# Patient Record
Sex: FEMALE | Race: WHITE | HISPANIC OR LATINO | Employment: UNEMPLOYED | URBAN - METROPOLITAN AREA
[De-identification: names, ages, dates, MRNs, and addresses within clinical notes are randomized per-mention and may not be internally consistent; named-entity substitution may affect disease eponyms.]

---

## 2017-01-01 ENCOUNTER — PATIENT MESSAGE (OUTPATIENT)
Dept: INTERNAL MEDICINE | Facility: CLINIC | Age: 72
End: 2017-01-01

## 2017-01-03 ENCOUNTER — TELEPHONE (OUTPATIENT)
Dept: FAMILY MEDICINE | Facility: CLINIC | Age: 72
End: 2017-01-03

## 2017-01-03 RX ORDER — METOPROLOL SUCCINATE 25 MG/1
25 TABLET, EXTENDED RELEASE ORAL DAILY
Qty: 30 TABLET | Refills: 0 | Status: SHIPPED | OUTPATIENT
Start: 2017-01-03 | End: 2017-01-28 | Stop reason: SDUPTHER

## 2017-01-03 NOTE — TELEPHONE ENCOUNTER
Her last B/P was elevated.  Have her come in for nurse visit for repeat B/P check.  Will send out #30 metoprolol.

## 2017-01-05 ENCOUNTER — TELEPHONE (OUTPATIENT)
Dept: UROLOGY | Facility: CLINIC | Age: 72
End: 2017-01-05

## 2017-01-09 ENCOUNTER — HOSPITAL ENCOUNTER (OUTPATIENT)
Dept: RADIOLOGY | Facility: HOSPITAL | Age: 72
Discharge: HOME | End: 2017-01-09
Attending: UROLOGY
Payer: MEDICARE

## 2017-01-09 DIAGNOSIS — R31.29 HEMATURIA, MICROSCOPIC: ICD-10-CM

## 2017-01-09 PROCEDURE — 76770 US EXAM ABDO BACK WALL COMP: CPT | Mod: TC,PO

## 2017-01-09 PROCEDURE — 76770 US EXAM ABDO BACK WALL COMP: CPT | Mod: 26,,, | Performed by: RADIOLOGY

## 2017-01-11 ENCOUNTER — OFFICE VISIT (OUTPATIENT)
Dept: FAMILY MEDICINE | Facility: CLINIC | Age: 72
End: 2017-01-11
Payer: MEDICARE

## 2017-01-11 ENCOUNTER — HOSPITAL ENCOUNTER (OUTPATIENT)
Dept: RADIOLOGY | Facility: HOSPITAL | Age: 72
Discharge: HOME | End: 2017-01-11
Attending: NURSE PRACTITIONER
Payer: MEDICARE

## 2017-01-11 VITALS
BODY MASS INDEX: 28.68 KG/M2 | TEMPERATURE: 98 F | DIASTOLIC BLOOD PRESSURE: 89 MMHG | HEART RATE: 82 BPM | WEIGHT: 168 LBS | SYSTOLIC BLOOD PRESSURE: 122 MMHG | HEIGHT: 64 IN

## 2017-01-11 DIAGNOSIS — M79.671 RIGHT FOOT PAIN: Primary | ICD-10-CM

## 2017-01-11 DIAGNOSIS — M79.672 LEFT FOOT PAIN: ICD-10-CM

## 2017-01-11 DIAGNOSIS — M79.671 RIGHT FOOT PAIN: ICD-10-CM

## 2017-01-11 PROCEDURE — 73630 X-RAY EXAM OF FOOT: CPT | Mod: 26,RT,, | Performed by: RADIOLOGY

## 2017-01-11 PROCEDURE — 99999 PR PBB SHADOW E&M-EST. PATIENT-LVL IV: CPT | Mod: PBBFAC,,, | Performed by: NURSE PRACTITIONER

## 2017-01-11 PROCEDURE — 99213 OFFICE O/P EST LOW 20 MIN: CPT | Mod: S$PBB,,, | Performed by: NURSE PRACTITIONER

## 2017-01-11 PROCEDURE — 73630 X-RAY EXAM OF FOOT: CPT | Mod: TC,PO,RT

## 2017-01-11 RX ORDER — DICLOFENAC SODIUM 75 MG/1
75 TABLET, DELAYED RELEASE ORAL 2 TIMES DAILY PRN
Qty: 40 TABLET | Refills: 0 | Status: SHIPPED | OUTPATIENT
Start: 2017-01-11 | End: 2017-01-28 | Stop reason: SDUPTHER

## 2017-01-11 NOTE — PROGRESS NOTES
Subjective:       Patient ID: Niurka Pedraza is a 71 y.o. female.    Chief Complaint: right foot pain    HPI Comments: Right ankle and foot swelling for months, has gotten worse over last week, by end of day will be very painful and swollen. No prior injury, has not tried anything at home for the pain.    Vitals:    01/11/17 1052   BP: 122/89   Pulse: 82   Temp: 98.3 °F (36.8 °C)     Review of Systems   Constitutional: Negative for chills and fever.   Respiratory: Negative for shortness of breath.    Cardiovascular: Negative for chest pain and palpitations.   Musculoskeletal: Positive for arthralgias (atrhriyis to hands, knees and hips also bother her in am, difficult to get started first thing in am).        Right ankle and foot pain with swelling, states has not been hot or red   Skin: Negative.  Negative for wound.       Past Medical History   Diagnosis Date    Coccidiomycosis, progressive     Hyperlipidemia     Hypertension     Pulmonary nodules      Objective:      Physical Exam   Constitutional: She is oriented to person, place, and time. She appears well-developed and well-nourished.   HENT:   Head: Normocephalic.   Eyes: Pupils are equal, round, and reactive to light.   Cardiovascular: Normal rate, regular rhythm, normal heart sounds and intact distal pulses.    No murmur heard.  Pulmonary/Chest: Effort normal and breath sounds normal.   Abdominal: Soft. Bowel sounds are normal.   Musculoskeletal:        Right ankle: She exhibits decreased range of motion and swelling. She exhibits no ecchymosis and normal pulse. Tenderness. Medial malleolus tenderness found.   Right medial malleolus tender and swollen, non pitting edema   Neurological: She is alert and oriented to person, place, and time.   Skin: Skin is warm and dry.   Psychiatric: She has a normal mood and affect.   Nursing note and vitals reviewed.      Assessment:       1. Right foot pain        Plan:       Right foot pain  -     Cancel: X-Ray Foot  Complete Left; Future; Expected date: 1/11/17  -     diclofenac (VOLTAREN) 75 MG EC tablet; Take 1 tablet (75 mg total) by mouth 2 (two) times daily as needed.  Dispense: 40 tablet; Refill: 0  -     X-Ray Foot Complete Right; Future; Expected date: 1/11/17    will xray foot today, diclofenac prn BID, compression stockings recommended when will be sitting for prolonged periods of time        Return for pending test results.

## 2017-01-11 NOTE — MR AVS SNAPSHOT
[facility]  [address]  [place] [address]  Phone: [phone]  Fax: [phone]                  Niurka Pedraza   2017 11:00 AM   Office Visit    Description:  Female : 1945   Provider:  LE Peralta   Department:  [facility]           Reason for Visit     right foot pain           Diagnoses this Visit        Comments    Left foot pain    -  Primary            To Do List           Future Appointments        Provider Department Dept Phone    2017 10:30 AM LAB, [place] [facility] [phone]    2017 10:30 AM MATA Dhaliwal MD [facility] [phone]      Goals (5 Years of Data)     None       These Medications        Disp Refills Start End    diclofenac (VOLTAREN) 75 MG EC tablet 40 tablet 0 2017     Take 1 tablet (75 mg total) by mouth 2 (two) times daily as needed. - Oral    Pharmacy: [facility]/pharmacy #[id number] - [place], [place] - [address]  #: [phone]         [facility] On Call     [facility] On [facility] Nurse Care Line -  Assistance  Registered nurses in the [facility] provide clinical advisement, health education, appointment booking, and other advisory services.  Call for this free service at [phone].             Medications           Message regarding Medications     Verify the changes and/or additions to your medication regime listed below are the same as discussed with your clinician today.  If any of these changes or additions are incorrect, please notify your healthcare provider.        START taking these NEW medications        Refills    diclofenac (VOLTAREN) 75 MG EC tablet 0    Sig: Take 1 tablet (75 mg total) by mouth 2 (two) times daily as needed.    Class: Normal    Route: Oral           Verify that the below list of medications is an accurate representation of the medications you are currently taking.  If none reported, the list may be blank. If  "incorrect, please contact your healthcare provider. Carry this list with you in case of emergency.           Current Medications     atorvastatin (LIPITOR) 20 MG tablet Take 1 tablet (20 mg total) by mouth once daily.    calcium-vitamin D3 (CALCIUM 500 + D) 500 mg(1,250mg) -200 unit per tablet Take 1 tablet by mouth 2 (two) times daily with meals.    levothyroxine (SYNTHROID) 50 MCG tablet Take 1 tablet (50 mcg total) by mouth once daily.    metoprolol succinate (TOPROL-XL) 25 MG 24 hr tablet Take 1 tablet (25 mg total) by mouth once daily.    multivitamin (ONE DAILY MULTIVITAMIN) per tablet Take 1 tablet by mouth once daily.    diclofenac (VOLTAREN) 75 MG EC tablet Take 1 tablet (75 mg total) by mouth 2 (two) times daily as needed.           Clinical Reference Information           Vital Signs - Last Recorded  Most recent update: 1/11/2017 10:55 AM by Araseli Cruz MA    BP Pulse Temp Ht Wt BMI    122/89 (BP Location: Left arm, Patient Position: Sitting, BP Method: Manual) 82 98.3 °F (36.8 °C) (Oral) 5' 4" (1.626 m) 76.2 kg (167 lb 15.9 oz) 28.84 kg/m2      Blood Pressure          Most Recent Value    BP  122/89      Allergies as of 1/11/2017     No Known Allergies      Immunizations Administered on Date of Encounter - 1/11/2017     None      Orders Placed During Today's Visit     Future Labs/Procedures Expected by Expires    X-Ray Foot Complete Left  1/11/2017 1/12/2018      Instructions    Compression stockings, wear when you will be sitting for prolonged periods of time.       "

## 2017-01-15 ENCOUNTER — PATIENT MESSAGE (OUTPATIENT)
Dept: INTERNAL MEDICINE | Facility: CLINIC | Age: 72
End: 2017-01-15

## 2017-01-16 ENCOUNTER — PROCEDURE VISIT (OUTPATIENT)
Dept: UROLOGY | Facility: CLINIC | Age: 72
End: 2017-01-16
Payer: MEDICARE

## 2017-01-16 VITALS
BODY MASS INDEX: 28.68 KG/M2 | WEIGHT: 168 LBS | HEIGHT: 64 IN | SYSTOLIC BLOOD PRESSURE: 161 MMHG | DIASTOLIC BLOOD PRESSURE: 91 MMHG | HEART RATE: 71 BPM

## 2017-01-16 DIAGNOSIS — R31.29 MICROHEMATURIA: Primary | ICD-10-CM

## 2017-01-16 PROCEDURE — 88112 CYTOPATH CELL ENHANCE TECH: CPT | Performed by: PATHOLOGY

## 2017-01-16 PROCEDURE — 52000 CYSTOURETHROSCOPY: CPT | Mod: PBBFAC,PO | Performed by: UROLOGY

## 2017-01-16 PROCEDURE — 88112 CYTOPATH CELL ENHANCE TECH: CPT | Mod: 26,,, | Performed by: PATHOLOGY

## 2017-01-16 NOTE — PROCEDURES
"Cystoscopy  Date/Time: 1/16/2017 11:51 AM  Performed by: MATA LEO  Authorized by: MATA LEO     Consent Done?:  Yes (Written)  Time out: Immediately prior to procedure a "time out" was called to verify the correct patient, procedure, equipment, support staff and site/side marked as required.    Indications: recurrent UTIs and hematuria    Position:  Other  Anesthesia:  Lidocaine jelly  Patient sedated?: No    Preparation: Patient was prepped and draped in usual sterile fashion      Scope type:  Flexible cystoscope    Patient tolerance:  Patient tolerated the procedure well with no immediate complications      The patient was placed in the frog-leg position.  The genitals were prepped and draped in a sterile fashion.  Viscous lidocaine jelly was intsilled into the urethra.  The uretrhal was dilated with sounds to 22 [place].  Using a flexible scope, a careful cystoscopic exam was then performed.  The entire bladder mucosa was systematically visualized.  There was mild bladder wall trabeculation, otherwise the mucosa appeared normal.  There were no lesions, masses foreign bodies or stones.   Each ureteral orifices were visualized and both had clear efflux of urine.  Barbotage was performed and washings were collected for cytological evaluation.  The cystoscope was then removed and I examined the entire length of the urethra.  The urethra appeared normal.  She tolerated the procedure well.  There were no complications.    Impression:  Normal cystoscopy.      "

## 2017-01-17 ENCOUNTER — LAB VISIT (OUTPATIENT)
Dept: LAB | Facility: HOSPITAL | Age: 72
End: 2017-01-17
Attending: INTERNAL MEDICINE
Payer: MEDICARE

## 2017-01-17 DIAGNOSIS — E03.9 HYPOTHYROIDISM, UNSPECIFIED TYPE: ICD-10-CM

## 2017-01-17 LAB — TSH SERPL DL<=0.005 MIU/L-ACNC: 2.48 UIU/ML

## 2017-01-17 PROCEDURE — 36415 COLL VENOUS BLD VENIPUNCTURE: CPT | Mod: PO

## 2017-01-17 PROCEDURE — 84443 ASSAY THYROID STIM HORMONE: CPT

## 2017-01-20 ENCOUNTER — TELEPHONE (OUTPATIENT)
Dept: UROLOGY | Facility: CLINIC | Age: 72
End: 2017-01-20

## 2017-01-20 RX ORDER — FERROUS SULFATE, DRIED 160(50) MG
1 TABLET, EXTENDED RELEASE ORAL 2 TIMES DAILY WITH MEALS
COMMUNITY
Start: 2017-01-20 | End: 2017-01-26 | Stop reason: SDUPTHER

## 2017-01-20 NOTE — TELEPHONE ENCOUNTER
----- Message from MATA Dhaliwal MD sent at 1/19/2017  4:45 PM CST -----  Call with urine cytology.  Negative.

## 2017-01-20 NOTE — TELEPHONE ENCOUNTER
Spoke to pt and informed her of the test results of the urine cytology. Pt verbalized understanding.

## 2017-01-22 ENCOUNTER — PATIENT MESSAGE (OUTPATIENT)
Dept: INTERNAL MEDICINE | Facility: CLINIC | Age: 72
End: 2017-01-22

## 2017-01-27 RX ORDER — FERROUS SULFATE, DRIED 160(50) MG
1 TABLET, EXTENDED RELEASE ORAL 2 TIMES DAILY WITH MEALS
COMMUNITY
Start: 2017-01-27 | End: 2017-04-18 | Stop reason: SDUPTHER

## 2017-01-28 DIAGNOSIS — M79.671 RIGHT FOOT PAIN: ICD-10-CM

## 2017-01-30 RX ORDER — DICLOFENAC SODIUM 75 MG/1
TABLET, DELAYED RELEASE ORAL
Qty: 40 TABLET | Refills: 0 | Status: SHIPPED | OUTPATIENT
Start: 2017-01-30 | End: 2017-03-21 | Stop reason: SDUPTHER

## 2017-01-30 RX ORDER — METOPROLOL SUCCINATE 25 MG/1
25 TABLET, EXTENDED RELEASE ORAL DAILY
Qty: 30 TABLET | Refills: 0 | OUTPATIENT
Start: 2017-01-30

## 2017-01-31 ENCOUNTER — TELEPHONE (OUTPATIENT)
Dept: FAMILY MEDICINE | Facility: CLINIC | Age: 72
End: 2017-01-31

## 2017-01-31 ENCOUNTER — CLINICAL SUPPORT (OUTPATIENT)
Dept: FAMILY MEDICINE | Facility: CLINIC | Age: 72
End: 2017-01-31
Payer: MEDICARE

## 2017-01-31 VITALS — DIASTOLIC BLOOD PRESSURE: 86 MMHG | SYSTOLIC BLOOD PRESSURE: 134 MMHG

## 2017-01-31 PROCEDURE — 99999 PR PBB SHADOW E&M-EST. PATIENT-LVL II: CPT | Mod: PBBFAC,,,

## 2017-01-31 PROCEDURE — 99212 OFFICE O/P EST SF 10 MIN: CPT | Mod: PBBFAC,PO

## 2017-01-31 RX ORDER — METOPROLOL SUCCINATE 25 MG/1
25 TABLET, EXTENDED RELEASE ORAL DAILY
Qty: 30 TABLET | Refills: 5 | Status: SHIPPED | OUTPATIENT
Start: 2017-01-31 | End: 2017-02-09 | Stop reason: SDUPTHER

## 2017-01-31 NOTE — PROGRESS NOTES
Pt here for nurse visit to check b/p. Allergies and medication reconciliation completed. Pt states correct. States that she has taken her medication this morning. 134/86 left arm sitting.

## 2017-02-09 DIAGNOSIS — E03.9 HYPOTHYROIDISM, UNSPECIFIED TYPE: ICD-10-CM

## 2017-02-10 RX ORDER — LEVOTHYROXINE SODIUM 50 UG/1
50 TABLET ORAL
Qty: 30 TABLET | Refills: 11 | Status: SHIPPED | OUTPATIENT
Start: 2017-02-10 | End: 2017-02-19 | Stop reason: SDUPTHER

## 2017-02-10 RX ORDER — METOPROLOL SUCCINATE 25 MG/1
25 TABLET, EXTENDED RELEASE ORAL DAILY
Qty: 30 TABLET | Refills: 11 | Status: SHIPPED | OUTPATIENT
Start: 2017-02-10 | End: 2017-04-18 | Stop reason: SDUPTHER

## 2017-02-19 DIAGNOSIS — E03.9 HYPOTHYROIDISM, UNSPECIFIED TYPE: ICD-10-CM

## 2017-02-19 RX ORDER — LEVOTHYROXINE SODIUM 50 UG/1
TABLET ORAL
Qty: 30 TABLET | Refills: 9 | Status: SHIPPED | OUTPATIENT
Start: 2017-02-19 | End: 2017-03-20 | Stop reason: SDUPTHER

## 2017-03-15 DIAGNOSIS — M79.671 RIGHT FOOT PAIN: ICD-10-CM

## 2017-03-15 RX ORDER — DICLOFENAC SODIUM 75 MG/1
TABLET, DELAYED RELEASE ORAL
Qty: 40 TABLET | Refills: 0 | Status: CANCELLED | OUTPATIENT
Start: 2017-03-15

## 2017-03-20 ENCOUNTER — TELEPHONE (OUTPATIENT)
Dept: FAMILY MEDICINE | Facility: CLINIC | Age: 72
End: 2017-03-20

## 2017-03-20 DIAGNOSIS — E03.9 HYPOTHYROIDISM, UNSPECIFIED TYPE: ICD-10-CM

## 2017-03-20 DIAGNOSIS — M79.671 RIGHT FOOT PAIN: ICD-10-CM

## 2017-03-23 RX ORDER — LEVOTHYROXINE SODIUM 50 UG/1
TABLET ORAL
Qty: 30 TABLET | Refills: 9 | Status: SHIPPED | OUTPATIENT
Start: 2017-03-23 | End: 2017-04-18 | Stop reason: SDUPTHER

## 2017-03-23 RX ORDER — DICLOFENAC SODIUM 75 MG/1
TABLET, DELAYED RELEASE ORAL
Qty: 40 TABLET | Refills: 0 | Status: SHIPPED | OUTPATIENT
Start: 2017-03-23 | End: 2017-09-01 | Stop reason: SDUPTHER

## 2017-03-31 ENCOUNTER — LAB VISIT (OUTPATIENT)
Dept: LAB | Facility: HOSPITAL | Age: 72
End: 2017-03-31
Attending: UROLOGY
Payer: MEDICARE

## 2017-03-31 ENCOUNTER — TELEPHONE (OUTPATIENT)
Dept: UROLOGY | Facility: CLINIC | Age: 72
End: 2017-03-31

## 2017-03-31 DIAGNOSIS — N39.0 URINARY TRACT INFECTION WITHOUT HEMATURIA, SITE UNSPECIFIED: Primary | ICD-10-CM

## 2017-03-31 DIAGNOSIS — R30.0 DYSURIA: Primary | ICD-10-CM

## 2017-03-31 DIAGNOSIS — N39.0 URINARY TRACT INFECTION WITHOUT HEMATURIA, SITE UNSPECIFIED: ICD-10-CM

## 2017-03-31 LAB
BACTERIA #/AREA URNS HPF: ABNORMAL /HPF
BILIRUB UR QL STRIP: NEGATIVE
CLARITY UR: CLEAR
COLOR UR: ABNORMAL
GLUCOSE UR QL STRIP: ABNORMAL
HGB UR QL STRIP: ABNORMAL
HYALINE CASTS #/AREA URNS LPF: 0 /LPF
KETONES UR QL STRIP: NEGATIVE
LEUKOCYTE ESTERASE UR QL STRIP: NEGATIVE
MICROSCOPIC COMMENT: ABNORMAL
NITRITE UR QL STRIP: POSITIVE
PH UR STRIP: 6 [PH] (ref 5–8)
PROT UR QL STRIP: ABNORMAL
RBC #/AREA URNS HPF: 25 /HPF (ref 0–4)
SP GR UR STRIP: >=1.03 (ref 1–1.03)
SQUAMOUS #/AREA URNS HPF: 5 /HPF
URN SPEC COLLECT METH UR: ABNORMAL
WBC #/AREA URNS HPF: 50 /HPF (ref 0–5)

## 2017-03-31 PROCEDURE — 81000 URINALYSIS NONAUTO W/SCOPE: CPT | Mod: PO

## 2017-03-31 PROCEDURE — 87077 CULTURE AEROBIC IDENTIFY: CPT

## 2017-03-31 PROCEDURE — 87088 URINE BACTERIA CULTURE: CPT

## 2017-03-31 PROCEDURE — 87186 SC STD MICRODIL/AGAR DIL: CPT

## 2017-03-31 PROCEDURE — 87086 URINE CULTURE/COLONY COUNT: CPT

## 2017-03-31 RX ORDER — CIPROFLOXACIN 500 MG/1
500 TABLET ORAL 2 TIMES DAILY
Qty: 10 TABLET | Refills: 0 | Status: SHIPPED | OUTPATIENT
Start: 2017-03-31 | End: 2017-04-05

## 2017-03-31 NOTE — TELEPHONE ENCOUNTER
Patient requested a urinalysis due to painful urination. Please sign orders for u/a and c&s. We will call her later today with results.

## 2017-03-31 NOTE — TELEPHONE ENCOUNTER
Please review urinalysis and advise on the test results. Pt would like to get started on an antibiotic.

## 2017-03-31 NOTE — TELEPHONE ENCOUNTER
Anusha,   Pt stopped in clinic asking for Dr Zelaya to rx her antibiotics for reoccurring UTI. I see she is a patient of Dr Dhaliwal. Dr Zelaya stated she will not rx for this. Can you assist pt with this issue. [phone].

## 2017-04-03 ENCOUNTER — PATIENT MESSAGE (OUTPATIENT)
Dept: INTERNAL MEDICINE | Facility: CLINIC | Age: 72
End: 2017-04-03

## 2017-04-03 ENCOUNTER — TELEPHONE (OUTPATIENT)
Dept: FAMILY MEDICINE | Facility: CLINIC | Age: 72
End: 2017-04-03

## 2017-04-03 DIAGNOSIS — N39.0 URINARY TRACT INFECTION WITHOUT HEMATURIA, SITE UNSPECIFIED: Primary | ICD-10-CM

## 2017-04-03 LAB — BACTERIA UR CULT: NORMAL

## 2017-04-03 NOTE — TELEPHONE ENCOUNTER
Please notify pt. That her culture was sensitive to the antibiotic given, it should have worked.  Please come in and repeat urinalysis.

## 2017-04-03 NOTE — TELEPHONE ENCOUNTER
Preet Zelaya, I would like to know if you have the results of my urine analysis from last friday. I continue in pain. Thanks Niurka

## 2017-04-04 ENCOUNTER — TELEPHONE (OUTPATIENT)
Dept: UROLOGY | Facility: CLINIC | Age: 72
End: 2017-04-04

## 2017-04-04 NOTE — TELEPHONE ENCOUNTER
----- Message from MATA Dhaliwal MD sent at 4/3/2017  5:35 PM CDT -----  Call with culture results.  Positive and sensitive to Cipro

## 2017-04-04 NOTE — TELEPHONE ENCOUNTER
----- Message from Brigette Duckworth sent at 4/3/2017  3:23 PM CDT -----  Contact: self  Patient [phone] is calling concerning her urine results from Friday 03 31 17/please call patient

## 2017-04-09 ENCOUNTER — PATIENT MESSAGE (OUTPATIENT)
Dept: UROLOGY | Facility: CLINIC | Age: 72
End: 2017-04-09

## 2017-04-10 RX ORDER — CEFDINIR 300 MG/1
300 CAPSULE ORAL 2 TIMES DAILY
Qty: 20 CAPSULE | Refills: 0 | Status: SHIPPED | OUTPATIENT
Start: 2017-04-10 | End: 2017-04-20

## 2017-04-10 NOTE — TELEPHONE ENCOUNTER
Persistent frequency, NO fever.  States that Cipro did not help and would like Cefdinir sent to her pharmacy

## 2017-04-10 NOTE — TELEPHONE ENCOUNTER
----- Message from Brigette Del Angel sent at 4/10/2017 10:28 AM CDT -----  Contact: Niurka  Patient is calling to state Rx antibiotic did not work and is asking for Rx that Dr Zelaya prescribed in December as it did work (Rx Cefdinir). Please call [phone]. Thanks!     [id number] [place] - [address]  [address]  [facility] [address]  Phone: [phone] Fax: [phone]

## 2017-04-18 ENCOUNTER — TELEPHONE (OUTPATIENT)
Dept: FAMILY MEDICINE | Facility: CLINIC | Age: 72
End: 2017-04-18

## 2017-04-18 DIAGNOSIS — E03.9 HYPOTHYROIDISM, UNSPECIFIED TYPE: ICD-10-CM

## 2017-04-18 RX ORDER — LEVOTHYROXINE SODIUM 50 UG/1
TABLET ORAL
Qty: 30 TABLET | Refills: 9 | Status: SHIPPED | OUTPATIENT
Start: 2017-04-18 | End: 2017-05-16 | Stop reason: SDUPTHER

## 2017-04-18 RX ORDER — METOPROLOL SUCCINATE 25 MG/1
25 TABLET, EXTENDED RELEASE ORAL DAILY
Qty: 30 TABLET | Refills: 6 | Status: SHIPPED | OUTPATIENT
Start: 2017-04-18 | End: 2018-01-10 | Stop reason: SDUPTHER

## 2017-04-18 RX ORDER — FERROUS SULFATE, DRIED 160(50) MG
1 TABLET, EXTENDED RELEASE ORAL 2 TIMES DAILY WITH MEALS
COMMUNITY
Start: 2017-04-18

## 2017-04-27 ENCOUNTER — OFFICE VISIT (OUTPATIENT)
Dept: UROLOGY | Facility: CLINIC | Age: 72
End: 2017-04-27
Payer: MEDICARE

## 2017-04-27 VITALS
HEART RATE: 81 BPM | SYSTOLIC BLOOD PRESSURE: 125 MMHG | BODY MASS INDEX: 28.65 KG/M2 | WEIGHT: 166.88 LBS | DIASTOLIC BLOOD PRESSURE: 86 MMHG

## 2017-04-27 DIAGNOSIS — R39.15 URINARY URGENCY: ICD-10-CM

## 2017-04-27 DIAGNOSIS — N30.90 CYSTITIS WITHOUT HEMATURIA: ICD-10-CM

## 2017-04-27 DIAGNOSIS — R35.0 INCREASED URINARY FREQUENCY: ICD-10-CM

## 2017-04-27 DIAGNOSIS — R30.0 DYSURIA: Primary | ICD-10-CM

## 2017-04-27 LAB
BILIRUB SERPL-MCNC: ABNORMAL MG/DL
BLOOD URINE, POC: 250
COLOR, POC UA: ABNORMAL
GLUCOSE UR QL STRIP: ABNORMAL
KETONES UR QL STRIP: ABNORMAL
LEUKOCYTE ESTERASE URINE, POC: ABNORMAL
NITRITE, POC UA: ABNORMAL
PH, POC UA: 5
PROTEIN, POC: 100
SPECIFIC GRAVITY, POC UA: 1.02
UROBILINOGEN, POC UA: ABNORMAL

## 2017-04-27 PROCEDURE — 99999 PR PBB SHADOW E&M-EST. PATIENT-LVL III: CPT | Mod: PBBFAC,,, | Performed by: UROLOGY

## 2017-04-27 PROCEDURE — 87186 SC STD MICRODIL/AGAR DIL: CPT

## 2017-04-27 PROCEDURE — 99214 OFFICE O/P EST MOD 30 MIN: CPT | Mod: S$PBB,,, | Performed by: UROLOGY

## 2017-04-27 PROCEDURE — 87086 URINE CULTURE/COLONY COUNT: CPT

## 2017-04-27 PROCEDURE — 87077 CULTURE AEROBIC IDENTIFY: CPT

## 2017-04-27 PROCEDURE — 87088 URINE BACTERIA CULTURE: CPT

## 2017-04-27 PROCEDURE — 99213 OFFICE O/P EST LOW 20 MIN: CPT | Mod: PBBFAC,PO | Performed by: UROLOGY

## 2017-04-27 PROCEDURE — 81002 URINALYSIS NONAUTO W/O SCOPE: CPT | Mod: PBBFAC,PO | Performed by: UROLOGY

## 2017-04-27 RX ORDER — AMOXICILLIN AND CLAVULANATE POTASSIUM 875; 125 MG/1; MG/1
1 TABLET, FILM COATED ORAL 2 TIMES DAILY
Qty: 20 TABLET | Refills: 0 | Status: SHIPPED | OUTPATIENT
Start: 2017-04-27 | End: 2017-05-08

## 2017-04-27 NOTE — PROGRESS NOTES
Subjective:       Patient ID: Niurka Pedraza is a 72 y.o. female.    Chief Complaint: Dysuria    HPI     72 year old with recurrent UTIs.  Urine last month gre E. Coli and she was treated with a course of Cipro based on culture results.  Her symptoms improved but then quickly returned.  She complains of urinary frequency, urgency and dysuria.  She was then seen in urgent care an repeat urine culture grew Enterococcuus.  She was given a course of Bactrim.  Symptoms have persisted.    Urine dipstick shows positive for blood and positive for leukocytes.  Micro exam: 5-10 WBC's per HPF and 10-15 RBC's per HPF.    Review of Systems   Constitutional: Negative for fever.   Genitourinary: Negative for dysuria and hematuria.       Objective:      Physical Exam   Constitutional: She is oriented to person, place, and time. She appears well-developed and well-nourished.   HENT:   Head: Normocephalic.   Eyes: Conjunctivae and EOM are normal.   Neck: Normal range of motion.   Cardiovascular: Normal rate.    Pulmonary/Chest: Effort normal.   Abdominal: Soft. She exhibits no distension and no mass. There is no tenderness.   Genitourinary:   Genitourinary Comments: Bladder non-tender and nondistended  No CVA tenderness   Musculoskeletal: She exhibits no edema.   Neurological: She is alert and oriented to person, place, and time.   Skin: Skin is warm and dry. No rash noted. No erythema.   Psychiatric: She has a normal mood and affect. Her behavior is normal.   Vitals reviewed.      Assessment:       1. Dysuria    2. Cystitis without hematuria    3. Increased urinary frequency    4. Urinary urgency        Plan:       Dysuria  -     POCT URINE DIPSTICK WITHOUT MICROSCOPE    Cystitis without hematuria  -     Urine culture  -     amoxicillin-clavulanate 875-125mg (AUGMENTIN) 875-125 mg per tablet; Take 1 tablet by mouth 2 (two) times daily.  Dispense: 20 tablet; Refill: 0  -     methen-m.blue-s.phos-phsal-hyo 118-10-40.8-36 mg Cap; Take 1  capsule by mouth every 8 (eight) hours as needed.  Dispense: 20 capsule; Refill: 2    Increased urinary frequency    Urinary urgency

## 2017-05-01 ENCOUNTER — TELEPHONE (OUTPATIENT)
Dept: UROLOGY | Facility: CLINIC | Age: 72
End: 2017-05-01

## 2017-05-01 LAB — BACTERIA UR CULT: NORMAL

## 2017-05-01 NOTE — TELEPHONE ENCOUNTER
----- Message from MATA Dhaliwal MD sent at 5/1/2017  4:17 PM CDT -----  Call with urine culture.  Positive and sensitive to Augmentin.

## 2017-05-01 NOTE — TELEPHONE ENCOUNTER
Called to inform patient that Dr. Dhaliwal states her culture results are positive and sensitive to Augmentin. She is to continue Augmentin as prescribed. Patient verbalized understanding.

## 2017-05-08 ENCOUNTER — OFFICE VISIT (OUTPATIENT)
Dept: UROLOGY | Facility: CLINIC | Age: 72
End: 2017-05-08
Payer: MEDICARE

## 2017-05-08 VITALS
SYSTOLIC BLOOD PRESSURE: 145 MMHG | HEART RATE: 74 BPM | BODY MASS INDEX: 29.35 KG/M2 | HEIGHT: 64 IN | DIASTOLIC BLOOD PRESSURE: 83 MMHG | WEIGHT: 171.94 LBS

## 2017-05-08 DIAGNOSIS — N30.00 ACUTE CYSTITIS WITHOUT HEMATURIA: Primary | ICD-10-CM

## 2017-05-08 DIAGNOSIS — N30.90 CYSTITIS WITHOUT HEMATURIA: ICD-10-CM

## 2017-05-08 LAB
BILIRUB SERPL-MCNC: ABNORMAL MG/DL
BLOOD URINE, POC: ABNORMAL
COLOR, POC UA: ABNORMAL
GLUCOSE UR QL STRIP: ABNORMAL
KETONES UR QL STRIP: ABNORMAL
LEUKOCYTE ESTERASE URINE, POC: ABNORMAL
NITRITE, POC UA: ABNORMAL
PH, POC UA: 5
PROTEIN, POC: ABNORMAL
SPECIFIC GRAVITY, POC UA: 1.03
UROBILINOGEN, POC UA: ABNORMAL

## 2017-05-08 PROCEDURE — 99213 OFFICE O/P EST LOW 20 MIN: CPT | Mod: S$PBB,,, | Performed by: UROLOGY

## 2017-05-08 PROCEDURE — 99999 PR PBB SHADOW E&M-EST. PATIENT-LVL III: CPT | Mod: PBBFAC,,, | Performed by: UROLOGY

## 2017-05-08 PROCEDURE — 81002 URINALYSIS NONAUTO W/O SCOPE: CPT | Mod: PBBFAC,PO | Performed by: UROLOGY

## 2017-05-08 PROCEDURE — 99213 OFFICE O/P EST LOW 20 MIN: CPT | Mod: PBBFAC,PO | Performed by: UROLOGY

## 2017-05-08 PROCEDURE — 87086 URINE CULTURE/COLONY COUNT: CPT

## 2017-05-08 NOTE — PROGRESS NOTES
Subjective:       Patient ID: Niurka Pedraza is a 72 y.o. female.    Chief Complaint: Follow-up    HPI     72 year old with recurrent UTIs. Urine last month gre E. Coli and she was treated with a course of Cipro based on culture results. Her symptoms improved but then quickly returned. She complains of urinary frequency, urgency and dysuria. She was then seen in urgent care an repeat urine culture grew Enterococcus. She was given a course of Bactrim. Symptoms have persisted.  Enterococcus resistant to Bactrim and sensitive to Ampicillin.  She now has completed a 10 days course of Augmentin.  She feels much better.  Still has frequency.  Urine dipstick shows negative for nitrites, leukocytes, glucose, protein, positive for 1+ blood.  Micro exam: 10-15 WBC's per HPF and 2-3 RBC's per HPF.    Review of Systems   Constitutional: Negative for fever.   Genitourinary: Negative for dysuria and hematuria.       Objective:      Physical Exam   Constitutional: She is oriented to person, place, and time. She appears well-developed and well-nourished.   Pulmonary/Chest: Effort normal. No respiratory distress.   Neurological: She is alert and oriented to person, place, and time.   Skin: Skin is warm.   Psychiatric: She has a normal mood and affect. Her behavior is normal.   Vitals reviewed.      Assessment:       1. Acute cystitis without hematuria    2. Cystitis without hematuria        Plan:       Acute cystitis without hematuria  -     POCT URINE DIPSTICK WITHOUT MICROSCOPE  -     Urine culture    Cystitis without hematuria      ?persistent infection.  Repeat urine culture.  Will hold additional antibiotics pending culture results.

## 2017-05-10 LAB — BACTERIA UR CULT: NORMAL

## 2017-05-11 ENCOUNTER — TELEPHONE (OUTPATIENT)
Dept: UROLOGY | Facility: CLINIC | Age: 72
End: 2017-05-11

## 2017-05-11 NOTE — TELEPHONE ENCOUNTER
----- Message from MATA Dhaliwal MD sent at 5/11/2017  7:08 AM CDT -----  Call patient with culture results.  Negative

## 2017-05-16 DIAGNOSIS — E03.9 HYPOTHYROIDISM, UNSPECIFIED TYPE: ICD-10-CM

## 2017-05-17 RX ORDER — LEVOTHYROXINE SODIUM 50 UG/1
TABLET ORAL
Qty: 30 TABLET | Refills: 9 | Status: SHIPPED | OUTPATIENT
Start: 2017-05-17 | End: 2018-02-01 | Stop reason: SDUPTHER

## 2017-05-29 ENCOUNTER — PATIENT MESSAGE (OUTPATIENT)
Dept: UROLOGY | Facility: CLINIC | Age: 72
End: 2017-05-29

## 2017-05-29 ENCOUNTER — LAB VISIT (OUTPATIENT)
Dept: LAB | Facility: HOSPITAL | Age: 72
End: 2017-05-29
Attending: UROLOGY
Payer: MEDICARE

## 2017-05-29 ENCOUNTER — TELEPHONE (OUTPATIENT)
Dept: UROLOGY | Facility: CLINIC | Age: 72
End: 2017-05-29

## 2017-05-29 DIAGNOSIS — R30.0 DYSURIA: ICD-10-CM

## 2017-05-29 DIAGNOSIS — R30.0 DYSURIA: Primary | ICD-10-CM

## 2017-05-29 LAB
BACTERIA #/AREA URNS HPF: ABNORMAL /HPF
BILIRUB UR QL STRIP: NEGATIVE
CLARITY UR: ABNORMAL
COLOR UR: YELLOW
GLUCOSE UR QL STRIP: NEGATIVE
HGB UR QL STRIP: ABNORMAL
HYALINE CASTS #/AREA URNS LPF: 0 /LPF
KETONES UR QL STRIP: NEGATIVE
LEUKOCYTE ESTERASE UR QL STRIP: ABNORMAL
MICROSCOPIC COMMENT: ABNORMAL
NITRITE UR QL STRIP: POSITIVE
PH UR STRIP: 6 [PH] (ref 5–8)
PROT UR QL STRIP: ABNORMAL
RBC #/AREA URNS HPF: 50 /HPF (ref 0–4)
SP GR UR STRIP: >=1.03 (ref 1–1.03)
URN SPEC COLLECT METH UR: ABNORMAL
WBC #/AREA URNS HPF: >100 /HPF (ref 0–5)
WBC CLUMPS URNS QL MICRO: ABNORMAL

## 2017-05-29 PROCEDURE — 81000 URINALYSIS NONAUTO W/SCOPE: CPT | Mod: PO

## 2017-05-29 PROCEDURE — 87088 URINE BACTERIA CULTURE: CPT

## 2017-05-29 PROCEDURE — 87086 URINE CULTURE/COLONY COUNT: CPT

## 2017-05-29 PROCEDURE — 87077 CULTURE AEROBIC IDENTIFY: CPT

## 2017-05-29 PROCEDURE — 87186 SC STD MICRODIL/AGAR DIL: CPT

## 2017-05-29 NOTE — TELEPHONE ENCOUNTER
Patient sent a message through the portal today for dysuria and urinary frequency.  She would like to get an order for a urinalysis and culture. She is starting on Uribell today and we will call her with results.

## 2017-05-30 ENCOUNTER — TELEPHONE (OUTPATIENT)
Dept: UROLOGY | Facility: CLINIC | Age: 72
End: 2017-05-30

## 2017-05-30 RX ORDER — NITROFURANTOIN 25; 75 MG/1; MG/1
100 CAPSULE ORAL 2 TIMES DAILY
Qty: 28 CAPSULE | Refills: 0 | Status: SHIPPED | OUTPATIENT
Start: 2017-05-30 | End: 2017-06-13

## 2017-05-30 NOTE — TELEPHONE ENCOUNTER
Spoke to pt and informed her of the urinalysis results. PT verbalized understanding and to go start taking the antibiotic dr boudreaux called in for her.

## 2017-05-30 NOTE — TELEPHONE ENCOUNTER
----- Message from MATA Dhaliwal MD sent at 5/30/2017  7:52 AM CDT -----  Macrobid sent to her pharmacy

## 2017-05-31 LAB — BACTERIA UR CULT: NORMAL

## 2017-06-26 ENCOUNTER — PATIENT MESSAGE (OUTPATIENT)
Dept: UROLOGY | Facility: CLINIC | Age: 72
End: 2017-06-26

## 2017-06-26 DIAGNOSIS — N30.00 ACUTE CYSTITIS WITHOUT HEMATURIA: Primary | ICD-10-CM

## 2017-06-27 ENCOUNTER — LAB VISIT (OUTPATIENT)
Dept: LAB | Facility: HOSPITAL | Age: 72
End: 2017-06-27
Attending: UROLOGY
Payer: MEDICARE

## 2017-06-27 DIAGNOSIS — N30.00 ACUTE CYSTITIS WITHOUT HEMATURIA: ICD-10-CM

## 2017-06-27 LAB
BACTERIA #/AREA URNS HPF: ABNORMAL /HPF
BILIRUB UR QL STRIP: NEGATIVE
CLARITY UR: ABNORMAL
COLOR UR: YELLOW
GLUCOSE UR QL STRIP: NEGATIVE
HGB UR QL STRIP: ABNORMAL
HYALINE CASTS #/AREA URNS LPF: 0 /LPF
KETONES UR QL STRIP: NEGATIVE
LEUKOCYTE ESTERASE UR QL STRIP: ABNORMAL
MICROSCOPIC COMMENT: ABNORMAL
NITRITE UR QL STRIP: NEGATIVE
PH UR STRIP: 7 [PH] (ref 5–8)
PROT UR QL STRIP: ABNORMAL
RBC #/AREA URNS HPF: 15 /HPF (ref 0–4)
SP GR UR STRIP: 1.02 (ref 1–1.03)
SQUAMOUS #/AREA URNS HPF: 2 /HPF
URN SPEC COLLECT METH UR: ABNORMAL
WBC #/AREA URNS HPF: >100 /HPF (ref 0–5)

## 2017-06-27 PROCEDURE — 81000 URINALYSIS NONAUTO W/SCOPE: CPT | Mod: PO

## 2017-06-28 ENCOUNTER — TELEPHONE (OUTPATIENT)
Dept: UROLOGY | Facility: CLINIC | Age: 72
End: 2017-06-28

## 2017-06-28 DIAGNOSIS — N30.00 ACUTE CYSTITIS WITHOUT HEMATURIA: Primary | ICD-10-CM

## 2017-06-28 NOTE — TELEPHONE ENCOUNTER
Patient notified, she reports that she has a very strong sense of urgency and is urinating every hour to hour & a half.  She has dysuria and is up all night.  She would prefer an antibiotic.  I put in Bactrim for you

## 2017-06-28 NOTE — TELEPHONE ENCOUNTER
----- Message from MATA Dhaliwal MD sent at 6/28/2017  3:09 PM CDT -----  Call with UA results.  Positive.   Is she symptomatic?  Should we give her antibiotics now or wait until culture results?

## 2017-06-29 ENCOUNTER — PATIENT MESSAGE (OUTPATIENT)
Dept: UROLOGY | Facility: CLINIC | Age: 72
End: 2017-06-29

## 2017-06-29 RX ORDER — SULFAMETHOXAZOLE AND TRIMETHOPRIM 800; 160 MG/1; MG/1
1 TABLET ORAL 2 TIMES DAILY
Qty: 20 TABLET | Refills: 0 | Status: SHIPPED | OUTPATIENT
Start: 2017-06-29 | End: 2017-07-09

## 2017-06-29 NOTE — TELEPHONE ENCOUNTER
Patient notified that Bactrim was sent to pharmacy,  If she doesn't improve will need to re check urine with a urine culture.

## 2017-07-19 ENCOUNTER — PATIENT MESSAGE (OUTPATIENT)
Dept: UROLOGY | Facility: CLINIC | Age: 72
End: 2017-07-19

## 2017-07-20 ENCOUNTER — OFFICE VISIT (OUTPATIENT)
Dept: UROLOGY | Facility: CLINIC | Age: 72
End: 2017-07-20
Payer: MEDICARE

## 2017-07-20 VITALS
HEIGHT: 64 IN | SYSTOLIC BLOOD PRESSURE: 155 MMHG | DIASTOLIC BLOOD PRESSURE: 93 MMHG | BODY MASS INDEX: 28.6 KG/M2 | HEART RATE: 84 BPM | WEIGHT: 167.56 LBS

## 2017-07-20 DIAGNOSIS — N30.90 CYSTITIS WITHOUT HEMATURIA: ICD-10-CM

## 2017-07-20 DIAGNOSIS — N39.41 URGE INCONTINENCE: ICD-10-CM

## 2017-07-20 DIAGNOSIS — R30.0 DYSURIA: Primary | ICD-10-CM

## 2017-07-20 PROCEDURE — 87186 SC STD MICRODIL/AGAR DIL: CPT

## 2017-07-20 PROCEDURE — 81002 URINALYSIS NONAUTO W/O SCOPE: CPT | Mod: PBBFAC,PO | Performed by: UROLOGY

## 2017-07-20 PROCEDURE — 87086 URINE CULTURE/COLONY COUNT: CPT

## 2017-07-20 PROCEDURE — 1159F MED LIST DOCD IN RCRD: CPT | Mod: ,,, | Performed by: UROLOGY

## 2017-07-20 PROCEDURE — 99999 PR PBB SHADOW E&M-EST. PATIENT-LVL III: CPT | Mod: PBBFAC,,, | Performed by: UROLOGY

## 2017-07-20 PROCEDURE — 87077 CULTURE AEROBIC IDENTIFY: CPT

## 2017-07-20 PROCEDURE — 99214 OFFICE O/P EST MOD 30 MIN: CPT | Mod: S$PBB,,, | Performed by: UROLOGY

## 2017-07-20 PROCEDURE — 1125F AMNT PAIN NOTED PAIN PRSNT: CPT | Mod: ,,, | Performed by: UROLOGY

## 2017-07-20 PROCEDURE — 99213 OFFICE O/P EST LOW 20 MIN: CPT | Mod: PBBFAC,PO | Performed by: UROLOGY

## 2017-07-20 PROCEDURE — 87088 URINE BACTERIA CULTURE: CPT

## 2017-07-20 RX ORDER — CIPROFLOXACIN 500 MG/1
500 TABLET ORAL 2 TIMES DAILY
Qty: 28 TABLET | Refills: 0 | Status: SHIPPED | OUTPATIENT
Start: 2017-07-20 | End: 2017-08-03

## 2017-07-20 RX ORDER — CEPHALEXIN 250 MG/1
250 CAPSULE ORAL DAILY
Qty: 30 CAPSULE | Refills: 11 | Status: SHIPPED | OUTPATIENT
Start: 2017-07-20 | End: 2017-09-15 | Stop reason: SDUPTHER

## 2017-07-20 NOTE — PROGRESS NOTES
Subjective:       Patient ID: Niurka Pedraza is a 72 y.o. female.    Chief Complaint: Dysuria    HPI     72 year old with recurrent UTIs. She has had multiple positive cultures with different organisms including E. Coli and Enterococcus.  She had an infection last month and completed a course of antibiotic.  Her symptoms resolved but then quickly return.   She complains of urinary frequency, urgency and dysuria.  Cystoscopy and renal ultrasound are unremarkable.    Urine dipstick shows positive for 3+blood, positive for nitrates and positive for 2+leukocytes.  Micro exam: tntc WBC's per HPF and 3+ bacteria.    Review of Systems   Constitutional: Negative for fever.   Genitourinary: Negative for dysuria and hematuria.       Objective:      Physical Exam   Constitutional: She is oriented to person, place, and time. She appears well-developed and well-nourished.   HENT:   Head: Normocephalic.   Eyes: Conjunctivae and EOM are normal.   Neck: Normal range of motion.   Cardiovascular: Normal rate.    Pulmonary/Chest: Effort normal.   Abdominal: Soft. She exhibits no distension and no mass. There is no tenderness.   Genitourinary:   Genitourinary Comments: Bladder non-tender and nondistended  No CVA tenderness   Musculoskeletal: She exhibits no edema.   Neurological: She is alert and oriented to person, place, and time.   Skin: Skin is warm and dry. No rash noted. No erythema.   Psychiatric: She has a normal mood and affect. Her behavior is normal.   Vitals reviewed.      Assessment:       1. Dysuria    2. Urge incontinence    3. Cystitis without hematuria        Plan:       Dysuria  -     POCT URINE DIPSTICK WITHOUT MICROSCOPE  -     Urine culture    Urge incontinence    Cystitis without hematuria    Other orders  -     ciprofloxacin HCl (CIPRO) 500 MG tablet; Take 1 tablet (500 mg total) by mouth 2 (two) times daily.  Dispense: 28 tablet; Refill: 0  -     cephALEXin (KEFLEX) 250 MG capsule; Take 1 capsule (250 mg total) by  mouth once daily.  Dispense: 30 capsule; Refill: 11      Begin daily antibiotics.  F/u urine culture.

## 2017-07-23 ENCOUNTER — PATIENT MESSAGE (OUTPATIENT)
Dept: UROLOGY | Facility: CLINIC | Age: 72
End: 2017-07-23

## 2017-07-24 LAB — BACTERIA UR CULT: NORMAL

## 2017-07-25 ENCOUNTER — TELEPHONE (OUTPATIENT)
Dept: UROLOGY | Facility: CLINIC | Age: 72
End: 2017-07-25

## 2017-07-25 RX ORDER — CEPHALEXIN 500 MG/1
500 CAPSULE ORAL EVERY 8 HOURS
Qty: 30 CAPSULE | Refills: 0 | Status: SHIPPED | OUTPATIENT
Start: 2017-07-25 | End: 2017-08-04

## 2017-07-25 NOTE — TELEPHONE ENCOUNTER
----- Message from MATA Dhaliwal MD sent at 7/25/2017 12:39 PM CDT -----  Call with Culture.  Resistant to Cipro.  Keflex sent to pharmacy

## 2017-08-07 ENCOUNTER — HOSPITAL ENCOUNTER (OUTPATIENT)
Dept: RADIOLOGY | Facility: HOSPITAL | Age: 72
Discharge: HOME | End: 2017-08-07
Attending: INTERNAL MEDICINE
Payer: MEDICARE

## 2017-08-07 DIAGNOSIS — Z12.31 OTHER SCREENING MAMMOGRAM: ICD-10-CM

## 2017-08-07 DIAGNOSIS — Z12.31 VISIT FOR SCREENING MAMMOGRAM: ICD-10-CM

## 2017-08-07 PROCEDURE — 77067 SCR MAMMO BI INCL CAD: CPT | Mod: TC

## 2017-08-07 PROCEDURE — 77063 BREAST TOMOSYNTHESIS BI: CPT | Mod: 26,52,, | Performed by: RADIOLOGY

## 2017-08-07 PROCEDURE — 77067 SCR MAMMO BI INCL CAD: CPT | Mod: 26,52,, | Performed by: RADIOLOGY

## 2017-08-31 ENCOUNTER — PATIENT MESSAGE (OUTPATIENT)
Dept: INTERNAL MEDICINE | Facility: CLINIC | Age: 72
End: 2017-08-31

## 2017-09-01 DIAGNOSIS — M79.671 RIGHT FOOT PAIN: ICD-10-CM

## 2017-09-01 RX ORDER — DICLOFENAC SODIUM 75 MG/1
TABLET, DELAYED RELEASE ORAL
Qty: 40 TABLET | Refills: 0 | Status: SHIPPED | OUTPATIENT
Start: 2017-09-01 | End: 2017-11-22 | Stop reason: SDUPTHER

## 2017-09-15 ENCOUNTER — PATIENT MESSAGE (OUTPATIENT)
Dept: UROLOGY | Facility: CLINIC | Age: 72
End: 2017-09-15

## 2017-09-15 DIAGNOSIS — N39.0 RECURRENT UTI: Primary | ICD-10-CM

## 2017-09-17 RX ORDER — CEPHALEXIN 250 MG/1
250 CAPSULE ORAL DAILY
Qty: 30 CAPSULE | Refills: 11 | Status: SHIPPED | OUTPATIENT
Start: 2017-09-17 | End: 2017-09-20 | Stop reason: SDUPTHER

## 2017-09-20 ENCOUNTER — CLINICAL SUPPORT (OUTPATIENT)
Dept: UROLOGY | Facility: CLINIC | Age: 72
End: 2017-09-20
Payer: MEDICARE

## 2017-09-20 DIAGNOSIS — N39.0 RECURRENT UTI: ICD-10-CM

## 2017-09-20 DIAGNOSIS — N39.0 RECURRENT UTI: Primary | ICD-10-CM

## 2017-09-20 RX ORDER — CEPHALEXIN 250 MG/1
250 CAPSULE ORAL 4 TIMES DAILY
Qty: 56 CAPSULE | Refills: 0 | Status: SHIPPED | OUTPATIENT
Start: 2017-09-20 | End: 2017-12-13

## 2017-09-20 NOTE — TELEPHONE ENCOUNTER
Spoke to pt and she was taking a dose of keflex, like she normally does, when she has a uti. She is not going to run out of her daily kelfex because that is what she was using. Pt needs to be caught up for the medication. Please sign order.

## 2017-09-20 NOTE — PROGRESS NOTES
Discussed pt's medication issues with running out of the kefex and how she was taking the medication. PT needed a prescription sent to the pharmacy.

## 2017-09-21 ENCOUNTER — PATIENT MESSAGE (OUTPATIENT)
Dept: UROLOGY | Facility: CLINIC | Age: 72
End: 2017-09-21

## 2017-10-04 ENCOUNTER — LAB VISIT (OUTPATIENT)
Dept: LAB | Facility: HOSPITAL | Age: 72
End: 2017-10-04
Attending: UROLOGY
Payer: MEDICARE

## 2017-10-04 DIAGNOSIS — N30.00 ACUTE CYSTITIS WITHOUT HEMATURIA: ICD-10-CM

## 2017-10-04 DIAGNOSIS — N30.00 ACUTE CYSTITIS WITHOUT HEMATURIA: Primary | ICD-10-CM

## 2017-10-04 PROCEDURE — 87088 URINE BACTERIA CULTURE: CPT

## 2017-10-04 PROCEDURE — 87186 SC STD MICRODIL/AGAR DIL: CPT

## 2017-10-04 PROCEDURE — 87077 CULTURE AEROBIC IDENTIFY: CPT

## 2017-10-04 PROCEDURE — 87086 URINE CULTURE/COLONY COUNT: CPT

## 2017-10-06 LAB — BACTERIA UR CULT: NORMAL

## 2017-10-09 ENCOUNTER — PATIENT MESSAGE (OUTPATIENT)
Dept: UROLOGY | Facility: CLINIC | Age: 72
End: 2017-10-09

## 2017-10-09 ENCOUNTER — TELEPHONE (OUTPATIENT)
Dept: UROLOGY | Facility: CLINIC | Age: 72
End: 2017-10-09

## 2017-10-09 RX ORDER — CIPROFLOXACIN 500 MG/1
500 TABLET ORAL 2 TIMES DAILY
Qty: 14 TABLET | Refills: 0 | Status: SHIPPED | OUTPATIENT
Start: 2017-10-09 | End: 2017-10-16

## 2017-10-09 NOTE — TELEPHONE ENCOUNTER
----- Message from Kami Krishnan sent at 10/9/2017  2:09 PM CDT -----  Contact: self  Needs new prescription for UTI. Please call back at [phone] (home)     [id number] [place], [place] - [address]  [address] [place] [address]  Phone: [phone] Fax: [phone]

## 2017-10-10 NOTE — TELEPHONE ENCOUNTER
----- Message from MATA Dhaliwal MD sent at 10/9/2017  6:01 PM CDT -----  Call with urine culture.  Positive.  Cipro sent to her pharmacy.

## 2017-10-10 NOTE — TELEPHONE ENCOUNTER
Patient notified of positive urine culture and Cipro sent to her pharmacy.  Stop Keflex while taking the Cipro then resume daily Keflex after Cipro completed.

## 2017-10-15 RX ORDER — ATORVASTATIN CALCIUM 20 MG/1
20 TABLET, FILM COATED ORAL DAILY
Qty: 90 TABLET | Refills: 0 | Status: SHIPPED | OUTPATIENT
Start: 2017-10-15 | End: 2018-01-11 | Stop reason: SDUPTHER

## 2017-10-16 ENCOUNTER — PATIENT MESSAGE (OUTPATIENT)
Dept: UROLOGY | Facility: CLINIC | Age: 72
End: 2017-10-16

## 2017-10-16 DIAGNOSIS — R30.0 DYSURIA: Primary | ICD-10-CM

## 2017-10-17 ENCOUNTER — LAB VISIT (OUTPATIENT)
Dept: LAB | Facility: HOSPITAL | Age: 72
End: 2017-10-17
Attending: UROLOGY
Payer: MEDICARE

## 2017-10-17 DIAGNOSIS — R30.0 DYSURIA: ICD-10-CM

## 2017-10-17 LAB
BACTERIA #/AREA URNS HPF: ABNORMAL /HPF
BILIRUB UR QL STRIP: NEGATIVE
CLARITY UR: CLEAR
COLOR UR: YELLOW
GLUCOSE UR QL STRIP: NEGATIVE
HGB UR QL STRIP: ABNORMAL
HYALINE CASTS #/AREA URNS LPF: 0 /LPF
KETONES UR QL STRIP: NEGATIVE
LEUKOCYTE ESTERASE UR QL STRIP: NEGATIVE
MICROSCOPIC COMMENT: ABNORMAL
NITRITE UR QL STRIP: POSITIVE
PH UR STRIP: 6 [PH] (ref 5–8)
PROT UR QL STRIP: ABNORMAL
RBC #/AREA URNS HPF: 5 /HPF (ref 0–4)
SP GR UR STRIP: >=1.03 (ref 1–1.03)
URN SPEC COLLECT METH UR: ABNORMAL
WBC #/AREA URNS HPF: 80 /HPF (ref 0–5)
WBC CLUMPS URNS QL MICRO: ABNORMAL

## 2017-10-17 PROCEDURE — 81000 URINALYSIS NONAUTO W/SCOPE: CPT | Mod: PO

## 2017-10-17 PROCEDURE — 87077 CULTURE AEROBIC IDENTIFY: CPT

## 2017-10-17 PROCEDURE — 87088 URINE BACTERIA CULTURE: CPT

## 2017-10-17 PROCEDURE — 87186 SC STD MICRODIL/AGAR DIL: CPT

## 2017-10-17 PROCEDURE — 87086 URINE CULTURE/COLONY COUNT: CPT

## 2017-10-19 LAB — BACTERIA UR CULT: NORMAL

## 2017-10-20 ENCOUNTER — TELEPHONE (OUTPATIENT)
Dept: UROLOGY | Facility: CLINIC | Age: 72
End: 2017-10-20

## 2017-10-20 RX ORDER — AMOXICILLIN AND CLAVULANATE POTASSIUM 875; 125 MG/1; MG/1
1 TABLET, FILM COATED ORAL 2 TIMES DAILY
Qty: 20 TABLET | Refills: 0 | Status: SHIPPED | OUTPATIENT
Start: 2017-10-20 | End: 2017-10-30

## 2017-10-20 NOTE — TELEPHONE ENCOUNTER
----- Message from MATA Dhaliwal MD sent at 10/20/2017  8:29 AM CDT -----  Call with culture.  Resistant to Cipro.  Augmentin sent to pharmacy

## 2017-10-24 ENCOUNTER — TELEPHONE (OUTPATIENT)
Dept: UROLOGY | Facility: CLINIC | Age: 72
End: 2017-10-24

## 2017-10-24 NOTE — TELEPHONE ENCOUNTER
Patient reports having right foot edema and pain while taking Cipro. She would like for this to be placed in her chart.

## 2017-10-24 NOTE — TELEPHONE ENCOUNTER
----- Message from Gilda Thompson sent at 10/24/2017 12:18 PM CDT -----  Contact: kathrin Murry call   Call back

## 2017-10-30 ENCOUNTER — HOSPITAL ENCOUNTER (OUTPATIENT)
Dept: RADIOLOGY | Facility: HOSPITAL | Age: 72
Discharge: HOME | End: 2017-10-30
Attending: INTERNAL MEDICINE
Payer: MEDICARE

## 2017-10-30 DIAGNOSIS — R05.9 COUGH: ICD-10-CM

## 2017-10-30 PROCEDURE — 71020 XR CHEST PA AND LATERAL: CPT | Mod: TC,PO

## 2017-10-30 PROCEDURE — 71020 XR CHEST PA AND LATERAL: CPT | Mod: 26,,, | Performed by: RADIOLOGY

## 2017-11-09 ENCOUNTER — PATIENT OUTREACH (OUTPATIENT)
Dept: INTERNAL MEDICINE | Facility: CLINIC | Age: 72
End: 2017-11-09

## 2017-11-09 NOTE — PROGRESS NOTES
[facility] is committed to your overall health.  To help you get the most out of each of your visits, we will review your information to make sure you are up to date on all of your recommended tests and/or procedures.       Your PCP  Savana Zelaya MD   found that you may be due for:       Health Maintenance Due   Topic Date Due    TETANUS VACCINE  03/10/1962    DEXA SCAN  03/10/1984    Colonoscopy  03/10/1994    Zoster Vaccine  03/10/2004    Pneumococcal (65+) (1 of 2 - PCV13) 03/10/2009    Influenza Vaccine  08/01/2017             If you have had any of the above done at another facility, please bring the records or information with you so that your record at [facility] will be complete.  If you would like to schedule any of these, please contact me.     If you are currently taking medication, please bring it with you to your appointment for review.     Also, if you have any type of Advanced Directives, please bring them with you to your office visit so we may scan them into your chart.     Thank you for Choosing [facility] for your healthcare needs.      Additional Information  If you have questions, you can email [email or web address] or call [phone]  to talk to our [facility] staff. Remember, [facility] is NOT to be used for urgent needs. For medical emergencies, dial 911.

## 2017-11-22 ENCOUNTER — OFFICE VISIT (OUTPATIENT)
Dept: INTERNAL MEDICINE | Facility: CLINIC | Age: 72
End: 2017-11-22
Attending: INTERNAL MEDICINE
Payer: MEDICARE

## 2017-11-22 ENCOUNTER — HOSPITAL ENCOUNTER (OUTPATIENT)
Dept: RADIOLOGY | Facility: OTHER | Age: 72
Discharge: HOME | End: 2017-11-22
Attending: INTERNAL MEDICINE
Payer: MEDICARE

## 2017-11-22 VITALS
WEIGHT: 169.31 LBS | HEIGHT: 64 IN | HEART RATE: 82 BPM | DIASTOLIC BLOOD PRESSURE: 69 MMHG | BODY MASS INDEX: 28.91 KG/M2 | SYSTOLIC BLOOD PRESSURE: 123 MMHG | OXYGEN SATURATION: 96 %

## 2017-11-22 DIAGNOSIS — E78.5 HYPERLIPIDEMIA, UNSPECIFIED HYPERLIPIDEMIA TYPE: ICD-10-CM

## 2017-11-22 DIAGNOSIS — M76.61 TENDONITIS, ACHILLES, RIGHT: ICD-10-CM

## 2017-11-22 DIAGNOSIS — H91.91 HEARING LOSS OF RIGHT EAR, UNSPECIFIED HEARING LOSS TYPE: ICD-10-CM

## 2017-11-22 DIAGNOSIS — H91.90 HEARING LOSS, UNSPECIFIED HEARING LOSS TYPE, UNSPECIFIED LATERALITY: ICD-10-CM

## 2017-11-22 DIAGNOSIS — K76.0 FATTY LIVER: ICD-10-CM

## 2017-11-22 DIAGNOSIS — R35.0 URINARY FREQUENCY: ICD-10-CM

## 2017-11-22 DIAGNOSIS — M81.0 AGE-RELATED OSTEOPOROSIS WITHOUT CURRENT PATHOLOGICAL FRACTURE: ICD-10-CM

## 2017-11-22 DIAGNOSIS — R91.8 LUNG NODULES: ICD-10-CM

## 2017-11-22 DIAGNOSIS — I10 HYPERTENSION, BENIGN: ICD-10-CM

## 2017-11-22 DIAGNOSIS — Z78.0 POSTMENOPAUSAL: ICD-10-CM

## 2017-11-22 DIAGNOSIS — H61.23 CERUMEN DEBRIS ON TYMPANIC MEMBRANE OF BOTH EARS: ICD-10-CM

## 2017-11-22 DIAGNOSIS — E66.3 OVERWEIGHT (BMI 25.0-29.9): ICD-10-CM

## 2017-11-22 DIAGNOSIS — M79.671 RIGHT FOOT PAIN: ICD-10-CM

## 2017-11-22 DIAGNOSIS — E03.9 HYPOTHYROIDISM, UNSPECIFIED TYPE: ICD-10-CM

## 2017-11-22 DIAGNOSIS — N39.0 RECURRENT UTI: Primary | ICD-10-CM

## 2017-11-22 DIAGNOSIS — Z12.11 COLON CANCER SCREENING: ICD-10-CM

## 2017-11-22 DIAGNOSIS — H93.11 TINNITUS OF RIGHT EAR: ICD-10-CM

## 2017-11-22 LAB
BILIRUB SERPL-MCNC: NORMAL MG/DL
BILIRUB UR QL STRIP: NEGATIVE
BLOOD URINE, POC: NORMAL
CLARITY UR: CLEAR
COLOR UR: YELLOW
COLOR, POC UA: YELLOW
GLUCOSE UR QL STRIP: NEGATIVE
GLUCOSE UR QL STRIP: NORMAL
HGB UR QL STRIP: ABNORMAL
KETONES UR QL STRIP: NEGATIVE
KETONES UR QL STRIP: NORMAL
LEUKOCYTE ESTERASE UR QL STRIP: NEGATIVE
LEUKOCYTE ESTERASE URINE, POC: NORMAL
NITRITE UR QL STRIP: NEGATIVE
NITRITE, POC UA: NORMAL
PH UR STRIP: 7 [PH] (ref 5–8)
PH, POC UA: 7
PROT UR QL STRIP: NEGATIVE
PROTEIN, POC: NORMAL
SP GR UR STRIP: 1.01 (ref 1–1.03)
SPECIFIC GRAVITY, POC UA: 1.01
URN SPEC COLLECT METH UR: ABNORMAL
UROBILINOGEN UR STRIP-ACNC: NEGATIVE EU/DL
UROBILINOGEN, POC UA: NORMAL

## 2017-11-22 PROCEDURE — 81003 URINALYSIS AUTO W/O SCOPE: CPT

## 2017-11-22 PROCEDURE — 99214 OFFICE O/P EST MOD 30 MIN: CPT | Mod: S$PBB,,, | Performed by: INTERNAL MEDICINE

## 2017-11-22 PROCEDURE — 99215 OFFICE O/P EST HI 40 MIN: CPT | Mod: PBBFAC,25 | Performed by: INTERNAL MEDICINE

## 2017-11-22 PROCEDURE — 81001 URINALYSIS AUTO W/SCOPE: CPT | Mod: PBBFAC | Performed by: INTERNAL MEDICINE

## 2017-11-22 PROCEDURE — 77080 DXA BONE DENSITY AXIAL: CPT | Mod: 26,,, | Performed by: RADIOLOGY

## 2017-11-22 PROCEDURE — 99999 PR PBB SHADOW E&M-EST. PATIENT-LVL V: CPT | Mod: PBBFAC,,, | Performed by: INTERNAL MEDICINE

## 2017-11-22 PROCEDURE — 87086 URINE CULTURE/COLONY COUNT: CPT

## 2017-11-22 PROCEDURE — 77080 DXA BONE DENSITY AXIAL: CPT | Mod: TC

## 2017-11-22 RX ORDER — NITROFURANTOIN 25; 75 MG/1; MG/1
100 CAPSULE ORAL 2 TIMES DAILY
Qty: 14 CAPSULE | Refills: 0 | Status: SHIPPED | OUTPATIENT
Start: 2017-11-22 | End: 2017-11-29

## 2017-11-22 RX ORDER — DICLOFENAC SODIUM 75 MG/1
TABLET, DELAYED RELEASE ORAL
Qty: 40 TABLET | Refills: 0 | Status: SHIPPED | OUTPATIENT
Start: 2017-11-22 | End: 2018-02-05 | Stop reason: SDUPTHER

## 2017-11-22 NOTE — PROGRESS NOTES
"Subjective:   Patient ID: Niurka Pedraza is a 72 y.o. female  Chief complaint:   Chief Complaint   Patient presents with    Establish Care       HPI  Pt here to est care.     Hx of chronic UTIs - completed augmentin 10/20 - 10/30  Taking low dose daily keflex to prevent UTIs. Today reports at baseline urinates frequently and has suprapubic pressure and pain. Followed by urology for this. Had US and cystoscopy and wnl.   Is starying hydrdated. Timed urination  Wears pads - and changing pad 4 times per day.   Today reports increased suprapubic pain, urinary leakage, burning with urination c/w prior UTI symptoms in past. No fevers or chills or back pain.   poct today shows + nitrites, leuks and protein    Hx of htn: taking meds regularly - nervous today.   Hx of white coat htn as well  Hx of pulm coccidiomycosis dx 2 years ago while living in [place] - completed treatment but chronic at this time - f/u with pulmonologist in [place] - Dr. Nemesio Cazares - plans to cont f/u with him     Hx of breast cancer - ductal carcinoma in situ - mastectomy right in 2014 - no chemo or xrt - last mmg was 08/2017 and negative    Was given cipro in past - at last use 10/10/2017 developed achilles tendonitis - give Voltaren which has helped reduce sx. Has not attended PT - is requesting referral for PT, ortho, and urology now that moved to [facility] recently    Review of Systems    Objective:  Vitals:    11/22/17 0941 11/22/17 1022   BP: (!) 132/98 123/69   Pulse: 82    SpO2: 96%    Weight: 76.8 kg (169 lb 5 oz)    Height: 5' 4" (1.626 m)      Body mass index is 29.06 kg/m².    Physical Exam   Constitutional: She is oriented to person, place, and time. She appears well-developed and well-nourished. No distress.   HENT:   Head: Normocephalic and atraumatic.   Right Ear: External ear normal.   Left Ear: External ear normal.   Nose: Nose normal.   Mouth/Throat: Oropharynx is clear and moist. No oropharyngeal exudate.   Eyes: Conjunctivae and " EOM are normal.   Neck: Neck supple. No thyromegaly present.   Cardiovascular: Normal rate, normal heart sounds and intact distal pulses.    Pulmonary/Chest: Effort normal and breath sounds normal. Left breast exhibits no inverted nipple, no mass, no nipple discharge, no skin change and no tenderness. There is no breast swelling.       Abdominal: Soft. Bowel sounds are normal.   Mild suprapubic ttp  No flank or cva ttp   Genitourinary: No breast tenderness, discharge or bleeding.   Musculoskeletal: She exhibits tenderness. She exhibits no edema.   + ttp of right achilles tendon  No erythema or edema or inc warmth   Lymphadenopathy:     She has no cervical adenopathy.     She has no axillary adenopathy.        Right: No supraclavicular and no epitrochlear adenopathy present.        Left: No supraclavicular and no epitrochlear adenopathy present.   Neurological: She is alert and oriented to person, place, and time.   Skin: Skin is warm and dry.   Psychiatric: Her behavior is normal. Thought content normal.   Vitals reviewed.    Assessment:  1. Recurrent UTI    2. Postmenopausal    3. Colon cancer screening    4. Urinary frequency    5. Hyperlipidemia, unspecified hyperlipidemia type    6. Hypothyroidism, unspecified type    7. Hypertension, benign    8. Lung nodules    9. Fatty liver    10. Hearing loss of right ear, unspecified hearing loss type dx in 2014 in [place]    11. Hearing loss, unspecified hearing loss type, unspecified laterality    12. Tinnitus of right ear    13. Overweight (BMI 25.0-29.9)    14. Age-related osteoporosis without current pathological fracture    15. Tendonitis, Achilles, right    16. Right foot pain    17. Cerumen debris on tympanic membrane of both ears        Plan:  Niurka was seen today for establish care.    Diagnoses and all orders for this visit:    Recurrent UTI: poct today shows + nitrites, leuks and protein  Give macrobid, f/u urine labs, refer to urology  Inc fluids, wipe front  to back, change pad daily  -     Ambulatory referral to Urology  -     Urinalysis  -     Urine culture  -     POCT urinalysis, dipstick or tablet reag    Urinary frequency: as above  -     Urinalysis  -     Urine culture  -     POCT urinalysis, dipstick or tablet reag    Hyperlipidemia, unspecified hyperlipidemia type: check lab, cont med  -     Comprehensive metabolic panel; Future  -     Lipid panel; Future    Hypothyroidism, unspecified type: cont med, check labs  -     TSH; Future  -     T4, free; Future    Hypertension, benign: at goal upon repeat - cont med  -     CBC auto differential; Future  -     Comprehensive metabolic panel; Future    Lung nodules: followed by pulm    Fatty liver: check labs    Hearing loss of right ear, unspecified hearing loss type dx in 2014 in [place]:     Hearing loss, unspecified hearing loss type, unspecified laterality: pt requesting hearing test for left ear  -     Ambulatory Referral to ENT    Tinnitus of right ear: stable    Overweight (BMI 25.0-29.9):   - cont diet and exercise  - increase intensity and duration of CV exercise to continue weight loss  - goal wt loss one pound per week  - portion control, healthy choices    Age-related osteoporosis without current pathological fracture: pt reports hx of this and declined fosamax in past. rec otc supplement of calcium 1500mg and vit d 800u divided into 2 doses daily along with reg exercise  -     DXA Bone Density Spine And Hip; Future    Tendonitis, Achilles, right  -     Ambulatory Referral to Orthopedics  -     Ambulatory Referral to Physical/Occupational Therapy    Right foot pain  -     diclofenac (VOLTAREN) 75 MG EC tablet; TAKE 1 TABLET (75 MG TOTAL) BY MOUTH 2 (TWO) TIMES DAILY AS NEEDED.  -     Ambulatory Referral to Orthopedics    Cerumen debris on tympanic membrane of both ears: debrox otc x 1 week and then NV for wax remova;  -     Ear wax removal; Future    -     nitrofurantoin, macrocrystal-monohydrate, (MACROBID)  100 MG capsule; Take 1 capsule (100 mg total) by mouth 2 (two) times daily.    Health Maintenance   Topic Date Due    Colonoscopy  03/10/1994    Zoster Vaccine  03/10/2004    Pneumococcal (65+) (1 of 2 - PCV13) 03/10/2009    DEXA SCAN  11/06/2017    Mammogram  08/15/2019    TETANUS VACCINE  08/18/2020    Lipid Panel  12/15/2021    Influenza Vaccine  Addressed     Declines vaccines

## 2017-11-24 ENCOUNTER — TELEPHONE (OUTPATIENT)
Dept: INTERNAL MEDICINE | Facility: CLINIC | Age: 72
End: 2017-11-24

## 2017-11-24 LAB — BACTERIA UR CULT: NORMAL

## 2017-11-24 NOTE — TELEPHONE ENCOUNTER
Attempted to reach patient and schedule Nurse visit for ear wax removal. Patient did not answer. Left detailed voicemail informing patient to call back and schedule Nurse Visit.

## 2017-11-24 NOTE — TELEPHONE ENCOUNTER
----- Message from Jaja Cr sent at 11/24/2017  1:09 PM CST -----  Contact: [employer] message   Appointment Request From: Niurka Pedraza    With Provider: Savana Anderson MD [-Primary Care Physician-]    Would Accept With:Request to schedule a test or procedure    Preferred Date Range: From 11/28/2017 To 11/29/2017    Preferred Times: Wednesday Afternoon    Reason for visit: Request an Appt    Comments:  Hello,  I would like to set up an appointment to come in for the ear wax removal. Afternoons are best.  Thank you.  Niurka

## 2017-11-27 ENCOUNTER — TELEPHONE (OUTPATIENT)
Dept: INTERNAL MEDICINE | Facility: CLINIC | Age: 72
End: 2017-11-27

## 2017-11-27 NOTE — TELEPHONE ENCOUNTER
----- Message from Jeronimo Jovel sent at 11/27/2017  2:50 PM CST -----  Contact: patient  x 1st Request   _ 2nd Request   _ 3rd Request     Who: NEETA PINTO [[id number]]    Why: Pt missed your call is requesting a call back in reference to scheduling NV for ear wax removal.    What Number to Call Back: [phone]    When to Expect a call back: (Before the end of the day)   -- if call after 3:00 call back will be tomorrow.

## 2017-11-27 NOTE — TELEPHONE ENCOUNTER
Called pt and scheduled NV appt for ear wash   Pt verbally agree to appt time and date and has no further questions or concerns

## 2017-11-29 ENCOUNTER — CLINICAL SUPPORT (OUTPATIENT)
Dept: INTERNAL MEDICINE | Facility: CLINIC | Age: 72
End: 2017-11-29
Payer: MEDICARE

## 2017-11-29 NOTE — PROGRESS NOTES
Pt came in today for ear wash. Ear wash was performed on both ears. Upon inspected both ears had stephanie colored sebum blocking both ear canals. Ear wash was performed twice on each ear with successful removal of ear wax. Pt stated ear felt unclogged. Pt given instructions to avoid loud sounds and music as her hearing may be sensitive and to be aware of dizziness that may occur from wash. Pt demonstrated verbal understanding of information and had no further questions or concerns at this time.

## 2017-12-05 ENCOUNTER — HOSPITAL ENCOUNTER (OUTPATIENT)
Dept: RADIOLOGY | Facility: HOSPITAL | Age: 72
Discharge: HOME | End: 2017-12-05
Attending: PHYSICIAN ASSISTANT
Payer: MEDICARE

## 2017-12-05 ENCOUNTER — OFFICE VISIT (OUTPATIENT)
Dept: ORTHOPEDICS | Facility: CLINIC | Age: 72
End: 2017-12-05
Payer: MEDICARE

## 2017-12-05 DIAGNOSIS — M25.571 ACUTE RIGHT ANKLE PAIN: ICD-10-CM

## 2017-12-05 DIAGNOSIS — M25.571 ACUTE RIGHT ANKLE PAIN: Primary | ICD-10-CM

## 2017-12-05 DIAGNOSIS — M76.71 PERONEAL TENDONITIS OF RIGHT LOWER EXTREMITY: ICD-10-CM

## 2017-12-05 PROCEDURE — 99212 OFFICE O/P EST SF 10 MIN: CPT | Mod: PBBFAC,25 | Performed by: PHYSICIAN ASSISTANT

## 2017-12-05 PROCEDURE — 99203 OFFICE O/P NEW LOW 30 MIN: CPT | Mod: S$PBB,,, | Performed by: PHYSICIAN ASSISTANT

## 2017-12-05 PROCEDURE — 73610 X-RAY EXAM OF ANKLE: CPT | Mod: 26,RT,, | Performed by: RADIOLOGY

## 2017-12-05 PROCEDURE — 99999 PR PBB SHADOW E&M-EST. PATIENT-LVL II: CPT | Mod: PBBFAC,,, | Performed by: PHYSICIAN ASSISTANT

## 2017-12-05 PROCEDURE — 73610 X-RAY EXAM OF ANKLE: CPT | Mod: TC,RT

## 2017-12-07 ENCOUNTER — CLINICAL SUPPORT (OUTPATIENT)
Dept: REHABILITATION | Facility: HOSPITAL | Age: 72
End: 2017-12-07
Attending: INTERNAL MEDICINE
Payer: MEDICARE

## 2017-12-07 DIAGNOSIS — R29.898 ANKLE WEAKNESS: ICD-10-CM

## 2017-12-07 DIAGNOSIS — R26.89 BALANCE PROBLEM: ICD-10-CM

## 2017-12-07 DIAGNOSIS — M25.571 ACUTE RIGHT ANKLE PAIN: Primary | ICD-10-CM

## 2017-12-07 PROCEDURE — 97161 PT EVAL LOW COMPLEX 20 MIN: CPT

## 2017-12-07 PROCEDURE — G8979 MOBILITY GOAL STATUS: HCPCS | Mod: CK

## 2017-12-07 PROCEDURE — G8978 MOBILITY CURRENT STATUS: HCPCS | Mod: CL

## 2017-12-07 NOTE — PROGRESS NOTES
Subjective:      Patient ID: Niurka Pedraza is a 72 y.o. female.    Chief Complaint: No chief complaint on file.    HPI    Patient is a 72 year old female who presents to clinic with chief complaint of a-traumatic constant right ankle/ foot pain since 01/2017.  Patient stated that she frequently takes antibiotics for frequent UTI's.  She stated that with taking one of her antibiotics she noticed pain in the tendons in her foot. She stated that the pain was decreased with stopping the medication. Patient reports that she does have some continued pain to the lateral side of her ankle extending into her foot. Pain is increased with increased activity. She has tried rest ice and NSAID without complete relief. She denied numbness or tingling.     Review of Systems   Constitution: Negative for chills and fever.   Cardiovascular: Negative for chest pain.   Respiratory: Negative for cough and shortness of breath.    Skin: Negative for color change, dry skin, itching, nail changes, poor wound healing and rash.   Musculoskeletal:        Right foot/ ankle pain   Neurological: Negative for dizziness.   Psychiatric/Behavioral: Negative for altered mental status. The patient is not nervous/anxious.    All other systems reviewed and are negative.        Objective:      General    Constitutional: She is oriented to person, place, and time. She appears well-developed and well-nourished. No distress.   HENT:   Head: Atraumatic.   Eyes: Conjunctivae are normal.   Cardiovascular: Normal rate.    Pulmonary/Chest: Effort normal.   Neurological: She is alert and oriented to person, place, and time.   Psychiatric: She has a normal mood and affect. Her behavior is normal.         Right Ankle/Foot Exam     Tenderness   The patient is tender to palpation of the peroneals.    Range of Motion   The patient has normal right ankle ROM.    Muscle Strength   The patient has normal right ankle strength.    Other   Sensation: normal        RADS: no  fracture or dislocation       Assessment:       Encounter Diagnoses   Name Primary?    Acute right ankle pain Yes    Peroneal tendonitis of right lower extremity           Plan:       Discussed treatment plan with patient. Including rest ice elevation NSAID CAM boot and PT. Order for PT placed, [facility]. Patient is to make appointment herself, She is to return to clinic as needed or if no improvement.

## 2017-12-07 NOTE — PROGRESS NOTES
[facility]   PATIENT EVALUATION    Name: Niurka Pedraza  Clinic Number: [id number]    Diagnosis:   Encounter Diagnoses   Name Primary?    Acute right ankle pain Yes    Ankle weakness     Balance problem      Physician: Savana Anderson MD  Treatment Orders: PT Eval and Treat    History     Past Medical History:   Diagnosis Date    Breast cancer     mastectomy 2014    Coccidiomycosis, progressive     Hyperlipidemia     Hypertension     Osteopenia     on Dexa 11/2017    Osteoporosis     pt reports hx of this, declined fosamax in past - treated with calcium and vit D    Pulmonary nodules      Current Outpatient Prescriptions   Medication Sig    atorvastatin (LIPITOR) 20 MG tablet Take 1 tablet (20 mg total) by mouth once daily.    calcium-vitamin D3 (CALCIUM 500 + D) 500 mg(1,250mg) -200 unit per tablet Take 1 tablet by mouth 2 (two) times daily with meals.    cephALEXin (KEFLEX) 250 MG capsule Take 1 capsule (250 mg total) by mouth 4 (four) times daily.    diclofenac (VOLTAREN) 75 MG EC tablet TAKE 1 TABLET (75 MG TOTAL) BY MOUTH 2 (TWO) TIMES DAILY AS NEEDED.    levothyroxine (SYNTHROID) 50 MCG tablet TAKE 1 TABLET (50 MCG TOTAL) BY MOUTH ONCE DAILY.    metoprolol succinate (TOPROL-XL) 25 MG 24 hr tablet Take 1 tablet (25 mg total) by mouth once daily.    multivitamin (ONE DAILY MULTIVITAMIN) per tablet Take 1 tablet by mouth once daily.     No current facility-administered medications for this visit.      Review of patient's allergies indicates:   Allergen Reactions    Ciprofloxacin Other (See Comments)     Right foot pain and edema    Amlodipine Edema     BLE    Lisinopril Other (See Comments)     cough       Precautions: standard    Evaluation Date: 12/7/17  Start Time: 1200  Stop Time: 1300  Visit # authorized: 1/1  Authorization period: 12/7/17-12/7/17  Plan of care expiration: 1/21/18    Hx of present illness: Pt was having R foot pain that was diagnosed  as arthritis, but began having sharp, acute pains when she was taking antibiotics about a month ago. The pain decreased once pt was off antibiotics, however, some pain remains.    Subjective     Niurka Pedraza states she is having pain when getting out of bed in the morning and when walking >30 minutes.    Pain:  Location: meidal R foot up medial ankle and mid lower leg  Description: Sharp  Activities Which Increase Pain: Morning  Activities Which Decrease Pain: rest, NSAIDs  Pain Scale: 2/10 now 7/10 at worst 2/10 at best    Physical Therapy Goals: to decrease pain and increase tolerance to weight-bearing activities      Objective     Observation: (standing) Pt ambulated into clinic without AD in sneakers; swelling around posterior tibialis tendon, increased pronation R>L    Palpation: tender to palpation R foot/ankle/lower leg following posterior tibilalis distribution    Gait: L knee genu valgus, L lateral trunk lean, L hip drop throughout gait cycle    SLS R: unable   SLS L: 10 seconds    Ankle Active/Passive ROM: (measured in degrees)    All active and passive ROM WNL with pain into passive eversion, active inversion/plantarflexion   Subtalar inversion 50' B, subtalar eversion 20' B    Strength: (graded 1-5 out of 5)    RLE LLE   Hip flexion:  4+/5 4+/5   Knee extension: 5/5 5/5   Ankle DF: 5/5 5/5   Posterior fibers of Gluteus medius: 5/5 5/5   Ankle IV: 4/5 5/5   Ankle EV: 5/5 5/5   Knee flexion:  5/5 5/5   Ankle PF 4/5 5/5   Hip extension 4/5 4/5       PT reviewed FOTO scores for Niurka Pedraza on 12/7/17  FOTO score: 34    Functional Limitations Reports - G Codes  Category: mobility  Tool: FOTO      Current ():  CL  Goal (): CK      TREATMENT:  Therapeutic exercise: Niurka received therapeutic exercises to develop strength and endurance, flexibility for 5 minutes including: seated inversion eccentrics, towel scrunches, and ankle plantarflexion with theraband    Pt. Education: Instructed pt. regarding:  proper technique with all exercises, body/gait mechanics, diagnosis, prognosis, goals, and POC. Pt demo good understanding of the education provided. Niurka demonstrated good return demonstration of activities. No cultural, environmental, or spiritual barriers identified to treatment or learning.        Assessment   Patient is a 72 y.o. female referred to outpatient physical therapy who presents with a PT diagnosis of R foot/ankle pain and weakness demonstrating joint dysfunction and functional limitation as described below. Level of complexity is low;  based on patient's past medical history including the below co-morbidities and personal factors; functional limitations, and clinical presentation directly impacting his/her plan of care. Pt demonstrates good rehab potential. Pt will benefit from physcial therapy services in order to maximize pain free functional mobility. The following goals were discussed with the patient and patient is in agreement with them as to be addressed in the treatment plan. Pt was given a HEP consisting of seated inversion eccentrics, towel scrunches, and ankle plantarflexion with theraband. Pt verbally understood the instructions as they were given and demonstrated proper form and technique during therapy. Pt was advised to perform these exercises free of pain, and to stop performing them if pain occurs.       History  Co-morbidities and personal factors that may impact the plan of care Examination  Body Structures and Functions, activity limitations and participation restrictions that may impact the plan of care Clinical Presentation   Decision Making/ Complexity Score   Co-morbidities:   none            Personal Factors:   none Body Regions: foot, ankle    Body Systems: musculoskeletal      Activity limitations: walking, standing, weight bearing      Participation Restrictions: walking       stable   low       Medical necessity is demonstrated by the following IMPAIRMENTS/PROBLEM  LIST:   1) Pain limiting function   2) Gait abnormality   3) ankle weakness   4) Lack of HEP    GOALS:   Short Term Goals:  3 weeks  1.Patient will be proficient and compliant with HEP.  2. Decrease pain at worst to no greater than 5/10.  3. Pt will be able to step out of bed without complaints of pain      Long Term Goals: 6 weeks  1. Abolish all R foot/ankle pain.  2. Increase R SLS time >/= 10 seconds in order to decrease fall risk.  3. Increase R ankle inversion and plantarflexion MMT to 5/5 in order to improve functional mobility.  4. Pt will be between 40% and 60% limited in mobility.    Plan     Pt will be treated by physical therapy 1-3 times a week for 6 weeks for Pt education, HEP, therapeutic exercises, neuromuscular re-education, soft tissue and joint mobilizations; and modalities prn to achieve established goals. Niurka may at times be seen by a PTA as part of the Rehab Team.     I certify the need for these services furnished under this plan of treatment and while under my care.  ______________________________ Physician/Referring Practitioner  Date of Signature    No Carter, PT, DPT

## 2017-12-08 NOTE — PLAN OF CARE
[facility]   PATIENT EVALUATION    Name: Niurka Pedraza  Clinic Number: [id number]    Diagnosis:   Encounter Diagnoses   Name Primary?    Acute right ankle pain Yes    Ankle weakness     Balance problem      Physician: Savana Anderson MD  Treatment Orders: PT Eval and Treat    History     Past Medical History:   Diagnosis Date    Breast cancer     mastectomy 2014    Coccidiomycosis, progressive     Hyperlipidemia     Hypertension     Osteopenia     on Dexa 11/2017    Osteoporosis     pt reports hx of this, declined fosamax in past - treated with calcium and vit D    Pulmonary nodules      Current Outpatient Prescriptions   Medication Sig    atorvastatin (LIPITOR) 20 MG tablet Take 1 tablet (20 mg total) by mouth once daily.    calcium-vitamin D3 (CALCIUM 500 + D) 500 mg(1,250mg) -200 unit per tablet Take 1 tablet by mouth 2 (two) times daily with meals.    cephALEXin (KEFLEX) 250 MG capsule Take 1 capsule (250 mg total) by mouth 4 (four) times daily.    diclofenac (VOLTAREN) 75 MG EC tablet TAKE 1 TABLET (75 MG TOTAL) BY MOUTH 2 (TWO) TIMES DAILY AS NEEDED.    levothyroxine (SYNTHROID) 50 MCG tablet TAKE 1 TABLET (50 MCG TOTAL) BY MOUTH ONCE DAILY.    metoprolol succinate (TOPROL-XL) 25 MG 24 hr tablet Take 1 tablet (25 mg total) by mouth once daily.    multivitamin (ONE DAILY MULTIVITAMIN) per tablet Take 1 tablet by mouth once daily.     No current facility-administered medications for this visit.      Review of patient's allergies indicates:   Allergen Reactions    Ciprofloxacin Other (See Comments)     Right foot pain and edema    Amlodipine Edema     BLE    Lisinopril Other (See Comments)     cough       Precautions: standard    Evaluation Date: 12/7/17  Start Time: 1200  Stop Time: 1300  Visit # authorized: 1/1  Authorization period: 12/7/17-12/7/17  Plan of care expiration: 1/21/18    Hx of present illness: Pt was having R foot pain that was diagnosed  as arthritis, but began having sharp, acute pains when she was taking antibiotics about a month ago. The pain decreased once pt was off antibiotics, however, some pain remains.    Subjective     Niurka Pedraza states she is having pain when getting out of bed in the morning and when walking >30 minutes.    Pain:  Location: meidal R foot up medial ankle and mid lower leg  Description: Sharp  Activities Which Increase Pain: Morning  Activities Which Decrease Pain: rest, NSAIDs  Pain Scale: 2/10 now 7/10 at worst 2/10 at best    Physical Therapy Goals: to decrease pain and increase tolerance to weight-bearing activities      Objective     Observation: (standing) Pt ambulated into clinic without AD in sneakers; swelling around posterior tibialis tendon, increased pronation R>L    Palpation: tender to palpation R foot/ankle/lower leg following posterior tibilalis distribution    Gait: L knee genu valgus, L lateral trunk lean, L hip drop throughout gait cycle    SLS R: unable   SLS L: 10 seconds    Ankle Active/Passive ROM: (measured in degrees)    All active and passive ROM WNL with pain into passive eversion, active inversion/plantarflexion   Subtalar inversion 50' B, subtalar eversion 20' B    Strength: (graded 1-5 out of 5)    RLE LLE   Hip flexion:  4+/5 4+/5   Knee extension: 5/5 5/5   Ankle DF: 5/5 5/5   Posterior fibers of Gluteus medius: 5/5 5/5   Ankle IV: 4/5 5/5   Ankle EV: 5/5 5/5   Knee flexion:  5/5 5/5   Ankle PF 4/5 5/5   Hip extension 4/5 4/5       PT reviewed FOTO scores for Niurka Pedraza on 12/7/17  FOTO score: 34    Functional Limitations Reports - G Codes  Category: mobility  Tool: FOTO      Current ():  CL  Goal (): CK      TREATMENT:  Therapeutic exercise: Niurka received therapeutic exercises to develop strength and endurance, flexibility for 5 minutes including: seated inversion eccentrics, towel scrunches, and ankle plantarflexion with theraband    Pt. Education: Instructed pt. regarding:  proper technique with all exercises, body/gait mechanics, diagnosis, prognosis, goals, and POC. Pt demo good understanding of the education provided. Niurka demonstrated good return demonstration of activities. No cultural, environmental, or spiritual barriers identified to treatment or learning.        Assessment   Patient is a 72 y.o. female referred to outpatient physical therapy who presents with a PT diagnosis of R foot/ankle pain and weakness demonstrating joint dysfunction and functional limitation as described below. Level of complexity is low;  based on patient's past medical history including the below co-morbidities and personal factors; functional limitations, and clinical presentation directly impacting his/her plan of care. Pt demonstrates good rehab potential. Pt will benefit from physcial therapy services in order to maximize pain free functional mobility. The following goals were discussed with the patient and patient is in agreement with them as to be addressed in the treatment plan. Pt was given a HEP consisting of seated inversion eccentrics, towel scrunches, and ankle plantarflexion with theraband. Pt verbally understood the instructions as they were given and demonstrated proper form and technique during therapy. Pt was advised to perform these exercises free of pain, and to stop performing them if pain occurs.       History  Co-morbidities and personal factors that may impact the plan of care Examination  Body Structures and Functions, activity limitations and participation restrictions that may impact the plan of care Clinical Presentation   Decision Making/ Complexity Score   Co-morbidities:   none            Personal Factors:   none Body Regions: foot, ankle    Body Systems: musculoskeletal      Activity limitations: walking, standing, weight bearing      Participation Restrictions: walking       stable   low       Medical necessity is demonstrated by the following IMPAIRMENTS/PROBLEM  LIST:   1) Pain limiting function   2) Gait abnormality   3) ankle weakness   4) Lack of HEP    GOALS:   Short Term Goals:  3 weeks  1.Patient will be proficient and compliant with HEP.  2. Decrease pain at worst to no greater than 5/10.  3. Pt will be able to step out of bed without complaints of pain      Long Term Goals: 6 weeks  1. Abolish all R foot/ankle pain.  2. Increase R SLS time >/= 10 seconds in order to decrease fall risk.  3. Increase R ankle inversion and plantarflexion MMT to 5/5 in order to improve functional mobility.  4. Pt will be between 40% and 60% limited in mobility.    Plan     Pt will be treated by physical therapy 1-3 times a week for 6 weeks for Pt education, HEP, therapeutic exercises, neuromuscular re-education, soft tissue and joint mobilizations; and modalities prn to achieve established goals. Niurka may at times be seen by a PTA as part of the Rehab Team.     I certify the need for these services furnished under this plan of treatment and while under my care.  ______________________________ Physician/Referring Practitioner  Date of Signature    No Carter, PT, DPT

## 2017-12-11 DIAGNOSIS — M54.9 CHRONIC BACK PAIN, UNSPECIFIED BACK LOCATION, UNSPECIFIED BACK PAIN LATERALITY: Primary | ICD-10-CM

## 2017-12-11 DIAGNOSIS — G89.29 CHRONIC BACK PAIN, UNSPECIFIED BACK LOCATION, UNSPECIFIED BACK PAIN LATERALITY: Primary | ICD-10-CM

## 2017-12-13 ENCOUNTER — OFFICE VISIT (OUTPATIENT)
Dept: UROLOGY | Facility: CLINIC | Age: 72
End: 2017-12-13
Attending: UROLOGY
Payer: MEDICARE

## 2017-12-13 ENCOUNTER — LAB VISIT (OUTPATIENT)
Dept: LAB | Facility: OTHER | Age: 72
End: 2017-12-13
Attending: UROLOGY
Payer: MEDICARE

## 2017-12-13 VITALS
HEIGHT: 64 IN | DIASTOLIC BLOOD PRESSURE: 83 MMHG | WEIGHT: 169 LBS | BODY MASS INDEX: 28.85 KG/M2 | SYSTOLIC BLOOD PRESSURE: 143 MMHG | HEART RATE: 84 BPM

## 2017-12-13 DIAGNOSIS — R05.9 COUGH: ICD-10-CM

## 2017-12-13 DIAGNOSIS — N39.0 RECURRENT UTI: Primary | ICD-10-CM

## 2017-12-13 DIAGNOSIS — R31.29 MICROHEMATURIA: ICD-10-CM

## 2017-12-13 DIAGNOSIS — R05.9 COUGH: Primary | ICD-10-CM

## 2017-12-13 DIAGNOSIS — R91.1 COIN LESION: ICD-10-CM

## 2017-12-13 DIAGNOSIS — J47.9 BRONCHIECTASIS WITHOUT ACUTE EXACERBATION: ICD-10-CM

## 2017-12-13 LAB
BILIRUB SERPL-MCNC: ABNORMAL MG/DL
BLOOD URINE, POC: 50
BUN SERPL-MCNC: 23 MG/DL
COLOR, POC UA: ABNORMAL
CREAT SERPL-MCNC: 0.9 MG/DL
EST. GFR  (AFRICAN AMERICAN): >60 ML/MIN/1.73 M^2
EST. GFR  (NON AFRICAN AMERICAN): >60 ML/MIN/1.73 M^2
GLUCOSE UR QL STRIP: ABNORMAL
IGA SERPL-MCNC: 115 MG/DL
IGE SERPL-ACNC: 100 IU/ML
IGG SERPL-MCNC: 1111 MG/DL
IGM SERPL-MCNC: 106 MG/DL
KETONES UR QL STRIP: NORMAL
LEUKOCYTE ESTERASE URINE, POC: ABNORMAL
NITRITE, POC UA: ABNORMAL
PH, POC UA: 5
POC RESIDUAL URINE VOLUME: 32 ML (ref 0–100)
PROTEIN, POC: NORMAL
SPECIFIC GRAVITY, POC UA: 1.02
UROBILINOGEN, POC UA: ABNORMAL

## 2017-12-13 PROCEDURE — 36415 COLL VENOUS BLD VENIPUNCTURE: CPT

## 2017-12-13 PROCEDURE — 99214 OFFICE O/P EST MOD 30 MIN: CPT | Mod: 25,S$GLB,, | Performed by: UROLOGY

## 2017-12-13 PROCEDURE — 82565 ASSAY OF CREATININE: CPT

## 2017-12-13 PROCEDURE — 82784 ASSAY IGA/IGD/IGG/IGM EACH: CPT | Mod: 59

## 2017-12-13 PROCEDURE — 51798 US URINE CAPACITY MEASURE: CPT | Mod: S$GLB,,, | Performed by: UROLOGY

## 2017-12-13 PROCEDURE — 81002 URINALYSIS NONAUTO W/O SCOPE: CPT | Mod: S$GLB,,, | Performed by: UROLOGY

## 2017-12-13 PROCEDURE — 81000 URINALYSIS NONAUTO W/SCOPE: CPT

## 2017-12-13 PROCEDURE — 84520 ASSAY OF UREA NITROGEN: CPT

## 2017-12-13 PROCEDURE — 82785 ASSAY OF IGE: CPT

## 2017-12-13 RX ORDER — CEPHALEXIN 250 MG/1
250 CAPSULE ORAL DAILY
COMMUNITY
End: 2018-01-08 | Stop reason: SDUPTHER

## 2017-12-13 RX ORDER — OXYBUTYNIN CHLORIDE 5 MG/1
5 TABLET, EXTENDED RELEASE ORAL DAILY
Qty: 30 TABLET | Refills: 11 | Status: SHIPPED | OUTPATIENT
Start: 2017-12-13 | End: 2018-10-05 | Stop reason: SDUPTHER

## 2017-12-13 NOTE — PROGRESS NOTES
Subjective:       Niurka Pedraza is a 72 y.o. female who is an established patient with [place] urologist, Dr Dhaliwal, though new to me was seen for evaluation of UTI.      She was seen by another urologist for recurrent UTIs/dysuria. Multiple +UCx with different bacteria (E coli, Enterococcus). She has had negative cystoscopy and GONZÁLEZ (1/17). UTIs return soon after treatment. She was started on prophy Keflex in 7/17.     She saw PCP in 11/17 with complaints of UTI. UCx was negative. She now feels a constant pressure in SP area and c/o pain with walking. She also notes pain worse with movement (like hitting a bump when driving). Denies dysuria. Increased frequency to now q1h. Present for 2 months. Denies constipation, occasional diarrhea. Frequent RADHA. Now with UUI. Also with BOBBI - increased coughing with recent lung issue. Nocturia x 4-5. Denies gross hematuria.     She moved here from [place] in 9/17.     PVR (bladder scan) today - 32cc    UCx:  11/17 - neg  10/17 - >100k E coli  10/17- >100k Enterococcus  7/17 - >100k E coli  5/17- >100k E coli  5/17 - neg  4/17- 50-99k Enterococcus  3/17 - >100k E coli  12/16 - neg      The following portions of the patient's history were reviewed and updated as appropriate: allergies, current medications, past family history, past medical history, past social history, past surgical history and problem list.    Review of Systems  Constitutional: no fever or chills  ENT: no nasal congestion or sore throat  Respiratory: no cough or shortness of breath  Cardiovascular: no chest pain or palpitations  Gastrointestinal: no nausea or vomiting, tolerating diet  Genitourinary: as per HPI  Hematologic/Lymphatic: no easy bruising or lymphadenopathy  Musculoskeletal: no arthralgias or myalgias  Skin: no rashes or lesions  Neurological: no seizures or tremors  Behavioral/Psych: no auditory or visual hallucinations        Objective:    Vitals: BP (!) 143/83 (BP Location: Right arm,  "Patient Position: Sitting, BP Method: Large (Automatic))   Pulse 84   Ht 5' 4" (1.626 m)   Wt 76.7 kg (169 lb)   BMI 29.01 kg/m²     Physical Exam   General: well developed, well nourished in no acute distress  Head: normocephalic, atraumatic  Neck: supple, trachea midline, no obvious enlargement of thyroid  HEENT: EOMI, mucus membranes moist, sclera anicteric, no hearing impairment  Lungs: symmetric expansion, non-labored breathing  Cardiovascular: regular rate and rhythm, normal pulses  Abdomen: soft, non tender, non distended, no palpable masses, no hepatosplenomegaly, no hernias, no CVA tenderness  Musculoskeletal: no peripheral edema, normal ROM in bilateral upper and lower extremities  Lymphatics: no cervical or inguinal lymphadenopathy  Skin: no rashes or lesions  Neuro: alert and oriented x 3, no gross deficits  Psych: normal judgment and insight, normal mood/affect and non-anxious  Genitourinary:   patient declined exam      Lab Review   Urine analysis today in clinic shows positive for red blood cells 50    Lab Results   Component Value Date    WBC 8.59 11/22/2017    HGB 12.6 11/22/2017    HCT 39.3 11/22/2017    MCV 87 11/22/2017     11/22/2017     Lab Results   Component Value Date    CREATININE 1.0 11/22/2017    BUN 24 (H) 11/22/2017         Imaging  Images and reports were personally reviewed by me and discussed with patient  GONZÁLEZ reviewed       Assessment/Plan:      1. Recurrent UTI    - Discussed UTI prevention strategies.   - Adequate hydration.   - Double voiding. Consider timed voiding.    - Avoid constipation.   - GONZÁLEZ - negative 1/17   - Cystoscopy - negative in 1/17   - Cranberry/probiotics.   - Estrace cream 2x weekly - will consider in near future   - Call with UTI symptoms so UA/UCx can be sent.      2. Microhematuria    - Discussed etiology and workup of hematuria   - UCx - results above   - Cytology - previously negative   - CT urogram - ordered. GONZÁLEZ was negative from 1/17.   - " Office cystoscopy - negative 1/17   - UA micro       3. Nocturia/frequency/urge incontinence   - Ditropan XL 5mg       Follow up in 6 weeks with PVR

## 2017-12-14 LAB
CAOX CRY UR QL COMP ASSIST: ABNORMAL
MICROSCOPIC COMMENT: ABNORMAL
RBC #/AREA URNS AUTO: 2 /HPF (ref 0–4)
WBC #/AREA URNS AUTO: 10 /HPF (ref 0–5)

## 2017-12-15 ENCOUNTER — CLINICAL SUPPORT (OUTPATIENT)
Dept: REHABILITATION | Facility: HOSPITAL | Age: 72
End: 2017-12-15
Attending: INTERNAL MEDICINE
Payer: MEDICARE

## 2017-12-15 DIAGNOSIS — R29.898 ANKLE WEAKNESS: ICD-10-CM

## 2017-12-15 DIAGNOSIS — M25.571 ACUTE RIGHT ANKLE PAIN: Primary | ICD-10-CM

## 2017-12-15 DIAGNOSIS — R26.89 BALANCE PROBLEM: ICD-10-CM

## 2017-12-15 PROCEDURE — 97110 THERAPEUTIC EXERCISES: CPT

## 2017-12-15 NOTE — PROGRESS NOTES
"                                                  Physical Therapy Daily Note     Date: 12/15/2017  Name: Niurak Pedraza  Clinic Number: [id number]  Diagnosis:   Encounter Diagnoses   Name Primary?    Acute right ankle pain Yes    Ankle weakness     Balance problem      Physician: Savana Anderson MD      Precautions: standard    Evaluation Date: 12/7/17  Start Time: 1400  Stop Time: 1500  Visit # authorized: 2/12  Authorization period: 12/7/17-12/7/17  Plan of care expiration: 1/21/18    Hx of present illness: Pt was having R foot pain that was diagnosed as arthritis, but began having sharp, acute pains when she was taking antibiotics about a month ago. The pain decreased once pt was off antibiotics, however, some pain remains.      Subjective     Pt reports 5-6/10 R ankle pain pre-tx. Although pt requested I ask her doctor for low back pain referral during the initial evaluation, she requests we focus only on the ankle/foot in tx until she improves. Pt reports she was unable to perform Hep secondary to pain so she stopped.    Objective     Patient received individual therapy to increase strength and endurance with activities as follows:     Niurka received therapeutic exercises to develop strength and endurance for 25 minutes including:     seated inversion eccentrics, towel scrunches, and ankle plantarflexion with theraband       12/15/2017   Inversion eccentrics Manual resistance x20   Plantarflexion seated 2x30 GTB   Towel scrunches x2 min   Seated arch lift x2 min   [place]   x3 min (picked up 3 marbled)   Calf raise eccentrics 2x10   Tandem stance  R back 2x30"   SLS 15" R       Niurka received the following manual therapy techniques for 5 minutes.    STM along R posterior tibialis distribution    Patient received ice to R foot/ankle for 10 minutes post-tx.    Written Home Exercises Provided: no new exercises provided this session  Pt demo good understanding of the education provided. Niurka " demonstrated good return demonstration of activities.     Education provided: Pt educated on all new clinic exercises. Reviewed HEP.  Niurka verbalized good understanding of education provided.   No spiritual or educational barriers to learning identified.    Assessment   Pt tolerated all tx well with no increases in symptoms. Added calf raise eccentrics to program to increase plantarflexion strength. Added towel scrunches, marble , and seated arch lift to program to increase foot intrinsic strength. Pt will continue to benefit from skilled PT in order to decrease pain and swelling, improve R foot/ankle strength, and improve functional mobility.     GOALS:   Short Term Goals:  3 weeks  1.Patient will be proficient and compliant with HEP.  2. Decrease pain at worst to no greater than 5/10.  3. Pt will be able to step out of bed without complaints of pain      Long Term Goals: 6 weeks  1. Abolish all R foot/ankle pain.  2. Increase R SLS time >/= 10 seconds in order to decrease fall risk.  3. Increase R ankle inversion and plantarflexion MMT to 5/5 in order to improve functional mobility.  4. Pt will be between 40% and 60% limited in mobility.       Plan   Progress inversion/plantarflexion/foot intrinsic strength as tolerated.      Therapist: No Carter, PT, DPT

## 2017-12-18 ENCOUNTER — HOSPITAL ENCOUNTER (OUTPATIENT)
Dept: RADIOLOGY | Facility: OTHER | Age: 72
Discharge: HOME | End: 2017-12-18
Attending: UROLOGY
Payer: MEDICARE

## 2017-12-18 ENCOUNTER — HOSPITAL ENCOUNTER (OUTPATIENT)
Dept: RADIOLOGY | Facility: OTHER | Age: 72
Discharge: HOME | End: 2017-12-18
Attending: INTERNAL MEDICINE
Payer: MEDICARE

## 2017-12-18 DIAGNOSIS — J47.0 ACUTE BRONCHITIS WITH BRONCHIECTASIS: ICD-10-CM

## 2017-12-18 DIAGNOSIS — R31.29 MICROHEMATURIA: ICD-10-CM

## 2017-12-18 DIAGNOSIS — N39.0 RECURRENT UTI: ICD-10-CM

## 2017-12-18 DIAGNOSIS — R05.9 COUGH: ICD-10-CM

## 2017-12-18 DIAGNOSIS — I10 HYPERTENSION: ICD-10-CM

## 2017-12-18 DIAGNOSIS — R91.1 PULMONARY NODULE: ICD-10-CM

## 2017-12-18 PROCEDURE — 25500020 PHARM REV CODE 255: Performed by: UROLOGY

## 2017-12-18 PROCEDURE — 74178 CT ABD&PLV WO CNTR FLWD CNTR: CPT | Mod: 26,,, | Performed by: RADIOLOGY

## 2017-12-18 PROCEDURE — 74178 CT ABD&PLV WO CNTR FLWD CNTR: CPT | Mod: TC

## 2017-12-18 PROCEDURE — 71250 CT THORAX DX C-: CPT | Mod: TC

## 2017-12-18 PROCEDURE — 71250 CT THORAX DX C-: CPT | Mod: 26,,, | Performed by: RADIOLOGY

## 2017-12-18 RX ADMIN — IOHEXOL 125 ML: 350 INJECTION, SOLUTION INTRAVENOUS at 12:12

## 2017-12-20 ENCOUNTER — TELEPHONE (OUTPATIENT)
Dept: UROLOGY | Facility: CLINIC | Age: 72
End: 2017-12-20

## 2017-12-20 NOTE — TELEPHONE ENCOUNTER
Called pt to discuss CT scan. No answer. VM left.    Recommended for pelvic US, may need gyn f/u.    Follow up with pulm for lung issues

## 2017-12-21 ENCOUNTER — CLINICAL SUPPORT (OUTPATIENT)
Dept: REHABILITATION | Facility: HOSPITAL | Age: 72
End: 2017-12-21
Attending: INTERNAL MEDICINE
Payer: MEDICARE

## 2017-12-21 ENCOUNTER — TELEPHONE (OUTPATIENT)
Dept: UROLOGY | Facility: CLINIC | Age: 72
End: 2017-12-21

## 2017-12-21 DIAGNOSIS — R29.898 ANKLE WEAKNESS: ICD-10-CM

## 2017-12-21 DIAGNOSIS — N94.9 ADNEXAL FULLNESS: Primary | ICD-10-CM

## 2017-12-21 DIAGNOSIS — M25.571 ACUTE RIGHT ANKLE PAIN: Primary | ICD-10-CM

## 2017-12-21 DIAGNOSIS — R26.89 BALANCE PROBLEM: ICD-10-CM

## 2017-12-21 PROCEDURE — 97110 THERAPEUTIC EXERCISES: CPT

## 2017-12-21 PROCEDURE — 97140 MANUAL THERAPY 1/> REGIONS: CPT

## 2017-12-21 NOTE — TELEPHONE ENCOUNTER
Tried to reach pt to discuss CT scan, no answer.     She has known lung issues that were seen on CT - recommend to follow up with pulmonary as they have planned.    CT also showed a possible abnormality around area of ovary. Recommend pelvic ultrasound to better evaluate. If further evaluation is needed, she will either see her established gyn or I will refer her to one.     Pelvic US ordered, please help schedule.

## 2017-12-21 NOTE — PROGRESS NOTES
"                                                  Physical Therapy Daily Note     Date: 12/22/2017  Name: Niurka Pedraza  Clinic Number: [id number]  Diagnosis:   Encounter Diagnoses   Name Primary?    Acute right ankle pain Yes    Ankle weakness     Balance problem      Physician: Savana Anderson MD      Precautions: standard    Evaluation Date: 12/7/17  Start Time: 1400  Stop Time: 1500  Visit # authorized: 3/12  Authorization period: 12/7/17-12/7/17  Plan of care expiration: 1/21/18    Hx of present illness: Pt was having R foot pain that was diagnosed as arthritis, but began having sharp, acute pains when she was taking antibiotics about a month ago. The pain decreased once pt was off antibiotics, however, some pain remains.      Subjective     Pt reports 6/10 R ankle pain pre-tx. Pt reports she was in so much pain last night that she is taking Diclofenac today. Pt reports her family is in town so she went shopping and walked around too much; she thinks this may have caused increased pain. When I was measuring for navicular drop and thinking about recommended inserts to increase arch support, patient reports she has been wearing inserts given to her by her doctor for about a week. She likes so far with no complaints.    Objective     Measurements   Navicular drop R 5cm to 4.3 cm   Navicular drop L 5.3cm to 4.3 cm    Patient received individual therapy to increase strength and endurance with activities as follows:     Niurka received therapeutic exercises to develop strength and endurance for 30 minutes including:     seated inversion eccentrics, towel scrunches, and ankle plantarflexion with theraband       12/22/2017   Inversion eccentrics Manual resistance x20   Plantarflexion seated 2x30 BTB   Towel scrunches x2 min   Seated arch lift x2 min   [place]   x3 min (picked up 3 marbled)   gastroc stretch standing x1 min B   Calf raise eccentrics 2x10   Tandem stance  R back 2x30"   SLS R 2x30"       Niurka " received the following manual therapy techniques for 25 minutes.    STM along R posterior tibialis distribution with trigger point release   Inversion/plantarflexion eccentrics 3x10 manual resistance    Patient received ice to R foot/ankle for 10 minutes post-tx.    Written Home Exercises Provided: no new exercises provided this session  Pt demo good understanding of the education provided. Niurka demonstrated good return demonstration of activities.     Education provided: Pt educated on all new clinic exercises. Reviewed HEP.  Niurka verbalized good understanding of education provided.   No spiritual or educational barriers to learning identified.    Assessment   Pt demos improved foot intrinsic strength with increased number of marbles picked up with toes and improved comprehension of seated arch lifts. Pt has continued swelling and tender points following R posterior tibialis distribution and muscle wasting in posterior tibialis and gastroc muscles on her R side. Pt will continue to benefit from skilled PT in order to decrease pain and swelling, improve R foot/ankle strength, and improve functional mobility.     GOALS:   Short Term Goals:  3 weeks  1.Patient will be proficient and compliant with HEP.  2. Decrease pain at worst to no greater than 5/10.  3. Pt will be able to step out of bed without complaints of pain      Long Term Goals: 6 weeks  1. Abolish all R foot/ankle pain.  2. Increase R SLS time >/= 10 seconds in order to decrease fall risk.  3. Increase R ankle inversion and plantarflexion MMT to 5/5 in order to improve functional mobility.  4. Pt will be between 40% and 60% limited in mobility.       Plan   Progress inversion/plantarflexion/foot intrinsic strength as tolerated. Progress note 1/7/18.      Therapist: No Carter, PT, DPT

## 2017-12-22 ENCOUNTER — CLINICAL SUPPORT (OUTPATIENT)
Dept: AUDIOLOGY | Facility: CLINIC | Age: 72
End: 2017-12-22
Payer: MEDICARE

## 2017-12-22 ENCOUNTER — OFFICE VISIT (OUTPATIENT)
Dept: OTOLARYNGOLOGY | Facility: CLINIC | Age: 72
End: 2017-12-22
Payer: MEDICARE

## 2017-12-22 VITALS
DIASTOLIC BLOOD PRESSURE: 82 MMHG | HEIGHT: 64 IN | SYSTOLIC BLOOD PRESSURE: 132 MMHG | WEIGHT: 172.81 LBS | BODY MASS INDEX: 29.5 KG/M2 | TEMPERATURE: 98 F | HEART RATE: 75 BPM

## 2017-12-22 DIAGNOSIS — H61.23 BILATERAL IMPACTED CERUMEN: Primary | ICD-10-CM

## 2017-12-22 PROCEDURE — 99213 OFFICE O/P EST LOW 20 MIN: CPT | Mod: 25,S$PBB,, | Performed by: NURSE PRACTITIONER

## 2017-12-22 PROCEDURE — 99999 PR PBB SHADOW E&M-EST. PATIENT-LVL III: CPT | Mod: PBBFAC,,, | Performed by: NURSE PRACTITIONER

## 2017-12-22 PROCEDURE — 92557 COMPREHENSIVE HEARING TEST: CPT | Mod: PBBFAC | Performed by: AUDIOLOGIST

## 2017-12-22 PROCEDURE — 69210 REMOVE IMPACTED EAR WAX UNI: CPT | Mod: S$PBB,,, | Performed by: NURSE PRACTITIONER

## 2017-12-22 PROCEDURE — 92567 TYMPANOMETRY: CPT | Mod: PBBFAC | Performed by: AUDIOLOGIST

## 2017-12-22 PROCEDURE — 69210 REMOVE IMPACTED EAR WAX UNI: CPT | Mod: 50,PBBFAC | Performed by: NURSE PRACTITIONER

## 2017-12-22 PROCEDURE — 99213 OFFICE O/P EST LOW 20 MIN: CPT | Mod: PBBFAC | Performed by: NURSE PRACTITIONER

## 2017-12-22 NOTE — PATIENT INSTRUCTIONS
Patient declined MRI to r/o CPA lesion.  Please retrieve previous audiogram and MRI scans.  Audiogram Reviewed:Asymmetrical hearing loss.  Noise and Hearing Protection pamphlet provided.  Hearing conservation strongly recommended.  Trial of amplification Right ear also recommended.  Re-check of hearing in 18-24 months or sooner if subjective change noted.  Schedule HAC.  Encouraged yearly ear cleanings.  F/U with PCP as per schedule.  RTC prn.

## 2017-12-22 NOTE — LETTER
December 22, 2017      Savana Anderson MD  [address]  [place] [address]           [facility]  [address]  [place] [address]  Phone: [phone]  Fax: [phone]          Patient: Niurka Pedraza   MR Number: [id number]   YOB: 1945   Date of Visit: 12/22/2017       Dear Dr. Savana Anderson:    Thank you for referring Niurka Pedraza to me for evaluation. Attached you will find relevant portions of my assessment and plan of care.    If you have questions, please do not hesitate to call me. I look forward to following Niurka Pedraza along with you.    Sincerely,    Yrn Irwin, NP    Enclosure  CC:  No Recipients    If you would like to receive this communication electronically, please contact [email or web address] or [phone] to request more information on [facility] Link access.    For providers and/or their staff who would like to refer a patient to [facility], please contact us through our one-stop-shop provider referral line, [facility], at [phone].    If you feel you have received this communication in error or would no longer like to receive these types of communications, please e-mail [email or web address]

## 2017-12-22 NOTE — PROGRESS NOTES
Subjective:       Patient ID: Niurka Pedraza is a 72 y.o. female.    Chief Complaint: Hearing Loss and Tinnitus    Niurka Pedraza is a 73 y/o Female who presents for a hearing evaluation and ear cleaning. Associated symptoms include AD occasional tinnitus. She had her ears cleaned via irrigation one week ago. She denies otologic surgery, otalgia, otorrhea, dizziness, or vertigo. No family h/o deafness or hearing loss. 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 She reports that she was thoroughly evaluated and had a negative MRI scan. She does not have the previous audiogram or MRI imaging results for today's visit.  She has a h/o HTN, HLD, Fatty liver, and Lung nodules.   Hearing Loss:   Chronicity:  Chronic  Onset:  More than 1 year ago  Progression since onset:  Gradually worsening  Frequency:  Constantly  Pain scale:  0/10  Hearing loss characteristics:  Moderate   Associated symptoms: tinnitus (occasional).  No dizziness, no vertigo, no ear congestion, no ear pain, no fever, no headaches, no buzzing, no rhinorrhea, no aural fullness, no fluctuance, no imbalance, no otalgia, no otorrhea, no facial weakness, no visual disturbances and not masked by noise.  Aggravated by:  Nothing  Treatments tried:  NothingNo stroke, TIA, or systemic emboli, no neurologic disease, no noise exposure, no dizziness, no ear surgery, no environmental allergies, no ear tubes, no ototoxic drugs, no ear infections and no recent URI.       Past Medical History: Patient has a past medical history of Breast cancer; Coccidiomycosis, progressive; Hyperlipidemia; Hypertension; Osteopenia; Osteoporosis; and Pulmonary nodules.    Past Surgical History: Patient has a past surgical history that includes Cholecystectomy and Mastectomy.    Social History: Patient reports that she has quit smoking. Her smoking use included Cigarettes. She smoked 0.50 packs per day. She has never used smokeless tobacco. She reports that she  does not drink alcohol or use drugs.    Family History: family history includes Breast cancer in her mother; Cancer in her brother and mother; Heart disease in her father; Hypertension in her brother, father, and son; No Known Problems in her daughter and sister; Stroke in her father.    Medications:   Current Outpatient Prescriptions   Medication Sig    atorvastatin (LIPITOR) 20 MG tablet Take 1 tablet (20 mg total) by mouth once daily.    calcium-vitamin D3 (CALCIUM 500 + D) 500 mg(1,250mg) -200 unit per tablet Take 1 tablet by mouth 2 (two) times daily with meals.    cephALEXin (KEFLEX) 250 MG capsule Take 250 mg by mouth once daily.    diclofenac (VOLTAREN) 75 MG EC tablet TAKE 1 TABLET (75 MG TOTAL) BY MOUTH 2 (TWO) TIMES DAILY AS NEEDED.    levothyroxine (SYNTHROID) 50 MCG tablet TAKE 1 TABLET (50 MCG TOTAL) BY MOUTH ONCE DAILY.    metoprolol succinate (TOPROL-XL) 25 MG 24 hr tablet Take 1 tablet (25 mg total) by mouth once daily.    multivitamin (ONE DAILY MULTIVITAMIN) per tablet Take 1 tablet by mouth once daily.    oxybutynin (DITROPAN-XL) 5 MG TR24 Take 1 tablet (5 mg total) by mouth once daily.     No current facility-administered medications for this visit.        Allergies: Patient is allergic to ciprofloxacin; amlodipine; and lisinopril.    Review of Systems   Constitutional: Negative for activity change, appetite change, chills, diaphoresis, fatigue, fever and unexpected weight change.   HENT: Positive for hearing loss and tinnitus (occasional). Negative for congestion, dental problem, drooling, ear discharge, ear pain, facial swelling, mouth sores, nosebleeds, postnasal drip, rhinorrhea, sinus pain, sinus pressure, sneezing, sore throat, trouble swallowing and voice change.    Eyes: Negative for pain and visual disturbance.   Respiratory: Negative for cough, chest tightness, shortness of breath, wheezing and stridor.    Cardiovascular: Negative for chest pain.   Musculoskeletal: Negative for  "gait problem and neck pain.   Skin: Negative for color change and rash.   Allergic/Immunologic: Negative for environmental allergies.   Neurological: Negative for dizziness, vertigo, seizures, syncope, facial asymmetry, speech difficulty, weakness, light-headedness, numbness and headaches.   Psychiatric/Behavioral: Negative for agitation and confusion. The patient is not nervous/anxious.        Objective:       /82 (BP Location: Left arm, Patient Position: Sitting, BP Method: Large (Automatic))   Pulse 75   Temp 97.7 °F (36.5 °C) (Tympanic)   Ht 5' 4" (1.626 m)   Wt 78.4 kg (172 lb 13.5 oz)   BMI 29.67 kg/m²     Physical Exam   Constitutional: She is oriented to person, place, and time. She appears well-developed and well-nourished.   HENT:   Head: Normocephalic and atraumatic. Not macrocephalic and not microcephalic. Head is without raccoon's eyes, without Mccurdy's sign, without abrasion, without contusion, without laceration, without right periorbital erythema and without left periorbital erythema. Hair is normal.   Right Ear: Tympanic membrane, external ear and ear canal normal. No lacerations. No drainage, swelling or tenderness. No foreign bodies. No mastoid tenderness. Tympanic membrane is not injected, not scarred, not perforated, not erythematous, not retracted and not bulging. Tympanic membrane mobility is normal. No middle ear effusion. No hemotympanum. Decreased hearing is noted.   Left Ear: Tympanic membrane, external ear and ear canal normal. No lacerations. No drainage, swelling or tenderness. No foreign bodies. No mastoid tenderness. Tympanic membrane is not injected, not scarred, not perforated, not erythematous, not retracted and not bulging. Tympanic membrane mobility is normal.  No middle ear effusion. No hemotympanum. No decreased hearing is noted.   Nose: Nose normal. No mucosal edema, rhinorrhea, nose lacerations, sinus tenderness, nasal deformity, septal deviation or nasal septal " hematoma. No epistaxis.  No foreign bodies. Right sinus exhibits no maxillary sinus tenderness and no frontal sinus tenderness. Left sinus exhibits no maxillary sinus tenderness and no frontal sinus tenderness.   Mouth/Throat: Uvula is midline, oropharynx is clear and moist and mucous membranes are normal. Mucous membranes are not pale, not dry and not cyanotic. She has dentures. No oral lesions. No trismus in the jaw. Abnormal dentition. No dental abscesses, uvula swelling, lacerations or dental caries. No oropharyngeal exudate, posterior oropharyngeal edema, posterior oropharyngeal erythema or tonsillar abscesses.   Procedure Note:    Patient was brought to the minor procedure room and using the operating microscope the right ear canal  was cleaned of ceruminous debris. There was a significant cerumen impaction.  The forceps and suction were both used to perform this. Tympanic membrane intact. Pt tolerated well. There were no complications.    Procedure Note:    Patient was brought to the minor procedure room and using the operating microscope the left ear canal  was cleaned of ceruminous debris. There was a significant cerumen impaction.  The forceps and suction were both used to perform this. Tympanic membrane intact. Pt tolerated well. There were no complications.    No AOM or OE.  No mastoid, tenderness, swelling, or redness.  Facial nerve intact.     Eyes: Conjunctivae, EOM and lids are normal. Pupils are equal, round, and reactive to light.   Neck: Trachea normal and normal range of motion. Neck supple. No spinous process tenderness and no muscular tenderness present. No neck rigidity. No edema, no erythema and normal range of motion present. No thyroid mass and no thyromegaly present.   Pulmonary/Chest: Effort normal.   Abdominal: Soft.   Musculoskeletal: Normal range of motion.   Lymphadenopathy:        Head (right side): No submental, no submandibular, no tonsillar, no preauricular and no posterior  auricular adenopathy present.        Head (left side): No submental, no submandibular, no tonsillar, no preauricular, no posterior auricular and no occipital adenopathy present.     She has no cervical adenopathy.   Neurological: She is alert and oriented to person, place, and time. No cranial nerve deficit or sensory deficit.   Skin: Skin is warm and dry.   Psychiatric: She has a normal mood and affect. Her behavior is normal. Judgment and thought content normal.   Nursing note and vitals reviewed.      As a result of this patients history and examination findings, a comprehensive audiogram was ordered to determine the level of hearing/hearing loss.        Assessment:       1. Bilateral impacted cerumen    2. Asymmetrical hearing loss of right ear        Plan:       Patient declined MRI scan to r/o CPA lesion.  Please retrieve previous audiogram and MRI scans.  Audiogram Reviewed:Asymmetrical hearing loss.  Noise and Hearing Protection pamphlet provided.  Hearing conservation strongly recommended.  Trial of amplification Right ear also recommended.  Re-check of hearing in 18-24 months or sooner if subjective change noted.  Schedule HAC.  Encouraged yearly ear cleanings.  F/U with PCP as per schedule.  RTC prn.

## 2017-12-22 NOTE — PROGRESS NOTES
Ms. Pedraza was seen in the clinic today for a hearing evaluation.  She reported a longstanding history of hearing loss in her right ear.     Audiological testing revealed a moderate to severe sensorineural hearing loss in the right ear and normal hearing sensitivity in the left ear. A speech reception threshold was obtained at 55 dBHL in the right ear and 20 dBHL in the left ear. Speech discrimination was 72% om the right 96% in the left ear.    Tympanometry revealed Type As tympanograms, bilaterally.    Recommendations:  1. Otologic evaluation  2. Annual hearing evaluation  3. Noise protection  4. Hearing aid consultation

## 2017-12-29 ENCOUNTER — CLINICAL SUPPORT (OUTPATIENT)
Dept: REHABILITATION | Facility: HOSPITAL | Age: 72
End: 2017-12-29
Attending: INTERNAL MEDICINE
Payer: MEDICARE

## 2017-12-29 DIAGNOSIS — M25.571 ACUTE RIGHT ANKLE PAIN: Primary | ICD-10-CM

## 2017-12-29 DIAGNOSIS — R26.89 BALANCE PROBLEM: ICD-10-CM

## 2017-12-29 DIAGNOSIS — R29.898 ANKLE WEAKNESS: ICD-10-CM

## 2017-12-29 PROCEDURE — 97035 APP MDLTY 1+ULTRASOUND EA 15: CPT

## 2017-12-29 PROCEDURE — 97110 THERAPEUTIC EXERCISES: CPT

## 2017-12-29 PROCEDURE — 97140 MANUAL THERAPY 1/> REGIONS: CPT

## 2017-12-29 NOTE — PROGRESS NOTES
"                                                  Physical Therapy Daily Note     Date: 12/29/2017  Name: Niurka Pedraza  Clinic Number: [id number]  Diagnosis:   Encounter Diagnoses   Name Primary?    Acute right ankle pain Yes    Ankle weakness     Balance problem      Physician: Savana Anderson MD      Precautions: standard    Evaluation Date: 12/7/17  Start Time: 1400  Stop Time: 1500  Visit # authorized: 4/12  Authorization period: 12/7/17-12/7/17  Plan of care expiration: 1/21/18    Hx of present illness: Pt was having R foot pain that was diagnosed as arthritis, but began having sharp, acute pains when she was taking antibiotics about a month ago. The pain decreased once pt was off antibiotics, however, some pain remains.      Subjective     Pt reports no real change since last tx. Pt reports she took Cipro for chronic infections many times over many years, but is in tx because this time her foot began to hurt really badly that she couldn't even walk. She is happy that she can at least walk now.    Objective     Patient received individual therapy to increase strength and endurance with activities as follows:     Niurka received therapeutic exercises to develop strength and endurance for 34 minutes including:     seated inversion eccentrics, towel scrunches, and ankle plantarflexion with theraband       12/29/2017   Inversion eccentrics Manual resistance 2x15   Plantarflexion seated 2x30 BTB NP   Towel scrunches x2 min   Seated arch lift x2 min   [place]   x1 cup   gastroc stretch standing x1 min B   Calf raise eccentrics 2x10   Tandem stance  R back 2x30" NP   SLS R 2x30"       Niurka received the following manual therapy techniques for 18 minutes.    STM along R posterior tibialis distribution with trigger point release    Patient received ultrasound over R posterior tibialis by medial malleolus after being cleared for contraindications for 8 minutes at 20% duty cycle, 3 MHz, 1.0 [place]dal[place]/cm " squared    Patient declined ice to R foot/ankle post-tx.    Written Home Exercises Provided: no new exercises provided this session  Pt demo good understanding of the education provided. Niurka demonstrated good return demonstration of activities.     Education provided: Pt educated on all new clinic exercises. Reviewed HEP.  Niurka verbalized good understanding of education provided.   No spiritual or educational barriers to learning identified.    Assessment   Pt demos improved comprehension of exercises with decreased need for cueing. Pt tolerated most tx well but had reports of pain during standing calf raise eccentrics. Pt slow to progress toward pain goals due to chronic nature of disorder caused by reaction to antibiotics. Added Ultrasound to program to promote tissue healing. Pt will continue to benefit from skilled PT in order to decrease pain and swelling, improve R foot/ankle strength, and improve functional mobility.     GOALS:   Short Term Goals:  3 weeks  1.Patient will be proficient and compliant with HEP. *GOAL MET 12/29/17  2. Decrease pain at worst to no greater than 5/10.  3. Pt will be able to step out of bed without complaints of pain      Long Term Goals: 6 weeks  1. Abolish all R foot/ankle pain.  2. Increase R SLS time >/= 10 seconds in order to decrease fall risk.  3. Increase R ankle inversion and plantarflexion MMT to 5/5 in order to improve functional mobility.  4. Pt will be between 40% and 60% limited in mobility.       Plan   Progress inversion/plantarflexion/foot intrinsic strength as tolerated. Progress note 1/7/18.      Therapist: No Carter, PT, DPT

## 2018-01-05 ENCOUNTER — CLINICAL SUPPORT (OUTPATIENT)
Dept: REHABILITATION | Facility: HOSPITAL | Age: 73
End: 2018-01-05
Attending: INTERNAL MEDICINE
Payer: MEDICARE

## 2018-01-05 DIAGNOSIS — M25.571 ACUTE RIGHT ANKLE PAIN: Primary | ICD-10-CM

## 2018-01-05 DIAGNOSIS — R26.89 BALANCE PROBLEM: ICD-10-CM

## 2018-01-05 DIAGNOSIS — R29.898 ANKLE WEAKNESS: ICD-10-CM

## 2018-01-05 PROCEDURE — 97110 THERAPEUTIC EXERCISES: CPT

## 2018-01-05 PROCEDURE — 97035 APP MDLTY 1+ULTRASOUND EA 15: CPT

## 2018-01-05 PROCEDURE — 97140 MANUAL THERAPY 1/> REGIONS: CPT

## 2018-01-05 NOTE — PROGRESS NOTES
"                                                  Physical Therapy Daily Note     Date: 01/05/2018  Name: Niurka Pedraza  Clinic Number: [id number]  Diagnosis:   Encounter Diagnoses   Name Primary?    Acute right ankle pain Yes    Ankle weakness     Balance problem      Physician: Savana Anderson MD      Precautions: standard    Evaluation Date: 12/7/17  Start Time: 1405  Stop Time: 1500  Visit # authorized: 4/12  Authorization period: 12/7/17-12/7/17  Plan of care expiration: 1/21/18    Hx of present illness: Pt was having R foot pain that was diagnosed as arthritis, but began having sharp, acute pains when she was taking antibiotics about a month ago. The pain decreased once pt was off antibiotics, however, some pain remains.      Subjective     Pt reports no real change since last tx. She feels frustrated that she is not getting better even though she has rested and not walked a lot recently.    Objective     Patient received individual therapy to increase strength and endurance with activities as follows:     Niurka received therapeutic exercises to develop strength and endurance for 15 minutes including:     seated inversion eccentrics, towel scrunches, and ankle plantarflexion with theraband       01/05/2018   Inversion eccentrics Manual resistance 2x20   Plantarflexion seated 2x30 STB    Towel scrunches x2 min   Seated arch lift x2 min NP   [place]   x3 min unable   gastroc stretch standing x1 min B   Calf raises 2x10   Tandem stance on foam R back 2x30"    SLS R 2x30" NP       Niurka received the following manual therapy techniques for 10 minutes.    STM along R posterior tibialis distribution with trigger point release    Patient received ultrasound over R posterior tibialis by medial malleolus after being cleared for contraindications for 8 minutes at 20% duty cycle, 3 MHz, 1.2 [place]dal[place]/cm squared    Patient declined ice to R foot/ankle post-tx.    Written Home Exercises Provided: no new exercises " provided this session  Pt demo good understanding of the education provided. Niurka demonstrated good return demonstration of activities.     Education provided: Pt educated on all new clinic exercises. Reviewed HEP.  Niurka verbalized good understanding of education provided.   No spiritual or educational barriers to learning identified.    Assessment   Patient tolerated treatment fairly but is slow to progress toward goals and is not demonstrating functional gains in mobility or pain. Pt unable to tolerate calf raise eccentrics this session so tx was modified to include calf raises on BLE. Patient also unable to  >1 marble with toes this session. Added foam to tandem balance to increase balance challenge this session. Pt will continue to benefit from skilled PT in order to decrease pain and swelling, improve R foot/ankle strength, and improve functional mobility.     GOALS:   Short Term Goals:  3 weeks  1.Patient will be proficient and compliant with HEP. *GOAL MET 12/29/17  2. Decrease pain at worst to no greater than 5/10.  3. Pt will be able to step out of bed without complaints of pain      Long Term Goals: 6 weeks  1. Abolish all R foot/ankle pain.  2. Increase R SLS time >/= 10 seconds in order to decrease fall risk.  3. Increase R ankle inversion and plantarflexion MMT to 5/5 in order to improve functional mobility.  4. Pt will be between 40% and 60% limited in mobility.       Plan   Pt will be treated by physical therapy 1-3 times a week for 6 weeks for Pt education, HEP, therapeutic exercises, neuromuscular re-education, soft tissue and joint mobilizations; and modalities prn to achieve established goals. Niurka may at times be seen by a PTA as part of the Rehab Team. Progress inversion/plantarflexion/foot intrinsic strength as tolerated. Progress note 1/7/18.      Therapist: No Carter, PT, DPT

## 2018-01-06 ENCOUNTER — PATIENT MESSAGE (OUTPATIENT)
Dept: UROLOGY | Facility: CLINIC | Age: 73
End: 2018-01-06

## 2018-01-08 ENCOUNTER — TELEPHONE (OUTPATIENT)
Dept: UROLOGY | Facility: HOSPITAL | Age: 73
End: 2018-01-08

## 2018-01-08 RX ORDER — CEPHALEXIN 250 MG/1
250 CAPSULE ORAL DAILY
Qty: 30 CAPSULE | Refills: 5 | Status: SHIPPED | OUTPATIENT
Start: 2018-01-08 | End: 2018-02-05 | Stop reason: SDUPTHER

## 2018-01-08 NOTE — TELEPHONE ENCOUNTER
Pt sent in request to start abx prophylaxis (that was sent in).    I had also sent a message previously that she was recommended to have pelvic US to better look at an area noted on her recent CT scan. I do not see that this has been scheduled yet. Could this get scheduled prior to her f/u to see me? Thanks.

## 2018-01-10 NOTE — TELEPHONE ENCOUNTER
----- Message from Dacia Carr sent at 1/10/2018 10:13 AM CST -----  Contact: Tameka with [facility]  Tameka with [facility] called to check on the status of the refill request they sent regarding metoprolol succinate (TOPROL-XL) 25 MG 24 hr tablet.  Per Tameka, patient is wanting it refilled at [facility] on [facility] in [facility].  Please call Tameka back at [phone] with any questions.  Thank you!

## 2018-01-11 RX ORDER — ATORVASTATIN CALCIUM 20 MG/1
20 TABLET, FILM COATED ORAL DAILY
Qty: 90 TABLET | Refills: 3 | Status: SHIPPED | OUTPATIENT
Start: 2018-01-11 | End: 2018-01-22 | Stop reason: SDUPTHER

## 2018-01-12 ENCOUNTER — CLINICAL SUPPORT (OUTPATIENT)
Dept: REHABILITATION | Facility: HOSPITAL | Age: 73
End: 2018-01-12
Attending: INTERNAL MEDICINE
Payer: MEDICARE

## 2018-01-12 DIAGNOSIS — R26.89 BALANCE PROBLEM: ICD-10-CM

## 2018-01-12 DIAGNOSIS — R29.898 ANKLE WEAKNESS: ICD-10-CM

## 2018-01-12 DIAGNOSIS — M25.571 ACUTE RIGHT ANKLE PAIN: Primary | ICD-10-CM

## 2018-01-12 PROCEDURE — G8978 MOBILITY CURRENT STATUS: HCPCS | Mod: CL

## 2018-01-12 PROCEDURE — G8979 MOBILITY GOAL STATUS: HCPCS | Mod: CK

## 2018-01-12 PROCEDURE — 97035 APP MDLTY 1+ULTRASOUND EA 15: CPT

## 2018-01-12 PROCEDURE — 97110 THERAPEUTIC EXERCISES: CPT

## 2018-01-12 RX ORDER — METOPROLOL SUCCINATE 25 MG/1
25 TABLET, EXTENDED RELEASE ORAL DAILY
Qty: 30 TABLET | Refills: 6 | Status: SHIPPED | OUTPATIENT
Start: 2018-01-12 | End: 2018-07-25 | Stop reason: SDUPTHER

## 2018-01-12 NOTE — PROGRESS NOTES
"                                                  Physical Therapy Daily Note     Date: 2018  Name: Niurka Pedraza  Clinic Number: [id number]  Diagnosis:   No diagnosis found.  Physician: Savana Anderson MD      Precautions: standard    Evaluation Date: 17  Start Time: 1405  Stop Time: 1500  Visit # authorized:   Authorization period: 17-17  Plan of care expiration: 18    Hx of present illness: Pt was having R foot pain that was diagnosed as arthritis, but began having sharp, acute pains when she was taking antibiotics about a month ago. The pain decreased once pt was off antibiotics, however, some pain remains.      Subjective     Pt reports no real change since last tx. She feels frustrated that she is not getting better even though she has rested and not walked a lot recently.    Objective     Patient received individual therapy to increase strength and endurance with activities as follows:     Niurka received therapeutic exercises to develop strength and endurance for 12 minutes includin/12/2018   Inversion eccentrics Manual resistance 2x20   Plantarflexion seated 2x30 STB    Towel scrunches x2 min   Seated arch lift x2 min NP   [place]   x3 min 1/2 cup   gastroc stretch standing x1 min B   Eccentric Calf raises with toe down on LLE 3x10   Tandem stance  R back 2x30"        Niurka received the following manual therapy techniques for 5 minutes.    STM along R posterior tibialis distribution with trigger point release    Patient received ultrasound over R posterior tibialis by medial malleolus after being cleared for contraindications for 8 minutes at 20% duty cycle, 3 MHz, 1.2 [place]dal[place]/cm squared    Patient declined ice to R foot/ankle post-tx.    Written Home Exercises Provided: no new exercises provided this session  Pt demo good understanding of the education provided. Niurka demonstrated good return demonstration of activities.     Education provided: Pt educated " on all new clinic exercises. Reviewed HEP.  Niurka verbalized good understanding of education provided.   No spiritual or educational barriers to learning identified.    Assessment   Patient has been seen in treatment 1x/week for 6 weeks with HEP. Pt has not shown improvements in swelling or pain and has only met 1 STG and 1 LTG. Discussed options with the patient and decided to refer back to her doctor for further treatment options. Treatment included soft tissue massage, ultrasound, strengthening and stretching exercises with focus on eccentric loading for affected tendon. Pt remains between 60% and 80% limited in mobility. Patient failed to respond to these methods and is being discharged from physical therapy at this time.    GOALS:   Short Term Goals:  3 weeks  1.Patient will be proficient and compliant with HEP. *GOAL MET 12/29/17  2. Decrease pain at worst to no greater than 5/10.  3. Pt will be able to step out of bed without complaints of pain      Long Term Goals: 6 weeks  1. Abolish all R foot/ankle pain.  2. Increase R SLS time >/= 10 seconds in order to decrease fall risk.  3. Increase R ankle inversion and plantarflexion MMT to 5/5 in order to improve functional mobility. *GOAL MET 1/12/18  4. Pt will be between 40% and 60% limited in mobility.       Plan   Pt discharged this visit 1/12/18 to return to MD for further treatment options.      Therapist: No Carter, PT, DPT

## 2018-01-22 ENCOUNTER — OFFICE VISIT (OUTPATIENT)
Dept: ORTHOPEDICS | Facility: CLINIC | Age: 73
End: 2018-01-22
Payer: MEDICARE

## 2018-01-22 DIAGNOSIS — M76.71 PERONEAL TENDONITIS OF RIGHT LOWER EXTREMITY: Primary | ICD-10-CM

## 2018-01-22 PROCEDURE — 99212 OFFICE O/P EST SF 10 MIN: CPT | Mod: PBBFAC | Performed by: PHYSICIAN ASSISTANT

## 2018-01-22 PROCEDURE — 99212 OFFICE O/P EST SF 10 MIN: CPT | Mod: S$PBB,,, | Performed by: PHYSICIAN ASSISTANT

## 2018-01-22 PROCEDURE — 99999 PR PBB SHADOW E&M-EST. PATIENT-LVL II: CPT | Mod: PBBFAC,,, | Performed by: PHYSICIAN ASSISTANT

## 2018-01-22 RX ORDER — METHYLPREDNISOLONE 4 MG/1
TABLET ORAL
Qty: 1 TABLET | Refills: 0 | Status: SHIPPED | OUTPATIENT
Start: 2018-01-22 | End: 2018-02-07

## 2018-01-22 RX ORDER — ATORVASTATIN CALCIUM 20 MG/1
20 TABLET, FILM COATED ORAL DAILY
Qty: 90 TABLET | Refills: 3 | Status: SHIPPED | OUTPATIENT
Start: 2018-01-22 | End: 2018-07-16 | Stop reason: SDUPTHER

## 2018-01-22 NOTE — PROGRESS NOTES
Subjective:      Patient ID: Niurka Pedraza is a 72 y.o. female.    Chief Complaint: No chief complaint on file.    HPI    Patient is a 72 year old female who presents to clinic with chief complaint of a-traumatic constant right ankle/ foot pain since 01/2017.  Patient stated that she frequently takes antibiotics for frequent UTI's.  She stated that with taking one of her antibiotics she noticed pain in the tendons in her foot. She stated that the pain was decreased with stopping the medication. Patient reports that she does have some continued pain to the lateral side of her ankle extending into her foot. Pain is increased with increased activity, and going up on toes.  Patient stated the PT increased her pain. She has tried rest ice and NSAID without complete relief. She denied numbness or tingling.     Review of Systems   Constitution: Negative for chills and fever.   Cardiovascular: Negative for chest pain.   Respiratory: Negative for cough and shortness of breath.    Skin: Negative for color change, dry skin, itching, nail changes, poor wound healing and rash.   Musculoskeletal:        Right foot/ ankle pain   Neurological: Negative for dizziness.   Psychiatric/Behavioral: Negative for altered mental status. The patient is not nervous/anxious.    All other systems reviewed and are negative.        Objective:      General    Constitutional: She is oriented to person, place, and time. She appears well-developed and well-nourished. No distress.   HENT:   Head: Atraumatic.   Eyes: Conjunctivae are normal.   Cardiovascular: Normal rate.    Pulmonary/Chest: Effort normal.   Neurological: She is alert and oriented to person, place, and time.   Psychiatric: She has a normal mood and affect. Her behavior is normal.         Right Ankle/Foot Exam     Tenderness   The patient is tender to palpation of the peroneals.    Range of Motion   The patient has normal right ankle ROM.    Muscle Strength   The patient has normal right  ankle strength.    Other   Sensation: normal        RADS: no fracture or dislocation       Assessment:       Encounter Diagnosis   Name Primary?    Peroneal tendonitis of right lower extremity Yes          Plan:       Discussed treatment plan with patient. Including rest ice elevation medrol dose mino, CAM boot. Patient is to make appointment herself, She is to return to clinic as needed or if no improvement consider MRI

## 2018-01-23 ENCOUNTER — TELEPHONE (OUTPATIENT)
Dept: UROLOGY | Facility: CLINIC | Age: 73
End: 2018-01-23

## 2018-01-23 ENCOUNTER — HOSPITAL ENCOUNTER (OUTPATIENT)
Dept: RADIOLOGY | Facility: OTHER | Age: 73
Discharge: HOME | End: 2018-01-23
Attending: UROLOGY
Payer: MEDICARE

## 2018-01-23 DIAGNOSIS — N83.299 COMPLEX OVARIAN CYST: Primary | ICD-10-CM

## 2018-01-23 DIAGNOSIS — N94.9 ADNEXAL FULLNESS: ICD-10-CM

## 2018-01-23 PROCEDURE — 76830 TRANSVAGINAL US NON-OB: CPT | Mod: TC

## 2018-01-23 PROCEDURE — 76856 US EXAM PELVIC COMPLETE: CPT | Mod: 26,,, | Performed by: RADIOLOGY

## 2018-01-23 PROCEDURE — 76830 TRANSVAGINAL US NON-OB: CPT | Mod: 26,,, | Performed by: RADIOLOGY

## 2018-01-23 NOTE — TELEPHONE ENCOUNTER
----- Message from Heather Tracy sent at 1/23/2018  3:11 PM CST -----  Contact: Self  Pt returning call. [phone].

## 2018-01-23 NOTE — TELEPHONE ENCOUNTER
Please inform pt:    Pelvic US showed a complex cyst in the R ovary. This will need to be evaluated by a gynecologist. I placed a referral for a gynecologist if she does not have one already established. She is a Jew patient.

## 2018-01-23 NOTE — TELEPHONE ENCOUNTER
Spoke with patient notified of complex ovarian cyst on ultrasound and scheduled appointment with GYN.

## 2018-02-01 DIAGNOSIS — E03.9 HYPOTHYROIDISM, UNSPECIFIED TYPE: ICD-10-CM

## 2018-02-02 RX ORDER — LEVOTHYROXINE SODIUM 50 UG/1
TABLET ORAL
Qty: 90 TABLET | Refills: 3 | Status: SHIPPED | OUTPATIENT
Start: 2018-02-02 | End: 2019-02-27 | Stop reason: SDUPTHER

## 2018-02-05 DIAGNOSIS — M79.671 RIGHT FOOT PAIN: ICD-10-CM

## 2018-02-05 RX ORDER — DICLOFENAC SODIUM 75 MG/1
TABLET, DELAYED RELEASE ORAL
Qty: 40 TABLET | Refills: 0 | Status: SHIPPED | OUTPATIENT
Start: 2018-02-05 | End: 2018-06-25 | Stop reason: SDUPTHER

## 2018-02-05 RX ORDER — CEPHALEXIN 250 MG/1
250 CAPSULE ORAL DAILY
Qty: 30 CAPSULE | Refills: 5 | Status: SHIPPED | OUTPATIENT
Start: 2018-02-05 | End: 2018-03-12 | Stop reason: SDUPTHER

## 2018-02-07 ENCOUNTER — OFFICE VISIT (OUTPATIENT)
Dept: UROLOGY | Facility: CLINIC | Age: 73
End: 2018-02-07
Attending: UROLOGY
Payer: MEDICARE

## 2018-02-07 VITALS
WEIGHT: 165 LBS | SYSTOLIC BLOOD PRESSURE: 143 MMHG | DIASTOLIC BLOOD PRESSURE: 84 MMHG | BODY MASS INDEX: 28.17 KG/M2 | HEART RATE: 80 BPM | HEIGHT: 64 IN

## 2018-02-07 DIAGNOSIS — R31.29 MICROHEMATURIA: ICD-10-CM

## 2018-02-07 DIAGNOSIS — N32.81 OAB (OVERACTIVE BLADDER): ICD-10-CM

## 2018-02-07 DIAGNOSIS — N39.0 RECURRENT UTI: Primary | ICD-10-CM

## 2018-02-07 LAB
BILIRUB SERPL-MCNC: ABNORMAL MG/DL
BLOOD URINE, POC: ABNORMAL
COLOR, POC UA: ABNORMAL
GLUCOSE UR QL STRIP: NORMAL
KETONES UR QL STRIP: ABNORMAL
LEUKOCYTE ESTERASE URINE, POC: ABNORMAL
NITRITE, POC UA: ABNORMAL
PH, POC UA: 7
POC RESIDUAL URINE VOLUME: 0 ML (ref 0–100)
PROTEIN, POC: ABNORMAL
SPECIFIC GRAVITY, POC UA: 1.01
UROBILINOGEN, POC UA: NORMAL

## 2018-02-07 PROCEDURE — 1159F MED LIST DOCD IN RCRD: CPT | Mod: S$GLB,,, | Performed by: UROLOGY

## 2018-02-07 PROCEDURE — 1126F AMNT PAIN NOTED NONE PRSNT: CPT | Mod: S$GLB,,, | Performed by: UROLOGY

## 2018-02-07 PROCEDURE — 99214 OFFICE O/P EST MOD 30 MIN: CPT | Mod: 25,S$GLB,, | Performed by: UROLOGY

## 2018-02-07 PROCEDURE — 51798 US URINE CAPACITY MEASURE: CPT | Mod: S$GLB,,, | Performed by: UROLOGY

## 2018-02-07 PROCEDURE — 81002 URINALYSIS NONAUTO W/O SCOPE: CPT | Mod: S$GLB,,, | Performed by: UROLOGY

## 2018-02-07 NOTE — PROGRESS NOTES
Subjective:       Niurka Pedraza is a 72 y.o. female who is an established patient with [facility] urologist, Dr Dhaliwal,  was seen for evaluation of UTI.      She was seen by another urologist for recurrent UTIs/dysuria. Multiple +UCx with different bacteria (E coli, Enterococcus). She has had negative cystoscopy and GONZÁLEZ (1/17). UTIs return soon after treatment. She was started on prophy Keflex in 7/17.     She saw PCP in 11/17 with complaints of UTI. UCx was negative. She now feels a constant pressure in SP area and c/o pain with walking. She also notes pain worse with movement (like hitting a bump when driving). Denies dysuria. Increased frequency to now q1h. Present for 2 months. Denies constipation, occasional diarrhea. Frequent RADHA. Now with UUI. Also with BOBBI - increased coughing with recent lung issue. Nocturia x 4-5. Denies gross hematuria.     She moved here from [place] in 9/17. She requested to be started on abx prophy starting 1/18 (Keflex 250mg).     PVR (bladder scan) initial visit - 32cc. Today - 0cc    CT uro - no  abnormalities. Lung nodules - followed by pulmonary. Ovarian cyst - referred to gyn (seeing tomorrow).     She has been doing great with the Ditropan - she is very pleased. She has been also doing abx prophy since 1/18.     UCx:  11/17 - neg  10/17 - >100k E coli  10/17- >100k Enterococcus  7/17 - >100k E coli  5/17- >100k E coli  5/17 - neg  4/17- 50-99k Enterococcus  3/17 - >100k E coli  12/16 - neg      The following portions of the patient's history were reviewed and updated as appropriate: allergies, current medications, past family history, past medical history, past social history, past surgical history and problem list.    Review of Systems  Constitutional: no fever or chills  ENT: no nasal congestion or sore throat  Respiratory: no cough or shortness of breath  Cardiovascular: no chest pain or palpitations  Gastrointestinal: no nausea or vomiting, tolerating  "diet  Genitourinary: as per HPI  Hematologic/Lymphatic: no easy bruising or lymphadenopathy  Musculoskeletal: no arthralgias or myalgias  Skin: no rashes or lesions  Neurological: no seizures or tremors  Behavioral/Psych: no auditory or visual hallucinations        Objective:    Vitals: BP (!) 143/84   Pulse 80   Ht 5' 4" (1.626 m)   Wt 74.8 kg (165 lb)   BMI 28.32 kg/m²     Physical Exam   General: well developed, well nourished in no acute distress  Head: normocephalic, atraumatic  Neck: supple, trachea midline, no obvious enlargement of thyroid  HEENT: EOMI, mucus membranes moist, sclera anicteric, no hearing impairment  Lungs: symmetric expansion, non-labored breathing  Cardiovascular: regular rate and rhythm, normal pulses  Abdomen: soft, non tender, non distended, no palpable masses, no hepatosplenomegaly, no hernias, no CVA tenderness  Musculoskeletal: no peripheral edema, normal ROM in bilateral upper and lower extremities  Lymphatics: no cervical or inguinal lymphadenopathy  Skin: no rashes or lesions  Neuro: alert and oriented x 3, no gross deficits  Psych: normal judgment and insight, normal mood/affect and non-anxious  Genitourinary:   patient declined exam      Lab Review   Urine analysis today in clinic shows - trace LE, + nit    Lab Results   Component Value Date    WBC 8.59 11/22/2017    HGB 12.6 11/22/2017    HCT 39.3 11/22/2017    MCV 87 11/22/2017     11/22/2017     Lab Results   Component Value Date    CREATININE 0.9 12/13/2017    BUN 23 12/13/2017         Imaging  Images and reports were personally reviewed by me and discussed with patient  GONZÁLEZ reviewed       Assessment/Plan:      1. Recurrent UTI    - Discussed UTI prevention strategies.   - Adequate hydration.   - Double voiding. Consider timed voiding.    - Avoid constipation.   - GONZÁLEZ - negative 1/17   - Cystoscopy - negative in 1/17   - Cranberry/probiotics.   - Estrace cream 2x weekly - will consider in near future   - Call with " UTI symptoms so UA/UCx can be sent.    - Abx prophy x 6 mths (started 1/18) - Keflex 250mg     2. Microhematuria    - Discussed etiology and workup of hematuria   - UCx - results above   - Cytology - previously negative   - CT urogram - ordered. GONZÁLEZ was negative from 1/17.   - Office cystoscopy - negative 1/17   - UA clear of RBCs today        3. Nocturia/frequency/urge incontinence   - Ditropan XL 5mg - doing great      Follow up in 6 months with P[facility]

## 2018-02-08 ENCOUNTER — LAB VISIT (OUTPATIENT)
Dept: LAB | Facility: OTHER | Age: 73
End: 2018-02-08
Attending: OBSTETRICS & GYNECOLOGY
Payer: MEDICARE

## 2018-02-08 ENCOUNTER — OFFICE VISIT (OUTPATIENT)
Dept: OBSTETRICS AND GYNECOLOGY | Facility: CLINIC | Age: 73
End: 2018-02-08
Attending: OBSTETRICS & GYNECOLOGY
Payer: MEDICARE

## 2018-02-08 VITALS
WEIGHT: 171.31 LBS | BODY MASS INDEX: 29.24 KG/M2 | SYSTOLIC BLOOD PRESSURE: 122 MMHG | DIASTOLIC BLOOD PRESSURE: 60 MMHG | HEIGHT: 64 IN

## 2018-02-08 DIAGNOSIS — R19.09 OTHER INTRA-ABDOMINAL AND PELVIC SWELLING, MASS AND LUMP: ICD-10-CM

## 2018-02-08 DIAGNOSIS — N83.201 CYST OF RIGHT OVARY: ICD-10-CM

## 2018-02-08 DIAGNOSIS — Z85.3 PERSONAL HISTORY OF BREAST CANCER: ICD-10-CM

## 2018-02-08 DIAGNOSIS — N83.201 CYST OF RIGHT OVARY: Primary | ICD-10-CM

## 2018-02-08 LAB
CANCER AG125 SERPL-ACNC: 41 U/ML
CEA SERPL-MCNC: 4.8 NG/ML

## 2018-02-08 PROCEDURE — 99213 OFFICE O/P EST LOW 20 MIN: CPT | Mod: PBBFAC | Performed by: OBSTETRICS & GYNECOLOGY

## 2018-02-08 PROCEDURE — 99999 PR PBB SHADOW E&M-EST. PATIENT-LVL III: CPT | Mod: PBBFAC,,, | Performed by: OBSTETRICS & GYNECOLOGY

## 2018-02-08 PROCEDURE — 82378 CARCINOEMBRYONIC ANTIGEN: CPT

## 2018-02-08 PROCEDURE — 99203 OFFICE O/P NEW LOW 30 MIN: CPT | Mod: S$PBB,,, | Performed by: OBSTETRICS & GYNECOLOGY

## 2018-02-08 PROCEDURE — 1125F AMNT PAIN NOTED PAIN PRSNT: CPT | Mod: ,,, | Performed by: OBSTETRICS & GYNECOLOGY

## 2018-02-08 PROCEDURE — 36415 COLL VENOUS BLD VENIPUNCTURE: CPT

## 2018-02-08 PROCEDURE — 1159F MED LIST DOCD IN RCRD: CPT | Mod: ,,, | Performed by: OBSTETRICS & GYNECOLOGY

## 2018-02-08 PROCEDURE — 86304 IMMUNOASSAY TUMOR CA 125: CPT

## 2018-02-08 NOTE — LETTER
February 8, 2018      Anaya Oropeza MD  [address]   [address]  [place] [address]           [facility] [address]  [address]  [facility] [address]  Phone: [phone]  Fax: [phone]          Patient: Niurka Pedraza   MR Number: [id number]   YOB: 1945   Date of Visit: 2/8/2018       Dear Dr. Anaya Oropeza:    Thank you for referring Niurka Pedraza to me for evaluation. Attached you will find relevant portions of my assessment and plan of care.    If you have questions, please do not hesitate to call me. I look forward to following Niurka Pedraza along with you.    Sincerely,    Linette Torres MD    Enclosure  CC:  No Recipients    If you would like to receive this communication electronically, please contact [email or web address] or [phone] to request more information on [employer] access.    For providers and/or their staff who would like to refer a patient to [facility], please contact us through our one-stop-shop provider referral line, [facility], at [phone].    If you feel you have received this communication in error or would no longer like to receive these types of communications, please e-mail [email or web address]

## 2018-02-08 NOTE — PROGRESS NOTES
Niurka Pedraza is a 72 y.o. female patient  NEW TO ME  who presents today for evaluation of an incidental finding of a complex ovarian cyst noted on CT and U/S done by her Urologist. SHE denies abdominal pain/bloating or any GI symptoms. Denies weight loss or weight gain.    :  Procedure: Pelvic ultrasound    Technique: Transabdominal and transvaginal ultrasound of the pelvis.    Clinical indication: 72-year-old female with abnormal CT.    Comparison: CT abdomen and pelvis 2017     Findings:The uterus measures 5.5 cm in length by 2.0 x 3.2 cm in transverse dimension.  The endometrium measures 3.5 mm in thickness.         Right ovary measures 5.0 by 3.9 x 3.7 cm with a few cystic follicles. There is a complex anechoic focus with internal echoes measuring 3.8 x 2.4 x 3.6 cm corresponding to abnormality seen on CT and suggestive for an hemorrhagic cyst.    Left ovary not seen.  There is arterial and venous flow identified in the ovaries bilaterally.    No adnexal mass or free fluid in the pelvis.      Electronic notification system activated   Impression      Complex cystic lesion measuring 3.8 cm right ovary corresponds to abnormality seen on CT. While this may represent a hemorrhagic cyst in light of postmenopausal status Clinical correlation and further characterization with MRI and surgical consultation advised.    Left ovary not seen.       Procedure comments: The patient was surveyed from the lung apices through the pelvis without contrast. Next, after the administration of 100 cc Omni 350 IV contrast  axial images through the abdomen and pelvis were obtained per urogram protocol..    Comparison: None.    Findings:    Examination of the vascular and soft tissue structures at the base of the neck is unremarkable.    Examination of the lung fields demonstrates bilateral pulmonary nodules. The largest of these is on the right and measures 1.5 cm in the posterior right lung. The largest of these  on the left is in the left upper lobe and measures 0.7 cm. There are multiple other scattered subcentimeter nodules bilaterally.    The heart is not enlarged. No pericardial effusion is seen. No axillary or mediastinal lymph node enlargement is seen. The esophagus maintains a normal course and caliber. Prior right mastectomy.    The liver is normal in size and attenuation. Focal hyperdensity in segment 7 near the hepatic dome with a focally dilated segment 7 bile duct. Remainder of the liver is without focal abnormality. No liver lesions..  The gallbladder is absent.  No intrahepatic or extrahepatic biliary ductal dilatation is noted.    The stomach, spleen, pancreas, and adrenal glands are unremarkable.    The kidneys are normal in size and location. No renal masses or nephrolithiasis. They concentrate and excrete contrast appropriately.  No hydronephrosis is seen. The urinary bladder is unremarkable. The uterus appears unremarkable. There is a complex right ovarian lesion.    The visualized loops of small and large bowel show no evidence of obstruction or inflammation. Extensive diverticulosis. No evidence of active diverticulitis.      No ascites, free fluid, or intraperitoneal free air is identified. There is no evidence of lymph node enlargement in the abdomen or pelvis.    The aorta is normal in course and caliber without significant atherosclerotic calcifications. The osseus structures demonstrate age-appropriate degenerative change without evidence of acute fracture or osseus destructive process.  The extraperitoneal soft tissues are unremarkable.   Impression         Bilateral pulmonary nodules, largest measuring 1.5 cm at the right lower lobe. Comparison with any prior cross-sectional imaging would be beneficial. Close interval followup, PET CT, and/or biopsy may be considered for further evaluation.    Complex cystic lesion in the right adnexa. Pelvic ultrasound would be beneficial for further  evaluation.    Focal dilated biliary radical in segment 7. This is of uncertain significance. There are no concerning hepatic lesions.       Patient's last menstrual period was 2000.    Past Medical History:   Diagnosis Date    Breast cancer     mastectomy     Coccidiomycosis, progressive     Hyperlipidemia     Hypertension     OAB (overactive bladder)     Osteopenia     on Dexa 2017    Osteoporosis     pt reports hx of this, declined fosamax in past - treated with calcium and vit D    Pulmonary nodules      Past Surgical History:   Procedure Laterality Date    CHOLECYSTECTOMY      MASTECTOMY Right          Social History     Social History    Marital status:      Spouse name: N/A    Number of children: 3    Years of education: N/A     Occupational History    retired from [employer], [employer]      neuro chemistry     Social History Main Topics    Smoking status: Former Smoker     Packs/day: 0.50     Years: 30.00     Types: Cigarettes     Quit date: 2000    Smokeless tobacco: Never Used      Comment: quit in her 50s, after 30 years smoking;    Alcohol use No    Drug use: No    Sexual activity: No     Other Topics Concern    Not on file     Social History Narrative    From [facility]     Moved to [facility] in  for research    Moved to [place] for period of time    Moved back to [place] Summer 2016 and then to [facility] this year 2017     Family History   Problem Relation Age of Onset    Cancer Mother      colon cancer    Breast cancer Mother     Hypertension Father     Heart disease Father     Stroke Father     No Known Problems Sister     Cancer Brother      lung, ++ tobacco    Hypertension Brother     No Known Problems Daughter     Hypertension Son     Colon cancer Neg Hx     Ovarian cancer Neg Hx      OB History      Para Term  AB Living    3 3 3     3    SAB TAB Ectopic Multiple Live Births            3     "            ROS:  GENERAL: Feeling well overall.   SKIN: Denies rash or lesions.   HEAD: Denies head injury or headache.   NODES: Denies enlarged lymph nodes.   CHEST: Denies chest pain or shortness of breath.   CARDIOVASCULAR: Denies palpitations or left sided chest pain.   ABDOMEN: No abdominal pain, nausea, vomiting or rectal bleeding.   URINARY: No dysuria, hematuria, or burning on urination.  REPRODUCTIVE: See HPI.   BREASTS: Denies pain, lumps, or nipple discharge.   HEMATOLOGIC: No easy bruisability or excessive bleeding.   MUSCULOSKELETAL: Denies joint pain or swelling.   NEUROLOGIC: Denies syncope or weakness.   PSYCHIATRIC: Denies depression.    /60   Ht 5' 4" (1.626 m)   Wt 77.7 kg (171 lb 4.8 oz)   LMP 02/08/2000   BMI 29.40 kg/m²     PE:   APPEARANCE: Well nourished, well developed, in no acute distress.  ABDOMEN: Soft. No tenderness or masses. No hernias. No CVA tenderness.  VULVA: No lesions. Normal female genitalia.  URETHRAL MEATUS: Normal size and location, no lesions, no prolapse.  URETHRA: No masses, tenderness, prolapse or scarring.  VAGINA: DRY and POORLY rugated, no discharge, no significant cystocele or rectocele.  CERVIX: No lesions and discharge. STENOTIC  UTERUS: Normal size, regular shape, mobile, non-tender, bladder base nontender.  ADNEXA: No PALPABLE masses, tenderness or CDS nodularity.  ANUS PERINEUM: Normal.    PROCEDURES:    1. Cyst of right ovary  CANCER ANTIGEN 125    CEA   2. Personal history of breast cancer  CANCER ANTIGEN 125    CEA   3. Other intra-abdominal and pelvic swelling, mass and lump   CANCER ANTIGEN 125    AND PLAN:    DISCUSSED EVALUATION, AND WILL CONSULT GYN ONC FOR REVIEW.    Follow-up if symptoms worsen or fail to improve.    "

## 2018-02-14 ENCOUNTER — TELEPHONE (OUTPATIENT)
Dept: OBSTETRICS AND GYNECOLOGY | Facility: CLINIC | Age: 73
End: 2018-02-14

## 2018-02-14 ENCOUNTER — PATIENT MESSAGE (OUTPATIENT)
Dept: OBSTETRICS AND GYNECOLOGY | Facility: CLINIC | Age: 73
End: 2018-02-14

## 2018-02-14 DIAGNOSIS — N83.209 CYST OF OVARY, UNSPECIFIED LATERALITY: Primary | ICD-10-CM

## 2018-02-14 DIAGNOSIS — R97.1 ELEVATED CA-125: ICD-10-CM

## 2018-02-14 NOTE — TELEPHONE ENCOUNTER
Pt returned your call.   ----- Message from Kami Crook sent at 2/14/2018  8:28 AM CST -----  Contact: pt  X_  1st Request  _  2nd Request  _  3rd Request      Who:NEETA PINTO [[id number]]    Why: Patient states she is returning a call     What Number to Call Back: [phone]    When to Expect a call back: (Before the end of the day)   -- if call after 3:00 call back will be tomorrow.

## 2018-02-14 NOTE — PROGRESS NOTES
I have placed a Consult order and also have contacted the patient and notified her that someone from your office will call her to schedule the consult. Thank you.

## 2018-02-20 ENCOUNTER — INITIAL CONSULT (OUTPATIENT)
Dept: GYNECOLOGIC ONCOLOGY | Facility: CLINIC | Age: 73
End: 2018-02-20
Attending: OBSTETRICS & GYNECOLOGY
Payer: MEDICARE

## 2018-02-20 ENCOUNTER — TELEPHONE (OUTPATIENT)
Dept: GYNECOLOGIC ONCOLOGY | Facility: CLINIC | Age: 73
End: 2018-02-20

## 2018-02-20 VITALS
WEIGHT: 170.88 LBS | BODY MASS INDEX: 29.33 KG/M2 | DIASTOLIC BLOOD PRESSURE: 74 MMHG | SYSTOLIC BLOOD PRESSURE: 136 MMHG | HEART RATE: 85 BPM

## 2018-02-20 DIAGNOSIS — R19.00 PELVIC MASS: ICD-10-CM

## 2018-02-20 DIAGNOSIS — R97.1 ELEVATED CA-125: ICD-10-CM

## 2018-02-20 DIAGNOSIS — R19.00 PELVIC MASS: Primary | ICD-10-CM

## 2018-02-20 PROCEDURE — 1126F AMNT PAIN NOTED NONE PRSNT: CPT | Mod: ,,, | Performed by: OBSTETRICS & GYNECOLOGY

## 2018-02-20 PROCEDURE — 99213 OFFICE O/P EST LOW 20 MIN: CPT | Mod: PBBFAC | Performed by: OBSTETRICS & GYNECOLOGY

## 2018-02-20 PROCEDURE — 1159F MED LIST DOCD IN RCRD: CPT | Mod: ,,, | Performed by: OBSTETRICS & GYNECOLOGY

## 2018-02-20 PROCEDURE — 99205 OFFICE O/P NEW HI 60 MIN: CPT | Mod: S$PBB,,, | Performed by: OBSTETRICS & GYNECOLOGY

## 2018-02-20 PROCEDURE — 99999 PR PBB SHADOW E&M-EST. PATIENT-LVL III: CPT | Mod: PBBFAC,,, | Performed by: OBSTETRICS & GYNECOLOGY

## 2018-02-20 NOTE — LETTER
February 25, 2018        Linette Torres MD  [address]  [address]  [place] [address]             [facility]  [address], [address]  [facility] [address]  Phone: [phone]  Fax: [phone]   Patient: Niurka Pedraza   MR Number: [id number]   YOB: 1945   Date of Visit: 2/20/2018       Dear Dr. Torres:    Thank you for referring Niurka Pedraza to me for evaluation. Below are the relevant portions of my assessment and plan of care.            If you have questions, please do not hesitate to call me. I look forward to following Niurka along with you.    Sincerely,      Talita Barrios MD           CC  No Recipients

## 2018-02-20 NOTE — PROGRESS NOTES
Subjective:      Patient ID: Niurka Pedraza is a 72 y.o. female.    Chief Complaint: Advice Only (ref by Dr Torres)      HPI  Patient is a 71yo female who presents today as a referral from Dr. Linette Torres for pelvic mass and elevated . Patient was seen by her urologist for recurrent UTIs and imaging was ordered.     Imaging reviewed:   CT urogram abd/pelvis 17  Impression   Bilateral pulmonary nodules, largest measuring 1.5 cm at the right lower lobe. Comparison with any prior cross-sectional imaging would be beneficial. Close interval followup, PET CT, and/or biopsy may be considered for further evaluation.    Complex cystic lesion in the right adnexa. Pelvic ultrasound would be beneficial for further evaluation.    Focal dilated biliary radical in segment 7. This is of uncertain significance. There are no concerning hepatic lesions.     Pelvic US 18  Impression    Complex cystic lesion measuring 3.8 cm right ovary corresponds to abnormality seen on CT. While this may represent a hemorrhagic cyst in light of postmenopausal status Clinical correlation and further characterization with MRI and surgical consultation advised.    Left ovary not seen.    UT 5.5cm       41 elevated. CEA 4.8.     Personal history of breast cancer , R mastectomy, no adjuvant treatment.    MMG 2017 WNLs.     Prior abdominal surgeries include cholecystectomy.  x 3Family history mother with breast cancer, no ovarian, uterine, or colon cancer.     Review of Systems   Constitutional: Negative for appetite change, chills, fatigue and fever.   HENT: Negative for mouth sores.    Respiratory: Negative for cough and shortness of breath.    Cardiovascular: Negative for leg swelling.   Gastrointestinal: Negative for abdominal pain, blood in stool, constipation and diarrhea.   Endocrine: Negative for cold intolerance.   Genitourinary: Negative for dysuria and vaginal bleeding.   Musculoskeletal: Negative for myalgias.    Skin: Negative for rash.   Allergic/Immunologic: Negative.    Neurological: Negative for weakness and numbness.   Hematological: Negative for adenopathy. Does not bruise/bleed easily.   Psychiatric/Behavioral: Negative for confusion.       Past Medical History:   Diagnosis Date    Breast cancer     mastectomy 2014    Coccidiomycosis, progressive     Elevated CA-125 2/25/2018    Hyperlipidemia     Hypertension     OAB (overactive bladder)     Osteopenia     on Dexa 11/2017    Osteoporosis     pt reports hx of this, declined fosamax in past - treated with calcium and vit D    Pelvic mass 2/25/2018    Pulmonary nodules      Past Surgical History:   Procedure Laterality Date    CHOLECYSTECTOMY      MASTECTOMY Right     2014     Family History   Problem Relation Age of Onset    Cancer Mother      colon cancer    Breast cancer Mother     Hypertension Father     Heart disease Father     Stroke Father     No Known Problems Sister     Cancer Brother      lung, ++ tobacco    Hypertension Brother     No Known Problems Daughter     Hypertension Son     Colon cancer Neg Hx     Ovarian cancer Neg Hx      Social History     Social History    Marital status:      Spouse name: N/A    Number of children: 3    Years of education: N/A     Occupational History    retired from [employer], [employer]      neuro chemistry     Social History Main Topics    Smoking status: Former Smoker     Packs/day: 0.50     Years: 30.00     Types: Cigarettes     Quit date: 2/8/2000    Smokeless tobacco: Never Used      Comment: quit in her 50s, after 30 years smoking;    Alcohol use No    Drug use: No    Sexual activity: No     Other Topics Concern    Not on file     Social History Narrative    From [place]     Moved to [facility] in 1985 for research    Moved to [place] for period of time    Moved back to [place] Summer 2016 and then to [facility] this year Sept 2017     Current Outpatient Prescriptions    Medication Sig    atorvastatin (LIPITOR) 20 MG tablet Take 1 tablet (20 mg total) by mouth once daily.    calcium-vitamin D3 (CALCIUM 500 + D) 500 mg(1,250mg) -200 unit per tablet Take 1 tablet by mouth 2 (two) times daily with meals.    cephALEXin (KEFLEX) 250 MG capsule Take 1 capsule (250 mg total) by mouth once daily.    diclofenac (VOLTAREN) 75 MG EC tablet TAKE 1 TABLET (75 MG TOTAL) BY MOUTH 2 (TWO) TIMES DAILY AS NEEDED.    levothyroxine (SYNTHROID) 50 MCG tablet TAKE 1 TABLET (50 MCG TOTAL) BY MOUTH ONCE DAILY.    metoprolol succinate (TOPROL-XL) 25 MG 24 hr tablet Take 1 tablet (25 mg total) by mouth once daily.    multivitamin (ONE DAILY MULTIVITAMIN) per tablet Take 1 tablet by mouth once daily.    oxybutynin (DITROPAN-XL) 5 MG TR24 Take 1 tablet (5 mg total) by mouth once daily.     No current facility-administered medications for this visit.      Review of patient's allergies indicates:   Allergen Reactions    Ciprofloxacin Other (See Comments)     Right foot pain and edema    Amlodipine Edema     BLE    Lisinopril Other (See Comments)     cough       Objective:   Physical Exam:   Constitutional: She is oriented to person, place, and time. She appears well-developed and well-nourished.    HENT:   Head: Normocephalic and atraumatic.    Eyes: EOM are normal. Pupils are equal, round, and reactive to light.    Neck: Normal range of motion. Neck supple. No thyromegaly present.    Cardiovascular: Normal rate, regular rhythm and intact distal pulses.     Pulmonary/Chest: Effort normal and breath sounds normal. No respiratory distress. She has no wheezes.        Abdominal: Soft. Bowel sounds are normal. She exhibits no distension, no ascites and no mass. There is no tenderness.     Genitourinary: Rectum normal, vagina normal and uterus normal. Pelvic exam was performed with patient supine. There is no lesion on the right labia. There is no lesion on the left labia. Cervix is normal.    Genitourinary Comments: Adnexal mass is not discretely palpable on exam.            Musculoskeletal: Normal range of motion and moves all extremeties.      Lymphadenopathy:     She has no cervical adenopathy.        Right: No inguinal and no supraclavicular adenopathy present.        Left: No inguinal and no supraclavicular adenopathy present.    Neurological: She is alert and oriented to person, place, and time.    Skin: Skin is warm and dry. No rash noted.    Psychiatric: She has a normal mood and affect.       Assessment:     1. Pelvic mass    2. Elevated CA-125        Plan:   No orders of the defined types were placed in this encounter.      I discussed with the patient the differential diagnosis of a pelvic mass in a postmenopausal woman including benign, borderline, and malignant process. Definitive diagnosis can only be made with surgical resection. She is a good candidate for minimally invasive approach. Give the elevated  I have recommended RTLH/BSO/possible staging based on intraoperative assessment. She desires to proceed. The risks, benefits, and indications of the procedure were discussed with the patient and her family members if present.  These included bleeding, transfusion, infection, damage to surrounding tissues (bowel, bladder, ureter), wound separation, conversion to laparotomy, perioperative cardiac events, VTE, pneumonia, and possible death.  She voiced understanding, all questions were answered and consents were signed.  1. Plan for RTLH/BSO/possible staging 3/23/18 [facility]  2. Pre op anesthesia consult

## 2018-02-25 PROBLEM — R19.00 PELVIC MASS: Status: ACTIVE | Noted: 2018-02-25

## 2018-02-25 PROBLEM — R97.1 ELEVATED CA-125: Status: ACTIVE | Noted: 2018-02-25

## 2018-02-25 RX ORDER — SODIUM CHLORIDE 9 MG/ML
INJECTION, SOLUTION INTRAVENOUS CONTINUOUS
Status: CANCELLED | OUTPATIENT
Start: 2018-02-25

## 2018-02-25 RX ORDER — LIDOCAINE HYDROCHLORIDE 10 MG/ML
1 INJECTION, SOLUTION EPIDURAL; INFILTRATION; INTRACAUDAL; PERINEURAL ONCE
Status: CANCELLED | OUTPATIENT
Start: 2018-02-25 | End: 2018-02-25

## 2018-03-12 RX ORDER — CEPHALEXIN 250 MG/1
250 CAPSULE ORAL DAILY
Qty: 30 CAPSULE | Refills: 5 | Status: SHIPPED | OUTPATIENT
Start: 2018-03-12 | End: 2018-07-13 | Stop reason: SDUPTHER

## 2018-03-16 ENCOUNTER — HOSPITAL ENCOUNTER (OUTPATIENT)
Dept: RADIOLOGY | Facility: HOSPITAL | Age: 73
Discharge: HOME | End: 2018-03-16
Attending: INTERNAL MEDICINE
Payer: MEDICARE

## 2018-03-16 ENCOUNTER — TELEPHONE (OUTPATIENT)
Dept: GYNECOLOGIC ONCOLOGY | Facility: CLINIC | Age: 73
End: 2018-03-16

## 2018-03-16 ENCOUNTER — HOSPITAL ENCOUNTER (OUTPATIENT)
Dept: PREADMISSION TESTING | Facility: OTHER | Age: 73
Discharge: HOME | End: 2018-03-16
Attending: OBSTETRICS & GYNECOLOGY
Payer: MEDICARE

## 2018-03-16 ENCOUNTER — ANESTHESIA EVENT (OUTPATIENT)
Dept: SURGERY | Facility: OTHER | Age: 73
End: 2018-03-16
Payer: MEDICARE

## 2018-03-16 VITALS
TEMPERATURE: 98 F | SYSTOLIC BLOOD PRESSURE: 147 MMHG | OXYGEN SATURATION: 97 % | HEART RATE: 77 BPM | HEIGHT: 64 IN | DIASTOLIC BLOOD PRESSURE: 90 MMHG | WEIGHT: 155 LBS | BODY MASS INDEX: 26.46 KG/M2

## 2018-03-16 DIAGNOSIS — Z01.811 ENCOUNTER FOR PREOPERATIVE PULMONARY EXAMINATION: ICD-10-CM

## 2018-03-16 DIAGNOSIS — R19.00 PELVIC MASS: ICD-10-CM

## 2018-03-16 DIAGNOSIS — R97.1 ELEVATED CA-125: ICD-10-CM

## 2018-03-16 DIAGNOSIS — Z01.818 PREOP TESTING: ICD-10-CM

## 2018-03-16 LAB
ABO + RH BLD: NORMAL
ANION GAP SERPL CALC-SCNC: 9 MMOL/L
BLD GP AB SCN CELLS X3 SERPL QL: NORMAL
BUN SERPL-MCNC: 23 MG/DL
CALCIUM SERPL-MCNC: 9.7 MG/DL
CHLORIDE SERPL-SCNC: 106 MMOL/L
CO2 SERPL-SCNC: 24 MMOL/L
CREAT SERPL-MCNC: 0.9 MG/DL
EST. GFR  (AFRICAN AMERICAN): >60 ML/MIN/1.73 M^2
EST. GFR  (NON AFRICAN AMERICAN): >60 ML/MIN/1.73 M^2
GLUCOSE SERPL-MCNC: 94 MG/DL
HCT VFR BLD AUTO: 41.1 %
HGB BLD-MCNC: 13.2 G/DL
POTASSIUM SERPL-SCNC: 4.1 MMOL/L
SODIUM SERPL-SCNC: 139 MMOL/L

## 2018-03-16 PROCEDURE — 71046 X-RAY EXAM CHEST 2 VIEWS: CPT | Mod: TC,FY,PO

## 2018-03-16 PROCEDURE — 36415 COLL VENOUS BLD VENIPUNCTURE: CPT

## 2018-03-16 PROCEDURE — 86901 BLOOD TYPING SEROLOGIC RH(D): CPT

## 2018-03-16 PROCEDURE — 85018 HEMOGLOBIN: CPT

## 2018-03-16 PROCEDURE — 71046 X-RAY EXAM CHEST 2 VIEWS: CPT | Mod: 26,,, | Performed by: RADIOLOGY

## 2018-03-16 PROCEDURE — 93010 ELECTROCARDIOGRAM REPORT: CPT | Mod: ,,, | Performed by: INTERNAL MEDICINE

## 2018-03-16 PROCEDURE — 93005 ELECTROCARDIOGRAM TRACING: CPT

## 2018-03-16 PROCEDURE — 80048 BASIC METABOLIC PNL TOTAL CA: CPT

## 2018-03-16 PROCEDURE — 85014 HEMATOCRIT: CPT

## 2018-03-16 RX ORDER — SODIUM CHLORIDE, SODIUM LACTATE, POTASSIUM CHLORIDE, CALCIUM CHLORIDE 600; 310; 30; 20 MG/100ML; MG/100ML; MG/100ML; MG/100ML
INJECTION, SOLUTION INTRAVENOUS CONTINUOUS
Status: CANCELLED | OUTPATIENT
Start: 2018-03-16

## 2018-03-16 NOTE — TELEPHONE ENCOUNTER
----- Message from Corey Nguyen sent at 3/16/2018  3:06 PM CDT -----  Contact: [facility] (Dr. Cazares)  X_ 1st Request  _ 2nd Request  _ 3rd Request    Who: [facility] (Dr. Cazares)    Why: States they need a statement for the records that patient needs medical clearance fax to [phone]. Would need this information by Monday morning    What Number to Call Back: [phone]    When to Expect a call back: (Before the end of the day)  -- if call after 3:00 call back will be tomorrow.

## 2018-03-16 NOTE — ANESTHESIA PREPROCEDURE EVALUATION
03/16/2018  Niurka Pedraza is a 73 y.o., female.    Anesthesia Evaluation    I have reviewed the Patient Summary Reports.    I have reviewed the Nursing Notes.   I have reviewed the Medications.     Review of Systems  Anesthesia Hx:  No problems with previous Anesthesia  Denies Family Hx of Anesthesia complications.   Denies Personal Hx of Anesthesia complications.   Social:  Former Smoker    Hematology/Oncology:  Hematology Normal   Oncology Normal     EENT/Dental:EENT/Dental Normal   Cardiovascular:   Exercise tolerance: good Hypertension    Pulmonary:   COPD History of pulmonary nodules with chronic cough. Has periods of better and worse throughout the day.unknown cause.Also coccidiiomycosis   Renal/:  Renal/ Normal     Musculoskeletal:  Musculoskeletal Normal    Neurological:  Neurology Normal    Endocrine:   Hypothyroidism    Dermatological:  Skin Normal    Psych:  Psychiatric Normal           Physical Exam  General:  Well nourished    Airway/Jaw/Neck:  Airway Findings: Mouth Opening: Normal Tongue: Normal  General Airway Assessment: Adult  Mallampati: I  TM Distance: Normal, at least 6 cm  Jaw/Neck Findings:  Neck ROM: Normal ROM      Dental:  Dental Findings: Upper Dentures, Lower Dentures             Anesthesia Plan  Type of Anesthesia, risks & benefits discussed:  Anesthesia Type:  general  Patient's Preference:   Intra-op Monitoring Plan: standard ASA monitors  Intra-op Monitoring Plan Comments:   Post Op Pain Control Plan: per primary service following discharge from PACU  Post Op Pain Control Plan Comments:   Induction:   IV  Beta Blocker:         Informed Consent: Patient understands risks and agrees with Anesthesia plan.  Questions answered. Anesthesia consent signed with patient.  ASA Score: 3     Day of Surgery Review of History & Physical:    H&P update referred to the surgeon.      Anesthesia Plan Notes: Will need pulmonology clearance prior to surgery due to chronic lung problems.  3/22/19: pulmonary clearance on chart        Ready For Surgery From Anesthesia Perspective.

## 2018-03-16 NOTE — TELEPHONE ENCOUNTER
Spoke with Delmis with Dr. Cazares office. Confirm fax number to send medical clearance for pt upcoming surgery.

## 2018-03-16 NOTE — DISCHARGE INSTRUCTIONS
PRE-ADMIT TESTING -  [phone]    [address]  [facility]          Your surgery has been scheduled at [facility]. We are pleased to have the opportunity to serve you. For Further Information please call [phone].    On the day of surgery please report to the Information Desk on the 1st floor.    · CONTACT YOUR PHYSICIAN'S OFFICE THE DAY PRIOR TO YOUR SURGERY TO OBTAIN YOUR ARRIVAL TIME.     · The evening before surgery do not eat anything after 9 p.m. ( this includes hard candy, chewing gum and mints).  You may only have GATORADE, POWERADE AND WATER  from 9 p.m. until you leave your home.   DO NOT DRINK ANY LIQUIDS ON THE WAY TO THE HOSPITAL.      SPECIAL MEDICATION INSTRUCTIONS: TAKE medications checked off by the Anesthesiologist on your Medication List.    Angiogram Patients: Take medications as instructed by your physician, including aspirin.     Surgery Patients:    If you take ASPIRIN - Your PHYSICIAN/SURGEON will need to inform you IF/OR when you need to stop taking aspirin prior to your surgery.     Do Not take any medications containing IBUPROFEN.  Do Not Wear any make-up or dark nail polish   (especially eye make-up) to surgery. If you come to surgery with makeup on you will be required to remove the makeup or nail polish.    Do not shave your surgical area at least 5 days prior to your surgery. The surgical prep will be performed at the hospital according to Infection Control regulations.    Leave all valuables at home.   Do Not wear any jewelry or watches, including any metal in body piercings.  Contact Lens must be removed before surgery. Either do not wear the contact lens or bring a case and solution for storage.  Please bring a container for eyeglasses or dentures as required.  Bring any paperwork your physician has provided, such as consent forms,  history and physicals, doctor's orders, etc.   Bring comfortable clothes that are loose fitting to wear upon  discharge. Take into consideration the type of surgery being performed.  Maintain your diet as advised per your physician the day prior to surgery.      Adequate rest the night before surgery is advised.   Park in the Parking lot behind the hospital or in the [facility] across the street from the parking lot. Parking is complimentary.  If you will be discharged the same day as your procedure, please arrange for a responsible adult to drive you home or to accompany you if traveling by taxi.   YOU WILL NOT BE PERMITTED TO DRIVE OR TO LEAVE THE HOSPITAL ALONE AFTER SURGERY.   It is strongly recommended that you arrange for someone to remain with you for the first 24 hrs following your surgery.       Thank you for your cooperation.  The Staff of [facility].        Bathing Instructions                                                                 Please shower the evening before and morning of your procedure with    ANTIBACTERIAL SOAP. ( DIAL, etc )  Concentrate on the surgical area   for at least 3 minutes and rinse completely. Dry off as usual.   Do not use any deodorant, powder, body lotions, perfume, after shave or    cologne.

## 2018-03-22 ENCOUNTER — TELEPHONE (OUTPATIENT)
Dept: GYNECOLOGIC ONCOLOGY | Facility: CLINIC | Age: 73
End: 2018-03-22

## 2018-03-23 ENCOUNTER — ANESTHESIA (OUTPATIENT)
Dept: SURGERY | Facility: OTHER | Age: 73
End: 2018-03-23
Payer: MEDICARE

## 2018-03-23 ENCOUNTER — HOSPITAL ENCOUNTER (OUTPATIENT)
Facility: OTHER | Age: 73
Discharge: HOME | End: 2018-03-24
Attending: OBSTETRICS & GYNECOLOGY | Admitting: OBSTETRICS & GYNECOLOGY
Payer: MEDICARE

## 2018-03-23 ENCOUNTER — SURGERY (OUTPATIENT)
Age: 73
End: 2018-03-23

## 2018-03-23 DIAGNOSIS — Z98.890 S/P ROBOT-ASSISTED SURGICAL PROCEDURE: Primary | ICD-10-CM

## 2018-03-23 DIAGNOSIS — R97.1 ELEVATED CA-125: ICD-10-CM

## 2018-03-23 DIAGNOSIS — R19.00 PELVIC MASS: ICD-10-CM

## 2018-03-23 LAB — POCT GLUCOSE: 114 MG/DL (ref 70–110)

## 2018-03-23 PROCEDURE — 27201423 OPTIME MED/SURG SUP & DEVICES STERILE SUPPLY: Performed by: OBSTETRICS & GYNECOLOGY

## 2018-03-23 PROCEDURE — 88305 TISSUE EXAM BY PATHOLOGIST: CPT | Mod: 26,,, | Performed by: PATHOLOGY

## 2018-03-23 PROCEDURE — 25000003 PHARM REV CODE 250: Performed by: SPECIALIST

## 2018-03-23 PROCEDURE — 37000009 HC ANESTHESIA EA ADD 15 MINS: Performed by: OBSTETRICS & GYNECOLOGY

## 2018-03-23 PROCEDURE — 63600175 PHARM REV CODE 636 W HCPCS: Performed by: NURSE ANESTHETIST, CERTIFIED REGISTERED

## 2018-03-23 PROCEDURE — 88305 TISSUE EXAM BY PATHOLOGIST: CPT | Performed by: PATHOLOGY

## 2018-03-23 PROCEDURE — 38571 LAPAROSCOPY LYMPHADENECTOMY: CPT | Mod: 82,AS,51, | Performed by: NURSE PRACTITIONER

## 2018-03-23 PROCEDURE — 88331 PATH CONSLTJ SURG 1 BLK 1SPC: CPT | Mod: 26,,, | Performed by: PATHOLOGY

## 2018-03-23 PROCEDURE — 25000003 PHARM REV CODE 250: Performed by: STUDENT IN AN ORGANIZED HEALTH CARE EDUCATION/TRAINING PROGRAM

## 2018-03-23 PROCEDURE — 37000008 HC ANESTHESIA 1ST 15 MINUTES: Performed by: OBSTETRICS & GYNECOLOGY

## 2018-03-23 PROCEDURE — 36000712 HC OR TIME LEV V 1ST 15 MIN: Performed by: OBSTETRICS & GYNECOLOGY

## 2018-03-23 PROCEDURE — 36000713 HC OR TIME LEV V EA ADD 15 MIN: Performed by: OBSTETRICS & GYNECOLOGY

## 2018-03-23 PROCEDURE — 88307 TISSUE EXAM BY PATHOLOGIST: CPT | Mod: 26,,, | Performed by: PATHOLOGY

## 2018-03-23 PROCEDURE — 94761 N-INVAS EAR/PLS OXIMETRY MLT: CPT

## 2018-03-23 PROCEDURE — 63600175 PHARM REV CODE 636 W HCPCS: Performed by: OBSTETRICS & GYNECOLOGY

## 2018-03-23 PROCEDURE — 38571 LAPAROSCOPY LYMPHADENECTOMY: CPT | Mod: 51,,, | Performed by: OBSTETRICS & GYNECOLOGY

## 2018-03-23 PROCEDURE — 88309 TISSUE EXAM BY PATHOLOGIST: CPT | Mod: 26,,, | Performed by: PATHOLOGY

## 2018-03-23 PROCEDURE — 58575 LAPS TOT HYST RESJ MAL: CPT | Mod: 82,AS,, | Performed by: NURSE PRACTITIONER

## 2018-03-23 PROCEDURE — 94799 UNLISTED PULMONARY SVC/PX: CPT

## 2018-03-23 PROCEDURE — 71000039 HC RECOVERY, EACH ADD'L HOUR: Performed by: OBSTETRICS & GYNECOLOGY

## 2018-03-23 PROCEDURE — 25000003 PHARM REV CODE 250: Performed by: NURSE ANESTHETIST, CERTIFIED REGISTERED

## 2018-03-23 PROCEDURE — 58575 LAPS TOT HYST RESJ MAL: CPT | Mod: ,,, | Performed by: OBSTETRICS & GYNECOLOGY

## 2018-03-23 PROCEDURE — 71000033 HC RECOVERY, INTIAL HOUR: Performed by: OBSTETRICS & GYNECOLOGY

## 2018-03-23 PROCEDURE — 25000003 PHARM REV CODE 250: Performed by: ANESTHESIOLOGY

## 2018-03-23 RX ORDER — SODIUM CHLORIDE, SODIUM LACTATE, POTASSIUM CHLORIDE, CALCIUM CHLORIDE 600; 310; 30; 20 MG/100ML; MG/100ML; MG/100ML; MG/100ML
INJECTION, SOLUTION INTRAVENOUS CONTINUOUS
Status: DISCONTINUED | OUTPATIENT
Start: 2018-03-23 | End: 2018-03-23

## 2018-03-23 RX ORDER — GLYCOPYRROLATE 0.2 MG/ML
INJECTION INTRAMUSCULAR; INTRAVENOUS
Status: DISCONTINUED | OUTPATIENT
Start: 2018-03-23 | End: 2018-03-23

## 2018-03-23 RX ORDER — ROCURONIUM BROMIDE 10 MG/ML
INJECTION, SOLUTION INTRAVENOUS
Status: DISCONTINUED | OUTPATIENT
Start: 2018-03-23 | End: 2018-03-23

## 2018-03-23 RX ORDER — NEOSTIGMINE METHYLSULFATE 1 MG/ML
INJECTION, SOLUTION INTRAVENOUS
Status: DISCONTINUED | OUTPATIENT
Start: 2018-03-23 | End: 2018-03-23

## 2018-03-23 RX ORDER — OXYCODONE HYDROCHLORIDE 5 MG/1
5 TABLET ORAL
Status: DISCONTINUED | OUTPATIENT
Start: 2018-03-23 | End: 2018-03-23 | Stop reason: HOSPADM

## 2018-03-23 RX ORDER — ACETAMINOPHEN 10 MG/ML
INJECTION, SOLUTION INTRAVENOUS
Status: DISCONTINUED | OUTPATIENT
Start: 2018-03-23 | End: 2018-03-23

## 2018-03-23 RX ORDER — ONDANSETRON 2 MG/ML
INJECTION INTRAMUSCULAR; INTRAVENOUS
Status: DISCONTINUED | OUTPATIENT
Start: 2018-03-23 | End: 2018-03-23

## 2018-03-23 RX ORDER — LIDOCAINE HCL/PF 100 MG/5ML
SYRINGE (ML) INTRAVENOUS
Status: DISCONTINUED | OUTPATIENT
Start: 2018-03-23 | End: 2018-03-23

## 2018-03-23 RX ORDER — SODIUM CHLORIDE 9 MG/ML
INJECTION, SOLUTION INTRAVENOUS CONTINUOUS
Status: DISCONTINUED | OUTPATIENT
Start: 2018-03-23 | End: 2018-03-23

## 2018-03-23 RX ORDER — HYDROCODONE BITARTRATE AND ACETAMINOPHEN 10; 325 MG/1; MG/1
1 TABLET ORAL EVERY 4 HOURS PRN
Status: DISCONTINUED | OUTPATIENT
Start: 2018-03-23 | End: 2018-03-24 | Stop reason: HOSPADM

## 2018-03-23 RX ORDER — LIDOCAINE HYDROCHLORIDE 10 MG/ML
1 INJECTION, SOLUTION EPIDURAL; INFILTRATION; INTRACAUDAL; PERINEURAL ONCE
Status: DISCONTINUED | OUTPATIENT
Start: 2018-03-23 | End: 2018-03-23

## 2018-03-23 RX ORDER — ATORVASTATIN CALCIUM 20 MG/1
20 TABLET, FILM COATED ORAL DAILY
Status: DISCONTINUED | OUTPATIENT
Start: 2018-03-23 | End: 2018-03-24 | Stop reason: HOSPADM

## 2018-03-23 RX ORDER — BISACODYL 10 MG
10 SUPPOSITORY, RECTAL RECTAL DAILY PRN
Status: DISCONTINUED | OUTPATIENT
Start: 2018-03-23 | End: 2018-03-24 | Stop reason: HOSPADM

## 2018-03-23 RX ORDER — DIPHENHYDRAMINE HYDROCHLORIDE 50 MG/ML
25 INJECTION INTRAMUSCULAR; INTRAVENOUS EVERY 4 HOURS PRN
Status: DISCONTINUED | OUTPATIENT
Start: 2018-03-23 | End: 2018-03-24 | Stop reason: HOSPADM

## 2018-03-23 RX ORDER — PROPOFOL 10 MG/ML
VIAL (ML) INTRAVENOUS
Status: DISCONTINUED | OUTPATIENT
Start: 2018-03-23 | End: 2018-03-23

## 2018-03-23 RX ORDER — SODIUM CHLORIDE 0.9 % (FLUSH) 0.9 %
3 SYRINGE (ML) INJECTION
Status: DISCONTINUED | OUTPATIENT
Start: 2018-03-23 | End: 2018-03-24 | Stop reason: HOSPADM

## 2018-03-23 RX ORDER — FENTANYL CITRATE 50 UG/ML
INJECTION, SOLUTION INTRAMUSCULAR; INTRAVENOUS
Status: DISCONTINUED | OUTPATIENT
Start: 2018-03-23 | End: 2018-03-23

## 2018-03-23 RX ORDER — IBUPROFEN 600 MG/1
600 TABLET ORAL EVERY 6 HOURS
Status: DISCONTINUED | OUTPATIENT
Start: 2018-03-23 | End: 2018-03-24 | Stop reason: HOSPADM

## 2018-03-23 RX ORDER — DEXAMETHASONE SODIUM PHOSPHATE 4 MG/ML
INJECTION, SOLUTION INTRA-ARTICULAR; INTRALESIONAL; INTRAMUSCULAR; INTRAVENOUS; SOFT TISSUE
Status: DISCONTINUED | OUTPATIENT
Start: 2018-03-23 | End: 2018-03-23

## 2018-03-23 RX ORDER — ONDANSETRON 8 MG/1
8 TABLET, ORALLY DISINTEGRATING ORAL EVERY 8 HOURS PRN
Status: DISCONTINUED | OUTPATIENT
Start: 2018-03-23 | End: 2018-03-24 | Stop reason: HOSPADM

## 2018-03-23 RX ORDER — HYDROCODONE BITARTRATE AND ACETAMINOPHEN 5; 325 MG/1; MG/1
1 TABLET ORAL EVERY 6 HOURS PRN
Qty: 30 TABLET | Refills: 0 | Status: SHIPPED | OUTPATIENT
Start: 2018-03-23 | End: 2018-03-23

## 2018-03-23 RX ORDER — DIPHENHYDRAMINE HYDROCHLORIDE 50 MG/ML
25 INJECTION INTRAMUSCULAR; INTRAVENOUS EVERY 6 HOURS PRN
Status: DISCONTINUED | OUTPATIENT
Start: 2018-03-23 | End: 2018-03-23 | Stop reason: HOSPADM

## 2018-03-23 RX ORDER — HYDROCODONE BITARTRATE AND ACETAMINOPHEN 5; 325 MG/1; MG/1
1 TABLET ORAL EVERY 4 HOURS PRN
Status: DISCONTINUED | OUTPATIENT
Start: 2018-03-23 | End: 2018-03-24 | Stop reason: HOSPADM

## 2018-03-23 RX ORDER — CEFAZOLIN SODIUM 1 G/3ML
2 INJECTION, POWDER, FOR SOLUTION INTRAMUSCULAR; INTRAVENOUS
Status: COMPLETED | OUTPATIENT
Start: 2018-03-23 | End: 2018-03-23

## 2018-03-23 RX ORDER — AMOXICILLIN 250 MG
1 CAPSULE ORAL 2 TIMES DAILY
Status: DISCONTINUED | OUTPATIENT
Start: 2018-03-23 | End: 2018-03-24 | Stop reason: HOSPADM

## 2018-03-23 RX ORDER — MEPERIDINE HYDROCHLORIDE 50 MG/ML
12.5 INJECTION INTRAMUSCULAR; INTRAVENOUS; SUBCUTANEOUS ONCE AS NEEDED
Status: DISCONTINUED | OUTPATIENT
Start: 2018-03-23 | End: 2018-03-23 | Stop reason: HOSPADM

## 2018-03-23 RX ORDER — LEVOTHYROXINE SODIUM 50 UG/1
50 TABLET ORAL
Status: DISCONTINUED | OUTPATIENT
Start: 2018-03-24 | End: 2018-03-24 | Stop reason: HOSPADM

## 2018-03-23 RX ORDER — LABETALOL HYDROCHLORIDE 5 MG/ML
INJECTION, SOLUTION INTRAVENOUS
Status: DISCONTINUED | OUTPATIENT
Start: 2018-03-23 | End: 2018-03-23

## 2018-03-23 RX ORDER — IBUPROFEN 600 MG/1
600 TABLET ORAL EVERY 6 HOURS PRN
Qty: 30 TABLET | Refills: 1 | Status: SHIPPED | OUTPATIENT
Start: 2018-03-23 | End: 2018-07-17 | Stop reason: ALTCHOICE

## 2018-03-23 RX ORDER — VECURONIUM BROMIDE FOR INJECTION 1 MG/ML
INJECTION, POWDER, LYOPHILIZED, FOR SOLUTION INTRAVENOUS
Status: DISCONTINUED | OUTPATIENT
Start: 2018-03-23 | End: 2018-03-23

## 2018-03-23 RX ORDER — FENTANYL CITRATE 50 UG/ML
25 INJECTION, SOLUTION INTRAMUSCULAR; INTRAVENOUS EVERY 5 MIN PRN
Status: DISCONTINUED | OUTPATIENT
Start: 2018-03-23 | End: 2018-03-23 | Stop reason: HOSPADM

## 2018-03-23 RX ORDER — HYDROMORPHONE HYDROCHLORIDE 2 MG/ML
0.4 INJECTION, SOLUTION INTRAMUSCULAR; INTRAVENOUS; SUBCUTANEOUS EVERY 5 MIN PRN
Status: DISCONTINUED | OUTPATIENT
Start: 2018-03-23 | End: 2018-03-23 | Stop reason: HOSPADM

## 2018-03-23 RX ORDER — HYDROCODONE BITARTRATE AND ACETAMINOPHEN 5; 325 MG/1; MG/1
1 TABLET ORAL EVERY 6 HOURS PRN
Qty: 30 TABLET | Refills: 0 | Status: SHIPPED | OUTPATIENT
Start: 2018-03-23 | End: 2018-08-06

## 2018-03-23 RX ORDER — DEXTROSE MONOHYDRATE, SODIUM CHLORIDE, AND POTASSIUM CHLORIDE 50; 1.49; 4.5 G/1000ML; G/1000ML; G/1000ML
INJECTION, SOLUTION INTRAVENOUS CONTINUOUS
Status: DISCONTINUED | OUTPATIENT
Start: 2018-03-23 | End: 2018-03-24

## 2018-03-23 RX ORDER — MUPIROCIN 20 MG/G
1 OINTMENT TOPICAL 2 TIMES DAILY
Status: DISCONTINUED | OUTPATIENT
Start: 2018-03-23 | End: 2018-03-24 | Stop reason: HOSPADM

## 2018-03-23 RX ORDER — OXYBUTYNIN CHLORIDE 5 MG/1
5 TABLET, EXTENDED RELEASE ORAL DAILY
Status: DISCONTINUED | OUTPATIENT
Start: 2018-03-23 | End: 2018-03-24 | Stop reason: HOSPADM

## 2018-03-23 RX ORDER — ONDANSETRON 2 MG/ML
4 INJECTION INTRAMUSCULAR; INTRAVENOUS DAILY PRN
Status: DISCONTINUED | OUTPATIENT
Start: 2018-03-23 | End: 2018-03-23 | Stop reason: HOSPADM

## 2018-03-23 RX ORDER — DIPHENHYDRAMINE HCL 25 MG
25 CAPSULE ORAL EVERY 4 HOURS PRN
Status: DISCONTINUED | OUTPATIENT
Start: 2018-03-23 | End: 2018-03-24 | Stop reason: HOSPADM

## 2018-03-23 RX ORDER — METOPROLOL SUCCINATE 25 MG/1
25 TABLET, EXTENDED RELEASE ORAL DAILY
Status: DISCONTINUED | OUTPATIENT
Start: 2018-03-23 | End: 2018-03-24 | Stop reason: HOSPADM

## 2018-03-23 RX ADMIN — ONDANSETRON 4 MG: 2 INJECTION INTRAMUSCULAR; INTRAVENOUS at 09:03

## 2018-03-23 RX ADMIN — DEXAMETHASONE SODIUM PHOSPHATE 8 MG: 4 INJECTION, SOLUTION INTRAMUSCULAR; INTRAVENOUS at 07:03

## 2018-03-23 RX ADMIN — IBUPROFEN 600 MG: 600 TABLET, FILM COATED ORAL at 12:03

## 2018-03-23 RX ADMIN — LIDOCAINE HYDROCHLORIDE 75 MG: 20 INJECTION, SOLUTION INTRAVENOUS at 07:03

## 2018-03-23 RX ADMIN — OXYCODONE HYDROCHLORIDE 5 MG: 5 TABLET ORAL at 11:03

## 2018-03-23 RX ADMIN — FENTANYL CITRATE 50 MCG: 50 INJECTION, SOLUTION INTRAMUSCULAR; INTRAVENOUS at 07:03

## 2018-03-23 RX ADMIN — CEFAZOLIN 2 G: 330 INJECTION, POWDER, FOR SOLUTION INTRAMUSCULAR; INTRAVENOUS at 07:03

## 2018-03-23 RX ADMIN — CALCIUM CHLORIDE 500 MG: 100 INJECTION, SOLUTION INTRAVENOUS at 08:03

## 2018-03-23 RX ADMIN — OXYBUTYNIN CHLORIDE 5 MG: 5 TABLET, EXTENDED RELEASE ORAL at 12:03

## 2018-03-23 RX ADMIN — ROCURONIUM BROMIDE 40 MG: 10 INJECTION, SOLUTION INTRAVENOUS at 07:03

## 2018-03-23 RX ADMIN — VECURONIUM BROMIDE FOR INJECTION 2 MG: 1 INJECTION, POWDER, LYOPHILIZED, FOR SOLUTION INTRAVENOUS at 08:03

## 2018-03-23 RX ADMIN — MUPIROCIN 1 G: 20 OINTMENT TOPICAL at 09:03

## 2018-03-23 RX ADMIN — NEOSTIGMINE METHYLSULFATE 4 MG: 1 INJECTION INTRAVENOUS at 10:03

## 2018-03-23 RX ADMIN — LABETALOL HYDROCHLORIDE 5 MG: 5 INJECTION, SOLUTION INTRAVENOUS at 07:03

## 2018-03-23 RX ADMIN — DEXTROSE MONOHYDRATE, SODIUM CHLORIDE, AND POTASSIUM CHLORIDE: 50; 4.5; 1.49 INJECTION, SOLUTION INTRAVENOUS at 12:03

## 2018-03-23 RX ADMIN — VECURONIUM BROMIDE FOR INJECTION 2 MG: 1 INJECTION, POWDER, LYOPHILIZED, FOR SOLUTION INTRAVENOUS at 09:03

## 2018-03-23 RX ADMIN — MUPIROCIN 1 G: 20 OINTMENT TOPICAL at 12:03

## 2018-03-23 RX ADMIN — FENTANYL CITRATE 50 MCG: 50 INJECTION, SOLUTION INTRAMUSCULAR; INTRAVENOUS at 09:03

## 2018-03-23 RX ADMIN — ROCURONIUM BROMIDE 10 MG: 10 INJECTION, SOLUTION INTRAVENOUS at 07:03

## 2018-03-23 RX ADMIN — SODIUM CHLORIDE, SODIUM LACTATE, POTASSIUM CHLORIDE, AND CALCIUM CHLORIDE: 600; 310; 30; 20 INJECTION, SOLUTION INTRAVENOUS at 06:03

## 2018-03-23 RX ADMIN — GLYCOPYRROLATE 0.4 MG: 0.2 INJECTION, SOLUTION INTRAMUSCULAR; INTRAVENOUS at 10:03

## 2018-03-23 RX ADMIN — STANDARDIZED SENNA CONCENTRATE AND DOCUSATE SODIUM 1 TABLET: 8.6; 5 TABLET, FILM COATED ORAL at 12:03

## 2018-03-23 RX ADMIN — GLYCOPYRROLATE 0.2 MG: 0.2 INJECTION, SOLUTION INTRAMUSCULAR; INTRAVENOUS at 08:03

## 2018-03-23 RX ADMIN — FENTANYL CITRATE 50 MCG: 50 INJECTION, SOLUTION INTRAMUSCULAR; INTRAVENOUS at 08:03

## 2018-03-23 RX ADMIN — ONDANSETRON 8 MG: 8 TABLET, ORALLY DISINTEGRATING ORAL at 12:03

## 2018-03-23 RX ADMIN — ACETAMINOPHEN 1000 MG: 10 INJECTION, SOLUTION INTRAVENOUS at 07:03

## 2018-03-23 RX ADMIN — STANDARDIZED SENNA CONCENTRATE AND DOCUSATE SODIUM 1 TABLET: 8.6; 5 TABLET, FILM COATED ORAL at 09:03

## 2018-03-23 RX ADMIN — METOPROLOL SUCCINATE 25 MG: 25 TABLET, EXTENDED RELEASE ORAL at 12:03

## 2018-03-23 RX ADMIN — FENTANYL CITRATE 50 MCG: 50 INJECTION, SOLUTION INTRAMUSCULAR; INTRAVENOUS at 10:03

## 2018-03-23 RX ADMIN — FENTANYL CITRATE 100 MCG: 50 INJECTION, SOLUTION INTRAMUSCULAR; INTRAVENOUS at 07:03

## 2018-03-23 RX ADMIN — ATORVASTATIN CALCIUM 20 MG: 20 TABLET, FILM COATED ORAL at 12:03

## 2018-03-23 RX ADMIN — PROPOFOL 200 MG: 10 INJECTION, EMULSION INTRAVENOUS at 07:03

## 2018-03-23 RX ADMIN — HYDROCODONE BITARTRATE AND ACETAMINOPHEN 1 TABLET: 5; 325 TABLET ORAL at 12:03

## 2018-03-23 RX ADMIN — SODIUM CHLORIDE, SODIUM GLUCONATE, SODIUM ACETATE, POTASSIUM CHLORIDE, MAGNESIUM CHLORIDE, SODIUM PHOSPHATE, DIBASIC, AND POTASSIUM PHOSPHATE: .53; .5; .37; .037; .03; .012; .00082 INJECTION, SOLUTION INTRAVENOUS at 08:03

## 2018-03-23 RX ADMIN — IBUPROFEN 600 MG: 600 TABLET, FILM COATED ORAL at 05:03

## 2018-03-23 NOTE — OPERATIVE NOTE ADDENDUM
Certification of Assistant at Surgery       Surgery Date: 3/23/2018     Participating Surgeons:  Surgeon(s) and Role:     * Talita Barrios MD - Primary       Nanda Barahona NP-C, First Assist    Procedures:  Procedure(s) (LRB):  XI ROBOTIC ASSISTED LAPAROSCOPIC HYSTERECTOMY, MINI LAP (N/A)  XI ROBOT ASSISTED LAPAROSCOPIC SALPINGO-OOPHERECTOMY BILATERAL (Bilateral)  STAGING (N/A)  OMENTECTOMY (N/A)  DISSECTION-LYMPH NODE-BILATERAL (Bilateral)    Assistant Surgeon's Certification of Necessity:  I understand that section 1842 (b) (6) (d) of the Social Security Act generally prohibits Medicare Part B reasonable charge payment for the services of assistants at surgery in teaching hospitals when qualified residents are available to furnish such services. I certify that the services for which payment is claimed were medically necessary, and that no qualified resident was available to perform the services. I further understand that these services are subject to post-payment review by the Medicare carrier.      Nanda Barahona NP    03/23/2018  10:38 AM

## 2018-03-23 NOTE — ANESTHESIA POSTPROCEDURE EVALUATION
"Anesthesia Post Evaluation    Patient: Niurka Pedraza    Procedure(s) Performed: Procedure(s) (LRB):  XI ROBOTIC ASSISTED LAPAROSCOPIC HYSTERECTOMY, MINI LAP (N/A)  XI ROBOT ASSISTED LAPAROSCOPIC SALPINGO-OOPHERECTOMY BILATERAL (Bilateral)  STAGING (N/A)  OMENTECTOMY (N/A)  DISSECTION-LYMPH NODE-BILATERAL (Bilateral)    Final Anesthesia Type: general  Patient location during evaluation: PACU  Patient participation: Yes- Able to Participate  Level of consciousness: awake and alert  Post-procedure vital signs: reviewed and stable  Pain management: adequate  Airway patency: patent  PONV status at discharge: No PONV  Anesthetic complications: no      Cardiovascular status: blood pressure returned to baseline  Respiratory status: unassisted and room air  Hydration status: euvolemic  Follow-up not needed.        Visit Vitals  /73   Pulse 76   Temp 37.1 °C (98.7 °F) (Oral)   Resp 16   Ht 5' 4" (1.626 m)   Wt 70.3 kg (155 lb)   LMP 02/08/2000   SpO2 97%   Breastfeeding? No   BMI 26.61 kg/m²       Pain/Luann Score: Pain Assessment Performed: Yes (3/23/2018 11:41 AM)  Presence of Pain: complains of pain/discomfort (3/23/2018 11:41 AM)  Pain Rating Prior to Med Admin: 5 (3/23/2018 11:16 AM)  Pain Rating Post Med Admin: 0 (sleeping) (3/23/2018 11:41 AM)  Luann Score: 10 (3/23/2018 11:00 AM)      "

## 2018-03-23 NOTE — HOSPITAL COURSE
03/23/2018 - presented as scheduled for RATLH/BSO/PLND/Omental bx for ovarian mass and elevated . Complex mass on inspection, suspicious for malignancy. Frozen specimen sent to pathology and returned as borderline. See op note for details, patient tolerated well.

## 2018-03-23 NOTE — TRANSFER OF CARE
"Anesthesia Transfer of Care Note    Patient: Niurka Pedraza    Procedure(s) Performed: Procedure(s) (LRB):  XI ROBOTIC ASSISTED LAPAROSCOPIC HYSTERECTOMY, MINI LAP (N/A)  XI ROBOT ASSISTED LAPAROSCOPIC SALPINGO-OOPHERECTOMY BILATERAL (Bilateral)  STAGING (N/A)  OMENTECTOMY (N/A)  DISSECTION-LYMPH NODE-BILATERAL (Bilateral)    Patient location: PACU    Anesthesia Type: general    Transport from OR: Transported from OR on 2-3 L/min O2 by NC with adequate spontaneous ventilation. Continuous SpO2 monitoring in transport    Post pain: adequate analgesia    Post assessment: no apparent anesthetic complications    Post vital signs: stable    Level of consciousness: awake and alert    Nausea/Vomiting: no nausea/vomiting    Complications: none    Transfer of care protocol was followed      Last vitals:   Visit Vitals  BP (!) 148/88 (BP Location: Left arm, Patient Position: Lying)   Pulse 81   Temp 36.9 °C (98.4 °F) (Oral)   Resp 16   Ht 5' 4" (1.626 m)   Wt 70.3 kg (155 lb)   LMP 02/08/2000   SpO2 96%   Breastfeeding? No   BMI 26.61 kg/m²     "

## 2018-03-23 NOTE — INTERVAL H&P NOTE
The patient has been examined and the H&P has been reviewed:    I concur with the findings and no changes have occurred since H&P was written.    Anesthesia/Surgery risks, benefits and alternative options discussed and understood by patient/family.    Proceed to OR for Davinci assisted laparoscopic hysterectomy with BSO, possible staging.    Nanda Barahona, NP-C, [facility]  GYN Oncology          Active Hospital Problems    Diagnosis  POA    Pelvic mass [R19.00]  Yes      Resolved Hospital Problems    Diagnosis Date Resolved POA   No resolved problems to display.

## 2018-03-23 NOTE — NURSING
Noted fine crackles to bilateral lung bases upon receiving pt to floor.  Pt did not want to eat or drink at this time, so started IV fluids as ordered.  Pt is now tolerating diet well, is hydrating adequately orally, and urine output is 260 mL since arriving to the floor (adequate urine output, clear light yellow).  Rechecked breath sounds, and noted more pronounced crackles in bilateral lung bases. Shut off IV fluids, paged GYN and spoke to resident re: change in status.  Ok to d/c fluids, provide incentive spirometer and teaching, continue to monitor.    Rechecked breath sounds at 1815.  Fine crackles noted to bases; improvement from 1500.  IS at bedside, pt demonstrated use and is performing hourly x 10 to 2500 mL, tolerating well.    Tolerating diet well, voiding clear yellow urine to herndon catheter to gravity, pain well controlled with PO pain medication.  Bed in low locked position, call light within reach, daughter at bedside, able to make needs known; no needs at this time.

## 2018-03-24 VITALS
OXYGEN SATURATION: 93 % | WEIGHT: 187.38 LBS | HEIGHT: 64 IN | TEMPERATURE: 98 F | DIASTOLIC BLOOD PRESSURE: 77 MMHG | BODY MASS INDEX: 31.99 KG/M2 | HEART RATE: 78 BPM | SYSTOLIC BLOOD PRESSURE: 171 MMHG | RESPIRATION RATE: 18 BRPM

## 2018-03-24 PROBLEM — Z98.890 S/P ROBOT-ASSISTED SURGICAL PROCEDURE: Status: ACTIVE | Noted: 2018-03-24

## 2018-03-24 LAB
ANION GAP SERPL CALC-SCNC: 11 MMOL/L
BASOPHILS # BLD AUTO: 0 K/UL
BASOPHILS NFR BLD: 0 %
BUN SERPL-MCNC: 23 MG/DL
CALCIUM SERPL-MCNC: 9.2 MG/DL
CHLORIDE SERPL-SCNC: 101 MMOL/L
CO2 SERPL-SCNC: 25 MMOL/L
CREAT SERPL-MCNC: 1 MG/DL
DIFFERENTIAL METHOD: ABNORMAL
EOSINOPHIL # BLD AUTO: 0 K/UL
EOSINOPHIL NFR BLD: 0.2 %
ERYTHROCYTE [DISTWIDTH] IN BLOOD BY AUTOMATED COUNT: 14.8 %
EST. GFR  (AFRICAN AMERICAN): >60 ML/MIN/1.73 M^2
EST. GFR  (NON AFRICAN AMERICAN): 56 ML/MIN/1.73 M^2
GLUCOSE SERPL-MCNC: 109 MG/DL
HCT VFR BLD AUTO: 34.7 %
HGB BLD-MCNC: 11.2 G/DL
LYMPHOCYTES # BLD AUTO: 2.3 K/UL
LYMPHOCYTES NFR BLD: 18.5 %
MCH RBC QN AUTO: 29.1 PG
MCHC RBC AUTO-ENTMCNC: 32.3 G/DL
MCV RBC AUTO: 90 FL
MONOCYTES # BLD AUTO: 0.7 K/UL
MONOCYTES NFR BLD: 5.5 %
NEUTROPHILS # BLD AUTO: 9.4 K/UL
NEUTROPHILS NFR BLD: 75.4 %
PLATELET # BLD AUTO: 313 K/UL
PMV BLD AUTO: 9.9 FL
POTASSIUM SERPL-SCNC: 4.2 MMOL/L
RBC # BLD AUTO: 3.85 M/UL
SODIUM SERPL-SCNC: 137 MMOL/L
WBC # BLD AUTO: 12.51 K/UL

## 2018-03-24 PROCEDURE — 85025 COMPLETE CBC W/AUTO DIFF WBC: CPT

## 2018-03-24 PROCEDURE — 36415 COLL VENOUS BLD VENIPUNCTURE: CPT

## 2018-03-24 PROCEDURE — 94761 N-INVAS EAR/PLS OXIMETRY MLT: CPT

## 2018-03-24 PROCEDURE — 25000003 PHARM REV CODE 250: Performed by: STUDENT IN AN ORGANIZED HEALTH CARE EDUCATION/TRAINING PROGRAM

## 2018-03-24 PROCEDURE — 80048 BASIC METABOLIC PNL TOTAL CA: CPT

## 2018-03-24 RX ADMIN — LEVOTHYROXINE SODIUM 50 MCG: 50 TABLET ORAL at 05:03

## 2018-03-24 RX ADMIN — IBUPROFEN 600 MG: 600 TABLET, FILM COATED ORAL at 05:03

## 2018-03-24 RX ADMIN — HYDROCODONE BITARTRATE AND ACETAMINOPHEN 1 TABLET: 10; 325 TABLET ORAL at 08:03

## 2018-03-24 RX ADMIN — STANDARDIZED SENNA CONCENTRATE AND DOCUSATE SODIUM 1 TABLET: 8.6; 5 TABLET, FILM COATED ORAL at 08:03

## 2018-03-24 RX ADMIN — IBUPROFEN 600 MG: 600 TABLET, FILM COATED ORAL at 12:03

## 2018-03-24 RX ADMIN — MUPIROCIN 1 G: 20 OINTMENT TOPICAL at 09:03

## 2018-03-24 RX ADMIN — METOPROLOL SUCCINATE 25 MG: 25 TABLET, EXTENDED RELEASE ORAL at 09:03

## 2018-03-24 RX ADMIN — ATORVASTATIN CALCIUM 20 MG: 20 TABLET, FILM COATED ORAL at 08:03

## 2018-03-24 RX ADMIN — OXYBUTYNIN CHLORIDE 5 MG: 5 TABLET, EXTENDED RELEASE ORAL at 08:03

## 2018-03-24 NOTE — PLAN OF CARE
Problem: Patient Care Overview  Goal: Plan of Care Review  Outcome: Ongoing (interventions implemented as appropriate)  Patient on 1L nc. Sats 97%. IS done. Pt. in no distress, will continue to monitor.

## 2018-03-24 NOTE — HOSPITAL COURSE
3/23/2018 - RATLH/BSO/LND/Omental biopsy performed without complication. EBL 50cc.  03/24/2018 - POD#1, doing well, meeting post-op milestones. Will discharge once voiding s/p herndon removal.

## 2018-03-24 NOTE — NURSING
New orders for discharge placed, pt is agreeable with discharge. PIV removed with catheter tip intact, dressing applied. Discharge teaching done at bedside, medications reviewed and appointments given. Pt and family member at bedside verbalized understanding. RX for Norco was also given to patient to be taken to pharmacy of choice. Lap site care taught. Transportaion for discharge placed. Will d/c home

## 2018-03-24 NOTE — HPI
72 yo with pelvic mass and elevated . PMHx complicated by HTN, HLD, OAB, pulm nodules (?coccidio), breast cancer, hypothyroid

## 2018-03-24 NOTE — DISCHARGE SUMMARY
[facility]  Gynecologic Oncology  Discharge Summary    Patient Name: Niurka Pedraza  MRN: [id number]  Admission Date: 3/23/2018  Hospital Length of Stay: 0 days  Discharge Date and Time:  03/24/2018   Attending Physician: Talita Barrios MD   Discharging Provider: Marily Barbosa MD  Primary Care Provider: Savana Anderson MD    HPI:   72 yo with pelvic mass and elevated . PMHx complicated by HTN, HLD, OAB, pulm nodules (?coccidio), breast cancer, hypothyroid    Hospital Course:  3/23/2018 - RATLH/BSO/LND/Omental biopsy performed without complication. EBL 50cc.  03/24/2018 - POD#1, doing well, meeting post-op milestones. Will discharge once voiding s/p herndon removal.      Procedure(s) (LRB):  XI ROBOTIC ASSISTED LAPAROSCOPIC HYSTERECTOMY, MINI LAP (N/A)  XI ROBOT ASSISTED LAPAROSCOPIC SALPINGO-OOPHERECTOMY BILATERAL (Bilateral)  STAGING (N/A)  OMENTECTOMY (N/A)  DISSECTION-LYMPH NODE-BILATERAL (Bilateral)         Significant Diagnostic Studies:   Lab Results   Component Value Date    WBC 12.51 03/24/2018    HGB 11.2 (L) 03/24/2018    HCT 34.7 (L) 03/24/2018    MCV 90 03/24/2018     03/24/2018       Pending Diagnostic Studies:     None        Final Active Diagnoses:    Diagnosis Date Noted POA    PRINCIPAL PROBLEM:  S/P RATLH/BSO/PLND/Omental Bx (mini lap) [Z98.890] 03/24/2018 Not Applicable    Pelvic mass [R19.00] 02/25/2018 Yes    Hypothyroidism [E03.9] 11/22/2017 Yes    Hyperlipidemia [E78.5] 12/11/2016 Yes      Problems Resolved During this Admission:    Diagnosis Date Noted Date Resolved POA        Does this patient meet criteria for extended DVT prophylaxis? No, because not indicated    Discharged Condition: good    Disposition: Home or Self Care    Follow Up:  Follow-up Information     Talita Barrios MD In 4 weeks.    Specialty:  Gynecologic Oncology  Why:  Post-op Check  Contact information:  [address] [place] [address] [phone]                 Patient  Instructions:     Diet Adult Regular     Activity as tolerated     Other restrictions (specify):   Order Comments: Pelvic rest. Nothing in the vagina x 6 weeks.     Notify your health care provider if you experience any of the following:  temperature >100.4     Notify your health care provider if you experience any of the following:  persistent nausea and vomiting or diarrhea     Notify your health care provider if you experience any of the following:  severe uncontrolled pain     Notify your health care provider if you experience any of the following:  redness, tenderness, or signs of infection (pain, swelling, redness, odor or green/yellow discharge around incision site)     Notify your health care provider if you experience any of the following:  severe persistent headache     Notify your health care provider if you experience any of the following:  persistent dizziness, light-headedness, or visual disturbances     No dressing needed   Order Comments: Please keep incision clean and dry. Wash daily with soap and water. Allow to air dry completely.     Notify your health care provider if you experience any of the following:   Order Comments: Vaginal bleeding, saturating more than 1 pad per hour for 2 hours       Medications:  Reconciled Home Medications:   Current Discharge Medication List      START taking these medications    Details   hydrocodone-acetaminophen 5-325mg (NORCO) 5-325 mg per tablet Take 1 tablet by mouth every 6 (six) hours as needed for Pain.  Qty: 30 tablet, Refills: 0      ibuprofen (ADVIL,MOTRIN) 600 MG tablet Take 1 tablet (600 mg total) by mouth every 6 (six) hours as needed for Pain.  Qty: 30 tablet, Refills: 1         CONTINUE these medications which have NOT CHANGED    Details   atorvastatin (LIPITOR) 20 MG tablet Take 1 tablet (20 mg total) by mouth once daily.  Qty: 90 tablet, Refills: 3      cephALEXin (KEFLEX) 250 MG capsule Take 1 capsule (250 mg total) by mouth once daily.  Qty: 30  capsule, Refills: 5      levothyroxine (SYNTHROID) 50 MCG tablet TAKE 1 TABLET (50 MCG TOTAL) BY MOUTH ONCE DAILY.  Qty: 90 tablet, Refills: 3    Associated Diagnoses: Hypothyroidism, unspecified type      metoprolol succinate (TOPROL-XL) 25 MG 24 hr tablet Take 1 tablet (25 mg total) by mouth once daily.  Qty: 30 tablet, Refills: 6      multivitamin (ONE DAILY MULTIVITAMIN) per tablet Take 1 tablet by mouth once daily.      oxybutynin (DITROPAN-XL) 5 MG TR24 Take 1 tablet (5 mg total) by mouth once daily.  Qty: 30 tablet, Refills: 11      calcium-vitamin D3 (CALCIUM 500 + D) 500 mg(1,250mg) -200 unit per tablet Take 1 tablet by mouth 2 (two) times daily with meals.      diclofenac (VOLTAREN) 75 MG EC tablet TAKE 1 TABLET (75 MG TOTAL) BY MOUTH 2 (TWO) TIMES DAILY AS NEEDED.  Qty: 40 tablet, Refills: 0    Associated Diagnoses: Right foot pain             Marily Barbosa MD  Gynecologic Oncology  [facility]

## 2018-03-24 NOTE — ASSESSMENT & PLAN NOTE
POD#1  - Continue routine post-op advances  - Pain: Continue po pain meds, pain well controlled  - Jorgensen removed this AM, VT to follow  - Encourage ambulation  - Encourage IS  - Post Op H/H 11/34, follow up labs in AM   - DC IVF, tolerating more po  - Regular diet  - Antiemetics prn nausea/vomiting.  - Simethicone prn for gas pain  - DVT ppx: RUDY/SCD

## 2018-03-24 NOTE — PROGRESS NOTES
[facility]  Gynecologic Oncology  Progress Note      Patient Name: Niurka Pedraza  MRN: [id number]  Admission Date: 3/23/2018  Hospital Length of Stay: 0 days  Attending Provider: Talita Barrios MD  Primary Care Provider: Savana Anderson MD  Principal Problem: S/P robot-assisted surgical procedure    Follow-up For: Procedure(s) (LRB):  XI ROBOTIC ASSISTED LAPAROSCOPIC HYSTERECTOMY, MINI LAP (N/A)  XI ROBOT ASSISTED LAPAROSCOPIC SALPINGO-OOPHERECTOMY BILATERAL (Bilateral)  STAGING (N/A)  OMENTECTOMY (N/A)  DISSECTION-LYMPH NODE-BILATERAL (Bilateral)  Post-Operative Day: 1 Day Post-Op  Subjective:      History of Present Illness:  72 yo with pelvic mass and elevated . PMHx complicated by HTN, HLD, OAB, pulm nodules (?coccidio), breast cancer, hypothyroid    Hospital Course:  3/23/2018 - RATLH/BSO/LND/Omental biopsy performed without complication. EBL 50cc.  03/24/2018 - POD#1, doing well, meeting post-op milestones. Will discharge once voiding s/p herndon removal.      Interval History: Patient is doing well this morning. No acute events overnight. Pain is well controlled with po pain meds. Tolerating regular diet without N/V. Herndon just removed this AM, has not yet voided. Ambulating without difficulty. No vaginal bleeding.     Scheduled Meds:   atorvastatin  20 mg Oral Daily    ibuprofen  600 mg Oral Q6H    levothyroxine  50 mcg Oral Before breakfast    metoprolol succinate  25 mg Oral Daily    mupirocin  1 g Nasal BID    oxybutynin  5 mg Oral Daily    senna-docusate 8.6-50 mg  1 tablet Oral BID     Continuous Infusions:   dextrose 5 % and 0.45 % NaCl with KCl 20 mEq Stopped (03/23/18 1508)     PRN Meds:bisacodyl, diphenhydrAMINE, diphenhydrAMINE, hydrocodone-acetaminophen 10-325mg, hydrocodone-acetaminophen 5-325mg, ondansetron, promethazine (PHENERGAN) IVPB, sodium chloride 0.9%    Review of patient's allergies indicates:   Allergen Reactions    Ciprofloxacin Other (See Comments)      Right foot pain and edema, tendonitis    Amlodipine Edema     BLE    Lisinopril Other (See Comments)     cough       Objective:     Vital Signs (Most Recent):  Temp: 98.4 °F (36.9 °C) (03/24/18 0437)  Pulse: 76 (03/24/18 0437)  Resp: 18 (03/23/18 2026)  BP: 125/62 (03/24/18 0437)  SpO2: 95 % (03/24/18 0437) Vital Signs (24h Range):  Temp:  [97.8 °F (36.6 °C)-98.7 °F (37.1 °C)] 98.4 °F (36.9 °C)  Pulse:  [75-89] 76  Resp:  [14-18] 18  SpO2:  [95 %-98 %] 95 %  BP: (114-145)/(57-78) 125/62     Weight: 85 kg (187 lb 6.3 oz)  Body mass index is 32.17 kg/m².    Intake/Output - Last 3 Shifts       03/22 0700 - 03/23 0659 03/23 0700 - 03/24 0659    I.V. (mL/kg)  2060.4 (24.2)    Total Intake(mL/kg)  2060.4 (24.2)    Urine (mL/kg/hr)  2100 (1)    Blood  50 (0)    Total Output   2150    Net   -89.6                   Physical Exam:   Constitutional: She is oriented to person, place, and time. She appears well-developed and well-nourished.    HENT:   Head: Normocephalic and atraumatic.     Neck: Normal range of motion.    Cardiovascular: Normal rate, regular rhythm and normal heart sounds.     Pulmonary/Chest: Effort normal and breath sounds normal.        Abdominal: Soft. Bowel sounds are normal. She exhibits abdominal incision (c/d/i). She exhibits no distension. There is no tenderness. There is no rebound and no guarding.             Musculoskeletal: Normal range of motion and moves all extremeties.       Neurological: She is alert and oriented to person, place, and time.    Skin: Skin is warm and dry.    Psychiatric: She has a normal mood and affect.       Lines/Drains/Airways     Peripheral Intravenous Line                 Peripheral IV - Single Lumen 03/23/18 0633 Left Hand 1 day                Laboratory:  Lab Results   Component Value Date    WBC 12.51 03/24/2018    HGB 11.2 (L) 03/24/2018    HCT 34.7 (L) 03/24/2018    MCV 90 03/24/2018     03/24/2018         Assessment/Plan:     * S/P  RATLH/BSO/PLND/Omental Bx (mini lap)    POD#1  - Continue routine post-op advances  - Pain: Continue po pain meds, pain well controlled  - Herndon removed this AM, VT to follow  - Encourage ambulation  - Encourage IS  - Post Op H/H 11/34, follow up labs in AM   - DC IVF, tolerating more po  - Regular diet  - Antiemetics prn nausea/vomiting.  - Simethicone prn for gas pain  - DVT ppx: RUDY/SCD        Hypothyroidism    - continue home meds        Hyperlipidemia    - continue home meds          VTE Risk Mitigation         Ordered     IP VTE HIGH RISK PATIENT  Once      03/24/18 0631     Place RUDY hose  Until discontinued      03/24/18 0631     Place sequential compression device  Until discontinued      03/24/18 0631          Was herndon catheter removed? Yes    Marily Barbosa MD  Gynecologic Oncology  [facility]

## 2018-03-24 NOTE — SUBJECTIVE & OBJECTIVE
Interval History: Patient is doing well this morning. No acute events overnight. Pain is well controlled with po pain meds. Tolerating regular diet without N/V. Jorgensen just removed this AM, has not yet voided. Ambulating without difficulty. No vaginal bleeding.     Scheduled Meds:   atorvastatin  20 mg Oral Daily    ibuprofen  600 mg Oral Q6H    levothyroxine  50 mcg Oral Before breakfast    metoprolol succinate  25 mg Oral Daily    mupirocin  1 g Nasal BID    oxybutynin  5 mg Oral Daily    senna-docusate 8.6-50 mg  1 tablet Oral BID     Continuous Infusions:   dextrose 5 % and 0.45 % NaCl with KCl 20 mEq Stopped (03/23/18 1508)     PRN Meds:bisacodyl, diphenhydrAMINE, diphenhydrAMINE, hydrocodone-acetaminophen 10-325mg, hydrocodone-acetaminophen 5-325mg, ondansetron, promethazine (PHENERGAN) IVPB, sodium chloride 0.9%    Review of patient's allergies indicates:   Allergen Reactions    Ciprofloxacin Other (See Comments)     Right foot pain and edema, tendonitis    Amlodipine Edema     BLE    Lisinopril Other (See Comments)     cough       Objective:     Vital Signs (Most Recent):  Temp: 98.4 °F (36.9 °C) (03/24/18 0437)  Pulse: 76 (03/24/18 0437)  Resp: 18 (03/23/18 2026)  BP: 125/62 (03/24/18 0437)  SpO2: 95 % (03/24/18 0437) Vital Signs (24h Range):  Temp:  [97.8 °F (36.6 °C)-98.7 °F (37.1 °C)] 98.4 °F (36.9 °C)  Pulse:  [75-89] 76  Resp:  [14-18] 18  SpO2:  [95 %-98 %] 95 %  BP: (114-145)/(57-78) 125/62     Weight: 85 kg (187 lb 6.3 oz)  Body mass index is 32.17 kg/m².    Intake/Output - Last 3 Shifts       03/22 0700 - 03/23 0659 03/23 0700 - 03/24 0659    I.V. (mL/kg)  2060.4 (24.2)    Total Intake(mL/kg)  2060.4 (24.2)    Urine (mL/kg/hr)  2100 (1)    Blood  50 (0)    Total Output   2150    Net   -89.6                   Physical Exam:   Constitutional: She is oriented to person, place, and time. She appears well-developed and well-nourished.    HENT:   Head: Normocephalic and atraumatic.     Neck:  Normal range of motion.    Cardiovascular: Normal rate, regular rhythm and normal heart sounds.     Pulmonary/Chest: Effort normal and breath sounds normal.        Abdominal: Soft. Bowel sounds are normal. She exhibits abdominal incision (c/d/i). She exhibits no distension. There is no tenderness. There is no rebound and no guarding.             Musculoskeletal: Normal range of motion and moves all extremeties.       Neurological: She is alert and oriented to person, place, and time.    Skin: Skin is warm and dry.    Psychiatric: She has a normal mood and affect.       Lines/Drains/Airways     Peripheral Intravenous Line                 Peripheral IV - Single Lumen 03/23/18 0633 Left Hand 1 day                Laboratory:  Lab Results   Component Value Date    WBC 12.51 03/24/2018    HGB 11.2 (L) 03/24/2018    HCT 34.7 (L) 03/24/2018    MCV 90 03/24/2018     03/24/2018

## 2018-03-24 NOTE — PLAN OF CARE
Problem: Patient Care Overview  Goal: Plan of Care Review  Outcome: Ongoing (interventions implemented as appropriate)  Pt remains free from falls. Vitals were stable throughout the night on room air. Positions self independently. No complaints of pain or nausea. Bed in low position and call light within reach. Will continue to monitor.

## 2018-03-24 NOTE — PLAN OF CARE
03/24/18 0848   Discharge Assessment   Assessment Type Discharge Planning Assessment   Prior to hospitilization cognitive status: Unable to Assess

## 2018-03-25 NOTE — OP NOTE
DATE OF PROCEDURE:  3/23/2018     SURGEON:  Talita Barrios M.D.     ASSISTANTS: ОЛЕГ Sims, First Assist-- No qualified resident was available for the procedure.      PREOPERATIVE DIAGNOSIS:   1. Pelvic mass  2. Elevated      POSTOPERATIVE DIAGNOSES:    1. Pelvic mass  2. Elevated      PROCEDURE PERFORMED:  Robotic-assisted total laparoscopic hysterectomy, bilateral salpingo-oophorectomy, bilateral pelvic lymph node dissection, omentectomy.      ANESTHESIA:  General endotracheal anesthesia.     SPECIMENS REMOVED:  1. Uterus and cervix.  2. Bilateral fallopian tubes and ovaries    3. Bilateral pelvic lymph nodes    4. Omentum     ESTIMATED BLOOD LOSS:  50cc     COMPLICATIONS:  None.     FINDINGS: 6 week size uterus, 5cm ROV with papillary excrescences, 3cm LOV with similar findings. No ascites, no adenopathy, no evidence of intraperitoneal disease. 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     PROCEDURE IN DETAIL:  The patient was taken to the Operating Room.  Informed consent had been obtained.  She underwent general endotracheal anesthesia without difficulty, was prepped and draped in the normal sterile fashion in a dorsal lithotomy position.  Timeout was performed.  All parties agreed to the planned procedure.  Perioperative antibiotics were administered.  Jorgensen catheter was placed under sterile conditions.  The VCare uterine manipulator was secured in place in a standard fashion for uterine manipulation.      Gloves were changed and attention was turned to the patient's abdomen.  The umbilicus was inverted. Veress needle was gently inserted.  Intra-abdominal placement was confirmed with low CO2 pressure and water drop test.  Abdomen was insufflated and pneumoperitoneum was obtained.  Skin incision was made superior to the umbilicus.  Robotic trocar was introduced.  Intra-abdominal placement was confirmed.  Additional trocars were placed, 2 robotic trocars to the left of the  camera, 1 robotic trocar to the right of the camera and an additional 8 mm assist port to the right of the camera.  The patient was placed in steep Trendelenburg. Robot was docked and operating surgeon reported to the console.       Bilateral round ligaments were identified.  These were cauterized and transected.  The posterior leaf of the broad ligament was then opened bilaterally facilitating access to the retroperitoneum. The bilateral infundibulopelvic ligaments were then skeletonized with good visualization of the ureter beneath.  This were cauterized and transected.  The anterior leaf of the broad ligament was then opened circumferentially.  Proper plane for the bladder flap was identified and the bladder was gently reflected off of the anterior aspect of the uterus and cervix to the level of the cervicovaginal junction. Bilateral uterine arteries were then skeletonized.  These were cauterized and transected.  The remainder of the uterosacral and cardinal ligaments were then also serially cauterized and transected.  Posterior colpotomy was initiated in the 6 o'clock position and carried around circumferentially.  The uterus, cervix, bilateral fallopian tubes and ovaries were then removed through the vagina.      We then proceeded with pelvic lymphadenectomy. Pararectal and paravesical spaces were opened bilaterally. Fibrofatty tissue was removed within defined boundaries for the pelvic lymphadenectomy including laterally the psoas muscle and the genitofemoral nerve, medially the superior vesicle artery and the ureter, cephalad the midpoint of the bifurcation of the common iliac arteries, caudad deep circumflex iliac vein and the deep boundary bilaterally with the obturator nerve. The lymph nodes were removed through the vagina.      We then closed the vaginal cuff with a V-Loc suture in a running fashion. Adriana was placed in the pelvis. The robotic trocars were then removed under direct visualization and the  robot was undocked. We then extended the midline incision 3cm, omentum was brought through the incision. Omentectomy was performed using the ligasure device to serial cauterize and transect. The fascia was then re approximated with vicryl suture in a running fashion. Skin incisions were then rendered hemostatic.  These were closed with 4-0 Monocryl in a subcuticular fashion and topped with sterile Dermabond.  At this point the procedure was deemed complete. The patient tolerated the procedure well.  Sponge, lap, needle and instrument counts were correct x2 as reported by the circulating nurse.  She was awakened from anesthesia and taken to recovery in stable condition.

## 2018-04-03 ENCOUNTER — OFFICE VISIT (OUTPATIENT)
Dept: GYNECOLOGIC ONCOLOGY | Facility: CLINIC | Age: 73
End: 2018-04-03
Payer: MEDICARE

## 2018-04-03 VITALS
HEART RATE: 82 BPM | WEIGHT: 174.19 LBS | DIASTOLIC BLOOD PRESSURE: 88 MMHG | SYSTOLIC BLOOD PRESSURE: 145 MMHG | HEIGHT: 64 IN | BODY MASS INDEX: 29.74 KG/M2

## 2018-04-03 DIAGNOSIS — C56.1 BORDERLINE SEROUS CYSTADENOMA OF RIGHT OVARY: ICD-10-CM

## 2018-04-03 DIAGNOSIS — Z98.890 S/P ROBOT-ASSISTED SURGICAL PROCEDURE: Primary | ICD-10-CM

## 2018-04-03 PROCEDURE — 99213 OFFICE O/P EST LOW 20 MIN: CPT | Mod: PBBFAC | Performed by: OBSTETRICS & GYNECOLOGY

## 2018-04-03 PROCEDURE — 99999 PR PBB SHADOW E&M-EST. PATIENT-LVL III: CPT | Mod: PBBFAC,,, | Performed by: OBSTETRICS & GYNECOLOGY

## 2018-04-03 PROCEDURE — 99024 POSTOP FOLLOW-UP VISIT: CPT | Mod: POP,,, | Performed by: OBSTETRICS & GYNECOLOGY

## 2018-04-03 RX ORDER — DEXTROMETHORPHAN HYDROBROMIDE, GUAIFENESIN 5; 100 MG/5ML; MG/5ML
600 LIQUID ORAL EVERY 8 HOURS
COMMUNITY
End: 2018-11-05

## 2018-04-03 NOTE — PROGRESS NOTES
Subjective:      Patient ID: Niurka Pedraza is a 73 y.o. female.    Chief Complaint: Pelvic Mass (follow up )      HPI  Presents today for first post op visit. S/p RTLH/BSO/BPLND/OMX 3/23/18. Uncomplicated post op course. 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 She is recovering well from surgery. Up and about, eating, +BM.       History:  Patient is a 71yo female originally referred by Dr. Linette Torres for pelvic mass and elevated . Patient was seen by her urologist for recurrent UTIs and imaging was ordered.      Imaging reviewed:   CT urogram abd/pelvis 17  Impression   Bilateral pulmonary nodules, largest measuring 1.5 cm at the right lower lobe. Comparison with any prior cross-sectional imaging would be beneficial. Close interval followup, PET CT, and/or biopsy may be considered for further evaluation.    Complex cystic lesion in the right adnexa. Pelvic ultrasound would be beneficial for further evaluation.    Focal dilated biliary radical in segment 7. This is of uncertain significance. There are no concerning hepatic lesions.      Pelvic US 18  Impression    Complex cystic lesion measuring 3.8 cm right ovary corresponds to abnormality seen on CT. While this may represent a hemorrhagic cyst in light of postmenopausal status Clinical correlation and further characterization with MRI and surgical consultation advised.    Left ovary not seen.     UT 5.5cm        41 elevated. CEA 4.8.      Personal history of breast cancer 2014, R mastectomy, no adjuvant treatment.    MMG 2017 WNLs.      Prior abdominal surgeries include cholecystectomy.  x 3. Family history mother with breast cancer, no ovarian, uterine, or colon cancer.     Review of Systems   Constitutional: Negative for appetite change, chills, fatigue and fever.   HENT: Negative for mouth sores.    Respiratory: Negative for cough and shortness of breath.    Cardiovascular:  Negative for leg swelling.   Gastrointestinal: Negative for abdominal pain, blood in stool, constipation and diarrhea.   Endocrine: Negative for cold intolerance.   Genitourinary: Negative for dysuria and vaginal bleeding.   Musculoskeletal: Negative for myalgias.   Skin: Negative for rash.   Allergic/Immunologic: Negative.    Neurological: Negative for weakness and numbness.   Hematological: Negative for adenopathy. Does not bruise/bleed easily.   Psychiatric/Behavioral: Negative for confusion.       Objective:   Physical Exam:   Constitutional: She is oriented to person, place, and time. She appears well-developed and well-nourished.    HENT:   Head: Normocephalic and atraumatic.    Eyes: EOM are normal. Pupils are equal, round, and reactive to light.    Neck: Normal range of motion. Neck supple. No thyromegaly present.    Cardiovascular: Normal rate, regular rhythm and intact distal pulses.     Pulmonary/Chest: Effort normal and breath sounds normal. No respiratory distress. She has no wheezes.        Abdominal: Soft. Bowel sounds are normal. She exhibits abdominal incision. She exhibits no distension, no ascites and no mass. There is no tenderness.   Trocar incision sites healing well.              Musculoskeletal: Normal range of motion and moves all extremeties.      Lymphadenopathy:     She has no cervical adenopathy.        Right: No supraclavicular adenopathy present.        Left: No supraclavicular adenopathy present.    Neurological: She is alert and oriented to person, place, and time.    Skin: Skin is warm and dry. No rash noted.    Psychiatric: She has a normal mood and affect.       Assessment:     1. S/P RATLH/BSO/PLND/Omental Bx (mini lap)    2. Borderline serous cystadenoma of right ovary        Plan:   No orders of the defined types were placed in this encounter.    Recovering well from surgery.     We discussed the natural history of serous borderline tumors of the ovary. These tumors do not  represent an invasive malignancy and thus chemotherapy is rarely indicated. Surgical resection is considered standard management. Survival approaches 99% at 5 years and 97% at 10 years for early stage disease. The risk of malignant transformation is rare, approximately 2% with recurrent disease, as the vast majority of tumors if they recur will recur as another borderline tumor. While there is no high quality data on post treatment surveillance we extrapolate from ovarian cancer with clinical exams and  monitoring.       RTC 4 weeks for final post op exam.

## 2018-04-03 NOTE — LETTER
April 3, 2018        Linette Torres MD  [address]  [address]  [facility] [address]             [facility]  [address], [address]  [facility] [id number]  Phone: [phone]  Fax: [phone]   Patient: Niurka Pedraza   MR Number: [id number]   YOB: 1945   Date of Visit: 4/3/2018       Dear Dr. Torres:    Thank you for referring Niurka Pedraza to me for evaluation. Below are the relevant portions of my assessment and plan of care.            If you have questions, please do not hesitate to call me. I look forward to following Niurka along with you.    Sincerely,      Talita Barrios MD           CC  No Recipients

## 2018-05-03 ENCOUNTER — HOSPITAL ENCOUNTER (OUTPATIENT)
Dept: RADIOLOGY | Facility: OTHER | Age: 73
Discharge: HOME | End: 2018-05-03
Attending: PHYSICIAN ASSISTANT
Payer: MEDICARE

## 2018-05-03 ENCOUNTER — OFFICE VISIT (OUTPATIENT)
Dept: ORTHOPEDICS | Facility: CLINIC | Age: 73
End: 2018-05-03
Payer: MEDICARE

## 2018-05-03 DIAGNOSIS — M25.571 ACUTE RIGHT ANKLE PAIN: ICD-10-CM

## 2018-05-03 DIAGNOSIS — M25.571 ACUTE RIGHT ANKLE PAIN: Primary | ICD-10-CM

## 2018-05-03 PROCEDURE — 99213 OFFICE O/P EST LOW 20 MIN: CPT | Mod: PBBFAC,25 | Performed by: PHYSICIAN ASSISTANT

## 2018-05-03 PROCEDURE — 73721 MRI JNT OF LWR EXTRE W/O DYE: CPT | Mod: TC,RT

## 2018-05-03 PROCEDURE — 99212 OFFICE O/P EST SF 10 MIN: CPT | Mod: S$PBB,,, | Performed by: PHYSICIAN ASSISTANT

## 2018-05-03 PROCEDURE — 99999 PR PBB SHADOW E&M-EST. PATIENT-LVL III: CPT | Mod: PBBFAC,,, | Performed by: PHYSICIAN ASSISTANT

## 2018-05-03 PROCEDURE — 73721 MRI JNT OF LWR EXTRE W/O DYE: CPT | Mod: 26,RT,, | Performed by: RADIOLOGY

## 2018-05-04 NOTE — PROGRESS NOTES
Subjective:      Patient ID: Niurka Pedraza is a 73 y.o. female.    Chief Complaint: No chief complaint on file.    HPI    Patient is a 72 year old female who presents to clinic with chief complaint of a-traumatic constant right ankle/ foot pain.  She was previously seen for a pain to the lateral ankle which was treated with boot and medrol does mino and resolved for the most part. She is now having pain to the medial side. Pain is increased with increased activity, and going up on toes. She has tried rest ice and NSAID and boot without complete relief. She denied numbness or tingling.     Review of Systems   Constitution: Negative for chills and fever.   Cardiovascular: Negative for chest pain.   Respiratory: Negative for cough and shortness of breath.    Skin: Negative for color change, dry skin, itching, nail changes, poor wound healing and rash.   Musculoskeletal:        Right foot/ ankle pain   Neurological: Negative for dizziness.   Psychiatric/Behavioral: Negative for altered mental status. The patient is not nervous/anxious.    All other systems reviewed and are negative.        Objective:      General    Constitutional: She is oriented to person, place, and time. She appears well-developed and well-nourished. No distress.   HENT:   Head: Atraumatic.   Eyes: Conjunctivae are normal.   Cardiovascular: Normal rate.    Pulmonary/Chest: Effort normal.   Neurological: She is alert and oriented to person, place, and time.   Psychiatric: She has a normal mood and affect. Her behavior is normal.         Right Ankle/Foot Exam     Tenderness   The patient is tender to palpation of the posterior tibial tendon.    Range of Motion   The patient has normal right ankle ROM.    Muscle Strength   The patient has normal right ankle strength.    Other   Sensation: normal        RADS: no fracture or dislocation       Assessment:       Encounter Diagnosis   Name Primary?    Acute right ankle pain Yes          Plan:        Discussed treatment plan with patient. Including rest ice elevation NASID, CAM boot orthotic insert. Order for MRI placed, due to pain in ankle 5 months with out complete relief with conservative measures. Patient will call for results.

## 2018-05-07 ENCOUNTER — TELEPHONE (OUTPATIENT)
Dept: GYNECOLOGIC ONCOLOGY | Facility: CLINIC | Age: 73
End: 2018-05-07

## 2018-05-07 ENCOUNTER — LAB VISIT (OUTPATIENT)
Dept: LAB | Facility: OTHER | Age: 73
End: 2018-05-07
Payer: MEDICARE

## 2018-05-07 ENCOUNTER — OFFICE VISIT (OUTPATIENT)
Dept: GYNECOLOGIC ONCOLOGY | Facility: CLINIC | Age: 73
End: 2018-05-07
Payer: MEDICARE

## 2018-05-07 VITALS
HEART RATE: 87 BPM | BODY MASS INDEX: 29.5 KG/M2 | SYSTOLIC BLOOD PRESSURE: 135 MMHG | DIASTOLIC BLOOD PRESSURE: 76 MMHG | HEIGHT: 64 IN | WEIGHT: 172.81 LBS

## 2018-05-07 DIAGNOSIS — R97.1 ELEVATED CANCER ANTIGEN 125 (CA-125): ICD-10-CM

## 2018-05-07 DIAGNOSIS — Z98.890 S/P ROBOT-ASSISTED SURGICAL PROCEDURE: ICD-10-CM

## 2018-05-07 DIAGNOSIS — C56.1 BORDERLINE SEROUS CYSTADENOMA OF RIGHT OVARY: Primary | ICD-10-CM

## 2018-05-07 DIAGNOSIS — R19.00 PELVIC MASS: ICD-10-CM

## 2018-05-07 DIAGNOSIS — R97.1 ELEVATED CA-125: ICD-10-CM

## 2018-05-07 DIAGNOSIS — R19.00 PELVIC MASS: Primary | ICD-10-CM

## 2018-05-07 PROCEDURE — 86304 IMMUNOASSAY TUMOR CA 125: CPT

## 2018-05-07 PROCEDURE — 36415 COLL VENOUS BLD VENIPUNCTURE: CPT

## 2018-05-07 PROCEDURE — 99213 OFFICE O/P EST LOW 20 MIN: CPT | Mod: PBBFAC | Performed by: OBSTETRICS & GYNECOLOGY

## 2018-05-07 PROCEDURE — 99999 PR PBB SHADOW E&M-EST. PATIENT-LVL III: CPT | Mod: PBBFAC,,, | Performed by: OBSTETRICS & GYNECOLOGY

## 2018-05-07 PROCEDURE — 99024 POSTOP FOLLOW-UP VISIT: CPT | Mod: POP,,, | Performed by: OBSTETRICS & GYNECOLOGY

## 2018-05-07 NOTE — PROGRESS NOTES
Subjective:      Patient ID: Niurka Pedraza is a 73 y.o. female.    Chief Complaint: Post-op Evaluation      HPI  Presents today for second post op visit. S/p RTLH/BSO/BPLND/OMX 3/23/18. Uncomplicated post op course. 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 She continues to recover well from surgery with no complaints.      History:  Patient is a 73yo female originally referred by Dr. Linette Torres for pelvic mass and elevated . Patient was seen by her urologist for recurrent UTIs and imaging was ordered.      Imaging reviewed:   CT urogram abd/pelvis 17  Impression   Bilateral pulmonary nodules, largest measuring 1.5 cm at the right lower lobe. Comparison with any prior cross-sectional imaging would be beneficial. Close interval followup, PET CT, and/or biopsy may be considered for further evaluation.    Complex cystic lesion in the right adnexa. Pelvic ultrasound would be beneficial for further evaluation.    Focal dilated biliary radical in segment 7. This is of uncertain significance. There are no concerning hepatic lesions.      Pelvic US 18  Impression    Complex cystic lesion measuring 3.8 cm right ovary corresponds to abnormality seen on CT. While this may represent a hemorrhagic cyst in light of postmenopausal status Clinical correlation and further characterization with MRI and surgical consultation advised.    Left ovary not seen.     UT 5.5cm        41 elevated. CEA 4.8.      Personal history of breast cancer , R mastectomy, no adjuvant treatment.    MMG 2017 WNLs.      Prior abdominal surgeries include cholecystectomy.  x 3. Family history mother with breast cancer, no ovarian, uterine, or colon cancer.     Review of Systems   Constitutional: Negative for appetite change, chills, fatigue and fever.   HENT: Negative for mouth sores.    Respiratory: Negative for cough and shortness of breath.    Cardiovascular: Negative  for leg swelling.   Gastrointestinal: Negative for abdominal pain, blood in stool, constipation and diarrhea.   Endocrine: Negative for cold intolerance.   Genitourinary: Negative for dysuria and vaginal bleeding.   Musculoskeletal: Negative for myalgias.   Skin: Negative for rash.   Allergic/Immunologic: Negative.    Neurological: Negative for weakness and numbness.   Hematological: Negative for adenopathy. Does not bruise/bleed easily.   Psychiatric/Behavioral: Negative for confusion.       Objective:   Physical Exam:   Constitutional: She is oriented to person, place, and time. She appears well-developed and well-nourished. No distress.    HENT:   Head: Normocephalic.     Neck: Normal range of motion.    Cardiovascular: Normal rate.  Exam reveals no cyanosis and no edema.     Pulmonary/Chest: Effort normal. No respiratory distress. She has no wheezes.        Abdominal: Soft. She exhibits abdominal incision. She exhibits no distension, no ascites and no mass. There is no tenderness.   Trocar incision sites healing well.      Genitourinary: Vagina normal. Pelvic exam was performed with patient supine. There is no rash, tenderness or lesion on the right labia. There is no rash, tenderness or lesion on the left labia. Uterus is absent. No bleeding in the vagina. No vaginal discharge found. Vaginal cuff normal.Cervix exhibits absence.           Musculoskeletal: Normal range of motion and moves all extremeties. She exhibits no edema.      Lymphadenopathy:        Right: No supraclavicular adenopathy present.        Left: No supraclavicular adenopathy present.    Neurological: She is alert and oriented to person, place, and time.    Skin: Skin is warm and dry. No rash noted. No cyanosis. No pallor.    Psychiatric: She has a normal mood and affect.       Assessment:     1. Borderline serous cystadenoma of right ovary    2. S/P RATLH/BSO/PLND/Omental Bx (mini lap)    3. Elevated CA-125        Plan:     Orders Placed This  Encounter   Procedures         Recovering well from surgery.     We discussed the natural history of serous borderline tumors of the ovary. These tumors do not represent an invasive malignancy and thus chemotherapy is rarely indicated. Surgical resection is considered standard management. Survival approaches 99% at 5 years and 97% at 10 years for early stage disease. The risk of malignant transformation is rare, approximately 2% with recurrent disease, as the vast majority of tumors if they recur will recur as another borderline tumor. While there is no high quality data on post treatment surveillance we extrapolate from ovarian cancer with clinical exams and  monitoring.        today.    RTC in 3 months for surveillance with .

## 2018-05-08 LAB — CANCER AG125 SERPL-ACNC: 15 U/ML

## 2018-05-22 ENCOUNTER — HOSPITAL ENCOUNTER (OUTPATIENT)
Dept: RADIOLOGY | Facility: OTHER | Age: 73
Discharge: HOME | End: 2018-05-22
Attending: INTERNAL MEDICINE
Payer: MEDICARE

## 2018-05-22 DIAGNOSIS — Z01.811 ENCOUNTER FOR PRE-OPERATIVE RESPIRATORY CLEARANCE: ICD-10-CM

## 2018-05-22 PROCEDURE — 71046 X-RAY EXAM CHEST 2 VIEWS: CPT | Mod: 26,,, | Performed by: RADIOLOGY

## 2018-05-22 PROCEDURE — 71046 X-RAY EXAM CHEST 2 VIEWS: CPT | Mod: TC,FY

## 2018-06-25 DIAGNOSIS — M79.671 RIGHT FOOT PAIN: ICD-10-CM

## 2018-06-25 RX ORDER — DICLOFENAC SODIUM 75 MG/1
TABLET, DELAYED RELEASE ORAL
Qty: 40 TABLET | Refills: 0 | Status: SHIPPED | OUTPATIENT
Start: 2018-06-25 | End: 2018-07-17 | Stop reason: SDUPTHER

## 2018-07-11 ENCOUNTER — CLINICAL SUPPORT (OUTPATIENT)
Dept: AUDIOLOGY | Facility: CLINIC | Age: 73
End: 2018-07-11

## 2018-07-11 PROCEDURE — 99499 UNLISTED E&M SERVICE: CPT | Mod: S$GLB,,, | Performed by: AUDIOLOGIST

## 2018-07-11 NOTE — PROGRESS NOTES
Mrs. Pedraza was seen for a hearing aid consultation. Recommended a hearing aid for her right ear. Discussed pros and cons of various styles and technology levels. Patient was most interested in [employer] technology with an occluded dome. Patient acknowledged understanding of the 30 day trial period, $250 restocking fee from the time of order, and the fact that we do not file insurance on behalf of the patient. Patient was advised to call or email with any questions. She set up a second HAC appointment for October, after her trip to [facility], to pick out technology level, color and decide on certain features (rechargeable, iPhone compatible, etc.).

## 2018-07-13 RX ORDER — CEPHALEXIN 250 MG/1
250 CAPSULE ORAL DAILY
Qty: 30 CAPSULE | Refills: 5 | Status: SHIPPED | OUTPATIENT
Start: 2018-07-13 | End: 2018-08-08

## 2018-07-16 ENCOUNTER — PATIENT MESSAGE (OUTPATIENT)
Dept: UROLOGY | Facility: CLINIC | Age: 73
End: 2018-07-16

## 2018-07-16 DIAGNOSIS — R30.0 DYSURIA: Primary | ICD-10-CM

## 2018-07-16 RX ORDER — ATORVASTATIN CALCIUM 20 MG/1
20 TABLET, FILM COATED ORAL DAILY
Qty: 90 TABLET | Refills: 3 | Status: SHIPPED | OUTPATIENT
Start: 2018-07-16 | End: 2019-06-12 | Stop reason: SDUPTHER

## 2018-07-17 ENCOUNTER — LAB VISIT (OUTPATIENT)
Dept: LAB | Facility: OTHER | Age: 73
End: 2018-07-17
Attending: UROLOGY
Payer: MEDICARE

## 2018-07-17 DIAGNOSIS — M79.671 RIGHT FOOT PAIN: ICD-10-CM

## 2018-07-17 DIAGNOSIS — R30.0 DYSURIA: ICD-10-CM

## 2018-07-17 LAB
BACTERIA #/AREA URNS HPF: ABNORMAL /HPF
BILIRUB UR QL STRIP: NEGATIVE
CLARITY UR: ABNORMAL
COLOR UR: YELLOW
GLUCOSE UR QL STRIP: NEGATIVE
HGB UR QL STRIP: ABNORMAL
HYALINE CASTS #/AREA URNS LPF: 0 /LPF
KETONES UR QL STRIP: NEGATIVE
LEUKOCYTE ESTERASE UR QL STRIP: ABNORMAL
MICROSCOPIC COMMENT: ABNORMAL
NITRITE UR QL STRIP: POSITIVE
PH UR STRIP: 6 [PH] (ref 5–8)
PROT UR QL STRIP: ABNORMAL
RBC #/AREA URNS HPF: 2 /HPF (ref 0–4)
SP GR UR STRIP: 1.02 (ref 1–1.03)
URN SPEC COLLECT METH UR: ABNORMAL
UROBILINOGEN UR STRIP-ACNC: 1 EU/DL
WBC #/AREA URNS HPF: 25 /HPF (ref 0–5)

## 2018-07-17 PROCEDURE — 87186 SC STD MICRODIL/AGAR DIL: CPT

## 2018-07-17 PROCEDURE — 87077 CULTURE AEROBIC IDENTIFY: CPT

## 2018-07-17 PROCEDURE — 87086 URINE CULTURE/COLONY COUNT: CPT

## 2018-07-17 PROCEDURE — 81000 URINALYSIS NONAUTO W/SCOPE: CPT

## 2018-07-17 PROCEDURE — 87088 URINE BACTERIA CULTURE: CPT

## 2018-07-17 RX ORDER — DICLOFENAC SODIUM 75 MG/1
TABLET, DELAYED RELEASE ORAL
Qty: 40 TABLET | Refills: 0 | Status: SHIPPED | OUTPATIENT
Start: 2018-07-17 | End: 2018-08-10 | Stop reason: SDUPTHER

## 2018-07-19 ENCOUNTER — TELEPHONE (OUTPATIENT)
Dept: UROLOGY | Facility: CLINIC | Age: 73
End: 2018-07-19

## 2018-07-19 ENCOUNTER — OFFICE VISIT (OUTPATIENT)
Dept: ORTHOPEDICS | Facility: CLINIC | Age: 73
End: 2018-07-19
Payer: MEDICARE

## 2018-07-19 DIAGNOSIS — M25.571 ACUTE RIGHT ANKLE PAIN: Primary | ICD-10-CM

## 2018-07-19 DIAGNOSIS — M66.871 TIBIALIS POSTERIOR TENDON TEAR, NONTRAUMATIC, RIGHT: ICD-10-CM

## 2018-07-19 LAB — BACTERIA UR CULT: NORMAL

## 2018-07-19 PROCEDURE — 99213 OFFICE O/P EST LOW 20 MIN: CPT | Mod: S$PBB,,, | Performed by: PHYSICIAN ASSISTANT

## 2018-07-19 PROCEDURE — 99212 OFFICE O/P EST SF 10 MIN: CPT | Mod: PBBFAC | Performed by: PHYSICIAN ASSISTANT

## 2018-07-19 PROCEDURE — 99999 PR PBB SHADOW E&M-EST. PATIENT-LVL II: CPT | Mod: PBBFAC,,, | Performed by: PHYSICIAN ASSISTANT

## 2018-07-19 RX ORDER — SULFAMETHOXAZOLE AND TRIMETHOPRIM 800; 160 MG/1; MG/1
1 TABLET ORAL 2 TIMES DAILY
Qty: 14 TABLET | Refills: 0 | Status: SHIPPED | OUTPATIENT
Start: 2018-07-19 | End: 2018-07-26

## 2018-07-19 NOTE — TELEPHONE ENCOUNTER
Pt came into office this afternoon. She wanted to know if her urine was sent off for culture as well due to her seeing the other results and not a urine culture. Pt was informed that a urine culture was done as well. Pt is requesting results because she stated that she has been in pain and want to know if abx are needed. Please advise.

## 2018-07-19 NOTE — TELEPHONE ENCOUNTER
The culture is not finalized yet. I will sent empiric abx to pharmacy, may need to adjust when culture finalizes.

## 2018-07-20 ENCOUNTER — TELEPHONE (OUTPATIENT)
Dept: UROLOGY | Facility: HOSPITAL | Age: 73
End: 2018-07-20

## 2018-07-20 ENCOUNTER — TELEPHONE (OUTPATIENT)
Dept: UROLOGY | Facility: CLINIC | Age: 73
End: 2018-07-20

## 2018-07-20 NOTE — PROGRESS NOTES
Subjective:      Patient ID: Niurka Pedraza is a 73 y.o. female.    Chief Complaint: No chief complaint on file.    HPI    Patient is a 72 year old female who presents to clinic with chief complaint of a-traumatic constant right medial ankle pain x 7 months.  Pain is increased with increased activity,She has tried rest ice and NSAID and boot without complete relief as well as PT.   MRI on 05/03/2018: Severe tendinosis at the level of the posterior tibial tendon with interstitial split tear. She denied numbness or tingling.     Review of Systems   Constitution: Negative for chills and fever.   Cardiovascular: Negative for chest pain.   Respiratory: Negative for cough and shortness of breath.    Skin: Negative for color change, dry skin, itching, nail changes, poor wound healing and rash.   Musculoskeletal:        Right foot/ ankle pain   Neurological: Negative for dizziness.   Psychiatric/Behavioral: Negative for altered mental status. The patient is not nervous/anxious.    All other systems reviewed and are negative.        Objective:      General    Constitutional: She is oriented to person, place, and time. She appears well-developed and well-nourished. No distress.   HENT:   Head: Atraumatic.   Eyes: Conjunctivae are normal.   Cardiovascular: Normal rate.    Pulmonary/Chest: Effort normal.   Neurological: She is alert and oriented to person, place, and time.   Psychiatric: She has a normal mood and affect. Her behavior is normal.         Right Ankle/Foot Exam     Tenderness   The patient is tender to palpation of the posterior tibial tendon.    Range of Motion   The patient has normal right ankle ROM.    Muscle Strength   The patient has normal right ankle strength.    Other   Sensation: normal          Assessment:       Encounter Diagnoses   Name Primary?    Acute right ankle pain Yes    Tibialis posterior tendon tear, nontraumatic, right           Plan:       Discussed treatment plan with patient. Discussed  treatment options at this point. Patient has failed conservative treatment. Discussed surgical consultation. Patient stated that she is unsure about surgery but it hurts her enough to do so. She has a trip in September and will follow up in October to discuss treatmetn with .

## 2018-07-20 NOTE — TELEPHONE ENCOUNTER
UCx results returned this AM. Bactrim that was sent in last evening is appropriate for this infection.

## 2018-07-25 ENCOUNTER — TELEPHONE (OUTPATIENT)
Dept: INTERNAL MEDICINE | Facility: CLINIC | Age: 73
End: 2018-07-25

## 2018-07-25 ENCOUNTER — PATIENT MESSAGE (OUTPATIENT)
Dept: INTERNAL MEDICINE | Facility: CLINIC | Age: 73
End: 2018-07-25

## 2018-07-25 DIAGNOSIS — I10 HYPERTENSION GOAL BP (BLOOD PRESSURE) < 140/90: Primary | ICD-10-CM

## 2018-07-25 RX ORDER — METOPROLOL SUCCINATE 25 MG/1
25 TABLET, EXTENDED RELEASE ORAL DAILY
Qty: 90 TABLET | Refills: 3 | Status: SHIPPED | OUTPATIENT
Start: 2018-07-25 | End: 2018-11-27

## 2018-07-25 NOTE — TELEPHONE ENCOUNTER
----- Message from Brigette Del Angel sent at 7/25/2018 11:11 AM CDT -----  Contact: Tameka  Type:  Pharmacy Calling to Clarify an RX    Name of Caller:  Tameka  Pharmacy Name:    [facility]/pharmacy #[id number] - [place] - [address]  [address]  [facility] [address]  Phone: [phone] Fax: [phone]  Prescription Name:  metoprolol succinate (TOPROL-XL) 25 MG 24 hr tablet 30    What do they need to clarify?:  Sent refill request  Best Call Back Number:  [phone]  Additional Information:  na

## 2018-07-30 ENCOUNTER — TELEPHONE (OUTPATIENT)
Dept: RESEARCH | Facility: HOSPITAL | Age: 73
End: 2018-07-30

## 2018-08-03 ENCOUNTER — LAB VISIT (OUTPATIENT)
Dept: LAB | Facility: OTHER | Age: 73
End: 2018-08-03
Payer: MEDICARE

## 2018-08-03 ENCOUNTER — TELEPHONE (OUTPATIENT)
Dept: GYNECOLOGIC ONCOLOGY | Facility: CLINIC | Age: 73
End: 2018-08-03

## 2018-08-03 DIAGNOSIS — C56.1 BORDERLINE SEROUS CYSTADENOMA OF RIGHT OVARY: ICD-10-CM

## 2018-08-03 LAB — CANCER AG125 SERPL-ACNC: 10 U/ML

## 2018-08-03 PROCEDURE — 36415 COLL VENOUS BLD VENIPUNCTURE: CPT

## 2018-08-03 PROCEDURE — 86304 IMMUNOASSAY TUMOR CA 125: CPT

## 2018-08-05 ENCOUNTER — PATIENT MESSAGE (OUTPATIENT)
Dept: UROLOGY | Facility: CLINIC | Age: 73
End: 2018-08-05

## 2018-08-05 DIAGNOSIS — N39.0 RECURRENT UTI: Primary | ICD-10-CM

## 2018-08-06 ENCOUNTER — OFFICE VISIT (OUTPATIENT)
Dept: GYNECOLOGIC ONCOLOGY | Facility: CLINIC | Age: 73
End: 2018-08-06
Payer: MEDICARE

## 2018-08-06 ENCOUNTER — LAB VISIT (OUTPATIENT)
Dept: LAB | Facility: OTHER | Age: 73
End: 2018-08-06
Attending: UROLOGY
Payer: MEDICARE

## 2018-08-06 ENCOUNTER — TELEPHONE (OUTPATIENT)
Dept: UROLOGY | Facility: CLINIC | Age: 73
End: 2018-08-06

## 2018-08-06 VITALS
BODY MASS INDEX: 29.52 KG/M2 | SYSTOLIC BLOOD PRESSURE: 153 MMHG | HEART RATE: 77 BPM | DIASTOLIC BLOOD PRESSURE: 71 MMHG | WEIGHT: 172 LBS

## 2018-08-06 DIAGNOSIS — N39.0 RECURRENT UTI: ICD-10-CM

## 2018-08-06 DIAGNOSIS — Z12.31 SCREENING MAMMOGRAM, ENCOUNTER FOR: ICD-10-CM

## 2018-08-06 DIAGNOSIS — C56.9: Primary | ICD-10-CM

## 2018-08-06 LAB
BACTERIA #/AREA URNS HPF: ABNORMAL /HPF
BILIRUB UR QL STRIP: NEGATIVE
CLARITY UR: ABNORMAL
COLOR UR: YELLOW
GLUCOSE UR QL STRIP: NEGATIVE
HGB UR QL STRIP: ABNORMAL
KETONES UR QL STRIP: NEGATIVE
LEUKOCYTE ESTERASE UR QL STRIP: ABNORMAL
MICROSCOPIC COMMENT: ABNORMAL
NITRITE UR QL STRIP: POSITIVE
PH UR STRIP: 6 [PH] (ref 5–8)
PROT UR QL STRIP: ABNORMAL
RBC #/AREA URNS HPF: 7 /HPF (ref 0–4)
SP GR UR STRIP: 1.01 (ref 1–1.03)
SQUAMOUS #/AREA URNS HPF: 5 /HPF
URN SPEC COLLECT METH UR: ABNORMAL
UROBILINOGEN UR STRIP-ACNC: NEGATIVE EU/DL
WBC #/AREA URNS HPF: 33 /HPF (ref 0–5)
WBC CLUMPS URNS QL MICRO: ABNORMAL

## 2018-08-06 PROCEDURE — 99999 PR PBB SHADOW E&M-EST. PATIENT-LVL III: CPT | Mod: PBBFAC,,, | Performed by: OBSTETRICS & GYNECOLOGY

## 2018-08-06 PROCEDURE — 99214 OFFICE O/P EST MOD 30 MIN: CPT | Mod: S$PBB,,, | Performed by: OBSTETRICS & GYNECOLOGY

## 2018-08-06 PROCEDURE — 87086 URINE CULTURE/COLONY COUNT: CPT

## 2018-08-06 PROCEDURE — 81000 URINALYSIS NONAUTO W/SCOPE: CPT

## 2018-08-06 PROCEDURE — 99213 OFFICE O/P EST LOW 20 MIN: CPT | Mod: PBBFAC | Performed by: OBSTETRICS & GYNECOLOGY

## 2018-08-06 RX ORDER — SULFAMETHOXAZOLE AND TRIMETHOPRIM 800; 160 MG/1; MG/1
1 TABLET ORAL 2 TIMES DAILY
Qty: 14 TABLET | Refills: 0 | Status: SHIPPED | OUTPATIENT
Start: 2018-08-06 | End: 2018-08-08

## 2018-08-06 NOTE — TELEPHONE ENCOUNTER
UA concerning for UTI. Will start empiric abx. May need to adjust when UCx returns in 2 days.     Bactrim sent to pharmacy.

## 2018-08-06 NOTE — PROGRESS NOTES
Subjective:      Patient ID: Niurka Pedraza is a 73 y.o. female.    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     41>15>10.    History:  Patient is a 73yo female originally referred by Dr. Linette Torres for pelvic mass and elevated . Patient was seen by her urologist for recurrent UTIs and imaging was ordered.      Imaging reviewed:   CT urogram abd/pelvis 17  Impression   Bilateral pulmonary nodules, largest measuring 1.5 cm at the right lower lobe. Comparison with any prior cross-sectional imaging would be beneficial. Close interval followup, PET CT, and/or biopsy may be considered for further evaluation.    Complex cystic lesion in the right adnexa. Pelvic ultrasound would be beneficial for further evaluation.    Focal dilated biliary radical in segment 7. This is of uncertain significance. There are no concerning hepatic lesions.      Pelvic US 18  Impression    Complex cystic lesion measuring 3.8 cm right ovary corresponds to abnormality seen on CT. While this may represent a hemorrhagic cyst in light of postmenopausal status Clinical correlation and further characterization with MRI and surgical consultation advised.    Left ovary not seen.     UT 5.5cm        41 elevated. CEA 4.8.      Personal history of breast cancer , R mastectomy, no adjuvant treatment.    MMG 2017 WNLs.      Prior abdominal surgeries include cholecystectomy.  x 3. Family history mother with breast cancer, no ovarian, uterine, or colon cancer.     S/p RTLH/BSO/BPLND/OMX 3/23/18. Uncomplicated post op course. 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    Review of Systems   Constitutional: Negative for appetite change, chills, fatigue and fever.   HENT: Negative for mouth  sores.    Respiratory: Negative for cough and shortness of breath.    Cardiovascular: Negative for leg swelling.   Gastrointestinal: Negative for abdominal pain, blood in stool, constipation, diarrhea, nausea and vomiting.   Endocrine: Negative for cold intolerance.   Genitourinary: Positive for frequency (has f/u with urology). Negative for dysuria, pelvic pain and vaginal bleeding.   Musculoskeletal: Negative for back pain and myalgias.   Skin: Negative for rash.   Allergic/Immunologic: Negative.    Neurological: Negative for weakness and numbness.   Hematological: Negative for adenopathy. Does not bruise/bleed easily.   Psychiatric/Behavioral: Negative for confusion. The patient is not nervous/anxious.        Objective:   Physical Exam:   Constitutional: She is oriented to person, place, and time. She appears well-developed and well-nourished. No distress.    HENT:   Head: Normocephalic.    Eyes: No scleral icterus.    Neck: Normal range of motion.    Cardiovascular: Exam reveals no cyanosis and no edema.     Pulmonary/Chest: Effort normal. No respiratory distress. Right breast exhibits absence. Left breast exhibits no inverted nipple, no mass, no nipple discharge, no skin change and no bleeding.        Abdominal: Soft. She exhibits no distension, no ascites and no mass. There is no tenderness.     Genitourinary: Vagina normal. Pelvic exam was performed with patient supine. There is no rash, tenderness or lesion on the right labia. There is no rash, tenderness or lesion on the left labia. Uterus is absent. There is an absent adnexa. Right adnexum displays no mass, no tenderness and no fullness. Left adnexum displays no mass, no tenderness and no fullness. No bleeding in the vagina. No vaginal discharge found. Vaginal cuff normal.Cervix exhibits absence.           Musculoskeletal: Normal range of motion and moves all extremeties. She exhibits no edema.      Lymphadenopathy:        Right: No supraclavicular  adenopathy present.        Left: No supraclavicular adenopathy present.    Neurological: She is alert and oriented to person, place, and time.    Skin: Skin is warm and dry. No rash noted. No cyanosis. No pallor.    Psychiatric: She has a normal mood and affect.       Assessment:     1. Papillary serous cystadenoma, borderline malignancy, unspecified laterality    2. Screening mammogram, encounter for        Plan:     Orders Placed This Encounter   Procedures    Mammo Digital Screening Left with [employer]_CAD       JONATHAN on today's exam.  Due for MMG  RTC in 3 months for surveillance with .    I spent approximately 25 minutes reviewing the available records and evaluating the patient, out of which over 50% of the time was spent face to face with the patient in counseling and coordinating this patient's care.

## 2018-08-07 ENCOUNTER — TELEPHONE (OUTPATIENT)
Dept: RESEARCH | Facility: HOSPITAL | Age: 73
End: 2018-08-07

## 2018-08-07 ENCOUNTER — TELEPHONE (OUTPATIENT)
Dept: UROLOGY | Facility: CLINIC | Age: 73
End: 2018-08-07

## 2018-08-07 LAB
BACTERIA UR CULT: NORMAL
BACTERIA UR CULT: NORMAL

## 2018-08-07 NOTE — TELEPHONE ENCOUNTER
Patient states that she has taken bactrim before and it has never helped- She doesn't no t want to continue that same abt- She states that she has an apt with you tmrw and will discuss her concerns with you then. I advised patient that I would pass along these concerns via message to you as well.

## 2018-08-08 ENCOUNTER — OFFICE VISIT (OUTPATIENT)
Dept: UROLOGY | Facility: CLINIC | Age: 73
End: 2018-08-08
Attending: UROLOGY
Payer: MEDICARE

## 2018-08-08 ENCOUNTER — HOSPITAL ENCOUNTER (OUTPATIENT)
Dept: RADIOLOGY | Facility: OTHER | Age: 73
Discharge: HOME | End: 2018-08-08
Attending: OBSTETRICS & GYNECOLOGY
Payer: MEDICARE

## 2018-08-08 VITALS
DIASTOLIC BLOOD PRESSURE: 85 MMHG | WEIGHT: 172 LBS | HEIGHT: 64 IN | SYSTOLIC BLOOD PRESSURE: 140 MMHG | BODY MASS INDEX: 29.37 KG/M2 | HEART RATE: 87 BPM

## 2018-08-08 DIAGNOSIS — N32.81 OAB (OVERACTIVE BLADDER): ICD-10-CM

## 2018-08-08 DIAGNOSIS — R31.29 MICROHEMATURIA: ICD-10-CM

## 2018-08-08 DIAGNOSIS — Z12.31 SCREENING MAMMOGRAM, ENCOUNTER FOR: ICD-10-CM

## 2018-08-08 DIAGNOSIS — N39.0 RECURRENT UTI: Primary | ICD-10-CM

## 2018-08-08 PROCEDURE — 99214 OFFICE O/P EST MOD 30 MIN: CPT | Mod: S$GLB,,, | Performed by: UROLOGY

## 2018-08-08 PROCEDURE — 77063 BREAST TOMOSYNTHESIS BI: CPT | Mod: 26,,, | Performed by: INTERNAL MEDICINE

## 2018-08-08 PROCEDURE — 77067 SCR MAMMO BI INCL CAD: CPT | Mod: 26,,, | Performed by: INTERNAL MEDICINE

## 2018-08-08 PROCEDURE — 77063 BREAST TOMOSYNTHESIS BI: CPT | Mod: TC

## 2018-08-08 RX ORDER — CEPHALEXIN 250 MG/1
250 CAPSULE ORAL DAILY
Qty: 30 CAPSULE | Refills: 11
Start: 2018-08-08 | End: 2018-08-13 | Stop reason: SDUPTHER

## 2018-08-08 NOTE — PROGRESS NOTES
Subjective:       Niurka Pedraza is a 73 y.o. female who is an established patient with [facility] urologist, Dr Dhaliwal,  was seen for evaluation of UTI.      She was seen by another urologist for recurrent UTIs/dysuria. Multiple +UCx with different bacteria (E coli, Enterococcus). She has had negative cystoscopy and GONZÁLEZ (1/17). UTIs return soon after treatment. She was started on prophy Keflex in 7/17.     She saw PCP in 11/17 with complaints of UTI. UCx was negative. She now feels a constant pressure in SP area and c/o pain with walking. She also notes pain worse with movement (like hitting a bump when driving). Denies dysuria. Increased frequency to now q1h. Present for 2 months. Denies constipation, occasional diarrhea. Frequent RADHA. Now with UUI. Also with BOBBI - increased coughing with recent lung issue. Nocturia x 4-5. Denies gross hematuria.     She moved here from [place] in 9/17. She requested to be started on abx prophy starting 1/18 (Keflex 250mg).     PVR (bladder scan) initial visit - 32cc. Today - 0cc    CT uro - no  abnormalities. Lung nodules - followed by pulmonary. Ovarian cyst - referred to gyn (now s/p DARCI-BSO for ovarian cancer).     She has been doing great with the Ditropan - she is very pleased. She has been also doing abx prophy since 1/18.    She was concerned re: UTI earlier this week. UA and UCx done - UCx with contaminant. She did not take Bactrim, started to feel better. Now taking probiotics that is helping.      UCx:  8/18 - contaminant  7/18 - 50-99k Enterobacter (treated with Bactrim)  11/17 - neg  10/17 - >100k E coli  10/17- >100k Enterococcus  7/17 - >100k E coli  5/17- >100k E coli  5/17 - neg  4/17- 50-99k Enterococcus  3/17 - >100k E coli  12/16 - neg    PVR (bladder scan) today - 33cc      The following portions of the patient's history were reviewed and updated as appropriate: allergies, current medications, past family history, past medical history, past social  "history, past surgical history and problem list.    Review of Systems  Constitutional: no fever or chills  ENT: no nasal congestion or sore throat  Respiratory: no cough or shortness of breath  Cardiovascular: no chest pain or palpitations  Gastrointestinal: no nausea or vomiting, tolerating diet  Genitourinary: as per HPI  Hematologic/Lymphatic: no easy bruising or lymphadenopathy  Musculoskeletal: no arthralgias or myalgias  Skin: no rashes or lesions  Neurological: no seizures or tremors  Behavioral/Psych: no auditory or visual hallucinations        Objective:    Vitals: BP (!) 140/85 (BP Location: Right arm, Patient Position: Sitting, BP Method: Large (Automatic))   Pulse 87   Ht 5' 4" (1.626 m)   Wt 78 kg (172 lb)   LMP 02/08/2000   BMI 29.52 kg/m²     Physical Exam   General: well developed, well nourished in no acute distress  Head: normocephalic, atraumatic  Neck: supple, trachea midline, no obvious enlargement of thyroid  HEENT: EOMI, mucus membranes moist, sclera anicteric, no hearing impairment  Lungs: symmetric expansion, non-labored breathing  Cardiovascular: regular rate and rhythm, normal pulses  Abdomen: soft, non tender, non distended, no palpable masses, no hepatosplenomegaly, no hernias, no CVA tenderness  Musculoskeletal: no peripheral edema, normal ROM in bilateral upper and lower extremities  Lymphatics: no cervical or inguinal lymphadenopathy  Skin: no rashes or lesions  Neuro: alert and oriented x 3, no gross deficits  Psych: normal judgment and insight, normal mood/affect and non-anxious  Genitourinary:   patient declined exam      Lab Review   Urine analysis today in clinic shows - +LE, 5-10 RBCs    Lab Results   Component Value Date    WBC 12.51 03/24/2018    HGB 11.2 (L) 03/24/2018    HCT 34.7 (L) 03/24/2018    MCV 90 03/24/2018     03/24/2018     Lab Results   Component Value Date    CREATININE 1.0 03/24/2018    BUN 23 03/24/2018         Imaging  Images and reports were " personally reviewed by me and discussed with patient  GONZÁLEZ reviewed       Assessment/Plan:      1. Recurrent UTI    - Discussed UTI prevention strategies.   - Adequate hydration.   - Double voiding. Consider timed voiding.    - Avoid constipation.   - GONZÁLEZ - negative 1/17   - Cystoscopy - negative in 1/17   - Cranberry/probiotics.   - Estrace cream 2x weekly - will consider in near future   - Call with UTI symptoms so UA/UCx can be sent.    - Abx prophy (started 1/18) - Keflex 250mg. Still taking.      2. Microhematuria    - Discussed etiology and workup of hematuria   - UCx - results above   - Cytology - previously negative   - CT urogram - ordered. GONZÁLEZ was negative from 1/17.   - Office cystoscopy - negative 1/17        3. Nocturia/frequency/urge incontinence   - Ditropan XL 5mg - doing great      Follow up in 6 months with [facility]

## 2018-08-10 DIAGNOSIS — M79.671 RIGHT FOOT PAIN: ICD-10-CM

## 2018-08-10 RX ORDER — DICLOFENAC SODIUM 75 MG/1
TABLET, DELAYED RELEASE ORAL
Qty: 40 TABLET | Refills: 0 | Status: SHIPPED | OUTPATIENT
Start: 2018-08-10 | End: 2021-05-21

## 2018-08-13 RX ORDER — CEPHALEXIN 250 MG/1
250 CAPSULE ORAL DAILY
Qty: 30 CAPSULE | Refills: 11 | Status: SHIPPED | OUTPATIENT
Start: 2018-08-13 | End: 2018-11-21

## 2018-10-05 RX ORDER — OXYBUTYNIN CHLORIDE 5 MG/1
5 TABLET, EXTENDED RELEASE ORAL DAILY
Qty: 30 TABLET | Refills: 11 | Status: SHIPPED | OUTPATIENT
Start: 2018-10-05 | End: 2019-04-01 | Stop reason: SDUPTHER

## 2018-10-18 ENCOUNTER — TELEPHONE (OUTPATIENT)
Dept: AUDIOLOGY | Facility: CLINIC | Age: 73
End: 2018-10-18

## 2018-10-19 ENCOUNTER — CLINICAL SUPPORT (OUTPATIENT)
Dept: AUDIOLOGY | Facility: CLINIC | Age: 73
End: 2018-10-19

## 2018-10-19 PROCEDURE — 99499 UNLISTED E&M SERVICE: CPT | Mod: S$GLB,,, | Performed by: AUDIOLOGIST

## 2018-10-25 ENCOUNTER — CLINICAL SUPPORT (OUTPATIENT)
Dept: AUDIOLOGY | Facility: CLINIC | Age: 73
End: 2018-10-25

## 2018-10-25 NOTE — PROGRESS NOTES
Mrs. Pedraza was seen on today's date for a hearing aid delivery. She was fitted with a right Phonak Audeo B50 Rechargeable FERNANDEZ hearing aid. Feedback was not an issue at today's appointment. Insertion/removal of hearing aids in to ears and , turning on/off hearing aid and cleaning (changing wax traps, changing domes, cleaning microphones), and water precautions were reviewed at today's appointment. Mrs. Pedraza elected not to add a VC or programs at today's appointment. She was set to 70% of her target settings. She was counseled on acclimatization. She will return for a f/u appointment on 11/08/2018. She was advised to call/email with any questions/issues prior to her f/u appointment.    Hearing Aid Information:    Right ear  : Phonak   Model:  Audeo B50 R  Type:  FERNANDEZ  Color: Sand Beige   Battery: Rechargeable  Tube/ length & power: 1xS  Dome size & style: Medium Occluded  Serial number: [id number]   Warranty expiration: 1/16/22   L and D expiration:  1/16/22

## 2018-11-02 ENCOUNTER — TELEPHONE (OUTPATIENT)
Dept: GYNECOLOGIC ONCOLOGY | Facility: CLINIC | Age: 73
End: 2018-11-02

## 2018-11-05 ENCOUNTER — OFFICE VISIT (OUTPATIENT)
Dept: GYNECOLOGIC ONCOLOGY | Facility: CLINIC | Age: 73
End: 2018-11-05
Payer: MEDICARE

## 2018-11-05 ENCOUNTER — LAB VISIT (OUTPATIENT)
Dept: LAB | Facility: OTHER | Age: 73
End: 2018-11-05
Payer: MEDICARE

## 2018-11-05 VITALS
HEART RATE: 75 BPM | HEIGHT: 64 IN | BODY MASS INDEX: 30.98 KG/M2 | WEIGHT: 181.44 LBS | DIASTOLIC BLOOD PRESSURE: 84 MMHG | SYSTOLIC BLOOD PRESSURE: 175 MMHG

## 2018-11-05 DIAGNOSIS — C56.9: Primary | ICD-10-CM

## 2018-11-05 DIAGNOSIS — C56.1 BORDERLINE SEROUS CYSTADENOMA OF RIGHT OVARY: ICD-10-CM

## 2018-11-05 LAB — CANCER AG125 SERPL-ACNC: 12 U/ML

## 2018-11-05 PROCEDURE — 86304 IMMUNOASSAY TUMOR CA 125: CPT

## 2018-11-05 PROCEDURE — 99213 OFFICE O/P EST LOW 20 MIN: CPT | Mod: PBBFAC | Performed by: OBSTETRICS & GYNECOLOGY

## 2018-11-05 PROCEDURE — 99999 PR PBB SHADOW E&M-EST. PATIENT-LVL III: CPT | Mod: PBBFAC,,, | Performed by: OBSTETRICS & GYNECOLOGY

## 2018-11-05 PROCEDURE — 36415 COLL VENOUS BLD VENIPUNCTURE: CPT

## 2018-11-05 PROCEDURE — 99213 OFFICE O/P EST LOW 20 MIN: CPT | Mod: S$PBB,,, | Performed by: OBSTETRICS & GYNECOLOGY

## 2018-11-05 RX ORDER — METOPROLOL SUCCINATE 25 MG/1
25 TABLET, EXTENDED RELEASE ORAL
COMMUNITY
Start: 2016-11-25 | End: 2018-11-05 | Stop reason: SDUPTHER

## 2018-11-05 RX ORDER — ATORVASTATIN CALCIUM 20 MG/1
20 TABLET, FILM COATED ORAL
COMMUNITY
Start: 2016-12-29 | End: 2018-11-05 | Stop reason: SDUPTHER

## 2018-11-05 RX ORDER — MULTIVITAMIN
1 TABLET ORAL
COMMUNITY
End: 2018-11-05 | Stop reason: SDUPTHER

## 2018-11-05 RX ORDER — FERROUS SULFATE, DRIED 160(50) MG
1 TABLET, EXTENDED RELEASE ORAL
COMMUNITY
Start: 2017-04-18 | End: 2018-11-05 | Stop reason: SDUPTHER

## 2018-11-05 NOTE — PROGRESS NOTES
Subjective:      Patient ID: Niurka Pedraza is a 73 y.o. female.    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 Reports continued overactive bladder symptoms but has urology follow up scheduled. No other GYN complaints.     41>15>10>pending today    History:  Patient is a 71yo female originally referred by Dr. Linette Torres for pelvic mass and elevated . Patient was seen by her urologist for recurrent UTIs and imaging was ordered.      Imaging reviewed:   CT urogram abd/pelvis 17  Impression   Bilateral pulmonary nodules, largest measuring 1.5 cm at the right lower lobe. Comparison with any prior cross-sectional imaging would be beneficial. Close interval followup, PET CT, and/or biopsy may be considered for further evaluation.    Complex cystic lesion in the right adnexa. Pelvic ultrasound would be beneficial for further evaluation.    Focal dilated biliary radical in segment 7. This is of uncertain significance. There are no concerning hepatic lesions.      Pelvic US 18  Impression    Complex cystic lesion measuring 3.8 cm right ovary corresponds to abnormality seen on CT. While this may represent a hemorrhagic cyst in light of postmenopausal status Clinical correlation and further characterization with MRI and surgical consultation advised.    Left ovary not seen.     UT 5.5cm        41 elevated. CEA 4.8.      Personal history of breast cancer , R mastectomy, no adjuvant treatment.    MMG 2017 WNLs.      Prior abdominal surgeries include cholecystectomy.  x 3. Family history mother with breast cancer, no ovarian, uterine, or colon cancer.     S/p RTLH/BSO/BPLND/OMX 3/23/18. Uncomplicated post op course. 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    Review of Systems   Constitutional: Negative for appetite change, chills,  fatigue and fever.   HENT: Negative for mouth sores.    Respiratory: Negative for cough and shortness of breath.    Cardiovascular: Negative for leg swelling.   Gastrointestinal: Negative for abdominal pain, blood in stool, constipation, diarrhea, nausea and vomiting.   Endocrine: Negative for cold intolerance.   Genitourinary: Positive for frequency (has f/u with urology). Negative for dysuria, pelvic pain and vaginal bleeding.   Musculoskeletal: Negative for back pain and myalgias.   Skin: Negative for rash.   Allergic/Immunologic: Negative.    Neurological: Negative for weakness and numbness.   Hematological: Negative for adenopathy. Does not bruise/bleed easily.   Psychiatric/Behavioral: Negative for confusion. The patient is not nervous/anxious.        Objective:   Physical Exam:   Constitutional: She is oriented to person, place, and time. She appears well-developed and well-nourished. No distress.    HENT:   Head: Normocephalic.    Eyes: No scleral icterus.    Neck: Normal range of motion.    Cardiovascular: Exam reveals no cyanosis and no edema.     Pulmonary/Chest: Effort normal. No respiratory distress. Right breast exhibits absence. Left breast exhibits no inverted nipple, no mass, no nipple discharge, no skin change and no bleeding.        Abdominal: Soft. She exhibits no distension, no ascites and no mass. There is no tenderness.     Genitourinary: Vagina normal. Pelvic exam was performed with patient supine. There is no rash, tenderness or lesion on the right labia. There is no rash, tenderness or lesion on the left labia. Uterus is absent. There is an absent adnexa. Right adnexum displays no mass, no tenderness and no fullness. Left adnexum displays no mass, no tenderness and no fullness. No bleeding in the vagina. No vaginal discharge found. Vaginal cuff normal.Cervix exhibits absence.           Musculoskeletal: Normal range of motion and moves all extremeties. She exhibits no edema.       Lymphadenopathy:        Right: No supraclavicular adenopathy present.        Left: No supraclavicular adenopathy present.    Neurological: She is alert and oriented to person, place, and time.    Skin: Skin is warm and dry. No rash noted. No cyanosis. No pallor.    Psychiatric: She has a normal mood and affect.       Assessment:     1. Papillary serous cystadenoma, borderline malignancy, unspecified laterality        Plan:     JONATHAN on today's exam.  F/u  today.  RTC in 3 months for surveillance with .

## 2018-11-08 ENCOUNTER — CLINICAL SUPPORT (OUTPATIENT)
Dept: AUDIOLOGY | Facility: CLINIC | Age: 73
End: 2018-11-08

## 2018-11-08 PROCEDURE — 99499 UNLISTED E&M SERVICE: CPT | Mod: S$GLB,,, | Performed by: AUDIOLOGIST

## 2018-11-08 NOTE — PROGRESS NOTES
Ms. Pedraza was seen for a HAFU. We increased the gain to 90% of target settings. The patient was satisfied with the volume and sound quality of the hearing aid at today's appointment. We reviewed cleaning (brushing) and discussed changing wax filters. The patient expressed that she was comfortable with cleaning. She will return in one month. She was encouraged to call if she needs to come in sooner.

## 2018-11-12 ENCOUNTER — OFFICE VISIT (OUTPATIENT)
Dept: ORTHOPEDICS | Facility: CLINIC | Age: 73
End: 2018-11-12
Payer: MEDICARE

## 2018-11-12 VITALS
DIASTOLIC BLOOD PRESSURE: 84 MMHG | WEIGHT: 179.56 LBS | HEIGHT: 64 IN | SYSTOLIC BLOOD PRESSURE: 137 MMHG | BODY MASS INDEX: 30.66 KG/M2 | HEART RATE: 80 BPM

## 2018-11-12 DIAGNOSIS — M66.871 TIBIALIS POSTERIOR TENDON TEAR, NONTRAUMATIC, RIGHT: Primary | ICD-10-CM

## 2018-11-12 PROCEDURE — 99214 OFFICE O/P EST MOD 30 MIN: CPT | Mod: S$PBB,,, | Performed by: ORTHOPAEDIC SURGERY

## 2018-11-12 PROCEDURE — 99999 PR PBB SHADOW E&M-EST. PATIENT-LVL III: CPT | Mod: PBBFAC,,, | Performed by: ORTHOPAEDIC SURGERY

## 2018-11-12 PROCEDURE — 99213 OFFICE O/P EST LOW 20 MIN: CPT | Mod: PBBFAC | Performed by: ORTHOPAEDIC SURGERY

## 2018-11-12 RX ORDER — DICLOFENAC SODIUM 10 MG/G
GEL TOPICAL DAILY
Status: DISCONTINUED | OUTPATIENT
Start: 2018-11-12 | End: 2019-08-09 | Stop reason: SDUPTHER

## 2018-11-12 NOTE — PROGRESS NOTES
DATE: 11/12/2018  PATIENT: Niurka Pedraza    CHIEF COMPLAINT: R ankle pain    HPI:  73F presents for evaluation of R ankle pain.  Located medial ankle.  >1 year duration.  Denies injury.  Relates pain to taking long course of cipro for recurrent UTIs.  Described as acute.  5/10 severity.  Decreasing in severity since onset.  Worsened by walking.  Improved by rest, elevate.  No history of similar symptoms.  Denies associated paresthesias.  Prior treatment has included boot immobilization, PT.    PAST MEDICAL/SURGICAL HISTORY:  Past Medical History:   Diagnosis Date    Arthritis     Borderline serous cystadenoma of right ovary 4/3/2018    Breast cancer     mastectomy 2014    Coccidiomycosis, progressive     Elevated CA-125 2/25/2018    Hyperlipidemia     Hypertension     OAB (overactive bladder)     Osteopenia     on Dexa 11/2017    Osteoporosis     pt reports hx of this, declined fosamax in past - treated with calcium and vit D    Pelvic mass 2/25/2018    Pulmonary nodules     Thyroid disease      Past Surgical History:   Procedure Laterality Date    CHOLECYSTECTOMY      DISSECTION-LYMPH NODE-BILATERAL Bilateral 3/23/2018    [facility]    MASTECTOMY Right     2014    OMENTECTOMY N/A 3/23/2018    [facility]    STAGING N/A 3/23/2018    [facility]    XI ROBOT ASSISTED LAPAROSCOPIC SALPINGO-OOPHERECTOMY BILATERAL Bilateral 3/23/2018    [facility]    XI ROBOTIC ASSISTED LAPAROSCOPIC HYSTERECTOMY, MINI LAP N/A 3/23/2018    [facility]       Family History:   Family History   Problem Relation Age of Onset    Cancer Mother         colon cancer    Breast cancer Mother     Hypertension Father     Heart disease Father     Stroke Father     No Known Problems Sister     Cancer Brother         lung, ++ tobacco    Hypertension Brother     No  Known Problems Daughter     Hypertension Son     Colon cancer Neg Hx     Ovarian cancer Neg Hx        Social History:   Social History     Socioeconomic History    Marital status:      Spouse name: Not on file    Number of children: 3    Years of education: Not on file    Highest education level: Not on file   Social Needs    Financial resource strain: Not on file    Food insecurity - worry: Not on file    Food insecurity - inability: Not on file    Transportation needs - medical: Not on file    Transportation needs - non-medical: Not on file   Occupational History    Occupation: retired from [employer], [employer]     Comment: neuro chemistry   Tobacco Use    Smoking status: Former Smoker     Packs/day: 0.50     Years: 30.00     Pack years: 15.00     Types: Cigarettes     Last attempt to quit: 2000     Years since quittin.7    Smokeless tobacco: Never Used    Tobacco comment: quit in her 50s, after 30 years smoking;   Substance and Sexual Activity    Alcohol use: No    Drug use: No    Sexual activity: No   Other Topics Concern    Not on file   Social History Narrative    From [place]     Moved to [facility] in  for research    Moved to [place] for period of time    Moved back to [place] Summer 2016 and then to [facility] this year 2017       Current Medications:   Current Outpatient Medications:     atorvastatin (LIPITOR) 20 MG tablet, Take 1 tablet (20 mg total) by mouth once daily., Disp: 90 tablet, Rfl: 3    calcium-vitamin D3 (CALCIUM 500 + D) 500 mg(1,250mg) -200 unit per tablet, Take 1 tablet by mouth 2 (two) times daily with meals., Disp: , Rfl:     cephALEXin (KEFLEX) 250 MG capsule, Take 1 capsule (250 mg total) by mouth once daily., Disp: 30 capsule, Rfl: 11    diclofenac (VOLTAREN) 75 MG EC tablet, TAKE 1 TABLET (75 MG TOTAL) BY MOUTH 2 (TWO) TIMES DAILY AS NEEDED., Disp: 40 tablet, Rfl: 0    levothyroxine (SYNTHROID) 50 MCG tablet, TAKE 1 TABLET (50 MCG  "TOTAL) BY MOUTH ONCE DAILY., Disp: 90 tablet, Rfl: 3    metoprolol succinate (TOPROL-XL) 25 MG 24 hr tablet, Take 1 tablet (25 mg total) by mouth once daily., Disp: 90 tablet, Rfl: 3    multivitamin (ONE DAILY MULTIVITAMIN) per tablet, Take 1 tablet by mouth once daily., Disp: , Rfl:     oxybutynin (DITROPAN-XL) 5 MG TR24, Take 1 tablet (5 mg total) by mouth once daily., Disp: 30 tablet, Rfl: 11    Current Facility-Administered Medications:     diclofenac sodium 1 % gel, , Topical (Top), Daily, Navid Weller MD    Allergies:   Review of patient's allergies indicates:   Allergen Reactions    Ciprofloxacin Other (See Comments)     Right foot pain and edema, tendonitis    Amlodipine Edema and Swelling     BLE  BLE    Lisinopril Other (See Comments)     cough       REVIEW OF SYSTEMS:  Constitutional: negative for fevers  Musculoskeletal: negative for paresthesias    PHYSICAL EXAMINATION:    /84   Pulse 80   Ht 5' 4" (1.626 m)   Wt 81.4 kg (179 lb 9 oz)   LMP 02/08/2000   BMI 30.82 kg/m²     Vitals:  Vital signs stable.  General: No acute distress.  Cardio: Regular rate.  Chest: No increased work of breathing.     MSK:  RLE:   Skin intact  Mild flatfoot deformity  Correctable hindfoot valgus  No ecchymoses  Mild swelling posterior to medial malleolus  TTP posterior to medial malleolus  No pain with ankle/subtalar ROM  No pain with resisted inversion/plantarflexion  Unable to perform one leg heel rise  SILT T/SP/SP/[place]/Sa  Motor intact T/SP/DP  Brisk cap refill  Warm well perfused extremities  PT palpable    IMAGING:     XR 12/2017 R ankle negative for arthritis, flatfoot deformity.    MRI R ankle showing severe tendinosis and tenosynovitis PTT with interstitial tearing.    ASSESSMENT/PLAN:    Niurka was seen today for right ankle pain.    Diagnoses and all orders for this visit:    Tibialis posterior tendon tear, nontraumatic, right  -     diclofenac sodium 1 % gel        73F with PTT rupture.  " Discussed treatment options with patient including immobilization, activity and shoe modifications, PT, surgery.  Patient has improved since onset with immobilization.  She is not ready for surgery at this time.  She would like to try custom vs OTC orthotics.  Rx for voltaren gel and custom orthotics given.  Spenco total support max OTC orthotics recommended if unable to get custom orthotics.  RTC 8 weeks for reassessment.        I have personally taken the history and examined this patient and agree with the residents note as stated above.

## 2018-11-12 NOTE — LETTER
November 12, 2018      Idalmis Hendricks PA-C  [address]  [facility] [address]           [facility]  [address], [address]  [facility] [address]  Phone: [phone]          Patient: Niurka Pedraza   MR Number: [id number]   YOB: 1945   Date of Visit: 11/12/2018       Dear Idalmis Hendricks:    Thank you for referring Niurka Pedraza to me for evaluation. Attached you will find relevant portions of my assessment and plan of care.    If you have questions, please do not hesitate to call me. I look forward to following Niurka Pedraza along with you.    Sincerely,    Navid Weller MD    Enclosure  CC:  No Recipients    If you would like to receive this communication electronically, please contact [email or web address] or [phone] to request more information on [employer] [facility] access.    For providers and/or their staff who would like to refer a patient to [facility], please contact us through our one-stop-shop provider referral line, [facility] , at [phone].    If you feel you have received this communication in error or would no longer like to receive these types of communications, please e-mail [email or web address]

## 2018-11-19 ENCOUNTER — PATIENT OUTREACH (OUTPATIENT)
Dept: ADMINISTRATIVE | Facility: HOSPITAL | Age: 73
End: 2018-11-19

## 2018-11-19 NOTE — PROGRESS NOTES
[facility] is committed to your overall health.  To help you get the most out of each of your visits, we will review your information to make sure you are up to date on all of your recommended tests and/or procedures.       Your PCP  Savana Anderson MD   found that you may be due for:       Health Maintenance Due   Topic Date Due    Colonoscopy  03/10/1994    Pneumococcal (65+) (1 of 2 - PCV13) 03/10/2009    Influenza Vaccine  08/01/2018             If you have had any of the above done at another facility, please bring the records or information with you so that your record at [facility] will be complete.  If you would like to schedule any of these, please contact me.     If you are currently taking medication, please bring it with you to your appointment for review.     Also, if you have any type of Advanced Directives, please bring them with you to your office visit so we may scan them into your chart.       Thank you for Choosing [facility] for your healthcare needs.        Additional Information  If you have questions, you can email [email or web address] or call [phone]  to talk to our [facility] staff. Remember, [facility] is NOT to be used for urgent needs. For medical emergencies, dial 911.

## 2018-11-21 ENCOUNTER — OFFICE VISIT (OUTPATIENT)
Dept: UROLOGY | Facility: CLINIC | Age: 73
End: 2018-11-21
Attending: UROLOGY
Payer: MEDICARE

## 2018-11-21 VITALS
HEART RATE: 94 BPM | DIASTOLIC BLOOD PRESSURE: 77 MMHG | BODY MASS INDEX: 30.56 KG/M2 | SYSTOLIC BLOOD PRESSURE: 141 MMHG | WEIGHT: 179 LBS | HEIGHT: 64 IN

## 2018-11-21 DIAGNOSIS — R31.29 MICROHEMATURIA: ICD-10-CM

## 2018-11-21 DIAGNOSIS — N39.0 RECURRENT UTI: Primary | ICD-10-CM

## 2018-11-21 DIAGNOSIS — N32.81 OAB (OVERACTIVE BLADDER): ICD-10-CM

## 2018-11-21 LAB
BILIRUB SERPL-MCNC: ABNORMAL MG/DL
BLOOD URINE, POC: ABNORMAL
COLOR, POC UA: YELLOW
GLUCOSE UR QL STRIP: ABNORMAL
KETONES UR QL STRIP: ABNORMAL
LEUKOCYTE ESTERASE URINE, POC: ABNORMAL
NITRITE, POC UA: ABNORMAL
PH, POC UA: 5
POC RESIDUAL URINE VOLUME: 50 ML (ref 0–100)
PROTEIN, POC: ABNORMAL
SPECIFIC GRAVITY, POC UA: 1.01
UROBILINOGEN, POC UA: ABNORMAL

## 2018-11-21 PROCEDURE — 87077 CULTURE AEROBIC IDENTIFY: CPT

## 2018-11-21 PROCEDURE — 87086 URINE CULTURE/COLONY COUNT: CPT

## 2018-11-21 PROCEDURE — 81002 URINALYSIS NONAUTO W/O SCOPE: CPT | Mod: S$GLB,,, | Performed by: UROLOGY

## 2018-11-21 PROCEDURE — 87088 URINE BACTERIA CULTURE: CPT

## 2018-11-21 PROCEDURE — 51798 US URINE CAPACITY MEASURE: CPT | Mod: S$GLB,,, | Performed by: UROLOGY

## 2018-11-21 PROCEDURE — 87186 SC STD MICRODIL/AGAR DIL: CPT

## 2018-11-21 PROCEDURE — 99214 OFFICE O/P EST MOD 30 MIN: CPT | Mod: 25,S$GLB,, | Performed by: UROLOGY

## 2018-11-21 NOTE — PROGRESS NOTES
Subjective:       Niurka Pedraza is a 73 y.o. female who is an established patient with [place] urologist, Dr Dhaliwal,  was seen for evaluation of UTI.      She was seen by another urologist for recurrent UTIs/dysuria. Multiple +UCx with different bacteria (E coli, Enterococcus). She has had negative cystoscopy and GONZÁLEZ (1/17). UTIs return soon after treatment. She was started on prophy Keflex in 7/17.     She saw PCP in 11/17 with complaints of UTI. UCx was negative. She now feels a constant pressure in SP area and c/o pain with walking. She also notes pain worse with movement (like hitting a bump when driving). Denies dysuria. Increased frequency to now q1h. Present for 2 months. Denies constipation, occasional diarrhea. Frequent RADHA. Now with UUI. Also with BOBBI - increased coughing with recent lung issue. Nocturia x 4-5. Denies gross hematuria.     She moved here from [place] in 9/17. She requested to be started on abx prophy starting 1/18 (Keflex 250mg).     PVR (bladder scan) initial visit - 32cc, 0cc.    CT uro - no  abnormalities. Lung nodules - followed by pulmonary. Ovarian cyst - referred to gyn (now s/p DARCI-BSO for ovarian cancer).     She has been doing great with the Ditropan - she is very pleased. Was on abx prophy (Keflex) 1/18 x 9 months. Taking probiotics that is helping.     [place] - took Bactrim. Now off prophylactic abx - more frequency, urgency, nocturia, SP pressure. No significant dysuria.      UCx:  8/18 - contaminant  7/18 - 50-99k Enterobacter (treated with Bactrim)  11/17 - neg  10/17 - >100k E coli  10/17- >100k Enterococcus  7/17 - >100k E coli  5/17- >100k E coli  5/17 - neg  4/17- 50-99k Enterococcus  3/17 - >100k E coli  12/16 - neg    PVR (bladder scan) last visit - 33cc. Today - 50cc      The following portions of the patient's history were reviewed and updated as appropriate: allergies, current medications, past family history, past  "medical history, past social history, past surgical history and problem list.    Review of Systems  Constitutional: no fever or chills  ENT: no nasal congestion or sore throat  Respiratory: no cough or shortness of breath  Cardiovascular: no chest pain or palpitations  Gastrointestinal: no nausea or vomiting, tolerating diet  Genitourinary: as per HPI  Hematologic/Lymphatic: no easy bruising or lymphadenopathy  Musculoskeletal: no arthralgias or myalgias  Skin: no rashes or lesions  Neurological: no seizures or tremors  Behavioral/Psych: no auditory or visual hallucinations        Objective:    Vitals: BP (!) 141/77 (BP Location: Right arm, Patient Position: Sitting, BP Method: Medium (Automatic))   Pulse 94   Ht 5' 4" (1.626 m)   Wt 81.2 kg (179 lb)   LMP 02/08/2000   BMI 30.73 kg/m²     Physical Exam   General: well developed, well nourished in no acute distress  Head: normocephalic, atraumatic  Neck: supple, trachea midline, no obvious enlargement of thyroid  HEENT: EOMI, mucus membranes moist, sclera anicteric, no hearing impairment  Lungs: symmetric expansion, non-labored breathing  Cardiovascular: regular rate and rhythm, normal pulses  Abdomen: soft, non tender, non distended, no palpable masses, no hepatosplenomegaly, no hernias, no CVA tenderness  Musculoskeletal: no peripheral edema, normal ROM in bilateral upper and lower extremities  Lymphatics: no cervical or inguinal lymphadenopathy  Skin: no rashes or lesions  Neuro: alert and oriented x 3, no gross deficits  Psych: normal judgment and insight, normal mood/affect and non-anxious  Genitourinary:   patient declined exam      Lab Review   Urine analysis today in clinic shows - +LE, +nit, 5-10 RBCs    Lab Results   Component Value Date    WBC 12.51 03/24/2018    HGB 11.2 (L) 03/24/2018    HCT 34.7 (L) 03/24/2018    MCV 90 03/24/2018     03/24/2018     Lab Results   Component Value Date    CREATININE 1.0 03/24/2018    BUN 23 03/24/2018 "         Imaging  Images and reports were personally reviewed by me and discussed with patient  GONZÁLEZ reviewed       Assessment/Plan:      1. Recurrent UTI    - Discussed UTI prevention strategies.   - Adequate hydration.   - Double voiding. Consider timed voiding.    - Avoid constipation.   - GONZÁLEZ - negative 1/17   - Cystoscopy - negative in 1/17   - Cranberry/probiotics.   - Estrace cream 2x weekly - will consider in near future   - Call with UTI symptoms so UA/UCx can be sent.    - Abx prophy (started 1/18) - Keflex 250mg took for 9 months, stopped 9/18   - UCx today    - Consider restart prophy     2. Microhematuria    - Discussed etiology and workup of hematuria   - UCx - results above   - Cytology - previously negative   - CT urogram - ordered. GONZÁLEZ was negative from 1/17.   - Office cystoscopy - negative 1/17        3. Nocturia/frequency/urge incontinence   - Ditropan XL 5mg - was doing great   - May need increase, will eval after UTI treated      Follow up in 3 months

## 2018-11-23 ENCOUNTER — PATIENT MESSAGE (OUTPATIENT)
Dept: ORTHOPEDICS | Facility: CLINIC | Age: 73
End: 2018-11-23

## 2018-11-23 DIAGNOSIS — M76.61 TENDONITIS, ACHILLES, RIGHT: Primary | ICD-10-CM

## 2018-11-23 RX ORDER — DICLOFENAC SODIUM 10 MG/G
2 GEL TOPICAL 4 TIMES DAILY
Qty: 1 TUBE | Refills: 1 | Status: SHIPPED | OUTPATIENT
Start: 2018-11-23 | End: 2019-05-15 | Stop reason: SDUPTHER

## 2018-11-23 NOTE — PROGRESS NOTES
Voltaren gel ordered as inpatient inadvertently by Dr. Weller. He is out of the office today so I reentered as pharmacy prescription.

## 2018-11-24 LAB — BACTERIA UR CULT: NORMAL

## 2018-11-26 ENCOUNTER — TELEPHONE (OUTPATIENT)
Dept: UROLOGY | Facility: HOSPITAL | Age: 73
End: 2018-11-26

## 2018-11-26 RX ORDER — CEPHALEXIN 500 MG/1
500 CAPSULE ORAL EVERY 8 HOURS
Qty: 30 CAPSULE | Refills: 0 | Status: SHIPPED | OUTPATIENT
Start: 2018-11-26 | End: 2018-12-06

## 2018-11-26 NOTE — TELEPHONE ENCOUNTER
Please inform patient of urinary tract infection on recent urine culture. Appropriate antibiotics (Keflex) were sent in to the pharmacy.

## 2018-11-27 ENCOUNTER — HOSPITAL ENCOUNTER (OUTPATIENT)
Dept: RADIOLOGY | Facility: OTHER | Age: 73
Discharge: HOME | End: 2018-11-27
Attending: INTERNAL MEDICINE
Payer: MEDICARE

## 2018-11-27 ENCOUNTER — OFFICE VISIT (OUTPATIENT)
Dept: INTERNAL MEDICINE | Facility: CLINIC | Age: 73
End: 2018-11-27
Attending: INTERNAL MEDICINE
Payer: MEDICARE

## 2018-11-27 VITALS
WEIGHT: 178.56 LBS | BODY MASS INDEX: 30.48 KG/M2 | OXYGEN SATURATION: 97 % | HEIGHT: 64 IN | HEART RATE: 77 BPM | SYSTOLIC BLOOD PRESSURE: 137 MMHG | DIASTOLIC BLOOD PRESSURE: 72 MMHG

## 2018-11-27 DIAGNOSIS — E78.2 MIXED HYPERLIPIDEMIA: ICD-10-CM

## 2018-11-27 DIAGNOSIS — M25.50 ARTHRALGIA, UNSPECIFIED JOINT: ICD-10-CM

## 2018-11-27 DIAGNOSIS — Z23 NEED FOR PNEUMOCOCCAL VACCINATION: ICD-10-CM

## 2018-11-27 DIAGNOSIS — B38.1: ICD-10-CM

## 2018-11-27 DIAGNOSIS — C56.1 BORDERLINE SEROUS CYSTADENOMA OF RIGHT OVARY: ICD-10-CM

## 2018-11-27 DIAGNOSIS — E03.9 HYPOTHYROIDISM, UNSPECIFIED TYPE: Primary | ICD-10-CM

## 2018-11-27 DIAGNOSIS — M85.80 OSTEOPENIA, UNSPECIFIED LOCATION: ICD-10-CM

## 2018-11-27 DIAGNOSIS — Z12.12 SCREENING FOR COLORECTAL CANCER: ICD-10-CM

## 2018-11-27 DIAGNOSIS — M76.61 TENDONITIS, ACHILLES, RIGHT: ICD-10-CM

## 2018-11-27 DIAGNOSIS — Z12.11 SCREENING FOR COLORECTAL CANCER: ICD-10-CM

## 2018-11-27 DIAGNOSIS — Z98.890 S/P ROBOT-ASSISTED SURGICAL PROCEDURE: ICD-10-CM

## 2018-11-27 DIAGNOSIS — I10 HYPERTENSION, BENIGN: ICD-10-CM

## 2018-11-27 PROBLEM — Z12.31 SCREENING MAMMOGRAM, ENCOUNTER FOR: Status: RESOLVED | Noted: 2018-08-06 | Resolved: 2018-11-27

## 2018-11-27 PROBLEM — M81.0 AGE-RELATED OSTEOPOROSIS WITHOUT CURRENT PATHOLOGICAL FRACTURE: Status: RESOLVED | Noted: 2017-11-22 | Resolved: 2018-11-27

## 2018-11-27 PROCEDURE — 73130 X-RAY EXAM OF HAND: CPT | Mod: 26,50,, | Performed by: RADIOLOGY

## 2018-11-27 PROCEDURE — 99999 PR PBB SHADOW E&M-EST. PATIENT-LVL V: CPT | Mod: PBBFAC,,, | Performed by: INTERNAL MEDICINE

## 2018-11-27 PROCEDURE — 99215 OFFICE O/P EST HI 40 MIN: CPT | Mod: PBBFAC,25 | Performed by: INTERNAL MEDICINE

## 2018-11-27 PROCEDURE — 99214 OFFICE O/P EST MOD 30 MIN: CPT | Mod: S$PBB,,, | Performed by: INTERNAL MEDICINE

## 2018-11-27 PROCEDURE — 73130 X-RAY EXAM OF HAND: CPT | Mod: 50,TC,FY

## 2018-11-27 RX ORDER — METOPROLOL SUCCINATE 50 MG/1
50 TABLET, EXTENDED RELEASE ORAL DAILY
Qty: 90 TABLET | Refills: 3 | Status: SHIPPED | OUTPATIENT
Start: 2018-11-27 | End: 2019-05-23 | Stop reason: SDUPTHER

## 2018-11-27 NOTE — PROGRESS NOTES
Subjective:   Patient ID: Niurka Pedraza is a 73 y.o. female  Chief complaint:   Chief Complaint   Patient presents with    Annual Exam       HPI  Pt here for annual exam     HTN: taking toprol xl 25 - not cheking at home due to fluctuations and cuasing more anxiety - reviewed bp in epic and most above goal     Hypothyroidism: taking levothyroxine in am and aniyah well.     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 Nemesio Cazares - Has appt with pulm scheduled     Hx of breast cancer - ductal carcinoma in situ - mastectomy right in 2014 - no chemo or xrt - last mmg was 08/2018 and negative and breast exam utd 8/2018 on chart review     Today reports diffuse joint pain today   Reports base of right hand, bilateral hips and lower back pain   No red or swollent joints   No family autoimmune joint disease - no lupus or RA or ank spond    No:   Hair loss  Dry eyes or mouth  Photosensitivity  Raynaud's  Oral or nasal ulcers  Rashes  Pleurisy or pericarditis.  Seizures, psychosis, or stroke.  Venous or arterial clots.  Pregnancy hx (if applicable): miscarriage      She is requesting referral to rheumatologist for opinion     Reports had pneumonia vaccine but unsure which one  "- will let me know  Review of Systems    Objective:  Vitals:    11/27/18 1320   BP: 137/72   Pulse: 77   SpO2: 97%   Weight: 81 kg (178 lb 9.2 oz)   Height: 5' 4" (1.626 m)     Body mass index is 30.65 kg/m².    Physical Exam   Constitutional: She is oriented to person, place, and time. She appears well-developed and well-nourished.   HENT:   Head: Normocephalic and atraumatic.   Right Ear: External ear normal.   Left Ear: External ear normal.   Nose: Nose normal.   Mouth/Throat: Oropharynx is clear and moist. No oropharyngeal exudate.   No carotid bruits   Eyes: Conjunctivae and EOM are normal.   Neck: Neck supple. No thyromegaly present.   Cardiovascular: Normal rate, regular rhythm and intact distal pulses.   Pulmonary/Chest: Effort normal and breath sounds normal. She has no wheezes. She has no rales.   Abdominal: Soft. Bowel sounds are normal.   Musculoskeletal: She exhibits no edema or tenderness.   Lymphadenopathy:     She has no cervical adenopathy.   Neurological: She is alert and oriented to person, place, and time.   Skin: Skin is warm and dry.   Psychiatric: Her behavior is normal. Thought content normal.   Vitals reviewed.      Assessment:  1. Hypothyroidism, unspecified type    2. Screening for colorectal cancer    3. Need for pneumococcal vaccination    4. Osteopenia, unspecified location    5. Mixed hyperlipidemia    6. Hypertension, benign    7. Arthralgia, unspecified joint    8. Coccidioidomycosis, chronic pulmonary    9. Borderline serous cystadenoma of right ovary    10. S/P RATLH/BSO/PLND/Omental Bx (mini lap)    11. Tendonitis, Achilles, right        Plan:  Niurka was seen today for annual exam.    Diagnoses and all orders for this visit:    Hypothyroidism, unspecified type  -     TSH; Future  -     CBC auto differential; Future  Controlled, cont med      Screening for colorectal cancer  -     Fecal Immunochemical Test (iFOBT); Future    Need for pneumococcal vaccination    Osteopenia, " unspecified location  -     Vitamin D; Future  rec otc supplement of calcium 1200mg and vit d 800u divided into 2 doses daily along with reg exercise    Mixed hyperlipidemia  -     TSH; Future  -     Lipid panel; Future  -     CBC auto differential; Future  -     Comprehensive metabolic panel; Future  Controlled - cont statin     Hypertension, benign  Uncontrolled  inc bb to 50 as above goal   rtc in 2 weeks for nv for bp check     Arthralgia, unspecified joint  -     Ambulatory Referral to Rheumatology  -     Ambulatory Referral to Orthopedics  -     Sedimentation rate; Future  -     C-reactive protein; Future  -      X-Ray Hand 2 View Bilat; Future  Suspect 2/2 to OA - pt requesting referral to rheum  Will refer to hand specialist     Coccidioidomycosis, chronic pulmonary  Stable, keep appt with pulm     Borderline serous cystadenoma of right ovary  Followed by gyn onc     S/P RATLH/BSO/PLND/Omental Bx (mini lap)  As above     Tendonitis, Achilles, right  Stable - followed by ortho     Other orders  -     metoprolol succinate (TOPROL-XL) 50 MG 24 hr tablet; Take 1 tablet (50 mg total) by mouth once daily.    Declined cscope - was counseled to pursue cscope with mother's hx of possible colon cancer - pt declined and will let me know if reconsiders   Agrees to fitkit     Health Maintenance   Topic Date Due    Colonoscopy  03/10/1994    Pneumococcal (65+) (1 of 2 - PCV13) 03/10/2009    Influenza Vaccine  01/24/2020 (Originally 8/1/2018)    Mammogram  08/08/2020    TETANUS VACCINE  08/18/2020    DEXA SCAN  11/22/2020    Lipid Panel  11/22/2022    Zoster Vaccine  Addressed

## 2018-11-30 DIAGNOSIS — M79.641 RIGHT HAND PAIN: Primary | ICD-10-CM

## 2018-12-03 ENCOUNTER — OFFICE VISIT (OUTPATIENT)
Dept: ORTHOPEDICS | Facility: CLINIC | Age: 73
End: 2018-12-03
Attending: INTERNAL MEDICINE
Payer: MEDICARE

## 2018-12-03 VITALS
WEIGHT: 178.56 LBS | HEART RATE: 74 BPM | SYSTOLIC BLOOD PRESSURE: 129 MMHG | HEIGHT: 64 IN | DIASTOLIC BLOOD PRESSURE: 85 MMHG | BODY MASS INDEX: 30.48 KG/M2

## 2018-12-03 DIAGNOSIS — M18.11 ARTHRITIS OF CARPOMETACARPAL (CMC) JOINT OF RIGHT THUMB: Primary | ICD-10-CM

## 2018-12-03 PROCEDURE — 99213 OFFICE O/P EST LOW 20 MIN: CPT | Mod: PBBFAC | Performed by: ORTHOPAEDIC SURGERY

## 2018-12-03 PROCEDURE — 99999 PR PBB SHADOW E&M-EST. PATIENT-LVL III: CPT | Mod: PBBFAC,,, | Performed by: ORTHOPAEDIC SURGERY

## 2018-12-03 PROCEDURE — 99203 OFFICE O/P NEW LOW 30 MIN: CPT | Mod: S$PBB,GC,, | Performed by: ORTHOPAEDIC SURGERY

## 2018-12-03 NOTE — PROGRESS NOTES
[facility]  History & Physical  Orthopedics    SUBJECTIVE:      Chief Complaint: R thumb pain    Referring Provider: Savana Anderson MD       History of Present Illness:  Patient is a 73 y.o. right hand dominant female who presents today with complaints of pain in the base of her right thumb that has been consistent for the past year. She says that the pain varies from day to day but can get up to a 7/10. She uses diclofenac cream which helps somewhat. She tries not to take many NSAIDs orally due to liver issues. She denies radiation of pain or numbness.      The patient is a/an retired.    Onset of symptoms/DOI was 1 year ago.    Symptoms are aggravated by activity and during the day.    Symptoms are alleviated by rest and medication.    Symptoms consist of pain.    The patient rates their pain as a 7/10.    Attempted treatment(s) and/or interventions include rest and anti-inflammatory medications.     The patient denies any fevers, chills, N/V, D/C and presents for evaluation.       Past Medical History:   Diagnosis Date    Arthritis     Borderline serous cystadenoma of right ovary 4/3/2018    Breast cancer     mastectomy 2014    Coccidiomycosis, progressive     Elevated CA-125 2/25/2018    Hyperlipidemia     Hypertension     OAB (overactive bladder)     Osteopenia     on Dexa 11/2017    Osteoporosis     pt reports hx of this, declined fosamax in past - treated with calcium and vit D    Pelvic mass 2/25/2018    Pulmonary nodules     Thyroid disease      Past Surgical History:   Procedure Laterality Date    CHOLECYSTECTOMY      DISSECTION-LYMPH NODE-BILATERAL Bilateral 3/23/2018    [facility] OR    MASTECTOMY Right     2014    OMENTECTOMY N/A 3/23/2018    [facility] OR    STAGING N/A 3/23/2018    [facility]    XI ROBOT ASSISTED LAPAROSCOPIC SALPINGO-OOPHERECTOMY BILATERAL  Bilateral 3/23/2018    Performed by [facility] at [facility] [place] OR    XI ROBOTIC ASSISTED LAPAROSCOPIC HYSTERECTOMY, MINI LAP N/A 3/23/2018    Performed by Talita Barrios [facility] at [facility] [place] OR     Review of patient's allergies indicates:   Allergen Reactions    Ciprofloxacin Other (See Comments)     Right foot pain and edema, tendonitis    Amlodipine Edema and Swelling     BLE  BLE    Lisinopril Other (See Comments)     cough     Social History     Social History Narrative    From [facility]     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    Moved to [place] for period of time    Moved back to [place] Summer 2016 and then to [place] this year Sept 2017     Family History   Problem Relation Age of Onset    Cancer Mother         colon cancer    Breast cancer Mother     Hypertension Father     Heart disease Father     Stroke Father     No Known Problems Sister     Cancer Brother         lung, ++ tobacco    Hypertension Brother     No Known Problems Daughter     Hypertension Son     Colon cancer Neg Hx     Ovarian cancer Neg Hx          Current Outpatient Medications:     atorvastatin (LIPITOR) 20 MG tablet, Take 1 tablet (20 mg total) by mouth once daily., Disp: 90 tablet, Rfl: 3    calcium-vitamin D3 (CALCIUM 500 + D) 500 mg(1,250mg) -200 unit per tablet, Take 1 tablet by mouth 2 (two) times daily with meals., Disp: , Rfl:     cephALEXin (KEFLEX) 500 MG capsule, Take 1 capsule (500 mg total) by mouth every 8 (eight) hours. for 10 days, Disp: 30 capsule, Rfl: 0    diclofenac (VOLTAREN) 75 MG EC tablet, TAKE 1 TABLET (75 MG TOTAL) BY MOUTH 2 (TWO) TIMES DAILY AS NEEDED., Disp: 40 tablet, Rfl: 0    diclofenac sodium (VOLTAREN) 1 % Gel, Apply 2 g topically 4 (four) times daily., Disp: 1 Tube, Rfl: 1    levothyroxine (SYNTHROID) 50 MCG tablet, TAKE 1 TABLET (50 MCG TOTAL) BY MOUTH ONCE DAILY., Disp: 90 tablet, Rfl: 3    metoprolol succinate (TOPROL-XL) 50 MG 24 hr tablet, Take 1 tablet (50 mg total) by mouth once  daily., Disp: 90 tablet, Rfl: 3    multivitamin (ONE DAILY MULTIVITAMIN) per tablet, Take 1 tablet by mouth once daily., Disp: , Rfl:     oxybutynin (DITROPAN-XL) 5 MG TR24, Take 1 tablet (5 mg total) by mouth once daily., Disp: 30 tablet, Rfl: 11    Current Facility-Administered Medications:     diclofenac sodium 1 % gel, , Topical (Top), Daily, Navid Weller MD      Review of Systems:  Constitutional: no fever or chills  Eyes: no visual changes  ENT: no nasal congestion or sore throat  Respiratory: no cough or shortness of breath  Cardiovascular: no chest pain  Gastrointestinal: no nausea or vomiting, tolerating diet  Musculoskeletal: arthralgias and pain    OBJECTIVE:      Vital Signs (Most Recent):  There were no vitals filed for this visit.  There is no height or weight on file to calculate BMI.      Physical Exam:  Constitutional: The patient appears well-developed and well-nourished. No distress.   Head: Normocephalic and atraumatic.   Nose: Nose normal.   Eyes: Conjunctivae and EOM are normal.   Neck: No tracheal deviation present.   Cardiovascular: Normal rate and intact distal pulses.    Pulmonary/Chest: Effort normal. No respiratory distress.   Abdominal: There is no guarding.   Neurological: The patient is alert.   Psychiatric: The patient has a normal mood and affect.     Right Hand/Wrist Examination:    Observation/Inspection:  Swelling  none    Deformity  none  Discoloration  none     Scars   none    Atrophy  none    HAND/WRIST EXAMINATION:  Finkelstein's Test   Neg  Snuff box tenderness   Neg  Troy's Test    Neg  Hook of Hamate Tenderness  Neg  CMC grind    Pos  Circumduction test   Neg    Neurovascular Exam:  Digits WWP, brisk CR < 3s throughout  NVI motor/LTS to M/R/U nerves, radial pulse 2+  Tinel's Test - Carpal Tunnel  Neg  Tinel's Test - Cubital Tunnel  Neg  Phalen's Test    Neg  Median Nerve Compression Test Neg    ROM hand/wrist/elbow full, painless      Diagnostic  Results:     Xray - R thumb: DJD of CMC joint. No fractures or dislocations.   EMG - none    ASSESSMENT/PLAN:      73 y.o. yo female with R thumb CMC arthritis  1) continue Diclofenac cream for pain  2) removable thumb spica splint given today, to be worse PRN  3) option for injection discussed with patient, she says she will call clinic if she decides to get an injection  4) RTC PRN        Eladio Ervin M.D.

## 2018-12-03 NOTE — LETTER
December 3, 2018      Savana Anderson MD  [address]  [facility] [address]           [facility]  [address], [address]  [facility] [address]  Phone: [phone]          Patient: Niurka Pedraza   MR Number: [id number]   YOB: 1945   Date of Visit: 12/3/2018       Dear Dr. Savana Anderson:    Thank you for referring Niurka Pedraza to me for evaluation. Attached you will find relevant portions of my assessment and plan of care.    If you have questions, please do not hesitate to call me. I look forward to following Niurka Pedraza along with you.    Sincerely,    Eladio Ervin MD    Enclosure  CC:  No Recipients    If you would like to receive this communication electronically, please contact [email or web address] or [phone] to request more information on [employer] access.    For providers and/or their staff who would like to refer a patient to [facility], please contact us through our one-stop-shop provider referral line, [facility], at [phone].    If you feel you have received this communication in error or would no longer like to receive these types of communications, please e-mail [email or web address]

## 2018-12-11 ENCOUNTER — TELEPHONE (OUTPATIENT)
Dept: INTERNAL MEDICINE | Facility: CLINIC | Age: 73
End: 2018-12-11

## 2018-12-11 ENCOUNTER — CLINICAL SUPPORT (OUTPATIENT)
Dept: INTERNAL MEDICINE | Facility: CLINIC | Age: 73
End: 2018-12-11
Payer: MEDICARE

## 2018-12-11 ENCOUNTER — LAB VISIT (OUTPATIENT)
Dept: LAB | Facility: OTHER | Age: 73
End: 2018-12-11
Attending: INTERNAL MEDICINE
Payer: MEDICARE

## 2018-12-11 VITALS — SYSTOLIC BLOOD PRESSURE: 150 MMHG | OXYGEN SATURATION: 95 % | HEART RATE: 68 BPM | DIASTOLIC BLOOD PRESSURE: 87 MMHG

## 2018-12-11 DIAGNOSIS — E03.9 HYPOTHYROIDISM, UNSPECIFIED TYPE: ICD-10-CM

## 2018-12-11 DIAGNOSIS — E78.2 MIXED HYPERLIPIDEMIA: ICD-10-CM

## 2018-12-11 DIAGNOSIS — M85.80 OSTEOPENIA, UNSPECIFIED LOCATION: ICD-10-CM

## 2018-12-11 DIAGNOSIS — M25.50 ARTHRALGIA, UNSPECIFIED JOINT: ICD-10-CM

## 2018-12-11 LAB
25(OH)D3+25(OH)D2 SERPL-MCNC: 37 NG/ML
ALBUMIN SERPL BCP-MCNC: 3.9 G/DL
ALP SERPL-CCNC: 65 U/L
ALT SERPL W/O P-5'-P-CCNC: 55 U/L
ANION GAP SERPL CALC-SCNC: 7 MMOL/L
AST SERPL-CCNC: 37 U/L
BASOPHILS # BLD AUTO: 0.02 K/UL
BASOPHILS NFR BLD: 0.3 %
BILIRUB SERPL-MCNC: 0.8 MG/DL
BUN SERPL-MCNC: 21 MG/DL
CALCIUM SERPL-MCNC: 8.8 MG/DL
CHLORIDE SERPL-SCNC: 107 MMOL/L
CHOLEST SERPL-MCNC: 127 MG/DL
CHOLEST/HDLC SERPL: 3.2 {RATIO}
CO2 SERPL-SCNC: 26 MMOL/L
CREAT SERPL-MCNC: 0.9 MG/DL
CRP SERPL-MCNC: 3.3 MG/L
DIFFERENTIAL METHOD: ABNORMAL
EOSINOPHIL # BLD AUTO: 0.1 K/UL
EOSINOPHIL NFR BLD: 0.8 %
ERYTHROCYTE [DISTWIDTH] IN BLOOD BY AUTOMATED COUNT: 15.7 %
ERYTHROCYTE [SEDIMENTATION RATE] IN BLOOD: 25 MM/HR
EST. GFR  (AFRICAN AMERICAN): >60 ML/MIN/1.73 M^2
EST. GFR  (NON AFRICAN AMERICAN): >60 ML/MIN/1.73 M^2
GLUCOSE SERPL-MCNC: 102 MG/DL
HCT VFR BLD AUTO: 39.4 %
HDLC SERPL-MCNC: 40 MG/DL
HDLC SERPL: 31.5 %
HGB BLD-MCNC: 12.4 G/DL
LDLC SERPL CALC-MCNC: 50.2 MG/DL
LYMPHOCYTES # BLD AUTO: 1.6 K/UL
LYMPHOCYTES NFR BLD: 20.2 %
MCH RBC QN AUTO: 27.4 PG
MCHC RBC AUTO-ENTMCNC: 31.5 G/DL
MCV RBC AUTO: 87 FL
MONOCYTES # BLD AUTO: 0.3 K/UL
MONOCYTES NFR BLD: 4 %
NEUTROPHILS # BLD AUTO: 6 K/UL
NEUTROPHILS NFR BLD: 74.4 %
NONHDLC SERPL-MCNC: 87 MG/DL
PLATELET # BLD AUTO: 290 K/UL
PMV BLD AUTO: 9.9 FL
POTASSIUM SERPL-SCNC: 4 MMOL/L
PROT SERPL-MCNC: 7.2 G/DL
RBC # BLD AUTO: 4.53 M/UL
SODIUM SERPL-SCNC: 140 MMOL/L
TRIGL SERPL-MCNC: 184 MG/DL
TSH SERPL DL<=0.005 MIU/L-ACNC: 2.8 UIU/ML
WBC # BLD AUTO: 7.99 K/UL

## 2018-12-11 PROCEDURE — 99212 OFFICE O/P EST SF 10 MIN: CPT | Mod: PBBFAC

## 2018-12-11 PROCEDURE — 99999 PR PBB SHADOW E&M-EST. PATIENT-LVL II: CPT | Mod: PBBFAC,,,

## 2018-12-11 PROCEDURE — 85025 COMPLETE CBC W/AUTO DIFF WBC: CPT

## 2018-12-11 PROCEDURE — 86140 C-REACTIVE PROTEIN: CPT

## 2018-12-11 PROCEDURE — 80061 LIPID PANEL: CPT

## 2018-12-11 PROCEDURE — 80053 COMPREHEN METABOLIC PANEL: CPT

## 2018-12-11 PROCEDURE — 82306 VITAMIN D 25 HYDROXY: CPT

## 2018-12-11 PROCEDURE — 85651 RBC SED RATE NONAUTOMATED: CPT

## 2018-12-11 PROCEDURE — 36415 COLL VENOUS BLD VENIPUNCTURE: CPT

## 2018-12-11 PROCEDURE — 84443 ASSAY THYROID STIM HORMONE: CPT

## 2018-12-11 RX ORDER — IRBESARTAN 75 MG/1
75 TABLET ORAL NIGHTLY
Qty: 30 TABLET | Refills: 1 | Status: SHIPPED | OUTPATIENT
Start: 2018-12-11 | End: 2018-12-28 | Stop reason: SDUPTHER

## 2018-12-11 NOTE — PROGRESS NOTES
Niurka Pedraza 73 y.o. female is here today for Blood Pressure check.   History of HTN yes.    Review of patient's allergies indicates:   Allergen Reactions    Ciprofloxacin Other (See Comments)     Right foot pain and edema, tendonitis    Amlodipine Edema and Swelling     BLE  BLE    Lisinopril Other (See Comments)     cough     Creatinine   Date Value Ref Range Status   03/24/2018 1.0 0.5 - 1.4 mg/dL Final     Sodium   Date Value Ref Range Status   03/24/2018 137 136 - 145 mmol/L Final     Potassium   Date Value Ref Range Status   03/24/2018 4.2 3.5 - 5.1 mmol/L Final   ]  Patient verifies taking blood pressure medications on a regular basis at the same time of the day.     Current Outpatient Medications:     atorvastatin (LIPITOR) 20 MG tablet, Take 1 tablet (20 mg total) by mouth once daily., Disp: 90 tablet, Rfl: 3    calcium-vitamin D3 (CALCIUM 500 + D) 500 mg(1,250mg) -200 unit per tablet, Take 1 tablet by mouth 2 (two) times daily with meals., Disp: , Rfl:     diclofenac (VOLTAREN) 75 MG EC tablet, TAKE 1 TABLET (75 MG TOTAL) BY MOUTH 2 (TWO) TIMES DAILY AS NEEDED., Disp: 40 tablet, Rfl: 0    diclofenac sodium (VOLTAREN) 1 % Gel, Apply 2 g topically 4 (four) times daily., Disp: 1 Tube, Rfl: 1    levothyroxine (SYNTHROID) 50 MCG tablet, TAKE 1 TABLET (50 MCG TOTAL) BY MOUTH ONCE DAILY., Disp: 90 tablet, Rfl: 3    metoprolol succinate (TOPROL-XL) 50 MG 24 hr tablet, Take 1 tablet (50 mg total) by mouth once daily., Disp: 90 tablet, Rfl: 3    multivitamin (ONE DAILY MULTIVITAMIN) per tablet, Take 1 tablet by mouth once daily., Disp: , Rfl:     oxybutynin (DITROPAN-XL) 5 MG TR24, Take 1 tablet (5 mg total) by mouth once daily., Disp: 30 tablet, Rfl: 11    Current Facility-Administered Medications:     diclofenac sodium 1 % gel, , Topical (Top), Daily, Navid Weller MD  Does patient have record of home blood pressure readings no. Readings have been averaging unknown.   Last dose of blood pressure  medication was taken at 0800.  Patient is asymptomatic.       BP: (!) 150/87 , Pulse: 68 .    Blood pressure reading after 15 minutes was 157/90, Pulse 69.  Dr. Anderson notified.

## 2018-12-11 NOTE — TELEPHONE ENCOUNTER
Spoke w/ pt and informed her for PCP rec and further updates to plan   Pt verbally understand and agree to  new RX and set up BP check appt in 2 weeks as rec by PCP   Pt has no further questions or concerns

## 2018-12-11 NOTE — TELEPHONE ENCOUNTER
bp is above goal today at [place]    - rec cont metoprolol - rec hold off on inc this due to HR in 60s  - rec start irbesartan 75mg daily and arrange nurse visit in 2 weeks for bp check   - please call pt with recs and do not close encounter until pt is reached - thanks!

## 2018-12-13 ENCOUNTER — TELEPHONE (OUTPATIENT)
Dept: INTERNAL MEDICINE | Facility: CLINIC | Age: 73
End: 2018-12-13

## 2018-12-13 DIAGNOSIS — R74.01 TRANSAMINITIS: Primary | ICD-10-CM

## 2018-12-13 NOTE — TELEPHONE ENCOUNTER
Message sent to pt via my chart with lab results and updates to plan.     Please arrange hep panel and lft's in 1-2 weeks   rec avoid all etoh 2-3 days prior to labs and avoid tylenol use as well

## 2018-12-17 ENCOUNTER — LAB VISIT (OUTPATIENT)
Dept: LAB | Facility: HOSPITAL | Age: 73
End: 2018-12-17
Attending: INTERNAL MEDICINE
Payer: MEDICARE

## 2018-12-17 DIAGNOSIS — Z12.12 SCREENING FOR COLORECTAL CANCER: ICD-10-CM

## 2018-12-17 DIAGNOSIS — Z12.11 SCREENING FOR COLORECTAL CANCER: ICD-10-CM

## 2018-12-17 LAB — HEMOCCULT STL QL IA: NEGATIVE

## 2018-12-17 PROCEDURE — 82274 ASSAY TEST FOR BLOOD FECAL: CPT

## 2018-12-18 ENCOUNTER — PATIENT MESSAGE (OUTPATIENT)
Dept: INTERNAL MEDICINE | Facility: CLINIC | Age: 73
End: 2018-12-18

## 2018-12-28 ENCOUNTER — CLINICAL SUPPORT (OUTPATIENT)
Dept: INTERNAL MEDICINE | Facility: CLINIC | Age: 73
End: 2018-12-28
Payer: MEDICARE

## 2018-12-28 ENCOUNTER — TELEPHONE (OUTPATIENT)
Dept: INTERNAL MEDICINE | Facility: CLINIC | Age: 73
End: 2018-12-28

## 2018-12-28 VITALS — HEART RATE: 85 BPM | SYSTOLIC BLOOD PRESSURE: 116 MMHG | DIASTOLIC BLOOD PRESSURE: 82 MMHG | OXYGEN SATURATION: 95 %

## 2018-12-28 PROCEDURE — 99212 OFFICE O/P EST SF 10 MIN: CPT | Mod: PBBFAC

## 2018-12-28 PROCEDURE — 99999 PR PBB SHADOW E&M-EST. PATIENT-LVL II: CPT | Mod: PBBFAC,,,

## 2018-12-28 RX ORDER — IRBESARTAN 75 MG/1
75 TABLET ORAL NIGHTLY
Qty: 90 TABLET | Refills: 1 | Status: SHIPPED | OUTPATIENT
Start: 2018-12-28 | End: 2019-01-09 | Stop reason: SDUPTHER

## 2018-12-28 NOTE — PROGRESS NOTES
Niurka Pedraza 73 y.o. female is here today for Blood Pressure check.   History of HTN yes.    Review of patient's allergies indicates:   Allergen Reactions    Ciprofloxacin Other (See Comments)     Right foot pain and edema, tendonitis    Amlodipine Edema and Swelling     BLE  BLE    Lisinopril Other (See Comments)     cough     Creatinine   Date Value Ref Range Status   12/11/2018 0.9 0.5 - 1.4 mg/dL Final     Sodium   Date Value Ref Range Status   12/11/2018 140 136 - 145 mmol/L Final     Potassium   Date Value Ref Range Status   12/11/2018 4.0 3.5 - 5.1 mmol/L Final   ]  Patient verifies taking blood pressure medications on a regular basis at the same time of the day.     Current Outpatient Medications:     irbesartan (AVAPRO) 75 MG tablet, Take 1 tablet (75 mg total) by mouth every evening., Disp: 30 tablet, Rfl: 1    metoprolol succinate (TOPROL-XL) 50 MG 24 hr tablet, Take 1 tablet (50 mg total) by mouth once daily., Disp: 90 tablet, Rfl: 3    atorvastatin (LIPITOR) 20 MG tablet, Take 1 tablet (20 mg total) by mouth once daily., Disp: 90 tablet, Rfl: 3    calcium-vitamin D3 (CALCIUM 500 + D) 500 mg(1,250mg) -200 unit per tablet, Take 1 tablet by mouth 2 (two) times daily with meals., Disp: , Rfl:     diclofenac (VOLTAREN) 75 MG EC tablet, TAKE 1 TABLET (75 MG TOTAL) BY MOUTH 2 (TWO) TIMES DAILY AS NEEDED., Disp: 40 tablet, Rfl: 0    diclofenac sodium (VOLTAREN) 1 % Gel, Apply 2 g topically 4 (four) times daily., Disp: 1 Tube, Rfl: 1    levothyroxine (SYNTHROID) 50 MCG tablet, TAKE 1 TABLET (50 MCG TOTAL) BY MOUTH ONCE DAILY., Disp: 90 tablet, Rfl: 3    multivitamin (ONE DAILY MULTIVITAMIN) per tablet, Take 1 tablet by mouth once daily., Disp: , Rfl:     oxybutynin (DITROPAN-XL) 5 MG TR24, Take 1 tablet (5 mg total) by mouth once daily., Disp: 30 tablet, Rfl: 11    Current Facility-Administered Medications:     diclofenac sodium 1 % gel, , Topical (Top), Daily, Navid Weller MD  Does patient  have record of home blood pressure readings no.     Last dose of blood pressure medication was taken at 9:00 am.  Patient is asymptomatic.   Denies headaches, chest pains, SOB, blurred vision, dizziness, numbness/tingling to extremities.    BP: 116/82 , Pulse: 85 .    Dr. Anderson notified.

## 2019-01-03 ENCOUNTER — LAB VISIT (OUTPATIENT)
Dept: LAB | Facility: OTHER | Age: 74
End: 2019-01-03
Attending: INTERNAL MEDICINE
Payer: MEDICARE

## 2019-01-03 DIAGNOSIS — R74.01 TRANSAMINITIS: ICD-10-CM

## 2019-01-03 LAB
ALBUMIN SERPL BCP-MCNC: 3.7 G/DL
ALP SERPL-CCNC: 73 U/L
ALT SERPL W/O P-5'-P-CCNC: 51 U/L
AST SERPL-CCNC: 31 U/L
BILIRUB DIRECT SERPL-MCNC: 0.3 MG/DL
BILIRUB SERPL-MCNC: 0.9 MG/DL
PROT SERPL-MCNC: 7.3 G/DL

## 2019-01-03 PROCEDURE — 36415 COLL VENOUS BLD VENIPUNCTURE: CPT

## 2019-01-03 PROCEDURE — 80076 HEPATIC FUNCTION PANEL: CPT

## 2019-01-03 PROCEDURE — 80074 ACUTE HEPATITIS PANEL: CPT

## 2019-01-04 LAB
HAV IGM SERPL QL IA: NEGATIVE
HBV CORE IGM SERPL QL IA: NEGATIVE
HBV SURFACE AG SERPL QL IA: NEGATIVE
HCV AB SERPL QL IA: NEGATIVE

## 2019-01-08 ENCOUNTER — PATIENT MESSAGE (OUTPATIENT)
Dept: UROLOGY | Facility: CLINIC | Age: 74
End: 2019-01-08

## 2019-01-08 DIAGNOSIS — R39.89 SUSPECTED UTI: Primary | ICD-10-CM

## 2019-01-09 ENCOUNTER — LAB VISIT (OUTPATIENT)
Dept: LAB | Facility: OTHER | Age: 74
End: 2019-01-09
Payer: MEDICARE

## 2019-01-09 DIAGNOSIS — R39.89 SUSPECTED UTI: ICD-10-CM

## 2019-01-09 LAB
BACTERIA #/AREA URNS HPF: ABNORMAL /HPF
BILIRUB UR QL STRIP: NEGATIVE
CLARITY UR: ABNORMAL
COLOR UR: YELLOW
GLUCOSE UR QL STRIP: NEGATIVE
HGB UR QL STRIP: ABNORMAL
HYALINE CASTS #/AREA URNS LPF: 0 /LPF
KETONES UR QL STRIP: NEGATIVE
LEUKOCYTE ESTERASE UR QL STRIP: ABNORMAL
MICROSCOPIC COMMENT: ABNORMAL
NITRITE UR QL STRIP: POSITIVE
PH UR STRIP: 7 [PH] (ref 5–8)
PROT UR QL STRIP: ABNORMAL
RBC #/AREA URNS HPF: 11 /HPF (ref 0–4)
SP GR UR STRIP: 1.02 (ref 1–1.03)
URN SPEC COLLECT METH UR: ABNORMAL
UROBILINOGEN UR STRIP-ACNC: NEGATIVE EU/DL
WBC #/AREA URNS HPF: 50 /HPF (ref 0–5)

## 2019-01-09 PROCEDURE — 87186 SC STD MICRODIL/AGAR DIL: CPT

## 2019-01-09 PROCEDURE — 87088 URINE BACTERIA CULTURE: CPT

## 2019-01-09 PROCEDURE — 81000 URINALYSIS NONAUTO W/SCOPE: CPT

## 2019-01-09 PROCEDURE — 87077 CULTURE AEROBIC IDENTIFY: CPT

## 2019-01-09 PROCEDURE — 87086 URINE CULTURE/COLONY COUNT: CPT

## 2019-01-09 RX ORDER — IRBESARTAN 75 MG/1
75 TABLET ORAL NIGHTLY
Qty: 90 TABLET | Refills: 3 | Status: SHIPPED | OUTPATIENT
Start: 2019-01-09 | End: 2019-03-29 | Stop reason: SDUPTHER

## 2019-01-12 LAB — BACTERIA UR CULT: NORMAL

## 2019-01-14 ENCOUNTER — TELEPHONE (OUTPATIENT)
Dept: UROLOGY | Facility: CLINIC | Age: 74
End: 2019-01-14

## 2019-01-14 ENCOUNTER — PATIENT MESSAGE (OUTPATIENT)
Dept: UROLOGY | Facility: CLINIC | Age: 74
End: 2019-01-14

## 2019-01-14 RX ORDER — CEPHALEXIN 500 MG/1
500 CAPSULE ORAL EVERY 8 HOURS
Qty: 30 CAPSULE | Refills: 0 | Status: SHIPPED | OUTPATIENT
Start: 2019-01-14 | End: 2019-01-24

## 2019-01-29 ENCOUNTER — OFFICE VISIT (OUTPATIENT)
Dept: RHEUMATOLOGY | Facility: CLINIC | Age: 74
End: 2019-01-29
Payer: MEDICARE

## 2019-01-29 VITALS
HEIGHT: 64 IN | SYSTOLIC BLOOD PRESSURE: 123 MMHG | BODY MASS INDEX: 31.27 KG/M2 | WEIGHT: 183.19 LBS | DIASTOLIC BLOOD PRESSURE: 77 MMHG | HEART RATE: 75 BPM

## 2019-01-29 DIAGNOSIS — M54.50 CHRONIC MIDLINE LOW BACK PAIN WITHOUT SCIATICA: ICD-10-CM

## 2019-01-29 DIAGNOSIS — M18.11 ARTHRITIS OF CARPOMETACARPAL (CMC) JOINT OF RIGHT THUMB: ICD-10-CM

## 2019-01-29 DIAGNOSIS — G89.29 CHRONIC MIDLINE LOW BACK PAIN WITHOUT SCIATICA: ICD-10-CM

## 2019-01-29 DIAGNOSIS — M76.61 TENDONITIS, ACHILLES, RIGHT: Primary | ICD-10-CM

## 2019-01-29 PROCEDURE — 99204 PR OFFICE/OUTPT VISIT, NEW, LEVL IV, 45-59 MIN: ICD-10-PCS | Mod: S$PBB,,, | Performed by: INTERNAL MEDICINE

## 2019-01-29 PROCEDURE — 99214 OFFICE O/P EST MOD 30 MIN: CPT | Mod: PBBFAC | Performed by: INTERNAL MEDICINE

## 2019-01-29 PROCEDURE — 99999 PR PBB SHADOW E&M-EST. PATIENT-LVL IV: ICD-10-PCS | Mod: PBBFAC,,, | Performed by: INTERNAL MEDICINE

## 2019-01-29 PROCEDURE — 99999 PR PBB SHADOW E&M-EST. PATIENT-LVL IV: CPT | Mod: PBBFAC,,, | Performed by: INTERNAL MEDICINE

## 2019-01-29 PROCEDURE — 99204 OFFICE O/P NEW MOD 45 MIN: CPT | Mod: S$PBB,,, | Performed by: INTERNAL MEDICINE

## 2019-01-29 NOTE — PROGRESS NOTES
Chief Complaint   Patient presents with    Disease Management     thumb pain,ankle pain and back pain       Patient was referred by     History of presenting illness    73 year old white female comes in with joint pains for a long time    Right thumb has been hurting  She has a splint now and that helps  She has hand xrays with b/l CMC OA     Low back hurts in the mornings  Walking first few steps is hard  Bending and mopping and sweeping is hard  Not very stable    Right foot/ankle had a tendinitis  This was due to a quinolone  MRI right ankle had   *Severe tendinosis at the level of the posterior tibial tendon with interstitial split tear  *Normal tibiotalar and subtalar joints.Normal talar dome articular cartilage,  *Intact Achilles' tendon and plantar fascia.  * Normal medial and lateral ligaments.  *Intact  peroneus brevis.    Topical voltaren gel helps    Past history : serous cystadenoma right ovary,breast cancer,HTN,HLD,osteopenia  Thyroid disease,pulmonary nodules    Family history : none    Social history : former smoker quit 2000      Review of Systems   Constitutional: Negative for activity change, appetite change, chills, diaphoresis, fatigue, fever and unexpected weight change.   HENT: Negative for congestion, dental problem, drooling, ear discharge, ear pain, facial swelling, hearing loss, mouth sores, nosebleeds, postnasal drip, rhinorrhea, sinus pressure, sinus pain, sneezing, sore throat, tinnitus, trouble swallowing and voice change.    Eyes: Negative for photophobia, pain, discharge, redness, itching and visual disturbance.   Respiratory: Negative for apnea, cough, choking, chest tightness, shortness of breath, wheezing and stridor.    Cardiovascular: Negative for chest pain, palpitations and leg swelling.   Gastrointestinal: Negative for abdominal distention, abdominal pain, anal bleeding, blood in stool, constipation, diarrhea, nausea, rectal pain and vomiting.   Endocrine: Negative  for cold intolerance, heat intolerance, polydipsia, polyphagia and polyuria.   Genitourinary: Negative for decreased urine volume, difficulty urinating, dysuria, enuresis, flank pain, frequency, genital sores, hematuria and urgency.   Musculoskeletal: Positive for arthralgias. Negative for back pain, gait problem, joint swelling, myalgias, neck pain and neck stiffness.   Skin: Negative for color change, pallor, rash and wound.   Allergic/Immunologic: Negative for environmental allergies, food allergies and immunocompromised state.   Neurological: Negative for dizziness, tremors, seizures, syncope, facial asymmetry, speech difficulty, weakness, light-headedness, numbness and headaches.   Hematological: Negative for adenopathy. Does not bruise/bleed easily.   Psychiatric/Behavioral: Negative for agitation, behavioral problems, confusion, decreased concentration, dysphoric mood, hallucinations, self-injury, sleep disturbance and suicidal ideas. The patient is not nervous/anxious and is not hyperactive.      Physical Exam     FOLEY-28 tender joint count: 0  FOLEY-28 swollen joint count: 0    Right CMC tenderness    Leg length discrepancy    Physical Exam   Constitutional: She is oriented to person, place, and time and well-developed, well-nourished, and in no distress. No distress.   HENT:   Head: Normocephalic.   Mouth/Throat: Oropharynx is clear and moist.   Eyes: Conjunctivae are normal. Pupils are equal, round, and reactive to light. Right eye exhibits no discharge. Left eye exhibits no discharge. No scleral icterus.   Neck: Normal range of motion. No thyromegaly present.   Cardiovascular: Normal rate, regular rhythm, normal heart sounds and intact distal pulses.    Pulmonary/Chest: Effort normal and breath sounds normal. No stridor.   Abdominal: Soft. Bowel sounds are normal.   Lymphadenopathy:     She has no cervical adenopathy.   Neurological: She is alert and oriented to person, place, and time.   Skin: Skin is  warm. No rash noted. She is not diaphoretic.     Psychiatric: Affect and judgment normal.   Musculoskeletal: Normal range of motion.         Assessment     73 year old white female with  Serous cystadenoma right ovary,breast cancer,HTN,HLD,osteopenia  Thyroid disease,pulmonary nodules    She comes in with  Right base of the thumb pain  Low back pain  Right ankle pain    1. Tendonitis, Achilles, right    2. Arthritis of carpometacarpal (CMC) joint of right thumb    3. Chronic midline low back pain without sciatica        She has Right CMC OA on the recent xray    Right ankle tendinitis and rupture/tear in the peroneal tendon due to use of quinolones    New problem     Plan    Right CMC steroid injection offered  Voltaren gel helps  Surgery offered  Splinting  OT    Right ankle surgery offered,she refused  She does good orthotics and follows with podiatry    Low back/hip xray and PT   Assessment of leg length discrepancy      Niurka was seen today for disease management.    Diagnoses and all orders for this visit:    Tendonitis, Achilles, right    Arthritis of carpometacarpal (CMC) joint of right thumb  -     Ambulatory Referral to Physical/Occupational Therapy    Chronic midline low back pain without sciatica  -     X-Ray Lumbar Spine AP And Lateral; Future  -     X-Ray Hips Bilateral 2 View Inc AP Pelvis; Future  -     Ambulatory Referral to Physical/Occupational Therapy

## 2019-01-29 NOTE — LETTER
January 29, 2019      Savana Anderson MD  [address]  [facility] [address]           [facility]  [address]  [place] [address]  Phone: [phone]  Fax: [phone]          Patient: Niurka Pedraza   MR Number: [id number]   YOB: 1945   Date of Visit: 1/29/2019       Dear Dr. Savana Anderson:    Thank you for referring Niurka Pedraza to me for evaluation. Attached you will find relevant portions of my assessment and plan of care.    If you have questions, please do not hesitate to call me. I look forward to following Niurka Pedraza along with you.    Sincerely,    Mathieu Matthews MD    Enclosure  CC:  No Recipients    If you would like to receive this communication electronically, please contact [email or web address] or [phone] to request more information on [facility] access.    For providers and/or their staff who would like to refer a patient to [facility], please contact us through our one-stop-shop provider referral line, [facility], at [phone].    If you feel you have received this communication in error or would no longer like to receive these types of communications, please e-mail [email or web address]

## 2019-01-30 ENCOUNTER — HOSPITAL ENCOUNTER (OUTPATIENT)
Dept: RADIOLOGY | Facility: OTHER | Age: 74
Discharge: HOME | End: 2019-01-30
Attending: INTERNAL MEDICINE
Payer: MEDICARE

## 2019-01-30 DIAGNOSIS — M54.50 CHRONIC MIDLINE LOW BACK PAIN WITHOUT SCIATICA: ICD-10-CM

## 2019-01-30 DIAGNOSIS — G89.29 CHRONIC MIDLINE LOW BACK PAIN WITHOUT SCIATICA: ICD-10-CM

## 2019-01-30 PROCEDURE — 72100 XR LUMBAR SPINE AP AND LATERAL: ICD-10-PCS | Mod: 26,,, | Performed by: RADIOLOGY

## 2019-01-30 PROCEDURE — 73521 X-RAY EXAM HIPS BI 2 VIEWS: CPT | Mod: 26,,, | Performed by: RADIOLOGY

## 2019-01-30 PROCEDURE — 72100 X-RAY EXAM L-S SPINE 2/3 VWS: CPT | Mod: TC,FY

## 2019-01-30 PROCEDURE — 72100 X-RAY EXAM L-S SPINE 2/3 VWS: CPT | Mod: 26,,, | Performed by: RADIOLOGY

## 2019-01-30 PROCEDURE — 73521 X-RAY EXAM HIPS BI 2 VIEWS: CPT | Mod: TC,FY

## 2019-01-30 PROCEDURE — 73521 XR HIPS BILATERAL 2 VIEW INCL AP PELVIS: ICD-10-PCS | Mod: 26,,, | Performed by: RADIOLOGY

## 2019-02-04 ENCOUNTER — TELEPHONE (OUTPATIENT)
Dept: GYNECOLOGIC ONCOLOGY | Facility: CLINIC | Age: 74
End: 2019-02-04

## 2019-02-05 ENCOUNTER — OFFICE VISIT (OUTPATIENT)
Dept: GYNECOLOGIC ONCOLOGY | Facility: CLINIC | Age: 74
End: 2019-02-05
Payer: MEDICARE

## 2019-02-05 ENCOUNTER — LAB VISIT (OUTPATIENT)
Dept: LAB | Facility: OTHER | Age: 74
End: 2019-02-05
Payer: MEDICARE

## 2019-02-05 VITALS
BODY MASS INDEX: 29.92 KG/M2 | WEIGHT: 175.25 LBS | HEIGHT: 64 IN | HEART RATE: 73 BPM | DIASTOLIC BLOOD PRESSURE: 62 MMHG | SYSTOLIC BLOOD PRESSURE: 123 MMHG

## 2019-02-05 DIAGNOSIS — C56.1 BORDERLINE SEROUS CYSTADENOMA OF RIGHT OVARY: ICD-10-CM

## 2019-02-05 DIAGNOSIS — C56.1 BORDERLINE SEROUS CYSTADENOMA OF RIGHT OVARY: Primary | ICD-10-CM

## 2019-02-05 LAB — CANCER AG125 SERPL-ACNC: 10 U/ML

## 2019-02-05 PROCEDURE — 86304 IMMUNOASSAY TUMOR CA 125: CPT

## 2019-02-05 PROCEDURE — 36415 COLL VENOUS BLD VENIPUNCTURE: CPT

## 2019-02-05 PROCEDURE — 99213 OFFICE O/P EST LOW 20 MIN: CPT | Mod: PBBFAC | Performed by: OBSTETRICS & GYNECOLOGY

## 2019-02-05 PROCEDURE — 99214 OFFICE O/P EST MOD 30 MIN: CPT | Mod: S$PBB,,, | Performed by: OBSTETRICS & GYNECOLOGY

## 2019-02-05 PROCEDURE — 99214 PR OFFICE/OUTPT VISIT, EST, LEVL IV, 30-39 MIN: ICD-10-PCS | Mod: S$PBB,,, | Performed by: OBSTETRICS & GYNECOLOGY

## 2019-02-05 PROCEDURE — 99999 PR PBB SHADOW E&M-EST. PATIENT-LVL III: ICD-10-PCS | Mod: PBBFAC,,, | Performed by: OBSTETRICS & GYNECOLOGY

## 2019-02-05 PROCEDURE — 99999 PR PBB SHADOW E&M-EST. PATIENT-LVL III: CPT | Mod: PBBFAC,,, | Performed by: OBSTETRICS & GYNECOLOGY

## 2019-02-05 NOTE — PROGRESS NOTES
Subjective:      Patient ID: Niurka Pedraza is a 73 y.o. female.    Chief Complaint: No chief complaint on file.      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 Without complaints or concerns. Specifically denies N/V, pain, swelling, bleeding, unexpected weight change, SOB, problems with bowel or bladder function.   Disease free interval 1 year.       41>15>10>12>pending today     History:  Patient is a 71yo female originally referred by Dr. Linette Torres for pelvic mass and elevated . Patient was seen by her urologist for recurrent UTIs and imaging was ordered.      Imaging reviewed:   CT urogram abd/pelvis 17  Impression   Bilateral pulmonary nodules, largest measuring 1.5 cm at the right lower lobe. Comparison with any prior cross-sectional imaging would be beneficial. Close interval followup, PET CT, and/or biopsy may be considered for further evaluation.    Complex cystic lesion in the right adnexa. Pelvic ultrasound would be beneficial for further evaluation.    Focal dilated biliary radical in segment 7. This is of uncertain significance. There are no concerning hepatic lesions.      Pelvic US 18  Impression    Complex cystic lesion measuring 3.8 cm right ovary corresponds to abnormality seen on CT. While this may represent a hemorrhagic cyst in light of postmenopausal status Clinical correlation and further characterization with MRI and surgical consultation advised.    Left ovary not seen.     UT 5.5cm        41 elevated. CEA 4.8.      Personal history of breast cancer 2014, R mastectomy, no adjuvant treatment.    MMG 2017 WNLs.      Prior abdominal surgeries include cholecystectomy.  x 3. Family history mother with breast cancer, no ovarian, uterine, or colon cancer.      S/p RTLH/BSO/BPLND/OMX 3/23/18. Uncomplicated post op course. Final pathology reviewed showing papillary seromucinous borderline tumor of  bilateral ovaries.    Review of Systems   Constitutional: Negative for appetite change, chills, fatigue and fever.   HENT: Negative for mouth sores.    Respiratory: Negative for cough and shortness of breath.    Cardiovascular: Negative for leg swelling.   Gastrointestinal: Negative for abdominal pain, blood in stool, constipation and diarrhea.   Endocrine: Negative for cold intolerance.   Genitourinary: Negative for dysuria and vaginal bleeding.   Musculoskeletal: Negative for myalgias.   Skin: Negative for rash.   Allergic/Immunologic: Negative.    Neurological: Negative for weakness and numbness.   Hematological: Negative for adenopathy. Does not bruise/bleed easily.   Psychiatric/Behavioral: Negative for confusion.       Objective:   Physical Exam:   Constitutional: She is oriented to person, place, and time. She appears well-developed and well-nourished.    HENT:   Head: Normocephalic and atraumatic.    Eyes: EOM are normal. Pupils are equal, round, and reactive to light.    Neck: Normal range of motion. Neck supple. No thyromegaly present.    Cardiovascular: Normal rate, regular rhythm and intact distal pulses.     Pulmonary/Chest: Effort normal and breath sounds normal. No respiratory distress. She has no wheezes.        Abdominal: Soft. Bowel sounds are normal. She exhibits no distension, no ascites and no mass. There is no tenderness.     Genitourinary: Rectum normal and vagina normal. Pelvic exam was performed with patient supine. There is no lesion on the right labia. There is no lesion on the left labia. Uterus is absent. There is an absent adnexa. Right adnexum displays no mass. Left adnexum displays no mass. Vaginal cuff normal.Cervix exhibits absence.           Musculoskeletal: Normal range of motion and moves all extremeties.      Lymphadenopathy:     She has no cervical adenopathy.        Right: No inguinal and no supraclavicular adenopathy present.        Left: No inguinal and no supraclavicular  adenopathy present.    Neurological: She is alert and oriented to person, place, and time.    Skin: Skin is warm and dry. No rash noted.    Psychiatric: She has a normal mood and affect.       Assessment:     1. Borderline serous cystadenoma of right ovary        Plan:   No orders of the defined types were placed in this encounter.         No evidence of disease on today's exam  Feels well, no new symptoms  Disease free interval 1 year  Tumor markers  pending.     Recommend continued surveillance. RTC 3 months with  or sooner if needed.

## 2019-02-24 ENCOUNTER — PATIENT MESSAGE (OUTPATIENT)
Dept: UROLOGY | Facility: CLINIC | Age: 74
End: 2019-02-24

## 2019-02-25 ENCOUNTER — LAB VISIT (OUTPATIENT)
Dept: LAB | Facility: OTHER | Age: 74
End: 2019-02-25
Attending: UROLOGY
Payer: MEDICARE

## 2019-02-25 DIAGNOSIS — N39.0 URINARY TRACT INFECTION WITHOUT HEMATURIA, SITE UNSPECIFIED: ICD-10-CM

## 2019-02-25 DIAGNOSIS — N39.0 URINARY TRACT INFECTION WITHOUT HEMATURIA, SITE UNSPECIFIED: Primary | ICD-10-CM

## 2019-02-25 PROCEDURE — 87086 URINE CULTURE/COLONY COUNT: CPT

## 2019-02-25 PROCEDURE — 87088 URINE BACTERIA CULTURE: CPT

## 2019-02-25 PROCEDURE — 87186 SC STD MICRODIL/AGAR DIL: CPT

## 2019-02-25 PROCEDURE — 87077 CULTURE AEROBIC IDENTIFY: CPT

## 2019-02-27 ENCOUNTER — TELEPHONE (OUTPATIENT)
Dept: UROLOGY | Facility: CLINIC | Age: 74
End: 2019-02-27

## 2019-02-27 DIAGNOSIS — E03.9 HYPOTHYROIDISM, UNSPECIFIED TYPE: ICD-10-CM

## 2019-02-27 LAB — BACTERIA UR CULT: NORMAL

## 2019-02-27 RX ORDER — LEVOTHYROXINE SODIUM 50 UG/1
50 TABLET ORAL
Qty: 90 TABLET | Refills: 3 | Status: SHIPPED | OUTPATIENT
Start: 2019-02-27 | End: 2020-04-02 | Stop reason: SDUPTHER

## 2019-02-27 RX ORDER — AMOXICILLIN AND CLAVULANATE POTASSIUM 875; 125 MG/1; MG/1
1 TABLET, FILM COATED ORAL 2 TIMES DAILY
Qty: 20 TABLET | Refills: 0 | Status: SHIPPED | OUTPATIENT
Start: 2019-02-27 | End: 2019-03-09

## 2019-02-27 NOTE — TELEPHONE ENCOUNTER
Please inform patient of urinary tract infection on recent urine culture. Appropriate antibiotics (Augmentin) were sent in to the pharmacy.

## 2019-03-11 ENCOUNTER — TELEPHONE (OUTPATIENT)
Dept: GYNECOLOGIC ONCOLOGY | Facility: CLINIC | Age: 74
End: 2019-03-11

## 2019-03-27 ENCOUNTER — OFFICE VISIT (OUTPATIENT)
Dept: UROLOGY | Facility: CLINIC | Age: 74
End: 2019-03-27
Attending: UROLOGY
Payer: MEDICARE

## 2019-03-27 VITALS
WEIGHT: 175 LBS | SYSTOLIC BLOOD PRESSURE: 154 MMHG | DIASTOLIC BLOOD PRESSURE: 85 MMHG | HEIGHT: 64 IN | HEART RATE: 73 BPM | BODY MASS INDEX: 29.88 KG/M2

## 2019-03-27 DIAGNOSIS — R31.29 MICROHEMATURIA: ICD-10-CM

## 2019-03-27 DIAGNOSIS — N32.81 OAB (OVERACTIVE BLADDER): ICD-10-CM

## 2019-03-27 DIAGNOSIS — R39.89 SUSPECTED UTI: ICD-10-CM

## 2019-03-27 DIAGNOSIS — N39.0 RECURRENT UTI: Primary | ICD-10-CM

## 2019-03-27 PROCEDURE — 99214 PR OFFICE/OUTPT VISIT, EST, LEVL IV, 30-39 MIN: ICD-10-PCS | Mod: S$GLB,,, | Performed by: UROLOGY

## 2019-03-27 PROCEDURE — 87088 URINE BACTERIA CULTURE: CPT

## 2019-03-27 PROCEDURE — 87086 URINE CULTURE/COLONY COUNT: CPT

## 2019-03-27 PROCEDURE — 99214 OFFICE O/P EST MOD 30 MIN: CPT | Mod: S$GLB,,, | Performed by: UROLOGY

## 2019-03-27 PROCEDURE — 87186 SC STD MICRODIL/AGAR DIL: CPT

## 2019-03-27 PROCEDURE — 87077 CULTURE AEROBIC IDENTIFY: CPT

## 2019-03-27 RX ORDER — NITROFURANTOIN 25; 75 MG/1; MG/1
100 CAPSULE ORAL 2 TIMES DAILY
Qty: 28 CAPSULE | Refills: 0 | Status: SHIPPED | OUTPATIENT
Start: 2019-03-27 | End: 2019-04-10

## 2019-03-27 NOTE — PROGRESS NOTES
Subjective:       Niurka Pedraza is a 74 y.o. female who is an established patient with [place] urologist, Dr Dhaliwal,  was seen for evaluation of UTI.      She was seen by another urologist for recurrent UTIs/dysuria. Multiple +UCx with different bacteria (E coli, Enterococcus). She has had negative cystoscopy and GONZÁLEZ (1/17). UTIs return soon after treatment. She was started on prophy Keflex in 7/17.     She saw PCP in 11/17 with complaints of UTI. UCx was negative. She now feels a constant pressure in SP area and c/o pain with walking. She also notes pain worse with movement (like hitting a bump when driving). Denies dysuria. Increased frequency to now q1h. Present for 2 months. Denies constipation, occasional diarrhea. Frequent RADHA. Now with UUI. Also with BOBBI - increased coughing with recent lung issue. Nocturia x 4-5. Denies gross hematuria.     She moved here from [place] in 9/17. She requested to be started on abx prophy starting 1/18 (Keflex 250mg).     PVR (bladder scan) initial visit - 32cc, 0cc.    CT uro - no  abnormalities. Lung nodules - followed by pulmonary. Ovarian cyst - referred to gyn (now s/p DARCI-BSO for ovarian cancer).     She has been doing great with the Ditropan - she is very pleased. Was on abx prophy (Keflex) 1/18 x 9 months. Taking probiotics that is helping.     She [place] ([place] - took Bactrim. Now off prophylactic abx - more frequency, urgency, nocturia, SP pressure. No significant dysuria.     UTIs have become more frequent. Symptoms return after only 5 days of abx. She is traveling to [facility] next week. +UTI symptoms.      UCx:  2/19 - >100k E coli  1/19 - >100k E coli  11/18 - >100k E coli  8/18 - contaminant  7/18 - 50-99k Enterobacter (treated with Bactrim)  11/17 - neg  10/17 - >100k E coli  10/17- >100k Enterococcus  7/17 - >100k E coli  5/17- >100k E coli  5/17 - neg  4/17- 50-99k Enterococcus  3/17 - >100k E coli  12/16 - neg    PVR (bladder  "scan) last visit - 33cc, 50cc      The following portions of the patient's history were reviewed and updated as appropriate: allergies, current medications, past family history, past medical history, past social history, past surgical history and problem list.     Review of Systems  Constitutional: no fever or chills  ENT: no nasal congestion or sore throat  Respiratory: no cough or shortness of breath  Cardiovascular: no chest pain or palpitations  Gastrointestinal: no nausea or vomiting, tolerating diet  Genitourinary: as per HPI  Hematologic/Lymphatic: no easy bruising or lymphadenopathy  Musculoskeletal: no arthralgias or myalgias  Skin: no rashes or lesions  Neurological: no seizures or tremors  Behavioral/Psych: no auditory or visual hallucinations        Objective:    Vitals: BP (!) 154/85 (BP Location: Left arm, Patient Position: Sitting, BP Method: Large (Automatic))   Pulse 73   Ht 5' 4" (1.626 m)   Wt 79.4 kg (175 lb)   LMP 02/08/2000   BMI 30.04 kg/m²     Physical Exam   General: well developed, well nourished in no acute distress  Head: normocephalic, atraumatic  Neck: supple, trachea midline, no obvious enlargement of thyroid  HEENT: EOMI, mucus membranes moist, sclera anicteric, no hearing impairment  Lungs: symmetric expansion, non-labored breathing  Cardiovascular: regular rate and rhythm, normal pulses  Abdomen: soft, non tender, non distended, no palpable masses, no hepatosplenomegaly, no hernias, no CVA tenderness  Musculoskeletal: no peripheral edema, normal ROM in bilateral upper and lower extremities  Lymphatics: no cervical or inguinal lymphadenopathy  Skin: no rashes or lesions  Neuro: alert and oriented x 3, no gross deficits  Psych: normal judgment and insight, normal mood/affect and non-anxious  Genitourinary:   patient declined exam      Lab Review   Urine analysis today in clinic shows - +LE, +nit, 250 RBCs    Lab Results   Component Value Date    WBC 7.99 12/11/2018    HGB 12.4 " 12/11/2018    HCT 39.4 12/11/2018    MCV 87 12/11/2018     12/11/2018     Lab Results   Component Value Date    CREATININE 0.9 12/11/2018    BUN 21 12/11/2018       Imaging  Images and reports were personally reviewed by me and discussed with patient  GNOZÁLEZ reviewed       Assessment/Plan:      1. Recurrent UTI    - Discussed UTI prevention strategies.   - Adequate hydration.   - Double voiding. Consider timed voiding.    - Avoid constipation.   - GONZÁLEZ - negative 1/17   - Cystoscopy - negative in 1/17 (by another urologist)   - Cranberry/probiotics.   - Estrace cream 2x weekly - will consider in near future (now with ovarian ca)   - Call with UTI symptoms so UA/UCx can be sent.    - Abx prophy (started 1/18) - Keflex 250mg took for 9 months, stopped 9/18     - Multiple UTIs since stopping prophy   - Consider restart prophy   - Repeat workup - GONZÁLEZ and cysto   - UCx today     2. Microhematuria    - Discussed etiology and workup of hematuria   - UCx - results above   - Cytology - previously negative   - CT urogram - no  abnormalities. GONZÁLEZ was negative from 1/17.   - Office cystoscopy - negative 1/17        3. Nocturia/frequency/urge incontinence   - Ditropan XL 5mg - was doing great   - May need increase, will eval after UTI treated      Follow up in 3-4 weeks for cystoscopy

## 2019-03-29 ENCOUNTER — TELEPHONE (OUTPATIENT)
Dept: UROLOGY | Facility: HOSPITAL | Age: 74
End: 2019-03-29

## 2019-03-29 DIAGNOSIS — I10 HYPERTENSION, BENIGN: Primary | ICD-10-CM

## 2019-03-29 LAB — BACTERIA UR CULT: NORMAL

## 2019-03-29 RX ORDER — IRBESARTAN 75 MG/1
75 TABLET ORAL NIGHTLY
Qty: 90 TABLET | Refills: 3 | Status: SHIPPED | OUTPATIENT
Start: 2019-03-29 | End: 2020-02-24 | Stop reason: SDUPTHER

## 2019-03-29 NOTE — TELEPHONE ENCOUNTER
Please inform pt that recent urine culture showed infection. Continue taking abx given at recent visit until all pills are completed. Thanks.

## 2019-04-01 RX ORDER — OXYBUTYNIN CHLORIDE 5 MG/1
5 TABLET, EXTENDED RELEASE ORAL DAILY
Qty: 30 TABLET | Refills: 11 | Status: SHIPPED | OUTPATIENT
Start: 2019-04-01 | End: 2019-09-04

## 2019-04-22 ENCOUNTER — HOSPITAL ENCOUNTER (OUTPATIENT)
Dept: RADIOLOGY | Facility: OTHER | Age: 74
Discharge: HOME | End: 2019-04-22
Attending: UROLOGY
Payer: MEDICARE

## 2019-04-22 DIAGNOSIS — N39.0 RECURRENT UTI: ICD-10-CM

## 2019-04-22 PROCEDURE — 76770 US EXAM ABDO BACK WALL COMP: CPT | Mod: TC

## 2019-04-22 PROCEDURE — 76770 US EXAM ABDO BACK WALL COMP: CPT | Mod: 26,,, | Performed by: RADIOLOGY

## 2019-04-22 PROCEDURE — 76770 US RETROPERITONEAL COMPLETE: ICD-10-PCS | Mod: 26,,, | Performed by: RADIOLOGY

## 2019-05-01 ENCOUNTER — PROCEDURE VISIT (OUTPATIENT)
Dept: UROLOGY | Facility: CLINIC | Age: 74
End: 2019-05-01
Attending: UROLOGY
Payer: MEDICARE

## 2019-05-01 VITALS
HEART RATE: 64 BPM | BODY MASS INDEX: 29.89 KG/M2 | DIASTOLIC BLOOD PRESSURE: 72 MMHG | SYSTOLIC BLOOD PRESSURE: 132 MMHG | HEIGHT: 64 IN | WEIGHT: 175.06 LBS

## 2019-05-01 DIAGNOSIS — N39.0 RECURRENT UTI: ICD-10-CM

## 2019-05-01 LAB
BILIRUB SERPL-MCNC: ABNORMAL MG/DL
BLOOD URINE, POC: ABNORMAL
COLOR, POC UA: ABNORMAL
GLUCOSE UR QL STRIP: NORMAL
KETONES UR QL STRIP: ABNORMAL
LEUKOCYTE ESTERASE URINE, POC: ABNORMAL
NITRITE, POC UA: ABNORMAL
PH, POC UA: 5
PROTEIN, POC: ABNORMAL
SPECIFIC GRAVITY, POC UA: 1.02
UROBILINOGEN, POC UA: NORMAL

## 2019-05-01 PROCEDURE — 81002 POCT URINE DIPSTICK WITHOUT MICROSCOPE: ICD-10-PCS | Mod: S$GLB,,, | Performed by: UROLOGY

## 2019-05-01 PROCEDURE — 81002 URINALYSIS NONAUTO W/O SCOPE: CPT | Mod: S$GLB,,, | Performed by: UROLOGY

## 2019-05-01 PROCEDURE — 87086 URINE CULTURE/COLONY COUNT: CPT

## 2019-05-01 PROCEDURE — 87077 CULTURE AEROBIC IDENTIFY: CPT

## 2019-05-01 PROCEDURE — 87186 SC STD MICRODIL/AGAR DIL: CPT

## 2019-05-01 PROCEDURE — 87088 URINE BACTERIA CULTURE: CPT

## 2019-05-04 ENCOUNTER — PATIENT MESSAGE (OUTPATIENT)
Dept: UROLOGY | Facility: CLINIC | Age: 74
End: 2019-05-04

## 2019-05-04 LAB — BACTERIA UR CULT: NORMAL

## 2019-05-06 ENCOUNTER — TELEPHONE (OUTPATIENT)
Dept: GYNECOLOGIC ONCOLOGY | Facility: CLINIC | Age: 74
End: 2019-05-06

## 2019-05-06 DIAGNOSIS — N39.0 RECURRENT UTI: Primary | ICD-10-CM

## 2019-05-06 RX ORDER — SULFAMETHOXAZOLE AND TRIMETHOPRIM 400; 80 MG/1; MG/1
1 TABLET ORAL 2 TIMES DAILY
Qty: 14 TABLET | Refills: 0 | Status: SHIPPED | OUTPATIENT
Start: 2019-05-06 | End: 2019-05-13

## 2019-05-07 ENCOUNTER — OFFICE VISIT (OUTPATIENT)
Dept: GYNECOLOGIC ONCOLOGY | Facility: CLINIC | Age: 74
End: 2019-05-07
Payer: MEDICARE

## 2019-05-07 ENCOUNTER — LAB VISIT (OUTPATIENT)
Dept: LAB | Facility: OTHER | Age: 74
End: 2019-05-07
Payer: MEDICARE

## 2019-05-07 VITALS
BODY MASS INDEX: 30.48 KG/M2 | SYSTOLIC BLOOD PRESSURE: 130 MMHG | HEART RATE: 72 BPM | DIASTOLIC BLOOD PRESSURE: 70 MMHG | HEIGHT: 64 IN | WEIGHT: 178.56 LBS

## 2019-05-07 DIAGNOSIS — C56.1 BORDERLINE SEROUS CYSTADENOMA OF RIGHT OVARY: ICD-10-CM

## 2019-05-07 DIAGNOSIS — C56.9 SEROUS CYSTADENOMA OF OVARY WITH BORDERLINE MALIGNANT FEATURES: Primary | ICD-10-CM

## 2019-05-07 LAB — CANCER AG125 SERPL-ACNC: 11 U/ML (ref 0–30)

## 2019-05-07 PROCEDURE — 99999 PR PBB SHADOW E&M-EST. PATIENT-LVL III: ICD-10-PCS | Mod: PBBFAC,,, | Performed by: OBSTETRICS & GYNECOLOGY

## 2019-05-07 PROCEDURE — 99999 PR PBB SHADOW E&M-EST. PATIENT-LVL III: CPT | Mod: PBBFAC,,, | Performed by: OBSTETRICS & GYNECOLOGY

## 2019-05-07 PROCEDURE — 86304 IMMUNOASSAY TUMOR CA 125: CPT

## 2019-05-07 PROCEDURE — 36415 COLL VENOUS BLD VENIPUNCTURE: CPT

## 2019-05-07 PROCEDURE — 99214 PR OFFICE/OUTPT VISIT, EST, LEVL IV, 30-39 MIN: ICD-10-PCS | Mod: S$PBB,,, | Performed by: OBSTETRICS & GYNECOLOGY

## 2019-05-07 PROCEDURE — 99214 OFFICE O/P EST MOD 30 MIN: CPT | Mod: S$PBB,,, | Performed by: OBSTETRICS & GYNECOLOGY

## 2019-05-07 PROCEDURE — 99213 OFFICE O/P EST LOW 20 MIN: CPT | Mod: PBBFAC | Performed by: OBSTETRICS & GYNECOLOGY

## 2019-05-07 NOTE — PROGRESS NOTES
Subjective:      Patient ID: Niurka Pedraza is a 74 y.o. female.    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 Without complaints or concerns. Specifically denies N/V, pain, swelling, bleeding, unexpected weight change, SOB, problems with bowel or bladder function.   Disease free interval 1.25 year.       41>15>10>12>10>pending today     History:  Patient is a 71yo female originally referred by Dr. Linette Torres for pelvic mass and elevated . Patient was seen by her urologist for recurrent UTIs and imaging was ordered.      Imaging reviewed:   CT urogram abd/pelvis 17  Impression   Bilateral pulmonary nodules, largest measuring 1.5 cm at the right lower lobe. Comparison with any prior cross-sectional imaging would be beneficial. Close interval followup, PET CT, and/or biopsy may be considered for further evaluation.    Complex cystic lesion in the right adnexa. Pelvic ultrasound would be beneficial for further evaluation.    Focal dilated biliary radical in segment 7. This is of uncertain significance. There are no concerning hepatic lesions.      Pelvic US 18  Impression    Complex cystic lesion measuring 3.8 cm right ovary corresponds to abnormality seen on CT. While this may represent a hemorrhagic cyst in light of postmenopausal status Clinical correlation and further characterization with MRI and surgical consultation advised.    Left ovary not seen.     UT 5.5cm        41 elevated. CEA 4.8.      Personal history of breast cancer , R mastectomy, no adjuvant treatment.    MMG 2017 WNLs.      Prior abdominal surgeries include cholecystectomy.  x 3. Family history mother with breast cancer, no ovarian, uterine, or colon cancer.      S/p RTLH/BSO/BPLND/OMX 3/23/18. Uncomplicated post op course. Final pathology reviewed showing papillary  seromucinous borderline tumor of bilateral ovaries.    Review of Systems   Constitutional: Negative for appetite change, chills, fatigue and fever.   HENT: Negative for mouth sores.    Respiratory: Negative for cough and shortness of breath.    Cardiovascular: Negative for leg swelling.   Gastrointestinal: Negative for abdominal pain, blood in stool, constipation and diarrhea.   Endocrine: Negative for cold intolerance.   Genitourinary: Negative for dysuria and vaginal bleeding.   Musculoskeletal: Negative for myalgias.   Skin: Negative for rash.   Allergic/Immunologic: Negative.    Neurological: Negative for weakness and numbness.   Hematological: Negative for adenopathy. Does not bruise/bleed easily.   Psychiatric/Behavioral: Negative for confusion.       Objective:   Physical Exam:   Constitutional: She is oriented to person, place, and time. She appears well-developed and well-nourished.    HENT:   Head: Normocephalic and atraumatic.    Eyes: Pupils are equal, round, and reactive to light. EOM are normal.    Neck: Normal range of motion. Neck supple. No thyromegaly present.    Cardiovascular: Normal rate, regular rhythm and intact distal pulses.     Pulmonary/Chest: Effort normal and breath sounds normal. No respiratory distress. She has no wheezes.        Abdominal: Soft. Bowel sounds are normal. She exhibits no distension, no ascites and no mass. There is no tenderness.     Genitourinary: Rectum normal and vagina normal. Pelvic exam was performed with patient supine. There is no lesion on the right labia. There is no lesion on the left labia. Uterus is absent. There is an absent adnexa. Right adnexum displays no mass. Left adnexum displays no mass. Vaginal cuff normal.Cervix exhibits absence.           Musculoskeletal: Normal range of motion and moves all extremeties.      Lymphadenopathy:     She has no cervical adenopathy.        Right: No inguinal and no supraclavicular adenopathy present.        Left: No  inguinal and no supraclavicular adenopathy present.    Neurological: She is alert and oriented to person, place, and time.    Skin: Skin is warm and dry. No rash noted.    Psychiatric: She has a normal mood and affect.       Assessment:     1. Serous cystadenoma of ovary with borderline malignant features    2. Borderline serous cystadenoma of right ovary        Plan:     Orders Placed This Encounter   Procedures         No evidence of disease on today's exam  Feels well, no new symptoms  Disease free interval 1.25 year  Tumor markers  pending.      Recommend continued surveillance. RTC 3 months with  or sooner if needed.

## 2019-05-14 ENCOUNTER — PATIENT MESSAGE (OUTPATIENT)
Dept: UROLOGY | Facility: CLINIC | Age: 74
End: 2019-05-14

## 2019-05-15 ENCOUNTER — OFFICE VISIT (OUTPATIENT)
Dept: UROLOGY | Facility: CLINIC | Age: 74
End: 2019-05-15
Attending: UROLOGY
Payer: MEDICARE

## 2019-05-15 ENCOUNTER — PROCEDURE VISIT (OUTPATIENT)
Dept: UROLOGY | Facility: CLINIC | Age: 74
End: 2019-05-15
Attending: UROLOGY
Payer: MEDICARE

## 2019-05-15 VITALS
WEIGHT: 178 LBS | BODY MASS INDEX: 30.39 KG/M2 | HEART RATE: 101 BPM | HEIGHT: 64 IN | DIASTOLIC BLOOD PRESSURE: 74 MMHG | SYSTOLIC BLOOD PRESSURE: 132 MMHG

## 2019-05-15 DIAGNOSIS — N39.0 RECURRENT UTI: Primary | ICD-10-CM

## 2019-05-15 DIAGNOSIS — R31.29 MICROHEMATURIA: ICD-10-CM

## 2019-05-15 DIAGNOSIS — N39.0 URINARY TRACT INFECTION WITHOUT HEMATURIA, SITE UNSPECIFIED: Primary | ICD-10-CM

## 2019-05-15 DIAGNOSIS — N32.81 OAB (OVERACTIVE BLADDER): ICD-10-CM

## 2019-05-15 LAB
BILIRUB SERPL-MCNC: ABNORMAL MG/DL
BLOOD URINE, POC: 250
COLOR, POC UA: ABNORMAL
GLUCOSE UR QL STRIP: NORMAL
KETONES UR QL STRIP: ABNORMAL
LEUKOCYTE ESTERASE URINE, POC: ABNORMAL
NITRITE, POC UA: ABNORMAL
PH, POC UA: 5
PROTEIN, POC: ABNORMAL
SPECIFIC GRAVITY, POC UA: 1.02
UROBILINOGEN, POC UA: NORMAL

## 2019-05-15 PROCEDURE — 99214 OFFICE O/P EST MOD 30 MIN: CPT | Mod: 25,S$GLB,, | Performed by: UROLOGY

## 2019-05-15 PROCEDURE — 87088 URINE BACTERIA CULTURE: CPT

## 2019-05-15 PROCEDURE — 81002 POCT URINE DIPSTICK WITHOUT MICROSCOPE: ICD-10-PCS | Mod: S$GLB,,, | Performed by: UROLOGY

## 2019-05-15 PROCEDURE — 87077 CULTURE AEROBIC IDENTIFY: CPT

## 2019-05-15 PROCEDURE — 99214 PR OFFICE/OUTPT VISIT, EST, LEVL IV, 30-39 MIN: ICD-10-PCS | Mod: 25,S$GLB,, | Performed by: UROLOGY

## 2019-05-15 PROCEDURE — 87186 SC STD MICRODIL/AGAR DIL: CPT

## 2019-05-15 PROCEDURE — 87086 URINE CULTURE/COLONY COUNT: CPT

## 2019-05-15 PROCEDURE — 81002 URINALYSIS NONAUTO W/O SCOPE: CPT | Mod: S$GLB,,, | Performed by: UROLOGY

## 2019-05-15 RX ORDER — LIDOCAINE HYDROCHLORIDE 20 MG/ML
JELLY TOPICAL ONCE
Status: CANCELLED | OUTPATIENT
Start: 2019-05-15

## 2019-05-15 RX ORDER — AMOXICILLIN AND CLAVULANATE POTASSIUM 875; 125 MG/1; MG/1
1 TABLET, FILM COATED ORAL 2 TIMES DAILY
Qty: 20 TABLET | Refills: 0 | Status: SHIPPED | OUTPATIENT
Start: 2019-05-15 | End: 2019-05-25

## 2019-05-15 RX ORDER — ESTRADIOL 0.1 MG/G
1 CREAM VAGINAL
Qty: 42.5 G | Refills: 11 | Status: SHIPPED | OUTPATIENT
Start: 2019-05-16 | End: 2021-05-21

## 2019-05-15 RX ORDER — CEPHALEXIN 500 MG/1
500 CAPSULE ORAL EVERY 6 HOURS
Status: CANCELLED | OUTPATIENT
Start: 2019-05-15

## 2019-05-15 NOTE — PROGRESS NOTES
Subjective:       Niurka Pedraza is a 74 y.o. female who is an established patient with [place] urologist, Dr Dhaliwal,  was seen for evaluation of UTI.      She was seen by another urologist for recurrent UTIs/dysuria. Multiple +UCx with different bacteria (E coli, Enterococcus). She has had negative cystoscopy and GONZÁLEZ (1/17). UTIs return soon after treatment. She was started on prophy Keflex in 7/17.     She saw PCP in 11/17 with complaints of UTI. UCx was negative. She now feels a constant pressure in SP area and c/o pain with walking. She also notes pain worse with movement (like hitting a bump when driving). Denies dysuria. Increased frequency to now q1h. Present for 2 months. Denies constipation, occasional diarrhea. Frequent RADHA. Now with UUI. Also with BOBBI - increased coughing with recent lung issue. Nocturia x 4-5. Denies gross hematuria.     She moved here from [place] in 9/17. She requested to be started on abx prophy starting 1/18 (Keflex 250mg).     PVR (bladder scan) initial visit - 32cc, 0cc.    CT uro - no  abnormalities. Lung nodules - followed by pulmonary. Ovarian cyst - referred to gyn (now s/p DARCI-BSO for ovarian cancer).     She has been doing great with the Ditropan - she is very pleased. Was on abx prophy (Keflex) 1/18 x 9 months. Taking probiotics that is helping.     [place] - took Bactrim. Now off prophylactic abx - more frequency, urgency, nocturia, SP pressure. No significant dysuria.     UTIs have become more frequent. Symptoms returning quickly.    Planned for cysto 2 weeks ago and today. +UTI symptoms. Treated with Bactrim last visit but no improvement.    GONZÁLEZ 4/19 - wnl, PVR 31cc     UCx:  5/19 - >100k E coli  3/19 - >100k E coli  2/19 - >100k E coli  1/19 - >100k E coli  11/18 - >100k E coli  8/18 - contaminant  7/18 - 50-99k Enterobacter (treated with Bactrim)  11/17 - neg  10/17 - >100k E coli  10/17- >100k Enterococcus  7/17 - >100k E  coli  5/17- >100k E coli  5/17 - neg  4/17- 50-99k Enterococcus  3/17 - >100k E coli  12/16 - neg    PVR (bladder scan) last visit - 33cc, 50cc      The following portions of the patient's history were reviewed and updated as appropriate: allergies, current medications, past family history, past medical history, past social history, past surgical history and problem list.     Review of Systems  Constitutional: no fever or chills  ENT: no nasal congestion or sore throat  Respiratory: no cough or shortness of breath  Cardiovascular: no chest pain or palpitations  Gastrointestinal: no nausea or vomiting, tolerating diet  Genitourinary: as per HPI  Hematologic/Lymphatic: no easy bruising or lymphadenopathy  Musculoskeletal: no arthralgias or myalgias  Skin: no rashes or lesions  Neurological: no seizures or tremors  Behavioral/Psych: no auditory or visual hallucinations        Objective:    Vitals: LMP 02/08/2000     Physical Exam   General: well developed, well nourished in no acute distress  Head: normocephalic, atraumatic  Neck: supple, trachea midline, no obvious enlargement of thyroid  HEENT: EOMI, mucus membranes moist, sclera anicteric, no hearing impairment  Lungs: symmetric expansion, non-labored breathing  Cardiovascular: regular rate and rhythm, normal pulses  Abdomen: soft, non tender, non distended, no palpable masses, no hepatosplenomegaly, no hernias, no CVA tenderness  Musculoskeletal: no peripheral edema, normal ROM in bilateral upper and lower extremities  Lymphatics: no cervical or inguinal lymphadenopathy  Skin: no rashes or lesions  Neuro: alert and oriented x 3, no gross deficits  Psych: normal judgment and insight, normal mood/affect and non-anxious  Genitourinary:   patient declined exam      Lab Review   Urine analysis today in clinic shows - +LE, +nit, 250 RBCs    Lab Results   Component Value Date    WBC 7.99 12/11/2018    HGB 12.4 12/11/2018    HCT 39.4 12/11/2018    MCV 87 12/11/2018    PLT  290 12/11/2018     Lab Results   Component Value Date    CREATININE 0.9 12/11/2018    BUN 21 12/11/2018       Imaging  Images and reports were personally reviewed by me and discussed with patient  GONZÁLEZ reviewed       Assessment/Plan:      1. Recurrent UTI    - Discussed UTI prevention strategies.   - Adequate hydration.   - Double voiding. Consider timed voiding.    - Avoid constipation.   - GONZÁLEZ - negative 1/17   - Cystoscopy - negative in 1/17 (by another urologist)   - Cranberry/probiotics.   - Estrace cream 2x weekly - will consider in near future (now with ovarian ca)   - Call with UTI symptoms so UA/UCx can be sent.    - Abx prophy (started 1/18) - Keflex 250mg took for 9 months, stopped 9/18     - Multiple UTIs since stopping prophy   - Consider restart prophy   - Repeat workup - GONZÁLEZ and cysto   - GONZÁLEZ - wnl   - Cysto rescheduled x 2   - UCx today. Augmentin empiric.   - Start Estrace (approved by gyn onc). Instructed on use.      2. Microhematuria    - Discussed etiology and workup of hematuria   - UCx - results above   - Cytology - previously negative   - CT urogram - no  abnormalities. GONZÁLEZ was negative from 1/17.   - Office cystoscopy - negative 1/17        3. Nocturia/frequency/urge incontinence   - Ditropan XL 5mg - was doing great   - May need increase, will eval after UTI treated      Follow up in 1-2 weeks for cystoscopy

## 2019-05-17 ENCOUNTER — PATIENT MESSAGE (OUTPATIENT)
Dept: RHEUMATOLOGY | Facility: CLINIC | Age: 74
End: 2019-05-17

## 2019-05-17 LAB — BACTERIA UR CULT: NORMAL

## 2019-05-17 NOTE — PROCEDURES
"Niurka Pedraza is a 74 y.o. female patient.    Pulse: 101 (05/15/19 1119)  BP: 132/74 (05/15/19 1119)  Weight: 80.7 kg (178 lb) (05/15/19 1119)  Height: 5' 4" (162.6 cm) (05/15/19 1119)       Procedures    Anaya Oropeza  5/17/2019      Procedure cancelled  " unknown

## 2019-05-20 ENCOUNTER — TELEPHONE (OUTPATIENT)
Dept: UROLOGY | Facility: CLINIC | Age: 74
End: 2019-05-20

## 2019-05-20 ENCOUNTER — TELEPHONE (OUTPATIENT)
Dept: UROLOGY | Facility: HOSPITAL | Age: 74
End: 2019-05-20

## 2019-05-23 RX ORDER — METOPROLOL SUCCINATE 50 MG/1
50 TABLET, EXTENDED RELEASE ORAL DAILY
Qty: 90 TABLET | Refills: 3 | Status: SHIPPED | OUTPATIENT
Start: 2019-05-23 | End: 2019-08-16 | Stop reason: SDUPTHER

## 2019-05-29 ENCOUNTER — PROCEDURE VISIT (OUTPATIENT)
Dept: UROLOGY | Facility: CLINIC | Age: 74
End: 2019-05-29
Attending: UROLOGY
Payer: MEDICARE

## 2019-05-29 VITALS
DIASTOLIC BLOOD PRESSURE: 79 MMHG | HEART RATE: 71 BPM | BODY MASS INDEX: 30.55 KG/M2 | WEIGHT: 178 LBS | SYSTOLIC BLOOD PRESSURE: 145 MMHG

## 2019-05-29 DIAGNOSIS — N39.0 RECURRENT UTI: Primary | ICD-10-CM

## 2019-05-29 PROCEDURE — 52000 CYSTOSCOPY: ICD-10-PCS | Mod: S$GLB,,, | Performed by: UROLOGY

## 2019-05-29 PROCEDURE — 87086 URINE CULTURE/COLONY COUNT: CPT

## 2019-05-29 PROCEDURE — 52000 CYSTOURETHROSCOPY: CPT | Mod: S$GLB,,, | Performed by: UROLOGY

## 2019-05-29 RX ORDER — LIDOCAINE HYDROCHLORIDE 20 MG/ML
JELLY TOPICAL
Status: COMPLETED | OUTPATIENT
Start: 2019-05-29 | End: 2019-05-29

## 2019-05-29 RX ORDER — CEPHALEXIN 500 MG/1
500 CAPSULE ORAL
Status: COMPLETED | OUTPATIENT
Start: 2019-05-29 | End: 2019-05-29

## 2019-05-29 RX ADMIN — LIDOCAINE HYDROCHLORIDE 5 ML: 20 JELLY TOPICAL at 03:05

## 2019-05-29 RX ADMIN — CEPHALEXIN 500 MG: 500 CAPSULE ORAL at 03:05

## 2019-05-29 NOTE — PROCEDURES
"Cystoscopy  Date/Time: 5/29/2019 3:38 PM  Performed by: Anaya Oropeza MD  Authorized by: Anaya Oropeza MD     Consent Done?:  Yes (Written)  Time out: Immediately prior to procedure a "time out" was called to verify the correct patient, procedure, equipment, support staff and site/side marked as required.    Indications: recurrent UTIs    Position:  Dorsal lithotomy  Anesthesia:  Lidocaine jelly  Patient sedated?: No    Preparation: Patient was prepped and draped in usual sterile fashion      Scope type:  Flexible cystoscope  Stent inserted: No    Stent removed: No    External exam normal: Yes    Digital exam performed: No    Urethra normal: Yes  Bladder neck normal: Bladder neck normal   Bladder normal: Yes (Heavy sediment making visualization poor. Squamous metaplasia noted. No bladder tumors/stones/etc.)      Patient tolerance:  Patient tolerated the procedure well with no immediate complications     Cystoscopy with heavy sediment in bladder  Bladder washing removed for UCx      "

## 2019-05-31 LAB — BACTERIA UR CULT: NO GROWTH

## 2019-06-10 ENCOUNTER — PATIENT MESSAGE (OUTPATIENT)
Dept: UROLOGY | Facility: CLINIC | Age: 74
End: 2019-06-10

## 2019-06-10 DIAGNOSIS — N39.0 RECURRENT UTI: Primary | ICD-10-CM

## 2019-06-10 NOTE — TELEPHONE ENCOUNTER
UA and UCx ordered.     She will likely need to do suppressive abx after treatment dose abx as her UTIs are happening too frequently.

## 2019-06-11 ENCOUNTER — LAB VISIT (OUTPATIENT)
Dept: LAB | Facility: OTHER | Age: 74
End: 2019-06-11
Attending: UROLOGY
Payer: MEDICARE

## 2019-06-11 DIAGNOSIS — N39.0 RECURRENT UTI: ICD-10-CM

## 2019-06-11 LAB
BACTERIA #/AREA URNS HPF: ABNORMAL /HPF
BILIRUB UR QL STRIP: NEGATIVE
CLARITY UR: ABNORMAL
COLOR UR: YELLOW
GLUCOSE UR QL STRIP: NEGATIVE
HGB UR QL STRIP: ABNORMAL
HYALINE CASTS #/AREA URNS LPF: 0 /LPF
KETONES UR QL STRIP: NEGATIVE
LEUKOCYTE ESTERASE UR QL STRIP: ABNORMAL
MICROSCOPIC COMMENT: ABNORMAL
NITRITE UR QL STRIP: POSITIVE
PH UR STRIP: 6 [PH] (ref 5–8)
PROT UR QL STRIP: ABNORMAL
RBC #/AREA URNS HPF: 100 /HPF (ref 0–4)
SP GR UR STRIP: >=1.03 (ref 1–1.03)
SQUAMOUS #/AREA URNS HPF: 8 /HPF
URN SPEC COLLECT METH UR: ABNORMAL
UROBILINOGEN UR STRIP-ACNC: 1 EU/DL
WBC #/AREA URNS HPF: 100 /HPF (ref 0–5)
WBC CLUMPS URNS QL MICRO: ABNORMAL

## 2019-06-11 PROCEDURE — 87186 SC STD MICRODIL/AGAR DIL: CPT

## 2019-06-11 PROCEDURE — 81000 URINALYSIS NONAUTO W/SCOPE: CPT

## 2019-06-11 PROCEDURE — 87086 URINE CULTURE/COLONY COUNT: CPT

## 2019-06-11 PROCEDURE — 87088 URINE BACTERIA CULTURE: CPT

## 2019-06-11 PROCEDURE — 87077 CULTURE AEROBIC IDENTIFY: CPT

## 2019-06-12 RX ORDER — ATORVASTATIN CALCIUM 20 MG/1
20 TABLET, FILM COATED ORAL DAILY
Qty: 90 TABLET | Refills: 3 | Status: SHIPPED | OUTPATIENT
Start: 2019-06-12 | End: 2020-08-23 | Stop reason: SDUPTHER

## 2019-06-13 ENCOUNTER — TELEPHONE (OUTPATIENT)
Dept: UROLOGY | Facility: CLINIC | Age: 74
End: 2019-06-13

## 2019-06-13 ENCOUNTER — PATIENT MESSAGE (OUTPATIENT)
Dept: UROLOGY | Facility: CLINIC | Age: 74
End: 2019-06-13

## 2019-06-13 NOTE — TELEPHONE ENCOUNTER
Spoke to pt she was checking on results of urine culture. I advised her culture is still pending.-mamta

## 2019-06-13 NOTE — TELEPHONE ENCOUNTER
----- Message from George Solorzano MA sent at 6/13/2019 12:42 PM CDT -----  Contact: Pt  Pt would like to be called back regarding her Uti.        Pt can be reached at [phone].      Thanks

## 2019-06-14 ENCOUNTER — TELEPHONE (OUTPATIENT)
Dept: UROLOGY | Facility: HOSPITAL | Age: 74
End: 2019-06-14

## 2019-06-14 ENCOUNTER — TELEPHONE (OUTPATIENT)
Dept: UROLOGY | Facility: CLINIC | Age: 74
End: 2019-06-14

## 2019-06-14 LAB — BACTERIA UR CULT: NORMAL

## 2019-06-14 RX ORDER — NITROFURANTOIN MACROCRYSTALS 50 MG/1
50 CAPSULE ORAL NIGHTLY
Qty: 30 CAPSULE | Refills: 11 | Status: SHIPPED | OUTPATIENT
Start: 2019-06-14 | End: 2019-07-14

## 2019-06-14 RX ORDER — NITROFURANTOIN 25; 75 MG/1; MG/1
100 CAPSULE ORAL 2 TIMES DAILY
Qty: 20 CAPSULE | Refills: 0 | Status: SHIPPED | OUTPATIENT
Start: 2019-06-14 | End: 2019-06-24

## 2019-06-14 NOTE — TELEPHONE ENCOUNTER
Spoke to pt advised do to positive urine culture macrobid 100mg was called into pharmacy pt should take bid until finished an that start macrobid 50mg after. Pt states she fully understand.mamta

## 2019-06-14 NOTE — TELEPHONE ENCOUNTER
Please inform patient of urinary tract infection on recent urine culture. Appropriate antibiotics (Macrobid) were sent in to the pharmacy.    Macrobid 100mg PO BID sent to pharmacy. After this completed, take Macrobid 50mg QHS for prophylaxis.

## 2019-07-05 ENCOUNTER — OFFICE VISIT (OUTPATIENT)
Dept: INTERNAL MEDICINE | Facility: CLINIC | Age: 74
End: 2019-07-05
Attending: INTERNAL MEDICINE
Payer: MEDICARE

## 2019-07-05 ENCOUNTER — HOSPITAL ENCOUNTER (OUTPATIENT)
Dept: RADIOLOGY | Facility: OTHER | Age: 74
Discharge: HOME | End: 2019-07-05
Attending: INTERNAL MEDICINE
Payer: MEDICARE

## 2019-07-05 ENCOUNTER — TELEPHONE (OUTPATIENT)
Dept: INTERNAL MEDICINE | Facility: CLINIC | Age: 74
End: 2019-07-05

## 2019-07-05 VITALS
BODY MASS INDEX: 30.71 KG/M2 | HEIGHT: 64 IN | OXYGEN SATURATION: 96 % | HEART RATE: 74 BPM | DIASTOLIC BLOOD PRESSURE: 70 MMHG | SYSTOLIC BLOOD PRESSURE: 126 MMHG | WEIGHT: 179.88 LBS

## 2019-07-05 DIAGNOSIS — M25.572 CHRONIC PAIN OF LEFT ANKLE: Primary | ICD-10-CM

## 2019-07-05 DIAGNOSIS — M25.572 LEFT ANKLE PAIN, UNSPECIFIED CHRONICITY: ICD-10-CM

## 2019-07-05 DIAGNOSIS — G89.29 CHRONIC PAIN OF LEFT ANKLE: Primary | ICD-10-CM

## 2019-07-05 DIAGNOSIS — M25.572 LEFT ANKLE PAIN, UNSPECIFIED CHRONICITY: Primary | ICD-10-CM

## 2019-07-05 PROCEDURE — 73610 X-RAY EXAM OF ANKLE: CPT | Mod: TC,FY,LT

## 2019-07-05 PROCEDURE — 99214 OFFICE O/P EST MOD 30 MIN: CPT | Mod: PBBFAC,25 | Performed by: INTERNAL MEDICINE

## 2019-07-05 PROCEDURE — 99999 PR PBB SHADOW E&M-EST. PATIENT-LVL IV: ICD-10-PCS | Mod: PBBFAC,,, | Performed by: INTERNAL MEDICINE

## 2019-07-05 PROCEDURE — 99214 OFFICE O/P EST MOD 30 MIN: CPT | Mod: S$PBB,,, | Performed by: INTERNAL MEDICINE

## 2019-07-05 PROCEDURE — 73610 XR ANKLE COMPLETE 3 VIEW LEFT: ICD-10-PCS | Mod: 26,LT,, | Performed by: INTERNAL MEDICINE

## 2019-07-05 PROCEDURE — 99999 PR PBB SHADOW E&M-EST. PATIENT-LVL IV: CPT | Mod: PBBFAC,,, | Performed by: INTERNAL MEDICINE

## 2019-07-05 PROCEDURE — 73610 X-RAY EXAM OF ANKLE: CPT | Mod: 26,LT,, | Performed by: INTERNAL MEDICINE

## 2019-07-05 PROCEDURE — 99214 PR OFFICE/OUTPT VISIT, EST, LEVL IV, 30-39 MIN: ICD-10-PCS | Mod: S$PBB,,, | Performed by: INTERNAL MEDICINE

## 2019-07-05 RX ORDER — DICLOFENAC SODIUM 10 MG/G
2 GEL TOPICAL EVERY 8 HOURS PRN
Qty: 100 G | Refills: 1 | Status: SHIPPED | OUTPATIENT
Start: 2019-07-05 | End: 2019-11-19 | Stop reason: SDUPTHER

## 2019-07-05 RX ORDER — LANOLIN ALCOHOL/MO/W.PET/CERES
400 CREAM (GRAM) TOPICAL DAILY
Refills: 0 | COMMUNITY
Start: 2019-07-05 | End: 2019-09-04

## 2019-07-05 NOTE — TELEPHONE ENCOUNTER
Ms Pedraza this is Dr. Anderson's recommendations:  Xray neg for fracture   Message sent to pt via my chart with results and updates to plan.   Please schedule MRI left ankle. I need a day and time to schedule MRI

## 2019-07-05 NOTE — TELEPHONE ENCOUNTER
Xray neg for fracture   Message sent to pt via my chart with results and updates to plan.   Please schedule MRI left ankle

## 2019-07-05 NOTE — PROGRESS NOTES
"Subjective:   Patient ID: Niurka Pedraza is a 74 y.o. female  Chief complaint:   Chief Complaint   Patient presents with    Foot Pain    Foot Swelling       HPI    Pt here for UC appt for left foot pain x 4-6 weeks    Seen previously by ortho for right ankle pain - seen by surgeon and rec surgery - she opted to postpone for now and using arch supports and diclofenac cream   Symptoms had improved but then started with left achy foot pain along medial portion of ankle   - nonradiating  - worse with walking   - improves with diclofenac topical   Is swelling at medial portion of ankle  No redness or inc warmth  Has been using ace bandage and ice which are helpful   No trauma or inciting event  Sx not subsided or improved overall    Review of Systems    Objective:  Vitals:    07/05/19 1210   BP: 126/70   Pulse: 74   SpO2: 96%   Weight: 81.6 kg (179 lb 14.3 oz)   Height: 5' 4" (1.626 m)     Body mass index is 30.88 kg/m².    Physical Exam   Constitutional: She is oriented to person, place, and time. She appears well-developed and well-nourished. No distress.   HENT:   Head: Normocephalic and atraumatic.   Eyes: Conjunctivae and EOM are normal.   Neck: Normal range of motion. Neck supple.   Cardiovascular: Intact distal pulses.   Pulmonary/Chest: Effort normal.   Abdominal: Normal appearance.   Musculoskeletal: She exhibits edema and tenderness.   + edema at left medial malleolus   +Tenderness posterior and inferior to left medial malleolus   No inc redness or warmth  Able to bear weight   Neurological: She is alert and oriented to person, place, and time. She has normal strength. Gait normal.   Skin: Skin is warm, dry and intact. Capillary refill takes less than 2 seconds. She is not diaphoretic. No cyanosis or erythema. Nails show no clubbing.   Psychiatric: She has a normal mood and affect. Her speech is normal and behavior is normal. Cognition and memory are normal.   Vitals reviewed.    Assessment:  1. Left ankle " pain, unspecified chronicity      Plan:  Niurka was seen today for foot pain and foot swelling.    Diagnoses and all orders for this visit:    Left ankle pain, unspecified chronicity  -     X-Ray Ankle Complete 3 View Left; Future  -     Ambulatory Referral to Orthopedics  RICE therapy  Check xray today to eval for fx - sx not improved > 4 weeks   Will get MRI if neg for fx  Will f/u with ortho      Other orders  -     diclofenac sodium (VOLTAREN) 1 % Gel; Apply 2 g topically every 8 (eight) hours as needed.  -     magnesium oxide (MAG-OX) 400 mg (241.3 mg magnesium) tablet; Take 1 tablet (400 mg total) by mouth once daily.    Health Maintenance   Topic Date Due    Pneumococcal Vaccine (65+ Low/Medium Risk) (1 of 2 - PCV13) 03/10/2009    Influenza Vaccine  01/24/2020 (Originally 8/1/2019)    Fecal Occult Blood Test (FOBT)/[employer]  12/17/2019    TETANUS VACCINE  08/18/2020    DEXA SCAN  11/22/2020    Lipid Panel  12/11/2023

## 2019-07-10 ENCOUNTER — PATIENT MESSAGE (OUTPATIENT)
Dept: INTERNAL MEDICINE | Facility: CLINIC | Age: 74
End: 2019-07-10

## 2019-07-16 ENCOUNTER — HOSPITAL ENCOUNTER (OUTPATIENT)
Dept: RADIOLOGY | Facility: OTHER | Age: 74
Discharge: HOME | End: 2019-07-16
Attending: INTERNAL MEDICINE
Payer: MEDICARE

## 2019-07-16 DIAGNOSIS — M25.572 CHRONIC PAIN OF LEFT ANKLE: ICD-10-CM

## 2019-07-16 DIAGNOSIS — G89.29 CHRONIC PAIN OF LEFT ANKLE: ICD-10-CM

## 2019-07-16 PROCEDURE — 73721 MRI JNT OF LWR EXTRE W/O DYE: CPT | Mod: 26,LT,, | Performed by: RADIOLOGY

## 2019-07-16 PROCEDURE — 73721 MRI ANKLE WITHOUT CONTRAST LEFT: ICD-10-PCS | Mod: 26,LT,, | Performed by: RADIOLOGY

## 2019-07-16 PROCEDURE — 73721 MRI JNT OF LWR EXTRE W/O DYE: CPT | Mod: TC,LT

## 2019-08-06 ENCOUNTER — TELEPHONE (OUTPATIENT)
Dept: ADMINISTRATIVE | Facility: OTHER | Age: 74
End: 2019-08-06

## 2019-08-09 ENCOUNTER — OFFICE VISIT (OUTPATIENT)
Dept: ORTHOPEDICS | Facility: CLINIC | Age: 74
End: 2019-08-09
Payer: MEDICARE

## 2019-08-09 VITALS — DIASTOLIC BLOOD PRESSURE: 81 MMHG | HEART RATE: 68 BPM | SYSTOLIC BLOOD PRESSURE: 168 MMHG

## 2019-08-09 DIAGNOSIS — M66.872 NONTRAUMATIC RUPTURE OF LEFT POSTERIOR TIBIAL TENDON: Primary | ICD-10-CM

## 2019-08-09 PROCEDURE — 99999 PR PBB SHADOW E&M-EST. PATIENT-LVL II: ICD-10-PCS | Mod: PBBFAC,,, | Performed by: ORTHOPAEDIC SURGERY

## 2019-08-09 PROCEDURE — 99213 PR OFFICE/OUTPT VISIT, EST, LEVL III, 20-29 MIN: ICD-10-PCS | Mod: S$PBB,,, | Performed by: ORTHOPAEDIC SURGERY

## 2019-08-09 PROCEDURE — 99212 OFFICE O/P EST SF 10 MIN: CPT | Mod: PBBFAC | Performed by: ORTHOPAEDIC SURGERY

## 2019-08-09 PROCEDURE — 99999 PR PBB SHADOW E&M-EST. PATIENT-LVL II: CPT | Mod: PBBFAC,,, | Performed by: ORTHOPAEDIC SURGERY

## 2019-08-09 PROCEDURE — 99213 OFFICE O/P EST LOW 20 MIN: CPT | Mod: S$PBB,,, | Performed by: ORTHOPAEDIC SURGERY

## 2019-08-09 RX ORDER — METHYLPREDNISOLONE 4 MG/1
TABLET ORAL
Qty: 1 PACKAGE | Refills: 0 | Status: SHIPPED | OUTPATIENT
Start: 2019-08-09 | End: 2019-08-13 | Stop reason: ALTCHOICE

## 2019-08-09 NOTE — LETTER
August 9, 2019      Savana Anderson MD  [address]  [place] [address]           [facility]  [address]  [place] [address]  Phone: [phone]          Patient: Niurka Pedraza   MR Number: [id number]   YOB: 1945   Date of Visit: 8/9/2019       Dear Dr. Savana Anderson:    Thank you for referring Niurka Pedraza to me for evaluation. Attached you will find relevant portions of my assessment and plan of care.    If you have questions, please do not hesitate to call me. I look forward to following Niurka Pedraza along with you.    Sincerely,    Navid Weller MD    Enclosure  CC:  No Recipients    If you would like to receive this communication electronically, please contact [email or web address] or [phone] to request more information on [employer] access.    For providers and/or their staff who would like to refer a patient to [facility], please contact us through our one-stop-shop provider referral line, [facility], at [phone].    If you feel you have received this communication in error or would no longer like to receive these types of communications, please e-mail [email or web address]

## 2019-08-09 NOTE — PROGRESS NOTES
Niurka Pedraza  Returns today for a new problem.  This is a 74-year-old female who I saw last November for right posterior tibial tendon dysfunction which has been improved and stabilized with conservative care including orthotics.  She comes in today because over the last 2 months she has developed severe pain and swelling on the medial aspect of her left foot unrelated to any trauma.  Her primary care doctor ordered x-ray and MRI and she is referred for further evaluation today. She has had to limit her activity due to the pain and swelling. She has not initiated any treatment on her own.    Examination:  She walks in with an antalgic gait. On standing inspection she has mild pes planus bilaterally. She is unable to do a single limb heel rise on the left side. There is noticeable swelling medially about the ankle and hindfoot.  On sitting exam she is tender along the course of the posterior tibial tendon with associated swelling.  She has pain on resistance to inversion of her foot. She has painless motion of her ankle subtalar joint. She has good distal pulses.    Imaging:  I reviewed a nonweightbearing x-ray of her left ankle as well as the MRI.  She has significant tendinosis of the posterior tibial tendon. There also associated changes of deltoid ligament and more than likely the spring ligament.    Impression:  Nontraumatic rupture left posterior tibial tendon    Recommendation:  I would like to immobilize her in a fracture boot for the next few weeks.  I would also like her to limit her activities as much as possible. I prescribed a Medrol Dosepak and recommended periodic icing along with elevation.    Follow-up in 4 weeks.  If her symptoms improve we may consider physical therapy and weaning out of the boot.  I suspect, though, she may require surgical intervention for this problem.

## 2019-08-12 ENCOUNTER — TELEPHONE (OUTPATIENT)
Dept: ADMINISTRATIVE | Facility: OTHER | Age: 74
End: 2019-08-12

## 2019-08-13 ENCOUNTER — OFFICE VISIT (OUTPATIENT)
Dept: GYNECOLOGIC ONCOLOGY | Facility: CLINIC | Age: 74
End: 2019-08-13
Payer: MEDICARE

## 2019-08-13 ENCOUNTER — HOSPITAL ENCOUNTER (OUTPATIENT)
Dept: RADIOLOGY | Facility: OTHER | Age: 74
Discharge: HOME | End: 2019-08-13
Attending: OBSTETRICS & GYNECOLOGY
Payer: MEDICARE

## 2019-08-13 VITALS
BODY MASS INDEX: 30.11 KG/M2 | WEIGHT: 176.38 LBS | DIASTOLIC BLOOD PRESSURE: 67 MMHG | SYSTOLIC BLOOD PRESSURE: 132 MMHG | HEIGHT: 64 IN | HEART RATE: 71 BPM

## 2019-08-13 DIAGNOSIS — C56.1 BORDERLINE SEROUS CYSTADENOMA OF RIGHT OVARY: ICD-10-CM

## 2019-08-13 DIAGNOSIS — Z12.31 SCREENING MAMMOGRAM, ENCOUNTER FOR: Primary | ICD-10-CM

## 2019-08-13 DIAGNOSIS — Z12.31 SCREENING MAMMOGRAM, ENCOUNTER FOR: ICD-10-CM

## 2019-08-13 PROCEDURE — 99214 OFFICE O/P EST MOD 30 MIN: CPT | Mod: S$PBB,,, | Performed by: OBSTETRICS & GYNECOLOGY

## 2019-08-13 PROCEDURE — 77067 SCR MAMMO BI INCL CAD: CPT | Mod: 26,,, | Performed by: RADIOLOGY

## 2019-08-13 PROCEDURE — 77067 SCR MAMMO BI INCL CAD: CPT | Mod: TC

## 2019-08-13 PROCEDURE — 77063 MAMMO DIGITAL SCREENING LEFT WITH TOMOSYNTHESIS_CAD: ICD-10-PCS | Mod: 26,,, | Performed by: RADIOLOGY

## 2019-08-13 PROCEDURE — 99213 OFFICE O/P EST LOW 20 MIN: CPT | Mod: PBBFAC | Performed by: OBSTETRICS & GYNECOLOGY

## 2019-08-13 PROCEDURE — 99999 PR PBB SHADOW E&M-EST. PATIENT-LVL III: CPT | Mod: PBBFAC,,, | Performed by: OBSTETRICS & GYNECOLOGY

## 2019-08-13 PROCEDURE — 77067 MAMMO DIGITAL SCREENING LEFT WITH TOMOSYNTHESIS_CAD: ICD-10-PCS | Mod: 26,,, | Performed by: RADIOLOGY

## 2019-08-13 PROCEDURE — 99214 PR OFFICE/OUTPT VISIT, EST, LEVL IV, 30-39 MIN: ICD-10-PCS | Mod: S$PBB,,, | Performed by: OBSTETRICS & GYNECOLOGY

## 2019-08-13 PROCEDURE — 99999 PR PBB SHADOW E&M-EST. PATIENT-LVL III: ICD-10-PCS | Mod: PBBFAC,,, | Performed by: OBSTETRICS & GYNECOLOGY

## 2019-08-13 PROCEDURE — 77063 BREAST TOMOSYNTHESIS BI: CPT | Mod: 26,,, | Performed by: RADIOLOGY

## 2019-08-16 DIAGNOSIS — I10 HYPERTENSION, BENIGN: Primary | ICD-10-CM

## 2019-08-16 RX ORDER — METOPROLOL SUCCINATE 50 MG/1
50 TABLET, EXTENDED RELEASE ORAL DAILY
Qty: 90 TABLET | Refills: 3 | Status: SHIPPED | OUTPATIENT
Start: 2019-08-16 | End: 2020-03-04 | Stop reason: SDUPTHER

## 2019-08-18 ENCOUNTER — PATIENT MESSAGE (OUTPATIENT)
Dept: UROLOGY | Facility: CLINIC | Age: 74
End: 2019-08-18

## 2019-08-18 DIAGNOSIS — N39.0 RECURRENT UTI: Primary | ICD-10-CM

## 2019-08-18 NOTE — PROGRESS NOTES
Subjective:      Patient ID: Niurka Pedraza is a 74 y.o. female.    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 Without complaints or concerns. Specifically denies N/V, pain, swelling, bleeding, unexpected weight change, SOB, problems with bowel or bladder function.   Disease free interval 1.5 year.       41>15>10>12>10>11>10 normal.      History:  Patient is a 73yo female originally referred by Dr. Linette Torres for pelvic mass and elevated . Patient was seen by her urologist for recurrent UTIs and imaging was ordered.      Imaging reviewed:   CT urogram abd/pelvis 17  Impression   Bilateral pulmonary nodules, largest measuring 1.5 cm at the right lower lobe. Comparison with any prior cross-sectional imaging would be beneficial. Close interval followup, PET CT, and/or biopsy may be considered for further evaluation.    Complex cystic lesion in the right adnexa. Pelvic ultrasound would be beneficial for further evaluation.    Focal dilated biliary radical in segment 7. This is of uncertain significance. There are no concerning hepatic lesions.      Pelvic US 18  Impression    Complex cystic lesion measuring 3.8 cm right ovary corresponds to abnormality seen on CT. While this may represent a hemorrhagic cyst in light of postmenopausal status Clinical correlation and further characterization with MRI and surgical consultation advised.    Left ovary not seen.     UT 5.5cm        41 elevated. CEA 4.8.      Personal history of breast cancer , R mastectomy, no adjuvant treatment.    MMG 2017 WNLs.      Prior abdominal surgeries include cholecystectomy.  x 3. Family history mother with breast cancer, no ovarian, uterine, or colon cancer.      S/p RTLH/BSO/BPLND/OMX 3/23/18. Uncomplicated post op course. 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    Review of Systems   Constitutional: Negative for appetite change, chills, fatigue and fever.   HENT: Negative for mouth sores.    Respiratory: Negative for cough and shortness of breath.    Cardiovascular: Negative for leg swelling.   Gastrointestinal: Negative for abdominal pain, blood in stool, constipation and diarrhea.   Endocrine: Negative for cold intolerance.   Genitourinary: Negative for dysuria and vaginal bleeding.   Musculoskeletal: Negative for myalgias.   Skin: Negative for rash.   Allergic/Immunologic: Negative.    Neurological: Negative for weakness and numbness.   Hematological: Negative for adenopathy. Does not bruise/bleed easily.   Psychiatric/Behavioral: Negative for confusion.       Objective:   Physical Exam:   Constitutional: She is oriented to person, place, and time. She appears well-developed and well-nourished.    HENT:   Head: Normocephalic and atraumatic.    Eyes: Pupils are equal, round, and reactive to light. EOM are normal.    Neck: Normal range of motion. Neck supple. No thyromegaly present.    Cardiovascular: Normal rate, regular rhythm and intact distal pulses.     Pulmonary/Chest: Effort normal and breath sounds normal. No respiratory distress. She has no wheezes.        Abdominal: Soft. Bowel sounds are normal. She exhibits no distension, no ascites and no mass. There is no tenderness.     Genitourinary: Rectum normal and vagina normal. Pelvic exam was performed with patient supine. There is no lesion on the right labia. There is no lesion on the left labia. Uterus is absent. There is an absent adnexa. Right adnexum displays no mass. Left adnexum displays no mass. Vaginal cuff normal.Cervix exhibits absence.           Musculoskeletal: Normal range of motion and moves all extremeties.      Lymphadenopathy:     She has no cervical adenopathy.        Right: No inguinal and no supraclavicular adenopathy present.         Left: No inguinal and no supraclavicular adenopathy present.    Neurological: She is alert and oriented to person, place, and time.    Skin: Skin is warm and dry. No rash noted.    Psychiatric: She has a normal mood and affect.       Assessment:     1. Screening mammogram, encounter for    2. Borderline serous cystadenoma of right ovary        Plan:   No orders of the defined types were placed in this encounter.    No evidence of disease on today's exam  Feels well, no new symptoms  Disease free interval 1.5 years  Tumor markers  normal       Recommend continued surveillance. RTC 3 months with  or sooner if needed.

## 2019-08-19 ENCOUNTER — LAB VISIT (OUTPATIENT)
Dept: LAB | Facility: OTHER | Age: 74
End: 2019-08-19
Attending: UROLOGY
Payer: MEDICARE

## 2019-08-19 DIAGNOSIS — N39.0 RECURRENT UTI: ICD-10-CM

## 2019-08-19 LAB
BACTERIA #/AREA URNS HPF: ABNORMAL /HPF
BILIRUB UR QL STRIP: NEGATIVE
CLARITY UR: ABNORMAL
COLOR UR: YELLOW
GLUCOSE UR QL STRIP: NEGATIVE
HGB UR QL STRIP: ABNORMAL
KETONES UR QL STRIP: NEGATIVE
LEUKOCYTE ESTERASE UR QL STRIP: ABNORMAL
MICROSCOPIC COMMENT: ABNORMAL
NITRITE UR QL STRIP: POSITIVE
PH UR STRIP: 6 [PH] (ref 5–8)
PROT UR QL STRIP: NEGATIVE
RBC #/AREA URNS HPF: 6 /HPF (ref 0–4)
SP GR UR STRIP: 1.02 (ref 1–1.03)
URN SPEC COLLECT METH UR: ABNORMAL
UROBILINOGEN UR STRIP-ACNC: NEGATIVE EU/DL
WBC #/AREA URNS HPF: >100 /HPF (ref 0–5)
WBC CLUMPS URNS QL MICRO: ABNORMAL

## 2019-08-19 PROCEDURE — 87088 URINE BACTERIA CULTURE: CPT

## 2019-08-19 PROCEDURE — 87077 CULTURE AEROBIC IDENTIFY: CPT

## 2019-08-19 PROCEDURE — 81000 URINALYSIS NONAUTO W/SCOPE: CPT

## 2019-08-19 PROCEDURE — 87086 URINE CULTURE/COLONY COUNT: CPT

## 2019-08-19 PROCEDURE — 87186 SC STD MICRODIL/AGAR DIL: CPT

## 2019-08-21 LAB — BACTERIA UR CULT: ABNORMAL

## 2019-08-22 ENCOUNTER — TELEPHONE (OUTPATIENT)
Dept: UROLOGY | Facility: CLINIC | Age: 74
End: 2019-08-22

## 2019-08-22 RX ORDER — SULFAMETHOXAZOLE AND TRIMETHOPRIM 400; 80 MG/1; MG/1
1 TABLET ORAL 2 TIMES DAILY
Qty: 14 TABLET | Refills: 0 | Status: SHIPPED | OUTPATIENT
Start: 2019-08-22 | End: 2019-08-29

## 2019-09-03 ENCOUNTER — PATIENT OUTREACH (OUTPATIENT)
Dept: ADMINISTRATIVE | Facility: OTHER | Age: 74
End: 2019-09-03

## 2019-09-04 ENCOUNTER — OFFICE VISIT (OUTPATIENT)
Dept: UROLOGY | Facility: CLINIC | Age: 74
End: 2019-09-04
Attending: UROLOGY
Payer: MEDICARE

## 2019-09-04 VITALS
DIASTOLIC BLOOD PRESSURE: 83 MMHG | HEART RATE: 71 BPM | HEIGHT: 64 IN | SYSTOLIC BLOOD PRESSURE: 138 MMHG | WEIGHT: 176.38 LBS | BODY MASS INDEX: 30.11 KG/M2

## 2019-09-04 DIAGNOSIS — R30.0 DYSURIA: ICD-10-CM

## 2019-09-04 DIAGNOSIS — R39.89 SUSPECTED UTI: ICD-10-CM

## 2019-09-04 DIAGNOSIS — R31.29 MICROHEMATURIA: ICD-10-CM

## 2019-09-04 DIAGNOSIS — N32.81 OAB (OVERACTIVE BLADDER): ICD-10-CM

## 2019-09-04 DIAGNOSIS — N39.0 RECURRENT UTI: Primary | ICD-10-CM

## 2019-09-04 LAB
BILIRUB SERPL-MCNC: ABNORMAL MG/DL
BLOOD URINE, POC: ABNORMAL
COLOR, POC UA: YELLOW
GLUCOSE UR QL STRIP: ABNORMAL
KETONES UR QL STRIP: ABNORMAL
LEUKOCYTE ESTERASE URINE, POC: ABNORMAL
NITRITE, POC UA: ABNORMAL
PH, POC UA: 5
POC RESIDUAL URINE VOLUME: 0 ML (ref 0–100)
PROTEIN, POC: ABNORMAL
SPECIFIC GRAVITY, POC UA: 1.02
UROBILINOGEN, POC UA: ABNORMAL

## 2019-09-04 PROCEDURE — 81002 URINALYSIS NONAUTO W/O SCOPE: CPT | Mod: S$GLB,,, | Performed by: UROLOGY

## 2019-09-04 PROCEDURE — 51798 POCT BLADDER SCAN: ICD-10-PCS | Mod: S$GLB,,, | Performed by: UROLOGY

## 2019-09-04 PROCEDURE — 99214 OFFICE O/P EST MOD 30 MIN: CPT | Mod: 25,S$GLB,, | Performed by: UROLOGY

## 2019-09-04 PROCEDURE — 87086 URINE CULTURE/COLONY COUNT: CPT

## 2019-09-04 PROCEDURE — 87077 CULTURE AEROBIC IDENTIFY: CPT

## 2019-09-04 PROCEDURE — 87186 SC STD MICRODIL/AGAR DIL: CPT

## 2019-09-04 PROCEDURE — 51798 US URINE CAPACITY MEASURE: CPT | Mod: S$GLB,,, | Performed by: UROLOGY

## 2019-09-04 PROCEDURE — 87088 URINE BACTERIA CULTURE: CPT

## 2019-09-04 PROCEDURE — 81002 POCT URINE DIPSTICK WITHOUT MICROSCOPE: ICD-10-PCS | Mod: S$GLB,,, | Performed by: UROLOGY

## 2019-09-04 PROCEDURE — 99214 PR OFFICE/OUTPT VISIT, EST, LEVL IV, 30-39 MIN: ICD-10-PCS | Mod: 25,S$GLB,, | Performed by: UROLOGY

## 2019-09-04 RX ORDER — NITROFURANTOIN MACROCRYSTALS 50 MG/1
CAPSULE ORAL
COMMUNITY
End: 2019-09-26 | Stop reason: SDUPTHER

## 2019-09-04 RX ORDER — OXYBUTYNIN CHLORIDE 10 MG/1
10 TABLET, EXTENDED RELEASE ORAL DAILY
Qty: 30 TABLET | Refills: 11 | Status: SHIPPED | OUTPATIENT
Start: 2019-09-04 | End: 2020-04-15 | Stop reason: SDUPTHER

## 2019-09-04 NOTE — PROGRESS NOTES
Subjective:       Niurka Pedraza is a 74 y.o. female who is an established patient with [place] urologist, Dr Dhaliwal,  was seen for evaluation of UTI.      She was seen by another urologist for recurrent UTIs/dysuria. Multiple +UCx with different bacteria (E coli, Enterococcus). She has had negative cystoscopy and GONZÁLEZ (1/17). UTIs return soon after treatment. She was started on prophy Keflex in 7/17.     She saw PCP in 11/17 with complaints of UTI. UCx was negative. She now feels a constant pressure in SP area and c/o pain with walking. She also notes pain worse with movement (like hitting a bump when driving). Denies dysuria. Increased frequency to now q1h. Present for 2 months. Denies constipation, occasional diarrhea. Frequent RADHA. Now with UUI. Also with BOBBI - increased coughing with recent lung issue. Nocturia x 4-5. Denies gross hematuria.     She moved here from [place] in 9/17. She requested to be started on abx prophy starting 1/18 (Keflex 250mg).     PVR (bladder scan) initial visit - 32cc, 0cc.    CT uro - no  abnormalities. Lung nodules - followed by pulmonary. Ovarian cyst - referred to gyn (now s/p DARCI-BSO for ovarian cancer).     She has been doing great with the Ditropan - she is very pleased. Was on abx prophy (Keflex) 1/18 x 9 months. Taking probiotics that is helping.     [place] - took Bactrim. Now off prophylactic abx - more frequency, urgency, nocturia, SP pressure. No significant dysuria.     UTIs have become more frequent. Symptoms returning quickly. Prophylactic abx were restarted (Macrobid 50mg).     She recently finished course of abx but symptoms are now returning just 5 days later. Dysuria improved but pressure and frequency worsened.     Cysto 5/19 - normal, heavy sediment in bladder    GONZÁLEZ 4/19 - wnl, PVR 31cc     UCx:  8/19 - >100k Klebsiella  6/19 - >100k E coli  5/19 - neg  5/19 - >100k E coli  3/19 - >100k E coli  2/19 - >100k E  "coli  1/19 - >100k E coli  11/18 - >100k E coli  8/18 - contaminant  7/18 - 50-99k Enterobacter (treated with Bactrim)  11/17 - neg  10/17 - >100k E coli  10/17- >100k Enterococcus  7/17 - >100k E coli  5/17- >100k E coli  5/17 - neg  4/17- 50-99k Enterococcus  3/17 - >100k E coli  12/16 - neg    PVR (bladder scan) last visit - 33cc, 50cc. Today - 0cc      The following portions of the patient's history were reviewed and updated as appropriate: allergies, current medications, past family history, past medical history, past social history, past surgical history and problem list.     Review of Systems  Constitutional: no fever or chills  ENT: no nasal congestion or sore throat  Respiratory: no cough or shortness of breath  Cardiovascular: no chest pain or palpitations  Gastrointestinal: no nausea or vomiting, tolerating diet  Genitourinary: as per HPI  Hematologic/Lymphatic: no easy bruising or lymphadenopathy  Musculoskeletal: no arthralgias or myalgias  Skin: no rashes or lesions  Neurological: no seizures or tremors  Behavioral/Psych: no auditory or visual hallucinations        Objective:    Vitals: /83 (BP Location: Left arm, Patient Position: Sitting, BP Method: Large (Automatic))   Pulse 71   Ht 5' 4" (1.626 m)   Wt 80 kg (176 lb 5.9 oz)   LMP 02/08/2000   BMI 30.27 kg/m²     Physical Exam   General: well developed, well nourished in no acute distress  Head: normocephalic, atraumatic  Neck: supple, trachea midline, no obvious enlargement of thyroid  HEENT: EOMI, mucus membranes moist, sclera anicteric, no hearing impairment  Lungs: symmetric expansion, non-labored breathing  Cardiovascular: regular rate and rhythm, normal pulses  Abdomen: soft, non tender, non distended, no palpable masses, no hepatosplenomegaly, no hernias, no CVA tenderness  Musculoskeletal: no peripheral edema, normal ROM in bilateral upper and lower extremities  Lymphatics: no cervical or inguinal lymphadenopathy  Skin: no rashes " or lesions  Neuro: alert and oriented x 3, no gross deficits  Psych: normal judgment and insight, normal mood/affect and non-anxious  Genitourinary:   patient declined exam      Lab Review   Urine analysis today in clinic shows - +LE, +nit, 5-10 RBCs    Lab Results   Component Value Date    WBC 7.99 12/11/2018    HGB 12.4 12/11/2018    HCT 39.4 12/11/2018    MCV 87 12/11/2018     12/11/2018     Lab Results   Component Value Date    CREATININE 0.9 12/11/2018    BUN 21 12/11/2018       Imaging  Images and reports were personally reviewed by me and discussed with patient  GONZÁLEZ reviewed       Assessment/Plan:      1. Recurrent UTI    - Discussed UTI prevention strategies.   - Adequate hydration.   - Double voiding. Consider timed voiding.    - Avoid constipation.   - GONZÁLEZ - negative 1/17   - Cystoscopy - negative in 1/17 (by another urologist)   - Cranberry/probiotics.   - Estrace cream 2x weekly - will consider in near future (now with ovarian ca)   - Call with UTI symptoms so UA/UCx can be sent.    - Abx prophy (started 1/18) - Keflex 250mg took for 9 months, stopped 9/18     - Multiple UTIs since stopping prophy   - Consider restart prophy   - Repeat workup - GONZÁLEZ and cysto   - GONZÁLEZ - wnl   - Cysto 5/19 - wnl   - Continue Estrace (approved by gyn onc). Instructed on use.    - Ellura trial, D-mannose supplement     2. Microhematuria    - Discussed etiology and workup of hematuria   - UCx - results above   - Cytology - previously negative   - CT urogram - no  abnormalities. GONZÁLEZ was negative from 1/17.   - Office cystoscopy - negative 1/17        3. Nocturia/frequency/urge incontinence   - Ditropan XL 5mg - was doing great   - Will increase to 10mg      Follow up in 3 months

## 2019-09-06 ENCOUNTER — OFFICE VISIT (OUTPATIENT)
Dept: ORTHOPEDICS | Facility: CLINIC | Age: 74
End: 2019-09-06
Payer: MEDICARE

## 2019-09-06 DIAGNOSIS — M66.872 NONTRAUMATIC RUPTURE OF LEFT POSTERIOR TIBIAL TENDON: Primary | ICD-10-CM

## 2019-09-06 PROCEDURE — 99212 OFFICE O/P EST SF 10 MIN: CPT | Mod: S$PBB,,, | Performed by: ORTHOPAEDIC SURGERY

## 2019-09-06 PROCEDURE — 99211 OFF/OP EST MAY X REQ PHY/QHP: CPT | Mod: PBBFAC | Performed by: ORTHOPAEDIC SURGERY

## 2019-09-06 PROCEDURE — 99999 PR PBB SHADOW E&M-EST. PATIENT-LVL I: CPT | Mod: PBBFAC,,, | Performed by: ORTHOPAEDIC SURGERY

## 2019-09-06 PROCEDURE — 99999 PR PBB SHADOW E&M-EST. PATIENT-LVL I: ICD-10-PCS | Mod: PBBFAC,,, | Performed by: ORTHOPAEDIC SURGERY

## 2019-09-06 PROCEDURE — 99212 PR OFFICE/OUTPT VISIT, EST, LEVL II, 10-19 MIN: ICD-10-PCS | Mod: S$PBB,,, | Performed by: ORTHOPAEDIC SURGERY

## 2019-09-06 NOTE — PROGRESS NOTES
Niurka Pedraza  Returns today for follow-up of her left posterior tibial tendon dysfunction.  This is a 74-year-old female who has had a previous history of right posterior tibial tendon dysfunction which improved with non operative measures.  She developed left-sided symptoms about 3 months ago and an MRI revealed significant tendinosis of the left posterior tibial tendon. She is trying to avoid any surgery. I had her immobilized in a fracture boot for the last few weeks and she reports some improvement.    Examination:  She still has significant swelling along the course of the left posterior tibial tendon with mild tenderness.  She does have weakness as well.    Impression:  Left posterior tibial tendon dysfunction    Recommendation:  She would like to give this further time since her other foot took a while to improve.  She declines any physical therapy.  She will continue to wear her orthotics.    Follow-up in 6 weeks

## 2019-09-07 LAB — BACTERIA UR CULT: ABNORMAL

## 2019-09-09 ENCOUNTER — TELEPHONE (OUTPATIENT)
Dept: UROLOGY | Facility: CLINIC | Age: 74
End: 2019-09-09

## 2019-09-09 RX ORDER — SULFAMETHOXAZOLE AND TRIMETHOPRIM 400; 80 MG/1; MG/1
1 TABLET ORAL 2 TIMES DAILY
Qty: 28 TABLET | Refills: 0 | Status: SHIPPED | OUTPATIENT
Start: 2019-09-09 | End: 2019-09-23

## 2019-09-09 NOTE — TELEPHONE ENCOUNTER
Spoke to pt advised do to positive urine culture bactrim was sent to the pharmacy. Pt states she fully understands.-mamta

## 2019-09-25 ENCOUNTER — PATIENT MESSAGE (OUTPATIENT)
Dept: UROLOGY | Facility: CLINIC | Age: 74
End: 2019-09-25

## 2019-09-26 RX ORDER — NITROFURANTOIN MACROCRYSTALS 50 MG/1
50 CAPSULE ORAL NIGHTLY
Qty: 90 CAPSULE | Refills: 3 | Status: SHIPPED | OUTPATIENT
Start: 2019-09-26 | End: 2019-12-30 | Stop reason: SDUPTHER

## 2019-10-16 ENCOUNTER — PATIENT OUTREACH (OUTPATIENT)
Dept: ADMINISTRATIVE | Facility: OTHER | Age: 74
End: 2019-10-16

## 2019-10-18 ENCOUNTER — OFFICE VISIT (OUTPATIENT)
Dept: ORTHOPEDICS | Facility: CLINIC | Age: 74
End: 2019-10-18
Payer: MEDICARE

## 2019-10-18 DIAGNOSIS — M66.872 NONTRAUMATIC RUPTURE OF LEFT POSTERIOR TIBIAL TENDON: Primary | ICD-10-CM

## 2019-10-18 PROCEDURE — 99999 PR PBB SHADOW E&M-EST. PATIENT-LVL I: ICD-10-PCS | Mod: PBBFAC,,, | Performed by: ORTHOPAEDIC SURGERY

## 2019-10-18 PROCEDURE — 99999 PR PBB SHADOW E&M-EST. PATIENT-LVL I: CPT | Mod: PBBFAC,,, | Performed by: ORTHOPAEDIC SURGERY

## 2019-10-18 PROCEDURE — 99213 PR OFFICE/OUTPT VISIT, EST, LEVL III, 20-29 MIN: ICD-10-PCS | Mod: S$PBB,,, | Performed by: ORTHOPAEDIC SURGERY

## 2019-10-18 PROCEDURE — 99213 OFFICE O/P EST LOW 20 MIN: CPT | Mod: S$PBB,,, | Performed by: ORTHOPAEDIC SURGERY

## 2019-10-18 PROCEDURE — 99211 OFF/OP EST MAY X REQ PHY/QHP: CPT | Mod: PBBFAC | Performed by: ORTHOPAEDIC SURGERY

## 2019-10-21 NOTE — PROGRESS NOTES
Niurka Pedraza    Returns today for follow-up of her left posterior tibial tendon dysfunction.  This is a 74-year-old female who has a functional rupture of her right posterior tibial tendon which she has been managing conservatively.  She had a similar problem on her right side in the past which ultimately stabilized to the point where she could live with it.   She is trying to avoid surgery.  She has used boot immobilization periodically in the past.  She is wearing shoes with orthotics.  She states since her last visit there has been no significant change for better or worse.  She would still like to continue non operative treatment.      Examination:  Her swelling appears to be decreased from last visit.  She still has mild tenderness along the course of the posterior tibial tendon as well as continued weakness.  Her hindfoot joints remain supple.    Impression:  Left posterior tibial tendon dysfunction      Recommendation:  She will continue with conservative measures including orthotics and boot immobilization  as needed for flareups.    She will follow up as needed and call if she has any severe changes.

## 2019-11-18 ENCOUNTER — TELEPHONE (OUTPATIENT)
Dept: GYNECOLOGIC ONCOLOGY | Facility: CLINIC | Age: 74
End: 2019-11-18

## 2019-11-19 ENCOUNTER — OFFICE VISIT (OUTPATIENT)
Dept: GYNECOLOGIC ONCOLOGY | Facility: CLINIC | Age: 74
End: 2019-11-19
Payer: MEDICARE

## 2019-11-19 ENCOUNTER — LAB VISIT (OUTPATIENT)
Dept: LAB | Facility: OTHER | Age: 74
End: 2019-11-19
Attending: OBSTETRICS & GYNECOLOGY
Payer: MEDICARE

## 2019-11-19 VITALS
DIASTOLIC BLOOD PRESSURE: 78 MMHG | WEIGHT: 177.69 LBS | HEART RATE: 80 BPM | HEIGHT: 64 IN | BODY MASS INDEX: 30.34 KG/M2 | SYSTOLIC BLOOD PRESSURE: 127 MMHG

## 2019-11-19 DIAGNOSIS — C56.9 SEROUS CYSTADENOMA OF OVARY WITH BORDERLINE MALIGNANT FEATURES: ICD-10-CM

## 2019-11-19 DIAGNOSIS — C56.1 BORDERLINE SEROUS CYSTADENOMA OF RIGHT OVARY: Primary | ICD-10-CM

## 2019-11-19 LAB — CANCER AG125 SERPL-ACNC: 11 U/ML (ref 0–30)

## 2019-11-19 PROCEDURE — 1159F PR MEDICATION LIST DOCUMENTED IN MEDICAL RECORD: ICD-10-PCS | Mod: ,,, | Performed by: OBSTETRICS & GYNECOLOGY

## 2019-11-19 PROCEDURE — 99214 OFFICE O/P EST MOD 30 MIN: CPT | Mod: S$PBB,,, | Performed by: OBSTETRICS & GYNECOLOGY

## 2019-11-19 PROCEDURE — 1159F MED LIST DOCD IN RCRD: CPT | Mod: ,,, | Performed by: OBSTETRICS & GYNECOLOGY

## 2019-11-19 PROCEDURE — 99212 OFFICE O/P EST SF 10 MIN: CPT | Mod: PBBFAC | Performed by: OBSTETRICS & GYNECOLOGY

## 2019-11-19 PROCEDURE — 99999 PR PBB SHADOW E&M-EST. PATIENT-LVL II: ICD-10-PCS | Mod: PBBFAC,,, | Performed by: OBSTETRICS & GYNECOLOGY

## 2019-11-19 PROCEDURE — 99999 PR PBB SHADOW E&M-EST. PATIENT-LVL II: CPT | Mod: PBBFAC,,, | Performed by: OBSTETRICS & GYNECOLOGY

## 2019-11-19 PROCEDURE — 86304 IMMUNOASSAY TUMOR CA 125: CPT

## 2019-11-19 PROCEDURE — 36415 COLL VENOUS BLD VENIPUNCTURE: CPT

## 2019-11-19 PROCEDURE — 99214 PR OFFICE/OUTPT VISIT, EST, LEVL IV, 30-39 MIN: ICD-10-PCS | Mod: S$PBB,,, | Performed by: OBSTETRICS & GYNECOLOGY

## 2019-11-19 PROCEDURE — 1126F PR PAIN SEVERITY QUANTIFIED, NO PAIN PRESENT: ICD-10-PCS | Mod: ,,, | Performed by: OBSTETRICS & GYNECOLOGY

## 2019-11-19 PROCEDURE — 1126F AMNT PAIN NOTED NONE PRSNT: CPT | Mod: ,,, | Performed by: OBSTETRICS & GYNECOLOGY

## 2019-11-19 RX ORDER — DICLOFENAC SODIUM 10 MG/G
2 GEL TOPICAL EVERY 8 HOURS PRN
Qty: 100 G | Refills: 1 | Status: SHIPPED | OUTPATIENT
Start: 2019-11-19 | End: 2020-02-14 | Stop reason: SDUPTHER

## 2019-11-19 NOTE — PROGRESS NOTES
Subjective:      Patient ID: Niurka Pedraza is a 74 y.o. female.    Chief Complaint: No chief complaint on file.      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 Without complaints or concerns. Specifically denies N/V, pain, swelling, bleeding, unexpected weight change, SOB, problems with bowel function.   Disease free interval 1.75 year.       41>15>10>12>10>11>10> pending today.      History:  Patient is a 73yo female originally referred by Dr. Linette Torres for pelvic mass and elevated . Patient was seen by her urologist for recurrent UTIs and imaging was ordered.      Imaging reviewed:   CT urogram abd/pelvis 17  Impression   Bilateral pulmonary nodules, largest measuring 1.5 cm at the right lower lobe. Comparison with any prior cross-sectional imaging would be beneficial. Close interval followup, PET CT, and/or biopsy may be considered for further evaluation.    Complex cystic lesion in the right adnexa. Pelvic ultrasound would be beneficial for further evaluation.    Focal dilated biliary radical in segment 7. This is of uncertain significance. There are no concerning hepatic lesions.      Pelvic US 18  Impression    Complex cystic lesion measuring 3.8 cm right ovary corresponds to abnormality seen on CT. While this may represent a hemorrhagic cyst in light of postmenopausal status Clinical correlation and further characterization with MRI and surgical consultation advised.    Left ovary not seen.     UT 5.5cm        41 elevated. CEA 4.8.      Personal history of breast cancer , R mastectomy, no adjuvant treatment.    MMG 2017 WNLs.      Prior abdominal surgeries include cholecystectomy.  x 3. Family history mother with breast cancer, no ovarian, uterine, or colon cancer.      S/p RTLH/BSO/BPLND/OMX 3/23/18. Uncomplicated post op course. Final pathology reviewed showing papillary seromucinous borderline tumor of  bilateral ovaries.    Review of Systems   Constitutional: Negative for appetite change, chills, fatigue and fever.   HENT: Negative for mouth sores.    Respiratory: Negative for cough and shortness of breath.    Cardiovascular: Negative for leg swelling.   Gastrointestinal: Negative for abdominal pain, blood in stool, constipation and diarrhea.   Endocrine: Negative for cold intolerance.   Genitourinary: Negative for dysuria and vaginal bleeding.   Musculoskeletal: Negative for myalgias.   Skin: Negative for rash.   Allergic/Immunologic: Negative.    Neurological: Negative for weakness and numbness.   Hematological: Negative for adenopathy. Does not bruise/bleed easily.   Psychiatric/Behavioral: Negative for confusion.       Objective:   Physical Exam:   Constitutional: She is oriented to person, place, and time. She appears well-developed and well-nourished.    HENT:   Head: Normocephalic and atraumatic.    Eyes: Pupils are equal, round, and reactive to light. EOM are normal.    Neck: Normal range of motion. Neck supple. No thyromegaly present.    Cardiovascular: Normal rate, regular rhythm and intact distal pulses.     Pulmonary/Chest: Effort normal and breath sounds normal. No respiratory distress. She has no wheezes.        Abdominal: Soft. Bowel sounds are normal. She exhibits no distension, no ascites and no mass. There is no tenderness.     Genitourinary: Rectum normal and vagina normal. Pelvic exam was performed with patient supine. There is no lesion on the right labia. There is no lesion on the left labia. Uterus is absent. There is an absent adnexa. Right adnexum displays no mass. Left adnexum displays no mass. Vaginal cuff normal.Cervix exhibits absence.           Musculoskeletal: Normal range of motion and moves all extremeties.      Lymphadenopathy:     She has no cervical adenopathy.        Right: No inguinal and no supraclavicular adenopathy present.        Left: No inguinal and no supraclavicular  adenopathy present.    Neurological: She is alert and oriented to person, place, and time.    Skin: Skin is warm and dry. No rash noted.    Psychiatric: She has a normal mood and affect.       Assessment:     1. Borderline serous cystadenoma of right ovary        Plan:   No orders of the defined types were placed in this encounter.    No evidence of disease on today's exam  Feels well, no new symptoms  Disease free interval 1.75 years  Tumor markers  pending       Recommend continued surveillance pending results. RTC 3 months with  or sooner if needed.

## 2019-12-19 ENCOUNTER — PATIENT OUTREACH (OUTPATIENT)
Dept: ADMINISTRATIVE | Facility: HOSPITAL | Age: 74
End: 2019-12-19

## 2019-12-19 DIAGNOSIS — Z12.12 SCREENING FOR COLORECTAL CANCER: Primary | ICD-10-CM

## 2019-12-19 DIAGNOSIS — Z12.11 SCREENING FOR COLORECTAL CANCER: Primary | ICD-10-CM

## 2019-12-26 ENCOUNTER — LAB VISIT (OUTPATIENT)
Dept: LAB | Facility: HOSPITAL | Age: 74
End: 2019-12-26
Attending: INTERNAL MEDICINE
Payer: MEDICARE

## 2019-12-26 DIAGNOSIS — Z12.11 SCREENING FOR COLORECTAL CANCER: ICD-10-CM

## 2019-12-26 DIAGNOSIS — Z12.12 SCREENING FOR COLORECTAL CANCER: ICD-10-CM

## 2019-12-26 PROCEDURE — 82274 ASSAY TEST FOR BLOOD FECAL: CPT

## 2019-12-30 RX ORDER — NITROFURANTOIN MACROCRYSTALS 50 MG/1
50 CAPSULE ORAL NIGHTLY
Qty: 90 CAPSULE | Refills: 3 | Status: SHIPPED | OUTPATIENT
Start: 2019-12-30 | End: 2020-04-04 | Stop reason: SDUPTHER

## 2019-12-30 NOTE — TELEPHONE ENCOUNTER
A message was left for the patient letting her know her medication is at the pharmacy for her .. AUBRIEN.

## 2019-12-31 LAB — HEMOCCULT STL QL IA: NEGATIVE

## 2020-01-14 ENCOUNTER — PATIENT OUTREACH (OUTPATIENT)
Dept: ADMINISTRATIVE | Facility: OTHER | Age: 75
End: 2020-01-14

## 2020-01-15 ENCOUNTER — OFFICE VISIT (OUTPATIENT)
Dept: UROLOGY | Facility: CLINIC | Age: 75
End: 2020-01-15
Attending: UROLOGY
Payer: MEDICARE

## 2020-01-15 VITALS
DIASTOLIC BLOOD PRESSURE: 73 MMHG | HEIGHT: 64 IN | HEART RATE: 80 BPM | SYSTOLIC BLOOD PRESSURE: 131 MMHG | BODY MASS INDEX: 30.22 KG/M2 | WEIGHT: 177 LBS

## 2020-01-15 DIAGNOSIS — R30.0 DYSURIA: ICD-10-CM

## 2020-01-15 DIAGNOSIS — R31.29 MICROHEMATURIA: ICD-10-CM

## 2020-01-15 DIAGNOSIS — N39.0 RECURRENT UTI: Primary | ICD-10-CM

## 2020-01-15 DIAGNOSIS — N32.81 OAB (OVERACTIVE BLADDER): ICD-10-CM

## 2020-01-15 LAB
BILIRUB SERPL-MCNC: NEGATIVE MG/DL
BLOOD URINE, POC: ABNORMAL
COLOR, POC UA: YELLOW
GLUCOSE UR QL STRIP: NORMAL
KETONES UR QL STRIP: NEGATIVE
LEUKOCYTE ESTERASE URINE, POC: ABNORMAL
NITRITE, POC UA: POSITIVE
PH, POC UA: 5
PROTEIN, POC: ABNORMAL
SPECIFIC GRAVITY, POC UA: 1.02
UROBILINOGEN, POC UA: NORMAL

## 2020-01-15 PROCEDURE — 99214 OFFICE O/P EST MOD 30 MIN: CPT | Mod: 25,S$GLB,, | Performed by: UROLOGY

## 2020-01-15 PROCEDURE — 1159F PR MEDICATION LIST DOCUMENTED IN MEDICAL RECORD: ICD-10-PCS | Mod: S$GLB,,, | Performed by: UROLOGY

## 2020-01-15 PROCEDURE — 81002 URINALYSIS NONAUTO W/O SCOPE: CPT | Mod: S$GLB,,, | Performed by: UROLOGY

## 2020-01-15 PROCEDURE — 1159F MED LIST DOCD IN RCRD: CPT | Mod: S$GLB,,, | Performed by: UROLOGY

## 2020-01-15 PROCEDURE — 1126F PR PAIN SEVERITY QUANTIFIED, NO PAIN PRESENT: ICD-10-PCS | Mod: S$GLB,,, | Performed by: UROLOGY

## 2020-01-15 PROCEDURE — 99214 PR OFFICE/OUTPT VISIT, EST, LEVL IV, 30-39 MIN: ICD-10-PCS | Mod: 25,S$GLB,, | Performed by: UROLOGY

## 2020-01-15 PROCEDURE — 1126F AMNT PAIN NOTED NONE PRSNT: CPT | Mod: S$GLB,,, | Performed by: UROLOGY

## 2020-01-15 PROCEDURE — 81002 POCT URINE DIPSTICK WITHOUT MICROSCOPE: ICD-10-PCS | Mod: S$GLB,,, | Performed by: UROLOGY

## 2020-01-15 NOTE — PROGRESS NOTES
Subjective:       Niurka Pedraza is a 74 y.o. female who is an established patient with [place] urologist, Dr Dhaliwal,  was seen for evaluation of UTI.      She was seen by another urologist for recurrent UTIs/dysuria. Multiple +UCx with different bacteria (E coli, Enterococcus). She has had negative cystoscopy and GONZÁLEZ (1/17). UTIs return soon after treatment. She was started on prophy Keflex in 7/17.     She saw PCP in 11/17 with complaints of UTI. UCx was negative. She now feels a constant pressure in SP area and c/o pain with walking. She also notes pain worse with movement (like hitting a bump when driving). Denies dysuria. Increased frequency to now q1h. Present for 2 months. Denies constipation, occasional diarrhea. Frequent RADHA. Now with UUI. Also with BOBBI - increased coughing with recent lung issue. Nocturia x 4-5. Denies gross hematuria.     She moved here from [place] in 9/17. She requested to be started on abx prophy starting 1/18 (Keflex 250mg).     PVR (bladder scan) initial visit - 32cc, 0cc.    CT uro - no  abnormalities. Lung nodules - followed by pulmonary. Ovarian cyst - referred to gyn (now s/p DARCI-BSO for ovarian cancer).     She has been doing great with the Ditropan - she is very pleased. Was on abx prophy (Keflex) 1/18 x 9 months. Taking probiotics that is helping.     [place] - took Bactrim. Now off prophylactic abx - more frequency, urgency, nocturia, SP pressure. No significant dysuria.     UTIs have become more frequent. Symptoms returning quickly. Prophylactic abx were restarted (Macrobid 50mg).     9/4/2019  She recently finished course of abx but symptoms are now returning just 5 days later. Dysuria improved but pressure and frequency worsened.     1/15/2020  Increase Ditropan to 10mg. Taking Macrobid QHS, started in 9/19. No symptoms today.     Cysto 5/19 - normal, heavy sediment in bladder    GONZÁLEZ 4/19 - wnl, PVR 31cc     UCx:  9/19 - >100k  "Klebsiella  8/19 - >100k Klebsiella  6/19 - >100k E coli  5/19 - neg  5/19 - >100k E coli  3/19 - >100k E coli  2/19 - >100k E coli  1/19 - >100k E coli  11/18 - >100k E coli  8/18 - contaminant  7/18 - 50-99k Enterobacter (treated with Bactrim)  11/17 - neg  10/17 - >100k E coli  10/17- >100k Enterococcus  7/17 - >100k E coli  5/17- >100k E coli  5/17 - neg  4/17- 50-99k Enterococcus  3/17 - >100k E coli  12/16 - neg    PVR (bladder scan) last visit - 33cc, 50cc, 0cc      The following portions of the patient's history were reviewed and updated as appropriate: allergies, current medications, past family history, past medical history, past social history, past surgical history and problem list.     Review of Systems  Constitutional: no fever or chills  ENT: no nasal congestion or sore throat  Respiratory: no cough or shortness of breath  Cardiovascular: no chest pain or palpitations  Gastrointestinal: no nausea or vomiting, tolerating diet  Genitourinary: as per HPI  Hematologic/Lymphatic: no easy bruising or lymphadenopathy  Musculoskeletal: no arthralgias or myalgias  Skin: no rashes or lesions  Neurological: no seizures or tremors  Behavioral/Psych: no auditory or visual hallucinations        Objective:    Vitals: /73 (BP Location: Left arm, Patient Position: Sitting, BP Method: Medium (Automatic))   Pulse 80   Ht 5' 4" (1.626 m)   Wt 80.3 kg (177 lb)   LMP 02/08/2000   BMI 30.38 kg/m²     Physical Exam   General: well developed, well nourished in no acute distress  Head: normocephalic, atraumatic  Neck: supple, trachea midline, no obvious enlargement of thyroid  HEENT: EOMI, mucus membranes moist, sclera anicteric, no hearing impairment  Lungs: symmetric expansion, non-labored breathing  Cardiovascular: regular rate and rhythm, normal pulses  Abdomen: soft, non tender, non distended, no palpable masses, no hepatosplenomegaly, no hernias, no CVA tenderness  Musculoskeletal: no peripheral edema, normal " ROM in bilateral upper and lower extremities  Lymphatics: no cervical or inguinal lymphadenopathy  Skin: no rashes or lesions  Neuro: alert and oriented x 3, no gross deficits  Psych: normal judgment and insight, normal mood/affect and non-anxious  Genitourinary:   patient declined exam      Lab Review   Urine analysis today in clinic shows - +LE, +nit, 5-10 RBCs (asymptomatic)    Lab Results   Component Value Date    WBC 7.99 12/11/2018    HGB 12.4 12/11/2018    HCT 39.4 12/11/2018    MCV 87 12/11/2018     12/11/2018     Lab Results   Component Value Date    CREATININE 0.9 12/11/2018    BUN 21 12/11/2018       Imaging  Images and reports were personally reviewed by me and discussed with patient  GONZÁLEZ reviewed       Assessment/Plan:      1. Recurrent UTI    - Discussed UTI prevention strategies.   - Adequate hydration.   - Double voiding. Consider timed voiding.    - Avoid constipation.   - GONZÁLEZ - negative 1/17   - Cystoscopy - negative in 1/17 (by another urologist)   - Cranberry/probiotics.   - Estrace cream 2x weekly - will consider in near future (now with ovarian ca)   - Call with UTI symptoms so UA/UCx can be sent.    - Abx prophy (started 1/18) - Keflex 250mg took for 9 months, stopped 9/18     - Multiple UTIs since stopping prophy   - Restarted prophy - Macrobid QHS, started 9/19 x will continue for 6 months   - Repeat workup - GONZÁLEZ and cysto   - GONZÁLEZ 4/19 - wnl   - Cysto 5/19 - wnl   - Continue Estrace (approved by gyn onc). Instructed on use.    - Ellura trial, D-mannose supplement     2. Microhematuria    - Discussed etiology and workup of hematuria   - UCx - results above   - Cytology - previously negative   - CT urogram - no  abnormalities. GONZÁLEZ was negative from 1/17.   - Office cystoscopy - negative 1/17        3. Nocturia/frequency/urge incontinence   - Ditropan XL 10mg     Plan to stop prophy abx in 3/20  Monistat OTC for yeast PRN      Follow up in 3 months

## 2020-02-14 RX ORDER — DICLOFENAC SODIUM 10 MG/G
2 GEL TOPICAL EVERY 8 HOURS PRN
Qty: 100 G | Refills: 1 | Status: SHIPPED | OUTPATIENT
Start: 2020-02-14 | End: 2021-05-21

## 2020-02-17 ENCOUNTER — TELEPHONE (OUTPATIENT)
Dept: GYNECOLOGIC ONCOLOGY | Facility: CLINIC | Age: 75
End: 2020-02-17

## 2020-02-24 DIAGNOSIS — I10 HYPERTENSION, BENIGN: ICD-10-CM

## 2020-02-24 RX ORDER — IRBESARTAN 75 MG/1
75 TABLET ORAL NIGHTLY
Qty: 90 TABLET | Refills: 0 | Status: SHIPPED | OUTPATIENT
Start: 2020-02-24 | End: 2020-05-18

## 2020-03-03 ENCOUNTER — TELEPHONE (OUTPATIENT)
Dept: GYNECOLOGIC ONCOLOGY | Facility: CLINIC | Age: 75
End: 2020-03-03

## 2020-03-03 NOTE — TELEPHONE ENCOUNTER
Spoke with our patient about her schedule appointment in gyn oncology she agreed she voiced understanding of the date, time and location.  MA/FABIAN /Preceptor Ramiro CONDE

## 2020-03-04 ENCOUNTER — OFFICE VISIT (OUTPATIENT)
Dept: GYNECOLOGIC ONCOLOGY | Facility: CLINIC | Age: 75
End: 2020-03-04
Payer: MEDICARE

## 2020-03-04 ENCOUNTER — LAB VISIT (OUTPATIENT)
Dept: LAB | Facility: OTHER | Age: 75
End: 2020-03-04
Attending: OBSTETRICS & GYNECOLOGY
Payer: MEDICARE

## 2020-03-04 VITALS
HEART RATE: 76 BPM | HEIGHT: 64 IN | SYSTOLIC BLOOD PRESSURE: 122 MMHG | WEIGHT: 178.13 LBS | BODY MASS INDEX: 30.41 KG/M2 | DIASTOLIC BLOOD PRESSURE: 73 MMHG

## 2020-03-04 DIAGNOSIS — I10 HYPERTENSION, BENIGN: ICD-10-CM

## 2020-03-04 DIAGNOSIS — C56.9 SEROUS CYSTADENOMA OF OVARY WITH BORDERLINE MALIGNANT FEATURES: ICD-10-CM

## 2020-03-04 DIAGNOSIS — C56.1 BORDERLINE SEROUS CYSTADENOMA OF RIGHT OVARY: Primary | ICD-10-CM

## 2020-03-04 LAB — CANCER AG125 SERPL-ACNC: 11 U/ML (ref 0–30)

## 2020-03-04 PROCEDURE — 99213 OFFICE O/P EST LOW 20 MIN: CPT | Mod: PBBFAC | Performed by: OBSTETRICS & GYNECOLOGY

## 2020-03-04 PROCEDURE — 99214 OFFICE O/P EST MOD 30 MIN: CPT | Mod: S$PBB,,, | Performed by: OBSTETRICS & GYNECOLOGY

## 2020-03-04 PROCEDURE — 99999 PR PBB SHADOW E&M-EST. PATIENT-LVL III: ICD-10-PCS | Mod: PBBFAC,,, | Performed by: OBSTETRICS & GYNECOLOGY

## 2020-03-04 PROCEDURE — 36415 COLL VENOUS BLD VENIPUNCTURE: CPT

## 2020-03-04 PROCEDURE — 99999 PR PBB SHADOW E&M-EST. PATIENT-LVL III: CPT | Mod: PBBFAC,,, | Performed by: OBSTETRICS & GYNECOLOGY

## 2020-03-04 PROCEDURE — 99214 PR OFFICE/OUTPT VISIT, EST, LEVL IV, 30-39 MIN: ICD-10-PCS | Mod: S$PBB,,, | Performed by: OBSTETRICS & GYNECOLOGY

## 2020-03-04 PROCEDURE — 86304 IMMUNOASSAY TUMOR CA 125: CPT

## 2020-03-04 RX ORDER — METOPROLOL SUCCINATE 50 MG/1
50 TABLET, EXTENDED RELEASE ORAL DAILY
Qty: 90 TABLET | Refills: 3 | Status: SHIPPED | OUTPATIENT
Start: 2020-03-04 | End: 2020-10-27

## 2020-03-08 NOTE — PROGRESS NOTES
Subjective:      Patient ID: Niurka Pedraza is a 74 y.o. female.    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 Without complaints or concerns. Specifically denies N/V, pain, swelling, bleeding, unexpected weight change, SOB, problems with bowel function.   Disease free interval 2 years       41>15>10>12>10>11>10> 11.      History:  Patient is a 71yo female originally referred by Dr. Linette Torres for pelvic mass and elevated . Patient was seen by her urologist for recurrent UTIs and imaging was ordered.      Imaging reviewed:   CT urogram abd/pelvis 17  Impression   Bilateral pulmonary nodules, largest measuring 1.5 cm at the right lower lobe. Comparison with any prior cross-sectional imaging would be beneficial. Close interval followup, PET CT, and/or biopsy may be considered for further evaluation.    Complex cystic lesion in the right adnexa. Pelvic ultrasound would be beneficial for further evaluation.    Focal dilated biliary radical in segment 7. This is of uncertain significance. There are no concerning hepatic lesions.      Pelvic US 18  Impression    Complex cystic lesion measuring 3.8 cm right ovary corresponds to abnormality seen on CT. While this may represent a hemorrhagic cyst in light of postmenopausal status Clinical correlation and further characterization with MRI and surgical consultation advised.    Left ovary not seen.     UT 5.5cm        41 elevated. CEA 4.8.      Personal history of breast cancer , R mastectomy, no adjuvant treatment.    MMG 2017 WNLs.      Prior abdominal surgeries include cholecystectomy.  x 3. Family history mother with breast cancer, no ovarian, uterine, or colon cancer.      S/p RTLH/BSO/BPLND/OMX 3/23/18. Uncomplicated post op course. Final pathology reviewed showing papillary seromucinous borderline tumor  of bilateral ovaries.    Review of Systems   Constitutional: Negative for appetite change, chills, fatigue and fever.   HENT: Negative for mouth sores.    Respiratory: Negative for cough and shortness of breath.    Cardiovascular: Negative for leg swelling.   Gastrointestinal: Negative for abdominal pain, blood in stool, constipation and diarrhea.   Endocrine: Negative for cold intolerance.   Genitourinary: Negative for dysuria and vaginal bleeding.   Musculoskeletal: Negative for myalgias.   Skin: Negative for rash.   Allergic/Immunologic: Negative.    Neurological: Negative for weakness and numbness.   Hematological: Negative for adenopathy. Does not bruise/bleed easily.   Psychiatric/Behavioral: Negative for confusion.       Objective:   Physical Exam:   Constitutional: She is oriented to person, place, and time. She appears well-developed and well-nourished.    HENT:   Head: Normocephalic and atraumatic.    Eyes: Pupils are equal, round, and reactive to light. EOM are normal.    Neck: Normal range of motion. Neck supple. No thyromegaly present.    Cardiovascular: Normal rate, regular rhythm and intact distal pulses.     Pulmonary/Chest: Effort normal and breath sounds normal. No respiratory distress. She has no wheezes.        Abdominal: Soft. Bowel sounds are normal. She exhibits no distension, no ascites and no mass. There is no tenderness.     Genitourinary: Rectum normal and vagina normal. Pelvic exam was performed with patient supine. There is no lesion on the right labia. There is no lesion on the left labia. Uterus is absent. There is an absent adnexa. Right adnexum displays no mass. Left adnexum displays no mass. Vaginal cuff normal.Cervix exhibits absence.           Musculoskeletal: Normal range of motion and moves all extremeties.      Lymphadenopathy:     She has no cervical adenopathy.        Right: No inguinal and no supraclavicular adenopathy present.        Left: No inguinal and no supraclavicular  adenopathy present.    Neurological: She is alert and oriented to person, place, and time.    Skin: Skin is warm and dry. No rash noted.    Psychiatric: She has a normal mood and affect.       Assessment:     1. Borderline serous cystadenoma of right ovary        Plan:   No orders of the defined types were placed in this encounter.    No evidence of disease on today's exam  Feels well, no new symptoms  Disease free interval 2 years  Tumor markers  normal      Recommend continued surveillance pending results. RTC 6 months with  or sooner if needed.

## 2020-04-02 DIAGNOSIS — E03.9 HYPOTHYROIDISM, UNSPECIFIED TYPE: ICD-10-CM

## 2020-04-02 RX ORDER — LEVOTHYROXINE SODIUM 50 UG/1
50 TABLET ORAL
Qty: 90 TABLET | Refills: 2 | Status: SHIPPED | OUTPATIENT
Start: 2020-04-02 | End: 2020-12-21

## 2020-04-04 RX ORDER — NITROFURANTOIN MACROCRYSTALS 50 MG/1
50 CAPSULE ORAL NIGHTLY
Qty: 90 CAPSULE | Refills: 3 | Status: SHIPPED | OUTPATIENT
Start: 2020-04-04 | End: 2020-07-08 | Stop reason: SDUPTHER

## 2020-04-15 RX ORDER — OXYBUTYNIN CHLORIDE 10 MG/1
10 TABLET, EXTENDED RELEASE ORAL DAILY
Qty: 30 TABLET | Refills: 11 | Status: SHIPPED | OUTPATIENT
Start: 2020-04-15 | End: 2020-04-17 | Stop reason: SDUPTHER

## 2020-04-20 ENCOUNTER — TELEPHONE (OUTPATIENT)
Dept: UROLOGY | Facility: CLINIC | Age: 75
End: 2020-04-20

## 2020-04-20 RX ORDER — OXYBUTYNIN CHLORIDE 10 MG/1
10 TABLET, EXTENDED RELEASE ORAL DAILY
Qty: 30 TABLET | Refills: 11 | Status: SHIPPED | OUTPATIENT
Start: 2020-04-20 | End: 2021-06-14 | Stop reason: SDUPTHER

## 2020-04-23 ENCOUNTER — PATIENT MESSAGE (OUTPATIENT)
Dept: ADMINISTRATIVE | Facility: OTHER | Age: 75
End: 2020-04-23

## 2020-05-10 ENCOUNTER — PATIENT MESSAGE (OUTPATIENT)
Dept: UROLOGY | Facility: CLINIC | Age: 75
End: 2020-05-10

## 2020-05-11 DIAGNOSIS — R30.0 DYSURIA: Primary | ICD-10-CM

## 2020-05-12 ENCOUNTER — LAB VISIT (OUTPATIENT)
Dept: LAB | Facility: OTHER | Age: 75
End: 2020-05-12
Attending: NURSE PRACTITIONER
Payer: MEDICARE

## 2020-05-12 DIAGNOSIS — R30.0 DYSURIA: ICD-10-CM

## 2020-05-12 PROCEDURE — 87088 URINE BACTERIA CULTURE: CPT

## 2020-05-12 PROCEDURE — 87086 URINE CULTURE/COLONY COUNT: CPT

## 2020-05-12 PROCEDURE — 87186 SC STD MICRODIL/AGAR DIL: CPT

## 2020-05-12 PROCEDURE — 87077 CULTURE AEROBIC IDENTIFY: CPT

## 2020-05-13 ENCOUNTER — PATIENT MESSAGE (OUTPATIENT)
Dept: ADMINISTRATIVE | Facility: OTHER | Age: 75
End: 2020-05-13

## 2020-05-14 LAB — BACTERIA UR CULT: ABNORMAL

## 2020-05-15 ENCOUNTER — TELEPHONE (OUTPATIENT)
Dept: UROLOGY | Facility: CLINIC | Age: 75
End: 2020-05-15

## 2020-05-15 DIAGNOSIS — N30.00 ACUTE CYSTITIS WITHOUT HEMATURIA: Primary | ICD-10-CM

## 2020-05-15 RX ORDER — CEPHALEXIN 500 MG/1
500 CAPSULE ORAL EVERY 12 HOURS
Qty: 14 CAPSULE | Refills: 0 | Status: SHIPPED | OUTPATIENT
Start: 2020-05-15 | End: 2020-05-22

## 2020-05-17 DIAGNOSIS — I10 HYPERTENSION, BENIGN: ICD-10-CM

## 2020-05-18 RX ORDER — IRBESARTAN 75 MG/1
TABLET ORAL
Qty: 90 TABLET | Refills: 0 | Status: SHIPPED | OUTPATIENT
Start: 2020-05-18 | End: 2020-06-11

## 2020-05-20 ENCOUNTER — PATIENT OUTREACH (OUTPATIENT)
Dept: OTHER | Facility: OTHER | Age: 75
End: 2020-05-20

## 2020-05-20 NOTE — LETTER
May 22, 2020     Niurka Pedraza  [address]  [facility] [address]       Dear Niurka,    Welcome to [facility]! Our goal is to make care effective, proactive and convenient by using data you send us from home to better treat your chronic conditions.              My name is Edmund Harding, and I am your dedicated Digital Medicine clinician. As an expert in medication management, I will help ensure that the medications you are taking continue to provide the intended benefits and help you reach your goals. You can reach me directly at [phone] or by sending me a message directly through your [facility] account.      I am Maria Dolores Lacy and I will be your health . My job is to help you identify lifestyle changes to improve your disease control. We will talk about nutrition, exercise, and other ways you may be able to adjust your current habits to better your health. Additionally, we will help ensure you are completing the tests and screenings that are necessary to help manage your conditions. You can reach me directly at [phone] or by sending me a message directly through your [facility] account.    Most importantly, YOU are at the center of this team. Together, we will work to improve your overall health and encourage you to meet your goals for a healthier lifestyle.     What we expect from YOU:  · Please take frequent home blood pressure measurements. We ask that you take at least 1 blood pressure reading per week, but more information will better help us get you know you. Be sure you rest for a few minutes before taking the reading in a quiet, comfortable place.     Be available to receive phone calls or [employer] messages, when appropriate, from your care team. Please let us know if there are any specific days or times that work best for us to reach you via phone.     Complete routine tests and screenings. Dont worry, we will help keep you on track!           What you should expect  from your Digital Medicine Care Team:   We will work with you to create a personalized plan of care and provide you with encouragement and education, including regarding lifestyle changes, that could help you manage your disease states.     We will adjust your current medications, if needed, and continue to monitor your long-term progress.     We will provide you and your physician with monthly progress reports after you have been in the program for more than 30 days.     We will send you reminders through [employer] and text messages to help ensure you do not miss any testing deadlines to help manage your disease states.    You will be able to reach us by phone or through your [employer] account by clicking our names under Care Team on the right side of the home screen.    I look forward to working with you to achieve your blood pressure goals!    We look forward to working with you to help manage your health,    Sincerely,    Your Digital Medicine Team    Please visit our websites to learn more:   · Hypertension: [email or web address]      Remember, we are not available for emergencies. If you have an emergency, please contact your doctors office directly or call [facility] on-call ([phone] or [phone]) or [phone].

## 2020-05-22 NOTE — PROGRESS NOTES
Digital Medicine: Health  Introduction    Introduced Niurka Pedraza to Digital Medicine. Discussed health  role and recommended lifestyle modifications.    Enrolled Ms. Pedraza into [facility] Digital Medicine program. Introduced myself as pt's health  for the program. Discussed lifestyle goals. Encouraged limiting sodium intake and discussed foods high in sodium. Encouraged the use of salt free seasonings and gave examples. Encouraged 150 minutes of physical activity weekly (~20-25 minutes daily). Pt says she may go for a walk for 20-25 minutes daily. Gave encouragement and support. Encouraged pt to reach out with questions or concerns.       The history is provided by the patient.     HYPERTENSION  Our goal is to get BP to consistently below 130/80mmHg and make the process convenient so patient can avoid extra trips to the office. Getting your blood pressure below 130/80mmHg (definition of control) will reduce your risk for heart attack, kidney failure, stroke and death (as well as kidney failure, eye disease, & dementia)      Reviewed that the Digital Medicine care team - consisting of a clinician and a health  - will follow the most current evidence-based national guidelines for treating your condition.  The health  will focus on lifestyle modifications and motivation while the clinician will focus on medication therapy.  The care team will review all data on a regular basis and reach out as needed.      Explained that one of the key parts of the program is communication with the care team.  Asked patient to respond to outreach attempts and complete questionnaires.  Stressed importance of medication adherence.    Reviewed non-pharmacologic therapies and impact on BP.      Explained that we expect patient to obtain several blood pressures per week at random times of day.  Instructed patient not to allow anyone else to use phone and monitoring device.  Confirmed appropriate BP monitoring technique.       Patient's BP goal is 130/80.Patient's BP average is 144/93 mmHg, which is above goal, per 2017 ACC/AHA Hypertension Guidelines.          Last 5 Patient Entered Readings                                      Current 30 Day Average: 144/93     Recent Readings 5/22/2020 5/22/2020 5/21/2020 5/21/2020 5/21/2020    SBP (mmHg) 152 157 142 136 148    DBP (mmHg) 94 96 83 91 87    Pulse 74 77 73 72 75            INTERVENTION(S)  recommended diet modifications, recommend physical activity, reviewed monitoring technique, encouragement/support, goal setting and denied questions    PLAN  patient verbalizes understanding, patient amenable to changes and continue monitoring      There are no preventive care reminders to display for this patient.    Reviewed the importance of self-monitoring, medication adherence, and that the health  can be used as a resource for lifestyle modifications to help reduce or maintain a healthy lifestyle.    Sent link to [facility] webpages and my contact information via [employer] for future questions. Follow up scheduled.             Diet Screening   She has the following dietary restrictions: low sodium diet    Intervention(s): low sodium diet education, reducing sodium intake, reducing processed foods and reducing seasonings    Physical Activity Screening       Intervention(s): goal setting and goal tracking       SDOH

## 2020-05-31 PROCEDURE — 99454 REM MNTR PHYSIOL PARAM 16-30: CPT | Mod: PBBFAC | Performed by: INTERNAL MEDICINE

## 2020-06-02 ENCOUNTER — PATIENT OUTREACH (OUTPATIENT)
Dept: OTHER | Facility: OTHER | Age: 75
End: 2020-06-02

## 2020-06-02 DIAGNOSIS — I10 HYPERTENSION, BENIGN: Primary | ICD-10-CM

## 2020-06-11 RX ORDER — IRBESARTAN 150 MG/1
150 TABLET ORAL NIGHTLY
Qty: 90 TABLET | Refills: 1 | Status: SHIPPED | OUTPATIENT
Start: 2020-06-11 | End: 2020-10-27

## 2020-06-11 NOTE — PROGRESS NOTES
Digital Medicine: Clinician Introduction    Niurka Pedraza is a 75 y.o. female who is newly enrolled in the Digital Medicine Clinic.    The following information was reviewed and updated:  Preferred pharmacy   [id number] - [place], [place] - [address]  [address]  [place] [address]  Phone: [phone] Fax: [phone]      Patient prefers a 90 days supply.     Review of patient's allergies indicates:   Allergen Reactions    Ciprofloxacin Other (See Comments)     Right foot pain and edema, tendonitis    Amlodipine Edema and Swelling     BLE  BLE    Lisinopril Other (See Comments)     cough       Patient reports doing well without major concern.  Does not know why her readings having been recently.    Hyper-/hypotensive symptoms: endorses increase in headaches recently    Medication issues/non-adherence: denies    Blood pressure cuff issues: denies    Diet: following low sodium diet    Physical activity: house chores, goes for walks around the block when she can but limited due to tendonitis          The history is provided by the patient. No  was used.     HYPERTENSION  Our goal is to get BP to consistently below 130/80mmHg and make the process convenient so patient can avoid extra trips to the office. Getting your blood pressure below 130/80mmHg (definition of control) will reduce your risk for heart attack, kidney failure, stroke and death (as well as kidney failure, eye disease, & dementia)      Reviewed non-pharmacologic therapies and impact on BP      Explained that we expect patient to obtain several blood pressures per week at random times of day.  Instructed patient not to allow anyone else to use phone and monitoring device.  Confirmed appropriate BP monitoring technique.      Explained to patient that the digital medicine team is not available for emergencies.  Patient will call [facility] on-[facility] ([phone] or [phone]) or [phone] if needed.     Patient's BP goal is 130/80. Patients BP average is 147/92 mmHg, which is above goal, per 2017 ACC/AHA Hypertension Guidelines.    Patient is experiencing symptoms of hypertension.      Medication Change: dose increase    Med Review complete.    Allergies reviewed.      Last 5 Patient Entered Readings                                      Current 30 Day Average: 147/92     Recent Readings 6/11/2020 6/10/2020 6/10/2020 6/9/2020 6/9/2020    SBP (mmHg) 153 135 141 142 147    DBP (mmHg) 90 88 93 93 96    Pulse 77 84 85 83 81            INTERVENTION(S)  reviewed appropriate dose schedule, reviewed monitoring technique, recommended med change, encouragement/support and denied questions    PLAN  patient verbalizes understanding, demonstrates understanding via teach back, patient amenable to changes, additional monitoring needed and continue monitoring    -Blood pressure is Uncontrolled per recent readings  -Endorses increase in headaches recently but unclear if directly related to high readings  -Current regimen is appropriate but inadequate for control.  Plan to titrate irbesartan dose.  -BMP needs updated    -Medication changes as follows: increase irbesartan to 150 mg once daily  -BMP to be drawn tomorrow.  Appt created and order linked          There are no preventive care reminders to display for this patient.    Current Medication Regimen:  Hypertension Medications             irbesartan (AVAPRO) 75 MG tablet TAKE 1 TABLET BY MOUTH EVERY EVENING    metoprolol succinate (TOPROL-XL) 50 MG 24 hr tablet Take 1 tablet (50 mg total) by mouth once daily.            Reviewed the importance of self-monitoring, medication adherence, and that the health  can be used as a resource for lifestyle modifications to help reduce or maintain a healthy lifestyle.    Sent link to [facility] webpages and my contact information via [employer] for future questions. Follow up scheduled.             Sleep Apnea  Screening  Patient not previously diagnosed with URSULA and         Medication Adherence Screening   She did not miss a dose this month.  Patient knows purpose of medications.      Patient identified the following reasons for non-compliance: None    Adherence tools used: Pill box

## 2020-06-12 ENCOUNTER — LAB VISIT (OUTPATIENT)
Dept: LAB | Facility: OTHER | Age: 75
End: 2020-06-12
Attending: OBSTETRICS & GYNECOLOGY
Payer: MEDICARE

## 2020-06-12 DIAGNOSIS — I10 HYPERTENSION, BENIGN: ICD-10-CM

## 2020-06-12 LAB
ANION GAP SERPL CALC-SCNC: 8 MMOL/L (ref 8–16)
BUN SERPL-MCNC: 19 MG/DL (ref 8–23)
CALCIUM SERPL-MCNC: 9.3 MG/DL (ref 8.7–10.5)
CHLORIDE SERPL-SCNC: 107 MMOL/L (ref 95–110)
CO2 SERPL-SCNC: 25 MMOL/L (ref 23–29)
CREAT SERPL-MCNC: 0.9 MG/DL (ref 0.5–1.4)
EST. GFR  (AFRICAN AMERICAN): >60 ML/MIN/1.73 M^2
EST. GFR  (NON AFRICAN AMERICAN): >60 ML/MIN/1.73 M^2
GLUCOSE SERPL-MCNC: 137 MG/DL (ref 70–110)
POTASSIUM SERPL-SCNC: 4.1 MMOL/L (ref 3.5–5.1)
SODIUM SERPL-SCNC: 140 MMOL/L (ref 136–145)

## 2020-06-12 PROCEDURE — 80048 BASIC METABOLIC PNL TOTAL CA: CPT

## 2020-06-12 PROCEDURE — 36415 COLL VENOUS BLD VENIPUNCTURE: CPT

## 2020-06-25 ENCOUNTER — PATIENT OUTREACH (OUTPATIENT)
Dept: OTHER | Facility: OTHER | Age: 75
End: 2020-06-25

## 2020-06-25 NOTE — PROGRESS NOTES
Unable to reach pt for f/u of medication change at last outreach.    BP avg 138/88 mmHg, trending downward nicely    Need to ensure patient feeling better with resolution of symptoms but likely continue current regimen

## 2020-06-29 PROCEDURE — 99454 REM MNTR PHYSIOL PARAM 16-30: CPT | Mod: PBBFAC | Performed by: INTERNAL MEDICINE

## 2020-07-06 ENCOUNTER — PATIENT OUTREACH (OUTPATIENT)
Dept: OTHER | Facility: OTHER | Age: 75
End: 2020-07-06

## 2020-07-06 NOTE — PROGRESS NOTES
Digital Medicine: Clinician Follow-Up    Patient reports doing well without major concern.  She is happy with the improved readings.  No longer having frequent headahces.    Hyper-/hypotensive symptoms: denies    Medication issues/non-adherence: denies    Blood pressure cuff issues: denies    Diet: No changes    Physical activity: No changes          The history is provided by the patient. No  was used.   Follow Up  Follow-up reason(s): reading review      Readings are trending down due to medication adherence.    Patient started new medication.    Is patient tolerating med change?:  Yes      INTERVENTION(S)  reviewed appropriate dose schedule, encouragement/support and denied questions    PLAN  patient verbalizes understanding and continue monitoring    -Blood pressure is Close-to-goal per recent readings, trending down  -Current regimen is appropriate and likely adequate for control.  Tolerating med change.  No further changes    -Continue current medications as prescribed        There are no preventive care reminders to display for this patient.    Last 5 Patient Entered Readings                                      Current 30 Day Average: 128/83     Recent Readings 7/5/2020 7/5/2020 7/5/2020 7/4/2020 7/4/2020    SBP (mmHg) 130 119 134 114 119    DBP (mmHg) 76 80 80 81 85    Pulse 84 95 94 70 71             Hypertension Medications             irbesartan (AVAPRO) 150 MG tablet Take 1 tablet (150 mg total) by mouth every evening.    metoprolol succinate (TOPROL-XL) 50 MG 24 hr tablet Take 1 tablet (50 mg total) by mouth once daily.                 Screenings

## 2020-07-12 ENCOUNTER — PATIENT MESSAGE (OUTPATIENT)
Dept: UROLOGY | Facility: CLINIC | Age: 75
End: 2020-07-12

## 2020-07-12 DIAGNOSIS — N39.0 RECURRENT UTI: Primary | ICD-10-CM

## 2020-07-13 ENCOUNTER — LAB VISIT (OUTPATIENT)
Dept: LAB | Facility: OTHER | Age: 75
End: 2020-07-13
Attending: UROLOGY
Payer: MEDICARE

## 2020-07-13 DIAGNOSIS — N39.0 RECURRENT UTI: ICD-10-CM

## 2020-07-13 PROCEDURE — 87088 URINE BACTERIA CULTURE: CPT

## 2020-07-13 PROCEDURE — 87077 CULTURE AEROBIC IDENTIFY: CPT

## 2020-07-13 PROCEDURE — 87086 URINE CULTURE/COLONY COUNT: CPT

## 2020-07-13 PROCEDURE — 87186 SC STD MICRODIL/AGAR DIL: CPT

## 2020-07-13 NOTE — TELEPHONE ENCOUNTER
Pt. States that she is in constant pain and more when she urinates .. she states it feels like a knife is stabbing her in her vagina .. she would like to bring in a sample to be cultured..  Pt. Takes nitrofurantoin(MACRODANTIN)10mg daily..

## 2020-07-15 ENCOUNTER — TELEPHONE (OUTPATIENT)
Dept: UROLOGY | Facility: CLINIC | Age: 75
End: 2020-07-15

## 2020-07-15 LAB — BACTERIA UR CULT: ABNORMAL

## 2020-07-15 RX ORDER — CEPHALEXIN 500 MG/1
500 CAPSULE ORAL EVERY 8 HOURS
Qty: 30 CAPSULE | Refills: 0 | Status: SHIPPED | OUTPATIENT
Start: 2020-07-15 | End: 2020-07-25

## 2020-07-15 NOTE — TELEPHONE ENCOUNTER
Please inform patient of urinary tract infection on recent urine culture. Appropriate antibiotics (Keflex) were sent in to the pharmacy. Okay to hold daily Macrobid while taking Keflex.

## 2020-07-15 NOTE — TELEPHONE ENCOUNTER
Patient advised that her recent ucx was positive for infection, pt will pick-up keflex from the pharmacy.

## 2020-07-17 DIAGNOSIS — Z71.89 COMPLEX CARE COORDINATION: ICD-10-CM

## 2020-07-24 ENCOUNTER — OFFICE VISIT (OUTPATIENT)
Dept: INTERNAL MEDICINE | Facility: CLINIC | Age: 75
End: 2020-07-24
Attending: INTERNAL MEDICINE
Payer: MEDICARE

## 2020-07-24 VITALS
HEART RATE: 76 BPM | SYSTOLIC BLOOD PRESSURE: 129 MMHG | WEIGHT: 178.56 LBS | HEIGHT: 64 IN | OXYGEN SATURATION: 96 % | BODY MASS INDEX: 30.48 KG/M2 | DIASTOLIC BLOOD PRESSURE: 88 MMHG

## 2020-07-24 DIAGNOSIS — N39.0 RECURRENT UTI: ICD-10-CM

## 2020-07-24 DIAGNOSIS — E03.9 HYPOTHYROIDISM, UNSPECIFIED TYPE: ICD-10-CM

## 2020-07-24 DIAGNOSIS — Z12.31 ENCOUNTER FOR SCREENING MAMMOGRAM FOR MALIGNANT NEOPLASM OF BREAST: ICD-10-CM

## 2020-07-24 DIAGNOSIS — I10 HYPERTENSION, BENIGN: Primary | ICD-10-CM

## 2020-07-24 DIAGNOSIS — R73.01 IFG (IMPAIRED FASTING GLUCOSE): ICD-10-CM

## 2020-07-24 DIAGNOSIS — Z12.39 SCREENING FOR BREAST CANCER: ICD-10-CM

## 2020-07-24 DIAGNOSIS — M79.671 RIGHT FOOT PAIN: ICD-10-CM

## 2020-07-24 DIAGNOSIS — H61.23 BILATERAL IMPACTED CERUMEN: ICD-10-CM

## 2020-07-24 DIAGNOSIS — N95.9 MENOPAUSAL PROBLEM: ICD-10-CM

## 2020-07-24 DIAGNOSIS — E78.5 HYPERLIPIDEMIA, UNSPECIFIED HYPERLIPIDEMIA TYPE: ICD-10-CM

## 2020-07-24 DIAGNOSIS — M85.80 OSTEOPENIA, UNSPECIFIED LOCATION: ICD-10-CM

## 2020-07-24 PROCEDURE — 99999 PR PBB SHADOW E&M-EST. PATIENT-LVL V: CPT | Mod: PBBFAC,,, | Performed by: INTERNAL MEDICINE

## 2020-07-24 PROCEDURE — 99215 OFFICE O/P EST HI 40 MIN: CPT | Mod: PBBFAC | Performed by: INTERNAL MEDICINE

## 2020-07-24 PROCEDURE — 99214 OFFICE O/P EST MOD 30 MIN: CPT | Mod: S$PBB,,, | Performed by: INTERNAL MEDICINE

## 2020-07-24 PROCEDURE — 99214 PR OFFICE/OUTPT VISIT, EST, LEVL IV, 30-39 MIN: ICD-10-PCS | Mod: S$PBB,,, | Performed by: INTERNAL MEDICINE

## 2020-07-24 PROCEDURE — 99999 PR PBB SHADOW E&M-EST. PATIENT-LVL V: ICD-10-PCS | Mod: PBBFAC,,, | Performed by: INTERNAL MEDICINE

## 2020-07-24 NOTE — PATIENT INSTRUCTIONS
Recommended vaccines:     Shingrix (shingles vaccine - 2 injections)  Pneumovax 23 - will give today   Tdap (tetanus - in 2-4 weeks at pharmacy)  Flu vaccine in fall (Late August or early September)

## 2020-07-24 NOTE — PROGRESS NOTES
"Subjective:   Patient ID: Niurka Pedraza is a 75 y.o. female  Chief complaint:   Chief Complaint   Patient presents with    Annual Exam       HPI  Pt here for annual exam      HTN:   - taking toprol xl 50 and irbesartan 150mg  - in dig htn prog - bp normal yesterday and elevated today - attrib in part to anxiety and coming to clinic - agrees to repeat bp once returns home     Hypothyroidism:  - taking levothyroxine 50mg in am and aniyah well.    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 Oropeza   - taking abx ppx      Asymmetrical hearing loss:   - wearing hearing aide for right ear   - seen by ENT 12/22/2017   - pt declined MRI scan for CPA lesion     Tendonitis abd posterior tendon tear:   - attributed to cipro and went to ortho for right ankle pain  - followed by ortho - to rtc prn       Osteopenia:   - taking yue/vit d - dexa 11/2017       Hx of pulm coccidiomycosis dx 3 years ago while living in [place], pulmonary nodules:   - completed treatment but chronic at this time   - f/u with pulmonologist in [place] - Dr. Nemesio Cazares   - Has appt with pulm scheduled   - seen by pulm as above and to repeat ct chest this year     Hx of breast cancer - ductal carcinoma in situ - mastectomy right in 2014   - no chemo or xrt   - last mmg was 8/2019  - due for breast exam      She requested referral to rheumatologist for opinion 1/2019     Reports bilateral foot cramping and pain at toenails of first digits  - no drainage or inc warmth or redness    HM:   shingrix   pvax 23  tdap  Flu vaccine in fall     Review of Systems    Objective:  Vitals:    07/24/20 1059   BP: 129/88   BP Location: Left arm   Patient Position: Sitting   Pulse: 76   SpO2: 96%   Weight: 81 kg (178 lb 9.2 oz)   Height: 5' 4" (1.626 m) "     Body mass index is 30.65 kg/m².    Physical Exam  Vitals signs reviewed.   Constitutional:       Appearance: Normal appearance. She is well-developed.   HENT:      Head: Normocephalic and atraumatic.      Right Ear: Ear canal and external ear normal. There is impacted cerumen.      Left Ear: Ear canal and external ear normal. There is impacted cerumen.      Nose: Nose normal. No congestion.      Mouth/Throat:      Mouth: Mucous membranes are moist.      Pharynx: Oropharynx is clear. No oropharyngeal exudate.   Eyes:      Extraocular Movements: Extraocular movements intact.      Conjunctiva/sclera: Conjunctivae normal.   Neck:      Musculoskeletal: Neck supple.      Thyroid: No thyromegaly.   Cardiovascular:      Rate and Rhythm: Normal rate and regular rhythm.      Pulses: Normal pulses.      Heart sounds: Normal heart sounds.   Pulmonary:      Effort: Pulmonary effort is normal.      Breath sounds: Normal breath sounds.   Chest:      Breasts:         Right: Absent.         Left: Normal. No swelling, bleeding, inverted nipple, mass, nipple discharge, skin change or tenderness.       Abdominal:      General: Bowel sounds are normal.      Palpations: Abdomen is soft.   Musculoskeletal:         General: No swelling or tenderness.   Feet:      Right foot:      Protective Sensation: 4 sites tested. 4 sites sensed.      Skin integrity: Skin integrity normal. No ulcer, blister, skin breakdown or erythema.      Left foot:      Protective Sensation: 4 sites tested. 4 sites sensed.      Skin integrity: Skin integrity normal. No ulcer, blister, skin breakdown or erythema.   Lymphadenopathy:      Cervical: No cervical adenopathy.      Upper Body:      Right upper body: No supraclavicular, axillary or pectoral adenopathy.      Left upper body: No supraclavicular, axillary or pectoral adenopathy.   Skin:     General: Skin is warm and dry.      Capillary Refill: Capillary refill takes less than 2 seconds.   Neurological:       General: No focal deficit present.      Mental Status: She is alert and oriented to person, place, and time. Mental status is at baseline.   Psychiatric:         Behavior: Behavior normal.         Thought Content: Thought content normal.         Assessment:  1. Hypertension, benign    2. Screening for breast cancer    3. Hyperlipidemia, unspecified hyperlipidemia type    4. Recurrent UTI    5. Hypothyroidism, unspecified type    6. Osteopenia, unspecified location    7. Bilateral impacted cerumen    8. IFG (impaired fasting glucose)    9. Menopausal problem    10. Encounter for screening mammogram for malignant neoplasm of breast     11. Right foot pain        Plan:  Niurka was seen today for annual exam.    Diagnoses and all orders for this visit:    Hypertension, benign  -     CBC auto differential; Future    Screening for breast cancer  -     Mammo Digital Screening Left w/ Vitor; Future    Hyperlipidemia, unspecified hyperlipidemia type  -     Comprehensive metabolic panel; Future  -     Lipid Panel; Future    Recurrent UTI    Hypothyroidism, unspecified type  -     TSH; Future    Osteopenia, unspecified location  -     DXA Bone Density Spine And Hip; Future    Bilateral impacted cerumen    IFG (impaired fasting glucose)  -     Hemoglobin A1C; Future    Menopausal problem  -     DXA Bone Density Spine And Hip; Future    Encounter for screening mammogram for malignant neoplasm of breast   -     Mammo Digital Screening Left w/ Vitor; Future    Right foot pain  -     Ambulatory referral/consult to Podiatry; Future    Other orders  -     (In Office Administered) Pneumococcal Polysaccharide Vaccine (23 Valent) (SQ/IM)    cont meds   Check bp at home when returns - cont dig htn prog   Consider switching BB   Keep appt with specialists   Labs and mmg, dexa due     Health Maintenance   Topic Date Due    Pneumococcal Vaccine (65+ Low/Medium Risk) (2 of 2 - PPSV23) 11/02/2018    TETANUS VACCINE  08/18/2020    DEXA SCAN   11/22/2020    Lipid Panel  12/11/2023    Hepatitis C Screening  Completed

## 2020-07-27 ENCOUNTER — PATIENT OUTREACH (OUTPATIENT)
Dept: ADMINISTRATIVE | Facility: OTHER | Age: 75
End: 2020-07-27

## 2020-07-27 NOTE — PROGRESS NOTES
"Digital Medicine: Health  Follow-Up    The history is provided by the patient.                     Topics Covered on Call: physical activity and Diet    Additional Follow-up details: Called pt for follow up. Average /84. Recent readings appear to be trending back down. She denies any changes to her diet or physical activity. Pt denies increased stress.         Diet-no change to diet    No change to diet.  Patient reports eating or drinking the following: Pt tries to follow low sodium diet. She says this is going well for her and she denies questions today. and Pt consumes salads, "meat but not too much", wheat pasta    Additional diet details:    Physical Activity-no change to routine  No change to exercise routine.       Additional physical activity details: Pt reports she has not been walking for physical activity. Although we made a goal for her to increase her step intake throughout her home with house work.       Medication Adherence-Medication adherence was assessed.        PLAN  Continue current diet/physical activity routine:  Provided patient education:    Patient verbalizes understanding. Patient did not express questions or concerns and patient has contact information if needed.    Explained the importance of self-monitoring and medication adherence. Encouraged the patient to communicate with their health  for lifestyle modifications to help improve or maintain a healthy lifestyle.        There are no preventive care reminders to display for this patient.    Last 5 Patient Entered Readings                                      Current 30 Day Average: 128/84     Recent Readings 7/25/2020 7/25/2020 7/24/2020 7/24/2020 7/24/2020    SBP (mmHg) 122 135 153 160 141    DBP (mmHg) 96 88 88 68 89    Pulse 70 72 68 69 75               "

## 2020-07-28 ENCOUNTER — PATIENT MESSAGE (OUTPATIENT)
Dept: UROLOGY | Facility: CLINIC | Age: 75
End: 2020-07-28

## 2020-07-28 ENCOUNTER — OFFICE VISIT (OUTPATIENT)
Dept: PODIATRY | Facility: CLINIC | Age: 75
End: 2020-07-28
Attending: INTERNAL MEDICINE
Payer: MEDICARE

## 2020-07-28 ENCOUNTER — LAB VISIT (OUTPATIENT)
Dept: LAB | Facility: OTHER | Age: 75
End: 2020-07-28
Attending: UROLOGY
Payer: MEDICARE

## 2020-07-28 VITALS
BODY MASS INDEX: 30.39 KG/M2 | SYSTOLIC BLOOD PRESSURE: 128 MMHG | HEART RATE: 74 BPM | WEIGHT: 178 LBS | HEIGHT: 64 IN | DIASTOLIC BLOOD PRESSURE: 77 MMHG

## 2020-07-28 DIAGNOSIS — M79.671 RIGHT FOOT PAIN: ICD-10-CM

## 2020-07-28 DIAGNOSIS — M76.829 PTTD (POSTERIOR TIBIAL TENDON DYSFUNCTION): ICD-10-CM

## 2020-07-28 DIAGNOSIS — N39.0 RECURRENT UTI: ICD-10-CM

## 2020-07-28 DIAGNOSIS — B35.1 ONYCHOMYCOSIS DUE TO DERMATOPHYTE: Primary | ICD-10-CM

## 2020-07-28 DIAGNOSIS — N39.0 RECURRENT UTI: Primary | ICD-10-CM

## 2020-07-28 PROCEDURE — 99214 OFFICE O/P EST MOD 30 MIN: CPT | Mod: PBBFAC,PN | Performed by: PODIATRIST

## 2020-07-28 PROCEDURE — 87077 CULTURE AEROBIC IDENTIFY: CPT

## 2020-07-28 PROCEDURE — 99203 OFFICE O/P NEW LOW 30 MIN: CPT | Mod: S$PBB,,, | Performed by: PODIATRIST

## 2020-07-28 PROCEDURE — 87086 URINE CULTURE/COLONY COUNT: CPT

## 2020-07-28 PROCEDURE — 87088 URINE BACTERIA CULTURE: CPT

## 2020-07-28 PROCEDURE — 99999 PR PBB SHADOW E&M-EST. PATIENT-LVL IV: CPT | Mod: PBBFAC,,, | Performed by: PODIATRIST

## 2020-07-28 PROCEDURE — 99203 PR OFFICE/OUTPT VISIT, NEW, LEVL III, 30-44 MIN: ICD-10-PCS | Mod: S$PBB,,, | Performed by: PODIATRIST

## 2020-07-28 PROCEDURE — 99999 PR PBB SHADOW E&M-EST. PATIENT-LVL IV: ICD-10-PCS | Mod: PBBFAC,,, | Performed by: PODIATRIST

## 2020-07-28 PROCEDURE — 87186 SC STD MICRODIL/AGAR DIL: CPT

## 2020-07-28 RX ORDER — SULFAMETHOXAZOLE AND TRIMETHOPRIM 800; 160 MG/1; MG/1
1 TABLET ORAL 2 TIMES DAILY
Qty: 14 TABLET | Refills: 0 | Status: SHIPPED | OUTPATIENT
Start: 2020-07-28 | End: 2020-07-31

## 2020-07-28 RX ORDER — LIDOCAINE HYDROCHLORIDE 20 MG/ML
JELLY TOPICAL
Qty: 30 ML | Refills: 2 | Status: SHIPPED | OUTPATIENT
Start: 2020-07-28 | End: 2022-08-17

## 2020-07-28 RX ORDER — CICLOPIROX 80 MG/ML
SOLUTION TOPICAL NIGHTLY
Qty: 6.6 ML | Refills: 11 | Status: SHIPPED | OUTPATIENT
Start: 2020-07-28 | End: 2020-09-28 | Stop reason: SDUPTHER

## 2020-07-28 NOTE — PROGRESS NOTES
Chart reviewed.   Immunizations: Triggered Imm Registry     Orders placed: n/a  Upcoming appts to satisfy TRINI topics: n/a

## 2020-07-28 NOTE — LETTER
July 28, 2020      Savana Anderson MD  [address]  [facility] [address]           [place] [facility]iat[facility]  [address] [address]  Phone: [phone]  Fax: [phone]          Patient: Niurka Pedraza   MR Number: [id number]   YOB: 1945   Date of Visit: 7/28/2020       Dear Dr. Savana Anderson:    Thank you for referring Niurka Pedraza to me for evaluation. Attached you will find relevant portions of my assessment and plan of care.    If you have questions, please do not hesitate to call me. I look forward to following Niurka Pedraza along with you.    Sincerely,    Tavon Wiggins, DPMIKEY    Enclosure  CC:  No Recipients    If you would like to receive this communication electronically, please contact [email or web address] or [phone] to request more information on [employer] access.    For providers and/or their staff who would like to refer a patient to [facility], please contact us through our one-stop-shop provider referral line, [facility], at [phone].    If you feel you have received this communication in error or would no longer like to receive these types of communications, please e-mail [email or web address]

## 2020-07-28 NOTE — PROGRESS NOTES
"Subjective:      Patient ID: Niurka Pedraza is a 75 y.o. female.    Chief Complaint: Numbness ( Bilateral Toes) and Nail Problem (Fungus Nail)    Burning tingling both feet, all toe tips gradually progressing to sulcus over past several months, worsening.  aggravated by rest at night.  No previous medical treatment.  OTC pain med not helping. Denies trauam, surgery.    CC2 throbbing pain both big toes/toenails.  Gradual onset, worsening over past several weeks, aggravated by increased weight bearing, shoe gear, pressure.  No previous medical treatment.  OTC pain med not helping. Denies trauma, surgery both hallux nails.    Hx "destroyed tendon right ankle PT tendon - she relates she suspects same left more recently , less severe.      Review of Systems   Skin: Positive for nail changes.   Neurological: Positive for numbness and paresthesias.           Objective:      Physical Exam  Constitutional:       General: She is not in acute distress.     Appearance: She is well-developed. She is not diaphoretic.   Cardiovascular:      Pulses:           Popliteal pulses are 2+ on the right side and 2+ on the left side.        Dorsalis pedis pulses are 2+ on the right side and 2+ on the left side.        Posterior tibial pulses are 2+ on the right side and 2+ on the left side.      Comments: Capillary refill 3 seconds all toes/distal feet, all toes/both feet warm to touch.      Negative lymphadenopathy bilateral popliteal fossa and tarsal tunnel.      Negavie lower extremity edema bilateral.    Musculoskeletal:      Right ankle: She exhibits normal range of motion, no swelling, no ecchymosis, no deformity, no laceration and normal pulse. Achilles tendon normal. Achilles tendon exhibits no pain, no defect and normal Nicholas's test results.      Comments: rcsp   everted bilateral, no single leg toe raise Bilateral, negative lateral malleolar index and positive too many toe sign.  Crepitus absent to attempted reduction.  Both " sides are reducible.   Medial arch collapse all loadng and wb..       Ankle dorsiflexion decreased at <10 degrees bilateral with moderate increase with knee flexion bilateral.    Otherwise, Normal angle, base, station of gait. All ten toes without clubbing, cyanosis, or signs of ischemia.  No pain to palpation bilateral lower extremities.  Range of motion, stability, muscle strength, and muscle tone normal bilateral feet and legs.    Lymphadenopathy:      Lower Body: No right inguinal adenopathy. No left inguinal adenopathy.      Comments: Negative lymphadenopathy bilateral popliteal fossa and tarsal tunnel.    Negative lymphangitic streaking bilateral feet/ankles/legs.   Skin:     General: Skin is warm and dry.      Capillary Refill: Capillary refill takes 2 to 3 seconds.      Coloration: Skin is not pale.      Findings: No abrasion, bruising, burn, ecchymosis, erythema, laceration, lesion or rash.      Nails: There is no clubbing.        Comments:   Skin is normal age and health appropriate color, turgor, texture, and temperature bilateral lower extremities without ulceration, hyperpigmentation, discoloration, masses nodules or cords palpated.  No ecchymosis, erythema, edema, or cardinal signs of infection bilateral lower extremities.    Toenails 1st,bilateral are hypertrophic thickened 2-3 mm, dystrophic, discolored tanish brown with tan, gray crumbly subungual debris.  Tender to distal nail plate pressure, without periungual skin abnormality of each.     Neurological:      Mental Status: She is alert and oriented to person, place, and time.      Sensory: No sensory deficit.      Motor: No tremor, atrophy or abnormal muscle tone.      Gait: Gait normal.      Deep Tendon Reflexes:      Reflex Scores:       Patellar reflexes are 2+ on the right side and 2+ on the left side.       Achilles reflexes are 2+ on the right side and 2+ on the left side.     Comments: Paresthesias, and burning bilateral toes with no  clearly identified trigger or source.     Psychiatric:         Behavior: Behavior is cooperative.               Assessment:       Encounter Diagnoses   Name Primary?    Right foot pain     Onychomycosis due to dermatophyte Yes    PTTD (posterior tibial tendon dysfunction)          Plan:       Niurka was seen today for numbness and nail problem.    Diagnoses and all orders for this visit:    Onychomycosis due to dermatophyte    Right foot pain  -     Ambulatory referral/consult to Podiatry    PTTD (posterior tibial tendon dysfunction)    Other orders  -     lidocaine HCL 2% (XYLOCAINE) 2 % jelly; Apply topically as needed. Apply topically once nightly to affected part of foot/feet.  -     ciclopirox (PENLAC) 8 % Soln; Apply topically nightly.      I counseled the patient on her conditions, their implications and medical management.        Patient will stretch the tendo achilles complex three times daily as demonstrated in the office.  Literature was dispensed illustrating proper stretching technique.    Patient will obtain over the counter arch supports and wear them in shoes whenever possible.  Athletic shoes intended for walking or running are usually best.    Discussed conservative treatment with shoes of adequate dimensions, material, and style to alleviate symptoms and delay or prevent surgical intervention.    Lidocaine gel    penlac    Recommend thorough workup for peripheral neuropathy vs, neurogenic source from back.  Tarsal tunnel not likely due to asymmetric PT symptoms/chronicity and recent same time onset/progression with symmetry of neuro symptoms.        Follow up in 1 month (on 8/28/2020), or if symptoms worsen or fail to improve.

## 2020-07-30 ENCOUNTER — PATIENT MESSAGE (OUTPATIENT)
Dept: INTERNAL MEDICINE | Facility: CLINIC | Age: 75
End: 2020-07-30

## 2020-07-30 LAB
BACTERIA UR CULT: ABNORMAL
BACTERIA UR CULT: ABNORMAL

## 2020-07-31 ENCOUNTER — TELEPHONE (OUTPATIENT)
Dept: UROLOGY | Facility: CLINIC | Age: 75
End: 2020-07-31

## 2020-07-31 ENCOUNTER — TELEPHONE (OUTPATIENT)
Dept: UROLOGY | Facility: HOSPITAL | Age: 75
End: 2020-07-31

## 2020-07-31 RX ORDER — AMOXICILLIN AND CLAVULANATE POTASSIUM 875; 125 MG/1; MG/1
1 TABLET, FILM COATED ORAL 2 TIMES DAILY
Qty: 20 TABLET | Refills: 0 | Status: SHIPPED | OUTPATIENT
Start: 2020-07-31 | End: 2020-08-10

## 2020-07-31 NOTE — TELEPHONE ENCOUNTER
Spoke to pt advised do to positive urine culture bactrim that was prescribed is not the correct antibiotic to be pt should stop an start new rx for augmentin. Once completed the augmentin pt should resume macrobid. Pt states she fully understands. mamta

## 2020-07-31 NOTE — TELEPHONE ENCOUNTER
Please inform patient of urinary tract infection on recent urine culture. Appropriate antibiotics (Augmentin) were sent in to the pharmacy.    Final culture showed a resistance to the Bactrim given previously. Stop this and start Augmentin.     UTI is sensitive to the Macrobid given previously for prophylaxis. When you finish the Augmentin, go ahead and restart the Macrobid. If you have another breakthrough UTI, we can adjust that.

## 2020-08-05 ENCOUNTER — LAB VISIT (OUTPATIENT)
Dept: LAB | Facility: OTHER | Age: 75
End: 2020-08-05
Attending: OBSTETRICS & GYNECOLOGY
Payer: MEDICARE

## 2020-08-05 DIAGNOSIS — C56.9 SEROUS CYSTADENOMA OF OVARY WITH BORDERLINE MALIGNANT FEATURES: ICD-10-CM

## 2020-08-05 LAB — CANCER AG125 SERPL-ACNC: 11 U/ML (ref 0–30)

## 2020-08-05 PROCEDURE — 36415 COLL VENOUS BLD VENIPUNCTURE: CPT

## 2020-08-05 PROCEDURE — 86304 IMMUNOASSAY TUMOR CA 125: CPT

## 2020-08-11 ENCOUNTER — LAB VISIT (OUTPATIENT)
Dept: LAB | Facility: OTHER | Age: 75
End: 2020-08-11
Attending: INTERNAL MEDICINE
Payer: MEDICARE

## 2020-08-11 DIAGNOSIS — E03.9 HYPOTHYROIDISM, UNSPECIFIED TYPE: ICD-10-CM

## 2020-08-11 DIAGNOSIS — I10 HYPERTENSION, BENIGN: ICD-10-CM

## 2020-08-11 DIAGNOSIS — E78.5 HYPERLIPIDEMIA, UNSPECIFIED HYPERLIPIDEMIA TYPE: ICD-10-CM

## 2020-08-11 DIAGNOSIS — R73.01 IFG (IMPAIRED FASTING GLUCOSE): ICD-10-CM

## 2020-08-11 LAB
ALBUMIN SERPL BCP-MCNC: 3.7 G/DL (ref 3.5–5.2)
ALP SERPL-CCNC: 62 U/L (ref 55–135)
ALT SERPL W/O P-5'-P-CCNC: 62 U/L (ref 10–44)
ANION GAP SERPL CALC-SCNC: 10 MMOL/L (ref 8–16)
AST SERPL-CCNC: 35 U/L (ref 10–40)
BASOPHILS # BLD AUTO: 0.05 K/UL (ref 0–0.2)
BASOPHILS NFR BLD: 0.6 % (ref 0–1.9)
BILIRUB SERPL-MCNC: 0.8 MG/DL (ref 0.1–1)
BUN SERPL-MCNC: 24 MG/DL (ref 8–23)
CALCIUM SERPL-MCNC: 8.6 MG/DL (ref 8.7–10.5)
CHLORIDE SERPL-SCNC: 107 MMOL/L (ref 95–110)
CHOLEST SERPL-MCNC: 146 MG/DL (ref 120–199)
CHOLEST/HDLC SERPL: 3.7 {RATIO} (ref 2–5)
CO2 SERPL-SCNC: 24 MMOL/L (ref 23–29)
CREAT SERPL-MCNC: 0.9 MG/DL (ref 0.5–1.4)
DIFFERENTIAL METHOD: ABNORMAL
EOSINOPHIL # BLD AUTO: 0.1 K/UL (ref 0–0.5)
EOSINOPHIL NFR BLD: 0.9 % (ref 0–8)
ERYTHROCYTE [DISTWIDTH] IN BLOOD BY AUTOMATED COUNT: 14.5 % (ref 11.5–14.5)
EST. GFR  (AFRICAN AMERICAN): >60 ML/MIN/1.73 M^2
EST. GFR  (NON AFRICAN AMERICAN): >60 ML/MIN/1.73 M^2
ESTIMATED AVG GLUCOSE: 97 MG/DL (ref 68–131)
GLUCOSE SERPL-MCNC: 112 MG/DL (ref 70–110)
HBA1C MFR BLD HPLC: 5 % (ref 4–5.6)
HCT VFR BLD AUTO: 39.4 % (ref 37–48.5)
HDLC SERPL-MCNC: 40 MG/DL (ref 40–75)
HDLC SERPL: 27.4 % (ref 20–50)
HGB BLD-MCNC: 12.4 G/DL (ref 12–16)
IMM GRANULOCYTES # BLD AUTO: 0.09 K/UL (ref 0–0.04)
IMM GRANULOCYTES NFR BLD AUTO: 1.2 % (ref 0–0.5)
LDLC SERPL CALC-MCNC: 80 MG/DL (ref 63–159)
LYMPHOCYTES # BLD AUTO: 2.2 K/UL (ref 1–4.8)
LYMPHOCYTES NFR BLD: 29 % (ref 18–48)
MCH RBC QN AUTO: 28.1 PG (ref 27–31)
MCHC RBC AUTO-ENTMCNC: 31.5 G/DL (ref 32–36)
MCV RBC AUTO: 89 FL (ref 82–98)
MONOCYTES # BLD AUTO: 0.4 K/UL (ref 0.3–1)
MONOCYTES NFR BLD: 5.3 % (ref 4–15)
NEUTROPHILS # BLD AUTO: 4.9 K/UL (ref 1.8–7.7)
NEUTROPHILS NFR BLD: 63 % (ref 38–73)
NONHDLC SERPL-MCNC: 106 MG/DL
NRBC BLD-RTO: 0 /100 WBC
PLATELET # BLD AUTO: 284 K/UL (ref 150–350)
PMV BLD AUTO: 9.6 FL (ref 9.2–12.9)
POTASSIUM SERPL-SCNC: 4.5 MMOL/L (ref 3.5–5.1)
PROT SERPL-MCNC: 7 G/DL (ref 6–8.4)
RBC # BLD AUTO: 4.42 M/UL (ref 4–5.4)
SODIUM SERPL-SCNC: 141 MMOL/L (ref 136–145)
TRIGL SERPL-MCNC: 130 MG/DL (ref 30–150)
TSH SERPL DL<=0.005 MIU/L-ACNC: 1.9 UIU/ML (ref 0.4–4)
WBC # BLD AUTO: 7.7 K/UL (ref 3.9–12.7)

## 2020-08-11 PROCEDURE — 80061 LIPID PANEL: CPT

## 2020-08-11 PROCEDURE — 80053 COMPREHEN METABOLIC PANEL: CPT

## 2020-08-11 PROCEDURE — 85025 COMPLETE CBC W/AUTO DIFF WBC: CPT

## 2020-08-11 PROCEDURE — 84443 ASSAY THYROID STIM HORMONE: CPT

## 2020-08-11 PROCEDURE — 83036 HEMOGLOBIN GLYCOSYLATED A1C: CPT

## 2020-08-11 PROCEDURE — 36415 COLL VENOUS BLD VENIPUNCTURE: CPT

## 2020-08-18 ENCOUNTER — PATIENT OUTREACH (OUTPATIENT)
Dept: OTHER | Facility: OTHER | Age: 75
End: 2020-08-18

## 2020-08-18 NOTE — PROGRESS NOTES
Digital Medicine: Clinician Follow-Up    Patient reports doing well without concern.  Feeling great.    The history is provided by the patient.   Follow-up reason(s): lab follow up and routine follow up.     Hypertension    Readings are trending up due to unclear reasons.  Patient is not experiencing signs/symptoms of hypertension.          Last 5 Patient Entered Readings                                      Current 30 Day Average: 127/83     Recent Readings 8/18/2020 8/17/2020 8/17/2020 8/16/2020 8/16/2020    SBP (mmHg) 136 131 141 124 139    DBP (mmHg) 83 87 89 81 83    Pulse 93 89 95 66 69             Screenings    ASSESSMENT(S)  Patients BP average is 127/83 mmHg, which is above goal. Patient's BP goal is less than or equal to 130/80 per 2017 ACC/AHA Hypertension Guidelines.       Hypertension Plan  Continue current therapy. Close-to-goal.  Defer changes for now.  Will see if readings start trending down over the next 6 weeks.  If not, will increase irbesartan to 300 mg/day.       Addressed any questions or concerns and patient has my contact information if needed prior to next outreach. Patient verbalizes understanding.            There are no preventive care reminders to display for this patient.      Hypertension Medications             irbesartan (AVAPRO) 150 MG tablet Take 1 tablet (150 mg total) by mouth every evening.    metoprolol succinate (TOPROL-XL) 50 MG 24 hr tablet Take 1 tablet (50 mg total) by mouth once daily.

## 2020-08-21 ENCOUNTER — PATIENT OUTREACH (OUTPATIENT)
Dept: OTHER | Facility: OTHER | Age: 75
End: 2020-08-21

## 2020-08-21 ENCOUNTER — HOSPITAL ENCOUNTER (OUTPATIENT)
Dept: RADIOLOGY | Facility: OTHER | Age: 75
Discharge: HOME | End: 2020-08-21
Attending: INTERNAL MEDICINE
Payer: MEDICARE

## 2020-08-21 DIAGNOSIS — Z12.39 SCREENING FOR BREAST CANCER: ICD-10-CM

## 2020-08-21 DIAGNOSIS — N95.9 MENOPAUSAL PROBLEM: ICD-10-CM

## 2020-08-21 DIAGNOSIS — Z12.31 ENCOUNTER FOR SCREENING MAMMOGRAM FOR MALIGNANT NEOPLASM OF BREAST: ICD-10-CM

## 2020-08-21 DIAGNOSIS — M85.80 OSTEOPENIA, UNSPECIFIED LOCATION: ICD-10-CM

## 2020-08-21 PROCEDURE — 77080 DXA BONE DENSITY AXIAL: CPT | Mod: TC

## 2020-08-21 PROCEDURE — 77063 BREAST TOMOSYNTHESIS BI: CPT | Mod: 26,,, | Performed by: RADIOLOGY

## 2020-08-21 PROCEDURE — 77067 MAMMO DIGITAL SCREENING LEFT WITH TOMOSYNTHESIS_CAD: ICD-10-PCS | Mod: 26,,, | Performed by: RADIOLOGY

## 2020-08-21 PROCEDURE — 77067 SCR MAMMO BI INCL CAD: CPT | Mod: TC

## 2020-08-21 PROCEDURE — 77080 DEXA BONE DENSITY SPINE HIP: ICD-10-PCS | Mod: 26,,, | Performed by: RADIOLOGY

## 2020-08-21 PROCEDURE — 77080 DXA BONE DENSITY AXIAL: CPT | Mod: 26,,, | Performed by: RADIOLOGY

## 2020-08-21 PROCEDURE — 77063 MAMMO DIGITAL SCREENING LEFT WITH TOMOSYNTHESIS_CAD: ICD-10-PCS | Mod: 26,,, | Performed by: RADIOLOGY

## 2020-08-21 PROCEDURE — 77067 SCR MAMMO BI INCL CAD: CPT | Mod: 26,,, | Performed by: RADIOLOGY

## 2020-08-21 NOTE — PROGRESS NOTES
Digital Medicine: Health  Follow-Up    The history is provided by the patient.             Reason for review: Blood pressure at goal      Additional Follow-up details: Called patient due to trending up readings. She charged her BP cuff recently. Patient does not know why readings are trending up. She has not changed her diet or physical activity. Patient was busy and could not talk long.        Diet-no change to diet    No change to diet.        Physical Activity-no change to routine  No change to exercise routine.     Medication Adherence-Medication adherence was assessed.        Substance, Sleep, Stress-No change  stress-  Details:  Intervention(s):    Sleep-  Details:  Intervention(s):    Alcohol -  Details:  Intervention(s):    Tobacco-  Details:  Intervention(s):          Continue current diet/physical activity routine.  Instructed to charge device.       Addressed any questions or concerns and patient has my contact information if needed prior to next outreach. Patient verbalizes understanding.      Explained the importance of self-monitoring and medication adherence. Encouraged the patient to communicate with their health  for lifestyle modifications to help improve or maintain a healthy lifestyle.            There are no preventive care reminders to display for this patient.    Last 5 Patient Entered Readings                                      Current 30 Day Average: 126/83     Recent Readings 8/21/2020 8/21/2020 8/20/2020 8/20/2020 8/20/2020    SBP (mmHg) 133 138 130 135 139    DBP (mmHg) 90 96 92 92 84    Pulse 93 85 90 88 89

## 2020-08-23 NOTE — TELEPHONE ENCOUNTER
No new care gaps identified.  Powered by [employer]. Reference number: [id number]. 8/23/2020 4:39:26 PM CDT

## 2020-08-24 RX ORDER — ATORVASTATIN CALCIUM 20 MG/1
20 TABLET, FILM COATED ORAL DAILY
Qty: 90 TABLET | Refills: 0 | Status: SHIPPED | OUTPATIENT
Start: 2020-08-24 | End: 2020-11-16

## 2020-08-25 NOTE — PROGRESS NOTES
Refill Authorization Note     is requesting a refill authorization.    Brief assessment and rationale for refill: APPROVE: non adherent     Medication-related problems identified: Non-adherence (knowledge deficit) non-intentional    Medication Therapy Plan: CDMR. Non adherent per Epic data; PDC ratio is 56%; Will approve 3 more    Medication reconciliation completed: No                         Comments:   Automatic Epic Protocol Generated Data:    Requested Prescriptions   Signed Prescriptions Disp Refills    atorvastatin (LIPITOR) 20 MG tablet 90 tablet 0     Sig: Take 1 tablet (20 mg total) by mouth once daily.       Cardiovascular:  Antilipid - Statins Passed - 8/24/2020  7:46 AM        Passed - Patient is at least 18 years old        Passed - Office visit in past 12 months or future 90 days.     Recent Outpatient Visits            3 weeks ago Onychomycosis due to dermatophyte    [place] [facility] Tavon Wiggins DPM    1 month ago Hypertension, benign    [facility] [address] Savana Anderson MD    5 months ago Borderline serous cystadenoma of right ovary    [facility] Talita Barrios MD    7 months ago Recurrent UTI    [facility] Anaya Oropeza MD    9 months ago Borderline serous cystadenoma of right ovary    [facility] Talita Barrios MD          Future Appointments              In 2 days Anaya Oropeza MD [facility], [facility]    In 2 weeks [facility], [facility]    In 2 months Savana Anderson MD [facility] [address], [facility]                Passed - Lipid Panel completed in last 360 days     Lab Results   Component Value Date    CHOL 146 08/11/2020    HDL 40 08/11/2020    LDLCALC 80.0 08/11/2020    TRIG 130 08/11/2020             Passed - ALT is 94 or below and within 360 days     ALT   Date Value Ref Range  Status   08/11/2020 62 (H) 10 - 44 U/L Final   01/03/2019 51 (H) 10 - 44 U/L Final   12/11/2018 55 (H) 10 - 44 U/L Final              Passed - AST is 54 or below and within 360 days     AST   Date Value Ref Range Status   08/11/2020 35 10 - 40 U/L Final   01/03/2019 31 10 - 40 U/L Final   12/11/2018 37 10 - 40 U/L Final                    Appointments  past 12m or future 3m with PCP    Date Provider   Last Visit   7/24/2020 Savana Anderson MD   Next Visit   10/27/2020 Savana Anderson MD   ED visits in past 90 days: 0     Note composed:9:46 PM 08/24/2020

## 2020-08-26 ENCOUNTER — OFFICE VISIT (OUTPATIENT)
Dept: UROLOGY | Facility: CLINIC | Age: 75
End: 2020-08-26
Attending: UROLOGY
Payer: MEDICARE

## 2020-08-26 VITALS
BODY MASS INDEX: 30.05 KG/M2 | DIASTOLIC BLOOD PRESSURE: 76 MMHG | HEART RATE: 83 BPM | HEIGHT: 64 IN | WEIGHT: 176 LBS | SYSTOLIC BLOOD PRESSURE: 129 MMHG

## 2020-08-26 DIAGNOSIS — N32.81 OAB (OVERACTIVE BLADDER): ICD-10-CM

## 2020-08-26 DIAGNOSIS — R31.29 MICROHEMATURIA: ICD-10-CM

## 2020-08-26 DIAGNOSIS — N39.0 RECURRENT UTI: Primary | ICD-10-CM

## 2020-08-26 DIAGNOSIS — R30.0 DYSURIA: ICD-10-CM

## 2020-08-26 LAB
BILIRUB SERPL-MCNC: ABNORMAL MG/DL
BLOOD URINE, POC: 250
CLARITY, POC UA: ABNORMAL
COLOR, POC UA: YELLOW
GLUCOSE UR QL STRIP: NORMAL
KETONES UR QL STRIP: ABNORMAL
LEUKOCYTE ESTERASE URINE, POC: ABNORMAL
NITRITE, POC UA: ABNORMAL
PH, POC UA: 5
POC RESIDUAL URINE VOLUME: 21 ML (ref 0–100)
PROTEIN, POC: ABNORMAL
SPECIFIC GRAVITY, POC UA: 1.02
UROBILINOGEN, POC UA: NORMAL

## 2020-08-26 PROCEDURE — 51798 US URINE CAPACITY MEASURE: CPT | Mod: S$GLB,,, | Performed by: UROLOGY

## 2020-08-26 PROCEDURE — 99214 OFFICE O/P EST MOD 30 MIN: CPT | Mod: 25,S$GLB,, | Performed by: UROLOGY

## 2020-08-26 PROCEDURE — 81002 URINALYSIS NONAUTO W/O SCOPE: CPT | Mod: S$GLB,,, | Performed by: UROLOGY

## 2020-08-26 PROCEDURE — 51798 POCT BLADDER SCAN: ICD-10-PCS | Mod: S$GLB,,, | Performed by: UROLOGY

## 2020-08-26 PROCEDURE — 99214 PR OFFICE/OUTPT VISIT, EST, LEVL IV, 30-39 MIN: ICD-10-PCS | Mod: 25,S$GLB,, | Performed by: UROLOGY

## 2020-08-26 PROCEDURE — 81002 POCT URINE DIPSTICK WITHOUT MICROSCOPE: ICD-10-PCS | Mod: S$GLB,,, | Performed by: UROLOGY

## 2020-08-26 RX ORDER — CEPHALEXIN 500 MG/1
500 CAPSULE ORAL EVERY 8 HOURS
Qty: 30 CAPSULE | Refills: 0 | Status: SHIPPED | OUTPATIENT
Start: 2020-08-26 | End: 2020-09-05

## 2020-08-31 PROCEDURE — 99454 REM MNTR PHYSIOL PARAM 16-30: CPT | Mod: PBBFAC | Performed by: INTERNAL MEDICINE

## 2020-09-03 ENCOUNTER — HOSPITAL ENCOUNTER (OUTPATIENT)
Dept: RADIOLOGY | Facility: OTHER | Age: 75
Discharge: HOME | End: 2020-09-03
Attending: UROLOGY
Payer: MEDICARE

## 2020-09-03 DIAGNOSIS — N39.0 RECURRENT UTI: ICD-10-CM

## 2020-09-03 PROCEDURE — 74178 CT UROGRAM ABD PELVIS W WO: ICD-10-PCS | Mod: 26,,, | Performed by: RADIOLOGY

## 2020-09-03 PROCEDURE — 74178 CT ABD&PLV WO CNTR FLWD CNTR: CPT | Mod: 26,,, | Performed by: RADIOLOGY

## 2020-09-03 PROCEDURE — 74178 CT ABD&PLV WO CNTR FLWD CNTR: CPT | Mod: TC

## 2020-09-03 PROCEDURE — 25500020 PHARM REV CODE 255: Performed by: UROLOGY

## 2020-09-03 RX ADMIN — IOHEXOL 125 ML: 350 INJECTION, SOLUTION INTRAVENOUS at 11:09

## 2020-09-08 ENCOUNTER — OFFICE VISIT (OUTPATIENT)
Dept: GYNECOLOGIC ONCOLOGY | Facility: CLINIC | Age: 75
End: 2020-09-08
Payer: MEDICARE

## 2020-09-08 VITALS
SYSTOLIC BLOOD PRESSURE: 141 MMHG | WEIGHT: 180.75 LBS | BODY MASS INDEX: 31.03 KG/M2 | DIASTOLIC BLOOD PRESSURE: 68 MMHG | HEART RATE: 70 BPM

## 2020-09-08 DIAGNOSIS — C56.9 SEROUS CYSTADENOMA OF OVARY WITH BORDERLINE MALIGNANT FEATURES: Primary | ICD-10-CM

## 2020-09-08 PROCEDURE — 99214 OFFICE O/P EST MOD 30 MIN: CPT | Mod: S$PBB,,, | Performed by: OBSTETRICS & GYNECOLOGY

## 2020-09-08 PROCEDURE — 99999 PR PBB SHADOW E&M-EST. PATIENT-LVL III: ICD-10-PCS | Mod: PBBFAC,,, | Performed by: OBSTETRICS & GYNECOLOGY

## 2020-09-08 PROCEDURE — 99213 OFFICE O/P EST LOW 20 MIN: CPT | Mod: PBBFAC | Performed by: OBSTETRICS & GYNECOLOGY

## 2020-09-08 PROCEDURE — 99214 PR OFFICE/OUTPT VISIT, EST, LEVL IV, 30-39 MIN: ICD-10-PCS | Mod: S$PBB,,, | Performed by: OBSTETRICS & GYNECOLOGY

## 2020-09-08 PROCEDURE — 99999 PR PBB SHADOW E&M-EST. PATIENT-LVL III: CPT | Mod: PBBFAC,,, | Performed by: OBSTETRICS & GYNECOLOGY

## 2020-09-08 NOTE — LETTER
September 8, 2020        [facility] Anderson MD  [address] [facility] [address]             [facility] [address]  [address], [address]  [facility] [address]  Phone: [phone]  Fax: [phone]   Patient: Niurka Pedraza   MR Number: [id number]   YOB: 1945   Date of Visit: 9/8/2020     Dear Dr. Anderson,     Please find attached progress note.     Sincerely,      Talita Barrios MD            CC  No Recipients    Enclosure

## 2020-09-08 NOTE — PROGRESS NOTES
Subjective:      Patient ID: Niurka Pedraza is a 75 y.o. female.    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 Without complaints or concerns. Specifically denies N/V, pain, swelling, bleeding, unexpected weight change, SOB, problems with bowel function.   Disease free interval 2.5 years       41>15>10>12>10>11>10> 11>11 normal and stable.      History:  Patient is a 73yo female originally referred by Dr. Linette Torres for pelvic mass and elevated . Patient was seen by her urologist for recurrent UTIs and imaging was ordered.      Imaging reviewed:   CT urogram abd/pelvis 17  Impression   Bilateral pulmonary nodules, largest measuring 1.5 cm at the right lower lobe. Comparison with any prior cross-sectional imaging would be beneficial. Close interval followup, PET CT, and/or biopsy may be considered for further evaluation.    Complex cystic lesion in the right adnexa. Pelvic ultrasound would be beneficial for further evaluation.    Focal dilated biliary radical in segment 7. This is of uncertain significance. There are no concerning hepatic lesions.      Pelvic US 18  Impression    Complex cystic lesion measuring 3.8 cm right ovary corresponds to abnormality seen on CT. While this may represent a hemorrhagic cyst in light of postmenopausal status Clinical correlation and further characterization with MRI and surgical consultation advised.    Left ovary not seen.     UT 5.5cm        41 elevated. CEA 4.8.      Personal history of breast cancer , R mastectomy, no adjuvant treatment.    MMG 2017 WNLs.      Prior abdominal surgeries include cholecystectomy.  x 3. Family history mother with breast cancer, no ovarian, uterine, or colon cancer.      S/p RTLH/BSO/BPLND/OMX 3/23/18. Uncomplicated post op course. Final pathology reviewed showing papillary seromucinous borderline tumor of bilateral  ovaries.       Review of Systems   Constitutional: Negative for appetite change, chills, fatigue and fever.   HENT: Negative for mouth sores.    Respiratory: Negative for cough and shortness of breath.    Cardiovascular: Negative for leg swelling.   Gastrointestinal: Negative for abdominal pain, blood in stool, constipation and diarrhea.   Endocrine: Negative for cold intolerance.   Genitourinary: Negative for dysuria and vaginal bleeding.   Musculoskeletal: Negative for myalgias.   Skin: Negative for rash.   Allergic/Immunologic: Negative.    Neurological: Negative for weakness and numbness.   Hematological: Negative for adenopathy. Does not bruise/bleed easily.   Psychiatric/Behavioral: Negative for confusion.       Objective:   Physical Exam:   Constitutional: She is oriented to person, place, and time. She appears well-developed and well-nourished.    HENT:   Head: Normocephalic and atraumatic.    Eyes: Pupils are equal, round, and reactive to light. EOM are normal.    Neck: Normal range of motion. Neck supple. No thyromegaly present.    Cardiovascular: Normal rate, regular rhythm and intact distal pulses.     Pulmonary/Chest: Effort normal and breath sounds normal. No respiratory distress. She has no wheezes.        Abdominal: Soft. Bowel sounds are normal. She exhibits no distension and no mass. There is no abdominal tenderness.     Genitourinary:    Vagina and rectum normal.      Pelvic exam was performed with patient supine.   There is no lesion on the right labia. There is no lesion on the left labia. Uterus is absent. Right adnexum displays no mass. Left adnexum displays no mass. Vaginal cuff normal.Cervix exhibits absence.           Musculoskeletal: Normal range of motion and moves all extremeties.      Lymphadenopathy:     She has no cervical adenopathy.        Right: No supraclavicular adenopathy present.        Left: No supraclavicular adenopathy present.    Neurological: She is alert and oriented to  person, place, and time.    Skin: Skin is warm and dry. No rash noted.    Psychiatric: She has a normal mood and affect.       Assessment:     1. 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 RTC 6 months with  or sooner if needed.

## 2020-09-17 ENCOUNTER — PATIENT MESSAGE (OUTPATIENT)
Dept: UROLOGY | Facility: CLINIC | Age: 75
End: 2020-09-17

## 2020-09-17 NOTE — H&P
----- Message from Angela García sent at 9/17/2020  8:31 AM CDT -----  Contact: [phone]  Type:  Needs Medical Advice    Who Called: Pt  Reason for Call: pt needs a Dr's Note for work due to being late to work for Dropping off a Urine Sample  Would the patient rather a call back or a response via [facility]?  Call back  Best Call Back Number: [phone]         HPI  Patient is a 71yo female who presents today as a referral from Dr. Linette Torres for pelvic mass and elevated . Patient was seen by her urologist for recurrent UTIs and imaging was ordered.      Imaging reviewed:   CT urogram abd/pelvis 17  Impression   Bilateral pulmonary nodules, largest measuring 1.5 cm at the right lower lobe. Comparison with any prior cross-sectional imaging would be beneficial. Close interval followup, PET CT, and/or biopsy may be considered for further evaluation.    Complex cystic lesion in the right adnexa. Pelvic ultrasound would be beneficial for further evaluation.    Focal dilated biliary radical in segment 7. This is of uncertain significance. There are no concerning hepatic lesions.      Pelvic US 18  Impression    Complex cystic lesion measuring 3.8 cm right ovary corresponds to abnormality seen on CT. While this may represent a hemorrhagic cyst in light of postmenopausal status Clinical correlation and further characterization with MRI and surgical consultation advised.    Left ovary not seen.     UT 5.5cm        41 elevated. CEA 4.8.      Personal history of breast cancer , R mastectomy, no adjuvant treatment.    MMG 2017 WNLs.      Prior abdominal surgeries include cholecystectomy.  x 3Family history mother with breast cancer, no ovarian, uterine, or colon cancer.      Review of Systems   Constitutional: Negative for appetite change, chills, fatigue and fever.   HENT: Negative for mouth sores.    Respiratory: Negative for cough and shortness of breath.    Cardiovascular: Negative for leg swelling.   Gastrointestinal: Negative for abdominal pain, blood in stool, constipation and diarrhea.   Endocrine: Negative for cold intolerance.   Genitourinary: Negative for dysuria and vaginal bleeding.   Musculoskeletal: Negative for myalgias.   Skin: Negative for rash.   Allergic/Immunologic: Negative.    Neurological: Negative for weakness and numbness.    Hematological: Negative for adenopathy. Does not bruise/bleed easily.   Psychiatric/Behavioral: Negative for confusion.              Past Medical History:   Diagnosis Date    Breast cancer       mastectomy 2014    Coccidiomycosis, progressive      Elevated CA-125 2/25/2018    Hyperlipidemia      Hypertension      OAB (overactive bladder)      Osteopenia       on Dexa 11/2017    Osteoporosis       pt reports hx of this, declined fosamax in past - treated with calcium and vit D    Pelvic mass 2/25/2018    Pulmonary nodules              Past Surgical History:   Procedure Laterality Date    CHOLECYSTECTOMY        MASTECTOMY Right       2014             Family History   Problem Relation Age of Onset    Cancer Mother         colon cancer    Breast cancer Mother      Hypertension Father      Heart disease Father      Stroke Father      No Known Problems Sister      Cancer Brother         lung, ++ tobacco    Hypertension Brother      No Known Problems Daughter      Hypertension Son      Colon cancer Neg Hx      Ovarian cancer Neg Hx        Social History   Social History            Social History    Marital status:        Spouse name: N/A    Number of children: 3    Years of education: N/A            Occupational History    retired from [employer], [employer]         neuro chemistry             Social History Main Topics    Smoking status: Former Smoker       Packs/day: 0.50       Years: 30.00       Types: Cigarettes       Quit date: 2/8/2000    Smokeless tobacco: Never Used         Comment: quit in her 50s, after 30 years smoking;    Alcohol use No    Drug use: No    Sexual activity: No           Other Topics Concern    Not on file          Social History Narrative     From [place]      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    calcium-vitamin D3 (CALCIUM 500 + D) 500 mg(1,250mg) -200 unit per tablet Take 1 tablet by mouth 2 (two) times daily with meals.    cephALEXin (KEFLEX) 250 MG capsule Take 1 capsule (250 mg total) by mouth once daily.    diclofenac (VOLTAREN) 75 MG EC tablet TAKE 1 TABLET (75 MG TOTAL) BY MOUTH 2 (TWO) TIMES DAILY AS NEEDED.    levothyroxine (SYNTHROID) 50 MCG tablet TAKE 1 TABLET (50 MCG TOTAL) BY MOUTH ONCE DAILY.    metoprolol succinate (TOPROL-XL) 25 MG 24 hr tablet Take 1 tablet (25 mg total) by mouth once daily.    multivitamin (ONE DAILY MULTIVITAMIN) per tablet Take 1 tablet by mouth once daily.    oxybutynin (DITROPAN-XL) 5 MG TR24 Take 1 tablet (5 mg total) by mouth once daily.      No current facility-administered medications for this visit.             Review of patient's allergies indicates:   Allergen Reactions    Ciprofloxacin Other (See Comments)       Right foot pain and edema    Amlodipine Edema       BLE    Lisinopril Other (See Comments)       cough         Objective:   Physical Exam:   Constitutional: She is oriented to person, place, and time. She appears well-developed and well-nourished.    HENT:   Head: Normocephalic and atraumatic.    Eyes: EOM are normal. Pupils are equal, round, and reactive to light.    Neck: Normal range of motion. Neck supple. No thyromegaly present.    Cardiovascular: Normal rate, regular rhythm and intact distal pulses.     Pulmonary/Chest: Effort normal and breath sounds normal. No respiratory distress. She has no wheezes.      Abdominal: Soft. Bowel sounds are normal. She exhibits no distension, no ascites and no mass. There is no tenderness.     Genitourinary: Rectum normal, vagina normal and uterus normal. Pelvic exam was performed with patient supine. There is no lesion on the right labia. There is no lesion on the left labia.  Cervix is normal.   Genitourinary Comments: Adnexal mass is not discretely palpable on exam.            Musculoskeletal: Normal range of motion and moves all extremeties.      Lymphadenopathy:     She has no cervical adenopathy.        Right: No inguinal and no supraclavicular adenopathy present.        Left: No inguinal and no supraclavicular adenopathy present.    Neurological: She is alert and oriented to person, place, and time.    Skin: Skin is warm and dry. No rash noted.    Psychiatric: She has a normal mood and affect.         Assessment:      1. Pelvic mass    2. Elevated CA-125          Plan:   No orders of the defined types were placed in this encounter.      I discussed with the patient the differential diagnosis of a pelvic mass in a postmenopausal woman including benign, borderline, and malignant process. Definitive diagnosis can only be made with surgical resection. She is a good candidate for minimally invasive approach. Give the elevated  I have recommended RTLH/BSO/possible staging based on intraoperative assessment. She desires to proceed. The risks, benefits, and indications of the procedure were discussed with the patient and her family members if present.  These included bleeding, transfusion, infection, damage to surrounding tissues (bowel, bladder, ureter), wound separation, conversion to laparotomy, perioperative cardiac events, VTE, pneumonia, and possible death.  She voiced understanding, all questions were answered and consents were signed.  1. Plan for RTLH/BSO/possible staging 3/23/18 [facility]  2. Pre op anesthesia consult

## 2020-09-18 ENCOUNTER — PATIENT MESSAGE (OUTPATIENT)
Dept: UROLOGY | Facility: CLINIC | Age: 75
End: 2020-09-18

## 2020-09-18 ENCOUNTER — LAB VISIT (OUTPATIENT)
Dept: LAB | Facility: OTHER | Age: 75
End: 2020-09-18
Attending: UROLOGY
Payer: MEDICARE

## 2020-09-18 DIAGNOSIS — N39.0 URINARY TRACT INFECTION WITHOUT HEMATURIA, SITE UNSPECIFIED: ICD-10-CM

## 2020-09-18 DIAGNOSIS — N39.0 URINARY TRACT INFECTION WITHOUT HEMATURIA, SITE UNSPECIFIED: Primary | ICD-10-CM

## 2020-09-18 PROCEDURE — 87186 SC STD MICRODIL/AGAR DIL: CPT

## 2020-09-18 PROCEDURE — 87088 URINE BACTERIA CULTURE: CPT

## 2020-09-18 PROCEDURE — 87086 URINE CULTURE/COLONY COUNT: CPT

## 2020-09-18 PROCEDURE — 87077 CULTURE AEROBIC IDENTIFY: CPT

## 2020-09-21 ENCOUNTER — TELEPHONE (OUTPATIENT)
Dept: UROLOGY | Facility: CLINIC | Age: 75
End: 2020-09-21

## 2020-09-21 LAB — BACTERIA UR CULT: ABNORMAL

## 2020-09-21 RX ORDER — SULFAMETHOXAZOLE AND TRIMETHOPRIM 800; 160 MG/1; MG/1
1 TABLET ORAL 2 TIMES DAILY
Qty: 14 TABLET | Refills: 0 | Status: SHIPPED | OUTPATIENT
Start: 2020-09-21 | End: 2020-09-28

## 2020-09-21 NOTE — TELEPHONE ENCOUNTER
Patient advised that her recent urine culture was positive for infection and she should  bactrim that was called into her pharmacy. Patient verbalized understanding.

## 2020-09-22 ENCOUNTER — PATIENT OUTREACH (OUTPATIENT)
Dept: ADMINISTRATIVE | Facility: OTHER | Age: 75
End: 2020-09-22

## 2020-09-22 NOTE — PROGRESS NOTES
Health Maintenance Due   Topic Date Due    Shingles Vaccine (1 of 2) 03/10/1994    Influenza Vaccine (1) 08/01/2020    TETANUS VACCINE  08/18/2020     Updates were requested from care everywhere.  Chart was reviewed for overdue Proactive [facility] Encounters ([facility]IN[facility]) topics (CRS, Breast Cancer Screening, Eye exam)  Health Maintenance has been updated.  LINKS immunization registry triggered.  Immunizations were reconciled.

## 2020-09-23 ENCOUNTER — OFFICE VISIT (OUTPATIENT)
Dept: UROLOGY | Facility: CLINIC | Age: 75
End: 2020-09-23
Attending: UROLOGY
Payer: MEDICARE

## 2020-09-23 VITALS
HEIGHT: 64 IN | WEIGHT: 180 LBS | SYSTOLIC BLOOD PRESSURE: 134 MMHG | HEART RATE: 79 BPM | DIASTOLIC BLOOD PRESSURE: 73 MMHG | BODY MASS INDEX: 30.73 KG/M2

## 2020-09-23 DIAGNOSIS — R31.29 MICROHEMATURIA: ICD-10-CM

## 2020-09-23 DIAGNOSIS — N32.81 OAB (OVERACTIVE BLADDER): ICD-10-CM

## 2020-09-23 DIAGNOSIS — N39.0 RECURRENT UTI: Primary | ICD-10-CM

## 2020-09-23 LAB
BILIRUB SERPL-MCNC: ABNORMAL MG/DL
BLOOD URINE, POC: 50
CLARITY, POC UA: ABNORMAL
COLOR, POC UA: YELLOW
GLUCOSE UR QL STRIP: NORMAL
KETONES UR QL STRIP: ABNORMAL
LEUKOCYTE ESTERASE URINE, POC: ABNORMAL
NITRITE, POC UA: ABNORMAL
PH, POC UA: 5
PROTEIN, POC: 30
SPECIFIC GRAVITY, POC UA: 1.02
UROBILINOGEN, POC UA: NORMAL

## 2020-09-23 PROCEDURE — 99214 PR OFFICE/OUTPT VISIT, EST, LEVL IV, 30-39 MIN: ICD-10-PCS | Mod: 25,S$GLB,, | Performed by: UROLOGY

## 2020-09-23 PROCEDURE — 99214 OFFICE O/P EST MOD 30 MIN: CPT | Mod: 25,S$GLB,, | Performed by: UROLOGY

## 2020-09-23 PROCEDURE — 81002 URINALYSIS NONAUTO W/O SCOPE: CPT | Mod: S$GLB,,, | Performed by: UROLOGY

## 2020-09-23 PROCEDURE — 81002 POCT URINE DIPSTICK WITHOUT MICROSCOPE: ICD-10-PCS | Mod: S$GLB,,, | Performed by: UROLOGY

## 2020-09-23 NOTE — PROGRESS NOTES
Subjective:       Niurka Pedraza is a 75 y.o. female who is an established patient with [place] urologist, Dr Dhaliwal,  was seen for evaluation of UTI.      She was seen by another urologist for recurrent UTIs/dysuria. Multiple +UCx with different bacteria (E coli, Enterococcus). She has had negative cystoscopy and GONZÁLEZ (1/17). UTIs return soon after treatment. She was started on prophy Keflex in 7/17.     She saw PCP in 11/17 with complaints of UTI. UCx was negative. She now feels a constant pressure in SP area and c/o pain with walking. She also notes pain worse with movement (like hitting a bump when driving). Denies dysuria. Increased frequency to now q1h. Present for 2 months. Denies constipation, occasional diarrhea. Frequent RADHA. Now with UUI. Also with BOBBI - increased coughing with recent lung issue. Nocturia x 4-5. Denies gross hematuria.     She moved here from [place] in 9/17. She requested to be started on abx prophy starting 1/18 (Keflex 250mg).     PVR (bladder scan) initial visit - 32cc, 0cc.    CT uro - no  abnormalities. Lung nodules - followed by pulmonary. Ovarian cyst - referred to gyn (now s/p DARCI-BSO for ovarian cancer).     She has been doing great with the Ditropan - she is very pleased. Was on abx prophy (Keflex) 1/18 x 9 months. Taking probiotics that is helping.     She [place] U[place] ([place] - took Bactrim. Now off prophylactic abx - more frequency, urgency, nocturia, SP pressure. No significant dysuria.     UTIs have become more frequent. Symptoms returning quickly. Prophylactic abx were restarted (Macrobid 50mg).     9/4/2019  She recently finished course of abx but symptoms are now returning just 5 days later. Dysuria improved but pressure and frequency worsened.     1/15/2020  Increase Ditropan to 10mg. Taking Macrobid QHS, started in 9/19. No symptoms today.     8/26/2020  Several recent breakthrough UTIs. Finished abx 2 weeks ago, symptoms have returned.  "Currently on Macrobid. UTI symptoms - frequency, dysuria.   PVR (bladder scan) today - 21cc    9/23/2020  On day 5 of abx, still with some symptoms but improving. CT done - clear.       Cysto 5/19 - normal, heavy sediment in bladder    GONZÁLEZ 4/19 - wnl, PVR 31cc  CT uro 9/20 - negative, pulm nodules (seeing PCP)     UCx:  9/20 - >100k Proteus  7/20 - >100k E coli, 10-49k Proteus  7/20 - >100k Proteus   5/20 - 10-49k Klebsiella  9/19 - >100k Klebsiella  8/19 - >100k Klebsiella  6/19 - >100k E coli  5/19 - neg  5/19 - >100k E coli  3/19 - >100k E coli  2/19 - >100k E coli  1/19 - >100k E coli  11/18 - >100k E coli  8/18 - contaminant  7/18 - 50-99k Enterobacter (treated with Bactrim)  11/17 - neg  10/17 - >100k E coli  10/17- >100k Enterococcus  7/17 - >100k E coli  5/17- >100k E coli  5/17 - neg  4/17- 50-99k Enterococcus  3/17 - >100k E coli  12/16 - neg    PVR (bladder scan) last visit - 33cc, 50cc, 0cc      The following portions of the patient's history were reviewed and updated as appropriate: allergies, current medications, past family history, past medical history, past social history, past surgical history and problem list.     Review of Systems  Constitutional: no fever or chills  ENT: no nasal congestion or sore throat  Respiratory: no cough or shortness of breath  Cardiovascular: no chest pain or palpitations  Gastrointestinal: no nausea or vomiting, tolerating diet  Genitourinary: as per HPI  Hematologic/Lymphatic: no easy bruising or lymphadenopathy  Musculoskeletal: no arthralgias or myalgias  Skin: no rashes or lesions  Neurological: no seizures or tremors  Behavioral/Psych: no auditory or visual hallucinations        Objective:    Vitals: /73 (BP Location: Right arm, Patient Position: Sitting, BP Method: Large (Automatic))   Pulse 79   Ht 5' 4" (1.626 m)   Wt 81.6 kg (180 lb)   LMP 02/08/2000   BMI 30.90 kg/m²     Physical Exam   General: well developed, well nourished in no acute " distress  Head: normocephalic, atraumatic  Neck: supple, trachea midline, no obvious enlargement of thyroid  HEENT: EOMI, mucus membranes moist, sclera anicteric, no hearing impairment  Lungs: symmetric expansion, non-labored breathing  Cardiovascular: regular rate and rhythm, normal pulses  Abdomen: soft, non tender, non distended, no palpable masses, no hepatosplenomegaly, no hernias, no CVA tenderness  Musculoskeletal: no peripheral edema, normal ROM in bilateral upper and lower extremities  Lymphatics: no cervical or inguinal lymphadenopathy  Skin: no rashes or lesions  Neuro: alert and oriented x 3, no gross deficits  Psych: normal judgment and insight, normal mood/affect and non-anxious  Genitourinary:   patient declined exam      Lab Review   Urine analysis today in clinic shows - ++LE, +nit, 5-10 RBCs (on abx)    Lab Results   Component Value Date    WBC 7.70 08/11/2020    HGB 12.4 08/11/2020    HCT 39.4 08/11/2020    MCV 89 08/11/2020     08/11/2020     Lab Results   Component Value Date    CREATININE 0.9 08/11/2020    BUN 24 (H) 08/11/2020       Imaging  Images and reports were personally reviewed by me and discussed with patient  GONZÁLEZ reviewed       Assessment/Plan:      1. Recurrent UTI    - Discussed UTI prevention strategies.   - Adequate hydration.   - Double voiding. Consider timed voiding.    - Avoid constipation.   - GONZÁLEZ - negative 1/17   - Cystoscopy - negative in 1/17 (by another urologist)   - Cranberry/probiotics.   - Estrace cream 2x weekly - will consider in near future (now with ovarian ca)   - Call with UTI symptoms so UA/UCx can be sent.    - Abx prophy (started 1/18) - Keflex 250mg took for 9 months, stopped 9/18     - Multiple UTIs since stopping prophy, now with UTIs ON prophy   - Restarted prophy - Macrobid QHS, restarted 9/19 - several UTIs sensitive to prophy   - Repeat workup - GONZÁLEZ and cysto   - GONZÁLEZ 4/19 - wnl   - Cysto 5/19 - wnl   - Continue Estrace (approved by gyn onc).  Instructed on use.    - Ellura trial, D-mannose supplement     - CT urogram 9/20 - negative   - Again encouraged Estrace   - Currently on Macrobid QHS. Discussed changing prophy abx - she requests to hold on changing for now.    - Finish current Bactrim (7d course)        2. Microhematuria    - Discussed etiology and workup of hematuria   - UCx - results above   - Cytology - previously negative   - CT urogram 12/17 - no  abnormalities. GONZÁLEZ was negative from 1/17.   - Office cystoscopy - negative 1/17, 5/19        3. Nocturia/frequency/urge incontinence   - Ditropan XL 10mg         Follow up in 4 months

## 2020-09-24 ENCOUNTER — PATIENT MESSAGE (OUTPATIENT)
Dept: PODIATRY | Facility: CLINIC | Age: 75
End: 2020-09-24

## 2020-09-24 RX ORDER — CICLOPIROX 80 MG/ML
SOLUTION TOPICAL NIGHTLY
Qty: 6.6 ML | Refills: 11 | Status: CANCELLED | OUTPATIENT
Start: 2020-09-24

## 2020-09-24 NOTE — TELEPHONE ENCOUNTER
Spoke with patient she state will go to [facility] because she have 11 refill.            I left a message for the patient to return my call about refill on medication.

## 2020-09-25 ENCOUNTER — PATIENT OUTREACH (OUTPATIENT)
Dept: OTHER | Facility: OTHER | Age: 75
End: 2020-09-25

## 2020-09-25 NOTE — PROGRESS NOTES
Digital Medicine: Clinician Follow-Up    Patient reports doing well but was concerned with the readings yesterday.  She reports feeling tired.    The history is provided by the patient.   Follow-up reason(s): routine follow up.     Hypertension    Readings are trending down   Patient is experiencing signs/symptoms of hypotension. Possibly, as above          Last 5 Patient Entered Readings                                      Current 30 Day Average: 126/82     Recent Readings 9/24/2020 9/24/2020 9/22/2020 9/22/2020 9/21/2020    SBP (mmHg) 94 104 128 127 103    DBP (mmHg) 68 76 74 80 67    Pulse 90 92 94 97 78               Screenings    ASSESSMENT(S)  Patients BP average is 126/82 mmHg, which is above goal. Patient's BP goal is less than or equal to 130/80 per 2017 ACC/AHA Hypertension Guidelines.     Hypertension Plan  Additional monitoring needed. She will take a reading this morning.  Continue current therapy. Defer changes for now.       Addressed patient questions and patient has my contact information if needed prior to next outreach. Patient verbalizes understanding.      Explained to the patient that the Digital Medicine team is not available for emergencies. Advised patient call [facility] ([phone] or [phone]) or [phone] if needed.           There are no preventive care reminders to display for this patient.  There are no preventive care reminders to display for this patient.    Hypertension Medications             irbesartan (AVAPRO) 150 MG tablet Take 1 tablet (150 mg total) by mouth every evening.    metoprolol succinate (TOPROL-XL) 50 MG 24 hr tablet Take 1 tablet (50 mg total) by mouth once daily.

## 2020-09-28 RX ORDER — CICLOPIROX 80 MG/ML
SOLUTION TOPICAL NIGHTLY
Qty: 6.6 ML | Refills: 11 | Status: SHIPPED | OUTPATIENT
Start: 2020-09-28 | End: 2021-05-21

## 2020-09-28 NOTE — PROGRESS NOTES
Contacted patient to review the reading she took on Friday.  These readings were controlled.  Denies issues.    She will monitor for hypotensive symptoms going forward and reach out to me if they present.    No changes.

## 2020-10-07 ENCOUNTER — PATIENT OUTREACH (OUTPATIENT)
Dept: OTHER | Facility: OTHER | Age: 75
End: 2020-10-07

## 2020-10-27 ENCOUNTER — LAB VISIT (OUTPATIENT)
Dept: LAB | Facility: OTHER | Age: 75
End: 2020-10-27
Attending: INTERNAL MEDICINE
Payer: MEDICARE

## 2020-10-27 ENCOUNTER — OFFICE VISIT (OUTPATIENT)
Dept: INTERNAL MEDICINE | Facility: CLINIC | Age: 75
End: 2020-10-27
Attending: INTERNAL MEDICINE
Payer: MEDICARE

## 2020-10-27 VITALS
OXYGEN SATURATION: 95 % | WEIGHT: 178.38 LBS | BODY MASS INDEX: 30.45 KG/M2 | DIASTOLIC BLOOD PRESSURE: 90 MMHG | SYSTOLIC BLOOD PRESSURE: 142 MMHG | HEART RATE: 71 BPM | HEIGHT: 64 IN

## 2020-10-27 DIAGNOSIS — K83.8 DILATION OF BILIARY TRACT: Primary | ICD-10-CM

## 2020-10-27 DIAGNOSIS — I10 HYPERTENSION, BENIGN: ICD-10-CM

## 2020-10-27 DIAGNOSIS — K83.8 DILATION OF BILIARY TRACT: ICD-10-CM

## 2020-10-27 DIAGNOSIS — N39.0 RECURRENT UTI: ICD-10-CM

## 2020-10-27 DIAGNOSIS — Z23 IMMUNIZATION DUE: ICD-10-CM

## 2020-10-27 DIAGNOSIS — E03.9 HYPOTHYROIDISM, UNSPECIFIED TYPE: ICD-10-CM

## 2020-10-27 LAB
BASOPHILS # BLD AUTO: 0.04 K/UL (ref 0–0.2)
BASOPHILS NFR BLD: 0.4 % (ref 0–1.9)
DIFFERENTIAL METHOD: ABNORMAL
EOSINOPHIL # BLD AUTO: 0.1 K/UL (ref 0–0.5)
EOSINOPHIL NFR BLD: 0.5 % (ref 0–8)
ERYTHROCYTE [DISTWIDTH] IN BLOOD BY AUTOMATED COUNT: 14.8 % (ref 11.5–14.5)
HCT VFR BLD AUTO: 42.8 % (ref 37–48.5)
HGB BLD-MCNC: 13.8 G/DL (ref 12–16)
IMM GRANULOCYTES # BLD AUTO: 0.06 K/UL (ref 0–0.04)
IMM GRANULOCYTES NFR BLD AUTO: 0.6 % (ref 0–0.5)
LYMPHOCYTES # BLD AUTO: 2.1 K/UL (ref 1–4.8)
LYMPHOCYTES NFR BLD: 21.7 % (ref 18–48)
MCH RBC QN AUTO: 28.8 PG (ref 27–31)
MCHC RBC AUTO-ENTMCNC: 32.2 G/DL (ref 32–36)
MCV RBC AUTO: 89 FL (ref 82–98)
MONOCYTES # BLD AUTO: 0.6 K/UL (ref 0.3–1)
MONOCYTES NFR BLD: 5.7 % (ref 4–15)
NEUTROPHILS # BLD AUTO: 6.9 K/UL (ref 1.8–7.7)
NEUTROPHILS NFR BLD: 71.1 % (ref 38–73)
NRBC BLD-RTO: 0 /100 WBC
PLATELET # BLD AUTO: 394 K/UL (ref 150–350)
PMV BLD AUTO: 9.9 FL (ref 9.2–12.9)
RBC # BLD AUTO: 4.79 M/UL (ref 4–5.4)
WBC # BLD AUTO: 9.67 K/UL (ref 3.9–12.7)

## 2020-10-27 PROCEDURE — 99999 PR PBB SHADOW E&M-EST. PATIENT-LVL IV: CPT | Mod: PBBFAC,,, | Performed by: INTERNAL MEDICINE

## 2020-10-27 PROCEDURE — 99214 PR OFFICE/OUTPT VISIT, EST, LEVL IV, 30-39 MIN: ICD-10-PCS | Mod: S$PBB,,, | Performed by: INTERNAL MEDICINE

## 2020-10-27 PROCEDURE — 99214 OFFICE O/P EST MOD 30 MIN: CPT | Mod: S$PBB,,, | Performed by: INTERNAL MEDICINE

## 2020-10-27 PROCEDURE — 36415 COLL VENOUS BLD VENIPUNCTURE: CPT

## 2020-10-27 PROCEDURE — 85025 COMPLETE CBC W/AUTO DIFF WBC: CPT

## 2020-10-27 PROCEDURE — 99214 OFFICE O/P EST MOD 30 MIN: CPT | Mod: PBBFAC,25 | Performed by: INTERNAL MEDICINE

## 2020-10-27 PROCEDURE — 99999 PR PBB SHADOW E&M-EST. PATIENT-LVL IV: ICD-10-PCS | Mod: PBBFAC,,, | Performed by: INTERNAL MEDICINE

## 2020-10-27 PROCEDURE — 90662 IIV NO PRSV INCREASED AG IM: CPT | Mod: PBBFAC

## 2020-10-27 RX ORDER — IRBESARTAN 300 MG/1
300 TABLET ORAL NIGHTLY
Qty: 90 TABLET | Refills: 3 | Status: SHIPPED | OUTPATIENT
Start: 2020-10-27 | End: 2021-08-15 | Stop reason: SDUPTHER

## 2020-10-27 NOTE — PROGRESS NOTES
"  Patient was given vaccine information sheet for the Flu Vaccine. The area of injection was palpated using the acromion process as a landmark. This area was cleaned with alcohol. Using a 25g 1" safety needle, 0.5mL of the vaccine was placed into the left muscle. The injection site was dressed with a bandage. Patient experienced no complications and was discharged in stable condition. Fluzone High Dose vaccine Lot: [id number] Exp:06/30/21  "

## 2020-10-27 NOTE — PROGRESS NOTES
"Subjective:   Patient ID: Niurka Pedraza is a 75 y.o. female  Chief complaint:   Chief Complaint   Patient presents with    Hypertension     f/u       HPI  Here for 3 month follow up of HTN:    Since LCV with me she has seen by urology - Dr. Oropeza, gyn onc, podiatry     Osteopenia: taking vit d and calc  utd on dexa    HTN:   - taking toprol xl 50 and irbesartan 150mg  - in dig htn prog - bp normal yesterday and elevated today - attrib in part to anxiety and coming to clinic - agrees to repeat bp once returns home  - dig htn prog going well   - aniyah current meds     Review of Systems    Objective:  Vitals:    10/27/20 1211   BP: (!) 142/90   BP Location: Left arm   Patient Position: Sitting   Pulse: 71   SpO2: 95%   Weight: 80.9 kg (178 lb 5.6 oz)   Height: 5' 4" (1.626 m)     Body mass index is 30.61 kg/m².    Physical Exam  Vitals signs reviewed.   Constitutional:       Appearance: Normal appearance. She is well-developed.   HENT:      Head: Normocephalic and atraumatic.      Nose:      Comments: Wearing mask  Eyes:      Conjunctiva/sclera: Conjunctivae normal.   Neck:      Musculoskeletal: Normal range of motion and neck supple.   Cardiovascular:      Rate and Rhythm: Normal rate and regular rhythm.   Pulmonary:      Effort: Pulmonary effort is normal.      Breath sounds: Normal breath sounds.   Abdominal:      General: Bowel sounds are normal.      Palpations: Abdomen is soft.   Skin:     General: Skin is warm and dry.      Nails: There is no clubbing.     Neurological:      Mental Status: She is alert and oriented to person, place, and time.      Gait: Gait normal.   Psychiatric:         Speech: Speech normal.         Behavior: Behavior normal.         Assessment:  1. Dilation of biliary tract    2. Immunization due    3. Hypertension, benign    4. Recurrent UTI    5. Hypothyroidism, unspecified type        Plan:  Niurka was seen today for hypertension.    Diagnoses and all orders for this visit:    Dilation of " biliary tract  -     CBC auto differential; Future  -     Ambulatory referral/consult to Gastroenterology; Future    Immunization due  -     Cancel: Influenza (FLUAD) - Quadrivalent (Adjuvanted) *Preferred* (65+) (PF)    Hypertension, benign    Recurrent UTI    Hypothyroidism, unspecified type    Other orders  -     irbesartan (AVAPRO) 300 MG tablet; Take 1 tablet (300 mg total) by mouth every evening.  -     Influenza - High Dose (65+) (PF) (IM)    keep f/u with specialists    Stop BB and inc arb to 300mg   Consider adding amlodipine next if needed   Cont dig htn prog    rec tdap, shingrix vaccine  pvax 23 if not already completed at pharmacy    Health Maintenance   Topic Date Due    Pneumococcal Vaccine (65+ Low/Medium Risk) (2 of 2 - PPSV23) 11/02/2018    TETANUS VACCINE  08/18/2020    DEXA SCAN  08/21/2023    Lipid Panel  08/11/2025    Hepatitis C Screening  Completed

## 2020-11-01 ENCOUNTER — NURSE TRIAGE (OUTPATIENT)
Dept: ADMINISTRATIVE | Facility: CLINIC | Age: 75
End: 2020-11-01

## 2020-11-01 ENCOUNTER — PATIENT MESSAGE (OUTPATIENT)
Dept: INTERNAL MEDICINE | Facility: CLINIC | Age: 75
End: 2020-11-01

## 2020-11-01 ENCOUNTER — PATIENT MESSAGE (OUTPATIENT)
Dept: PHARMACY | Facility: CLINIC | Age: 75
End: 2020-11-01

## 2020-11-01 NOTE — TELEPHONE ENCOUNTER
"    Reason for Disposition   Systolic BP  >= 160 OR Diastolic >= 100    Additional Information   Negative: Difficult to awaken or acting confused (e.g., disoriented, slurred speech)   Negative: Severe difficulty breathing (e.g., struggling for each breath, speaks in single words)   Negative: [1] Weakness of the face, arm or leg on one side of the body AND [2] new onset   Negative: [1] Numbness (i.e., loss of sensation) of the face, arm or leg on one side of the body AND [2] new onset   Negative: [1] Chest pain lasts > 5 minutes AND [2] history of heart disease  (i.e., heart attack, bypass surgery, angina, angioplasty, CHF)   Negative: [1] Chest pain AND [2] took nitrogylcerin AND [3] pain was not relieved   Negative: Sounds like a life-threatening emergency to the triager   Negative: [1] Systolic BP  >= 160 OR Diastolic >= 100 AND [2] cardiac or neurologic symptoms (e.g., chest pain, difficulty breathing, unsteady gait, blurred vision)   Negative: [1] Systolic BP  >= 200 OR Diastolic >= 120  AND [2] having NO cardiac or neurologic symptoms   Negative: [1] Postpartum < 6 weeks AND [2] BP Systolic BP  >= 140 OR Diastolic >= 90   Negative: [1] Systolic BP  >= 180 OR Diastolic >= 110 AND [2] missed most recent dose of blood pressure medication   Negative: Systolic BP  >= 180 OR Diastolic >= 110   Negative: Ran out of BP medications    Protocols used: [facility]    Pt and her daughter stated her blood pressure is high. Stated BP 30 minutes ago was 186/102. BP during triage is 153/72. Pt answered "No" to triage questions above. Advised to see PCP within 3 days. Instructed to call back if :    * Weakness or numbness of the face, arm or leg on one side of the body occurs    * Difficulty walking, difficulty talking, or severe headache occurs    * Chest pain or difficulty breathing occurs    * Your blood pressure is over 180/110    * You become worse.    Pt and daughter verbalized understanding.  "

## 2020-11-02 ENCOUNTER — PATIENT OUTREACH (OUTPATIENT)
Dept: OTHER | Facility: OTHER | Age: 75
End: 2020-11-02

## 2020-11-02 ENCOUNTER — PATIENT MESSAGE (OUTPATIENT)
Dept: OTHER | Facility: OTHER | Age: 75
End: 2020-11-02

## 2020-11-02 NOTE — PROGRESS NOTES
Digital Medicine: Clinician Follow-Up    Patient reports that Saturday night was the first time she took the 2 tablets of irbesartan.  The next day was experiencing very high readings.  As a result, last night she decided to skip irbesartan completely.    Confirms stopping metoprolol as instructed by PCP.    Took readings this morning but have not transmitted yet - 160/100     The history is provided by the patient.      Review of patient's allergies indicates:   -- Ciprofloxacin -- Other (See Comments)    --  Right foot pain and edema, tendonitis   -- Amlodipine -- Edema and Swelling    --  BLE             BLE   -- Lisinopril -- Other (See Comments)    --  cough  Follow-up reason(s): medication change follow-up.     Hypertension    Readings are trending up Patient is not experiencing signs/symptoms of hypertension.      Patient did make medication change.    Is patient tolerating med change? no            Last 5 Patient Entered Readings                                      Current 30 Day Average: 137/86     Recent Readings 11/1/2020 11/1/2020 11/1/2020 11/1/2020 11/1/2020    SBP (mmHg) 153 186 158 162 156    DBP (mmHg) 72 102 104 102 63    Pulse 85 85 98 94 93                 Depression Screening  Did not address depression screening.    Sleep Apnea Screening    Did not address sleep apnea screening.     Medication Affordability Screening  Did not address medication affordability screening.     Medication Adherence-Medication adherence was asssessed.    Patient identified the following reasons for non-adherence:  Lack of perceived benefits.            ASSESSMENT(S)  Patients BP average is 137/86 mmHg, which is at goal. Patient's BP goal is less than or equal to 130/80.    Hypertension Plan  Hypertension Medication Change. PCP stopped metoprolol 50 mg without any taper so possibly documenting rebound HTN.  Patient incorrectly skipped irbesartan dose last night thinking the high readings were caused by the increased  dose.  Explained that this was unlikely the case and to not skip doses.  We will restart metoprolol 50 mg - 1/2 tablet once daily and continue irbesartan 150 mg daily for 7 days before discontinuing metoprolol and increase irbesartan to 2 tablets daily.  Instructed to charge device.       Addressed patient questions and patient has my contact information if needed prior to next outreach. Patient verbalizes understanding.      Explained to the patient that the Digital Medicine team is not available for emergencies. Advised patient call [facility] ([phone] or [phone]) or 911 if needed.            There are no preventive care reminders to display for this patient.  There are no preventive care reminders to display for this patient.      Hypertension Medications             irbesartan (AVAPRO) 300 MG tablet Take 1 tablet (300 mg total) by mouth every evening.

## 2020-11-10 ENCOUNTER — PATIENT OUTREACH (OUTPATIENT)
Dept: OTHER | Facility: OTHER | Age: 75
End: 2020-11-10

## 2020-11-10 PROBLEM — J84.9 INTERSTITIAL LUNG DISEASE: Status: ACTIVE | Noted: 2020-11-10

## 2020-11-10 PROBLEM — R05.3 CHRONIC COUGH: Status: ACTIVE | Noted: 2020-11-10

## 2020-11-10 NOTE — PROGRESS NOTES
Attempting follow-up since starting the metoprolol taper and going back down to irbesartan 150 mg/day last week.    Readings continue to trend up.    Would stop metoprolol now and increase irbesartan back to 300 mg/day.

## 2020-11-16 RX ORDER — ATORVASTATIN CALCIUM 20 MG/1
TABLET, FILM COATED ORAL
Qty: 90 TABLET | Refills: 3 | Status: SHIPPED | OUTPATIENT
Start: 2020-11-16 | End: 2021-03-30 | Stop reason: SDUPTHER

## 2020-11-16 NOTE — PROGRESS NOTES
Digital Medicine: Clinician Follow-Up    Patient reports doing well.  Confirms increased irbesartan dose back 300 mg/day 2 days ago and stopped the metoprolol at that time.    Notes increasing heart rate.    The history is provided by the patient.      Review of patient's allergies indicates:   -- Ciprofloxacin -- Other (See Comments)    --  Right foot pain and edema, tendonitis   -- Amlodipine -- Edema and Swelling    --  BLE             BLE   -- Lisinopril -- Other (See Comments)    --  cough  Follow-up reason(s): medication change follow-up.     Hypertension    Readings are trending up Patient is not experiencing signs/symptoms of hypertension.      Patient did make medication change.    Is patient tolerating med change? yes            Last 5 Patient Entered Readings                                      Current 30 Day Average: 140/89     Recent Readings 11/14/2020 11/14/2020 11/13/2020 11/13/2020 11/12/2020    SBP (mmHg) 136 136 147 153 142    DBP (mmHg) 84 69 91 93 87    Pulse 106 103 93 95 94                 Depression Screening  Did not address depression screening.    Sleep Apnea Screening    Did not address sleep apnea screening.     Medication Affordability Screening  Did not address medication affordability screening.     Medication Adherence-Medication adherence was assessed.          ASSESSMENT(S)  Patients BP average is 140/89 mmHg, which is above goal. Patient's BP goal is less than or equal to 130/80.     Hypertension Plan  Continue current therapy. Readings likely to start trending down.  Last reading was improved and close to goal.  Discussed heart rates and reasons why the could be elevated, namely stopping metoprolol.  She will reach out to PCP if concerned.       Addressed patient questions and patient has my contact information if needed prior to next outreach. Patient verbalizes understanding.             There are no preventive care reminders to display for this patient.  There are no  preventive care reminders to display for this patient.      Hypertension Medications             irbesartan (AVAPRO) 300 MG tablet Take 1 tablet (300 mg total) by mouth every evening.

## 2020-11-16 NOTE — PROGRESS NOTES
Refill Authorization Note   Niurka Pedraza is requesting a refill authorization.  Brief assessment and rationale for refill: Approve     Medication Therapy Plan: CD    Medication reconciliation completed: No   Comments:   Orders Placed This Encounter    atorvastatin (LIPITOR) 20 MG tablet      Requested Prescriptions   Signed Prescriptions Disp Refills    atorvastatin (LIPITOR) 20 MG tablet 90 tablet 3     Sig: TAKE 1 TABLET BY MOUTH EVERY DAY       Cardiovascular:  Antilipid - Statins Passed - 11/16/2020 12:36 AM        Passed - Patient is at least 18 years old        Passed - Office visit in past 12 months or future 90 days     Recent Outpatient Visits            6 days ago ILD (interstitial lung disease)    [facility] Nemesio Cazares MD    2 weeks ago Dilation of biliary tract    [facility] [address] Savana Anderson MD    1 month ago Recurrent UTI    [facility] [address] Anaya Oropeza MD    2 months ago Serous cystadenoma of ovary with borderline malignant features    [facility] [address] Talita Barrios MD    2 months ago Recurrent UTI    [facility] [address] Anaya Oropeza MD          Future Appointments              In 2 months Anaya Oropeza MD [facility] [address], [facility]    In 4 months LAB, [facility], [facility]    In 4 months [facility] [address], [facility]    In 5 months Savana Anderson MD [facility] [address], [facility]                Passed - ALT is 94 or below and within 360 days     ALT   Date Value Ref Range Status   08/11/2020 62 (H) 10 - 44 U/L Final   01/03/2019 51 (H) 10 - 44 U/L Final   12/11/2018 55 (H) 10 - 44 U/L Final              Passed - AST is 54 or below and within 360 days     AST   Date Value Ref Range Status   08/11/2020 35 10 - 40 U/L Final   01/03/2019 31 10 - 40 U/L  Final   12/11/2018 37 10 - 40 U/L Final              Passed - Total Cholesterol within 360 days     Cholesterol   Date Value Ref Range Status   08/11/2020 146 120 - 199 mg/dL Final     Comment:     The National Cholesterol Education Program (NCEP) has set the  following guidelines (reference ranges) for Cholesterol:  Optimal.....................<200 mg/dL  Borderline High.............200-239 mg/dL  High........................> or = 240 mg/dL     12/11/2018 127 120 - 199 mg/dL Final     Comment:     The National Cholesterol Education Program (NCEP) has set the  following guidelines (reference ranges) for Cholesterol:  Optimal.....................<200 mg/dL  Borderline High.............200-239 mg/dL  High........................> or = 240 mg/dL     11/22/2017 158 120 - 199 mg/dL Final     Comment:     The National Cholesterol Education Program (NCEP) has set the  following guidelines (reference ranges) for Cholesterol:  Optimal.....................<200 mg/dL  Borderline High.............200-239 mg/dL  High........................> or = 240 mg/dL                Passed - LDL within 360 days     LDL Cholesterol   Date Value Ref Range Status   08/11/2020 80.0 63.0 - 159.0 mg/dL Final     Comment:     The National Cholesterol Education Program (NCEP) has set the  following guidelines (reference values) for LDL Cholesterol:  Optimal.......................<130 mg/dL  Borderline High...............130-159 mg/dL  High..........................160-189 mg/dL  Very High.....................>190 mg/dL              Passed - HDL within 360 days     HDL   Date Value Ref Range Status   08/11/2020 40 40 - 75 mg/dL Final     Comment:     The National Cholesterol Education Program (NCEP) has set the  following guidelines (reference values) for HDL Cholesterol:  Low...............<40 mg/dL  Optimal...........>60 mg/dL              Passed - Triglycerides within 360 days     Triglycerides   Date Value Ref Range Status   08/11/2020 130 30 -  150 mg/dL Final     Comment:     The National Cholesterol Education Program (NCEP) has set the  following guidelines (reference values) for triglycerides:  Normal......................<150 mg/dL  Borderline High.............150-199 mg/dL  High........................200-499 mg/dL     12/11/2018 184 (H) 30 - 150 mg/dL Final     Comment:     The National Cholesterol Education Program (NCEP) has set the  following guidelines (reference values) for triglycerides:  Normal......................<150 mg/dL  Borderline High.............150-199 mg/dL  High........................200-499 mg/dL     11/22/2017 174 (H) 30 - 150 mg/dL Final     Comment:     The National Cholesterol Education Program (NCEP) has set the  following guidelines (reference values) for triglycerides:  Normal......................<150 mg/dL  Borderline High.............150-199 mg/dL  High........................200-499 mg/dL                    Appointments  past 12m or future 3m with PCP    Date Provider   Last Visit   10/27/2020 Savana Anderson MD   Next Visit   4/27/2021 Savana Anderson MD   ED visits in past 90 days: 0     Note composed:3:05 PM 11/16/2020

## 2020-11-16 NOTE — TELEPHONE ENCOUNTER
No new care gaps identified.  Powered by [employer]. Reference number: [id number]. 11/16/2020 12:37:05 AM   CST

## 2020-11-19 NOTE — PROGRESS NOTES
Digital Medicine: Health  Follow-Up    The history is provided by the patient.             Reason for review: Blood pressure not at goal        Topics Covered on Call: physical activity and Diet    Additional Follow-up details: Patient has concerned about her pulse rate, encouraged she speak with her PCP.            Diet-no change to diet    No change to diet.  Patient reports eating or drinking the following: Patient reports no changes to her diet. Says she tries to eat very little salt. Mostly her diet consist of fruit, vegetables, and pasta.      Physical Activity-no change to routine  No change to exercise routine.       Additional physical activity details: Walking around her home, doing home activities, and yard work. Patient states she is constantly moving.       Medication Adherence-Medication adherence was assessed.      Substance, Sleep, Stress-Not assessed      Continue current diet/physical activity routine.       Addressed patient questions and patient has my contact information if needed prior to next outreach. Patient verbalizes understanding.      Explained the importance of self-monitoring and medication adherence. Encouraged the patient to communicate with their health  for lifestyle modifications to help improve or maintain a healthy lifestyle.               There are no preventive care reminders to display for this patient.      Last 5 Patient Entered Readings                                      Current 30 Day Average: 140/89     Recent Readings 11/18/2020 11/16/2020 11/16/2020 11/15/2020 11/15/2020    SBP (mmHg) 142 141 147 137 120    DBP (mmHg) 88 88 88 90 81    Pulse 104 100 102 112 108

## 2020-11-22 ENCOUNTER — PATIENT MESSAGE (OUTPATIENT)
Dept: INTERNAL MEDICINE | Facility: CLINIC | Age: 75
End: 2020-11-22

## 2020-11-22 DIAGNOSIS — R00.0 INCREASED HEART RATE: Primary | ICD-10-CM

## 2020-11-30 RX ORDER — METOPROLOL SUCCINATE 50 MG/1
50 TABLET, EXTENDED RELEASE ORAL DAILY
Qty: 90 TABLET | Refills: 3 | Status: SHIPPED | OUTPATIENT
Start: 2020-11-30 | End: 2021-02-27 | Stop reason: SDUPTHER

## 2020-12-01 ENCOUNTER — PATIENT OUTREACH (OUTPATIENT)
Dept: OTHER | Facility: OTHER | Age: 75
End: 2020-12-01

## 2020-12-01 NOTE — PROGRESS NOTES
Digital Medicine: Clinician Follow-Up    Patient reports doing okay but still concerned about readings.  Believes that adjusting the irbesartan is the reason for the high heart rates as well as the blood pressures.    Reports not reading PCP message yet today.    The history is provided by the patient.   Follow-up reason(s): medication change follow-up.     Hypertension    Readings are trending down Patient is not experiencing signs/symptoms of hypertension.      Patient did make medication change.    Is patient tolerating med change? no            Last 5 Patient Entered Readings                                      Current 30 Day Average: 143/92     Recent Readings 11/30/2020 11/30/2020 11/29/2020 11/28/2020 11/28/2020    SBP (mmHg) 160 166 137 125 126    DBP (mmHg) 103 103 85 82 82    Pulse 95 97 108 110 112                 Depression Screening  Did not address depression screening.    Sleep Apnea Screening    Did not address sleep apnea screening.     Medication Affordability Screening  Did not address medication affordability screening.     Medication Adherence-Medication adherence was asssessed.    Patient identified the following reasons for non-adherence:  Side effects from medication.  Lack of perceived benefits.            ASSESSMENT(S)  Patients BP average is 143/92 mmHg, which is above goal. Patient's BP goal is less than or equal to 130/80.     Hypertension Plan  Hypertension Medication Change. Restart metoprolol 50 mg and decrease irbesartan to 150 mg daily as prescribed by Dr. Anderson.  Readings were actually decreasing on irbesartan 300 mg/day until this morning but heart rates were definitely trending up.  Provided patient education. Discussed the likely reason why heart rates are elevated - metoprolol discontinuation - and that by restarting the heart rates will decrease again.       Addressed patient questions and patient has my contact information if needed prior to next outreach. Patient  verbalizes understanding.             There are no preventive care reminders to display for this patient.  There are no preventive care reminders to display for this patient.      Hypertension Medications             irbesartan (AVAPRO) 300 MG tablet Take 1 tablet (300 mg total) by mouth every evening.    metoprolol succinate (TOPROL-XL) 50 MG 24 hr tablet Take 1 tablet (50 mg total) by mouth once daily.

## 2020-12-01 NOTE — TELEPHONE ENCOUNTER
Please contact pt and inform her that I received her message     - I reviewed her blood pressure and heart rate readings from the digital hypertension program  - I recommend resuming the metoprolol and let's continue the 150mg dose of irbesartan for now    - for some patients on high doses of beta blockers like metoprolol, they can develop rebound high blood pressure but I typically do not see this in those taking lower doses like 50mg for example.  However this may explain her readings earlier on when meds were changed    - please continue home blood pressure monitoring - we can make further adjustments if needed based on readings after resuming metoprolol 50mg

## 2020-12-05 ENCOUNTER — PATIENT MESSAGE (OUTPATIENT)
Dept: INTERNAL MEDICINE | Facility: CLINIC | Age: 75
End: 2020-12-05

## 2020-12-15 ENCOUNTER — PATIENT OUTREACH (OUTPATIENT)
Dept: OTHER | Facility: OTHER | Age: 75
End: 2020-12-15

## 2020-12-15 DIAGNOSIS — I10 HYPERTENSION, BENIGN: Primary | ICD-10-CM

## 2020-12-16 ENCOUNTER — OFFICE VISIT (OUTPATIENT)
Dept: CARDIOLOGY | Facility: CLINIC | Age: 75
End: 2020-12-16
Attending: INTERNAL MEDICINE
Payer: MEDICARE

## 2020-12-16 VITALS
HEART RATE: 73 BPM | DIASTOLIC BLOOD PRESSURE: 80 MMHG | BODY MASS INDEX: 30.13 KG/M2 | SYSTOLIC BLOOD PRESSURE: 120 MMHG | WEIGHT: 176.5 LBS | HEIGHT: 64 IN

## 2020-12-16 DIAGNOSIS — R00.0 INCREASED HEART RATE: ICD-10-CM

## 2020-12-16 DIAGNOSIS — E78.5 HYPERLIPIDEMIA, UNSPECIFIED HYPERLIPIDEMIA TYPE: ICD-10-CM

## 2020-12-16 DIAGNOSIS — I10 HYPERTENSION, BENIGN: Primary | ICD-10-CM

## 2020-12-16 DIAGNOSIS — R00.2 PALPITATIONS: ICD-10-CM

## 2020-12-16 PROCEDURE — 99203 PR OFFICE/OUTPT VISIT, NEW, LEVL III, 30-44 MIN: ICD-10-PCS | Mod: S$PBB,,, | Performed by: INTERNAL MEDICINE

## 2020-12-16 PROCEDURE — 93010 ELECTROCARDIOGRAM REPORT: CPT | Mod: ,,, | Performed by: INTERNAL MEDICINE

## 2020-12-16 PROCEDURE — 99213 OFFICE O/P EST LOW 20 MIN: CPT | Mod: PBBFAC | Performed by: INTERNAL MEDICINE

## 2020-12-16 PROCEDURE — 99999 PR PBB SHADOW E&M-EST. PATIENT-LVL III: CPT | Mod: PBBFAC,,, | Performed by: INTERNAL MEDICINE

## 2020-12-16 PROCEDURE — 93005 ELECTROCARDIOGRAM TRACING: CPT

## 2020-12-16 PROCEDURE — 99999 PR PBB SHADOW E&M-EST. PATIENT-LVL III: ICD-10-PCS | Mod: PBBFAC,,, | Performed by: INTERNAL MEDICINE

## 2020-12-16 PROCEDURE — 93010 EKG 12-LEAD: ICD-10-PCS | Mod: ,,, | Performed by: INTERNAL MEDICINE

## 2020-12-16 PROCEDURE — 99203 OFFICE O/P NEW LOW 30 MIN: CPT | Mod: S$PBB,,, | Performed by: INTERNAL MEDICINE

## 2020-12-16 NOTE — LETTER
December 16, 2020      Savana Anderson MD  [address] [facility] [address]           [facility]  [address] [place] [address], [address] [address]  Phone: [phone]  Fax: [phone]          Patient: Niurka Pedraza   MR Number: [id number]   YOB: 1945   Date of Visit: 12/16/2020       Dear Dr. Savana Anderson:    Thank you for referring Niurka Pedraza to me for evaluation. Attached you will find relevant portions of my assessment and plan of care.    If you have questions, please do not hesitate to call me. I look forward to following Niurka Pedraza along with you.    Sincerely,    Yao Hunt MD    Enclosure  CC:  No Recipients    If you would like to receive this communication electronically, please contact [email or web address] or [phone] to request more information on [employer] Link access.    For providers and/or their staff who would like to refer a patient to [facility], please contact us through our one-stop-shop provider referral line, [facility], at [phone].    If you feel you have received this communication in error or would no longer like to receive these types of communications, please e-mail [email or web address]

## 2020-12-16 NOTE — PROGRESS NOTES
Cardiology Consult  12/16/2020  1:41 PM    Attending Cardiologist: Yao Hunt M.D.  Primary Care Provider: Savana Anderson MD  Chief Complaint/Reason For Consultation:  HTN      Problem list  Patient Active Problem List   Diagnosis    Pulmonary nodules    Pulmonary coccidioidomycosis, unspecified    Hyperlipidemia    Recurrent UTI    Skin lesion    Fatigue    Hypertension, benign    Hypothyroidism    Hearing loss    Hearing loss of right ear    Tinnitus of right ear    Overweight (BMI 25.0-29.9)    Tendonitis, Achilles, right    Cerumen debris on tympanic membrane of both ears    Fatty liver    Postmenopausal    Acute right ankle pain    Ankle weakness    Balance problem    Microhematuria    OAB (overactive bladder)    Pelvic mass    Elevated CA-125    S/P RATLH/BSO/PLND/Omental Bx (mini lap)    Borderline serous cystadenoma of right ovary    Arthritis of carpometacarpal (CMC) joint of right thumb    Chronic cough    Interstitial lung disease       CC:  HTN    HPI:  Niurka Pedraza is a 75 y.o.year-old female HTN asked for cardiology referral to reassure that she is on correct meds.  Recently, her BP meds were changed (increase irbesartan to 300mg and stop metoprolol) and her HR registered 110 so she was concern.  She has since resume her previous regimen of irbesartan 150mg QPM and metoprolol 50mg QAM.  No prior cardiac problems.  Had cardiac w/u including stress test about 6 years ago.  No angina, CARDOZO, palpitations or syncope.      Medications  Current Outpatient Medications   Medication Sig Dispense Refill    irbesartan (AVAPRO) 300 MG tablet Take 1 tablet (300 mg total) by mouth every evening. (Patient taking differently: Take 150 mg by mouth every evening. ) 90 tablet 3    metoprolol succinate (TOPROL-XL) 50 MG 24 hr tablet Take 1 tablet (50 mg total) by mouth once daily. 90 tablet 3    atorvastatin (LIPITOR) 20 MG tablet TAKE 1 TABLET BY MOUTH EVERY DAY 90 tablet 3     azelastine (ASTELIN) 137 mcg (0.1 %) nasal spray 2 sprays (274 mcg total) by Nasal route 2 (two) times daily. 30 mL 6    budesonide-formoterol 80-4.5 mcg (SYMBICORT) 80-4.5 mcg/actuation HFAA Inhale 2 puffs into the lungs 2 (two) times daily as needed. Controller 1 Inhaler 6    calcium-vitamin D3 (CALCIUM 500 + D) 500 mg(1,250mg) -200 unit per tablet Take 1 tablet by mouth 2 (two) times daily with meals.      cephALEXin (KEFLEX) 250 MG capsule Take 1 capsule (250 mg total) by mouth every evening. 30 capsule 6    ciclopirox (PENLAC) 8 % Soln Apply topically nightly. 6.6 mL 11    diclofenac (VOLTAREN) 75 MG EC tablet TAKE 1 TABLET (75 MG TOTAL) BY MOUTH 2 (TWO) TIMES DAILY AS NEEDED. 40 tablet 0    diclofenac sodium (VOLTAREN) 1 % Gel Apply 2 g topically every 8 (eight) hours as needed. 100 g 1    estradiol (ESTRACE) 0.01 % (0.1 mg/gram) vaginal cream Place 1 g vaginally twice a week. 42.5 g 11    levothyroxine (SYNTHROID) 50 MCG tablet Take 1 tablet (50 mcg total) by mouth before breakfast. 90 tablet 2    lidocaine HCL 2% (XYLOCAINE) 2 % jelly Apply topically as needed. Apply topically once nightly to affected part of foot/feet. 30 mL 2    multivitamin (ONE DAILY MULTIVITAMIN) per tablet Take 1 tablet by mouth once daily.      oxybutynin (DITROPAN-XL) 10 MG 24 hr tablet Take 1 tablet (10 mg total) by mouth once daily. 30 tablet 11     No current facility-administered medications for this visit.       Prior to Admission medications    Medication Sig Start Date End Date Taking? Authorizing Provider   irbesartan (AVAPRO) 300 MG tablet Take 1 tablet (300 mg total) by mouth every evening.  Patient taking differently: Take 150 mg by mouth every evening.  10/27/20 10/27/21 Yes Savana Anderson MD   metoprolol succinate (TOPROL-XL) 50 MG 24 hr tablet Take 1 tablet (50 mg total) by mouth once daily. 11/30/20 11/30/21 Yes Savana Anderson MD   atorvastatin (LIPITOR) 20 MG tablet TAKE 1 TABLET BY MOUTH  EVERY DAY 11/16/20   Savana Anderson MD   azelastine (ASTELIN) 137 mcg (0.1 %) nasal spray 2 sprays (274 mcg total) by Nasal route 2 (two) times daily. 11/10/20 11/10/21  Nemesio Cazares MD   budesonide-formoterol 80-4.5 mcg (SYMBICORT) 80-4.5 mcg/actuation HFAA Inhale 2 puffs into the lungs 2 (two) times daily as needed. Controller 11/10/20 11/10/21  Nemesio Cazares MD   calcium-vitamin D3 (CALCIUM 500 + D) 500 mg(1,250mg) -200 unit per tablet Take 1 tablet by mouth 2 (two) times daily with meals. 4/18/17   Savana Zelaya MD   cephALEXin (KEFLEX) 250 MG capsule Take 1 capsule (250 mg total) by mouth every evening. 11/10/20   Nemesio Cazares MD   ciclopirox (PENLAC) 8 % Soln Apply topically nightly. 9/28/20   Tavon Wiggins DPM   diclofenac (VOLTAREN) 75 MG EC tablet TAKE 1 TABLET (75 MG TOTAL) BY MOUTH 2 (TWO) TIMES DAILY AS NEEDED. 8/10/18   Savana Anderson MD   diclofenac sodium (VOLTAREN) 1 % Gel Apply 2 g topically every 8 (eight) hours as needed. 2/14/20   Savana Anderson MD   estradiol (ESTRACE) 0.01 % (0.1 mg/gram) vaginal cream Place 1 g vaginally twice a week. 5/16/19 9/23/20  Anaya Oropeza MD   levothyroxine (SYNTHROID) 50 MCG tablet Take 1 tablet (50 mcg total) by mouth before breakfast. 4/2/20   Savana Anderson MD   lidocaine HCL 2% (XYLOCAINE) 2 % jelly Apply topically as needed. Apply topically once nightly to affected part of foot/feet. 7/28/20   Tavon Wiggins DPM   multivitamin (ONE DAILY MULTIVITAMIN) per tablet Take 1 tablet by mouth once daily.    Historical Provider   oxybutynin (DITROPAN-XL) 10 MG 24 hr tablet Take 1 tablet (10 mg total) by mouth once daily. 4/20/20 4/20/21  Ana Rosa Reyna NP   nitrofurantoin (MACRODANTIN) 50 MG capsule Take 1 capsule (50 mg total) by mouth every evening. 7/9/20 12/16/20  Ana Rosa Reyna NP         History  Past Medical History:   Diagnosis Date    Arthritis     Borderline serous cystadenoma of right ovary 4/3/2018     Breast cancer     mastectomy 2014    Coccidiomycosis, progressive     Elevated CA-125 2018    Hyperlipidemia     Hypertension     OAB (overactive bladder)     Osteopenia     on Dexa 2017    Osteoporosis     pt reports hx of this, declined fosamax in past - treated with calcium and vit D    Pelvic mass 2018    Pulmonary nodules     Thyroid disease      Past Surgical History:   Procedure Laterality Date    CHOLECYSTECTOMY      MASTECTOMY Right          Social History     Socioeconomic History    Marital status:      Spouse name: Not on file    Number of children: 3    Years of education: Not on file    Highest education level: Not on file   Occupational History    Occupation: retired from [employer], [employer]     Comment: neuro chemistry   Social Needs    Financial resource strain: Not hard at all    Food insecurity     Worry: Never true     Inability: Never true    Transportation needs     Medical: No     Non-medical: No   Tobacco Use    Smoking status: Former Smoker     Packs/day: 0.50     Years: 30.00     Pack years: 15.00     Types: Cigarettes     Quit date: 2000     Years since quittin.8    Smokeless tobacco: Never Used    Tobacco comment: quit in her 50s, after 30 years smoking;   Substance and Sexual Activity    Alcohol use: No     Frequency: Monthly or less     Drinks per session: 1 or 2     Binge frequency: Never    Drug use: No    Sexual activity: Never   Lifestyle    Physical activity     Days per week: Not on file     Minutes per session: Not on file    Stress: Not on file   Relationships    Social connections     Talks on phone: More than three times a week     Gets together: Twice a week     Attends Muslim service: Never     Active member of club or organization: No     Attends meetings of clubs or organizations: Never     Relationship status:    Other Topics Concern    Not on file   Social History Narrative    From  [place]     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   HENT: Negative for congestion and nosebleeds.    Eyes: Negative for double vision, vision loss in left eye, vision loss in right eye and visual disturbance.   Cardiovascular: Negative for chest pain, claudication, cyanosis, dyspnea on exertion, irregular heartbeat, leg swelling, near-syncope, orthopnea, palpitations, paroxysmal nocturnal dyspnea and syncope.   Respiratory: Negative for cough, hemoptysis, shortness of breath, sleep disturbances due to breathing, snoring, sputum production and wheezing.    Endocrine: Negative for cold intolerance and heat intolerance.   Skin: Negative for nail changes and rash.   Musculoskeletal: Negative for joint pain, muscle cramps, muscle weakness and myalgias.   Gastrointestinal: Negative for change in bowel habit, heartburn, hematemesis, hematochezia, hemorrhoids and melena.   Neurological: Negative for dizziness, focal weakness and headaches.         Physical Exam  Wt Readings from Last 1 Encounters:   11/10/20 81.2 kg (179 lb)     BP Readings from Last 3 Encounters:   11/10/20 134/80   10/27/20 (!) 142/90   09/23/20 134/73     Pulse Readings from Last 1 Encounters:   11/10/20 88     There is no height or weight on file to calculate BMI.    Physical Exam   Constitutional: She is oriented to person, place, and time. She appears well-developed and well-nourished.   HENT:   Head: Atraumatic.   Eyes: EOM are normal. No scleral icterus.   Neck: Neck  supple. Normal carotid pulses, no hepatojugular reflux and no JVD present. Carotid bruit is not present. No edema present.   Cardiovascular: Normal rate, regular rhythm, S1 normal, S2 normal, normal heart sounds and intact distal pulses. PMI is not displaced. Exam reveals no friction rub.   No murmur heard.  Pulses:       Carotid pulses are 2+ on the right side and 2+ on the left side.       Radial pulses are 2+ on the right side and 2+ on the left side.        Dorsalis pedis pulses are 2+ on the right side and 2+ on the left side.        Posterior tibial pulses are 2+ on the right side and 2+ on the left side.   Pulmonary/Chest: Effort normal and breath sounds normal. No stridor. No respiratory distress. She has no wheezes. She has no rales. She exhibits no tenderness.   Abdominal: Soft. Normal aorta and bowel sounds are normal.   Musculoskeletal:         General: No edema.   Neurological: She is alert and oriented to person, place, and time.   Skin: Skin is warm and dry. No cyanosis. Nails show no clubbing.   Psychiatric: She has a normal mood and affect. Her behavior is normal. Thought content normal.   Vitals reviewed.                Assessment:  1.  HTN  -controlled    Plan:  Suggested that she takes irbesartan 150mg QAM and metoprolol 50mg QPM instead.  No further cardiac testing.  Reassured her that her HR of 110 was likely rebound from stopping metoprolol.  Also reassured her that she does not have any cardiac symptoms and her EKG was normal.  She will return if needed.    Follow up:  PRN    Time spent evaluating and treating patient 15 minutes with >50% of this time being face-to-face.     Yao Hunt MD, F.A.C.C, F.S.C.A.I.

## 2020-12-17 DIAGNOSIS — I10 HYPERTENSION, BENIGN: ICD-10-CM

## 2020-12-17 NOTE — TELEPHONE ENCOUNTER
No new care gaps identified.  Powered by [employer]. Reference number: [id number]. 12/17/2020 4:30:06 AM   CST

## 2020-12-18 RX ORDER — IRBESARTAN 150 MG/1
150 TABLET ORAL NIGHTLY
Qty: 90 TABLET | Refills: 1 | OUTPATIENT
Start: 2020-12-18

## 2020-12-18 NOTE — PROGRESS NOTES
Quick DC. Inappropriate Request    Refill Authorization Note   Niurka Pedraza  is requesting a refill authorization.  Brief Assessment and Rationale for Refill:  Quick Discontinue  Medication Therapy Plan:  CDMR; Pt now on irbesartan 300 mg    Medication Reconciliation Completed:  No      Comments:   Pended Medication(s)       Requested Prescriptions     Refused Prescriptions Disp Refills    irbesartan (AVAPRO) 150 MG tablet [Pharmacy Med Name: IRBESARTAN 150 MG TABLET] 90 tablet 1     Sig: TAKE 1 TABLET (150 MG TOTAL) BY MOUTH EVERY EVENING.     Refused By: JAMILAH JIMENEZ     Reason for Refusal: Refill not appropriate        Duplicate Pended Encounter(s)/ Last Prescribed Details:    Authorizing Provider: Savana Anderson MD ASTRID #:  [id number] NPI:  [id number]    Ordering User:  Savana Anderson MD               Pharmacy:  [facility]/pharm[facility] #[id number] - [place] - [address]   ASTRID #:  [id number]   Pharmacy Comments: --          Fill quantity remaining: -- Fill quantity used: -- Next fill due: --       Outpatient Medication Detail     Disp Refills Start End JERILYN   irbesartan (AVAPRO) 300 MG tablet 90 tablet 3 10/27/2020 10/27/2021 --   Sig - Route: Take 1 tablet (300 mg total) by mouth every evening. - Oral   Patient taking differently: Take 150 mg by mouth every evening.         Sent to pharmacy as: irbesartan (AVAPRO) 300 MG tablet   Class: Normal   Notes to Pharmacy: .   Order: [id number]   Date/Time Signed: 10/27/2020 12:56       E-Prescribing Status: Receipt confirmed by pharmacy (10/27/2020 12:56 PM CDT)   Ordering Encounter Report    Associated Reports   View Encounter                Note composed:2:49 PM 12/18/2020

## 2020-12-19 DIAGNOSIS — E03.9 HYPOTHYROIDISM, UNSPECIFIED TYPE: ICD-10-CM

## 2020-12-19 NOTE — TELEPHONE ENCOUNTER
No new care gaps identified.  Powered by [employer]. Reference number: [id number]. 12/19/2020 1:19:56 AM   CST

## 2020-12-21 RX ORDER — LEVOTHYROXINE SODIUM 50 UG/1
50 TABLET ORAL
Qty: 90 TABLET | Refills: 2 | Status: SHIPPED | OUTPATIENT
Start: 2020-12-21 | End: 2021-07-30 | Stop reason: SDUPTHER

## 2020-12-22 NOTE — PROGRESS NOTES
Refill Authorization Note   Niurka Pedraza  is requesting a refill authorization.  Brief Assessment and Rationale for Refill:  Approve     Medication Therapy Plan:  CDMR. approve     Medication Reconciliation Completed: No   Comments:       Requested Prescriptions   Pending Prescriptions Disp Refills    levothyroxine (SYNTHROID) 50 MCG tablet [Pharmacy Med Name: LEVOTHYROXINE 50 MCG TABLET] 90 tablet 2     Sig: TAKE 1 TABLET (50 MCG TOTAL) BY MOUTH BEFORE BREAKFAST.       Endocrinology:  Hypothyroid Agents Failed - 12/19/2020  1:19 AM        Failed - Manual Review: FT4 is not required if last TSH is WNL.        Failed - T4 free within 1080 days     Free T4   Date Value Ref Range Status   11/22/2017 1.16 0.71 - 1.51 ng/dL Final   12/15/2016 0.89 0.71 - 1.51 ng/dL Final              Passed - Patient is at least 18 years old        Passed - Office visit in past 12 months or future 90 days     Recent Outpatient Visits            5 days ago Hypertension, benign    [facility] Yao Hunt MD    1 month ago ILD (interstitial lung disease)    [facility] Nemesio Cazares MD    1 month ago Dilation of biliary tract    [facility] [address] Savana Anderson MD    2 months ago Recurrent UTI    [facility] [address] Anaya Oropeza MD    3 months ago Serous cystadenoma of ovary with borderline malignant features    [facility] [address] Talita Barrios MD          Future Appointments              In 1 month Anaya Oropeza MD [facility] [address], [facility]    In 3 months LAB, [facility], [facility]    In 3 months [facility]    In 4 months Savana Anderson MD [facility] [address], [facility]                Passed - TSH in normal range and within 360 days     TSH   Date Value Ref Range Status   08/11/2020 1.899  0.400 - 4.000 uIU/mL Final   12/11/2018 2.804 0.400 - 4.000 uIU/mL Final   11/22/2017 1.540 0.400 - 4.000 uIU/mL Final                  Appointments  past 12m or future 3m with PCP    Date Provider   Last Visit   10/27/2020 Savana Anderson MD   Next Visit   4/27/2021 Savana Anderson MD   ED visits in past 90 days: 0     Note composed:6:11 PM 12/21/2020

## 2021-02-03 ENCOUNTER — IMMUNIZATION (OUTPATIENT)
Dept: PHARMACY | Facility: CLINIC | Age: 76
End: 2021-02-03
Payer: MEDICARE

## 2021-02-03 DIAGNOSIS — Z23 NEED FOR VACCINATION: Primary | ICD-10-CM

## 2021-02-08 ENCOUNTER — PATIENT OUTREACH (OUTPATIENT)
Dept: ADMINISTRATIVE | Facility: OTHER | Age: 76
End: 2021-02-08

## 2021-02-10 ENCOUNTER — OFFICE VISIT (OUTPATIENT)
Dept: UROLOGY | Facility: CLINIC | Age: 76
End: 2021-02-10
Attending: UROLOGY
Payer: MEDICARE

## 2021-02-10 VITALS
HEART RATE: 73 BPM | HEIGHT: 64 IN | WEIGHT: 176 LBS | BODY MASS INDEX: 30.05 KG/M2 | SYSTOLIC BLOOD PRESSURE: 124 MMHG | DIASTOLIC BLOOD PRESSURE: 62 MMHG

## 2021-02-10 DIAGNOSIS — N39.0 RECURRENT UTI: Primary | ICD-10-CM

## 2021-02-10 DIAGNOSIS — N32.81 OAB (OVERACTIVE BLADDER): ICD-10-CM

## 2021-02-10 DIAGNOSIS — R31.29 MICROHEMATURIA: ICD-10-CM

## 2021-02-10 LAB
BILIRUB SERPL-MCNC: ABNORMAL MG/DL
BLOOD URINE, POC: 50
CLARITY, POC UA: CLEAR
COLOR, POC UA: YELLOW
GLUCOSE UR QL STRIP: NORMAL
KETONES UR QL STRIP: ABNORMAL
LEUKOCYTE ESTERASE URINE, POC: ABNORMAL
NITRITE, POC UA: ABNORMAL
PH, POC UA: 5
PROTEIN, POC: ABNORMAL
SPECIFIC GRAVITY, POC UA: 1.02
UROBILINOGEN, POC UA: NORMAL

## 2021-02-10 PROCEDURE — 81002 URINALYSIS NONAUTO W/O SCOPE: CPT | Mod: S$GLB,,, | Performed by: UROLOGY

## 2021-02-10 PROCEDURE — 87086 URINE CULTURE/COLONY COUNT: CPT

## 2021-02-10 PROCEDURE — 81002 POCT URINE DIPSTICK WITHOUT MICROSCOPE: ICD-10-PCS | Mod: S$GLB,,, | Performed by: UROLOGY

## 2021-02-10 PROCEDURE — 99214 OFFICE O/P EST MOD 30 MIN: CPT | Mod: 25,S$GLB,, | Performed by: UROLOGY

## 2021-02-10 PROCEDURE — 87088 URINE BACTERIA CULTURE: CPT

## 2021-02-10 PROCEDURE — 87186 SC STD MICRODIL/AGAR DIL: CPT

## 2021-02-10 PROCEDURE — 87077 CULTURE AEROBIC IDENTIFY: CPT

## 2021-02-10 PROCEDURE — 99214 PR OFFICE/OUTPT VISIT, EST, LEVL IV, 30-39 MIN: ICD-10-PCS | Mod: 25,S$GLB,, | Performed by: UROLOGY

## 2021-02-13 LAB — BACTERIA UR CULT: ABNORMAL

## 2021-02-15 ENCOUNTER — TELEPHONE (OUTPATIENT)
Dept: UROLOGY | Facility: CLINIC | Age: 76
End: 2021-02-15

## 2021-02-15 RX ORDER — SULFAMETHOXAZOLE AND TRIMETHOPRIM 800; 160 MG/1; MG/1
1 TABLET ORAL 2 TIMES DAILY
Qty: 14 TABLET | Refills: 0 | Status: SHIPPED | OUTPATIENT
Start: 2021-02-15 | End: 2021-02-22

## 2021-02-18 ENCOUNTER — PATIENT MESSAGE (OUTPATIENT)
Dept: PHARMACY | Facility: CLINIC | Age: 76
End: 2021-02-18

## 2021-02-24 ENCOUNTER — PES CALL (OUTPATIENT)
Dept: ADMINISTRATIVE | Facility: CLINIC | Age: 76
End: 2021-02-24

## 2021-02-26 ENCOUNTER — TELEPHONE (OUTPATIENT)
Dept: GASTROENTEROLOGY | Facility: CLINIC | Age: 76
End: 2021-02-26

## 2021-02-26 ENCOUNTER — TELEPHONE (OUTPATIENT)
Dept: GYNECOLOGIC ONCOLOGY | Facility: CLINIC | Age: 76
End: 2021-02-26

## 2021-02-27 DIAGNOSIS — I10 HYPERTENSION, BENIGN: Primary | ICD-10-CM

## 2021-03-01 RX ORDER — METOPROLOL SUCCINATE 50 MG/1
50 TABLET, EXTENDED RELEASE ORAL DAILY
Qty: 90 TABLET | Refills: 2 | Status: SHIPPED | OUTPATIENT
Start: 2021-03-01 | End: 2021-12-10

## 2021-03-03 ENCOUNTER — IMMUNIZATION (OUTPATIENT)
Dept: PHARMACY | Facility: CLINIC | Age: 76
End: 2021-03-03
Payer: MEDICARE

## 2021-03-03 DIAGNOSIS — Z23 NEED FOR VACCINATION: Primary | ICD-10-CM

## 2021-03-04 ENCOUNTER — LAB VISIT (OUTPATIENT)
Dept: LAB | Facility: HOSPITAL | Age: 76
End: 2021-03-04
Attending: INTERNAL MEDICINE
Payer: MEDICARE

## 2021-03-04 ENCOUNTER — OFFICE VISIT (OUTPATIENT)
Dept: GASTROENTEROLOGY | Facility: CLINIC | Age: 76
End: 2021-03-04
Payer: MEDICARE

## 2021-03-04 VITALS
HEART RATE: 81 BPM | SYSTOLIC BLOOD PRESSURE: 138 MMHG | BODY MASS INDEX: 29.74 KG/M2 | DIASTOLIC BLOOD PRESSURE: 84 MMHG | HEIGHT: 64 IN | WEIGHT: 174.19 LBS

## 2021-03-04 DIAGNOSIS — R74.01 ELEVATED ALT MEASUREMENT: ICD-10-CM

## 2021-03-04 DIAGNOSIS — R93.3 ABNORMAL FINDINGS ON DIAGNOSTIC IMAGING OF OTHER PARTS OF DIGESTIVE TRACT: ICD-10-CM

## 2021-03-04 DIAGNOSIS — R17 JAUNDICE: ICD-10-CM

## 2021-03-04 DIAGNOSIS — K83.8 DILATION OF BILIARY TRACT: ICD-10-CM

## 2021-03-04 DIAGNOSIS — R17 ELEVATED BILIRUBIN: Primary | ICD-10-CM

## 2021-03-04 DIAGNOSIS — Z12.11 COLON CANCER SCREENING: Primary | ICD-10-CM

## 2021-03-04 LAB
ALBUMIN SERPL BCP-MCNC: 3.8 G/DL (ref 3.5–5.2)
ALP SERPL-CCNC: 80 U/L (ref 55–135)
ALT SERPL W/O P-5'-P-CCNC: 33 U/L (ref 10–44)
ANION GAP SERPL CALC-SCNC: 10 MMOL/L (ref 8–16)
AST SERPL-CCNC: 25 U/L (ref 10–40)
BILIRUB DIRECT SERPL-MCNC: 0.6 MG/DL (ref 0.1–0.3)
BILIRUB SERPL-MCNC: 1.6 MG/DL (ref 0.1–1)
BUN SERPL-MCNC: 16 MG/DL (ref 8–23)
CALCIUM SERPL-MCNC: 9.4 MG/DL (ref 8.7–10.5)
CHLORIDE SERPL-SCNC: 105 MMOL/L (ref 95–110)
CO2 SERPL-SCNC: 26 MMOL/L (ref 23–29)
CREAT SERPL-MCNC: 0.9 MG/DL (ref 0.5–1.4)
EST. GFR  (AFRICAN AMERICAN): >60 ML/MIN/1.73 M^2
EST. GFR  (NON AFRICAN AMERICAN): >60 ML/MIN/1.73 M^2
GLUCOSE SERPL-MCNC: 94 MG/DL (ref 70–110)
IGA SERPL-MCNC: 149 MG/DL (ref 40–350)
IGA SERPL-MCNC: 149 MG/DL (ref 40–350)
IGG SERPL-MCNC: 1421 MG/DL (ref 650–1600)
IGM SERPL-MCNC: 114 MG/DL (ref 50–300)
POTASSIUM SERPL-SCNC: 4.1 MMOL/L (ref 3.5–5.1)
PROT SERPL-MCNC: 7.9 G/DL (ref 6–8.4)
SODIUM SERPL-SCNC: 141 MMOL/L (ref 136–145)

## 2021-03-04 PROCEDURE — 99999 PR PBB SHADOW E&M-EST. PATIENT-LVL IV: CPT | Mod: PBBFAC,GC,, | Performed by: STUDENT IN AN ORGANIZED HEALTH CARE EDUCATION/TRAINING PROGRAM

## 2021-03-04 PROCEDURE — 86256 FLUORESCENT ANTIBODY TITER: CPT | Performed by: STUDENT IN AN ORGANIZED HEALTH CARE EDUCATION/TRAINING PROGRAM

## 2021-03-04 PROCEDURE — 99214 OFFICE O/P EST MOD 30 MIN: CPT | Mod: PBBFAC | Performed by: STUDENT IN AN ORGANIZED HEALTH CARE EDUCATION/TRAINING PROGRAM

## 2021-03-04 PROCEDURE — 83516 IMMUNOASSAY NONANTIBODY: CPT | Performed by: INTERNAL MEDICINE

## 2021-03-04 PROCEDURE — 82784 ASSAY IGA/IGD/IGG/IGM EACH: CPT | Mod: 59 | Performed by: STUDENT IN AN ORGANIZED HEALTH CARE EDUCATION/TRAINING PROGRAM

## 2021-03-04 PROCEDURE — 86038 ANTINUCLEAR ANTIBODIES: CPT | Performed by: STUDENT IN AN ORGANIZED HEALTH CARE EDUCATION/TRAINING PROGRAM

## 2021-03-04 PROCEDURE — 86235 NUCLEAR ANTIGEN ANTIBODY: CPT | Mod: 59 | Performed by: STUDENT IN AN ORGANIZED HEALTH CARE EDUCATION/TRAINING PROGRAM

## 2021-03-04 PROCEDURE — 86706 HEP B SURFACE ANTIBODY: CPT | Performed by: INTERNAL MEDICINE

## 2021-03-04 PROCEDURE — 99204 PR OFFICE/OUTPT VISIT, NEW, LEVL IV, 45-59 MIN: ICD-10-PCS | Mod: S$PBB,GC,, | Performed by: STUDENT IN AN ORGANIZED HEALTH CARE EDUCATION/TRAINING PROGRAM

## 2021-03-04 PROCEDURE — 86039 ANTINUCLEAR ANTIBODIES (ANA): CPT | Performed by: INTERNAL MEDICINE

## 2021-03-04 PROCEDURE — 80076 HEPATIC FUNCTION PANEL: CPT | Performed by: INTERNAL MEDICINE

## 2021-03-04 PROCEDURE — 80048 BASIC METABOLIC PNL TOTAL CA: CPT | Performed by: INTERNAL MEDICINE

## 2021-03-04 PROCEDURE — 86790 VIRUS ANTIBODY NOS: CPT | Performed by: INTERNAL MEDICINE

## 2021-03-04 PROCEDURE — 99999 PR PBB SHADOW E&M-EST. PATIENT-LVL IV: ICD-10-PCS | Mod: PBBFAC,GC,, | Performed by: STUDENT IN AN ORGANIZED HEALTH CARE EDUCATION/TRAINING PROGRAM

## 2021-03-04 PROCEDURE — 99204 OFFICE O/P NEW MOD 45 MIN: CPT | Mod: S$PBB,GC,, | Performed by: STUDENT IN AN ORGANIZED HEALTH CARE EDUCATION/TRAINING PROGRAM

## 2021-03-04 PROCEDURE — 36415 COLL VENOUS BLD VENIPUNCTURE: CPT | Performed by: INTERNAL MEDICINE

## 2021-03-05 ENCOUNTER — TELEPHONE (OUTPATIENT)
Dept: GASTROENTEROLOGY | Facility: HOSPITAL | Age: 76
End: 2021-03-05

## 2021-03-05 LAB
ANA PATTERN 1: NORMAL
ANA SER QL IF: POSITIVE
ANA TITR SER IF: NORMAL {TITER}
HEPATITIS A ANTIBODY, IGG: POSITIVE

## 2021-03-08 ENCOUNTER — TELEPHONE (OUTPATIENT)
Dept: GASTROENTEROLOGY | Facility: CLINIC | Age: 76
End: 2021-03-08

## 2021-03-08 ENCOUNTER — HOSPITAL ENCOUNTER (OUTPATIENT)
Dept: RADIOLOGY | Facility: HOSPITAL | Age: 76
Discharge: HOME | End: 2021-03-08
Attending: STUDENT IN AN ORGANIZED HEALTH CARE EDUCATION/TRAINING PROGRAM
Payer: MEDICARE

## 2021-03-08 ENCOUNTER — TELEPHONE (OUTPATIENT)
Dept: ENDOSCOPY | Facility: HOSPITAL | Age: 76
End: 2021-03-08

## 2021-03-08 DIAGNOSIS — R93.3 ABNORMAL FINDINGS ON DIAGNOSTIC IMAGING OF OTHER PARTS OF DIGESTIVE TRACT: ICD-10-CM

## 2021-03-08 DIAGNOSIS — K83.8 DILATION OF BILIARY TRACT: ICD-10-CM

## 2021-03-08 DIAGNOSIS — Z12.11 SPECIAL SCREENING FOR MALIGNANT NEOPLASMS, COLON: Primary | ICD-10-CM

## 2021-03-08 DIAGNOSIS — Z01.812 PRE-PROCEDURE LAB EXAM: ICD-10-CM

## 2021-03-08 LAB
HBV SURFACE AB SER QL IA: NEGATIVE
HBV SURFACE AB SERPL IA-ACNC: <3 MIU/ML
SMOOTH MUSCLE AB TITR SER IF: NORMAL {TITER}
TTG IGA SER-ACNC: 6 UNITS

## 2021-03-08 PROCEDURE — 76376 3D RENDER W/INTRP POSTPROCES: CPT | Mod: TC

## 2021-03-08 PROCEDURE — 76376 MRI ABDOMEN WITH AND WO_INC MRCP: ICD-10-PCS | Mod: 26,GC,, | Performed by: RADIOLOGY

## 2021-03-08 PROCEDURE — 25500020 PHARM REV CODE 255: Performed by: STUDENT IN AN ORGANIZED HEALTH CARE EDUCATION/TRAINING PROGRAM

## 2021-03-08 PROCEDURE — 74183 MRI ABDOMEN WITH AND WO_INC MRCP: ICD-10-PCS | Mod: 26,GC,, | Performed by: RADIOLOGY

## 2021-03-08 PROCEDURE — 76376 3D RENDER W/INTRP POSTPROCES: CPT | Mod: 26,GC,, | Performed by: RADIOLOGY

## 2021-03-08 PROCEDURE — A9585 GADOBUTROL INJECTION: HCPCS | Performed by: STUDENT IN AN ORGANIZED HEALTH CARE EDUCATION/TRAINING PROGRAM

## 2021-03-08 PROCEDURE — 74183 MRI ABD W/O CNTR FLWD CNTR: CPT | Mod: 26,GC,, | Performed by: RADIOLOGY

## 2021-03-08 RX ORDER — GADOBUTROL 604.72 MG/ML
10 INJECTION INTRAVENOUS
Status: COMPLETED | OUTPATIENT
Start: 2021-03-08 | End: 2021-03-08

## 2021-03-08 RX ORDER — SODIUM, POTASSIUM,MAG SULFATES 17.5-3.13G
1 SOLUTION, RECONSTITUTED, ORAL ORAL DAILY
Qty: 1 KIT | Refills: 0 | Status: SHIPPED | OUTPATIENT
Start: 2021-03-08 | End: 2021-03-10

## 2021-03-08 RX ADMIN — GADOBUTROL 10 ML: 604.72 INJECTION INTRAVENOUS at 01:03

## 2021-03-09 LAB
ANTI SM ANTIBODY: 0.08 RATIO (ref 0–0.99)
ANTI SM/RNP ANTIBODY: 0.12 RATIO (ref 0–0.99)
ANTI-SM INTERPRETATION: NEGATIVE
ANTI-SM/RNP INTERPRETATION: NEGATIVE
ANTI-SSA ANTIBODY: 0.07 RATIO (ref 0–0.99)
ANTI-SSA INTERPRETATION: NEGATIVE
ANTI-SSB ANTIBODY: 0.07 RATIO (ref 0–0.99)
ANTI-SSB INTERPRETATION: NEGATIVE
DSDNA AB SER-ACNC: NORMAL [IU]/ML

## 2021-03-12 ENCOUNTER — LAB VISIT (OUTPATIENT)
Dept: LAB | Facility: HOSPITAL | Age: 76
End: 2021-03-12
Attending: INTERNAL MEDICINE
Payer: MEDICARE

## 2021-03-12 DIAGNOSIS — R17 ELEVATED BILIRUBIN: ICD-10-CM

## 2021-03-12 PROCEDURE — 36415 COLL VENOUS BLD VENIPUNCTURE: CPT | Performed by: INTERNAL MEDICINE

## 2021-03-14 ENCOUNTER — TELEPHONE (OUTPATIENT)
Dept: GASTROENTEROLOGY | Facility: CLINIC | Age: 76
End: 2021-03-14

## 2021-03-14 DIAGNOSIS — R93.5 ABNORMAL MRI OF THE ABDOMEN: ICD-10-CM

## 2021-03-14 DIAGNOSIS — K83.8 COMMON BILE DUCT DILATION: Primary | ICD-10-CM

## 2021-03-14 DIAGNOSIS — K83.8 COMMON BILE DUCT DILATATION: Primary | ICD-10-CM

## 2021-03-15 ENCOUNTER — TELEPHONE (OUTPATIENT)
Dept: ENDOSCOPY | Facility: HOSPITAL | Age: 76
End: 2021-03-15

## 2021-03-15 ENCOUNTER — TELEPHONE (OUTPATIENT)
Dept: GASTROENTEROLOGY | Facility: CLINIC | Age: 76
End: 2021-03-15

## 2021-03-18 ENCOUNTER — PATIENT MESSAGE (OUTPATIENT)
Dept: GASTROENTEROLOGY | Facility: HOSPITAL | Age: 76
End: 2021-03-18

## 2021-03-18 LAB
MOL DX INTERP BLD/T QL: NORMAL
REF LAB TEST METHOD: NORMAL
TEST PERFORMANCE INFO SPEC: NORMAL
UGT1A1 ADDITIONAL INFORMATION: NORMAL
UGT1A1 FULL GENE SEQUENCE RESULT: NORMAL
UGT1A1 INTERPRETATION: NORMAL
UGT1A1 REVIEWED BY: NORMAL
UGT1A1 TA REPEAT RESULT: NORMAL

## 2021-03-22 ENCOUNTER — TELEPHONE (OUTPATIENT)
Dept: ENDOSCOPY | Facility: HOSPITAL | Age: 76
End: 2021-03-22

## 2021-03-22 ENCOUNTER — TELEPHONE (OUTPATIENT)
Dept: SPORTS MEDICINE | Facility: CLINIC | Age: 76
End: 2021-03-22

## 2021-03-26 DIAGNOSIS — K83.8 DILATED CBD, ACQUIRED: ICD-10-CM

## 2021-03-29 ENCOUNTER — TELEPHONE (OUTPATIENT)
Dept: ENDOSCOPY | Facility: HOSPITAL | Age: 76
End: 2021-03-29

## 2021-03-29 ENCOUNTER — PATIENT MESSAGE (OUTPATIENT)
Dept: ENDOSCOPY | Facility: HOSPITAL | Age: 76
End: 2021-03-29

## 2021-03-29 DIAGNOSIS — K83.8 DILATED CBD, ACQUIRED: ICD-10-CM

## 2021-03-31 ENCOUNTER — EXTERNAL CHRONIC CARE MANAGEMENT (OUTPATIENT)
Dept: PRIMARY CARE CLINIC | Facility: CLINIC | Age: 76
End: 2021-03-31
Payer: MEDICARE

## 2021-03-31 PROCEDURE — 99490 CHRNC CARE MGMT STAFF 1ST 20: CPT | Mod: S$PBB,,, | Performed by: INTERNAL MEDICINE

## 2021-03-31 PROCEDURE — 99490 PR CHRONIC CARE MGMT, 1ST 20 MIN: ICD-10-PCS | Mod: S$PBB,,, | Performed by: INTERNAL MEDICINE

## 2021-03-31 PROCEDURE — 99490 CHRNC CARE MGMT STAFF 1ST 20: CPT | Mod: PBBFAC | Performed by: INTERNAL MEDICINE

## 2021-04-01 RX ORDER — ATORVASTATIN CALCIUM 20 MG/1
20 TABLET, FILM COATED ORAL DAILY
Qty: 90 TABLET | Refills: 1 | Status: SHIPPED | OUTPATIENT
Start: 2021-04-01 | End: 2021-12-27

## 2021-04-05 ENCOUNTER — HOSPITAL ENCOUNTER (OUTPATIENT)
Dept: PREADMISSION TESTING | Facility: OTHER | Age: 76
Discharge: HOME | End: 2021-04-05
Attending: ANESTHESIOLOGY
Payer: MEDICARE

## 2021-04-05 DIAGNOSIS — K83.8 DILATED CBD, ACQUIRED: ICD-10-CM

## 2021-04-05 PROCEDURE — U0003 INFECTIOUS AGENT DETECTION BY NUCLEIC ACID (DNA OR RNA); SEVERE ACUTE RESPIRATORY SYNDROME CORONAVIRUS 2 (SARS-COV-2) (CORONAVIRUS DISEASE [COVID-19]), AMPLIFIED PROBE TECHNIQUE, MAKING USE OF HIGH THROUGHPUT TECHNOLOGIES AS DESCRIBED BY CMS-2020-01-R: HCPCS | Performed by: FAMILY MEDICINE

## 2021-04-05 PROCEDURE — U0005 INFEC AGEN DETEC AMPLI PROBE: HCPCS | Performed by: FAMILY MEDICINE

## 2021-04-06 ENCOUNTER — OFFICE VISIT (OUTPATIENT)
Dept: GYNECOLOGIC ONCOLOGY | Facility: CLINIC | Age: 76
End: 2021-04-06
Payer: MEDICARE

## 2021-04-06 ENCOUNTER — LAB VISIT (OUTPATIENT)
Dept: LAB | Facility: OTHER | Age: 76
End: 2021-04-06
Attending: OBSTETRICS & GYNECOLOGY
Payer: MEDICARE

## 2021-04-06 VITALS
SYSTOLIC BLOOD PRESSURE: 132 MMHG | WEIGHT: 171.5 LBS | DIASTOLIC BLOOD PRESSURE: 71 MMHG | BODY MASS INDEX: 29.44 KG/M2 | HEART RATE: 72 BPM

## 2021-04-06 DIAGNOSIS — C56.9 SEROUS CYSTADENOMA OF OVARY WITH BORDERLINE MALIGNANT FEATURES: ICD-10-CM

## 2021-04-06 DIAGNOSIS — C56.1 BORDERLINE SEROUS CYSTADENOMA OF RIGHT OVARY: Primary | ICD-10-CM

## 2021-04-06 LAB
CANCER AG125 SERPL-ACNC: 10 U/ML (ref 0–30)
SARS-COV-2 RNA RESP QL NAA+PROBE: NOT DETECTED

## 2021-04-06 PROCEDURE — 99999 PR PBB SHADOW E&M-EST. PATIENT-LVL III: ICD-10-PCS | Mod: PBBFAC,,, | Performed by: OBSTETRICS & GYNECOLOGY

## 2021-04-06 PROCEDURE — 99213 OFFICE O/P EST LOW 20 MIN: CPT | Mod: PBBFAC | Performed by: OBSTETRICS & GYNECOLOGY

## 2021-04-06 PROCEDURE — 36415 COLL VENOUS BLD VENIPUNCTURE: CPT | Performed by: OBSTETRICS & GYNECOLOGY

## 2021-04-06 PROCEDURE — 99214 PR OFFICE/OUTPT VISIT, EST, LEVL IV, 30-39 MIN: ICD-10-PCS | Mod: S$PBB,,, | Performed by: OBSTETRICS & GYNECOLOGY

## 2021-04-06 PROCEDURE — 99999 PR PBB SHADOW E&M-EST. PATIENT-LVL III: CPT | Mod: PBBFAC,,, | Performed by: OBSTETRICS & GYNECOLOGY

## 2021-04-06 PROCEDURE — 99214 OFFICE O/P EST MOD 30 MIN: CPT | Mod: S$PBB,,, | Performed by: OBSTETRICS & GYNECOLOGY

## 2021-04-06 PROCEDURE — 86304 IMMUNOASSAY TUMOR CA 125: CPT | Performed by: OBSTETRICS & GYNECOLOGY

## 2021-04-08 ENCOUNTER — HOSPITAL ENCOUNTER (OUTPATIENT)
Facility: HOSPITAL | Age: 76
Discharge: HOME | End: 2021-04-08
Attending: INTERNAL MEDICINE | Admitting: INTERNAL MEDICINE
Payer: MEDICARE

## 2021-04-08 ENCOUNTER — ANESTHESIA EVENT (OUTPATIENT)
Dept: ENDOSCOPY | Facility: HOSPITAL | Age: 76
End: 2021-04-08
Payer: MEDICARE

## 2021-04-08 ENCOUNTER — ANESTHESIA (OUTPATIENT)
Dept: ENDOSCOPY | Facility: HOSPITAL | Age: 76
End: 2021-04-08
Payer: MEDICARE

## 2021-04-08 VITALS
SYSTOLIC BLOOD PRESSURE: 141 MMHG | OXYGEN SATURATION: 94 % | HEART RATE: 69 BPM | DIASTOLIC BLOOD PRESSURE: 76 MMHG | RESPIRATION RATE: 20 BRPM | TEMPERATURE: 98 F

## 2021-04-08 DIAGNOSIS — K83.8 DILATED BILE DUCT: ICD-10-CM

## 2021-04-08 PROCEDURE — D9220A PRA ANESTHESIA: Mod: CRNA,,, | Performed by: REGISTERED NURSE

## 2021-04-08 PROCEDURE — 37000009 HC ANESTHESIA EA ADD 15 MINS: Performed by: INTERNAL MEDICINE

## 2021-04-08 PROCEDURE — 27202125 HC BALLOON, EXTRACTION (ANY): Performed by: INTERNAL MEDICINE

## 2021-04-08 PROCEDURE — 43274 ERCP DUCT STENT PLACEMENT: CPT | Performed by: INTERNAL MEDICINE

## 2021-04-08 PROCEDURE — 43264 ERCP REMOVE DUCT CALCULI: CPT | Mod: 51,,, | Performed by: INTERNAL MEDICINE

## 2021-04-08 PROCEDURE — PATHNN PATH DEFICIENCY: ICD-10-PCS | Mod: ,,, | Performed by: INTERNAL MEDICINE

## 2021-04-08 PROCEDURE — 74328 X-RAY BILE DUCT ENDOSCOPY: CPT | Mod: 26,,, | Performed by: INTERNAL MEDICINE

## 2021-04-08 PROCEDURE — 27201674 HC SPHINCTERTOME: Performed by: INTERNAL MEDICINE

## 2021-04-08 PROCEDURE — D9220A PRA ANESTHESIA: Mod: ANES,,, | Performed by: ANESTHESIOLOGY

## 2021-04-08 PROCEDURE — D9220A PRA ANESTHESIA: ICD-10-PCS | Mod: ANES,,, | Performed by: ANESTHESIOLOGY

## 2021-04-08 PROCEDURE — PATHNN PATH DEFICIENCY: Mod: ,,, | Performed by: INTERNAL MEDICINE

## 2021-04-08 PROCEDURE — 88305 TISSUE EXAM BY PATHOLOGIST: CPT | Mod: 26,,, | Performed by: PATHOLOGY

## 2021-04-08 PROCEDURE — 43264 ERCP REMOVE DUCT CALCULI: CPT | Performed by: INTERNAL MEDICINE

## 2021-04-08 PROCEDURE — 43274 PR ERCP W/STENT PLCMNT BILIARY/PANCREATIC DUCT: ICD-10-PCS | Mod: ,,, | Performed by: INTERNAL MEDICINE

## 2021-04-08 PROCEDURE — 43259 PR ENDOSCOPIC ULTRASOUND EXAM: ICD-10-PCS | Mod: 51,,, | Performed by: INTERNAL MEDICINE

## 2021-04-08 PROCEDURE — 43259 EGD US EXAM DUODENUM/JEJUNUM: CPT | Performed by: INTERNAL MEDICINE

## 2021-04-08 PROCEDURE — 25000003 PHARM REV CODE 250: Performed by: REGISTERED NURSE

## 2021-04-08 PROCEDURE — 74328 X-RAY BILE DUCT ENDOSCOPY: CPT | Performed by: INTERNAL MEDICINE

## 2021-04-08 PROCEDURE — 27201012 HC FORCEPS, HOT/COLD, DISP: Performed by: INTERNAL MEDICINE

## 2021-04-08 PROCEDURE — 43274 ERCP DUCT STENT PLACEMENT: CPT | Mod: ,,, | Performed by: INTERNAL MEDICINE

## 2021-04-08 PROCEDURE — 25000003 PHARM REV CODE 250: Performed by: INTERNAL MEDICINE

## 2021-04-08 PROCEDURE — 74328 PR  X-RAY FOR BILE DUCT ENDOSCOPY: ICD-10-PCS | Mod: 26,,, | Performed by: INTERNAL MEDICINE

## 2021-04-08 PROCEDURE — D9220A PRA ANESTHESIA: ICD-10-PCS | Mod: CRNA,,, | Performed by: REGISTERED NURSE

## 2021-04-08 PROCEDURE — 88305 TISSUE EXAM BY PATHOLOGIST: CPT | Mod: 59 | Performed by: PATHOLOGY

## 2021-04-08 PROCEDURE — C2617 STENT, NON-COR, TEM W/O DEL: HCPCS | Performed by: INTERNAL MEDICINE

## 2021-04-08 PROCEDURE — 43264 PR ERCP,W/REMOVAL STONE,BIL/PANCR DUCTS: ICD-10-PCS | Mod: 51,,, | Performed by: INTERNAL MEDICINE

## 2021-04-08 PROCEDURE — C1769 GUIDE WIRE: HCPCS | Performed by: INTERNAL MEDICINE

## 2021-04-08 PROCEDURE — 43239 EGD BIOPSY SINGLE/MULTIPLE: CPT | Mod: 59,,, | Performed by: INTERNAL MEDICINE

## 2021-04-08 PROCEDURE — 63600175 PHARM REV CODE 636 W HCPCS: Performed by: ANESTHESIOLOGY

## 2021-04-08 PROCEDURE — 43239 PR EGD, FLEX, W/BIOPSY, SGL/MULTI: ICD-10-PCS | Mod: 59,,, | Performed by: INTERNAL MEDICINE

## 2021-04-08 PROCEDURE — 37000008 HC ANESTHESIA 1ST 15 MINUTES: Performed by: INTERNAL MEDICINE

## 2021-04-08 PROCEDURE — 63600175 PHARM REV CODE 636 W HCPCS: Performed by: REGISTERED NURSE

## 2021-04-08 PROCEDURE — 43239 EGD BIOPSY SINGLE/MULTIPLE: CPT | Performed by: INTERNAL MEDICINE

## 2021-04-08 PROCEDURE — 25500020 PHARM REV CODE 255: Performed by: INTERNAL MEDICINE

## 2021-04-08 PROCEDURE — 88305 TISSUE EXAM BY PATHOLOGIST: ICD-10-PCS | Mod: 26,,, | Performed by: PATHOLOGY

## 2021-04-08 PROCEDURE — 43259 EGD US EXAM DUODENUM/JEJUNUM: CPT | Mod: 51,,, | Performed by: INTERNAL MEDICINE

## 2021-04-08 RX ORDER — INDOMETHACIN 50 MG/1
SUPPOSITORY RECTAL
Status: COMPLETED | OUTPATIENT
Start: 2021-04-08 | End: 2021-04-08

## 2021-04-08 RX ORDER — SODIUM CHLORIDE 9 MG/ML
INJECTION, SOLUTION INTRAVENOUS CONTINUOUS
Status: DISCONTINUED | OUTPATIENT
Start: 2021-04-08 | End: 2021-04-08 | Stop reason: HOSPADM

## 2021-04-08 RX ORDER — SODIUM CHLORIDE 0.9 % (FLUSH) 0.9 %
3 SYRINGE (ML) INJECTION
Status: DISCONTINUED | OUTPATIENT
Start: 2021-04-08 | End: 2021-04-08 | Stop reason: HOSPADM

## 2021-04-08 RX ORDER — FENTANYL CITRATE 50 UG/ML
INJECTION, SOLUTION INTRAMUSCULAR; INTRAVENOUS
Status: DISCONTINUED | OUTPATIENT
Start: 2021-04-08 | End: 2021-04-08

## 2021-04-08 RX ORDER — LIDOCAINE HYDROCHLORIDE 20 MG/ML
INJECTION INTRAVENOUS
Status: DISCONTINUED | OUTPATIENT
Start: 2021-04-08 | End: 2021-04-08

## 2021-04-08 RX ORDER — FENTANYL CITRATE 50 UG/ML
25 INJECTION, SOLUTION INTRAMUSCULAR; INTRAVENOUS EVERY 5 MIN PRN
Status: COMPLETED | OUTPATIENT
Start: 2021-04-08 | End: 2021-04-08

## 2021-04-08 RX ORDER — PROPOFOL 10 MG/ML
VIAL (ML) INTRAVENOUS CONTINUOUS PRN
Status: DISCONTINUED | OUTPATIENT
Start: 2021-04-08 | End: 2021-04-08

## 2021-04-08 RX ORDER — PROPOFOL 10 MG/ML
VIAL (ML) INTRAVENOUS
Status: DISCONTINUED | OUTPATIENT
Start: 2021-04-08 | End: 2021-04-08

## 2021-04-08 RX ORDER — SODIUM CHLORIDE 0.9 % (FLUSH) 0.9 %
10 SYRINGE (ML) INJECTION
Status: DISCONTINUED | OUTPATIENT
Start: 2021-04-08 | End: 2021-04-08 | Stop reason: HOSPADM

## 2021-04-08 RX ORDER — DEXMEDETOMIDINE HYDROCHLORIDE 100 UG/ML
INJECTION, SOLUTION INTRAVENOUS
Status: DISCONTINUED | OUTPATIENT
Start: 2021-04-08 | End: 2021-04-08

## 2021-04-08 RX ORDER — SODIUM CHLORIDE 9 MG/ML
INJECTION, SOLUTION INTRAVENOUS CONTINUOUS PRN
Status: DISCONTINUED | OUTPATIENT
Start: 2021-04-08 | End: 2021-04-08

## 2021-04-08 RX ADMIN — FENTANYL CITRATE 25 MCG: 50 INJECTION, SOLUTION INTRAMUSCULAR; INTRAVENOUS at 11:04

## 2021-04-08 RX ADMIN — INDOMETHACIN 100 MG: 50 SUPPOSITORY RECTAL at 11:04

## 2021-04-08 RX ADMIN — GLYCOPYRROLATE 0.1 MG: 0.2 INJECTION, SOLUTION INTRAMUSCULAR; INTRAVITREAL at 11:04

## 2021-04-08 RX ADMIN — FENTANYL CITRATE 25 MCG: 50 INJECTION, SOLUTION INTRAMUSCULAR; INTRAVENOUS at 01:04

## 2021-04-08 RX ADMIN — SODIUM CHLORIDE: 9 INJECTION, SOLUTION INTRAVENOUS at 10:04

## 2021-04-08 RX ADMIN — DEXMEDETOMIDINE HYDROCHLORIDE 8 MCG: 100 INJECTION, SOLUTION, CONCENTRATE INTRAVENOUS at 12:04

## 2021-04-08 RX ADMIN — PROPOFOL 20 MG: 10 INJECTION, EMULSION INTRAVENOUS at 11:04

## 2021-04-08 RX ADMIN — IOHEXOL 10 ML: 300 INJECTION, SOLUTION INTRAVENOUS at 12:04

## 2021-04-08 RX ADMIN — FENTANYL CITRATE 25 MCG: 50 INJECTION, SOLUTION INTRAMUSCULAR; INTRAVENOUS at 12:04

## 2021-04-08 RX ADMIN — PROPOFOL 150 MCG/KG/MIN: 10 INJECTION, EMULSION INTRAVENOUS at 11:04

## 2021-04-08 RX ADMIN — PROPOFOL 30 MG: 10 INJECTION, EMULSION INTRAVENOUS at 11:04

## 2021-04-08 RX ADMIN — LIDOCAINE HYDROCHLORIDE 80 MG: 20 INJECTION, SOLUTION INTRAVENOUS at 11:04

## 2021-04-09 DIAGNOSIS — K83.8 DILATED CBD, ACQUIRED: Primary | ICD-10-CM

## 2021-04-14 ENCOUNTER — PES CALL (OUTPATIENT)
Dept: ADMINISTRATIVE | Facility: CLINIC | Age: 76
End: 2021-04-14

## 2021-04-14 LAB
FINAL PATHOLOGIC DIAGNOSIS: NORMAL
GROSS: NORMAL
Lab: NORMAL

## 2021-04-18 ENCOUNTER — TELEPHONE (OUTPATIENT)
Dept: GASTROENTEROLOGY | Facility: CLINIC | Age: 76
End: 2021-04-18

## 2021-04-18 DIAGNOSIS — R79.89 ELEVATED LFTS: Primary | ICD-10-CM

## 2021-04-19 ENCOUNTER — TELEPHONE (OUTPATIENT)
Dept: GASTROENTEROLOGY | Facility: CLINIC | Age: 76
End: 2021-04-19

## 2021-04-19 ENCOUNTER — TELEPHONE (OUTPATIENT)
Dept: ENDOSCOPY | Facility: HOSPITAL | Age: 76
End: 2021-04-19

## 2021-04-23 ENCOUNTER — TELEPHONE (OUTPATIENT)
Dept: ENDOSCOPY | Facility: HOSPITAL | Age: 76
End: 2021-04-23

## 2021-04-30 ENCOUNTER — EXTERNAL CHRONIC CARE MANAGEMENT (OUTPATIENT)
Dept: PRIMARY CARE CLINIC | Facility: CLINIC | Age: 76
End: 2021-04-30
Payer: MEDICARE

## 2021-04-30 ENCOUNTER — HOSPITAL ENCOUNTER (OUTPATIENT)
Dept: RADIOLOGY | Facility: OTHER | Age: 76
Discharge: HOME | End: 2021-04-30
Attending: INTERNAL MEDICINE
Payer: MEDICARE

## 2021-04-30 DIAGNOSIS — K83.8 DILATED CBD, ACQUIRED: ICD-10-CM

## 2021-04-30 PROCEDURE — 74019 RADEX ABDOMEN 2 VIEWS: CPT | Mod: TC,FY

## 2021-04-30 PROCEDURE — 99490 CHRNC CARE MGMT STAFF 1ST 20: CPT | Mod: S$PBB,,, | Performed by: INTERNAL MEDICINE

## 2021-04-30 PROCEDURE — 99490 PR CHRONIC CARE MGMT, 1ST 20 MIN: ICD-10-PCS | Mod: S$PBB,,, | Performed by: INTERNAL MEDICINE

## 2021-04-30 PROCEDURE — 99490 CHRNC CARE MGMT STAFF 1ST 20: CPT | Mod: PBBFAC | Performed by: INTERNAL MEDICINE

## 2021-04-30 PROCEDURE — 74019 RADEX ABDOMEN 2 VIEWS: CPT | Mod: 26,,, | Performed by: RADIOLOGY

## 2021-04-30 PROCEDURE — 74019 XR ABDOMEN FLAT AND ERECT: ICD-10-PCS | Mod: 26,,, | Performed by: RADIOLOGY

## 2021-05-10 ENCOUNTER — TELEPHONE (OUTPATIENT)
Dept: ENDOSCOPY | Facility: HOSPITAL | Age: 76
End: 2021-05-10

## 2021-05-17 ENCOUNTER — LAB VISIT (OUTPATIENT)
Dept: LAB | Facility: HOSPITAL | Age: 76
End: 2021-05-17
Attending: INTERNAL MEDICINE
Payer: MEDICARE

## 2021-05-17 DIAGNOSIS — R79.89 ELEVATED LFTS: ICD-10-CM

## 2021-05-17 LAB
ALBUMIN SERPL BCP-MCNC: 3.5 G/DL (ref 3.5–5.2)
ALP SERPL-CCNC: 86 U/L (ref 55–135)
ALT SERPL W/O P-5'-P-CCNC: 21 U/L (ref 10–44)
AST SERPL-CCNC: 22 U/L (ref 10–40)
BILIRUB DIRECT SERPL-MCNC: 0.5 MG/DL (ref 0.1–0.3)
BILIRUB SERPL-MCNC: 1.1 MG/DL (ref 0.1–1)
PROT SERPL-MCNC: 7.6 G/DL (ref 6–8.4)

## 2021-05-17 PROCEDURE — 36415 COLL VENOUS BLD VENIPUNCTURE: CPT | Performed by: INTERNAL MEDICINE

## 2021-05-17 PROCEDURE — 80076 HEPATIC FUNCTION PANEL: CPT | Performed by: INTERNAL MEDICINE

## 2021-05-21 ENCOUNTER — OFFICE VISIT (OUTPATIENT)
Dept: INTERNAL MEDICINE | Facility: CLINIC | Age: 76
End: 2021-05-21
Attending: INTERNAL MEDICINE
Payer: MEDICARE

## 2021-05-21 ENCOUNTER — TELEPHONE (OUTPATIENT)
Dept: INTERNAL MEDICINE | Facility: CLINIC | Age: 76
End: 2021-05-21

## 2021-05-21 VITALS
HEIGHT: 64 IN | HEART RATE: 87 BPM | OXYGEN SATURATION: 94 % | WEIGHT: 169.56 LBS | DIASTOLIC BLOOD PRESSURE: 76 MMHG | BODY MASS INDEX: 28.95 KG/M2 | SYSTOLIC BLOOD PRESSURE: 108 MMHG

## 2021-05-21 DIAGNOSIS — H61.20 IMPACTED CERUMEN, UNSPECIFIED LATERALITY: ICD-10-CM

## 2021-05-21 DIAGNOSIS — I10 HYPERTENSION, BENIGN: ICD-10-CM

## 2021-05-21 DIAGNOSIS — C56.1 BORDERLINE SEROUS CYSTADENOMA OF RIGHT OVARY: ICD-10-CM

## 2021-05-21 DIAGNOSIS — H61.23 CERUMEN DEBRIS ON TYMPANIC MEMBRANE OF BOTH EARS: ICD-10-CM

## 2021-05-21 DIAGNOSIS — Z98.890 S/P ROBOT-ASSISTED SURGICAL PROCEDURE: ICD-10-CM

## 2021-05-21 DIAGNOSIS — H91.90 HEARING LOSS, UNSPECIFIED HEARING LOSS TYPE, UNSPECIFIED LATERALITY: Primary | ICD-10-CM

## 2021-05-21 DIAGNOSIS — J84.9 INTERSTITIAL LUNG DISEASE: ICD-10-CM

## 2021-05-21 DIAGNOSIS — E03.9 HYPOTHYROIDISM, UNSPECIFIED TYPE: ICD-10-CM

## 2021-05-21 DIAGNOSIS — N39.0 RECURRENT UTI: ICD-10-CM

## 2021-05-21 DIAGNOSIS — E78.5 HYPERLIPIDEMIA, UNSPECIFIED HYPERLIPIDEMIA TYPE: ICD-10-CM

## 2021-05-21 PROCEDURE — 99999 PR PBB SHADOW E&M-EST. PATIENT-LVL IV: CPT | Mod: PBBFAC,,, | Performed by: INTERNAL MEDICINE

## 2021-05-21 PROCEDURE — 99999 PR PBB SHADOW E&M-EST. PATIENT-LVL IV: ICD-10-PCS | Mod: PBBFAC,,, | Performed by: INTERNAL MEDICINE

## 2021-05-21 PROCEDURE — 99214 OFFICE O/P EST MOD 30 MIN: CPT | Mod: PBBFAC | Performed by: INTERNAL MEDICINE

## 2021-05-21 PROCEDURE — 99214 PR OFFICE/OUTPT VISIT, EST, LEVL IV, 30-39 MIN: ICD-10-PCS | Mod: S$PBB,,, | Performed by: INTERNAL MEDICINE

## 2021-05-21 PROCEDURE — 99214 OFFICE O/P EST MOD 30 MIN: CPT | Mod: S$PBB,,, | Performed by: INTERNAL MEDICINE

## 2021-05-24 DIAGNOSIS — H91.90 HEARING LOSS, UNSPECIFIED HEARING LOSS TYPE, UNSPECIFIED LATERALITY: Primary | ICD-10-CM

## 2021-05-24 DIAGNOSIS — H93.11 TINNITUS OF RIGHT EAR: ICD-10-CM

## 2021-05-27 ENCOUNTER — TELEPHONE (OUTPATIENT)
Dept: INTERNAL MEDICINE | Facility: CLINIC | Age: 76
End: 2021-05-27

## 2021-05-31 ENCOUNTER — EXTERNAL CHRONIC CARE MANAGEMENT (OUTPATIENT)
Dept: PRIMARY CARE CLINIC | Facility: CLINIC | Age: 76
End: 2021-05-31
Payer: MEDICARE

## 2021-05-31 PROCEDURE — 99490 CHRNC CARE MGMT STAFF 1ST 20: CPT | Mod: S$PBB,,, | Performed by: INTERNAL MEDICINE

## 2021-05-31 PROCEDURE — 99490 PR CHRONIC CARE MGMT, 1ST 20 MIN: ICD-10-PCS | Mod: S$PBB,,, | Performed by: INTERNAL MEDICINE

## 2021-05-31 PROCEDURE — 99490 CHRNC CARE MGMT STAFF 1ST 20: CPT | Mod: PBBFAC | Performed by: INTERNAL MEDICINE

## 2021-06-14 RX ORDER — OXYBUTYNIN CHLORIDE 10 MG/1
10 TABLET, EXTENDED RELEASE ORAL DAILY
Qty: 30 TABLET | Refills: 11 | Status: SHIPPED | OUTPATIENT
Start: 2021-06-14 | End: 2021-09-23 | Stop reason: SDUPTHER

## 2021-06-30 ENCOUNTER — EXTERNAL CHRONIC CARE MANAGEMENT (OUTPATIENT)
Dept: PRIMARY CARE CLINIC | Facility: CLINIC | Age: 76
End: 2021-06-30
Payer: MEDICARE

## 2021-06-30 PROCEDURE — 99490 CHRNC CARE MGMT STAFF 1ST 20: CPT | Mod: S$PBB,,, | Performed by: INTERNAL MEDICINE

## 2021-06-30 PROCEDURE — 99490 PR CHRONIC CARE MGMT, 1ST 20 MIN: ICD-10-PCS | Mod: S$PBB,,, | Performed by: INTERNAL MEDICINE

## 2021-06-30 PROCEDURE — 99490 CHRNC CARE MGMT STAFF 1ST 20: CPT | Mod: PBBFAC | Performed by: INTERNAL MEDICINE

## 2021-07-07 ENCOUNTER — TELEPHONE (OUTPATIENT)
Dept: UROLOGY | Facility: CLINIC | Age: 76
End: 2021-07-07

## 2021-07-09 ENCOUNTER — LAB VISIT (OUTPATIENT)
Dept: LAB | Facility: OTHER | Age: 76
End: 2021-07-09
Attending: UROLOGY
Payer: MEDICARE

## 2021-07-09 ENCOUNTER — PATIENT MESSAGE (OUTPATIENT)
Dept: UROLOGY | Facility: CLINIC | Age: 76
End: 2021-07-09

## 2021-07-09 DIAGNOSIS — R35.0 FREQUENCY OF URINATION: Primary | ICD-10-CM

## 2021-07-09 DIAGNOSIS — R35.0 FREQUENCY OF URINATION: ICD-10-CM

## 2021-07-09 DIAGNOSIS — R39.89 SUSPECTED UTI: Primary | ICD-10-CM

## 2021-07-09 PROCEDURE — 87186 SC STD MICRODIL/AGAR DIL: CPT | Performed by: UROLOGY

## 2021-07-09 PROCEDURE — 87086 URINE CULTURE/COLONY COUNT: CPT | Performed by: UROLOGY

## 2021-07-09 PROCEDURE — 87077 CULTURE AEROBIC IDENTIFY: CPT | Performed by: UROLOGY

## 2021-07-09 PROCEDURE — 87088 URINE BACTERIA CULTURE: CPT | Performed by: UROLOGY

## 2021-07-12 ENCOUNTER — TELEPHONE (OUTPATIENT)
Dept: UROLOGY | Facility: CLINIC | Age: 76
End: 2021-07-12

## 2021-07-12 LAB — BACTERIA UR CULT: ABNORMAL

## 2021-07-12 RX ORDER — AMOXICILLIN AND CLAVULANATE POTASSIUM 875; 125 MG/1; MG/1
1 TABLET, FILM COATED ORAL 2 TIMES DAILY
Qty: 14 TABLET | Refills: 0 | Status: SHIPPED | OUTPATIENT
Start: 2021-07-12 | End: 2021-07-19

## 2021-07-20 ENCOUNTER — PATIENT OUTREACH (OUTPATIENT)
Dept: ADMINISTRATIVE | Facility: OTHER | Age: 76
End: 2021-07-20

## 2021-07-21 ENCOUNTER — CLINICAL SUPPORT (OUTPATIENT)
Dept: OTOLARYNGOLOGY | Facility: CLINIC | Age: 76
End: 2021-07-21
Payer: MEDICARE

## 2021-07-21 ENCOUNTER — OFFICE VISIT (OUTPATIENT)
Dept: OTOLARYNGOLOGY | Facility: CLINIC | Age: 76
End: 2021-07-21
Payer: MEDICARE

## 2021-07-21 VITALS
DIASTOLIC BLOOD PRESSURE: 73 MMHG | HEIGHT: 64 IN | WEIGHT: 167.81 LBS | TEMPERATURE: 98 F | SYSTOLIC BLOOD PRESSURE: 119 MMHG | BODY MASS INDEX: 28.65 KG/M2 | HEART RATE: 85 BPM

## 2021-07-21 DIAGNOSIS — H90.3 ASYMMETRICAL SENSORINEURAL HEARING LOSS: ICD-10-CM

## 2021-07-21 DIAGNOSIS — H90.3 ASYMMETRICAL SENSORINEURAL HEARING LOSS: Primary | ICD-10-CM

## 2021-07-21 DIAGNOSIS — H93.11 TINNITUS OF RIGHT EAR: ICD-10-CM

## 2021-07-21 DIAGNOSIS — H93.299 REDUCED SPEECH DISCRIMINATION: ICD-10-CM

## 2021-07-21 DIAGNOSIS — H91.91 PROFOUND HEARING LOSS OF RIGHT EAR: ICD-10-CM

## 2021-07-21 DIAGNOSIS — H61.23 BILATERAL IMPACTED CERUMEN: ICD-10-CM

## 2021-07-21 DIAGNOSIS — H90.A22 SENSORINEURAL HEARING LOSS (SNHL) OF LEFT EAR WITH RESTRICTED HEARING OF RIGHT EAR: ICD-10-CM

## 2021-07-21 DIAGNOSIS — R29.2 ABNORMAL ACOUSTIC REFLEX: ICD-10-CM

## 2021-07-21 PROCEDURE — 92550 PR TYMPANOMETRY AND REFLEX THRESHOLD MEASUREMENTS: ICD-10-PCS | Mod: S$GLB,,, | Performed by: AUDIOLOGIST-HEARING AID FITTER

## 2021-07-21 PROCEDURE — 92557 PR COMPREHENSIVE HEARING TEST: ICD-10-PCS | Mod: S$GLB,,, | Performed by: AUDIOLOGIST-HEARING AID FITTER

## 2021-07-21 PROCEDURE — 92557 COMPREHENSIVE HEARING TEST: CPT | Mod: S$GLB,,, | Performed by: AUDIOLOGIST-HEARING AID FITTER

## 2021-07-21 PROCEDURE — 69210 EAR CERUMEN REMOVAL: ICD-10-PCS | Mod: S$GLB,,, | Performed by: OTOLARYNGOLOGY

## 2021-07-21 PROCEDURE — 99204 PR OFFICE/OUTPT VISIT, NEW, LEVL IV, 45-59 MIN: ICD-10-PCS | Mod: 25,S$GLB,, | Performed by: OTOLARYNGOLOGY

## 2021-07-21 PROCEDURE — 69210 REMOVE IMPACTED EAR WAX UNI: CPT | Mod: S$GLB,,, | Performed by: OTOLARYNGOLOGY

## 2021-07-21 PROCEDURE — 99204 OFFICE O/P NEW MOD 45 MIN: CPT | Mod: 25,S$GLB,, | Performed by: OTOLARYNGOLOGY

## 2021-07-21 PROCEDURE — 92550 TYMPANOMETRY & REFLEX THRESH: CPT | Mod: S$GLB,,, | Performed by: AUDIOLOGIST-HEARING AID FITTER

## 2021-07-22 ENCOUNTER — PES CALL (OUTPATIENT)
Dept: ADMINISTRATIVE | Facility: CLINIC | Age: 76
End: 2021-07-22

## 2021-07-30 DIAGNOSIS — E03.9 HYPOTHYROIDISM, UNSPECIFIED TYPE: ICD-10-CM

## 2021-07-30 RX ORDER — LEVOTHYROXINE SODIUM 50 UG/1
50 TABLET ORAL
Qty: 90 TABLET | Refills: 0 | Status: SHIPPED | OUTPATIENT
Start: 2021-07-30 | End: 2022-02-28 | Stop reason: SDUPTHER

## 2021-07-31 ENCOUNTER — EXTERNAL CHRONIC CARE MANAGEMENT (OUTPATIENT)
Dept: PRIMARY CARE CLINIC | Facility: CLINIC | Age: 76
End: 2021-07-31
Payer: MEDICARE

## 2021-07-31 PROCEDURE — 99490 CHRNC CARE MGMT STAFF 1ST 20: CPT | Mod: S$PBB,,, | Performed by: INTERNAL MEDICINE

## 2021-07-31 PROCEDURE — 99490 PR CHRONIC CARE MGMT, 1ST 20 MIN: ICD-10-PCS | Mod: S$PBB,,, | Performed by: INTERNAL MEDICINE

## 2021-07-31 PROCEDURE — 99490 CHRNC CARE MGMT STAFF 1ST 20: CPT | Mod: PBBFAC | Performed by: INTERNAL MEDICINE

## 2021-08-16 ENCOUNTER — PATIENT OUTREACH (OUTPATIENT)
Dept: ADMINISTRATIVE | Facility: HOSPITAL | Age: 76
End: 2021-08-16

## 2021-08-16 ENCOUNTER — PATIENT MESSAGE (OUTPATIENT)
Dept: INTERNAL MEDICINE | Facility: CLINIC | Age: 76
End: 2021-08-16

## 2021-08-16 DIAGNOSIS — Z12.31 ENCOUNTER FOR SCREENING MAMMOGRAM FOR BREAST CANCER: Primary | ICD-10-CM

## 2021-08-16 RX ORDER — IRBESARTAN 300 MG/1
300 TABLET ORAL NIGHTLY
Qty: 90 TABLET | Refills: 3 | Status: SHIPPED | OUTPATIENT
Start: 2021-08-16 | End: 2022-02-13 | Stop reason: SDUPTHER

## 2021-08-17 ENCOUNTER — LAB VISIT (OUTPATIENT)
Dept: PRIMARY CARE CLINIC | Facility: OTHER | Age: 76
End: 2021-08-17
Attending: INTERNAL MEDICINE
Payer: MEDICARE

## 2021-08-17 DIAGNOSIS — Z20.822 ENCOUNTER FOR LABORATORY TESTING FOR COVID-19 VIRUS: ICD-10-CM

## 2021-08-17 DIAGNOSIS — R05.9 COUGH: ICD-10-CM

## 2021-08-17 PROCEDURE — U0003 INFECTIOUS AGENT DETECTION BY NUCLEIC ACID (DNA OR RNA); SEVERE ACUTE RESPIRATORY SYNDROME CORONAVIRUS 2 (SARS-COV-2) (CORONAVIRUS DISEASE [COVID-19]), AMPLIFIED PROBE TECHNIQUE, MAKING USE OF HIGH THROUGHPUT TECHNOLOGIES AS DESCRIBED BY CMS-2020-01-R: HCPCS | Performed by: INTERNAL MEDICINE

## 2021-08-19 LAB
SARS-COV-2 RNA RESP QL NAA+PROBE: NOT DETECTED
SARS-COV-2- CYCLE NUMBER: -1

## 2021-08-24 ENCOUNTER — OFFICE VISIT (OUTPATIENT)
Dept: INTERNAL MEDICINE | Facility: CLINIC | Age: 76
End: 2021-08-24
Attending: INTERNAL MEDICINE
Payer: MEDICARE

## 2021-08-24 ENCOUNTER — HOSPITAL ENCOUNTER (OUTPATIENT)
Dept: CARDIOLOGY | Facility: OTHER | Age: 76
Discharge: HOME | End: 2021-08-24
Attending: INTERNAL MEDICINE
Payer: MEDICARE

## 2021-08-24 ENCOUNTER — HOSPITAL ENCOUNTER (OUTPATIENT)
Dept: RADIOLOGY | Facility: OTHER | Age: 76
Discharge: HOME | End: 2021-08-24
Attending: INTERNAL MEDICINE
Payer: MEDICARE

## 2021-08-24 VITALS
HEIGHT: 64 IN | DIASTOLIC BLOOD PRESSURE: 78 MMHG | OXYGEN SATURATION: 96 % | WEIGHT: 166 LBS | HEART RATE: 84 BPM | BODY MASS INDEX: 28.34 KG/M2 | SYSTOLIC BLOOD PRESSURE: 125 MMHG

## 2021-08-24 DIAGNOSIS — R06.09 DOE (DYSPNEA ON EXERTION): ICD-10-CM

## 2021-08-24 DIAGNOSIS — J84.9 INTERSTITIAL LUNG DISEASE: ICD-10-CM

## 2021-08-24 DIAGNOSIS — R06.02 SHORTNESS OF BREATH: ICD-10-CM

## 2021-08-24 DIAGNOSIS — I10 HYPERTENSION, BENIGN: ICD-10-CM

## 2021-08-24 DIAGNOSIS — H91.91 HEARING LOSS OF RIGHT EAR, UNSPECIFIED HEARING LOSS TYPE: ICD-10-CM

## 2021-08-24 DIAGNOSIS — E78.5 HYPERLIPIDEMIA, UNSPECIFIED HYPERLIPIDEMIA TYPE: ICD-10-CM

## 2021-08-24 DIAGNOSIS — R06.09 DOE (DYSPNEA ON EXERTION): Primary | ICD-10-CM

## 2021-08-24 DIAGNOSIS — E03.9 HYPOTHYROIDISM, UNSPECIFIED TYPE: ICD-10-CM

## 2021-08-24 PROCEDURE — 93010 EKG 12-LEAD: ICD-10-PCS | Mod: ,,, | Performed by: INTERNAL MEDICINE

## 2021-08-24 PROCEDURE — 99214 PR OFFICE/OUTPT VISIT, EST, LEVL IV, 30-39 MIN: ICD-10-PCS | Mod: S$PBB,,, | Performed by: INTERNAL MEDICINE

## 2021-08-24 PROCEDURE — 99213 OFFICE O/P EST LOW 20 MIN: CPT | Mod: PBBFAC | Performed by: INTERNAL MEDICINE

## 2021-08-24 PROCEDURE — 71046 XR CHEST PA AND LATERAL: ICD-10-PCS | Mod: 26,,, | Performed by: RADIOLOGY

## 2021-08-24 PROCEDURE — 99214 OFFICE O/P EST MOD 30 MIN: CPT | Mod: S$PBB,,, | Performed by: INTERNAL MEDICINE

## 2021-08-24 PROCEDURE — 71046 X-RAY EXAM CHEST 2 VIEWS: CPT | Mod: TC,FY

## 2021-08-24 PROCEDURE — 99999 PR PBB SHADOW E&M-EST. PATIENT-LVL III: CPT | Mod: PBBFAC,,, | Performed by: INTERNAL MEDICINE

## 2021-08-24 PROCEDURE — 93010 ELECTROCARDIOGRAM REPORT: CPT | Mod: ,,, | Performed by: INTERNAL MEDICINE

## 2021-08-24 PROCEDURE — 71046 X-RAY EXAM CHEST 2 VIEWS: CPT | Mod: 26,,, | Performed by: RADIOLOGY

## 2021-08-24 PROCEDURE — 93005 ELECTROCARDIOGRAM TRACING: CPT

## 2021-08-24 PROCEDURE — 99999 PR PBB SHADOW E&M-EST. PATIENT-LVL III: ICD-10-PCS | Mod: PBBFAC,,, | Performed by: INTERNAL MEDICINE

## 2021-08-26 ENCOUNTER — TELEPHONE (OUTPATIENT)
Dept: INTERNAL MEDICINE | Facility: CLINIC | Age: 76
End: 2021-08-26

## 2021-08-26 RX ORDER — AZITHROMYCIN 250 MG/1
TABLET, FILM COATED ORAL
Qty: 6 TABLET | Refills: 0 | Status: SHIPPED | OUTPATIENT
Start: 2021-08-26 | End: 2021-08-31

## 2021-08-27 ENCOUNTER — PES CALL (OUTPATIENT)
Dept: ADMINISTRATIVE | Facility: CLINIC | Age: 76
End: 2021-08-27

## 2021-08-31 ENCOUNTER — EXTERNAL CHRONIC CARE MANAGEMENT (OUTPATIENT)
Dept: PRIMARY CARE CLINIC | Facility: CLINIC | Age: 76
End: 2021-08-31
Payer: MEDICARE

## 2021-08-31 PROCEDURE — 99490 CHRNC CARE MGMT STAFF 1ST 20: CPT | Mod: PBBFAC | Performed by: INTERNAL MEDICINE

## 2021-08-31 PROCEDURE — 99439 CHRNC CARE MGMT STAF EA ADDL: CPT | Mod: PBBFAC | Performed by: INTERNAL MEDICINE

## 2021-08-31 PROCEDURE — 99490 PR CHRONIC CARE MGMT, 1ST 20 MIN: ICD-10-PCS | Mod: S$PBB,,, | Performed by: INTERNAL MEDICINE

## 2021-08-31 PROCEDURE — 99439 PR CHRONIC CARE MGMT, EA ADDTL 20 MIN: ICD-10-PCS | Mod: S$PBB,,, | Performed by: INTERNAL MEDICINE

## 2021-08-31 PROCEDURE — 99490 CHRNC CARE MGMT STAFF 1ST 20: CPT | Mod: S$PBB,,, | Performed by: INTERNAL MEDICINE

## 2021-08-31 PROCEDURE — 99439 CHRNC CARE MGMT STAF EA ADDL: CPT | Mod: S$PBB,,, | Performed by: INTERNAL MEDICINE

## 2021-09-09 ENCOUNTER — PATIENT MESSAGE (OUTPATIENT)
Dept: INTERNAL MEDICINE | Facility: CLINIC | Age: 76
End: 2021-09-09

## 2021-09-11 ENCOUNTER — PATIENT MESSAGE (OUTPATIENT)
Dept: INTERNAL MEDICINE | Facility: CLINIC | Age: 76
End: 2021-09-11

## 2021-09-13 ENCOUNTER — HOSPITAL ENCOUNTER (OUTPATIENT)
Dept: RADIOLOGY | Facility: OTHER | Age: 76
Discharge: HOME | End: 2021-09-13
Attending: INTERNAL MEDICINE
Payer: MEDICARE

## 2021-09-13 DIAGNOSIS — Z12.31 ENCOUNTER FOR SCREENING MAMMOGRAM FOR BREAST CANCER: ICD-10-CM

## 2021-09-13 PROCEDURE — 77067 SCR MAMMO BI INCL CAD: CPT | Mod: TC

## 2021-09-13 PROCEDURE — 77067 MAMMO DIGITAL SCREENING LEFT WITH TOMO: ICD-10-PCS | Mod: 26,,, | Performed by: RADIOLOGY

## 2021-09-13 PROCEDURE — 77067 SCR MAMMO BI INCL CAD: CPT | Mod: 26,,, | Performed by: RADIOLOGY

## 2021-09-13 PROCEDURE — 77063 BREAST TOMOSYNTHESIS BI: CPT | Mod: 26,,, | Performed by: RADIOLOGY

## 2021-09-13 PROCEDURE — 77063 MAMMO DIGITAL SCREENING LEFT WITH TOMO: ICD-10-PCS | Mod: 26,,, | Performed by: RADIOLOGY

## 2021-09-17 ENCOUNTER — OFFICE VISIT (OUTPATIENT)
Dept: INTERNAL MEDICINE | Facility: CLINIC | Age: 76
End: 2021-09-17
Attending: INTERNAL MEDICINE
Payer: MEDICARE

## 2021-09-17 VITALS
DIASTOLIC BLOOD PRESSURE: 80 MMHG | HEIGHT: 64 IN | HEART RATE: 68 BPM | OXYGEN SATURATION: 96 % | WEIGHT: 164.44 LBS | SYSTOLIC BLOOD PRESSURE: 134 MMHG | BODY MASS INDEX: 28.07 KG/M2

## 2021-09-17 DIAGNOSIS — R05.3 CHRONIC COUGH: ICD-10-CM

## 2021-09-17 DIAGNOSIS — J84.9 INTERSTITIAL PULMONARY DISEASE, UNSPECIFIED: ICD-10-CM

## 2021-09-17 DIAGNOSIS — B38.2 PULMONARY COCCIDIOIDOMYCOSIS, UNSPECIFIED: ICD-10-CM

## 2021-09-17 DIAGNOSIS — I10 HYPERTENSION, BENIGN: ICD-10-CM

## 2021-09-17 DIAGNOSIS — J84.9 INTERSTITIAL LUNG DISEASE: Primary | ICD-10-CM

## 2021-09-17 PROCEDURE — 99214 OFFICE O/P EST MOD 30 MIN: CPT | Mod: PBBFAC | Performed by: INTERNAL MEDICINE

## 2021-09-17 PROCEDURE — 99999 PR PBB SHADOW E&M-EST. PATIENT-LVL IV: ICD-10-PCS | Mod: PBBFAC,,, | Performed by: INTERNAL MEDICINE

## 2021-09-17 PROCEDURE — 99214 PR OFFICE/OUTPT VISIT, EST, LEVL IV, 30-39 MIN: ICD-10-PCS | Mod: S$PBB,,, | Performed by: INTERNAL MEDICINE

## 2021-09-17 PROCEDURE — 99999 PR PBB SHADOW E&M-EST. PATIENT-LVL IV: CPT | Mod: PBBFAC,,, | Performed by: INTERNAL MEDICINE

## 2021-09-17 PROCEDURE — 99214 OFFICE O/P EST MOD 30 MIN: CPT | Mod: S$PBB,,, | Performed by: INTERNAL MEDICINE

## 2021-09-17 RX ORDER — ALBUTEROL SULFATE 90 UG/1
1-2 AEROSOL, METERED RESPIRATORY (INHALATION) EVERY 6 HOURS PRN
Qty: 18 G | Refills: 1 | Status: SHIPPED | OUTPATIENT
Start: 2021-09-17 | End: 2023-03-19 | Stop reason: SDUPTHER

## 2021-09-21 ENCOUNTER — TELEPHONE (OUTPATIENT)
Dept: PULMONOLOGY | Facility: CLINIC | Age: 76
End: 2021-09-21

## 2021-09-28 ENCOUNTER — HOSPITAL ENCOUNTER (OUTPATIENT)
Dept: CARDIOLOGY | Facility: OTHER | Age: 76
Discharge: HOME | End: 2021-09-28
Attending: INTERNAL MEDICINE
Payer: MEDICARE

## 2021-09-28 ENCOUNTER — HOSPITAL ENCOUNTER (OUTPATIENT)
Dept: RADIOLOGY | Facility: OTHER | Age: 76
Discharge: HOME | End: 2021-09-28
Attending: INTERNAL MEDICINE
Payer: MEDICARE

## 2021-09-28 VITALS
BODY MASS INDEX: 28 KG/M2 | HEIGHT: 64 IN | SYSTOLIC BLOOD PRESSURE: 118 MMHG | WEIGHT: 164 LBS | HEART RATE: 80 BPM | DIASTOLIC BLOOD PRESSURE: 86 MMHG

## 2021-09-28 DIAGNOSIS — R06.02 SHORTNESS OF BREATH: ICD-10-CM

## 2021-09-28 DIAGNOSIS — J84.9 INTERSTITIAL PULMONARY DISEASE, UNSPECIFIED: ICD-10-CM

## 2021-09-28 DIAGNOSIS — R06.09 DOE (DYSPNEA ON EXERTION): ICD-10-CM

## 2021-09-28 LAB
BSA FOR ECHO PROCEDURE: 1.83 M2
CV ECHO LV RWT: 0.53 CM
CV STRESS BASE HR: 80 BPM
DIASTOLIC BLOOD PRESSURE: 86 MMHG
ECHO LV POSTERIOR WALL: 1 CM (ref 0.6–1.1)
FRACTIONAL SHORTENING: 50 % (ref 28–44)
INTERVENTRICULAR SEPTUM: 0.9 CM (ref 0.6–1.1)
LEFT INTERNAL DIMENSION IN SYSTOLE: 1.9 CM (ref 2.1–4)
LEFT VENTRICLE MASS INDEX: 61 G/M2
LEFT VENTRICULAR INTERNAL DIMENSION IN DIASTOLE: 3.8 CM (ref 3.5–6)
LEFT VENTRICULAR MASS: 109.03 G
OHS CV CPX 85 PERCENT MAX PREDICTED HEART RATE MALE: 118
OHS CV CPX ESTIMATED METS: 5
OHS CV CPX MAX PREDICTED HEART RATE: 138
OHS CV CPX PATIENT IS FEMALE: 1
OHS CV CPX PATIENT IS MALE: 0
OHS CV CPX PEAK DIASTOLIC BLOOD PRESSURE: 71 MMHG
OHS CV CPX PEAK HEAR RATE: 129 BPM
OHS CV CPX PEAK RATE PRESSURE PRODUCT: NORMAL
OHS CV CPX PEAK SYSTOLIC BLOOD PRESSURE: 137 MMHG
OHS CV CPX PERCENT MAX PREDICTED HEART RATE ACHIEVED: 93
OHS CV CPX RATE PRESSURE PRODUCT PRESENTING: 9440
STRESS ECHO POST EXERCISE DUR MIN: 2 MINUTES
STRESS ECHO POST EXERCISE DUR SEC: 0 SECONDS
SYSTOLIC BLOOD PRESSURE: 118 MMHG

## 2021-09-28 PROCEDURE — 71250 CT THORAX DX C-: CPT | Mod: TC

## 2021-09-28 PROCEDURE — 71250 CT THORAX DX C-: CPT | Mod: 26,,, | Performed by: RADIOLOGY

## 2021-09-28 PROCEDURE — 93351 STRESS TTE COMPLETE: CPT | Mod: 26,,, | Performed by: INTERNAL MEDICINE

## 2021-09-28 PROCEDURE — 71250 CT CHEST WITHOUT CONTRAST: ICD-10-PCS | Mod: 26,,, | Performed by: RADIOLOGY

## 2021-09-28 PROCEDURE — 93351 STRESS ECHO (CUPID ONLY): ICD-10-PCS | Mod: 26,,, | Performed by: INTERNAL MEDICINE

## 2021-09-28 PROCEDURE — 93351 STRESS TTE COMPLETE: CPT

## 2021-09-30 ENCOUNTER — EXTERNAL CHRONIC CARE MANAGEMENT (OUTPATIENT)
Dept: PRIMARY CARE CLINIC | Facility: CLINIC | Age: 76
End: 2021-09-30
Payer: MEDICARE

## 2021-09-30 ENCOUNTER — TELEPHONE (OUTPATIENT)
Dept: INTERNAL MEDICINE | Facility: CLINIC | Age: 76
End: 2021-09-30

## 2021-09-30 PROCEDURE — 99490 CHRNC CARE MGMT STAFF 1ST 20: CPT | Mod: PBBFAC | Performed by: INTERNAL MEDICINE

## 2021-09-30 PROCEDURE — 99490 CHRNC CARE MGMT STAFF 1ST 20: CPT | Mod: S$PBB,,, | Performed by: INTERNAL MEDICINE

## 2021-09-30 PROCEDURE — 99490 PR CHRONIC CARE MGMT, 1ST 20 MIN: ICD-10-PCS | Mod: S$PBB,,, | Performed by: INTERNAL MEDICINE

## 2021-10-05 ENCOUNTER — LAB VISIT (OUTPATIENT)
Dept: LAB | Facility: OTHER | Age: 76
End: 2021-10-05
Attending: OBSTETRICS & GYNECOLOGY
Payer: MEDICARE

## 2021-10-05 DIAGNOSIS — C56.9 SEROUS CYSTADENOMA OF OVARY WITH BORDERLINE MALIGNANT FEATURES: ICD-10-CM

## 2021-10-05 LAB — CANCER AG125 SERPL-ACNC: 16 U/ML (ref 0–30)

## 2021-10-05 PROCEDURE — 36415 COLL VENOUS BLD VENIPUNCTURE: CPT | Performed by: OBSTETRICS & GYNECOLOGY

## 2021-10-05 PROCEDURE — 86304 IMMUNOASSAY TUMOR CA 125: CPT | Performed by: OBSTETRICS & GYNECOLOGY

## 2021-10-07 ENCOUNTER — LAB VISIT (OUTPATIENT)
Dept: LAB | Facility: OTHER | Age: 76
End: 2021-10-07
Payer: MEDICARE

## 2021-10-07 DIAGNOSIS — J84.9 ILD (INTERSTITIAL LUNG DISEASE): ICD-10-CM

## 2021-10-07 DIAGNOSIS — R05.3 CHRONIC COUGH: ICD-10-CM

## 2021-10-07 PROCEDURE — 87206 SMEAR FLUORESCENT/ACID STAI: CPT | Performed by: INTERNAL MEDICINE

## 2021-10-07 PROCEDURE — 87116 MYCOBACTERIA CULTURE: CPT | Performed by: INTERNAL MEDICINE

## 2021-10-07 PROCEDURE — 87015 SPECIMEN INFECT AGNT CONCNTJ: CPT | Performed by: INTERNAL MEDICINE

## 2021-10-08 ENCOUNTER — LAB VISIT (OUTPATIENT)
Dept: LAB | Facility: OTHER | Age: 76
End: 2021-10-08
Attending: INTERNAL MEDICINE
Payer: MEDICARE

## 2021-10-08 DIAGNOSIS — J84.9 ILD (INTERSTITIAL LUNG DISEASE): ICD-10-CM

## 2021-10-08 DIAGNOSIS — R05.3 CHRONIC COUGH: ICD-10-CM

## 2021-10-08 PROCEDURE — 87015 SPECIMEN INFECT AGNT CONCNTJ: CPT | Performed by: INTERNAL MEDICINE

## 2021-10-08 PROCEDURE — 87206 SMEAR FLUORESCENT/ACID STAI: CPT | Performed by: INTERNAL MEDICINE

## 2021-10-08 PROCEDURE — 87116 MYCOBACTERIA CULTURE: CPT | Performed by: INTERNAL MEDICINE

## 2021-10-13 ENCOUNTER — OFFICE VISIT (OUTPATIENT)
Dept: GYNECOLOGIC ONCOLOGY | Facility: CLINIC | Age: 76
End: 2021-10-13
Payer: MEDICARE

## 2021-10-13 VITALS
WEIGHT: 163 LBS | HEART RATE: 80 BPM | SYSTOLIC BLOOD PRESSURE: 132 MMHG | DIASTOLIC BLOOD PRESSURE: 64 MMHG | BODY MASS INDEX: 27.98 KG/M2

## 2021-10-13 DIAGNOSIS — C56.1 BORDERLINE SEROUS CYSTADENOMA OF RIGHT OVARY: Primary | ICD-10-CM

## 2021-10-13 PROCEDURE — 99214 PR OFFICE/OUTPT VISIT, EST, LEVL IV, 30-39 MIN: ICD-10-PCS | Mod: S$PBB,,, | Performed by: OBSTETRICS & GYNECOLOGY

## 2021-10-13 PROCEDURE — 99214 OFFICE O/P EST MOD 30 MIN: CPT | Mod: S$PBB,,, | Performed by: OBSTETRICS & GYNECOLOGY

## 2021-10-13 PROCEDURE — 99213 OFFICE O/P EST LOW 20 MIN: CPT | Mod: PBBFAC | Performed by: OBSTETRICS & GYNECOLOGY

## 2021-10-13 PROCEDURE — 99999 PR PBB SHADOW E&M-EST. PATIENT-LVL III: CPT | Mod: PBBFAC,,, | Performed by: OBSTETRICS & GYNECOLOGY

## 2021-10-13 PROCEDURE — 99999 PR PBB SHADOW E&M-EST. PATIENT-LVL III: ICD-10-PCS | Mod: PBBFAC,,, | Performed by: OBSTETRICS & GYNECOLOGY

## 2021-10-15 ENCOUNTER — LAB VISIT (OUTPATIENT)
Dept: LAB | Facility: OTHER | Age: 76
End: 2021-10-15
Attending: INTERNAL MEDICINE
Payer: MEDICARE

## 2021-10-15 DIAGNOSIS — J84.9 ILD (INTERSTITIAL LUNG DISEASE): ICD-10-CM

## 2021-10-15 DIAGNOSIS — J31.0 CHRONIC RHINITIS: ICD-10-CM

## 2021-10-15 DIAGNOSIS — M25.50 ARTHRALGIA, UNSPECIFIED JOINT: ICD-10-CM

## 2021-10-15 LAB
ALBUMIN SERPL BCP-MCNC: 3.5 G/DL (ref 3.5–5.2)
ALP SERPL-CCNC: 75 U/L (ref 55–135)
ALT SERPL W/O P-5'-P-CCNC: 17 U/L (ref 10–44)
ANION GAP SERPL CALC-SCNC: 11 MMOL/L (ref 8–16)
AST SERPL-CCNC: 23 U/L (ref 10–40)
BASOPHILS # BLD AUTO: 0.02 K/UL (ref 0–0.2)
BASOPHILS NFR BLD: 0.2 % (ref 0–1.9)
BILIRUB SERPL-MCNC: 2 MG/DL (ref 0.1–1)
BUN SERPL-MCNC: 16 MG/DL (ref 8–23)
CALCIUM SERPL-MCNC: 9.3 MG/DL (ref 8.7–10.5)
CCP AB SER IA-ACNC: 1.9 U/ML
CHLORIDE SERPL-SCNC: 102 MMOL/L (ref 95–110)
CO2 SERPL-SCNC: 23 MMOL/L (ref 23–29)
CREAT SERPL-MCNC: 0.9 MG/DL (ref 0.5–1.4)
DIFFERENTIAL METHOD: ABNORMAL
EOSINOPHIL # BLD AUTO: 0 K/UL (ref 0–0.5)
EOSINOPHIL NFR BLD: 0.1 % (ref 0–8)
ERYTHROCYTE [DISTWIDTH] IN BLOOD BY AUTOMATED COUNT: 15.5 % (ref 11.5–14.5)
EST. GFR  (AFRICAN AMERICAN): >60 ML/MIN/1.73 M^2
EST. GFR  (NON AFRICAN AMERICAN): >60 ML/MIN/1.73 M^2
GLUCOSE SERPL-MCNC: 124 MG/DL (ref 70–110)
HCT VFR BLD AUTO: 37.8 % (ref 37–48.5)
HGB BLD-MCNC: 12.6 G/DL (ref 12–16)
IGA SERPL-MCNC: 157 MG/DL (ref 40–350)
IGE SERPL-ACNC: 108 IU/ML (ref 0–100)
IGG SERPL-MCNC: 1313 MG/DL (ref 650–1600)
IGM SERPL-MCNC: 112 MG/DL (ref 50–300)
IMM GRANULOCYTES # BLD AUTO: 0.06 K/UL (ref 0–0.04)
IMM GRANULOCYTES NFR BLD AUTO: 0.6 % (ref 0–0.5)
LYMPHOCYTES # BLD AUTO: 1.2 K/UL (ref 1–4.8)
LYMPHOCYTES NFR BLD: 11 % (ref 18–48)
MCH RBC QN AUTO: 29 PG (ref 27–31)
MCHC RBC AUTO-ENTMCNC: 33.3 G/DL (ref 32–36)
MCV RBC AUTO: 87 FL (ref 82–98)
MONOCYTES # BLD AUTO: 0.9 K/UL (ref 0.3–1)
MONOCYTES NFR BLD: 8.3 % (ref 4–15)
NEUTROPHILS # BLD AUTO: 8.5 K/UL (ref 1.8–7.7)
NEUTROPHILS NFR BLD: 79.8 % (ref 38–73)
NRBC BLD-RTO: 0 /100 WBC
PLATELET # BLD AUTO: 433 K/UL (ref 150–450)
PMV BLD AUTO: 8.9 FL (ref 9.2–12.9)
POTASSIUM SERPL-SCNC: 4 MMOL/L (ref 3.5–5.1)
PROT SERPL-MCNC: 7.8 G/DL (ref 6–8.4)
RBC # BLD AUTO: 4.35 M/UL (ref 4–5.4)
RHEUMATOID FACT SERPL-ACNC: 27 IU/ML (ref 0–15)
SODIUM SERPL-SCNC: 136 MMOL/L (ref 136–145)
WBC # BLD AUTO: 10.68 K/UL (ref 3.9–12.7)

## 2021-10-15 PROCEDURE — 85025 COMPLETE CBC W/AUTO DIFF WBC: CPT | Performed by: INTERNAL MEDICINE

## 2021-10-15 PROCEDURE — 82785 ASSAY OF IGE: CPT | Performed by: INTERNAL MEDICINE

## 2021-10-15 PROCEDURE — 36415 COLL VENOUS BLD VENIPUNCTURE: CPT | Performed by: INTERNAL MEDICINE

## 2021-10-15 PROCEDURE — 82784 ASSAY IGA/IGD/IGG/IGM EACH: CPT | Performed by: INTERNAL MEDICINE

## 2021-10-15 PROCEDURE — 86235 NUCLEAR ANTIGEN ANTIBODY: CPT | Mod: 59 | Performed by: INTERNAL MEDICINE

## 2021-10-15 PROCEDURE — 86200 CCP ANTIBODY: CPT | Performed by: INTERNAL MEDICINE

## 2021-10-15 PROCEDURE — 86235 NUCLEAR ANTIGEN ANTIBODY: CPT | Performed by: INTERNAL MEDICINE

## 2021-10-15 PROCEDURE — 86431 RHEUMATOID FACTOR QUANT: CPT | Performed by: INTERNAL MEDICINE

## 2021-10-15 PROCEDURE — 86038 ANTINUCLEAR ANTIBODIES: CPT | Performed by: INTERNAL MEDICINE

## 2021-10-15 PROCEDURE — 80053 COMPREHEN METABOLIC PANEL: CPT | Performed by: INTERNAL MEDICINE

## 2021-10-18 LAB
ANA SER QL IF: NORMAL
ENA JO1 IGG SER-ACNC: <0.2 U
ENA SCL70 AB SER-ACNC: 9 UNITS

## 2021-10-21 PROBLEM — R91.8 GROUND GLASS OPACITY PRESENT ON IMAGING OF LUNG: Status: ACTIVE | Noted: 2021-10-21

## 2021-10-21 LAB
C IMMITIS AB TITR SER CF: NEGATIVE {TITER}
C IMMITIS IGG SER QL: NEGATIVE
C IMMITIS IGM SER QL: NEGATIVE

## 2021-10-31 ENCOUNTER — EXTERNAL CHRONIC CARE MANAGEMENT (OUTPATIENT)
Dept: PRIMARY CARE CLINIC | Facility: CLINIC | Age: 76
End: 2021-10-31
Payer: MEDICARE

## 2021-10-31 PROCEDURE — 99490 CHRNC CARE MGMT STAFF 1ST 20: CPT | Mod: PBBFAC | Performed by: INTERNAL MEDICINE

## 2021-10-31 PROCEDURE — 99490 CHRNC CARE MGMT STAFF 1ST 20: CPT | Mod: S$PBB,,, | Performed by: INTERNAL MEDICINE

## 2021-10-31 PROCEDURE — 99490 PR CHRONIC CARE MGMT, 1ST 20 MIN: ICD-10-PCS | Mod: S$PBB,,, | Performed by: INTERNAL MEDICINE

## 2021-11-09 ENCOUNTER — IMMUNIZATION (OUTPATIENT)
Dept: INTERNAL MEDICINE | Facility: CLINIC | Age: 76
End: 2021-11-09
Payer: MEDICARE

## 2021-11-09 DIAGNOSIS — Z23 NEED FOR VACCINATION: Primary | ICD-10-CM

## 2021-11-09 PROCEDURE — 0064A COVID-19, MRNA, LNP-S, PF, 100 MCG/0.25 ML DOSE VACCINE (MODERNA BOOSTER): CPT | Mod: PBBFAC

## 2021-11-25 LAB
ACID FAST MOD KINY STN SPEC: NORMAL
MYCOBACTERIUM SPEC QL CULT: NORMAL

## 2021-11-26 LAB
ACID FAST MOD KINY STN SPEC: NORMAL
MYCOBACTERIUM SPEC QL CULT: NORMAL

## 2021-11-30 ENCOUNTER — EXTERNAL CHRONIC CARE MANAGEMENT (OUTPATIENT)
Dept: PRIMARY CARE CLINIC | Facility: CLINIC | Age: 76
End: 2021-11-30
Payer: MEDICARE

## 2021-11-30 PROCEDURE — 99490 CHRNC CARE MGMT STAFF 1ST 20: CPT | Mod: PBBFAC | Performed by: INTERNAL MEDICINE

## 2021-11-30 PROCEDURE — 99439 CHRNC CARE MGMT STAF EA ADDL: CPT | Mod: PBBFAC | Performed by: INTERNAL MEDICINE

## 2021-11-30 PROCEDURE — 99490 PR CHRONIC CARE MGMT, 1ST 20 MIN: ICD-10-PCS | Mod: S$PBB,,, | Performed by: INTERNAL MEDICINE

## 2021-11-30 PROCEDURE — 99490 CHRNC CARE MGMT STAFF 1ST 20: CPT | Mod: S$PBB,,, | Performed by: INTERNAL MEDICINE

## 2021-11-30 PROCEDURE — 99439 CHRNC CARE MGMT STAF EA ADDL: CPT | Mod: S$PBB,,, | Performed by: INTERNAL MEDICINE

## 2021-11-30 PROCEDURE — 99439 PR CHRONIC CARE MGMT, EA ADDTL 20 MIN: ICD-10-PCS | Mod: S$PBB,,, | Performed by: INTERNAL MEDICINE

## 2021-12-10 ENCOUNTER — PATIENT MESSAGE (OUTPATIENT)
Dept: INTERNAL MEDICINE | Facility: CLINIC | Age: 76
End: 2021-12-10
Payer: MEDICARE

## 2021-12-10 ENCOUNTER — LAB VISIT (OUTPATIENT)
Dept: LAB | Facility: OTHER | Age: 76
End: 2021-12-10
Attending: INTERNAL MEDICINE
Payer: MEDICARE

## 2021-12-10 ENCOUNTER — OFFICE VISIT (OUTPATIENT)
Dept: INTERNAL MEDICINE | Facility: CLINIC | Age: 76
End: 2021-12-10
Attending: INTERNAL MEDICINE
Payer: MEDICARE

## 2021-12-10 VITALS
WEIGHT: 155.19 LBS | HEART RATE: 76 BPM | HEIGHT: 64 IN | SYSTOLIC BLOOD PRESSURE: 144 MMHG | OXYGEN SATURATION: 95 % | DIASTOLIC BLOOD PRESSURE: 92 MMHG | BODY MASS INDEX: 26.49 KG/M2

## 2021-12-10 DIAGNOSIS — I10 HYPERTENSION, BENIGN: Primary | ICD-10-CM

## 2021-12-10 DIAGNOSIS — R20.2 NUMBNESS AND TINGLING OF FOOT: ICD-10-CM

## 2021-12-10 DIAGNOSIS — J84.10 PULMONARY FIBROSIS: ICD-10-CM

## 2021-12-10 DIAGNOSIS — R20.0 NUMBNESS AND TINGLING OF FOOT: ICD-10-CM

## 2021-12-10 LAB
ERYTHROCYTE [SEDIMENTATION RATE] IN BLOOD: 18 MM/HR (ref 0–20)
VIT B12 SERPL-MCNC: 704 PG/ML (ref 210–950)

## 2021-12-10 PROCEDURE — 84165 PROTEIN E-PHORESIS SERUM: CPT | Performed by: INTERNAL MEDICINE

## 2021-12-10 PROCEDURE — 99999 PR PBB SHADOW E&M-EST. PATIENT-LVL IV: CPT | Mod: PBBFAC,,, | Performed by: INTERNAL MEDICINE

## 2021-12-10 PROCEDURE — 86592 SYPHILIS TEST NON-TREP QUAL: CPT | Performed by: INTERNAL MEDICINE

## 2021-12-10 PROCEDURE — 99999 PR PBB SHADOW E&M-EST. PATIENT-LVL IV: ICD-10-PCS | Mod: PBBFAC,,, | Performed by: INTERNAL MEDICINE

## 2021-12-10 PROCEDURE — 99214 OFFICE O/P EST MOD 30 MIN: CPT | Mod: S$PBB,,, | Performed by: INTERNAL MEDICINE

## 2021-12-10 PROCEDURE — 86334 IMMUNOFIX E-PHORESIS SERUM: CPT | Mod: 26,,, | Performed by: PATHOLOGY

## 2021-12-10 PROCEDURE — 99214 OFFICE O/P EST MOD 30 MIN: CPT | Mod: PBBFAC | Performed by: INTERNAL MEDICINE

## 2021-12-10 PROCEDURE — 84425 ASSAY OF VITAMIN B-1: CPT | Performed by: INTERNAL MEDICINE

## 2021-12-10 PROCEDURE — 85651 RBC SED RATE NONAUTOMATED: CPT | Performed by: INTERNAL MEDICINE

## 2021-12-10 PROCEDURE — 99214 PR OFFICE/OUTPT VISIT, EST, LEVL IV, 30-39 MIN: ICD-10-PCS | Mod: S$PBB,,, | Performed by: INTERNAL MEDICINE

## 2021-12-10 PROCEDURE — 36415 COLL VENOUS BLD VENIPUNCTURE: CPT | Performed by: INTERNAL MEDICINE

## 2021-12-10 PROCEDURE — 87389 HIV-1 AG W/HIV-1&-2 AB AG IA: CPT | Performed by: INTERNAL MEDICINE

## 2021-12-10 PROCEDURE — 86334 PATHOLOGIST INTERPRETATION IFE: ICD-10-PCS | Mod: 26,,, | Performed by: PATHOLOGY

## 2021-12-10 PROCEDURE — 82607 VITAMIN B-12: CPT | Performed by: INTERNAL MEDICINE

## 2021-12-10 PROCEDURE — 86334 IMMUNOFIX E-PHORESIS SERUM: CPT | Performed by: INTERNAL MEDICINE

## 2021-12-10 PROCEDURE — 84165 PROTEIN E-PHORESIS SERUM: CPT | Mod: 26,,, | Performed by: PATHOLOGY

## 2021-12-10 PROCEDURE — 84165 PATHOLOGIST INTERPRETATION SPE: ICD-10-PCS | Mod: 26,,, | Performed by: PATHOLOGY

## 2021-12-10 RX ORDER — CARVEDILOL 6.25 MG/1
6.25 TABLET ORAL 2 TIMES DAILY WITH MEALS
Qty: 60 TABLET | Refills: 2 | Status: SHIPPED | OUTPATIENT
Start: 2021-12-10 | End: 2022-01-21

## 2021-12-13 ENCOUNTER — PATIENT OUTREACH (OUTPATIENT)
Dept: ADMINISTRATIVE | Facility: OTHER | Age: 76
End: 2021-12-13
Payer: MEDICARE

## 2021-12-13 PROBLEM — J84.10 PULMONARY FIBROSIS: Status: ACTIVE | Noted: 2021-12-13

## 2021-12-13 LAB
ALBUMIN SERPL ELPH-MCNC: 3.91 G/DL (ref 3.35–5.55)
ALPHA1 GLOB SERPL ELPH-MCNC: 0.4 G/DL (ref 0.17–0.41)
ALPHA2 GLOB SERPL ELPH-MCNC: 1.01 G/DL (ref 0.43–0.99)
B-GLOBULIN SERPL ELPH-MCNC: 0.69 G/DL (ref 0.5–1.1)
GAMMA GLOB SERPL ELPH-MCNC: 1.2 G/DL (ref 0.67–1.58)
HIV 1+2 AB+HIV1 P24 AG SERPL QL IA: NEGATIVE
INTERPRETATION SERPL IFE-IMP: NORMAL
PROT SERPL-MCNC: 7.2 G/DL (ref 6–8.4)
RPR SER QL: NORMAL

## 2021-12-14 ENCOUNTER — OFFICE VISIT (OUTPATIENT)
Dept: PODIATRY | Facility: CLINIC | Age: 76
End: 2021-12-14
Payer: MEDICARE

## 2021-12-14 VITALS
WEIGHT: 155 LBS | DIASTOLIC BLOOD PRESSURE: 88 MMHG | SYSTOLIC BLOOD PRESSURE: 143 MMHG | HEIGHT: 64 IN | HEART RATE: 87 BPM | BODY MASS INDEX: 26.46 KG/M2

## 2021-12-14 DIAGNOSIS — M79.675 TOE PAIN, BILATERAL: ICD-10-CM

## 2021-12-14 DIAGNOSIS — M79.674 TOE PAIN, BILATERAL: ICD-10-CM

## 2021-12-14 DIAGNOSIS — B35.1 ONYCHOMYCOSIS DUE TO DERMATOPHYTE: Primary | ICD-10-CM

## 2021-12-14 LAB
ARSENIC BLD-MCNC: <1 NG/ML
CADMIUM BLD-MCNC: 0.3 NG/ML
CITY: NORMAL
COUNTY: NORMAL
GUARDIAN FIRST NAME: NORMAL
GUARDIAN LAST NAME: NORMAL
HOME PHONE: NORMAL
LEAD BLD-MCNC: <1 MCG/DL
MERCURY BLD-MCNC: <1 NG/ML
PATHOLOGIST INTERPRETATION IFE: NORMAL
PATHOLOGIST INTERPRETATION SPE: NORMAL
RACE: NORMAL
STATE: NORMAL
STREET ADDRESS: NORMAL
VENOUS/CAPILLARY: NORMAL
VIT B1 BLD-MCNC: 96 UG/L (ref 38–122)
ZIP: NORMAL

## 2021-12-14 PROCEDURE — 99999 PR PBB SHADOW E&M-EST. PATIENT-LVL IV: ICD-10-PCS | Mod: PBBFAC,,, | Performed by: PODIATRIST

## 2021-12-14 PROCEDURE — 99214 OFFICE O/P EST MOD 30 MIN: CPT | Mod: PBBFAC,PN | Performed by: PODIATRIST

## 2021-12-14 PROCEDURE — 99212 PR OFFICE/OUTPT VISIT, EST, LEVL II, 10-19 MIN: ICD-10-PCS | Mod: S$PBB,,, | Performed by: PODIATRIST

## 2021-12-14 PROCEDURE — 99212 OFFICE O/P EST SF 10 MIN: CPT | Mod: S$PBB,,, | Performed by: PODIATRIST

## 2021-12-14 PROCEDURE — 99999 PR PBB SHADOW E&M-EST. PATIENT-LVL IV: CPT | Mod: PBBFAC,,, | Performed by: PODIATRIST

## 2021-12-14 RX ORDER — NINTEDANIB 150 MG/1
CAPSULE ORAL
COMMUNITY
Start: 2021-12-06 | End: 2022-03-11

## 2021-12-27 RX ORDER — ATORVASTATIN CALCIUM 20 MG/1
TABLET, FILM COATED ORAL
Qty: 90 TABLET | Refills: 2 | Status: SHIPPED | OUTPATIENT
Start: 2021-12-27 | End: 2022-10-03

## 2021-12-31 ENCOUNTER — EXTERNAL CHRONIC CARE MANAGEMENT (OUTPATIENT)
Dept: PRIMARY CARE CLINIC | Facility: CLINIC | Age: 76
End: 2021-12-31
Payer: MEDICARE

## 2021-12-31 PROCEDURE — 99490 CHRNC CARE MGMT STAFF 1ST 20: CPT | Mod: S$PBB,,, | Performed by: INTERNAL MEDICINE

## 2021-12-31 PROCEDURE — 99490 CHRNC CARE MGMT STAFF 1ST 20: CPT | Mod: PBBFAC | Performed by: INTERNAL MEDICINE

## 2021-12-31 PROCEDURE — 99490 PR CHRONIC CARE MGMT, 1ST 20 MIN: ICD-10-PCS | Mod: S$PBB,,, | Performed by: INTERNAL MEDICINE

## 2022-01-02 ENCOUNTER — PATIENT MESSAGE (OUTPATIENT)
Dept: INTERNAL MEDICINE | Facility: CLINIC | Age: 77
End: 2022-01-02
Payer: MEDICARE

## 2022-01-12 ENCOUNTER — OFFICE VISIT (OUTPATIENT)
Dept: UROLOGY | Facility: CLINIC | Age: 77
End: 2022-01-12
Attending: UROLOGY
Payer: MEDICARE

## 2022-01-12 VITALS
HEIGHT: 64 IN | WEIGHT: 156.31 LBS | HEART RATE: 69 BPM | SYSTOLIC BLOOD PRESSURE: 139 MMHG | DIASTOLIC BLOOD PRESSURE: 87 MMHG | BODY MASS INDEX: 26.69 KG/M2

## 2022-01-12 DIAGNOSIS — N39.0 RECURRENT UTI: Primary | ICD-10-CM

## 2022-01-12 DIAGNOSIS — R35.1 NOCTURIA: ICD-10-CM

## 2022-01-12 DIAGNOSIS — N32.81 OAB (OVERACTIVE BLADDER): ICD-10-CM

## 2022-01-12 DIAGNOSIS — R31.29 MICROHEMATURIA: ICD-10-CM

## 2022-01-12 PROCEDURE — 99214 PR OFFICE/OUTPT VISIT, EST, LEVL IV, 30-39 MIN: ICD-10-PCS | Mod: S$GLB,,, | Performed by: UROLOGY

## 2022-01-12 PROCEDURE — 99214 OFFICE O/P EST MOD 30 MIN: CPT | Mod: S$GLB,,, | Performed by: UROLOGY

## 2022-01-18 ENCOUNTER — HOSPITAL ENCOUNTER (EMERGENCY)
Facility: HOSPITAL | Age: 77
Discharge: HOME | End: 2022-01-18
Attending: EMERGENCY MEDICINE | Admitting: EMERGENCY MEDICINE
Payer: MEDICARE

## 2022-01-18 VITALS
TEMPERATURE: 98 F | DIASTOLIC BLOOD PRESSURE: 79 MMHG | OXYGEN SATURATION: 94 % | WEIGHT: 164 LBS | RESPIRATION RATE: 16 BRPM | HEIGHT: 66 IN | HEART RATE: 79 BPM | BODY MASS INDEX: 26.36 KG/M2 | SYSTOLIC BLOOD PRESSURE: 165 MMHG

## 2022-01-18 DIAGNOSIS — I16.0 HYPERTENSIVE URGENCY: ICD-10-CM

## 2022-01-18 DIAGNOSIS — R04.0 LEFT-SIDED EPISTAXIS: Primary | ICD-10-CM

## 2022-01-18 LAB
ANION GAP SERPL CALC-SCNC: 9 MMOL/L (ref 8–16)
BASOPHILS # BLD AUTO: 0.03 K/UL (ref 0–0.2)
BASOPHILS NFR BLD: 0.2 % (ref 0–1.9)
BUN SERPL-MCNC: 24 MG/DL (ref 8–23)
CALCIUM SERPL-MCNC: 9.3 MG/DL (ref 8.7–10.5)
CHLORIDE SERPL-SCNC: 105 MMOL/L (ref 95–110)
CO2 SERPL-SCNC: 26 MMOL/L (ref 23–29)
CREAT SERPL-MCNC: 0.8 MG/DL (ref 0.5–1.4)
DIFFERENTIAL METHOD: ABNORMAL
EOSINOPHIL # BLD AUTO: 0.1 K/UL (ref 0–0.5)
EOSINOPHIL NFR BLD: 0.4 % (ref 0–8)
ERYTHROCYTE [DISTWIDTH] IN BLOOD BY AUTOMATED COUNT: 17.4 % (ref 11.5–14.5)
EST. GFR  (AFRICAN AMERICAN): >60 ML/MIN/1.73 M^2
EST. GFR  (NON AFRICAN AMERICAN): >60 ML/MIN/1.73 M^2
GLUCOSE SERPL-MCNC: 106 MG/DL (ref 70–110)
HCT VFR BLD AUTO: 41.5 % (ref 37–48.5)
HGB BLD-MCNC: 13.9 G/DL (ref 12–16)
IMM GRANULOCYTES # BLD AUTO: 0.08 K/UL (ref 0–0.04)
IMM GRANULOCYTES NFR BLD AUTO: 0.6 % (ref 0–0.5)
LYMPHOCYTES # BLD AUTO: 2.3 K/UL (ref 1–4.8)
LYMPHOCYTES NFR BLD: 16.8 % (ref 18–48)
MCH RBC QN AUTO: 30 PG (ref 27–31)
MCHC RBC AUTO-ENTMCNC: 33.5 G/DL (ref 32–36)
MCV RBC AUTO: 90 FL (ref 82–98)
MONOCYTES # BLD AUTO: 0.6 K/UL (ref 0.3–1)
MONOCYTES NFR BLD: 4.5 % (ref 4–15)
NEUTROPHILS # BLD AUTO: 10.7 K/UL (ref 1.8–7.7)
NEUTROPHILS NFR BLD: 77.5 % (ref 38–73)
NRBC BLD-RTO: 0 /100 WBC
PLATELET # BLD AUTO: 409 K/UL (ref 150–450)
PMV BLD AUTO: 8.5 FL (ref 9.2–12.9)
POTASSIUM SERPL-SCNC: 4.1 MMOL/L (ref 3.5–5.1)
RBC # BLD AUTO: 4.63 M/UL (ref 4–5.4)
SODIUM SERPL-SCNC: 140 MMOL/L (ref 136–145)
WBC # BLD AUTO: 13.85 K/UL (ref 3.9–12.7)

## 2022-01-18 PROCEDURE — 25000003 PHARM REV CODE 250: Performed by: EMERGENCY MEDICINE

## 2022-01-18 PROCEDURE — 96374 THER/PROPH/DIAG INJ IV PUSH: CPT

## 2022-01-18 PROCEDURE — 63600175 PHARM REV CODE 636 W HCPCS: Performed by: EMERGENCY MEDICINE

## 2022-01-18 PROCEDURE — 96375 TX/PRO/DX INJ NEW DRUG ADDON: CPT

## 2022-01-18 PROCEDURE — 85025 COMPLETE CBC W/AUTO DIFF WBC: CPT | Performed by: EMERGENCY MEDICINE

## 2022-01-18 PROCEDURE — 80048 BASIC METABOLIC PNL TOTAL CA: CPT | Performed by: EMERGENCY MEDICINE

## 2022-01-18 PROCEDURE — 30901 CONTROL OF NOSEBLEED: CPT | Mod: LT

## 2022-01-18 PROCEDURE — 99284 EMERGENCY DEPT VISIT MOD MDM: CPT | Mod: 25

## 2022-01-18 PROCEDURE — 96376 TX/PRO/DX INJ SAME DRUG ADON: CPT | Mod: 59

## 2022-01-18 RX ORDER — MORPHINE SULFATE 2 MG/ML
2 INJECTION, SOLUTION INTRAMUSCULAR; INTRAVENOUS
Status: COMPLETED | OUTPATIENT
Start: 2022-01-18 | End: 2022-01-18

## 2022-01-18 RX ORDER — CEPHALEXIN 500 MG/1
500 CAPSULE ORAL
Status: COMPLETED | OUTPATIENT
Start: 2022-01-18 | End: 2022-01-18

## 2022-01-18 RX ORDER — ONDANSETRON 2 MG/ML
4 INJECTION INTRAMUSCULAR; INTRAVENOUS
Status: COMPLETED | OUTPATIENT
Start: 2022-01-18 | End: 2022-01-18

## 2022-01-18 RX ORDER — HYDRALAZINE HYDROCHLORIDE 20 MG/ML
10 INJECTION INTRAMUSCULAR; INTRAVENOUS
Status: COMPLETED | OUTPATIENT
Start: 2022-01-18 | End: 2022-01-18

## 2022-01-18 RX ORDER — OXYMETAZOLINE HCL 0.05 %
1 SPRAY, NON-AEROSOL (ML) NASAL 2 TIMES DAILY
Qty: 15 ML | Refills: 0 | Status: SHIPPED | OUTPATIENT
Start: 2022-01-18 | End: 2022-01-21

## 2022-01-18 RX ORDER — CEPHALEXIN 500 MG/1
500 CAPSULE ORAL 4 TIMES DAILY
Qty: 20 CAPSULE | Refills: 0 | Status: SHIPPED | OUTPATIENT
Start: 2022-01-18 | End: 2022-01-23

## 2022-01-18 RX ORDER — LIDOCAINE HYDROCHLORIDE 20 MG/ML
5 SOLUTION OROPHARYNGEAL
Status: COMPLETED | OUTPATIENT
Start: 2022-01-18 | End: 2022-01-18

## 2022-01-18 RX ORDER — ACETAMINOPHEN 500 MG
1000 TABLET ORAL
Status: COMPLETED | OUTPATIENT
Start: 2022-01-18 | End: 2022-01-18

## 2022-01-18 RX ORDER — TRANEXAMIC ACID 100 MG/ML
1000 INJECTION, SOLUTION INTRAVENOUS
Status: DISCONTINUED | OUTPATIENT
Start: 2022-01-18 | End: 2022-01-18 | Stop reason: HOSPADM

## 2022-01-18 RX ORDER — OXYMETAZOLINE HCL 0.05 %
1 SPRAY, NON-AEROSOL (ML) NASAL
Status: COMPLETED | OUTPATIENT
Start: 2022-01-18 | End: 2022-01-18

## 2022-01-18 RX ORDER — LABETALOL HYDROCHLORIDE 5 MG/ML
10 INJECTION, SOLUTION INTRAVENOUS
Status: COMPLETED | OUTPATIENT
Start: 2022-01-18 | End: 2022-01-18

## 2022-01-18 RX ORDER — HYDROMORPHONE HYDROCHLORIDE 1 MG/ML
0.5 INJECTION, SOLUTION INTRAMUSCULAR; INTRAVENOUS; SUBCUTANEOUS
Status: COMPLETED | OUTPATIENT
Start: 2022-01-18 | End: 2022-01-18

## 2022-01-18 RX ADMIN — HYDRALAZINE HYDROCHLORIDE 10 MG: 20 INJECTION, SOLUTION INTRAMUSCULAR; INTRAVENOUS at 09:01

## 2022-01-18 RX ADMIN — MORPHINE SULFATE 2 MG: 2 INJECTION, SOLUTION INTRAMUSCULAR; INTRAVENOUS at 11:01

## 2022-01-18 RX ADMIN — ONDANSETRON 4 MG: 2 INJECTION INTRAMUSCULAR; INTRAVENOUS at 05:01

## 2022-01-18 RX ADMIN — ACETAMINOPHEN 1000 MG: 500 TABLET ORAL at 07:01

## 2022-01-18 RX ADMIN — Medication 1 SPRAY: at 08:01

## 2022-01-18 RX ADMIN — HYDROMORPHONE HYDROCHLORIDE 0.5 MG: 1 INJECTION, SOLUTION INTRAMUSCULAR; INTRAVENOUS; SUBCUTANEOUS at 02:01

## 2022-01-18 RX ADMIN — LIDOCAINE HYDROCHLORIDE 5 ML: 20 SOLUTION ORAL; TOPICAL at 12:01

## 2022-01-18 RX ADMIN — LABETALOL HYDROCHLORIDE 10 MG: 5 INJECTION, SOLUTION INTRAVENOUS at 12:01

## 2022-01-18 RX ADMIN — HYDRALAZINE HYDROCHLORIDE 10 MG: 20 INJECTION, SOLUTION INTRAMUSCULAR; INTRAVENOUS at 01:01

## 2022-01-18 RX ADMIN — ONDANSETRON 4 MG: 2 INJECTION INTRAMUSCULAR; INTRAVENOUS at 02:01

## 2022-01-18 RX ADMIN — CEPHALEXIN 500 MG: 500 CAPSULE ORAL at 07:01

## 2022-01-18 NOTE — ED PROVIDER NOTES
SCRIBE #1 NOTE: I, Kaitlin Hatch, am scribing for, and in the presence of, Lilian Villalta MD.       Source of History:  The patient.    Chief complaint:  Epistaxis (Pt reports nosebleed starting this morning. Denies injury, denies use of blood thinners. No other symptoms at this time. )      HPI:  Niurka Pedraza is a 76 y.o. female presenting with epistaxis that began about 45 minutes ago. The patient denies any trauma that may have caused this bleeding. Currently, she has cotton packed into the left nare, which has slowed the bleeding. She denies any pain associated with the bleed. She notes that she has had episodes of epistaxis in the past. She is not on any blood thinners. She denies any other bleeding. She has a PMHx of HTN. She typically takes her HTN medications in the morning and at night. She has taken her medications this morning. She also has a PMHx of pulmonary fibrosis and notes that she recently had a change in medication.    This is the extent to the patients complaints today here in the emergency department.    ROS: As per HPI and below:  General: No fever.  No chills.  Eyes: No visual changes.  ENT: No sore throat. No ear pain. +Epistaxis.  Head: No headache.    Respiratory: No shortness of breath.  Cardiovascular: No chest pain.  Abdomen: No abdominal pain.  No nausea or vomiting.  Genito-Urinary: No abnormal urination.  Neurologic: No focal weakness.  No numbness.  MSK: no back pain.  Integument: No rashes or lesions.  Hematologic: No easy bruising.  Endocrine: No excessive thirst or urination.    Review of patient's allergies indicates:   Allergen Reactions    Ciprofloxacin Other (See Comments)     Right foot pain and edema, tendonitis    Amlodipine Edema and Swelling     BLE  BLE    Lisinopril Other (See Comments)     cough    Nitrofuran analogues        PMH:  As per HPI and below:  Past Medical History:   Diagnosis Date    Arthritis     Borderline serous cystadenoma of right ovary 4/3/2018  "   Breast cancer     mastectomy 2014    Coccidiomycosis, progressive     Elevated CA-125 2018    Hyperlipidemia     Hypertension     OAB (overactive bladder)     Osteopenia     on Dexa 2017    Osteoporosis     pt reports hx of this, declined fosamax in past - treated with calcium and vit D    Pelvic mass 2018    Pulmonary nodules     Thyroid disease      Past Surgical History:   Procedure Laterality Date    CHOLECYSTECTOMY      ENDOSCOPIC ULTRASOUND OF UPPER GASTROINTESTINAL TRACT N/A 2021    Procedure: ULTRASOUND, UPPER GI TRACT, ENDOSCOPIC;  Surgeon: Aisha Barajas MD;  Location: [facility] ([address] [place] [address]);  Service: Endoscopy;  Laterality: N/A;  UEUS/ERCP evaluation abn MRCP - Dr Angelika Zamudio-19 test 21 at [facility] Pre-admit - pg    ERCP N/A 2021    Procedure: ERCP (ENDOSCOPIC RETROGRADE CHOLANGIOPANCREATOGRAPHY);  Surgeon: Aisha Barajas MD;  Location: [facility] ([address] [place] [address]);  Service: Endoscopy;  Laterality: N/A;  UEUS/ERCP evaluation abn MRCP - Dr Angelika Zamudio-19 test 21 at [facility] Pre-admit - pg    ESOPHAGOGASTRODUODENOSCOPY N/A 2021    Procedure: EGD (ESOPHAGOGASTRODUODENOSCOPY);  Surgeon: Aisha Barajas MD;  Location: [facility] ([address] [place] [address]);  Service: Endoscopy;  Laterality: N/A;  UEUS/ERCP evaluation abn MRCP - Dr Angelika Zamudio-19 test 21 at [facility] Pre-admit - pg    MASTECTOMY Right     2014       Social History     Tobacco Use    Smoking status: Former Smoker     Packs/day: 0.50     Years: 30.00     Pack years: 15.00     Types: Cigarettes     Quit date: 2000     Years since quittin.9    Smokeless tobacco: Never Used    Tobacco comment: quit in her 50s, after 30 years smoking;   Substance Use Topics    Alcohol use: No    Drug use: No       Physical Exam:    BP (!) 165/79   Pulse 79   Temp 97.8 °F (36.6 °C) (Oral)   Resp 16   Ht 5' 6" (1.676 m)   Wt 74.4 kg (164 lb)   LMP 2000   SpO2 (!) 94%   Breastfeeding No   BMI " 26.47 kg/m²   Nursing note and vital signs reviewed.    Appearance: No acute distress. Comfortable-appearing.  Eyes: No conjunctival injection.  Neck: No deformity.   ENT: Oropharynx clear.  No stridor. Cotton packed in the left nare with minimal oozing surrounding.   Chest/ Respiratory: Clear to auscultation bilaterally.  Good air movement.  No wheezes.  No rhonchi. No rales. No accessory muscle use.  Cardiovascular: Regular rate and rhythm.  No murmurs. No gallops. No rubs.  Abdomen: Soft.  Not distended.  Nontender.  No guarding.  No rebound. Non-peritoneal.  Musculoskeletal: Good range of motion all joints.  No deformities.  Neck supple.  No meningismus.  Skin: No rashes seen.  Good turgor.  No abrasions.  No ecchymoses.  Neurologic: Motor intact.  Sensation intact.  Cerebellar intact.  Cranial nerves intact.  Mental Status:  Alert and oriented x 3.  Appropriate, conversant.      I decided to obtain the patient's medical records.  Summary of Medical Records:      Initial Impression  76 y.o. female with epistaxis and recent elevated HTN possibly related to new medication for pulmonary fibrosis. Plan topical vasoconstrictor, pressure and packing if needed.    Differential Dx:  Anterior/posterior epistaxis, sinusitis, hypertensive urgency/emergency, thromocytopenia     MDM:        ED Course as of 02/01/22 1044   Tue Jan 18, 2022   1000 10:00 AM Patient reevaluated, nasal packing is holding. [MG]   1007 Recurrent epistaxis despite intranasal afrin.  [LF]   1329 Patient with recurrent epistaxis despite packing with rhino rocket 5.5, replaced with 7.5 with improvement but still with minimal oozing/occasional clot with spitting.  Patient with persistent hypertension despite multiple doses of IV antihypertensives.  Will transfer to higher level of care for further management of HTN with ENT available for evaluation.   [LF]      ED Course User Index  [LF] Lilian Villalta MD  [MG] Kaitlin Gil  Attestation:   Scribe #1: I performed the above scribed service and the documentation accurately describes the services I performed. I attest to the accuracy of the note.    Physician Attestation for Scribe: I, Lilian Villalta MD, reviewed documentation as scribed in my presence, which is both accurate and complete.    Diagnostic Impression:    1. Left-sided epistaxis    2. Hypertensive urgency         ED Disposition Condition    Discharge Stable          ED Prescriptions     Medication Sig Dispense Start Date End Date Auth. Provider    cephALEXin (KEFLEX) 500 MG capsule () Take 1 capsule (500 mg total) by mouth 4 (four) times daily. for 5 days 20 capsule 2022 Danielle Bartholomew MD    oxymetazoline (AFRIN) 0.05 % nasal spray () 1 spray by Nasal route 2 (two) times daily. AS NEEDED FOR BLEEDING for 3 days 15 mL 2022 Danielle Bartholomew MD        Follow-up Information     Follow up With Specialties Details Why Contact Info    Rudi Carroll MD Otolaryngology In 3 days on Friday for packing removal [address] [facility] [address]  [phone]               Lilian Villalta MD  22 [phone]

## 2022-01-18 NOTE — ED TRIAGE NOTES
Chief Complaint   Patient presents with    Epistaxis     Pt reports nosebleed starting this morning. Denies injury, denies use of blood thinners. No other symptoms at this time.      Pt presents to ED with epistaxis from both nostrils, bilateral bleeding. Pt is currently in a study for [employer] drug study for pulmonary fibrosis. Pt states that her blood pressure has been elevated since starting this medication and has been experiencing nose bleeds more frequently. States mild dizziness, arrives with saturated cotton balls in nostrils and swallowing blood. Denies other injuries and is not on blood thinners.

## 2022-01-18 NOTE — ED NOTES
Rhino rocket inserted by MD, well tolerated, following recurrence of nasal bleed. Mild oozing around rocket. Pt denies dizziness at this time.

## 2022-01-19 NOTE — ED NOTES
Pt transferred by [facility] as transfer from [facility]. Pt has 3rd rhino rocket of day size 7.5 to L nare, no apparent running blood to nare at this time, no visible blood in back of throat. Pt c/o neck pain, not acute. VSS. Daughter at bedside.     Patient identifiers for Niurka Pedraza 76 y.o. female checked and correct.  Chief Complaint   Patient presents with    Epistaxis     Pt reports nosebleed starting this morning. Denies injury, denies use of blood thinners. No other symptoms at this time.      Past Medical History:   Diagnosis Date    Arthritis     Borderline serous cystadenoma of right ovary 4/3/2018    Breast cancer     mastectomy 2014    Coccidiomycosis, progressive     Elevated CA-125 2/25/2018    Hyperlipidemia     Hypertension     OAB (overactive bladder)     Osteopenia     on Dexa 11/2017    Osteoporosis     pt reports hx of this, declined fosamax in past - treated with calcium and vit D    Pelvic mass 2/25/2018    Pulmonary nodules     Thyroid disease      Allergies reported:   Review of patient's allergies indicates:   Allergen Reactions    Ciprofloxacin Other (See Comments)     Right foot pain and edema, tendonitis    Amlodipine Edema and Swelling     BLE  BLE    Lisinopril Other (See Comments)     cough    Nitrofuran analogues          LOC: Patient is awake, alert, and aware of environment with an appropriate affect. Patient is oriented x 4 and speaking appropriately.  APPEARANCE: Patient resting comfortably and in no acute distress. Patient is clean and well groomed, patient's clothing is properly fastened.  HEENT: Rhino rocket to L nare, bleeding controlled at this time  SKIN: The skin is warm and dry. Patient has normal skin turgor and moist mucus membranes.   MUSKULOSKELETAL: Patient is moving all extremities well, no obvious deformities noted. Pulses intact.   RESPIRATORY: Airway is open and patent. Respirations are spontaneous and non-labored with normal effort and  rate.  CARDIAC: Patient has a normal rate and rhythm. No peripheral edema noted.   ABDOMEN: No distention noted. Soft and non-tender upon palpation.  NEUROLOGICAL: pupils 3mm, PERRL. Facial expression is symmetrical. Hand grasps are equal bilaterally. Normal sensation in all extremities when touched with finger.

## 2022-01-19 NOTE — SUBJECTIVE & OBJECTIVE
Medications:  Continuous Infusions:  Scheduled Meds:   tranexamic acid  1,000 mg Intravenous ED 1 Time     PRN Meds:     No current facility-administered medications on file prior to encounter.     Current Outpatient Medications on File Prior to Encounter   Medication Sig    irbesartan (AVAPRO) 300 MG tablet Take 1 tablet (300 mg total) by mouth every evening. (Patient taking differently: Take 300 mg by mouth once daily.)    albuterol (VENTOLIN HFA) 90 mcg/actuation inhaler Inhale 1-2 puffs into the lungs every 6 (six) hours as needed for Wheezing or Shortness of Breath. Rescue    atorvastatin (LIPITOR) 20 MG tablet TAKE 1 TABLET BY MOUTH EVERY DAY    budesonide-formoterol 80-4.5 mcg (SYMBICORT) 80-4.5 mcg/actuation HFAA Inhale 2 puffs into the lungs 2 (two) times daily as needed. Controller    calcium-vitamin D3 (CALCIUM 500 + D) 500 mg(1,250mg) -200 unit per tablet Take 1 tablet by mouth 2 (two) times daily with meals.    carvediloL (COREG) 6.25 MG tablet Take 1 tablet (6.25 mg total) by mouth 2 (two) times daily with meals.    levothyroxine (SYNTHROID) 50 MCG tablet Take 1 tablet (50 mcg total) by mouth before breakfast.    lidocaine HCL 2% (XYLOCAINE) 2 % jelly Apply topically as needed. Apply topically once nightly to affected part of foot/feet.    multivitamin (THERAGRAN) per tablet Take 1 tablet by mouth once daily.    OFEV 150 mg Cap Take by mouth.    oxybutynin (DITROPAN-XL) 10 MG 24 hr tablet Take 1 tablet (10 mg total) by mouth once daily.    predniSONE (DELTASONE) 5 MG tablet Take 0.5 tablets (2.5 mg total) by mouth 3 (three) times daily.       Review of patient's allergies indicates:   Allergen Reactions    Ciprofloxacin Other (See Comments)     Right foot pain and edema, tendonitis    Amlodipine Edema and Swelling     BLE  BLE    Lisinopril Other (See Comments)     cough    Nitrofuran analogues        Past Medical History:   Diagnosis Date    Arthritis     Borderline serous  cystadenoma of right ovary 4/3/2018    Breast cancer     mastectomy 2014    Coccidiomycosis, progressive     Elevated CA-125 2018    Hyperlipidemia     Hypertension     OAB (overactive bladder)     Osteopenia     on Dexa 2017    Osteoporosis     pt reports hx of this, declined fosamax in past - treated with calcium and vit D    Pelvic mass 2018    Pulmonary nodules     Thyroid disease      Past Surgical History:   Procedure Laterality Date    CHOLECYSTECTOMY      ENDOSCOPIC ULTRASOUND OF UPPER GASTROINTESTINAL TRACT N/A 2021    Procedure: ULTRASOUND, UPPER GI TRACT, ENDOSCOPIC;  Surgeon: Aisha Barajas MD;  Location: [facility] ([address] [place] [address]);  Service: Endoscopy;  Laterality: N/A;  UEUS/ERCP evaluation abn MRCP - Dr Angelika Zamudio-19 test 21 at [facility] Pre-admit - pg    ERCP N/A 2021    Procedure: ERCP (ENDOSCOPIC RETROGRADE CHOLANGIOPANCREATOGRAPHY);  Surgeon: Aisha Barajas MD;  [facility] ([facility]  Service: Endoscopy;  Laterality: N/A;  UEUS/ERCP evaluation abn MRCP - Dr Angelika Steinerid-19 test 21 at [facility] Pre-admit - pg    ESOPHAGOGASTRODUODENOSCOPY N/A 2021    Procedure: EGD (ESOPHAGOGASTRODUODENOSCOPY);  Surgeon: Aisha Barajas MD;  [facility]  Service: Endoscopy;  Laterality: N/A;  UEUS/ERCP evaluation abn MRCP - Dr Angelika Steinerid-19 test 21 at [facility] Pre-admit - pg    MASTECTOMY Right          Family History     Problem Relation (Age of Onset)    Breast cancer Mother    Cancer Mother, Brother    Heart disease Father    Hypertension Father, Brother, Son    No Known Problems Sister, Daughter    Stroke Father        Tobacco Use    Smoking status: Former Smoker     Packs/day: 0.50     Years: 30.00     Pack years: 15.00     Types: Cigarettes     Quit date: 2000     Years since quittin.9    Smokeless tobacco: Never Used    Tobacco comment: quit in her 50s, after 30 years smoking;   Substance and  Sexual Activity    Alcohol use: No    Drug use: No    Sexual activity: Never     Review of Systems   Constitutional: Negative for fever.   HENT: Positive for congestion and nosebleeds. Negative for facial swelling, sore throat, tinnitus and trouble swallowing.    Eyes: Negative for discharge.   Respiratory: Negative for apnea, choking and chest tightness.    Cardiovascular: Negative for chest pain.   Gastrointestinal: Negative for abdominal distention.   Endocrine: Negative for cold intolerance and heat intolerance.   Genitourinary: Negative for difficulty urinating.   Musculoskeletal: Negative for arthralgias.   Neurological: Negative for dizziness, facial asymmetry and speech difficulty.   Hematological: Negative for adenopathy. Does not bruise/bleed easily.   Psychiatric/Behavioral: Negative for agitation.     Objective:     Vital Signs (Most Recent):  Temp: 97.8 °F (36.6 °C) (01/18/22 1800)  Pulse: 98 (01/18/22 1800)  Resp: 16 (01/18/22 1800)  BP: 126/75 (01/18/22 1800)  SpO2: 95 % (01/18/22 1800) Vital Signs (24h Range):  Temp:  [97.8 °F (36.6 °C)-97.9 °F (36.6 °C)] 97.8 °F (36.6 °C)  Pulse:  [69-98] 98  Resp:  [16-18] 16  SpO2:  [91 %-97 %] 95 %  BP: (126-202)/() 126/75     Weight: 74.4 kg (164 lb)  Body mass index is 26.47 kg/m².    Date 01/18/22 0700 - 01/19/22 0659   Shift 9872-8545 9606-1065 6795-2962 24 Hour Total   INTAKE   Shift Total(mL/kg)       OUTPUT   Emesis/NG output 120   120   Shift Total(mL/kg) 120(1.6)   120(1.6)   Weight (kg) 74.4 74.4 74.4 74.4       Physical Exam  Vitals and nursing note reviewed.   Constitutional:       General: She is not in acute distress.     Appearance: Normal appearance. She is not ill-appearing or toxic-appearing.   HENT:      Head: Normocephalic and atraumatic.      Right Ear: External ear normal.      Left Ear: External ear normal.      Nose: No congestion.        Mouth/Throat:      Mouth: Mucous membranes are moist.      Pharynx: No oropharyngeal exudate  or posterior oropharyngeal erythema.     Eyes:      Extraocular Movements: Extraocular movements intact.      Pupils: Pupils are equal, round, and reactive to light.   Cardiovascular:      Rate and Rhythm: Normal rate.   Pulmonary:      Effort: Pulmonary effort is normal. No respiratory distress.      Breath sounds: No stridor.   Abdominal:      General: Abdomen is flat.   Musculoskeletal:         General: Normal range of motion.      Cervical back: Normal range of motion. No rigidity.   Skin:     General: Skin is warm.      Capillary Refill: Capillary refill takes less than 2 seconds.      Coloration: Skin is not jaundiced.   Neurological:      General: No focal deficit present.      Mental Status: She is alert and oriented to person, place, and time.         Significant Labs:  BMP:   Recent Labs   Lab 01/18/22  1001         CO2 26   BUN 24*   CREATININE 0.8   CALCIUM 9.3     CBC:   Recent Labs   Lab 01/18/22  1001   WBC 13.85*   RBC 4.63   HGB 13.9   HCT 41.5      MCV 90   MCH 30.0   MCHC 33.5       Significant Diagnostics:  I have reviewed and interpreted all pertinent imaging results/findings within the past 24 hours.

## 2022-01-19 NOTE — CONSULTS
[facility] - Emergency Dept  Otorhinolaryngology-Head & Neck Surgery  Consult Note    Patient Name: Niurka Pedraza  MRN: [id number]  Code Status: Prior  Admission Date: 1/18/2022  Hospital Length of Stay: 0 days  Attending Physician: Danielle Bartholomew MD  Primary Care Provider: Savana Anderson MD    Patient information was obtained from patient and ER records.     Inpatient consult to ENT  Consult performed by: Michael Galindo MD  Consult ordered by: Danielle Bartholomew MD        Subjective:     Chief Complaint/Reason for Admission: epistaxis    History of Present Illness: 76F with PMH of HTN presents as transfer for epistaxis eval. 7.5cm rapid rhino placed at outside hospital with apparent hemostasis. Pt ntoes that bleeding started this AM when her BP was >200 systolic. ED tried 3 separate packs at OSH, 3rd pack placed at 11 this morning. She has had minimal oozing since then. She does report facial pain since pack placed. Not on blood thinners. Never had issues with epistaxis previously.       Medications:  Continuous Infusions:  Scheduled Meds:   tranexamic acid  1,000 mg Intravenous ED 1 Time     PRN Meds:     No current facility-administered medications on file prior to encounter.     Current Outpatient Medications on File Prior to Encounter   Medication Sig    irbesartan (AVAPRO) 300 MG tablet Take 1 tablet (300 mg total) by mouth every evening. (Patient taking differently: Take 300 mg by mouth once daily.)    albuterol (VENTOLIN HFA) 90 mcg/actuation inhaler Inhale 1-2 puffs into the lungs every 6 (six) hours as needed for Wheezing or Shortness of Breath. Rescue    atorvastatin (LIPITOR) 20 MG tablet TAKE 1 TABLET BY MOUTH EVERY DAY    budesonide-formoterol 80-4.5 mcg (SYMBICORT) 80-4.5 mcg/actuation HFAA Inhale 2 puffs into the lungs 2 (two) times daily as needed. Controller    calcium-vitamin D3 (CALCIUM 500 + D) 500 mg(1,250mg) -200 unit per tablet Take 1 tablet by mouth 2 (two) times daily with  meals.    carvediloL (COREG) 6.25 MG tablet Take 1 tablet (6.25 mg total) by mouth 2 (two) times daily with meals.    levothyroxine (SYNTHROID) 50 MCG tablet Take 1 tablet (50 mcg total) by mouth before breakfast.    lidocaine HCL 2% (XYLOCAINE) 2 % jelly Apply topically as needed. Apply topically once nightly to affected part of foot/feet.    multivitamin (THERAGRAN) per tablet Take 1 tablet by mouth once daily.    OFEV 150 mg Cap Take by mouth.    oxybutynin (DITROPAN-XL) 10 MG 24 hr tablet Take 1 tablet (10 mg total) by mouth once daily.    predniSONE (DELTASONE) 5 MG tablet Take 0.5 tablets (2.5 mg total) by mouth 3 (three) times daily.       Review of patient's allergies indicates:   Allergen Reactions    Ciprofloxacin Other (See Comments)     Right foot pain and edema, tendonitis    Amlodipine Edema and Swelling     BLE  BLE    Lisinopril Other (See Comments)     cough    Nitrofuran analogues        Past Medical History:   Diagnosis Date    Arthritis     Borderline serous cystadenoma of right ovary 4/3/2018    Breast cancer     mastectomy 2014    Coccidiomycosis, progressive     Elevated CA-125 2/25/2018    Hyperlipidemia     Hypertension     OAB (overactive bladder)     Osteopenia     on Dexa 11/2017    Osteoporosis     pt reports hx of this, declined fosamax in past - treated with calcium and vit D    Pelvic mass 2/25/2018    Pulmonary nodules     Thyroid disease      Past Surgical History:   Procedure Laterality Date    CHOLECYSTECTOMY      ENDOSCOPIC ULTRASOUND OF UPPER GASTROINTESTINAL TRACT N/A 4/8/2021    Procedure: ULTRASOUND, UPPER GI TRACT, ENDOSCOPIC;  Surgeon: Aisha Barajas MD;  Location: [address];  Service: Endoscopy;  Laterality: N/A;  UEUS/ERCP evaluation abn MRCP - Dr Carney  Covid-19 test 4/5/21 at [facility] Pre-admit - pg    ERCP N/A 4/8/2021    Procedure: ERCP (ENDOSCOPIC RETROGRADE CHOLANGIOPANCREATOGRAPHY);  Surgeon: Aisha Barajas MD;   [facility] ([address] [place] [address]);  Service: Endoscopy;  Laterality: N/A;  UEUS/ERCP evaluation abn MRCP - Dr Carney  Covid-19 test 21 at [facility] Pre-admit - pg    ESOPHAGOGASTRODUODENOSCOPY N/A 2021    Procedure: EGD (ESOPHAGOGASTRODUODENOSCOPY);  Surgeon: Aisha Barajas MD;  Location: [facility] ([address] [place] [address]);  Service: Endoscopy;  Laterality: N/A;  UEUS/ERCP evaluation abn MRCP - Dr Carney  Covid-19 test 21 at [facility] Pre-admit - pg    MASTECTOMY Right          Family History     Problem Relation (Age of Onset)    Breast cancer Mother    Cancer Mother, Brother    Heart disease Father    Hypertension Father, Brother, Son    No Known Problems Sister, Daughter    Stroke Father        Tobacco Use    Smoking status: Former Smoker     Packs/day: 0.50     Years: 30.00     Pack years: 15.00     Types: Cigarettes     Quit date: 2000     Years since quittin.9    Smokeless tobacco: Never Used    Tobacco comment: quit in her 50s, after 30 years smoking;   Substance and Sexual Activity    Alcohol use: No    Drug use: No    Sexual activity: Never     Review of Systems   Constitutional: Negative for fever.   HENT: Positive for congestion and nosebleeds. Negative for facial swelling, sore throat, tinnitus and trouble swallowing.    Eyes: Negative for discharge.   Respiratory: Negative for apnea, choking and chest tightness.    Cardiovascular: Negative for chest pain.   Gastrointestinal: Negative for abdominal distention.   Endocrine: Negative for cold intolerance and heat intolerance.   Genitourinary: Negative for difficulty urinating.   Musculoskeletal: Negative for arthralgias.   Neurological: Negative for dizziness, facial asymmetry and speech difficulty.   Hematological: Negative for adenopathy. Does not bruise/bleed easily.   Psychiatric/Behavioral: Negative for agitation.     Objective:     Vital Signs (Most Recent):  Temp: 97.8 °F (36.6 °C) (22 1800)  Pulse: 98 (22 1800)  Resp:  16 (01/18/22 1800)  BP: 126/75 (01/18/22 1800)  SpO2: 95 % (01/18/22 1800) Vital Signs (24h Range):  Temp:  [97.8 °F (36.6 °C)-97.9 °F (36.6 °C)] 97.8 °F (36.6 °C)  Pulse:  [69-98] 98  Resp:  [16-18] 16  SpO2:  [91 %-97 %] 95 %  BP: (126-202)/() 126/75     Weight: 74.4 kg (164 lb)  Body mass index is 26.47 kg/m².    Date 01/18/22 0700 - 01/19/22 0659   Shift 5404-3055 3098-0720 4876-0786 24 Hour Total   INTAKE   Shift Total(mL/kg)       OUTPUT   Emesis/NG output 120   120   Shift Total(mL/kg) 120(1.6)   120(1.6)   Weight (kg) 74.4 74.4 74.4 74.4       Physical Exam  Vitals and nursing note reviewed.   Constitutional:       General: She is not in acute distress.     Appearance: Normal appearance. She is not ill-appearing or toxic-appearing.   HENT:      Head: Normocephalic and atraumatic.      Right Ear: External ear normal.      Left Ear: External ear normal.      Nose: No congestion.        Mouth/Throat:      Mouth: Mucous membranes are moist.      Pharynx: No oropharyngeal exudate or posterior oropharyngeal erythema.     Eyes:      Extraocular Movements: Extraocular movements intact.      Pupils: Pupils are equal, round, and reactive to light.   Cardiovascular:      Rate and Rhythm: Normal rate.   Pulmonary:      Effort: Pulmonary effort is normal. No respiratory distress.      Breath sounds: No stridor.   Abdominal:      General: Abdomen is flat.   Musculoskeletal:         General: Normal range of motion.      Cervical back: Normal range of motion. No rigidity.   Skin:     General: Skin is warm.      Capillary Refill: Capillary refill takes less than 2 seconds.      Coloration: Skin is not jaundiced.   Neurological:      General: No focal deficit present.      Mental Status: She is alert and oriented to person, place, and time.         Significant Labs:  BMP:   Recent Labs   Lab 01/18/22  1001         CO2 26   BUN 24*   CREATININE 0.8   CALCIUM 9.3     CBC:   Recent Labs   Lab 01/18/22  1001    WBC 13.85*   RBC 4.63   HGB 13.9   HCT 41.5      MCV 90   MCH 30.0   MCHC 33.5       Significant Diagnostics:  I have reviewed and interpreted all pertinent imaging results/findings within the past 24 hours.    Assessment/Plan:     Epistaxis  76F with epistaxis. Hemostatic with rapid rhino in place. Pt and daugher feel comfortable going home with pack in.    -- please start keflex while pack in place  -- nasal saline to bilateral nares while pack in place q4h wa  -- afrin prn bleeding  -- will schedule for pack removal in clinic Friday with Dr Carroll.  -- discussed with staff      VTE Risk Mitigation (From admission, onward)    None          Thank you for your consult. I will sign off. Please contact us if you have any additional questions.    Michael Galindo MD  Otorhinolaryngology-Head & Neck Surgery  [facility] - Emergency Dept

## 2022-01-19 NOTE — DISCHARGE INSTRUCTIONS
You were seen in the emergency department for nose bleed.  Take Keflex as prescribed while the packing is in place.  Apply nasal saline to bilateral nose while packing in place every 4 hours.  Apply Afrin as needed for bleeding.  Follow-up in ENT clinic on Friday with Dr. Carroll for pack removal.  Return the emergency department for worsening bleeding, fever or chills, or any other concerning symptoms.

## 2022-01-19 NOTE — ASSESSMENT & PLAN NOTE
76F with epistaxis. Hemostatic with rapid rhino in place. Pt and daugher feel comfortable going home with pack in.    -- please start keflex while pack in place  -- nasal saline to bilateral nares while pack in place q4h wa  -- afrin prn bleeding  -- will schedule for pack removal in clinic Friday with Dr Carroll.  -- discussed with staff

## 2022-01-19 NOTE — HPI
76F with PMH of HTN presents as transfer for epistaxis eval. 7.5cm rapid rhino placed at outside hospital with apparent hemostasis. Pt ntoes that bleeding started this AM when her BP was >200 systolic. ED tried 3 separate packs at OSH, 3rd pack placed at 11 this morning. She has had minimal oozing since then. She does report facial pain since pack placed. Not on blood thinners. Never had issues with epistaxis previously.

## 2022-01-19 NOTE — ED PROVIDER NOTES
Encounter Date: 1/18/2022       History     Chief Complaint   Patient presents with    Epistaxis     Pt reports nosebleed starting this morning. Denies injury, denies use of blood thinners. No other symptoms at this time.      76F with PMh pulmonary disease presents as transfer from  for ongoing epistaxis.  Patient not on blood thinners, but this morning developed left epistaxis.  Multiple attempts were made for hemostasis, ultimately with 7.5 rhino rocket.  Daughter at bedside states the present rhino rocket has not had any fresh bleeding since 3:00 p.m..  Patient has been transferred for ENT evaluation.  Patient endorses nausea, although she was given antiemetic just prior to arrival.  She endorses generalized headache.  Denies shortness of breath or lightheadedness.         Review of patient's allergies indicates:   Allergen Reactions    Ciprofloxacin Other (See Comments)     Right foot pain and edema, tendonitis    Amlodipine Edema and Swelling     BLE  BLE    Lisinopril Other (See Comments)     cough    Nitrofuran analogues      Past Medical History:   Diagnosis Date    Arthritis     Borderline serous cystadenoma of right ovary 4/3/2018    Breast cancer     mastectomy 2014    Coccidiomycosis, progressive     Elevated CA-125 2/25/2018    Hyperlipidemia     Hypertension     OAB (overactive bladder)     Osteopenia     on Dexa 11/2017    Osteoporosis     pt reports hx of this, declined fosamax in past - treated with calcium and vit D    Pelvic mass 2/25/2018    Pulmonary nodules     Thyroid disease      Past Surgical History:   Procedure Laterality Date    CHOLECYSTECTOMY      ENDOSCOPIC ULTRASOUND OF UPPER GASTROINTESTINAL TRACT N/A 4/8/2021    Procedure: ULTRASOUND, UPPER GI TRACT, ENDOSCOPIC;  Surgeon: Aisha Barajas MD;  Location: [facility] ([address] [place] [address]);  Service: Endoscopy;  Laterality: N/A;  UEUS/ERCP evaluation abn MRCP - Dr Carney  Covid-19 test 4/5/21 at [facility]  Pre-admit - pg    ERCP N/A 2021    Procedure: ERCP (ENDOSCOPIC RETROGRADE CHOLANGIOPANCREATOGRAPHY);  Surgeon: Aisha Barajas MD;  Location: [facility] ([address] [place] [address]);  Service: Endoscopy;  Laterality: N/A;  UEUS/ERCP evaluation abn MRCP - Dr Angelika Steinerid-19 test 21 at [facility] Pre-admit - pg    ESOPHAGOGASTRODUODENOSCOPY N/A 2021    Procedure: EGD (ESOPHAGOGASTRODUODENOSCOPY);  Surgeon: Aisha Barajas MD;  Location: [facility] ([address] [place] [address]);  Service: Endoscopy;  Laterality: N/A;  UEUS/ERCP evaluation abn MRCP Hortencia Steinerid-19 test 21 at [facility] Pre-admit - pg    MASTECTOMY Right     2014     Family History   Problem Relation Age of Onset    Cancer Mother         colon cancer    Breast cancer Mother     Hypertension Father     Heart disease Father     Stroke Father     No Known Problems Sister     Cancer Brother         lung, ++ tobacco    Hypertension Brother     No Known Problems Daughter     Hypertension Son     Colon cancer Neg Hx     Ovarian cancer Neg Hx      Social History     Tobacco Use    Smoking status: Former Smoker     Packs/day: 0.50     Years: 30.00     Pack years: 15.00     Types: Cigarettes     Quit date: 2000     Years since quittin.9    Smokeless tobacco: Never Used    Tobacco comment: quit in her 50s, after 30 years smoking;   Substance Use Topics    Alcohol use: No    Drug use: No     Review of Systems   Constitutional: Negative for chills and fever.   HENT: Positive for nosebleeds. Negative for congestion.    Respiratory: Negative for cough, chest tightness and shortness of breath.    Gastrointestinal: Positive for nausea and vomiting. Negative for abdominal pain.   Skin: Negative for pallor.   Neurological: Positive for headaches. Negative for dizziness, weakness, light-headedness and numbness.       Physical Exam     Initial Vitals [22 0814]   BP Pulse Resp Temp SpO2   (!) 178/87 85 18 97.9 °F (36.6 °C) 95 %      MAP       --          Physical Exam    Nursing note and vitals reviewed.  Constitutional: She appears well-developed and well-nourished. She is not diaphoretic.   Appears uncomfortable   HENT:   Head: Normocephalic and atraumatic.   Rhino rocket with dried blood in left naris, no blood in oropharynx   Eyes: EOM are normal. Pupils are equal, round, and reactive to light.   Neck:   Normal range of motion.  Musculoskeletal:         General: Normal range of motion.      Cervical back: Normal range of motion.     Neurological: She is alert and oriented to person, place, and time. She has normal strength.   Skin: Skin is warm and dry. Capillary refill takes less than 2 seconds. No pallor.   Psychiatric: She has a normal mood and affect. Her behavior is normal. Judgment and thought content normal.         ED Course   Procedures  Labs Reviewed   CBC W/ AUTO DIFFERENTIAL - Abnormal; Notable for the following components:       Result Value    WBC 13.85 (*)     RDW 17.4 (*)     MPV 8.5 (*)     Immature Granulocytes 0.6 (*)     Gran # (ANC) 10.7 (*)     Immature Grans (Abs) 0.08 (*)     Gran % 77.5 (*)     Lymph % 16.8 (*)     All other components within normal limits   BASIC METABOLIC PANEL - Abnormal; Notable for the following components:    BUN 24 (*)     All other components within normal limits          Imaging Results    None          Medications   tranexamic acid injection Soln 1,000 mg (1,000 mg Intravenous Not Given 1/18/22 1100)   oxymetazoline 0.05 % nasal spray 1 spray (1 spray Each Nostril Given 1/18/22 0852)   hydrALAZINE injection 10 mg (10 mg Intravenous Given 1/18/22 0958)   morphine injection 2 mg (2 mg Intravenous Given 1/18/22 1112)   LIDOcaine HCl 2% oral solution 5 mL (5 mLs Oral Given 1/18/22 1217)   labetaloL injection 10 mg (10 mg Intravenous Given 1/18/22 1217)   hydrALAZINE injection 10 mg (10 mg Intravenous Given 1/18/22 1329)   ondansetron injection 4 mg (4 mg Intravenous Given 1/18/22 1436)   HYDROmorphone injection  0.5 mg (0.5 mg Intravenous Given 1/18/22 4455)   ondansetron injection 4 mg (4 mg Intravenous Given 1/18/22 1718)     Medical Decision Making:   History:   Old Medical Records: I decided to obtain old medical records.  Old Records Summarized: records from clinic visits and records from previous admission(s).       <> Summary of Records: Seen at [facility], blood pressure management with IV hydralazine, multiple attempts at hemostasis including 5.5 and then 7.5 rhino rocket.  Initial Assessment:   Patient appears uncomfortable but normal mentation, rhino rocket in left naris with dried blood and no evidence of active epistaxis.  Will consult with ENT, plan antibiotics for nasal packing.  Will order CBC to determine if significant blood loss since this morning, as well as INR.  Differential Diagnosis:   Epistaxis  ED Management:  Patient seen by ENT, they feel patient is stable for discharge with Keflex.  Patient has headache, will give Tylenol.  Repeat CBC, INR, and point of care glucose initially ordered upon patient's arrival but not obtained prior to ENT evaluation.  Pt remains HD stable, stable for d/c, I discussed outpatient follow up and return precautions with pt and answered all questions.                         Clinical Impression:   Final diagnoses:  [R04.0] Left-sided epistaxis (Primary)  [I16.0] Hypertensive urgency          ED Disposition Condition    Discharge Stable              Danielle Bartholomew MD  01/19/22 4015

## 2022-01-20 ENCOUNTER — PATIENT MESSAGE (OUTPATIENT)
Dept: INTERNAL MEDICINE | Facility: CLINIC | Age: 77
End: 2022-01-20
Payer: MEDICARE

## 2022-01-20 DIAGNOSIS — I10 HYPERTENSION, BENIGN: ICD-10-CM

## 2022-01-21 ENCOUNTER — OFFICE VISIT (OUTPATIENT)
Dept: OTOLARYNGOLOGY | Facility: CLINIC | Age: 77
End: 2022-01-21
Payer: MEDICARE

## 2022-01-21 VITALS
SYSTOLIC BLOOD PRESSURE: 124 MMHG | DIASTOLIC BLOOD PRESSURE: 75 MMHG | BODY MASS INDEX: 24.53 KG/M2 | WEIGHT: 152 LBS | HEART RATE: 96 BPM

## 2022-01-21 DIAGNOSIS — R04.0 EPISTAXIS: ICD-10-CM

## 2022-01-21 DIAGNOSIS — J34.2 NASAL SEPTAL DEVIATION: Primary | ICD-10-CM

## 2022-01-21 PROCEDURE — 31238 PR NASAL/SINUS ENDOSCOPY,W/CONTROL NASAL HEM: ICD-10-PCS | Mod: S$PBB,LT,, | Performed by: STUDENT IN AN ORGANIZED HEALTH CARE EDUCATION/TRAINING PROGRAM

## 2022-01-21 PROCEDURE — 99999 PR PBB SHADOW E&M-EST. PATIENT-LVL III: ICD-10-PCS | Mod: PBBFAC,,, | Performed by: STUDENT IN AN ORGANIZED HEALTH CARE EDUCATION/TRAINING PROGRAM

## 2022-01-21 PROCEDURE — 31238 NSL/SINS NDSC SRG NSL HEMRRG: CPT | Mod: PBBFAC | Performed by: STUDENT IN AN ORGANIZED HEALTH CARE EDUCATION/TRAINING PROGRAM

## 2022-01-21 PROCEDURE — 99213 PR OFFICE/OUTPT VISIT, EST, LEVL III, 20-29 MIN: ICD-10-PCS | Mod: 25,S$PBB,, | Performed by: STUDENT IN AN ORGANIZED HEALTH CARE EDUCATION/TRAINING PROGRAM

## 2022-01-21 PROCEDURE — 99213 OFFICE O/P EST LOW 20 MIN: CPT | Mod: 25,S$PBB,, | Performed by: STUDENT IN AN ORGANIZED HEALTH CARE EDUCATION/TRAINING PROGRAM

## 2022-01-21 PROCEDURE — 99213 OFFICE O/P EST LOW 20 MIN: CPT | Mod: PBBFAC,25 | Performed by: STUDENT IN AN ORGANIZED HEALTH CARE EDUCATION/TRAINING PROGRAM

## 2022-01-21 PROCEDURE — 99999 PR PBB SHADOW E&M-EST. PATIENT-LVL III: CPT | Mod: PBBFAC,,, | Performed by: STUDENT IN AN ORGANIZED HEALTH CARE EDUCATION/TRAINING PROGRAM

## 2022-01-21 PROCEDURE — 31238 NSL/SINS NDSC SRG NSL HEMRRG: CPT | Mod: S$PBB,LT,, | Performed by: STUDENT IN AN ORGANIZED HEALTH CARE EDUCATION/TRAINING PROGRAM

## 2022-01-21 RX ORDER — MONTELUKAST SODIUM 10 MG/1
10 TABLET ORAL DAILY
COMMUNITY
Start: 2022-01-17 | End: 2022-04-19

## 2022-01-21 RX ORDER — CARVEDILOL 12.5 MG/1
12.5 TABLET ORAL 2 TIMES DAILY WITH MEALS
Qty: 60 TABLET | Refills: 2 | Status: SHIPPED | OUTPATIENT
Start: 2022-01-21 | End: 2022-01-23

## 2022-01-21 NOTE — PATIENT INSTRUCTIONS
Patient Education       Nosebleeds Discharge Instructions   About this topic   The nose has many small blood vessels. These blood vessels warm and moisten the air you breathe. They lie close to the surface, and that makes it easy for them to get injured. The most common causes of a nosebleed are dryness, colds, and picking your nose. A nosebleed can also happen after an injury. When you have a nosebleed, blood comes out of your nostrils.     What care is needed at home?   · Ask your doctor what you need to do when you go home. Make sure you ask questions if you do not understand what the doctor says. This way you will know what you need to do.  · To keep from getting another nosebleed right away, for the next 24 hours avoid:  ? Heavy lifting  ? Bending over  ? Blowing your nose very hard  ? Picking your nose  · If your nose starts to bleed again, follow these steps:  ? Blow your nose gently. This may increase bleeding for a moment.  ? Sit, leaning forward slightly. Do not lie down or the blood will run down your throat.  ? Pinch the soft area towards the bottom of your nose, just below the bony part.  ? Hold it shut for at least 5 to 15 minutes. Do not check sooner to see if bleeding has stopped.  ? If your nose keeps bleeding, repeat these steps one time, but hold your nose pinched shut for 10 to 30 minutes. If it continues to bleed, go to the ER or call your doctor.  ? You can also use 2 sprays of oxymetolazone or phenylephrine, an over-the-counter nose spray, in the bleeding nostril. Do not do this more than 3 days in a row.  · Place an ice pack or a bag of frozen peas wrapped in a towel over your nose. Never put ice right on the skin. Do not leave the ice on more than 10 to 15 minutes at a time.  What follow-up care is needed?   Your doctor may ask you to make visits to the office to check on your progress. Be sure to keep these visits.  What drugs may be needed?   The doctor may order drugs to:  · Fight an  infection  · Prevent dryness of your nose  Will physical activity be limited?   You may need to limit your activity. Talk to your doctor about the activities you can do.  What can be done to prevent this health problem?   · Use a saline nasal spray or saline nose drops to avoid dry nose.  · Try keeping the air in your house moist. Run a humidifier.  · Open your mouth when you sneeze.  · Breathe in the steam from a basin of hot water or hot bath when you have a cold. This will help loosen any hard mucus inside your nose.  · Do not pick your nose or put anything into your nose.  · Ask your doctor first before taking any drugs.  · Dont smoke or use e-cigarettes.  When do I need to call the doctor?   · Often have nosebleeds  · Nosebleed happens after you start taking a new drug  · Nosebleed happens with bruising all over the body  · Nosebleed does not stop after 15 to 20 minutes  Teach Back: Helping You Understand   The Teach Back Method helps you understand the information we are giving you. After you talk with the staff, tell them in your own words what you learned. This helps to make sure the staff has described each thing clearly. It also helps to explain things that may have been confusing. Before going home, make sure you can do these:  · I can tell you about my condition.  · I can tell you what I will do if my nose starts to bleed again.  · I can tell you what I will do if I have more nosebleeds or the nosebleed does not stop after 15 to 20 minutes.  Where can I learn more?   American Academy of Pediatrics  https://www.healthychildren.org/English/health-issues/injuries-emergencies/Pages/How-to-Stop-a-Nosebleed.aspx   KidsHealth  http://kidshealth.org/teen/safety/first_aid/nosebleeds.html#   NHS Choices  http://www.nhs.uk/conditions/nosebleed/pages/introduction.aspx   Last Reviewed Date   2020-01-15  Consumer Information Use and Disclaimer   This information is not specific medical advice and does not replace  information you receive from your health care provider. This is only a brief summary of general information. It does NOT include all information about conditions, illnesses, injuries, tests, procedures, treatments, therapies, discharge instructions or life-style choices that may apply to you. You must talk with your health care provider for complete information about your health and treatment options. This information should not be used to decide whether or not to accept your health care providers advice, instructions or recommendations. Only your health care provider has the knowledge and training to provide advice that is right for you.  Copyright   Copyright © 2021 [employer] and its affiliates and/or licensors. All rights reserved.

## 2022-01-21 NOTE — TELEPHONE ENCOUNTER
Ok, please thank her for the update!   I sent in the 12.5mg dose of carvedilol to start taking now please let her know  - please arrange NV for BP check in 2 weeks

## 2022-01-21 NOTE — PROGRESS NOTES
Subjective:     Niurka Pedraza is a 76 y.o. female who was referred to me by Dr. Danielle Bartholomew in consultation for nosebleeds.     She has not previously had nasal cauterization.  The bleeding has required packing for control.  The last episode was 3 days ago, and is not currently active.  There is not a prior history of nasal surgery.  There is not a prior history of nasal trauma.  There is not a history of environmental allergies.  She does not currently use a nasal spray.  There is not a family history to suggest a clotting disorder.  She does currently take a blood-thinning agent.    Has left sided nasal pack placed in ED. Had SBP over 200 mmHg in the ED. Bleeding subsided after BP was under control.     Past Medical History  She has a past medical history of Arthritis, Borderline serous cystadenoma of right ovary, Breast cancer, Coccidiomycosis, progressive, Elevated CA-125, Hyperlipidemia, Hypertension, OAB (overactive bladder), Osteopenia, Osteoporosis, Pelvic mass, Pulmonary nodules, and Thyroid disease.    Past Surgical History  She has a past surgical history that includes Cholecystectomy; Mastectomy (Right); Esophagogastroduodenoscopy (N/A, 4/8/2021); Endoscopic ultrasound of upper gastrointestinal tract (N/A, 4/8/2021); and ERCP (N/A, 4/8/2021).    Family History  Her family history includes Breast cancer in her mother; Cancer in her brother and mother; Heart disease in her father; Hypertension in her brother, father, and son; No Known Problems in her daughter and sister; Stroke in her father.    Social History  She reports that she quit smoking about 21 years ago. Her smoking use included cigarettes. She has a 15.00 pack-year smoking history. She has never used smokeless tobacco. She reports that she does not drink alcohol and does not use drugs.    Allergies  She is allergic to ciprofloxacin, amlodipine, lisinopril, and nitrofuran analogues.    Medications  She has a current medication list which  includes the following prescription(s): albuterol, atorvastatin, budesonide-formoterol 80-4.5 mcg, calcium-vitamin d3, carvedilol, cephalexin, irbesartan, levothyroxine, lidocaine hcl 2%, montelukast, multivitamin, ofev, oxybutynin, oxymetazoline, and prednisone.    Review of Systems   HENT: Positive for ear pain, facial swelling, nosebleeds, runny nose and stuffy nose.               Objective:     /75 (BP Location: Left arm, Patient Position: Sitting)   Pulse 96   Wt 68.9 kg (152 lb)   LMP 02/08/2000   BMI 24.53 kg/m²      Constitutional:   Vital signs are normal. She appears well-developed. Normal speech.      Head:  Normocephalic and atraumatic.     Ears:    Right Ear: No drainage or tenderness. No middle ear effusion.   Left Ear: No drainage or tenderness.  No middle ear effusion.     Nose:        See nasal endoscopy for future details    Mouth/Throat  Oropharynx clear and moist without lesions or asymmetry and normal uvula midline. No trismus. No oropharyngeal exudate. Mirror exam not performed due to patient tolerance.  Mirror exam not performed due to patient tolerance.      Neck:  Neck normal without thyromegaly masses, asymmetry, normal tracheal structure, crepitus, and tenderness, thyroid normal and trachea normal.     Pulmonary/Chest:   Effort normal.     Psychiatric:   She has a normal mood and affect. Her speech is normal.     Skin:   No abrasions, lacerations, lesions, or rashes.       Procedure    Nasal Endoscopy:  1/21/2022    The use of diagnostic nasal endoscopy was considered medically necessary for the evaluation and visualization of the nasal anatomy for symptoms suggestive of nasal or sinus origin. Physical examination (including a nasal speculum evaluation) did not provide sufficient clinical information to establish a diagnosis, or symptoms did not improve or worsened following treatment.     The nasal cavity was decongested with topical 1% phenylephrine and anesthetized with 4%  lidocaine.  A rigid 0-degree endoscope was introduced into the nasal cavity.    The patient was seated in the examination chair. After discussion of risks and benefits, a nasal endoscope was inserted into the nose the endoscope was passed along the left nasal floor to the nasopharynx. It was then passed between the middle and superior meatus, nasal turbinates, nasal septum, nasopharynx and sphenoethmoid region. The nasal endoscope was withdrawn and there was no complications. An identical procedure was performed on the right side. I was present for the entire procedure.The patient tolerated the above procedure well. The findings of this procedure can be found in the dictated note from 1/21/2022 visit.      Right sided nasal passage without evidence of bleeding. Once the Rhinorocket was removed from the left a brisk bleed was noted to be eminating from the left anterior septum. Hemostatic vasoconstrictors were used to control the bleeding. And AgNO3 was applied topically. Posisep was placed in the left cavity to assist with healing and hemostasis.     Nasal Endoscopy with Control of Epistaxis    After written consent was obtained the nose was anesthetized with topical pontocaine and phenylephrine pledgets.  silver nitrate was used to cauterize the prominent vascularity on the left anterior nasal septum.  The patient tolerated the procedure well and there were no complications.  Hemostasis was obtained.  Bacitracin ointment was placed after cauterization.    Data Reviewed    Hemoglobin (g/dL)   Date Value   01/18/2022 13.9     Hematocrit (%)   Date Value   01/18/2022 41.5     Platelets (K/uL)   Date Value   01/18/2022 409     No results found for: LABPROT  aPTT (sec)   Date Value   10/25/2021 29.5     INR (no units)   Date Value   10/25/2021 1.0            Assessment:     1. Nasal septal deviation    2. Epistaxis         Plan:     I had a long discussion with the patient and her daughter regarding her condition and  the further workup and management options.      Do not blow nose for 1 week.  Sneeze with mouth open.  Do not take ibuprofen or aspirin for 1 week.  Place saline nasal gel in nostrils at bedtime.  May use saline nose drops during the day to prevent dryness.  If bleeding recurs, use oxymetazoline (Afrin) spray in the nostril and call for follow-up appointment.    Follow up in about 2 weeks (around 2/4/2022).

## 2022-01-23 ENCOUNTER — PATIENT MESSAGE (OUTPATIENT)
Dept: INTERNAL MEDICINE | Facility: CLINIC | Age: 77
End: 2022-01-23
Payer: MEDICARE

## 2022-01-23 DIAGNOSIS — I10 HYPERTENSION, BENIGN: ICD-10-CM

## 2022-01-23 RX ORDER — CARVEDILOL 6.25 MG/1
6.25 TABLET ORAL 2 TIMES DAILY WITH MEALS
Qty: 60 TABLET | Refills: 2 | Status: SHIPPED | OUTPATIENT
Start: 2022-01-23 | End: 2022-06-05

## 2022-01-23 NOTE — TELEPHONE ENCOUNTER
Please thank her for the update!   Will dec coreg back to 6.25 - if has any further bg readings less than 90/60 will decrease this further to 3.25mg twice daily   - rec continue current dose of irbesartan 300mg     - please call pharmacy and discontinue 12.5mg dose Rx

## 2022-01-25 ENCOUNTER — PATIENT MESSAGE (OUTPATIENT)
Dept: NEUROLOGY | Facility: CLINIC | Age: 77
End: 2022-01-25
Payer: MEDICARE

## 2022-01-31 ENCOUNTER — EXTERNAL CHRONIC CARE MANAGEMENT (OUTPATIENT)
Dept: PRIMARY CARE CLINIC | Facility: CLINIC | Age: 77
End: 2022-01-31
Payer: MEDICARE

## 2022-01-31 PROCEDURE — 99490 CHRNC CARE MGMT STAFF 1ST 20: CPT | Mod: S$PBB,,, | Performed by: INTERNAL MEDICINE

## 2022-01-31 PROCEDURE — 99490 PR CHRONIC CARE MGMT, 1ST 20 MIN: ICD-10-PCS | Mod: S$PBB,,, | Performed by: INTERNAL MEDICINE

## 2022-01-31 PROCEDURE — 99490 CHRNC CARE MGMT STAFF 1ST 20: CPT | Mod: PBBFAC | Performed by: INTERNAL MEDICINE

## 2022-02-03 ENCOUNTER — CLINICAL SUPPORT (OUTPATIENT)
Dept: INTERNAL MEDICINE | Facility: CLINIC | Age: 77
End: 2022-02-03
Payer: MEDICARE

## 2022-02-03 VITALS — SYSTOLIC BLOOD PRESSURE: 116 MMHG | OXYGEN SATURATION: 95 % | HEART RATE: 81 BPM | DIASTOLIC BLOOD PRESSURE: 72 MMHG

## 2022-02-03 PROCEDURE — 99999 PR PBB SHADOW E&M-EST. PATIENT-LVL II: CPT | Mod: PBBFAC,,,

## 2022-02-03 PROCEDURE — 99999 PR PBB SHADOW E&M-EST. PATIENT-LVL II: ICD-10-PCS | Mod: PBBFAC,,,

## 2022-02-03 PROCEDURE — 99212 OFFICE O/P EST SF 10 MIN: CPT | Mod: PBBFAC

## 2022-02-03 NOTE — Clinical Note
Does patient have record of home blood pressure readings yes. Readings have been averaging  (statesBP readings are on her connection to [facility] ) Last dose of blood pressure medication was taken at 0900 this morning Patient is asymptomatic.   /80    Pulse: 83  Blood pressure reading after 15 minutes was 116/72, Pulse 81.   notified.

## 2022-02-03 NOTE — PROGRESS NOTES
Niurka Pedraza 76 y.o. female is here today for Blood Pressure check.   History of HTN yes.    Review of patient's allergies indicates:   Allergen Reactions    Ciprofloxacin Other (See Comments)     Right foot pain and edema, tendonitis    Amlodipine Edema and Swelling     BLE  BLE    Lisinopril Other (See Comments)     cough    Nitrofuran analogues      Creatinine   Date Value Ref Range Status   01/18/2022 0.8 0.5 - 1.4 mg/dL Final     Sodium   Date Value Ref Range Status   01/18/2022 140 136 - 145 mmol/L Final     Potassium   Date Value Ref Range Status   01/18/2022 4.1 3.5 - 5.1 mmol/L Final   ]  Patient verifies taking blood pressure medications on a regular basis at the same time of the day.     Current Outpatient Medications:     albuterol (VENTOLIN HFA) 90 mcg/actuation inhaler, Inhale 1-2 puffs into the lungs every 6 (six) hours as needed for Wheezing or Shortness of Breath. Rescue, Disp: 18 g, Rfl: 1    atorvastatin (LIPITOR) 20 MG tablet, TAKE 1 TABLET BY MOUTH EVERY DAY, Disp: 90 tablet, Rfl: 2    budesonide-formoterol 80-4.5 mcg (SYMBICORT) 80-4.5 mcg/actuation HFAA, Inhale 2 puffs into the lungs 2 (two) times daily as needed. Controller, Disp: 1 Inhaler, Rfl: 6    calcium-vitamin D3 (CALCIUM 500 + D) 500 mg(1,250mg) -200 unit per tablet, Take 1 tablet by mouth 2 (two) times daily with meals., Disp: , Rfl:     carvediloL (COREG) 6.25 MG tablet, Take 1 tablet (6.25 mg total) by mouth 2 (two) times daily with meals., Disp: 60 tablet, Rfl: 2    irbesartan (AVAPRO) 300 MG tablet, Take 1 tablet (300 mg total) by mouth every evening. (Patient taking differently: Take 300 mg by mouth once daily.), Disp: 90 tablet, Rfl: 3    levothyroxine (SYNTHROID) 50 MCG tablet, Take 1 tablet (50 mcg total) by mouth before breakfast., Disp: 90 tablet, Rfl: 0    lidocaine HCL 2% (XYLOCAINE) 2 % jelly, Apply topically as needed. Apply topically once nightly to affected part of foot/feet., Disp: 30 mL, Rfl: 2     montelukast (SINGULAIR) 10 mg tablet, Take 10 mg by mouth once daily., Disp: , Rfl:     multivitamin (THERAGRAN) per tablet, Take 1 tablet by mouth once daily., Disp: , Rfl:     OFEV 150 mg Cap, Take by mouth., Disp: , Rfl:     oxybutynin (DITROPAN-XL) 10 MG 24 hr tablet, Take 1 tablet (10 mg total) by mouth once daily., Disp: 30 tablet, Rfl: 11    predniSONE (DELTASONE) 5 MG tablet, Take 0.5 tablets (2.5 mg total) by mouth 3 (three) times daily., Disp: 50 tablet, Rfl: 1     Does patient have record of home blood pressure readings yes. Readings have been averaging  (statesBP readings are on her connection to [facility] )  Last dose of blood pressure medication was taken at 0900 this morning  Patient is asymptomatic.    /80    Pulse: 83   Blood pressure reading after 15 minutes was 116/72, Pulse 81.    notified.

## 2022-02-04 ENCOUNTER — TELEPHONE (OUTPATIENT)
Dept: INTERNAL MEDICINE | Facility: CLINIC | Age: 77
End: 2022-02-04
Payer: MEDICARE

## 2022-02-04 NOTE — TELEPHONE ENCOUNTER
----- Message from Linette Cruz, RN sent at 2/3/2022  1:07 PM CST -----  Does patient have record of home blood pressure readings yes. Readings have been averaging  (statesBP readings are on her connection to [facility] )  Last dose of blood pressure medication was taken at 0900 this morning  Patient is asymptomatic.    /80    Pulse: 83   Blood pressure reading after 15 minutes was 116/72, Pulse 81.    notified.

## 2022-02-07 NOTE — TELEPHONE ENCOUNTER
Left message on voicemail     [facility] Discharge Summary      MEGAN Alonso is a female infant born on 2020 at 11:24 AM. She weighed 3.44 kg and measured 19.685 in length. Her head circumference was 33 cm at birth. Apgars were 8  and 9 . She has been doing well and feeding well. Maternal Data:     Delivery Type: Vaginal, Spontaneous    Delivery Resuscitation: Suctioning-bulb; Tactile Stimulation  Number of Vessels: 3 Vessels   Cord Events: Nuchal Cord With Compressions  Meconium Stained: None    Estimated Gestational Age: Information for the patient's mother:  Peyman Bell [[id number]]   39w3d       Prenatal Labs: Information for the patient's mother:  Peyman Bell [[id number]]     Lab Results   Component Value Date/Time    ABO/Rh(D) O NEGATIVE 2019 05:48 AM    Antibody screen NEG 2019 05:48 AM    Antibody screen, External neg 2018    HBsAg, External Negative 2018    HIV, External Non Reactive 2018    Rubella, External Immune 2018    RPR, External Non-Reactive 2018    Gonorrhea, External negative 2013    Chlamydia, External negative 2013    GrBStrep, External Negative 2019    ABO,Rh O Neg 2018        Nursery Course:    Immunization History   Administered Date(s) Administered    Hep B, Adol/Ped 2019     [place] Hearing Screen  Hearing Screen: Yes  Left Ear: Pass  Right Ear: Pass  Repeat Hearing Screen Needed: No    Discharge Exam:     Pulse 136, temperature 98.1 °F (36.7 °C), resp. rate 48, height 0.5 m, weight 3.215 kg, head circumference 33 cm. General: healthy-appearing, vigorous infant. Strong cry.   Head: sutures lines are open,fontanelles soft, flat and open  Eyes: sclerae white, pupils equal and reactive, red reflex normal bilaterally  Ears: well-positioned, well-formed pinnae  Nose: clear, normal mucosa  Mouth: Normal tongue, palate intact,  Neck: normal structure  Chest: lungs clear to auscultation, unlabored breathing, no clavicular crepitus  Heart: RRR, S1 S2, no murmurs  Abd: Soft, non-tender, no masses, no HSM, nondistended, umbilical stump clean and dry  Pulses: strong equal femoral pulses, brisk capillary refill  Hips: Right hip click, left normal  : Normal genitalia  Extremities: well-perfused, warm and dry  Neuro: easily aroused  Good symmetric tone and strength  Positive root and suck. Symmetric normal reflexes  Skin: warm and pink    Intake and Output:    05/10 0701 - 05/10 1900  In: 25 [P.O.:25]  Out: -   Urine Occurrence(s): 1 Stool Occurrence(s): 1     Labs:    Recent Results (from the past 96 hour(s))   CORD BLOOD EVALUATION    Collection Time: 19 11:24 AM   Result Value Ref Range    ABO/Rh(D) O NEGATIVE     FRANCIA IgG NEG     WEAK D NEG    BILIRUBIN, FRACTIONATED    Collection Time: 05/10/19 12:36 AM   Result Value Ref Range    Bilirubin, total 9.1 (H) <8.0 MG/DL    Bilirubin, direct 0.3 (H) <0.21 MG/DL    Bilirubin, indirect 8.8 (H) 0.0 - 1.1 MG/DL       Feeding method:    Feeding Method Used: Breast feeding      CHD Screen:  Pre Ductal O2 Sat (%): 97   Post Ductal O2 Sat (%): 97     Assessment:     Active Problems:    Normal  (single liveborn) (4229)      Hip click in  ()         Plan:     Continue routine care. Discharge 2019. Will reevaluate right hip at next office follow up. Follow-up:   As scheduled.   Special Instructions:

## 2022-02-08 ENCOUNTER — OFFICE VISIT (OUTPATIENT)
Dept: OTOLARYNGOLOGY | Facility: CLINIC | Age: 77
End: 2022-02-08
Payer: MEDICARE

## 2022-02-08 VITALS
WEIGHT: 152 LBS | HEART RATE: 88 BPM | DIASTOLIC BLOOD PRESSURE: 81 MMHG | SYSTOLIC BLOOD PRESSURE: 131 MMHG | BODY MASS INDEX: 24.53 KG/M2

## 2022-02-08 DIAGNOSIS — J34.2 NASAL SEPTAL DEVIATION: ICD-10-CM

## 2022-02-08 DIAGNOSIS — R04.0 EPISTAXIS: Primary | ICD-10-CM

## 2022-02-08 PROCEDURE — 31231 PR NASAL ENDOSCOPY, DX: ICD-10-PCS | Mod: S$PBB,,, | Performed by: STUDENT IN AN ORGANIZED HEALTH CARE EDUCATION/TRAINING PROGRAM

## 2022-02-08 PROCEDURE — 99213 OFFICE O/P EST LOW 20 MIN: CPT | Mod: 25,S$PBB,, | Performed by: STUDENT IN AN ORGANIZED HEALTH CARE EDUCATION/TRAINING PROGRAM

## 2022-02-08 PROCEDURE — 31231 NASAL ENDOSCOPY DX: CPT | Mod: PBBFAC | Performed by: STUDENT IN AN ORGANIZED HEALTH CARE EDUCATION/TRAINING PROGRAM

## 2022-02-08 PROCEDURE — 99999 PR PBB SHADOW E&M-EST. PATIENT-LVL IV: CPT | Mod: PBBFAC,,, | Performed by: STUDENT IN AN ORGANIZED HEALTH CARE EDUCATION/TRAINING PROGRAM

## 2022-02-08 PROCEDURE — 99213 PR OFFICE/OUTPT VISIT, EST, LEVL III, 20-29 MIN: ICD-10-PCS | Mod: 25,S$PBB,, | Performed by: STUDENT IN AN ORGANIZED HEALTH CARE EDUCATION/TRAINING PROGRAM

## 2022-02-08 PROCEDURE — 99999 PR PBB SHADOW E&M-EST. PATIENT-LVL IV: ICD-10-PCS | Mod: PBBFAC,,, | Performed by: STUDENT IN AN ORGANIZED HEALTH CARE EDUCATION/TRAINING PROGRAM

## 2022-02-08 PROCEDURE — 31231 NASAL ENDOSCOPY DX: CPT | Mod: S$PBB,,, | Performed by: STUDENT IN AN ORGANIZED HEALTH CARE EDUCATION/TRAINING PROGRAM

## 2022-02-08 PROCEDURE — 99214 OFFICE O/P EST MOD 30 MIN: CPT | Mod: PBBFAC | Performed by: STUDENT IN AN ORGANIZED HEALTH CARE EDUCATION/TRAINING PROGRAM

## 2022-02-08 NOTE — PROGRESS NOTES
Subjective:      Niurka is a 76 y.o. female who comes for follow-up of epistaxis.  Her last visit with me was on 1/21/2022.  She has been doing well since our last visit. Has had a couple of spots of blood in her tissue when she blows her nose.     Her current sinus regime consists of: Nasal saline sprays    The patient's medications, allergies, past medical, surgical, social and family histories were reviewed and updated as appropriate.    A detailed review of systems was obtained with pertinent positives as per the above HPI, and otherwise negative.        Objective:     /81 (Patient Position: Sitting)   Pulse 88   Wt 68.9 kg (152 lb)   LMP 02/08/2000   BMI 24.53 kg/m²        Constitutional:   Vital signs are normal. She appears well-developed. Normal speech.      Head:  Normocephalic and atraumatic.     Ears:    Right Ear: No drainage or tenderness. No middle ear effusion.   Left Ear: No drainage or tenderness.  No middle ear effusion.     Nose:  Epistaxis is observed.   See nasal endoscopy for details    Mouth/Throat  Oropharynx clear and moist without lesions or asymmetry and normal uvula midline. No trismus. No oropharyngeal exudate. Mirror exam not performed due to patient tolerance.  Mirror exam not performed due to patient tolerance.      Neck:  Neck normal without thyromegaly masses, asymmetry, normal tracheal structure, crepitus, and tenderness, thyroid normal and trachea normal.     Pulmonary/Chest:   Effort normal.     Psychiatric:   She has a normal mood and affect. Her speech is normal.     Skin:   No abrasions, lacerations, lesions, or rashes.       Procedure    Nasal endoscopy performed.  See procedure note.                    Nasal Endoscopy:  2/8/2022    The use of diagnostic nasal endoscopy was considered medically necessary for the evaluation and visualization of the nasal anatomy for symptoms suggestive of nasal or sinus origin. Physical examination (including a nasal speculum  evaluation) did not provide sufficient clinical information to establish a diagnosis, or symptoms did not improve or worsened following treatment.     The nasal cavity was decongested with topical 1% phenylephrine and anesthetized with 4% lidocaine.  A rigid 0-degree endoscope was introduced into the nasal cavity.    The patient was seated in the examination chair. After discussion of risks and benefits, a nasal endoscope was inserted into the nose the endoscope was passed along the left nasal floor to the nasopharynx. It was then passed between the middle and superior meatus, nasal turbinates, nasal septum, nasopharynx and sphenoethmoid region. The nasal endoscope was withdrawn and there was no complications. An identical procedure was performed on the right side. I was present for the entire procedure.The patient tolerated the above procedure well. The findings of this procedure can be found in the dictated note from 2/8/2022 visit.        Significant improvement since last visit. Some irritation and crusting buildup on the lest nose along the IT. There is some new right sided epistaxis. No nasal polyposis. No masses.    Right septum cauterized with AgNO3. Patient tolerated procedure well.     Data Reviewed    WBC (K/uL)   Date Value   01/18/2022 13.85 (H)     Eosinophil % (%)   Date Value   01/18/2022 0.4     Eos # (K/uL)   Date Value   01/18/2022 0.1     Platelets (K/uL)   Date Value   01/18/2022 409     Glucose (mg/dL)   Date Value   01/18/2022 106     IgE (IU/mL)   Date Value   10/15/2021 108 (H)       No sinus imaging available.      Assessment:     1. Epistaxis    2. Nasal septal deviation         Plan:     - Cont nasal saline  - RTC 1 month to assess nasal cavity.     Rudi Carroll MD

## 2022-02-11 ENCOUNTER — TELEPHONE (OUTPATIENT)
Dept: OTOLARYNGOLOGY | Facility: CLINIC | Age: 77
End: 2022-02-11
Payer: MEDICARE

## 2022-02-13 DIAGNOSIS — I10 HYPERTENSION, BENIGN: Primary | ICD-10-CM

## 2022-02-14 RX ORDER — IRBESARTAN 300 MG/1
300 TABLET ORAL NIGHTLY
Qty: 90 TABLET | Refills: 3 | Status: SHIPPED | OUTPATIENT
Start: 2022-02-14 | End: 2023-05-09 | Stop reason: SDUPTHER

## 2022-02-14 NOTE — TELEPHONE ENCOUNTER
No new care gaps identified.  Powered by [employer]. Reference number: [id number].   2/13/2022 7:12:05 PM CST

## 2022-02-17 ENCOUNTER — OFFICE VISIT (OUTPATIENT)
Dept: OTOLARYNGOLOGY | Facility: CLINIC | Age: 77
End: 2022-02-17
Payer: MEDICARE

## 2022-02-17 ENCOUNTER — PES CALL (OUTPATIENT)
Dept: ADMINISTRATIVE | Facility: CLINIC | Age: 77
End: 2022-02-17
Payer: MEDICARE

## 2022-02-17 DIAGNOSIS — J34.2 NASAL SEPTAL DEVIATION: ICD-10-CM

## 2022-02-17 DIAGNOSIS — R04.0 EPISTAXIS: Primary | ICD-10-CM

## 2022-02-17 DIAGNOSIS — J34.89 OTHER SPECIFIED DISORDERS OF NOSE AND NASAL SINUSES: ICD-10-CM

## 2022-02-17 PROCEDURE — 31237 PR NASAL/SINUS ENDOSCOPY,BX/RMV POLYP/DEBRID: ICD-10-PCS | Mod: 50,S$PBB,, | Performed by: STUDENT IN AN ORGANIZED HEALTH CARE EDUCATION/TRAINING PROGRAM

## 2022-02-17 PROCEDURE — 99999 PR PBB SHADOW E&M-EST. PATIENT-LVL III: CPT | Mod: PBBFAC,,, | Performed by: STUDENT IN AN ORGANIZED HEALTH CARE EDUCATION/TRAINING PROGRAM

## 2022-02-17 PROCEDURE — 99213 OFFICE O/P EST LOW 20 MIN: CPT | Mod: 25,S$PBB,, | Performed by: STUDENT IN AN ORGANIZED HEALTH CARE EDUCATION/TRAINING PROGRAM

## 2022-02-17 PROCEDURE — 99213 PR OFFICE/OUTPT VISIT, EST, LEVL III, 20-29 MIN: ICD-10-PCS | Mod: 25,S$PBB,, | Performed by: STUDENT IN AN ORGANIZED HEALTH CARE EDUCATION/TRAINING PROGRAM

## 2022-02-17 PROCEDURE — 99213 OFFICE O/P EST LOW 20 MIN: CPT | Mod: PBBFAC | Performed by: STUDENT IN AN ORGANIZED HEALTH CARE EDUCATION/TRAINING PROGRAM

## 2022-02-17 PROCEDURE — 31237 NSL/SINS NDSC SURG BX POLYPC: CPT | Mod: PBBFAC,50 | Performed by: STUDENT IN AN ORGANIZED HEALTH CARE EDUCATION/TRAINING PROGRAM

## 2022-02-17 PROCEDURE — 31231 NASAL ENDOSCOPY DX: CPT | Mod: PBBFAC | Performed by: STUDENT IN AN ORGANIZED HEALTH CARE EDUCATION/TRAINING PROGRAM

## 2022-02-17 PROCEDURE — 99999 PR PBB SHADOW E&M-EST. PATIENT-LVL III: ICD-10-PCS | Mod: PBBFAC,,, | Performed by: STUDENT IN AN ORGANIZED HEALTH CARE EDUCATION/TRAINING PROGRAM

## 2022-02-17 PROCEDURE — 31237 NSL/SINS NDSC SURG BX POLYPC: CPT | Mod: 50,S$PBB,, | Performed by: STUDENT IN AN ORGANIZED HEALTH CARE EDUCATION/TRAINING PROGRAM

## 2022-02-17 NOTE — PROGRESS NOTES
Subjective:      Niurka is a 76 y.o. female who comes for follow-up of epistaxis.  Her last visit with me was on 2/8/2022.  She has been doing well since our last visit until 2 days ago when she had left sided epistaxis. Patient reports that her episodes of epistaxis are very well correlated with elevations in her blood pressure.     The nosebleed was able to be controlled at home with manual pressure and topical vasoconstrictive agents.     Her current sinus regime consists of: Nasal saline sprays    The patient's medications, allergies, past medical, surgical, social and family histories were reviewed and updated as appropriate.    A detailed review of systems was obtained with pertinent positives as per the above HPI, and otherwise negative.        Objective:     LMP 02/08/2000                    Constitutional:   Vital signs are normal. She appears well-developed. Normal speech.      Head:  Normocephalic and atraumatic.     Ears:    Right Ear: No drainage or tenderness. No middle ear effusion.   Left Ear: No drainage or tenderness.  No middle ear effusion.     Nose:  Epistaxis is observed.   See nasal endoscopy for details    Mouth/Throat  Oropharynx clear and moist without lesions or asymmetry and normal uvula midline. No trismus. No oropharyngeal exudate. Mirror exam not performed due to patient tolerance.  Mirror exam not performed due to patient tolerance.      Neck:  Neck normal without thyromegaly masses, asymmetry, normal tracheal structure, crepitus, and tenderness, thyroid normal and trachea normal.     Pulmonary/Chest:   Effort normal.     Psychiatric:   She has a normal mood and affect. Her speech is normal.     Skin:   No abrasions, lacerations, lesions, or rashes.       Procedure    Nasal endoscopy performed.  See procedure note.    Nasal Endoscopy:  2/17/2022    The use of diagnostic nasal endoscopy was considered medically necessary for the evaluation and visualization of the nasal anatomy  for symptoms suggestive of nasal or sinus origin. Physical examination (including a nasal speculum evaluation) did not provide sufficient clinical information to establish a diagnosis, or symptoms did not improve or worsened following treatment.     The nasal cavity was decongested with topical 1% phenylephrine and anesthetized with 4% lidocaine.  A rigid 0-degree endoscope was introduced into the nasal cavity.    The patient was seated in the examination chair. After discussion of risks and benefits, a nasal endoscope was inserted into the nose the endoscope was passed along the left nasal floor to the nasopharynx. It was then passed between the middle and superior meatus, nasal turbinates, nasal septum, nasopharynx and sphenoethmoid region. The nasal endoscope was withdrawn and there was no complications. An identical procedure was performed on the right side. I was present for the entire procedure.The patient tolerated the above procedure well. The findings of this procedure can be found in the dictated note from 2/17/2022 visit.      Bilateral nasal crusting removed in clinic. Improvement of her nasal septum. There was a minimal amount of excoriation on the left nasal septum, no definitive area to cauterize. More crusting on the right from her previous cautery. Small amount of exposed cartilage with healing tissue. Again no definitive area to cauterize in clinic today.     Data Reviewed    WBC (K/uL)   Date Value   01/18/2022 13.85 (H)     Eosinophil % (%)   Date Value   01/18/2022 0.4     Eos # (K/uL)   Date Value   01/18/2022 0.1     Platelets (K/uL)   Date Value   01/18/2022 409     Glucose (mg/dL)   Date Value   01/18/2022 106     IgE (IU/mL)   Date Value   10/15/2021 108 (H)       No sinus imaging available.      Assessment:     1. Epistaxis    2. Other specified disorders of nose and nasal sinuses    3. Nasal septal deviation         Plan:     I had a long discussion with the patient and her daughter about  management of her continued epistaxis.     As her acute episodes are well correlated with spikes in her blood pressure I have strongly advised her to be evaluated by her PCP for consideration to change/manage her medication better.     We discussed that she may benefit from bilateral SPA ligation with possible septoplasty for this recurrent problem. I informed the patient that SPA ligation can control about 90% of future bleeding episodes, however I would need to preserve some blood flow to the septum to prevent any loss of her septum.     The risks and benefits of endoscopic surgery were discussed with the patient in detail, which include but are not limited to pain, bleeding, infection, the need for reoperation, injury to orbit or orbital contents, injury to brain or meninges, and unforeseeable complications of surgery including myocardial infarction, stroke or pulmonary embolus.    CT [employer] will be ordered for potential surgical intervention. She will reach out to the clinic after her PCP visit to schedule her surgery.     Place saline nasal gel in nostrils at bedtime.  May use saline nose drops during the day to prevent dryness.  If bleeding recurs, use oxymetazoline (Afrin) spray in the nostril and call for follow-up appointment.    Rudi Carroll MD

## 2022-02-21 ENCOUNTER — PES CALL (OUTPATIENT)
Dept: ADMINISTRATIVE | Facility: CLINIC | Age: 77
End: 2022-02-21
Payer: MEDICARE

## 2022-02-22 ENCOUNTER — HOSPITAL ENCOUNTER (OUTPATIENT)
Dept: RADIOLOGY | Facility: OTHER | Age: 77
Discharge: HOME | End: 2022-02-22
Attending: STUDENT IN AN ORGANIZED HEALTH CARE EDUCATION/TRAINING PROGRAM
Payer: MEDICARE

## 2022-02-22 ENCOUNTER — PES CALL (OUTPATIENT)
Dept: ADMINISTRATIVE | Facility: CLINIC | Age: 77
End: 2022-02-22
Payer: MEDICARE

## 2022-02-22 DIAGNOSIS — J34.89 OTHER SPECIFIED DISORDERS OF NOSE AND NASAL SINUSES: ICD-10-CM

## 2022-02-22 PROCEDURE — 70486 CT MAXILLOFACIAL W/O DYE: CPT | Mod: 26,,, | Performed by: RADIOLOGY

## 2022-02-22 PROCEDURE — 70486 CT MEDTRONIC SINUSES WITHOUT: ICD-10-PCS | Mod: 26,,, | Performed by: RADIOLOGY

## 2022-02-22 PROCEDURE — 70486 CT MAXILLOFACIAL W/O DYE: CPT | Mod: TC

## 2022-02-28 ENCOUNTER — EXTERNAL CHRONIC CARE MANAGEMENT (OUTPATIENT)
Dept: PRIMARY CARE CLINIC | Facility: CLINIC | Age: 77
End: 2022-02-28
Payer: MEDICARE

## 2022-02-28 PROCEDURE — 99490 CHRNC CARE MGMT STAFF 1ST 20: CPT | Mod: S$PBB,,, | Performed by: INTERNAL MEDICINE

## 2022-02-28 PROCEDURE — 99490 CHRNC CARE MGMT STAFF 1ST 20: CPT | Mod: PBBFAC | Performed by: INTERNAL MEDICINE

## 2022-02-28 PROCEDURE — 99490 PR CHRONIC CARE MGMT, 1ST 20 MIN: ICD-10-PCS | Mod: S$PBB,,, | Performed by: INTERNAL MEDICINE

## 2022-03-07 PROBLEM — J84.112 UIP (USUAL INTERSTITIAL PNEUMONITIS): Status: ACTIVE | Noted: 2021-12-13

## 2022-03-11 ENCOUNTER — OFFICE VISIT (OUTPATIENT)
Dept: INTERNAL MEDICINE | Facility: CLINIC | Age: 77
End: 2022-03-11
Attending: INTERNAL MEDICINE
Payer: MEDICARE

## 2022-03-11 ENCOUNTER — OFFICE VISIT (OUTPATIENT)
Dept: OTOLARYNGOLOGY | Facility: CLINIC | Age: 77
End: 2022-03-11
Payer: MEDICARE

## 2022-03-11 VITALS
DIASTOLIC BLOOD PRESSURE: 74 MMHG | HEART RATE: 69 BPM | BODY MASS INDEX: 25.02 KG/M2 | WEIGHT: 155 LBS | SYSTOLIC BLOOD PRESSURE: 122 MMHG

## 2022-03-11 VITALS
OXYGEN SATURATION: 95 % | WEIGHT: 155.44 LBS | BODY MASS INDEX: 24.98 KG/M2 | HEART RATE: 69 BPM | HEIGHT: 66 IN | SYSTOLIC BLOOD PRESSURE: 122 MMHG | DIASTOLIC BLOOD PRESSURE: 74 MMHG

## 2022-03-11 DIAGNOSIS — R04.0 EPISTAXIS: Primary | ICD-10-CM

## 2022-03-11 DIAGNOSIS — J84.9 INTERSTITIAL LUNG DISEASE: ICD-10-CM

## 2022-03-11 DIAGNOSIS — R30.0 DYSURIA: ICD-10-CM

## 2022-03-11 DIAGNOSIS — J34.2 NASAL SEPTAL DEVIATION: ICD-10-CM

## 2022-03-11 DIAGNOSIS — I10 HYPERTENSION, BENIGN: Primary | ICD-10-CM

## 2022-03-11 DIAGNOSIS — J34.3 HYPERTROPHY OF BOTH INFERIOR NASAL TURBINATES: ICD-10-CM

## 2022-03-11 LAB
AMORPH CRY UR QL COMP ASSIST: ABNORMAL
BACTERIA #/AREA URNS AUTO: ABNORMAL /HPF
BILIRUB SERPL-MCNC: NEGATIVE MG/DL
BILIRUB UR QL STRIP: NEGATIVE
BLOOD URINE, POC: NEGATIVE
CLARITY UR REFRACT.AUTO: ABNORMAL
COLOR UR AUTO: YELLOW
COLOR, POC UA: NORMAL
GLUCOSE UR QL STRIP: NEGATIVE
GLUCOSE UR QL STRIP: NORMAL
HGB UR QL STRIP: ABNORMAL
KETONES UR QL STRIP: ABNORMAL
KETONES UR QL STRIP: NEGATIVE
LEUKOCYTE ESTERASE UR QL STRIP: ABNORMAL
LEUKOCYTE ESTERASE URINE, POC: NORMAL
MICROSCOPIC COMMENT: ABNORMAL
NITRITE UR QL STRIP: POSITIVE
NITRITE, POC UA: POSITIVE
PH UR STRIP: 6 [PH] (ref 5–8)
PH, POC UA: 6
PROT UR QL STRIP: NEGATIVE
PROTEIN, POC: NORMAL
RBC #/AREA URNS AUTO: 9 /HPF (ref 0–4)
SP GR UR STRIP: 1.02 (ref 1–1.03)
SPECIFIC GRAVITY, POC UA: 1.02
SQUAMOUS #/AREA URNS AUTO: 7 /HPF
URN SPEC COLLECT METH UR: ABNORMAL
WBC #/AREA URNS AUTO: 69 /HPF (ref 0–5)
YEAST UR QL AUTO: ABNORMAL

## 2022-03-11 PROCEDURE — 99214 OFFICE O/P EST MOD 30 MIN: CPT | Mod: PBBFAC,25,27 | Performed by: INTERNAL MEDICINE

## 2022-03-11 PROCEDURE — 99999 PR PBB SHADOW E&M-EST. PATIENT-LVL III: CPT | Mod: PBBFAC,,, | Performed by: STUDENT IN AN ORGANIZED HEALTH CARE EDUCATION/TRAINING PROGRAM

## 2022-03-11 PROCEDURE — 81001 URINALYSIS AUTO W/SCOPE: CPT | Mod: PBBFAC | Performed by: INTERNAL MEDICINE

## 2022-03-11 PROCEDURE — 99213 PR OFFICE/OUTPT VISIT, EST, LEVL III, 20-29 MIN: ICD-10-PCS | Mod: 25,S$PBB,, | Performed by: STUDENT IN AN ORGANIZED HEALTH CARE EDUCATION/TRAINING PROGRAM

## 2022-03-11 PROCEDURE — 99214 OFFICE O/P EST MOD 30 MIN: CPT | Mod: S$PBB,,, | Performed by: INTERNAL MEDICINE

## 2022-03-11 PROCEDURE — 87088 URINE BACTERIA CULTURE: CPT | Performed by: INTERNAL MEDICINE

## 2022-03-11 PROCEDURE — 31231 PR NASAL ENDOSCOPY, DX: ICD-10-PCS | Mod: S$PBB,,, | Performed by: STUDENT IN AN ORGANIZED HEALTH CARE EDUCATION/TRAINING PROGRAM

## 2022-03-11 PROCEDURE — 99213 OFFICE O/P EST LOW 20 MIN: CPT | Mod: PBBFAC | Performed by: STUDENT IN AN ORGANIZED HEALTH CARE EDUCATION/TRAINING PROGRAM

## 2022-03-11 PROCEDURE — 31231 NASAL ENDOSCOPY DX: CPT | Mod: PBBFAC | Performed by: STUDENT IN AN ORGANIZED HEALTH CARE EDUCATION/TRAINING PROGRAM

## 2022-03-11 PROCEDURE — 99999 PR PBB SHADOW E&M-EST. PATIENT-LVL III: ICD-10-PCS | Mod: PBBFAC,,, | Performed by: STUDENT IN AN ORGANIZED HEALTH CARE EDUCATION/TRAINING PROGRAM

## 2022-03-11 PROCEDURE — 87077 CULTURE AEROBIC IDENTIFY: CPT | Performed by: INTERNAL MEDICINE

## 2022-03-11 PROCEDURE — 87086 URINE CULTURE/COLONY COUNT: CPT | Performed by: INTERNAL MEDICINE

## 2022-03-11 PROCEDURE — 99214 PR OFFICE/OUTPT VISIT, EST, LEVL IV, 30-39 MIN: ICD-10-PCS | Mod: S$PBB,,, | Performed by: INTERNAL MEDICINE

## 2022-03-11 PROCEDURE — 81001 URINALYSIS AUTO W/SCOPE: CPT | Performed by: INTERNAL MEDICINE

## 2022-03-11 PROCEDURE — 99213 OFFICE O/P EST LOW 20 MIN: CPT | Mod: 25,S$PBB,, | Performed by: STUDENT IN AN ORGANIZED HEALTH CARE EDUCATION/TRAINING PROGRAM

## 2022-03-11 PROCEDURE — 31231 NASAL ENDOSCOPY DX: CPT | Mod: S$PBB,,, | Performed by: STUDENT IN AN ORGANIZED HEALTH CARE EDUCATION/TRAINING PROGRAM

## 2022-03-11 PROCEDURE — 99999 PR PBB SHADOW E&M-EST. PATIENT-LVL IV: CPT | Mod: PBBFAC,,, | Performed by: INTERNAL MEDICINE

## 2022-03-11 PROCEDURE — 87186 SC STD MICRODIL/AGAR DIL: CPT | Performed by: INTERNAL MEDICINE

## 2022-03-11 PROCEDURE — 99999 PR PBB SHADOW E&M-EST. PATIENT-LVL IV: ICD-10-PCS | Mod: PBBFAC,,, | Performed by: INTERNAL MEDICINE

## 2022-03-11 RX ORDER — SULFAMETHOXAZOLE AND TRIMETHOPRIM 800; 160 MG/1; MG/1
1 TABLET ORAL 2 TIMES DAILY
Qty: 6 TABLET | Refills: 0 | Status: SHIPPED | OUTPATIENT
Start: 2022-03-11 | End: 2022-03-14

## 2022-03-11 NOTE — PROGRESS NOTES
Subjective:      Niurka is a 77 y.o. female who comes for follow-up of epistaxis.  Her last visit with me was on 2/17/2022.  No further bleeding episodes. Was seen by PCP this am for her BP medication. Had CT sinus performed.     SNOT-22: 11  STOP-BANG: 3    Her current sinus regime consists of: Nasal saline sprays    The patient's medications, allergies, past medical, surgical, social and family histories were reviewed and updated as appropriate.    A detailed review of systems was obtained with pertinent positives as per the above HPI, and otherwise negative.        Objective:     /74 (Patient Position: Sitting)   Pulse 69   Wt 70.3 kg (155 lb)   LMP 02/08/2000   BMI 25.02 kg/m²      Constitutional:   Vital signs are normal. She appears well-developed. Normal speech.      Head:  Normocephalic and atraumatic.     Ears:    Right Ear: No drainage or tenderness. No middle ear effusion.   Left Ear: No drainage or tenderness.  No middle ear effusion.     Nose:  Epistaxis is observed.   See nasal endoscopy for details    Mouth/Throat  Oropharynx clear and moist without lesions or asymmetry and normal uvula midline. No trismus. No oropharyngeal exudate. Mirror exam not performed due to patient tolerance.  Mirror exam not performed due to patient tolerance.      Neck:  Neck normal without thyromegaly masses, asymmetry, normal tracheal structure, crepitus, and tenderness, thyroid normal and trachea normal.     Pulmonary/Chest:   Effort normal.     Psychiatric:   She has a normal mood and affect. Her speech is normal.     Skin:   No abrasions, lacerations, lesions, or rashes.       Procedure    Nasal endoscopy performed.  See procedure note.    Nasal Endoscopy:  3/11/2022    The use of diagnostic nasal endoscopy was considered medically necessary for the evaluation and visualization of the nasal anatomy for symptoms suggestive of nasal or sinus origin. Physical examination (including a nasal speculum  evaluation) did not provide sufficient clinical information to establish a diagnosis, or symptoms did not improve or worsened following treatment.     The nasal cavity was decongested with topical 1% phenylephrine and anesthetized with 4% lidocaine.  A rigid 0-degree endoscope was introduced into the nasal cavity.    The patient was seated in the examination chair. After discussion of risks and benefits, a nasal endoscope was inserted into the nose the endoscope was passed along the left nasal floor to the nasopharynx. It was then passed between the middle and superior meatus, nasal turbinates, nasal septum, nasopharynx and sphenoethmoid region. The nasal endoscope was withdrawn and there was no complications. An identical procedure was performed on the right side. I was present for the entire procedure.The patient tolerated the above procedure well. The findings of this procedure can be found in the dictated note from 3/11/2022 visit.                              Right sided septal deviation. No active bleeding. Previous area of cautery completely healed. No masses or nasal polyps.     Data Reviewed    WBC (K/uL)   Date Value   03/07/2022 11.57     Eosinophil % (%)   Date Value   03/07/2022 0.3     Eos # (K/uL)   Date Value   03/07/2022 0.0     Platelets (K/uL)   Date Value   03/07/2022 465 (H)     Glucose (mg/dL)   Date Value   01/18/2022 106     IgE (IU/mL)   Date Value   10/15/2021 108 (H)       I independently reviewed the images of the CT sinuses dated 2/22/22. Pertinent findings include right sided septal deviation. Some hyperdense material along the right nasal septum (area of previous cautery with AgNO3). No significant sinonasal opacification in any of her sinuses.       Assessment:     1. Epistaxis    2. Nasal septal deviation    3. Hypertrophy of both inferior nasal turbinates         Plan:     Epistaxis without event for the last month.    We discussed that she may benefit from bilateral SPA ligation  with possible septoplasty for this recurrent problem. I informed the patient that SPA ligation can control about 90% of future bleeding episodes, however I would need to preserve some blood flow to the septum to prevent any loss of her septum.     The risks and benefits of endoscopic surgery were discussed with the patient in detail, which include but are not limited to pain, bleeding, infection, the need for reoperation, injury to orbit or orbital contents, injury to brain or meninges, and unforeseeable complications of surgery including myocardial infarction, stroke or pulmonary embolus.    Patient is content to continue to observe this for now, which I think is reasonable given her resolution in her symptoms.     She will reach out to the clinic if she has any more episodes.     Place saline nasal gel in nostrils at bedtime.  May use saline nose drops during the day to prevent dryness.  If bleeding recurs, use oxymetazoline (Afrin) spray in the nostril and call for follow-up appointment.    Rudi Carroll MD

## 2022-03-11 NOTE — PROGRESS NOTES
"Subjective:   Patient ID: Niurka Pedraza is a 77 y.o. female  Chief complaint:   Chief Complaint   Patient presents with    Hypertension     F/u       HPI    Htn: controlled today   Taking Coreg 6.25 and irb 300  - stopped Ofev in Jan 18th and bp improved - did not start coreg 12.5mg dose since bp improved  fter d/c this     Interstitial lung disease:   Followed by pulm   Still on azithro 3 times per week and montelukast daily and prednisone 2.5 mg TID  Referred to rheum for opinion with +RF on labs     Nosebleeds:   Has ENT appt today - last nosebleed was 1 month ago   - nosebleeds started in Feb  - seen by ENT 2/8/2022  - area cauterized in Feb   - has afrin at home for prn use if sx return     emg test rescheduled   Sx stable   Labs unremarkable     Reports sx of uti over past few days  +Dysuria, frequency   No fevers   No flank pain for a few days or new lbp    Review of Systems    Objective:  Vitals:    03/11/22 1020   BP: 122/74   BP Location: Left arm   Patient Position: Sitting   Pulse: 69   SpO2: 95%   Weight: 70.5 kg (155 lb 6.8 oz)   Height: 5' 6" (1.676 m)     Body mass index is 25.09 kg/m².    Physical Exam  Vitals reviewed.   Constitutional:       Appearance: Normal appearance. She is well-developed.   HENT:      Head: Normocephalic and atraumatic.      Nose:      Comments: Wearing mask   Eyes:      Conjunctiva/sclera: Conjunctivae normal.   Cardiovascular:      Rate and Rhythm: Normal rate and regular rhythm.      Pulses: Normal pulses.      Heart sounds: Normal heart sounds.   Pulmonary:      Effort: Pulmonary effort is normal.      Breath sounds: Normal breath sounds.   Abdominal:      General: Bowel sounds are normal.      Palpations: Abdomen is soft.      Tenderness: There is no right CVA tenderness or left CVA tenderness.      Comments: No flank ttp    Musculoskeletal:         General: No swelling or tenderness.      Cervical back: Normal range of motion and neck supple.   Skin:     General: Skin " is warm and dry.      Capillary Refill: Capillary refill takes less than 2 seconds.      Nails: There is no clubbing.   Neurological:      General: No focal deficit present.      Mental Status: She is alert and oriented to person, place, and time.      Gait: Gait normal.   Psychiatric:         Speech: Speech normal.         Behavior: Behavior normal.         Thought Content: Thought content normal.         Assessment:  1. Hypertension, benign    2. Dysuria    3. Interstitial lung disease        Plan:  Niurka was seen today for hypertension.    Diagnoses and all orders for this visit:    Hypertension, benign  Well controlled today   Cont current meds     Dysuria  -     Urine culture; Future  -     POCT urinalysis, dipstick or tablet reag  -     Urinalysis; Future  -     sulfamethoxazole-trimethoprim 800-160mg (BACTRIM DS) 800-160 mg Tab; Take 1 tablet by mouth 2 (two) times daily. for 3 days    Interstitial lung disease  Followed by pulm   To f/u with rheum for opinion     Other orders  -     Urinalysis Microscopic    Check urine studies and will [facility]   poct with trace leuks, + nitrite, trace pro, no blood   - will send in bactrim   - allx/intol to  and macrobid    Health Maintenance   Topic Date Due    DEXA Scan  08/21/2023    Lipid Panel  08/24/2026    TETANUS VACCINE  07/21/2031    Hepatitis C Screening  Completed

## 2022-03-14 ENCOUNTER — TELEPHONE (OUTPATIENT)
Dept: INTERNAL MEDICINE | Facility: CLINIC | Age: 77
End: 2022-03-14
Payer: MEDICARE

## 2022-03-14 ENCOUNTER — PATIENT MESSAGE (OUTPATIENT)
Dept: INTERNAL MEDICINE | Facility: CLINIC | Age: 77
End: 2022-03-14
Payer: MEDICARE

## 2022-03-14 DIAGNOSIS — J84.112 UIP (USUAL INTERSTITIAL PNEUMONITIS): Primary | ICD-10-CM

## 2022-03-14 DIAGNOSIS — R76.8 RHEUMATOID FACTOR POSITIVE: ICD-10-CM

## 2022-03-14 NOTE — TELEPHONE ENCOUNTER
E consults not active currently for ID at this time   Sent message to ID staff to review u cx findings   Pt with dysuria and frequency - no fever or flank pain or new lbp - no vag discharge  ID will review results and coordinate tx and f/u appt   Called and informed pt to expect outreach from ID clinic   ER prompts reviewed at length   All questions were answered and pt verbalized understanding of plan.

## 2022-03-15 ENCOUNTER — E-CONSULT (OUTPATIENT)
Dept: INFECTIOUS DISEASES | Facility: HOSPITAL | Age: 77
End: 2022-03-15
Payer: MEDICARE

## 2022-03-15 ENCOUNTER — PROCEDURE VISIT (OUTPATIENT)
Dept: NEUROLOGY | Facility: CLINIC | Age: 77
End: 2022-03-15
Attending: INTERNAL MEDICINE
Payer: MEDICARE

## 2022-03-15 DIAGNOSIS — A49.9 ESBL (EXTENDED SPECTRUM BETA-LACTAMASE) PRODUCING BACTERIA INFECTION: Primary | ICD-10-CM

## 2022-03-15 DIAGNOSIS — Z16.12 ESBL (EXTENDED SPECTRUM BETA-LACTAMASE) PRODUCING BACTERIA INFECTION: Primary | ICD-10-CM

## 2022-03-15 DIAGNOSIS — R20.0 NUMBNESS AND TINGLING OF FOOT: ICD-10-CM

## 2022-03-15 DIAGNOSIS — R20.2 NUMBNESS AND TINGLING OF FOOT: ICD-10-CM

## 2022-03-15 DIAGNOSIS — Z16.12 ESBL (EXTENDED SPECTRUM BETA-LACTAMASE) PRODUCING BACTERIA INFECTION: ICD-10-CM

## 2022-03-15 DIAGNOSIS — A49.9 ESBL (EXTENDED SPECTRUM BETA-LACTAMASE) PRODUCING BACTERIA INFECTION: ICD-10-CM

## 2022-03-15 PROCEDURE — 95910 NRV CNDJ TEST 7-8 STUDIES: CPT | Mod: PBBFAC | Performed by: PSYCHIATRY & NEUROLOGY

## 2022-03-15 PROCEDURE — 95886 MUSC TEST DONE W/N TEST COMP: CPT | Mod: 26,S$PBB,, | Performed by: PSYCHIATRY & NEUROLOGY

## 2022-03-15 PROCEDURE — 95910 PR NERVE CONDUCTION STUDY; 7-8 STUDIES: ICD-10-PCS | Mod: 26,S$PBB,, | Performed by: PSYCHIATRY & NEUROLOGY

## 2022-03-15 PROCEDURE — 95886 MUSC TEST DONE W/N TEST COMP: CPT | Mod: PBBFAC | Performed by: PSYCHIATRY & NEUROLOGY

## 2022-03-15 PROCEDURE — 95886 PR EMG COMPLETE, W/ NERVE CONDUCTION STUDIES, 5+ MUSCLES: ICD-10-PCS | Mod: 26,S$PBB,, | Performed by: PSYCHIATRY & NEUROLOGY

## 2022-03-15 PROCEDURE — 95910 NRV CNDJ TEST 7-8 STUDIES: CPT | Mod: 26,S$PBB,, | Performed by: PSYCHIATRY & NEUROLOGY

## 2022-03-15 NOTE — PROGRESS NOTES
Infectious Diseases  Response for E-Consult     Patient Name: Niurka Pedraza  MRN: [id number]  Primary Care Provider: Savana Anderson MD   Requesting Provider: Savana Anderson MD      Findings: 77 year old woman with ESBL UTI   Escherichia coli ESBL     CULTURE, URINE     Amikacin <=16 mcg/mL Resistant     Amox/K Clav'ate >16/8 mcg/mL Resistant     Amp/Sulbactam 16/8 mcg/mL Resistant     Ampicillin >16 mcg/mL Resistant     Cefazolin >16 mcg/mL Resistant     Cefepime >16 mcg/mL Resistant     Ceftriaxone >32 mcg/mL Resistant     Ciprofloxacin >2 mcg/mL Resistant     Ertapenem <=0.5 mcg/mL Sensitive     Gentamicin >8 mcg/mL Resistant     Levofloxacin >4 mcg/mL Resistant     Meropenem <=1 mcg/mL Sensitive     Nitrofurantoin >64 mcg/mL Resistant     Piperacillin/Tazo <=16 mcg/mL Resistant     Tobramycin >8 mcg/mL Resistant     Trimeth/Sulfa >2/38 mcg/mL Resistant              Plan - I called the microlab- to add fosfomycin to the drug susceptibility.  Will send one dose of Fosfomycin to her pharmacy    Final plan will depend on this drug susceptibility.  Plan A -if isolate is susceptible to Fosfomycin -then we can give 2 doses of fosfomycin 3 gram every 72 hours.  Plan B -if isolate is resistant to fosfomycin-we can arrange for IV Ertapenem one gram daily for 3 days through [facility] .  Will follow labs in AM     I did not speak to the requesting provider verbally about this.     Total time of Consultation: 10 minute    Percentage of time spent on verbal/written discussion: 50%     Thank you for your consult.     Chandra Mejia MD  Infectious Diseases  [phone].

## 2022-03-15 NOTE — PROCEDURES
Procedures     EMG/NCS  was performed in the lab. Report scanned into chart.          Maria Tuttle MD  Medicine-Neurology, Clinical Neurophysiology

## 2022-03-16 ENCOUNTER — INFUSION (OUTPATIENT)
Dept: INFECTIOUS DISEASES | Facility: HOSPITAL | Age: 77
End: 2022-03-16
Attending: INTERNAL MEDICINE
Payer: MEDICARE

## 2022-03-16 ENCOUNTER — OFFICE VISIT (OUTPATIENT)
Dept: INFECTIOUS DISEASES | Facility: CLINIC | Age: 77
End: 2022-03-16
Payer: MEDICARE

## 2022-03-16 VITALS
WEIGHT: 154.75 LBS | BODY MASS INDEX: 24.87 KG/M2 | HEIGHT: 66 IN | SYSTOLIC BLOOD PRESSURE: 131 MMHG | TEMPERATURE: 98 F | DIASTOLIC BLOOD PRESSURE: 82 MMHG | HEART RATE: 98 BPM

## 2022-03-16 VITALS
WEIGHT: 154.75 LBS | TEMPERATURE: 97 F | BODY MASS INDEX: 24.98 KG/M2 | DIASTOLIC BLOOD PRESSURE: 71 MMHG | HEART RATE: 94 BPM | SYSTOLIC BLOOD PRESSURE: 131 MMHG | RESPIRATION RATE: 18 BRPM

## 2022-03-16 DIAGNOSIS — B38.2 PULMONARY COCCIDIOIDOMYCOSIS, UNSPECIFIED: ICD-10-CM

## 2022-03-16 DIAGNOSIS — N32.81 OAB (OVERACTIVE BLADDER): ICD-10-CM

## 2022-03-16 DIAGNOSIS — Z16.12 URINARY TRACT INFECTION DUE TO EXTENDED-SPECTRUM BETA LACTAMASE (ESBL) PRODUCING ESCHERICHIA COLI: ICD-10-CM

## 2022-03-16 DIAGNOSIS — Z16.12 URINARY TRACT INFECTION DUE TO EXTENDED-SPECTRUM BETA LACTAMASE (ESBL) PRODUCING ESCHERICHIA COLI: Primary | ICD-10-CM

## 2022-03-16 DIAGNOSIS — T38.0X5A IMMUNOSUPPRESSION DUE TO CHRONIC STEROID USE: ICD-10-CM

## 2022-03-16 DIAGNOSIS — Z79.52 IMMUNOSUPPRESSION DUE TO CHRONIC STEROID USE: ICD-10-CM

## 2022-03-16 DIAGNOSIS — J84.112 UIP (USUAL INTERSTITIAL PNEUMONITIS): ICD-10-CM

## 2022-03-16 DIAGNOSIS — N39.0 URINARY TRACT INFECTION DUE TO EXTENDED-SPECTRUM BETA LACTAMASE (ESBL) PRODUCING ESCHERICHIA COLI: ICD-10-CM

## 2022-03-16 DIAGNOSIS — N39.0 RECURRENT UTI: Primary | ICD-10-CM

## 2022-03-16 DIAGNOSIS — J84.9 INTERSTITIAL LUNG DISEASE: ICD-10-CM

## 2022-03-16 DIAGNOSIS — D84.821 IMMUNOSUPPRESSION DUE TO CHRONIC STEROID USE: ICD-10-CM

## 2022-03-16 DIAGNOSIS — N39.0 URINARY TRACT INFECTION DUE TO EXTENDED-SPECTRUM BETA LACTAMASE (ESBL) PRODUCING ESCHERICHIA COLI: Primary | ICD-10-CM

## 2022-03-16 DIAGNOSIS — B96.29 URINARY TRACT INFECTION DUE TO EXTENDED-SPECTRUM BETA LACTAMASE (ESBL) PRODUCING ESCHERICHIA COLI: Primary | ICD-10-CM

## 2022-03-16 DIAGNOSIS — B96.29 URINARY TRACT INFECTION DUE TO EXTENDED-SPECTRUM BETA LACTAMASE (ESBL) PRODUCING ESCHERICHIA COLI: ICD-10-CM

## 2022-03-16 LAB — BACTERIA UR CULT: ABNORMAL

## 2022-03-16 PROCEDURE — 63600175 PHARM REV CODE 636 W HCPCS: Performed by: INTERNAL MEDICINE

## 2022-03-16 PROCEDURE — 99204 PR OFFICE/OUTPT VISIT, NEW, LEVL IV, 45-59 MIN: ICD-10-PCS | Mod: S$PBB,,, | Performed by: INTERNAL MEDICINE

## 2022-03-16 PROCEDURE — 99213 OFFICE O/P EST LOW 20 MIN: CPT | Mod: PBBFAC,25 | Performed by: INTERNAL MEDICINE

## 2022-03-16 PROCEDURE — 96365 THER/PROPH/DIAG IV INF INIT: CPT

## 2022-03-16 PROCEDURE — 25000003 PHARM REV CODE 250: Performed by: INTERNAL MEDICINE

## 2022-03-16 PROCEDURE — 99999 PR PBB SHADOW E&M-EST. PATIENT-LVL III: CPT | Mod: PBBFAC,,, | Performed by: INTERNAL MEDICINE

## 2022-03-16 PROCEDURE — 99999 PR PBB SHADOW E&M-EST. PATIENT-LVL III: ICD-10-PCS | Mod: PBBFAC,,, | Performed by: INTERNAL MEDICINE

## 2022-03-16 PROCEDURE — 99204 OFFICE O/P NEW MOD 45 MIN: CPT | Mod: S$PBB,,, | Performed by: INTERNAL MEDICINE

## 2022-03-16 RX ORDER — SODIUM CHLORIDE 0.9 % (FLUSH) 0.9 %
10 SYRINGE (ML) INJECTION
Status: DISCONTINUED | OUTPATIENT
Start: 2022-03-16 | End: 2022-03-16 | Stop reason: HOSPADM

## 2022-03-16 RX ORDER — SODIUM CHLORIDE 0.9 % (FLUSH) 0.9 %
10 SYRINGE (ML) INJECTION
Status: CANCELLED | OUTPATIENT
Start: 2022-03-16

## 2022-03-16 RX ORDER — HEPARIN 100 UNIT/ML
500 SYRINGE INTRAVENOUS
Status: CANCELLED | OUTPATIENT
Start: 2022-03-16

## 2022-03-16 RX ORDER — HEPARIN 100 UNIT/ML
500 SYRINGE INTRAVENOUS
Status: DISCONTINUED | OUTPATIENT
Start: 2022-03-16 | End: 2022-03-16 | Stop reason: HOSPADM

## 2022-03-16 RX ADMIN — SODIUM CHLORIDE 1 G: 9 INJECTION, SOLUTION INTRAVENOUS at 03:03

## 2022-03-16 RX ADMIN — SODIUM CHLORIDE: 0.9 INJECTION, SOLUTION INTRAVENOUS at 02:03

## 2022-03-16 NOTE — PROGRESS NOTES
Paticelina arrived for Ertapenem 1/3. NS @ 25 ml/hr infusing concurrently with Ertapenem.   Tolerated infusion without difficulty and left unit in NAD. Return appointment provided.

## 2022-03-16 NOTE — PROGRESS NOTES
"Subjective:      Patient ID: Niurka Pedraza is a 77 y.o. female.    Chief Complaint:Urinary Tract Infection      History of Present Illness    Patient referred for recurrent UTIs, now with MDR E.coli UTI.     76 yo female, currently on azithromycin and prednisone for IPF/UIP, presents with MDR E.coli UTI. She has history of OAB, currently being treated by urology and states that she has had recurrent UTIs for many years. This is the longest she has been "clean" - 8 months. 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     She states that this infection is characterized with constant pressure and dysuria, but not fever or persistent pelvic pain. She has frequency but not incomplete voiding.   She has never been on methenamine.      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 She is currently seeing Dr. Cazares in pulmonology for UIP and is currently taking azithromycin three times per week and prednisone 2.5 TID for this. She was on nintedanib but this led to nose bleeding and it was stopped in January.    She denies fever or chills, as well as back pain or abdominal pain. She was prescribed Bactrim with mild improvement. Urine culture grew E.coli, sensitive only to meropenem and ertapenem. An e-consult was sent to Dr. Mejia, Infectious Disease in [place], who recommended testing for fosfomycin sensitivity - this result came back after her dose of ertapenem in our infusion suite today. She was referred by her PCP for IV antibiotic treatment.    She states that she will be moving to [place] in the next few months.    Review of Systems   Constitutional: Positive for malaise/fatigue and weight loss. Negative for chills, decreased appetite, fever, night sweats and weight gain.   HENT: Positive for hearing loss and tinnitus. Negative for congestion, ear pain, hoarse " voice and sore throat.    Eyes: Negative for blurred vision, redness and visual disturbance.   Cardiovascular: Negative for chest pain, leg swelling and palpitations.   Respiratory: Positive for cough and shortness of breath. Negative for hemoptysis, sputum production and wheezing.    Hematologic/Lymphatic: Negative for adenopathy. Does not bruise/bleed easily.   Skin: Negative for dry skin, itching, rash and suspicious lesions.   Musculoskeletal: Positive for back pain. Negative for joint pain, myalgias and neck pain.   Gastrointestinal: Negative for abdominal pain, constipation, diarrhea, heartburn, nausea and vomiting.   Genitourinary: Positive for flank pain and frequency. Negative for dysuria, hematuria, hesitancy and urgency.   Neurological: Positive for numbness. Negative for dizziness, headaches, paresthesias and weakness.   Psychiatric/Behavioral: Positive for memory loss. Negative for depression. The patient does not have insomnia and is not nervous/anxious.      Objective:   Physical Exam  Vitals and nursing note reviewed.   Constitutional:       Appearance: Normal appearance.   HENT:      Head: Normocephalic and atraumatic.   Eyes:      Extraocular Movements: Extraocular movements intact.      Conjunctiva/sclera: Conjunctivae normal.      Pupils: Pupils are equal, round, and reactive to light.   Cardiovascular:      Rate and Rhythm: Normal rate and regular rhythm.   Pulmonary:      Effort: Pulmonary effort is normal.      Breath sounds: Rales (bilateral bases) present.   Abdominal:      General: Abdomen is flat. Bowel sounds are normal. There is no distension.      Tenderness: There is no abdominal tenderness.   Musculoskeletal:      Cervical back: Normal range of motion and neck supple.   Skin:     General: Skin is warm and dry.   Neurological:      General: No focal deficit present.      Mental Status: She is alert.       Assessment:       1. Recurrent UTI    2. Urinary tract infection due to  extended-spectrum beta lactamase (ESBL) producing Escherichia coli    3. OAB (overactive bladder)    4. Pulmonary coccidioidomycosis, unspecified    5. Interstitial lung disease    6. UIP (usual interstitial pneumonitis)    7. Immunosuppression due to chronic steroid use          Plan:       Patient is doing well, however, she is immunosuppressed on steroids. She does not appear to have urinary retention, but does have OAB. After discussion with the patient, she agreed that she would take IV ertapenem daily for the next 3 days. Due to her Medicare status, this was only available in our infusion suite here. I arranged infusion of ertapenem here and then for the next two days. I discussed using methenamine for urinary antisepsis - she agreed to this after treatment for her infection. Her coccidioides is treated and needs no further work up. She will follow up in 2 weeks with me, or sooner if there are symptoms or problems. I spent over 45 minutes on this encounter, including chart review and arranging IV antibiotics to be given in the infusion suite.

## 2022-03-17 ENCOUNTER — INFUSION (OUTPATIENT)
Dept: INFECTIOUS DISEASES | Facility: HOSPITAL | Age: 77
End: 2022-03-17
Attending: INTERNAL MEDICINE
Payer: MEDICARE

## 2022-03-17 VITALS
WEIGHT: 153.69 LBS | BODY MASS INDEX: 24.8 KG/M2 | TEMPERATURE: 97 F | OXYGEN SATURATION: 95 % | RESPIRATION RATE: 16 BRPM | DIASTOLIC BLOOD PRESSURE: 60 MMHG | SYSTOLIC BLOOD PRESSURE: 122 MMHG | HEART RATE: 96 BPM

## 2022-03-17 DIAGNOSIS — Z16.12 URINARY TRACT INFECTION DUE TO EXTENDED-SPECTRUM BETA LACTAMASE (ESBL) PRODUCING ESCHERICHIA COLI: Primary | ICD-10-CM

## 2022-03-17 DIAGNOSIS — B96.29 URINARY TRACT INFECTION DUE TO EXTENDED-SPECTRUM BETA LACTAMASE (ESBL) PRODUCING ESCHERICHIA COLI: Primary | ICD-10-CM

## 2022-03-17 DIAGNOSIS — N39.0 URINARY TRACT INFECTION DUE TO EXTENDED-SPECTRUM BETA LACTAMASE (ESBL) PRODUCING ESCHERICHIA COLI: Primary | ICD-10-CM

## 2022-03-17 PROCEDURE — 25000003 PHARM REV CODE 250: Performed by: INTERNAL MEDICINE

## 2022-03-17 PROCEDURE — 96365 THER/PROPH/DIAG IV INF INIT: CPT

## 2022-03-17 PROCEDURE — 63600175 PHARM REV CODE 636 W HCPCS: Performed by: INTERNAL MEDICINE

## 2022-03-17 RX ORDER — HEPARIN 100 UNIT/ML
500 SYRINGE INTRAVENOUS
Status: DISCONTINUED | OUTPATIENT
Start: 2022-03-17 | End: 2022-03-17 | Stop reason: HOSPADM

## 2022-03-17 RX ORDER — SODIUM CHLORIDE 0.9 % (FLUSH) 0.9 %
10 SYRINGE (ML) INJECTION
Status: CANCELLED | OUTPATIENT
Start: 2022-03-17

## 2022-03-17 RX ORDER — HEPARIN 100 UNIT/ML
500 SYRINGE INTRAVENOUS
Status: CANCELLED | OUTPATIENT
Start: 2022-03-17

## 2022-03-17 RX ORDER — GRANULES FOR ORAL 3 G/1
3 POWDER ORAL ONCE
Qty: 3 G | Refills: 0 | Status: SHIPPED | OUTPATIENT
Start: 2022-03-17 | End: 2022-03-17

## 2022-03-17 RX ORDER — SODIUM CHLORIDE 0.9 % (FLUSH) 0.9 %
10 SYRINGE (ML) INJECTION
Status: DISCONTINUED | OUTPATIENT
Start: 2022-03-17 | End: 2022-03-17 | Stop reason: HOSPADM

## 2022-03-17 RX ADMIN — SODIUM CHLORIDE: 0.9 INJECTION, SOLUTION INTRAVENOUS at 09:03

## 2022-03-17 RX ADMIN — SODIUM CHLORIDE 1 G: 9 INJECTION, SOLUTION INTRAVENOUS at 09:03

## 2022-03-18 ENCOUNTER — INFUSION (OUTPATIENT)
Dept: INFECTIOUS DISEASES | Facility: HOSPITAL | Age: 77
End: 2022-03-18
Attending: INTERNAL MEDICINE
Payer: MEDICARE

## 2022-03-18 VITALS
BODY MASS INDEX: 25 KG/M2 | HEIGHT: 66 IN | RESPIRATION RATE: 18 BRPM | DIASTOLIC BLOOD PRESSURE: 68 MMHG | SYSTOLIC BLOOD PRESSURE: 120 MMHG | TEMPERATURE: 97 F | HEART RATE: 99 BPM | WEIGHT: 155.56 LBS | OXYGEN SATURATION: 97 %

## 2022-03-18 DIAGNOSIS — Z16.12 URINARY TRACT INFECTION DUE TO EXTENDED-SPECTRUM BETA LACTAMASE (ESBL) PRODUCING ESCHERICHIA COLI: Primary | ICD-10-CM

## 2022-03-18 DIAGNOSIS — B96.29 URINARY TRACT INFECTION DUE TO EXTENDED-SPECTRUM BETA LACTAMASE (ESBL) PRODUCING ESCHERICHIA COLI: Primary | ICD-10-CM

## 2022-03-18 DIAGNOSIS — N39.0 URINARY TRACT INFECTION DUE TO EXTENDED-SPECTRUM BETA LACTAMASE (ESBL) PRODUCING ESCHERICHIA COLI: Primary | ICD-10-CM

## 2022-03-18 PROCEDURE — 25000003 PHARM REV CODE 250: Performed by: INTERNAL MEDICINE

## 2022-03-18 PROCEDURE — 96365 THER/PROPH/DIAG IV INF INIT: CPT

## 2022-03-18 RX ORDER — SODIUM CHLORIDE 0.9 % (FLUSH) 0.9 %
10 SYRINGE (ML) INJECTION
Status: CANCELLED | OUTPATIENT
Start: 2022-03-18

## 2022-03-18 RX ORDER — SODIUM CHLORIDE 0.9 % (FLUSH) 0.9 %
10 SYRINGE (ML) INJECTION
Status: DISCONTINUED | OUTPATIENT
Start: 2022-03-18 | End: 2022-03-18 | Stop reason: HOSPADM

## 2022-03-18 RX ORDER — HEPARIN 100 UNIT/ML
500 SYRINGE INTRAVENOUS
Status: CANCELLED | OUTPATIENT
Start: 2022-03-18

## 2022-03-18 RX ORDER — HEPARIN 100 UNIT/ML
500 SYRINGE INTRAVENOUS
Status: DISCONTINUED | OUTPATIENT
Start: 2022-03-18 | End: 2022-03-18 | Stop reason: HOSPADM

## 2022-03-18 RX ADMIN — SODIUM CHLORIDE: 0.9 INJECTION, SOLUTION INTRAVENOUS at 09:03

## 2022-03-18 NOTE — PROGRESS NOTES
Paticelina arrived for Ertapenem 3/3. NS @ 25 ml/hr infusing concurrently with Ertapenem.   Tolerated infusion without difficulty and left unit in NAD. Return appointment provided.

## 2022-03-31 ENCOUNTER — EXTERNAL CHRONIC CARE MANAGEMENT (OUTPATIENT)
Dept: PRIMARY CARE CLINIC | Facility: CLINIC | Age: 77
End: 2022-03-31
Payer: MEDICARE

## 2022-03-31 PROCEDURE — 99490 CHRNC CARE MGMT STAFF 1ST 20: CPT | Mod: PBBFAC | Performed by: INTERNAL MEDICINE

## 2022-03-31 PROCEDURE — 99490 PR CHRONIC CARE MGMT, 1ST 20 MIN: ICD-10-PCS | Mod: S$PBB,,, | Performed by: INTERNAL MEDICINE

## 2022-03-31 PROCEDURE — 99490 CHRNC CARE MGMT STAFF 1ST 20: CPT | Mod: S$PBB,,, | Performed by: INTERNAL MEDICINE

## 2022-04-14 ENCOUNTER — OFFICE VISIT (OUTPATIENT)
Dept: GYNECOLOGIC ONCOLOGY | Facility: CLINIC | Age: 77
End: 2022-04-14
Payer: MEDICARE

## 2022-04-14 ENCOUNTER — LAB VISIT (OUTPATIENT)
Dept: LAB | Facility: OTHER | Age: 77
End: 2022-04-14
Attending: OBSTETRICS & GYNECOLOGY
Payer: MEDICARE

## 2022-04-14 VITALS
SYSTOLIC BLOOD PRESSURE: 132 MMHG | HEART RATE: 75 BPM | BODY MASS INDEX: 25.23 KG/M2 | DIASTOLIC BLOOD PRESSURE: 71 MMHG | WEIGHT: 156.31 LBS

## 2022-04-14 DIAGNOSIS — C56.1 BORDERLINE SEROUS CYSTADENOMA OF RIGHT OVARY: Primary | ICD-10-CM

## 2022-04-14 DIAGNOSIS — C56.1 BORDERLINE SEROUS CYSTADENOMA OF RIGHT OVARY: ICD-10-CM

## 2022-04-14 LAB — CANCER AG125 SERPL-ACNC: 9 U/ML (ref 0–30)

## 2022-04-14 PROCEDURE — 99999 PR PBB SHADOW E&M-EST. PATIENT-LVL II: ICD-10-PCS | Mod: PBBFAC,,, | Performed by: OBSTETRICS & GYNECOLOGY

## 2022-04-14 PROCEDURE — 99212 OFFICE O/P EST SF 10 MIN: CPT | Mod: PBBFAC | Performed by: OBSTETRICS & GYNECOLOGY

## 2022-04-14 PROCEDURE — 99999 PR PBB SHADOW E&M-EST. PATIENT-LVL II: CPT | Mod: PBBFAC,,, | Performed by: OBSTETRICS & GYNECOLOGY

## 2022-04-14 PROCEDURE — 86304 IMMUNOASSAY TUMOR CA 125: CPT | Performed by: OBSTETRICS & GYNECOLOGY

## 2022-04-14 PROCEDURE — 99214 OFFICE O/P EST MOD 30 MIN: CPT | Mod: S$PBB,,, | Performed by: OBSTETRICS & GYNECOLOGY

## 2022-04-14 PROCEDURE — 36415 COLL VENOUS BLD VENIPUNCTURE: CPT | Performed by: OBSTETRICS & GYNECOLOGY

## 2022-04-14 PROCEDURE — 99214 PR OFFICE/OUTPT VISIT, EST, LEVL IV, 30-39 MIN: ICD-10-PCS | Mod: S$PBB,,, | Performed by: OBSTETRICS & GYNECOLOGY

## 2022-04-14 RX ORDER — AZITHROMYCIN 250 MG/1
250 TABLET, FILM COATED ORAL
COMMUNITY
Start: 2022-03-15 | End: 2022-10-24 | Stop reason: SDUPTHER

## 2022-04-14 NOTE — PROGRESS NOTES
Subjective:      Patient ID: Niurka Pedraza is a 77 y.o. female.    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 Without complaints or concerns. Specifically denies N/V, pain, swelling, bleeding, unexpected weight change, SOB, problems with bowel function.   Disease free interval 4 years    pending      History:  Patient is a 71yo female originally referred by Dr. Linette Torres for pelvic mass and elevated . Patient was seen by her urologist for recurrent UTIs and imaging was ordered.      Imaging reviewed:   CT urogram abd/pelvis 17  Impression   Bilateral pulmonary nodules, largest measuring 1.5 cm at the right lower lobe. Comparison with any prior cross-sectional imaging would be beneficial. Close interval followup, PET CT, and/or biopsy may be considered for further evaluation.    Complex cystic lesion in the right adnexa. Pelvic ultrasound would be beneficial for further evaluation.    Focal dilated biliary radical in segment 7. This is of uncertain significance. There are no concerning hepatic lesions.      Pelvic US 18  Impression    Complex cystic lesion measuring 3.8 cm right ovary corresponds to abnormality seen on CT. While this may represent a hemorrhagic cyst in light of postmenopausal status Clinical correlation and further characterization with MRI and surgical consultation advised.    Left ovary not seen.     UT 5.5cm        41 elevated. CEA 4.8.      Personal history of breast cancer , R mastectomy, no adjuvant treatment.    MMG 2017 WNLs.      Prior abdominal surgeries include cholecystectomy.  x 3. Family history mother with breast cancer, no ovarian, uterine, or colon cancer.      S/p RTLH/BSO/BPLND/OMX 3/23/18. Uncomplicated post op course. 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      Review of Systems    Constitutional: Negative for appetite change, chills, fatigue and fever.   HENT: Negative for mouth sores.    Respiratory: Negative for cough and shortness of breath.    Cardiovascular: Negative for leg swelling.   Gastrointestinal: Negative for abdominal pain, blood in stool, constipation and diarrhea.   Endocrine: Negative for cold intolerance.   Genitourinary: Negative for dysuria and vaginal bleeding.   Musculoskeletal: Negative for myalgias.   Skin: Negative for rash.   Allergic/Immunologic: Negative.    Neurological: Negative for weakness and numbness.   Hematological: Negative for adenopathy. Does not bruise/bleed easily.   Psychiatric/Behavioral: Negative for confusion.       Objective:   Physical Exam:   Constitutional: She is oriented to person, place, and time. She appears well-developed and well-nourished.    HENT:   Head: Normocephalic and atraumatic.    Eyes: Pupils are equal, round, and reactive to light. EOM are normal.    Neck: No thyromegaly present.    Cardiovascular: Normal rate, regular rhythm and intact distal pulses.     Pulmonary/Chest: Effort normal and breath sounds normal. No respiratory distress. She has no wheezes.        Abdominal: Soft. Bowel sounds are normal. She exhibits no distension and no mass. There is no abdominal tenderness.     Genitourinary:    Vagina and rectum normal.      Pelvic exam was performed with patient supine.   There is no lesion on the right labia. There is no lesion on the left labia. Right adnexum displays no mass. Left adnexum displays no mass. Vaginal cuff normal.  Cervix is absent.Uterus is absent.           Musculoskeletal: Normal range of motion and moves all extremeties.      Lymphadenopathy:     She has no cervical adenopathy.        Right: No supraclavicular adenopathy present.        Left: No supraclavicular adenopathy present.    Neurological: She is alert and oriented to person, place, and time.    Skin: Skin is warm and dry. No rash noted.     Psychiatric: She has a normal mood and affect.       Assessment:     1. Borderline serous cystadenoma of right ovary        Plan:     Orders Placed This Encounter   Procedures        CEA     No evidence of disease on today's exam  Feels well, no new symptoms  Disease free interval 4 years  Tumor markers  pending        Recommend continued surveillance pending results. RTC 6 months with tumor markers or sooner if needed     I spent approximately 30 minutes reviewing the available records and evaluating the patient, out of which over 50% of the time was spent face to face with the patient in counseling and coordinating this patient's care.

## 2022-04-30 ENCOUNTER — EXTERNAL CHRONIC CARE MANAGEMENT (OUTPATIENT)
Dept: PRIMARY CARE CLINIC | Facility: CLINIC | Age: 77
End: 2022-04-30
Payer: MEDICARE

## 2022-04-30 PROCEDURE — 99490 PR CHRONIC CARE MGMT, 1ST 20 MIN: ICD-10-PCS | Mod: S$PBB,,, | Performed by: INTERNAL MEDICINE

## 2022-04-30 PROCEDURE — 99490 CHRNC CARE MGMT STAFF 1ST 20: CPT | Mod: S$PBB,,, | Performed by: INTERNAL MEDICINE

## 2022-04-30 PROCEDURE — 99490 CHRNC CARE MGMT STAFF 1ST 20: CPT | Mod: PBBFAC | Performed by: INTERNAL MEDICINE

## 2022-05-29 ENCOUNTER — PATIENT MESSAGE (OUTPATIENT)
Dept: UROLOGY | Facility: CLINIC | Age: 77
End: 2022-05-29
Payer: MEDICARE

## 2022-05-30 ENCOUNTER — TELEPHONE (OUTPATIENT)
Dept: UROLOGY | Facility: CLINIC | Age: 77
End: 2022-05-30
Payer: MEDICARE

## 2022-05-30 DIAGNOSIS — R30.0 DYSURIA: Primary | ICD-10-CM

## 2022-05-30 NOTE — TELEPHONE ENCOUNTER
I called to let the pt know she can drop off a urine sample at any [facility] lab but there was no answer so I left a message.

## 2022-05-31 ENCOUNTER — EXTERNAL CHRONIC CARE MANAGEMENT (OUTPATIENT)
Dept: PRIMARY CARE CLINIC | Facility: CLINIC | Age: 77
End: 2022-05-31
Payer: MEDICARE

## 2022-05-31 ENCOUNTER — LAB VISIT (OUTPATIENT)
Dept: LAB | Facility: OTHER | Age: 77
End: 2022-05-31
Attending: UROLOGY
Payer: MEDICARE

## 2022-05-31 DIAGNOSIS — R30.0 DYSURIA: ICD-10-CM

## 2022-05-31 LAB
BACTERIA #/AREA URNS HPF: ABNORMAL /HPF
BILIRUB UR QL STRIP: NEGATIVE
CLARITY UR REFRACT.AUTO: CLEAR
COLOR UR AUTO: YELLOW
GLUCOSE UR QL STRIP: NEGATIVE
HGB UR QL STRIP: NEGATIVE
KETONES UR QL STRIP: NEGATIVE
LEUKOCYTE ESTERASE UR QL STRIP: ABNORMAL
MICROSCOPIC COMMENT: ABNORMAL
NITRITE UR QL STRIP: POSITIVE
PH UR STRIP: 6 [PH] (ref 5–8)
PROT UR QL STRIP: NEGATIVE
SP GR UR STRIP: 1.01 (ref 1–1.03)
SQUAMOUS #/AREA URNS HPF: 12 /HPF
URN SPEC COLLECT METH UR: ABNORMAL
WBC #/AREA URNS HPF: >100 /HPF (ref 0–5)
WBC CLUMPS URNS QL MICRO: ABNORMAL

## 2022-05-31 PROCEDURE — 99490 CHRNC CARE MGMT STAFF 1ST 20: CPT | Mod: S$PBB,,, | Performed by: INTERNAL MEDICINE

## 2022-05-31 PROCEDURE — 99490 PR CHRONIC CARE MGMT, 1ST 20 MIN: ICD-10-PCS | Mod: S$PBB,,, | Performed by: INTERNAL MEDICINE

## 2022-05-31 PROCEDURE — 87186 SC STD MICRODIL/AGAR DIL: CPT | Performed by: UROLOGY

## 2022-05-31 PROCEDURE — 81003 URINALYSIS AUTO W/O SCOPE: CPT | Performed by: UROLOGY

## 2022-05-31 PROCEDURE — 87088 URINE BACTERIA CULTURE: CPT | Performed by: UROLOGY

## 2022-05-31 PROCEDURE — 81000 URINALYSIS NONAUTO W/SCOPE: CPT | Mod: 59 | Performed by: UROLOGY

## 2022-05-31 PROCEDURE — 87077 CULTURE AEROBIC IDENTIFY: CPT | Performed by: UROLOGY

## 2022-05-31 PROCEDURE — 99490 CHRNC CARE MGMT STAFF 1ST 20: CPT | Mod: PBBFAC | Performed by: INTERNAL MEDICINE

## 2022-05-31 PROCEDURE — 87086 URINE CULTURE/COLONY COUNT: CPT | Mod: 59 | Performed by: UROLOGY

## 2022-06-02 ENCOUNTER — NURSE TRIAGE (OUTPATIENT)
Dept: ADMINISTRATIVE | Facility: CLINIC | Age: 77
End: 2022-06-02
Payer: MEDICARE

## 2022-06-02 ENCOUNTER — TELEPHONE (OUTPATIENT)
Dept: UROLOGY | Facility: CLINIC | Age: 77
End: 2022-06-02
Payer: MEDICARE

## 2022-06-02 ENCOUNTER — HOSPITAL ENCOUNTER (EMERGENCY)
Facility: HOSPITAL | Age: 77
Discharge: HOME | End: 2022-06-02
Attending: EMERGENCY MEDICINE
Payer: MEDICARE

## 2022-06-02 VITALS
DIASTOLIC BLOOD PRESSURE: 82 MMHG | BODY MASS INDEX: 27.43 KG/M2 | RESPIRATION RATE: 18 BRPM | WEIGHT: 160.69 LBS | HEIGHT: 64 IN | SYSTOLIC BLOOD PRESSURE: 171 MMHG | HEART RATE: 83 BPM | OXYGEN SATURATION: 95 % | TEMPERATURE: 99 F

## 2022-06-02 DIAGNOSIS — N39.0 UTI DUE TO EXTENDED-SPECTRUM BETA LACTAMASE (ESBL) PRODUCING ESCHERICHIA COLI: Primary | ICD-10-CM

## 2022-06-02 DIAGNOSIS — B96.29 UTI DUE TO EXTENDED-SPECTRUM BETA LACTAMASE (ESBL) PRODUCING ESCHERICHIA COLI: Primary | ICD-10-CM

## 2022-06-02 DIAGNOSIS — Z16.12 UTI DUE TO EXTENDED-SPECTRUM BETA LACTAMASE (ESBL) PRODUCING ESCHERICHIA COLI: Primary | ICD-10-CM

## 2022-06-02 LAB
ANION GAP SERPL CALC-SCNC: 8 MMOL/L (ref 8–16)
BASOPHILS # BLD AUTO: 0.05 K/UL (ref 0–0.2)
BASOPHILS NFR BLD: 0.4 % (ref 0–1.9)
BUN SERPL-MCNC: 15 MG/DL (ref 8–23)
CALCIUM SERPL-MCNC: 9.6 MG/DL (ref 8.7–10.5)
CHLORIDE SERPL-SCNC: 100 MMOL/L (ref 95–110)
CO2 SERPL-SCNC: 27 MMOL/L (ref 23–29)
CREAT SERPL-MCNC: 0.8 MG/DL (ref 0.5–1.4)
DIFFERENTIAL METHOD: ABNORMAL
EOSINOPHIL # BLD AUTO: 0.1 K/UL (ref 0–0.5)
EOSINOPHIL NFR BLD: 0.6 % (ref 0–8)
ERYTHROCYTE [DISTWIDTH] IN BLOOD BY AUTOMATED COUNT: 14.5 % (ref 11.5–14.5)
EST. GFR  (AFRICAN AMERICAN): >60 ML/MIN/1.73 M^2
EST. GFR  (NON AFRICAN AMERICAN): >60 ML/MIN/1.73 M^2
GLUCOSE SERPL-MCNC: 88 MG/DL (ref 70–110)
HCT VFR BLD AUTO: 38.8 % (ref 37–48.5)
HGB BLD-MCNC: 12.5 G/DL (ref 12–16)
IMM GRANULOCYTES # BLD AUTO: 0.18 K/UL (ref 0–0.04)
IMM GRANULOCYTES NFR BLD AUTO: 1.6 % (ref 0–0.5)
LYMPHOCYTES # BLD AUTO: 3.1 K/UL (ref 1–4.8)
LYMPHOCYTES NFR BLD: 27.4 % (ref 18–48)
MCH RBC QN AUTO: 28.3 PG (ref 27–31)
MCHC RBC AUTO-ENTMCNC: 32.2 G/DL (ref 32–36)
MCV RBC AUTO: 88 FL (ref 82–98)
MONOCYTES # BLD AUTO: 0.6 K/UL (ref 0.3–1)
MONOCYTES NFR BLD: 5.6 % (ref 4–15)
NEUTROPHILS # BLD AUTO: 7.3 K/UL (ref 1.8–7.7)
NEUTROPHILS NFR BLD: 64.4 % (ref 38–73)
NRBC BLD-RTO: 0 /100 WBC
PLATELET # BLD AUTO: 457 K/UL (ref 150–450)
PMV BLD AUTO: 8.4 FL (ref 9.2–12.9)
POTASSIUM SERPL-SCNC: 3.9 MMOL/L (ref 3.5–5.1)
RBC # BLD AUTO: 4.41 M/UL (ref 4–5.4)
SODIUM SERPL-SCNC: 135 MMOL/L (ref 136–145)
WBC # BLD AUTO: 11.34 K/UL (ref 3.9–12.7)

## 2022-06-02 PROCEDURE — 99284 EMERGENCY DEPT VISIT MOD MDM: CPT | Mod: ,,, | Performed by: PHYSICIAN ASSISTANT

## 2022-06-02 PROCEDURE — 99283 EMERGENCY DEPT VISIT LOW MDM: CPT

## 2022-06-02 PROCEDURE — 99284 PR EMERGENCY DEPT VISIT,LEVEL IV: ICD-10-PCS | Mod: ,,, | Performed by: PHYSICIAN ASSISTANT

## 2022-06-02 PROCEDURE — 85025 COMPLETE CBC W/AUTO DIFF WBC: CPT | Performed by: PHYSICIAN ASSISTANT

## 2022-06-02 PROCEDURE — 80048 BASIC METABOLIC PNL TOTAL CA: CPT | Performed by: PHYSICIAN ASSISTANT

## 2022-06-02 PROCEDURE — 25000003 PHARM REV CODE 250: Performed by: PHYSICIAN ASSISTANT

## 2022-06-02 RX ORDER — GRANULES FOR ORAL 3 G/1
3 POWDER ORAL
Qty: 6 G | Refills: 0 | Status: SHIPPED | OUTPATIENT
Start: 2022-06-02 | End: 2022-06-04 | Stop reason: SDUPTHER

## 2022-06-02 RX ORDER — GRANULES FOR ORAL 3 G/1
3 POWDER ORAL
Status: COMPLETED | OUTPATIENT
Start: 2022-06-02 | End: 2022-06-02

## 2022-06-02 RX ADMIN — GRANULES FOR ORAL SOLUTION 3 G: 3 POWDER ORAL at 08:06

## 2022-06-02 NOTE — TELEPHONE ENCOUNTER
" I called and informed th ept that per Ms. Reyna, "Please inform patient that her most recent urine culture was positive for a UTI. Unfortunately the only medications that will work are IV antibiotics that we do not administer in clinic. Recommend patient present to ER for treatment with IV antibiotics". The pt verbalized understanding and said she will go to the ER.  "

## 2022-06-02 NOTE — ED PROVIDER NOTES
Encounter Date: 6/2/2022       History     Chief Complaint   Patient presents with    DR. Ocasio     Got blood work done. Positive for e coli. Needs antibiotics     77-year-old female with multiple comorbidities including ILD on chronic prednisone, coccidiomycosis, recurrent UTIs presents for antibiotic treatment for multi drug-resistant UTI.  She reports frequent, recurrent UTIs since she was a child and has been having dysuria and urinary frequency for a few days.  She had a urine culture drawn by her urologist which showed ESBL E coli sensitive only to ertapenem and meropenem.  She denies any other complaints, no hematuria, abdominal pain, nausea/vomiting, fevers/chills or flank pain.  She reports that previously she was able to do an outpatient infusion of ertapenem.        Review of patient's allergies indicates:   Allergen Reactions    Ciprofloxacin Other (See Comments)     Right foot pain and edema, tendonitis    Amlodipine Edema and Swelling     BLE  BLE    Lisinopril Other (See Comments)     cough    Nitrofuran analogues      Past Medical History:   Diagnosis Date    Arthritis     Borderline serous cystadenoma of right ovary 4/3/2018    Breast cancer     mastectomy 2014    Coccidiomycosis, progressive     Elevated CA-125 2/25/2018    Hyperlipidemia     Hypertension     OAB (overactive bladder)     Osteopenia     on Dexa 11/2017    Osteoporosis     pt reports hx of this, declined fosamax in past - treated with calcium and vit D    Pelvic mass 2/25/2018    Pulmonary nodules     Thyroid disease      Past Surgical History:   Procedure Laterality Date    CHOLECYSTECTOMY      ENDOSCOPIC ULTRASOUND OF UPPER GASTROINTESTINAL TRACT N/A 4/8/2021    Procedure: ULTRASOUND, UPPER GI TRACT, ENDOSCOPIC;  Surgeon: Aisha Barajas MD;  Location: [address]);  Service: Endoscopy;  Laterality: N/A;  UEUS/ERCP evaluation abn MRCP - Dr Carney  Covid-19 test 4/5/21 at [facility] Pre-admit - pg     ERCP N/A 2021    Procedure: ERCP (ENDOSCOPIC RETROGRADE CHOLANGIOPANCREATOGRAPHY);  Surgeon: Aisha Barajas MD;  Location: [facility] ([address] [place] [address]);  Service: Endoscopy;  Laterality: N/A;  UEUS/ERCP evaluation abn MRCP - Dr Carney  Covid-19 test 21 at [facility] Pre-admit - pg    ESOPHAGOGASTRODUODENOSCOPY N/A 2021    Procedure: EGD (ESOPHAGOGASTRODUODENOSCOPY);  Surgeon: Aisha Barajas MD;  Location: [facility] ([address] [place] [address]);  Service: Endoscopy;  Laterality: N/A;  UEUS/ERCP evaluation abn MRCP Hortencia Carney  Covid-19 test 21 at [facility] Pre-admit - pg    MASTECTOMY Right          Family History   Problem Relation Age of Onset    Cancer Mother         colon cancer    Breast cancer Mother     Hypertension Father     Heart disease Father     Stroke Father     No Known Problems Sister     Cancer Brother         lung, ++ tobacco    Hypertension Brother     No Known Problems Daughter     Hypertension Son     Colon cancer Neg Hx     Ovarian cancer Neg Hx      Social History     Tobacco Use    Smoking status: Former Smoker     Packs/day: 0.50     Years: 30.00     Pack years: 15.00     Types: Cigarettes     Quit date: 2000     Years since quittin.3    Smokeless tobacco: Never Used    Tobacco comment: quit in her 50s, after 30 years smoking;   Substance Use Topics    Alcohol use: No    Drug use: No     Review of Systems   Constitutional: Negative for chills, fatigue and fever.   HENT: Negative for sore throat.    Respiratory: Negative for shortness of breath.    Cardiovascular: Negative for chest pain.   Gastrointestinal: Negative for abdominal pain, nausea and vomiting.   Endocrine: Negative for polyuria.   Genitourinary: Positive for dysuria and frequency. Negative for hematuria, pelvic pain and urgency.   Musculoskeletal: Negative for back pain.   Skin: Negative for rash.   Allergic/Immunologic: Positive for immunocompromised state. Negative for food allergies.    Neurological: Negative for weakness.   Hematological: Does not bruise/bleed easily.       Physical Exam     Initial Vitals [06/02/22 1741]   BP Pulse Resp Temp SpO2   (!) 171/82 83 18 98.9 °F (37.2 °C) 95 %      MAP       --         Physical Exam    Nursing note and vitals reviewed.  Constitutional: She appears well-developed and well-nourished. She is not diaphoretic. No distress.   HENT:   Head: Normocephalic and atraumatic.   Eyes: EOM are normal. Pupils are equal, round, and reactive to light.   Neck:   Normal range of motion.  Cardiovascular: Normal rate, regular rhythm, normal heart sounds and intact distal pulses. Exam reveals no gallop and no friction rub.    No murmur heard.  Pulmonary/Chest: Breath sounds normal. No respiratory distress. She has no wheezes. She has no rhonchi. She has no rales. She exhibits no tenderness.   Abdominal: Abdomen is soft. Bowel sounds are normal. She exhibits no distension and no mass. There is no abdominal tenderness. There is no rebound and no guarding.   Musculoskeletal:         General: Normal range of motion.      Cervical back: Normal range of motion.     Neurological: She is alert and oriented to person, place, and time.   Skin: Skin is warm and dry.   Psychiatric: She has a normal mood and affect.         ED Course   Procedures  Labs Reviewed   CBC W/ AUTO DIFFERENTIAL - Abnormal; Notable for the following components:       Result Value    Platelets 457 (*)     MPV 8.4 (*)     Immature Granulocytes 1.6 (*)     Immature Grans (Abs) 0.18 (*)     All other components within normal limits   BASIC METABOLIC PANEL - Abnormal; Notable for the following components:    Sodium 135 (*)     All other components within normal limits          Imaging Results    None          Medications   fosfomycin packet 3 g (3 g Oral Given 6/2/22 2048)     Medical Decision Making:   History:   Old Medical Records: I decided to obtain old medical records.  Old Records Summarized: records from  clinic visits.       <> Summary of Records: Patient had urine culture performed 2 days ago which showed ESBL E coli sensitive only to ertapenem and meropenem.  Initial Assessment:   77-year-old female presenting for multi drug-resistant UTI.  She is hypertensive 171/82 with otherwise normal vitals.  Well-appearing.  Differential Diagnosis:   UTI  She has no fever, flank pain, vomiting to suggest pyelonephritis  She is alert and oriented without any weakness, fever, tachycardia or other concerning features of sepsis  Unclear why her UTIs are recurrent, she is followed by urology is for this issue  Clinical Tests:   Lab Tests: Ordered and Reviewed  ED Management:  Will check labs, for dose IV ertapenem and discuss with Hospital Medicine and Infectious Disease.    Labs are reassuring.  I discussed this patient with hospitalist as well as infectious disease fellow and ED .  Apparently, her prior ESBL UTI was actually sensitive to fosfomycin but this was not tested for on her susceptibilities for this culture.  The micro lab was contacted by Dr. Dempsey of Infectious Diseases who will add on susceptibility testing for this.  Given her well appearance and no systemic symptoms, I think that patient to treatment is preferable if possible, patient and family would prefer to be discharged.  Given 1st dose fosfomycin in the emergency department a prescription for 2 additional doses per infectious disease recommendations.  Plan for follow-up in ID clinic.  Patient was given strict ED return precautions and will be contacted by ID clinic if her UTI is resistant to fosfomycin.  Stressed the importance of follow-up, strict ED return precautions given.  Patient voiced understanding and is comfortable with discharge.   Other:   I have discussed this case with another health care provider.       <> Summary of the Discussion: See ED course             ED Course as of 06/03/22 0006   Thu Jun 02, 2022   1796 I discussed this  patient with hospitalist Dr. Hernandez to see if we can help arrange for outpatient treatment.  Will discuss with Infectious Disease. [CC]   1916 WBC: 11.34  No leukocytosis but there is mild thrombocytosis [CC]   1929 I discussed this patient with infectious disease fellow Dr. Dempsey to help set up outpatient antibiotic infusion. [CC]   2003 I again discussed this patient with Infectious Disease Dr. Dempsey who reports that previously the patient's bacteria was sensitive to fosfomycin and that she may be able to be treated with this again. [CC]      ED Course User Index  [CC] Vanessa Silva PA-C             Clinical Impression:   Final diagnoses:  [N39.0, B96.29, Z16.12] UTI due to extended-spectrum beta lactamase (ESBL) producing Escherichia coli (Primary)          ED Disposition Condition    Discharge Stable        ED Prescriptions     Medication Sig Dispense Start Date End Date Auth. Provider    fosfomycin (MONUROL) 3 gram Pack Take 3 g by mouth every 72 hours. for 2 doses 6 g 6/2/2022 6/6/2022 Vanessa Silva PA-C        Follow-up Information     Follow up With Specialties Details Why Contact Info    Ervin Plascencia MD Infectious Diseases Schedule an appointment as soon as possible for a visit in 1 week  [address] [facility] [address]  [phone]      [facility] - Emergency Dept Emergency Medicine Go to  If symptoms worsen [address] [facility] [id number] [phone]           Vanessa Silva PA-C  06/03/22 0006

## 2022-06-02 NOTE — TELEPHONE ENCOUNTER
Reason for Disposition   Caller has already spoken with another triager or PCP AND has further questions AND triager able to answer questions.    Protocols used: NO CONTACT OR DUPLICATE CONTACT CALL-A-ALVIN    John Paul, Niurka's daughter, called with questions regarding the message they received from urology's office today.  Ana Rosa Reyna NP in urology, called to inform the patient that she does have UTI, and the medication / antibiotic needed for this particular infection does require IV administration of antibiotic, and she should go to ED now for this. She wants to know if they can wait until tomorrow.  Strongly encouraged they bring Niurka to the ED now, so essential antibiotic medication can be infused now. John Paul states they will bring her to [facility] ED now.  Message to Ana Rosa Reyna NP, Ervin Plascencia MD, Infectious Disease, and also Dr Anderson, pcp. Please contact caller directly with any additional care advice.

## 2022-06-02 NOTE — TELEPHONE ENCOUNTER
Please inform patient that her most recent urine culture was positive for a UTI. Unfortunately the only medications that will work are IV antibiotics that we do not administer in clinic. Recommend patient present to ER for treatment with IV antibiotics

## 2022-06-03 LAB — BACTERIA UR CULT: NO GROWTH

## 2022-06-03 NOTE — PROGRESS NOTES
Documentation for clarification-There are two urine cultures in reference here- one without UTI  ( order [id number])and one with UTI ( order [id number]). Patient was sent to the ER for the urine culture noted to have a UTI

## 2022-06-03 NOTE — DISCHARGE INSTRUCTIONS
Diagnosis:  Multi drug-resistant UTI    I discussed your case with our infectious disease doctor, you were given 1 dose of fosfomycin in the emergency department.  This medicine is dosed every 3 days.  I am prescribing 2 additional doses of this medication.  If the bacteria is sensitive to this medicine, you would take your next dose Sunday 6/5/2022 and the following dose 6/8/2022.  If the bacteria is resistant to the fosfomycin, you will receive a call from our infectious disease team will help arrange for IV antibiotics.    Please call to schedule follow-up appointment with our infectious disease team.  If you start to have any worsening symptoms, or new symptoms such as fever, flank pain, vomiting, confusion or weakness please return to the emergency department immediately.

## 2022-06-03 NOTE — ED NOTES
Patient identifiers verified and correct for Niurka Pedraza  LOC: The patient is awake, alert and aware of environment with an appropriate affect, the patient is oriented x 3 and speaking appropriately.   APPEARANCE: Patient appears comfortable and in no acute distress, patient is clean and well groomed.  SKIN: The skin is warm and dry, color consistent with ethnicity, patient has normal skin turgor and moist mucus membranes, skin intact, no breakdown or bruising noted.   MUSCULOSKELETAL: Patient moving all extremities spontaneously, no swelling noted.  RESPIRATORY: Airway is open and patent, respirations are spontaneous, patient has a normal effort and rate, no accessory muscle use noted, pt placed on continuous pulse ox with O2 sats noted at 97% on room air.  CARDIAC: Pt placed on cardiac monitor. Patient has a normal rate and regular rhythm, no edema noted, capillary refill < 3 seconds.   GASTRO: Soft and non tender to palpation, no distention noted, normoactive bowel sounds present in all four quadrants. Pt states bowel movements have been regular.  : e. Coli in urine. Burning and frequency  NEURO: Pt opens eyes spontaneously, behavior appropriate to situation, follows commands, facial expression symmetrical, bilateral hand grasp equal and even, purposeful motor response noted, normal sensation in all extremities when touched with a finger.

## 2022-06-04 ENCOUNTER — TELEPHONE (OUTPATIENT)
Dept: INFECTIOUS DISEASES | Facility: HOSPITAL | Age: 77
End: 2022-06-04
Payer: MEDICARE

## 2022-06-04 DIAGNOSIS — I10 HYPERTENSION, BENIGN: ICD-10-CM

## 2022-06-04 RX ORDER — GRANULES FOR ORAL 3 G/1
3 POWDER ORAL
Qty: 6 G | Refills: 0 | Status: SHIPPED | OUTPATIENT
Start: 2022-06-04 | End: 2022-06-08

## 2022-06-04 NOTE — TELEPHONE ENCOUNTER
Care Due:                  Date            Visit Type   Department     Provider  --------------------------------------------------------------------------------                                EP -                              PRIMARY      [facility] INTERNAL  Last Visit: 03-      CARE ([facility])   MEDICINE       Savana Anderson                              EP -                              PRIMARY      [facility] INTERNAL  Next Visit: 06-      CARE ([facility])   MEDICINE       Savana Anderson                                                            Last  Test          Frequency    Reason                     Performed    Due Date  --------------------------------------------------------------------------------    Lipid Panel.  12 months..  atorvastatin.............  08- 08-    TSH.........  12 months..  levothyroxine............  08- 08-    [employer] Embedded Care Gaps. Reference number: [id number]. 6/04/2022   7:52:14 AM CDT

## 2022-06-04 NOTE — TELEPHONE ENCOUNTER
----- Message from Mei Willingham MA sent at 6/3/2022  2:52 PM CDT -----  Contact: [phone]  Pt stated she needs a prescription for fosfomycin (MONUROL) 3 gram Pack send to [facility].   Scheduled pt to see you on 6/7/22 at 10:30.  Thanks  ----- Message -----  From: Rishi Dempsey MD  Sent: 6/3/2022   2:36 PM CDT  To: Mei Willingham MA    Yeah anytime next week in the morning with me is fine  ----- Message -----  From: Mei Willingham MA  Sent: 6/3/2022   2:27 PM CDT  To: Rishi Dempsey MD    Hi, See message below.   Pt is requesting an appointment for next week,   Can you see her?  ----- Message -----  From: Ervin Plascencia MD  Sent: 6/3/2022   2:07 PM CDT  To: Mei Willingham MA    She was treated by Roselyn from the ED. He can see her.   ----- Message -----  From: Mei Willingham MA  Sent: 6/3/2022  11:28 AM CDT  To: Ervin Plascencia MD    When can you see him?  ----- Message -----  From: Thalia Irwin  Sent: 6/3/2022  11:15 AM CDT  To: Thais Jolly    Pt, need follow up UTI appt and there no options in Epic, pls call to schedule

## 2022-06-05 LAB — BACTERIA UR CULT: ABNORMAL

## 2022-06-05 RX ORDER — CARVEDILOL 6.25 MG/1
TABLET ORAL
Qty: 180 TABLET | Refills: 3 | Status: SHIPPED | OUTPATIENT
Start: 2022-06-05 | End: 2023-06-20 | Stop reason: SDUPTHER

## 2022-06-05 NOTE — TELEPHONE ENCOUNTER
Refill Routing Note   Medication(s) are not appropriate for processing by [facility] for the following reason(s):      - Patient has been seen in the ED/Hospital since the last PCP visit    [facility] action(s):  Defer          Medication reconciliation completed: No     Appointments  past 12m or future 3m with PCP    Date Provider   Last Visit   3/11/2022 Savana Anderson MD   Next Visit   6/24/2022 Savana Anderson MD   ED visits in past 90 days: 1        Note composed:11:57 PM 06/04/2022

## 2022-06-06 ENCOUNTER — TELEPHONE (OUTPATIENT)
Dept: UROLOGY | Facility: CLINIC | Age: 77
End: 2022-06-06
Payer: MEDICARE

## 2022-06-07 ENCOUNTER — OFFICE VISIT (OUTPATIENT)
Dept: INFECTIOUS DISEASES | Facility: CLINIC | Age: 77
End: 2022-06-07
Payer: MEDICARE

## 2022-06-07 VITALS
TEMPERATURE: 99 F | HEIGHT: 64 IN | SYSTOLIC BLOOD PRESSURE: 111 MMHG | DIASTOLIC BLOOD PRESSURE: 70 MMHG | WEIGHT: 160.94 LBS | HEART RATE: 78 BPM | BODY MASS INDEX: 27.48 KG/M2

## 2022-06-07 DIAGNOSIS — A49.9 ESBL (EXTENDED SPECTRUM BETA-LACTAMASE) PRODUCING BACTERIA INFECTION: Primary | ICD-10-CM

## 2022-06-07 DIAGNOSIS — Z16.12 ESBL (EXTENDED SPECTRUM BETA-LACTAMASE) PRODUCING BACTERIA INFECTION: Primary | ICD-10-CM

## 2022-06-07 DIAGNOSIS — N39.0 RECURRENT UTI: ICD-10-CM

## 2022-06-07 PROCEDURE — 99213 OFFICE O/P EST LOW 20 MIN: CPT | Mod: S$PBB,GC,, | Performed by: STUDENT IN AN ORGANIZED HEALTH CARE EDUCATION/TRAINING PROGRAM

## 2022-06-07 PROCEDURE — 99999 PR PBB SHADOW E&M-EST. PATIENT-LVL III: ICD-10-PCS | Mod: PBBFAC,GC,, | Performed by: STUDENT IN AN ORGANIZED HEALTH CARE EDUCATION/TRAINING PROGRAM

## 2022-06-07 PROCEDURE — 99213 OFFICE O/P EST LOW 20 MIN: CPT | Mod: PBBFAC | Performed by: STUDENT IN AN ORGANIZED HEALTH CARE EDUCATION/TRAINING PROGRAM

## 2022-06-07 PROCEDURE — 99999 PR PBB SHADOW E&M-EST. PATIENT-LVL III: CPT | Mod: PBBFAC,GC,, | Performed by: STUDENT IN AN ORGANIZED HEALTH CARE EDUCATION/TRAINING PROGRAM

## 2022-06-07 PROCEDURE — 99213 PR OFFICE/OUTPT VISIT, EST, LEVL III, 20-29 MIN: ICD-10-PCS | Mod: S$PBB,GC,, | Performed by: STUDENT IN AN ORGANIZED HEALTH CARE EDUCATION/TRAINING PROGRAM

## 2022-06-07 NOTE — PROGRESS NOTES
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 Coli UTI.    UTI's typically present with suprapubic pain, dysuria, frequency.  Last seen in ID clinic with Dr. Plascencia 3/16/22.  Was sensitive to fosfomycin, but patient had already started, and ultimately completed, a regimen of Ertapenem.  She presented to [facility] ED 6/2/22 after her urologist had collected urine culture due to complaints of dysuria, frequency.  Found to have ESBL E. Coli.  I had been contacted by the ED provider at the time, recommended fosfomycin x3 doses and outpatient ID clinic follow-up today.    Has one more dose of fosfomycin left.  Dysuria resolved, still with some frequency.  Denies fevers, chills, nausea, vomiting, abdominal or flank pain.    Review of Systems   Constitutional: Negative for chills and fever.   Cardiovascular: Negative for chest pain.   Respiratory: Negative for shortness of breath.    Gastrointestinal: Negative for nausea and vomiting.   Genitourinary: Positive for frequency. Negative for dysuria, flank pain and pelvic pain.         Past Medical History:   Diagnosis Date    Arthritis     Borderline serous cystadenoma of right ovary 4/3/2018    Breast cancer     mastectomy 2014    Coccidiomycosis, progressive     Elevated CA-125 2/25/2018    Hyperlipidemia     Hypertension     OAB (overactive bladder)     Osteopenia     on Dexa 11/2017    Osteoporosis     pt reports hx of this, declined fosamax in past - treated with calcium and vit D    Pelvic mass 2/25/2018    Pulmonary nodules     Thyroid disease      Past Surgical History:   Procedure Laterality Date    CHOLECYSTECTOMY      ENDOSCOPIC ULTRASOUND OF  UPPER GASTROINTESTINAL TRACT N/A 2021    Procedure: ULTRASOUND, UPPER GI TRACT, ENDOSCOPIC;  Surgeon: Aisha Barajas MD;  [facility]  Service: Endoscopy;  Laterality: N/A;  UEUS/ERCP evaluation abn MRCP - Dr Carney  Covid-19 test 21 at [facility] Pre-admit - pg    ERCP N/A 2021    Procedure: ERCP (ENDOSCOPIC RETROGRADE CHOLANGIOPANCREATOGRAPHY);  Surgeon: Aisha Barajas MD;  [facility]  Service: Endoscopy;  Laterality: N/A;  UEUS/ERCP evaluation abn MRCP - Dr Carney  Covid-19 test 21 at [facility] Pre-admit - pg    ESOPHAGOGASTRODUODENOSCOPY N/A 2021    Procedure: EGD (ESOPHAGOGASTRODUODENOSCOPY);  Surgeon: Aisha Barajas MD;  [facility] ([address] [place] [address]);  Service: Endoscopy;  Laterality: N/A;  UEUS/ERCP evaluation abn MRCP - Dr Carney  Covid-19 test 21 at [facility] Pre-admit - pg    MASTECTOMY Right     2014     Family History   Problem Relation Age of Onset    Cancer Mother         colon cancer    Breast cancer Mother     Hypertension Father     Heart disease Father     Stroke Father     No Known Problems Sister     Cancer Brother         lung, ++ tobacco    Hypertension Brother     No Known Problems Daughter     Hypertension Son     Colon cancer Neg Hx     Ovarian cancer Neg Hx      Social History     Tobacco Use    Smoking status: Former Smoker     Packs/day: 0.50     Years: 30.00     Pack years: 15.00     Types: Cigarettes     Quit date: 2000     Years since quittin.3    Smokeless tobacco: Never Used    Tobacco comment: quit in her 50s, after 30 years smoking;   Substance Use Topics    Alcohol use: No    Drug use: No       Review of patient's allergies indicates:   Allergen Reactions    Ciprofloxacin Other (See Comments)     Right foot pain and edema, tendonitis    Amlodipine Edema and Swelling     BLE  BLE    Lisinopril Other (See Comments)     cough    Nitrofuran analogues          Objective:   VS  (24h):   Vitals:    06/07/22 0954   BP: 111/70   Pulse: 78   Temp: 99.1 °F (37.3 °C)         Physical Exam  Vitals reviewed.   Constitutional:       General: She is not in acute distress.     Appearance: Normal appearance. She is normal weight. She is not ill-appearing, toxic-appearing or diaphoretic.   HENT:      Head: Normocephalic and atraumatic.      Right Ear: External ear normal.      Left Ear: External ear normal.      Nose: Nose normal.      Mouth/Throat:      Mouth: Mucous membranes are moist.      Pharynx: Oropharynx is clear.   Eyes:      General: No scleral icterus.        Right eye: No discharge.         Left eye: No discharge.      Extraocular Movements: Extraocular movements intact.      Conjunctiva/sclera: Conjunctivae normal.   Cardiovascular:      Rate and Rhythm: Normal rate and regular rhythm.      Heart sounds: Normal heart sounds. No murmur heard.  Pulmonary:      Effort: Pulmonary effort is normal. No respiratory distress.      Breath sounds: Normal breath sounds. No wheezing, rhonchi or rales.   Abdominal:      General: Abdomen is flat. There is no distension.      Palpations: Abdomen is soft.      Tenderness: There is no abdominal tenderness. There is no right CVA tenderness, left CVA tenderness, guarding or rebound.   Musculoskeletal:         General: Normal range of motion.      Cervical back: Normal range of motion.   Skin:     General: Skin is warm and dry.   Neurological:      Mental Status: She is alert and oriented to person, place, and time. Mental status is at baseline.   Psychiatric:         Mood and Affect: Mood normal.         Behavior: Behavior normal.         Thought Content: Thought content normal.         Judgment: Judgment normal.           Labs:      Micro:       Radiology:       Immunization History   Administered Date(s) Administered    COVID-19 MRNA, LN-S PF (MODERNA HALF 0.25 ML DOSE) 11/09/2021    COVID-19, MRNA, LN-S, PF (MODERNA FULL 0.5 ML DOSE) 02/03/2021,  03/03/2021    Influenza - High Dose - PF (65 years and older) 10/27/2020    Pneumococcal Conjugate - 13 Valent 11/02/2017    Pneumococcal Polysaccharide - 23 Valent 07/28/2020    Tdap 07/21/2021         Assessment & Plan:     1. ESBL (extended spectrum beta-lactamase) producing bacteria infection    2. Recurrent UTI     ESBL E. Coli recurrent UTI, treating with fosfomycin q72h x 3 doses.  Improving but still not at baseline.  Advised patient that is symptoms persist/worsen will repeat urine culture after treatment complete.    Rishi Dempsey MD  Infectious Disease Fellow

## 2022-06-14 ENCOUNTER — OFFICE VISIT (OUTPATIENT)
Dept: RHEUMATOLOGY | Facility: CLINIC | Age: 77
End: 2022-06-14
Payer: MEDICARE

## 2022-06-14 VITALS
BODY MASS INDEX: 27.67 KG/M2 | OXYGEN SATURATION: 95 % | HEIGHT: 64 IN | HEART RATE: 82 BPM | WEIGHT: 162.06 LBS | DIASTOLIC BLOOD PRESSURE: 78 MMHG | RESPIRATION RATE: 20 BRPM | SYSTOLIC BLOOD PRESSURE: 126 MMHG

## 2022-06-14 DIAGNOSIS — J84.112 UIP (USUAL INTERSTITIAL PNEUMONITIS): Primary | ICD-10-CM

## 2022-06-14 DIAGNOSIS — R76.8 RHEUMATOID FACTOR POSITIVE: ICD-10-CM

## 2022-06-14 DIAGNOSIS — Z71.89 COUNSELING AND COORDINATION OF CARE: ICD-10-CM

## 2022-06-14 DIAGNOSIS — M15.9 PRIMARY OSTEOARTHRITIS INVOLVING MULTIPLE JOINTS: ICD-10-CM

## 2022-06-14 PROCEDURE — 99204 PR OFFICE/OUTPT VISIT, NEW, LEVL IV, 45-59 MIN: ICD-10-PCS | Mod: S$PBB,,, | Performed by: INTERNAL MEDICINE

## 2022-06-14 PROCEDURE — 99214 OFFICE O/P EST MOD 30 MIN: CPT | Mod: PBBFAC,PN | Performed by: INTERNAL MEDICINE

## 2022-06-14 PROCEDURE — 99204 OFFICE O/P NEW MOD 45 MIN: CPT | Mod: S$PBB,,, | Performed by: INTERNAL MEDICINE

## 2022-06-14 PROCEDURE — 99999 PR PBB SHADOW E&M-EST. PATIENT-LVL IV: ICD-10-PCS | Mod: PBBFAC,,, | Performed by: INTERNAL MEDICINE

## 2022-06-14 PROCEDURE — 99999 PR PBB SHADOW E&M-EST. PATIENT-LVL IV: CPT | Mod: PBBFAC,,, | Performed by: INTERNAL MEDICINE

## 2022-06-14 NOTE — PROGRESS NOTES
RHEUMATOLOGY OUTPATIENT CLINIC NOTE    6/14/2022    Attending Rheumatologist: Don Levy  Primary Care Provider: Savana Anderson MD   Physician Requesting Consultation: Savana Anderson MD  [address] [place] [address]  Chief Complaint/Reason For Consultation:  No chief complaint on file.      Subjective:       MINOO Pedraza is a 77 y.o. White female with medical history noted below who presents for evaluation of ILD.     Patient notes Hx of ILD. Had work up showing +SHAHNAZ and RF, in the past, repeat negative. Patient endorses joint pain in her first CMC bilaterally though R>L. Also reports chronic back pain. Notes pain is typically worse with activity. Reports minimal morning stiffness. No swelling. Uses brace with relief. She endorses cough, SOB, food getting stuck with swallowing, fatigue, heart burn. Notes some unsteadiness due to weakness. Denies rash, Raynaud's, fever, wt loss, blood clots, dry eyes, dry mouth, oral sores.     Review of Systems   Constitutional: Positive for fatigue. Negative for chills, fever and unexpected weight change.   HENT: Negative for mouth sores.    Eyes: Negative for redness and eye dryness.   Respiratory: Positive for cough and shortness of breath.    Cardiovascular: Negative for chest pain.   Gastrointestinal: Negative for abdominal distention, constipation, diarrhea, nausea and vomiting.   Genitourinary: Negative for vaginal dryness.   Musculoskeletal: Positive for arthralgias and back pain. Negative for gait problem, joint swelling, leg pain, myalgias, neck pain, neck stiffness and joint deformity.   Integumentary:  Negative for rash.   Neurological: Negative for weakness, numbness and headaches.   Hematological: Negative for adenopathy. Does not bruise/bleed easily.   Psychiatric/Behavioral: Negative for confusion, decreased concentration and sleep disturbance. The patient is not nervous/anxious.    All other systems reviewed and are  negative.       Chronic comorbid conditions affecting medical decision making today:  Past Medical History:   Diagnosis Date    Arthritis     Borderline serous cystadenoma of right ovary 4/3/2018    Breast cancer     mastectomy 2014    Coccidiomycosis, progressive     Elevated CA-125 2/25/2018    Hyperlipidemia     Hypertension     OAB (overactive bladder)     Osteopenia     on Dexa 11/2017    Osteoporosis     pt reports hx of this, declined fosamax in past - treated with calcium and vit D    Pelvic mass 2/25/2018    Pulmonary nodules     Thyroid disease      Past Surgical History:   Procedure Laterality Date    CHOLECYSTECTOMY      ENDOSCOPIC ULTRASOUND OF UPPER GASTROINTESTINAL TRACT N/A 4/8/2021    Procedure: ULTRASOUND, UPPER GI TRACT, ENDOSCOPIC;  Surgeon: Aisha Barajas MD;  Location: [facility] ([address] [place] [address]);  Service: Endoscopy;  Laterality: N/A;  UEUS/ERCP evaluation abn MRCP - Dr Angelika Zamudio-19 test 4/5/21 at [facility] Pre-admit - pg    ERCP N/A 4/8/2021    Procedure: ERCP (ENDOSCOPIC RETROGRADE CHOLANGIOPANCREATOGRAPHY);  Surgeon: Aisha Barajas MD;  Location: [facility] ([address] [place] [address]);  Service: Endoscopy;  Laterality: N/A;  UEUS/ERCP evaluation abn MRCP Hortencia Carney  Covid-19 test 4/5/21 at [facility] Pre-admit - pg    ESOPHAGOGASTRODUODENOSCOPY N/A 4/8/2021    Procedure: EGD (ESOPHAGOGASTRODUODENOSCOPY);  Surgeon: Aisha Barajas MD;  Location: [facility] ([address] [place] [address]);  Service: Endoscopy;  Laterality: N/A;  UEUS/ERCP evaluation abn MRCP - Dr Carney  Covid-19 test 4/5/21 at [facility] Pre-admit - pg    MASTECTOMY Right     2014     Family History   Problem Relation Age of Onset    Cancer Mother         colon cancer    Breast cancer Mother     Hypertension Father     Heart disease Father     Stroke Father     No Known Problems Sister     Cancer Brother         lung, ++ tobacco    Hypertension Brother     No Known Problems Daughter     Hypertension Son     Colon cancer Neg Hx      Ovarian cancer Neg Hx      Social History     Substance and Sexual Activity   Alcohol Use No     Social History     Tobacco Use   Smoking Status Former Smoker    Packs/day: 0.50    Years: 30.00    Pack years: 15.00    Types: Cigarettes    Quit date: 2000    Years since quittin.3   Smokeless Tobacco Never Used   Tobacco Comment    quit in her 50s, after 30 years smoking;     Social History     Substance and Sexual Activity   Drug Use No       Current Outpatient Medications:     albuterol (VENTOLIN HFA) 90 mcg/actuation inhaler, Inhale 1-2 puffs into the lungs every 6 (six) hours as needed for Wheezing or Shortness of Breath. Rescue, Disp: 18 g, Rfl: 1    atorvastatin (LIPITOR) 20 MG tablet, TAKE 1 TABLET BY MOUTH EVERY DAY, Disp: 90 tablet, Rfl: 2    azithromycin (Z-SEB) 250 MG tablet, Take 250 mg by mouth 3 (three) times a week., Disp: , Rfl:     budesonide-formoterol 80-4.5 mcg (SYMBICORT) 80-4.5 mcg/actuation HFAA, Inhale 2 puffs into the lungs 2 (two) times daily as needed. Controller, Disp: 1 Inhaler, Rfl: 6    calcium-vitamin D3 (CALCIUM 500 + D) 500 mg(1,250mg) -200 unit per tablet, Take 1 tablet by mouth 2 (two) times daily with meals., Disp: , Rfl:     carvediloL (COREG) 6.25 MG tablet, TAKE 1 TABLET BY MOUTH TWICE A DAY WITH FOOD, Disp: 180 tablet, Rfl: 3    irbesartan (AVAPRO) 300 MG tablet, Take 1 tablet (300 mg total) by mouth every evening., Disp: 90 tablet, Rfl: 3    levothyroxine (SYNTHROID) 50 MCG tablet, Take 1 tablet (50 mcg total) by mouth before breakfast., Disp: 90 tablet, Rfl: 1    lidocaine HCL 2% (XYLOCAINE) 2 % jelly, Apply topically as needed. Apply topically once nightly to affected part of foot/feet., Disp: 30 mL, Rfl: 2    montelukast (SINGULAIR) 10 mg tablet, TAKE 1 TABLET BY MOUTH EVERY DAY IN THE EVENING, Disp: 90 tablet, Rfl: 2    multivitamin (THERAGRAN) per tablet, Take 1 tablet by mouth once daily., Disp: , Rfl:     oxybutynin (DITROPAN-XL) 10 MG 24  hr tablet, Take 1 tablet (10 mg total) by mouth once daily., Disp: 30 tablet, Rfl: 11    predniSONE (DELTASONE) 5 MG tablet, Take 0.5 tablets (2.5 mg total) by mouth 3 (three) times daily., Disp: 50 tablet, Rfl: 1     Objective:         Vitals:    06/14/22 1347   BP: 126/78   Pulse: 82   Resp: 20     Physical Exam   Pulmonary/Chest: She has rales.   Musculoskeletal:      Comments: Can make fist, no synovitis  Bilateral 1st CMC TTP  Knee crepitus   Strength 5/5   No tender points        Reviewed old and all outside pertinent medical records available.    All lab results personally reviewed and interpreted by me.  Lab Results   Component Value Date    WBC 11.34 06/02/2022    HGB 12.5 06/02/2022    HCT 38.8 06/02/2022    MCV 88 06/02/2022    MCH 28.3 06/02/2022    MCHC 32.2 06/02/2022    RDW 14.5 06/02/2022     (H) 06/02/2022    MPV 8.4 (L) 06/02/2022       Lab Results   Component Value Date     (L) 06/02/2022    K 3.9 06/02/2022     06/02/2022    CO2 27 06/02/2022    GLU 88 06/02/2022    BUN 15 06/02/2022    CALCIUM 9.6 06/02/2022    PROT 7.8 10/15/2021    ALBUMIN 3.5 10/15/2021    BILITOT 2.0 (H) 10/15/2021    AST 23 10/15/2021    ALKPHOS 75 10/15/2021    ALT 17 10/15/2021       Lab Results   Component Value Date    COLORU Yellow 05/31/2022    APPEARANCEUA Clear 05/31/2022    SPECGRAV 1.015 05/31/2022    PHUR 6.0 05/31/2022    PROTEINUA Negative 05/31/2022    KETONESU Negative 05/31/2022    LEUKOCYTESUR 2+ (A) 05/31/2022    NITRITE Positive (A) 05/31/2022    UROBILINOGEN normal 02/10/2021       Lab Results   Component Value Date    CRP <0.50 03/07/2022       Lab Results   Component Value Date    SEDRATE 18 12/10/2021       Lab Results   Component Value Date    RF <8.6 03/07/2022    SEDRATE 18 12/10/2021       No components found for: 25OHVITDTOT, 36DGZUPF5, 05BATQIB7, METHODNOTE    No results found for: URICACID    No components found for: TSPOTTB        Imaging:  All imaging reviewed and  independently interpreted by me.         ASSESSMENT / PLAN:     Niurka Pedraza is a 77 y.o. White female with:      1. UIP (usual interstitial pneumonitis)  - patient with UIP   - unclear etiology  - prior labs include +SHAHNAZ and RF, repeat negative, her ROS is not consistent with CTD  - discussed association of CTD and ILD  - continue following with Pulm  - work up as below  - reassurance   - Anti-scleroderma antibody; Future  - RNA polymerase III Ab, IgG; Future  - PM-Scl Antibody by Immunodiffusion; Future  - Anti Sm/RNP Antibody; Future  - Th/To Antibody; Future  - MyoMarker Panel 3; Future  - CK; Future  - Aldolase; Future  - C-Reactive Protein; Future  - Sedimentation rate; Future  - Sjogrens syndrome-B extractable nuclear antibody; Future  - Sjogrens syndrome-A extractable nuclear antibody; Future    2. Rheumatoid factor positive  - patient with +RF, then repeat Negative, Negative CCP   - she has UIP otherwise no other clues to point as RA as etiology  - work up as above  - reassurance     3. Primary osteoarthritis involving multiple joints  - patients joint pain 2/2 OA  - discussed diagnosis and management  - NSAIDs/Tylenol PRN   - wt loss  - reassurance and exercise    4. Other specified counseling  - over 10 minutes spent regarding below topics:  - Immunization counseling done.  - Weight loss counseling done.  - Nutrition and exercise counseling.  - Limitation of alcohol consumption.  - Regular exercise:  Aerobic and resistance.  - Medication counseling provided.      Follow up in about 4 weeks (around 7/12/2022).    Method of contact with patient concerns: [facility]    Disclaimer:  This note is prepared using voice recognition software and as such is likely to have errors and has not been proof read. Please contact me for questions.     Time spent: 45 minutes in face to face discussion concerning diagnosis, prognosis, review of lab and test results, benefits of treatment as well as management  of disease, counseling of patient and coordination of care between various health care providers.  Greater than half the time spent was used for coordination of care and counseling of patient.    Don Levy M.D.  Rheumatology Department   [facility]

## 2022-06-15 ENCOUNTER — PATIENT MESSAGE (OUTPATIENT)
Dept: INFECTIOUS DISEASES | Facility: CLINIC | Age: 77
End: 2022-06-15
Payer: MEDICARE

## 2022-06-15 ENCOUNTER — LAB VISIT (OUTPATIENT)
Dept: LAB | Facility: OTHER | Age: 77
End: 2022-06-15
Attending: INTERNAL MEDICINE
Payer: MEDICARE

## 2022-06-15 DIAGNOSIS — J84.112 UIP (USUAL INTERSTITIAL PNEUMONITIS): ICD-10-CM

## 2022-06-15 LAB
CK SERPL-CCNC: 38 U/L (ref 20–180)
CRP SERPL-MCNC: 0.9 MG/L (ref 0–8.2)
ERYTHROCYTE [SEDIMENTATION RATE] IN BLOOD: 10 MM/HR (ref 0–20)

## 2022-06-15 PROCEDURE — 86235 NUCLEAR ANTIGEN ANTIBODY: CPT | Mod: 59 | Performed by: INTERNAL MEDICINE

## 2022-06-15 PROCEDURE — 82085 ASSAY OF ALDOLASE: CPT | Performed by: INTERNAL MEDICINE

## 2022-06-15 PROCEDURE — 83516 IMMUNOASSAY NONANTIBODY: CPT | Performed by: INTERNAL MEDICINE

## 2022-06-15 PROCEDURE — 85651 RBC SED RATE NONAUTOMATED: CPT | Performed by: INTERNAL MEDICINE

## 2022-06-15 PROCEDURE — 82550 ASSAY OF CK (CPK): CPT | Performed by: INTERNAL MEDICINE

## 2022-06-15 PROCEDURE — 86235 NUCLEAR ANTIGEN ANTIBODY: CPT | Performed by: INTERNAL MEDICINE

## 2022-06-15 PROCEDURE — 86140 C-REACTIVE PROTEIN: CPT | Performed by: INTERNAL MEDICINE

## 2022-06-16 DIAGNOSIS — N39.0 RECURRENT UTI: Primary | ICD-10-CM

## 2022-06-16 LAB — ENA SCL70 IGG SER IA-ACNC: <0.2 U

## 2022-06-17 ENCOUNTER — LAB VISIT (OUTPATIENT)
Dept: LAB | Facility: OTHER | Age: 77
End: 2022-06-17
Attending: INTERNAL MEDICINE
Payer: MEDICARE

## 2022-06-17 ENCOUNTER — TELEPHONE (OUTPATIENT)
Dept: INFECTIOUS DISEASES | Facility: CLINIC | Age: 77
End: 2022-06-17
Payer: MEDICARE

## 2022-06-17 DIAGNOSIS — N39.0 RECURRENT UTI: ICD-10-CM

## 2022-06-17 LAB
ALDOLASE SERPL-CCNC: 6.5 U/L (ref 1.2–7.6)
ANTI SM/RNP ANTIBODY: 0.1 RATIO (ref 0–0.99)
ANTI-SM/RNP INTERPRETATION: NEGATIVE
ANTI-SSA ANTIBODY: 0.06 RATIO (ref 0–0.99)
ANTI-SSA INTERPRETATION: NEGATIVE
ANTI-SSB ANTIBODY: 0.06 RATIO (ref 0–0.99)
ANTI-SSB INTERPRETATION: NEGATIVE
BACTERIA #/AREA URNS AUTO: ABNORMAL /HPF
BILIRUB UR QL STRIP: NEGATIVE
CLARITY UR REFRACT.AUTO: ABNORMAL
COLOR UR AUTO: ABNORMAL
GLUCOSE UR QL STRIP: NEGATIVE
HGB UR QL STRIP: NEGATIVE
HYALINE CASTS UR QL AUTO: 0 /LPF
KETONES UR QL STRIP: ABNORMAL
LEUKOCYTE ESTERASE UR QL STRIP: ABNORMAL
MICROSCOPIC COMMENT: ABNORMAL
NITRITE UR QL STRIP: POSITIVE
PH UR STRIP: 5 [PH] (ref 5–8)
PROT UR QL STRIP: ABNORMAL
RBC #/AREA URNS AUTO: >100 /HPF (ref 0–4)
SP GR UR STRIP: 1.02 (ref 1–1.03)
SQUAMOUS #/AREA URNS AUTO: 25 /HPF
URN SPEC COLLECT METH UR: ABNORMAL
WBC #/AREA URNS AUTO: >100 /HPF (ref 0–5)

## 2022-06-17 PROCEDURE — 81001 URINALYSIS AUTO W/SCOPE: CPT | Performed by: STUDENT IN AN ORGANIZED HEALTH CARE EDUCATION/TRAINING PROGRAM

## 2022-06-17 PROCEDURE — 87086 URINE CULTURE/COLONY COUNT: CPT | Performed by: STUDENT IN AN ORGANIZED HEALTH CARE EDUCATION/TRAINING PROGRAM

## 2022-06-17 PROCEDURE — 87077 CULTURE AEROBIC IDENTIFY: CPT | Performed by: STUDENT IN AN ORGANIZED HEALTH CARE EDUCATION/TRAINING PROGRAM

## 2022-06-17 PROCEDURE — 87186 SC STD MICRODIL/AGAR DIL: CPT | Performed by: STUDENT IN AN ORGANIZED HEALTH CARE EDUCATION/TRAINING PROGRAM

## 2022-06-17 PROCEDURE — 87088 URINE BACTERIA CULTURE: CPT | Performed by: STUDENT IN AN ORGANIZED HEALTH CARE EDUCATION/TRAINING PROGRAM

## 2022-06-17 NOTE — TELEPHONE ENCOUNTER
Spoke with patient and schedule her lab appointment for today @ 12:00. Patient understood and confirmed.

## 2022-06-20 ENCOUNTER — INFUSION (OUTPATIENT)
Dept: INFECTIOUS DISEASES | Facility: HOSPITAL | Age: 77
End: 2022-06-20
Attending: INTERNAL MEDICINE
Payer: MEDICARE

## 2022-06-20 ENCOUNTER — PATIENT MESSAGE (OUTPATIENT)
Dept: INFECTIOUS DISEASES | Facility: CLINIC | Age: 77
End: 2022-06-20
Payer: MEDICARE

## 2022-06-20 VITALS
WEIGHT: 160.69 LBS | BODY MASS INDEX: 27.59 KG/M2 | SYSTOLIC BLOOD PRESSURE: 134 MMHG | TEMPERATURE: 97 F | OXYGEN SATURATION: 95 % | RESPIRATION RATE: 16 BRPM | DIASTOLIC BLOOD PRESSURE: 63 MMHG

## 2022-06-20 DIAGNOSIS — Z16.12 URINARY TRACT INFECTION DUE TO EXTENDED-SPECTRUM BETA LACTAMASE (ESBL) PRODUCING ESCHERICHIA COLI: Primary | ICD-10-CM

## 2022-06-20 DIAGNOSIS — N39.0 URINARY TRACT INFECTION DUE TO EXTENDED-SPECTRUM BETA LACTAMASE (ESBL) PRODUCING ESCHERICHIA COLI: Primary | ICD-10-CM

## 2022-06-20 DIAGNOSIS — B96.29 URINARY TRACT INFECTION DUE TO EXTENDED-SPECTRUM BETA LACTAMASE (ESBL) PRODUCING ESCHERICHIA COLI: Primary | ICD-10-CM

## 2022-06-20 LAB — ENA SCL70 AB SER-ACNC: 9 UNITS

## 2022-06-20 PROCEDURE — 25000003 PHARM REV CODE 250: Performed by: INTERNAL MEDICINE

## 2022-06-20 PROCEDURE — 63600175 PHARM REV CODE 636 W HCPCS: Performed by: INTERNAL MEDICINE

## 2022-06-20 PROCEDURE — 96365 THER/PROPH/DIAG IV INF INIT: CPT

## 2022-06-20 RX ORDER — SODIUM CHLORIDE 0.9 % (FLUSH) 0.9 %
10 SYRINGE (ML) INJECTION
Status: CANCELLED | OUTPATIENT
Start: 2022-06-20

## 2022-06-20 RX ORDER — HEPARIN 100 UNIT/ML
500 SYRINGE INTRAVENOUS
Status: CANCELLED | OUTPATIENT
Start: 2022-06-20

## 2022-06-20 RX ORDER — SODIUM CHLORIDE 0.9 % (FLUSH) 0.9 %
10 SYRINGE (ML) INJECTION
Status: DISCONTINUED | OUTPATIENT
Start: 2022-06-20 | End: 2022-06-20 | Stop reason: HOSPADM

## 2022-06-20 RX ADMIN — SODIUM CHLORIDE 1 G: 9 INJECTION, SOLUTION INTRAVENOUS at 03:06

## 2022-06-20 RX ADMIN — SODIUM CHLORIDE: 9 INJECTION, SOLUTION INTRAVENOUS at 03:06

## 2022-06-20 NOTE — PROGRESS NOTES
Patiient arrived for Ertapenem1/5.  cc bag @ 25 ml/hr infusing concurrently with Ertapenem.   Tolerated infusion without difficulty and left unit in NAD. Return appointment provided.

## 2022-06-21 ENCOUNTER — TELEPHONE (OUTPATIENT)
Dept: INFECTIOUS DISEASES | Facility: CLINIC | Age: 77
End: 2022-06-21
Payer: MEDICARE

## 2022-06-21 ENCOUNTER — INFUSION (OUTPATIENT)
Dept: INFECTIOUS DISEASES | Facility: HOSPITAL | Age: 77
End: 2022-06-21
Attending: INTERNAL MEDICINE
Payer: MEDICARE

## 2022-06-21 VITALS
RESPIRATION RATE: 16 BRPM | HEART RATE: 84 BPM | WEIGHT: 162.13 LBS | OXYGEN SATURATION: 93 % | BODY MASS INDEX: 27.83 KG/M2 | TEMPERATURE: 96 F | SYSTOLIC BLOOD PRESSURE: 141 MMHG | DIASTOLIC BLOOD PRESSURE: 68 MMHG

## 2022-06-21 DIAGNOSIS — B96.29 URINARY TRACT INFECTION DUE TO EXTENDED-SPECTRUM BETA LACTAMASE (ESBL) PRODUCING ESCHERICHIA COLI: Primary | ICD-10-CM

## 2022-06-21 DIAGNOSIS — N39.0 URINARY TRACT INFECTION DUE TO EXTENDED-SPECTRUM BETA LACTAMASE (ESBL) PRODUCING ESCHERICHIA COLI: Primary | ICD-10-CM

## 2022-06-21 DIAGNOSIS — Z16.12 URINARY TRACT INFECTION DUE TO EXTENDED-SPECTRUM BETA LACTAMASE (ESBL) PRODUCING ESCHERICHIA COLI: Primary | ICD-10-CM

## 2022-06-21 LAB — BACTERIA UR CULT: ABNORMAL

## 2022-06-21 PROCEDURE — 25000003 PHARM REV CODE 250: Performed by: INTERNAL MEDICINE

## 2022-06-21 PROCEDURE — 96365 THER/PROPH/DIAG IV INF INIT: CPT

## 2022-06-21 PROCEDURE — 63600175 PHARM REV CODE 636 W HCPCS: Performed by: INTERNAL MEDICINE

## 2022-06-21 RX ORDER — SODIUM CHLORIDE 0.9 % (FLUSH) 0.9 %
10 SYRINGE (ML) INJECTION
Status: CANCELLED | OUTPATIENT
Start: 2022-06-21

## 2022-06-21 RX ORDER — HEPARIN 100 UNIT/ML
500 SYRINGE INTRAVENOUS
Status: CANCELLED | OUTPATIENT
Start: 2022-06-21

## 2022-06-21 RX ORDER — HEPARIN 100 UNIT/ML
500 SYRINGE INTRAVENOUS
Status: DISCONTINUED | OUTPATIENT
Start: 2022-06-21 | End: 2022-06-21 | Stop reason: HOSPADM

## 2022-06-21 RX ORDER — SODIUM CHLORIDE 0.9 % (FLUSH) 0.9 %
10 SYRINGE (ML) INJECTION
Status: DISCONTINUED | OUTPATIENT
Start: 2022-06-21 | End: 2022-06-21 | Stop reason: HOSPADM

## 2022-06-21 RX ADMIN — SODIUM CHLORIDE: 9 INJECTION, SOLUTION INTRAVENOUS at 08:06

## 2022-06-21 RX ADMIN — SODIUM CHLORIDE 1 G: 9 INJECTION, SOLUTION INTRAVENOUS at 09:06

## 2022-06-21 NOTE — PROGRESS NOTES
Patiient arrived for Ertapenem2/5.  cc bag @ 25 ml/hr infusing concurrently with Ertapenem.   Tolerated infusion without difficulty and left unit in NAD. Return appointment provided.

## 2022-06-21 NOTE — TELEPHONE ENCOUNTER
Urine culture result came back as ESBL, E. Coli.  Patient having frequency since finishing fosfomycin, now with dysuria.  Discussed treatment plan with patient, will order Ertapenem 1g IV daily for 5 days at [facility] ambulatory infusion suite.

## 2022-06-22 ENCOUNTER — INFUSION (OUTPATIENT)
Dept: INFECTIOUS DISEASES | Facility: HOSPITAL | Age: 77
End: 2022-06-22
Attending: INTERNAL MEDICINE
Payer: MEDICARE

## 2022-06-22 VITALS
OXYGEN SATURATION: 95 % | HEART RATE: 84 BPM | SYSTOLIC BLOOD PRESSURE: 131 MMHG | WEIGHT: 162.25 LBS | BODY MASS INDEX: 27.85 KG/M2 | TEMPERATURE: 97 F | DIASTOLIC BLOOD PRESSURE: 61 MMHG | RESPIRATION RATE: 18 BRPM

## 2022-06-22 DIAGNOSIS — N39.0 URINARY TRACT INFECTION DUE TO EXTENDED-SPECTRUM BETA LACTAMASE (ESBL) PRODUCING ESCHERICHIA COLI: Primary | ICD-10-CM

## 2022-06-22 DIAGNOSIS — Z16.12 URINARY TRACT INFECTION DUE TO EXTENDED-SPECTRUM BETA LACTAMASE (ESBL) PRODUCING ESCHERICHIA COLI: Primary | ICD-10-CM

## 2022-06-22 DIAGNOSIS — B96.29 URINARY TRACT INFECTION DUE TO EXTENDED-SPECTRUM BETA LACTAMASE (ESBL) PRODUCING ESCHERICHIA COLI: Primary | ICD-10-CM

## 2022-06-22 PROCEDURE — 63600175 PHARM REV CODE 636 W HCPCS: Performed by: INTERNAL MEDICINE

## 2022-06-22 PROCEDURE — 96365 THER/PROPH/DIAG IV INF INIT: CPT

## 2022-06-22 PROCEDURE — 25000003 PHARM REV CODE 250: Performed by: INTERNAL MEDICINE

## 2022-06-22 RX ORDER — SODIUM CHLORIDE 0.9 % (FLUSH) 0.9 %
10 SYRINGE (ML) INJECTION
Status: CANCELLED | OUTPATIENT
Start: 2022-06-22

## 2022-06-22 RX ORDER — SODIUM CHLORIDE 0.9 % (FLUSH) 0.9 %
10 SYRINGE (ML) INJECTION
Status: DISCONTINUED | OUTPATIENT
Start: 2022-06-22 | End: 2022-06-22 | Stop reason: HOSPADM

## 2022-06-22 RX ORDER — HEPARIN 100 UNIT/ML
500 SYRINGE INTRAVENOUS
Status: CANCELLED | OUTPATIENT
Start: 2022-06-22

## 2022-06-22 RX ADMIN — SODIUM CHLORIDE: 9 INJECTION, SOLUTION INTRAVENOUS at 09:06

## 2022-06-22 RX ADMIN — SODIUM CHLORIDE 1 G: 9 INJECTION, SOLUTION INTRAVENOUS at 09:06

## 2022-06-22 NOTE — PROGRESS NOTES
Adrian arrived for Ertapenem 3/5.  cc bag @ 25 ml/hr infusing concurrently.   Tolerated infusion without difficulty and left unit in NAD. Return appointment provided.

## 2022-06-23 ENCOUNTER — INFUSION (OUTPATIENT)
Dept: INFECTIOUS DISEASES | Facility: HOSPITAL | Age: 77
End: 2022-06-23
Attending: INTERNAL MEDICINE
Payer: MEDICARE

## 2022-06-23 VITALS
BODY MASS INDEX: 27.89 KG/M2 | SYSTOLIC BLOOD PRESSURE: 121 MMHG | DIASTOLIC BLOOD PRESSURE: 63 MMHG | WEIGHT: 163.38 LBS | TEMPERATURE: 97 F | HEART RATE: 103 BPM | HEIGHT: 64 IN

## 2022-06-23 DIAGNOSIS — N39.0 URINARY TRACT INFECTION DUE TO EXTENDED-SPECTRUM BETA LACTAMASE (ESBL) PRODUCING ESCHERICHIA COLI: Primary | ICD-10-CM

## 2022-06-23 DIAGNOSIS — Z16.12 URINARY TRACT INFECTION DUE TO EXTENDED-SPECTRUM BETA LACTAMASE (ESBL) PRODUCING ESCHERICHIA COLI: Primary | ICD-10-CM

## 2022-06-23 DIAGNOSIS — B96.29 URINARY TRACT INFECTION DUE TO EXTENDED-SPECTRUM BETA LACTAMASE (ESBL) PRODUCING ESCHERICHIA COLI: Primary | ICD-10-CM

## 2022-06-23 LAB — RNAP III AB SER-ACNC: <20 UNITS

## 2022-06-23 PROCEDURE — 25000003 PHARM REV CODE 250: Performed by: INTERNAL MEDICINE

## 2022-06-23 PROCEDURE — 96365 THER/PROPH/DIAG IV INF INIT: CPT

## 2022-06-23 PROCEDURE — 63600175 PHARM REV CODE 636 W HCPCS: Performed by: INTERNAL MEDICINE

## 2022-06-23 RX ORDER — SODIUM CHLORIDE 0.9 % (FLUSH) 0.9 %
10 SYRINGE (ML) INJECTION
Status: CANCELLED | OUTPATIENT
Start: 2022-06-23

## 2022-06-23 RX ORDER — HEPARIN 100 UNIT/ML
500 SYRINGE INTRAVENOUS
Status: DISCONTINUED | OUTPATIENT
Start: 2022-06-23 | End: 2022-06-23 | Stop reason: HOSPADM

## 2022-06-23 RX ORDER — HEPARIN 100 UNIT/ML
500 SYRINGE INTRAVENOUS
Status: CANCELLED | OUTPATIENT
Start: 2022-06-23

## 2022-06-23 RX ORDER — SODIUM CHLORIDE 0.9 % (FLUSH) 0.9 %
10 SYRINGE (ML) INJECTION
Status: DISCONTINUED | OUTPATIENT
Start: 2022-06-23 | End: 2022-06-23 | Stop reason: HOSPADM

## 2022-06-23 RX ADMIN — SODIUM CHLORIDE: 9 INJECTION, SOLUTION INTRAVENOUS at 10:06

## 2022-06-23 RX ADMIN — SODIUM CHLORIDE 1 G: 9 INJECTION, SOLUTION INTRAVENOUS at 10:06

## 2022-06-24 ENCOUNTER — TELEPHONE (OUTPATIENT)
Dept: UROLOGY | Facility: CLINIC | Age: 77
End: 2022-06-24
Payer: MEDICARE

## 2022-06-24 ENCOUNTER — INFUSION (OUTPATIENT)
Dept: INFECTIOUS DISEASES | Facility: HOSPITAL | Age: 77
End: 2022-06-24
Attending: INTERNAL MEDICINE
Payer: MEDICARE

## 2022-06-24 ENCOUNTER — OFFICE VISIT (OUTPATIENT)
Dept: INTERNAL MEDICINE | Facility: CLINIC | Age: 77
End: 2022-06-24
Attending: INTERNAL MEDICINE
Payer: MEDICARE

## 2022-06-24 VITALS
HEART RATE: 84 BPM | OXYGEN SATURATION: 92 % | RESPIRATION RATE: 18 BRPM | BODY MASS INDEX: 27.91 KG/M2 | WEIGHT: 162.56 LBS | DIASTOLIC BLOOD PRESSURE: 57 MMHG | SYSTOLIC BLOOD PRESSURE: 119 MMHG | TEMPERATURE: 97 F

## 2022-06-24 VITALS
BODY MASS INDEX: 27.77 KG/M2 | HEIGHT: 64 IN | WEIGHT: 162.69 LBS | DIASTOLIC BLOOD PRESSURE: 74 MMHG | OXYGEN SATURATION: 95 % | HEART RATE: 82 BPM | SYSTOLIC BLOOD PRESSURE: 115 MMHG

## 2022-06-24 DIAGNOSIS — I10 HYPERTENSION, BENIGN: ICD-10-CM

## 2022-06-24 DIAGNOSIS — B96.29 URINARY TRACT INFECTION DUE TO EXTENDED-SPECTRUM BETA LACTAMASE (ESBL) PRODUCING ESCHERICHIA COLI: Primary | ICD-10-CM

## 2022-06-24 DIAGNOSIS — N39.0 URINARY TRACT INFECTION DUE TO EXTENDED-SPECTRUM BETA LACTAMASE (ESBL) PRODUCING ESCHERICHIA COLI: Primary | ICD-10-CM

## 2022-06-24 DIAGNOSIS — G60.9 IDIOPATHIC NEUROPATHY: ICD-10-CM

## 2022-06-24 DIAGNOSIS — Z16.12 URINARY TRACT INFECTION DUE TO EXTENDED-SPECTRUM BETA LACTAMASE (ESBL) PRODUCING ESCHERICHIA COLI: Primary | ICD-10-CM

## 2022-06-24 DIAGNOSIS — N39.0 RECURRENT UTI: Primary | ICD-10-CM

## 2022-06-24 DIAGNOSIS — J84.9 INTERSTITIAL LUNG DISEASE: ICD-10-CM

## 2022-06-24 DIAGNOSIS — R30.0 DYSURIA: Primary | ICD-10-CM

## 2022-06-24 PROCEDURE — 25000003 PHARM REV CODE 250: Performed by: INTERNAL MEDICINE

## 2022-06-24 PROCEDURE — 63600175 PHARM REV CODE 636 W HCPCS: Performed by: INTERNAL MEDICINE

## 2022-06-24 PROCEDURE — 96365 THER/PROPH/DIAG IV INF INIT: CPT

## 2022-06-24 PROCEDURE — 99999 PR PBB SHADOW E&M-EST. PATIENT-LVL IV: CPT | Mod: PBBFAC,,, | Performed by: INTERNAL MEDICINE

## 2022-06-24 PROCEDURE — 99214 OFFICE O/P EST MOD 30 MIN: CPT | Mod: S$PBB,,, | Performed by: INTERNAL MEDICINE

## 2022-06-24 PROCEDURE — 99214 PR OFFICE/OUTPT VISIT, EST, LEVL IV, 30-39 MIN: ICD-10-PCS | Mod: S$PBB,,, | Performed by: INTERNAL MEDICINE

## 2022-06-24 PROCEDURE — 99999 PR PBB SHADOW E&M-EST. PATIENT-LVL IV: ICD-10-PCS | Mod: PBBFAC,,, | Performed by: INTERNAL MEDICINE

## 2022-06-24 PROCEDURE — 99214 OFFICE O/P EST MOD 30 MIN: CPT | Mod: PBBFAC,25 | Performed by: INTERNAL MEDICINE

## 2022-06-24 RX ORDER — SODIUM CHLORIDE 0.9 % (FLUSH) 0.9 %
10 SYRINGE (ML) INJECTION
Status: CANCELLED | OUTPATIENT
Start: 2022-06-24

## 2022-06-24 RX ORDER — HEPARIN 100 UNIT/ML
500 SYRINGE INTRAVENOUS
Status: CANCELLED | OUTPATIENT
Start: 2022-06-24

## 2022-06-24 RX ADMIN — SODIUM CHLORIDE 1 G: 9 INJECTION, SOLUTION INTRAVENOUS at 08:06

## 2022-06-24 RX ADMIN — SODIUM CHLORIDE: 0.9 INJECTION, SOLUTION INTRAVENOUS at 08:06

## 2022-06-24 NOTE — PROGRESS NOTES
Adrian arrived for Ertapenem 5/5.  cc bag @ 25 ml/hr infusing concurrently.   Tolerated infusion without difficulty and left unit in NAD. Return appointment provided.

## 2022-06-24 NOTE — TELEPHONE ENCOUNTER
LVM WITH DETAILED MESSAGE FOR THE PATIENT TO CALL THE OFFICE FOR AN APPOINTMENT    ESTHER GRANGER    ----- Message from Marilee Jovel MA sent at 6/24/2022  4:43 PM CDT -----    ----- Message -----  From: Anaya Oropeza MD  Sent: 6/24/2022   4:34 PM CDT  To: Sandip Julien Staff    Pt with persistent UTI despite IV abx. Recommend to repeat workup by ID doctor,which would mean doing a cystoscopy. Can you please notify her of this and help schedule? She is a [facility] patient.

## 2022-06-24 NOTE — PROGRESS NOTES
"Subjective:   Patient ID: Niurka Pedraza is a 77 y.o. female  Chief complaint:   Chief Complaint   Patient presents with    Hypertension     F/u       HPI    HTN: controlled today   Taking Coreg 6.25 and irb 300  Previously:   - stopped Ofev in Jan 18th and bp improved - did not start coreg 12.5mg dose since bp improved  fter d/c this     Interstitial lung disease on azithro and pdn:   Followed by pulm   - seen by rheum this month   - on azithro 3 times per week and montelukast daily and prednisone 2.5 mg TID  Referred to rheum for opinion with +RF on labs     Idiopathic neuropathy:   emg test rescheduled for numbness of feet and completed 3/15/2022 - wnl   Sx located in toes B and she reports that sx are progressing to mid foot bilaterally  Labs unremarkable and reviewed for PN work up today    She started to take fish oil and tuna to inc DHA    MDR UTI:   - seen in ER 6/2  - had Ertapenem infusion yesterday  After had inc freq since finishing fosfomycin and dysuria - to complete 5 days IV Ert  - today reports still with mild dysuria   - followed by ID in past   Reports sx of uti over past few days  +Dysuria, frequency   No fevers   No flank pain for a few days or new lbp    Gyn: Melissa  Gyn onc: eDnis  Pulm: Hollie  ENT: [facility]   Urology: Sandip  Pod: Feliciano     Review of Systems      Objective:  Vitals:    06/24/22 1110   BP: 115/74   BP Location: Left arm   Patient Position: Sitting   Pulse: 82   SpO2: 95%   Weight: 73.8 kg (162 lb 11.2 oz)   Height: 5' 4" (1.626 m)     Body mass index is 27.93 kg/m².    Physical Exam  Vitals reviewed.   Constitutional:       Appearance: Normal appearance. She is well-developed.   HENT:      Head: Normocephalic and atraumatic.   Eyes:      Conjunctiva/sclera: Conjunctivae normal.   Cardiovascular:      Rate and Rhythm: Normal rate and regular rhythm.      Pulses: Normal pulses.      Heart sounds: Normal heart sounds.   Pulmonary:      Effort: Pulmonary effort is " normal.      Breath sounds: Normal breath sounds.   Abdominal:      General: Bowel sounds are normal.      Palpations: Abdomen is soft.      Tenderness: There is no right CVA tenderness or left CVA tenderness.      Comments: No flank ttp    Musculoskeletal:         General: No swelling or tenderness.      Cervical back: Normal range of motion and neck supple.   Skin:     General: Skin is warm and dry.      Capillary Refill: Capillary refill takes less than 2 seconds.      Nails: There is no clubbing.   Neurological:      General: No focal deficit present.      Mental Status: She is alert and oriented to person, place, and time.      Gait: Gait normal.   Psychiatric:         Speech: Speech normal.         Behavior: Behavior normal.         Thought Content: Thought content normal.         Assessment:  1. Dysuria    2. Idiopathic neuropathy    3. Hypertension, benign    4. Interstitial lung disease        Plan:  Dysuria  -     Urinalysis, Reflex to Urine Culture Urine, Clean Catch; Future; Expected date: 06/24/2022  Complete tx per ID recs     Idiopathic neuropathy  -     Ambulatory referral/consult to Neurology; Future; Expected date: 07/01/2022    Hypertension, benign  Controlled   Cont meds   - refer to neuro - she would like to see Dr. Michael Garnica - appt to be arranged     Interstitial lung disease  Followed by specialists - cont meds     Cont meds     Health Maintenance   Topic Date Due    DEXA Scan  08/21/2023    Lipid Panel  08/24/2026    TETANUS VACCINE  07/21/2031    Hepatitis C Screening  Completed

## 2022-06-27 ENCOUNTER — TELEPHONE (OUTPATIENT)
Dept: INTERNAL MEDICINE | Facility: CLINIC | Age: 77
End: 2022-06-27
Payer: MEDICARE

## 2022-06-27 DIAGNOSIS — E78.5 HYPERLIPIDEMIA, UNSPECIFIED HYPERLIPIDEMIA TYPE: Primary | ICD-10-CM

## 2022-06-27 DIAGNOSIS — I10 HYPERTENSION, BENIGN: ICD-10-CM

## 2022-06-27 DIAGNOSIS — R73.9 HYPERGLYCEMIA: ICD-10-CM

## 2022-06-27 DIAGNOSIS — R73.01 IFG (IMPAIRED FASTING GLUCOSE): ICD-10-CM

## 2022-06-27 NOTE — TELEPHONE ENCOUNTER
Patients form for [employer] requires lab results from 9/1/21-9/3022. Patients most recent labs were done 8/2021. Pended new orders to complete form.

## 2022-06-28 ENCOUNTER — PATIENT MESSAGE (OUTPATIENT)
Dept: NEUROLOGY | Facility: CLINIC | Age: 77
End: 2022-06-28
Payer: MEDICARE

## 2022-06-28 ENCOUNTER — TELEPHONE (OUTPATIENT)
Dept: NEUROLOGY | Facility: CLINIC | Age: 77
End: 2022-06-28
Payer: MEDICARE

## 2022-06-28 NOTE — TELEPHONE ENCOUNTER
----- Message from Bisi Miller MA sent at 6/27/2022  4:10 PM CDT -----  Regarding: FW: Appt Request  Can you help get her scheduled?  ----- Message -----  From: Yolanda Balbuena  Sent: 6/27/2022  11:44 AM CDT  To: [facility] Staff  Subject: Appt Request                                     Pt called to schedule from referral pt is asking to see .     Requesting call back: [phone]

## 2022-06-29 ENCOUNTER — TELEPHONE (OUTPATIENT)
Dept: UROLOGY | Facility: CLINIC | Age: 77
End: 2022-06-29

## 2022-06-29 ENCOUNTER — OFFICE VISIT (OUTPATIENT)
Dept: UROLOGY | Facility: CLINIC | Age: 77
End: 2022-06-29
Attending: UROLOGY
Payer: MEDICARE

## 2022-06-29 ENCOUNTER — PATIENT MESSAGE (OUTPATIENT)
Dept: INTERNAL MEDICINE | Facility: CLINIC | Age: 77
End: 2022-06-29
Payer: MEDICARE

## 2022-06-29 VITALS
HEIGHT: 64 IN | HEART RATE: 88 BPM | WEIGHT: 162 LBS | DIASTOLIC BLOOD PRESSURE: 57 MMHG | BODY MASS INDEX: 27.66 KG/M2 | SYSTOLIC BLOOD PRESSURE: 106 MMHG

## 2022-06-29 DIAGNOSIS — R31.29 MICROHEMATURIA: ICD-10-CM

## 2022-06-29 DIAGNOSIS — N39.0 RECURRENT UTI: Primary | ICD-10-CM

## 2022-06-29 DIAGNOSIS — N32.81 OAB (OVERACTIVE BLADDER): ICD-10-CM

## 2022-06-29 DIAGNOSIS — R35.1 NOCTURIA: ICD-10-CM

## 2022-06-29 LAB
BILIRUB SERPL-MCNC: NEGATIVE MG/DL
BLOOD URINE, POC: 50
CLARITY, POC UA: ABNORMAL
COLOR, POC UA: YELLOW
GLUCOSE UR QL STRIP: NORMAL
KETONES UR QL STRIP: NEGATIVE
LEUKOCYTE ESTERASE URINE, POC: ABNORMAL
NITRITE, POC UA: NEGATIVE
PH, POC UA: 5
POC RESIDUAL URINE VOLUME: 22 ML (ref 0–100)
PROTEIN, POC: 30
SPECIFIC GRAVITY, POC UA: 1.02
UROBILINOGEN, POC UA: NORMAL

## 2022-06-29 PROCEDURE — 51798 POCT BLADDER SCAN: ICD-10-PCS | Mod: S$GLB,,, | Performed by: UROLOGY

## 2022-06-29 PROCEDURE — 81002 POCT URINE DIPSTICK WITHOUT MICROSCOPE: ICD-10-PCS | Mod: S$GLB,,, | Performed by: UROLOGY

## 2022-06-29 PROCEDURE — 99214 PR OFFICE/OUTPT VISIT, EST, LEVL IV, 30-39 MIN: ICD-10-PCS | Mod: S$GLB,,, | Performed by: UROLOGY

## 2022-06-29 PROCEDURE — 87086 URINE CULTURE/COLONY COUNT: CPT | Performed by: UROLOGY

## 2022-06-29 PROCEDURE — 99214 OFFICE O/P EST MOD 30 MIN: CPT | Mod: S$GLB,,, | Performed by: UROLOGY

## 2022-06-29 PROCEDURE — 81002 URINALYSIS NONAUTO W/O SCOPE: CPT | Mod: S$GLB,,, | Performed by: UROLOGY

## 2022-06-29 PROCEDURE — 51798 US URINE CAPACITY MEASURE: CPT | Mod: S$GLB,,, | Performed by: UROLOGY

## 2022-06-29 RX ORDER — ESTRADIOL 0.1 MG/G
1 CREAM VAGINAL
Qty: 42.5 G | Refills: 11 | Status: SHIPPED | OUTPATIENT
Start: 2022-06-30 | End: 2024-02-06

## 2022-06-29 NOTE — PROGRESS NOTES
Subjective:       Niurka Pedraza is a 77 y.o. female who is an established patient with [facility] urologist, Dr Dhaliwal,  was seen for evaluation of UTI.      She was seen by another urologist for recurrent UTIs/dysuria. Multiple +UCx with different bacteria (E coli, Enterococcus). She has had negative cystoscopy and GONZÁLEZ (1/17). UTIs return soon after treatment. She was started on prophy Keflex in 7/17.     She saw PCP in 11/17 with complaints of UTI. UCx was negative. She now feels a constant pressure in SP area and c/o pain with walking. She also notes pain worse with movement (like hitting a bump when driving). Denies dysuria. Increased frequency to now q1h. Present for 2 months. Denies constipation, occasional diarrhea. Frequent ARDHA. Now with UUI. Also with BOBBI - increased coughing with recent lung issue. Nocturia x 4-5. Denies gross hematuria.     She moved here from [place] in 9/17. She requested to be started on abx prophy starting 1/18 (Keflex 250mg).     PVR (bladder scan) initial visit - 32cc, 0cc.    CT uro - no  abnormalities. Lung nodules - followed by pulmonary. Ovarian cyst - referred to gyn (now s/p DARCI-BSO for ovarian cancer).     She has been doing great with the Ditropan - she is very pleased. Was on abx prophy (Keflex) 1/18 x 9 months. Taking probiotics that is helping.     [place] - took Bactrim. Now off prophylactic abx - more frequency, urgency, nocturia, SP pressure. No significant dysuria.     UTIs have become more frequent. Symptoms returning quickly. Prophylactic abx were restarted (Macrobid 50mg).     9/4/2019  She recently finished course of abx but symptoms are now returning just 5 days later. Dysuria improved but pressure and frequency worsened.     1/15/2020  Increase Ditropan to 10mg. Taking Macrobid QHS, started in 9/19. No symptoms today.     8/26/2020  Several recent breakthrough UTIs. Finished abx 2 weeks ago, symptoms have returned.  Currently on Macrobid. UTI symptoms - frequency, dysuria.   PVR (bladder scan) today - 21cc    9/23/2020  On day 5 of abx, still with some symptoms but improving. CT done - clear.     2/10/2021  Occasional pain and urgency. Macrobid stopped and changed to Keflex. She is requesting UCx though states symptoms are stable.     1/12/2022  Denies current issues with UTI. Nocturia x 3-4. Off prophy abx. Now on new med for pulm fibrosis.     6/29/2022  Now having multiresistant recurrent UTIs requiring IV abx - treated by ID (Dr Rishi Dempsey). Has been treated with IV abx (ertapenem) - 5 days last week. Still with urgency and frequency. Not using Estrace.   PVR (bladder scan) today - 22c      Cysto 5/19 - normal, heavy sediment in bladder    GONZÁLEZ 4/19 - wnl, PVR 31cc  CT uro 9/20 - negative, pulm nodules (seeing PCP)     UCx:  7/21 - >100k Enterococcus  2/21 - 50-99k Proteus  9/20 - >100k Proteus  7/20 - >100k E coli, 10-49k Proteus  7/20 - >100k Proteus   5/20 - 10-49k Klebsiella  9/19 - >100k Klebsiella  8/19 - >100k Klebsiella  6/19 - >100k E coli  5/19 - neg  5/19 - >100k E coli  3/19 - >100k E coli  2/19 - >100k E coli  1/19 - >100k E coli  11/18 - >100k E coli  8/18 - contaminant  7/18 - 50-99k Enterobacter (treated with Bactrim)  11/17 - neg  10/17 - >100k E coli  10/17- >100k Enterococcus  7/17 - >100k E coli  5/17- >100k E coli  5/17 - neg  4/17- 50-99k Enterococcus  3/17 - >100k E coli  12/16 - neg     PVR (bladder scan) last visit - 33cc, 50cc, 0cc      The following portions of the patient's history were reviewed and updated as appropriate: allergies, current medications, past family history, past medical history, past social history, past surgical history and problem list.     Review of Systems  Constitutional: no fever or chills  ENT: no nasal congestion or sore throat  Respiratory: no cough or shortness of breath  Cardiovascular: no chest pain or palpitations  Gastrointestinal: no nausea or vomiting, tolerating  diet  Genitourinary: as per HPI  Hematologic/Lymphatic: no easy bruising or lymphadenopathy  Musculoskeletal: no arthralgias or myalgias  Skin: no rashes or lesions  Neurological: no seizures or tremors  Behavioral/Psych: no auditory or visual hallucinations        Objective:    Vitals: LMP 02/08/2000     Physical Exam   General: well developed, well nourished in no acute distress  Head: normocephalic, atraumatic  Neck: supple, trachea midline, no obvious enlargement of thyroid  HEENT: EOMI, mucus membranes moist, sclera anicteric, no hearing impairment  Lungs: symmetric expansion, non-labored breathing  Skin: no rashes or lesions  Neuro: alert and oriented x 3, no gross deficits  Psych: normal judgment and insight, normal mood/affect and non-anxious  Genitourinary:   patient declined exam      Lab Review   Urine analysis today in clinic shows - ++LE, 30 protein, 50 RBC     Lab Results   Component Value Date    WBC 11.34 06/02/2022    HGB 12.5 06/02/2022    HCT 38.8 06/02/2022    MCV 88 06/02/2022     (H) 06/02/2022     Lab Results   Component Value Date    CREATININE 0.8 06/02/2022    BUN 15 06/02/2022       Imaging  Images and reports were personally reviewed by me and discussed with patient  GONZÁLEZ reviewed       Assessment/Plan:      1. Recurrent UTI    - Discussed UTI prevention strategies.   - Adequate hydration.   - Double voiding. Consider timed voiding.    - Avoid constipation.   - GONZÁLEZ - negative 1/17   - Cystoscopy - negative in 1/17 (by another urologist)   - Cranberry/probiotics.   - Estrace cream 2x weekly - will consider in near future (now with ovarian ca)   - Call with UTI symptoms so UA/UCx can be sent.    - Abx prophy (started 1/18) - Keflex 250mg took for 9 months, stopped 9/18     - Multiple UTIs since stopping prophy, now with UTIs ON prophy   - Repeat workup - GONZÁLEZ and cysto   - GONZÁLEZ 4/19 - wnl   - Cysto 5/19 - wnl   - Continue Estrace (approved by gyn onc). Instructed on use.    - Ellura  trial, D-mannose supplement, probiotics     - CT urogram 9/20 - negative   - Again encouraged Estrace, discussed importance. New rx today.    - Was on Macrobid QHS, then Keflex - now stopped.   - UCx today due to persistent symptoms. If UTI noted, likely need ID assistance in treatment.   - Repeat UTI workup - GONZÁLEZ (unable to get CT uro currently due to contrast shortage) and cystoscopy.        2. Microhematuria    - Discussed etiology and workup of hematuria   - UCx - results above   - Cytology - previously negative   - CT urogram 12/17 - no  abnormalities. GONZÁLEZ was negative from 1/17.   - Office cystoscopy - negative 1/17, 5/19        3. Nocturia/frequency/urge incontinence   - Ditropan XL 10mg - discussed SE. Declines adjustment of medication.   - Reduce PM fluids      Follow up in 2-3 weeks for cysto

## 2022-06-29 NOTE — TELEPHONE ENCOUNTER
Not sure why unless he is unavailable for clinic appointments and working primarily inpatient?    Please let her know that we sent a message to his staff to inquire about an appointment with him

## 2022-06-30 ENCOUNTER — LAB VISIT (OUTPATIENT)
Dept: LAB | Facility: OTHER | Age: 77
End: 2022-06-30
Attending: INTERNAL MEDICINE
Payer: MEDICARE

## 2022-06-30 DIAGNOSIS — R73.9 HYPERGLYCEMIA: ICD-10-CM

## 2022-06-30 DIAGNOSIS — I10 HYPERTENSION, BENIGN: ICD-10-CM

## 2022-06-30 DIAGNOSIS — E78.5 HYPERLIPIDEMIA, UNSPECIFIED HYPERLIPIDEMIA TYPE: ICD-10-CM

## 2022-06-30 DIAGNOSIS — R73.01 IFG (IMPAIRED FASTING GLUCOSE): ICD-10-CM

## 2022-06-30 LAB
ALBUMIN SERPL BCP-MCNC: 3.5 G/DL (ref 3.5–5.2)
ALP SERPL-CCNC: 52 U/L (ref 55–135)
ALT SERPL W/O P-5'-P-CCNC: 22 U/L (ref 10–44)
ANION GAP SERPL CALC-SCNC: 10 MMOL/L (ref 8–16)
ANTI-PM/SCL AB: <20 UNITS
ANTI-SS-A 52 KD AB, IGG: <20 UNITS
AST SERPL-CCNC: 17 U/L (ref 10–40)
BACTERIA UR CULT: NORMAL
BACTERIA UR CULT: NORMAL
BILIRUB SERPL-MCNC: 0.8 MG/DL (ref 0.1–1)
BUN SERPL-MCNC: 19 MG/DL (ref 8–23)
CALCIUM SERPL-MCNC: 8.7 MG/DL (ref 8.7–10.5)
CHLORIDE SERPL-SCNC: 106 MMOL/L (ref 95–110)
CHOLEST SERPL-MCNC: 153 MG/DL (ref 120–199)
CHOLEST/HDLC SERPL: 2.2 {RATIO} (ref 2–5)
CO2 SERPL-SCNC: 24 MMOL/L (ref 23–29)
CREAT SERPL-MCNC: 0.8 MG/DL (ref 0.5–1.4)
EJ AB SER QL: NEGATIVE
ENA JO1 AB SER IA-ACNC: <20 UNITS
ENA SM+RNP AB SER IA-ACNC: <20 UNITS
EST. GFR  (AFRICAN AMERICAN): >60 ML/MIN/1.73 M^2
EST. GFR  (NON AFRICAN AMERICAN): >60 ML/MIN/1.73 M^2
ESTIMATED AVG GLUCOSE: 94 MG/DL (ref 68–131)
FIBRILLARIN (U3 RNP): NEGATIVE
GLUCOSE SERPL-MCNC: 100 MG/DL (ref 70–110)
HBA1C MFR BLD: 4.9 % (ref 4–5.6)
HDLC SERPL-MCNC: 69 MG/DL (ref 40–75)
HDLC SERPL: 45.1 % (ref 20–50)
KU AB SER QL: NEGATIVE
LDLC SERPL CALC-MCNC: 70.6 MG/DL (ref 63–159)
MDA-5 (P140): <20 UNITS
MI2 AB SER QL: NEGATIVE
NONHDLC SERPL-MCNC: 84 MG/DL
NXP-2 (P140): <20 UNITS
OJ AB SER QL: NEGATIVE
PL12 AB SER QL: NEGATIVE
PL7 AB SER QL: NEGATIVE
POTASSIUM SERPL-SCNC: 4 MMOL/L (ref 3.5–5.1)
PROT SERPL-MCNC: 6.8 G/DL (ref 6–8.4)
SODIUM SERPL-SCNC: 140 MMOL/L (ref 136–145)
SRP AB SERPL QL: NEGATIVE
TH/TO: NEGATIVE
TIF1 GAMMA (P155/140): <20 UNITS
TRIGL SERPL-MCNC: 67 MG/DL (ref 30–150)
U2 SNRNP: NEGATIVE

## 2022-06-30 PROCEDURE — 36415 COLL VENOUS BLD VENIPUNCTURE: CPT | Performed by: INTERNAL MEDICINE

## 2022-06-30 PROCEDURE — 80053 COMPREHEN METABOLIC PANEL: CPT | Performed by: INTERNAL MEDICINE

## 2022-06-30 PROCEDURE — 80061 LIPID PANEL: CPT | Performed by: INTERNAL MEDICINE

## 2022-06-30 PROCEDURE — 83036 HEMOGLOBIN GLYCOSYLATED A1C: CPT | Performed by: INTERNAL MEDICINE

## 2022-07-01 ENCOUNTER — TELEPHONE (OUTPATIENT)
Dept: UROLOGY | Facility: CLINIC | Age: 77
End: 2022-07-01
Payer: MEDICARE

## 2022-07-01 ENCOUNTER — HOSPITAL ENCOUNTER (OUTPATIENT)
Dept: RADIOLOGY | Facility: HOSPITAL | Age: 77
Discharge: HOME | End: 2022-07-01
Attending: UROLOGY
Payer: MEDICARE

## 2022-07-01 DIAGNOSIS — N39.0 RECURRENT UTI: ICD-10-CM

## 2022-07-01 PROCEDURE — 76770 US EXAM ABDO BACK WALL COMP: CPT | Mod: 26,,, | Performed by: INTERNAL MEDICINE

## 2022-07-01 PROCEDURE — 76857 US PELVIS LIMITED NON OB: ICD-10-PCS | Mod: 26,,, | Performed by: INTERNAL MEDICINE

## 2022-07-01 PROCEDURE — 76770 US RETROPERITONEAL COMPLETE: ICD-10-PCS | Mod: 26,,, | Performed by: INTERNAL MEDICINE

## 2022-07-01 PROCEDURE — 76770 US EXAM ABDO BACK WALL COMP: CPT | Mod: TC

## 2022-07-01 PROCEDURE — 76857 US EXAM PELVIC LIMITED: CPT | Mod: TC

## 2022-07-01 PROCEDURE — 76857 US EXAM PELVIC LIMITED: CPT | Mod: 26,,, | Performed by: INTERNAL MEDICINE

## 2022-07-01 NOTE — TELEPHONE ENCOUNTER
LVM to have pt return call to discuss this.    ----- Message from Anaya Oropeza MD sent at 7/1/2022  9:55 AM CDT -----  Please inform pt:  No specific bacteria in your recent urine culture. If you have further symptoms, I recommend a catheterized urine collection specimen to be sent for repeat urine culture.

## 2022-07-05 ENCOUNTER — TELEPHONE (OUTPATIENT)
Dept: INFECTIOUS DISEASES | Facility: HOSPITAL | Age: 77
End: 2022-07-05
Payer: MEDICARE

## 2022-07-05 ENCOUNTER — TELEPHONE (OUTPATIENT)
Dept: UROLOGY | Facility: CLINIC | Age: 77
End: 2022-07-05
Payer: MEDICARE

## 2022-07-05 NOTE — TELEPHONE ENCOUNTER
I left a message for the pt to return call to clinic.  ----- Message from Anaya Oropeza MD sent at 7/1/2022  9:55 AM CDT -----  Please inform pt:  No specific bacteria in your recent urine culture. If you have further symptoms, I recommend a catheterized urine collection specimen to be sent for repeat urine culture.

## 2022-07-05 NOTE — TELEPHONE ENCOUNTER
Called patient to discuss US results, Urine culture results, and to discuss next step for follow-up.  No answer, left VM, will try again at a later date.

## 2022-07-06 ENCOUNTER — PROCEDURE VISIT (OUTPATIENT)
Dept: UROLOGY | Facility: CLINIC | Age: 77
End: 2022-07-06
Attending: UROLOGY
Payer: MEDICARE

## 2022-07-06 VITALS — DIASTOLIC BLOOD PRESSURE: 85 MMHG | SYSTOLIC BLOOD PRESSURE: 128 MMHG | HEART RATE: 79 BPM

## 2022-07-06 DIAGNOSIS — N39.0 RECURRENT UTI: ICD-10-CM

## 2022-07-06 LAB
BILIRUB SERPL-MCNC: NEGATIVE MG/DL
BLOOD URINE, POC: NORMAL
CLARITY, POC UA: NORMAL
COLOR, POC UA: YELLOW
GLUCOSE UR QL STRIP: NORMAL
KETONES UR QL STRIP: NEGATIVE
LEUKOCYTE ESTERASE URINE, POC: NORMAL
NITRITE, POC UA: POSITIVE
PH, POC UA: 5
PROTEIN, POC: NORMAL
SPECIFIC GRAVITY, POC UA: 1.03
UROBILINOGEN, POC UA: NEGATIVE

## 2022-07-06 PROCEDURE — 81002 URINALYSIS NONAUTO W/O SCOPE: CPT | Mod: S$GLB,,, | Performed by: UROLOGY

## 2022-07-06 PROCEDURE — 87088 URINE BACTERIA CULTURE: CPT | Performed by: UROLOGY

## 2022-07-06 PROCEDURE — 87086 URINE CULTURE/COLONY COUNT: CPT | Performed by: UROLOGY

## 2022-07-06 PROCEDURE — 88112 PR  CYTOPATH, CELL ENHANCE TECH: ICD-10-PCS | Mod: 26,,, | Performed by: PATHOLOGY

## 2022-07-06 PROCEDURE — 81002 POCT URINE DIPSTICK WITHOUT MICROSCOPE: ICD-10-PCS | Mod: S$GLB,,, | Performed by: UROLOGY

## 2022-07-06 PROCEDURE — 52000 CYSTOSCOPY: ICD-10-PCS | Mod: S$GLB,,, | Performed by: UROLOGY

## 2022-07-06 PROCEDURE — 52000 CYSTOURETHROSCOPY: CPT | Mod: S$GLB,,, | Performed by: UROLOGY

## 2022-07-06 PROCEDURE — 88112 CYTOPATH CELL ENHANCE TECH: CPT | Performed by: PATHOLOGY

## 2022-07-06 PROCEDURE — 87186 SC STD MICRODIL/AGAR DIL: CPT | Performed by: UROLOGY

## 2022-07-06 PROCEDURE — 87077 CULTURE AEROBIC IDENTIFY: CPT | Performed by: UROLOGY

## 2022-07-06 PROCEDURE — 88112 CYTOPATH CELL ENHANCE TECH: CPT | Mod: 26,,, | Performed by: PATHOLOGY

## 2022-07-06 RX ORDER — CEPHALEXIN 500 MG/1
500 CAPSULE ORAL
Status: COMPLETED | OUTPATIENT
Start: 2022-07-06 | End: 2022-07-06

## 2022-07-06 RX ORDER — LIDOCAINE HYDROCHLORIDE 20 MG/ML
JELLY TOPICAL
Status: COMPLETED | OUTPATIENT
Start: 2022-07-06 | End: 2022-07-06

## 2022-07-06 RX ADMIN — LIDOCAINE HYDROCHLORIDE 1 ML: 20 JELLY TOPICAL at 02:07

## 2022-07-06 RX ADMIN — CEPHALEXIN 500 MG: 500 CAPSULE ORAL at 03:07

## 2022-07-06 NOTE — PROCEDURES
Cystoscopy    Date/Time: 7/6/2022 2:00 PM  Performed by: Anaya Oropeza MD  Authorized by: Anaya Oropeza MD     Consent Done?:  Yes (Written)  Timeout: prior to procedure the correct patient, procedure, and site was verified    Prep: patient was prepped and draped in usual sterile fashion    Anesthesia:  Lidocaine jelly  Indications: recurrent UTIs    Position:  Dorsal lithotomy  Anesthesia:  Lidocaine jelly  Patient sedated?: No    Preparation: Patient was prepped and draped in usual sterile fashion    Scope type:  Flexible cystoscope  Stent inserted: No    Stent removed: No    External exam normal: Yes (Tiny urethral caruncle)    Digital exam performed: No    Urethra normal: Yes    Bladder neck normal: Yes    Bladder normal: No (Widespread scaly areas of the bladder L>R. Abnormal yellow scaliness easly detached from bladder wall without bleeding. No tumors identified.  Photos taken.)     patient tolerated the procedure well with no immediate complications  Comments:        14Fr catheter passed prior to cystoscopy for urine collection. PVR >30cc. Urine for culture and cytology.     Cystoscopy with abnormal appearing bladder mucosa. Recommend repeat cysto in 1 month. If continued appearance, recommend cysto/BBx in OR.

## 2022-07-09 LAB — BACTERIA UR CULT: ABNORMAL

## 2022-07-11 ENCOUNTER — TELEPHONE (OUTPATIENT)
Dept: UROLOGY | Facility: CLINIC | Age: 77
End: 2022-07-11
Payer: MEDICARE

## 2022-07-11 LAB
FINAL PATHOLOGIC DIAGNOSIS: ABNORMAL
Lab: ABNORMAL

## 2022-07-11 NOTE — TELEPHONE ENCOUNTER
----- Message from Anaya Oropeza MD sent at 7/11/2022 10:22 AM CDT -----  I have pt scheduled for cysto in about 1 month. I would like to also see her in clinic sooner to discuss her issues. Can you please book her to see me on July 20 at the 10am slot that is currently blocked? It will be for an regular office visit, not cysto.     Thanks.  Dr. Oropeza

## 2022-07-12 ENCOUNTER — PATIENT MESSAGE (OUTPATIENT)
Dept: INFECTIOUS DISEASES | Facility: CLINIC | Age: 77
End: 2022-07-12
Payer: MEDICARE

## 2022-07-13 NOTE — PROGRESS NOTES
Subjective:       Niurka Pedraza is a 77 y.o. female who is an established patient with [facility], Dr Dhaliwal,  was seen for evaluation of UTI.      She was seen by another urologist for recurrent UTIs/dysuria. Multiple +UCx with different bacteria (E coli, Enterococcus). She has had negative cystoscopy and GONZÁLEZ (1/17). UTIs return soon after treatment. She was started on prophy Keflex in 7/17.     She saw PCP in 11/17 with complaints of UTI. UCx was negative. She now feels a constant pressure in SP area and c/o pain with walking. She also notes pain worse with movement (like hitting a bump when driving). Denies dysuria. Increased frequency to now q1h. Present for 2 months. Denies constipation, occasional diarrhea. Frequent RADHA. Now with UUI. Also with BOBBI - increased coughing with recent lung issue. Nocturia x 4-5. Denies gross hematuria.     She moved here from [place] in 9/17. She requested to be started on abx prophy starting 1/18 (Keflex 250mg).     PVR (bladder scan) initial visit - 32cc, 0cc.    CT uro - no  abnormalities. Lung nodules - followed by pulmonary. Ovarian cyst - referred to gyn (now s/p DARCI-BSO for ovarian cancer).     She has been doing great with the Ditropan - she is very pleased. Was on abx prophy (Keflex) 1/18 x 9 months. Taking probiotics that is helping.     [place] - took Bactrim. Now off prophylactic abx - more frequency, urgency, nocturia, SP pressure. No significant dysuria.     UTIs have become more frequent. Symptoms returning quickly. Prophylactic abx were restarted (Macrobid 50mg).     9/4/2019  She recently finished course of abx but symptoms are now returning just 5 days later. Dysuria improved but pressure and frequency worsened.     1/15/2020  Increase Ditropan to 10mg. Taking Macrobid QHS, started in 9/19. No symptoms today.     8/26/2020  Several recent breakthrough UTIs. Finished abx 2 weeks ago, symptoms have returned.  "Currently on Macrobid. UTI symptoms - frequency, dysuria.   PVR (bladder scan) today - 21cc    9/23/2020  On day 5 of abx, still with some symptoms but improving. CT done - clear.     2/10/2021  Occasional pain and urgency. Macrobid stopped and changed to Keflex. She is requesting UCx though states symptoms are stable.     1/12/2022  Denies current issues with UTI. Nocturia x 3-4. Off prophy abx. Now on new med for pulm fibrosis.     6/29/2022  Now having multiresistant recurrent UTIs requiring IV abx - treated by ID (Dr Rishi Dempsey). Has been treated with IV abx (ertapenem) - 5 days last week. Still with urgency and frequency. Not using Estrace.   PVR (bladder scan) today - 22c    7/20/2022  Here to discuss procedure. UTI symptoms present but not as severe - frequency q1h but dysuria not as bad. She is not on IV treatment for UTI currently.     Cytology 7/22 - no malignant cells    Cysto 5/19 - normal, heavy sediment in bladder    Cysto 7/22 - very abnormal appearing bladder mucosa with widespread "scaly" yellow/white areas L>R. Easily detached from bladder wall without bleeding. No tumors.     GONZÁLEZ 4/19 - wnl, PVR 31cc  CT uro 9/20 - negative, pulm nodules (seeing PCP)  GONZÁLEZ 7/22 - no hydro/stones. PVR 51cc     UCx:  7/22 - >100k E coli ESBL (multiresistant)  7/21 - >100k Enterococcus  2/21 - 50-99k Proteus  9/20 - >100k Proteus  7/20 - >100k E coli, 10-49k Proteus  7/20 - >100k Proteus   5/20 - 10-49k Klebsiella  9/19 - >100k Klebsiella  8/19 - >100k Klebsiella  6/19 - >100k E coli  5/19 - neg  5/19 - >100k E coli  3/19 - >100k E coli  2/19 - >100k E coli  1/19 - >100k E coli  11/18 - >100k E coli  8/18 - contaminant  7/18 - 50-99k Enterobacter (treated with Bactrim)  11/17 - neg  10/17 - >100k E coli  10/17- >100k Enterococcus  7/17 - >100k E coli  5/17- >100k E coli  5/17 - neg  4/17- 50-99k Enterococcus  3/17 - >100k E coli  12/16 - neg     PVR (bladder scan) last visit - 33cc, 50cc, 0cc      The following " "portions of the patient's history were reviewed and updated as appropriate: allergies, current medications, past family history, past medical history, past social history, past surgical history and problem list.     Review of Systems  Constitutional: no fever or chills  ENT: no nasal congestion or sore throat  Respiratory: no cough or shortness of breath  Cardiovascular: no chest pain or palpitations  Gastrointestinal: no nausea or vomiting, tolerating diet  Genitourinary: as per HPI  Hematologic/Lymphatic: no easy bruising or lymphadenopathy  Musculoskeletal: no arthralgias or myalgias  Skin: no rashes or lesions  Neurological: no seizures or tremors  Behavioral/Psych: no auditory or visual hallucinations        Objective:    Vitals: /67   Pulse 78   Ht 5' 4" (1.626 m)   Wt 73.5 kg (162 lb)   LMP 02/08/2000   BMI 27.81 kg/m²     Physical Exam   General: well developed, well nourished in no acute distress  Head: normocephalic, atraumatic  Neck: supple, trachea midline, no obvious enlargement of thyroid  HEENT: EOMI, mucus membranes moist, sclera anicteric, no hearing impairment  Lungs: symmetric expansion, non-labored breathing  Skin: no rashes or lesions  Neuro: alert and oriented x 3, no gross deficits  Psych: normal judgment and insight, normal mood/affect and non-anxious  Genitourinary:   patient declined exam      Lab Review   Urine analysis today in clinic shows - ++LE, +nit, 50 RBC     Lab Results   Component Value Date    WBC 11.34 06/02/2022    HGB 12.5 06/02/2022    HCT 38.8 06/02/2022    MCV 88 06/02/2022     (H) 06/02/2022     Lab Results   Component Value Date    CREATININE 0.8 06/30/2022    BUN 19 06/30/2022       Imaging  Images and reports were personally reviewed by me and discussed with patient  GONZÁLEZ reviewed       Assessment/Plan:      1. Recurrent UTI    - Discussed UTI prevention strategies.   - Adequate hydration.   - Double voiding. Consider timed voiding.    - Avoid " constipation.   - GONZÁLEZ - negative 1/17   - Cystoscopy - negative in 1/17 (by another urologist)   - Cranberry/probiotics.   - Estrace cream 2x weekly - will consider in near future (now with ovarian ca)   - Call with UTI symptoms so UA/UCx can be sent.    - Abx prophy (started 1/18) - Keflex 250mg took for 9 months, stopped 9/18     - Multiple UTIs since stopping prophy, now with UTIs ON prophy   - Repeat workup - GONZÁLEZ and cysto   - GONZÁLEZ 4/19 - wnl   - Cysto 5/19 - wnl   - Continue Estrace (approved by gyn onc). Instructed on use.    - Ellura trial, D-mannose supplement, probiotics     - CT urogram 9/20 - negative   - Again encouraged Estrace, discussed importance. New rx today.    - Was on Macrobid QHS, then Keflex - now stopped.   - Recurrent multiresistant UTIs.    - Repeat GONZÁLEZ 7/22 - wnl   - Office cystoscopy with widespread abnormal bladder mucosa. Recommend bladder biopsy/TUR of abnormal areas. OR 7/27/22.   - Will need IV abx prior       2. Microhematuria    - Discussed etiology and workup of hematuria   - UCx - results above   - Cytology - previously negative   - CT urogram 12/17 - no  abnormalities. GONZÁLEZ was negative from 1/17.   - Office cystoscopy - negative 1/17, 5/19        3. Nocturia/frequency/urge incontinence   - Ditropan XL 10mg - discussed SE. Declines adjustment of medication.   - Reduce PM fluids      Follow up in 3 weeks

## 2022-07-13 NOTE — H&P (VIEW-ONLY)
Subjective:       Niurka Pedraza is a 77 y.o. female who is an established patient with [place] urologist, Dr Dhaliwal,  was seen for evaluation of UTI.      She was seen by another urologist for recurrent UTIs/dysuria. Multiple +UCx with different bacteria (E coli, Enterococcus). She has had negative cystoscopy and GONZÁLEZ (1/17). UTIs return soon after treatment. She was started on prophy Keflex in 7/17.     She saw PCP in 11/17 with complaints of UTI. UCx was negative. She now feels a constant pressure in SP area and c/o pain with walking. She also notes pain worse with movement (like hitting a bump when driving). Denies dysuria. Increased frequency to now q1h. Present for 2 months. Denies constipation, occasional diarrhea. Frequent RADHA. Now with UUI. Also with BOBBI - increased coughing with recent lung issue. Nocturia x 4-5. Denies gross hematuria.     She moved here from [place] in 9/17. She requested to be started on abx prophy starting 1/18 (Keflex 250mg).     PVR (bladder scan) initial visit - 32cc, 0cc.    CT uro - no  abnormalities. Lung nodules - followed by pulmonary. Ovarian cyst - referred to gyn (now s/p DARCI-BSO for ovarian cancer).     She has been doing great with the Ditropan - she is very pleased. Was on abx prophy (Keflex) 1/18 x 9 months. Taking probiotics that is helping.     [place] - took Bactrim. Now off prophylactic abx - more frequency, urgency, nocturia, SP pressure. No significant dysuria.     UTIs have become more frequent. Symptoms returning quickly. Prophylactic abx were restarted (Macrobid 50mg).     9/4/2019  She recently finished course of abx but symptoms are now returning just 5 days later. Dysuria improved but pressure and frequency worsened.     1/15/2020  Increase Ditropan to 10mg. Taking Macrobid QHS, started in 9/19. No symptoms today.     8/26/2020  Several recent breakthrough UTIs. Finished abx 2 weeks ago, symptoms have returned.  "Currently on Macrobid. UTI symptoms - frequency, dysuria.   PVR (bladder scan) today - 21cc    9/23/2020  On day 5 of abx, still with some symptoms but improving. CT done - clear.     2/10/2021  Occasional pain and urgency. Macrobid stopped and changed to Keflex. She is requesting UCx though states symptoms are stable.     1/12/2022  Denies current issues with UTI. Nocturia x 3-4. Off prophy abx. Now on new med for pulm fibrosis.     6/29/2022  Now having multiresistant recurrent UTIs requiring IV abx - treated by ID (Dr Rishi Dempsey). Has been treated with IV abx (ertapenem) - 5 days last week. Still with urgency and frequency. Not using Estrace.   PVR (bladder scan) today - 22c    7/20/2022  Here to discuss procedure. UTI symptoms present but not as severe - frequency q1h but dysuria not as bad. She is not on IV treatment for UTI currently.     Cytology 7/22 - no malignant cells    Cysto 5/19 - normal, heavy sediment in bladder    Cysto 7/22 - very abnormal appearing bladder mucosa with widespread "scaly" yellow/white areas L>R. Easily detached from bladder wall without bleeding. No tumors.     GONZÁLEZ 4/19 - wnl, PVR 31cc  CT uro 9/20 - negative, pulm nodules (seeing PCP)  GONZÁLEZ 7/22 - no hydro/stones. PVR 51cc     UCx:  7/22 - >100k E coli ESBL (multiresistant)  7/21 - >100k Enterococcus  2/21 - 50-99k Proteus  9/20 - >100k Proteus  7/20 - >100k E coli, 10-49k Proteus  7/20 - >100k Proteus   5/20 - 10-49k Klebsiella  9/19 - >100k Klebsiella  8/19 - >100k Klebsiella  6/19 - >100k E coli  5/19 - neg  5/19 - >100k E coli  3/19 - >100k E coli  2/19 - >100k E coli  1/19 - >100k E coli  11/18 - >100k E coli  8/18 - contaminant  7/18 - 50-99k Enterobacter (treated with Bactrim)  11/17 - neg  10/17 - >100k E coli  10/17- >100k Enterococcus  7/17 - >100k E coli  5/17- >100k E coli  5/17 - neg  4/17- 50-99k Enterococcus  3/17 - >100k E coli  12/16 - neg     PVR (bladder scan) last visit - 33cc, 50cc, 0cc      The following " "portions of the patient's history were reviewed and updated as appropriate: allergies, current medications, past family history, past medical history, past social history, past surgical history and problem list.     Review of Systems  Constitutional: no fever or chills  ENT: no nasal congestion or sore throat  Respiratory: no cough or shortness of breath  Cardiovascular: no chest pain or palpitations  Gastrointestinal: no nausea or vomiting, tolerating diet  Genitourinary: as per HPI  Hematologic/Lymphatic: no easy bruising or lymphadenopathy  Musculoskeletal: no arthralgias or myalgias  Skin: no rashes or lesions  Neurological: no seizures or tremors  Behavioral/Psych: no auditory or visual hallucinations        Objective:    Vitals: /67   Pulse 78   Ht 5' 4" (1.626 m)   Wt 73.5 kg (162 lb)   LMP 02/08/2000   BMI 27.81 kg/m²     Physical Exam   General: well developed, well nourished in no acute distress  Head: normocephalic, atraumatic  Neck: supple, trachea midline, no obvious enlargement of thyroid  HEENT: EOMI, mucus membranes moist, sclera anicteric, no hearing impairment  Lungs: symmetric expansion, non-labored breathing  Skin: no rashes or lesions  Neuro: alert and oriented x 3, no gross deficits  Psych: normal judgment and insight, normal mood/affect and non-anxious  Genitourinary:   patient declined exam      Lab Review   Urine analysis today in clinic shows - ++LE, +nit, 50 RBC     Lab Results   Component Value Date    WBC 11.34 06/02/2022    HGB 12.5 06/02/2022    HCT 38.8 06/02/2022    MCV 88 06/02/2022     (H) 06/02/2022     Lab Results   Component Value Date    CREATININE 0.8 06/30/2022    BUN 19 06/30/2022       Imaging  Images and reports were personally reviewed by me and discussed with patient  GONZÁLEZ reviewed       Assessment/Plan:      1. Recurrent UTI    - Discussed UTI prevention strategies.   - Adequate hydration.   - Double voiding. Consider timed voiding.    - Avoid " constipation.   - GONZÁLEZ - negative 1/17   - Cystoscopy - negative in 1/17 (by another urologist)   - Cranberry/probiotics.   - Estrace cream 2x weekly - will consider in near future (now with ovarian ca)   - Call with UTI symptoms so UA/UCx can be sent.    - Abx prophy (started 1/18) - Keflex 250mg took for 9 months, stopped 9/18     - Multiple UTIs since stopping prophy, now with UTIs ON prophy   - Repeat workup - GONZÁLEZ and cysto   - GONZÁLEZ 4/19 - wnl   - Cysto 5/19 - wnl   - Continue Estrace (approved by gyn onc). Instructed on use.    - Ellura trial, D-mannose supplement, probiotics     - CT urogram 9/20 - negative   - Again encouraged Estrace, discussed importance. New rx today.    - Was on Macrobid QHS, then Keflex - now stopped.   - Recurrent multiresistant UTIs.    - Repeat GONZÁLEZ 7/22 - wnl   - Office cystoscopy with widespread abnormal bladder mucosa. Recommend bladder biopsy/TUR of abnormal areas. OR 7/27/22.   - Will need IV abx prior       2. Microhematuria    - Discussed etiology and workup of hematuria   - UCx - results above   - Cytology - previously negative   - CT urogram 12/17 - no  abnormalities. GONZÁLEZ was negative from 1/17.   - Office cystoscopy - negative 1/17, 5/19        3. Nocturia/frequency/urge incontinence   - Ditropan XL 10mg - discussed SE. Declines adjustment of medication.   - Reduce PM fluids      Follow up in 3 weeks

## 2022-07-20 ENCOUNTER — OFFICE VISIT (OUTPATIENT)
Dept: UROLOGY | Facility: CLINIC | Age: 77
End: 2022-07-20
Attending: UROLOGY
Payer: MEDICARE

## 2022-07-20 ENCOUNTER — PATIENT MESSAGE (OUTPATIENT)
Dept: INFECTIOUS DISEASES | Facility: CLINIC | Age: 77
End: 2022-07-20
Payer: MEDICARE

## 2022-07-20 ENCOUNTER — THERAPY VISIT (OUTPATIENT)
Dept: INFECTIOUS DISEASES | Facility: HOSPITAL | Age: 77
End: 2022-07-20
Payer: MEDICARE

## 2022-07-20 VITALS
HEART RATE: 78 BPM | HEIGHT: 64 IN | BODY MASS INDEX: 27.66 KG/M2 | DIASTOLIC BLOOD PRESSURE: 67 MMHG | SYSTOLIC BLOOD PRESSURE: 136 MMHG | WEIGHT: 162 LBS

## 2022-07-20 DIAGNOSIS — N32.9 LESION OF BLADDER: Primary | ICD-10-CM

## 2022-07-20 DIAGNOSIS — R31.29 MICROHEMATURIA: ICD-10-CM

## 2022-07-20 DIAGNOSIS — R30.0 DYSURIA: ICD-10-CM

## 2022-07-20 DIAGNOSIS — N39.0 URINARY TRACT INFECTION DUE TO EXTENDED-SPECTRUM BETA LACTAMASE (ESBL) PRODUCING ESCHERICHIA COLI: Primary | ICD-10-CM

## 2022-07-20 DIAGNOSIS — Z16.12 URINARY TRACT INFECTION DUE TO EXTENDED-SPECTRUM BETA LACTAMASE (ESBL) PRODUCING ESCHERICHIA COLI: Primary | ICD-10-CM

## 2022-07-20 DIAGNOSIS — R35.1 NOCTURIA: ICD-10-CM

## 2022-07-20 DIAGNOSIS — N39.0 RECURRENT UTI: ICD-10-CM

## 2022-07-20 DIAGNOSIS — N32.81 OAB (OVERACTIVE BLADDER): ICD-10-CM

## 2022-07-20 DIAGNOSIS — Z01.818 PRE-OP TESTING: ICD-10-CM

## 2022-07-20 DIAGNOSIS — B96.29 URINARY TRACT INFECTION DUE TO EXTENDED-SPECTRUM BETA LACTAMASE (ESBL) PRODUCING ESCHERICHIA COLI: Primary | ICD-10-CM

## 2022-07-20 PROCEDURE — 99214 OFFICE O/P EST MOD 30 MIN: CPT | Mod: S$GLB,,, | Performed by: UROLOGY

## 2022-07-20 PROCEDURE — 99214 PR OFFICE/OUTPT VISIT, EST, LEVL IV, 30-39 MIN: ICD-10-PCS | Mod: S$GLB,,, | Performed by: UROLOGY

## 2022-07-20 NOTE — H&P
Subjective:       Niurka Pedraza is a 77 y.o. female who is an established patient with [place] urologist, Dr Dhaliwal,  was seen for evaluation of UTI.      She was seen by another urologist for recurrent UTIs/dysuria. Multiple +UCx with different bacteria (E coli, Enterococcus). She has had negative cystoscopy and GONZÁLEZ (1/17). UTIs return soon after treatment. She was started on prophy Keflex in 7/17.     She saw PCP in 11/17 with complaints of UTI. UCx was negative. She now feels a constant pressure in SP area and c/o pain with walking. She also notes pain worse with movement (like hitting a bump when driving). Denies dysuria. Increased frequency to now q1h. Present for 2 months. Denies constipation, occasional diarrhea. Frequent RADHA. Now with UUI. Also with BOBBI - increased coughing with recent lung issue. Nocturia x 4-5. Denies gross hematuria.     She moved here from [place] in 9/17. She requested to be started on abx prophy starting 1/18 (Keflex 250mg).     PVR (bladder scan) initial visit - 32cc, 0cc.    CT uro - no  abnormalities. Lung nodules - followed by pulmonary. Ovarian cyst - referred to gyn (now s/p DARCI-BSO for ovarian cancer).     She has been doing great with the Ditropan - she is very pleased. Was on abx prophy (Keflex) 1/18 x 9 months. Taking probiotics that is helping.     [place] - took Bactrim. Now off prophylactic abx - more frequency, urgency, nocturia, SP pressure. No significant dysuria.     UTIs have become more frequent. Symptoms returning quickly. Prophylactic abx were restarted (Macrobid 50mg).     9/4/2019  She recently finished course of abx but symptoms are now returning just 5 days later. Dysuria improved but pressure and frequency worsened.     1/15/2020  Increase Ditropan to 10mg. Taking Macrobid QHS, started in 9/19. No symptoms today.     8/26/2020  Several recent breakthrough UTIs. Finished abx 2 weeks ago, symptoms have returned.  "Currently on Macrobid. UTI symptoms - frequency, dysuria.   PVR (bladder scan) today - 21cc    9/23/2020  On day 5 of abx, still with some symptoms but improving. CT done - clear.     2/10/2021  Occasional pain and urgency. Macrobid stopped and changed to Keflex. She is requesting UCx though states symptoms are stable.     1/12/2022  Denies current issues with UTI. Nocturia x 3-4. Off prophy abx. Now on new med for pulm fibrosis.     6/29/2022  Now having multiresistant recurrent UTIs requiring IV abx - treated by ID (Dr Rishi Dempsey). Has been treated with IV abx (ertapenem) - 5 days last week. Still with urgency and frequency. Not using Estrace.   PVR (bladder scan) today - 22c    7/20/2022  Here to discuss procedure. UTI symptoms present but not as severe - frequency q1h but dysuria not as bad. She is not on IV treatment for UTI currently.       Cysto 5/19 - normal, heavy sediment in bladder    Cysto 7/22 - very abnormal appearing bladder mucosa with widespread "scaly" yellow/white areas L>R. Easily detached from bladder wall without bleeding. No tumors.     GONZÁLEZ 4/19 - wnl, PVR 31cc  CT uro 9/20 - negative, pulm nodules (seeing PCP)  GONZÁLEZ 7/22 - no hydro/stones. PVR 51cc     UCx:  7/22 - >100k E coli ESBL (multiresistant)  7/21 - >100k Enterococcus  2/21 - 50-99k Proteus  9/20 - >100k Proteus  7/20 - >100k E coli, 10-49k Proteus  7/20 - >100k Proteus   5/20 - 10-49k Klebsiella  9/19 - >100k Klebsiella  8/19 - >100k Klebsiella  6/19 - >100k E coli  5/19 - neg  5/19 - >100k E coli  3/19 - >100k E coli  2/19 - >100k E coli  1/19 - >100k E coli  11/18 - >100k E coli  8/18 - contaminant  7/18 - 50-99k Enterobacter (treated with Bactrim)  11/17 - neg  10/17 - >100k E coli  10/17- >100k Enterococcus  7/17 - >100k E coli  5/17- >100k E coli  5/17 - neg  4/17- 50-99k Enterococcus  3/17 - >100k E coli  12/16 - neg     PVR (bladder scan) last visit - 33cc, 50cc, 0cc      The following portions of the patient's history were " "reviewed and updated as appropriate: allergies, current medications, past family history, past medical history, past social history, past surgical history and problem list.     Review of Systems  Constitutional: no fever or chills  ENT: no nasal congestion or sore throat  Respiratory: no cough or shortness of breath  Cardiovascular: no chest pain or palpitations  Gastrointestinal: no nausea or vomiting, tolerating diet  Genitourinary: as per HPI  Hematologic/Lymphatic: no easy bruising or lymphadenopathy  Musculoskeletal: no arthralgias or myalgias  Skin: no rashes or lesions  Neurological: no seizures or tremors  Behavioral/Psych: no auditory or visual hallucinations        Objective:    Vitals: /67   Pulse 78   Ht 5' 4" (1.626 m)   Wt 73.5 kg (162 lb)   LMP 02/08/2000   BMI 27.81 kg/m²     Physical Exam   General: well developed, well nourished in no acute distress  Head: normocephalic, atraumatic  Neck: supple, trachea midline, no obvious enlargement of thyroid  HEENT: EOMI, mucus membranes moist, sclera anicteric, no hearing impairment  Lungs: symmetric expansion, non-labored breathing  Skin: no rashes or lesions  Neuro: alert and oriented x 3, no gross deficits  Psych: normal judgment and insight, normal mood/affect and non-anxious  Genitourinary:   patient declined exam      Lab Review   Urine analysis today in clinic shows - ++LE, +nit, 50 RBC     Lab Results   Component Value Date    WBC 11.34 06/02/2022    HGB 12.5 06/02/2022    HCT 38.8 06/02/2022    MCV 88 06/02/2022     (H) 06/02/2022     Lab Results   Component Value Date    CREATININE 0.8 06/30/2022    BUN 19 06/30/2022       Imaging  Images and reports were personally reviewed by me and discussed with patient  GONZÁLEZ reviewed       Assessment/Plan:      1. Recurrent UTI    - Discussed UTI prevention strategies.   - Adequate hydration.   - Double voiding. Consider timed voiding.    - Avoid constipation.   - GONZÁLEZ - negative 1/17   - " Cystoscopy - negative in 1/17 (by another urologist)   - Cranberry/probiotics.   - Estrace cream 2x weekly - will consider in near future (now with ovarian ca)   - Call with UTI symptoms so UA/UCx can be sent.    - Abx prophy (started 1/18) - Keflex 250mg took for 9 months, stopped 9/18     - Multiple UTIs since stopping prophy, now with UTIs ON prophy   - Repeat workup - GONZÁLEZ and cysto   - GONZÁLEZ 4/19 - wnl   - Cysto 5/19 - wnl   - Continue Estrace (approved by gyn onc). Instructed on use.    - Ellura trial, D-mannose supplement, probiotics     - CT urogram 9/20 - negative   - Again encouraged Estrace, discussed importance. New rx today.    - Was on Macrobid QHS, then Keflex - now stopped.   - Recurrent multiresistant UTIs.    - Repeat GONZÁLEZ 7/22 - wnl   - Office cystoscopy with widespread abnormal bladder mucosa. Recommend bladder biopsy/TUR of abnormal areas. OR 7/27/22.   - Will need IV abx prior       2. Microhematuria    - Discussed etiology and workup of hematuria   - UCx - results above   - Cytology - previously negative   - CT urogram 12/17 - no  abnormalities. GONZÁLEZ was negative from 1/17.   - Office cystoscopy - negative 1/17, 5/19        3. Nocturia/frequency/urge incontinence   - Ditropan XL 10mg - discussed SE. Declines adjustment of medication.   - Reduce PM fluids      Follow up in 3 weeks

## 2022-07-20 NOTE — PROGRESS NOTES
Has cystoscopy and biopsy planned 7/27/22.  Will treat with Ertapenem during periprocedure period.

## 2022-07-20 NOTE — Clinical Note
I have scheduled our mutual patient, Ms Pedraza, for cysto in OR with resection of the abnormal appearing bladder mucosa seen on office cysto. She has not started abx to treat her recent UTI. Could we start that now in preparation for surgery to limit post-op infection issues? Her OR is scheduled for 7/27/22. Thanks.

## 2022-07-20 NOTE — H&P (VIEW-ONLY)
Subjective:       Niurka Pedraza is a 77 y.o. female who is an established patient with [place] urologist, Dr Dhaliwal,  was seen for evaluation of UTI.      She was seen by another urologist for recurrent UTIs/dysuria. Multiple +UCx with different bacteria (E coli, Enterococcus). She has had negative cystoscopy and GONZÁLEZ (1/17). UTIs return soon after treatment. She was started on prophy Keflex in 7/17.     She saw PCP in 11/17 with complaints of UTI. UCx was negative. She now feels a constant pressure in SP area and c/o pain with walking. She also notes pain worse with movement (like hitting a bump when driving). Denies dysuria. Increased frequency to now q1h. Present for 2 months. Denies constipation, occasional diarrhea. Frequent RADHA. Now with UUI. Also with BOBBI - increased coughing with recent lung issue. Nocturia x 4-5. Denies gross hematuria.     She moved here from [place] in 9/17. She requested to be started on abx prophy starting 1/18 (Keflex 250mg).     PVR (bladder scan) initial visit - 32cc, 0cc.    CT uro - no  abnormalities. Lung nodules - followed by pulmonary. Ovarian cyst - referred to gyn (now s/p DARCI-BSO for ovarian cancer).     She has been doing great with the Ditropan - she is very pleased. Was on abx prophy (Keflex) 1/18 x 9 months. Taking probiotics that is helping.     [place] - [place] Bactrim. Now off prophylactic abx - more frequency, urgency, nocturia, SP pressure. No significant dysuria.     UTIs have become more frequent. Symptoms returning quickly. Prophylactic abx were restarted (Macrobid 50mg).     9/4/2019  She recently finished course of abx but symptoms are now returning just 5 days later. Dysuria improved but pressure and frequency worsened.     1/15/2020  Increase Ditropan to 10mg. Taking Macrobid QHS, started in 9/19. No symptoms today.     8/26/2020  Several recent breakthrough UTIs. Finished abx 2 weeks ago, symptoms have returned.  "Currently on Macrobid. UTI symptoms - frequency, dysuria.   PVR (bladder scan) today - 21cc    9/23/2020  On day 5 of abx, still with some symptoms but improving. CT done - clear.     2/10/2021  Occasional pain and urgency. Macrobid stopped and changed to Keflex. She is requesting UCx though states symptoms are stable.     1/12/2022  Denies current issues with UTI. Nocturia x 3-4. Off prophy abx. Now on new med for pulm fibrosis.     6/29/2022  Now having multiresistant recurrent UTIs requiring IV abx - treated by ID (Dr Rishi Dempsey). Has been treated with IV abx (ertapenem) - 5 days last week. Still with urgency and frequency. Not using Estrace.   PVR (bladder scan) today - 22c    7/20/2022  Here to discuss procedure. UTI symptoms present but not as severe - frequency q1h but dysuria not as bad. She is not on IV treatment for UTI currently.       Cysto 5/19 - normal, heavy sediment in bladder    Cysto 7/22 - very abnormal appearing bladder mucosa with widespread "scaly" yellow/white areas L>R. Easily detached from bladder wall without bleeding. No tumors.     GONZÁLEZ 4/19 - wnl, PVR 31cc  CT uro 9/20 - negative, pulm nodules (seeing PCP)  GONZÁLEZ 7/22 - no hydro/stones. PVR 51cc     UCx:  7/22 - >100k E coli ESBL (multiresistant)  7/21 - >100k Enterococcus  2/21 - 50-99k Proteus  9/20 - >100k Proteus  7/20 - >100k E coli, 10-49k Proteus  7/20 - >100k Proteus   5/20 - 10-49k Klebsiella  9/19 - >100k Klebsiella  8/19 - >100k Klebsiella  6/19 - >100k E coli  5/19 - neg  5/19 - >100k E coli  3/19 - >100k E coli  2/19 - >100k E coli  1/19 - >100k E coli  11/18 - >100k E coli  8/18 - contaminant  7/18 - 50-99k Enterobacter (treated with Bactrim)  11/17 - neg  10/17 - >100k E coli  10/17- >100k Enterococcus  7/17 - >100k E coli  5/17- >100k E coli  5/17 - neg  4/17- 50-99k Enterococcus  3/17 - >100k E coli  12/16 - neg     PVR (bladder scan) last visit - 33cc, 50cc, 0cc      The following portions of the patient's history were " "reviewed and updated as appropriate: allergies, current medications, past family history, past medical history, past social history, past surgical history and problem list.     Review of Systems  Constitutional: no fever or chills  ENT: no nasal congestion or sore throat  Respiratory: no cough or shortness of breath  Cardiovascular: no chest pain or palpitations  Gastrointestinal: no nausea or vomiting, tolerating diet  Genitourinary: as per HPI  Hematologic/Lymphatic: no easy bruising or lymphadenopathy  Musculoskeletal: no arthralgias or myalgias  Skin: no rashes or lesions  Neurological: no seizures or tremors  Behavioral/Psych: no auditory or visual hallucinations        Objective:    Vitals: /67   Pulse 78   Ht 5' 4" (1.626 m)   Wt 73.5 kg (162 lb)   LMP 02/08/2000   BMI 27.81 kg/m²     Physical Exam   General: well developed, well nourished in no acute distress  Head: normocephalic, atraumatic  Neck: supple, trachea midline, no obvious enlargement of thyroid  HEENT: EOMI, mucus membranes moist, sclera anicteric, no hearing impairment  Lungs: symmetric expansion, non-labored breathing  Skin: no rashes or lesions  Neuro: alert and oriented x 3, no gross deficits  Psych: normal judgment and insight, normal mood/affect and non-anxious  Genitourinary:   patient declined exam      Lab Review   Urine analysis today in clinic shows - ++LE, +nit, 50 RBC     Lab Results   Component Value Date    WBC 11.34 06/02/2022    HGB 12.5 06/02/2022    HCT 38.8 06/02/2022    MCV 88 06/02/2022     (H) 06/02/2022     Lab Results   Component Value Date    CREATININE 0.8 06/30/2022    BUN 19 06/30/2022       Imaging  Images and reports were personally reviewed by me and discussed with patient  GONZÁLEZ reviewed       Assessment/Plan:      1. Recurrent UTI    - Discussed UTI prevention strategies.   - Adequate hydration.   - Double voiding. Consider timed voiding.    - Avoid constipation.   - GONZÁLEZ - negative 1/17   - " Cystoscopy - negative in 1/17 (by another urologist)   - Cranberry/probiotics.   - Estrace cream 2x weekly - will consider in near future (now with ovarian ca)   - Call with UTI symptoms so UA/UCx can be sent.    - Abx prophy (started 1/18) - Keflex 250mg took for 9 months, stopped 9/18     - Multiple UTIs since stopping prophy, now with UTIs ON prophy   - Repeat workup - GONZÁLEZ and cysto   - GONZÁLEZ 4/19 - wnl   - Cysto 5/19 - wnl   - Continue Estrace (approved by gyn onc). Instructed on use.    - Ellura trial, D-mannose supplement, probiotics     - CT urogram 9/20 - negative   - Again encouraged Estrace, discussed importance. New rx today.    - Was on Macrobid QHS, then Keflex - now stopped.   - Recurrent multiresistant UTIs.    - Repeat GONZÁLEZ 7/22 - wnl   - Office cystoscopy with widespread abnormal bladder mucosa. Recommend bladder biopsy/TUR of abnormal areas. OR 7/27/22.   - Will need IV abx prior       2. Microhematuria    - Discussed etiology and workup of hematuria   - UCx - results above   - Cytology - previously negative   - CT urogram 12/17 - no  abnormalities. GONZÁLEZ was negative from 1/17.   - Office cystoscopy - negative 1/17, 5/19        3. Nocturia/frequency/urge incontinence   - Ditropan XL 10mg - discussed SE. Declines adjustment of medication.   - Reduce PM fluids      Follow up in 3 weeks

## 2022-07-22 ENCOUNTER — TREATMENT PLANNING (OUTPATIENT)
Dept: INFECTIOUS DISEASES | Facility: HOSPITAL | Age: 77
End: 2022-07-22
Payer: MEDICARE

## 2022-07-24 ENCOUNTER — LAB VISIT (OUTPATIENT)
Dept: SURGERY | Facility: CLINIC | Age: 77
End: 2022-07-24
Payer: MEDICARE

## 2022-07-24 DIAGNOSIS — Z01.818 PRE-OP TESTING: ICD-10-CM

## 2022-07-24 LAB — SARS-COV-2 RNA RESP QL NAA+PROBE: NOT DETECTED

## 2022-07-24 PROCEDURE — U0005 INFEC AGEN DETEC AMPLI PROBE: HCPCS | Performed by: NURSE PRACTITIONER

## 2022-07-24 PROCEDURE — U0003 INFECTIOUS AGENT DETECTION BY NUCLEIC ACID (DNA OR RNA); SEVERE ACUTE RESPIRATORY SYNDROME CORONAVIRUS 2 (SARS-COV-2) (CORONAVIRUS DISEASE [COVID-19]), AMPLIFIED PROBE TECHNIQUE, MAKING USE OF HIGH THROUGHPUT TECHNOLOGIES AS DESCRIBED BY CMS-2020-01-R: HCPCS | Performed by: NURSE PRACTITIONER

## 2022-07-25 ENCOUNTER — ANESTHESIA EVENT (OUTPATIENT)
Dept: SURGERY | Facility: OTHER | Age: 77
End: 2022-07-25
Payer: MEDICARE

## 2022-07-25 ENCOUNTER — INFUSION (OUTPATIENT)
Dept: INFECTIOUS DISEASES | Facility: HOSPITAL | Age: 77
End: 2022-07-25
Attending: INTERNAL MEDICINE
Payer: MEDICARE

## 2022-07-25 ENCOUNTER — HOSPITAL ENCOUNTER (OUTPATIENT)
Dept: PREADMISSION TESTING | Facility: OTHER | Age: 77
Discharge: HOME | End: 2022-07-25
Attending: UROLOGY
Payer: MEDICARE

## 2022-07-25 VITALS
BODY MASS INDEX: 28.07 KG/M2 | TEMPERATURE: 98 F | SYSTOLIC BLOOD PRESSURE: 132 MMHG | DIASTOLIC BLOOD PRESSURE: 85 MMHG | WEIGHT: 164.44 LBS | RESPIRATION RATE: 18 BRPM | HEART RATE: 77 BPM | HEIGHT: 64 IN | OXYGEN SATURATION: 95 %

## 2022-07-25 VITALS
DIASTOLIC BLOOD PRESSURE: 71 MMHG | BODY MASS INDEX: 28.31 KG/M2 | TEMPERATURE: 98 F | HEART RATE: 81 BPM | SYSTOLIC BLOOD PRESSURE: 133 MMHG | OXYGEN SATURATION: 94 % | WEIGHT: 164.88 LBS

## 2022-07-25 DIAGNOSIS — B96.29 URINARY TRACT INFECTION DUE TO EXTENDED-SPECTRUM BETA LACTAMASE (ESBL) PRODUCING ESCHERICHIA COLI: Primary | ICD-10-CM

## 2022-07-25 DIAGNOSIS — N39.0 URINARY TRACT INFECTION DUE TO EXTENDED-SPECTRUM BETA LACTAMASE (ESBL) PRODUCING ESCHERICHIA COLI: Primary | ICD-10-CM

## 2022-07-25 DIAGNOSIS — Z16.12 URINARY TRACT INFECTION DUE TO EXTENDED-SPECTRUM BETA LACTAMASE (ESBL) PRODUCING ESCHERICHIA COLI: Primary | ICD-10-CM

## 2022-07-25 PROCEDURE — 25000003 PHARM REV CODE 250: Performed by: STUDENT IN AN ORGANIZED HEALTH CARE EDUCATION/TRAINING PROGRAM

## 2022-07-25 PROCEDURE — 96365 THER/PROPH/DIAG IV INF INIT: CPT

## 2022-07-25 PROCEDURE — 63600175 PHARM REV CODE 636 W HCPCS: Performed by: STUDENT IN AN ORGANIZED HEALTH CARE EDUCATION/TRAINING PROGRAM

## 2022-07-25 RX ORDER — SODIUM CHLORIDE, SODIUM LACTATE, POTASSIUM CHLORIDE, CALCIUM CHLORIDE 600; 310; 30; 20 MG/100ML; MG/100ML; MG/100ML; MG/100ML
INJECTION, SOLUTION INTRAVENOUS CONTINUOUS
Status: CANCELLED | OUTPATIENT
Start: 2022-07-25

## 2022-07-25 RX ORDER — ALBUTEROL SULFATE 2.5 MG/.5ML
2.5 SOLUTION RESPIRATORY (INHALATION)
Status: CANCELLED | OUTPATIENT
Start: 2022-07-25 | End: 2022-07-25

## 2022-07-25 RX ORDER — ACETAMINOPHEN 500 MG
1000 TABLET ORAL
Status: CANCELLED | OUTPATIENT
Start: 2022-07-25 | End: 2022-07-25

## 2022-07-25 RX ORDER — LIDOCAINE HYDROCHLORIDE 10 MG/ML
0.5 INJECTION, SOLUTION EPIDURAL; INFILTRATION; INTRACAUDAL; PERINEURAL ONCE
Status: CANCELLED | OUTPATIENT
Start: 2022-07-25 | End: 2022-07-25

## 2022-07-25 RX ADMIN — SODIUM CHLORIDE 1 G: 9 INJECTION, SOLUTION INTRAVENOUS at 01:07

## 2022-07-25 NOTE — DISCHARGE INSTRUCTIONS
Information to Prepare you for your Surgery    PRE-ADMIT TESTING -  [phone]    [address] [facility]          Your surgery has been scheduled at [facility]. We are pleased to have the opportunity to serve you. For Further Information please call [phone].    On the day of surgery please report to the Information Desk on the 1st floor.    CONTACT YOUR PHYSICIAN'S OFFICE THE DAY PRIOR TO YOUR SURGERY TO OBTAIN YOUR ARRIVAL TIME.     The evening before surgery do not eat anything after 9 p.m. ( this includes hard candy, chewing gum and mints).  You may only have GATORADE, POWERADE AND WATER  from 9 p.m. until you leave your home.   DO NOT DRINK ANY LIQUIDS ON THE WAY TO THE HOSPITAL.      Why does your anesthesiologist allow you to drink Gatorade/Powerade before surgery?  Gatorade/Powerade helps to increase your comfort before surgery and to decrease your nausea after surgery. The carbohydrates in Gatorade/Powerade help reduce your body's stress response to surgery.  If you are a diabetic-drink only water prior to surgery.      Current Visitor policy(12/27/2021) - Patients may have 2 visitors pre and post procedure. Only 2 visitors will be allowed in the Surgical building with the patient.     SPECIAL MEDICATION INSTRUCTIONS: TAKE medications checked off by the Anesthesiologist on your Medication List.    Angiogram Patients: Take medications as instructed by your physician, including aspirin.     Surgery Patients:    If you take ASPIRIN - Your PHYSICIAN/SURGEON will need to inform you IF/OR when you need to stop taking aspirin prior to your surgery.     Do Not take any medications containing IBUPROFEN.    Do Not Wear any make-up (especially eye make-up) to surgery. Please remove any false eyelashes or eyelash extensions. If you arrive the day of surgery with makeup/eyelashes on you will be required to remove prior to surgery. (There is a risk of corneal  abrasions if eye makeup/eyelash extensions are not removed)      Leave all valuables at home.   Do Not wear any jewelry or watches, including any metal in body piercings. Jewelry must be removed prior to coming to the hospital.  There is a possibility that rings that are unable to be removed may be cut off if they are on the surgical extremity.    Please remove all hair extensions, wigs, clips and any other metal accessories/ ornaments from your hair.  These items may pose a flammable/fire risk in Surgery and must be removed.    Do not shave your surgical area at least 5 days prior to your surgery. The surgical prep will be performed at the hospital according to Infection Control regulations.    Contact Lens must be removed before surgery. Either do not wear the contact lens or bring a case and solution for storage.  Please bring a container for eyeglasses or dentures as required.  Bring any paperwork your physician has provided, such as consent forms,  history and physicals, doctor's orders, etc.   Bring comfortable clothes that are loose fitting to wear upon discharge. Take into consideration the type of surgery being performed.  Maintain your diet as advised per your physician the day prior to surgery.      Adequate rest the night before surgery is advised.   Park in the Parking lot behind the hospital or in the [employer] [facility] across the street from the parking lot. Parking is complimentary.  If you will be discharged the same day as your procedure, please arrange for a responsible adult to drive you home or to accompany you if traveling by taxi.   YOU WILL NOT BE PERMITTED TO DRIVE OR TO LEAVE THE HOSPITAL ALONE AFTER SURGERY.   If you are being discharged the same day, it is strongly recommended that you arrange for someone to remain with you for the first 24 hrs following your surgery.    The Surgeon will speak to your family/visitor after your surgery regarding the outcome of your surgery and post op  care.  The Surgeon may speak to you after your surgery, but there is a possibility you may not remember the details.  Please check with your family members regarding the conversation with the Surgeon.    We strongly recommend whoever is bringing you home be present for discharge instructions.  This will ensure a thorough understanding for your post op home care.    ALL CHILDREN MUST ALWAYS BE ACCOMPANIED BY AN ADULT.    Visitors-Refer to current Visitor policy handouts.    Thank you for your cooperation.  The Staff of [facility].            Bathing Instructions with Hibiclens    Shower the evening before and morning of your procedure with Hibiclens:  Wash your face with water and your regular face wash/soap  Apply Hibiclens directly on your skin or on a wet washcloth and wash gently. When showering: Move away from the shower stream when applying Hibiclens to avoid rinsing off too soon.  Rinse thoroughly with warm water  Do not dilute Hibiclens        Dry off as usual, do not use any deodorant, powder, body lotions, perfume, after shave or cologne.

## 2022-07-25 NOTE — PROGRESS NOTES
MD Dempsey stated pt will receive her 3rd dose of Eratepenem on Wednesday during her scheduled procedure, MD Dempsey stated to cancel her appt with infusion on Wednesday, BALJEET Biswas RN Supervisor notified

## 2022-07-25 NOTE — ANESTHESIA PREPROCEDURE EVALUATION
07/25/2022  Niurka Pedraza is a 77 y.o., female.      Pre-op Assessment    I have reviewed the Patient Summary Reports.     I have reviewed the Nursing Notes.    I have reviewed the Medications.   Prednisone    Review of Systems  Anesthesia Hx:  Denies Family Hx of Anesthesia complications.   Denies Personal Hx of Anesthesia complications.   Social:  Former Smoker    Hematology/Oncology:         -- Cancer in past history: Breast right surgery    Cardiovascular:   Hypertension hyperlipidemia    Pulmonary:   Shortness of breath (usual interstitial pneumonitis) Pulmonary coccidioidomycosis, unspecified   Interstitial lung disease  On TID prednisone  NO home O2 required   Hepatic/GI:   GERD Liver Disease,    Musculoskeletal:   Arthritis     Neurological:  Neurology Normal    Endocrine:   Hypothyroidism        Physical Exam  General: Cooperative, Alert and Oriented    Airway:  Mallampati: II   Mouth Opening: Normal  TM Distance: Normal  Tongue: Normal  Neck ROM: Normal ROM    Dental:  Partial Dentures, Dentures, Caps / Implants        Anesthesia Plan  Type of Anesthesia, risks & benefits discussed:    Anesthesia Type: Gen Supraglottic Airway  Intra-op Monitoring Plan: Standard ASA Monitors  Post Op Pain Control Plan: multimodal analgesia  Induction:  IV  Informed Consent: Informed consent signed with the Patient and all parties understand the risks and agree with anesthesia plan.  All questions answered.   ASA Score: 3  Anesthesia Plan Notes: Normal labs in June. Will not repeat  Will need steroid coverage    Ready For Surgery From Anesthesia Perspective.     .       no

## 2022-07-26 ENCOUNTER — INFUSION (OUTPATIENT)
Dept: INFECTIOUS DISEASES | Facility: HOSPITAL | Age: 77
End: 2022-07-26
Attending: INTERNAL MEDICINE
Payer: MEDICARE

## 2022-07-26 ENCOUNTER — TELEPHONE (OUTPATIENT)
Dept: UROLOGY | Facility: CLINIC | Age: 77
End: 2022-07-26
Payer: MEDICARE

## 2022-07-26 VITALS
BODY MASS INDEX: 28.15 KG/M2 | HEART RATE: 70 BPM | WEIGHT: 164 LBS | DIASTOLIC BLOOD PRESSURE: 66 MMHG | TEMPERATURE: 97 F | RESPIRATION RATE: 18 BRPM | SYSTOLIC BLOOD PRESSURE: 134 MMHG | OXYGEN SATURATION: 95 %

## 2022-07-26 DIAGNOSIS — B96.29 URINARY TRACT INFECTION DUE TO EXTENDED-SPECTRUM BETA LACTAMASE (ESBL) PRODUCING ESCHERICHIA COLI: Primary | ICD-10-CM

## 2022-07-26 DIAGNOSIS — Z16.12 URINARY TRACT INFECTION DUE TO EXTENDED-SPECTRUM BETA LACTAMASE (ESBL) PRODUCING ESCHERICHIA COLI: Primary | ICD-10-CM

## 2022-07-26 DIAGNOSIS — N39.0 URINARY TRACT INFECTION DUE TO EXTENDED-SPECTRUM BETA LACTAMASE (ESBL) PRODUCING ESCHERICHIA COLI: Primary | ICD-10-CM

## 2022-07-26 PROCEDURE — 63600175 PHARM REV CODE 636 W HCPCS: Performed by: STUDENT IN AN ORGANIZED HEALTH CARE EDUCATION/TRAINING PROGRAM

## 2022-07-26 PROCEDURE — 96365 THER/PROPH/DIAG IV INF INIT: CPT

## 2022-07-26 PROCEDURE — 25000003 PHARM REV CODE 250: Performed by: STUDENT IN AN ORGANIZED HEALTH CARE EDUCATION/TRAINING PROGRAM

## 2022-07-26 RX ADMIN — SODIUM CHLORIDE 1 G: 9 INJECTION, SOLUTION INTRAVENOUS at 01:07

## 2022-07-27 ENCOUNTER — HOSPITAL ENCOUNTER (OUTPATIENT)
Facility: OTHER | Age: 77
Discharge: HOME | End: 2022-07-27
Attending: UROLOGY | Admitting: UROLOGY
Payer: MEDICARE

## 2022-07-27 ENCOUNTER — ANESTHESIA (OUTPATIENT)
Dept: SURGERY | Facility: OTHER | Age: 77
End: 2022-07-27
Payer: MEDICARE

## 2022-07-27 VITALS
BODY MASS INDEX: 28.07 KG/M2 | SYSTOLIC BLOOD PRESSURE: 161 MMHG | TEMPERATURE: 98 F | DIASTOLIC BLOOD PRESSURE: 75 MMHG | HEIGHT: 64 IN | HEART RATE: 80 BPM | RESPIRATION RATE: 15 BRPM | WEIGHT: 164.44 LBS | OXYGEN SATURATION: 99 %

## 2022-07-27 DIAGNOSIS — N32.9 LESION OF BLADDER: Primary | ICD-10-CM

## 2022-07-27 DIAGNOSIS — N39.0 RECURRENT UTI: ICD-10-CM

## 2022-07-27 PROCEDURE — 71000033 HC RECOVERY, INTIAL HOUR: Performed by: UROLOGY

## 2022-07-27 PROCEDURE — 36000707: Performed by: UROLOGY

## 2022-07-27 PROCEDURE — 63600175 PHARM REV CODE 636 W HCPCS: Performed by: STUDENT IN AN ORGANIZED HEALTH CARE EDUCATION/TRAINING PROGRAM

## 2022-07-27 PROCEDURE — 25000003 PHARM REV CODE 250: Performed by: ANESTHESIOLOGY

## 2022-07-27 PROCEDURE — 71000015 HC POSTOP RECOV 1ST HR: Performed by: UROLOGY

## 2022-07-27 PROCEDURE — 25500020 PHARM REV CODE 255: Performed by: UROLOGY

## 2022-07-27 PROCEDURE — 36000706: Performed by: UROLOGY

## 2022-07-27 PROCEDURE — 25000003 PHARM REV CODE 250: Performed by: STUDENT IN AN ORGANIZED HEALTH CARE EDUCATION/TRAINING PROGRAM

## 2022-07-27 PROCEDURE — 88305 TISSUE EXAM BY PATHOLOGIST: CPT | Mod: 59 | Performed by: PATHOLOGY

## 2022-07-27 PROCEDURE — 71000016 HC POSTOP RECOV ADDL HR: Performed by: UROLOGY

## 2022-07-27 PROCEDURE — 52240 CYSTOSCOPY AND TREATMENT: CPT | Mod: ,,, | Performed by: UROLOGY

## 2022-07-27 PROCEDURE — 94640 AIRWAY INHALATION TREATMENT: CPT

## 2022-07-27 PROCEDURE — 27201423 OPTIME MED/SURG SUP & DEVICES STERILE SUPPLY: Performed by: UROLOGY

## 2022-07-27 PROCEDURE — 37000008 HC ANESTHESIA 1ST 15 MINUTES: Performed by: UROLOGY

## 2022-07-27 PROCEDURE — 88305 TISSUE EXAM BY PATHOLOGIST: ICD-10-PCS | Mod: 26,,, | Performed by: PATHOLOGY

## 2022-07-27 PROCEDURE — C1758 CATHETER, URETERAL: HCPCS | Performed by: UROLOGY

## 2022-07-27 PROCEDURE — 25000242 PHARM REV CODE 250 ALT 637 W/ HCPCS: Performed by: ANESTHESIOLOGY

## 2022-07-27 PROCEDURE — 63600175 PHARM REV CODE 636 W HCPCS: Performed by: ANESTHESIOLOGY

## 2022-07-27 PROCEDURE — 52240 PR CYSTOURETHROSCOPY,FULGUR >5 CM LESN: ICD-10-PCS | Mod: ,,, | Performed by: UROLOGY

## 2022-07-27 PROCEDURE — 88305 TISSUE EXAM BY PATHOLOGIST: CPT | Mod: 26,,, | Performed by: PATHOLOGY

## 2022-07-27 PROCEDURE — 37000009 HC ANESTHESIA EA ADD 15 MINS: Performed by: UROLOGY

## 2022-07-27 RX ORDER — EPHEDRINE SULFATE 50 MG/ML
INJECTION, SOLUTION INTRAVENOUS
Status: DISCONTINUED | OUTPATIENT
Start: 2022-07-27 | End: 2022-07-27

## 2022-07-27 RX ORDER — HYDROMORPHONE HYDROCHLORIDE 2 MG/ML
0.4 INJECTION, SOLUTION INTRAMUSCULAR; INTRAVENOUS; SUBCUTANEOUS EVERY 5 MIN PRN
Status: DISCONTINUED | OUTPATIENT
Start: 2022-07-27 | End: 2022-07-27 | Stop reason: HOSPADM

## 2022-07-27 RX ORDER — DEXAMETHASONE SODIUM PHOSPHATE 4 MG/ML
INJECTION, SOLUTION INTRA-ARTICULAR; INTRALESIONAL; INTRAMUSCULAR; INTRAVENOUS; SOFT TISSUE
Status: DISCONTINUED | OUTPATIENT
Start: 2022-07-27 | End: 2022-07-27

## 2022-07-27 RX ORDER — FENTANYL CITRATE 50 UG/ML
INJECTION, SOLUTION INTRAMUSCULAR; INTRAVENOUS
Status: DISCONTINUED | OUTPATIENT
Start: 2022-07-27 | End: 2022-07-27

## 2022-07-27 RX ORDER — MEPERIDINE HYDROCHLORIDE 25 MG/ML
12.5 INJECTION INTRAMUSCULAR; INTRAVENOUS; SUBCUTANEOUS ONCE AS NEEDED
Status: DISCONTINUED | OUTPATIENT
Start: 2022-07-27 | End: 2022-07-27 | Stop reason: HOSPADM

## 2022-07-27 RX ORDER — OXYCODONE HYDROCHLORIDE 5 MG/1
5 TABLET ORAL
Status: DISCONTINUED | OUTPATIENT
Start: 2022-07-27 | End: 2022-07-27 | Stop reason: HOSPADM

## 2022-07-27 RX ORDER — ALBUTEROL SULFATE 2.5 MG/.5ML
2.5 SOLUTION RESPIRATORY (INHALATION)
Status: COMPLETED | OUTPATIENT
Start: 2022-07-27 | End: 2022-07-27

## 2022-07-27 RX ORDER — PHENAZOPYRIDINE HYDROCHLORIDE 100 MG/1
100 TABLET, FILM COATED ORAL 3 TIMES DAILY PRN
Qty: 30 TABLET | Refills: 0 | Status: SHIPPED | OUTPATIENT
Start: 2022-07-27 | End: 2022-08-06

## 2022-07-27 RX ORDER — PROPOFOL 10 MG/ML
VIAL (ML) INTRAVENOUS
Status: DISCONTINUED | OUTPATIENT
Start: 2022-07-27 | End: 2022-07-27

## 2022-07-27 RX ORDER — LIDOCAINE HYDROCHLORIDE 20 MG/ML
INJECTION INTRAVENOUS
Status: DISCONTINUED | OUTPATIENT
Start: 2022-07-27 | End: 2022-07-27

## 2022-07-27 RX ORDER — SODIUM CHLORIDE, SODIUM LACTATE, POTASSIUM CHLORIDE, CALCIUM CHLORIDE 600; 310; 30; 20 MG/100ML; MG/100ML; MG/100ML; MG/100ML
INJECTION, SOLUTION INTRAVENOUS CONTINUOUS
Status: DISCONTINUED | OUTPATIENT
Start: 2022-07-27 | End: 2022-07-27 | Stop reason: HOSPADM

## 2022-07-27 RX ORDER — PROCHLORPERAZINE EDISYLATE 5 MG/ML
5 INJECTION INTRAMUSCULAR; INTRAVENOUS EVERY 30 MIN PRN
Status: DISCONTINUED | OUTPATIENT
Start: 2022-07-27 | End: 2022-07-27 | Stop reason: HOSPADM

## 2022-07-27 RX ORDER — ONDANSETRON 2 MG/ML
INJECTION INTRAMUSCULAR; INTRAVENOUS
Status: DISCONTINUED | OUTPATIENT
Start: 2022-07-27 | End: 2022-07-27

## 2022-07-27 RX ORDER — SODIUM CHLORIDE 0.9 % (FLUSH) 0.9 %
3 SYRINGE (ML) INJECTION
Status: DISCONTINUED | OUTPATIENT
Start: 2022-07-27 | End: 2022-07-27 | Stop reason: HOSPADM

## 2022-07-27 RX ORDER — CEFAZOLIN SODIUM 1 G/3ML
2 INJECTION, POWDER, FOR SOLUTION INTRAMUSCULAR; INTRAVENOUS
Status: DISCONTINUED | OUTPATIENT
Start: 2022-07-27 | End: 2022-07-27

## 2022-07-27 RX ORDER — ACETAMINOPHEN 500 MG
1000 TABLET ORAL
Status: COMPLETED | OUTPATIENT
Start: 2022-07-27 | End: 2022-07-27

## 2022-07-27 RX ORDER — LIDOCAINE HYDROCHLORIDE 10 MG/ML
0.5 INJECTION, SOLUTION EPIDURAL; INFILTRATION; INTRACAUDAL; PERINEURAL ONCE
Status: DISCONTINUED | OUTPATIENT
Start: 2022-07-27 | End: 2022-07-27 | Stop reason: HOSPADM

## 2022-07-27 RX ORDER — DIPHENHYDRAMINE HYDROCHLORIDE 50 MG/ML
25 INJECTION INTRAMUSCULAR; INTRAVENOUS EVERY 6 HOURS PRN
Status: DISCONTINUED | OUTPATIENT
Start: 2022-07-27 | End: 2022-07-27 | Stop reason: HOSPADM

## 2022-07-27 RX ADMIN — SODIUM CHLORIDE 100 MG: 9 INJECTION, SOLUTION INTRAVENOUS at 08:07

## 2022-07-27 RX ADMIN — PROPOFOL 150 MG: 10 INJECTION, EMULSION INTRAVENOUS at 08:07

## 2022-07-27 RX ADMIN — SODIUM CHLORIDE, SODIUM LACTATE, POTASSIUM CHLORIDE, AND CALCIUM CHLORIDE: .6; .31; .03; .02 INJECTION, SOLUTION INTRAVENOUS at 08:07

## 2022-07-27 RX ADMIN — EPHEDRINE SULFATE 10 MG: 50 INJECTION INTRAVENOUS at 08:07

## 2022-07-27 RX ADMIN — ACETAMINOPHEN 1000 MG: 500 TABLET, FILM COATED ORAL at 06:07

## 2022-07-27 RX ADMIN — DEXAMETHASONE SODIUM PHOSPHATE 8 MG: 4 INJECTION, SOLUTION INTRAMUSCULAR; INTRAVENOUS at 08:07

## 2022-07-27 RX ADMIN — FENTANYL CITRATE 50 MCG: 50 INJECTION, SOLUTION INTRAMUSCULAR; INTRAVENOUS at 09:07

## 2022-07-27 RX ADMIN — EPHEDRINE SULFATE 10 MG: 50 INJECTION INTRAVENOUS at 09:07

## 2022-07-27 RX ADMIN — ONDANSETRON HYDROCHLORIDE 4 MG: 2 INJECTION INTRAMUSCULAR; INTRAVENOUS at 08:07

## 2022-07-27 RX ADMIN — ERTAPENEM 1 G: 1 INJECTION INTRAMUSCULAR; INTRAVENOUS at 08:07

## 2022-07-27 RX ADMIN — OXYCODONE 5 MG: 5 TABLET ORAL at 10:07

## 2022-07-27 RX ADMIN — LIDOCAINE HYDROCHLORIDE 40 MG: 20 INJECTION, SOLUTION INTRAVENOUS at 08:07

## 2022-07-27 RX ADMIN — ALBUTEROL SULFATE 2.5 MG: 2.5 SOLUTION RESPIRATORY (INHALATION) at 07:07

## 2022-07-27 RX ADMIN — FENTANYL CITRATE 50 MCG: 50 INJECTION, SOLUTION INTRAMUSCULAR; INTRAVENOUS at 08:07

## 2022-07-27 NOTE — PLAN OF CARE
Niurka PHAN Keila has met all discharge criteria from Phase II. Vital Signs are stable, ambulating  without difficulty. Discharge instructions given, patient verbalized understanding. Discharged from facility via wheelchair in stable condition.

## 2022-07-27 NOTE — ANESTHESIA PROCEDURE NOTES
Intubation    Date/Time: 7/27/2022 8:40 AM  Performed by: Gerald Doe CRNA  Authorized by: Evangelist Silver MD     Intubation:     Induction:  Intravenous    Intubated:  Postinduction    Mask Ventilation:  N/a    Attempts:  1    Attempted By:  CRNA    Difficult Airway Encountered?: No      Airway Device:  Supraglottic airway/LMA    Airway Device Size:  3.5    Placement Verified By:  Capnometry    Complicating Factors:  None    Findings Post-Intubation:  BS equal bilateral and atraumatic/condition of teeth unchanged

## 2022-07-27 NOTE — ANESTHESIA POSTPROCEDURE EVALUATION
Anesthesia Post Evaluation    Patient: Niurka PHAN Keila    Procedure(s) Performed: Procedure(s) (LRB):  CYSTOSCOPY, WITH BLADDER BIOPSY; retrograde pyelogram, (Bilateral)  TURBT (TRANSURETHRAL RESECTION OF BLADDER TUMOR) (N/A)    Final Anesthesia Type: general      Patient location during evaluation: PACU  Patient participation: Yes- Able to Participate  Level of consciousness: awake and alert  Post-procedure vital signs: reviewed and stable  Pain management: adequate  Airway patency: patent    PONV status at discharge: No PONV  Anesthetic complications: no      Cardiovascular status: blood pressure returned to baseline  Respiratory status: unassisted, spontaneous ventilation and room air  Hydration status: euvolemic  Follow-up not needed.          Vitals Value Taken Time   /75 07/27/22 1030   Temp 36.7 °C (98.1 °F) 07/27/22 1030   Pulse 78 07/27/22 1030   Resp 15 07/27/22 1039   SpO2 97 % 07/27/22 1030         Event Time   Out of Recovery 10:26:00         Pain/Luann Score: Pain Rating Prior to Med Admin: 4 (7/27/2022 10:39 AM)  Luann Score: 10 (7/27/2022 10:30 AM)

## 2022-07-27 NOTE — TRANSFER OF CARE
"Anesthesia Transfer of Care Note    Patient: Niurka PHAN Keila    Procedure(s) Performed: Procedure(s) (LRB):  CYSTOSCOPY, WITH BLADDER BIOPSY; retrograde pyelogram, (Bilateral)  TURBT (TRANSURETHRAL RESECTION OF BLADDER TUMOR)    Patient location: PACU    Anesthesia Type: general    Transport from OR: Transported from OR on 2-3 L/min O2 by NC with adequate spontaneous ventilation    Post pain: adequate analgesia    Post assessment: no apparent anesthetic complications and tolerated procedure well    Post vital signs: stable    Level of consciousness: awake and alert    Nausea/Vomiting: no nausea/vomiting    Complications: none    Transfer of care protocol was followed      Last vitals:   Visit Vitals  BP (!) 172/84 (BP Location: Left arm, Patient Position: Lying)   Pulse 70   Temp 37.2 °C (99 °F) (Oral)   Resp 18   Ht 5' 4" (1.626 m)   Wt 74.6 kg (164 lb 7.4 oz)   LMP 02/08/2000   SpO2 96%   Breastfeeding No   BMI 28.23 kg/m²     "

## 2022-07-27 NOTE — PATIENT INSTRUCTIONS
Post Cystoscopy and Transurethral resection of Bladder Tumor Instructions  Do not strain to have a bowel movement  No strenuous exercise x 7 days  No driving while you are on narcotic pain medications    You can expect:  To see blood in your urine.    Go to the ER if:  Your catheter stops draining  You are having severe abdominal pain  Inability to void if you do not have a catheter    Call the doctor if:  Temperature is greater than 101F  Persistent vomiting and inability to keep food down

## 2022-07-27 NOTE — INTERVAL H&P NOTE
The patient has been examined and the H&P has been reviewed:    I concur with the findings and no changes have occurred since H&P was written. Ertapenem to be given pre-op.     Anesthesia/Surgery risks, benefits and alternative options discussed and understood by patient/family.          There are no hospital problems to display for this patient.

## 2022-07-27 NOTE — DISCHARGE SUMMARY
[facility]  Discharge Note  Short Stay    Procedure(s) (LRB):  CYSTOSCOPY, WITH BLADDER BIOPSY; retrograde pyelogram, (Bilateral)  TURBT (TRANSURETHRAL RESECTION OF BLADDER TUMOR)    OUTCOME: Patient tolerated treatment/procedure well without complication and is now ready for discharge.    DISPOSITION: Home or Self Care    FINAL DIAGNOSIS:  Lesion of bladder    FOLLOWUP: In clinic    DISCHARGE INSTRUCTIONS:    Discharge Procedure Orders   No dressing needed     Notify your health care provider if you experience any of the following:  temperature >100.4     Notify your health care provider if you experience any of the following:  persistent nausea and vomiting or diarrhea     Notify your health care provider if you experience any of the following:  severe uncontrolled pain     Notify your health care provider if you experience any of the following:  difficulty breathing or increased cough     Notify your health care provider if you experience any of the following:  severe persistent headache     Notify your health care provider if you experience any of the following:  worsening rash     Notify your health care provider if you experience any of the following:  persistent dizziness, light-headedness, or visual disturbances     Activity as tolerated        TIME SPENT ON DISCHARGE: 15 minutes

## 2022-07-27 NOTE — OP NOTE
[facility]  Operative Note    Date: 07/27/2022    Pre-Op Diagnosis: Bladder lesions, recurrent UTI    Post-Op Diagnosis: same    Procedure(s) Performed:   1. TURBT of large (>5.0 cm) sized bladder lesions  2. Bilateral retrograde pyelogram  3. Fluoro < 1 hr    Specimen(s):   1) Bladder scrapings  2) Bladder biopsies    Staff Surgeon: Anaya Oropeza MD    Assistant Surgeon: Dennis Peralta MD (TA),  Michael Ibanez MD    Anesthesia: General LMA anesthesia    Indications: Niurka Pedraza is a 77 y.o. female with a bladder lesions in the setting of [place]. She presents today for surgical resection.      Findings:   1. Diffuse large red-brown bladder plaque located along the entire left lateral wall. The underside of the plaque was noted to be white-silver with a non-erythematous bladder base. The edges of the plaque were also non-erythematous and white in appearance.   2. Multiple medium sized plaques noted around the right lateral wall and base of the bladder at the trigone. No involvement of the UO's were seen.   3. Several small areas of white lesions with scales were seen in the dome on the bladder.  4. B/l RPG showed no filling defects, delicate calices, and no evidence of obstruction. Mild narrowing in L distal ureter, contrast drained briskly.       Estimated Blood Loss: min    Drains: none    Procedure in detail:  After risks, benefits, and possible complications were explained, the patient elected to undergo the procedure and informed consent was obtained. All questions were answered in the stephon-operative area. The patient was transferred to the cystoscopy suite and placed in the supine position. SCDs were applied and working. Anesthesia was administered. The patient was placed in the dorsal lithotomy position and prepped and draped in the usual sterile fashion. Time out was performed, and stephon-procedural antibiotics were confirmed.     A resectoscope in a 26 Fr sheath was assembled with the  Ronald visual obturator. This was introduced into the bladder per urethra, which passed easily. The entire urethra was visualized which showed no masses or strictures. The right and left ureteral orifices were identified in the normal anatomic position. Formal cystoscopy was performed which revealed the above findings with diffuse red-brown plaques along the lateral walls of the bladder. There were no trabeculations, bladder stones, or bladder diverticula.    The right ureteral orifice was identified and cannulated with a 5 Fr open-ended ureteral catheter. A retrograde pyelogram was performed which showed the above. This was then repeated on the left side, revealing the above.      The visual obturator was exchanged for the resecting mechanism. The resecting element was then used to scrape away the majority of the plaques noted along the bladder trigone. The large plaque along the left lateral wall was more adherent and only the superficial layer was able to be removed. This was repeated along the right lateral wall and dome as well. The resecting element was then removed and the bladder was drained with all bladder scraping removed and sent to pathology.     The romo was placed on the scope and inserted into the sheath. Cold-cut biopsy forceps were introduced into the scope and used to take several biopsies of the residual lateral wall plaques' outer portion. Specimens were then removed and passed off the field for pathologic analysis. The bladder was drained and the forceps were removed along with the romo and the resecting element was reassembled and used to fulgurate all areas of the bleeding. Adequate hemostasis was achieved and the scope was removed and bladder was again drained. Jorgensen catheter was not deemed necessary.     The patient tolerated the procedure well and was transferred to recovery in stable condition.    Disposition:  The patient will be discharged home from PACU.     Michael Ibanez  MD        Agree with above documentation.    I was present and scrubbed for the entirety of the procedure.    Anaya Oropeza

## 2022-07-28 ENCOUNTER — INFUSION (OUTPATIENT)
Dept: INFECTIOUS DISEASES | Facility: HOSPITAL | Age: 77
End: 2022-07-28
Attending: INTERNAL MEDICINE
Payer: MEDICARE

## 2022-07-28 ENCOUNTER — PATIENT MESSAGE (OUTPATIENT)
Dept: ADMINISTRATIVE | Facility: OTHER | Age: 77
End: 2022-07-28
Payer: MEDICARE

## 2022-07-28 VITALS
OXYGEN SATURATION: 95 % | WEIGHT: 164 LBS | TEMPERATURE: 97 F | RESPIRATION RATE: 16 BRPM | BODY MASS INDEX: 28.15 KG/M2 | DIASTOLIC BLOOD PRESSURE: 60 MMHG | HEART RATE: 82 BPM | SYSTOLIC BLOOD PRESSURE: 131 MMHG

## 2022-07-28 DIAGNOSIS — Z16.12 URINARY TRACT INFECTION DUE TO EXTENDED-SPECTRUM BETA LACTAMASE (ESBL) PRODUCING ESCHERICHIA COLI: Primary | ICD-10-CM

## 2022-07-28 DIAGNOSIS — N39.0 URINARY TRACT INFECTION DUE TO EXTENDED-SPECTRUM BETA LACTAMASE (ESBL) PRODUCING ESCHERICHIA COLI: Primary | ICD-10-CM

## 2022-07-28 DIAGNOSIS — B96.29 URINARY TRACT INFECTION DUE TO EXTENDED-SPECTRUM BETA LACTAMASE (ESBL) PRODUCING ESCHERICHIA COLI: Primary | ICD-10-CM

## 2022-07-28 PROCEDURE — 96365 THER/PROPH/DIAG IV INF INIT: CPT

## 2022-07-28 PROCEDURE — 63600175 PHARM REV CODE 636 W HCPCS: Performed by: STUDENT IN AN ORGANIZED HEALTH CARE EDUCATION/TRAINING PROGRAM

## 2022-07-28 PROCEDURE — 25000003 PHARM REV CODE 250: Performed by: STUDENT IN AN ORGANIZED HEALTH CARE EDUCATION/TRAINING PROGRAM

## 2022-07-28 RX ADMIN — SODIUM CHLORIDE 1 G: 9 INJECTION, SOLUTION INTRAVENOUS at 02:07

## 2022-07-28 NOTE — PROGRESS NOTES
Patiient arrived for Ertapenem 4/5 infusion over 30 mins., Tolerated well, left unit in NAD

## 2022-07-29 ENCOUNTER — INFUSION (OUTPATIENT)
Dept: INFECTIOUS DISEASES | Facility: HOSPITAL | Age: 77
End: 2022-07-29
Attending: INTERNAL MEDICINE
Payer: MEDICARE

## 2022-07-29 VITALS
OXYGEN SATURATION: 94 % | SYSTOLIC BLOOD PRESSURE: 135 MMHG | TEMPERATURE: 97 F | DIASTOLIC BLOOD PRESSURE: 62 MMHG | BODY MASS INDEX: 28.36 KG/M2 | HEART RATE: 70 BPM | RESPIRATION RATE: 18 BRPM | WEIGHT: 165.25 LBS

## 2022-07-29 DIAGNOSIS — N39.0 URINARY TRACT INFECTION DUE TO EXTENDED-SPECTRUM BETA LACTAMASE (ESBL) PRODUCING ESCHERICHIA COLI: Primary | ICD-10-CM

## 2022-07-29 DIAGNOSIS — B96.29 URINARY TRACT INFECTION DUE TO EXTENDED-SPECTRUM BETA LACTAMASE (ESBL) PRODUCING ESCHERICHIA COLI: Primary | ICD-10-CM

## 2022-07-29 DIAGNOSIS — Z16.12 URINARY TRACT INFECTION DUE TO EXTENDED-SPECTRUM BETA LACTAMASE (ESBL) PRODUCING ESCHERICHIA COLI: Primary | ICD-10-CM

## 2022-07-29 PROCEDURE — 96365 THER/PROPH/DIAG IV INF INIT: CPT

## 2022-07-29 PROCEDURE — 63600175 PHARM REV CODE 636 W HCPCS: Performed by: STUDENT IN AN ORGANIZED HEALTH CARE EDUCATION/TRAINING PROGRAM

## 2022-07-29 PROCEDURE — 25000003 PHARM REV CODE 250: Performed by: STUDENT IN AN ORGANIZED HEALTH CARE EDUCATION/TRAINING PROGRAM

## 2022-07-29 RX ADMIN — SODIUM CHLORIDE 1 G: 9 INJECTION, SOLUTION INTRAVENOUS at 01:07

## 2022-07-29 NOTE — PROGRESS NOTES
Patiient arrived for Ertapenem 5/5 infusion over 30 mins., Tolerated well, left unit in NAD

## 2022-08-01 LAB
FINAL PATHOLOGIC DIAGNOSIS: NORMAL
GROSS: NORMAL
Lab: NORMAL

## 2022-08-10 ENCOUNTER — PATIENT MESSAGE (OUTPATIENT)
Dept: ADMINISTRATIVE | Facility: OTHER | Age: 77
End: 2022-08-10
Payer: MEDICARE

## 2022-08-15 ENCOUNTER — OFFICE VISIT (OUTPATIENT)
Dept: RHEUMATOLOGY | Facility: CLINIC | Age: 77
End: 2022-08-15
Payer: MEDICARE

## 2022-08-15 VITALS
DIASTOLIC BLOOD PRESSURE: 80 MMHG | OXYGEN SATURATION: 95 % | BODY MASS INDEX: 28.49 KG/M2 | SYSTOLIC BLOOD PRESSURE: 135 MMHG | HEIGHT: 64 IN | HEART RATE: 71 BPM | WEIGHT: 166.88 LBS

## 2022-08-15 DIAGNOSIS — M15.9 PRIMARY OSTEOARTHRITIS INVOLVING MULTIPLE JOINTS: ICD-10-CM

## 2022-08-15 DIAGNOSIS — J84.112 UIP (USUAL INTERSTITIAL PNEUMONITIS): Primary | ICD-10-CM

## 2022-08-15 DIAGNOSIS — Z71.89 COUNSELING AND COORDINATION OF CARE: ICD-10-CM

## 2022-08-15 PROCEDURE — 99214 OFFICE O/P EST MOD 30 MIN: CPT | Mod: S$PBB,,, | Performed by: INTERNAL MEDICINE

## 2022-08-15 PROCEDURE — 99214 PR OFFICE/OUTPT VISIT, EST, LEVL IV, 30-39 MIN: ICD-10-PCS | Mod: S$PBB,,, | Performed by: INTERNAL MEDICINE

## 2022-08-15 PROCEDURE — 99999 PR PBB SHADOW E&M-EST. PATIENT-LVL III: ICD-10-PCS | Mod: PBBFAC,,, | Performed by: INTERNAL MEDICINE

## 2022-08-15 PROCEDURE — 99999 PR PBB SHADOW E&M-EST. PATIENT-LVL III: CPT | Mod: PBBFAC,,, | Performed by: INTERNAL MEDICINE

## 2022-08-15 PROCEDURE — 99213 OFFICE O/P EST LOW 20 MIN: CPT | Mod: PBBFAC,PN | Performed by: INTERNAL MEDICINE

## 2022-08-15 NOTE — PROGRESS NOTES
RHEUMATOLOGY OUTPATIENT CLINIC NOTE    8/15/2022    Attending Rheumatologist: Don Levy  Primary Care Provider: Savana Anderson MD   Physician Requesting Consultation: No referring provider defined for this encounter.  Chief Complaint/Reason For Consultation:  No chief complaint on file.      Subjective:       HPI  Niurka Pedraza is a 77 y.o. White female with medical history noted below who presents for evaluation of ILD.  Patient notes Hx of ILD. Had work up showing +SHAHNAZ and RF, in the past, repeat negative. Patient endorses joint pain in her first CMC bilaterally though R>L. Also reports chronic back pain. Notes pain is typically worse with activity. Reports minimal morning stiffness. No swelling. Uses brace with relief. She endorses cough, SOB, food getting stuck with swallowing, fatigue, heart burn. Notes some unsteadiness due to weakness. Denies rash, Raynaud's, fever, wt loss, blood clots, dry eyes, dry mouth, oral sores.     Today  Patient here for follow up.   Last visit presented for work up a recent diagnosis of ILD. Had extensive work up, all negative. Recently started on PDN wih little improvement of symptoms. Advised to follow up with Pulmonary.     Review of Systems   Constitutional: Positive for fatigue. Negative for chills, fever and unexpected weight change.   HENT: Negative for mouth sores.    Eyes: Negative for redness and eye dryness.   Respiratory: Positive for cough and shortness of breath.    Cardiovascular: Negative for chest pain.   Gastrointestinal: Negative for abdominal distention, constipation, diarrhea, nausea and vomiting.   Genitourinary: Negative for vaginal dryness.   Musculoskeletal: Positive for arthralgias and back pain. Negative for gait problem, joint swelling, leg pain, myalgias, neck pain, neck stiffness and joint deformity.   Integumentary:  Negative for rash.   Neurological: Negative for weakness, numbness and headaches.   Hematological: Negative for  adenopathy. Does not bruise/bleed easily.   Psychiatric/Behavioral: Negative for confusion, decreased concentration and sleep disturbance. The patient is not nervous/anxious.    All other systems reviewed and are negative.       Chronic comorbid conditions affecting medical decision making today:  Past Medical History:   Diagnosis Date    Arthritis     Borderline serous cystadenoma of right ovary 4/3/2018    Breast cancer     mastectomy 2014    Coccidiomycosis, progressive     Elevated CA-125 2/25/2018    [place] (hard of hearing)     Hyperlipidemia     OAB (overactive bladder)     Osteopenia     on Dexa 11/2017    Osteoporosis     pt reports hx of this, declined fosamax in past - treated with calcium and vit D    Pelvic mass 2/25/2018    Pulmonary fibrosis     Pulmonary nodules     SOB (shortness of breath) on exertion     Thyroid disease     hypo    UTI (urinary tract infection)      Past Surgical History:   Procedure Laterality Date    CHOLECYSTECTOMY      CYSTOSCOPY WITH BIOPSY OF BLADDER Bilateral 7/27/2022    Procedure: CYSTOSCOPY, WITH BLADDER BIOPSY; retrograde pyelogram,;  Surgeon: Anaya Oropeza MD;  Location: [facility];  Service: Urology;  Laterality: Bilateral;    ENDOSCOPIC ULTRASOUND OF UPPER GASTROINTESTINAL TRACT N/A 4/8/2021    Procedure: ULTRASOUND, UPPER GI TRACT, ENDOSCOPIC;  Surgeon: Aisha Barajas MD;  Location: [address]);  Service: Endoscopy;  Laterality: N/A;  UEUS/ERCP evaluation abn MRCP - Dr Angelika Zamudio-19 test 4/5/21 at [facility] Pre-admit - pg    ERCP N/A 4/8/2021    Procedure: ERCP (ENDOSCOPIC RETROGRADE CHOLANGIOPANCREATOGRAPHY);  Surgeon: Aisha Barajas MD;  Location: [address]);  Service: Endoscopy;  Laterality: N/A;  UEUS/ERCP evaluation abn MRCP - Dr Angelika Zamudio-19 test 4/5/21 at [facility] Pre-admit - pg    ESOPHAGOGASTRODUODENOSCOPY N/A 4/8/2021    Procedure: EGD (ESOPHAGOGASTRODUODENOSCOPY);  Surgeon: Aisha Barajas MD;   [facility]  Service: Endoscopy;  Laterality: N/A;  UEUS/ERCP evaluation abn MRCP - Dr Carney  Covid-19 test 21 at [facility] Pre-admit - pg    HYSTERECTOMY      MASTECTOMY Right     2014     Family History   Problem Relation Age of Onset    Cancer Mother         colon cancer    Breast cancer Mother     Hypertension Father     Heart disease Father     Stroke Father     No Known Problems Sister     Cancer Brother         lung, ++ tobacco    Hypertension Brother     No Known Problems Daughter     Hypertension Son     Colon cancer Neg Hx     Ovarian cancer Neg Hx      Social History     Substance and Sexual Activity   Alcohol Use No     Social History     Tobacco Use   Smoking Status Former Smoker    Packs/day: 0.50    Years: 30.00    Pack years: 15.00    Types: Cigarettes    Quit date: 2000    Years since quittin.5   Smokeless Tobacco Never Used   Tobacco Comment    quit in her 50s, after 30 years smoking;     Social History     Substance and Sexual Activity   Drug Use No       Current Outpatient Medications:     albuterol (VENTOLIN HFA) 90 mcg/actuation inhaler, Inhale 1-2 puffs into the lungs every 6 (six) hours as needed for Wheezing or Shortness of Breath. Rescue, Disp: 18 g, Rfl: 1    atorvastatin (LIPITOR) 20 MG tablet, TAKE 1 TABLET BY MOUTH EVERY DAY, Disp: 90 tablet, Rfl: 2    azithromycin (Z-SEB) 250 MG tablet, Take 250 mg by mouth 3 (three) times a week., Disp: , Rfl:     calcium-vitamin D3 (CALCIUM 500 + D) 500 mg(1,250mg) -200 unit per tablet, Take 1 tablet by mouth 2 (two) times daily with meals., Disp: , Rfl:     carvediloL (COREG) 6.25 MG tablet, TAKE 1 TABLET BY MOUTH TWICE A DAY WITH FOOD, Disp: 180 tablet, Rfl: 3    estradioL (ESTRACE) 0.01 % (0.1 mg/gram) vaginal cream, Place 1 g vaginally twice a week., Disp: 42.5 g, Rfl: 11    irbesartan (AVAPRO) 300 MG tablet, Take 1 tablet (300 mg total) by mouth every evening., Disp: 90 tablet, Rfl:  3    levothyroxine (SYNTHROID) 50 MCG tablet, Take 1 tablet (50 mcg total) by mouth before breakfast., Disp: 90 tablet, Rfl: 1    lidocaine HCL 2% (XYLOCAINE) 2 % jelly, Apply topically as needed. Apply topically once nightly to affected part of foot/feet., Disp: 30 mL, Rfl: 2    montelukast (SINGULAIR) 10 mg tablet, TAKE 1 TABLET BY MOUTH EVERY DAY IN THE EVENING, Disp: 90 tablet, Rfl: 2    multivitamin (THERAGRAN) per tablet, Take 1 tablet by mouth once daily., Disp: , Rfl:     oxybutynin (DITROPAN-XL) 10 MG 24 hr tablet, Take 1 tablet (10 mg total) by mouth once daily., Disp: 30 tablet, Rfl: 11    predniSONE (DELTASONE) 5 MG tablet, TAKE 0.5 TABLETS (2.5 MG TOTAL) BY MOUTH 3 (THREE) TIMES DAILY., Disp: 50 tablet, Rfl: 1    budesonide-formoterol 80-4.5 mcg (SYMBICORT) 80-4.5 mcg/actuation HFAA, Inhale 2 puffs into the lungs 2 (two) times daily as needed. Controller (Patient not taking: Reported on 6/24/2022), Disp: 1 Inhaler, Rfl: 6     Objective:         Vitals:    08/15/22 1052   BP: 135/80   Pulse: 71     Physical Exam   Pulmonary/Chest: She has rales.   Musculoskeletal:      Comments: Can make fist, no synovitis  Bilateral 1st CMC TTP  Knee crepitus   Strength 5/5   No tender points        Reviewed old and all outside pertinent medical records available.    All lab results personally reviewed and interpreted by me.  Lab Results   Component Value Date    WBC 11.34 06/02/2022    HGB 12.5 06/02/2022    HCT 38.8 06/02/2022    MCV 88 06/02/2022    MCH 28.3 06/02/2022    MCHC 32.2 06/02/2022    RDW 14.5 06/02/2022     (H) 06/02/2022    MPV 8.4 (L) 06/02/2022       Lab Results   Component Value Date     06/30/2022    K 4.0 06/30/2022     06/30/2022    CO2 24 06/30/2022     06/30/2022    BUN 19 06/30/2022    CALCIUM 8.7 06/30/2022    PROT 6.8 06/30/2022    ALBUMIN 3.5 06/30/2022    BILITOT 0.8 06/30/2022    AST 17 06/30/2022    ALKPHOS 52 (L) 06/30/2022    ALT 22 06/30/2022       Lab  Results   Component Value Date    COLORU Yellow 07/06/2022    APPEARANCEUA Cloudy (A) 06/17/2022    SPECGRAV 1.03 07/06/2022    PHUR 5 07/06/2022    PROTEINUA 1+ (A) 06/17/2022    KETONESU Negative 07/06/2022    LEUKOCYTESUR 3+ (A) 06/17/2022    NITRITE Positive 07/06/2022    UROBILINOGEN Negative 07/06/2022       Lab Results   Component Value Date    CRP 0.9 06/15/2022       Lab Results   Component Value Date    SEDRATE 10 06/15/2022       Lab Results   Component Value Date    RF <8.6 03/07/2022    SEDRATE 10 06/15/2022       No components found for: 25OHVITDTOT, 47BBELWB1, 64OYNZOK4, METHODNOTE    No results found for: URICACID    No components found for: TSPOTTB        Imaging:  All imaging reviewed and independently interpreted by me.         ASSESSMENT / PLAN:     Niurka Pedraza is a 77 y.o. White female with:      1. UIP (usual interstitial pneumonitis)  - patient with UIP   - unclear etiology  - prior labs include +SHAHNAZ and RF, repeat negative, her ROS is not consistent with CTD  - at this point all her repeat ABS were negative and no symptoms of CTD would say this is likely idiopathic at this time  - continue following with Pulm  - reassurance     2. Primary osteoarthritis involving multiple joints  - patients joint pain 2/2 OA  - stable   - NSAIDs/Tylenol PRN   - wt loss  - reassurance and exercise    3. Other specified counseling  - over 10 minutes spent regarding below topics:  - Immunization counseling done.  - Weight loss counseling done.  - Nutrition and exercise counseling.  - Limitation of alcohol consumption.  - Regular exercise:  Aerobic and resistance.  - Medication counseling provided.      Follow up in about 6 months (around 2/15/2023).    Method of contact with patient concerns: [facility]    Disclaimer:  This note is prepared using voice recognition software and as such is likely to have errors and has not been proof read. Please contact me for questions.     Time spent: 30  minutes in face to face discussion concerning diagnosis, prognosis, review of lab and test results, benefits of treatment as well as management of disease, counseling of patient and coordination of care between various health care providers.  Greater than half the time spent was used for coordination of care and counseling of patient.    Don Levy M.D.  Rheumatology Department   [facility]

## 2022-08-17 ENCOUNTER — OFFICE VISIT (OUTPATIENT)
Dept: UROLOGY | Facility: CLINIC | Age: 77
End: 2022-08-17
Attending: UROLOGY
Payer: MEDICARE

## 2022-08-17 VITALS — DIASTOLIC BLOOD PRESSURE: 57 MMHG | SYSTOLIC BLOOD PRESSURE: 120 MMHG | HEART RATE: 70 BPM

## 2022-08-17 DIAGNOSIS — R35.1 NOCTURIA: ICD-10-CM

## 2022-08-17 DIAGNOSIS — N39.0 RECURRENT UTI: ICD-10-CM

## 2022-08-17 DIAGNOSIS — R31.29 MICROHEMATURIA: ICD-10-CM

## 2022-08-17 DIAGNOSIS — N32.81 OAB (OVERACTIVE BLADDER): ICD-10-CM

## 2022-08-17 DIAGNOSIS — R30.0 DYSURIA: ICD-10-CM

## 2022-08-17 DIAGNOSIS — N32.9 LESION OF BLADDER: Primary | ICD-10-CM

## 2022-08-17 LAB
BILIRUB SERPL-MCNC: NEGATIVE MG/DL
BLOOD URINE, POC: ABNORMAL
CLARITY, POC UA: ABNORMAL
COLOR, POC UA: YELLOW
GLUCOSE UR QL STRIP: NORMAL
KETONES UR QL STRIP: NEGATIVE
LEUKOCYTE ESTERASE URINE, POC: ABNORMAL
NITRITE, POC UA: POSITIVE
PH, POC UA: 6
PROTEIN, POC: NEGATIVE
SPECIFIC GRAVITY, POC UA: 1.01
UROBILINOGEN, POC UA: NORMAL

## 2022-08-17 PROCEDURE — 99214 PR OFFICE/OUTPT VISIT, EST, LEVL IV, 30-39 MIN: ICD-10-PCS | Mod: S$GLB,,, | Performed by: UROLOGY

## 2022-08-17 PROCEDURE — 99214 OFFICE O/P EST MOD 30 MIN: CPT | Mod: S$GLB,,, | Performed by: UROLOGY

## 2022-08-17 PROCEDURE — 81002 POCT URINE DIPSTICK WITHOUT MICROSCOPE: ICD-10-PCS | Mod: S$GLB,,, | Performed by: UROLOGY

## 2022-08-17 PROCEDURE — 81002 URINALYSIS NONAUTO W/O SCOPE: CPT | Mod: S$GLB,,, | Performed by: UROLOGY

## 2022-08-17 NOTE — PROGRESS NOTES
Subjective:       Niurka Pedraza is a 77 y.o. female who is an established patient with [place] urologist, Dr Dhaliwal,  was seen for evaluation of UTI.      She was seen by another urologist for recurrent UTIs/dysuria. Multiple +UCx with different bacteria (E coli, Enterococcus). She has had negative cystoscopy and GONZÁLEZ (1/17). UTIs return soon after treatment. She was started on prophy Keflex in 7/17.     She saw PCP in 11/17 with complaints of UTI. UCx was negative. She now feels a constant pressure in SP area and c/o pain with walking. She also notes pain worse with movement (like hitting a bump when driving). Denies dysuria. Increased frequency to now q1h. Present for 2 months. Denies constipation, occasional diarrhea. Frequent RADHA. Now with UUI. Also with BOBBI - increased coughing with recent lung issue. Nocturia x 4-5. Denies gross hematuria.     She moved here from [place] in 9/17. She requested to be started on abx prophy starting 1/18 (Keflex 250mg).     PVR (bladder scan) initial visit - 32cc, 0cc.    CT uro - no  abnormalities. Lung nodules - followed by pulmonary. Ovarian cyst - referred to gyn (now s/p DARCI-BSO for ovarian cancer).     She has been doing great with the Ditropan - she is very pleased. Was on abx prophy (Keflex) 1/18 x 9 months. Taking probiotics that is helping.     [place] - took Bactrim. Now off prophylactic abx - more frequency, urgency, nocturia, SP pressure. No significant dysuria.     UTIs have become more frequent. Symptoms returning quickly. Prophylactic abx were restarted (Macrobid 50mg).     9/4/2019  She recently finished course of abx but symptoms are now returning just 5 days later. Dysuria improved but pressure and frequency worsened.     1/15/2020  Increase Ditropan to 10mg. Taking Macrobid QHS, started in 9/19. No symptoms today.     8/26/2020  Several recent breakthrough UTIs. Finished abx 2 weeks ago, symptoms have returned.  "Currently on Macrobid. UTI symptoms - frequency, dysuria.   PVR (bladder scan) today - 21cc    9/23/2020  On day 5 of abx, still with some symptoms but improving. CT done - clear.     2/10/2021  Occasional pain and urgency. Macrobid stopped and changed to Keflex. She is requesting UCx though states symptoms are stable.     1/12/2022  Denies current issues with UTI. Nocturia x 3-4. Off prophy abx. Now on new med for pulm fibrosis.     6/29/2022  Now having multiresistant recurrent UTIs requiring IV abx - treated by ID (Dr Rishi Dempsey). Has been treated with IV abx (ertapenem) - 5 days last week. Still with urgency and frequency. Not using Estrace.   PVR (bladder scan) today - 22c    7/20/2022  Here to discuss procedure. UTI symptoms present but not as severe - frequency q1h but dysuria not as bad. She is not on IV treatment for UTI currently.     Cytology 7/22 - no malignant cells    Cysto 5/19 - normal, heavy sediment in bladder    Cysto 7/22 - very abnormal appearing bladder mucosa with widespread "scaly" yellow/white areas L>R. Easily detached from bladder wall without bleeding. No tumors.     GONZÁLEZ 4/19 - wnl, PVR 31cc  CT uro 9/20 - negative, pulm nodules (seeing PCP)  GONZÁLEZ 7/22 - no hydro/stones. PVR 51cc    OR 7/27/22 - TUR of large red-brown plaque along entire L lateral wall with white/silver appearance of bladder under plaque. Other plaques noted throoughout bladder, removed. Bladder biopsies - Polypoid keratinizing squamous mucosa with hemorrhagic stroma and reactive epithelial changes. No malignancy. Plaques were keratinaceous debris and blood.     She reports frequency and nocturia q1h. Mild dysuria - improving.      UCx:  7/22 - >100k E coli ESBL (multiresistant)  7/21 - >100k Enterococcus  2/21 - 50-99k Proteus  9/20 - >100k Proteus  7/20 - >100k E coli, 10-49k Proteus  7/20 - >100k Proteus   5/20 - 10-49k Klebsiella  9/19 - >100k Klebsiella  8/19 - >100k Klebsiella  6/19 - >100k E coli  5/19 - neg  5/19 " - >100k E coli  3/19 - >100k E coli  2/19 - >100k E coli  1/19 - >100k E coli  11/18 - >100k E coli  8/18 - contaminant  7/18 - 50-99k Enterobacter (treated with Bactrim)  11/17 - neg  10/17 - >100k E coli  10/17- >100k Enterococcus  7/17 - >100k E coli  5/17- >100k E coli  5/17 - neg  4/17- 50-99k Enterococcus  3/17 - >100k E coli  12/16 - neg     PVR (bladder scan) last visit - 33cc, 50cc, 0cc      The following portions of the patient's history were reviewed and updated as appropriate: allergies, current medications, past family history, past medical history, past social history, past surgical history and problem list.     Review of Systems  Constitutional: no fever or chills  ENT: no nasal congestion or sore throat  Respiratory: no cough or shortness of breath  Cardiovascular: no chest pain or palpitations  Gastrointestinal: no nausea or vomiting, tolerating diet  Genitourinary: as per HPI  Hematologic/Lymphatic: no easy bruising or lymphadenopathy  Musculoskeletal: no arthralgias or myalgias  Skin: no rashes or lesions  Neurological: no seizures or tremors  Behavioral/Psych: no auditory or visual hallucinations        Objective:    Vitals: BP (!) 120/57   Pulse 70   LMP 02/08/2000     Physical Exam   General: well developed, well nourished in no acute distress  Head: normocephalic, atraumatic  Neck: supple, trachea midline, no obvious enlargement of thyroid  HEENT: EOMI, mucus membranes moist, sclera anicteric, no hearing impairment  Lungs: symmetric expansion, non-labored breathing  Skin: no rashes or lesions  Neuro: alert and oriented x 3, no gross deficits  Psych: normal judgment and insight, normal mood/affect and non-anxious  Genitourinary:   patient declined exam      Lab Review   Urine analysis today in clinic shows - ++LE, +nit, 5-10 RBC     Lab Results   Component Value Date    WBC 11.34 06/02/2022    HGB 12.5 06/02/2022    HCT 38.8 06/02/2022    MCV 88 06/02/2022     (H) 06/02/2022     Lab  Results   Component Value Date    CREATININE 0.8 06/30/2022    BUN 19 06/30/2022       Imaging  Images and reports were personally reviewed by me and discussed with patient  GONZÁLEZ reviewed       Assessment/Plan:      1. Recurrent UTI    - Discussed UTI prevention strategies.   - Adequate hydration.   - Double voiding. Consider timed voiding.    - Avoid constipation.   - GONZÁLEZ - negative 1/17   - Cystoscopy - negative in 1/17 (by another urologist)   - Cranberry/probiotics.   - Estrace cream 2x weekly - will consider in near future (now with ovarian ca)   - Call with UTI symptoms so UA/UCx can be sent.    - Abx prophy (started 1/18) - Keflex 250mg took for 9 months, stopped 9/18     - Multiple UTIs since stopping prophy, now with UTIs ON prophy   - Repeat workup - GONZÁLEZ and cysto   - GONZÁLEZ 4/19 - wnl   - Cysto 5/19 - wnl   - Continue Estrace (approved by gyn onc). Instructed on use.    - Ellura trial, D-mannose supplement, probiotics     - CT urogram 9/20 - negative   - Again encouraged Estrace, discussed importance. New rx today.    - Was on Macrobid QHS, then Keflex - now stopped.   - Recurrent multiresistant UTIs.    - Repeat GONZÁLEZ 7/22 - wnl   - Office cystoscopy with widespread abnormal bladder mucosa. S/p bladder biopsy/TUR of abnormal areas. OR 7/27/22.   - No malignancy   - Suspect related to ulcerative cystitis     - Repeat cystoscopy in 5-6 months       2. Microhematuria    - Discussed etiology and workup of hematuria   - UCx - results above   - Cytology - previously negative   - CT urogram 12/17 - no  abnormalities. GONZÁLEZ was negative from 1/17.   - Office cystoscopy - negative 1/17, 5/19, 7/22 with abnormalities per above        3. Nocturia/frequency/urge incontinence   - Ditropan XL 10mg - discussed SE. Declines adjustment of medication.   - Reduce PM fluids      Follow up in 5-6 months with cysto

## 2022-08-27 NOTE — ADDENDUM NOTE
Addended by: KIMBERLY KHAN on: 8/27/2022 04:28 PM     Modules accepted: Orders, Level of Service

## 2022-09-02 NOTE — PROGRESS NOTES
I have reviewed the notes, assessments, and/or procedures performed by Dr. Dempsey, I concur with her/his documentation of Niurka Pedraza.

## 2022-09-20 ENCOUNTER — PATIENT MESSAGE (OUTPATIENT)
Dept: INTERNAL MEDICINE | Facility: CLINIC | Age: 77
End: 2022-09-20
Payer: MEDICARE

## 2022-09-20 DIAGNOSIS — Z12.31 BREAST CANCER SCREENING BY MAMMOGRAM: Primary | ICD-10-CM

## 2022-09-21 NOTE — PROGRESS NOTES
Patient required treatment for ESBL UTI, treatment plan already made as a separate encounter.  This is an erroneous/duplicate encounter.

## 2022-10-05 ENCOUNTER — PATIENT MESSAGE (OUTPATIENT)
Dept: UROLOGY | Facility: CLINIC | Age: 77
End: 2022-10-05
Payer: MEDICARE

## 2022-10-06 ENCOUNTER — LAB VISIT (OUTPATIENT)
Dept: LAB | Facility: OTHER | Age: 77
End: 2022-10-06
Attending: UROLOGY
Payer: MEDICARE

## 2022-10-06 ENCOUNTER — TELEPHONE (OUTPATIENT)
Dept: UROLOGY | Facility: CLINIC | Age: 77
End: 2022-10-06
Payer: MEDICARE

## 2022-10-06 DIAGNOSIS — N39.0 RECURRENT UTI: Primary | ICD-10-CM

## 2022-10-06 DIAGNOSIS — N39.0 RECURRENT UTI: ICD-10-CM

## 2022-10-06 PROCEDURE — 87086 URINE CULTURE/COLONY COUNT: CPT | Performed by: UROLOGY

## 2022-10-06 PROCEDURE — 87186 SC STD MICRODIL/AGAR DIL: CPT | Performed by: UROLOGY

## 2022-10-06 PROCEDURE — 87088 URINE BACTERIA CULTURE: CPT | Performed by: UROLOGY

## 2022-10-06 PROCEDURE — 87077 CULTURE AEROBIC IDENTIFY: CPT | Performed by: UROLOGY

## 2022-10-06 NOTE — TELEPHONE ENCOUNTER
I lvm for the pt to let her know Dr. Sage ordered urine testing and she can go to any [facility] lab and drop off her sample.

## 2022-10-06 NOTE — TELEPHONE ENCOUNTER
----- Message from Marilee Jovel MA sent at 10/6/2022 10:27 AM CDT -----  The pt believes she has a UTI. Can you please place orders for urine testing?

## 2022-10-10 ENCOUNTER — TELEPHONE (OUTPATIENT)
Dept: UROLOGY | Facility: CLINIC | Age: 77
End: 2022-10-10
Payer: MEDICARE

## 2022-10-10 ENCOUNTER — PATIENT MESSAGE (OUTPATIENT)
Dept: INFECTIOUS DISEASES | Facility: CLINIC | Age: 77
End: 2022-10-10
Payer: MEDICARE

## 2022-10-10 ENCOUNTER — PATIENT MESSAGE (OUTPATIENT)
Dept: UROLOGY | Facility: CLINIC | Age: 77
End: 2022-10-10
Payer: MEDICARE

## 2022-10-10 DIAGNOSIS — N39.0 RECURRENT UTI: Primary | ICD-10-CM

## 2022-10-10 LAB — BACTERIA UR CULT: ABNORMAL

## 2022-10-10 NOTE — TELEPHONE ENCOUNTER
LM for pt re:infectious disease referral        ----- Message from Abbey Hoffman sent at 10/10/2022 12:56 PM CDT -----  Contact: pt/[phone]  Type:  Patient Call    Who Called:pt  Would the patient rather a call back or a response via [facility]? Call   Best Call Back Number:[phone]  Additional Information: pt  is calling  regarding  her  urine  results  she  is advising if  a  rep  can call  her.

## 2022-10-10 NOTE — TELEPHONE ENCOUNTER
"I lvm for pt to call clinic back and go over her urine culture results. Per Dr. Alejandro, "Patient with multidrug resistant UTI, no oral antibiotics available. Needs to be seen by infectious disease for IV antibiotic treatment. If symptoms unbearable ( fevers, chills, chest pain, shortness of breath, flank pain, unable to void), report to the ED "  "

## 2022-10-11 ENCOUNTER — INFUSION (OUTPATIENT)
Dept: INFECTIOUS DISEASES | Facility: HOSPITAL | Age: 77
End: 2022-10-11
Attending: INTERNAL MEDICINE
Payer: MEDICARE

## 2022-10-11 ENCOUNTER — OFFICE VISIT (OUTPATIENT)
Dept: INFECTIOUS DISEASES | Facility: CLINIC | Age: 77
End: 2022-10-11
Payer: MEDICARE

## 2022-10-11 VITALS
HEART RATE: 85 BPM | SYSTOLIC BLOOD PRESSURE: 114 MMHG | TEMPERATURE: 99 F | HEIGHT: 64 IN | BODY MASS INDEX: 29.02 KG/M2 | DIASTOLIC BLOOD PRESSURE: 69 MMHG | WEIGHT: 170 LBS

## 2022-10-11 VITALS
SYSTOLIC BLOOD PRESSURE: 113 MMHG | WEIGHT: 168.75 LBS | HEART RATE: 76 BPM | DIASTOLIC BLOOD PRESSURE: 58 MMHG | RESPIRATION RATE: 20 BRPM | HEIGHT: 64 IN | OXYGEN SATURATION: 94 % | TEMPERATURE: 97 F | BODY MASS INDEX: 28.81 KG/M2

## 2022-10-11 DIAGNOSIS — N39.0 URINARY TRACT INFECTION DUE TO EXTENDED-SPECTRUM BETA LACTAMASE (ESBL) PRODUCING ESCHERICHIA COLI: Primary | ICD-10-CM

## 2022-10-11 DIAGNOSIS — Z16.12 URINARY TRACT INFECTION DUE TO EXTENDED-SPECTRUM BETA LACTAMASE (ESBL) PRODUCING ESCHERICHIA COLI: Primary | ICD-10-CM

## 2022-10-11 DIAGNOSIS — N39.0 RECURRENT UTI: Primary | ICD-10-CM

## 2022-10-11 DIAGNOSIS — B96.29 URINARY TRACT INFECTION DUE TO EXTENDED-SPECTRUM BETA LACTAMASE (ESBL) PRODUCING ESCHERICHIA COLI: Primary | ICD-10-CM

## 2022-10-11 PROCEDURE — 99213 OFFICE O/P EST LOW 20 MIN: CPT | Mod: S$PBB,GC,, | Performed by: STUDENT IN AN ORGANIZED HEALTH CARE EDUCATION/TRAINING PROGRAM

## 2022-10-11 PROCEDURE — 63600175 PHARM REV CODE 636 W HCPCS: Performed by: STUDENT IN AN ORGANIZED HEALTH CARE EDUCATION/TRAINING PROGRAM

## 2022-10-11 PROCEDURE — 99213 PR OFFICE/OUTPT VISIT, EST, LEVL III, 20-29 MIN: ICD-10-PCS | Mod: S$PBB,GC,, | Performed by: STUDENT IN AN ORGANIZED HEALTH CARE EDUCATION/TRAINING PROGRAM

## 2022-10-11 PROCEDURE — 96365 THER/PROPH/DIAG IV INF INIT: CPT

## 2022-10-11 PROCEDURE — 25000003 PHARM REV CODE 250: Performed by: STUDENT IN AN ORGANIZED HEALTH CARE EDUCATION/TRAINING PROGRAM

## 2022-10-11 PROCEDURE — 99213 OFFICE O/P EST LOW 20 MIN: CPT | Mod: PBBFAC,25 | Performed by: STUDENT IN AN ORGANIZED HEALTH CARE EDUCATION/TRAINING PROGRAM

## 2022-10-11 PROCEDURE — 99999 PR PBB SHADOW E&M-EST. PATIENT-LVL III: ICD-10-PCS | Mod: PBBFAC,GC,, | Performed by: STUDENT IN AN ORGANIZED HEALTH CARE EDUCATION/TRAINING PROGRAM

## 2022-10-11 PROCEDURE — 99999 PR PBB SHADOW E&M-EST. PATIENT-LVL III: CPT | Mod: PBBFAC,GC,, | Performed by: STUDENT IN AN ORGANIZED HEALTH CARE EDUCATION/TRAINING PROGRAM

## 2022-10-11 RX ADMIN — SODIUM CHLORIDE 1 G: 9 INJECTION, SOLUTION INTRAVENOUS at 01:10

## 2022-10-11 NOTE — PROGRESS NOTES
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 Coli UTI.     UTI's typically present with suprapubic pain, dysuria, frequency.  UA from 10/6/22 collected after development of recurrent symptoms and shows ESBL E. Coli.  Has been susceptible to fosfomycin in the past, though the patient believes prior fosfomycin regimens have given less relief than Ertapenem infusions.    Takes d mannose, vitamin c, estradiol.    Review of Systems   Constitutional: Negative for chills and fever.   Cardiovascular:  Negative for chest pain.   Respiratory:  Negative for shortness of breath.    Gastrointestinal:  Negative for nausea and vomiting.   Genitourinary:  Positive for dysuria and frequency. Negative for flank pain and pelvic pain.       Past Medical History:   Diagnosis Date    Arthritis     Borderline serous cystadenoma of right ovary 4/3/2018    Breast cancer     mastectomy 2014    Coccidiomycosis, progressive     Elevated CA-125 2/25/2018    [place] (hard of hearing)     Hyperlipidemia     OAB (overactive bladder)     Osteopenia     on Dexa 11/2017    Osteoporosis     pt reports hx of this, declined fosamax in past - treated with calcium and vit D    Pelvic mass 2/25/2018    Pulmonary fibrosis     Pulmonary nodules     SOB (shortness of breath) on exertion     Thyroid disease     hypo    UTI (urinary tract infection)      Past Surgical History:   Procedure Laterality Date    CHOLECYSTECTOMY      CYSTOSCOPY WITH BIOPSY OF BLADDER Bilateral 7/27/2022    Procedure: CYSTOSCOPY, WITH BLADDER BIOPSY; retrograde pyelogram,;  Surgeon: Anaya Oropeza MD;  Location: [facility] OR;  Service: Urology;  Laterality: Bilateral;     ENDOSCOPIC ULTRASOUND OF UPPER GASTROINTESTINAL TRACT N/A 2021    Procedure: ULTRASOUND, UPPER GI TRACT, ENDOSCOPIC;  Surgeon: Aisha Barajas MD;  Location: [facility] ([address] [place] [address]);  Service: Endoscopy;  Laterality: N/A;  UEUS/ERCP evaluation abn MRCP - Dr Carney  Covid-19 test 21 at [facility] Pre-admit - pg    ERCP N/A 2021    Procedure: ERCP (ENDOSCOPIC RETROGRADE CHOLANGIOPANCREATOGRAPHY);  Surgeon: Aisha Barajas MD;  Location: [facility] ([address] [place] [address]);  Service: Endoscopy;  Laterality: N/A;  UEUS/ERCP evaluation abn MRCP - Dr Carney  Covid-19 test 21 at [facility] Pre-admit - pg    ESOPHAGOGASTRODUODENOSCOPY N/A 2021    Procedure: EGD (ESOPHAGOGASTRODUODENOSCOPY);  Surgeon: Aisha Barajas MD;  Location: [facility] ([address] [place] [address]);  Service: Endoscopy;  Laterality: N/A;  UEUS/ERCP evaluation abn MRCP - Dr Carney  Covid-19 test 21 at [facility] Pre-admit - pg    HYSTERECTOMY      MASTECTOMY Right     2014     Family History   Problem Relation Age of Onset    Cancer Mother         colon cancer    Breast cancer Mother     Hypertension Father     Heart disease Father     Stroke Father     No Known Problems Sister     Cancer Brother         lung, ++ tobacco    Hypertension Brother     No Known Problems Daughter     Hypertension Son     Colon cancer Neg Hx     Ovarian cancer Neg Hx      Social History     Tobacco Use    Smoking status: Former     Packs/day: 0.50     Years: 30.00     Pack years: 15.00     Types: Cigarettes     Quit date: 2000     Years since quittin.7    Smokeless tobacco: Never    Tobacco comments:     quit in her 50s, after 30 years smoking;   Substance Use Topics    Alcohol use: No    Drug use: No       Review of patient's allergies indicates:   Allergen Reactions    Ciprofloxacin Other (See Comments)     Right foot pain and edema, tendonitis    Amlodipine Edema and Swelling     BLE  BLE    Lisinopril Other (See Comments)     cough    Nitrofuran analogues           Objective:   VS (24h):   Vitals:    10/11/22 1252   BP: 114/69   Pulse: 85   Temp: 98.9 °F (37.2 °C)         Physical Exam  Vitals reviewed.   Constitutional:       General: She is not in acute distress.     Appearance: Normal appearance. She is normal weight. She is not ill-appearing, toxic-appearing or diaphoretic.   HENT:      Head: Normocephalic and atraumatic.      Right Ear: External ear normal.      Left Ear: External ear normal.      Nose: Nose normal.      Mouth/Throat:      Mouth: Mucous membranes are moist.      Pharynx: Oropharynx is clear.   Eyes:      General: No scleral icterus.        Right eye: No discharge.         Left eye: No discharge.      Extraocular Movements: Extraocular movements intact.      Conjunctiva/sclera: Conjunctivae normal.   Cardiovascular:      Rate and Rhythm: Normal rate and regular rhythm.      Heart sounds: Normal heart sounds. No murmur heard.  Pulmonary:      Effort: Pulmonary effort is normal. No respiratory distress.      Breath sounds: Normal breath sounds. No wheezing, rhonchi or rales.   Abdominal:      General: Abdomen is flat. There is no distension.      Palpations: Abdomen is soft.      Tenderness: There is no abdominal tenderness. There is no right CVA tenderness, left CVA tenderness, guarding or rebound.   Musculoskeletal:         General: Normal range of motion.      Cervical back: Normal range of motion.   Skin:     General: Skin is warm and dry.   Neurological:      Mental Status: She is alert and oriented to person, place, and time. Mental status is at baseline.   Psychiatric:         Mood and Affect: Mood normal.         Behavior: Behavior normal.         Thought Content: Thought content normal.         Judgment: Judgment normal.         Labs:  Reviewed    Micro:   Reviewed    Radiology:   Reviewed    Immunization History   Administered Date(s) Administered    COVID-19 MRNA, LN-S PF (MODERNA HALF 0.25 ML DOSE) 11/09/2021    COVID-19, MRNA, LN-S, PF  (MODERNA FULL 0.5 ML DOSE) 02/03/2021, 03/03/2021    Influenza - High Dose - PF (65 years and older) 10/27/2020    Pneumococcal Conjugate - 13 Valent 11/02/2017    Pneumococcal Polysaccharide - 23 Valent 07/28/2020    Tdap 07/21/2021         Assessment & Plan:     1. Recurrent UTI  - fosfomycin (MONUROL) 3 gram Pack; Take 3 g by mouth once. for 1 dose  Dispense: 3 g; Refill: 0     Recurrent ESBL E. Coli UTI, presents with recurrence today.  -4 days of Ertapenem 1g IV; Ambulatory infusion center not open on Saturdays  -1 dose of fosfomycin afterward to complete treatment    Follow up in 4 months      Rishi Dempsey MD  Infectious Disease Fellow

## 2022-10-11 NOTE — PROGRESS NOTES
Patiient arrived for Ertapenem 1/4 infusion over 30 mins., Tolerated well, left unit in NAD

## 2022-10-12 ENCOUNTER — INFUSION (OUTPATIENT)
Dept: INFECTIOUS DISEASES | Facility: HOSPITAL | Age: 77
End: 2022-10-12
Attending: INTERNAL MEDICINE
Payer: MEDICARE

## 2022-10-12 VITALS
TEMPERATURE: 97 F | SYSTOLIC BLOOD PRESSURE: 128 MMHG | RESPIRATION RATE: 18 BRPM | OXYGEN SATURATION: 94 % | DIASTOLIC BLOOD PRESSURE: 62 MMHG | BODY MASS INDEX: 29.2 KG/M2 | WEIGHT: 170.06 LBS | HEART RATE: 75 BPM

## 2022-10-12 DIAGNOSIS — B96.29 URINARY TRACT INFECTION DUE TO EXTENDED-SPECTRUM BETA LACTAMASE (ESBL) PRODUCING ESCHERICHIA COLI: Primary | ICD-10-CM

## 2022-10-12 DIAGNOSIS — N39.0 URINARY TRACT INFECTION DUE TO EXTENDED-SPECTRUM BETA LACTAMASE (ESBL) PRODUCING ESCHERICHIA COLI: Primary | ICD-10-CM

## 2022-10-12 DIAGNOSIS — Z16.12 URINARY TRACT INFECTION DUE TO EXTENDED-SPECTRUM BETA LACTAMASE (ESBL) PRODUCING ESCHERICHIA COLI: Primary | ICD-10-CM

## 2022-10-12 PROCEDURE — 63600175 PHARM REV CODE 636 W HCPCS: Performed by: STUDENT IN AN ORGANIZED HEALTH CARE EDUCATION/TRAINING PROGRAM

## 2022-10-12 PROCEDURE — 96365 THER/PROPH/DIAG IV INF INIT: CPT

## 2022-10-12 PROCEDURE — 25000003 PHARM REV CODE 250: Performed by: STUDENT IN AN ORGANIZED HEALTH CARE EDUCATION/TRAINING PROGRAM

## 2022-10-12 RX ADMIN — SODIUM CHLORIDE 1 G: 9 INJECTION, SOLUTION INTRAVENOUS at 01:10

## 2022-10-12 NOTE — PROGRESS NOTES
Patiient arrived for Ertapenem 2/4 infusion over 30 mins., Tolerated well, left unit in NAD

## 2022-10-13 ENCOUNTER — INFUSION (OUTPATIENT)
Dept: INFECTIOUS DISEASES | Facility: HOSPITAL | Age: 77
End: 2022-10-13
Attending: INTERNAL MEDICINE
Payer: MEDICARE

## 2022-10-13 VITALS
HEART RATE: 79 BPM | SYSTOLIC BLOOD PRESSURE: 129 MMHG | WEIGHT: 170.5 LBS | HEIGHT: 64 IN | TEMPERATURE: 97 F | BODY MASS INDEX: 29.11 KG/M2 | DIASTOLIC BLOOD PRESSURE: 65 MMHG | OXYGEN SATURATION: 94 % | RESPIRATION RATE: 20 BRPM

## 2022-10-13 DIAGNOSIS — Z16.12 URINARY TRACT INFECTION DUE TO EXTENDED-SPECTRUM BETA LACTAMASE (ESBL) PRODUCING ESCHERICHIA COLI: Primary | ICD-10-CM

## 2022-10-13 DIAGNOSIS — B96.29 URINARY TRACT INFECTION DUE TO EXTENDED-SPECTRUM BETA LACTAMASE (ESBL) PRODUCING ESCHERICHIA COLI: Primary | ICD-10-CM

## 2022-10-13 DIAGNOSIS — N39.0 URINARY TRACT INFECTION DUE TO EXTENDED-SPECTRUM BETA LACTAMASE (ESBL) PRODUCING ESCHERICHIA COLI: Primary | ICD-10-CM

## 2022-10-13 PROCEDURE — 63600175 PHARM REV CODE 636 W HCPCS: Performed by: STUDENT IN AN ORGANIZED HEALTH CARE EDUCATION/TRAINING PROGRAM

## 2022-10-13 PROCEDURE — 25000003 PHARM REV CODE 250: Performed by: STUDENT IN AN ORGANIZED HEALTH CARE EDUCATION/TRAINING PROGRAM

## 2022-10-13 PROCEDURE — 96365 THER/PROPH/DIAG IV INF INIT: CPT

## 2022-10-13 RX ORDER — GRANULES FOR ORAL 3 G/1
3 POWDER ORAL ONCE
Qty: 3 G | Refills: 0 | Status: SHIPPED | OUTPATIENT
Start: 2022-10-13 | End: 2022-10-13

## 2022-10-13 RX ADMIN — SODIUM CHLORIDE 1 G: 9 INJECTION, SOLUTION INTRAVENOUS at 02:10

## 2022-10-13 NOTE — PROGRESS NOTES
Patiient arrived for Ertapenem 3/4 infusion over 30 mins., Tolerated well, left unit in NAD

## 2022-10-14 ENCOUNTER — INFUSION (OUTPATIENT)
Dept: INFECTIOUS DISEASES | Facility: HOSPITAL | Age: 77
End: 2022-10-14
Attending: INTERNAL MEDICINE
Payer: MEDICARE

## 2022-10-14 VITALS
SYSTOLIC BLOOD PRESSURE: 131 MMHG | RESPIRATION RATE: 18 BRPM | BODY MASS INDEX: 28.87 KG/M2 | WEIGHT: 168.19 LBS | TEMPERATURE: 98 F | DIASTOLIC BLOOD PRESSURE: 65 MMHG | OXYGEN SATURATION: 94 % | HEART RATE: 98 BPM

## 2022-10-14 DIAGNOSIS — N39.0 URINARY TRACT INFECTION DUE TO EXTENDED-SPECTRUM BETA LACTAMASE (ESBL) PRODUCING ESCHERICHIA COLI: Primary | ICD-10-CM

## 2022-10-14 DIAGNOSIS — Z16.12 URINARY TRACT INFECTION DUE TO EXTENDED-SPECTRUM BETA LACTAMASE (ESBL) PRODUCING ESCHERICHIA COLI: Primary | ICD-10-CM

## 2022-10-14 DIAGNOSIS — B96.29 URINARY TRACT INFECTION DUE TO EXTENDED-SPECTRUM BETA LACTAMASE (ESBL) PRODUCING ESCHERICHIA COLI: Primary | ICD-10-CM

## 2022-10-14 PROCEDURE — 63600175 PHARM REV CODE 636 W HCPCS: Performed by: STUDENT IN AN ORGANIZED HEALTH CARE EDUCATION/TRAINING PROGRAM

## 2022-10-14 PROCEDURE — 25000003 PHARM REV CODE 250: Performed by: STUDENT IN AN ORGANIZED HEALTH CARE EDUCATION/TRAINING PROGRAM

## 2022-10-14 PROCEDURE — 96365 THER/PROPH/DIAG IV INF INIT: CPT

## 2022-10-14 RX ADMIN — SODIUM CHLORIDE 1 G: 9 INJECTION, SOLUTION INTRAVENOUS at 01:10

## 2022-10-14 NOTE — PROGRESS NOTES
Patiient arrived for Ertapenem 4/4 infusion over 30 mins., Tolerated well, left unit in NAD

## 2022-10-19 ENCOUNTER — PATIENT MESSAGE (OUTPATIENT)
Dept: INFECTIOUS DISEASES | Facility: CLINIC | Age: 77
End: 2022-10-19
Payer: MEDICARE

## 2022-10-19 ENCOUNTER — OFFICE VISIT (OUTPATIENT)
Dept: INTERNAL MEDICINE | Facility: CLINIC | Age: 77
End: 2022-10-19
Attending: INTERNAL MEDICINE
Payer: MEDICARE

## 2022-10-19 ENCOUNTER — LAB VISIT (OUTPATIENT)
Dept: LAB | Facility: OTHER | Age: 77
End: 2022-10-19
Attending: OBSTETRICS & GYNECOLOGY
Payer: MEDICARE

## 2022-10-19 ENCOUNTER — OFFICE VISIT (OUTPATIENT)
Dept: GYNECOLOGIC ONCOLOGY | Facility: CLINIC | Age: 77
End: 2022-10-19
Payer: MEDICARE

## 2022-10-19 ENCOUNTER — TELEPHONE (OUTPATIENT)
Dept: OTOLARYNGOLOGY | Facility: CLINIC | Age: 77
End: 2022-10-19
Payer: MEDICARE

## 2022-10-19 VITALS
OXYGEN SATURATION: 96 % | BODY MASS INDEX: 28.79 KG/M2 | WEIGHT: 168.63 LBS | DIASTOLIC BLOOD PRESSURE: 78 MMHG | HEIGHT: 64 IN | HEART RATE: 64 BPM | SYSTOLIC BLOOD PRESSURE: 130 MMHG

## 2022-10-19 VITALS
HEART RATE: 78 BPM | BODY MASS INDEX: 29.2 KG/M2 | WEIGHT: 170.13 LBS | SYSTOLIC BLOOD PRESSURE: 123 MMHG | DIASTOLIC BLOOD PRESSURE: 72 MMHG

## 2022-10-19 DIAGNOSIS — I10 ESSENTIAL HYPERTENSION: ICD-10-CM

## 2022-10-19 DIAGNOSIS — C56.1 BORDERLINE SEROUS CYSTADENOMA OF RIGHT OVARY: Primary | ICD-10-CM

## 2022-10-19 DIAGNOSIS — N95.9 MENOPAUSAL PROBLEM: ICD-10-CM

## 2022-10-19 DIAGNOSIS — C56.1 BORDERLINE SEROUS CYSTADENOMA OF RIGHT OVARY: ICD-10-CM

## 2022-10-19 DIAGNOSIS — E03.9 HYPOTHYROIDISM, UNSPECIFIED TYPE: ICD-10-CM

## 2022-10-19 DIAGNOSIS — M85.80 OSTEOPENIA, UNSPECIFIED LOCATION: ICD-10-CM

## 2022-10-19 DIAGNOSIS — N39.0 RECURRENT UTI: ICD-10-CM

## 2022-10-19 DIAGNOSIS — G60.9 IDIOPATHIC NEUROPATHY: ICD-10-CM

## 2022-10-19 DIAGNOSIS — J84.9 INTERSTITIAL LUNG DISEASE: ICD-10-CM

## 2022-10-19 DIAGNOSIS — R30.0 DYSURIA: Primary | ICD-10-CM

## 2022-10-19 DIAGNOSIS — H91.93 BILATERAL HEARING LOSS, UNSPECIFIED HEARING LOSS TYPE: ICD-10-CM

## 2022-10-19 LAB
CANCER AG125 SERPL-ACNC: 10 U/ML (ref 0–30)
CEA SERPL-MCNC: 7.3 NG/ML (ref 0–5)

## 2022-10-19 PROCEDURE — 99999 PR PBB SHADOW E&M-EST. PATIENT-LVL III: CPT | Mod: PBBFAC,,, | Performed by: OBSTETRICS & GYNECOLOGY

## 2022-10-19 PROCEDURE — 99214 PR OFFICE/OUTPT VISIT, EST, LEVL IV, 30-39 MIN: ICD-10-PCS | Mod: S$PBB,,, | Performed by: INTERNAL MEDICINE

## 2022-10-19 PROCEDURE — 99999 PR PBB SHADOW E&M-EST. PATIENT-LVL V: CPT | Mod: PBBFAC,,, | Performed by: INTERNAL MEDICINE

## 2022-10-19 PROCEDURE — 82378 CARCINOEMBRYONIC ANTIGEN: CPT | Performed by: OBSTETRICS & GYNECOLOGY

## 2022-10-19 PROCEDURE — 99214 OFFICE O/P EST MOD 30 MIN: CPT | Mod: S$PBB,,, | Performed by: OBSTETRICS & GYNECOLOGY

## 2022-10-19 PROCEDURE — 99213 OFFICE O/P EST LOW 20 MIN: CPT | Mod: 25,27,PBBFAC | Performed by: OBSTETRICS & GYNECOLOGY

## 2022-10-19 PROCEDURE — 36415 COLL VENOUS BLD VENIPUNCTURE: CPT | Performed by: OBSTETRICS & GYNECOLOGY

## 2022-10-19 PROCEDURE — 86304 IMMUNOASSAY TUMOR CA 125: CPT | Performed by: OBSTETRICS & GYNECOLOGY

## 2022-10-19 PROCEDURE — 99214 PR OFFICE/OUTPT VISIT, EST, LEVL IV, 30-39 MIN: ICD-10-PCS | Mod: S$PBB,,, | Performed by: OBSTETRICS & GYNECOLOGY

## 2022-10-19 PROCEDURE — 99999 PR PBB SHADOW E&M-EST. PATIENT-LVL V: ICD-10-PCS | Mod: PBBFAC,,, | Performed by: INTERNAL MEDICINE

## 2022-10-19 PROCEDURE — 99215 OFFICE O/P EST HI 40 MIN: CPT | Mod: PBBFAC,27 | Performed by: INTERNAL MEDICINE

## 2022-10-19 PROCEDURE — 99214 OFFICE O/P EST MOD 30 MIN: CPT | Mod: S$PBB,,, | Performed by: INTERNAL MEDICINE

## 2022-10-19 PROCEDURE — 99999 PR PBB SHADOW E&M-EST. PATIENT-LVL III: ICD-10-PCS | Mod: PBBFAC,,, | Performed by: OBSTETRICS & GYNECOLOGY

## 2022-10-19 PROCEDURE — 87086 URINE CULTURE/COLONY COUNT: CPT | Performed by: INTERNAL MEDICINE

## 2022-10-19 RX ORDER — GRANULES FOR ORAL 3 G/1
POWDER ORAL
COMMUNITY
Start: 2022-10-14 | End: 2023-01-18

## 2022-10-19 NOTE — TELEPHONE ENCOUNTER
LM of appointment on 12/19 with Becky العراقي and Tayo, letter has been mailed with date and times

## 2022-10-19 NOTE — PROGRESS NOTES
"Subjective:   Patient ID: Niurka Pedraza is a 77 y.o. female  Chief complaint:   Chief Complaint   Patient presents with    Hypertension     F/u    Dysuria         HPIHere for 4 month follow up:     Sister from [place] visiting in November for first time!!!  - 80 years old     Being treated for ESBL UTI by ID and completed infusion 10/14   - seen by ID 10/11/2022 - given fosfomycin and ertapenem  - seen by urology 8/17/2022 - to have repeat cystoscopy 8/2022  - seen by Rheum 8/15/2022 - Dr. Levy - to f/u in 6 months    Per ID note 10/11/2022:  "UTI's typically present with suprapubic pain, dysuria, frequency.  UA from 10/6/22 collected after development of recurrent symptoms and shows ESBL E. Coli.  Has been susceptible to fosfomycin in the past, though the patient believes prior fosfomycin regimens have given less relief than Ertapenem infusions."  - completed IV infusion on 10/14 and had one dose of fosfomycin after this   - sx of dysuria and frequency improved but still not gone to date     - had poct urine today in clinic + lueks, trace pro, no blood   - reviewed how to complete clean catch urine specimen and she did perform this correctly today for this specimen     HTN: controlled today   Taking Coreg 6.25 and irb 300  Previously:   - stopped Ofev in Jan 18th and bp improved - did not start coreg 12.5mg dose since bp improved  fter d/c this     Interstitial lung disease on azithro and pdn:   Followed by pulm   - seen by rheum   - on azithro 3 times per week and montelukast daily and prednisone 2.5 mg TID  - Referred to rheum for opinion with +RF on labs     Idiopathic neuropathy:   Has appt with neuro in November  Previously:   emg test for numbness of feet and completed 3/15/2022 - wnl   Sx located in toes B and she reports that sx are progressing to mid foot bilaterally  Labs unremarkable and reviewed for PN work up today    She started to take fish oil and tuna to inc DHA    MDR UTI:   - seen in ER 6/2  - " "had Ertapenem infusion yesterday  After had inc freq since finishing fosfomycin and dysuria - to complete 5 days IV Ert  - today reports still with mild dysuria   - followed by ID in past   Reports sx of uti over past few days  +Dysuria, frequency   No fevers   No flank pain for a few days or new lbp    HM:  Completed all vaccines per her including flu, shingrix first, new covid booster and pneumonia vaccine - reports flu vaccine was given with recent covid booster on 9/30/2022 - HM to be updated     Osteopenia:  Dexa 8/2020 - due for repeat    Hypothyroidism:   Blaire levothyroxine   Tsh at goal    Gyn: Melissa Kline onc: Denis  Pulm: Hollie  ENT: [facility] nosebleeds   Urology: Sandip  Pod: Feliciano     Review of Systems    Objective:  Vitals:    10/19/22 1137   BP: 130/78   BP Location: Left arm   Patient Position: Sitting   Pulse: 64   SpO2: 96%   Weight: 76.5 kg (168 lb 10.4 oz)   Height: 5' 4" (1.626 m)       Body mass index is 28.95 kg/m².    Physical Exam  Vitals reviewed.   Constitutional:       Appearance: Normal appearance. She is well-developed.   HENT:      Head: Normocephalic and atraumatic.   Eyes:      Extraocular Movements: Extraocular movements intact.      Conjunctiva/sclera: Conjunctivae normal.   Neck:      Thyroid: No thyromegaly.   Cardiovascular:      Rate and Rhythm: Normal rate and regular rhythm.      Pulses: Normal pulses.      Heart sounds: Normal heart sounds.   Pulmonary:      Effort: Pulmonary effort is normal.      Breath sounds: Normal breath sounds.   Abdominal:      General: Bowel sounds are normal.      Palpations: Abdomen is soft.      Tenderness: There is no right CVA tenderness or left CVA tenderness.   Musculoskeletal:         General: No swelling or tenderness.      Cervical back: Neck supple.   Lymphadenopathy:      Cervical: No cervical adenopathy.   Skin:     General: Skin is warm and dry.      Capillary Refill: Capillary refill takes less than 2 seconds.   Neurological:      " General: No focal deficit present.      Mental Status: She is alert and oriented to person, place, and time. Mental status is at baseline.   Psychiatric:         Behavior: Behavior normal.         Thought Content: Thought content normal.       Assessment:  1. Dysuria    2. Menopausal problem    3. Bilateral hearing loss, unspecified hearing loss type    4. Hypothyroidism, unspecified type    5. Recurrent UTI    6. Essential hypertension    7. Interstitial lung disease    8. Idiopathic neuropathy    9. Osteopenia, unspecified location          Plan:  Dysuria  -     Urine culture; Future; Expected date: 10/19/2022  Completed IV tx - repeat culture   ADDENDUM:  She agrees to f/u with ID   - reviewed chart and specialist notified and reviewed cx data     Menopausal problem  -     DXA Bone Density Spine And Hip; Future; Expected date: 10/19/2022    Bilateral hearing loss, unspecified hearing loss type  -     Ambulatory referral/consult to ENT; Future; Expected date: 10/26/2022  -     Ambulatory referral/consult to Audiology; Future; Expected date: 10/26/2022    Hypothyroidism, unspecified type  -     TSH; Future; Expected date: 10/19/2022  Stable   Cont med and check tsh     Recurrent UTI    Essential hypertension  Stable   Cont meds     Interstitial lung disease  Stable   F/u with pulm     Idiopathic neuropathy  Stable   F/u with enuro     Osteopenia, unspecified location  Recommend over the counter supplement of calcium 1200mg divided into 2 doses daily. Recommend 800u of vitamin D3 daily along with regular exercise.  Schedule dexa       Health Maintenance   Topic Date Due    DEXA Scan  08/21/2023    Lipid Panel  06/30/2027    TETANUS VACCINE  07/21/2031    Hepatitis C Screening  Completed

## 2022-10-20 ENCOUNTER — TELEPHONE (OUTPATIENT)
Dept: GYNECOLOGIC ONCOLOGY | Facility: CLINIC | Age: 77
End: 2022-10-20
Payer: MEDICARE

## 2022-10-20 LAB
BACTERIA UR CULT: NORMAL
BACTERIA UR CULT: NORMAL

## 2022-10-20 NOTE — PROGRESS NOTES
Subjective:      Patient ID: Niurka Pedraza is a 77 y.o. female.    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 Without complaints or concerns. Specifically denies N/V, pain, swelling, bleeding, unexpected weight change, SOB, problems with bowel function.   Disease free interval 4.5 years      CEA 7.3 (elevated)  ___________________________   History:  Patient is a 73yo female originally referred by Dr. Linette Torres for pelvic mass and elevated . Patient was seen by her urologist for recurrent UTIs and imaging was ordered.      Imaging reviewed:   CT urogram abd/pelvis 17  Impression   Bilateral pulmonary nodules, largest measuring 1.5 cm at the right lower lobe. Comparison with any prior cross-sectional imaging would be beneficial. Close interval followup, PET CT, and/or biopsy may be considered for further evaluation.    Complex cystic lesion in the right adnexa. Pelvic ultrasound would be beneficial for further evaluation.    Focal dilated biliary radical in segment 7. This is of uncertain significance. There are no concerning hepatic lesions.      Pelvic US 18  Impression    Complex cystic lesion measuring 3.8 cm right ovary corresponds to abnormality seen on CT. While this may represent a hemorrhagic cyst in light of postmenopausal status Clinical correlation and further characterization with MRI and surgical consultation advised.    Left ovary not seen.     UT 5.5cm        41 elevated. CEA 4.8.      Personal history of breast cancer , R mastectomy, no adjuvant treatment.    MMG 2017 WNLs.      Prior abdominal surgeries include cholecystectomy.  x 3. Family history mother with breast cancer, no ovarian, uterine, or colon cancer.      S/p RTLH/BSO/BPLND/OMX 3/23/18. Uncomplicated post op course. Final pathology reviewed showing papillary seromucinous borderline  tumor of bilateral ovaries.       Review of Systems   Constitutional:  Negative for appetite change, chills, fatigue and fever.   HENT:  Negative for mouth sores.    Respiratory:  Negative for cough and shortness of breath.    Cardiovascular:  Negative for leg swelling.   Gastrointestinal:  Negative for abdominal pain, blood in stool, constipation and diarrhea.   Endocrine: Negative for cold intolerance.   Genitourinary:  Negative for dysuria and vaginal bleeding.   Musculoskeletal:  Negative for myalgias.   Skin:  Negative for rash.   Allergic/Immunologic: Negative.    Neurological:  Negative for weakness and numbness.   Hematological:  Negative for adenopathy. Does not bruise/bleed easily.   Psychiatric/Behavioral:  Negative for confusion.      Objective:   Physical Exam:   Constitutional: She is oriented to person, place, and time. She appears well-developed and well-nourished.    HENT:   Head: Normocephalic and atraumatic.    Eyes: Pupils are equal, round, and reactive to light. EOM are normal.    Neck: No thyromegaly present.    Cardiovascular:  Normal rate, regular rhythm and intact distal pulses.             Pulmonary/Chest: Effort normal and breath sounds normal. No respiratory distress. She has no wheezes.        Abdominal: Soft. Bowel sounds are normal. She exhibits no distension and no mass. There is no abdominal tenderness.     Genitourinary:    Vagina and rectum normal.      Pelvic exam was performed with patient supine.   There is no lesion on the right labia. There is no lesion on the left labia. Right adnexum displays no mass. Left adnexum displays no mass. Vaginal cuff normal.  Cervix is absent.Uterus is absent.           Musculoskeletal: Normal range of motion and moves all extremeties.      Lymphadenopathy:     She has no cervical adenopathy. No inguinal adenopathy noted on the right or left side.        Right: No supraclavicular adenopathy present.        Left: No supraclavicular adenopathy  present.    Neurological: She is alert and oriented to person, place, and time.    Skin: Skin is warm and dry. No rash noted.    Psychiatric: She has a normal mood and affect.     Assessment:     1. Borderline serous cystadenoma of right ovary        Plan:     Orders Placed This Encounter   Procedures        CEA     No evidence of disease on today's exam  Feels well, no new symptoms  Disease free interval 4.5 years  Tumor markers  normal and stable. CEA slightly elevated 7.3         Will repeat CEA in 4 weeks for follow up. Currently being treated for recurrent UTIs.   Otherwse RTC 6 months with tumor markers or sooner if needed     I spent approximately 30 minutes reviewing the available records and evaluating the patient, out of which over 50% of the time was spent face to face with the patient in counseling and coordinating this patient's care.

## 2022-10-20 NOTE — TELEPHONE ENCOUNTER
Called patient to review slightly elevated CEA. Currently being treated for recurrent UTIs. Will plan to repeat in 1 month. Inquire about last colonoscopy.     No answer, left voicemail.

## 2022-10-24 ENCOUNTER — TELEPHONE (OUTPATIENT)
Dept: GYNECOLOGIC ONCOLOGY | Facility: CLINIC | Age: 77
End: 2022-10-24
Payer: MEDICARE

## 2022-10-24 ENCOUNTER — PATIENT MESSAGE (OUTPATIENT)
Dept: GYNECOLOGIC ONCOLOGY | Facility: CLINIC | Age: 77
End: 2022-10-24
Payer: MEDICARE

## 2022-10-24 PROBLEM — G60.9 IDIOPATHIC NEUROPATHY: Status: ACTIVE | Noted: 2022-10-24

## 2022-10-26 DIAGNOSIS — R97.0 ELEVATED CEA: Primary | ICD-10-CM

## 2022-11-09 ENCOUNTER — PATIENT MESSAGE (OUTPATIENT)
Dept: NEUROLOGY | Facility: CLINIC | Age: 77
End: 2022-11-09
Payer: MEDICARE

## 2022-11-10 ENCOUNTER — TELEPHONE (OUTPATIENT)
Dept: NEUROLOGY | Facility: CLINIC | Age: 77
End: 2022-11-10
Payer: MEDICARE

## 2022-11-16 ENCOUNTER — HOSPITAL ENCOUNTER (OUTPATIENT)
Dept: RADIOLOGY | Facility: OTHER | Age: 77
Discharge: HOME | End: 2022-11-16
Attending: INTERNAL MEDICINE
Payer: MEDICARE

## 2022-11-16 DIAGNOSIS — Z12.31 BREAST CANCER SCREENING BY MAMMOGRAM: ICD-10-CM

## 2022-11-16 PROCEDURE — 77063 BREAST TOMOSYNTHESIS BI: CPT | Mod: 26,,, | Performed by: RADIOLOGY

## 2022-11-16 PROCEDURE — 77063 BREAST TOMOSYNTHESIS BI: CPT | Mod: TC

## 2022-11-16 PROCEDURE — 77067 SCR MAMMO BI INCL CAD: CPT | Mod: 26,,, | Performed by: RADIOLOGY

## 2022-11-16 PROCEDURE — 77067 MAMMO DIGITAL SCREENING LEFT WITH TOMO: ICD-10-PCS | Mod: 26,,, | Performed by: RADIOLOGY

## 2022-11-16 PROCEDURE — 77063 MAMMO DIGITAL SCREENING LEFT WITH TOMO: ICD-10-PCS | Mod: 26,,, | Performed by: RADIOLOGY

## 2022-11-17 ENCOUNTER — LAB VISIT (OUTPATIENT)
Dept: LAB | Facility: OTHER | Age: 77
End: 2022-11-17
Attending: OBSTETRICS & GYNECOLOGY
Payer: MEDICARE

## 2022-11-17 ENCOUNTER — TELEPHONE (OUTPATIENT)
Dept: NEUROLOGY | Facility: CLINIC | Age: 77
End: 2022-11-17
Payer: MEDICARE

## 2022-11-17 DIAGNOSIS — C56.1 BORDERLINE SEROUS CYSTADENOMA OF RIGHT OVARY: ICD-10-CM

## 2022-11-17 LAB — CEA SERPL-MCNC: 7.7 NG/ML (ref 0–5)

## 2022-11-17 PROCEDURE — 82378 CARCINOEMBRYONIC ANTIGEN: CPT | Performed by: OBSTETRICS & GYNECOLOGY

## 2022-11-17 PROCEDURE — 36415 COLL VENOUS BLD VENIPUNCTURE: CPT | Performed by: OBSTETRICS & GYNECOLOGY

## 2022-11-17 NOTE — TELEPHONE ENCOUNTER
----- Message from Luz Marina Aguilar sent at 11/17/2022 10:16 AM CST -----  Contact: pt  Pt requesting a callback to get her appt rescheduled and wants to know why she cant see Dr caldwell           Confirmed contact below:  Contact Name:Niurka Pedraza  Phone Number: [phone]

## 2022-11-18 ENCOUNTER — TELEPHONE (OUTPATIENT)
Dept: NEUROLOGY | Facility: CLINIC | Age: 77
End: 2022-11-18
Payer: MEDICARE

## 2022-11-18 NOTE — TELEPHONE ENCOUNTER
----- Message from Ly Rueda MA sent at 11/18/2022  4:39 PM CST -----  Regarding: FW: Appointment  Contact: [phone] or [phone]  This is the pt I wanted tot alk to you about yesterday. Schedule with Nahun (I think we took her off of his schedule based on her diagnosis).  If not, where should we recommend her to?   ----- Message -----  From: Hollie De La Cruz  Sent: 11/18/2022   2:39 PM CST  To: [facility] Ochoa Staff  Subject: Appointment                                      Pt is wanting to be seen by Dr. Garnica. She is est with No Harmon but states she no longer wants her to care for her. I sent a message to No Harmon detailing the situation. Please call to discuss further @ [phone] or [phone]

## 2022-11-19 NOTE — TELEPHONE ENCOUNTER
----- Message from Hollie De La Cruz sent at 11/18/2022  2:39 PM CST -----  Regarding: Switch Providers  Contact: [phone] or [phone]  Pt is wanting to switch her care over to Dr. Garnica. Please call to discuss further @ [phone] or [phone]

## 2022-11-21 ENCOUNTER — TELEPHONE (OUTPATIENT)
Dept: GYNECOLOGIC ONCOLOGY | Facility: CLINIC | Age: 77
End: 2022-11-21
Payer: MEDICARE

## 2022-11-21 DIAGNOSIS — C56.1 BORDERLINE SEROUS CYSTADENOMA OF RIGHT OVARY: Primary | ICD-10-CM

## 2022-11-21 DIAGNOSIS — R97.0 ELEVATED CEA: ICD-10-CM

## 2022-11-21 PROBLEM — R09.02 HYPOXEMIA: Status: ACTIVE | Noted: 2022-11-21

## 2022-11-21 NOTE — TELEPHONE ENCOUNTER
Spoke with patient. Reviewed CEA 4.8>7.3>7.7. History seromucinous borderline tumor of the ovary. Will obtain CT to evaluate for recurrent disease. Also discussed colonoscopy.     Message routed to staff to assist with scheduling. She voiced understanding.

## 2022-11-22 ENCOUNTER — TELEPHONE (OUTPATIENT)
Dept: NEUROLOGY | Facility: CLINIC | Age: 77
End: 2022-11-22
Payer: MEDICARE

## 2022-11-22 NOTE — TELEPHONE ENCOUNTER
Tried to contact the patient to schedule an appointment with a general Neurologist. Unable to leave a message on voicemail.

## 2022-11-22 NOTE — TELEPHONE ENCOUNTER
----- Message from Anaya Gracia MA sent at 11/22/2022  3:11 PM CST -----  Hi,    Patient has recently cancelled her appointment with No and has called to switch her care over to Dr. Garnica.

## 2022-11-28 ENCOUNTER — TELEPHONE (OUTPATIENT)
Dept: GYNECOLOGIC ONCOLOGY | Facility: CLINIC | Age: 77
End: 2022-11-28
Payer: MEDICARE

## 2022-11-28 ENCOUNTER — HOSPITAL ENCOUNTER (OUTPATIENT)
Dept: RADIOLOGY | Facility: OTHER | Age: 77
Discharge: HOME | End: 2022-11-28
Attending: OBSTETRICS & GYNECOLOGY
Payer: MEDICARE

## 2022-11-28 DIAGNOSIS — Z98.890 S/P ROBOT-ASSISTED SURGICAL PROCEDURE: Primary | ICD-10-CM

## 2022-11-28 DIAGNOSIS — R97.0 ELEVATED CEA: ICD-10-CM

## 2022-11-28 DIAGNOSIS — C56.1 BORDERLINE SEROUS CYSTADENOMA OF RIGHT OVARY: ICD-10-CM

## 2022-11-28 PROCEDURE — 74177 CT ABD & PELVIS W/CONTRAST: CPT | Mod: 26,,, | Performed by: RADIOLOGY

## 2022-11-28 PROCEDURE — 74177 CT CHEST ABDOMEN PELVIS WITH CONTRAST (XPD): ICD-10-PCS | Mod: 26,,, | Performed by: RADIOLOGY

## 2022-11-28 PROCEDURE — 71260 CT THORAX DX C+: CPT | Mod: 26,,, | Performed by: RADIOLOGY

## 2022-11-28 PROCEDURE — 71260 CT THORAX DX C+: CPT | Mod: TC

## 2022-11-28 PROCEDURE — A9698 NON-RAD CONTRAST MATERIALNOC: HCPCS | Performed by: OBSTETRICS & GYNECOLOGY

## 2022-11-28 PROCEDURE — 74177 CT ABD & PELVIS W/CONTRAST: CPT | Mod: TC

## 2022-11-28 PROCEDURE — 25500020 PHARM REV CODE 255: Performed by: OBSTETRICS & GYNECOLOGY

## 2022-11-28 PROCEDURE — 71260 CT CHEST ABDOMEN PELVIS WITH CONTRAST (XPD): ICD-10-PCS | Mod: 26,,, | Performed by: RADIOLOGY

## 2022-11-28 RX ADMIN — BARIUM SULFATE 900 ML: 20 SUSPENSION ORAL at 11:11

## 2022-11-28 RX ADMIN — IOHEXOL 75 ML: 350 INJECTION, SOLUTION INTRAVENOUS at 12:11

## 2022-11-28 NOTE — TELEPHONE ENCOUNTER
Called patient to review CT which shows no obvious evidence of recurrent borderline tumor. Mild thickening of the sigmoid colon and will plan for colonoscopy.     No answer. Left voicemail.

## 2022-11-29 ENCOUNTER — TELEPHONE (OUTPATIENT)
Dept: ENDOSCOPY | Facility: HOSPITAL | Age: 77
End: 2022-11-29
Payer: MEDICARE

## 2022-11-29 ENCOUNTER — PATIENT MESSAGE (OUTPATIENT)
Dept: ENDOSCOPY | Facility: HOSPITAL | Age: 77
End: 2022-11-29
Payer: MEDICARE

## 2022-11-29 VITALS — HEIGHT: 64 IN | WEIGHT: 170 LBS | BODY MASS INDEX: 29.02 KG/M2

## 2022-11-29 DIAGNOSIS — Z12.11 SPECIAL SCREENING FOR MALIGNANT NEOPLASMS, COLON: Primary | ICD-10-CM

## 2022-11-29 RX ORDER — POLYETHYLENE GLYCOL 3350, SODIUM SULFATE ANHYDROUS, SODIUM BICARBONATE, SODIUM CHLORIDE, POTASSIUM CHLORIDE 236; 22.74; 6.74; 5.86; 2.97 G/4L; G/4L; G/4L; G/4L; G/4L
4 POWDER, FOR SOLUTION ORAL ONCE
Qty: 4000 ML | Refills: 0 | Status: SHIPPED | OUTPATIENT
Start: 2022-11-29 | End: 2022-11-29

## 2022-12-09 ENCOUNTER — ANESTHESIA EVENT (OUTPATIENT)
Dept: ENDOSCOPY | Facility: HOSPITAL | Age: 77
End: 2022-12-09
Payer: MEDICARE

## 2022-12-09 ENCOUNTER — HOSPITAL ENCOUNTER (OUTPATIENT)
Facility: HOSPITAL | Age: 77
Discharge: HOME | End: 2022-12-09
Attending: STUDENT IN AN ORGANIZED HEALTH CARE EDUCATION/TRAINING PROGRAM | Admitting: STUDENT IN AN ORGANIZED HEALTH CARE EDUCATION/TRAINING PROGRAM
Payer: MEDICARE

## 2022-12-09 ENCOUNTER — ANESTHESIA (OUTPATIENT)
Dept: ENDOSCOPY | Facility: HOSPITAL | Age: 77
End: 2022-12-09
Payer: MEDICARE

## 2022-12-09 VITALS
BODY MASS INDEX: 27.31 KG/M2 | OXYGEN SATURATION: 96 % | HEART RATE: 73 BPM | RESPIRATION RATE: 15 BRPM | TEMPERATURE: 98 F | HEIGHT: 64 IN | DIASTOLIC BLOOD PRESSURE: 60 MMHG | SYSTOLIC BLOOD PRESSURE: 119 MMHG | WEIGHT: 160 LBS

## 2022-12-09 DIAGNOSIS — Z12.11 ENCOUNTER FOR SCREENING COLONOSCOPY: ICD-10-CM

## 2022-12-09 DIAGNOSIS — Z12.11 SCREENING FOR MALIGNANT NEOPLASM OF COLON: Primary | ICD-10-CM

## 2022-12-09 PROCEDURE — 45385 PR COLONOSCOPY,REMV LESN,SNARE: ICD-10-PCS | Mod: PT,,, | Performed by: STUDENT IN AN ORGANIZED HEALTH CARE EDUCATION/TRAINING PROGRAM

## 2022-12-09 PROCEDURE — 45385 COLONOSCOPY W/LESION REMOVAL: CPT | Mod: PT | Performed by: STUDENT IN AN ORGANIZED HEALTH CARE EDUCATION/TRAINING PROGRAM

## 2022-12-09 PROCEDURE — 45385 COLONOSCOPY W/LESION REMOVAL: CPT | Mod: PT,,, | Performed by: STUDENT IN AN ORGANIZED HEALTH CARE EDUCATION/TRAINING PROGRAM

## 2022-12-09 PROCEDURE — E9220 PRA ENDO ANESTHESIA: ICD-10-PCS | Mod: PT,,, | Performed by: NURSE ANESTHETIST, CERTIFIED REGISTERED

## 2022-12-09 PROCEDURE — 88305 TISSUE EXAM BY PATHOLOGIST: ICD-10-PCS | Mod: 26,,, | Performed by: PATHOLOGY

## 2022-12-09 PROCEDURE — 63600175 PHARM REV CODE 636 W HCPCS: Performed by: NURSE ANESTHETIST, CERTIFIED REGISTERED

## 2022-12-09 PROCEDURE — E9220 PRA ENDO ANESTHESIA: HCPCS | Mod: PT,,, | Performed by: NURSE ANESTHETIST, CERTIFIED REGISTERED

## 2022-12-09 PROCEDURE — 88305 TISSUE EXAM BY PATHOLOGIST: CPT | Performed by: PATHOLOGY

## 2022-12-09 PROCEDURE — 37000008 HC ANESTHESIA 1ST 15 MINUTES: Performed by: STUDENT IN AN ORGANIZED HEALTH CARE EDUCATION/TRAINING PROGRAM

## 2022-12-09 PROCEDURE — 88305 TISSUE EXAM BY PATHOLOGIST: CPT | Mod: 26,,, | Performed by: PATHOLOGY

## 2022-12-09 PROCEDURE — 37000009 HC ANESTHESIA EA ADD 15 MINS: Performed by: STUDENT IN AN ORGANIZED HEALTH CARE EDUCATION/TRAINING PROGRAM

## 2022-12-09 PROCEDURE — 25000003 PHARM REV CODE 250: Performed by: NURSE ANESTHETIST, CERTIFIED REGISTERED

## 2022-12-09 PROCEDURE — 27201089 HC SNARE, DISP (ANY): Performed by: STUDENT IN AN ORGANIZED HEALTH CARE EDUCATION/TRAINING PROGRAM

## 2022-12-09 RX ORDER — LIDOCAINE HYDROCHLORIDE 20 MG/ML
INJECTION, SOLUTION EPIDURAL; INFILTRATION; INTRACAUDAL; PERINEURAL
Status: DISCONTINUED | OUTPATIENT
Start: 2022-12-09 | End: 2022-12-09

## 2022-12-09 RX ORDER — PROPOFOL 10 MG/ML
VIAL (ML) INTRAVENOUS CONTINUOUS PRN
Status: DISCONTINUED | OUTPATIENT
Start: 2022-12-09 | End: 2022-12-09

## 2022-12-09 RX ORDER — PROPOFOL 10 MG/ML
VIAL (ML) INTRAVENOUS
Status: DISCONTINUED | OUTPATIENT
Start: 2022-12-09 | End: 2022-12-09

## 2022-12-09 RX ORDER — SODIUM CHLORIDE 9 MG/ML
INJECTION, SOLUTION INTRAVENOUS CONTINUOUS
Status: DISCONTINUED | OUTPATIENT
Start: 2022-12-09 | End: 2022-12-09 | Stop reason: HOSPADM

## 2022-12-09 RX ADMIN — SODIUM CHLORIDE: 9 INJECTION, SOLUTION INTRAVENOUS at 01:12

## 2022-12-09 RX ADMIN — PROPOFOL 100 MG: 10 INJECTION, EMULSION INTRAVENOUS at 01:12

## 2022-12-09 RX ADMIN — Medication 150 MCG/KG/MIN: at 01:12

## 2022-12-09 RX ADMIN — LIDOCAINE HYDROCHLORIDE 50 MG: 20 INJECTION, SOLUTION EPIDURAL; INFILTRATION; INTRACAUDAL; PERINEURAL at 01:12

## 2022-12-09 NOTE — PROVATION PATIENT INSTRUCTIONS
Discharge Summary/Instructions after an Endoscopic Procedure  Patient Name: Niurka Pedraza  Patient MRN: [id number]  Patient YOB: 1945  Friday, December 9, 2022  Enrique Dominguez MD  Dear patient,  As a result of recent federal legislation (The Federal Cures Act), you may   receive lab or pathology results from your procedure in your [facility]   account before your physician is able to contact you. Your physician or   their representative will relay the results to you with their   recommendations at their soonest availability.  Thank you,  RESTRICTIONS:  During your procedure today, you received medications for sedation.  These   medications may affect your judgment, balance and coordination.  Therefore,   for 24 hours, you have the following restrictions:   - DO NOT drive a car, operate machinery, make legal/financial decisions,   sign important papers or drink alcohol.    ACTIVITY:  Today: no heavy lifting, straining or running due to procedural   sedation/anesthesia.  The following day: return to full activity including work.  DIET:  Eat and drink normally unless instructed otherwise.     TREATMENT FOR COMMON SIDE EFFECTS:  - Mild abdominal pain, nausea, belching, bloating or excessive gas:  rest,   eat lightly and use a heating pad.  - Sore Throat: treat with throat lozenges and/or gargle with warm salt   water.  - Because air was used during the procedure, expelling large amounts of air   from your rectum or belching is normal.  - If a bowel prep was taken, you may not have a bowel movement for 1-3 days.    This is normal.  SYMPTOMS TO WATCH FOR AND REPORT TO YOUR PHYSICIAN:  1. Abdominal pain or bloating, other than gas cramps.  2. Chest pain.  3. Back pain.  4. Signs of infection such as: chills or fever occurring within 24 hours   after the procedure.  5. Rectal bleeding, which would show as bright red, maroon, or black stools.   (A tablespoon of blood from the rectum is not serious, especially  if   hemorrhoids are present.)  6. Vomiting.  7. Weakness or dizziness.  GO DIRECTLY TO THE NEAREST EMERGENCY ROOM IF YOU HAVE ANY OF THE FOLLOWING:      Difficulty breathing              Chills and/or fever over 101 F   Persistent vomiting and/or vomiting blood   Severe abdominal pain   Severe chest pain   Black, tarry stools   Bleeding- more than one tablespoon   Any other symptom or condition that you feel may need urgent attention  Your doctor recommends these additional instructions:  If any biopsies were taken, your doctors clinic will contact you in 1 to 2   weeks with any results.  - Discharge patient to home.   - Resume previous diet.   - Continue present medications.   - Await pathology results.   - Repeat colonoscopy in 3 years for surveillance based on pathology results.     - Return to referring physician as previously scheduled.  For questions, problems or results please call your physician - Enrique Dominguez MD at Work:  [phone].  [facility] EMERGENCY ROOM PHONE NUMBER: [phone]  IF A COMPLICATION OR EMERGENCY SITUATION ARISES AND YOU ARE UNABLE TO REACH   YOUR PHYSICIAN - GO DIRECTLY TO THE EMERGENCY ROOM.  MD Enrique Nails MD  12/9/2022 1:40:41 PM  This report has been verified and signed electronically.  Dear patient,  As a result of recent federal legislation (The Federal Cures Act), you may   receive lab or pathology results from your procedure in your [facility]   account before your physician is able to contact you. Your physician or   their representative will relay the results to you with their   recommendations at their soonest availability.  Thank you,  PROVATION

## 2022-12-09 NOTE — H&P
[facility]  Colon and Rectal Surgery    [address]  [place], [place]  Tel: [phone]  Fax: [phone]  [email or web address]   MD Francois Fong MD Brian Kann, MD W. Forrest Johnston, MD Matthew Giglia, MD Jennifer Paruch, MD William Kethman, MD Danielle Kay, MD     Patient name: Niurka Pedraza   YOB: 1945   MRN: [id number]  Date of procedure: 12/09/2022    Procedure: Colonoscopy  Indications: Screening for colon cancer - CEA elevated, ? Lesion in ascending colon on CT CAP, possible family history of CRC - her mother had an obstruction before passing, personal and family history of breast cancer, underwent RTLH/BSO/BPLND/OMX in 3/23/18 with Dr. Barrios, has never had a colonoscopy    The patient was informed of the availability of a certified  without charge. A certified  was not necessary for this visit.    Sedation plan: MAC  ASA: ASA 2 - Patient with mild systemic disease with no functional limitations    Review of Systems  See above    Past Medical History:   Diagnosis Date    Arthritis     Borderline serous cystadenoma of right ovary 4/3/2018    Breast cancer     mastectomy 2014    Coccidiomycosis, progressive     Elevated CA-125 2/25/2018    [place] (hard of hearing)     Hyperlipidemia     OAB (overactive bladder)     Osteopenia     on Dexa 11/2017    Osteoporosis     pt reports hx of this, declined fosamax in past - treated with calcium and vit D    Pelvic mass 2/25/2018    Pulmonary fibrosis     Pulmonary nodules     SOB (shortness of breath) on exertion     Thyroid disease     hypo    UTI (urinary tract infection)      Past Surgical History:   Procedure Laterality Date    CHOLECYSTECTOMY      CYSTOSCOPY WITH BIOPSY OF BLADDER Bilateral 7/27/2022    Procedure: CYSTOSCOPY, WITH BLADDER BIOPSY; retrograde pyelogram,;  Surgeon: Anaya Oropeza MD;  Location: [facility];  Service: Urology;  Laterality:  Bilateral;    ENDOSCOPIC ULTRASOUND OF UPPER GASTROINTESTINAL TRACT N/A 2021    Procedure: ULTRASOUND, UPPER GI TRACT, ENDOSCOPIC;  Surgeon: Aisha Barajas MD;  [facility]  Service: Endoscopy;  Laterality: N/A;  UEUS/ERCP evaluation abn MRCP - Dr Carney  Covid-19 test 21 at [facility] Pre-admit - pg    ERCP N/A 2021    Procedure: ERCP (ENDOSCOPIC RETROGRADE CHOLANGIOPANCREATOGRAPHY);  Surgeon: Aisha Barajas MD;  [facility]  Service: Endoscopy;  Laterality: N/A;  UEUS/ERCP evaluation abn MRCP - Dr Carney  Covid-19 test 21 at [facility] Pre-admit - pg    ESOPHAGOGASTRODUODENOSCOPY N/A 2021    Procedure: EGD (ESOPHAGOGASTRODUODENOSCOPY);  Surgeon: Aisha Barajas MD;  Location: [facility] ([address] [place] [address]);  Service: Endoscopy;  Laterality: N/A;  UEUS/ERCP evaluation abn MRCP - Dr Carney  Covid-19 test 21 at [facility] Pre-admit - pg    HYSTERECTOMY      MASTECTOMY Right     2014     Family History   Problem Relation Age of Onset    Cancer Mother         colon cancer    Breast cancer Mother     Hypertension Father     Heart disease Father     Stroke Father     No Known Problems Sister     Cancer Brother         lung, ++ tobacco    Hypertension Brother     No Known Problems Daughter     Hypertension Son     Colon cancer Neg Hx     Ovarian cancer Neg Hx      Social History     Tobacco Use    Smoking status: Former     Packs/day: 0.50     Years: 30.00     Pack years: 15.00     Types: Cigarettes     Quit date: 2000     Years since quittin.8    Smokeless tobacco: Never    Tobacco comments:     quit in her 50s, after 30 years smoking;   Substance Use Topics    Alcohol use: No    Drug use: No     Review of patient's allergies indicates:   Allergen Reactions    Ciprofloxacin Other (See Comments)     Right foot pain and edema, tendonitis    Amlodipine Edema and Swelling     BLE  BLE    Lisinopril Other (See Comments)     cough    Nitrofuran analogues         Prior to Admission medications    Medication Sig Start Date End Date Taking? Authorizing Provider   atorvastatin (LIPITOR) 20 MG tablet TAKE 1 TABLET BY MOUTH EVERY DAY 10/3/22  Yes Savana Anderson MD   calcium-vitamin D3 (CALCIUM 500 + D) 500 mg(1,250mg) -200 unit per tablet Take 1 tablet by mouth 2 (two) times daily with meals. 4/18/17  Yes Savana Zelaya MD   carvediloL (COREG) 6.25 MG tablet TAKE 1 TABLET BY MOUTH TWICE A DAY WITH FOOD 6/5/22  Yes Savana Anderson MD   irbesartan (AVAPRO) 300 MG tablet Take 1 tablet (300 mg total) by mouth every evening. 2/14/22 2/14/23 Yes Savana Anderson MD   levothyroxine (SYNTHROID) 50 MCG tablet TAKE 1 TABLET BY MOUTH BEFORE BREAKFAST. 8/28/22  Yes Savana Anderson MD   montelukast (SINGULAIR) 10 mg tablet TAKE 1 TABLET BY MOUTH EVERY DAY IN THE EVENING 4/19/22  Yes Nemesio Cazares MD   multivitamin (THERAGRAN) per tablet Take 1 tablet by mouth once daily.   Yes Historical Provider   oxybutynin (DITROPAN-XL) 10 MG 24 hr tablet Take 1 tablet (10 mg total) by mouth once daily. 12/22/21 12/22/22 Yes Anaya Oropeza MD   predniSONE (DELTASONE) 1 MG tablet Take 2 tablets (2 mg total) by mouth once daily. 11/21/22  Yes Nemesio Cazares MD   albuterol (VENTOLIN HFA) 90 mcg/actuation inhaler Inhale 1-2 puffs into the lungs every 6 (six) hours as needed for Wheezing or Shortness of Breath. Rescue 9/17/21   Savana Anderson MD   azithromycin (Z-SEB) 250 MG tablet Take 1 tablet (250 mg total) by mouth 3 (three) times a week. 10/24/22   Blaze Hager, TRINO   budesonide-formoterol 80-4.5 mcg (SYMBICORT) 80-4.5 mcg/actuation HFAA Inhale 2 puffs into the lungs 2 (two) times daily as needed. Controller  Patient not taking: Reported on 6/24/2022 11/10/20 4/14/22  Nemesio Cazares MD   estradioL (ESTRACE) 0.01 % (0.1 mg/gram) vaginal cream Place 1 g vaginally twice a week. 6/30/22 6/30/23  Anaya Oropeza MD   fosfomycin (MONUROL) 3 gram Pack  "SMARTSIG:3 Gram(s) By Mouth Once 10/14/22   Historical Provider       Physical Examination  BP (!) 169/90 (BP Location: Left arm, Patient Position: Lying)   Pulse 88   Temp 98.1 °F (36.7 °C) (Temporal)   Resp 16   Ht 5' 4" (1.626 m)   Wt 72.6 kg (160 lb)   LMP 02/08/2000   SpO2 95%   Breastfeeding No   BMI 27.46 kg/m²      Constitutional: well developed, no cough, no dyspnea, alert, and no acute distress    Head: Normocephalic, no lesions, without obvious abnormality  Eye: Normal external eye, conjunctiva, and lids, PERRL  Cardiovascular: regular rate and regular rhythm  Respiratory: normal air entry  Gastrointestinal: soft, non-tender, without masses or organomegaly  Neurologic: alert, oriented, normal speech, no focal findings or movement disorder noted  Psychiatric: appropriate, normal mood    Plan of Care    It was a pleasure meeting Ms. Pedraza today - we will plan to perform a colonoscopy with monitored anesthesia care. The details of the procedure, the possible need for biopsy or polypectomy and the potential risks including bleeding, perforation, missed polyps were discussed in detail and they consented to undergo the procedure.      Enrique Dominguez MD - Staff Surgeon  Department of Colon & Rectal Surgery  [facility]    "

## 2022-12-09 NOTE — TRANSFER OF CARE
"Anesthesia Transfer of Care Note    Patient: Niurka Pedraza    Procedure(s) Performed: Procedure(s) (LRB):  COLONOSCOPY (N/A)    Patient location: PACU    Anesthesia Type: general    Transport from OR: Transported from OR on room air with adequate spontaneous ventilation    Post pain: adequate analgesia    Post assessment: no apparent anesthetic complications    Post vital signs: stable    Level of consciousness: awake    Nausea/Vomiting: no nausea/vomiting    Complications: none    Transfer of care protocol was followed      Last vitals:   Visit Vitals  BP (!) 169/90 (BP Location: Left arm, Patient Position: Lying)   Pulse 88   Temp 36.7 °C (98.1 °F) (Temporal)   Resp 16   Ht 5' 4" (1.626 m)   Wt 72.6 kg (160 lb)   LMP 02/08/2000   SpO2 95%   Breastfeeding No   BMI 27.46 kg/m²     "

## 2022-12-09 NOTE — ANESTHESIA POSTPROCEDURE EVALUATION
Anesthesia Post Evaluation    Patient: Niurka Pedraza    Procedure(s) Performed: Procedure(s) (LRB):  COLONOSCOPY (N/A)    Final Anesthesia Type: general      Patient location during evaluation: GI PACU  Patient participation: Yes- Able to Participate  Level of consciousness: awake and alert and oriented  Post-procedure vital signs: reviewed and stable  Pain management: adequate  Airway patency: patent    PONV status at discharge: No PONV  Anesthetic complications: no      Cardiovascular status: hemodynamically stable  Respiratory status: unassisted, spontaneous ventilation and room air  Hydration status: euvolemic  Follow-up not needed.          Vitals Value Taken Time   /60 12/09/22 1415   Temp 98 12/09/22 1415   Pulse 73 12/09/22 1415   Resp 15 12/09/22 1415   SpO2 96 % 12/09/22 1415         Event Time   Out of Recovery 14:23:37         Pain/Luann Score: Luann Score: 8 (12/9/2022  1:45 PM)

## 2022-12-09 NOTE — ANESTHESIA PREPROCEDURE EVALUATION
12/09/2022  Niurka Pedraza is a 77 y.o., female.      Patient Active Problem List   Diagnosis    Pulmonary nodules    Pulmonary coccidioidomycosis, unspecified    Hyperlipidemia    Recurrent UTI    Skin lesion    Fatigue    Essential hypertension    Hypothyroidism    Hearing loss    Hearing loss of right ear    Tinnitus of right ear    Overweight (BMI 25.0-29.9)    Tendonitis, Achilles, right    Cerumen debris on tympanic membrane of both ears    Fatty liver    Postmenopausal    Acute right ankle pain    Ankle weakness    Balance problem    Microhematuria    OAB (overactive bladder)    Pelvic mass    Elevated CA-125    S/P RATLH/BSO/PLND/Omental Bx (mini lap)    Borderline serous cystadenoma of right ovary    Arthritis of carpometacarpal (CMC) joint of right thumb    Chronic cough    Interstitial lung disease    Dilated bile duct    Ground glass opacity present on imaging of lung    UIP (usual interstitial pneumonitis)    Epistaxis    Urinary tract infection due to extended-spectrum beta lactamase (ESBL) producing Escherichia coli    Immunosuppression due to chronic steroid use    Lesion of bladder    Idiopathic neuropathy    Osteopenia    Hypoxemia     Past Surgical History:   Procedure Laterality Date    CHOLECYSTECTOMY      CYSTOSCOPY WITH BIOPSY OF BLADDER Bilateral 7/27/2022    Procedure: CYSTOSCOPY, WITH BLADDER BIOPSY; retrograde pyelogram,;  Surgeon: Anaya Oropeza MD;  [facility];  Service: Urology;  Laterality: Bilateral;    ENDOSCOPIC ULTRASOUND OF UPPER GASTROINTESTINAL TRACT N/A 4/8/2021    Procedure: ULTRASOUND, UPPER GI TRACT, ENDOSCOPIC;  Surgeon: Aisha Barajas MD;  Location: [facility] ([address] [place] [address]);  Service: Endoscopy;  Laterality: N/A;  UEUS/ERCP evaluation abn MRCP - Dr Carney  Covid-19 test 4/5/21 at [facility] Pre-admit - pg    ERCP  N/A 4/8/2021    Procedure: ERCP (ENDOSCOPIC RETROGRADE CHOLANGIOPANCREATOGRAPHY);  Surgeon: Aisha Barajas MD;  Location: [facility] ([address] [place] [address]);  Service: Endoscopy;  Laterality: N/A;  UEUS/ERCP evaluation abn MRCP - Dr Carney  Covid-19 test 4/5/21 at [facility] Pre-admit - pg    ESOPHAGOGASTRODUODENOSCOPY N/A 4/8/2021    Procedure: EGD (ESOPHAGOGASTRODUODENOSCOPY);  Surgeon: Aisha Barajas MD;  [facility]  Service: Endoscopy;  Laterality: N/A;  UEUS/ERCP evaluation abn MRCP - Dr Carney  Covid-19 test 4/5/21 at [facility] Pre-admit - pg    HYSTERECTOMY      MASTECTOMY Right     2014     Past Medical History:   Diagnosis Date    Arthritis     Borderline serous cystadenoma of right ovary 4/3/2018    Breast cancer     mastectomy 2014    Coccidiomycosis, progressive     Elevated CA-125 2/25/2018    [place] (hard of hearing)     Hyperlipidemia     OAB (overactive bladder)     Osteopenia     on Dexa 11/2017    Osteoporosis     pt reports hx of this, declined fosamax in past - treated with calcium and vit D    Pelvic mass 2/25/2018    Pulmonary fibrosis     Pulmonary nodules     SOB (shortness of breath) on exertion     Thyroid disease     hypo    UTI (urinary tract infection)          Pre-op Assessment    I have reviewed the Patient Summary Reports.       I have reviewed the Medications.     Review of Systems      Physical Exam  General: Well nourished, Alert and Oriented    Airway:  Mallampati: II / I  Mouth Opening: Normal  TM Distance: Normal  Tongue: Normal  Neck ROM: Normal ROM    Dental:  Intact        Anesthesia Plan  Type of Anesthesia, risks & benefits discussed:    Anesthesia Type: Gen Natural Airway  Intra-op Monitoring Plan: Standard ASA Monitors  Induction:  IV  Airway Plan: , Post-Induction  Informed Consent: Informed consent signed with the Patient and all parties understand the risks and agree with anesthesia plan.  All questions answered.   ASA Score: 3  Day of  Surgery Review of History & Physical: I have interviewed and examined the patient. I have reviewed the patient's H&P dated: There are no significant changes.     Ready For Surgery From Anesthesia Perspective.     .

## 2022-12-12 ENCOUNTER — PATIENT MESSAGE (OUTPATIENT)
Dept: NEUROLOGY | Facility: CLINIC | Age: 77
End: 2022-12-12
Payer: MEDICARE

## 2022-12-12 NOTE — TELEPHONE ENCOUNTER
SAMMYM and sent portal message offering pt an appt in Neuro the same day pt goes to ENT at [facility].

## 2022-12-13 NOTE — PROGRESS NOTES
Neurology Clinic Note      Date: 12/19/22  Patient Name: Niurka Pedraza   MRN: [id number]   PCP: Savana Anderson  Referring Provider: Savana Anderson MD    Assessment and Plan:   Niurka Pedraza is a 77 y.o. female presenting for evaluation of a progressive length-dependent neuropathy.  Her nerve conduction study in March 2022 showed no evidence of large fiber involvement although her exam now is notable for severe vibratory loss in the lower extremities.  Labs ordered for neuropathy workup.  Discussed that she may need a repeat nerve conduction study at some point but this can wait pending her workup.    She also appears to have restless leg syndrome.  Started on gabapentin 300 mg nightly.      Problem List Items Addressed This Visit          Neuro    Neuropathy - Primary    Relevant Medications    gabapentin (NEURONTIN) 300 MG capsule    Other Relevant Orders    SHAHNAZ    Hepatitis C antibody    Protein electrophoresis, serum    Protein electrophoresis, timed urine    Vitamin B1    Vitamin B12 Deficiency Panel    Sedimentation rate    Sjogrens syndrome-A extractable nuclear antibody    RPR    Copper, serum    FOLATE    VITAMIN B6    RLS (restless legs syndrome)    Relevant Medications    gabapentin (NEURONTIN) 300 MG capsule    Other Relevant Orders    Ferritin     Other Visit Diagnoses       Idiopathic progressive neuropathy        Relevant Orders    Vitamin B12 Deficiency Panel    FOLATE    VITAMIN B6           Subjective:          HPI: Ms. Niurka Pedraza is a 77 y.o. female with a history of breast cancer s/p mastectomy, ILD, HLD, HTN, serous cystadenoma of the right ovary, presenting for evaluation of neuropathy.    Her symptoms began around 3 years ago in the lower extremities as tingling and numbness initially involving the toes. Symptoms have been slowly progressive and now both her feet are equally involved up to the ankles.  They occur at night but also She denies any pain or weakness.  No low back pain  or radiating pain.  No bowel/bladder incontinence.  Over the last 6-7 months her fingertips have started to get involved.  She denies any neck pain.  Over the last year, she has been feeling unsteady on her feet but denies any falls.  She does not feel she requires any assistive devices as yet.    Sleep is normal.  She does have a history of breast cancer and underwent mastectomy.  She has never been on any chemotherapeutic drugs.  No history of exposure to heavy metals.    Of note, she also reports cramping in her calves bilaterally for many years.  This is separate from the numbness and occurs only at night.  Relieved with movement.    She underwent a nerve conduction study in March 2022 which showed no evidence of large fiber involvement.    Labs were reviewed.  Vitamin B12 was normal in December 2021.  ESR was normal in June 2022.  No history of diabetes.      PAST MEDICAL HISTORY:  Past Medical History:   Diagnosis Date    Arthritis     Borderline serous cystadenoma of right ovary 4/3/2018    Breast cancer     mastectomy 2014    Coccidiomycosis, progressive     Elevated CA-125 2/25/2018    [place] (hard of hearing)     Hyperlipidemia     OAB (overactive bladder)     Osteopenia     on Dexa 11/2017    Osteoporosis     pt reports hx of this, declined fosamax in past - treated with calcium and vit D    Pelvic mass 2/25/2018    Pulmonary fibrosis     Pulmonary nodules     SOB (shortness of breath) on exertion     Thyroid disease     hypo    UTI (urinary tract infection)        PAST SURGICAL HISTORY:  Past Surgical History:   Procedure Laterality Date    CHOLECYSTECTOMY      COLONOSCOPY N/A 12/9/2022    Procedure: COLONOSCOPY;  Surgeon: Enrique Dominguez MD;  Location: [address]);  Service: Endoscopy;  Laterality: N/A;  inst via portal  pt requests after 12/9/22-MS  11/30-pt notified of earlier arrival time-[facility]    CYSTOSCOPY WITH BIOPSY OF BLADDER Bilateral 7/27/2022    Procedure:  CYSTOSCOPY, WITH BLADDER BIOPSY; retrograde pyelogram,;  Surgeon: Anaya Oropeza MD;  Location: [facility];  Service: Urology;  Laterality: Bilateral;    ENDOSCOPIC ULTRASOUND OF UPPER GASTROINTESTINAL TRACT N/A 4/8/2021    Procedure: ULTRASOUND, UPPER GI TRACT, ENDOSCOPIC;  Surgeon: Aisha Barajas MD;  Location: [facility] (2ND FLR);  Service: Endoscopy;  Laterality: N/A;  UEUS/ERCP evaluation abn MRCP - Dr Angelika Steinerid-19 test 4/5/21 at [facility] Pre-admit - pg    ERCP N/A 4/8/2021    Procedure: ERCP (ENDOSCOPIC RETROGRADE CHOLANGIOPANCREATOGRAPHY);  Surgeon: Aisha Barajas MD;  Location: [facility] (2ND FLR);  Service: Endoscopy;  Laterality: N/A;  UEUS/ERCP evaluation abn MRCFLEX Zamudio-19 test 4/5/21 at [facility] Pre-admit - pg    ESOPHAGOGASTRODUODENOSCOPY N/A 4/8/2021    Procedure: EGD (ESOPHAGOGASTRODUODENOSCOPY);  Surgeon: Aisha Barajas MD;  Location: [facility] (2ND FLR);  Service: Endoscopy;  Laterality: N/A;  UEUS/ERCP evaluation abn MRCP - Dr Angelika Steinerid-19 test 4/5/21 at [facility] Pre-admit - pg    HYSTERECTOMY      MASTECTOMY Right     2014       CURRENT MEDS:  Current Outpatient Medications   Medication Sig Dispense Refill    albuterol (VENTOLIN HFA) 90 mcg/actuation inhaler Inhale 1-2 puffs into the lungs every 6 (six) hours as needed for Wheezing or Shortness of Breath. Rescue 18 g 1    atorvastatin (LIPITOR) 20 MG tablet TAKE 1 TABLET BY MOUTH EVERY DAY 90 tablet 2    azithromycin (Z-SEB) 250 MG tablet Take 1 tablet (250 mg total) by mouth 3 (three) times a week. 12 tablet 11    budesonide-formoterol 80-4.5 mcg (SYMBICORT) 80-4.5 mcg/actuation HFAA Inhale 2 puffs into the lungs 2 (two) times daily as needed. Controller (Patient not taking: Reported on 6/24/2022) 1 Inhaler 6    calcium-vitamin D3 (CALCIUM 500 + D) 500 mg(1,250mg) -200 unit per tablet Take 1 tablet by mouth 2 (two) times daily with meals.      carvediloL (COREG) 6.25 MG tablet TAKE 1 TABLET BY  MOUTH TWICE A DAY WITH FOOD 180 tablet 3    estradioL (ESTRACE) 0.01 % (0.1 mg/gram) vaginal cream Place 1 g vaginally twice a week. 42.5 g 11    fosfomycin (MONUROL) 3 gram Pack SMARTSIG:3 Gram(s) By Mouth Once      gabapentin (NEURONTIN) 300 MG capsule Take 1 capsule (300 mg total) by mouth every evening. 30 capsule 2    irbesartan (AVAPRO) 300 MG tablet Take 1 tablet (300 mg total) by mouth every evening. 90 tablet 3    levothyroxine (SYNTHROID) 50 MCG tablet TAKE 1 TABLET BY MOUTH BEFORE BREAKFAST. 90 tablet 1    montelukast (SINGULAIR) 10 mg tablet TAKE 1 TABLET BY MOUTH EVERY DAY IN THE EVENING 90 tablet 2    multivitamin (THERAGRAN) per tablet Take 1 tablet by mouth once daily.      oxybutynin (DITROPAN-XL) 10 MG 24 hr tablet Take 1 tablet (10 mg total) by mouth once daily. 30 tablet 11    predniSONE (DELTASONE) 1 MG tablet Take 2 tablets (2 mg total) by mouth once daily. 180 tablet 3     No current facility-administered medications for this visit.       ALLERGIES:  Review of patient's allergies indicates:   Allergen Reactions    Ciprofloxacin Other (See Comments)     Right foot pain and edema, tendonitis    Amlodipine Edema and Swelling     BLE  BLE    Lisinopril Other (See Comments)     cough    Nitrofuran analogues        FAMILY HISTORY:  Family History   Problem Relation Age of Onset    Cancer Mother         colon cancer    Breast cancer Mother     Hypertension Father     Heart disease Father     Stroke Father     No Known Problems Sister     Cancer Brother         lung, ++ tobacco    Hypertension Brother     No Known Problems Daughter     Hypertension Son     Colon cancer Neg Hx     Ovarian cancer Neg Hx        SOCIAL HISTORY:  Social History     Tobacco Use    Smoking status: Former     Packs/day: 0.50     Years: 30.00     Pack years: 15.00     Types: Cigarettes     Quit date: 2000     Years since quittin.8    Smokeless tobacco: Never    Tobacco comments:     quit in  "her 50s, after 30 years smoking;   Substance Use Topics    Alcohol use: No    Drug use: No       Review of Systems:  12 system review of systems is negative except for the symptoms mentioned in HPI.      Objective:     Vitals:    22 1329   BP: (!) 113/56   Pulse: 72   Resp: 19   Weight: 77.5 kg (170 lb 13.7 oz)   Height: 5' 4" (1.626 m)     General: NAD, well nourished   Eyes: no tearing, discharge, no erythema   Neck: Supple, full range of motion  Cardiovascular: Warm and well perfused  Lungs: Normal work of breathing  Skin: No rash, lesions, or breakdown on exposed skin  Psychiatry: Mood and affect are appropriate       NEUROLOGICAL EXAMINATION:     MENTAL STATUS   Oriented to person.   Oriented to place.   Oriented to time.   Follows 2 step commands.   Speech: speech is normal   Level of consciousness: alert    CRANIAL NERVES     CN II   Visual fields full to confrontation.     CN III, IV, VI   Extraocular motions are normal.   Ophthalmoparesis: none    CN V   Facial sensation intact.     CN VII   Facial expression full, symmetric.     CN VIII   CN VIII normal.     CN XI   CN XI normal.     CN XII   CN XII normal.     MOTOR EXAM   Overall muscle tone: normal  Right arm pronator drift: absent  Left arm pronator drift: absent    Strength   Right deltoid: 5/5  Left deltoid: 5/5  Right biceps: 5/5  Left biceps: 5/5  Right triceps: 5/5  Left triceps: 5/5  Right wrist flexion: 5/5  Left wrist flexion: 5/5  Right wrist extension: 5/5  Left wrist extension: 5/5  Right interossei: 5/5  Left interossei: 5/5  Right iliopsoas: 5/5  Left iliopsoas: 5/5  Right quadriceps: 5/5  Left quadriceps: 5/5  Right hamstrin/5  Left hamstrin/5  Right glutei: 5/5  Left glutei: 5/5  Right anterior tibial: 5/5  Left anterior tibial: 5/5  Right posterior tibial: 5/5  Left posterior tibial: 5/5  Right peroneal: 5/5  Left peroneal: 5/5  Right gastroc: 5/5  Left gastroc: 5/5       5/5 in all muscle groups of upper and lower " extremities.     REFLEXES     Reflexes   Right brachioradialis: 2+  Left brachioradialis: 2+  Right biceps: 2+  Left biceps: 2+  Right triceps: 2+  Left triceps: 2+  Right patellar: 2+  Left patellar: 2+  Right achilles: 1+  Left achilles: 1+  Right plantar: normal  Left plantar: normal  Right Ram: absent  Left Ram: absent  Right ankle clonus: absent  Left ankle clonus: absent    SENSORY EXAM   Light touch normal.   Right leg vibration: decreased from ankle  Left leg vibration: decreased from ankle  Proprioception normal.   Pinprick normal.   Romberg: negative    GAIT AND COORDINATION      Coordination   Finger to nose coordination: normal  Heel to shin coordination: normal    Tremor   Resting tremor: absent  Intention tremor: absent       Mild ataxic gait.             Umang Canales MD  Department of Neurology  [facility]

## 2022-12-19 ENCOUNTER — CLINICAL SUPPORT (OUTPATIENT)
Dept: OTOLARYNGOLOGY | Facility: CLINIC | Age: 77
End: 2022-12-19
Attending: INTERNAL MEDICINE
Payer: MEDICARE

## 2022-12-19 ENCOUNTER — OFFICE VISIT (OUTPATIENT)
Dept: NEUROLOGY | Facility: CLINIC | Age: 77
End: 2022-12-19
Payer: MEDICARE

## 2022-12-19 ENCOUNTER — LAB VISIT (OUTPATIENT)
Dept: LAB | Facility: OTHER | Age: 77
End: 2022-12-19
Attending: STUDENT IN AN ORGANIZED HEALTH CARE EDUCATION/TRAINING PROGRAM
Payer: MEDICARE

## 2022-12-19 ENCOUNTER — OFFICE VISIT (OUTPATIENT)
Dept: OTOLARYNGOLOGY | Facility: CLINIC | Age: 77
End: 2022-12-19
Payer: MEDICARE

## 2022-12-19 VITALS
HEIGHT: 64 IN | SYSTOLIC BLOOD PRESSURE: 120 MMHG | HEART RATE: 100 BPM | BODY MASS INDEX: 29.71 KG/M2 | DIASTOLIC BLOOD PRESSURE: 77 MMHG | WEIGHT: 174 LBS | TEMPERATURE: 98 F

## 2022-12-19 VITALS
BODY MASS INDEX: 29.17 KG/M2 | RESPIRATION RATE: 19 BRPM | DIASTOLIC BLOOD PRESSURE: 56 MMHG | SYSTOLIC BLOOD PRESSURE: 113 MMHG | HEART RATE: 72 BPM | WEIGHT: 170.88 LBS | HEIGHT: 64 IN

## 2022-12-19 DIAGNOSIS — H90.3 ASYMMETRICAL SENSORINEURAL HEARING LOSS: ICD-10-CM

## 2022-12-19 DIAGNOSIS — E61.1 IRON DEFICIENCY: ICD-10-CM

## 2022-12-19 DIAGNOSIS — H93.299 REDUCED SPEECH DISCRIMINATION: ICD-10-CM

## 2022-12-19 DIAGNOSIS — H91.91 PROFOUND HEARING LOSS OF RIGHT EAR: ICD-10-CM

## 2022-12-19 DIAGNOSIS — G25.81 RLS (RESTLESS LEGS SYNDROME): ICD-10-CM

## 2022-12-19 DIAGNOSIS — H90.3 ASYMMETRICAL SENSORINEURAL HEARING LOSS: Primary | ICD-10-CM

## 2022-12-19 DIAGNOSIS — G62.9 NEUROPATHY: ICD-10-CM

## 2022-12-19 DIAGNOSIS — H91.93 BILATERAL HEARING LOSS, UNSPECIFIED HEARING LOSS TYPE: ICD-10-CM

## 2022-12-19 DIAGNOSIS — G60.3 IDIOPATHIC PROGRESSIVE NEUROPATHY: ICD-10-CM

## 2022-12-19 DIAGNOSIS — G60.9 HEREDITARY AND IDIOPATHIC NEUROPATHY, UNSPECIFIED: ICD-10-CM

## 2022-12-19 DIAGNOSIS — R29.2 ABNORMAL ACOUSTIC REFLEX: ICD-10-CM

## 2022-12-19 DIAGNOSIS — G62.9 NEUROPATHY: Primary | ICD-10-CM

## 2022-12-19 DIAGNOSIS — H61.23 BILATERAL IMPACTED CERUMEN: ICD-10-CM

## 2022-12-19 LAB
ERYTHROCYTE [SEDIMENTATION RATE] IN BLOOD: 13 MM/HR (ref 0–20)
FERRITIN SERPL-MCNC: 211 NG/ML (ref 20–300)
FOLATE SERPL-MCNC: 14.1 NG/ML (ref 4–24)
HCV AB SERPL QL IA: NORMAL
RPR SER QL: NORMAL

## 2022-12-19 PROCEDURE — 99999 PR PBB SHADOW E&M-EST. PATIENT-LVL III: ICD-10-PCS | Mod: PBBFAC,,, | Performed by: STUDENT IN AN ORGANIZED HEALTH CARE EDUCATION/TRAINING PROGRAM

## 2022-12-19 PROCEDURE — 86038 ANTINUCLEAR ANTIBODIES: CPT | Performed by: STUDENT IN AN ORGANIZED HEALTH CARE EDUCATION/TRAINING PROGRAM

## 2022-12-19 PROCEDURE — 84425 ASSAY OF VITAMIN B-1: CPT | Performed by: STUDENT IN AN ORGANIZED HEALTH CARE EDUCATION/TRAINING PROGRAM

## 2022-12-19 PROCEDURE — 36415 COLL VENOUS BLD VENIPUNCTURE: CPT | Performed by: STUDENT IN AN ORGANIZED HEALTH CARE EDUCATION/TRAINING PROGRAM

## 2022-12-19 PROCEDURE — 82525 ASSAY OF COPPER: CPT | Performed by: STUDENT IN AN ORGANIZED HEALTH CARE EDUCATION/TRAINING PROGRAM

## 2022-12-19 PROCEDURE — 92550 TYMPANOMETRY & REFLEX THRESH: CPT | Mod: S$GLB,,, | Performed by: AUDIOLOGIST-HEARING AID FITTER

## 2022-12-19 PROCEDURE — 99204 OFFICE O/P NEW MOD 45 MIN: CPT | Mod: S$PBB,,, | Performed by: STUDENT IN AN ORGANIZED HEALTH CARE EDUCATION/TRAINING PROGRAM

## 2022-12-19 PROCEDURE — 86803 HEPATITIS C AB TEST: CPT | Performed by: STUDENT IN AN ORGANIZED HEALTH CARE EDUCATION/TRAINING PROGRAM

## 2022-12-19 PROCEDURE — 84207 ASSAY OF VITAMIN B-6: CPT | Performed by: STUDENT IN AN ORGANIZED HEALTH CARE EDUCATION/TRAINING PROGRAM

## 2022-12-19 PROCEDURE — 99213 OFFICE O/P EST LOW 20 MIN: CPT | Mod: PBBFAC | Performed by: STUDENT IN AN ORGANIZED HEALTH CARE EDUCATION/TRAINING PROGRAM

## 2022-12-19 PROCEDURE — 99999 PR PBB SHADOW E&M-EST. PATIENT-LVL III: CPT | Mod: PBBFAC,,, | Performed by: STUDENT IN AN ORGANIZED HEALTH CARE EDUCATION/TRAINING PROGRAM

## 2022-12-19 PROCEDURE — 92557 COMPREHENSIVE HEARING TEST: CPT | Mod: S$GLB,,, | Performed by: AUDIOLOGIST-HEARING AID FITTER

## 2022-12-19 PROCEDURE — 82746 ASSAY OF FOLIC ACID SERUM: CPT | Performed by: STUDENT IN AN ORGANIZED HEALTH CARE EDUCATION/TRAINING PROGRAM

## 2022-12-19 PROCEDURE — 69210 REMOVE IMPACTED EAR WAX UNI: CPT | Mod: S$GLB,,, | Performed by: OTOLARYNGOLOGY

## 2022-12-19 PROCEDURE — 99214 OFFICE O/P EST MOD 30 MIN: CPT | Mod: 25,S$GLB,, | Performed by: OTOLARYNGOLOGY

## 2022-12-19 PROCEDURE — 99214 PR OFFICE/OUTPT VISIT, EST, LEVL IV, 30-39 MIN: ICD-10-PCS | Mod: 25,S$GLB,, | Performed by: OTOLARYNGOLOGY

## 2022-12-19 PROCEDURE — 86235 NUCLEAR ANTIGEN ANTIBODY: CPT | Performed by: STUDENT IN AN ORGANIZED HEALTH CARE EDUCATION/TRAINING PROGRAM

## 2022-12-19 PROCEDURE — 82728 ASSAY OF FERRITIN: CPT | Performed by: STUDENT IN AN ORGANIZED HEALTH CARE EDUCATION/TRAINING PROGRAM

## 2022-12-19 PROCEDURE — 92557 PR COMPREHENSIVE HEARING TEST: ICD-10-PCS | Mod: S$GLB,,, | Performed by: AUDIOLOGIST-HEARING AID FITTER

## 2022-12-19 PROCEDURE — 84165 PROTEIN E-PHORESIS SERUM: CPT | Mod: 26,,, | Performed by: PATHOLOGY

## 2022-12-19 PROCEDURE — 85651 RBC SED RATE NONAUTOMATED: CPT | Performed by: STUDENT IN AN ORGANIZED HEALTH CARE EDUCATION/TRAINING PROGRAM

## 2022-12-19 PROCEDURE — 99204 PR OFFICE/OUTPT VISIT, NEW, LEVL IV, 45-59 MIN: ICD-10-PCS | Mod: S$PBB,,, | Performed by: STUDENT IN AN ORGANIZED HEALTH CARE EDUCATION/TRAINING PROGRAM

## 2022-12-19 PROCEDURE — 84165 PATHOLOGIST INTERPRETATION SPE: ICD-10-PCS | Mod: 26,,, | Performed by: PATHOLOGY

## 2022-12-19 PROCEDURE — 84165 PROTEIN E-PHORESIS SERUM: CPT | Performed by: STUDENT IN AN ORGANIZED HEALTH CARE EDUCATION/TRAINING PROGRAM

## 2022-12-19 PROCEDURE — 86592 SYPHILIS TEST NON-TREP QUAL: CPT | Performed by: STUDENT IN AN ORGANIZED HEALTH CARE EDUCATION/TRAINING PROGRAM

## 2022-12-19 PROCEDURE — 92550 PR TYMPANOMETRY AND REFLEX THRESHOLD MEASUREMENTS: ICD-10-PCS | Mod: S$GLB,,, | Performed by: AUDIOLOGIST-HEARING AID FITTER

## 2022-12-19 PROCEDURE — 69210 EAR CERUMEN REMOVAL: ICD-10-PCS | Mod: S$GLB,,, | Performed by: OTOLARYNGOLOGY

## 2022-12-19 RX ORDER — GABAPENTIN 300 MG/1
300 CAPSULE ORAL NIGHTLY
Qty: 30 CAPSULE | Refills: 2 | Status: SHIPPED | OUTPATIENT
Start: 2022-12-19 | End: 2023-03-17

## 2022-12-19 NOTE — Clinical Note
Your patient, Niurka Pedraza, was recently seen for an audiogram.  My assessment and recommendations are enclosed.  If you should have any questions or concerns, please contact me at [phone].   Sincerely, Mauri George, CCC-A Audiologist [facility]

## 2022-12-20 LAB
ALBUMIN SERPL ELPH-MCNC: 3.91 G/DL (ref 3.35–5.55)
ALPHA1 GLOB SERPL ELPH-MCNC: 0.29 G/DL (ref 0.17–0.41)
ALPHA2 GLOB SERPL ELPH-MCNC: 0.74 G/DL (ref 0.43–0.99)
ANA SER QL IF: NORMAL
B-GLOBULIN SERPL ELPH-MCNC: 0.7 G/DL (ref 0.5–1.1)
FINAL PATHOLOGIC DIAGNOSIS: NORMAL
GAMMA GLOB SERPL ELPH-MCNC: 1.07 G/DL (ref 0.67–1.58)
GROSS: NORMAL
Lab: NORMAL
PATHOLOGIST INTERPRETATION SPE: NORMAL
PROT SERPL-MCNC: 6.7 G/DL (ref 6–8.4)

## 2022-12-20 NOTE — PROGRESS NOTES
Mauri George, CCC-A  Audiologist - [facility] [address] [place] [address]  [email or web address]  [phone]    Patient: Niurka Pedraza   MRN: [id number]  [address]   Home Phone [phone]   Work Phone Not on file.   Mobile [phone]   : 1945  CARDOZO: 2022      AUDIOLOGICAL EVALUATION      RECOMMENDATIONS:   It is recommended that Niurka Pedraza:  Follow up medically with Dr. العراقي to assess her asymmetrical hearing loss.  Consider a BiCROS hearing aid system to improve speech understanding.  Continue to receive audiological monitoring annually.  Use precaution and/or hearing protection in noisy environments.    If you should have any questions or concerns regarding the above information, please do not hesitate to contact me at [phone].      _______________________________  Mauri George, WILMAN-A  Audiologist

## 2022-12-21 LAB
ANTI-SSA ANTIBODY: 0.07 RATIO (ref 0–0.99)
ANTI-SSA INTERPRETATION: NEGATIVE
VIT B12 SERPL-MCNC: 436 NG/L (ref 180–914)

## 2022-12-22 LAB — COPPER SERPL-MCNC: 1143 UG/L (ref 810–1990)

## 2022-12-23 LAB
PYRIDOXAL SERPL-MCNC: 9 UG/L (ref 5–50)
VIT B1 BLD-MCNC: 92 UG/L (ref 38–122)

## 2022-12-27 ENCOUNTER — PATIENT MESSAGE (OUTPATIENT)
Dept: INTERNAL MEDICINE | Facility: CLINIC | Age: 77
End: 2022-12-27
Payer: MEDICARE

## 2023-01-18 ENCOUNTER — PROCEDURE VISIT (OUTPATIENT)
Dept: UROLOGY | Facility: CLINIC | Age: 78
End: 2023-01-18
Attending: UROLOGY
Payer: MEDICARE

## 2023-01-18 VITALS — DIASTOLIC BLOOD PRESSURE: 59 MMHG | HEART RATE: 86 BPM | SYSTOLIC BLOOD PRESSURE: 111 MMHG

## 2023-01-18 DIAGNOSIS — N39.0 RECURRENT UTI: Primary | ICD-10-CM

## 2023-01-18 DIAGNOSIS — N32.9 LESION OF BLADDER: ICD-10-CM

## 2023-01-18 PROCEDURE — 52000 CYSTOURETHROSCOPY: CPT | Mod: S$GLB,,, | Performed by: UROLOGY

## 2023-01-18 PROCEDURE — 52000 CYSTOSCOPY: ICD-10-PCS | Mod: S$GLB,,, | Performed by: UROLOGY

## 2023-01-18 RX ORDER — CEPHALEXIN 500 MG/1
500 CAPSULE ORAL
Status: COMPLETED | OUTPATIENT
Start: 2023-01-18 | End: 2023-01-18

## 2023-01-18 RX ORDER — OXYBUTYNIN CHLORIDE 15 MG/1
15 TABLET, EXTENDED RELEASE ORAL DAILY
Qty: 30 TABLET | Refills: 11 | Status: SHIPPED | OUTPATIENT
Start: 2023-01-18 | End: 2023-09-06 | Stop reason: SDUPTHER

## 2023-01-18 RX ORDER — LIDOCAINE HYDROCHLORIDE 20 MG/ML
JELLY TOPICAL
Status: COMPLETED | OUTPATIENT
Start: 2023-01-18 | End: 2023-01-18

## 2023-01-18 RX ADMIN — CEPHALEXIN 500 MG: 500 CAPSULE ORAL at 12:01

## 2023-01-18 RX ADMIN — LIDOCAINE HYDROCHLORIDE 1 ML: 20 JELLY TOPICAL at 12:01

## 2023-01-18 NOTE — PROCEDURES
"Cystoscopy    Date/Time: 1/18/2023 11:20 AM  Performed by: Anaya Oropeza MD  Authorized by: Anaya Oropeza MD     Consent Done?:  Yes (Written)  Timeout: prior to procedure the correct patient, procedure, and site was verified    Prep: patient was prepped and draped in usual sterile fashion    Anesthesia:  Lidocaine jelly  Indications: recurrent UTIs    Position:  Dorsal lithotomy  Anesthesia:  Lidocaine jelly  Patient sedated?: No    Preparation: Patient was prepped and draped in usual sterile fashion    Scope type:  Flexible cystoscope  Stent inserted: No    Stent removed: No    External exam normal: Yes    Digital exam performed: No    Urethra normal: Yes    Bladder neck normal: Yes    Bladder normal: No (Similar appearing yellow "plaques" on the bladder wall, easily scraped free of bladder wall with minimal manipulation of scope. Non-bleeding. Less area than previous but still large area in L lateral, small in R lateral.)     patient tolerated the procedure well with no immediate complications  Comments:        Multi-resistant UTI in in 10/2022.   Cysto with similar appearance of "plaques" on bladder wall. Will monitor for now. If UTIs become more of an issue, will remove again in OR.  Still with frequency on Ditropan 10mg. Will increase to 15mg. Consider change in med.     "

## 2023-01-19 ENCOUNTER — HOSPITAL ENCOUNTER (OUTPATIENT)
Dept: RADIOLOGY | Facility: OTHER | Age: 78
Discharge: HOME | End: 2023-01-19
Attending: INTERNAL MEDICINE
Payer: MEDICARE

## 2023-01-19 DIAGNOSIS — N95.9 MENOPAUSAL PROBLEM: ICD-10-CM

## 2023-01-19 PROCEDURE — 77080 DXA BONE DENSITY AXIAL: CPT | Mod: 26,,, | Performed by: RADIOLOGY

## 2023-01-19 PROCEDURE — 77080 DXA BONE DENSITY AXIAL: CPT | Mod: TC

## 2023-01-19 PROCEDURE — 77080 DEXA BONE DENSITY SPINE HIP: ICD-10-PCS | Mod: 26,,, | Performed by: RADIOLOGY

## 2023-02-21 ENCOUNTER — TELEPHONE (OUTPATIENT)
Dept: INTERNAL MEDICINE | Facility: CLINIC | Age: 78
End: 2023-02-21
Payer: MEDICARE

## 2023-03-10 ENCOUNTER — TELEPHONE (OUTPATIENT)
Dept: NEUROLOGY | Facility: CLINIC | Age: 78
End: 2023-03-10
Payer: MEDICARE

## 2023-03-17 ENCOUNTER — LAB VISIT (OUTPATIENT)
Dept: LAB | Facility: OTHER | Age: 78
End: 2023-03-17
Attending: OBSTETRICS & GYNECOLOGY
Payer: MEDICARE

## 2023-03-17 ENCOUNTER — OFFICE VISIT (OUTPATIENT)
Dept: INTERNAL MEDICINE | Facility: CLINIC | Age: 78
End: 2023-03-17
Attending: INTERNAL MEDICINE
Payer: MEDICARE

## 2023-03-17 VITALS
WEIGHT: 175.5 LBS | HEIGHT: 64 IN | HEART RATE: 70 BPM | SYSTOLIC BLOOD PRESSURE: 126 MMHG | OXYGEN SATURATION: 95 % | BODY MASS INDEX: 29.96 KG/M2 | DIASTOLIC BLOOD PRESSURE: 77 MMHG

## 2023-03-17 DIAGNOSIS — E03.9 HYPOTHYROIDISM, UNSPECIFIED TYPE: ICD-10-CM

## 2023-03-17 DIAGNOSIS — J84.9 INTERSTITIAL LUNG DISEASE: ICD-10-CM

## 2023-03-17 DIAGNOSIS — C56.1 BORDERLINE SEROUS CYSTADENOMA OF RIGHT OVARY: ICD-10-CM

## 2023-03-17 DIAGNOSIS — J84.10 PULMONARY FIBROSIS: ICD-10-CM

## 2023-03-17 DIAGNOSIS — Z79.52 IMMUNOSUPPRESSION DUE TO CHRONIC STEROID USE: ICD-10-CM

## 2023-03-17 DIAGNOSIS — N39.0 RECURRENT UTI: ICD-10-CM

## 2023-03-17 DIAGNOSIS — E78.5 HYPERLIPIDEMIA, UNSPECIFIED HYPERLIPIDEMIA TYPE: ICD-10-CM

## 2023-03-17 DIAGNOSIS — I10 ESSENTIAL HYPERTENSION: Primary | ICD-10-CM

## 2023-03-17 DIAGNOSIS — D84.821 IMMUNOSUPPRESSION DUE TO CHRONIC STEROID USE: ICD-10-CM

## 2023-03-17 DIAGNOSIS — T38.0X5A IMMUNOSUPPRESSION DUE TO CHRONIC STEROID USE: ICD-10-CM

## 2023-03-17 LAB
CANCER AG125 SERPL-ACNC: 10 U/ML (ref 0–30)
CEA SERPL-MCNC: 5.6 NG/ML (ref 0–5)

## 2023-03-17 PROCEDURE — 99999 PR PBB SHADOW E&M-EST. PATIENT-LVL IV: CPT | Mod: PBBFAC,,, | Performed by: INTERNAL MEDICINE

## 2023-03-17 PROCEDURE — 99214 PR OFFICE/OUTPT VISIT, EST, LEVL IV, 30-39 MIN: ICD-10-PCS | Mod: S$PBB,,, | Performed by: INTERNAL MEDICINE

## 2023-03-17 PROCEDURE — 99214 OFFICE O/P EST MOD 30 MIN: CPT | Mod: PBBFAC | Performed by: INTERNAL MEDICINE

## 2023-03-17 PROCEDURE — 99999 PR PBB SHADOW E&M-EST. PATIENT-LVL IV: ICD-10-PCS | Mod: PBBFAC,,, | Performed by: INTERNAL MEDICINE

## 2023-03-17 PROCEDURE — 99214 OFFICE O/P EST MOD 30 MIN: CPT | Mod: S$PBB,,, | Performed by: INTERNAL MEDICINE

## 2023-03-17 PROCEDURE — 36415 COLL VENOUS BLD VENIPUNCTURE: CPT | Performed by: OBSTETRICS & GYNECOLOGY

## 2023-03-17 PROCEDURE — 82378 CARCINOEMBRYONIC ANTIGEN: CPT | Performed by: OBSTETRICS & GYNECOLOGY

## 2023-03-17 PROCEDURE — 86304 IMMUNOASSAY TUMOR CA 125: CPT | Performed by: OBSTETRICS & GYNECOLOGY

## 2023-03-17 NOTE — PROGRESS NOTES
Subjective:   Patient ID: Niurka Pedraza is a 78 y.o. female  Chief complaint:   No chief complaint on file.        HPI  Here for 4 month follow up:     - she is requesting to have cea level repeated - has f/u with specialist scheduled in a few weeks     Had ct scan and cscope - tub adenoma - to repeat in 3 years     Sister from [place] visited in November for first time - great visit and stayed for 1 month  - 80 years old     Patient was seen by Urology January 18, 2023 for cystoscopy due to recurrent urinary tract infections - cysto showed similar appearance of plaques or bladder wall and plan is to monitor for now and consider removal in OR if urinary tract infections progress  - ditropan increased to 15 mg    Hearing loss:    Followed up with ear nose and throat December 19, 2022 and plan is to recheck hearing in 1 year    Neuropathy:  Seen by Neurology December 19, 2022  - gabapentin - never took after read about pot side effects   Previously:   Idiopathic neuropathy:   Has appt with neuro in November  Previously:   emg test for numbness of feet and completed 3/15/2022 - wnl   Sx located in toes B and she reports that sx are progressing to mid foot bilaterally  Labs unremarkable and reviewed for PN work up today    She started to take fish oil and tuna to inc DHA    Restless leg syndrome:  Started on gabapentin but had side effects  - tried pickle juice - 2 tbs and sx of cramping resolved and stopped     Pulmonary fibrosis:  Seen by pulmonology November 21, 2022  Rheumatological eval has been negative for autoimmune condition  - at that appointment they discussed other medication options and patient deferred for now - plans to follow-up in 3 months  - of note oxygen saturation during ambulation decreased the patient did not qualify for oxygen at that time  - was on prednisone, Singulair, and azithromycin - however she stopped all meds now 6 months ago   Previously:   Interstitial lung disease on azithro and  "pdn:   Followed by pulm   - seen by rheum   - on azithro 3 times per week and montelukast daily and prednisone 2.5 mg TID  - Referred to rheum for opinion with +RF on labs     HTN:   controlled today - home readings stable   Taking Coreg 6.25 and irb 300  Previously:   - stopped Ofev in Jan 18th and bp improved - did not start coreg 12.5mg dose since bp improved  fter d/c this   - edema with amlodipine   - cough from lisinopril     Osteopenia:  Dexa 1/2023     Hypothyroidism:   Blaire levothyroxine   Tsh at goal    HM:  2nd shingrix     Gyn: Melissa  Gyn onc: Denis  Pulm: Hollie  ENT: Carroll nosebleeds   Urology: Sandip  Pod: Feliciano     Review of Systems    Objective:  Vitals:    03/17/23 1118 03/17/23 1159   BP: (!) 140/88 126/77   BP Location: Left arm    Patient Position: Sitting    Pulse: 70    SpO2: 95%    Weight: 79.6 kg (175 lb 7.8 oz)    Height: 5' 4" (1.626 m)        Body mass index is 30.12 kg/m².    Physical Exam  Vitals reviewed.   Constitutional:       Appearance: Normal appearance. She is well-developed.   HENT:      Head: Normocephalic and atraumatic.   Eyes:      Extraocular Movements: Extraocular movements intact.      Conjunctiva/sclera: Conjunctivae normal.   Neck:      Thyroid: No thyromegaly.   Cardiovascular:      Rate and Rhythm: Normal rate and regular rhythm.      Pulses: Normal pulses.      Heart sounds: Normal heart sounds.   Pulmonary:      Effort: Pulmonary effort is normal.      Breath sounds: Normal breath sounds.   Abdominal:      General: Bowel sounds are normal.      Palpations: Abdomen is soft.      Tenderness: There is no right CVA tenderness or left CVA tenderness.   Musculoskeletal:         General: No swelling or tenderness.      Cervical back: Neck supple.   Lymphadenopathy:      Cervical: No cervical adenopathy.   Skin:     General: Skin is warm and dry.      Capillary Refill: Capillary refill takes less than 2 seconds.   Neurological:      General: No focal deficit present.      " Mental Status: She is alert and oriented to person, place, and time. Mental status is at baseline.   Psychiatric:         Behavior: Behavior normal.         Thought Content: Thought content normal.       Assessment:  1. Essential hypertension    2. Borderline serous cystadenoma of right ovary    3. Immunosuppression due to chronic steroid use    4. Pulmonary fibrosis    5. Recurrent UTI    6. Interstitial lung disease    7. Hyperlipidemia, unspecified hyperlipidemia type    8. Hypothyroidism, unspecified type          Plan:  Diagnoses and all orders for this visit:    Essential hypertension  Stable cont med   Low salt diet     Borderline serous cystadenoma of right ovary  S/p surgical tx and followed by specialist     Immunosuppression due to chronic steroid use  Stable   Cont med per specialists     Pulmonary fibrosis  Stable   Cont f/u with specialists     Recurrent UTI  Stable   Followed by id and urology   S/p cystoscopy     Interstitial lung disease  Stable   Followed by pulm   Cont meds     Hyperlipidemia, unspecified hyperlipidemia type  Stable   Cont med     Hypothyroidism, unspecified type  Stable   Cont med     Health Maintenance   Topic Date Due    DEXA Scan  01/19/2026    Lipid Panel  06/30/2027    TETANUS VACCINE  07/21/2031    Hepatitis C Screening  Completed

## 2023-03-20 ENCOUNTER — TELEPHONE (OUTPATIENT)
Dept: GYNECOLOGIC ONCOLOGY | Facility: CLINIC | Age: 78
End: 2023-03-20
Payer: MEDICARE

## 2023-03-20 NOTE — TELEPHONE ENCOUNTER
Called patient to review lab results.   CEA has trended down 7.7 to 5.6. Imaging and colonoscopy have been negative. She is scheduled for a follow up visit. Will recommend continued follow up. No additional interventions for the CEA at this time.      No answer. Left voicemail.

## 2023-03-21 ENCOUNTER — OFFICE VISIT (OUTPATIENT)
Dept: NEUROLOGY | Facility: CLINIC | Age: 78
End: 2023-03-21
Payer: MEDICARE

## 2023-03-21 VITALS
DIASTOLIC BLOOD PRESSURE: 56 MMHG | HEART RATE: 85 BPM | SYSTOLIC BLOOD PRESSURE: 114 MMHG | BODY MASS INDEX: 29.71 KG/M2 | HEIGHT: 64 IN | WEIGHT: 174 LBS

## 2023-03-21 DIAGNOSIS — G62.9 NEUROPATHY: Primary | ICD-10-CM

## 2023-03-21 DIAGNOSIS — G25.81 RLS (RESTLESS LEGS SYNDROME): ICD-10-CM

## 2023-03-21 PROCEDURE — 99213 OFFICE O/P EST LOW 20 MIN: CPT | Mod: PBBFAC | Performed by: STUDENT IN AN ORGANIZED HEALTH CARE EDUCATION/TRAINING PROGRAM

## 2023-03-21 PROCEDURE — 99212 PR OFFICE/OUTPT VISIT, EST, LEVL II, 10-19 MIN: ICD-10-PCS | Mod: S$PBB,,, | Performed by: STUDENT IN AN ORGANIZED HEALTH CARE EDUCATION/TRAINING PROGRAM

## 2023-03-21 PROCEDURE — 99999 PR PBB SHADOW E&M-EST. PATIENT-LVL III: ICD-10-PCS | Mod: PBBFAC,,, | Performed by: STUDENT IN AN ORGANIZED HEALTH CARE EDUCATION/TRAINING PROGRAM

## 2023-03-21 PROCEDURE — 99212 OFFICE O/P EST SF 10 MIN: CPT | Mod: S$PBB,,, | Performed by: STUDENT IN AN ORGANIZED HEALTH CARE EDUCATION/TRAINING PROGRAM

## 2023-03-21 PROCEDURE — 99999 PR PBB SHADOW E&M-EST. PATIENT-LVL III: CPT | Mod: PBBFAC,,, | Performed by: STUDENT IN AN ORGANIZED HEALTH CARE EDUCATION/TRAINING PROGRAM

## 2023-03-21 NOTE — PROGRESS NOTES
Neurology Clinic Note      Date: 3/21/23  Patient Name: Niurka Pedraza   MRN: [id number]   PCP: Savana Anderson  Referring Provider: No ref. provider found    Assessment and Plan:   Niurka Pedraza is a 78 y.o. female presenting for evaluation of a progressive length-dependent neuropathy, consistent with small fibre neuropathy. Labs work has been normal.  Likely idiopathic.  I offered the option of a skin biopsy but also informed her that it is not guaranteed that it would provide a diagnosis or change her management.  She opted to defer any further testing for the moment.  No neuropathic agents indicated as she prefers to use electrolyte supplements.    RTC as needed.  She was also provided a disability sticker.        Problem List Items Addressed This Visit          Neuro    Neuropathy - Primary    RLS (restless legs syndrome)         Subjective:       Interval History (3/21/2023):  Patient presents today for follow-up.  She was last seen in December 2023.    She is doing well today.  No progression of her neuropathy.  Continues to deny any pain or weakness or falls.  Did not try the gabapentin as she was concerned about side effects.  She has been using electrolyte supplements (OTC) which has helped.    Her lab work has been normal with no apparent cause found for neuropathy.    HPI (12/19/2023):     Ms. Niurka Pedraza is a 78 y.o. female pwith a history of breast cancer s/p mastectomy, ILD, HLD, HTN, serous cystadenoma of the right ovary, presenting for evaluation of neuropathy.     Her symptoms began around 3 years ago in the lower extremities as tingling and numbness initially involving the toes. Symptoms have been slowly progressive and now both her feet are equally involved up to the ankles.  They occur at night but also She denies any pain or weakness.  No low back pain or radiating pain.  No bowel/bladder incontinence.  Over the last 6-7 months her fingertips have started to get involved.  She denies any neck  pain.  Over the last year, she has been feeling unsteady on her feet but denies any falls.  She does not feel she requires any assistive devices as yet.     Sleep is normal.  She does have a history of breast cancer and underwent mastectomy.  She has never been on any chemotherapeutic drugs.  No history of exposure to heavy metals.     Of note, she also reports cramping in her calves bilaterally for many years.  This is separate from the numbness and occurs only at night.  Relieved with movement.     She underwent a nerve conduction study in March 2022 which showed no evidence of large fiber involvement.     Labs were reviewed.  Vitamin B12 was normal in December 2021.  ESR was normal in June 2022.  No history of diabetes.    PAST MEDICAL HISTORY:  Past Medical History:   Diagnosis Date    Arthritis     Borderline serous cystadenoma of right ovary 4/3/2018    Breast cancer     mastectomy 2014    Coccidiomycosis, progressive     Elevated CA-125 2/25/2018    [place] (hard of hearing)     Hyperlipidemia     OAB (overactive bladder)     Osteopenia     on Dexa 11/2017    Osteoporosis     pt reports hx of this, declined fosamax in past - treated with calcium and vit D    Pelvic mass 2/25/2018    Pulmonary fibrosis     Pulmonary nodules     SOB (shortness of breath) on exertion     Thyroid disease     hypo    UTI (urinary tract infection)        PAST SURGICAL HISTORY:  Past Surgical History:   Procedure Laterality Date    CHOLECYSTECTOMY      COLONOSCOPY N/A 12/9/2022    Procedure: COLONOSCOPY;  Surgeon: Enrique Dominguez MD;  Location: [facility] ([address] [place] [address]);  Service: Endoscopy;  Laterality: N/A;  inst via portal  pt requests after 12/9/22-MS  11/30-pt notified of earlier arrival time-KPvt    CYSTOSCOPY WITH BIOPSY OF BLADDER Bilateral 7/27/2022    Procedure: CYSTOSCOPY, WITH BLADDER BIOPSY; retrograde pyelogram,;  Surgeon: Anaya Oropeza MD;  Location: [facility];  Service: Urology;  Laterality: Bilateral;    ENDOSCOPIC  ULTRASOUND OF UPPER GASTROINTESTINAL TRACT N/A 4/8/2021    Procedure: ULTRASOUND, UPPER GI TRACT, ENDOSCOPIC;  Surgeon: Aisha Barajas MD;  Location: [facility] (2ND FLR);  Service: Endoscopy;  Laterality: N/A;  UEUS/ERCP evaluation abn MRCP - Dr Carney  Covid-19 test 4/5/21 at [facility] Pre-admit - pg    ERCP N/A 4/8/2021    Procedure: ERCP (ENDOSCOPIC RETROGRADE CHOLANGIOPANCREATOGRAPHY);  Surgeon: Aisha Barajas MD;  Location: [facility] ENDO (2ND FLR);  Service: Endoscopy;  Laterality: N/A;  UEUS/ERCP evaluation abn MRCP - Dr Angelika Steinerid-19 test 4/5/21 at [facility] Pre-admit - pg    ESOPHAGOGASTRODUODENOSCOPY N/A 4/8/2021    Procedure: EGD (ESOPHAGOGASTRODUODENOSCOPY);  Surgeon: Aisha Barajas MD;  Location: [facility] (2ND FLR);  Service: Endoscopy;  Laterality: N/A;  UEUS/ERCP evaluation abn MRCP - Dr Carney  Covid-19 test 4/5/21 at [facility] Pre-admit - pg    HYSTERECTOMY      MASTECTOMY Right     2014       CURRENT MEDS:  Current Outpatient Medications   Medication Sig Dispense Refill    albuterol (VENTOLIN HFA) 90 mcg/actuation inhaler Inhale 1-2 puffs into the lungs every 6 (six) hours as needed for Wheezing or Shortness of Breath. Rescue 18 g 11    atorvastatin (LIPITOR) 20 MG tablet TAKE 1 TABLET BY MOUTH EVERY DAY 90 tablet 2    azithromycin (Z-SEB) 250 MG tablet Take 1 tablet (250 mg total) by mouth 3 (three) times a week. (Patient not taking: Reported on 3/17/2023) 12 tablet 11    budesonide-formoterol 80-4.5 mcg (SYMBICORT) 80-4.5 mcg/actuation HFAA Inhale 2 puffs into the lungs 2 (two) times daily as needed. Controller (Patient not taking: Reported on 6/24/2022) 1 Inhaler 6    calcium-vitamin D3 (CALCIUM 500 + D) 500 mg(1,250mg) -200 unit per tablet Take 1 tablet by mouth 2 (two) times daily with meals.      carvediloL (COREG) 6.25 MG tablet TAKE 1 TABLET BY MOUTH TWICE A DAY WITH FOOD 180 tablet 3    estradioL (ESTRACE) 0.01 % (0.1 mg/gram) vaginal cream Place 1 g vaginally twice a week.  42.5 g 11    irbesartan (AVAPRO) 300 MG tablet Take 1 tablet (300 mg total) by mouth every evening. 90 tablet 3    levothyroxine (SYNTHROID) 50 MCG tablet TAKE 1 TABLET BY MOUTH BEFORE BREAKFAST. 90 tablet 1    montelukast (SINGULAIR) 10 mg tablet TAKE 1 TABLET BY MOUTH EVERY DAY IN THE EVENING (Patient not taking: Reported on 3/17/2023) 90 tablet 2    multivitamin (THERAGRAN) per tablet Take 1 tablet by mouth once daily.      oxybutynin (DITROPAN XL) 15 MG TR24 Take 1 tablet (15 mg total) by mouth once daily. 30 tablet 11    predniSONE (DELTASONE) 1 MG tablet Take 2 tablets (2 mg total) by mouth once daily. 180 tablet 3     No current facility-administered medications for this visit.       ALLERGIES:  Review of patient's allergies indicates:   Allergen Reactions    Ciprofloxacin Other (See Comments)     Right foot pain and edema, tendonitis    Amlodipine Edema and Swelling     BLE  BLE    Lisinopril Other (See Comments)     cough    Nitrofuran analogues        FAMILY HISTORY:  Family History   Problem Relation Age of Onset    Cancer Mother         colon cancer    Breast cancer Mother     Hypertension Father     Heart disease Father     Stroke Father     No Known Problems Sister     Cancer Brother         lung, ++ tobacco    Hypertension Brother     No Known Problems Daughter     Hypertension Son     Colon cancer Neg Hx     Ovarian cancer Neg Hx        SOCIAL HISTORY:  Social History     Tobacco Use    Smoking status: Former     Packs/day: 0.50     Years: 30.00     Pack years: 15.00     Types: Cigarettes     Quit date: 2000     Years since quittin.1    Smokeless tobacco: Never    Tobacco comments:     quit in her 50s, after 30 years smoking;   Substance Use Topics    Alcohol use: No    Drug use: No       Review of Systems:  12 system review of systems is negative except for the symptoms mentioned in HPI.      Objective:   There were no vitals filed for this visit.  General: NAD, well nourished   Eyes: no  tearing, discharge, no erythema   Neck: Supple, full range of motion  Cardiovascular: Warm and well perfuse  Lungs: Normal work of breathing  Skin: No rash, lesions, or breakdown on exposed skin  Psychiatry: Mood and affect are appropriate       MENTAL STATUS   Oriented to person.   Oriented to place.   Oriented to time.   Follows 2 step commands.   Speech: speech is normal   Level of consciousness: alert     CRANIAL NERVES      CN II   Visual fields full to confrontation.      CN III, IV, VI   Extraocular motions are normal.   Ophthalmoparesis: none     CN V   Facial sensation intact.      CN VII   Facial expression full, symmetric.      CN VIII   CN VIII normal.      CN XI   CN XI normal.      CN XII   CN XII normal.      MOTOR EXAM   Overall muscle tone: normal  Right arm pronator drift: absent  Left arm pronator drift: absent     Strength   Right deltoid: 5/5  Left deltoid: 5/5  Right biceps: 5/5  Left biceps: 5/5  Right triceps: 5/5  Left triceps: 5/5  Right wrist flexion: 5/5  Left wrist flexion: 5/5  Right wrist extension: 5/5  Left wrist extension: 5/5  Right interossei: 5/5  Left interossei: 5/5  Right iliopsoas: 5/5  Left iliopsoas: 5/5  Right quadriceps: 5/5  Left quadriceps: 5/5  Right hamstrin/5  Left hamstrin/5  Right glutei: 5/5  Left glutei: 5/5  Right anterior tibial: 5/5  Left anterior tibial: 5/5  Right posterior tibial: 5/5  Left posterior tibial: 5/5  Right peroneal: 5/5  Left peroneal: 5/5  Right gastroc: 5/5  Left gastroc: 5/5       5/5 in all muscle groups of upper and lower extremities.      REFLEXES      Reflexes   Right brachioradialis: 2+  Left brachioradialis: 2+  Right biceps: 2+  Left biceps: 2+  Right triceps: 2+  Left triceps: 2+  Right patellar: 2+  Left patellar: 2+  Right achilles: 1+  Left achilles: 1+  Right plantar: normal  Left plantar: normal  Right Ram: absent  Left Ram: absent  Right ankle clonus: absent  Left ankle clonus: absent     SENSORY EXAM   Light  touch normal.   Right leg vibration: decreased from ankle  Left leg vibration: decreased from ankle  Proprioception normal.   Pinprick normal.   Romberg: negative     GAIT AND COORDINATION      Coordination   Finger to nose coordination: normal  Heel to shin coordination: normal     Tremor   Resting tremor: absent  Intention tremor: absent       Mild ataxic gait.           Umang Canales MD  Department of Neurology  [facility]

## 2023-03-22 NOTE — PROGRESS NOTES
Subjective:       Patient ID: Niurka Pedraza is a 78 y.o. female.    Chief Complaint: Follow-up (Check/clean ears and update audiogram.)    Last seen as a new patient 07/21/2021 with history as follows:     She is a new patient for me here today to have her ears and hearing checked.  Reports gradual onset of right-sided tinnitus beginning about 5 years ago.  No antecedent acoustical trauma or head trauma.  No associated fullness, hearing fluctuation, spinning.  Evaluated in [place] and found right-sided hearing loss.  States had workup with imaging studies including MRI and all negative.  Tried right hearing aid in the past but did not like it.  Then last year lost hearing completely on the right.  Again no other otologic or neurologic symptoms or antecedent trigger.  Her concern now is her left ear function.  States can hear AS, but wonders if some decline in hearing AS.  No prior otologic history otherwise.  No acoustical trauma.  No family history of otologic disease.  No nasal or throat or other otolaryngologic complaints.  Medical history includes hypertension, pulmonary coccidioidomycosis, ILD, hypothyroidism on replacement.    Interval history:      At her last visit bilateral cerumen impactions were cleared and audiogram was subsequently performed.  This demonstrated profound non serviceable hearing loss right significantly worse right than prior audiogram done 12/22/2017, as well as left ear hearing ranging from borderline normal to mild sensorineural hearing loss.  Amplification options were reviewed and hearing aid consultation with audiology which she declined.  Also discussed re-evaluation with MRI given the change and lack of prior reports which she also declined.  Hearing protection was reviewed and follow-up one year and sooner if change or problems.  Referred here today by PCP to follow-up on all of this.  Not aware of any change in hearing versus possibly some decline AS.  No acoustical trauma.  No  otalgia, otorrhea, fluctuations, fullness, spinning.  No other otologic or otolaryngologic complaints.  Saw Dr. Carroll in early 2022 for epistaxis with no further bleeding since then.        Review of Systems     Constitutional: Negative for fever.      HENT: Positive for hearing loss and ringing in the ears.  Negative for ear discharge, ear pain, sore throat, stuffy nose, trouble swallowing and voice change.      Respiratory:  Negative for shortness of breath.      Cardiovascular:  Negative for chest pain.     Gastrointestinal:  Negative for abdominal pain.     Musc: Positive for aching joints.     Neurological: Negative for dizziness.      Hematologic: Negative for swollen glands.              Objective:        Vitals:    12/19/22 1511   BP: 120/77   Pulse: 100   Temp: 97.9 °F (36.6 °C)     Body mass index is 29.87 kg/m².  Physical Exam  Constitutional:       General: She is not in acute distress.     Appearance: She is well-developed.   HENT:      Head: Normocephalic and atraumatic.      Right Ear: Tympanic membrane and external ear normal. There is impacted cerumen.      Left Ear: Tympanic membrane and external ear normal. There is impacted cerumen.      Nose: No nasal deformity, mucosal edema or rhinorrhea.      Mouth/Throat:      Mouth: Mucous membranes are moist.      Pharynx: No pharyngeal swelling, oropharyngeal exudate or posterior oropharyngeal erythema.   Neck:      Trachea: Phonation normal.   Pulmonary:      Effort: Pulmonary effort is normal. No respiratory distress.   Lymphadenopathy:      Cervical: No cervical adenopathy.   Skin:     General: Skin is warm and dry.   Neurological:      Mental Status: She is alert and oriented to person, place, and time.   Psychiatric:         Speech: Speech normal.         Behavior: Behavior normal.       Tests / Results:                          Assessment:       1. Bilateral hearing loss, unspecified hearing loss type    2. Asymmetrical sensorineural hearing loss     3. Profound hearing loss of right ear    4. Bilateral impacted cerumen          Plan:       Ears cleaned as per procedure note.    See procedure note.      Above audiogram was subsequently performed and discussed/reviewed and compared to prior.  This demonstrates stable thresholds AS with continued excellent speech discrimination AS and continued profound non serviceable hearing loss right.  Declines further evaluation and amplification trial.  Understands/reviewed hearing protection.  Follow-up one year for cleaning and audiogram unless symptoms or change or problems prior.

## 2023-03-22 NOTE — PROCEDURES
Ear Cerumen Removal    Date/Time: 12/19/2022 3:00 PM  Performed by: Charlotte العراقي MD  Authorized by: Charlotte العراقي MD     Consent Done?:  Yes (Verbal)  Location details:  Both ears  Cerumen  Removal Results:  Cerumen completely removed  Patient tolerance:  Patient tolerated the procedure well with no immediate complications     The ear canals are obstructed by adherent cerumen, right greater than left, cleared with a combination of micro instrumentation and tolerated well and otherwise canals and TMs within normal limits AU.  Audiogram subsequently performed.

## 2023-04-19 ENCOUNTER — HOSPITAL ENCOUNTER (OUTPATIENT)
Dept: RADIOLOGY | Facility: OTHER | Age: 78
Discharge: HOME | End: 2023-04-19
Attending: INTERNAL MEDICINE
Payer: MEDICARE

## 2023-04-19 DIAGNOSIS — R93.89 ABNORMAL CT OF THE CHEST: ICD-10-CM

## 2023-04-19 PROCEDURE — 71046 X-RAY EXAM CHEST 2 VIEWS: CPT | Mod: TC,FY

## 2023-04-19 PROCEDURE — 71046 X-RAY EXAM CHEST 2 VIEWS: CPT | Mod: 26,,, | Performed by: RADIOLOGY

## 2023-04-19 PROCEDURE — 71046 XR CHEST PA AND LATERAL: ICD-10-PCS | Mod: 26,,, | Performed by: RADIOLOGY

## 2023-04-23 DIAGNOSIS — E03.9 HYPOTHYROIDISM, UNSPECIFIED TYPE: ICD-10-CM

## 2023-04-23 NOTE — TELEPHONE ENCOUNTER
Care Due:                  Date            Visit Type   Department     Provider  --------------------------------------------------------------------------------                                EP -                              PRIMARY      [facility] INTERNAL  Last Visit: 03-      CARE ([facility])   MEDICINE       Savana Anderson                              EP -                              PRIMARY      [facility] INTERNAL  Next Visit: 07-      CARE ([facility])   MEDICINE       Savana Anderson                                                            Last  Test          Frequency    Reason                     Performed    Due Date  --------------------------------------------------------------------------------    Lipid Panel.  12 months..  atorvastatin.............  06- 06-    [employer] Embedded Care Gaps. Reference number: [id number]. 4/23/2023   8:54:54 AM CDT

## 2023-04-24 RX ORDER — LEVOTHYROXINE SODIUM 50 UG/1
50 TABLET ORAL
Qty: 90 TABLET | Refills: 1 | Status: SHIPPED | OUTPATIENT
Start: 2023-04-24 | End: 2023-12-04 | Stop reason: SDUPTHER

## 2023-04-24 NOTE — TELEPHONE ENCOUNTER
Provider Staff:  Action required for this patient     Please see care gap opportunities below in Care Due Message.    Thanks!  [facility]     Appointments      Date Provider   Last Visit   3/17/2023 Savana Anderson MD   Next Visit   7/18/2023 Savana Anderson MD     Refill Decision Note   Niurka Pedraza  is requesting a refill authorization.  Brief Assessment and Rationale for Refill:  Approve     Medication Therapy Plan:         Comments:     Note composed:10:41 AM 04/24/2023             Appointments     Last Visit   3/17/2023 Savana Anderson MD   Next Visit   7/18/2023 Savana Anderson MD

## 2023-04-26 PROBLEM — R91.1 LUNG NODULE: Status: ACTIVE | Noted: 2023-04-26

## 2023-05-03 ENCOUNTER — LAB VISIT (OUTPATIENT)
Dept: LAB | Facility: OTHER | Age: 78
End: 2023-05-03
Attending: OBSTETRICS & GYNECOLOGY
Payer: MEDICARE

## 2023-05-03 ENCOUNTER — OFFICE VISIT (OUTPATIENT)
Dept: GYNECOLOGIC ONCOLOGY | Facility: CLINIC | Age: 78
End: 2023-05-03
Payer: MEDICARE

## 2023-05-03 VITALS
WEIGHT: 169 LBS | BODY MASS INDEX: 28.85 KG/M2 | HEART RATE: 83 BPM | HEIGHT: 64 IN | DIASTOLIC BLOOD PRESSURE: 66 MMHG | SYSTOLIC BLOOD PRESSURE: 116 MMHG

## 2023-05-03 DIAGNOSIS — R97.0 ELEVATED CEA: ICD-10-CM

## 2023-05-03 DIAGNOSIS — C56.1 BORDERLINE SEROUS CYSTADENOMA OF RIGHT OVARY: ICD-10-CM

## 2023-05-03 DIAGNOSIS — C56.1 BORDERLINE SEROUS CYSTADENOMA OF RIGHT OVARY: Primary | ICD-10-CM

## 2023-05-03 LAB
CANCER AG125 SERPL-ACNC: 21 U/ML (ref 0–30)
CEA SERPL-MCNC: 6.3 NG/ML (ref 0–5)

## 2023-05-03 PROCEDURE — 99999 PR PBB SHADOW E&M-EST. PATIENT-LVL III: CPT | Mod: PBBFAC,,, | Performed by: OBSTETRICS & GYNECOLOGY

## 2023-05-03 PROCEDURE — 86304 IMMUNOASSAY TUMOR CA 125: CPT | Performed by: OBSTETRICS & GYNECOLOGY

## 2023-05-03 PROCEDURE — 99214 PR OFFICE/OUTPT VISIT, EST, LEVL IV, 30-39 MIN: ICD-10-PCS | Mod: S$PBB,,, | Performed by: OBSTETRICS & GYNECOLOGY

## 2023-05-03 PROCEDURE — 99213 OFFICE O/P EST LOW 20 MIN: CPT | Mod: PBBFAC | Performed by: OBSTETRICS & GYNECOLOGY

## 2023-05-03 PROCEDURE — 99999 PR PBB SHADOW E&M-EST. PATIENT-LVL III: ICD-10-PCS | Mod: PBBFAC,,, | Performed by: OBSTETRICS & GYNECOLOGY

## 2023-05-03 PROCEDURE — 99214 OFFICE O/P EST MOD 30 MIN: CPT | Mod: S$PBB,,, | Performed by: OBSTETRICS & GYNECOLOGY

## 2023-05-03 PROCEDURE — 36415 COLL VENOUS BLD VENIPUNCTURE: CPT | Performed by: OBSTETRICS & GYNECOLOGY

## 2023-05-03 PROCEDURE — 82378 CARCINOEMBRYONIC ANTIGEN: CPT | Performed by: OBSTETRICS & GYNECOLOGY

## 2023-05-03 NOTE — PROGRESS NOTES
Subjective:       Niurka Pedraza is a 75 y.o. female who is an established patient with [place] urologist, Dr Dhaliwal,  was seen for evaluation of UTI.      She was seen by another urologist for recurrent UTIs/dysuria. Multiple +UCx with different bacteria (E coli, Enterococcus). She has had negative cystoscopy and GONZÁLEZ (1/17). UTIs return soon after treatment. She was started on prophy Keflex in 7/17.     She saw PCP in 11/17 with complaints of UTI. UCx was negative. She now feels a constant pressure in SP area and c/o pain with walking. She also notes pain worse with movement (like hitting a bump when driving). Denies dysuria. Increased frequency to now q1h. Present for 2 months. Denies constipation, occasional diarrhea. Frequent RADHA. Now with UUI. Also with BOBBI - increased coughing with recent lung issue. Nocturia x 4-5. Denies gross hematuria.     She moved here from [place] in 9/17. She requested to be started on abx prophy starting 1/18 (Keflex 250mg).     PVR (bladder scan) initial visit - 32cc, 0cc.    CT uro - no  abnormalities. Lung nodules - followed by pulmonary. Ovarian cyst - referred to gyn (now s/p DARCI-BSO for ovarian cancer).     She has been doing great with the Ditropan - she is very pleased. Was on abx prophy (Keflex) 1/18 x 9 months. Taking probiotics that is helping.     She [place] U[place] - took Bactrim. Now off prophylactic abx - more frequency, urgency, nocturia, SP pressure. No significant dysuria.     UTIs have become more frequent. Symptoms returning quickly. Prophylactic abx were restarted (Macrobid 50mg).     9/4/2019  She recently finished course of abx but symptoms are now returning just 5 days later. Dysuria improved but pressure and frequency worsened.     1/15/2020  Increase Ditropan to 10mg. Taking Macrobid QHS, started in 9/19. No symptoms today.     8/26/2020  Several recent breakthrough UTIs. Finished abx 2 weeks ago, symptoms have returned.  "Currently on Macrobid. UTI symptoms - frequency, dysuria.   PVR (bladder scan) today - 21cc    Cysto 5/19 - normal, heavy sediment in bladder    GONZÁLEZ 4/19 - wnl, PVR 31cc     UCx:  7/20 - >100k E coli, 10-49k Proteus  7/20 - >100k Proteus   5/20 - 10-49k Klebsiella  9/19 - >100k Klebsiella  8/19 - >100k Klebsiella  6/19 - >100k E coli  5/19 - neg  5/19 - >100k E coli  3/19 - >100k E coli  2/19 - >100k E coli  1/19 - >100k E coli  11/18 - >100k E coli  8/18 - contaminant  7/18 - 50-99k Enterobacter (treated with Bactrim)  11/17 - neg  10/17 - >100k E coli  10/17- >100k Enterococcus  7/17 - >100k E coli  5/17- >100k E coli  5/17 - neg  4/17- 50-99k Enterococcus  3/17 - >100k E coli  12/16 - neg    PVR (bladder scan) last visit - 33cc, 50cc, 0cc      The following portions of the patient's history were reviewed and updated as appropriate: allergies, current medications, past family history, past medical history, past social history, past surgical history and problem list.     Review of Systems  Constitutional: no fever or chills  ENT: no nasal congestion or sore throat  Respiratory: no cough or shortness of breath  Cardiovascular: no chest pain or palpitations  Gastrointestinal: no nausea or vomiting, tolerating diet  Genitourinary: as per HPI  Hematologic/Lymphatic: no easy bruising or lymphadenopathy  Musculoskeletal: no arthralgias or myalgias  Skin: no rashes or lesions  Neurological: no seizures or tremors  Behavioral/Psych: no auditory or visual hallucinations        Objective:    Vitals: /76 (BP Location: Right arm, Patient Position: Sitting, BP Method: Medium (Automatic))   Pulse 83   Ht 5' 4" (1.626 m)   Wt 79.8 kg (176 lb)   LMP 02/08/2000   BMI 30.21 kg/m²     Physical Exam   General: well developed, well nourished in no acute distress  Head: normocephalic, atraumatic  Neck: supple, trachea midline, no obvious enlargement of thyroid  HEENT: EOMI, mucus membranes moist, sclera anicteric, no hearing " impairment  Lungs: symmetric expansion, non-labored breathing  Cardiovascular: regular rate and rhythm, normal pulses  Abdomen: soft, non tender, non distended, no palpable masses, no hepatosplenomegaly, no hernias, no CVA tenderness  Musculoskeletal: no peripheral edema, normal ROM in bilateral upper and lower extremities  Lymphatics: no cervical or inguinal lymphadenopathy  Skin: no rashes or lesions  Neuro: alert and oriented x 3, no gross deficits  Psych: normal judgment and insight, normal mood/affect and non-anxious  Genitourinary:   patient declined exam      Lab Review   Urine analysis today in clinic shows - +LE, +nit, 5-10 RBCs (+symptomatic)    Lab Results   Component Value Date    WBC 7.70 08/11/2020    HGB 12.4 08/11/2020    HCT 39.4 08/11/2020    MCV 89 08/11/2020     08/11/2020     Lab Results   Component Value Date    CREATININE 0.9 08/11/2020    BUN 24 (H) 08/11/2020       Imaging  Images and reports were personally reviewed by me and discussed with patient  GONZÁLEZ reviewed       Assessment/Plan:      1. Recurrent UTI    - Discussed UTI prevention strategies.   - Adequate hydration.   - Double voiding. Consider timed voiding.    - Avoid constipation.   - GONZÁLEZ - negative 1/17   - Cystoscopy - negative in 1/17 (by another urologist)   - Cranberry/probiotics.   - Estrace cream 2x weekly - will consider in near future (now with ovarian ca)   - Call with UTI symptoms so UA/UCx can be sent.    - Abx prophy (started 1/18) - Keflex 250mg took for 9 months, stopped 9/18     - Multiple UTIs since stopping prophy, now with UTIs ON prophy   - Restarted prophy - Macrobid QHS, restarted 9/19 - several UTIs sensitive to prophy   - Repeat workup - GONZÁLEZ and cysto   - GONZÁLEZ 4/19 - wnl   - Cysto 5/19 - wnl   - Continue Estrace (approved by gyn onc). Instructed on use.    - Ellura trial, D-mannose supplement     - CT urogram now   - Change ppx based on this UCx. Empiric Keflex sent (treatment dose)   - Again encouraged  Estrace     2. Microhematuria    - Discussed etiology and workup of hematuria   - UCx - results above   - Cytology - previously negative   - CT urogram 12/17 - no  abnormalities. GONZÁLEZ was negative from 1/17.   - Office cystoscopy - negative 1/17, 5/19        3. Nocturia/frequency/urge incontinence   - Ditropan XL 10mg     Plan to stop prophy abx in 3/20  Monistat OTC for yeast PRN      Follow up in 3-4 weeks     No

## 2023-05-03 NOTE — PROGRESS NOTES
Subjective     Patient ID: Niurka Pedraza is a 78 y.o. female.    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 Without complaints or concerns. Specifically denies N/V, pain, swelling, bleeding, unexpected weight change, SOB, problems with bowel function.   Disease free interval 5 years   -10   CEA 5.6, previously 7.3 (elevated)> 7.7> 5.3> 6.3    Work up for elevated CEA  CT 2022 no obvious evidence of disease  Colonoscopy 2022 negative.   ___________________________   History:  Patient is a 73yo female originally referred by Dr. Linette Torres for pelvic mass and elevated . Patient was seen by her urologist for recurrent UTIs and imaging was ordered.      Imaging reviewed:   CT urogram abd/pelvis 17  Impression   Bilateral pulmonary nodules, largest measuring 1.5 cm at the right lower lobe. Comparison with any prior cross-sectional imaging would be beneficial. Close interval followup, PET CT, and/or biopsy may be considered for further evaluation.    Complex cystic lesion in the right adnexa. Pelvic ultrasound would be beneficial for further evaluation.    Focal dilated biliary radical in segment 7. This is of uncertain significance. There are no concerning hepatic lesions.     Pelvic US 18  Impression    Complex cystic lesion measuring 3.8 cm right ovary corresponds to abnormality seen on CT. While this may represent a hemorrhagic cyst in light of postmenopausal status Clinical correlation and further characterization with MRI and surgical consultation advised.    Left ovary not seen.     UT 5.5cm       41 elevated. CEA 4.8.      Personal history of breast cancer , R mastectomy, no adjuvant treatment.    MMG 2017 WNLs.      Prior abdominal surgeries include cholecystectomy.  x 3. Family history mother with breast cancer, no ovarian, uterine, or colon cancer.       S/p RTLH/BSO/BPLND/OMX 3/23/18. Uncomplicated post op course. 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           Review of Systems   Constitutional:  Negative for chills and fever.   HENT:  Negative for mouth sores.    Respiratory:  Negative for chest tightness and shortness of breath.    Cardiovascular:  Negative for chest pain.   Gastrointestinal:  Negative for abdominal distention, nausea and vomiting.   Genitourinary:  Negative for dysuria, hematuria, pelvic pain, vaginal bleeding and vaginal discharge.   Neurological:  Negative for syncope and weakness.        Objective     Physical Exam  Vitals reviewed. Exam conducted with a chaperone present.   Constitutional:       Appearance: Normal appearance.   HENT:      Head: Normocephalic and atraumatic.   Eyes:      Extraocular Movements: Extraocular movements intact.      Conjunctiva/sclera: Conjunctivae normal.      Pupils: Pupils are equal, round, and reactive to light.   Pulmonary:      Effort: Pulmonary effort is normal. No respiratory distress.   Abdominal:      General: There is no distension.      Palpations: Abdomen is soft.      Tenderness: There is no abdominal tenderness.   Genitourinary:     Vagina: Normal.      Uterus: Absent.       Adnexa:         Right: No mass.          Left: No mass.     Musculoskeletal:         General: Normal range of motion.   Lymphadenopathy:      Lower Body: No right inguinal adenopathy. No left inguinal adenopathy.   Skin:     General: Skin is warm and dry.   Neurological:      General: No focal deficit present.      Mental Status: She is alert and oriented to person, place, and time. Mental status is at baseline.   Psychiatric:         Mood and Affect: Mood normal.         Behavior: Behavior normal.         Thought Content: Thought content normal.         Judgment: Judgment normal.        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 CEA slightly elevated 6.3, work up unremarkable. Will reach out GI to discuss further work up.          Otherwise RTC 6 months with tumor markers or sooner if needed     I spent approximately 30 minutes reviewing the available records and evaluating the patient, out of which over 50% of the time was spent face to face with the patient in counseling and coordinating this patient's care.

## 2023-05-08 ENCOUNTER — PES CALL (OUTPATIENT)
Dept: ADMINISTRATIVE | Facility: CLINIC | Age: 78
End: 2023-05-08
Payer: MEDICARE

## 2023-05-08 ENCOUNTER — TELEPHONE (OUTPATIENT)
Dept: GASTROENTEROLOGY | Facility: CLINIC | Age: 78
End: 2023-05-08
Payer: MEDICARE

## 2023-05-09 ENCOUNTER — TELEPHONE (OUTPATIENT)
Dept: GYNECOLOGIC ONCOLOGY | Facility: CLINIC | Age: 78
End: 2023-05-09
Payer: MEDICARE

## 2023-05-09 ENCOUNTER — TELEPHONE (OUTPATIENT)
Dept: ENDOSCOPY | Facility: HOSPITAL | Age: 78
End: 2023-05-09
Payer: MEDICARE

## 2023-05-09 DIAGNOSIS — D27.9: Primary | ICD-10-CM

## 2023-05-09 DIAGNOSIS — I10 HYPERTENSION, BENIGN: ICD-10-CM

## 2023-05-09 RX ORDER — IRBESARTAN 300 MG/1
300 TABLET ORAL NIGHTLY
Qty: 90 TABLET | Refills: 3 | Status: SHIPPED | OUTPATIENT
Start: 2023-05-09 | End: 2023-11-27 | Stop reason: SDUPTHER

## 2023-05-09 NOTE — TELEPHONE ENCOUNTER
----- Message from Patti Singh sent at 5/9/2023  3:48 PM CDT -----  Name of Who is Calling: NEETA PINTO [[id number]]            What is the request in detail: Patient is requesting call back              Can the clinic reply by [facility]: no              [facility]: [phone]

## 2023-05-09 NOTE — TELEPHONE ENCOUNTER
Refill Routing Note   Medication(s) are not appropriate for processing by [facility] for the following reason(s):      No active prescription written by PCP    [facility] action(s):  Defer None identified          Appointments  past 12m or future 3m with PCP    Date Provider   Last Visit   3/17/2023 Savana Anderson MD   Next Visit   5/17/2023 Savana Anderson MD   ED visits in past 90 days: 0        Note composed:5:22 PM 05/09/2023

## 2023-05-09 NOTE — TELEPHONE ENCOUNTER
"Procedure: EGD     Diagnosis:   She has  history of seromucinous borderline tumor of the ovary. We generally follow  and CEA tumor markers. Her CEA recently became slightly elevated. CT imaging negative and colonoscopy negative.     Talita Barrios       Procedure Timin-4 weeks     *If within 4 weeks selected, please laura as high priority*     *If greater than 12 weeks, please select "5-12 weeks" and delay sending until 2 months prior to requested date*     Provider: Myself     Location: [address]     Additional Scheduling Information: no     Prep Specifications:Standard prep     Have you attached a patient to this message: yes   "

## 2023-05-09 NOTE — TELEPHONE ENCOUNTER
No care due was identified.  [facility] Embedded Care Due Messages. Reference number: [id number].   5/09/2023 9:50:58 AM CDT

## 2023-05-11 ENCOUNTER — PES CALL (OUTPATIENT)
Dept: ADMINISTRATIVE | Facility: CLINIC | Age: 78
End: 2023-05-11
Payer: MEDICARE

## 2023-05-15 ENCOUNTER — HOSPITAL ENCOUNTER (OUTPATIENT)
Dept: RADIOLOGY | Facility: OTHER | Age: 78
Discharge: HOME | End: 2023-05-15
Attending: INTERNAL MEDICINE
Payer: MEDICARE

## 2023-05-15 DIAGNOSIS — R05.3 CHRONIC COUGH: ICD-10-CM

## 2023-05-15 PROCEDURE — 71250 CT THORAX DX C-: CPT | Mod: TC

## 2023-05-15 PROCEDURE — 71250 CT CHEST WITHOUT CONTRAST: ICD-10-PCS | Mod: 26,,, | Performed by: RADIOLOGY

## 2023-05-15 PROCEDURE — 71250 CT THORAX DX C-: CPT | Mod: 26,,, | Performed by: RADIOLOGY

## 2023-05-16 RX ORDER — FLUTICASONE FUROATE AND VILANTEROL TRIFENATATE 100; 25 UG/1; UG/1
1 POWDER RESPIRATORY (INHALATION) DAILY
Qty: 1 EACH | Refills: 11 | Status: SHIPPED | OUTPATIENT
Start: 2023-05-16 | End: 2023-05-17 | Stop reason: SDUPTHER

## 2023-05-16 RX ORDER — AZITHROMYCIN 250 MG/1
250 TABLET, FILM COATED ORAL DAILY
Qty: 30 TABLET | Refills: 11 | Status: ON HOLD | OUTPATIENT
Start: 2023-05-16 | End: 2023-05-26 | Stop reason: HOSPADM

## 2023-05-17 ENCOUNTER — OFFICE VISIT (OUTPATIENT)
Dept: INTERNAL MEDICINE | Facility: CLINIC | Age: 78
End: 2023-05-17
Attending: INTERNAL MEDICINE
Payer: MEDICARE

## 2023-05-17 DIAGNOSIS — R05.9 COUGH, UNSPECIFIED TYPE: Primary | ICD-10-CM

## 2023-05-17 DIAGNOSIS — J84.9 INTERSTITIAL LUNG DISEASE: ICD-10-CM

## 2023-05-17 DIAGNOSIS — I10 ESSENTIAL HYPERTENSION: ICD-10-CM

## 2023-05-17 DIAGNOSIS — N39.0 RECURRENT UTI: ICD-10-CM

## 2023-05-17 DIAGNOSIS — E03.9 HYPOTHYROIDISM, UNSPECIFIED TYPE: ICD-10-CM

## 2023-05-17 DIAGNOSIS — R73.9 HYPERGLYCEMIA: ICD-10-CM

## 2023-05-17 DIAGNOSIS — E78.5 HYPERLIPIDEMIA, UNSPECIFIED HYPERLIPIDEMIA TYPE: ICD-10-CM

## 2023-05-17 DIAGNOSIS — G62.9 PERIPHERAL POLYNEUROPATHY: ICD-10-CM

## 2023-05-17 DIAGNOSIS — G62.9 NEUROPATHY: ICD-10-CM

## 2023-05-17 DIAGNOSIS — R30.0 DYSURIA: ICD-10-CM

## 2023-05-17 PROCEDURE — 99999 PR PBB SHADOW E&M-EST. PATIENT-LVL IV: ICD-10-PCS | Mod: PBBFAC,,, | Performed by: INTERNAL MEDICINE

## 2023-05-17 PROCEDURE — 99999 PR PBB SHADOW E&M-EST. PATIENT-LVL IV: CPT | Mod: PBBFAC,,, | Performed by: INTERNAL MEDICINE

## 2023-05-17 PROCEDURE — 99215 OFFICE O/P EST HI 40 MIN: CPT | Mod: S$PBB,,, | Performed by: INTERNAL MEDICINE

## 2023-05-17 PROCEDURE — 99214 OFFICE O/P EST MOD 30 MIN: CPT | Mod: PBBFAC | Performed by: INTERNAL MEDICINE

## 2023-05-17 PROCEDURE — 99215 PR OFFICE/OUTPT VISIT, EST, LEVL V, 40-54 MIN: ICD-10-PCS | Mod: S$PBB,,, | Performed by: INTERNAL MEDICINE

## 2023-05-17 RX ORDER — PANTOPRAZOLE SODIUM 20 MG/1
20 TABLET, DELAYED RELEASE ORAL DAILY
Qty: 30 TABLET | Refills: 2 | Status: SHIPPED | OUTPATIENT
Start: 2023-05-17 | End: 2023-10-25 | Stop reason: SDUPTHER

## 2023-05-17 RX ORDER — GABAPENTIN 100 MG/1
100 CAPSULE ORAL NIGHTLY
Qty: 30 CAPSULE | Refills: 3 | Status: SHIPPED | OUTPATIENT
Start: 2023-05-17 | End: 2024-02-06

## 2023-05-17 RX ORDER — FLUTICASONE FUROATE AND VILANTEROL 100; 25 UG/1; UG/1
1 POWDER RESPIRATORY (INHALATION) DAILY
Qty: 60 EACH | Refills: 4 | Status: SHIPPED | OUTPATIENT
Start: 2023-05-17 | End: 2023-05-19 | Stop reason: SDUPTHER

## 2023-05-17 NOTE — PROGRESS NOTES
Subjective:   Patient ID: Niurka Pedraza is a 78 y.o. female  Chief complaint:   Chief Complaint   Patient presents with    Dysuria         HPI  Here for 2 month follow up:     Since lcv seen by gyn/onc 5/3    Seen by pulm 4/23 - recently tx with augmentin and pdn at that appt for worsening cough   Had ct chest and stable per pulm note when compared to 2021 study   Rec to take prilosec otc and if sx do not improve then restart azithro daily   - today is starting azithro daily   - has not tried ppi yet and will send in protonix for 4 weeks - no burning or gi sx currently  Previously:   Pulmonary fibrosis:  Seen by pulmonology November 21, 2022  Rheumatological eval has been negative for autoimmune condition  - at that appointment they discussed other medication options and patient deferred for now - plans to follow-up in 3 months  - of note oxygen saturation during ambulation decreased the patient did not qualify for oxygen at that time  - was on prednisone, Singulair, and azithromycin - however she stopped all meds now 6 months ago   Previously:   Interstitial lung disease on azithro and pdn:   Followed by pulm   - seen by rheum   - on azithro 3 times per week and montelukast daily and prednisone 2.5 mg TID  - Referred to rheum for opinion with +RF on labs     To have EGD 5/19 due to CEA level     Neuropathy:  Seen by neuro for neuropathy 3/21/2023 - to return as needed   Previously:   Seen by Neurology December 19, 2022  - gabapentin - never took after read about pot side effects   Previously:   Idiopathic neuropathy:   Has appt with neuro in November  Previously:   emg test for numbness of feet and completed 3/15/2022 - wnl   Sx located in toes B and she reports that sx are progressing to mid foot bilaterally  Labs unremarkable and reviewed for PN work up today    She started to take fish oil and tuna to inc DHA    Tub adenoma:   Had ct scan and cscope 12/2022 - to repeat in 3 years     MDR recurrent UTI:   Took  azos yesterday due to dysuria symptoms   Previously:   Patient was seen by Urology January 18, 2023 for cystoscopy due to recurrent urinary tract infections - cysto showed similar appearance of plaques or bladder wall and plan is to monitor for now and consider removal in OR if urinary tract infections progress  - ditropan increased to 15 mg    HTN:   controlled today - home readings stable   Taking Coreg 6.25 and irb 300  Previously:   - stopped Ofev in Jan 18th and bp improved - did not start coreg 12.5mg dose since bp improved  fter d/c this   - edema with amlodipine   - cough from lisinopril     ##### not addressed today ####    Hearing loss:    Followed up with ear nose and throat December 19, 2022 and plan is to recheck hearing in 1 year    Restless leg syndrome:  Started on gabapentin but did not start as concerned about pot side effects  - tried pickle juice - 2 tbs and sx of cramping resolved and stopped     Osteopenia:  Dexa 1/2023     Hypothyroidism:   Blaire levothyroxine   Tsh at goal    ########    HM:  Utd on covid booster per immunization record    Gyn: Melissa  Gyn onc: Denis  Pulm: Hollie  ENT: Carroll nosebleeds   Urology: Sandip  Pod: Feliciano     Review of Systems    Objective:  Vitals:    05/17/23 0935   BP: (P) 110/62   BP Location: (P) Left arm   Patient Position: (P) Sitting   Pulse: (P) 88   SpO2: (P) 95%   Weight: (P) 76 kg (167 lb 8.8 oz)       Body mass index is 28.76 kg/m² (pended).    Physical Exam  Vitals reviewed.   Constitutional:       Appearance: Normal appearance. She is well-developed.   HENT:      Head: Normocephalic and atraumatic.      Right Ear: Tympanic membrane, ear canal and external ear normal.      Left Ear: Tympanic membrane, ear canal and external ear normal.      Nose: Nose normal. No congestion.      Mouth/Throat:      Mouth: Mucous membranes are moist.      Pharynx: Oropharynx is clear. No oropharyngeal exudate.   Eyes:      Extraocular Movements: Extraocular movements  intact.      Conjunctiva/sclera: Conjunctivae normal.   Neck:      Thyroid: No thyromegaly.   Cardiovascular:      Rate and Rhythm: Normal rate and regular rhythm.      Pulses: Normal pulses.      Heart sounds: Normal heart sounds.   Pulmonary:      Effort: Pulmonary effort is normal.      Breath sounds: Normal breath sounds. No wheezing or rhonchi.   Abdominal:      General: Bowel sounds are normal.      Palpations: Abdomen is soft.      Tenderness: There is no right CVA tenderness or left CVA tenderness.   Musculoskeletal:         General: No swelling or tenderness.      Cervical back: Neck supple.   Lymphadenopathy:      Cervical: No cervical adenopathy.   Skin:     General: Skin is warm and dry.      Capillary Refill: Capillary refill takes less than 2 seconds.   Neurological:      General: No focal deficit present.      Mental Status: She is alert and oriented to person, place, and time. Mental status is at baseline.   Psychiatric:         Behavior: Behavior normal.         Thought Content: Thought content normal.       Assessment:  1. Cough, unspecified type    2. Hyperglycemia    3. Hypothyroidism, unspecified type    4. Hyperlipidemia, unspecified hyperlipidemia type    5. Dysuria    6. Peripheral polyneuropathy    7. Interstitial lung disease    8. Neuropathy    9. Recurrent UTI    10. Essential hypertension          Plan:  Niurka was seen today for dysuria.    Diagnoses and all orders for this visit:    Cough, unspecified type  -     pantoprazole (PROTONIX) 20 MG tablet; Take 1 tablet (20 mg total) by mouth once daily.  Start ppi   F/u with pulm as planned   Cont med regimen     Hyperglycemia  -     Basic Metabolic Panel; Future  -     Hemoglobin A1C; Future    Hypothyroidism, unspecified type  -     TSH; Future  Stable   Cont med   Update lab     Hyperlipidemia, unspecified hyperlipidemia type  -     Lipid Panel; Future  Stable cont statin     Dysuria  -  Urine culture; Future  Check ucx clean catch - had  azos so will forgo ua     Peripheral polyneuropathy  -     gabapentin (NEURONTIN) 100 MG capsule; Take 1 capsule (100 mg total) by mouth every evening.  Today: discussed tiral of gpn 100 vs lyria vs elavil for pn symptoms - never started gpn 300mg in past due to c/f pot se - can start lowest dose first to see if tolerates    Interstitial lung disease  As above   F/u with pulm     Neuropathy    Recurrent UTI  As above     Essential hypertension  Stable   Cont meds     40 min spent in care of patient including chart review, history, orders, physical, orders and coordination of care    Health Maintenance   Topic Date Due    DEXA Scan  01/19/2026    Lipid Panel  06/30/2027    TETANUS VACCINE  07/21/2031    Hepatitis C Screening  Completed

## 2023-05-18 ENCOUNTER — TELEPHONE (OUTPATIENT)
Dept: INTERNAL MEDICINE | Facility: CLINIC | Age: 78
End: 2023-05-18
Payer: MEDICARE

## 2023-05-18 ENCOUNTER — TELEPHONE (OUTPATIENT)
Dept: ENDOSCOPY | Facility: HOSPITAL | Age: 78
End: 2023-05-18
Payer: MEDICARE

## 2023-05-18 DIAGNOSIS — R30.0 DYSURIA: Primary | ICD-10-CM

## 2023-05-18 NOTE — TELEPHONE ENCOUNTER
----- Message from Krupa Jovel sent at 5/18/2023  4:11 AM CDT -----  Regarding: Lab Client Services  Contact: [phone]  Good Morning,     My name is Krupa Jovel I work in the Lab Client Services. We had a problem with some lab work on this patient. If someone from your office could call us at [phone] or [phone]. [phone] that would be great. Anyone in my department can help.      Thank you

## 2023-05-18 NOTE — TELEPHONE ENCOUNTER
Thank you for update! Can you please contact pt to inquire if she can submit another sample as the prior sample was leaking/broken

## 2023-05-18 NOTE — TELEPHONE ENCOUNTER
----- Message from Sheryl JENSEN Route sent at 5/18/2023  8:31 AM CDT -----  Regarding: Reschedule  Contact: pt. [phone]  Pt has an appt with provider on 5/19 she needs to reschedule for sometime next month. Pt states she is not feeling well Patient Requesting Call Back @ pt. [phone]

## 2023-05-18 NOTE — TELEPHONE ENCOUNTER
Urine culture was suppose to be processed from specimen collected yesterday in clinic - do not see it in epic as processing - please call and ensure that it is being processed    - as for message below that is not reji of my orders so they should contact the ordering provider about the coccidio - maybe her pulmonologist

## 2023-05-19 ENCOUNTER — LAB VISIT (OUTPATIENT)
Dept: LAB | Facility: OTHER | Age: 78
End: 2023-05-19
Attending: INTERNAL MEDICINE
Payer: MEDICARE

## 2023-05-19 DIAGNOSIS — R30.0 DYSURIA: ICD-10-CM

## 2023-05-19 PROCEDURE — 87086 URINE CULTURE/COLONY COUNT: CPT | Performed by: INTERNAL MEDICINE

## 2023-05-19 RX ORDER — FLUTICASONE FUROATE AND VILANTEROL 100; 25 UG/1; UG/1
1 POWDER RESPIRATORY (INHALATION) DAILY
Qty: 60 EACH | Refills: 4 | Status: SHIPPED | OUTPATIENT
Start: 2023-05-19

## 2023-05-19 NOTE — TELEPHONE ENCOUNTER
----- Message from Hasmukh Irwin sent at 5/18/2023 11:44 PM CDT -----  Regarding: Client Services  Contact: [phone]  Good Morning,     My name is Hasmukh Irwin, I work in the Lab Client Services. We had a problem with some lab work on this patient. If someone from your office could call us at [phone] or [phone] that would be great. Anyone in my department can help.      Thank you,

## 2023-05-20 LAB
BACTERIA UR CULT: NORMAL
BACTERIA UR CULT: NORMAL

## 2023-05-23 ENCOUNTER — HOSPITAL ENCOUNTER (INPATIENT)
Facility: OTHER | Age: 78
LOS: 2 days | Discharge: HOME | DRG: 690 | End: 2023-05-26
Attending: EMERGENCY MEDICINE | Admitting: EMERGENCY MEDICINE
Payer: MEDICARE

## 2023-05-23 ENCOUNTER — TELEPHONE (OUTPATIENT)
Dept: INTERNAL MEDICINE | Facility: CLINIC | Age: 78
End: 2023-05-23
Payer: MEDICARE

## 2023-05-23 DIAGNOSIS — R06.02 SHORTNESS OF BREATH: ICD-10-CM

## 2023-05-23 DIAGNOSIS — N30.01 ACUTE CYSTITIS WITH HEMATURIA: Primary | ICD-10-CM

## 2023-05-23 DIAGNOSIS — D64.9 SYMPTOMATIC ANEMIA: ICD-10-CM

## 2023-05-23 DIAGNOSIS — R06.09 DOE (DYSPNEA ON EXERTION): ICD-10-CM

## 2023-05-23 DIAGNOSIS — D64.9 ANEMIA REQUIRING TRANSFUSIONS: ICD-10-CM

## 2023-05-23 LAB
ABO + RH BLD: NORMAL
ALBUMIN SERPL BCP-MCNC: 2.8 G/DL (ref 3.5–5.2)
ALP SERPL-CCNC: 139 U/L (ref 55–135)
ALT SERPL W/O P-5'-P-CCNC: 26 U/L (ref 10–44)
ANION GAP SERPL CALC-SCNC: 11 MMOL/L (ref 8–16)
AST SERPL-CCNC: 26 U/L (ref 10–40)
BACTERIA #/AREA URNS HPF: ABNORMAL /HPF
BASOPHILS # BLD AUTO: 0.01 K/UL (ref 0–0.2)
BASOPHILS NFR BLD: 0.2 % (ref 0–1.9)
BILIRUB SERPL-MCNC: 1.4 MG/DL (ref 0.1–1)
BILIRUB UR QL STRIP: NEGATIVE
BLD GP AB SCN CELLS X3 SERPL QL: NORMAL
BNP SERPL-MCNC: 98 PG/ML (ref 0–99)
BUN SERPL-MCNC: 12 MG/DL (ref 8–23)
CALCIUM SERPL-MCNC: 9 MG/DL (ref 8.7–10.5)
CHLORIDE SERPL-SCNC: 101 MMOL/L (ref 95–110)
CLARITY UR: ABNORMAL
CO2 SERPL-SCNC: 23 MMOL/L (ref 23–29)
COLOR UR: ABNORMAL
CREAT SERPL-MCNC: 0.9 MG/DL (ref 0.5–1.4)
DIFFERENTIAL METHOD: ABNORMAL
EOSINOPHIL # BLD AUTO: 0 K/UL (ref 0–0.5)
EOSINOPHIL NFR BLD: 0.2 % (ref 0–8)
ERYTHROCYTE [DISTWIDTH] IN BLOOD BY AUTOMATED COUNT: 17.9 % (ref 11.5–14.5)
EST. GFR  (NO RACE VARIABLE): >60 ML/MIN/1.73 M^2
GLUCOSE SERPL-MCNC: 129 MG/DL (ref 70–110)
GLUCOSE UR QL STRIP: NEGATIVE
HCT VFR BLD AUTO: 22.9 % (ref 37–48.5)
HGB BLD-MCNC: 7.3 G/DL (ref 12–16)
HGB UR QL STRIP: NEGATIVE
HYALINE CASTS #/AREA URNS LPF: 0 /LPF
IMM GRANULOCYTES # BLD AUTO: 0.27 K/UL (ref 0–0.04)
IMM GRANULOCYTES NFR BLD AUTO: 4.8 % (ref 0–0.5)
KETONES UR QL STRIP: NEGATIVE
LEUKOCYTE ESTERASE UR QL STRIP: ABNORMAL
LYMPHOCYTES # BLD AUTO: 1.2 K/UL (ref 1–4.8)
LYMPHOCYTES NFR BLD: 21.6 % (ref 18–48)
MCH RBC QN AUTO: 28.9 PG (ref 27–31)
MCHC RBC AUTO-ENTMCNC: 31.9 G/DL (ref 32–36)
MCV RBC AUTO: 91 FL (ref 82–98)
MICROSCOPIC COMMENT: ABNORMAL
MONOCYTES # BLD AUTO: 0.3 K/UL (ref 0.3–1)
MONOCYTES NFR BLD: 4.6 % (ref 4–15)
NEUTROPHILS # BLD AUTO: 3.8 K/UL (ref 1.8–7.7)
NEUTROPHILS NFR BLD: 68.6 % (ref 38–73)
NITRITE UR QL STRIP: POSITIVE
NRBC BLD-RTO: 0 /100 WBC
PH UR STRIP: 6 [PH] (ref 5–8)
PLATELET # BLD AUTO: 350 K/UL (ref 150–450)
PMV BLD AUTO: 8.8 FL (ref 9.2–12.9)
POTASSIUM SERPL-SCNC: 3.8 MMOL/L (ref 3.5–5.1)
PROT SERPL-MCNC: 7.3 G/DL (ref 6–8.4)
PROT UR QL STRIP: ABNORMAL
RBC # BLD AUTO: 2.53 M/UL (ref 4–5.4)
RBC #/AREA URNS HPF: 8 /HPF (ref 0–4)
SODIUM SERPL-SCNC: 135 MMOL/L (ref 136–145)
SP GR UR STRIP: 1.01 (ref 1–1.03)
SPECIMEN OUTDATE: NORMAL
SQUAMOUS #/AREA URNS HPF: 20 /HPF
TROPONIN I SERPL DL<=0.01 NG/ML-MCNC: 0.01 NG/ML (ref 0–0.03)
URN SPEC COLLECT METH UR: ABNORMAL
UROBILINOGEN UR STRIP-ACNC: NEGATIVE EU/DL
WBC # BLD AUTO: 5.6 K/UL (ref 3.9–12.7)
WBC #/AREA URNS HPF: >100 /HPF (ref 0–5)
WBC CLUMPS URNS QL MICRO: ABNORMAL
YEAST URNS QL MICRO: ABNORMAL

## 2023-05-23 PROCEDURE — 93010 ELECTROCARDIOGRAM REPORT: CPT | Mod: ,,, | Performed by: INTERNAL MEDICINE

## 2023-05-23 PROCEDURE — 87086 URINE CULTURE/COLONY COUNT: CPT | Performed by: NURSE PRACTITIONER

## 2023-05-23 PROCEDURE — 63600175 PHARM REV CODE 636 W HCPCS: Performed by: EMERGENCY MEDICINE

## 2023-05-23 PROCEDURE — 82728 ASSAY OF FERRITIN: CPT | Performed by: PHYSICIAN ASSISTANT

## 2023-05-23 PROCEDURE — G0378 HOSPITAL OBSERVATION PER HR: HCPCS

## 2023-05-23 PROCEDURE — 84466 ASSAY OF TRANSFERRIN: CPT | Performed by: PHYSICIAN ASSISTANT

## 2023-05-23 PROCEDURE — 25000003 PHARM REV CODE 250: Performed by: EMERGENCY MEDICINE

## 2023-05-23 PROCEDURE — 80053 COMPREHEN METABOLIC PANEL: CPT | Performed by: NURSE PRACTITIONER

## 2023-05-23 PROCEDURE — 83880 ASSAY OF NATRIURETIC PEPTIDE: CPT | Performed by: NURSE PRACTITIONER

## 2023-05-23 PROCEDURE — 85025 COMPLETE CBC W/AUTO DIFF WBC: CPT | Performed by: NURSE PRACTITIONER

## 2023-05-23 PROCEDURE — 94640 AIRWAY INHALATION TREATMENT: CPT

## 2023-05-23 PROCEDURE — 84484 ASSAY OF TROPONIN QUANT: CPT | Performed by: PHYSICIAN ASSISTANT

## 2023-05-23 PROCEDURE — 93005 ELECTROCARDIOGRAM TRACING: CPT

## 2023-05-23 PROCEDURE — 87088 URINE BACTERIA CULTURE: CPT | Performed by: NURSE PRACTITIONER

## 2023-05-23 PROCEDURE — 86900 BLOOD TYPING SEROLOGIC ABO: CPT | Performed by: PHYSICIAN ASSISTANT

## 2023-05-23 PROCEDURE — 86920 COMPATIBILITY TEST SPIN: CPT | Performed by: INTERNAL MEDICINE

## 2023-05-23 PROCEDURE — 25000242 PHARM REV CODE 250 ALT 637 W/ HCPCS: Performed by: PHYSICIAN ASSISTANT

## 2023-05-23 PROCEDURE — 87077 CULTURE AEROBIC IDENTIFY: CPT | Performed by: NURSE PRACTITIONER

## 2023-05-23 PROCEDURE — 81000 URINALYSIS NONAUTO W/SCOPE: CPT | Performed by: NURSE PRACTITIONER

## 2023-05-23 PROCEDURE — 36415 COLL VENOUS BLD VENIPUNCTURE: CPT | Performed by: PHYSICIAN ASSISTANT

## 2023-05-23 PROCEDURE — 93010 EKG 12-LEAD: ICD-10-PCS | Mod: ,,, | Performed by: INTERNAL MEDICINE

## 2023-05-23 PROCEDURE — 63600175 PHARM REV CODE 636 W HCPCS: Performed by: PHYSICIAN ASSISTANT

## 2023-05-23 PROCEDURE — 86920 COMPATIBILITY TEST SPIN: CPT | Performed by: PHYSICIAN ASSISTANT

## 2023-05-23 PROCEDURE — 87186 SC STD MICRODIL/AGAR DIL: CPT | Performed by: NURSE PRACTITIONER

## 2023-05-23 RX ORDER — METHYLPREDNISOLONE SOD SUCC 125 MG
125 VIAL (EA) INJECTION
Status: COMPLETED | OUTPATIENT
Start: 2023-05-23 | End: 2023-05-23

## 2023-05-23 RX ORDER — PANTOPRAZOLE SODIUM 40 MG/1
40 TABLET, DELAYED RELEASE ORAL DAILY
Status: DISCONTINUED | OUTPATIENT
Start: 2023-05-24 | End: 2023-05-26 | Stop reason: HOSPADM

## 2023-05-23 RX ORDER — FERROUS SULFATE, DRIED 160(50) MG
1 TABLET, EXTENDED RELEASE ORAL 2 TIMES DAILY WITH MEALS
Status: DISCONTINUED | OUTPATIENT
Start: 2023-05-24 | End: 2023-05-26 | Stop reason: HOSPADM

## 2023-05-23 RX ORDER — HYDROCODONE BITARTRATE AND ACETAMINOPHEN 5; 325 MG/1; MG/1
1 TABLET ORAL EVERY 8 HOURS PRN
Status: DISCONTINUED | OUTPATIENT
Start: 2023-05-24 | End: 2023-05-24

## 2023-05-23 RX ORDER — CARVEDILOL 6.25 MG/1
6.25 TABLET ORAL 2 TIMES DAILY WITH MEALS
Status: DISCONTINUED | OUTPATIENT
Start: 2023-05-24 | End: 2023-05-26 | Stop reason: HOSPADM

## 2023-05-23 RX ORDER — IPRATROPIUM BROMIDE AND ALBUTEROL SULFATE 2.5; .5 MG/3ML; MG/3ML
3 SOLUTION RESPIRATORY (INHALATION)
Status: COMPLETED | OUTPATIENT
Start: 2023-05-23 | End: 2023-05-23

## 2023-05-23 RX ORDER — AZELASTINE 1 MG/ML
1 SPRAY, METERED NASAL 2 TIMES DAILY
Status: DISCONTINUED | OUTPATIENT
Start: 2023-05-24 | End: 2023-05-26 | Stop reason: HOSPADM

## 2023-05-23 RX ORDER — OXYBUTYNIN CHLORIDE 5 MG/1
15 TABLET, EXTENDED RELEASE ORAL DAILY
Status: DISCONTINUED | OUTPATIENT
Start: 2023-05-24 | End: 2023-05-26 | Stop reason: HOSPADM

## 2023-05-23 RX ORDER — ACETAMINOPHEN 325 MG/1
650 TABLET ORAL EVERY 6 HOURS PRN
Status: DISCONTINUED | OUTPATIENT
Start: 2023-05-24 | End: 2023-05-24

## 2023-05-23 RX ORDER — PREDNISONE 1 MG/1
2 TABLET ORAL DAILY
Status: DISCONTINUED | OUTPATIENT
Start: 2023-05-24 | End: 2023-05-26 | Stop reason: HOSPADM

## 2023-05-23 RX ORDER — HYDROCODONE BITARTRATE AND ACETAMINOPHEN 500; 5 MG/1; MG/1
TABLET ORAL
Status: DISCONTINUED | OUTPATIENT
Start: 2023-05-23 | End: 2023-05-26 | Stop reason: HOSPADM

## 2023-05-23 RX ORDER — KETOROLAC TROMETHAMINE 30 MG/ML
10 INJECTION, SOLUTION INTRAMUSCULAR; INTRAVENOUS EVERY 8 HOURS PRN
Status: DISCONTINUED | OUTPATIENT
Start: 2023-05-24 | End: 2023-05-24

## 2023-05-23 RX ORDER — MONTELUKAST SODIUM 10 MG/1
10 TABLET ORAL NIGHTLY
Status: DISCONTINUED | OUTPATIENT
Start: 2023-05-24 | End: 2023-05-26 | Stop reason: HOSPADM

## 2023-05-23 RX ORDER — ATORVASTATIN CALCIUM 20 MG/1
20 TABLET, FILM COATED ORAL DAILY
Status: DISCONTINUED | OUTPATIENT
Start: 2023-05-24 | End: 2023-05-26 | Stop reason: HOSPADM

## 2023-05-23 RX ORDER — DIPHENHYDRAMINE HYDROCHLORIDE 50 MG/ML
25 INJECTION INTRAMUSCULAR; INTRAVENOUS NIGHTLY PRN
Status: DISCONTINUED | OUTPATIENT
Start: 2023-05-24 | End: 2023-05-26 | Stop reason: HOSPADM

## 2023-05-23 RX ORDER — FLUTICASONE FUROATE AND VILANTEROL 100; 25 UG/1; UG/1
1 POWDER RESPIRATORY (INHALATION) DAILY
Status: DISCONTINUED | OUTPATIENT
Start: 2023-05-24 | End: 2023-05-26 | Stop reason: HOSPADM

## 2023-05-23 RX ORDER — ONDANSETRON 2 MG/ML
4 INJECTION INTRAMUSCULAR; INTRAVENOUS EVERY 8 HOURS PRN
Status: DISCONTINUED | OUTPATIENT
Start: 2023-05-24 | End: 2023-05-26 | Stop reason: HOSPADM

## 2023-05-23 RX ORDER — GABAPENTIN 100 MG/1
100 CAPSULE ORAL NIGHTLY
Status: DISCONTINUED | OUTPATIENT
Start: 2023-05-24 | End: 2023-05-26 | Stop reason: HOSPADM

## 2023-05-23 RX ORDER — IPRATROPIUM BROMIDE AND ALBUTEROL SULFATE 2.5; .5 MG/3ML; MG/3ML
3 SOLUTION RESPIRATORY (INHALATION) EVERY 4 HOURS PRN
Status: DISCONTINUED | OUTPATIENT
Start: 2023-05-24 | End: 2023-05-26 | Stop reason: HOSPADM

## 2023-05-23 RX ORDER — LEVOTHYROXINE SODIUM 50 UG/1
50 TABLET ORAL
Status: DISCONTINUED | OUTPATIENT
Start: 2023-05-24 | End: 2023-05-26 | Stop reason: HOSPADM

## 2023-05-23 RX ADMIN — METHYLPREDNISOLONE SODIUM SUCCINATE 125 MG: 125 INJECTION, POWDER, FOR SOLUTION INTRAMUSCULAR; INTRAVENOUS at 10:05

## 2023-05-23 RX ADMIN — IPRATROPIUM BROMIDE AND ALBUTEROL SULFATE 3 ML: .5; 3 SOLUTION RESPIRATORY (INHALATION) at 10:05

## 2023-05-23 RX ADMIN — ERTAPENEM 1 G: 1 INJECTION INTRAMUSCULAR; INTRAVENOUS at 10:05

## 2023-05-23 NOTE — TELEPHONE ENCOUNTER
Spoke to MsKuldip Keila and informed her per Dr. Anderson that Hi, Your urine culture grew contaminants. There was no one specific predominant bacteria present. How are your symptoms? Please let me know if you are still symptomatic. We may need to arrange a urology appointment for further evaluation or for an in and out catheter specimen if symptoms persist. Please let me know    Patient states that she is still having a lot of pain.  Patient states that her urination is Painful and frequent.

## 2023-05-23 NOTE — TELEPHONE ENCOUNTER
----- Message from Sheldon Vieira sent at 5/23/2023  3:47 PM CDT -----  Regarding: Niurka  Type: Patient Callback     Who called: Niurka     What is the request in detail: Pt stated she needs the doctor to call her back. Pt stated she is feeling worse than before. Please contact the patient as soon as possible     Can the clinic reply by [facility]? No     Would the patient rather a call back or a response via [facility]? Callback     Best call back number: [phone]      Additional Information:

## 2023-05-23 NOTE — Clinical Note
Diagnosis: Acute cystitis with hematuria [932077]   Future Attending Provider: KELLY LAYNE [[id number]]   Is the patient being sent to ED Observation?: Yes   Admitting Provider:: KELLY LAYNE [[id number]]

## 2023-05-23 NOTE — ED TRIAGE NOTES
Chronic SOB with CARDOZO for years due to pulmonary fibrosis. States worse over past 3-4 days. Chronic cough, no fever. States she has never been placed on home O2. Reports that she has to rest after walking short distances. No CP associated with SOB. Also reports chronic, recurrent UTIs. States symptoms have been worse over past 2 weeks. No fever reported.

## 2023-05-23 NOTE — TELEPHONE ENCOUNTER
----- Message from Pooja Cantor sent at 5/23/2023  1:02 PM CDT -----  Regarding: callback  Name of caller: kathrin       What is the requesting detail: pt is requesting a call bck regarding her urine test. Please advise.       Can the clinic reply by [facility]:       What number to call back:[phone]

## 2023-05-23 NOTE — TELEPHONE ENCOUNTER
"Called pt back   Reports dysuria and frequency worse - more pain with urination   Discussed ucx and need to repeat - may need in and out cath   Has hx of mdr utis     Has ILD, chronic cough now with acute sob worse over past 3-4 days   - reports has 1mg pdn take 2 twice daily at baseline - now with hill with ambulating in home about 15 feet  - started azithro yesterday daily   - cough is "terrible"   - no chest pain   - was given augmentin and medrol dose pack at end of April   Home pulse ox 94% on her read - baseline in chart is 95%-97% on RA  New hill concerning - discussed pot causes including infection, hf, pe   Discussed safest next step would be to go to an er near her - her daughter can bring her today and will go to either main campus or [facility] for further assessment      "

## 2023-05-23 NOTE — TELEPHONE ENCOUNTER
Spoke with pt, he symptoms worse than before. Has other problems in addition to this one. Requesting Dr. Anderson call her. Scheduled her to see urology on Friday.

## 2023-05-23 NOTE — TELEPHONE ENCOUNTER
----- Message from Savana Anderson MD sent at 5/23/2023 11:02 AM CDT -----  Please call pt to inform her of my recommendations under results comments

## 2023-05-23 NOTE — FIRST PROVIDER EVALUATION
Emergency Department TeleTriage Encounter Note      CHIEF COMPLAINT    Chief Complaint   Patient presents with    Shortness of Breath     SOB onset 3-4 days ago. Reports having a respiratory infection a month ago and has gotten better. Hx of pulmonary fibrosis. 93% RA. Accessory muscle use noted. Pt also reports urinary frequency and dysuria. Denies body aches/chills.        VITAL SIGNS   Initial Vitals [05/23/23 1813]   BP Pulse Resp Temp SpO2   (!) 156/73 99 (!) 22 98.3 °F (36.8 °C) 95 %      MAP       --            ALLERGIES    Review of patient's allergies indicates:   Allergen Reactions    Ciprofloxacin Other (See Comments)     Right foot pain and edema, tendonitis    Amlodipine Edema and Swelling     BLE  BLE    Lisinopril Other (See Comments)     cough    Nitrofuran analogues        PROVIDER TRIAGE NOTE  TeleTriage Note: Niurka Pedraza, a nontoxic/well appearing, 78 y.o. female, presented to the ED with c/o SOB and feels unstable on her feet. Diagnosed with bronchitis a month ago. Hx of pulmonary fibrosis. Concerned that there is mold in the house. Also having UTI symptoms.     All ED beds are full at present; patient notified of this status.  Patient seen and medically screened by Nurse Practitioner via teletriage. Orders initiated at triage to expedite care.  Patient is stable to return to the waiting room and will be placed in an ED bed when available.  Care will be transferred to an alternate provider when patient has been placed in an Exam Room from the [facility] for physical exam, additional orders, and disposition.  6:42 PM Daniella Alston DNP, FNP-C        ORDERS  Labs Reviewed   CBC W/ AUTO DIFFERENTIAL   COMPREHENSIVE METABOLIC PANEL   B-TYPE NATRIURETIC PEPTIDE   URINALYSIS, REFLEX TO URINE CULTURE       ED Orders (720h ago, onward)      Start Ordered     Status Ordering Provider    05/23/23 1846 05/23/23 1845  Urinalysis, Reflex to Urine Culture Urine, Clean Catch  STAT         Ordered DANIELLA ALSTON  MIKEY.    05/23/23 1845 05/23/23 1844  CBC Auto Differential  STAT         Ordered RAMY, KESHIA SCHULTZ    05/23/23 1845 05/23/23 1844  Comprehensive Metabolic Panel  STAT         Ordered RAMY, KESHIA JENSENKuldip    05/23/23 1845 05/23/23 1844  Pulse Oximetry Continuous  Continuous         Ordered RAMY, KESHIA JENSENKuldip    05/23/23 1845 05/23/23 1844  Cardiac Monitoring - Adult  Continuous         Ordered RAMY, KESHIA JENSENKuldip    05/23/23 1845 05/23/23 1844  EKG 12-lead  Once         Ordered RAMY, KESHIA JENSENKuldip    05/23/23 1845 05/23/23 1844  X-Ray Chest 1 View  1 time imaging         Ordered RAMY, KESHIA JENSENKuldip    05/23/23 1845 05/23/23 1844  Brain Natriuretic Peptide  STAT         Ordered RAMY, KESHIA JENSENKuldip    05/23/23 1845 05/23/23 1844  Oxygen Continuous  Continuous         Ordered RAMY, KESHIA JENSENKuldip              Virtual Visit Note: The provider triage portion of this emergency department evaluation and documentation was performed via [employer], a HIPAA-compliant telemedicine application, in concert with a tele-presenter in the room. A face to face patient evaluation with one of my colleagues will occur once the patient is placed in an emergency department room.      DISCLAIMER: This note was prepared with [employer] voice recognition transcription software. Garbled syntax, mangled pronouns, and other bizarre constructions may be attributed to that software system.

## 2023-05-24 PROBLEM — R29.818 SUSPECTED SLEEP APNEA: Status: ACTIVE | Noted: 2023-05-24

## 2023-05-24 PROBLEM — N30.01 ACUTE CYSTITIS WITH HEMATURIA: Status: ACTIVE | Noted: 2023-05-24

## 2023-05-24 PROBLEM — R06.02 SHORTNESS OF BREATH: Status: ACTIVE | Noted: 2023-05-24

## 2023-05-24 PROBLEM — D64.9 ANEMIA REQUIRING TRANSFUSIONS: Status: ACTIVE | Noted: 2023-05-24

## 2023-05-24 LAB
ALBUMIN SERPL BCP-MCNC: 2.7 G/DL (ref 3.5–5.2)
ALP SERPL-CCNC: 142 U/L (ref 55–135)
ALT SERPL W/O P-5'-P-CCNC: 24 U/L (ref 10–44)
ANION GAP SERPL CALC-SCNC: 11 MMOL/L (ref 8–16)
ANISOCYTOSIS BLD QL SMEAR: SLIGHT
ANISOCYTOSIS BLD QL SMEAR: SLIGHT
AST SERPL-CCNC: 26 U/L (ref 10–40)
BASOPHILS # BLD AUTO: 0.01 K/UL (ref 0–0.2)
BASOPHILS NFR BLD: 0 % (ref 0–1.9)
BASOPHILS NFR BLD: 0.2 % (ref 0–1.9)
BILIRUB SERPL-MCNC: 1.3 MG/DL (ref 0.1–1)
BLD PROD TYP BPU: NORMAL
BLOOD UNIT EXPIRATION DATE: NORMAL
BLOOD UNIT TYPE CODE: 6200
BLOOD UNIT TYPE: NORMAL
BUN SERPL-MCNC: 11 MG/DL (ref 8–23)
CALCIUM SERPL-MCNC: 8.9 MG/DL (ref 8.7–10.5)
CHLORIDE SERPL-SCNC: 103 MMOL/L (ref 95–110)
CO2 SERPL-SCNC: 22 MMOL/L (ref 23–29)
CODING SYSTEM: NORMAL
CREAT SERPL-MCNC: 0.8 MG/DL (ref 0.5–1.4)
CROSSMATCH INTERPRETATION: NORMAL
DIFFERENTIAL METHOD: ABNORMAL
DIFFERENTIAL METHOD: ABNORMAL
DISPENSE STATUS: NORMAL
EOSINOPHIL # BLD AUTO: 0 K/UL (ref 0–0.5)
EOSINOPHIL NFR BLD: 0 % (ref 0–8)
EOSINOPHIL NFR BLD: 0 % (ref 0–8)
ERYTHROCYTE [DISTWIDTH] IN BLOOD BY AUTOMATED COUNT: 17 % (ref 11.5–14.5)
ERYTHROCYTE [DISTWIDTH] IN BLOOD BY AUTOMATED COUNT: 17.4 % (ref 11.5–14.5)
EST. GFR  (NO RACE VARIABLE): >60 ML/MIN/1.73 M^2
FERRITIN SERPL-MCNC: 749 NG/ML (ref 20–300)
GLUCOSE SERPL-MCNC: 176 MG/DL (ref 70–110)
HCT VFR BLD AUTO: 24.3 % (ref 37–48.5)
HCT VFR BLD AUTO: 25.2 % (ref 37–48.5)
HGB BLD-MCNC: 7.9 G/DL (ref 12–16)
HGB BLD-MCNC: 8.2 G/DL (ref 12–16)
HYPOCHROMIA BLD QL SMEAR: ABNORMAL
IMM GRANULOCYTES # BLD AUTO: 0.22 K/UL (ref 0–0.04)
IMM GRANULOCYTES # BLD AUTO: ABNORMAL K/UL (ref 0–0.04)
IMM GRANULOCYTES NFR BLD AUTO: 4.6 % (ref 0–0.5)
IMM GRANULOCYTES NFR BLD AUTO: ABNORMAL % (ref 0–0.5)
IRON SERPL-MCNC: 35 UG/DL (ref 30–160)
LYMPHOCYTES # BLD AUTO: 1.2 K/UL (ref 1–4.8)
LYMPHOCYTES NFR BLD: 24.2 % (ref 18–48)
LYMPHOCYTES NFR BLD: 29 % (ref 18–48)
MCH RBC QN AUTO: 28.8 PG (ref 27–31)
MCH RBC QN AUTO: 28.9 PG (ref 27–31)
MCHC RBC AUTO-ENTMCNC: 32.5 G/DL (ref 32–36)
MCHC RBC AUTO-ENTMCNC: 32.5 G/DL (ref 32–36)
MCV RBC AUTO: 89 FL (ref 82–98)
MCV RBC AUTO: 89 FL (ref 82–98)
MONOCYTES # BLD AUTO: 0.1 K/UL (ref 0.3–1)
MONOCYTES NFR BLD: 2 % (ref 4–15)
MONOCYTES NFR BLD: 2.3 % (ref 4–15)
MYELOCYTES NFR BLD MANUAL: 2 %
NEUTROPHILS # BLD AUTO: 3.3 K/UL (ref 1.8–7.7)
NEUTROPHILS NFR BLD: 66 % (ref 38–73)
NEUTROPHILS NFR BLD: 68.7 % (ref 38–73)
NEUTS BAND NFR BLD MANUAL: 1 %
NRBC BLD-RTO: 1 /100 WBC
NRBC BLD-RTO: 1 /100 WBC
NUM UNITS TRANS PACKED RBC: NORMAL
OVALOCYTES BLD QL SMEAR: ABNORMAL
PLATELET # BLD AUTO: 306 K/UL (ref 150–450)
PLATELET # BLD AUTO: 333 K/UL (ref 150–450)
PLATELET BLD QL SMEAR: ABNORMAL
PLATELET BLD QL SMEAR: ABNORMAL
PMV BLD AUTO: 9.1 FL (ref 9.2–12.9)
PMV BLD AUTO: 9.2 FL (ref 9.2–12.9)
POIKILOCYTOSIS BLD QL SMEAR: SLIGHT
POTASSIUM SERPL-SCNC: 4.2 MMOL/L (ref 3.5–5.1)
PROT SERPL-MCNC: 7.1 G/DL (ref 6–8.4)
RBC # BLD AUTO: 2.74 M/UL (ref 4–5.4)
RBC # BLD AUTO: 2.84 M/UL (ref 4–5.4)
SATURATED IRON: 15 % (ref 20–50)
SODIUM SERPL-SCNC: 136 MMOL/L (ref 136–145)
TOTAL IRON BINDING CAPACITY: 226 UG/DL (ref 250–450)
TRANSFERRIN SERPL-MCNC: 153 MG/DL (ref 200–375)
WBC # BLD AUTO: 3.28 K/UL (ref 3.9–12.7)
WBC # BLD AUTO: 4.79 K/UL (ref 3.9–12.7)

## 2023-05-24 PROCEDURE — 94640 AIRWAY INHALATION TREATMENT: CPT

## 2023-05-24 PROCEDURE — 99223 PR INITIAL HOSPITAL CARE,LEVL III: ICD-10-PCS | Mod: ,,,

## 2023-05-24 PROCEDURE — 25000003 PHARM REV CODE 250: Performed by: NURSE PRACTITIONER

## 2023-05-24 PROCEDURE — 85027 COMPLETE CBC AUTOMATED: CPT | Performed by: NURSE PRACTITIONER

## 2023-05-24 PROCEDURE — 63600175 PHARM REV CODE 636 W HCPCS: Performed by: NURSE PRACTITIONER

## 2023-05-24 PROCEDURE — 99223 1ST HOSP IP/OBS HIGH 75: CPT | Mod: ,,, | Performed by: INTERNAL MEDICINE

## 2023-05-24 PROCEDURE — 36415 COLL VENOUS BLD VENIPUNCTURE: CPT

## 2023-05-24 PROCEDURE — 94761 N-INVAS EAR/PLS OXIMETRY MLT: CPT

## 2023-05-24 PROCEDURE — 99223 PR INITIAL HOSPITAL CARE,LEVL III: ICD-10-PCS | Mod: ,,, | Performed by: INTERNAL MEDICINE

## 2023-05-24 PROCEDURE — 99285 EMERGENCY DEPT VISIT HI MDM: CPT | Mod: 25

## 2023-05-24 PROCEDURE — 36430 TRANSFUSION BLD/BLD COMPNT: CPT

## 2023-05-24 PROCEDURE — 25000003 PHARM REV CODE 250

## 2023-05-24 PROCEDURE — 25000003 PHARM REV CODE 250: Performed by: INTERNAL MEDICINE

## 2023-05-24 PROCEDURE — 96375 TX/PRO/DX INJ NEW DRUG ADDON: CPT

## 2023-05-24 PROCEDURE — 99900035 HC TECH TIME PER 15 MIN (STAT)

## 2023-05-24 PROCEDURE — 85025 COMPLETE CBC W/AUTO DIFF WBC: CPT

## 2023-05-24 PROCEDURE — 25000242 PHARM REV CODE 250 ALT 637 W/ HCPCS: Performed by: NURSE PRACTITIONER

## 2023-05-24 PROCEDURE — 99223 1ST HOSP IP/OBS HIGH 75: CPT | Mod: ,,,

## 2023-05-24 PROCEDURE — 96365 THER/PROPH/DIAG IV INF INIT: CPT

## 2023-05-24 PROCEDURE — 85007 BL SMEAR W/DIFF WBC COUNT: CPT | Performed by: NURSE PRACTITIONER

## 2023-05-24 PROCEDURE — P9016 RBC LEUKOCYTES REDUCED: HCPCS | Performed by: PHYSICIAN ASSISTANT

## 2023-05-24 PROCEDURE — 27000221 HC OXYGEN, UP TO 24 HOURS

## 2023-05-24 PROCEDURE — 80053 COMPREHEN METABOLIC PANEL: CPT | Performed by: NURSE PRACTITIONER

## 2023-05-24 PROCEDURE — 11000001 HC ACUTE MED/SURG PRIVATE ROOM

## 2023-05-24 RX ORDER — AMOXICILLIN 250 MG
1 CAPSULE ORAL 2 TIMES DAILY
Status: DISCONTINUED | OUTPATIENT
Start: 2023-05-24 | End: 2023-05-26 | Stop reason: HOSPADM

## 2023-05-24 RX ORDER — TALC
6 POWDER (GRAM) TOPICAL NIGHTLY PRN
Status: DISCONTINUED | OUTPATIENT
Start: 2023-05-24 | End: 2023-05-26 | Stop reason: HOSPADM

## 2023-05-24 RX ORDER — IRBESARTAN 150 MG/1
300 TABLET ORAL NIGHTLY
Status: DISCONTINUED | OUTPATIENT
Start: 2023-05-24 | End: 2023-05-26 | Stop reason: HOSPADM

## 2023-05-24 RX ORDER — SIMETHICONE 80 MG
1 TABLET,CHEWABLE ORAL 4 TIMES DAILY PRN
Status: DISCONTINUED | OUTPATIENT
Start: 2023-05-24 | End: 2023-05-26 | Stop reason: HOSPADM

## 2023-05-24 RX ORDER — SODIUM CHLORIDE 9 MG/ML
INJECTION, SOLUTION INTRAVENOUS
Status: DISCONTINUED | OUTPATIENT
Start: 2023-05-24 | End: 2023-05-26 | Stop reason: HOSPADM

## 2023-05-24 RX ORDER — TRAMADOL HYDROCHLORIDE 50 MG/1
50 TABLET ORAL EVERY 6 HOURS PRN
Status: DISCONTINUED | OUTPATIENT
Start: 2023-05-24 | End: 2023-05-26 | Stop reason: HOSPADM

## 2023-05-24 RX ORDER — SODIUM CHLORIDE 0.9 % (FLUSH) 0.9 %
10 SYRINGE (ML) INJECTION EVERY 12 HOURS PRN
Status: DISCONTINUED | OUTPATIENT
Start: 2023-05-24 | End: 2023-05-26 | Stop reason: HOSPADM

## 2023-05-24 RX ORDER — ACETAMINOPHEN 500 MG
1000 TABLET ORAL EVERY 8 HOURS PRN
Status: DISCONTINUED | OUTPATIENT
Start: 2023-05-24 | End: 2023-05-26 | Stop reason: HOSPADM

## 2023-05-24 RX ORDER — BISACODYL 10 MG
10 SUPPOSITORY, RECTAL RECTAL DAILY PRN
Status: DISCONTINUED | OUTPATIENT
Start: 2023-05-24 | End: 2023-05-26 | Stop reason: HOSPADM

## 2023-05-24 RX ORDER — MAG HYDROX/ALUMINUM HYD/SIMETH 200-200-20
30 SUSPENSION, ORAL (FINAL DOSE FORM) ORAL 4 TIMES DAILY PRN
Status: DISCONTINUED | OUTPATIENT
Start: 2023-05-24 | End: 2023-05-26 | Stop reason: HOSPADM

## 2023-05-24 RX ORDER — SODIUM CHLORIDE 0.9 % (FLUSH) 0.9 %
10 SYRINGE (ML) INJECTION
Status: DISCONTINUED | OUTPATIENT
Start: 2023-05-24 | End: 2023-05-26 | Stop reason: HOSPADM

## 2023-05-24 RX ADMIN — Medication 1 TABLET: at 04:05

## 2023-05-24 RX ADMIN — Medication 1 TABLET: at 07:05

## 2023-05-24 RX ADMIN — AZELASTINE HYDROCHLORIDE 137 MCG: 137 SPRAY, METERED NASAL at 10:05

## 2023-05-24 RX ADMIN — PREDNISONE 2 MG: 1 TABLET ORAL at 09:05

## 2023-05-24 RX ADMIN — ATORVASTATIN CALCIUM 20 MG: 20 TABLET, FILM COATED ORAL at 09:05

## 2023-05-24 RX ADMIN — GABAPENTIN 100 MG: 100 CAPSULE ORAL at 01:05

## 2023-05-24 RX ADMIN — MONTELUKAST 10 MG: 10 TABLET, FILM COATED ORAL at 01:05

## 2023-05-24 RX ADMIN — FLUTICASONE FUROATE AND VILANTEROL TRIFENATATE 1 PUFF: 100; 25 POWDER RESPIRATORY (INHALATION) at 08:05

## 2023-05-24 RX ADMIN — DOCUSATE SODIUM AND SENNOSIDES 1 TABLET: 8.6; 5 TABLET, FILM COATED ORAL at 08:05

## 2023-05-24 RX ADMIN — PANTOPRAZOLE SODIUM 40 MG: 40 TABLET, DELAYED RELEASE ORAL at 09:05

## 2023-05-24 RX ADMIN — OXYBUTYNIN CHLORIDE 15 MG: 5 TABLET, EXTENDED RELEASE ORAL at 09:05

## 2023-05-24 RX ADMIN — MONTELUKAST 10 MG: 10 TABLET, FILM COATED ORAL at 08:05

## 2023-05-24 RX ADMIN — IRBESARTAN 300 MG: 150 TABLET, FILM COATED ORAL at 01:05

## 2023-05-24 RX ADMIN — SODIUM CHLORIDE 20 ML/HR: 9 INJECTION, SOLUTION INTRAVENOUS at 11:05

## 2023-05-24 RX ADMIN — CARVEDILOL 6.25 MG: 6.25 TABLET, FILM COATED ORAL at 07:05

## 2023-05-24 RX ADMIN — LEVOTHYROXINE SODIUM 50 MCG: 25 TABLET ORAL at 06:05

## 2023-05-24 RX ADMIN — IRBESARTAN 300 MG: 150 TABLET, FILM COATED ORAL at 10:05

## 2023-05-24 RX ADMIN — AZELASTINE HYDROCHLORIDE 137 MCG: 137 SPRAY, METERED NASAL at 09:05

## 2023-05-24 RX ADMIN — CARVEDILOL 6.25 MG: 6.25 TABLET, FILM COATED ORAL at 04:05

## 2023-05-24 RX ADMIN — GABAPENTIN 100 MG: 100 CAPSULE ORAL at 08:05

## 2023-05-24 RX ADMIN — ERTAPENEM 1 G: 1 INJECTION INTRAMUSCULAR; INTRAVENOUS at 11:05

## 2023-05-24 NOTE — NURSING
Patient arrived to floor via wheelchair with transport.  Patient is a,a,ox4, VSS, and has no complaints at this time.  Patient's skin is clean, dry, and intact.  Will continue to monitor.

## 2023-05-24 NOTE — PROGRESS NOTES
ED Observation Unit  Progress Note      HPI   78-year-old female with a history of pulmonary fibrosis, recurrent UTI, hyperlipidemia presents ER for evaluation of multiple complaints.  Patient reports worsening shortness of breath on exertion over the last 2-3 days.  Daughter reports that patient has been taking multiple pauses upon speaking due to the shortness of breath.  Patient does have a history of pulmonary fibrosis and does have baseline shortness of breath and chronic productive cough.  She denies any associated chest pain or palpitations.  No stent placement.  Does not use any blood thinners.  No recorded fevers at home.  Patient does not use supplemental O2 at home.  No history of PE or DVT.  No leg swelling.  Patient had recent URI but the end of April and was placed on antibiotics and steroids at the time.     Patient also reports dysuria, urgency and frequency upon urination.  She does have history of E coli ESBL and has had multiple infusions in the past.  Has been followed by ID.  Denies any abdominal pain.  No vomiting or diarrhea otherwise.      ED Course:  CBC with H&H 7.3/22.9.  Patient was 3 points higher on labs from 1 month ago.  Unclear etiology for anemia.  Iron studies in process.  Rectal exam guaiac negative and abdomen soft and nontender.  Patient consented and plan to transfuse 1 unit PRBCs.  Shortness of breath likely due to symptomatic anemia.  Patient reports relief with oxygen.  She was ambulated and 95% on room air with ambulation.  Placed on oxygen for comfort.  Patient also given Solu-Medrol and DuoNebs given history of pulmonary fibrosis.  CXR with known underlying pulmonary nodules and bronchiectasis.  No focal consolidation, pneumothorax, or pleural effusion. CMP with mild hyponatremia, mildly elevated T bili and mildly elevated alkaline phosphatase.  Troponin and BNP WNL.  UA consistent with UTI.  Patient with known resistant infections.  Historically has been given ertapenem  with success. In the past ID has arranged outpatient ertapenem infusion. Hopefully can aid patient with this.     I reviewed the ED Provider Note dated 05/23/2023 prior to my evaluation of this patient.  I reviewed all labs and imaging performed in the Main ED, prior to patient being placed in Observation. Patient was placed in the ED Observation Unit for symptomatic anemia and acute cystitis.    Interval History   - afebrile without leukocytosis on repeat CBC  - H&H with only mild improvement to 7.9/24.3 from 7.3/22.9 after 1 unit PRBC  - mild hypoxia at 90% on RA, placed on 2L NC overnight and remains on NC. Ambulated and patient desat to 89% with associated tachycardia.   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    Breast cancer     mastectomy 2014    Coccidiomycosis, progressive     Elevated CA-125 2/25/2018    Hyperlipidemia     OAB (overactive bladder)     Osteopenia     on Dexa 11/2017    Osteoporosis     pt reports hx of this, declined fosamax in past - treated with calcium and vit D    Pelvic mass 2/25/2018    Pulmonary fibrosis     Pulmonary nodules     SOB (shortness of breath) on exertion     Thyroid disease     hypo    UTI (urinary tract infection)       Past Surgical History:   Procedure Laterality Date    CHOLECYSTECTOMY      COLONOSCOPY N/A 12/9/2022    Procedure: COLONOSCOPY;  Surgeon: Enrique Dominguez MD;  Location: [address]);  Service: Endoscopy;  Laterality: N/A;  inst via portal  pt requests after 12/9/22-MS  11/30-pt notified of earlier arrival time-KPvt    CYSTOSCOPY WITH BIOPSY OF BLADDER Bilateral 7/27/2022    Procedure: CYSTOSCOPY, WITH BLADDER BIOPSY; retrograde pyelogram,;  Surgeon: Anaya Oropeza MD;  Location: [facility] OR;   Service: Urology;  Laterality: Bilateral;    ENDOSCOPIC ULTRASOUND OF UPPER GASTROINTESTINAL TRACT N/A 2021    Procedure: ULTRASOUND, UPPER GI TRACT, ENDOSCOPIC;  Surgeon: Aisha Barajas MD;  Location: [address]);  Service: Endoscopy;  Laterality: N/A;  UEUS/ERCP evaluation abn MRCP Hortencia Carney  Covid-19 test 21 at [facility] Pre-admit - pg    ERCP N/A 2021    Procedure: ERCP (ENDOSCOPIC RETROGRADE CHOLANGIOPANCREATOGRAPHY);  Surgeon: Aisha Barajas MD;  Location: [facility] ([address] [place] [address]);  Service: Endoscopy;  Laterality: N/A;  UEUS/ERCP evaluation abn MRCP - Dr Carney  Covid-19 test 21 at [facility] Pre-admit - pg    ESOPHAGOGASTRODUODENOSCOPY N/A 2021    Procedure: EGD (ESOPHAGOGASTRODUODENOSCOPY);  Surgeon: Aisha Barajas MD;  Location: [facility] ([address] [place] [address]);  Service: Endoscopy;  Laterality: N/A;  UEUS/ERCP evaluation abn MRCP Hortencia Carney  Covid-19 test 21 at [facility] Pre-admit - pg    HYSTERECTOMY      MASTECTOMY Right     2014        Family Hx   Family History   Problem Relation Age of Onset    Cancer Mother         colon cancer    Breast cancer Mother     Hypertension Father     Heart disease Father     Stroke Father     No Known Problems Sister     Cancer Brother         lung, ++ tobacco    Hypertension Brother     No Known Problems Daughter     Hypertension Son     Colon cancer Neg Hx     Ovarian cancer Neg Hx         Social Hx   Social History     Socioeconomic History    Marital status:     Number of children: 3   Occupational History    Occupation: retired from [employer], [employer]     Comment: neuro chemistry   Tobacco Use    Smoking status: Former     Packs/day: 0.50     Years: 30.00     Pack years: 15.00     Types: Cigarettes     Quit date: 2000     Years since quittin.3    Smokeless tobacco: Never    Tobacco comments:     quit in her 50s, after 30 years smoking;   Substance and Sexual Activity    Alcohol use: No    Drug use: No     "Sexual activity: Not Currently   Social History Narrative    From [place]     Moved to [facility] in 1985 for research    Moved to [place] for period of time    Moved back to [place] Summer 2016 and then to [facility] this year Sept 2017        Vital Signs   Vitals:    05/24/23 1508 05/24/23 1608 05/24/23 1628 05/24/23 1639   BP:  120/61 138/72    BP Location:       Patient Position:       Pulse: 85 82 82 85   Resp: 18 14 14    Temp:  98.1 °F (36.7 °C) 98 °F (36.7 °C)    TempSrc:       SpO2: 97% 96% 95%    Weight:  74.9 kg (165 lb 2 oz)     Height:  5' 4" (1.626 m)          Review of Systems  Review of Systems   Constitutional:  Negative for chills and fever.   HENT:  Negative for congestion, nosebleeds and sore throat.    Eyes:  Negative for blurred vision, double vision and photophobia.   Respiratory:  Positive for shortness of breath. Negative for cough.    Cardiovascular:  Negative for chest pain, claudication and leg swelling.   Gastrointestinal:  Negative for diarrhea, nausea and vomiting.   Genitourinary:  Positive for frequency. Negative for dysuria and urgency.   Musculoskeletal:  Negative for back pain and neck pain.   Skin:  Negative for itching and rash.   Neurological:  Negative for dizziness, weakness and headaches.     Brief Physical Exam/Reassessment   Physical Exam  Constitutional:       General: She is not in acute distress.     Appearance: Normal appearance. She is not toxic-appearing.   HENT:      Head: Normocephalic and atraumatic.      Nose: Nose normal.      Mouth/Throat:      Mouth: Mucous membranes are moist.   Cardiovascular:      Rate and Rhythm: Normal rate and regular rhythm.   Pulmonary:      Effort: Pulmonary effort is normal. No respiratory distress.   Abdominal:      General: Abdomen is flat. There is no distension.   Musculoskeletal:         General: Normal range of motion.      Cervical back: Normal range of motion.   Skin:     General: Skin is warm and dry.   Neurological:      General: " No focal deficit present.      Mental Status: She is alert and oriented to person, place, and time. Mental status is at baseline.   Psychiatric:         Mood and Affect: Mood normal.         Behavior: Behavior normal.       Labs/Imaging   Labs Reviewed   CBC W/ AUTO DIFFERENTIAL - Abnormal; Notable for the following components:       Result Value    RBC 2.53 (*)     Hemoglobin 7.3 (*)     Hematocrit 22.9 (*)     MCHC 31.9 (*)     RDW 17.9 (*)     MPV 8.8 (*)     Immature Granulocytes 4.8 (*)     Immature Grans (Abs) 0.27 (*)     All other components within normal limits   COMPREHENSIVE METABOLIC PANEL - Abnormal; Notable for the following components:    Sodium 135 (*)     Glucose 129 (*)     Albumin 2.8 (*)     Total Bilirubin 1.4 (*)     Alkaline Phosphatase 139 (*)     All other components within normal limits   URINALYSIS, REFLEX TO URINE CULTURE - Abnormal; Notable for the following components:    Color, UA Orange (*)     Appearance, UA Hazy (*)     Protein, UA 1+ (*)     Nitrite, UA Positive (*)     Leukocytes, UA 3+ (*)     All other components within normal limits    Narrative:     Specimen Source->Urine   URINALYSIS MICROSCOPIC - Abnormal; Notable for the following components:    RBC, UA 8 (*)     WBC, UA >100 (*)     WBC Clumps, UA Occasional (*)     Bacteria Few (*)     Yeast, UA Rare (*)     All other components within normal limits    Narrative:     Specimen Source->Urine   IRON AND TIBC - Abnormal; Notable for the following components:    Transferrin 153 (*)     TIBC 226 (*)     Saturated Iron 15 (*)     All other components within normal limits   FERRITIN - Abnormal; Notable for the following components:    Ferritin 749 (*)     All other components within normal limits   CBC W/ AUTO DIFFERENTIAL - Abnormal; Notable for the following components:    WBC 3.28 (*)     RBC 2.74 (*)     Hemoglobin 7.9 (*)     Hematocrit 24.3 (*)     RDW 17.0 (*)     nRBC 1 (*)     Mono % 2.0 (*)     All other components  within normal limits   COMPREHENSIVE METABOLIC PANEL - Abnormal; Notable for the following components:    CO2 22 (*)     Glucose 176 (*)     Albumin 2.7 (*)     Total Bilirubin 1.3 (*)     Alkaline Phosphatase 142 (*)     All other components within normal limits   CULTURE, URINE   B-TYPE NATRIURETIC PEPTIDE   TROPONIN I   IRON AND TIBC   FERRITIN   TYPE & SCREEN   PREPARE RBC SOFT   PREPARE RBC SOFT      Imaging Results              X-Ray Chest 1 View (Final result)  Result time 05/23/23 19:47:18      Final result by Shea Peralta MD (05/23/23 19:47:18)                   Impression:      No new superimposed focal consolidation.      Electronically signed by: Shea Peralta  Date:    05/23/2023  Time:    19:47               Narrative:    EXAMINATION:  CHEST ONE VIEW    CLINICAL HISTORY:  shortness of breath;    TECHNIQUE:  One view of the chest.    COMPARISON:  10/25/2021 and CT chest 05/15/2023    FINDINGS:  The cardiac silhouette is within normal limits.  Vascular calcification is seen at the aortic knob.  There is chronic interstitial prominence, which is unchanged.  There are known underlying pulmonary nodules and bronchiectasis.  There is no focal consolidation, pneumothorax, or pleural effusion.  The bones are diffusely osteopenic.                                       I reviewed all labs, imaging, EKGs.     Plan    Acute cystitis with hematuria   - IV ertapenam q24h  - consult ID for outpatient infusions upon d/c    2. Shortness of breath  3. Hypoxia  4. Symptomatic anemia  - no history of GI bleed, iron studies pending, unclear source of anemia  - p.r.n oxygen and breathing treatments  - consider 6 minute walk test for home O2  - upgrade to hospital medicine for further observation      I have discussed this case with Dr. Glimore who has agreed to accept this patient to hospital medicine under his service.

## 2023-05-24 NOTE — ED PROVIDER NOTES
Encounter Date: 5/23/2023       History     Chief Complaint   Patient presents with    Shortness of Breath     SOB onset 3-4 days ago. Reports having a respiratory infection a month ago and has gotten better. Hx of pulmonary fibrosis. 93% RA. Accessory muscle use noted. Pt also reports urinary frequency and dysuria. Denies body aches/chills.      78-year-old female with a history of pulmonary fibrosis, recurrent UTI, hyperlipidemia presents ER for evaluation of multiple complaints.  Patient reports worsening shortness of breath on exertion over the last 2-3 days.  Daughter reports that patient has been taking multiple pauses upon speaking due to the shortness of breath.  Patient does have a history of pulmonary fibrosis and does have baseline shortness of breath and chronic productive cough.  She denies any associated chest pain or palpitations.  No stent placement.  Does not use any blood thinners.  No recorded fevers at home.  Patient does not use supplemental O2 at home.  No history of PE or DVT.  No leg swelling.  Patient had recent URI but the end of April and was placed on antibiotics and steroids at the time.    Patient also reports dysuria, urgency and frequency upon urination.  She does have history of E coli ESBL and has had multiple infusions in the past.  Has been followed by ID.  Denies any abdominal pain.  No vomiting or diarrhea otherwise.      Review of patient's allergies indicates:   Allergen Reactions    Ciprofloxacin Other (See Comments)     Right foot pain and edema, tendonitis    Amlodipine Edema and Swelling     BLE  BLE    Lisinopril Other (See Comments)     cough    Nitrofuran analogues      Past Medical History:   Diagnosis Date    Arthritis     Borderline serous cystadenoma of right ovary 4/3/2018    Breast cancer     mastectomy 2014    Coccidiomycosis, progressive     Elevated CA-125 2/25/2018    Hyperlipidemia     OAB (overactive bladder)     Osteopenia     on Dexa 11/2017     Osteoporosis     pt reports hx of this, declined fosamax in past - treated with calcium and vit D    Pelvic mass 2/25/2018    Pulmonary fibrosis     Pulmonary nodules     SOB (shortness of breath) on exertion     Thyroid disease     hypo    UTI (urinary tract infection)      Past Surgical History:   Procedure Laterality Date    CHOLECYSTECTOMY      COLONOSCOPY N/A 12/9/2022    Procedure: COLONOSCOPY;  Surgeon: Enrique Dominguez MD;  Location: [facility] (4TH FLR);  Service: Endoscopy;  Laterality: N/A;  inst via portal  pt requests after 12/9/22-MS  11/30-pt notified of earlier arrival time-KPvt    CYSTOSCOPY WITH BIOPSY OF BLADDER Bilateral 7/27/2022    Procedure: CYSTOSCOPY, WITH BLADDER BIOPSY; retrograde pyelogram,;  Surgeon: Anaya Oropeza MD;  Location: [facility];  Service: Urology;  Laterality: Bilateral;    ENDOSCOPIC ULTRASOUND OF UPPER GASTROINTESTINAL TRACT N/A 4/8/2021    Procedure: ULTRASOUND, UPPER GI TRACT, ENDOSCOPIC;  Surgeon: Aisha Barajas MD;  Location: [facility] (2ND FLR);  Service: Endoscopy;  Laterality: N/A;  UEUS/ERCP evaluation abn MRCP - Dr Angelika Zamudio-19 test 4/5/21 at [facility] Pre-admit - pg    ERCP N/A 4/8/2021    Procedure: ERCP (ENDOSCOPIC RETROGRADE CHOLANGIOPANCREATOGRAPHY);  Surgeon: Aisha Barajas MD;  Location: [facility] (2ND FLR);  Service: Endoscopy;  Laterality: N/A;  UEUS/ERCP evaluation abn MRCP - Dr Angelika Steinerid-19 test 4/5/21 at [facility] Pre-admit - pg    ESOPHAGOGASTRODUODENOSCOPY N/A 4/8/2021    Procedure: EGD (ESOPHAGOGASTRODUODENOSCOPY);  Surgeon: Aisha Barajas MD;  Location: [facility] (2ND FLR);  Service: Endoscopy;  Laterality: N/A;  UEUS/ERCP evaluation abn MRCP Hortencia Steinerid-19 test 4/5/21 at [facility] Pre-admit - pg    HYSTERECTOMY      MASTECTOMY Right     2014     Family History   Problem Relation Age of Onset    Cancer Mother         colon cancer    Breast cancer Mother     Hypertension Father     Heart disease Father     Stroke  Father     No Known Problems Sister     Cancer Brother         lung, ++ tobacco    Hypertension Brother     No Known Problems Daughter     Hypertension Son     Colon cancer Neg Hx     Ovarian cancer Neg Hx      Social History     Tobacco Use    Smoking status: Former     Packs/day: 0.50     Years: 30.00     Pack years: 15.00     Types: Cigarettes     Quit date: 2000     Years since quittin.3    Smokeless tobacco: Never    Tobacco comments:     quit in her 50s, after 30 years smoking;   Substance Use Topics    Alcohol use: No    Drug use: No     Review of Systems   Constitutional:  Positive for fatigue. Negative for chills and fever.   HENT:  Negative for congestion.    Eyes:  Negative for visual disturbance.   Respiratory:  Positive for cough and shortness of breath.    Cardiovascular:  Negative for chest pain.   Gastrointestinal:  Negative for abdominal pain, blood in stool, nausea and vomiting.   Genitourinary:  Positive for dysuria, frequency and urgency.   Musculoskeletal:  Negative for myalgias.   Skin:  Negative for rash.   Allergic/Immunologic: Negative for immunocompromised state.   Neurological:  Negative for syncope, weakness and numbness.   Hematological:  Does not bruise/bleed easily.   Psychiatric/Behavioral:  Negative for confusion.      Physical Exam     Initial Vitals [23 1813]   BP Pulse Resp Temp SpO2   (!) 156/73 99 (!) 22 98.3 °F (36.8 °C) 95 %      MAP       --         Physical Exam    Vitals reviewed.  Constitutional: She appears well-developed and well-nourished. She is not diaphoretic. No distress.   HENT:   Head: Normocephalic and atraumatic.   Eyes: Conjunctivae and EOM are normal.   Neck: Neck supple.   Cardiovascular:  Normal rate, regular rhythm, normal heart sounds and intact distal pulses.           Pulmonary/Chest: No respiratory distress. She has decreased breath sounds (slight).   Abdominal: She exhibits no distension. There is no abdominal tenderness. There is no  rebound and no guarding.   Genitourinary: Rectum:      Guaiac result negative (brown stool).   Guaiac negative stool (brown stool).   Musculoskeletal:         General: Normal range of motion.      Cervical back: Neck supple.     Neurological: She is alert and oriented to person, place, and time.   Skin: Skin is warm.       ED Course   Procedures  Labs Reviewed   CBC W/ AUTO DIFFERENTIAL - Abnormal; Notable for the following components:       Result Value    RBC 2.53 (*)     Hemoglobin 7.3 (*)     Hematocrit 22.9 (*)     MCHC 31.9 (*)     RDW 17.9 (*)     MPV 8.8 (*)     Immature Granulocytes 4.8 (*)     Immature Grans (Abs) 0.27 (*)     All other components within normal limits   COMPREHENSIVE METABOLIC PANEL - Abnormal; Notable for the following components:    Sodium 135 (*)     Glucose 129 (*)     Albumin 2.8 (*)     Total Bilirubin 1.4 (*)     Alkaline Phosphatase 139 (*)     All other components within normal limits   URINALYSIS, REFLEX TO URINE CULTURE - Abnormal; Notable for the following components:    Color, UA Orange (*)     Appearance, UA Hazy (*)     Protein, UA 1+ (*)     Nitrite, UA Positive (*)     Leukocytes, UA 3+ (*)     All other components within normal limits    Narrative:     Specimen Source->Urine   URINALYSIS MICROSCOPIC - Abnormal; Notable for the following components:    RBC, UA 8 (*)     WBC, UA >100 (*)     WBC Clumps, UA Occasional (*)     Bacteria Few (*)     Yeast, UA Rare (*)     All other components within normal limits    Narrative:     Specimen Source->Urine   CULTURE, URINE   B-TYPE NATRIURETIC PEPTIDE   TROPONIN I   IRON AND TIBC   FERRITIN   TYPE & SCREEN   PREPARE RBC SOFT   PREPARE RBC SOFT          Imaging Results              X-Ray Chest 1 View (Final result)  Result time 05/23/23 19:47:18      Final result by Shea Peralta MD (05/23/23 19:47:18)                   Impression:      No new superimposed focal consolidation.      Electronically signed by: Shea  Fabian  Date:    05/23/2023  Time:    19:47               Narrative:    EXAMINATION:  CHEST ONE VIEW    CLINICAL HISTORY:  shortness of breath;    TECHNIQUE:  One view of the chest.    COMPARISON:  10/25/2021 and CT chest 05/15/2023    FINDINGS:  The cardiac silhouette is within normal limits.  Vascular calcification is seen at the aortic knob.  There is chronic interstitial prominence, which is unchanged.  There are known underlying pulmonary nodules and bronchiectasis.  There is no focal consolidation, pneumothorax, or pleural effusion.  The bones are diffusely osteopenic.                                       Medications   0.9%  NaCl infusion (for blood administration) (has no administration in time range)   ertapenem (INVANZ) 1 g in sodium chloride 0.9 % 100 mL IVPB (MB+) (0 g Intravenous Stopped 5/23/23 2309)   albuterol-ipratropium 2.5 mg-0.5 mg/3 mL nebulizer solution 3 mL (3 mLs Nebulization Given 5/23/23 2237)   methylPREDNISolone sodium succinate injection 125 mg (125 mg Intravenous Given 5/23/23 2242)     Medical Decision Making:   Differential Diagnosis:   Pneumonia, ACS, URI, CHF, electrolyte derangement     APC / Resident Notes:   Patient seen in the ER promptly upon arrival.  She is afebrile, no acute distress.  She is able to speak in complete full sentences without difficulty.  O2 saturation above 98% on room air.  Lung sounds are slightly diminished.  Abdomen is soft, nondistended nontender.  No CVA tenderness on exam.  No peripheral edema noted on exam.      ED Course as of 05/23/23 2325   Tue May 23, 2023   2153 On ambulation patient's O2 saturation dropped to 95% on room air. [AJ]   2154 Lab work shows normal white count of 5.6.  Hemoglobin is low 7.3, this is lower from patient's baseline, 1 month ago was 10 [AJ]   2154 Chemistries reveal normal liver and kidney functions.  BNP and troponin unremarkable [AJ]   2154 Urinalysis concerning for UTI with positive nitrite, greater than 100 WBC and  few bacteria.  Patient is symptomatic with dysuria and urgency frequency [AJ]   2154 Patient was given IV ertapenem in the ED she is had multiple urine cultures positive for E coli ESBL [AJ]   2300 Patient received additional DuoNeb and Solu-Medrol given history of pulmonary fibrosis. [AJ]   2300 Blood transfusion consent obtained.  Patient was ordered 1 unit of blood given the symptomatic anemia [AJ]   2301 Discussed case with ED or your provider Warren JAMESON for observation of patient.  Patient and daughter were informed of the decision for admission and observation, acknowledges and agrees to the treatment plan.    Plan:  Repeat hemoglobin after transfusion if stable may be discharge.  Outpatient follow-up with Hematology.  Set up patient for ertapenem IV infusions.  History of resistant UTI in the past. ID consult as needed    The care of this patient was overseen by attending physician who agrees with treatment, plan, and disposition.  Patient has remained stable during her stay in the ED and stable for transfer to the floor at this time.    Disclaimer: This note has been generated using voice-recognition software. There may be typographical errors that have been missed during proof-reading.     [AJ]      ED Course User Index  [AJ] Jackleyn Cowan PA-C                 Clinical Impression:   Final diagnoses:  [R06.02] Shortness of breath  [N30.01] Acute cystitis with hematuria (Primary)  [D64.9] Symptomatic anemia  [D64.9] Anemia requiring transfusions        ED Disposition Condition    Observation Stable                Jackelyn Cowan PA-C  05/23/23 2316       Jackelyn Cowan PA-C  05/23/23 2325       Jackelyn Cowan PA-C  05/24/23 0005

## 2023-05-24 NOTE — H&P
[facility] ED Observation Unit  History and Physical      I assumed care of this patient from the Emergency Department at onset of observation time, 11:05 PM on 05/23/2023.       History of Present Illness:  78-year-old female with a history of pulmonary fibrosis, recurrent UTI, hyperlipidemia presents ER for evaluation of multiple complaints.  Patient reports worsening shortness of breath on exertion over the last 2-3 days.  Daughter reports that patient has been taking multiple pauses upon speaking due to the shortness of breath.  Patient does have a history of pulmonary fibrosis and does have baseline shortness of breath and chronic productive cough.  She denies any associated chest pain or palpitations.  No stent placement.  Does not use any blood thinners.  No recorded fevers at home.  Patient does not use supplemental O2 at home.  No history of PE or DVT.  No leg swelling.  Patient had recent URI but the end of April and was placed on antibiotics and steroids at the time.     Patient also reports dysuria, urgency and frequency upon urination.  She does have history of E coli ESBL and has had multiple infusions in the past.  Has been followed by ID.  Denies any abdominal pain.  No vomiting or diarrhea otherwise.     ED Course:  CBC with H&H 7.3/22.9.  Patient was 3 points higher on labs from 1 month ago.  Unclear etiology for anemia.  Iron studies in process.  Rectal exam guaiac negative and abdomen soft and nontender.  Patient consented and plan to transfuse 1 unit PRBCs.  Shortness of breath likely due to symptomatic anemia.  Patient reports relief with oxygen.  She was ambulated and 95% on room air with ambulation.  Placed on oxygen for comfort.  Patient also given Solu-Medrol and DuoNebs given history of pulmonary fibrosis.  CXR with known underlying pulmonary nodules and bronchiectasis.  No focal consolidation, pneumothorax, or pleural effusion. CMP with mild hyponatremia, mildly elevated T bili and  mildly elevated alkaline phosphatase.  Troponin and BNP WNL.  UA consistent with UTI.  Patient with known resistant infections.  Historically has been given ertapenem with success. In the past ID has arranged outpatient ertapenem infusion. Hopefully can aid patient with this.    I reviewed the ED Provider Note dated 05/23/2023 prior to my evaluation of this patient.  I reviewed all labs and imaging performed in the Main ED, prior to patient being placed in Observation. Patient was placed in the ED Observation Unit for symptomatic anemia and acute cystitis.    PMHx   Past Medical History:   Diagnosis Date    Arthritis     Borderline serous cystadenoma of right ovary 4/3/2018    Breast cancer     mastectomy 2014    Coccidiomycosis, progressive     Elevated CA-125 2/25/2018    Hyperlipidemia     OAB (overactive bladder)     Osteopenia     on Dexa 11/2017    Osteoporosis     pt reports hx of this, declined fosamax in past - treated with calcium and vit D    Pelvic mass 2/25/2018    Pulmonary fibrosis     Pulmonary nodules     SOB (shortness of breath) on exertion     Thyroid disease     hypo    UTI (urinary tract infection)       Past Surgical History:   Procedure Laterality Date    CHOLECYSTECTOMY      COLONOSCOPY N/A 12/9/2022    Procedure: COLONOSCOPY;  Surgeon: Enrique Dominguez MD;  Location: [facility] (4TH FLR);  Service: Endoscopy;  Laterality: N/A;  inst via portal  pt requests after 12/9/22-MS  11/30-pt notified of earlier arrival time-KPvt    CYSTOSCOPY WITH BIOPSY OF BLADDER Bilateral 7/27/2022    Procedure: CYSTOSCOPY, WITH BLADDER BIOPSY; retrograde pyelogram,;  Surgeon: Anaya Oropeza MD;  Location: [facility] OR;  Service: Urology;  Laterality: Bilateral;    ENDOSCOPIC ULTRASOUND OF UPPER GASTROINTESTINAL TRACT N/A 4/8/2021    Procedure: ULTRASOUND, UPPER GI TRACT, ENDOSCOPIC;  Surgeon: Aisha Barajas MD;  Location: [facility] (2ND FLR);  Service: Endoscopy;  Laterality: N/A;  UEUS/ERCP evaluation  abn MRCP Hortencia Carney  Covid-19 test 21 at [facility] [place], operated by [facility] Pre-admit - pg    ERCP N/A 2021    Procedure: ERCP (ENDOSCOPIC RETROGRADE CHOLANGIOPANCREATOGRAPHY);  Surgeon: Aisha Barajas MD;  Location: [facility] ([address] [place] [address]);  Service: Endoscopy;  Laterality: N/A;  UEUS/ERCP evaluation abn MRCP - Dr Carney  Covid-19 test 21 at [facility], [place], operated by [facility] Pre-admit - pg    ESOPHAGOGASTRODUODENOSCOPY N/A 2021    Procedure: EGD (ESOPHAGOGASTRODUODENOSCOPY);  Surgeon: Aisha Barajas MD;  Location: [facility] ([address] [place] [address]);  Service: Endoscopy;  Laterality: N/A;  UEUS/ERCP evaluation abn MRCP Hortencia Carney  Covid-19 test 21 at [facility], [place], operated by [facility] Pre-admit - pg    HYSTERECTOMY      MASTECTOMY Right     2014        Family Hx   Family History   Problem Relation Age of Onset    Cancer Mother         colon cancer    Breast cancer Mother     Hypertension Father     Heart disease Father     Stroke Father     No Known Problems Sister     Cancer Brother         lung, ++ tobacco    Hypertension Brother     No Known Problems Daughter     Hypertension Son     Colon cancer Neg Hx     Ovarian cancer Neg Hx         Social Hx   Social History     Socioeconomic History    Marital status:     Number of children: 3   Occupational History    Occupation: retired from [employer], [employer] [place]     Comment: neuro chemistry   Tobacco Use    Smoking status: Former     Packs/day: 0.50     Years: 30.00     Pack years: 15.00     Types: Cigarettes     Quit date: 2000     Years since quittin.3    Smokeless tobacco: Never    Tobacco comments:     quit in her 50s, after 30 years smoking;   Substance and Sexual Activity    Alcohol use: No    Drug use: No    Sexual activity: Not Currently   Social History Narrative    From [place]     Moved to [facility] in  for research    Moved to [place] for period of time    Moved back to [place] Summer 2016 and then to [facility] this year 2017        Vital Signs   Vitals:    23 1813 23 2237   BP:  "(!) 156/73    Pulse: 99 91   Resp: (!) 22 20   Temp: 98.3 °F (36.8 °C)    TempSrc: Oral    SpO2: 95% 98%   Weight: 74.3 kg (163 lb 12.8 oz)    Height: 5' 4" (1.626 m)        Review of Systems  Review of Systems   Constitutional:  Negative for chills, fever and malaise/fatigue.   Respiratory:  Positive for shortness of breath. Negative for cough.         +CARDOZO   Cardiovascular:  Negative for chest pain and palpitations.   Gastrointestinal:  Positive for abdominal pain. Negative for nausea and vomiting.   Genitourinary:  Positive for dysuria, frequency and urgency. Negative for flank pain and hematuria.   Musculoskeletal:  Negative for back pain and myalgias.   Skin:  Negative for rash.   Neurological:  Negative for dizziness and headaches.   Psychiatric/Behavioral:  Negative for depression and suicidal ideas.      Brief Physical Exam/Reassessment   Physical Exam    Constitutional: She appears well-developed and well-nourished.  Non-toxic appearance. No distress.   Frail appearing   HENT:   Head: Normocephalic and atraumatic.   Eyes: Pupils are equal, round, and reactive to light.   Neck:   Normal range of motion.  Cardiovascular:  Normal rate, regular rhythm and normal heart sounds.           Pulmonary/Chest: Breath sounds normal. No respiratory distress. She has no wheezes. She has no rhonchi. She has no rales. She exhibits no tenderness.   Dyspneic with ambulation   Abdominal: Abdomen is soft. She exhibits no distension. There is no abdominal tenderness.   Musculoskeletal:         General: Normal range of motion.      Cervical back: Normal range of motion.     Neurological: She is alert and oriented to person, place, and time. No sensory deficit. GCS score is 15. GCS eye subscore is 4. GCS verbal subscore is 5. GCS motor subscore is 6.   Skin: Skin is warm and dry. Capillary refill takes less than 2 seconds. No rash noted.   Psychiatric: She has a normal mood and affect. Thought content normal.       Labs Reviewed "   CBC W/ AUTO DIFFERENTIAL - Abnormal; Notable for the following components:       Result Value    RBC 2.53 (*)     Hemoglobin 7.3 (*)     Hematocrit 22.9 (*)     MCHC 31.9 (*)     RDW 17.9 (*)     MPV 8.8 (*)     Immature Granulocytes 4.8 (*)     Immature Grans (Abs) 0.27 (*)     All other components within normal limits   COMPREHENSIVE METABOLIC PANEL - Abnormal; Notable for the following components:    Sodium 135 (*)     Glucose 129 (*)     Albumin 2.8 (*)     Total Bilirubin 1.4 (*)     Alkaline Phosphatase 139 (*)     All other components within normal limits   URINALYSIS, REFLEX TO URINE CULTURE - Abnormal; Notable for the following components:    Color, UA Orange (*)     Appearance, UA Hazy (*)     Protein, UA 1+ (*)     Nitrite, UA Positive (*)     Leukocytes, UA 3+ (*)     All other components within normal limits    Narrative:     Specimen Source->Urine   URINALYSIS MICROSCOPIC - Abnormal; Notable for the following components:    RBC, UA 8 (*)     WBC, UA >100 (*)     WBC Clumps, UA Occasional (*)     Bacteria Few (*)     Yeast, UA Rare (*)     All other components within normal limits    Narrative:     Specimen Source->Urine   CULTURE, URINE   B-TYPE NATRIURETIC PEPTIDE   TROPONIN I   IRON AND TIBC   FERRITIN   TYPE & SCREEN   PREPARE RBC SOFT   PREPARE RBC SOFT     X-Ray Chest 1 View   Final Result      No new superimposed focal consolidation.         Electronically signed by: Shea Peralta   Date:    05/23/2023   Time:    19:47            Medications:   Scheduled Meds:   [START ON 5/24/2023] atorvastatin  20 mg Oral Daily    [START ON 5/24/2023] azelastine  1 spray Nasal BID    [START ON 5/24/2023] calcium-vitamin D3  1 tablet Oral BID WM    [START ON 5/24/2023] carvediloL  6.25 mg Oral BID WM    [START ON 5/24/2023] ertapenem (INVANZ) IVPB  1 g Intravenous Q24H    [START ON 5/24/2023] fluticasone furoate-vilanteroL  1 puff Inhalation Daily    gabapentin  100 mg Oral QHS    [START ON 5/24/2023]  levothyroxine  50 mcg Oral Before breakfast    montelukast  10 mg Oral QHS    NON FORMULARY MEDICATION 300 mg  300 mg Oral QHS    [START ON 5/24/2023] oxybutynin  15 mg Oral Daily    [START ON 5/24/2023] pantoprazole  20 mg Oral Daily    [START ON 5/24/2023] predniSONE  2 mg Oral Daily     Continuous Infusions:  PRN Meds:.sodium chloride, [START ON 5/24/2023] albuterol-ipratropium      Assessment/Plan:    1. Acute cystitis with hematuria    2. Shortness of breath    3. Symptomatic anemia    4. Anemia requiring transfusions      Acute cystitis with hematuria  -patient with history of resistant UTIs  -ertapenem Q 24  -discuss with ID as they arranged outpatient antibiotic infusions in the past    2. Shortness of breath  -likely secondary to the anemia  -oxygen p.r.n. for comfort  -breathing treatments p.r.n.   -not hypoxic  -no desaturation with ambulation    3. Symptomatic anemia  -no history of GIB  -iron studies pending    Case was discussed with the ED provider, Jackelyn Cowan

## 2023-05-24 NOTE — CARE UPDATE
05/24/23 0825   Patient Assessment/Suction   Level of Consciousness (AVPU) alert   Respiratory Effort Normal;Unlabored   All Lung Fields Breath Sounds clear;diminished   PRE-TX-O2   Device (Oxygen Therapy) nasal cannula   Flow (L/min) 2   SpO2 96 %   Pulse 84   Resp 15   Inhaler   $ Inhaler Charges MDI (Metered Dose Inahler) Treatment;Mouth rinsed post treatment   Daily Review of Necessity (Inhaler) completed   Respiratory Treatment Status (Inhaler) given   Treatment Route (Inhaler) mouthpiece   Patient Position (Inhaler) HOB elevated   Post Treatment Assessment (Inhaler) breath sounds unchanged   Signs of Intolerance (Inhaler) none   Breath Sounds Post-Respiratory Treatment   Throughout All Fields Post-Treatment All Fields   Throughout All Fields Post-Treatment clear;diminished   Post-treatment Heart Rate (beats/min) 85   Post-treatment Resp Rate (breaths/min) 15

## 2023-05-24 NOTE — HPI
Niurka Pedraza is a 78 year old lady with hx of recurrent UTI with MDRO, interstitial lung disease with chronic cough, pulmonary fibrosis, HTN, HLD, hypothyroidism, immunosuppression due to chronic steroid use referred to ED from PCP for a few complaints. 1) worsening dysuria and frequency 2) worsening SOB, with acute CARDOZO over the past 3-4 days with Pox 94% on RA (baseline 95-97% on RA) 3) Worsening cough over the last 1 month that cause her so much fatigue that she cannot partake in activities 4) Worsening neuropathy in bilateral lower extremities, chronic.     Patient reports last UTI was in Oct 2022. Sees outpatient urology and had cystoscopy done. Patient reports urine now is brownish, and she has suprapubic tenderness and dysuria. No fever and chills. Patient reports she started having SOB and worsening cough with brownish sputum since 1 month ago. Previously had sick contacts in family. Patient reports that since getting started on antibiotics from PCP, sputum has improved from brown to yellowish to clear. She is concerned about the new onset CARDOZO and SOB on rest. Patient has on baseline has been on azithromycin 3x/week, montelukast daily and prednisone 2mg daily for pulmonary fibrosis and has started taking azithromycin daily yesterday PTA.     Afebrile, HR 99, RR 22, /73, Pox 95% on RA on arrival. H/H 7.3/22.9, 3 points lower than H/H 10.8/33.6 on 4/19/23. Plt at baseline. No leukocytosis on arrival but neutropenic today 3.28K. BNP and Trop I normal. Cr, BUN 0.9, 12 respectively, normal. . UA nitrites positive, 3+ leukocytes, >100WBC. CXR with no new superimposed focal consolidation. Patient was given IV Ertapenam 1g in ED (5/23/23) for UTI, and IV methylprednisolone 125mg with Duonebs for SOB. Patient also received 1 unit of PRBC but recheck today H/H remains at 7.9/24.3. Patient now requires 2L O2 NC to maintain sats above 95%. Urine cultures are in process. Patient denies any hemetemesis,  bleeding per vagina or rectum or wounds anywhere. Patient reports last colonoscopy was done in Dec 2022 and had some polyps clipped and biopsied which were benign. Has a upcoming scheduled EGD.      Patient is upgraded from EDOU to inpatient status with Hospital Medicine for management of UTI, new onset CARDOZO and resting SOB and evaluation of symptomatic anemia. Consults are placed for GI and pulmonology.

## 2023-05-25 PROBLEM — G57.93 NEUROPATHY OF BOTH FEET: Chronic | Status: ACTIVE | Noted: 2023-05-25

## 2023-05-25 PROBLEM — Z86.19 HISTORY OF ESBL E. COLI INFECTION: Status: ACTIVE | Noted: 2023-05-25

## 2023-05-25 LAB
ANION GAP SERPL CALC-SCNC: 8 MMOL/L (ref 8–16)
ASCENDING AORTA: 2.95 CM
AV INDEX (PROSTH): 0.57
AV MEAN GRADIENT: 7 MMHG
AV PEAK GRADIENT: 12 MMHG
AV VALVE AREA: 1.78 CM2
AV VELOCITY RATIO: 0.52
BLD PROD TYP BPU: NORMAL
BLOOD UNIT EXPIRATION DATE: NORMAL
BLOOD UNIT TYPE CODE: 6200
BLOOD UNIT TYPE: NORMAL
BSA FOR ECHO PROCEDURE: 1.84 M2
BUN SERPL-MCNC: 16 MG/DL (ref 8–23)
CALCIUM SERPL-MCNC: 8.8 MG/DL (ref 8.7–10.5)
CHLORIDE SERPL-SCNC: 106 MMOL/L (ref 95–110)
CO2 SERPL-SCNC: 24 MMOL/L (ref 23–29)
CODING SYSTEM: NORMAL
CREAT SERPL-MCNC: 0.8 MG/DL (ref 0.5–1.4)
CROSSMATCH INTERPRETATION: NORMAL
CV ECHO LV RWT: 0.31 CM
DISPENSE STATUS: NORMAL
DOP CALC AO PEAK VEL: 1.72 M/S
DOP CALC AO VTI: 39.8 CM
DOP CALC LVOT AREA: 3.1 CM2
DOP CALC LVOT DIAMETER: 2 CM
DOP CALC LVOT PEAK VEL: 0.89 M/S
DOP CALC LVOT STROKE VOLUME: 70.96 CM3
DOP CALCLVOT PEAK VEL VTI: 22.6 CM
E WAVE DECELERATION TIME: 255.58 MSEC
E/A RATIO: 1.63
E/E' RATIO: 11 M/S
ECHO LV POSTERIOR WALL: 0.76 CM (ref 0.6–1.1)
EJECTION FRACTION: 60 %
ERYTHROCYTE [DISTWIDTH] IN BLOOD BY AUTOMATED COUNT: 17.4 % (ref 11.5–14.5)
EST. GFR  (NO RACE VARIABLE): >60 ML/MIN/1.73 M^2
FOLATE SERPL-MCNC: 15.3 NG/ML (ref 4–24)
FRACTIONAL SHORTENING: 39 % (ref 28–44)
GLUCOSE SERPL-MCNC: 116 MG/DL (ref 70–110)
HAPTOGLOB SERPL-MCNC: 401 MG/DL (ref 30–250)
HCT VFR BLD AUTO: 24.1 % (ref 37–48.5)
HGB BLD-MCNC: 7.6 G/DL (ref 12–16)
INTERVENTRICULAR SEPTUM: 0.83 CM (ref 0.6–1.1)
IVC DIAMETER: 1.45 CM
IVRT: 110.37 MSEC
LA MAJOR: 4.84 CM
LA MINOR: 5.42 CM
LA WIDTH: 3.6 CM
LDH SERPL L TO P-CCNC: 298 U/L (ref 110–260)
LEFT ATRIUM SIZE: 4.01 CM
LEFT ATRIUM VOLUME INDEX MOD: 25.6 ML/M2
LEFT ATRIUM VOLUME INDEX: 34.9 ML/M2
LEFT ATRIUM VOLUME MOD: 46 CM3
LEFT ATRIUM VOLUME: 62.75 CM3
LEFT INTERNAL DIMENSION IN SYSTOLE: 2.94 CM (ref 2.1–4)
LEFT VENTRICLE DIASTOLIC VOLUME INDEX: 60.54 ML/M2
LEFT VENTRICLE DIASTOLIC VOLUME: 108.97 ML
LEFT VENTRICLE MASS INDEX: 71 G/M2
LEFT VENTRICLE SYSTOLIC VOLUME INDEX: 18.5 ML/M2
LEFT VENTRICLE SYSTOLIC VOLUME: 33.27 ML
LEFT VENTRICULAR INTERNAL DIMENSION IN DIASTOLE: 4.83 CM (ref 3.5–6)
LEFT VENTRICULAR MASS: 127.01 G
LV LATERAL E/E' RATIO: 11 M/S
LV SEPTAL E/E' RATIO: 11 M/S
LVOT MG: 1.91 MMHG
LVOT MV: 0.66 CM/S
MCH RBC QN AUTO: 28.4 PG (ref 27–31)
MCHC RBC AUTO-ENTMCNC: 31.5 G/DL (ref 32–36)
MCV RBC AUTO: 90 FL (ref 82–98)
MV PEAK A VEL: 0.54 M/S
MV PEAK E VEL: 0.88 M/S
NUM UNITS TRANS PACKED RBC: NORMAL
PISA MRMAX VEL: 4.32 M/S
PISA TR MAX VEL: 2.72 M/S
PLATELET # BLD AUTO: 294 K/UL (ref 150–450)
PMV BLD AUTO: 9.3 FL (ref 9.2–12.9)
POTASSIUM SERPL-SCNC: 3.9 MMOL/L (ref 3.5–5.1)
PV MV: 0.66 M/S
PV PEAK VELOCITY: 0.81 CM/S
RA MAJOR: 4.41 CM
RA PRESSURE: 3 MMHG
RA WIDTH: 3.4 CM
RBC # BLD AUTO: 2.68 M/UL (ref 4–5.4)
RETICS/RBC NFR AUTO: 3.2 % (ref 0.5–2.5)
RIGHT VENTRICULAR END-DIASTOLIC DIMENSION: 3.33 CM
SINUS: 2.8 CM
SODIUM SERPL-SCNC: 138 MMOL/L (ref 136–145)
STJ: 2.49 CM
TDI LATERAL: 0.08 M/S
TDI SEPTAL: 0.08 M/S
TDI: 0.08 M/S
TR MAX PG: 30 MMHG
TRICUSPID ANNULAR PLANE SYSTOLIC EXCURSION: 1.4 CM
TV REST PULMONARY ARTERY PRESSURE: 33 MMHG
VIT B12 SERPL-MCNC: 763 PG/ML (ref 210–950)
WBC # BLD AUTO: 5.05 K/UL (ref 3.9–12.7)

## 2023-05-25 PROCEDURE — P9016 RBC LEUKOCYTES REDUCED: HCPCS | Performed by: INTERNAL MEDICINE

## 2023-05-25 PROCEDURE — 82607 VITAMIN B-12: CPT | Performed by: INTERNAL MEDICINE

## 2023-05-25 PROCEDURE — 36415 COLL VENOUS BLD VENIPUNCTURE: CPT | Performed by: INTERNAL MEDICINE

## 2023-05-25 PROCEDURE — 80048 BASIC METABOLIC PNL TOTAL CA: CPT

## 2023-05-25 PROCEDURE — 83615 LACTATE (LD) (LDH) ENZYME: CPT | Performed by: INTERNAL MEDICINE

## 2023-05-25 PROCEDURE — 99223 1ST HOSP IP/OBS HIGH 75: CPT | Mod: ,,, | Performed by: INTERNAL MEDICINE

## 2023-05-25 PROCEDURE — 97530 THERAPEUTIC ACTIVITIES: CPT

## 2023-05-25 PROCEDURE — 85045 AUTOMATED RETICULOCYTE COUNT: CPT | Performed by: INTERNAL MEDICINE

## 2023-05-25 PROCEDURE — 99223 PR INITIAL HOSPITAL CARE,LEVL III: ICD-10-PCS | Mod: ,,, | Performed by: INTERNAL MEDICINE

## 2023-05-25 PROCEDURE — 85027 COMPLETE CBC AUTOMATED: CPT

## 2023-05-25 PROCEDURE — 99223 PR INITIAL HOSPITAL CARE,LEVL III: ICD-10-PCS | Mod: GC,,, | Performed by: INTERNAL MEDICINE

## 2023-05-25 PROCEDURE — 82746 ASSAY OF FOLIC ACID SERUM: CPT | Performed by: INTERNAL MEDICINE

## 2023-05-25 PROCEDURE — 25000003 PHARM REV CODE 250: Performed by: NURSE PRACTITIONER

## 2023-05-25 PROCEDURE — 25000003 PHARM REV CODE 250

## 2023-05-25 PROCEDURE — 11000001 HC ACUTE MED/SURG PRIVATE ROOM

## 2023-05-25 PROCEDURE — 94640 AIRWAY INHALATION TREATMENT: CPT

## 2023-05-25 PROCEDURE — 83010 ASSAY OF HAPTOGLOBIN QUANT: CPT | Performed by: INTERNAL MEDICINE

## 2023-05-25 PROCEDURE — 27000221 HC OXYGEN, UP TO 24 HOURS

## 2023-05-25 PROCEDURE — 63600175 PHARM REV CODE 636 W HCPCS: Performed by: NURSE PRACTITIONER

## 2023-05-25 PROCEDURE — 97161 PT EVAL LOW COMPLEX 20 MIN: CPT

## 2023-05-25 PROCEDURE — 25000003 PHARM REV CODE 250: Performed by: INTERNAL MEDICINE

## 2023-05-25 PROCEDURE — 99900035 HC TECH TIME PER 15 MIN (STAT)

## 2023-05-25 PROCEDURE — 99223 1ST HOSP IP/OBS HIGH 75: CPT | Mod: GC,,, | Performed by: INTERNAL MEDICINE

## 2023-05-25 PROCEDURE — 36430 TRANSFUSION BLD/BLD COMPNT: CPT

## 2023-05-25 PROCEDURE — 36415 COLL VENOUS BLD VENIPUNCTURE: CPT

## 2023-05-25 PROCEDURE — 97165 OT EVAL LOW COMPLEX 30 MIN: CPT

## 2023-05-25 PROCEDURE — 94761 N-INVAS EAR/PLS OXIMETRY MLT: CPT

## 2023-05-25 RX ORDER — HYDROCODONE BITARTRATE AND ACETAMINOPHEN 500; 5 MG/1; MG/1
TABLET ORAL
Status: DISCONTINUED | OUTPATIENT
Start: 2023-05-25 | End: 2023-05-26 | Stop reason: HOSPADM

## 2023-05-25 RX ORDER — ASCORBIC ACID 250 MG
250 TABLET ORAL DAILY
Status: DISCONTINUED | OUTPATIENT
Start: 2023-05-25 | End: 2023-05-26 | Stop reason: HOSPADM

## 2023-05-25 RX ORDER — L. ACIDOPHILUS/L.BULGARICUS 100MM CELL
1 GRANULES IN PACKET (EA) ORAL 2 TIMES DAILY
Status: DISCONTINUED | OUTPATIENT
Start: 2023-05-25 | End: 2023-05-26 | Stop reason: HOSPADM

## 2023-05-25 RX ADMIN — DOCUSATE SODIUM AND SENNOSIDES 1 TABLET: 8.6; 5 TABLET, FILM COATED ORAL at 08:05

## 2023-05-25 RX ADMIN — Medication 1 TABLET: at 06:05

## 2023-05-25 RX ADMIN — Medication 1 TABLET: at 08:05

## 2023-05-25 RX ADMIN — LACTOBACILLUS ACIDOPHILUS / LACTOBACILLUS BULGARICUS 1 EACH: 100 MILLION CFU STRENGTH GRANULES at 08:05

## 2023-05-25 RX ADMIN — IRBESARTAN 300 MG: 150 TABLET, FILM COATED ORAL at 08:05

## 2023-05-25 RX ADMIN — PANTOPRAZOLE SODIUM 40 MG: 40 TABLET, DELAYED RELEASE ORAL at 08:05

## 2023-05-25 RX ADMIN — LEVOTHYROXINE SODIUM 50 MCG: 25 TABLET ORAL at 06:05

## 2023-05-25 RX ADMIN — ERTAPENEM 1 G: 1 INJECTION INTRAMUSCULAR; INTRAVENOUS at 11:05

## 2023-05-25 RX ADMIN — CARVEDILOL 6.25 MG: 6.25 TABLET, FILM COATED ORAL at 08:05

## 2023-05-25 RX ADMIN — AZELASTINE HYDROCHLORIDE 137 MCG: 137 SPRAY, METERED NASAL at 08:05

## 2023-05-25 RX ADMIN — PREDNISONE 2 MG: 1 TABLET ORAL at 08:05

## 2023-05-25 RX ADMIN — FLUTICASONE FUROATE AND VILANTEROL TRIFENATATE 1 PUFF: 100; 25 POWDER RESPIRATORY (INHALATION) at 08:05

## 2023-05-25 RX ADMIN — Medication 250 MG: at 11:05

## 2023-05-25 RX ADMIN — CARVEDILOL 6.25 MG: 6.25 TABLET, FILM COATED ORAL at 06:05

## 2023-05-25 RX ADMIN — MONTELUKAST 10 MG: 10 TABLET, FILM COATED ORAL at 08:05

## 2023-05-25 RX ADMIN — ATORVASTATIN CALCIUM 20 MG: 20 TABLET, FILM COATED ORAL at 08:05

## 2023-05-25 RX ADMIN — GABAPENTIN 100 MG: 100 CAPSULE ORAL at 08:05

## 2023-05-25 RX ADMIN — OXYBUTYNIN CHLORIDE 15 MG: 5 TABLET, EXTENDED RELEASE ORAL at 08:05

## 2023-05-25 NOTE — ASSESSMENT & PLAN NOTE
Hx of ILD, pulmonary fibrosis, on home dose Prednisone 2mg daily, montelukast 10mg daily, not on home O2.  Worsening SOB and CARDOZO over 3-4 days now requiring 2L O2 via NC at rest to maintain 98% in the setting of acute drop in hemoglobin over 1 month. IV methylprednisolone 125mg with Duonebs given in ED.    Plan:  - Appreciate pulmonology input  - Continue PO prednisone 2mg and montelukast 10mg daily  - Continue home inhalers albuterol and fluticasone on discharge, duonebs Q4 PRN  - Continue PO pantoprazole 40mg daily  - O2 to maintain Pox > 96%  - Outpatient repeat PFT  - Will need 6MWT to assess need for home oxygen

## 2023-05-25 NOTE — PROGRESS NOTES
[facility] ([place])  Pulmonology  Progress Note    Patient Name: Niurka Pedraza  MRN: [id number]  Admission Date: 5/23/2023  Hospital Length of Stay: 1 days  Code Status: Full Code  Attending Provider: Salvador Gilmore MD  Primary Care Provider: Savana Anderson MD   Principal Problem: Shortness of breath    Subjective:     Interval History:  There was no acute overnight event. Patient discloses she is feeling in terms of fatigue, exertional SOB and cough.   Patient is afebrile.    Objective:     Vital Signs (Most Recent):  Temp: 98.2 °F (36.8 °C) (05/25/23 0804)  Pulse: 79 (05/25/23 0820)  Resp: 16 (05/25/23 0820)  BP: (!) 128/59 (05/25/23 0804)  SpO2: 97 % (05/25/23 0820) Vital Signs (24h Range):  Temp:  [97.7 °F (36.5 °C)-98.2 °F (36.8 °C)] 98.2 °F (36.8 °C)  Pulse:  [] 79  Resp:  [14-24] 16  SpO2:  [89 %-98 %] 97 %  BP: (120-138)/(59-72) 128/59     Weight: 74.9 kg (165 lb 2 oz)  Body mass index is 28.34 kg/m².      Intake/Output Summary (Last 24 hours) at 5/25/2023 0831  Last data filed at 5/25/2023 0647  Gross per 24 hour   Intake 180 ml   Output 600 ml   Net -420 ml        Physical Exam  Vitals reviewed.    Constitutional:       Appearance: Normal appearance.   HENT:      Head: Normocephalic and atraumatic.      Mouth/Throat:      Mouth: Mucous membranes are moist.   Eyes:      Conjunctiva/sclera: Conjunctivae normal.   Cardiovascular:      Rate and Rhythm: Normal rate and regular rhythm.      Pulses: Normal pulses.      Heart sounds: No murmur heard.  Pulmonary:      Effort: Pulmonary effort is normal. No respiratory distress.      Breath sounds: No wheezing.      Comments: Bilateral inspiratory crackles to the posterior lower lung field  Abdominal:      Palpations: Abdomen is soft.      Tenderness: There is no abdominal tenderness.   Musculoskeletal:      Comments: No edema  Neurological:      General: No focal deficit present.      Mental Status: She is alert.   Psychiatric:         Mood and Affect:  Mood normal.         Behavior: Behavior normal.     Review of Systems    Vents:  Oxygen Concentration (%): 28 (05/25/23 0820)    Lines/Drains/Airways       Peripheral Intravenous Line  Duration                  Peripheral IV - Single Lumen 05/24/23 0107 20 G Posterior;Right Forearm 1 day                    Significant Labs:    CBC/Anemia Profile:  Recent Labs   Lab 05/23/23  2333 05/24/23  0633 05/24/23  1816 05/25/23  0510   WBC  --  3.28* 4.79 5.05   HGB  --  7.9* 8.2* 7.6*   HCT  --  24.3* 25.2* 24.1*   PLT  --  306 333 294   MCV  --  89 89 90   RDW  --  17.0* 17.4* 17.4*   IRON 35  --   --   --    FERRITIN 749*  --   --   --         Chemistries:  Recent Labs   Lab 05/23/23  1901 05/24/23  0633 05/25/23  0510   * 136 138   K 3.8 4.2 3.9    103 106   CO2 23 22* 24   BUN 12 11 16   CREATININE 0.9 0.8 0.8   CALCIUM 9.0 8.9 8.8   ALBUMIN 2.8* 2.7*  --    PROT 7.3 7.1  --    BILITOT 1.4* 1.3*  --    ALKPHOS 139* 142*  --    ALT 26 24  --    AST 26 26  --        All pertinent labs within the past 24 hours have been reviewed.    Significant Imaging:  I have reviewed all pertinent imaging results/findings within the past 24 hours.    Assessment/Plan:     Pulmonary  * Shortness of breath  Worsening SOB likely due to symptomatic acute on chronic anemia (significant drop in hemoglobin) on the background of pulmonary fibrosis, SOB improving status post transfusion      Anemia work up  Agree with GI evaluation  Consider hematology evaluation if GI work up is non contributory   Patient has no recent TTE, check TTE to evaluate the cardiac function, structure and pulmonary pressure  Outpatient repeat PFT  6MWT before discharge to assess the need for home oxygen        Pulmonary fibrosis with bronchiectasis  Patient does not appear to be in acute exacerbation. Patient had a recent exacerbation which is improving status post outpatient course of antibiotics and Medrol Dosepak   Continue home dose of Prednisone 2mg oral  daily  Continue home inhalers  Continue PPI      Renal/  Recurrent UTI  Management as par primary     Other  Suspected sleep apnea  Please refer for sleep study on discharge        Patient was seen with the attending physician.            Barbara Hernandez MD  Pulmonology  [facility] ([place])

## 2023-05-25 NOTE — SUBJECTIVE & OBJECTIVE
Interval History:  There was no acute overnight event. Patient discloses she is feeling in terms of fatigue, exertional SOB and cough.   Patient is afebrile.    Objective:     Vital Signs (Most Recent):  Temp: 98.2 °F (36.8 °C) (05/25/23 0804)  Pulse: 79 (05/25/23 0820)  Resp: 16 (05/25/23 0820)  BP: (!) 128/59 (05/25/23 0804)  SpO2: 97 % (05/25/23 0820) Vital Signs (24h Range):  Temp:  [97.7 °F (36.5 °C)-98.2 °F (36.8 °C)] 98.2 °F (36.8 °C)  Pulse:  [] 79  Resp:  [14-24] 16  SpO2:  [89 %-98 %] 97 %  BP: (120-138)/(59-72) 128/59     Weight: 74.9 kg (165 lb 2 oz)  Body mass index is 28.34 kg/m².      Intake/Output Summary (Last 24 hours) at 5/25/2023 0831  Last data filed at 5/25/2023 0647  Gross per 24 hour   Intake 180 ml   Output 600 ml   Net -420 ml        Physical Exam  Vitals reviewed.    Constitutional:       Appearance: Normal appearance.   HENT:      Head: Normocephalic and atraumatic.      Mouth/Throat:      Mouth: Mucous membranes are moist.   Eyes:      Conjunctiva/sclera: Conjunctivae normal.   Cardiovascular:      Rate and Rhythm: Normal rate and regular rhythm.      Pulses: Normal pulses.      Heart sounds: No murmur heard.  Pulmonary:      Effort: Pulmonary effort is normal. No respiratory distress.      Breath sounds: No wheezing.      Comments: Bilateral inspiratory crackles to the posterior lower lung field  Abdominal:      Palpations: Abdomen is soft.      Tenderness: There is no abdominal tenderness.   Musculoskeletal:      Comments: No edema  Neurological:      General: No focal deficit present.      Mental Status: She is alert.   Psychiatric:         Mood and Affect: Mood normal.         Behavior: Behavior normal.     Review of Systems    Vents:  Oxygen Concentration (%): 28 (05/25/23 0820)    Lines/Drains/Airways       Peripheral Intravenous Line  Duration                  Peripheral IV - Single Lumen 05/24/23 0107 20 G Posterior;Right Forearm 1 day                    Significant  Labs:    CBC/Anemia Profile:  Recent Labs   Lab 05/23/23  2333 05/24/23  0633 05/24/23  1816 05/25/23  0510   WBC  --  3.28* 4.79 5.05   HGB  --  7.9* 8.2* 7.6*   HCT  --  24.3* 25.2* 24.1*   PLT  --  306 333 294   MCV  --  89 89 90   RDW  --  17.0* 17.4* 17.4*   IRON 35  --   --   --    FERRITIN 749*  --   --   --         Chemistries:  Recent Labs   Lab 05/23/23  1901 05/24/23  0633 05/25/23  0510   * 136 138   K 3.8 4.2 3.9    103 106   CO2 23 22* 24   BUN 12 11 16   CREATININE 0.9 0.8 0.8   CALCIUM 9.0 8.9 8.8   ALBUMIN 2.8* 2.7*  --    PROT 7.3 7.1  --    BILITOT 1.4* 1.3*  --    ALKPHOS 139* 142*  --    ALT 26 24  --    AST 26 26  --        All pertinent labs within the past 24 hours have been reviewed.    Significant Imaging:  I have reviewed all pertinent imaging results/findings within the past 24 hours.

## 2023-05-25 NOTE — ASSESSMENT & PLAN NOTE
Worsening SOB, with acute CARDOZO over the past 3-4 days with Pox 94% on RA (baseline 95-97% on RA). Mild bilateral lower extremity swelling noted by daughter. BNP normal. CXR with no new superimposed focal consolidation.     - Echo

## 2023-05-25 NOTE — RESEARCH
Sponsor: Dr. Nitin Swain M.D.    Study Title/IRB Number: A computer vision approach to prevention of injury from falling  IRB #: 2020.256    Principle Investigator: Nitin Swain M.D.    Present for Discussion: Yes/Patient only     Is LAR Consenting for Subject: No    Prior to the Informed Consent (IC) being signed, or any study protocol required data collection, testing, procedure, or intervention being performed, the following was done and/or discussed:  Patient was given a copy of the IC for review   Purpose of the study and qualifications to participate   Study design, follow up schedule, and tests or procedures done at each visit  Confidentiality and HIPAA Authorization for Release of Medical Records for the research trial/ subject's rights/research related injury  Risk, Benefits, Alternative Treatments, Compensation and Costs  Participation in the research trial is voluntary and patient may withdraw at anytime  Contact information for study related questions    Patient verbalizes understanding of the above: Yes  Contact information for CRC and PI given to patient: Yes  Patient able to adequately summarize: the purpose of the study, the risks associated with the study, and all procedures, testing, and follow-ups associated with the study: Yes    Patient signed the informed consent form for the A Computer Vision Approach to Prevent Injury From Falling research study with an IRB approval date of 07/09/2020.  Each page of the consent form was reviewed with patient and all questions answered satisfactorily. Patient signed the consent form and received a copy of same. The original consent was scanned into electronic medical records (EPIC) and filed into the subject's research study chart.    Ms. Pedraza was able to complete the following upon entry of the study:    - Subject was outfitted with white and black checkered gown: No  - Subject understands that they must wear the white and black checkered gown  for the entirety of the 24 hour observation period: No   - Subject understands that visitors and staff will also be recorded while in the view of visual recording equipment: Yes  - Subject was able to read consent and understands that they'll only be visually recorded for 24 hours and not audio recorded: Yes  -Subject states that they understand that this is an investigational study and that the WOW (Workstation on Wheels) doesn't prevent or lower risks of falls or injuries, and that they should always follow their provider's orders and use their call bell to alert hospital staff before ambulating or becoming mobile: Yes    Dr. Nitin Swain M.D. has reviewed the following inclusion/ exclusion criteria and confirms that subject meets all Inclusion and no Exclusion criteria at this time:      Inclusion-   - Subject is currently admitted as an inpatient with acute care needs to [facility] and is at risk for falling.  - Subject is awake, alert, oriented and can speak and understand English without        difficulty.  - Subject can stand and ambulate with or without assistance.  - Subject must be literate.  - Subject must have a BMI that allows for proper fit of white and black      checkered gown.  - Subject must be able to provide informed consent.  - Subject will be admitted for > 24 hours.    Exclusion-  - Subject cannot read.  - Subject is immobile e.g. (paralyzed)  - Subject has BMI that prevents them from properly fitting in white and black checkered      gown.  - Subject has COVID-19 or other airborne infectious diseases.  - Subject is on reverse-isolation for immunosuppressive diseases.  - Subject has altered mental status or diagnosis of dementia.  - Subject is expected to be discharged in < 24 hours.    Pt AAO x 4, lying quietly in bed supine with HOB elevated X 75 degrees. Respirations even and unlabored without distress noted or complaints voiced. Pt is calm with a  pleasant affect. Pt reminded that WOW and video recording is not monitored and provides no emergency benefits or reduction or elimination from the risk of injury from falls; pt voiced understanding. Pt reminded to follow plan of care put forth by provider and to call RN for all assistance to reduce risk of problematic situations and potential injuries; pt voiced understanding. Recording started without incident. Advised pt that their participation is voluntary and if for any reasons they decide to cease the study that they only needed to inform their nurse and study session would be stopped; pt voiced understanding. Call bell within reach. Charge nurse notified of continuous video monitoring and of other study initiatives.

## 2023-05-25 NOTE — HPI
78 year old woman with history of UIP with pulmonary fibrosis, not on home oxygen, hypothyroidism, Coccidiomycosis, hyperlipidemia and breast cancer status post mastectomy that presents to the ED for the evaluation of worsening SOB over the last week.  Patient discloses baseline SOB, but in early appear one of the daughters had COVID infection and after which everyone around was sick with cold including her. Then, she developed worsening SOB (on exertion), productive cough with brown sputum and lethargy. Patient was treated with augmentin and 10 days medrol dosepak with improvement in cough and breathing. CT scan of the chest was done that showed stable chronic interstitial lung disease with fibrosis.  Over the last week, patient started having progressive SOB, fatigue and memory impairment.  Patient also discloses increase urinary frequency, urgency and dysuria.  Patient discloses no chest pain, palpitation, fever or chills.  Daughter discloses that patient snores at night.  Patient does not smoke, no alcohol or illicit drug. Patient used to work as .  Last month, patient visted [place] with her family.  of note, patient had a long history of use of nitrofuratoin before she was diagnosed with ILD in 2020.   For the pulmonary fibrosis, patient follows up with Dr. Nemesio Cazares.    On presentation, patient was afebrile and hemodynamically stable and oxygen sat of 95% on RA on presentation. Labs remarkable for significant anemia (hemoglobin of 7.3; hemoglobin 1 month ago was 10.8) and elevated ALP. UA was positive for infection.  Patient was started on ceftriaxone and received 1 unit of PRBCs. Pulmonary consulted for ILD, pulmonary fibrosis with worsening SOB.      Most recent Pulmonary Investigations:  CT scan of 04/26/2023:  Continued findings of chronic interstitial lung disease including interlobular septal thickening, mosaic attenuation of the lungs, varicoid bronchiectasis, and subpleural  cysts.  multiple scattered ill-defined opacities and pulmonary nodules.  No significant interval change compared to prior.  Dominant pleural based nodule right lower lobe measures 1.5 cm series 4, image 194, stable.  Pleura: No significant pleural effusion    PFT of Oct/01/2021:  FVC 1.85 (77%)  FEV1 1.56 (84%)  FEV1/FVC 84.56 (108%  TLC 3.62 (73%)  DLCO 9.21 (58%)  Mild restriction with moderate decrease in DLCO.    Broncho of 10/25/2021:  Impression:            - Chronic cough                          - The airway examination of the right lung was                          normal.                          - Scant, white, thick secretions were found                          throughout the tracheobronchial tree.                          - Transbronchial lung biopsies were performed.                          - Scant, white, thick secretions were found                          throughout the left tracheobronchial tree.                          - Bronchoalveolar lavage was performed.    Cytology:  Negative for malignant cells, but showed alveolar macrophages and rare giant cells.  Transbronchial Biopsy: Scant fragments of benign bronchial epithelium    TTE of 08/24/2021:  The left ventricle is normal in size with concentric remodeling and normal systolic function.  The stress echo portion of this study is negative for myocardial ischemia.  The patient's exercise capacity was fair.  The ECG portion of this study is negative for myocardial ischemia.

## 2023-05-25 NOTE — SUBJECTIVE & OBJECTIVE
Past Medical History:   Diagnosis Date    Arthritis     Borderline serous cystadenoma of right ovary 4/3/2018    Breast cancer     mastectomy 2014    Coccidiomycosis, progressive     Elevated CA-125 2/25/2018    Hyperlipidemia     OAB (overactive bladder)     Osteopenia     on Dexa 11/2017    Osteoporosis     pt reports hx of this, declined fosamax in past - treated with calcium and vit D    Pelvic mass 2/25/2018    Pulmonary fibrosis     Pulmonary nodules     SOB (shortness of breath) on exertion     Thyroid disease     hypo    UTI (urinary tract infection)        Past Surgical History:   Procedure Laterality Date    CHOLECYSTECTOMY      COLONOSCOPY N/A 12/9/2022    Procedure: COLONOSCOPY;  Surgeon: Enrique Dominguez MD;  Location: [facility] (4TH FLR);  Service: Endoscopy;  Laterality: N/A;  inst via portal  pt requests after 12/9/22-MS  11/30-pt notified of earlier arrival time-KPvt    CYSTOSCOPY WITH BIOPSY OF BLADDER Bilateral 7/27/2022    Procedure: CYSTOSCOPY, WITH BLADDER BIOPSY; retrograde pyelogram,;  Surgeon: Anaya Oropeza MD;  Location: [facility];  Service: Urology;  Laterality: Bilateral;    ENDOSCOPIC ULTRASOUND OF UPPER GASTROINTESTINAL TRACT N/A 4/8/2021    Procedure: ULTRASOUND, UPPER GI TRACT, ENDOSCOPIC;  Surgeon: Aisha Barajas MD;  Location: [facility] (2ND FLR);  Service: Endoscopy;  Laterality: N/A;  UEUS/ERCP evaluation abn MRCP - Dr Angelika Steinerid-19 test 4/5/21 at [facility], [place], operated by [facility] Pre-admit - pg    ERCP N/A 4/8/2021    Procedure: ERCP (ENDOSCOPIC RETROGRADE CHOLANGIOPANCREATOGRAPHY);  Surgeon: Aisha Barajas MD;  Location: [facility] (2ND FLR);  Service: Endoscopy;  Laterality: N/A;  UEUS/ERCP evaluation abn MRCP - Dr Carney  Covid-19 test 4/5/21 at [facility], [place], operated by [facility] Pre-admit - pg    ESOPHAGOGASTRODUODENOSCOPY N/A 4/8/2021    Procedure: EGD (ESOPHAGOGASTRODUODENOSCOPY);  Surgeon: Aisha Barajas MD;  Location: [facility] (2ND FLR);  Service: Endoscopy;  Laterality: N/A;  UEUS/ERCP  evaluation abn MRCP - Dr Carney  Covid-19 test 21 at [facility] Pre-admit - pg    HYSTERECTOMY      MASTECTOMY Right            Review of patient's allergies indicates:   Allergen Reactions    Ciprofloxacin Other (See Comments)     Right foot pain and edema, tendonitis    Amlodipine Edema and Swelling     BLE  BLE    Lisinopril Other (See Comments)     cough    Nitrofuran analogues        Family History       Problem Relation (Age of Onset)    Breast cancer Mother    Cancer Mother, Brother    Heart disease Father    Hypertension Father, Brother, Son    No Known Problems Sister, Daughter    Stroke Father          Tobacco Use    Smoking status: Former     Packs/day: 0.50     Years: 30.00     Pack years: 15.00     Types: Cigarettes     Quit date: 2000     Years since quittin.3    Smokeless tobacco: Never    Tobacco comments:     quit in her 50s, after 30 years smoking;   Substance and Sexual Activity    Alcohol use: No    Drug use: No    Sexual activity: Not Currently         Review of Systems   Constitutional:  Positive for fatigue and unexpected weight change. Negative for activity change, chills and fever.   Respiratory:  Positive for cough and shortness of breath. Negative for apnea and wheezing.    Cardiovascular:  Positive for leg swelling. Negative for chest pain and palpitations.   Gastrointestinal:  Positive for nausea. Negative for abdominal pain and vomiting.   Genitourinary:  Positive for dysuria, frequency and urgency.   Skin:  Negative for color change.   Psychiatric/Behavioral:  Positive for decreased concentration. Negative for agitation and confusion.    All other systems reviewed and are negative.  Objective:     Vital Signs (Most Recent):  Temp: 98 °F (36.7 °C) (23 1628)  Pulse: 85 (23 1800)  Resp: 14 (23 1628)  BP: 138/72 (23 1628)  SpO2: 95 % (23 1628) Vital Signs (24h Range):  Temp:  [97.5 °F (36.4 °C)-98.1 °F (36.7 °C)] 98 °F (36.7 °C)  Pulse:   [] 85  Resp:  [14-20] 14  SpO2:  [89 %-98 %] 95 %  BP: (112-140)/(58-73) 138/72     Weight: 74.9 kg (165 lb 2 oz)  Body mass index is 28.34 kg/m².      Intake/Output Summary (Last 24 hours) at 5/24/2023 1917  Last data filed at 5/24/2023 1713  Gross per 24 hour   Intake 425.42 ml   Output 200 ml   Net 225.42 ml        Physical Exam  Vitals reviewed.   Constitutional:       Appearance: Normal appearance.   HENT:      Head: Normocephalic and atraumatic.      Mouth/Throat:      Mouth: Mucous membranes are moist.   Eyes:      Conjunctiva/sclera: Conjunctivae normal.   Cardiovascular:      Rate and Rhythm: Normal rate and regular rhythm.      Pulses: Normal pulses.      Heart sounds: No murmur heard.  Pulmonary:      Effort: Pulmonary effort is normal. No respiratory distress.      Breath sounds: No wheezing.      Comments: Bilateral inspiratory crackles to the posterior lower lung field  Abdominal:      Palpations: Abdomen is soft.      Tenderness: There is no abdominal tenderness.   Musculoskeletal:      Comments: Trace bilateral lower extremities swelling   Neurological:      General: No focal deficit present.      Mental Status: She is alert.   Psychiatric:         Mood and Affect: Mood normal.         Behavior: Behavior normal.        Vents:       Lines/Drains/Airways       Peripheral Intravenous Line  Duration                  Peripheral IV - Single Lumen 05/24/23 0107 20 G Posterior;Right Forearm <1 day                    Significant Labs:    CBC/Anemia Profile:  Recent Labs   Lab 05/23/23  1901 05/23/23  2333 05/24/23  0633 05/24/23  1816   WBC 5.60  --  3.28* 4.79   HGB 7.3*  --  7.9* 8.2*   HCT 22.9*  --  24.3* 25.2*     --  306 333   MCV 91  --  89 89   RDW 17.9*  --  17.0* 17.4*   IRON  --  35  --   --    FERRITIN  --  749*  --   --         Chemistries:  Recent Labs   Lab 05/23/23  1901 05/24/23  0633   * 136   K 3.8 4.2    103   CO2 23 22*   BUN 12 11   CREATININE 0.9 0.8   CALCIUM  9.0 8.9   ALBUMIN 2.8* 2.7*   PROT 7.3 7.1   BILITOT 1.4* 1.3*   ALKPHOS 139* 142*   ALT 26 24   AST 26 26       All pertinent labs within the past 24 hours have been reviewed.    Significant Imaging:   I have reviewed all pertinent imaging results/findings within the past 24 hours.

## 2023-05-25 NOTE — ASSESSMENT & PLAN NOTE
Worsening SOB likely due to symptomatic acute on chronic anemia (significant drop in hemoglobin) on the background of pulmonary fibrosis     Anemia work up  Agree with GI evaluation  Patient has no recent TTE, check TTE to evaluate the cardiac function, structure and pulmonary pressure  Outpatient repeat PFT  Will need repeat 6MWT before discharge to assess the need for home oxygen

## 2023-05-25 NOTE — CARE UPDATE
05/24/23 2000   Patient Assessment/Suction   Level of Consciousness (AVPU) alert   Respiratory Effort Normal   All Lung Fields Breath Sounds clear;equal bilaterally   Rhythm/Pattern, Respiratory shortness of breath   Skin Integrity   $ Wound Care Tech Time 15 min   Area Observed Behind ear;Cheek;Right   Skin Appearance without discoloration   PRE-TX-O2   Device (Oxygen Therapy) nasal cannula   $ Is the patient on Low Flow Oxygen? Yes   Flow (L/min) 2   Oxygen Concentration (%) 28   SpO2 98 %   Pulse Oximetry Type Intermittent   $ Pulse Oximetry - Multiple Charge Pulse Oximetry - Multiple   Pulse 80   Resp 18   Ready to Wean/Extubation Screen   FIO2<=50 (chart decimal) 0.28

## 2023-05-25 NOTE — PLAN OF CARE
POC reviewed with patient awake and alert. Purposeful rounding completed. VS and assessment per flowsheets. No significant events this shift. Denies pain/discomfort. Received IV antibiotics according to MAR. No injuries, falls, or trauma occurred during shift. Bed low and locked, side rails up x 2, call light within reach. Safety maintained.       Problem: Adult Inpatient Plan of Care  Goal: Plan of Care Review  Outcome: Ongoing, Progressing  Goal: Patient-Specific Goal (Individualized)  Outcome: Ongoing, Progressing  Goal: Absence of Hospital-Acquired Illness or Injury  Outcome: Ongoing, Progressing  Goal: Optimal Comfort and Wellbeing  Outcome: Ongoing, Progressing  Goal: Readiness for Transition of Care  Outcome: Ongoing, Progressing

## 2023-05-25 NOTE — H&P
[facility] Medicine  History & Physical    Patient Name: Niurka Pedraza  MRN: [id number]  Patient Class: IP- Inpatient  Admission Date: 5/23/2023  Attending Physician: Salvador Gilmore MD   Primary Care Provider: Savana Anderson MD    Patient information was obtained from patient, past medical records and ER records.     Subjective:     Principal Problem:Shortness of breath    Chief Complaint:   Chief Complaint   Patient presents with    Shortness of Breath     SOB onset 3-4 days ago. Reports having a respiratory infection a month ago and has gotten better. Hx of pulmonary fibrosis. 93% RA. Accessory muscle use noted. Pt also reports urinary frequency and dysuria. Denies body aches/chills.         HPI: Niurka Pedraza is a 78 year old lady with hx of recurrent UTI with MDRO, interstitial lung disease with chronic cough, pulmonary fibrosis, HTN, HLD, hypothyroidism, immunosuppression due to chronic steroid use referred to ED from PCP for a few complaints. 1) worsening dysuria and frequency 2) worsening SOB, with acute CARDOZO over the past 3-4 days with Pox 94% on RA (baseline 95-97% on RA) 3) Worsening cough over the last 1 month that cause her so much fatigue that she cannot partake in activities 4) Worsening neuropathy in bilateral lower extremities, chronic.     Patient reports last UTI was in Oct 2022. Sees outpatient urology and had cystoscopy done. Patient reports urine now is brownish, and she has suprapubic tenderness and dysuria. No fever and chills. Patient reports she started having SOB and worsening cough with brownish sputum since 1 month ago. Previously had sick contacts in family. Patient reports that since getting started on antibiotics from PCP, sputum has improved from brown to yellowish to clear. She is concerned about the new onset CARDOZO and SOB on rest. Patient has on baseline has been on azithromycin 3x/week, montelukast daily and prednisone 2mg daily for pulmonary fibrosis and  has started taking azithromycin daily yesterday PTA.     Afebrile, HR 99, RR 22, /73, Pox 95% on RA on arrival. H/H 7.3/22.9, 3 points lower than H/H 10.8/33.6 on 4/19/23. Plt at baseline. No leukocytosis on arrival but neutropenic today 3.28K. BNP and Trop I normal. Cr, BUN 0.9, 12 respectively, normal. . UA nitrites positive, 3+ leukocytes, >100WBC. CXR with no new superimposed focal consolidation. Patient was given IV Ertapenam 1g in ED (5/23/23) for UTI, and IV methylprednisolone 125mg with Duonebs for SOB. Patient also received 1 unit of PRBC but recheck today H/H remains at 7.9/24.3. Patient now requires 2L O2 NC to maintain sats above 95%. Urine cultures are in process. Patient denies any hemetemesis, bleeding per vagina or rectum or wounds anywhere. Patient reports last colonoscopy was done in Dec 2022 and had some polyps clipped and biopsied which were benign. Has a upcoming scheduled EGD.      Patient is upgraded from EDOU to inpatient status with Hospital Medicine for management of UTI, new onset CARDOZO and resting SOB and evaluation of symptomatic anemia. Consults are placed for GI and pulmonology.       Past Medical History:   Diagnosis Date    Arthritis     Borderline serous cystadenoma of right ovary 4/3/2018    Breast cancer     mastectomy 2014    Coccidiomycosis, progressive     Elevated CA-125 2/25/2018    Hyperlipidemia     OAB (overactive bladder)     Osteopenia     on Dexa 11/2017    Osteoporosis     pt reports hx of this, declined fosamax in past - treated with calcium and vit D    Pelvic mass 2/25/2018    Pulmonary fibrosis     Pulmonary nodules     SOB (shortness of breath) on exertion     Thyroid disease     hypo    UTI (urinary tract infection)        Past Surgical History:   Procedure Laterality Date    CHOLECYSTECTOMY      COLONOSCOPY N/A 12/9/2022    Procedure: COLONOSCOPY;  Surgeon: Enrique Dominguez MD;  Location: [facility] ([address] [place] [address]);  Service: Endoscopy;   Laterality: N/A;  inst via portal  pt requests after 12/9/22-MS  11/30-pt notified of earlier arrival time-KPvt    CYSTOSCOPY WITH BIOPSY OF BLADDER Bilateral 7/27/2022    Procedure: CYSTOSCOPY, WITH BLADDER BIOPSY; retrograde pyelogram,;  Surgeon: Anaya Oropeza MD;  Location: [facility];  Service: Urology;  Laterality: Bilateral;    ENDOSCOPIC ULTRASOUND OF UPPER GASTROINTESTINAL TRACT N/A 4/8/2021    Procedure: ULTRASOUND, UPPER GI TRACT, ENDOSCOPIC;  Surgeon: Aisha Barajas MD;  Location: [facility] ([address] [place] [address]);  Service: Endoscopy;  Laterality: N/A;  UEUS/ERCP evaluation abn MRCP - Dr Angelika Zamudio-19 test 4/5/21 at [facility] Pre-admit - pg    ERCP N/A 4/8/2021    Procedure: ERCP (ENDOSCOPIC RETROGRADE CHOLANGIOPANCREATOGRAPHY);  Surgeon: Aisha Barajas MD;  [facility]  Service: Endoscopy;  Laterality: N/A;  UEUS/ERCP evaluation abn MRCP - Dr Angelika Steinerid-19 test 4/5/21 at [facility] Pre-admit - pg    ESOPHAGOGASTRODUODENOSCOPY N/A 4/8/2021    Procedure: EGD (ESOPHAGOGASTRODUODENOSCOPY);  Surgeon: Aisha Barajas MD;  [facility]  Service: Endoscopy;  Laterality: N/A;  UEUS/ERCP evaluation abn MRCP - Dr Angelika Steinerid-19 test 4/5/21 at [facility] Pre-admit - pg    HYSTERECTOMY      MASTECTOMY Right     2014       Review of patient's allergies indicates:   Allergen Reactions    Ciprofloxacin Other (See Comments)     Right foot pain and edema, tendonitis    Amlodipine Edema and Swelling     BLE  BLE    Lisinopril Other (See Comments)     cough    Nitrofuran analogues        No current facility-administered medications on file prior to encounter.     Current Outpatient Medications on File Prior to Encounter   Medication Sig    albuterol (VENTOLIN HFA) 90 mcg/actuation inhaler Inhale 1-2 puffs into the lungs every 6 (six) hours as needed for Wheezing or Shortness of Breath. Rescue    atorvastatin (LIPITOR) 20 MG tablet TAKE 1 TABLET BY MOUTH EVERY DAY     azithromycin (Z-SEB) 250 MG tablet Take 1 tablet (250 mg total) by mouth once daily. (Patient not taking: Reported on 5/17/2023)    calcium-vitamin D3 (CALCIUM 500 + D) 500 mg(1,250mg) -200 unit per tablet Take 1 tablet by mouth 2 (two) times daily with meals.    carvediloL (COREG) 6.25 MG tablet TAKE 1 TABLET BY MOUTH TWICE A DAY WITH FOOD    estradioL (ESTRACE) 0.01 % (0.1 mg/gram) vaginal cream Place 1 g vaginally twice a week.    fluticasone furoate-vilanteroL (BREO ELLIPTA) 100-25 mcg/dose diskus inhaler Inhale 1 puff into the lungs once daily. Controller.  PLEASE NOTE IT IS ONCE A DAY!!    fluticasone furoate-vilanteroL (BREO ELLIPTA) 100-25 mcg/dose diskus inhaler Inhale 1 puff into the lungs once daily. Controller    gabapentin (NEURONTIN) 100 MG capsule Take 1 capsule (100 mg total) by mouth every evening.    irbesartan (AVAPRO) 300 MG tablet Take 1 tablet (300 mg total) by mouth every evening.    levothyroxine (SYNTHROID) 50 MCG tablet Take 1 tablet (50 mcg total) by mouth before breakfast.    montelukast (SINGULAIR) 10 mg tablet TAKE 1 TABLET BY MOUTH EVERY DAY IN THE EVENING    multivitamin (THERAGRAN) per tablet Take 1 tablet by mouth once daily.    oxybutynin (DITROPAN XL) 15 MG TR24 Take 1 tablet (15 mg total) by mouth once daily.    pantoprazole (PROTONIX) 20 MG tablet Take 1 tablet (20 mg total) by mouth once daily.    predniSONE (DELTASONE) 1 MG tablet Take 2 tablets (2 mg total) by mouth once daily.    [DISCONTINUED] budesonide-formoterol 80-4.5 mcg (SYMBICORT) 80-4.5 mcg/actuation HFAA Inhale 2 puffs into the lungs 2 (two) times daily as needed. Controller     Family History       Problem Relation (Age of Onset)    Breast cancer Mother    Cancer Mother, Brother    Heart disease Father    Hypertension Father, Brother, Son    No Known Problems Sister, Daughter    Stroke Father          Tobacco Use    Smoking status: Former     Packs/day: 0.50     Years: 30.00     Pack years:  15.00     Types: Cigarettes     Quit date: 2000     Years since quittin.3    Smokeless tobacco: Never    Tobacco comments:     quit in her 50s, after 30 years smoking;   Substance and Sexual Activity    Alcohol use: No    Drug use: No    Sexual activity: Not Currently     Review of Systems   Constitutional:  Positive for activity change. Negative for chills and fever.   HENT:  Negative for congestion and sore throat.    Eyes:  Negative for pain and redness.   Respiratory:  Positive for cough and shortness of breath. Negative for wheezing.    Cardiovascular:  Positive for leg swelling. Negative for chest pain.   Gastrointestinal:  Negative for anal bleeding, blood in stool, diarrhea, nausea and vomiting.   Genitourinary:  Positive for dysuria. Negative for vaginal bleeding.   Skin:  Negative for rash and wound.   Neurological:  Positive for numbness (neuropathy in bilateral feet). Negative for weakness and light-headedness.   Psychiatric/Behavioral:  Negative for agitation and behavioral problems.    Objective:     Vital Signs (Most Recent):  Temp: 98.2 °F (36.8 °C) (23 234)  Pulse: 77 (23)  Resp: 18 (23)  BP: 120/60 (23)  SpO2: 97 % (23) Vital Signs (24h Range):  Temp:  [97.5 °F (36.4 °C)-98.2 °F (36.8 °C)] 98.2 °F (36.8 °C)  Pulse:  [] 77  Resp:  [14-24] 18  SpO2:  [89 %-98 %] 97 %  BP: (112-140)/(58-73) 120/60     Weight: 74.9 kg (165 lb 2 oz)  Body mass index is 28.34 kg/m².     Physical Exam  Vitals and nursing note reviewed.   Constitutional:       General: She is not in acute distress.  HENT:      Head: Normocephalic and atraumatic.      Nose: No congestion or rhinorrhea.   Eyes:      General: No scleral icterus.        Right eye: No discharge.         Left eye: No discharge.   Cardiovascular:      Rate and Rhythm: Regular rhythm.      Pulses: Normal pulses.      Heart sounds: Normal heart sounds.   Pulmonary:      Effort: No respiratory  distress.      Breath sounds: No wheezing.      Comments: On 2L O2, speaking in full sentences  Abdominal:      General: Bowel sounds are normal. There is no distension.      Palpations: Abdomen is soft.      Tenderness: There is no abdominal tenderness.   Musculoskeletal:      Right lower leg: Edema (slight, noted in ankle joint) present.      Left lower leg: Edema (slight, noted in ankle joint) present.   Skin:     General: Skin is warm and dry.   Neurological:      Mental Status: She is alert and oriented to person, place, and time. Mental status is at baseline.   Psychiatric:         Mood and Affect: Mood normal.         Behavior: Behavior normal.              Significant Labs: All pertinent labs within the past 24 hours have been reviewed.  BMP:   Recent Labs   Lab 05/24/23  0633   *      K 4.2      CO2 22*   BUN 11   CREATININE 0.8   CALCIUM 8.9     CBC:   Recent Labs   Lab 05/23/23  1901 05/24/23  0633 05/24/23  1816   WBC 5.60 3.28* 4.79   HGB 7.3* 7.9* 8.2*   HCT 22.9* 24.3* 25.2*    306 333     CMP:   Recent Labs   Lab 05/23/23  1901 05/24/23  0633   * 136   K 3.8 4.2    103   CO2 23 22*   * 176*   BUN 12 11   CREATININE 0.9 0.8   CALCIUM 9.0 8.9   PROT 7.3 7.1   ALBUMIN 2.8* 2.7*   BILITOT 1.4* 1.3*   ALKPHOS 139* 142*   AST 26 26   ALT 26 24   ANIONGAP 11 11     Cardiac Markers:   Recent Labs   Lab 05/23/23 1901   BNP 98     Lactic Acid: No results for input(s): LACTATE in the last 48 hours.  Magnesium: No results for input(s): MG in the last 48 hours.  Troponin:   Recent Labs   Lab 05/23/23 2050   TROPONINI 0.009     Urine Culture: No results for input(s): LABURIN in the last 48 hours.  Urine Studies:   Recent Labs   Lab 05/23/23 1902   COLORU [place]*   APPEARANCEUA Hazy*   PHUR 6.0   SPECGRAV 1.010   PROTEINUA 1+*   GLUCUA Negative   KETONESU Negative   BILIRUBINUA Negative   OCCULTUA Negative   NITRITE Positive*   UROBILINOGEN Negative   LEUKOCYTESUR 3+*    RBCUA 8*   WBCUA >100*   BACTERIA Few*   SQUAMEPITHEL 20   HYALINECASTS 0       Significant Imaging: I have reviewed and interpreted all pertinent imaging results/findings within the past 24 hours.    Assessment/Plan:     * Shortness of breath  Worsening SOB, with acute CARDOZO over the past 3-4 days with Pox 94% on RA (baseline 95-97% on RA). Mild bilateral lower extremity swelling noted by daughter. BNP normal. CXR with no new superimposed focal consolidation.     - Echo    Pulmonary fibrosis  Hx of ILD, pulmonary fibrosis, on home dose Prednisone 2mg daily, montelukast 10mg daily, not on home O2.  Worsening SOB and CARDOZO over 3-4 days now requiring 2L O2 via NC at rest to maintain 98% in the setting of acute drop in hemoglobin over 1 month. IV methylprednisolone 125mg with Duonebs given in ED.    Plan:  - Appreciate pulmonology input  - Continue PO prednisone 2mg and montelukast 10mg daily  - Continue home inhalers albuterol and fluticasone on discharge, duonebs Q4 PRN  - Continue PO pantoprazole 40mg daily  - O2 to maintain Pox > 96%  - Outpatient repeat PFT  - Will need 6MWT to assess need for home oxygen    Acute cystitis with hematuria  Hx of recurrent UTI, MDR, Urine cx from 10/6/22 positive for MDR E.Coli.  Patient reports last UTI was in Oct 2022. Sees outpatient urology and had cystoscopy done. Patient reports urine now is brownish, and she has suprapubic tenderness and dysuria. No fever and chills. Afebrile, no leukocytosis. UA nitrites positive, 3+ leukocytes, >100WBC. Patient was given IV Ertapenam 1g in ED (5/23/23) for UTI.     - Continue IV Ertapenam 1g daily  - Urine culture 5/24/23 in process    Symptomatic anemia  Worsening SOB, with acute CARDOZO over the past 3-4 days with Pox 94% on RA (baseline 95-97% on RA). Denies any hemetemesis, bleeding per vagina or rectum or wounds anywhere. Patient reports last colonoscopy was done in Dec 2022 and had some polyps clipped and biopsied which were benign. Has a  upcoming scheduled EGD. H/H 7.3/22.9, 3 points lower than H/H 10.8/33.6 on 4/19/23. Plt at baseline. 1U PRBC transfused, H/H improved to 8.2/25.2. Patient denies taking any NSAIDs, no heavy alcohol drinking    - GI consult  - Trend H/H, transfuse is hgb < 7  - O2 as tolerated to keep Pox > 96%    Essential hypertension  Hx of HTN, HLD  Chronic. Controlled.     - Resume home dose atorvastatin 20mg daily, carvedilol 6.25mg BID, irbesartan 300mg qhs  - Low sodium diet  - Monitor and adjust as needed    Neuropathy of both feet  Chronic. 2-3 years. Sometimes causes instability in gait.     - Continue home dose gabapentin 100mg qhs  - PT and OT evaluate for safety     Suspected sleep apnea  Noted by Dr. Hernandez, pulmonology    - Sleep study on discharge    Immunosuppression due to chronic steroid use  Leukopenia today, likely hemodilution as noted by Dr. Hernandez  Re-checked, WBC WNL.       Chronic cough  Chronic. Improving as per patient.     Hypothyroidism  Chronic.     - Resume home dose levothyroxine 50mcg daily        VTE Risk Mitigation (From admission, onward)         Ordered     IP VTE HIGH RISK PATIENT  Once         05/24/23 1811     Reason for no Mechanical VTE Prophylaxis  Once        Question:  Reasons:  Answer:  Active Bleeding    05/24/23 1811                Valerie De La Rosa NP  Department of Hospital Medicine  [facility])

## 2023-05-25 NOTE — ASSESSMENT & PLAN NOTE
Pulmonary fibrosis, likely on the background of chronic use of nitrofurantion    Patient does not appear to be in exacerbation  Continue home dose of Prednisone 2mg oral daily  Continue home inhalers  Continue GI prophylaxis   The primary team is concern about her leukopenia today and wondering if steroid should be continue. I looked at the CBC. All the cell lineages were all decrease, I suspect this is hemo-dilution. Please consider repeating the CBC before further transfusion and will continue the prednisone.

## 2023-05-25 NOTE — HPI
77 yo woman with a history of pulmonary fibrosis, recurrent UTIs with ESBL E.coli who presented on 5/24 with SOB, dysuria, and suprapubic pain/tenderness. She was afebrile. She complains of cough and brown sputum, but had O2 sat of 95% on RA. She takes azithromycin 3x/week, montelukast daily and prednisone 2mg daily for pulmonary fibrosis    On arrival she did not have a leukocytosis, but had a new acute anemia - with her HCT at 22.9 on 5/23, dropping from 33.6 on 4/19.    She started azithromycin daily prior to admission. She was given ertapenem for her UTI as well as solumedrol for her pulmonary fibrosis. She is concerned about positive mold testing in her home.

## 2023-05-25 NOTE — ASSESSMENT & PLAN NOTE
Worsening SOB, with acute CARDOZO over the past 3-4 days with Pox 94% on RA (baseline 95-97% on RA). Denies any hemetemesis, bleeding per vagina or rectum or wounds anywhere. Patient reports last colonoscopy was done in Dec 2022 and had some polyps clipped and biopsied which were benign. Has a upcoming scheduled EGD. H/H 7.3/22.9, 3 points lower than H/H 10.8/33.6 on 4/19/23. Plt at baseline. 1U PRBC transfused, H/H improved to 8.2/25.2. Patient denies taking any NSAIDs, no heavy alcohol drinking    - GI consult  - Trend H/H, transfuse is hgb < 7  - O2 as tolerated to keep Pox > 96%

## 2023-05-25 NOTE — CONSULTS
[facility] ([place])  Infectious Disease  Consult Note    Patient Name: Niurka Pedraza  MRN: [id number]  Admission Date: 5/23/2023  Hospital Length of Stay: 1 days  Attending Physician: Salvador Gilmore MD  Primary Care Provider: Savana Anderson MD     Isolation Status: No active isolations    Patient information was obtained from patient, past medical records and ER records.      Inpatient consult to Infectious Diseases  Consult performed by: Ervin Plascencia MD  Consult ordered by: Salvador Gilmore MD        Assessment/Plan:     Renal/  Recurrent UTI  77 yo female with IPF and recurrent ESBL E.coli UTIs. No UTIs since 10/22. Urine is growing GNR. ID and sensitivity pending. Improved on ertapenem.    Recommendation:  Continue ertapenem for 3 days  Follow culture and sensitivities.    ID  History of ESBL E. coli infection  Treat with ertapenem as above - awaiting identification and sensitivities        Thank you for your consult. I will follow-up with patient. Please contact us if you have any additional questions.    Ervin Plascencia MD  Infectious Disease  [facility] ([place])    Subjective:     Principal Problem: Shortness of breath    HPI: 77 yo woman with a history of pulmonary fibrosis, recurrent UTIs with ESBL E.coli who presented on 5/24 with SOB, dysuria, and suprapubic pain/tenderness. She was afebrile. She complains of cough and brown sputum, but had O2 sat of 95% on RA. She takes azithromycin 3x/week, montelukast daily and prednisone 2mg daily for pulmonary fibrosis    On arrival she did not have a leukocytosis, but had a new acute anemia - with her HCT at 22.9 on 5/23, dropping from 33.6 on 4/19.    She started azithromycin daily prior to admission. She was given ertapenem for her UTI as well as solumedrol for her pulmonary fibrosis. She is concerned about positive mold testing in her home.        Past Medical History:   Diagnosis Date    Arthritis     Borderline serous cystadenoma of right  ovary 4/3/2018    Breast cancer     mastectomy 2014    Coccidiomycosis, progressive     Elevated CA-125 2/25/2018    Hyperlipidemia     OAB (overactive bladder)     Osteopenia     on Dexa 11/2017    Osteoporosis     pt reports hx of this, declined fosamax in past - treated with calcium and vit D    Pelvic mass 2/25/2018    Pulmonary fibrosis     Pulmonary nodules     SOB (shortness of breath) on exertion     Thyroid disease     hypo    UTI (urinary tract infection)        Past Surgical History:   Procedure Laterality Date    CHOLECYSTECTOMY      COLONOSCOPY N/A 12/9/2022    Procedure: COLONOSCOPY;  Surgeon: Enrique Dominguez MD;  Location: [facility] (4TH FLR);  Service: Endoscopy;  Laterality: N/A;  inst via portal  pt requests after 12/9/22-MS  11/30-pt notified of earlier arrival time-KPvt    CYSTOSCOPY WITH BIOPSY OF BLADDER Bilateral 7/27/2022    Procedure: CYSTOSCOPY, WITH BLADDER BIOPSY; retrograde pyelogram,;  Surgeon: Anaya Oropeza MD;  Location: [facility];  Service: Urology;  Laterality: Bilateral;    ENDOSCOPIC ULTRASOUND OF UPPER GASTROINTESTINAL TRACT N/A 4/8/2021    Procedure: ULTRASOUND, UPPER GI TRACT, ENDOSCOPIC;  Surgeon: iAsha Barajas MD;  Location: [facility] (2ND FLR);  Service: Endoscopy;  Laterality: N/A;  UEUS/ERCP evaluation abn MRCP - Dr Angelika Zamudio-19 test 4/5/21 at [facility] [place] Pre-admit - pg    ERCP N/A 4/8/2021    Procedure: ERCP (ENDOSCOPIC RETROGRADE CHOLANGIOPANCREATOGRAPHY);  Surgeon: Aisha Barajas MD;  Location: [facility] (2ND FLR);  Service: Endoscopy;  Laterality: N/A;  UEUS/ERCP evaluation abn MRCP Hortencia Zamudio-19 test 4/5/21 at [facility] [place] Pre-admit - pg    ESOPHAGOGASTRODUODENOSCOPY N/A 4/8/2021    Procedure: EGD (ESOPHAGOGASTRODUODENOSCOPY);  Surgeon: Aisha Barajas MD;  Location: [facility] (2ND FLR);  Service: Endoscopy;  Laterality: N/A;  UEUS/ERCP evaluation abn MRCP Hortencia Zamudio-19 test 4/5/21 at [facility] [place] Pre-admit - pg     HYSTERECTOMY      MASTECTOMY Right     2014       Review of patient's allergies indicates:   Allergen Reactions    Ciprofloxacin Other (See Comments)     Right foot pain and edema, tendonitis    Amlodipine Edema and Swelling     BLE  BLE    Lisinopril Other (See Comments)     cough    Nitrofuran analogues        Medications:  Medications Prior to Admission   Medication Sig    albuterol (VENTOLIN HFA) 90 mcg/actuation inhaler Inhale 1-2 puffs into the lungs every 6 (six) hours as needed for Wheezing or Shortness of Breath. Rescue    atorvastatin (LIPITOR) 20 MG tablet TAKE 1 TABLET BY MOUTH EVERY DAY    azithromycin (Z-SEB) 250 MG tablet Take 1 tablet (250 mg total) by mouth once daily. (Patient not taking: Reported on 5/17/2023)    calcium-vitamin D3 (CALCIUM 500 + D) 500 mg(1,250mg) -200 unit per tablet Take 1 tablet by mouth 2 (two) times daily with meals.    carvediloL (COREG) 6.25 MG tablet TAKE 1 TABLET BY MOUTH TWICE A DAY WITH FOOD    estradioL (ESTRACE) 0.01 % (0.1 mg/gram) vaginal cream Place 1 g vaginally twice a week.    fluticasone furoate-vilanteroL (BREO ELLIPTA) 100-25 mcg/dose diskus inhaler Inhale 1 puff into the lungs once daily. Controller.  PLEASE NOTE IT IS ONCE A DAY!!    fluticasone furoate-vilanteroL (BREO ELLIPTA) 100-25 mcg/dose diskus inhaler Inhale 1 puff into the lungs once daily. Controller    gabapentin (NEURONTIN) 100 MG capsule Take 1 capsule (100 mg total) by mouth every evening.    irbesartan (AVAPRO) 300 MG tablet Take 1 tablet (300 mg total) by mouth every evening.    levothyroxine (SYNTHROID) 50 MCG tablet Take 1 tablet (50 mcg total) by mouth before breakfast.    montelukast (SINGULAIR) 10 mg tablet TAKE 1 TABLET BY MOUTH EVERY DAY IN THE EVENING    multivitamin (THERAGRAN) per tablet Take 1 tablet by mouth once daily.    oxybutynin (DITROPAN XL) 15 MG TR24 Take 1 tablet (15 mg total) by mouth once daily.    pantoprazole (PROTONIX) 20 MG tablet Take 1  tablet (20 mg total) by mouth once daily.    predniSONE (DELTASONE) 1 MG tablet Take 2 tablets (2 mg total) by mouth once daily.     Antibiotics (From admission, onward)      Start     Stop Route Frequency Ordered    23 2300  ertapenem (INVANZ) 1 g in sodium chloride 0.9 % 100 mL IVPB (MB+)         -- IV Every 24 hours (non-standard times) 23 1101          Antifungals (From admission, onward)      None          Antivirals (From admission, onward)      None             Immunization History   Administered Date(s) Administered    COVID-19 MRNA, LN-S PF (MODERNA HALF 0.25 ML DOSE) 2021    COVID-19 Vaccine 2022    COVID-19, MRNA, LN-S, PF (MODERNA FULL 0.5 ML DOSE) 2021, 2021    Influenza - High Dose - PF (65 years and older) 10/27/2020, 2022    Pneumococcal Conjugate - 13 Valent 2017    Pneumococcal Conjugate - 20 Valent 2022    Pneumococcal Polysaccharide - 23 Valent 2020    Tdap 2021    Zoster Recombinant 2022       Family History       Problem Relation (Age of Onset)    Breast cancer Mother    Cancer Mother, Brother    Heart disease Father    Hypertension Father, Brother, Son    No Known Problems Sister, Daughter    Stroke Father          Social History     Socioeconomic History    Marital status:     Number of children: 3   Occupational History    Occupation: retired from [employer]     Comment: neuro chemistry   Tobacco Use    Smoking status: Former     Packs/day: 0.50     Years: 30.00     Pack years: 15.00     Types: Cigarettes     Quit date: 2000     Years since quittin.3    Smokeless tobacco: Never    Tobacco comments:     quit in her 50s, after 30 years smoking;   Substance and Sexual Activity    Alcohol use: No    Drug use: No    Sexual activity: Not Currently   Social History Narrative    From [facility]     Moved to [facility] in  for research    Moved to [place] for period of time     Moved back to [place] Summer 2016 and then to [facility] this year Sept 2017     Review of Systems   Respiratory:  Positive for shortness of breath.    Genitourinary:  Positive for dysuria, pelvic pain and urgency. Negative for flank pain.   All other systems reviewed and are negative.  Objective:     Vital Signs (Most Recent):  Temp: 98.1 °F (36.7 °C) (05/25/23 1620)  Pulse: 74 (05/25/23 1620)  Resp: 16 (05/25/23 1620)  BP: (!) 125/58 (05/25/23 1620)  SpO2: 97 % (05/25/23 1620) Vital Signs (24h Range):  Temp:  [97.7 °F (36.5 °C)-98.2 °F (36.8 °C)] 98.1 °F (36.7 °C)  Pulse:  [70-85] 74  Resp:  [14-24] 16  SpO2:  [95 %-98 %] 97 %  BP: (104-138)/(58-72) 125/58     Weight: 74.9 kg (165 lb 2 oz)  Body mass index is 28.34 kg/m².    Estimated Creatinine Clearance: 56.5 mL/min (based on SCr of 0.8 mg/dL).     Physical Exam  Vitals and nursing note reviewed.   Constitutional:       Appearance: Normal appearance.   HENT:      Head: Normocephalic and atraumatic.   Eyes:      Extraocular Movements: Extraocular movements intact.      Pupils: Pupils are equal, round, and reactive to light.   Cardiovascular:      Rate and Rhythm: Normal rate and regular rhythm.   Pulmonary:      Effort: Pulmonary effort is normal.      Breath sounds: Rales present.   Abdominal:      General: Abdomen is flat.      Palpations: Abdomen is soft.   Skin:     General: Skin is warm and dry.   Neurological:      General: No focal deficit present.      Mental Status: She is alert and oriented to person, place, and time.          Significant Labs: Blood Culture: No results for input(s): LABBLOO in the last 4320 hours.  CBC:   Recent Labs   Lab 05/24/23  0633 05/24/23  1816 05/25/23  0510   WBC 3.28* 4.79 5.05   HGB 7.9* 8.2* 7.6*   HCT 24.3* 25.2* 24.1*    333 294     CMP:   Recent Labs   Lab 05/23/23  1901 05/24/23  0633 05/25/23  0510   * 136 138   K 3.8 4.2 3.9    103 106   CO2 23 22* 24   * 176* 116*   BUN 12 11 16   CREATININE  0.9 0.8 0.8   CALCIUM 9.0 8.9 8.8   PROT 7.3 7.1  --    ALBUMIN 2.8* 2.7*  --    BILITOT 1.4* 1.3*  --    ALKPHOS 139* 142*  --    AST 26 26  --    ALT 26 24  --    ANIONGAP 11 11 8     Urine Culture:   Recent Labs   Lab 05/19/23  1238 05/23/23  1902   LABURIN Multiple organisms isolated. None in predominance.  Repeat if  clinically necessary. GRAM NEGATIVE KAIA  >100,000 cfu/ml  Identification and susceptibility pending  *       Significant Imaging: CXR: I have reviewed all pertinent results/findings within the past 24 hours:  The cardiac silhouette is within normal limits.  Vascular calcification is seen at the aortic knob.  There is chronic interstitial prominence, which is unchanged.  There are known underlying pulmonary nodules and bronchiectasis.  There is no focal consolidation, pneumothorax, or pleural effusion.  The bones are diffusely osteopenic.

## 2023-05-25 NOTE — PLAN OF CARE
05/25/23 1705   Discharge Assessment   Assessment Type Discharge Planning Assessment   Confirmed/corrected address, phone number and insurance Yes   Confirmed Demographics Correct on Facesheet   Source of Information family   People in Home alone   Do you expect to return to your current living situation? Yes   Do you have help at home or someone to help you manage your care at home? No   Prior to hospitilization cognitive status: Alert/Oriented   Current cognitive status: Alert/Oriented   Walking or Climbing Stairs stair climbing difficulty, assistance 1 person;stair climbing difficulty, requires equipment   Equipment Currently Used at Home wheelchair   Readmission within 30 days? No   Patient currently being followed by outpatient case management? No   Do you currently have service(s) that help you manage your care at home? No   Do you take prescription medications? Yes   Do you have prescription coverage? Yes   Do you have any problems affording any of your prescribed medications? No   Is the patient taking medications as prescribed? yes   Who is going to help you get home at discharge? Daughter   How do you get to doctors appointments? car, drives self;family or friend will provide   Are you on dialysis? No   Discharge Plan A Home Health   Discharge Plan B Home   DME Needed Upon Discharge  none   Discharge Plan discussed with: Adult children   Transition of Care Barriers None   Financial Resource Strain   How hard is it for you to pay for the very basics like food, housing, medical care, and heating? Not hard   Housing Stability   In the last 12 months, was there a time when you were not able to pay the mortgage or rent on time? N   In the last 12 months, how many places have you lived? 1   In the last 12 months, was there a time when you did not have a steady place to sleep or slept in a shelter (including now)? N   Transportation Needs   In the past 12 months, has lack of transportation kept you from  medical appointments or from getting medications? no   In the past 12 months, has lack of transportation kept you from meetings, work, or from getting things needed for daily living? No   Food Insecurity   Within the past 12 months, you worried that your food would run out before you got the money to buy more. Never true   Within the past 12 months, the food you bought just didn't last and you didn't have money to get more. Never true   Stress   Do you feel stress - tense, restless, nervous, or anxious, or unable to sleep at night because your mind is troubled all the time - these days? Not at all   Social Connections   In a typical week, how many times do you talk on the phone with family, friends, or neighbors? More than 3   How often do you get together with friends or relatives? More than 3   How often do you attend Jehovah's witness or Gnosticism services? Never   Do you belong to any clubs or organizations such as Jehovah's witness groups, unions, fraternal or athletic groups, or school groups? No   How often do you attend meetings of the clubs or organizations you belong to? Never   Are you , , , , never , or living with a partner?    Alcohol Use   Q1: How often do you have a drink containing alcohol? Never   Q2: How many drinks containing alcohol do you have on a typical day when you are drinking? None   Q3: How often do you have six or more drinks on one occasion? Never     Pt's PCP is correct in the chart.  Her daughter will pick her up at discharge.

## 2023-05-25 NOTE — PLAN OF CARE
Problem: Adult Inpatient Plan of Care  Goal: Plan of Care Review  Outcome: Ongoing, Progressing  Goal: Patient-Specific Goal (Individualized)  Outcome: Ongoing, Progressing  Goal: Absence of Hospital-Acquired Illness or Injury  Outcome: Ongoing, Progressing  Goal: Optimal Comfort and Wellbeing  Outcome: Ongoing, Progressing  Goal: Readiness for Transition of Care  Outcome: Ongoing, Progressing     Pt alert and oriented. Afebrile. Ambulatory with standby assist. Oxygen maintained @ 2L nasal cannula. 1u PRBC's transfused. ID consulted. Seen by Pulmonology. IV antibiotics continued. Safety maintained.

## 2023-05-25 NOTE — SUBJECTIVE & OBJECTIVE
Past Medical History:   Diagnosis Date    Arthritis     Borderline serous cystadenoma of right ovary 4/3/2018    Breast cancer     mastectomy 2014    Coccidiomycosis, progressive     Elevated CA-125 2/25/2018    Hyperlipidemia     OAB (overactive bladder)     Osteopenia     on Dexa 11/2017    Osteoporosis     pt reports hx of this, declined fosamax in past - treated with calcium and vit D    Pelvic mass 2/25/2018    Pulmonary fibrosis     Pulmonary nodules     SOB (shortness of breath) on exertion     Thyroid disease     hypo    UTI (urinary tract infection)        Past Surgical History:   Procedure Laterality Date    CHOLECYSTECTOMY      COLONOSCOPY N/A 12/9/2022    Procedure: COLONOSCOPY;  Surgeon: Enrique Dominguez MD;  Location: [facility] (4TH FLR);  Service: Endoscopy;  Laterality: N/A;  inst via portal  pt requests after 12/9/22-MS  11/30-pt notified of earlier arrival time-KPvt    CYSTOSCOPY WITH BIOPSY OF BLADDER Bilateral 7/27/2022    Procedure: CYSTOSCOPY, WITH BLADDER BIOPSY; retrograde pyelogram,;  Surgeon: Anaya Oropeza MD;  Location: [facility];  Service: Urology;  Laterality: Bilateral;    ENDOSCOPIC ULTRASOUND OF UPPER GASTROINTESTINAL TRACT N/A 4/8/2021    Procedure: ULTRASOUND, UPPER GI TRACT, ENDOSCOPIC;  Surgeon: Aisha Barajas MD;  Location: [facility] (2ND FLR);  Service: Endoscopy;  Laterality: N/A;  UEUS/ERCP evaluation abn MRCP - Dr Angelika Steinerid-19 test 4/5/21 at [facility] Pre-admit - pg    ERCP N/A 4/8/2021    Procedure: ERCP (ENDOSCOPIC RETROGRADE CHOLANGIOPANCREATOGRAPHY);  Surgeon: Aisha Barajas MD;  Location: [facility] (2ND FLR);  Service: Endoscopy;  Laterality: N/A;  UEUS/ERCP evaluation abn MRCP - Dr Carney  Covid-19 test 4/5/21 at [facility] Pre-admit - pg    ESOPHAGOGASTRODUODENOSCOPY N/A 4/8/2021    Procedure: EGD (ESOPHAGOGASTRODUODENOSCOPY);  Surgeon: Aisha Barajas MD;  Location: [facility] (2ND FLR);  Service: Endoscopy;  Laterality: N/A;  UEUS/ERCP  evaluation abn MRCP - Dr Carney  Covid-19 test 4/5/21 at [facility] Pre-admit - pg    HYSTERECTOMY      MASTECTOMY Right     2014       Review of patient's allergies indicates:   Allergen Reactions    Ciprofloxacin Other (See Comments)     Right foot pain and edema, tendonitis    Amlodipine Edema and Swelling     BLE  BLE    Lisinopril Other (See Comments)     cough    Nitrofuran analogues        No current facility-administered medications on file prior to encounter.     Current Outpatient Medications on File Prior to Encounter   Medication Sig    albuterol (VENTOLIN HFA) 90 mcg/actuation inhaler Inhale 1-2 puffs into the lungs every 6 (six) hours as needed for Wheezing or Shortness of Breath. Rescue    atorvastatin (LIPITOR) 20 MG tablet TAKE 1 TABLET BY MOUTH EVERY DAY    azithromycin (Z-SEB) 250 MG tablet Take 1 tablet (250 mg total) by mouth once daily. (Patient not taking: Reported on 5/17/2023)    calcium-vitamin D3 (CALCIUM 500 + D) 500 mg(1,250mg) -200 unit per tablet Take 1 tablet by mouth 2 (two) times daily with meals.    carvediloL (COREG) 6.25 MG tablet TAKE 1 TABLET BY MOUTH TWICE A DAY WITH FOOD    estradioL (ESTRACE) 0.01 % (0.1 mg/gram) vaginal cream Place 1 g vaginally twice a week.    fluticasone furoate-vilanteroL (BREO ELLIPTA) 100-25 mcg/dose diskus inhaler Inhale 1 puff into the lungs once daily. Controller.  PLEASE NOTE IT IS ONCE A DAY!!    fluticasone furoate-vilanteroL (BREO ELLIPTA) 100-25 mcg/dose diskus inhaler Inhale 1 puff into the lungs once daily. Controller    gabapentin (NEURONTIN) 100 MG capsule Take 1 capsule (100 mg total) by mouth every evening.    irbesartan (AVAPRO) 300 MG tablet Take 1 tablet (300 mg total) by mouth every evening.    levothyroxine (SYNTHROID) 50 MCG tablet Take 1 tablet (50 mcg total) by mouth before breakfast.    montelukast (SINGULAIR) 10 mg tablet TAKE 1 TABLET BY MOUTH EVERY DAY IN THE EVENING    multivitamin (THERAGRAN) per tablet Take 1 tablet by  mouth once daily.    oxybutynin (DITROPAN XL) 15 MG TR24 Take 1 tablet (15 mg total) by mouth once daily.    pantoprazole (PROTONIX) 20 MG tablet Take 1 tablet (20 mg total) by mouth once daily.    predniSONE (DELTASONE) 1 MG tablet Take 2 tablets (2 mg total) by mouth once daily.    [DISCONTINUED] budesonide-formoterol 80-4.5 mcg (SYMBICORT) 80-4.5 mcg/actuation HFAA Inhale 2 puffs into the lungs 2 (two) times daily as needed. Controller     Family History       Problem Relation (Age of Onset)    Breast cancer Mother    Cancer Mother, Brother    Heart disease Father    Hypertension Father, Brother, Son    No Known Problems Sister, Daughter    Stroke Father          Tobacco Use    Smoking status: Former     Packs/day: 0.50     Years: 30.00     Pack years: 15.00     Types: Cigarettes     Quit date: 2000     Years since quittin.3    Smokeless tobacco: Never    Tobacco comments:     quit in her 50s, after 30 years smoking;   Substance and Sexual Activity    Alcohol use: No    Drug use: No    Sexual activity: Not Currently     Review of Systems   Constitutional:  Positive for activity change. Negative for chills and fever.   HENT:  Negative for congestion and sore throat.    Eyes:  Negative for pain and redness.   Respiratory:  Positive for cough and shortness of breath. Negative for wheezing.    Cardiovascular:  Positive for leg swelling. Negative for chest pain.   Gastrointestinal:  Negative for anal bleeding, blood in stool, diarrhea, nausea and vomiting.   Genitourinary:  Positive for dysuria. Negative for vaginal bleeding.   Skin:  Negative for rash and wound.   Neurological:  Positive for numbness (neuropathy in bilateral feet). Negative for weakness and light-headedness.   Psychiatric/Behavioral:  Negative for agitation and behavioral problems.    Objective:     Vital Signs (Most Recent):  Temp: 98.2 °F (36.8 °C) (23)  Pulse: 77 (23)  Resp: 18 (23)  BP: 120/60 (23  2344)  SpO2: 97 % (05/24/23 2344) Vital Signs (24h Range):  Temp:  [97.5 °F (36.4 °C)-98.2 °F (36.8 °C)] 98.2 °F (36.8 °C)  Pulse:  [] 77  Resp:  [14-24] 18  SpO2:  [89 %-98 %] 97 %  BP: (112-140)/(58-73) 120/60     Weight: 74.9 kg (165 lb 2 oz)  Body mass index is 28.34 kg/m².     Physical Exam  Vitals and nursing note reviewed.   Constitutional:       General: She is not in acute distress.  HENT:      Head: Normocephalic and atraumatic.      Nose: No congestion or rhinorrhea.   Eyes:      General: No scleral icterus.        Right eye: No discharge.         Left eye: No discharge.   Cardiovascular:      Rate and Rhythm: Regular rhythm.      Pulses: Normal pulses.      Heart sounds: Normal heart sounds.   Pulmonary:      Effort: No respiratory distress.      Breath sounds: No wheezing.      Comments: On 2L O2, speaking in full sentences  Abdominal:      General: Bowel sounds are normal. There is no distension.      Palpations: Abdomen is soft.      Tenderness: There is no abdominal tenderness.   Musculoskeletal:      Right lower leg: Edema (slight, noted in ankle joint) present.      Left lower leg: Edema (slight, noted in ankle joint) present.   Skin:     General: Skin is warm and dry.   Neurological:      Mental Status: She is alert and oriented to person, place, and time. Mental status is at baseline.   Psychiatric:         Mood and Affect: Mood normal.         Behavior: Behavior normal.              Significant Labs: All pertinent labs within the past 24 hours have been reviewed.  BMP:   Recent Labs   Lab 05/24/23  0633   *      K 4.2      CO2 22*   BUN 11   CREATININE 0.8   CALCIUM 8.9     CBC:   Recent Labs   Lab 05/23/23  1901 05/24/23  0633 05/24/23  1816   WBC 5.60 3.28* 4.79   HGB 7.3* 7.9* 8.2*   HCT 22.9* 24.3* 25.2*    306 333     CMP:   Recent Labs   Lab 05/23/23  1901 05/24/23  0633   * 136   K 3.8 4.2    103   CO2 23 22*   * 176*   BUN 12 11    CREATININE 0.9 0.8   CALCIUM 9.0 8.9   PROT 7.3 7.1   ALBUMIN 2.8* 2.7*   BILITOT 1.4* 1.3*   ALKPHOS 139* 142*   AST 26 26   ALT 26 24   ANIONGAP 11 11     Cardiac Markers:   Recent Labs   Lab 05/23/23  1901   BNP 98     Lactic Acid: No results for input(s): LACTATE in the last 48 hours.  Magnesium: No results for input(s): MG in the last 48 hours.  Troponin:   Recent Labs   Lab 05/23/23 2050   TROPONINI 0.009     Urine Culture: No results for input(s): LABURIN in the last 48 hours.  Urine Studies:   Recent Labs   Lab 05/23/23 1902   COLORU [place]*   APPEARANCEUA Hazy*   PHUR 6.0   SPECGRAV 1.010   PROTEINUA 1+*   GLUCUA Negative   KETONESU Negative   BILIRUBINUA Negative   OCCULTUA Negative   NITRITE Positive*   UROBILINOGEN Negative   LEUKOCYTESUR 3+*   RBCUA 8*   WBCUA >100*   BACTERIA Few*   SQUAMEPITHEL 20   HYALINECASTS 0       Significant Imaging: I have reviewed and interpreted all pertinent imaging results/findings within the past 24 hours.

## 2023-05-25 NOTE — SUBJECTIVE & OBJECTIVE
Past Medical History:   Diagnosis Date    Arthritis     Borderline serous cystadenoma of right ovary 4/3/2018    Breast cancer     mastectomy 2014    Coccidiomycosis, progressive     Elevated CA-125 2/25/2018    Hyperlipidemia     OAB (overactive bladder)     Osteopenia     on Dexa 11/2017    Osteoporosis     pt reports hx of this, declined fosamax in past - treated with calcium and vit D    Pelvic mass 2/25/2018    Pulmonary fibrosis     Pulmonary nodules     SOB (shortness of breath) on exertion     Thyroid disease     hypo    UTI (urinary tract infection)        Past Surgical History:   Procedure Laterality Date    CHOLECYSTECTOMY      COLONOSCOPY N/A 12/9/2022    Procedure: COLONOSCOPY;  Surgeon: Enrique Dominguez MD;  Location: [facility] (4TH FLR);  Service: Endoscopy;  Laterality: N/A;  inst via portal  pt requests after 12/9/22-MS  11/30-pt notified of earlier arrival time-KPvt    CYSTOSCOPY WITH BIOPSY OF BLADDER Bilateral 7/27/2022    Procedure: CYSTOSCOPY, WITH BLADDER BIOPSY; retrograde pyelogram,;  Surgeon: Anaya Oropeza MD;  Location: [facility];  Service: Urology;  Laterality: Bilateral;    ENDOSCOPIC ULTRASOUND OF UPPER GASTROINTESTINAL TRACT N/A 4/8/2021    Procedure: ULTRASOUND, UPPER GI TRACT, ENDOSCOPIC;  Surgeon: Aisha Barajas MD;  Location: [facility] (2ND FLR);  Service: Endoscopy;  Laterality: N/A;  UEUS/ERCP evaluation abn MRCP - Dr Angelika Steinerid-19 test 4/5/21 at [facility] Pre-admit - pg    ERCP N/A 4/8/2021    Procedure: ERCP (ENDOSCOPIC RETROGRADE CHOLANGIOPANCREATOGRAPHY);  Surgeon: Aisha Barajas MD;  Location: [facility] (2ND FLR);  Service: Endoscopy;  Laterality: N/A;  UEUS/ERCP evaluation abn MRCP - Dr Carney  Covid-19 test 4/5/21 at [facility] Pre-admit - pg    ESOPHAGOGASTRODUODENOSCOPY N/A 4/8/2021    Procedure: EGD (ESOPHAGOGASTRODUODENOSCOPY);  Surgeon: Aisha Barajas MD;  Location: [facility] (2ND FLR);  Service: Endoscopy;  Laterality: N/A;  UEUS/ERCP  evaluation abn MRCP - Dr Carney  Covid-19 test 4/5/21 at [facility] Pre-admit - pg    HYSTERECTOMY      MASTECTOMY Right     2014       Review of patient's allergies indicates:   Allergen Reactions    Ciprofloxacin Other (See Comments)     Right foot pain and edema, tendonitis    Amlodipine Edema and Swelling     BLE  BLE    Lisinopril Other (See Comments)     cough    Nitrofuran analogues        Medications:  Medications Prior to Admission   Medication Sig    albuterol (VENTOLIN HFA) 90 mcg/actuation inhaler Inhale 1-2 puffs into the lungs every 6 (six) hours as needed for Wheezing or Shortness of Breath. Rescue    atorvastatin (LIPITOR) 20 MG tablet TAKE 1 TABLET BY MOUTH EVERY DAY    azithromycin (Z-SEB) 250 MG tablet Take 1 tablet (250 mg total) by mouth once daily. (Patient not taking: Reported on 5/17/2023)    calcium-vitamin D3 (CALCIUM 500 + D) 500 mg(1,250mg) -200 unit per tablet Take 1 tablet by mouth 2 (two) times daily with meals.    carvediloL (COREG) 6.25 MG tablet TAKE 1 TABLET BY MOUTH TWICE A DAY WITH FOOD    estradioL (ESTRACE) 0.01 % (0.1 mg/gram) vaginal cream Place 1 g vaginally twice a week.    fluticasone furoate-vilanteroL (BREO ELLIPTA) 100-25 mcg/dose diskus inhaler Inhale 1 puff into the lungs once daily. Controller.  PLEASE NOTE IT IS ONCE A DAY!!    fluticasone furoate-vilanteroL (BREO ELLIPTA) 100-25 mcg/dose diskus inhaler Inhale 1 puff into the lungs once daily. Controller    gabapentin (NEURONTIN) 100 MG capsule Take 1 capsule (100 mg total) by mouth every evening.    irbesartan (AVAPRO) 300 MG tablet Take 1 tablet (300 mg total) by mouth every evening.    levothyroxine (SYNTHROID) 50 MCG tablet Take 1 tablet (50 mcg total) by mouth before breakfast.    montelukast (SINGULAIR) 10 mg tablet TAKE 1 TABLET BY MOUTH EVERY DAY IN THE EVENING    multivitamin (THERAGRAN) per tablet Take 1 tablet by mouth once daily.    oxybutynin (DITROPAN XL) 15 MG TR24 Take 1 tablet (15 mg total) by mouth  once daily.    pantoprazole (PROTONIX) 20 MG tablet Take 1 tablet (20 mg total) by mouth once daily.    predniSONE (DELTASONE) 1 MG tablet Take 2 tablets (2 mg total) by mouth once daily.     Antibiotics (From admission, onward)      Start     Stop Route Frequency Ordered    23 2300  ertapenem (INVANZ) 1 g in sodium chloride 0.9 % 100 mL IVPB (MB+)         -- IV Every 24 hours (non-standard times) 23 1101          Antifungals (From admission, onward)      None          Antivirals (From admission, onward)      None             Immunization History   Administered Date(s) Administered    COVID-19 MRNA, LN-S PF (MODERNA HALF 0.25 ML DOSE) 2021    COVID-19 Vaccine 2022    COVID-19, MRNA, LN-S, PF (MODERNA FULL 0.5 ML DOSE) 2021, 2021    Influenza - High Dose - PF (65 years and older) 10/27/2020, 2022    Pneumococcal Conjugate - 13 Valent 2017    Pneumococcal Conjugate - 20 Valent 2022    Pneumococcal Polysaccharide - 23 Valent 2020    Tdap 2021    Zoster Recombinant 2022       Family History       Problem Relation (Age of Onset)    Breast cancer Mother    Cancer Mother, Brother    Heart disease Father    Hypertension Father, Brother, Son    No Known Problems Sister, Daughter    Stroke Father          Social History     Socioeconomic History    Marital status:     Number of children: 3   Occupational History    Occupation: retired from hospitals, [employer]     Comment: neuro chemistry   Tobacco Use    Smoking status: Former     Packs/day: 0.50     Years: 30.00     Pack years: 15.00     Types: Cigarettes     Quit date: 2000     Years since quittin.3    Smokeless tobacco: Never    Tobacco comments:     quit in her 50s, after 30 years smoking;   Substance and Sexual Activity    Alcohol use: No    Drug use: No    Sexual activity: Not Currently   Social History Narrative    From [place]     Moved to [place] [facility] [place] in  for research     Moved to [place] for period of time    Moved back to [place] Summer 2016 and then to [facility] this year Sept 2017     Review of Systems   Respiratory:  Positive for shortness of breath.    Genitourinary:  Positive for dysuria, pelvic pain and urgency. Negative for flank pain.   All other systems reviewed and are negative.  Objective:     Vital Signs (Most Recent):  Temp: 98.1 °F (36.7 °C) (05/25/23 1620)  Pulse: 74 (05/25/23 1620)  Resp: 16 (05/25/23 1620)  BP: (!) 125/58 (05/25/23 1620)  SpO2: 97 % (05/25/23 1620) Vital Signs (24h Range):  Temp:  [97.7 °F (36.5 °C)-98.2 °F (36.8 °C)] 98.1 °F (36.7 °C)  Pulse:  [70-85] 74  Resp:  [14-24] 16  SpO2:  [95 %-98 %] 97 %  BP: (104-138)/(58-72) 125/58     Weight: 74.9 kg (165 lb 2 oz)  Body mass index is 28.34 kg/m².    Estimated Creatinine Clearance: 56.5 mL/min (based on SCr of 0.8 mg/dL).     Physical Exam  Vitals and nursing note reviewed.   Constitutional:       Appearance: Normal appearance.   HENT:      Head: Normocephalic and atraumatic.   Eyes:      Extraocular Movements: Extraocular movements intact.      Pupils: Pupils are equal, round, and reactive to light.   Cardiovascular:      Rate and Rhythm: Normal rate and regular rhythm.   Pulmonary:      Effort: Pulmonary effort is normal.      Breath sounds: Rales present.   Abdominal:      General: Abdomen is flat.      Palpations: Abdomen is soft.   Skin:     General: Skin is warm and dry.   Neurological:      General: No focal deficit present.      Mental Status: She is alert and oriented to person, place, and time.          Significant Labs: Blood Culture: No results for input(s): LABBLOO in the last 4320 hours.  CBC:   Recent Labs   Lab 05/24/23  0633 05/24/23  1816 05/25/23  0510   WBC 3.28* 4.79 5.05   HGB 7.9* 8.2* 7.6*   HCT 24.3* 25.2* 24.1*    333 294     CMP:   Recent Labs   Lab 05/23/23  1901 05/24/23  0633 05/25/23  0510   * 136 138   K 3.8 4.2 3.9    103 106   CO2 23 22* 24   *  176* 116*   BUN 12 11 16   CREATININE 0.9 0.8 0.8   CALCIUM 9.0 8.9 8.8   PROT 7.3 7.1  --    ALBUMIN 2.8* 2.7*  --    BILITOT 1.4* 1.3*  --    ALKPHOS 139* 142*  --    AST 26 26  --    ALT 26 24  --    ANIONGAP 11 11 8     Urine Culture:   Recent Labs   Lab 05/19/23  1238 05/23/23  1902   LABURIN Multiple organisms isolated. None in predominance.  Repeat if  clinically necessary. GRAM NEGATIVE KAIA  >100,000 cfu/ml  Identification and susceptibility pending  *       Significant Imaging: CXR: I have reviewed all pertinent results/findings within the past 24 hours:  The cardiac silhouette is within normal limits.  Vascular calcification is seen at the aortic knob.  There is chronic interstitial prominence, which is unchanged.  There are known underlying pulmonary nodules and bronchiectasis.  There is no focal consolidation, pneumothorax, or pleural effusion.  The bones are diffusely osteopenic.

## 2023-05-25 NOTE — ASSESSMENT & PLAN NOTE
Hx of recurrent UTI, MDR, Urine cx from 10/6/22 positive for MDR E.Coli.  Patient reports last UTI was in Oct 2022. Sees outpatient urology and had cystoscopy done. Patient reports urine now is brownish, and she has suprapubic tenderness and dysuria. No fever and chills. Afebrile, no leukocytosis. UA nitrites positive, 3+ leukocytes, >100WBC. Patient was given IV Ertapenam 1g in ED (5/23/23) for UTI.     - Continue IV Ertapenam 1g daily  - Urine culture 5/24/23 in process

## 2023-05-25 NOTE — PT/OT/SLP EVAL
Physical Therapy Evaluation, Treatment, and Discharge Note    Patient Name:  Niurka Pedraza   MRN:  [id number]    Recommendations:     Discharge Recommendations: home health PT, pulmonary rehab (pulm rehab if dx qualifies)  Discharge Equipment Recommendations: none (O2 per MD, has cane and WC for longer community mobility)   Barriers to discharge: None    Assessment:     Niurka Pedraza is a 78 y.o. female admitted with a medical diagnosis of Shortness of breath. She presents with the following impairments/functional limitations: impaired endurance, impaired balance, gait instability, decreased lower extremity function, impaired cardiopulmonary response to activity, impaired sensation .      Patient evaluated and no acute care PT goals identified. Patient has been mobilizing (I) in room doffing O2, instructed in safe mobility with maintaining O2 charla when walking until weaned by MD/RT. Gait and stairs performed with minor SOB and wide step widths possibly d/t chronic neuropathy but no overt LOB or difficulty with mobility.      During this hospitalization, patient does not require further acute PT services.  Please re-consult if situation changes.  Recommendation for d/c to home with HH PT vs pulm rehab (if qualifies with her current dx).    Recent Surgery: * No surgery found *      Plan:     During this hospitalization, patient does not require further acute PT services.  Please re-consult if situation changes.      Subjective     Chief Complaint: Slight SOB with ambulation without NC and concern about SpO2 falling below her usual 95%  Patient/Family Comments/goals: Goal to stay strong (has been mobilizing already in room to prevent debility); Patient agreeable to evaluation.  Pain/Comfort:  Pain Rating 1: 0/10  Pain Rating Post-Intervention 1: 0/10    Patients cultural, spiritual, Protestant conflicts given the current situation: no    Living Environment:  Pt lives alone in a 1st story apartment in 2 story home with a  few steps to enter and handrail(s).   One dtr lives upstairs, another dtr lives next door  Upon discharge, patient will have assistance from her dtrs as needed (visit throughout day).  Prior level of function:  Ambulation: Indep for household mobility, mod(I) with cane for longer distances, WC for extended trips requiring extended standing  ADL's: Mod(I)  Falls: Denies  Equipment used at home: none.  DME owned (not currently used): single point cane and wheelchair.     Objective:     Communicated with JINNY Gonzalez prior to session.  Patient found HOB elevated with peripheral IV, oxygen, telemetry upon PT entry to room.    General Precautions: Standard, respiratory    Orthopedic Precautions:N/A   Braces: N/A  Respiratory Status: Nasal cannula, flow 2 L/min    Patient donned non slip socks, slip on shoes, and gait belt for OOB mobility.    Exams:  Cognition:   Patient is oriented x4.  Pt follows approximately 100% of one and two step commands.    Mood: Pleasant and cooperative.   Safety Awareness: Good  Musculoskeletal:  BMI: 28.34  Posture:  Forward head  LE ROM/Strength:   R ROM: WFL  L ROM: WFL  R Strength:   Knee extension: 5/5  Dorsiflexion: 5/5   L Strength:   Knee extension: 5/5  Dorsiflexion: 5/5   Neuromuscular:  Sensation: H/o diabetic neuropathy  Coordination/Tone/Reflexes: No impairments identified with functional mobility. No formal testing performed.  Balance:   Static sitting: Normal  Static standing: Good  Dynamic standing: Good  Visual-vestibular: No impairments identified with functional mobility. No formal testing performed.  Integument: Visible skin intact  Cardiopulmonary:  Vital signs:   Activity O2 SpO2   Supine 2L 98%   Sitting RA 96%   Ambulation RA 92%, SOB   Ambulation 1L 94%     Edema: None noted    Functional Mobility:  Bed Mobility:     Supine to Sit: modified independence  Transfers:     Sit to Stand:  independence and supervision with no AD  From EOB and toilet  Gait: 2x20 ft with no AD  and SBA with RA and SOB, 1x160 ft with no AD and supervision on 1L NC.   With and without NC, pt with wide step widths with associated increased lateral sway without overt LOB suspect d/t chronic neuropathy.   No overt LOB including during turns and opening doors.   No furniture cruising noted.   Stairs:  Pt ascended/descended 4 stair(s) with No Assistive Device with left handrail with Stand-by Assistance.   Demo's step to gait pattern, pt reports normal gait pattern d/t difficulty with stairs at baseline    AM-PAC 6 CLICK MOBILITY  Total Score:22       Treatment and Education:  TA:  Instructed in safe us eof NC until cleared for RA by MD/RT - provided extension tubing to allow her to mobilize to toilet while keeping NC donned  Instructed in continued OOB mobility as tolerated to prevent hospital acquired debility  Gait Speed: 0.56 m/s  0.4m/s - 0.79m/s - Limited Community Ambulator  Normal gait speed for average healthy adult: 1.2m/s-1.4m/s    AM-PAC 6 CLICK MOBILITY  Total Score:22     Patient left sitting edge of bed with all lines intact, call button in reach, and dtr and MD present.    GOALS:   Multidisciplinary Problems       Physical Therapy Goals       Not on file              Multidisciplinary Problems (Resolved)          Problem: Physical Therapy    Goal Priority Disciplines Outcome Goal Variances Interventions   Physical Therapy Goal   (Resolved)     PT, PT/OT Met                         History:     Past Medical History:   Diagnosis Date    Arthritis     Borderline serous cystadenoma of right ovary 4/3/2018    Breast cancer     mastectomy 2014    Coccidiomycosis, progressive     Elevated CA-125 2/25/2018    Hyperlipidemia     OAB (overactive bladder)     Osteopenia     on Dexa 11/2017    Osteoporosis     pt reports hx of this, declined fosamax in past - treated with calcium and vit D    Pelvic mass 2/25/2018    Pulmonary fibrosis     Pulmonary nodules     SOB (shortness of breath) on exertion     Thyroid  disease     hypo    UTI (urinary tract infection)        Past Surgical History:   Procedure Laterality Date    CHOLECYSTECTOMY      COLONOSCOPY N/A 12/9/2022    Procedure: COLONOSCOPY;  Surgeon: Enrique Dominguez MD;  Location: [facility] (4TH FLR);  Service: Endoscopy;  Laterality: N/A;  inst via portal  pt requests after 12/9/22-MS  11/30-pt notified of earlier arrival time-KPvt    CYSTOSCOPY WITH BIOPSY OF BLADDER Bilateral 7/27/2022    Procedure: CYSTOSCOPY, WITH BLADDER BIOPSY; retrograde pyelogram,;  Surgeon: Anaya Oropeza MD;  Location: [facility];  Service: Urology;  Laterality: Bilateral;    ENDOSCOPIC ULTRASOUND OF UPPER GASTROINTESTINAL TRACT N/A 4/8/2021    Procedure: ULTRASOUND, UPPER GI TRACT, ENDOSCOPIC;  Surgeon: Aisha Barajas MD;  Location: [facility] (2ND FLR);  Service: Endoscopy;  Laterality: N/A;  UEUS/ERCP evaluation abn MRCP - Dr Angelika Zamudio-19 test 4/5/21 at [facility] Pre-admit - pg    ERCP N/A 4/8/2021    Procedure: ERCP (ENDOSCOPIC RETROGRADE CHOLANGIOPANCREATOGRAPHY);  Surgeon: Aisha Barajas MD;  Location: [facility] (2ND FLR);  Service: Endoscopy;  Laterality: N/A;  UEUS/ERCP evaluation abn MRCP - Dr Angelika Steinerid-19 test 4/5/21 at [facility] Pre-admit - pg    ESOPHAGOGASTRODUODENOSCOPY N/A 4/8/2021    Procedure: EGD (ESOPHAGOGASTRODUODENOSCOPY);  Surgeon: Aisha Barajas MD;  Location: [facility] (2ND FLR);  Service: Endoscopy;  Laterality: N/A;  UEUS/ERCP evaluation abn MRCP - Dr Angelika Steinerid-19 test 4/5/21 at [facility] Pre-admit - pg    HYSTERECTOMY      MASTECTOMY Right     2014       Time Tracking:     PT Received On: 05/25/23  PT Start Time: 1039     PT Stop Time: 1056  PT Total Time (min): 17 min     Billable Minutes: Evaluation 9 and Therapeutic Activity 8      05/25/2023

## 2023-05-25 NOTE — CONSULTS
[facility] S[facility] ([place])  Pulmonology  Consult Note    Patient Name: Niurka Pedraza  MRN: [id number]  Admission Date: 5/23/2023  Hospital Length of Stay: 0 days  Code Status: Full Code  Attending Physician: Salvador Gilmore MD  Primary Care Provider: Savana Anderson MD   Principal Problem: Shortness of breath    Inpatient consult to Pulmonology  Consult performed by: Barbara Hernandez MD  Consult ordered by: Valerie De La Rosa NP        Subjective:     HPI:  78 year old woman with history of UIP with pulmonary fibrosis, not on home oxygen, hypothyroidism, Coccidiomycosis, hyperlipidemia and breast cancer status post mastectomy that presents to the ED for the evaluation of worsening SOB over the last week.  Patient discloses baseline SOB, but in early appear one of the daughters had COVID infection and after which everyone around was sick with cold including her. Then, she developed worsening SOB (on exertion), productive cough with brown sputum and lethargy. Patient was treated with augmentin and 10 days medrol dosepak with improvement in cough and breathing. CT scan of the chest was done that showed stable chronic interstitial lung disease with fibrosis.  Over the last week, patient started having progressive SOB, fatigue and memory impairment.  Patient also discloses increase urinary frequency, urgency and dysuria.  Patient discloses no chest pain, palpitation, fever or chills.  Daughter discloses that patient snores at night.  Patient does not smoke, no alcohol or illicit drug. Patient used to work as .  Last month, patient visted [place] with her family.  of note, patient had a long history of use of nitrofuratoin before she was diagnosed with ILD in 2020.   For the pulmonary fibrosis, patient follows up with Dr. Nemesio Cazares.    On presentation, patient was afebrile and hemodynamically stable and oxygen sat of 95% on RA on presentation. Labs remarkable for significant anemia (hemoglobin of 7.3; hemoglobin  1 month ago was 10.8) and elevated ALP. UA was positive for infection.  Patient was started on ceftriaxone and received 1 unit of PRBCs. Pulmonary consulted for ILD, pulmonary fibrosis with worsening SOB.      Most recent Pulmonary Investigations:  CT scan of 04/26/2023:  Continued findings of chronic interstitial lung disease including interlobular septal thickening, mosaic attenuation of the lungs, varicoid bronchiectasis, and subpleural cysts.  multiple scattered ill-defined opacities and pulmonary nodules.  No significant interval change compared to prior.  Dominant pleural based nodule right lower lobe measures 1.5 cm series 4, image 194, stable.  Pleura: No significant pleural effusion    PFT of Oct/01/2021:  FVC 1.85 (77%)  FEV1 1.56 (84%)  FEV1/FVC 84.56 (108%  TLC 3.62 (73%)  DLCO 9.21 (58%)  Mild restriction with moderate decrease in DLCO.    Broncho of 10/25/2021:  Impression:            - Chronic cough                          - The airway examination of the right lung was                          normal.                          - Scant, white, thick secretions were found                          throughout the tracheobronchial tree.                          - Transbronchial lung biopsies were performed.                          - Scant, white, thick secretions were found                          throughout the left tracheobronchial tree.                          - Bronchoalveolar lavage was performed.    Cytology:  Negative for malignant cells, but showed alveolar macrophages and rare giant cells.  Transbronchial Biopsy: Scant fragments of benign bronchial epithelium    TTE of 08/24/2021:  The left ventricle is normal in size with concentric remodeling and normal systolic function.  The stress echo portion of this study is negative for myocardial ischemia.  The patient's exercise capacity was fair.  The ECG portion of this study is negative for myocardial ischemia.      Past Medical History:    Diagnosis Date    Arthritis     Borderline serous cystadenoma of right ovary 4/3/2018    Breast cancer     mastectomy 2014    Coccidiomycosis, progressive     Elevated CA-125 2/25/2018    Hyperlipidemia     OAB (overactive bladder)     Osteopenia     on Dexa 11/2017    Osteoporosis     pt reports hx of this, declined fosamax in past - treated with calcium and vit D    Pelvic mass 2/25/2018    Pulmonary fibrosis     Pulmonary nodules     SOB (shortness of breath) on exertion     Thyroid disease     hypo    UTI (urinary tract infection)        Past Surgical History:   Procedure Laterality Date    CHOLECYSTECTOMY      COLONOSCOPY N/A 12/9/2022    Procedure: COLONOSCOPY;  Surgeon: Enrique Dominguez MD;  Location: [facility] (4TH FLR);  Service: Endoscopy;  Laterality: N/A;  inst via portal  pt requests after 12/9/22-MS  11/30-pt notified of earlier arrival time-KPvt    CYSTOSCOPY WITH BIOPSY OF BLADDER Bilateral 7/27/2022    Procedure: CYSTOSCOPY, WITH BLADDER BIOPSY; retrograde pyelogram,;  Surgeon: Anaya Oropeza MD;  Location: [facility];  Service: Urology;  Laterality: Bilateral;    ENDOSCOPIC ULTRASOUND OF UPPER GASTROINTESTINAL TRACT N/A 4/8/2021    Procedure: ULTRASOUND, UPPER GI TRACT, ENDOSCOPIC;  Surgeon: Aisha Barajas MD;  Location: [facility] (2ND FLR);  Service: Endoscopy;  Laterality: N/A;  UEUS/ERCP evaluation abn MRCFLEX Zamudio-19 test 4/5/21 at [facility] Pre-admit - pg    ERCP N/A 4/8/2021    Procedure: ERCP (ENDOSCOPIC RETROGRADE CHOLANGIOPANCREATOGRAPHY);  Surgeon: Aisha Barajas MD;  Location: [facility] (2ND FLR);  Service: Endoscopy;  Laterality: N/A;  UEUS/ERCP evaluation abn TIN Carney  Covid-19 test 4/5/21 at [facility] Pre-admit - pg    ESOPHAGOGASTRODUODENOSCOPY N/A 4/8/2021    Procedure: EGD (ESOPHAGOGASTRODUODENOSCOPY);  Surgeon: Aisha Barajas MD;  Location: [facility] (2ND FLR);  Service: Endoscopy;  Laterality: N/A;  UEUS/ERCP evaluation abn TIN Burnett Dr  Angelika  Covid-19 test 21 at [facility] Pre-admit - pg    HYSTERECTOMY      MASTECTOMY Right            Review of patient's allergies indicates:   Allergen Reactions    Ciprofloxacin Other (See Comments)     Right foot pain and edema, tendonitis    Amlodipine Edema and Swelling     BLE  BLE    Lisinopril Other (See Comments)     cough    Nitrofuran analogues        Family History       Problem Relation (Age of Onset)    Breast cancer Mother    Cancer Mother, Brother    Heart disease Father    Hypertension Father, Brother, Son    No Known Problems Sister, Daughter    Stroke Father          Tobacco Use    Smoking status: Former     Packs/day: 0.50     Years: 30.00     Pack years: 15.00     Types: Cigarettes     Quit date: 2000     Years since quittin.3    Smokeless tobacco: Never    Tobacco comments:     quit in her 50s, after 30 years smoking;   Substance and Sexual Activity    Alcohol use: No    Drug use: No    Sexual activity: Not Currently         Review of Systems   Constitutional:  Positive for fatigue and unexpected weight change. Negative for activity change, chills and fever.   Respiratory:  Positive for cough and shortness of breath. Negative for apnea and wheezing.    Cardiovascular:  Positive for leg swelling. Negative for chest pain and palpitations.   Gastrointestinal:  Positive for nausea. Negative for abdominal pain and vomiting.   Genitourinary:  Positive for dysuria, frequency and urgency.   Skin:  Negative for color change.   Psychiatric/Behavioral:  Positive for decreased concentration. Negative for agitation and confusion.    All other systems reviewed and are negative.  Objective:     Vital Signs (Most Recent):  Temp: 98 °F (36.7 °C) (23 1628)  Pulse: 85 (23 1800)  Resp: 14 (23 1628)  BP: 138/72 (23 1628)  SpO2: 95 % (23 1628) Vital Signs (24h Range):  Temp:  [97.5 °F (36.4 °C)-98.1 °F (36.7 °C)] 98 °F (36.7 °C)  Pulse:  [] 85  Resp:  [14-20]  14  SpO2:  [89 %-98 %] 95 %  BP: (112-140)/(58-73) 138/72     Weight: 74.9 kg (165 lb 2 oz)  Body mass index is 28.34 kg/m².      Intake/Output Summary (Last 24 hours) at 5/24/2023 1917  Last data filed at 5/24/2023 1713  Gross per 24 hour   Intake 425.42 ml   Output 200 ml   Net 225.42 ml        Physical Exam  Vitals reviewed.   Constitutional:       Appearance: Normal appearance.   HENT:      Head: Normocephalic and atraumatic.      Mouth/Throat:      Mouth: Mucous membranes are moist.   Eyes:      Conjunctiva/sclera: Conjunctivae normal.   Cardiovascular:      Rate and Rhythm: Normal rate and regular rhythm.      Pulses: Normal pulses.      Heart sounds: No murmur heard.  Pulmonary:      Effort: Pulmonary effort is normal. No respiratory distress.      Breath sounds: No wheezing.      Comments: Bilateral inspiratory crackles to the posterior lower lung field  Abdominal:      Palpations: Abdomen is soft.      Tenderness: There is no abdominal tenderness.   Musculoskeletal:      Comments: Trace bilateral lower extremities swelling   Neurological:      General: No focal deficit present.      Mental Status: She is alert.   Psychiatric:         Mood and Affect: Mood normal.         Behavior: Behavior normal.        Vents:       Lines/Drains/Airways       Peripheral Intravenous Line  Duration                  Peripheral IV - Single Lumen 05/24/23 0107 20 G Posterior;Right Forearm <1 day                    Significant Labs:    CBC/Anemia Profile:  Recent Labs   Lab 05/23/23  1901 05/23/23  2333 05/24/23  0633 05/24/23  1816   WBC 5.60  --  3.28* 4.79   HGB 7.3*  --  7.9* 8.2*   HCT 22.9*  --  24.3* 25.2*     --  306 333   MCV 91  --  89 89   RDW 17.9*  --  17.0* 17.4*   IRON  --  35  --   --    FERRITIN  --  749*  --   --         Chemistries:  Recent Labs   Lab 05/23/23  1901 05/24/23  0633   * 136   K 3.8 4.2    103   CO2 23 22*   BUN 12 11   CREATININE 0.9 0.8   CALCIUM 9.0 8.9   ALBUMIN 2.8* 2.7*    PROT 7.3 7.1   BILITOT 1.4* 1.3*   ALKPHOS 139* 142*   ALT 26 24   AST 26 26       All pertinent labs within the past 24 hours have been reviewed.    Significant Imaging:   I have reviewed all pertinent imaging results/findings within the past 24 hours.    Assessment/Plan:     Pulmonary  * Shortness of breath  Worsening SOB likely due to symptomatic acute on chronic anemia (significant drop in hemoglobin) on the background of pulmonary fibrosis     Anemia work up  Agree with GI evaluation  Patient has no recent TTE, check TTE to evaluate the cardiac function, structure and pulmonary pressure  Outpatient repeat PFT  Will need repeat 6MWT before discharge to assess the need for home oxygen      Pulmonary fibrosis with bronchiectasis   Patient does not appear to be in exacerbation  Continue home dose of Prednisone 2mg oral daily  Continue home inhalers  Continue GI prophylaxis   The primary team is concern about her leukopenia today and wondering if steroid should be continue. I looked at the CBC. All the cell lineages were all decrease, I suspect this is hemo-dilution. Please consider repeating the CBC before further transfusion and will continue the prednisone.    Renal/  Recurrent UTI  Management as par primary    Other  Suspected sleep apnea  Please refer for sleep study on discharge        Thank you for the consult. We will continue to follow.     Barbara Hernandez MD  Pulmonology  Scientologist - Med Surg ([place])

## 2023-05-25 NOTE — ASSESSMENT & PLAN NOTE
77 yo female with IPF and recurrent ESBL E.coli UTIs. No UTIs since 10/22. Urine is growing GNR. ID and sensitivity pending. Improved on ertapenem.    Recommendation:  Continue ertapenem for 3 days  Follow culture and sensitivities.

## 2023-05-25 NOTE — ASSESSMENT & PLAN NOTE
Hx of HTN, HLD  Chronic. Controlled.     - Resume home dose atorvastatin 20mg daily, carvedilol 6.25mg BID, irbesartan 300mg qhs  - Low sodium diet  - Monitor and adjust as needed

## 2023-05-25 NOTE — PLAN OF CARE
Problem: Physical Therapy  Goal: Physical Therapy Goal  Outcome: Met     Patient evaluated and no acute care PT goals identified. Patient has been mobilizing (I) in room doffing O2, instructed in safe mobility with maintaining O2 charla when walking until weaned by MD/RT. Gait and stairs performed with minor SOB and wide step widths possibly d/t chronic neuropathy but no overt LOB or difficulty with mobility.     During this hospitalization, patient does not require further acute PT services.  Please re-consult if situation changes.  Recommendation for d/c to home with HH PT vs pulm rehab (if qualifies with her current dx).

## 2023-05-25 NOTE — ASSESSMENT & PLAN NOTE
Chronic. 2-3 years. Sometimes causes instability in gait.     - Continue home dose gabapentin 100mg qhs  - PT and OT evaluate for safety

## 2023-05-26 ENCOUNTER — PATIENT MESSAGE (OUTPATIENT)
Dept: INTERNAL MEDICINE | Facility: CLINIC | Age: 78
End: 2023-05-26
Payer: MEDICARE

## 2023-05-26 VITALS
DIASTOLIC BLOOD PRESSURE: 64 MMHG | WEIGHT: 165.13 LBS | OXYGEN SATURATION: 96 % | TEMPERATURE: 98 F | SYSTOLIC BLOOD PRESSURE: 120 MMHG | BODY MASS INDEX: 28.19 KG/M2 | HEART RATE: 76 BPM | HEIGHT: 64 IN | RESPIRATION RATE: 17 BRPM

## 2023-05-26 LAB
ANION GAP SERPL CALC-SCNC: 11 MMOL/L (ref 8–16)
ANISOCYTOSIS BLD QL SMEAR: SLIGHT
BACTERIA UR CULT: ABNORMAL
BASOPHILS # BLD AUTO: 0.01 K/UL (ref 0–0.2)
BASOPHILS NFR BLD: 0.2 % (ref 0–1.9)
BUN SERPL-MCNC: 21 MG/DL (ref 8–23)
CALCIUM SERPL-MCNC: 8.8 MG/DL (ref 8.7–10.5)
CHLORIDE SERPL-SCNC: 105 MMOL/L (ref 95–110)
CO2 SERPL-SCNC: 24 MMOL/L (ref 23–29)
CREAT SERPL-MCNC: 0.8 MG/DL (ref 0.5–1.4)
DIFFERENTIAL METHOD: ABNORMAL
EOSINOPHIL # BLD AUTO: 0 K/UL (ref 0–0.5)
EOSINOPHIL NFR BLD: 0 % (ref 0–8)
ERYTHROCYTE [DISTWIDTH] IN BLOOD BY AUTOMATED COUNT: 17.2 % (ref 11.5–14.5)
ERYTHROCYTE [DISTWIDTH] IN BLOOD BY AUTOMATED COUNT: 17.3 % (ref 11.5–14.5)
EST. GFR  (NO RACE VARIABLE): >60 ML/MIN/1.73 M^2
GLUCOSE SERPL-MCNC: 105 MG/DL (ref 70–110)
HCT VFR BLD AUTO: 29.6 % (ref 37–48.5)
HCT VFR BLD AUTO: 32.2 % (ref 37–48.5)
HGB BLD-MCNC: 10.3 G/DL (ref 12–16)
HGB BLD-MCNC: 9.3 G/DL (ref 12–16)
IMM GRANULOCYTES # BLD AUTO: 0.27 K/UL (ref 0–0.04)
IMM GRANULOCYTES NFR BLD AUTO: 4.9 % (ref 0–0.5)
LYMPHOCYTES # BLD AUTO: 1.6 K/UL (ref 1–4.8)
LYMPHOCYTES NFR BLD: 29.6 % (ref 18–48)
MCH RBC QN AUTO: 28.3 PG (ref 27–31)
MCH RBC QN AUTO: 28.7 PG (ref 27–31)
MCHC RBC AUTO-ENTMCNC: 31.4 G/DL (ref 32–36)
MCHC RBC AUTO-ENTMCNC: 32 G/DL (ref 32–36)
MCV RBC AUTO: 90 FL (ref 82–98)
MCV RBC AUTO: 90 FL (ref 82–98)
MONOCYTES # BLD AUTO: 0.2 K/UL (ref 0.3–1)
MONOCYTES NFR BLD: 3.5 % (ref 4–15)
NEUTROPHILS # BLD AUTO: 3.4 K/UL (ref 1.8–7.7)
NEUTROPHILS NFR BLD: 61.8 % (ref 38–73)
NRBC BLD-RTO: 1 /100 WBC
PATH REV BLD -IMP: NORMAL
PATH REV BLD -IMP: NORMAL
PLATELET # BLD AUTO: 297 K/UL (ref 150–450)
PLATELET # BLD AUTO: 338 K/UL (ref 150–450)
PLATELET BLD QL SMEAR: ABNORMAL
PMV BLD AUTO: 8.5 FL (ref 9.2–12.9)
PMV BLD AUTO: 8.8 FL (ref 9.2–12.9)
POLYCHROMASIA BLD QL SMEAR: ABNORMAL
POTASSIUM SERPL-SCNC: 3.9 MMOL/L (ref 3.5–5.1)
RBC # BLD AUTO: 3.29 M/UL (ref 4–5.4)
RBC # BLD AUTO: 3.59 M/UL (ref 4–5.4)
SODIUM SERPL-SCNC: 140 MMOL/L (ref 136–145)
WBC # BLD AUTO: 4.18 K/UL (ref 3.9–12.7)
WBC # BLD AUTO: 5.47 K/UL (ref 3.9–12.7)

## 2023-05-26 PROCEDURE — 80048 BASIC METABOLIC PNL TOTAL CA: CPT

## 2023-05-26 PROCEDURE — 25000003 PHARM REV CODE 250

## 2023-05-26 PROCEDURE — 85025 COMPLETE CBC W/AUTO DIFF WBC: CPT | Performed by: INTERNAL MEDICINE

## 2023-05-26 PROCEDURE — 85027 COMPLETE CBC AUTOMATED: CPT

## 2023-05-26 PROCEDURE — 36415 COLL VENOUS BLD VENIPUNCTURE: CPT

## 2023-05-26 PROCEDURE — 63600175 PHARM REV CODE 636 W HCPCS: Performed by: NURSE PRACTITIONER

## 2023-05-26 PROCEDURE — 63600175 PHARM REV CODE 636 W HCPCS: Performed by: INTERNAL MEDICINE

## 2023-05-26 PROCEDURE — 36415 COLL VENOUS BLD VENIPUNCTURE: CPT | Performed by: INTERNAL MEDICINE

## 2023-05-26 PROCEDURE — 25000003 PHARM REV CODE 250: Performed by: INTERNAL MEDICINE

## 2023-05-26 PROCEDURE — 99900035 HC TECH TIME PER 15 MIN (STAT)

## 2023-05-26 PROCEDURE — 85060 PATHOLOGIST REVIEW: ICD-10-PCS | Mod: ,,, | Performed by: PATHOLOGY

## 2023-05-26 PROCEDURE — 27000221 HC OXYGEN, UP TO 24 HOURS

## 2023-05-26 PROCEDURE — 85060 BLOOD SMEAR INTERPRETATION: CPT | Mod: ,,, | Performed by: PATHOLOGY

## 2023-05-26 PROCEDURE — 94640 AIRWAY INHALATION TREATMENT: CPT

## 2023-05-26 PROCEDURE — 25000003 PHARM REV CODE 250: Performed by: NURSE PRACTITIONER

## 2023-05-26 PROCEDURE — 94761 N-INVAS EAR/PLS OXIMETRY MLT: CPT

## 2023-05-26 RX ORDER — ASCORBIC ACID 250 MG
250 TABLET ORAL DAILY
Qty: 30 TABLET | Refills: 2 | Status: SHIPPED | OUTPATIENT
Start: 2023-05-27 | End: 2023-09-08

## 2023-05-26 RX ORDER — L. ACIDOPHILUS/L.BULGARICUS 100MM CELL
1 GRANULES IN PACKET (EA) ORAL 2 TIMES DAILY
Qty: 60 PACKET | Refills: 0 | Status: SHIPPED | OUTPATIENT
Start: 2023-05-26 | End: 2023-06-25

## 2023-05-26 RX ADMIN — LEVOTHYROXINE SODIUM 50 MCG: 25 TABLET ORAL at 05:05

## 2023-05-26 RX ADMIN — FLUTICASONE FUROATE AND VILANTEROL TRIFENATATE 1 PUFF: 100; 25 POWDER RESPIRATORY (INHALATION) at 07:05

## 2023-05-26 RX ADMIN — OXYBUTYNIN CHLORIDE 15 MG: 5 TABLET, EXTENDED RELEASE ORAL at 10:05

## 2023-05-26 RX ADMIN — DOCUSATE SODIUM AND SENNOSIDES 1 TABLET: 8.6; 5 TABLET, FILM COATED ORAL at 10:05

## 2023-05-26 RX ADMIN — Medication 250 MG: at 10:05

## 2023-05-26 RX ADMIN — IRON SUCROSE 200 MG: 20 INJECTION, SOLUTION INTRAVENOUS at 12:05

## 2023-05-26 RX ADMIN — ATORVASTATIN CALCIUM 20 MG: 20 TABLET, FILM COATED ORAL at 10:05

## 2023-05-26 RX ADMIN — Medication 1 TABLET: at 10:05

## 2023-05-26 RX ADMIN — AZELASTINE HYDROCHLORIDE 137 MCG: 137 SPRAY, METERED NASAL at 10:05

## 2023-05-26 RX ADMIN — PANTOPRAZOLE SODIUM 40 MG: 40 TABLET, DELAYED RELEASE ORAL at 10:05

## 2023-05-26 RX ADMIN — CARVEDILOL 6.25 MG: 6.25 TABLET, FILM COATED ORAL at 10:05

## 2023-05-26 RX ADMIN — PREDNISONE 2 MG: 1 TABLET ORAL at 10:05

## 2023-05-26 RX ADMIN — LACTOBACILLUS ACIDOPHILUS / LACTOBACILLUS BULGARICUS 1 EACH: 100 MILLION CFU STRENGTH GRANULES at 10:05

## 2023-05-26 NOTE — PROCEDURES
"Instructions for measuring Oxygen Saturation  to qualify for Home Oxygen:     Please obtain and document (REPLACE # SIGNS AND COPY AND PASTE TEXT INSIDE QUOTATION MARKS) the following for Home Oxygen:     This must be performed and documented within 2 days of discharge.     "Pulse Oximetry:   % SpO2 on room air at rest on                                 If 88% or less, STOP and document.     If 89% or more, measure and  document the following:     "Pulse Oximetry:   SpO2 on room air at rest on                               SpO2 on room air with activity/exercise on                            SpO2 on  oxgyen with activity/excercise on      (NOTE:  FOR OXYGEN WITH ACTIVITY - MEDICARE WANTS TO SEE THAT THE OXYGEN INCREASES ONCE A PATIENT HAS WALKED AND IS BACK ON THE OXYGEN)   Patient SpO2 on oxygen at 2 lpm nasal cannula at rest 98%  Patient SpO2 on room air at rest 95%  Patient SpO2 on room air during exercise decreased to 90%, Hr 98 RR 20  Patient SpO2 During exercise on Oxygen at 2 lpm 94% Hr 95  "

## 2023-05-26 NOTE — PLAN OF CARE
05/26/23 1639   Final Note   Assessment Type Final Discharge Note   Anticipated Discharge Disposition Home-Health   Post-Acute Status   Post-Acute Authorization Home Health   Home Health Status Set-up Complete/Auth obtained   Discharge Delays None known at this time     I certify I provided patient choice and a list to the patient/family of CMS rated Home Health, SNF, IRF, LTACH, intermediate Nursing Homes .  Patient/Family signed Patient's Choice Disclosure Form choosing the following  [facility].    Pt's daughter is picking her up.

## 2023-05-26 NOTE — PLAN OF CARE
Problem: Occupational Therapy  Goal: Occupational Therapy Goal  Description: Evaluation complete and pt not needing further skilled OT services.  Recommend discharge to home with assist of daughter.  OT to sign off.   Outcome: Met

## 2023-05-26 NOTE — PT/OT/SLP EVAL
Occupational Therapy   Evaluation and Discharge    Name: Niurka Pedraza  MRN: [id number]  Admitting Diagnosis: Shortness of breath  Recent Surgery: * No surgery found *      Recommendations:     Discharge Recommendations: pulmonary rehab, home health PT, home health OT  Discharge Equipment Recommendations:   (Wheelchair for community distances, SPC, Home O2)  Barriers to discharge:   (Per MD)    Assessment:     Niurka Pedraza is a 78 y.o. female with a medical diagnosis of Shortness of breath.  She presents at Supervision level with ADL and ADL mobility without AD. Performance deficits affecting function: impaired endurance, impaired balance, impaired functional mobility, gait instability, impaired cardiopulmonary response to activity, impaired sensation.      Rehab Prognosis: Good; patient would benefit from acute skilled OT services to address these deficits and reach maximum level of function.       Plan:   No further skilled OT needs in acute care setting.  Plan of Care Expires: 05/25/23  Plan of Care Reviewed with: patient, daughter    Subjective     Chief Complaint: None  Patient/Family Comments/goals: To return home.     Occupational Profile:  Living Environment: Lives in ground floor apartment of 2 story home; daughter lives in upstairs apartment, tub/shower  Previous level of function: Mod I, gets SOB with increased activity/long distances  Roles and Routines: Mother  Equipment Used at Home: wheelchair  Assistance upon Discharge: Daughters    Pain/Comfort:   None    Patients cultural, spiritual, Temple conflicts given the current situation: no    Objective:     Communicated with: nurse prior to session.  Patient found HOB elevated with peripheral IV, oxygen, telemetry upon OT entry to room.    General Precautions: Standard, respiratory  Orthopedic Precautions: N/A  Braces: N/A  Respiratory Status: Nasal cannula, flow 2 L/min    Occupational Performance:    Bed Mobility:    Supine to sit: Indep  Sit to  supine: Indep    Functional Mobility/Transfers:  Sit to stand: Mod I, use of hands to push up and use of grab bar in bathroom  Transfers: Supervision level for safety  Functional Mobility: No LOB, OT managing IV pole for safety    Activities of Daily Living:  Dressing: Supervision for clothing retrieval  Toileting: Supervision; set up BSC next to bed in case of urgency and not able to wait for nursing to come to room, pt educated on safety      Cognitive/Visual Perceptual:  Intact; no deficits noted    Physical Exam:  UE Function: No deficits bilaterally, WFL for self care tasks  Sitting Balance: Normal/Good  Standing Balance: Good/Good  Sensation: Neuropathy  Skin: Visible skin intact  Edema: None noted    AMPAC 6 Click ADL:  AMPA Total Score:  19    Treatment & Education:  Role of OT, safety, use of call button and BSC    Patient left HOB elevated with all lines intact, call button in reach, nurse notified, and daughter present    GOALS:   Multidisciplinary Problems       Occupational Therapy Goals       Not on file              Multidisciplinary Problems (Resolved)          Problem: Occupational Therapy    Goal Priority Disciplines Outcome Interventions   Occupational Therapy Goal   (Resolved)     OT, PT/OT Met    Description: Evaluation complete and pt not needing further skilled OT services.  Recommend discharge to home with assist of daughter.  OT to sign off.                        History:     Past Medical History:   Diagnosis Date    Arthritis     Borderline serous cystadenoma of right ovary 4/3/2018    Breast cancer     mastectomy 2014    Coccidiomycosis, progressive     Elevated CA-125 2/25/2018    Hyperlipidemia     OAB (overactive bladder)     Osteopenia     on Dexa 11/2017    Osteoporosis     pt reports hx of this, declined fosamax in past - treated with calcium and vit D    Pelvic mass 2/25/2018    Pulmonary fibrosis     Pulmonary nodules     SOB (shortness of breath) on exertion     Thyroid disease      hypo    UTI (urinary tract infection)          Past Surgical History:   Procedure Laterality Date    CHOLECYSTECTOMY      COLONOSCOPY N/A 12/9/2022    Procedure: COLONOSCOPY;  Surgeon: Enrique Dominguez MD;  Location: [facility] (4TH FLR);  Service: Endoscopy;  Laterality: N/A;  inst via portal  pt requests after 12/9/22-MS  11/30-pt notified of earlier arrival time-KPvt    CYSTOSCOPY WITH BIOPSY OF BLADDER Bilateral 7/27/2022    Procedure: CYSTOSCOPY, WITH BLADDER BIOPSY; retrograde pyelogram,;  Surgeon: Anaya Oropeza MD;  Location: [facility];  Service: Urology;  Laterality: Bilateral;    ENDOSCOPIC ULTRASOUND OF UPPER GASTROINTESTINAL TRACT N/A 4/8/2021    Procedure: ULTRASOUND, UPPER GI TRACT, ENDOSCOPIC;  Surgeon: Aisha Barajas MD;  Location: [facility] (2ND FLR);  Service: Endoscopy;  Laterality: N/A;  UEUS/ERCP evaluation abn MRCP - Dr Angelika Zamudio-19 test 4/5/21 at [facility] Pre-admit - pg    ERCP N/A 4/8/2021    Procedure: ERCP (ENDOSCOPIC RETROGRADE CHOLANGIOPANCREATOGRAPHY);  Surgeon: Aisha Barajas MD;  Location: [facility] (2ND FLR);  Service: Endoscopy;  Laterality: N/A;  UEUS/ERCP evaluation abn MRCP - Dr Angelika Steinerid-19 test 4/5/21 at [facility] Pre-admit - pg    ESOPHAGOGASTRODUODENOSCOPY N/A 4/8/2021    Procedure: EGD (ESOPHAGOGASTRODUODENOSCOPY);  Surgeon: Aisha Barajas MD;  Location: [facility] (2ND FLR);  Service: Endoscopy;  Laterality: N/A;  UEUS/ERCP evaluation abn MRCP - Dr Angelika Steinerid-19 test 4/5/21 at [facility] Pre-admit - pg    HYSTERECTOMY      MASTECTOMY Right     2014       Time Tracking:     OT Date of Treatment: 05/25/23  OT Start Time: 1725  OT Stop Time: 1743  OT Total Time (min): 18 min    Billable Minutes:Evaluation 18    5/26/2023

## 2023-05-26 NOTE — PLAN OF CARE
Problem: Adult Inpatient Plan of Care  Goal: Plan of Care Review  5/26/2023 0403 by Zee Gallagher RN  Outcome: Ongoing, Progressing  5/26/2023 0403 by Zee Gallagher RN  Outcome: Ongoing, Progressing     Problem: Infection  Goal: Absence of Infection Signs and Symptoms  Outcome: Ongoing, Progressing     POC reviewed with patient and daughter who was at bedside at beginning of shift.  A/O x4.  Respirations unlabored.  O2 continues at 2L/NC.  Skin w/d.  Continent of b/b.  Up to restroom without difficulty.  ABX Tx in progress with no adverse effects noted.  Tolerates meds whole with water without difficulty.  VSS.  See flowsheet for full assessment.  Able to verbalize wants/needs.  No c/o pain or discomfort at this time.  Fall/safety precautions maintained.

## 2023-05-26 NOTE — NURSING
Pt and daughter given verbal and written discharge instructions including new prescriptions and follow-up appointments. Pt denies current pain or discomfort. No apparent barriers to discharge noted.

## 2023-05-26 NOTE — PLAN OF CARE
[facility] ([place])      HOME HEALTH ORDERS  FACE TO FACE ENCOUNTER    Patient Name: Niurka Pedraza  YOB: 1945    PCP: Savana Anderson MD   [facility]P [facility] [address] [facility] [address]  PCP Phone Number: [phone]  PCP Fax: [phone]    Encounter Date: 5/23/23    Admit to Home Health    Diagnoses:  Active Hospital Problems    Diagnosis  POA    *Shortness of breath [R06.02]  Yes    Neuropathy of both feet [G57.93]  Yes     Chronic    History of ESBL E. coli infection [Z86.19]  Yes    Acute cystitis with hematuria [N30.01]  Yes    Symptomatic anemia [D64.9]  Yes    Suspected sleep apnea [R29.818]  Yes    Immunosuppression due to chronic steroid use [D84.821, T38.0X5A, Z79.52]  Not Applicable    Pulmonary fibrosis [J84.10]  Yes    Interstitial lung disease [J84.9]  Yes     Post infectious from coccidiomycosis        Chronic cough [R05.3]  Yes    Essential hypertension [I10]  Yes    Hypothyroidism [E03.9]  Yes    Hyperlipidemia [E78.5]  Yes    Recurrent UTI [N39.0]  Yes      Resolved Hospital Problems   No resolved problems to display.       Follow Up Appointments:  Future Appointments   Date Time Provider Department Center   6/13/2023 11:30 AM Nemesio Cazares MD [facility] NLPUL [facility]   6/14/2023  3:00 PM Yrn Irwin NP [facility]   6/19/2023  2:20 PM Elida Jackson NP [facility]   8/18/2023 10:30 AM Savana Anderson MD [facility]   11/8/2023 10:30 AM LAB, [facility] LABDRAW [facility]   11/8/2023 11:30 AM Talita Barrios MD [facility] GYN ONC [facility]       Allergies:  Review of patient's allergies indicates:   Allergen Reactions    Ciprofloxacin Other (See Comments)     Right foot pain and edema, tendonitis    Amlodipine Edema and Swelling     BLE  BLE    Lisinopril Other (See Comments)     cough    Nitrofuran analogues        Medications: Review discharge medications with patient and family and provide education.    Current  Facility-Administered Medications   Medication Dose Route Frequency Provider Last Rate Last Admin    0.9%  NaCl infusion (for blood administration)   Intravenous Q24H PRN Jackelyn Cowan PA-C        0.9%  NaCl infusion (for blood administration)   Intravenous Q24H PRN Salvador Gilmore MD        0.9%  NaCl infusion   Intravenous PRN Salvador Gilmore  mL/hr at 05/25/23 1843 Rate Verify at 05/25/23 1843    acetaminophen tablet 1,000 mg  1,000 mg Oral Q8H PRN Valerie De La Rosa NP        albuterol-ipratropium 2.5 mg-0.5 mg/3 mL nebulizer solution 3 mL  3 mL Nebulization Q4H PRN Warren Luo NP        aluminum-magnesium hydroxide-simethicone 200-200-20 mg/5 mL suspension 30 mL  30 mL Oral QID PRN Valerie De La Rosa NP        ascorbic acid (vitamin C) tablet 250 mg  250 mg Oral Daily Salvador Gilmore MD   250 mg at 05/26/23 1000    atorvastatin tablet 20 mg  20 mg Oral Daily Warren Luo NP   20 mg at 05/26/23 1000    azelastine 137 mcg (0.1 %) nasal spray 137 mcg  1 spray Nasal BID Warren Luo NP   137 mcg at 05/26/23 1000    bisacodyL suppository 10 mg  10 mg Rectal Daily PRN Valerie De La Rosa NP        calcium-vitamin D3 500 mg-5 mcg (200 unit) per tablet 1 tablet  1 tablet Oral BID  Warren Luo, NP   1 tablet at 05/26/23 1005    carvediloL tablet 6.25 mg  6.25 mg Oral BID  Warren Luo, NP   6.25 mg at 05/26/23 1005    diphenhydrAMINE injection 25 mg  25 mg Intravenous Nightly PRN Warren Luo NP        fluticasone furoate-vilanteroL 100-25 mcg/dose diskus inhaler 1 puff  1 puff Inhalation Daily Warren Luo NP   1 puff at 05/26/23 0730    gabapentin capsule 100 mg  100 mg Oral QHS Warren Luo, NP   100 mg at 05/25/23 2036    irbesartan tablet 300 mg  300 mg Oral QHS Warren Luo, NP   300 mg at 05/25/23 2035    lactobacillus acidophilus & bulgar 100 million cell packet 1 each  1 packet Oral BID Salvador Gilmore MD   1 each at 05/26/23 1000    levothyroxine tablet 50 mcg  50 mcg Oral Before  breakfast Luannen E. Uphold, NP   50 mcg at 05/26/23 0520    melatonin tablet 6 mg  6 mg Oral Nightly PRN Luannen E. Uphold, NP        montelukast tablet 10 mg  10 mg Oral QHS Cassen E. Uphold, NP   10 mg at 05/25/23 2036    ondansetron injection 4 mg  4 mg Intravenous Q8H PRN Warren E. Uphold, NP        oxybutynin 24 hr tablet 15 mg  15 mg Oral Daily Cassen E. Uphold, NP   15 mg at 05/26/23 1000    pantoprazole EC tablet 40 mg  40 mg Oral Daily Cassen E. Uphold, NP   40 mg at 05/26/23 1000    predniSONE tablet 2 mg  2 mg Oral Daily Cassen E. Uphold, NP   2 mg at 05/26/23 1000    promethazine (PHENERGAN) 12.5 mg in dextrose 5 % (D5W) 50 mL IVPB  12.5 mg Intravenous Q6H PRN Warren E. Uphold, NP        senna-docusate 8.6-50 mg per tablet 1 tablet  1 tablet Oral BID Angelauzhen Rj, NP   1 tablet at 05/26/23 1000    simethicone chewable tablet 80 mg  1 tablet Oral QID PRN Valerie Rj, NP        sodium chloride 0.9% flush 10 mL  10 mL Intravenous PRN Warren E. Uphold, NP        sodium chloride 0.9% flush 10 mL  10 mL Intravenous Q12H PRN Dallinhen Rj, NP        traMADoL tablet 50 mg  50 mg Oral Q6H PRN Valerie Rj, NP         Current Discharge Medication List        CONTINUE these medications which have NOT CHANGED    Details   albuterol (VENTOLIN HFA) 90 mcg/actuation inhaler Inhale 1-2 puffs into the lungs every 6 (six) hours as needed for Wheezing or Shortness of Breath. Rescue  Qty: 18 g, Refills: 11    Associated Diagnoses: ILD (interstitial lung disease)      atorvastatin (LIPITOR) 20 MG tablet TAKE 1 TABLET BY MOUTH EVERY DAY  Qty: 90 tablet, Refills: 2      azithromycin (Z-SEB) 250 MG tablet Take 1 tablet (250 mg total) by mouth once daily.  Qty: 30 tablet, Refills: 11      calcium-vitamin D3 (CALCIUM 500 + D) 500 mg(1,250mg) -200 unit per tablet Take 1 tablet by mouth 2 (two) times daily with meals.      carvediloL (COREG) 6.25 MG tablet TAKE 1 TABLET BY MOUTH TWICE A DAY WITH FOOD  Qty: 180 tablet, Refills: 3     Associated Diagnoses: Hypertension, benign      estradioL (ESTRACE) 0.01 % (0.1 mg/gram) vaginal cream Place 1 g vaginally twice a week.  Qty: 42.5 g, Refills: 11      !! fluticasone furoate-vilanteroL (BREO ELLIPTA) 100-25 mcg/dose diskus inhaler Inhale 1 puff into the lungs once daily. Controller.  PLEASE NOTE IT IS ONCE A DAY!!  Qty: 60 each, Refills: 4      !! fluticasone furoate-vilanteroL (BREO ELLIPTA) 100-25 mcg/dose diskus inhaler Inhale 1 puff into the lungs once daily. Controller  Qty: 1 each, Refills: 11      gabapentin (NEURONTIN) 100 MG capsule Take 1 capsule (100 mg total) by mouth every evening.  Qty: 30 capsule, Refills: 3    Associated Diagnoses: Peripheral polyneuropathy      irbesartan (AVAPRO) 300 MG tablet Take 1 tablet (300 mg total) by mouth every evening.  Qty: 90 tablet, Refills: 3    Comments: .  Associated Diagnoses: Hypertension, benign      levothyroxine (SYNTHROID) 50 MCG tablet Take 1 tablet (50 mcg total) by mouth before breakfast.  Qty: 90 tablet, Refills: 1    Associated Diagnoses: Hypothyroidism, unspecified type      montelukast (SINGULAIR) 10 mg tablet TAKE 1 TABLET BY MOUTH EVERY DAY IN THE EVENING  Qty: 90 tablet, Refills: 2      multivitamin (THERAGRAN) per tablet Take 1 tablet by mouth once daily.      oxybutynin (DITROPAN XL) 15 MG TR24 Take 1 tablet (15 mg total) by mouth once daily.  Qty: 30 tablet, Refills: 11      pantoprazole (PROTONIX) 20 MG tablet Take 1 tablet (20 mg total) by mouth once daily.  Qty: 30 tablet, Refills: 2    Associated Diagnoses: Cough, unspecified type      predniSONE (DELTASONE) 1 MG tablet Take 2 tablets (2 mg total) by mouth once daily.  Qty: 180 tablet, Refills: 3       !! - Potential duplicate medications found. Please discuss with provider.        STOP taking these medications       azelastine (ASTELIN) 137 mcg (0.1 %) nasal spray Comments:   Reason for Stopping:                 I have seen and examined this patient within the last 30 days.  My clinical findings that support the need for the home health skilled services and home bound status are the following:no   Weakness/numbness causing balance and gait disturbance due to Infection and Weakness/Debility making it taxing to leave home.     Diet:   cardiac diet    Labs:  SN to perform labs:  CBC: Weekly; 1 week(s) and Report Lab results to PCP.    Referrals/ Consults  Physical Therapy to evaluate and treat. Evaluate for home safety and equipment needs; Establish/upgrade home exercise program. Perform / instruct on therapeutic exercises, gait training, transfer training, and Range of Motion.  Occupational Therapy to evaluate and treat. Evaluate home environment for safety and equipment needs. Perform/Instruct on transfers, ADL training, ROM, and therapeutic exercises.    Activities:   activity as tolerated    Nursing:   Agency to admit patient within 24 hours of hospital discharge unless specified on physician order or at patient request    SN to complete comprehensive assessment including routine vital signs. Instruct on disease process and s/s of complications to report to MD. Review/verify medication list sent home with the patient at time of discharge  and instruct patient/caregiver as needed. Frequency may be adjusted depending on start of care date.     Skilled nurse to perform up to 3 visits PRN for symptoms related to diagnosis    Notify MD if SBP > 160 or < 90; DBP > 90 or < 50; HR > 120 or < 50; Temp > 101; O2 < 88%    Ok to schedule additional visits based on staff availability and patient request on consecutive days within the home health episode.    When multiple disciplines ordered:    Start of Care occurs on Sunday - Wednesday schedule remaining discipline evaluations as ordered on separate consecutive days following the start of care.    Thursday SOC -schedule subsequent evaluations Friday and Monday the following week.     Friday - Saturday SOC - schedule subsequent discipline  evaluations on consecutive days starting Monday of the following week.    For all post-discharge communication and subsequent orders please contact patient's primary care physician. If unable to reach primary care physician or do not receive response within 30 minutes, please contact [facility] for clinical staff order clarification    Home Health Aide:  Physical Therapy Three times weekly and Occupational Therapy Three times weekly    Wound Care Orders  no    I certify that this patient is confined to her home and needs physical therapy and occupational therapy.

## 2023-05-26 NOTE — PLAN OF CARE
Problem: Adult Inpatient Plan of Care  Goal: Plan of Care Review  Outcome: Met  Goal: Patient-Specific Goal (Individualized)  Outcome: Met  Goal: Absence of Hospital-Acquired Illness or Injury  Outcome: Met  Goal: Optimal Comfort and Wellbeing  Outcome: Met  Goal: Readiness for Transition of Care  Outcome: Met     Problem: Infection  Goal: Absence of Infection Signs and Symptoms  Outcome: Met     Adequate for Discharge

## 2023-05-27 ENCOUNTER — NURSE TRIAGE (OUTPATIENT)
Dept: ADMINISTRATIVE | Facility: CLINIC | Age: 78
End: 2023-05-27
Payer: MEDICARE

## 2023-05-27 ENCOUNTER — HOSPITAL ENCOUNTER (EMERGENCY)
Facility: HOSPITAL | Age: 78
Discharge: HOME | End: 2023-05-27
Attending: STUDENT IN AN ORGANIZED HEALTH CARE EDUCATION/TRAINING PROGRAM
Payer: MEDICARE

## 2023-05-27 VITALS
BODY MASS INDEX: 28.17 KG/M2 | WEIGHT: 165 LBS | HEIGHT: 64 IN | SYSTOLIC BLOOD PRESSURE: 152 MMHG | DIASTOLIC BLOOD PRESSURE: 82 MMHG | OXYGEN SATURATION: 97 % | TEMPERATURE: 98 F | HEART RATE: 76 BPM | RESPIRATION RATE: 18 BRPM

## 2023-05-27 DIAGNOSIS — N39.0 URINARY TRACT INFECTION WITHOUT HEMATURIA, SITE UNSPECIFIED: Primary | ICD-10-CM

## 2023-05-27 LAB
ANION GAP SERPL CALC-SCNC: 11 MMOL/L (ref 8–16)
ANISOCYTOSIS BLD QL SMEAR: SLIGHT
BACTERIA #/AREA URNS AUTO: ABNORMAL /HPF
BASOPHILS # BLD AUTO: 0.03 K/UL (ref 0–0.2)
BASOPHILS NFR BLD: 0.6 % (ref 0–1.9)
BILIRUB UR QL STRIP: NEGATIVE
BUN SERPL-MCNC: 13 MG/DL (ref 8–23)
CALCIUM SERPL-MCNC: 8.9 MG/DL (ref 8.7–10.5)
CHLORIDE SERPL-SCNC: 104 MMOL/L (ref 95–110)
CLARITY UR REFRACT.AUTO: ABNORMAL
CO2 SERPL-SCNC: 22 MMOL/L (ref 23–29)
COLOR UR AUTO: COLORLESS
CREAT SERPL-MCNC: 0.8 MG/DL (ref 0.5–1.4)
DIFFERENTIAL METHOD: ABNORMAL
EOSINOPHIL # BLD AUTO: 0 K/UL (ref 0–0.5)
EOSINOPHIL NFR BLD: 0 % (ref 0–8)
ERYTHROCYTE [DISTWIDTH] IN BLOOD BY AUTOMATED COUNT: 16.8 % (ref 11.5–14.5)
EST. GFR  (NO RACE VARIABLE): >60 ML/MIN/1.73 M^2
GLUCOSE SERPL-MCNC: 106 MG/DL (ref 70–110)
GLUCOSE UR QL STRIP: NEGATIVE
HCT VFR BLD AUTO: 30.8 % (ref 37–48.5)
HGB BLD-MCNC: 9.6 G/DL (ref 12–16)
HGB UR QL STRIP: NEGATIVE
HIV 1+2 AB+HIV1 P24 AG SERPL QL IA: NORMAL
HYPOCHROMIA BLD QL SMEAR: ABNORMAL
IMM GRANULOCYTES # BLD AUTO: 0.23 K/UL (ref 0–0.04)
IMM GRANULOCYTES NFR BLD AUTO: 4.8 % (ref 0–0.5)
KETONES UR QL STRIP: NEGATIVE
LEUKOCYTE ESTERASE UR QL STRIP: ABNORMAL
LYMPHOCYTES # BLD AUTO: 1.4 K/UL (ref 1–4.8)
LYMPHOCYTES NFR BLD: 29.6 % (ref 18–48)
MCH RBC QN AUTO: 27.7 PG (ref 27–31)
MCHC RBC AUTO-ENTMCNC: 31.2 G/DL (ref 32–36)
MCV RBC AUTO: 89 FL (ref 82–98)
MICROSCOPIC COMMENT: ABNORMAL
MONOCYTES # BLD AUTO: 0.1 K/UL (ref 0.3–1)
MONOCYTES NFR BLD: 1.5 % (ref 4–15)
NEUTROPHILS # BLD AUTO: 3 K/UL (ref 1.8–7.7)
NEUTROPHILS NFR BLD: 63.5 % (ref 38–73)
NITRITE UR QL STRIP: NEGATIVE
NRBC BLD-RTO: 0 /100 WBC
OVALOCYTES BLD QL SMEAR: ABNORMAL
PH UR STRIP: 7 [PH] (ref 5–8)
PLATELET # BLD AUTO: 314 K/UL (ref 150–450)
PMV BLD AUTO: 9.1 FL (ref 9.2–12.9)
POIKILOCYTOSIS BLD QL SMEAR: SLIGHT
POLYCHROMASIA BLD QL SMEAR: ABNORMAL
POTASSIUM SERPL-SCNC: 4.1 MMOL/L (ref 3.5–5.1)
PROT UR QL STRIP: NEGATIVE
RBC # BLD AUTO: 3.46 M/UL (ref 4–5.4)
RBC #/AREA URNS AUTO: 36 /HPF (ref 0–4)
SODIUM SERPL-SCNC: 137 MMOL/L (ref 136–145)
SP GR UR STRIP: 1.01 (ref 1–1.03)
SPHEROCYTES BLD QL SMEAR: ABNORMAL
SQUAMOUS #/AREA URNS AUTO: 10 /HPF
URN SPEC COLLECT METH UR: ABNORMAL
WBC # BLD AUTO: 4.77 K/UL (ref 3.9–12.7)
WBC #/AREA URNS AUTO: 27 /HPF (ref 0–5)
YEAST UR QL AUTO: ABNORMAL

## 2023-05-27 PROCEDURE — 96374 THER/PROPH/DIAG INJ IV PUSH: CPT

## 2023-05-27 PROCEDURE — 63600175 PHARM REV CODE 636 W HCPCS: Performed by: PHYSICIAN ASSISTANT

## 2023-05-27 PROCEDURE — 99284 PR EMERGENCY DEPT VISIT,LEVEL IV: ICD-10-PCS | Mod: ,,, | Performed by: STUDENT IN AN ORGANIZED HEALTH CARE EDUCATION/TRAINING PROGRAM

## 2023-05-27 PROCEDURE — 99284 EMERGENCY DEPT VISIT MOD MDM: CPT | Mod: 25

## 2023-05-27 PROCEDURE — 25000003 PHARM REV CODE 250: Performed by: PHYSICIAN ASSISTANT

## 2023-05-27 PROCEDURE — 87106 FUNGI IDENTIFICATION YEAST: CPT | Performed by: PHYSICIAN ASSISTANT

## 2023-05-27 PROCEDURE — 87389 HIV-1 AG W/HIV-1&-2 AB AG IA: CPT | Performed by: PHYSICIAN ASSISTANT

## 2023-05-27 PROCEDURE — 99284 EMERGENCY DEPT VISIT MOD MDM: CPT | Mod: ,,, | Performed by: STUDENT IN AN ORGANIZED HEALTH CARE EDUCATION/TRAINING PROGRAM

## 2023-05-27 PROCEDURE — 87088 URINE BACTERIA CULTURE: CPT | Performed by: PHYSICIAN ASSISTANT

## 2023-05-27 PROCEDURE — 87086 URINE CULTURE/COLONY COUNT: CPT | Performed by: PHYSICIAN ASSISTANT

## 2023-05-27 PROCEDURE — 80048 BASIC METABOLIC PNL TOTAL CA: CPT | Performed by: PHYSICIAN ASSISTANT

## 2023-05-27 PROCEDURE — 81001 URINALYSIS AUTO W/SCOPE: CPT | Performed by: PHYSICIAN ASSISTANT

## 2023-05-27 PROCEDURE — 85025 COMPLETE CBC W/AUTO DIFF WBC: CPT | Performed by: PHYSICIAN ASSISTANT

## 2023-05-27 RX ORDER — SULFAMETHOXAZOLE AND TRIMETHOPRIM 800; 160 MG/1; MG/1
1 TABLET ORAL 2 TIMES DAILY
Qty: 14 TABLET | Refills: 0 | Status: SHIPPED | OUTPATIENT
Start: 2023-05-27 | End: 2023-06-03

## 2023-05-27 RX ADMIN — ERTAPENEM 1 G: 1 INJECTION INTRAMUSCULAR; INTRAVENOUS at 03:05

## 2023-05-27 NOTE — ED PROVIDER NOTES
Encounter Date: 5/27/2023       History     Chief Complaint   Patient presents with    antibiotics     Dc'd last night from [facility], told to come here for daily iv antibiotics for uti     78-year-old female with pmhx pulmonary fibrosis, breast cancer, recurrent urinary tract infections presents to ED with dysuria starting last night. She has associated urgency, frequency, suprapubic pressure. She was discharged from [facility] yesterday. She received 3 doses of IV ertapenem for UTI as recommended by ID. Her symptoms improved while inpatient. She was not discharged home on oral antibiotics. She has a significant history of ESBL UTI. She denies fever, hematuria, flank pain, nausea, vomiting.       Review of patient's allergies indicates:   Allergen Reactions    Ciprofloxacin Other (See Comments)     Right foot pain and edema, tendonitis    Amlodipine Edema and Swelling     BLE  BLE    Lisinopril Other (See Comments)     cough    Nitrofuran analogues      Past Medical History:   Diagnosis Date    Arthritis     Borderline serous cystadenoma of right ovary 4/3/2018    Breast cancer     mastectomy 2014    Coccidiomycosis, progressive     Elevated CA-125 2/25/2018    Hyperlipidemia     OAB (overactive bladder)     Osteopenia     on Dexa 11/2017    Osteoporosis     pt reports hx of this, declined fosamax in past - treated with calcium and vit D    Pelvic mass 2/25/2018    Pulmonary fibrosis     Pulmonary nodules     SOB (shortness of breath) on exertion     Thyroid disease     hypo    UTI (urinary tract infection)      Past Surgical History:   Procedure Laterality Date    CHOLECYSTECTOMY      COLONOSCOPY N/A 12/9/2022    Procedure: COLONOSCOPY;  Surgeon: Enrique Dominguez MD;  Location: [address]);  Service: Endoscopy;  Laterality: N/A;  inst via portal  pt requests after 12/9/22-MS  11/30-pt notified of earlier arrival time-[facility]    CYSTOSCOPY WITH BIOPSY OF BLADDER Bilateral 7/27/2022    Procedure:  CYSTOSCOPY, WITH BLADDER BIOPSY; retrograde pyelogram,;  Surgeon: Anaya Oropeza MD;  Location: [facility];  Service: Urology;  Laterality: Bilateral;    ENDOSCOPIC ULTRASOUND OF UPPER GASTROINTESTINAL TRACT N/A 2021    Procedure: ULTRASOUND, UPPER GI TRACT, ENDOSCOPIC;  Surgeon: Aisha Barajas MD;  [facility]  Service: Endoscopy;  Laterality: N/A;  UEUS/ERCP evaluation abn MRCP - Dr Carney  Covid-19 test 21 at [facility] Pre-admit - pg    ERCP N/A 2021    Procedure: ERCP (ENDOSCOPIC RETROGRADE CHOLANGIOPANCREATOGRAPHY);  Surgeon: Aisha Barajas MD;  Location: [facility] ([address] [place] [address]);  Service: Endoscopy;  Laterality: N/A;  UEUS/ERCP evaluation abn MRCFLEX Steinerid-19 test 21 at [facility] Pre-admit - pg    ESOPHAGOGASTRODUODENOSCOPY N/A 2021    Procedure: EGD (ESOPHAGOGASTRODUODENOSCOPY);  Surgeon: Aisha Barajas MD;  Location: [facility] ([address] [place] [address]);  Service: Endoscopy;  Laterality: N/A;  UEUS/ERCP evaluation abn MRCP Hortencia Carney  Covid-19 test 21 at [facility] Pre-admit - pg    HYSTERECTOMY      MASTECTOMY Right     2014     Family History   Problem Relation Age of Onset    Cancer Mother         colon cancer    Breast cancer Mother     Hypertension Father     Heart disease Father     Stroke Father     No Known Problems Sister     Cancer Brother         lung, ++ tobacco    Hypertension Brother     No Known Problems Daughter     Hypertension Son     Colon cancer Neg Hx     Ovarian cancer Neg Hx      Social History     Tobacco Use    Smoking status: Former     Packs/day: 0.50     Years: 30.00     Pack years: 15.00     Types: Cigarettes     Quit date: 2000     Years since quittin.3    Smokeless tobacco: Never    Tobacco comments:     quit in her 50s, after 30 years smoking;   Substance Use Topics    Alcohol use: No    Drug use: No     Review of Systems   Constitutional:  Negative for chills and fever.   HENT:  Negative for sore throat.     Respiratory:  Negative for shortness of breath.    Cardiovascular:  Negative for chest pain.   Gastrointestinal:  Negative for nausea and vomiting.   Genitourinary:  Positive for dysuria, frequency, pelvic pain and urgency. Negative for flank pain and hematuria.   Musculoskeletal:  Negative for back pain.   Skin:  Negative for rash.   Neurological:  Negative for weakness.   Hematological:  Does not bruise/bleed easily.     Physical Exam     Initial Vitals [05/27/23 1242]   BP Pulse Resp Temp SpO2   135/63 82 18 98.1 °F (36.7 °C) 96 %      MAP       --         Physical Exam    Nursing note and vitals reviewed.  Constitutional: She appears well-developed and well-nourished. She is not diaphoretic. No distress.   HENT:   Head: Normocephalic and atraumatic.   Nose: Nose normal.   Eyes: Conjunctivae and EOM are normal.   Neck: Neck supple.   Cardiovascular:  Normal rate.           Pulmonary/Chest: No respiratory distress.   Abdominal: Abdomen is soft. Bowel sounds are normal. She exhibits no distension. There is abdominal tenderness in the suprapubic area.   No right CVA tenderness.  No left CVA tenderness. There is no guarding.   Musculoskeletal:      Cervical back: Neck supple.     Neurological: She is alert and oriented to person, place, and time. Gait normal.   Skin: No rash noted.   Psychiatric: She has a normal mood and affect. Thought content normal.       ED Course   Procedures  Labs Reviewed   CBC W/ AUTO DIFFERENTIAL - Abnormal; Notable for the following components:       Result Value    RBC 3.46 (*)     Hemoglobin 9.6 (*)     Hematocrit 30.8 (*)     MCHC 31.2 (*)     RDW 16.8 (*)     MPV 9.1 (*)     Immature Granulocytes 4.8 (*)     Immature Grans (Abs) 0.23 (*)     Mono # 0.1 (*)     Mono % 1.5 (*)     All other components within normal limits   BASIC METABOLIC PANEL - Abnormal; Notable for the following components:    CO2 22 (*)     All other components within normal limits   URINALYSIS, REFLEX TO URINE  CULTURE - Abnormal; Notable for the following components:    Color, UA Colorless (*)     Appearance, UA Hazy (*)     Leukocytes, UA 2+ (*)     All other components within normal limits    Narrative:     Specimen Source->Urine   URINALYSIS MICROSCOPIC - Abnormal; Notable for the following components:    RBC, UA 36 (*)     WBC, UA 27 (*)     All other components within normal limits    Narrative:     Specimen Source->Urine   CULTURE, URINE   HIV 1 / 2 ANTIBODY    Narrative:     Release to patient->Immediate          Imaging Results    None          Medications   ertapenem (INVANZ) 1 g in sodium chloride 0.9 % 100 mL IVPB (MB+) (1 g Intravenous New Bag 5/27/23 0215)     Medical Decision Making:   Initial Assessment:   78-year-old female with pmhx pulmonary fibrosis, breast cancer, recurrent urinary tract infections presents to ED with dysuria starting last night. Nontoxic appearing. Hemodynamically stable. Afebrile. I will initiate workup and reassess.   Differential Diagnosis:   UTI, pyelonephritis, urosepsis, nephrolithiasis, IC   Clinical Tests:   Lab Tests: Ordered and Reviewed  ED Management:  - No leukocytosis   - Urinalysis significant for WBCs/RBCs/occasional bacteria though squams present. Given significant hx of UTIs and recent infection, dose of ertapenem based off of ID&S  -ID consulted-- Spoke with Don Saunders MD. Agreeable that ertapenem in ED and dc on oral bactrim for appropriate UTI therapy based on ID&S  - On Reassessment, patient is resting comfortably. Vital signs remain stable during visit. I believe she is stable at this time for discharge.  - Strict ED precautions give to return for new or worsening symptoms   Other:   I discussed test(s) with the performing physician.           ED Course as of 05/28/23 0023   Sat May 27, 2023   1610 Leukocytes, UA(!): 2+ [HM]   1610 WBC, UA(!): 27 [HM]   1610 WBC: 4.77 [HM]      ED Course User Index  [HM] Cindy Almodovar PA-C                 Clinical  Impression:   Final diagnoses:  [N39.0] Urinary tract infection without hematuria, site unspecified (Primary)        ED Disposition Condition    Discharge Stable          ED Prescriptions       Medication Sig Dispense Start Date End Date Auth. Provider    sulfamethoxazole-trimethoprim 800-160mg (BACTRIM DS) 800-160 mg Tab Take 1 tablet by mouth 2 (two) times daily. for 7 days 14 tablet 5/27/2023 6/3/2023 Cindy Almodovar PA-C          Follow-up Information       Follow up With Specialties Details Why Contact Info    Anaya Oropeza MD Urology In 3 days  [address]  [address]  [facility] [address]  [phone]      Savana Anderson MD Internal Medicine In 3 days  [address] [facility] [address]  [phone]      [facility] - Emergency Dept Emergency Medicine  If symptoms worsen [address] [facility] [address] [phone]             Cindy Almodovar PA-C  05/28/23 0023

## 2023-05-27 NOTE — TELEPHONE ENCOUNTER
Patient recent seen in the ER for a UTI. States that she has a history of UTI's that only improve with 5 days of antibiotics. Patient was only given 3 days of antibiotics. States that after returning home symptoms returned and are worse now. Currently c/o dysuria and frequency. Advised per protocol to be seen within the next 24 hours. Patient VU. Plans to go to the nearest ED now for further evaluation. Advised the patient to call back with any further questions or if symptoms worsen.      Reason for Disposition   [1] Taking antibiotic > 72 hours (3 days) for UTI AND [2] painful urination or frequency is SAME (unchanged, not better)    Additional Information   Negative: Shock suspected (e.g., cold/pale/clammy skin, too weak to stand, low BP, rapid pulse)   Negative: Sounds like a life-threatening emergency to the triager   Negative: [1] Unable to urinate (or only a few drops) > 4 hours AND [2] bladder feels very full (e.g., palpable bladder or strong urge to urinate)   Negative: Passing pure blood or large blood clots (i.e., size > a dime)  (Exceptions: Flecks, small strands, or pinkish-red color)   Negative: Patient sounds very sick or weak to the triager   Negative: Fever > 103 F (39.4 C)   Negative: [1] SEVERE pain (e.g., excruciating) AND [2] no improvement 2 hours after pain medications   Negative: [1] Fever > 100.0 F (37.8 C) AND [2] new-onset since starting antibiotics   Negative: [1] Side (flank) or lower back pain AND [2] new-onset since starting antibiotics   Negative: [1] Taking antibiotic > 24 hours for UTI AND [2] flank or lower back pain getting WORSE   Negative: [1] Vomiting 2 or more times AND [2] interferes with taking oral antibiotic    Protocols used: Urinary Tract Infection on Antibiotic Follow-up Call - Female-OSCAR

## 2023-05-27 NOTE — TELEPHONE ENCOUNTER
She Is already established with an infectious disease specialist - she should follow up with her infectious disease and pulmonary specialists to review these results

## 2023-05-27 NOTE — ED TRIAGE NOTES
Pt. Is a 78 yr old female coming in post tx at [facility] for a UTI that normally requires 5 treatments.  She only received three at [facility] and was discharged.  She had frequency burning Qhr.

## 2023-05-27 NOTE — TELEPHONE ENCOUNTER
Duplicate Encounter. No Contact. Post Discharge Day 1 patient was triaged by another OOC Triage RN and advised to Go to ED. Patient states she will be going to ED.        Reason for Disposition   Caller has already spoken with another triager and has no further questions.    Additional Information   Negative: Caller is angry or rude (e.g., hangs up, verbally abusive, yelling)   Negative: Caller hangs up   Negative: Caller has already spoken with the PCP and has no further questions.    Protocols used: No Contact or Duplicate Contact Call-A-

## 2023-05-27 NOTE — DISCHARGE INSTRUCTIONS
- You were diagnosed with acute UTI today   - Ertapenem does given in the emergency department.  Start oral Bactrim tomorrow  - Drink plenty of fluids  - Return to the emergency department if you develop worsening of your symptoms include but not limited to flank pain, worsening of dysuria, hematuria, urinary frequency, fever, abdominal pain, vomiting  - Follow-up with your primary care provider or with Urology in 48-72 hours

## 2023-05-29 ENCOUNTER — OFFICE VISIT (OUTPATIENT)
Dept: UROLOGY | Facility: CLINIC | Age: 78
End: 2023-05-29
Attending: UROLOGY
Payer: MEDICARE

## 2023-05-29 ENCOUNTER — PATIENT MESSAGE (OUTPATIENT)
Dept: INTERNAL MEDICINE | Facility: CLINIC | Age: 78
End: 2023-05-29
Payer: MEDICARE

## 2023-05-29 VITALS
DIASTOLIC BLOOD PRESSURE: 71 MMHG | SYSTOLIC BLOOD PRESSURE: 115 MMHG | BODY MASS INDEX: 27.85 KG/M2 | HEART RATE: 81 BPM | WEIGHT: 163.13 LBS | HEIGHT: 64 IN

## 2023-05-29 DIAGNOSIS — N39.0 URINARY TRACT INFECTION WITHOUT HEMATURIA, SITE UNSPECIFIED: ICD-10-CM

## 2023-05-29 LAB
BACTERIA UR CULT: ABNORMAL
BILIRUB SERPL-MCNC: NORMAL MG/DL
BLOOD URINE, POC: NORMAL
CLARITY, POC UA: CLEAR
COLOR, POC UA: YELLOW
GLUCOSE UR QL STRIP: NORMAL
KETONES UR QL STRIP: NORMAL
LEUKOCYTE ESTERASE URINE, POC: NORMAL
NITRITE, POC UA: NORMAL
PH, POC UA: 5
PROTEIN, POC: NORMAL
SPECIFIC GRAVITY, POC UA: 1.01
UROBILINOGEN, POC UA: NORMAL

## 2023-05-29 PROCEDURE — 51701 INSERT BLADDER CATHETER: CPT | Mod: S$PBB,,,

## 2023-05-29 PROCEDURE — 81002 URINALYSIS NONAUTO W/O SCOPE: CPT | Mod: PBBFAC

## 2023-05-29 PROCEDURE — 99214 OFFICE O/P EST MOD 30 MIN: CPT | Mod: S$PBB,25,,

## 2023-05-29 PROCEDURE — 87088 URINE BACTERIA CULTURE: CPT

## 2023-05-29 PROCEDURE — G0180 MD CERTIFICATION HHA PATIENT: HCPCS | Mod: ,,, | Performed by: INTERNAL MEDICINE

## 2023-05-29 PROCEDURE — 99999 PR PBB SHADOW E&M-EST. PATIENT-LVL IV: CPT | Mod: PBBFAC,,,

## 2023-05-29 PROCEDURE — 51701 PR INSERTION OF NON-INDWELLING BLADDER CATHETERIZATION FOR RESIDUAL UR: ICD-10-PCS | Mod: S$PBB,,,

## 2023-05-29 PROCEDURE — 87086 URINE CULTURE/COLONY COUNT: CPT

## 2023-05-29 PROCEDURE — G0180 PR HOME HEALTH MD CERTIFICATION: ICD-10-PCS | Mod: ,,, | Performed by: INTERNAL MEDICINE

## 2023-05-29 PROCEDURE — 51701 INSERT BLADDER CATHETER: CPT | Mod: PBBFAC

## 2023-05-29 PROCEDURE — 99214 OFFICE O/P EST MOD 30 MIN: CPT | Mod: PBBFAC

## 2023-05-29 PROCEDURE — 99999 PR PBB SHADOW E&M-EST. PATIENT-LVL IV: ICD-10-PCS | Mod: PBBFAC,,,

## 2023-05-29 PROCEDURE — 87106 FUNGI IDENTIFICATION YEAST: CPT

## 2023-05-29 PROCEDURE — 99214 PR OFFICE/OUTPT VISIT, EST, LEVL IV, 30-39 MIN: ICD-10-PCS | Mod: S$PBB,25,,

## 2023-05-29 RX ORDER — FLUCONAZOLE 200 MG/1
200 TABLET ORAL DAILY
Qty: 14 TABLET | Refills: 0 | Status: SHIPPED | OUTPATIENT
Start: 2023-05-29 | End: 2023-06-12

## 2023-05-29 NOTE — PROGRESS NOTES
CHIEF COMPLAINT:  UTI    HISTORY OF PRESENTING ILLINESS:  Niurka Pedraza is a 78 y.o. female established to urology. Presents today c./o urgency, frequency, bladder pressure. Went to ED 5/23/23 for SOB and was found to have UTI, she was tx with IV antibiotics x3 and remained symptomatic. She states that she typically needs 5 doses of IV antibiotics to treat her UTIs. Established to Dr. Oropeza and ID for recurrent multi drug resistant UTIs. Was found to have yeast + urine culture, was rx'd 14 days of diflucan, has not started yet. Pt states the urine culture was not a catheterized sample. She would like to have cath sample collected prior to beginning 14 day course of fluconazole. She is here today with her daughter. PMHx listed below.        REVIEW OF SYSTEMS:  Review of Systems   Constitutional:  Negative for chills and fever.   HENT:  Negative for congestion and sore throat.    Respiratory:  Negative for cough and shortness of breath.    Cardiovascular:  Negative for chest pain and palpitations.   Gastrointestinal:  Positive for abdominal pain (bladder pressure). Negative for constipation.   Genitourinary:  Positive for frequency and urgency. Negative for dysuria, flank pain and hematuria.   Neurological:  Negative for dizziness and headaches.       PATIENT HISTORY:  Past Medical History:   Diagnosis Date    Arthritis     Borderline serous cystadenoma of right ovary 4/3/2018    Breast cancer     mastectomy 2014    Coccidiomycosis, progressive     Elevated CA-125 2/25/2018    Hyperlipidemia     OAB (overactive bladder)     Osteopenia     on Dexa 11/2017    Osteoporosis     pt reports hx of this, declined fosamax in past - treated with calcium and vit D    Pelvic mass 2/25/2018    Pulmonary fibrosis     Pulmonary nodules     SOB (shortness of breath) on exertion     Thyroid disease     hypo    UTI (urinary tract infection)        Past Surgical History:   Procedure Laterality Date    CHOLECYSTECTOMY      COLONOSCOPY  N/A 12/9/2022    Procedure: COLONOSCOPY;  Surgeon: Enrique Dominguez MD;  Location: [facility] (4TH FLR);  Service: Endoscopy;  Laterality: N/A;  inst via portal  pt requests after 12/9/22-MS  11/30-pt notified of earlier arrival time-KPvt    CYSTOSCOPY WITH BIOPSY OF BLADDER Bilateral 7/27/2022    Procedure: CYSTOSCOPY, WITH BLADDER BIOPSY; retrograde pyelogram,;  Surgeon: Anaya Oropeza MD;  Location: [facility];  Service: Urology;  Laterality: Bilateral;    ENDOSCOPIC ULTRASOUND OF UPPER GASTROINTESTINAL TRACT N/A 4/8/2021    Procedure: ULTRASOUND, UPPER GI TRACT, ENDOSCOPIC;  Surgeon: Aisha Barajas MD;  Location: [facility] (2ND FLR);  Service: Endoscopy;  Laterality: N/A;  UEUS/ERCP evaluation abn MRCP - Dr Angelika Steinerid-19 test 4/5/21 at [facility] Pre-admit - pg    ERCP N/A 4/8/2021    Procedure: ERCP (ENDOSCOPIC RETROGRADE CHOLANGIOPANCREATOGRAPHY);  Surgeon: Aisha Barajas MD;  Location: [facility] (2ND FLR);  Service: Endoscopy;  Laterality: N/A;  UEUS/ERCP evaluation abn MRCP - Dr Angelika Steinerid-19 test 4/5/21 at [facility] Pre-admit - pg    ESOPHAGOGASTRODUODENOSCOPY N/A 4/8/2021    Procedure: EGD (ESOPHAGOGASTRODUODENOSCOPY);  Surgeon: Aisha Barajas MD;  Location: [facility] (2ND FLR);  Service: Endoscopy;  Laterality: N/A;  UEUS/ERCP evaluation abn MRCP - Dr Angelika Steinerid-19 test 4/5/21 at [facility] Pre-admit - pg    HYSTERECTOMY      MASTECTOMY Right     2014       Family History   Problem Relation Age of Onset    Cancer Mother         colon cancer    Breast cancer Mother     Hypertension Father     Heart disease Father     Stroke Father     No Known Problems Sister     Cancer Brother         lung, ++ tobacco    Hypertension Brother     No Known Problems Daughter     Hypertension Son     Colon cancer Neg Hx     Ovarian cancer Neg Hx        Social History     Socioeconomic History    Marital status:     Number of children: 3   Occupational History    Occupation: retired from  [employer]     Comment: neuro chemistry   Tobacco Use    Smoking status: Former     Packs/day: 0.50     Years: 30.00     Pack years: 15.00     Types: Cigarettes     Quit date: 2000     Years since quittin.3    Smokeless tobacco: Never    Tobacco comments:     quit in her 50s, after 30 years smoking;   Substance and Sexual Activity    Alcohol use: No    Drug use: No    Sexual activity: Not Currently   Social History Narrative    From [place]     Moved to [facility] in  for research    Moved to [place] for period of time    Moved back to [place] Summer 2016 and then to [facility] this year 2017     Social Determinants of Health     Financial Resource Strain: Low Risk     Difficulty of Paying Living Expenses: Not hard at all   Food Insecurity: No Food Insecurity    Worried About Running Out of Food in the Last Year: Never true    Ran Out of Food in the Last Year: Never true   Transportation Needs: No Transportation Needs    Lack of Transportation (Medical): No    Lack of Transportation (Non-Medical): No   Stress: No Stress Concern Present    Feeling of Stress : Not at all   Social Connections: Socially Isolated    Frequency of Communication with Friends and Family: More than three times a week    Frequency of Social Gatherings with Friends and Family: More than three times a week    Attends Mosque Services: Never    Active Member of Clubs or Organizations: No    Attends Club or Organization Meetings: Never    Marital Status:    Housing Stability: Low Risk     Unable to Pay for Housing in the Last Year: No    Number of Places Lived in the Last Year: 1    Unstable Housing in the Last Year: No       Allergies:  Ciprofloxacin, Amlodipine, Lisinopril, and Nitrofuran analogues    Medications:    Current Outpatient Medications:     albuterol (VENTOLIN HFA) 90 mcg/actuation inhaler, Inhale 1-2 puffs into the lungs every 6 (six) hours as needed for Wheezing or Shortness of Breath. Rescue,  Disp: 18 g, Rfl: 11    ascorbic acid, vitamin C, (VITAMIN C) 250 MG tablet, Take 1 tablet (250 mg total) by mouth once daily., Disp: 30 tablet, Rfl: 2    atorvastatin (LIPITOR) 20 MG tablet, TAKE 1 TABLET BY MOUTH EVERY DAY, Disp: 90 tablet, Rfl: 2    calcium-vitamin D3 (CALCIUM 500 + D) 500 mg(1,250mg) -200 unit per tablet, Take 1 tablet by mouth 2 (two) times daily with meals., Disp: , Rfl:     carvediloL (COREG) 6.25 MG tablet, TAKE 1 TABLET BY MOUTH TWICE A DAY WITH FOOD, Disp: 180 tablet, Rfl: 3    estradioL (ESTRACE) 0.01 % (0.1 mg/gram) vaginal cream, Place 1 g vaginally twice a week., Disp: 42.5 g, Rfl: 11    fluconazole (DIFLUCAN) 200 MG Tab, Take 1 tablet (200 mg total) by mouth once daily. for 14 days, Disp: 14 tablet, Rfl: 0    fluticasone furoate-vilanteroL (BREO ELLIPTA) 100-25 mcg/dose diskus inhaler, Inhale 1 puff into the lungs once daily. Controller. PLEASE NOTE IT IS ONCE A DAY!!, Disp: 60 each, Rfl: 4    fluticasone furoate-vilanteroL (BREO ELLIPTA) 100-25 mcg/dose diskus inhaler, Inhale 1 puff into the lungs once daily. Controller, Disp: 1 each, Rfl: 11    gabapentin (NEURONTIN) 100 MG capsule, Take 1 capsule (100 mg total) by mouth every evening., Disp: 30 capsule, Rfl: 3    irbesartan (AVAPRO) 300 MG tablet, Take 1 tablet (300 mg total) by mouth every evening., Disp: 90 tablet, Rfl: 3    lactobacillus acidophilus & bulgar (LACTINEX) 100 million cell packet, Take 1 packet (1 each total) by mouth 2 (two) times daily., Disp: 60 packet, Rfl: 0    levothyroxine (SYNTHROID) 50 MCG tablet, Take 1 tablet (50 mcg total) by mouth before breakfast., Disp: 90 tablet, Rfl: 1    montelukast (SINGULAIR) 10 mg tablet, TAKE 1 TABLET BY MOUTH EVERY DAY IN THE EVENING, Disp: 90 tablet, Rfl: 2    multivitamin (THERAGRAN) per tablet, Take 1 tablet by mouth once daily., Disp: , Rfl:     oxybutynin (DITROPAN XL) 15 MG TR24, Take 1 tablet (15 mg total) by mouth once daily., Disp: 30 tablet, Rfl: 11    pantoprazole  (PROTONIX) 20 MG tablet, Take 1 tablet (20 mg total) by mouth once daily., Disp: 30 tablet, Rfl: 2    predniSONE (DELTASONE) 1 MG tablet, Take 2 tablets (2 mg total) by mouth once daily., Disp: 180 tablet, Rfl: 3    sulfamethoxazole-trimethoprim 800-160mg (BACTRIM DS) 800-160 mg Tab, Take 1 tablet by mouth 2 (two) times daily. for 7 days, Disp: 14 tablet, Rfl: 0    PHYSICAL EXAMINATION:  Physical Exam  Exam conducted with a chaperone present.   Constitutional:       Appearance: Normal appearance.   HENT:      Head: Normocephalic and atraumatic.      Right Ear: External ear normal.      Left Ear: External ear normal.   Pulmonary:      Effort: Pulmonary effort is normal. No respiratory distress.   Abdominal:      Hernia: There is no hernia in the left inguinal area or right inguinal area.   Genitourinary:     Exam position: Lithotomy position.      Pubic Area: No rash or pubic lice.       Labia:         Right: No rash, tenderness, lesion or injury.         Left: No rash, tenderness or injury.       Urethra: No prolapse, urethral pain, urethral swelling or urethral lesion.      Comments: +vulvovaginal dryness. Consent verbally obtained. Name, , allergies verified. Betadine prep x3 was applied to the urethral meatus. An in and out cath was performed after voiding. The PVR was 80 ml.     Lymphadenopathy:      Lower Body: No right inguinal adenopathy. No left inguinal adenopathy.   Skin:     General: Skin is warm and dry.   Neurological:      General: No focal deficit present.      Mental Status: She is alert and oriented to person, place, and time.   Psychiatric:         Mood and Affect: Mood normal.         Behavior: Behavior normal.         LABS:  Cath PVR 80 ml    Lab Results   Component Value Date    CREATININE 0.8 2023    EGFRNORACEVR >60.0 2023             IMPRESSION:    Encounter Diagnoses   Name Primary?    Urinary tract infection without hematuria, site unspecified          Assessment:       1.  Urinary tract infection without hematuria, site unspecified        Plan:   - cath urine sample sent for culture, will call with results     Follow up PRN with Dr. Oropeza    I spent 30 minutes with the patient of which more than half was spent in direct consultation with the patient in regards to our treatment and plan.  We addressed the office findings and recent labs.   Education and recommendations of today's plan of care including home remedies and needed follow up with PCP.   We discussed the chief complaint; reviewed the LUTS and the possible contributory factors.   Reassurance no infection.  Recommended lifestyle modifications with a proper, healthy diet, good hydration but during the day. Reducing bladder irritants.   Benefits of regular exercise.

## 2023-05-29 NOTE — TELEPHONE ENCOUNTER
Spoke with Luz and informed her I sent incorrect message to her about appt info. Got her two different message threads from today confused. Pt will see urology today for UTI symptoms. Will send staff message to Dr. Dempsey staff to assist with scheduling for mold exposure.

## 2023-05-31 ENCOUNTER — OFFICE VISIT (OUTPATIENT)
Dept: INFECTIOUS DISEASES | Facility: CLINIC | Age: 78
End: 2023-05-31
Payer: MEDICARE

## 2023-05-31 ENCOUNTER — TELEPHONE (OUTPATIENT)
Dept: UROLOGY | Facility: CLINIC | Age: 78
End: 2023-05-31
Payer: MEDICARE

## 2023-05-31 VITALS — WEIGHT: 163.13 LBS | TEMPERATURE: 98 F | BODY MASS INDEX: 27.85 KG/M2 | HEIGHT: 64 IN

## 2023-05-31 DIAGNOSIS — N39.0 RECURRENT UTI: Primary | ICD-10-CM

## 2023-05-31 LAB — BACTERIA UR CULT: ABNORMAL

## 2023-05-31 PROCEDURE — 99999 PR PBB SHADOW E&M-EST. PATIENT-LVL III: ICD-10-PCS | Mod: PBBFAC,GC,, | Performed by: STUDENT IN AN ORGANIZED HEALTH CARE EDUCATION/TRAINING PROGRAM

## 2023-05-31 PROCEDURE — 99214 OFFICE O/P EST MOD 30 MIN: CPT | Mod: S$PBB,GC,, | Performed by: STUDENT IN AN ORGANIZED HEALTH CARE EDUCATION/TRAINING PROGRAM

## 2023-05-31 PROCEDURE — 99999 PR PBB SHADOW E&M-EST. PATIENT-LVL III: CPT | Mod: PBBFAC,GC,, | Performed by: STUDENT IN AN ORGANIZED HEALTH CARE EDUCATION/TRAINING PROGRAM

## 2023-05-31 PROCEDURE — 99214 PR OFFICE/OUTPT VISIT, EST, LEVL IV, 30-39 MIN: ICD-10-PCS | Mod: S$PBB,GC,, | Performed by: STUDENT IN AN ORGANIZED HEALTH CARE EDUCATION/TRAINING PROGRAM

## 2023-05-31 PROCEDURE — 99213 OFFICE O/P EST LOW 20 MIN: CPT | Mod: PBBFAC | Performed by: STUDENT IN AN ORGANIZED HEALTH CARE EDUCATION/TRAINING PROGRAM

## 2023-05-31 NOTE — TELEPHONE ENCOUNTER
Discussed cath urine culture results. Recommended her to begin Diflucan as previously prescribed and to complete course as prescribed.

## 2023-05-31 NOTE — PROGRESS NOTES
INFECTIOUS DISEASE CLINIC  06/05/2023     Subjective:      Chief Complaint: Recurrent UTI     History of Present Illness:     77-year-old female with IPF/UIP on azithromycin and prednisone, hysterectomy/BSO 2018 for borderline ovarian cystadenoma right ovary, OAB,  Recurrent UTI's with prior ESBL presents with dysuria.    Interval history:  Last seen 10/11/22.  Was doing well without any symptoms of UTI until ~5/1/23.  Was admitted to [facility] 5/23/23 for SOB 2/2 pulmonary fibrosis exacerbation, and was treated with 3 days Ertapenem for what appeared to be bacteria UTI 2/2 ESBl E. Coli at the time.  No improvement in symptoms afterward.    Repeat UA's have since grown Candida tropicalis, not ESBL E. Coli from prior.  Seen by urology 5/29/23, Cx repeated growing yeast pending species (suspect will be same).  Has Rx for fluconazole from ED provider which she has yet to start taking.    Current symptoms are dysuria and frequency.  Denies fevers, chills, nausea, vomiting, diarrhea, abdominal/suprapubic pain.     Takes d mannose, vitamin c, estradiol.    Review of Systems   Constitutional: Negative for chills and fever.   Cardiovascular:  Negative for chest pain.   Respiratory:  Negative for shortness of breath.    Gastrointestinal:  Negative for nausea and vomiting.   Genitourinary:  Positive for dysuria and frequency. Negative for flank pain and pelvic pain.       Past Medical History:   Diagnosis Date    Arthritis     Borderline serous cystadenoma of right ovary 4/3/2018    Breast cancer     mastectomy 2014    Coccidiomycosis, progressive     Elevated CA-125 2/25/2018    Hyperlipidemia     OAB (overactive bladder)     Osteopenia     on Dexa 11/2017    Osteoporosis     pt reports hx of this, declined fosamax in past - treated with calcium and vit D    Pelvic mass 2/25/2018    Pulmonary fibrosis     Pulmonary nodules     SOB (shortness of breath) on exertion     Thyroid disease     hypo    UTI (urinary tract  infection)      Past Surgical History:   Procedure Laterality Date    CHOLECYSTECTOMY      COLONOSCOPY N/A 12/9/2022    Procedure: COLONOSCOPY;  Surgeon: Enrique Dominguez MD;  Location: [facility] (4TH FLR);  Service: Endoscopy;  Laterality: N/A;  inst via portal  pt requests after 12/9/22-MS  11/30-pt notified of earlier arrival time-KPvt    CYSTOSCOPY WITH BIOPSY OF BLADDER Bilateral 7/27/2022    Procedure: CYSTOSCOPY, WITH BLADDER BIOPSY; retrograde pyelogram,;  Surgeon: Anaya Oropeza MD;  [facility];  Service: Urology;  Laterality: Bilateral;    ENDOSCOPIC ULTRASOUND OF UPPER GASTROINTESTINAL TRACT N/A 4/8/2021    Procedure: ULTRASOUND, UPPER GI TRACT, ENDOSCOPIC;  Surgeon: Aisha Barajas MD;  Location: [facility] (2ND FLR);  Service: Endoscopy;  Laterality: N/A;  UEUS/ERCP evaluation abn MRCP - Dr Angelika Steinerid-19 test 4/5/21 at [facility] Pre-admit - pg    ERCP N/A 4/8/2021    Procedure: ERCP (ENDOSCOPIC RETROGRADE CHOLANGIOPANCREATOGRAPHY);  Surgeon: Aisha Barajas MD;  Location: [facility] (2ND FLR);  Service: Endoscopy;  Laterality: N/A;  UEUS/ERCP evaluation abn MRCP - Dr Carney  Covid-19 test 4/5/21 at [facility] Pre-admit - pg    ESOPHAGOGASTRODUODENOSCOPY N/A 4/8/2021    Procedure: EGD (ESOPHAGOGASTRODUODENOSCOPY);  Surgeon: Aisha Barajas MD;  Location: [facility] (2ND FLR);  Service: Endoscopy;  Laterality: N/A;  UEUS/ERCP evaluation abn MRCP - Dr Carney  Covid-19 test 4/5/21 at [facility] Pre-admit - pg    ESOPHAGOGASTRODUODENOSCOPY N/A 6/2/2023    Procedure: EGD (ESOPHAGOGASTRODUODENOSCOPY);  Surgeon: Emeka Carney MD;  Location: [facility] (4TH FLR);  Service: Endoscopy;  Laterality: N/A;  She has  history of seromucinous borderline tumor of the ovary. We generally follow  and CEA tumor markers. Her CEA recently became slightly elevated. CT imaging negative and colonoscopy negative.     Talita Barrios    instructions portal-LW  pre    HYSTERECTOMY      MASTECTOMY  Right          Family History   Problem Relation Age of Onset    Cancer Mother         colon cancer    Breast cancer Mother     Hypertension Father     Heart disease Father     Stroke Father     No Known Problems Sister     Cancer Brother         lung, ++ tobacco    Hypertension Brother     No Known Problems Daughter     Hypertension Son     Colon cancer Neg Hx     Ovarian cancer Neg Hx      Social History     Tobacco Use    Smoking status: Former     Packs/day: 0.50     Years: 30.00     Pack years: 15.00     Types: Cigarettes     Quit date: 2000     Years since quittin.3    Smokeless tobacco: Never    Tobacco comments:     quit in her 50s, after 30 years smoking;   Substance Use Topics    Alcohol use: No    Drug use: No       Review of patient's allergies indicates:   Allergen Reactions    Ciprofloxacin Other (See Comments)     Right foot pain and edema, tendonitis    Amlodipine Edema and Swelling     BLE  BLE    Lisinopril Other (See Comments)     cough    Nitrofuran analogues          Objective:   VS (24h):   Vitals:    23 0901   Temp: 97.8 °F (36.6 °C)         Physical Exam  Vitals reviewed.   Constitutional:       General: She is not in acute distress.     Appearance: Normal appearance. She is normal weight. She is not ill-appearing, toxic-appearing or diaphoretic.   HENT:      Head: Normocephalic and atraumatic.      Right Ear: External ear normal.      Left Ear: External ear normal.      Nose: Nose normal.      Mouth/Throat:      Mouth: Mucous membranes are moist.      Pharynx: Oropharynx is clear.   Eyes:      General: No scleral icterus.        Right eye: No discharge.         Left eye: No discharge.      Extraocular Movements: Extraocular movements intact.      Conjunctiva/sclera: Conjunctivae normal.   Cardiovascular:      Rate and Rhythm: Normal rate and regular rhythm.      Heart sounds: Normal heart sounds. No murmur heard.  Pulmonary:      Effort: Pulmonary effort is normal. No  respiratory distress.      Breath sounds: Normal breath sounds. No wheezing, rhonchi or rales.   Abdominal:      General: Abdomen is flat. There is no distension.      Palpations: Abdomen is soft.      Tenderness: There is no abdominal tenderness. There is no right CVA tenderness, left CVA tenderness, guarding or rebound.   Musculoskeletal:         General: Normal range of motion.      Cervical back: Normal range of motion.   Skin:     General: Skin is warm and dry.   Neurological:      Mental Status: She is alert and oriented to person, place, and time. Mental status is at baseline.   Psychiatric:         Mood and Affect: Mood normal.         Behavior: Behavior normal.         Thought Content: Thought content normal.         Judgment: Judgment normal.         Labs:  reviewed    Micro:   reviewed    Radiology:   reviewed    Immunization History   Administered Date(s) Administered    COVID-19 MRNA, LN-S PF (MODERNA HALF 0.25 ML DOSE) 11/09/2021    COVID-19 Vaccine 09/30/2022    COVID-19, MRNA, LN-S, PF (MODERNA FULL 0.5 ML DOSE) 02/03/2021, 03/03/2021    Influenza - High Dose - PF (65 years and older) 10/27/2020, 09/21/2022    Pneumococcal Conjugate - 13 Valent 11/02/2017    Pneumococcal Conjugate - 20 Valent 09/30/2022    Pneumococcal Polysaccharide - 23 Valent 07/28/2020    Tdap 07/21/2021    Zoster Recombinant 09/21/2022         Assessment & Plan:     1. Recurrent UTI   77-year-old female with IPF/UIP on azithromycin and prednisone, hysterectomy/BSO 2018 for borderline ovarian cystadenoma right ovary, OAB,  Recurrent UTI's with prior ESBL presents with dysuriasince 5/1/23.  Initial Cx grew ESBL E. Coli, not improved with Ertapenem x3 days.  Now with candida tropicalis, was prescribed fluconazole by ED.    -discussed with patient that fungal UTI is unusual; and that dysuria and frequency x1 month for UTI is also unusual for UTI.  I agree with trial of fluconazole given this is still possible.  -advised patient to  call back/send message in a few days after taking fluconazole to see if symptoms improve.  If not, possible that symptomatology is due to other etiologies.  Would then discussed with urology for further workup.    Follow up to be determined depending on response to fluconazole.      Rishi Dempsey MD  Infectious Disease Fellow

## 2023-06-01 ENCOUNTER — PATIENT MESSAGE (OUTPATIENT)
Dept: ENDOSCOPY | Facility: HOSPITAL | Age: 78
End: 2023-06-01
Payer: MEDICARE

## 2023-06-02 ENCOUNTER — ANESTHESIA EVENT (OUTPATIENT)
Dept: ENDOSCOPY | Facility: HOSPITAL | Age: 78
End: 2023-06-02
Payer: MEDICARE

## 2023-06-02 ENCOUNTER — ANESTHESIA (OUTPATIENT)
Dept: ENDOSCOPY | Facility: HOSPITAL | Age: 78
End: 2023-06-02
Payer: MEDICARE

## 2023-06-02 ENCOUNTER — TELEPHONE (OUTPATIENT)
Dept: ENDOSCOPY | Facility: HOSPITAL | Age: 78
End: 2023-06-02
Payer: MEDICARE

## 2023-06-02 ENCOUNTER — HOSPITAL ENCOUNTER (OUTPATIENT)
Facility: HOSPITAL | Age: 78
Discharge: HOME | End: 2023-06-02
Attending: INTERNAL MEDICINE | Admitting: INTERNAL MEDICINE
Payer: MEDICARE

## 2023-06-02 VITALS
WEIGHT: 163 LBS | TEMPERATURE: 98 F | SYSTOLIC BLOOD PRESSURE: 132 MMHG | RESPIRATION RATE: 18 BRPM | DIASTOLIC BLOOD PRESSURE: 69 MMHG | BODY MASS INDEX: 27.83 KG/M2 | HEART RATE: 70 BPM | OXYGEN SATURATION: 96 % | HEIGHT: 64 IN

## 2023-06-02 DIAGNOSIS — K21.9 GERD (GASTROESOPHAGEAL REFLUX DISEASE): ICD-10-CM

## 2023-06-02 PROCEDURE — E9220 PRA ENDO ANESTHESIA: HCPCS | Mod: ,,, | Performed by: STUDENT IN AN ORGANIZED HEALTH CARE EDUCATION/TRAINING PROGRAM

## 2023-06-02 PROCEDURE — 37000009 HC ANESTHESIA EA ADD 15 MINS: Performed by: INTERNAL MEDICINE

## 2023-06-02 PROCEDURE — 63600175 PHARM REV CODE 636 W HCPCS: Performed by: STUDENT IN AN ORGANIZED HEALTH CARE EDUCATION/TRAINING PROGRAM

## 2023-06-02 PROCEDURE — 88342 IMHCHEM/IMCYTCHM 1ST ANTB: CPT | Performed by: PATHOLOGY

## 2023-06-02 PROCEDURE — 88305 TISSUE EXAM BY PATHOLOGIST: CPT | Mod: 26,,, | Performed by: PATHOLOGY

## 2023-06-02 PROCEDURE — E9220 PRA ENDO ANESTHESIA: ICD-10-PCS | Mod: ,,, | Performed by: STUDENT IN AN ORGANIZED HEALTH CARE EDUCATION/TRAINING PROGRAM

## 2023-06-02 PROCEDURE — 43239 EGD BIOPSY SINGLE/MULTIPLE: CPT | Mod: ,,, | Performed by: INTERNAL MEDICINE

## 2023-06-02 PROCEDURE — 27201012 HC FORCEPS, HOT/COLD, DISP: Performed by: INTERNAL MEDICINE

## 2023-06-02 PROCEDURE — 88342 CHG IMMUNOCYTOCHEMISTRY: ICD-10-PCS | Mod: 26,,, | Performed by: PATHOLOGY

## 2023-06-02 PROCEDURE — 43239 EGD BIOPSY SINGLE/MULTIPLE: CPT | Performed by: INTERNAL MEDICINE

## 2023-06-02 PROCEDURE — 37000008 HC ANESTHESIA 1ST 15 MINUTES: Performed by: INTERNAL MEDICINE

## 2023-06-02 PROCEDURE — 25000003 PHARM REV CODE 250: Performed by: INTERNAL MEDICINE

## 2023-06-02 PROCEDURE — 88305 TISSUE EXAM BY PATHOLOGIST: ICD-10-PCS | Mod: 26,,, | Performed by: PATHOLOGY

## 2023-06-02 PROCEDURE — 25000003 PHARM REV CODE 250: Performed by: STUDENT IN AN ORGANIZED HEALTH CARE EDUCATION/TRAINING PROGRAM

## 2023-06-02 PROCEDURE — 43239 PR EGD, FLEX, W/BIOPSY, SGL/MULTI: ICD-10-PCS | Mod: ,,, | Performed by: INTERNAL MEDICINE

## 2023-06-02 PROCEDURE — 88305 TISSUE EXAM BY PATHOLOGIST: CPT | Performed by: PATHOLOGY

## 2023-06-02 PROCEDURE — 88342 IMHCHEM/IMCYTCHM 1ST ANTB: CPT | Mod: 26,,, | Performed by: PATHOLOGY

## 2023-06-02 RX ORDER — LIDOCAINE HYDROCHLORIDE 20 MG/ML
INJECTION INTRAVENOUS
Status: DISCONTINUED | OUTPATIENT
Start: 2023-06-02 | End: 2023-06-02

## 2023-06-02 RX ORDER — PROPOFOL 10 MG/ML
VIAL (ML) INTRAVENOUS
Status: DISCONTINUED | OUTPATIENT
Start: 2023-06-02 | End: 2023-06-02

## 2023-06-02 RX ORDER — PROPOFOL 10 MG/ML
VIAL (ML) INTRAVENOUS CONTINUOUS PRN
Status: DISCONTINUED | OUTPATIENT
Start: 2023-06-02 | End: 2023-06-02

## 2023-06-02 RX ORDER — SODIUM CHLORIDE 9 MG/ML
INJECTION, SOLUTION INTRAVENOUS CONTINUOUS
Status: DISCONTINUED | OUTPATIENT
Start: 2023-06-02 | End: 2023-06-02 | Stop reason: HOSPADM

## 2023-06-02 RX ADMIN — SODIUM CHLORIDE: 0.9 INJECTION, SOLUTION INTRAVENOUS at 03:06

## 2023-06-02 RX ADMIN — PROPOFOL 50 MG: 10 INJECTION, EMULSION INTRAVENOUS at 03:06

## 2023-06-02 RX ADMIN — PROPOFOL 125 MCG/KG/MIN: 10 INJECTION, EMULSION INTRAVENOUS at 03:06

## 2023-06-02 RX ADMIN — LIDOCAINE HYDROCHLORIDE 100 MG: 20 INJECTION INTRAVENOUS at 03:06

## 2023-06-02 RX ADMIN — PROPOFOL 10 MG: 10 INJECTION, EMULSION INTRAVENOUS at 03:06

## 2023-06-02 NOTE — TRANSFER OF CARE
"Anesthesia Transfer of Care Note    Patient: Nuirka Pedraza    Procedure(s) Performed: Procedure(s) (LRB):  EGD (ESOPHAGOGASTRODUODENOSCOPY) (N/A)    Patient location: PACU    Anesthesia Type: general    Transport from OR: Transported from OR on room air with adequate spontaneous ventilation    Post pain: adequate analgesia    Post assessment: no apparent anesthetic complications    Post vital signs: stable    Level of consciousness: awake    Nausea/Vomiting: no nausea/vomiting    Complications: none    Transfer of care protocol was followed      Last vitals:   Visit Vitals  BP (!) 92/55 (BP Location: Left arm, Patient Position: Lying)   Pulse 68   Temp 36.5 °C (97.7 °F) (Temporal)   Resp 18   Ht 5' 4" (1.626 m)   Wt 73.9 kg (163 lb)   LMP 02/08/2000   SpO2 96%   Breastfeeding No   BMI 27.98 kg/m²     "

## 2023-06-02 NOTE — ANESTHESIA POSTPROCEDURE EVALUATION
Anesthesia Post Evaluation    Patient: Niurka Pedraza    Procedure(s) Performed: Procedure(s) (LRB):  EGD (ESOPHAGOGASTRODUODENOSCOPY) (N/A)    Final Anesthesia Type: general      Patient location during evaluation: GI PACU  Patient participation: Yes- Able to Participate  Level of consciousness: awake and alert  Post-procedure vital signs: reviewed and stable  Pain management: adequate  Airway patency: patent    PONV status at discharge: No PONV  Anesthetic complications: no      Cardiovascular status: blood pressure returned to baseline  Respiratory status: spontaneous ventilation  Hydration status: euvolemic  Follow-up not needed.          Vitals Value Taken Time   BP 92/55 06/02/23 1556   Temp 36.5 °C (97.7 °F) 06/02/23 1556   Pulse 68 06/02/23 1556   Resp 18 06/02/23 1556   SpO2 96 % 06/02/23 1556         No case tracking events are documented in the log.      Pain/Luann Score: Luann Score: 6 (6/2/2023  3:56 PM)

## 2023-06-02 NOTE — H&P
[facility] 4th [facility]  History & Physical    Subjective:      Chief Complaint/Reason for Admission:     Direct access EGD for iron deficiency anemia and history of GERD    Niurka Pedraza is a 78 y.o. female.    Past Medical History:   Diagnosis Date    Arthritis     Borderline serous cystadenoma of right ovary 4/3/2018    Breast cancer     mastectomy 2014    Coccidiomycosis, progressive     Elevated CA-125 2/25/2018    Hyperlipidemia     OAB (overactive bladder)     Osteopenia     on Dexa 11/2017    Osteoporosis     pt reports hx of this, declined fosamax in past - treated with calcium and vit D    Pelvic mass 2/25/2018    Pulmonary fibrosis     Pulmonary nodules     SOB (shortness of breath) on exertion     Thyroid disease     hypo    UTI (urinary tract infection)      Past Surgical History:   Procedure Laterality Date    CHOLECYSTECTOMY      COLONOSCOPY N/A 12/9/2022    Procedure: COLONOSCOPY;  Surgeon: Enrique Dominguez MD;  Location: [facility] (4TH FLR);  Service: Endoscopy;  Laterality: N/A;  inst via portal  pt requests after 12/9/22-MS  11/30-pt notified of earlier arrival time-KPvt    CYSTOSCOPY WITH BIOPSY OF BLADDER Bilateral 7/27/2022    Procedure: CYSTOSCOPY, WITH BLADDER BIOPSY; retrograde pyelogram,;  Surgeon: Anaya Oropeza MD;  Location: [facility];  Service: Urology;  Laterality: Bilateral;    ENDOSCOPIC ULTRASOUND OF UPPER GASTROINTESTINAL TRACT N/A 4/8/2021    Procedure: ULTRASOUND, UPPER GI TRACT, ENDOSCOPIC;  Surgeon: Aisha Barajas MD;  Location: [facility] (2ND FLR);  Service: Endoscopy;  Laterality: N/A;  UEUS/ERCP evaluation abn MRCP - Dr Angelika Zamudio-19 test 4/5/21 at [facility] Pre-admit - pg    ERCP N/A 4/8/2021    Procedure: ERCP (ENDOSCOPIC RETROGRADE CHOLANGIOPANCREATOGRAPHY);  Surgeon: Aisha Barajas MD;  Location: [facility] (2ND FLR);  Service: Endoscopy;  Laterality: N/A;  UEUS/ERCP evaluation abn MRCP - Dr Angelika Zamudio-19 test 4/5/21 at [facility]  Pre-admit - pg    ESOPHAGOGASTRODUODENOSCOPY N/A 2021    Procedure: EGD (ESOPHAGOGASTRODUODENOSCOPY);  Surgeon: Aisha Barajas MD;  Location: [address];  Service: Endoscopy;  Laterality: N/A;  UEUS/ERCP evaluation abn MRCP - Dr Carney  Covid-19 test 21 at [facility] Pre-admit - pg    HYSTERECTOMY      MASTECTOMY Right     2014     Family History   Problem Relation Age of Onset    Cancer Mother         colon cancer    Breast cancer Mother     Hypertension Father     Heart disease Father     Stroke Father     No Known Problems Sister     Cancer Brother         lung, ++ tobacco    Hypertension Brother     No Known Problems Daughter     Hypertension Son     Colon cancer Neg Hx     Ovarian cancer Neg Hx      Social History     Tobacco Use    Smoking status: Former     Packs/day: 0.50     Years: 30.00     Pack years: 15.00     Types: Cigarettes     Quit date: 2000     Years since quittin.3    Smokeless tobacco: Never    Tobacco comments:     quit in her 50s, after 30 years smoking;   Substance Use Topics    Alcohol use: No    Drug use: No       PTA Medications   Medication Sig    albuterol (VENTOLIN HFA) 90 mcg/actuation inhaler Inhale 1-2 puffs into the lungs every 6 (six) hours as needed for Wheezing or Shortness of Breath. Rescue    ascorbic acid, vitamin C, (VITAMIN C) 250 MG tablet Take 1 tablet (250 mg total) by mouth once daily.    atorvastatin (LIPITOR) 20 MG tablet TAKE 1 TABLET BY MOUTH EVERY DAY    calcium-vitamin D3 (CALCIUM 500 + D) 500 mg(1,250mg) -200 unit per tablet Take 1 tablet by mouth 2 (two) times daily with meals.    carvediloL (COREG) 6.25 MG tablet TAKE 1 TABLET BY MOUTH TWICE A DAY WITH FOOD    estradioL (ESTRACE) 0.01 % (0.1 mg/gram) vaginal cream Place 1 g vaginally twice a week.    fluconazole (DIFLUCAN) 200 MG Tab Take 1 tablet (200 mg total) by mouth once daily. for 14 days    fluticasone furoate-vilanteroL (BREO ELLIPTA) 100-25 mcg/dose diskus inhaler Inhale 1  puff into the lungs once daily. Controller.  PLEASE NOTE IT IS ONCE A DAY!!    fluticasone furoate-vilanteroL (BREO ELLIPTA) 100-25 mcg/dose diskus inhaler Inhale 1 puff into the lungs once daily. Controller    gabapentin (NEURONTIN) 100 MG capsule Take 1 capsule (100 mg total) by mouth every evening.    irbesartan (AVAPRO) 300 MG tablet Take 1 tablet (300 mg total) by mouth every evening.    lactobacillus acidophilus & bulgar (LACTINEX) 100 million cell packet Take 1 packet (1 each total) by mouth 2 (two) times daily.    levothyroxine (SYNTHROID) 50 MCG tablet Take 1 tablet (50 mcg total) by mouth before breakfast.    montelukast (SINGULAIR) 10 mg tablet TAKE 1 TABLET BY MOUTH EVERY DAY IN THE EVENING    multivitamin (THERAGRAN) per tablet Take 1 tablet by mouth once daily.    oxybutynin (DITROPAN XL) 15 MG TR24 Take 1 tablet (15 mg total) by mouth once daily.    pantoprazole (PROTONIX) 20 MG tablet Take 1 tablet (20 mg total) by mouth once daily.    predniSONE (DELTASONE) 1 MG tablet Take 2 tablets (2 mg total) by mouth once daily.    sulfamethoxazole-trimethoprim 800-160mg (BACTRIM DS) 800-160 mg Tab Take 1 tablet by mouth 2 (two) times daily. for 7 days     Review of patient's allergies indicates:   Allergen Reactions    Ciprofloxacin Other (See Comments)     Right foot pain and edema, tendonitis    Amlodipine Edema and Swelling     BLE  BLE    Lisinopril Other (See Comments)     cough    Nitrofuran analogues         Review of Systems   Constitutional:  Negative for fever.   Respiratory:  Negative for shortness of breath.    Cardiovascular:  Negative for chest pain.   Gastrointestinal:  Negative for abdominal pain, nausea and vomiting.     Objective:      Vital Signs (Most Recent)  Temp: 98.2 °F (36.8 °C) (06/02/23 1445)  Pulse: 74 (06/02/23 1445)  Resp: 20 (06/02/23 1445)  BP: 137/70 (06/02/23 1445)  SpO2: 96 % (06/02/23 1445)    Vital Signs Range (Last 24H):  Temp:  [98.2 °F (36.8 °C)]   Pulse:  [74]   Resp:   [20]   BP: (137)/(70)   SpO2:  [96 %]     Physical Exam  Cardiovascular:      Rate and Rhythm: Normal rate.   Pulmonary:      Effort: Pulmonary effort is normal.   Neurological:      Mental Status: She is alert and oriented to person, place, and time.         Assessment:      Direct access EGD for iron deficiency anemia and history of GERD      Plan:    Direct access EGD for iron deficiency anemia and history of GERD

## 2023-06-02 NOTE — PROVATION PATIENT INSTRUCTIONS
Discharge Summary/Instructions after an Endoscopic Procedure  Patient Name: Niurka Pedraza  Patient MRN: [id number]  Patient YOB: 1945  Friday, June 2, 2023  Emeka Carney MD  Dear patient,  As a result of recent federal legislation (The Federal Cures Act), you may   receive lab or pathology results from your procedure in your [facility]   account before your physician is able to contact you. Your physician or   their representative will relay the results to you with their   recommendations at their soonest availability.  Thank you,  RESTRICTIONS:  During your procedure today, you received medications for sedation.  These   medications may affect your judgment, balance and coordination.  Therefore,   for 24 hours, you have the following restrictions:   - DO NOT drive a car, operate machinery, make legal/financial decisions,   sign important papers or drink alcohol.    ACTIVITY:  Today: no heavy lifting, straining or running due to procedural   sedation/anesthesia.  The following day: return to full activity including work.  DIET:  Eat and drink normally unless instructed otherwise.     TREATMENT FOR COMMON SIDE EFFECTS:  - Mild abdominal pain, nausea, belching, bloating or excessive gas:  rest,   eat lightly and use a heating pad.  - Sore Throat: treat with throat lozenges and/or gargle with warm salt   water.  - Because air was used during the procedure, expelling large amounts of air   from your rectum or belching is normal.  - If a bowel prep was taken, you may not have a bowel movement for 1-3 days.    This is normal.  SYMPTOMS TO WATCH FOR AND REPORT TO YOUR PHYSICIAN:  1. Abdominal pain or bloating, other than gas cramps.  2. Chest pain.  3. Back pain.  4. Signs of infection such as: chills or fever occurring within 24 hours   after the procedure.  5. Rectal bleeding, which would show as bright red, maroon, or black stools.   (A tablespoon of blood from the rectum is not serious, especially if    hemorrhoids are present.)  6. Vomiting.  7. Weakness or dizziness.  GO DIRECTLY TO THE NEAREST EMERGENCY ROOM IF YOU HAVE ANY OF THE FOLLOWING:      Difficulty breathing              Chills and/or fever over 101 F   Persistent vomiting and/or vomiting blood   Severe abdominal pain   Severe chest pain   Black, tarry stools   Bleeding- more than one tablespoon   Any other symptom or condition that you feel may need urgent attention  Your doctor recommends these additional instructions:  If any biopsies were taken, your doctors clinic will contact you in 1 to 2   weeks with any results.  - Discharge patient to home.   - Follow an antireflux regimen.   - Await pathology results.   - Telephone endoscopist for pathology results in 3 weeks.   - Return to GI clinic.   - Return to referring physician.   - The findings and recommendations were discussed with the patient.  For questions, problems or results please call your physician - Emeka Carney MD at Work:  [phone].  [facility], EMERGENCY ROOM PHONE NUMBER: [phone]  IF A COMPLICATION OR EMERGENCY SITUATION ARISES AND YOU ARE UNABLE TO REACH   YOUR PHYSICIAN - GO DIRECTLY TO THE EMERGENCY ROOM.  Emeka Carney MD  6/2/2023 3:55:25 PM  This report has been verified and signed electronically.  Dear patient,  As a result of recent federal legislation (The Federal Cures Act), you may   receive lab or pathology results from your procedure in your [facility]   account before your physician is able to contact you. Your physician or   their representative will relay the results to you with their   recommendations at their soonest availability.  Thank you,  PROVATION

## 2023-06-02 NOTE — ANESTHESIA PREPROCEDURE EVALUATION
06/02/2023  Niurka Pedraza is a 78 y.o., female here for EGD. States she has very dry throat/mouth.       Patient Active Problem List   Diagnosis    Pulmonary coccidioidomycosis, unspecified    Hyperlipidemia    Recurrent UTI    Essential hypertension    Hypothyroidism    Hearing loss    Hearing loss of right ear    Tinnitus of right ear    Overweight (BMI 25.0-29.9)    Tendonitis, Achilles, right    Fatty liver    Postmenopausal    Acute right ankle pain    Ankle weakness    Balance problem    Microhematuria    OAB (overactive bladder)    Pelvic mass    Elevated CA-125    S/P RATLH/BSO/PLND/Omental Bx (mini lap)    Borderline serous cystadenoma of right ovary    Arthritis of carpometacarpal (CMC) joint of right thumb    Chronic cough    Interstitial lung disease    Dilated bile duct    Ground glass opacity present on imaging of lung    Pulmonary fibrosis    Epistaxis    Urinary tract infection due to extended-spectrum beta lactamase (ESBL) producing Escherichia coli    Immunosuppression due to chronic steroid use    Lesion of bladder    Neuropathy    Osteopenia    Hypoxemia    RLS (restless legs syndrome)    Lung nodule    Shortness of breath    Acute cystitis with hematuria    Symptomatic anemia    Suspected sleep apnea    Neuropathy of both feet    History of ESBL E. coli infection     Past Surgical History:   Procedure Laterality Date    CHOLECYSTECTOMY      COLONOSCOPY N/A 12/9/2022    Procedure: COLONOSCOPY;  Surgeon: Enrique Dominguez MD;  Location: [facility] ([address] [place] [address]);  Service: Endoscopy;  Laterality: N/A;  inst via portal  pt requests after 12/9/22-MS  11/30-pt notified of earlier arrival time-[facility]ospitals in [place]    CYSTOSCOPY WITH BIOPSY OF BLADDER Bilateral 7/27/2022    Procedure: CYSTOSCOPY, WITH BLADDER BIOPSY; retrograde pyelogram,;  Surgeon: Anaya Oropeza,  MD;  Location: [facility] OR;  Service: Urology;  Laterality: Bilateral;    ENDOSCOPIC ULTRASOUND OF UPPER GASTROINTESTINAL TRACT N/A 4/8/2021    Procedure: ULTRASOUND, UPPER GI TRACT, ENDOSCOPIC;  Surgeon: Aisha Barajas MD;  Location: [address]);  Service: Endoscopy;  Laterality: N/A;  UEUS/ERCP evaluation abn MRCP - Dr Carney  Covid-19 test 4/5/21 at [facility] [place] Pre-admit - pg    ERCP N/A 4/8/2021    Procedure: ERCP (ENDOSCOPIC RETROGRADE CHOLANGIOPANCREATOGRAPHY);  Surgeon: Aisha Barajas MD;  Location: [facility] (2ND FLR);  Service: Endoscopy;  Laterality: N/A;  UEUS/ERCP evaluation abn MRCP - Dr Carney  Covid-19 test 4/5/21 at [facility] [place] Pre-admit - pg    ESOPHAGOGASTRODUODENOSCOPY N/A 4/8/2021    Procedure: EGD (ESOPHAGOGASTRODUODENOSCOPY);  Surgeon: Aisha Barajas MD;  Location: [facility] ([address] [place] [address]);  Service: Endoscopy;  Laterality: N/A;  UEUS/ERCP evaluation abn MRCP - Dr Carney  Covid-19 test 4/5/21 at [facility] [place] Pre-admit - pg    HYSTERECTOMY      MASTECTOMY Right     2014     Past Medical History:   Diagnosis Date    Arthritis     Borderline serous cystadenoma of right ovary 4/3/2018    Breast cancer     mastectomy 2014    Coccidiomycosis, progressive     Elevated CA-125 2/25/2018    Hyperlipidemia     OAB (overactive bladder)     Osteopenia     on Dexa 11/2017    Osteoporosis     pt reports hx of this, declined fosamax in past - treated with calcium and vit D    Pelvic mass 2/25/2018    Pulmonary fibrosis     Pulmonary nodules     SOB (shortness of breath) on exertion     Thyroid disease     hypo    UTI (urinary tract infection)          Pre-op Assessment    I have reviewed the Patient Summary Reports.       I have reviewed the Medications.     Review of Systems  Anesthesia Hx:  No problems with previous Anesthesia    Social:  Non-Smoker    Cardiovascular:   Exercise tolerance: good Hypertension    Pulmonary:   Shortness of breath Does not use oxygen   Endocrine:    Hypothyroidism        Physical Exam  General: Well nourished, Alert and Oriented    Airway:  Mallampati: II / I  Mouth Opening: Normal  TM Distance: Normal  Tongue: Normal  Neck ROM: Normal ROM    Dental:  Intact        Anesthesia Plan  Type of Anesthesia, risks & benefits discussed:    Anesthesia Type: Gen Natural Airway  Intra-op Monitoring Plan: Standard ASA Monitors  Induction:  IV  Airway Plan: , Post-Induction  Informed Consent: Informed consent signed with the Patient and all parties understand the risks and agree with anesthesia plan.  All questions answered.   ASA Score: 3  Day of Surgery Review of History & Physical: I have interviewed and examined the patient. I have reviewed the patient's H&P dated: There are no significant changes.     Ready For Surgery From Anesthesia Perspective.     .

## 2023-06-03 ENCOUNTER — PATIENT MESSAGE (OUTPATIENT)
Dept: INFECTIOUS DISEASES | Facility: CLINIC | Age: 78
End: 2023-06-03
Payer: MEDICARE

## 2023-06-03 ENCOUNTER — PATIENT MESSAGE (OUTPATIENT)
Dept: ENDOSCOPY | Facility: HOSPITAL | Age: 78
End: 2023-06-03
Payer: MEDICARE

## 2023-06-08 ENCOUNTER — PATIENT MESSAGE (OUTPATIENT)
Dept: INTERNAL MEDICINE | Facility: CLINIC | Age: 78
End: 2023-06-08
Payer: MEDICARE

## 2023-06-08 DIAGNOSIS — D64.9 SYMPTOMATIC ANEMIA: Primary | ICD-10-CM

## 2023-06-08 LAB
FINAL PATHOLOGIC DIAGNOSIS: NORMAL
GROSS: NORMAL
Lab: NORMAL

## 2023-06-09 ENCOUNTER — LAB VISIT (OUTPATIENT)
Dept: LAB | Facility: OTHER | Age: 78
End: 2023-06-09
Attending: INTERNAL MEDICINE
Payer: MEDICARE

## 2023-06-09 DIAGNOSIS — D64.9 SYMPTOMATIC ANEMIA: ICD-10-CM

## 2023-06-09 LAB
BASOPHILS # BLD AUTO: 0.01 K/UL (ref 0–0.2)
BASOPHILS NFR BLD: 0.2 % (ref 0–1.9)
DIFFERENTIAL METHOD: ABNORMAL
EOSINOPHIL # BLD AUTO: 0 K/UL (ref 0–0.5)
EOSINOPHIL NFR BLD: 0 % (ref 0–8)
ERYTHROCYTE [DISTWIDTH] IN BLOOD BY AUTOMATED COUNT: 17.3 % (ref 11.5–14.5)
HCT VFR BLD AUTO: 33.3 % (ref 37–48.5)
HGB BLD-MCNC: 10.6 G/DL (ref 12–16)
IMM GRANULOCYTES # BLD AUTO: 0.05 K/UL (ref 0–0.04)
IMM GRANULOCYTES NFR BLD AUTO: 1.2 % (ref 0–0.5)
LYMPHOCYTES # BLD AUTO: 1.6 K/UL (ref 1–4.8)
LYMPHOCYTES NFR BLD: 37.9 % (ref 18–48)
MCH RBC QN AUTO: 29 PG (ref 27–31)
MCHC RBC AUTO-ENTMCNC: 31.8 G/DL (ref 32–36)
MCV RBC AUTO: 91 FL (ref 82–98)
MONOCYTES # BLD AUTO: 0.2 K/UL (ref 0.3–1)
MONOCYTES NFR BLD: 5.1 % (ref 4–15)
NEUTROPHILS # BLD AUTO: 2.3 K/UL (ref 1.8–7.7)
NEUTROPHILS NFR BLD: 55.6 % (ref 38–73)
NRBC BLD-RTO: 0 /100 WBC
PLATELET # BLD AUTO: 208 K/UL (ref 150–450)
PMV BLD AUTO: 9.4 FL (ref 9.2–12.9)
RBC # BLD AUTO: 3.65 M/UL (ref 4–5.4)
WBC # BLD AUTO: 4.14 K/UL (ref 3.9–12.7)

## 2023-06-09 PROCEDURE — 85025 COMPLETE CBC W/AUTO DIFF WBC: CPT | Performed by: INTERNAL MEDICINE

## 2023-06-09 PROCEDURE — 36415 COLL VENOUS BLD VENIPUNCTURE: CPT | Performed by: INTERNAL MEDICINE

## 2023-06-14 ENCOUNTER — OFFICE VISIT (OUTPATIENT)
Dept: INTERNAL MEDICINE | Facility: CLINIC | Age: 78
End: 2023-06-14
Payer: MEDICARE

## 2023-06-14 ENCOUNTER — LAB VISIT (OUTPATIENT)
Dept: LAB | Facility: OTHER | Age: 78
End: 2023-06-14
Attending: INTERNAL MEDICINE
Payer: MEDICARE

## 2023-06-14 VITALS
HEIGHT: 64 IN | BODY MASS INDEX: 27.52 KG/M2 | HEART RATE: 85 BPM | SYSTOLIC BLOOD PRESSURE: 98 MMHG | OXYGEN SATURATION: 95 % | DIASTOLIC BLOOD PRESSURE: 60 MMHG | WEIGHT: 161.19 LBS

## 2023-06-14 DIAGNOSIS — R91.8 GROUND GLASS OPACITY PRESENT ON IMAGING OF LUNG: ICD-10-CM

## 2023-06-14 DIAGNOSIS — B96.29 URINARY TRACT INFECTION DUE TO EXTENDED-SPECTRUM BETA LACTAMASE (ESBL) PRODUCING ESCHERICHIA COLI: ICD-10-CM

## 2023-06-14 DIAGNOSIS — M25.571 ACUTE RIGHT ANKLE PAIN: ICD-10-CM

## 2023-06-14 DIAGNOSIS — E66.3 OVERWEIGHT (BMI 25.0-29.9): ICD-10-CM

## 2023-06-14 DIAGNOSIS — R26.89 BALANCE PROBLEM: ICD-10-CM

## 2023-06-14 DIAGNOSIS — N32.9 LESION OF BLADDER: ICD-10-CM

## 2023-06-14 DIAGNOSIS — G62.9 NEUROPATHY: ICD-10-CM

## 2023-06-14 DIAGNOSIS — M76.61 TENDONITIS, ACHILLES, RIGHT: ICD-10-CM

## 2023-06-14 DIAGNOSIS — Z78.0 POSTMENOPAUSAL: ICD-10-CM

## 2023-06-14 DIAGNOSIS — T38.0X5A IMMUNOSUPPRESSION DUE TO CHRONIC STEROID USE: ICD-10-CM

## 2023-06-14 DIAGNOSIS — M18.11 ARTHRITIS OF CARPOMETACARPAL (CMC) JOINT OF RIGHT THUMB: ICD-10-CM

## 2023-06-14 DIAGNOSIS — R29.898 ANKLE WEAKNESS: ICD-10-CM

## 2023-06-14 DIAGNOSIS — Z98.890 S/P ROBOT-ASSISTED SURGICAL PROCEDURE: ICD-10-CM

## 2023-06-14 DIAGNOSIS — H91.91 HEARING LOSS OF RIGHT EAR, UNSPECIFIED HEARING LOSS TYPE: ICD-10-CM

## 2023-06-14 DIAGNOSIS — N39.0 RECURRENT UTI: ICD-10-CM

## 2023-06-14 DIAGNOSIS — R29.818 SUSPECTED SLEEP APNEA: ICD-10-CM

## 2023-06-14 DIAGNOSIS — Z00.00 ENCOUNTER FOR PREVENTIVE HEALTH EXAMINATION: Primary | ICD-10-CM

## 2023-06-14 DIAGNOSIS — D69.6 THROMBOCYTOPENIA: ICD-10-CM

## 2023-06-14 DIAGNOSIS — R91.1 LUNG NODULE: ICD-10-CM

## 2023-06-14 DIAGNOSIS — N39.0 URINARY TRACT INFECTION DUE TO EXTENDED-SPECTRUM BETA LACTAMASE (ESBL) PRODUCING ESCHERICHIA COLI: ICD-10-CM

## 2023-06-14 DIAGNOSIS — H91.90 HEARING LOSS, UNSPECIFIED HEARING LOSS TYPE, UNSPECIFIED LATERALITY: ICD-10-CM

## 2023-06-14 DIAGNOSIS — H93.11 TINNITUS OF RIGHT EAR: ICD-10-CM

## 2023-06-14 DIAGNOSIS — N32.81 OAB (OVERACTIVE BLADDER): ICD-10-CM

## 2023-06-14 DIAGNOSIS — E03.9 HYPOTHYROIDISM, UNSPECIFIED TYPE: ICD-10-CM

## 2023-06-14 DIAGNOSIS — B38.2 PULMONARY COCCIDIOIDOMYCOSIS, UNSPECIFIED: ICD-10-CM

## 2023-06-14 DIAGNOSIS — I70.0 AORTIC ATHEROSCLEROSIS: ICD-10-CM

## 2023-06-14 DIAGNOSIS — D84.821 IMMUNOSUPPRESSION DUE TO CHRONIC STEROID USE: ICD-10-CM

## 2023-06-14 DIAGNOSIS — R19.00 PELVIC MASS: ICD-10-CM

## 2023-06-14 DIAGNOSIS — R06.02 SHORTNESS OF BREATH: ICD-10-CM

## 2023-06-14 DIAGNOSIS — D64.9 ANEMIA, UNSPECIFIED TYPE: ICD-10-CM

## 2023-06-14 DIAGNOSIS — J84.9 ILD (INTERSTITIAL LUNG DISEASE): ICD-10-CM

## 2023-06-14 DIAGNOSIS — J84.9 INTERSTITIAL LUNG DISEASE: ICD-10-CM

## 2023-06-14 DIAGNOSIS — G57.93 NEUROPATHY OF BOTH FEET: Chronic | ICD-10-CM

## 2023-06-14 DIAGNOSIS — C56.1 BORDERLINE SEROUS CYSTADENOMA OF RIGHT OVARY: ICD-10-CM

## 2023-06-14 DIAGNOSIS — K83.8 DILATED BILE DUCT: ICD-10-CM

## 2023-06-14 DIAGNOSIS — G25.81 RLS (RESTLESS LEGS SYNDROME): ICD-10-CM

## 2023-06-14 DIAGNOSIS — M85.80 OSTEOPENIA, UNSPECIFIED LOCATION: ICD-10-CM

## 2023-06-14 DIAGNOSIS — I10 ESSENTIAL HYPERTENSION: ICD-10-CM

## 2023-06-14 DIAGNOSIS — Z79.52 IMMUNOSUPPRESSION DUE TO CHRONIC STEROID USE: ICD-10-CM

## 2023-06-14 DIAGNOSIS — R04.0 EPISTAXIS: ICD-10-CM

## 2023-06-14 DIAGNOSIS — J84.10 PULMONARY FIBROSIS: ICD-10-CM

## 2023-06-14 DIAGNOSIS — Z16.12 URINARY TRACT INFECTION DUE TO EXTENDED-SPECTRUM BETA LACTAMASE (ESBL) PRODUCING ESCHERICHIA COLI: ICD-10-CM

## 2023-06-14 DIAGNOSIS — R09.02 HYPOXEMIA: ICD-10-CM

## 2023-06-14 DIAGNOSIS — E21.3 HYPERPARATHYROIDISM: ICD-10-CM

## 2023-06-14 DIAGNOSIS — R31.29 MICROHEMATURIA: ICD-10-CM

## 2023-06-14 DIAGNOSIS — N30.01 ACUTE CYSTITIS WITH HEMATURIA: ICD-10-CM

## 2023-06-14 DIAGNOSIS — Z86.19 HISTORY OF ESBL E. COLI INFECTION: ICD-10-CM

## 2023-06-14 DIAGNOSIS — R05.3 CHRONIC COUGH: ICD-10-CM

## 2023-06-14 DIAGNOSIS — M25.50 ARTHRALGIA, UNSPECIFIED JOINT: ICD-10-CM

## 2023-06-14 DIAGNOSIS — E78.5 HYPERLIPIDEMIA, UNSPECIFIED HYPERLIPIDEMIA TYPE: ICD-10-CM

## 2023-06-14 DIAGNOSIS — K76.0 FATTY LIVER: ICD-10-CM

## 2023-06-14 LAB
BASOPHILS # BLD AUTO: 0.01 K/UL (ref 0–0.2)
BASOPHILS NFR BLD: 0.3 % (ref 0–1.9)
CRP SERPL-MCNC: 7.2 MG/L (ref 0–8.2)
CRP SERPL-MCNC: 7.2 MG/L (ref 0–8.2)
DIFFERENTIAL METHOD: ABNORMAL
EOSINOPHIL # BLD AUTO: 0 K/UL (ref 0–0.5)
EOSINOPHIL NFR BLD: 0 % (ref 0–8)
ERYTHROCYTE [DISTWIDTH] IN BLOOD BY AUTOMATED COUNT: 18 % (ref 11.5–14.5)
ERYTHROCYTE [SEDIMENTATION RATE] IN BLOOD: 30 MM/HR (ref 0–20)
HCT VFR BLD AUTO: 31 % (ref 37–48.5)
HGB BLD-MCNC: 10.1 G/DL (ref 12–16)
IMM GRANULOCYTES # BLD AUTO: 0.07 K/UL (ref 0–0.04)
IMM GRANULOCYTES NFR BLD AUTO: 2 % (ref 0–0.5)
LYMPHOCYTES # BLD AUTO: 1.5 K/UL (ref 1–4.8)
LYMPHOCYTES NFR BLD: 42.9 % (ref 18–48)
MCH RBC QN AUTO: 29.8 PG (ref 27–31)
MCHC RBC AUTO-ENTMCNC: 32.6 G/DL (ref 32–36)
MCV RBC AUTO: 91 FL (ref 82–98)
MONOCYTES # BLD AUTO: 0.1 K/UL (ref 0.3–1)
MONOCYTES NFR BLD: 2.8 % (ref 4–15)
NEUTROPHILS # BLD AUTO: 1.8 K/UL (ref 1.8–7.7)
NEUTROPHILS NFR BLD: 52 % (ref 38–73)
NRBC BLD-RTO: 0 /100 WBC
PLATELET # BLD AUTO: 168 K/UL (ref 150–450)
PMV BLD AUTO: 9.3 FL (ref 9.2–12.9)
RBC # BLD AUTO: 3.39 M/UL (ref 4–5.4)
RHEUMATOID FACT SERPL-ACNC: <13 IU/ML (ref 0–15)
WBC # BLD AUTO: 3.54 K/UL (ref 3.9–12.7)

## 2023-06-14 PROCEDURE — G0439 PPPS, SUBSEQ VISIT: HCPCS | Mod: ,,, | Performed by: NURSE PRACTITIONER

## 2023-06-14 PROCEDURE — 99999 PR PBB SHADOW E&M-EST. PATIENT-LVL III: ICD-10-PCS | Mod: PBBFAC,,, | Performed by: NURSE PRACTITIONER

## 2023-06-14 PROCEDURE — 86235 NUCLEAR ANTIGEN ANTIBODY: CPT | Mod: 59 | Performed by: INTERNAL MEDICINE

## 2023-06-14 PROCEDURE — 86038 ANTINUCLEAR ANTIBODIES: CPT | Performed by: INTERNAL MEDICINE

## 2023-06-14 PROCEDURE — 85651 RBC SED RATE NONAUTOMATED: CPT | Performed by: INTERNAL MEDICINE

## 2023-06-14 PROCEDURE — 86431 RHEUMATOID FACTOR QUANT: CPT | Performed by: INTERNAL MEDICINE

## 2023-06-14 PROCEDURE — 86140 C-REACTIVE PROTEIN: CPT | Performed by: INTERNAL MEDICINE

## 2023-06-14 PROCEDURE — 86235 NUCLEAR ANTIGEN ANTIBODY: CPT | Performed by: INTERNAL MEDICINE

## 2023-06-14 PROCEDURE — 36415 COLL VENOUS BLD VENIPUNCTURE: CPT | Performed by: INTERNAL MEDICINE

## 2023-06-14 PROCEDURE — G0439 PR MEDICARE ANNUAL WELLNESS SUBSEQUENT VISIT: ICD-10-PCS | Mod: ,,, | Performed by: NURSE PRACTITIONER

## 2023-06-14 PROCEDURE — 85025 COMPLETE CBC W/AUTO DIFF WBC: CPT | Performed by: INTERNAL MEDICINE

## 2023-06-14 PROCEDURE — 99213 OFFICE O/P EST LOW 20 MIN: CPT | Mod: PBBFAC,PN | Performed by: NURSE PRACTITIONER

## 2023-06-14 PROCEDURE — 99999 PR PBB SHADOW E&M-EST. PATIENT-LVL III: CPT | Mod: PBBFAC,,, | Performed by: NURSE PRACTITIONER

## 2023-06-14 NOTE — PATIENT INSTRUCTIONS
Counseling and Referral of Other Preventative  (Italic type indicates deductible and co-insurance are waived)    Patient Name: Niurka Pedraza  Today's Date: 6/14/2023    Health Maintenance       Date Due Completion Date    Hemoglobin A1c (Prediabetes) 06/30/2023 6/30/2022    COVID-19 Vaccine (6 - Moderna series) 09/16/2023 5/16/2023    Colorectal Cancer Screening 12/09/2025 12/9/2022    DEXA Scan 01/19/2026 1/19/2023    Override on 11/6/2014: Done    Lipid Panel 06/30/2027 6/30/2022    TETANUS VACCINE 07/21/2031 7/21/2021    Override on 8/18/2010: Done        No orders of the defined types were placed in this encounter.    The following information is provided to all patients.  This information is to help you find resources for any of the problems found today that may be affecting your health:                Living healthy guide: www.[facility].[place].gov      Understanding Diabetes: www.diabetes.org      Eating healthy: www.cdc.gov/healthyweight      CDC home safety checklist: www.cdc.gov/steadi/patient.html      Agency on Aging: www.goea.[place].[facility]      Alcoholics anonymous (AA): www.aa.org      Physical Activity: www.yuki.nih.gov/wx6imrx      Tobacco use: www.quitwithusla.org

## 2023-06-14 NOTE — PROGRESS NOTES
Gastroenterology Clinic Consultation Note    Reason for Visit:  The primary encounter diagnosis was Constipation, unspecified constipation type. A diagnosis of Anemia, unspecified type was also pertinent to this visit.    PCP:   Savana Anderson         Initial HPI   This is a 78 y.o. female presenting for consideration of video capsule endoscopy. Presented to the ER and was hospitalized 5/23/23 for anemia, cystitis; requiring 3U pRBC transfusion. Further evaluation for her anemia was conducted with EGD (6/2) without any signs of bleeding. Colonoscopy done in December was negative. Presents to me today to discuss video capsule endoscopy. Daughter was present for visit. Overall feeling well. Denies any blood in stool, melena, hematemesis, nausea, vomiting, diarrhea. Does endorse constipation ([place] #1). Not taking anything to help with her constipation. Denies any notable blood in her stool. Most recent labs done yesterday with stable H/H.       ROS:  Review of Systems   Constitutional:  Negative for chills, diaphoresis, fever, malaise/fatigue and weight loss.   HENT:  Negative for sore throat.    Eyes:  Negative for redness.   Gastrointestinal:  Negative for abdominal pain, blood in stool, constipation, diarrhea, heartburn, melena, nausea and vomiting.   Skin:  Negative for itching and rash.   Neurological:  Negative for dizziness, loss of consciousness and weakness.      Medical History:  has a past medical history of Arthritis, Borderline serous cystadenoma of right ovary (04/03/2018), Breast cancer, Coccidiomycosis, progressive, Elevated CA-125 (02/25/2018), Hyperlipidemia, OAB (overactive bladder), Osteopenia, Osteoporosis, Pelvic mass (02/25/2018), Pulmonary fibrosis, Pulmonary nodules, SOB (shortness of breath) on exertion, Thyroid disease, and UTI (urinary tract infection).    Surgical History:  has a past surgical history that  includes Cholecystectomy; Mastectomy (Right); Esophagogastroduodenoscopy (N/A, 04/08/2021); Endoscopic ultrasound of upper gastrointestinal tract (N/A, 04/08/2021); ERCP (N/A, 04/08/2021); Hysterectomy; Cystoscopy with biopsy of bladder (Bilateral, 07/27/2022); Colonoscopy (N/A, 12/09/2022); and Esophagogastroduodenoscopy (N/A, 06/02/2023).    Family History: family history includes Breast cancer in her mother; Cancer in her brother and mother; Heart disease in her father; Hypertension in her brother, father, and son; No Known Problems in her daughter and sister; Stroke in her father..       Review of patient's allergies indicates:   Allergen Reactions    Ciprofloxacin Other (See Comments)     Right foot pain and edema, tendonitis    Amlodipine Edema and Swelling     BLE  BLE    Lisinopril Other (See Comments)     cough    Nitrofuran analogues        Current Outpatient Medications on File Prior to Visit   Medication Sig Dispense Refill    albuterol (VENTOLIN HFA) 90 mcg/actuation inhaler Inhale 1-2 puffs into the lungs every 6 (six) hours as needed for Wheezing or Shortness of Breath. Rescue 18 g 11    ascorbic acid, vitamin C, (VITAMIN C) 250 MG tablet Take 1 tablet (250 mg total) by mouth once daily. 30 tablet 2    atorvastatin (LIPITOR) 20 MG tablet TAKE 1 TABLET BY MOUTH EVERY DAY 90 tablet 2    calcium-vitamin D3 (CALCIUM 500 + D) 500 mg(1,250mg) -200 unit per tablet Take 1 tablet by mouth 2 (two) times daily with meals.      carvediloL (COREG) 6.25 MG tablet TAKE 1 TABLET BY MOUTH TWICE A DAY WITH FOOD 180 tablet 3    estradioL (ESTRACE) 0.01 % (0.1 mg/gram) vaginal cream Place 1 g vaginally twice a week. 42.5 g 11    fluticasone furoate-vilanteroL (BREO ELLIPTA) 100-25 mcg/dose diskus inhaler Inhale 1 puff into the lungs once daily. Controller.  PLEASE NOTE IT IS ONCE A DAY!! 60 each 4    fluticasone furoate-vilanteroL (BREO ELLIPTA) 100-25 mcg/dose diskus inhaler Inhale 1 puff into the lungs once daily.  "Controller 1 each 11    gabapentin (NEURONTIN) 100 MG capsule Take 1 capsule (100 mg total) by mouth every evening. 30 capsule 3    irbesartan (AVAPRO) 300 MG tablet Take 1 tablet (300 mg total) by mouth every evening. 90 tablet 3    lactobacillus acidophilus & bulgar (LACTINEX) 100 million cell packet Take 1 packet (1 each total) by mouth 2 (two) times daily. 60 packet 0    levothyroxine (SYNTHROID) 50 MCG tablet Take 1 tablet (50 mcg total) by mouth before breakfast. 90 tablet 1    montelukast (SINGULAIR) 10 mg tablet TAKE 1 TABLET BY MOUTH EVERY DAY IN THE EVENING 90 tablet 2    multivitamin (THERAGRAN) per tablet Take 1 tablet by mouth once daily.      oxybutynin (DITROPAN XL) 15 MG TR24 Take 1 tablet (15 mg total) by mouth once daily. 30 tablet 11    pantoprazole (PROTONIX) 20 MG tablet Take 1 tablet (20 mg total) by mouth once daily. 30 tablet 2    predniSONE (DELTASONE) 1 MG tablet Take 2 tablets (2 mg total) by mouth once daily. 180 tablet 3    [DISCONTINUED] budesonide-formoterol 80-4.5 mcg (SYMBICORT) 80-4.5 mcg/actuation HFAA Inhale 2 puffs into the lungs 2 (two) times daily as needed. Controller 1 Inhaler 6     No current facility-administered medications on file prior to visit.         Objective Findings:    Vital Signs:  /85   Pulse 86   Ht 5' 4" (1.626 m)   Wt 72.3 kg (159 lb 6.3 oz)   LMP 02/08/2000   SpO2 96%   BMI 27.36 kg/m²   Body mass index is 27.36 kg/m².    Physical Exam:  Physical Exam  Vitals reviewed.   Constitutional:       General: She is not in acute distress.     Appearance: She is normal weight. She is not ill-appearing.   HENT:      Mouth/Throat:      Mouth: Mucous membranes are moist.      Pharynx: Oropharynx is clear.   Eyes:      General: No scleral icterus.  Abdominal:      General: Bowel sounds are normal. There is no distension.      Palpations: Abdomen is soft. There is no mass.      Tenderness: There is no abdominal tenderness.      Hernia: No hernia is present. "   Skin:     General: Skin is warm and dry.      Capillary Refill: Capillary refill takes less than 2 seconds.      Coloration: Skin is not jaundiced or pale.      Findings: No bruising or erythema.   Neurological:      Mental Status: She is alert and oriented to person, place, and time. Mental status is at baseline.           Labs:  Lab Results   Component Value Date    WBC 3.54 (L) 06/14/2023    HGB 10.1 (L) 06/14/2023    HCT 31.0 (L) 06/14/2023     06/14/2023    CRP 7.2 06/14/2023    CRP 7.2 06/14/2023    CHOL 153 06/30/2022    TRIG 67 06/30/2022    HDL 69 06/30/2022    ALKPHOS 142 (H) 05/24/2023    ALT 24 05/24/2023    AST 26 05/24/2023     05/27/2023    K 4.1 05/27/2023     05/27/2023    CREATININE 0.8 05/27/2023    BUN 13 05/27/2023    CO2 22 (L) 05/27/2023    TSH 1.293 10/19/2022    INR 1.0 10/25/2021    HGBA1C 4.9 06/30/2022       Imaging reviewed: No pertinent imaging reviewed.      Endoscopy reviewed: EGD 6/2/2023  Impression:            - Normal examined duodenum. Biopsied.                          - Normal stomach. Biopsied.                          - Normal esophagus.                          - Z-line regular.   Recommendation:        - Discharge patient to home.                          - Follow an antireflux regimen.                          - Await pathology results.                          - Telephone endoscopist for pathology results in 3                          weeks.                          - Return to GI clinic.                          - Return to referring physician.                          - The findings and recommendations were discussed                          with the patient.   Attending Participation:        I personally performed the entire procedure.   Emeka Carney MD   6/2/2023 3:55:25 PM     Colonoscopy 12/9/2022  Impression:            - The examined portion of the ileum was normal.                          - Two 6 to 10 mm polyps in the ascending colon,                           removed with a cold snare. Resected and retrieved.                          - Two 5 to 7 mm polyps in the transverse colon,                          removed with a cold snare. Resected and retrieved.                          - Diverticulosis in the entire examined colon.                          - The examination was otherwise normal on direct                          and retroflexion views.   Recommendation:        - Discharge patient to home.                          - Resume previous diet.                          - Continue present medications.                          - Await pathology results.                          - Repeat colonoscopy in 3 years for surveillance                          based on pathology results.                          - Return to referring physician as previously                          scheduled.   Attending Participation:        I personally performed the entire procedure.   MD Enrique Nails MD   12/9/2022 1:40:41 PM     Assessment:  1. Constipation, unspecified constipation type    2. Anemia, unspecified type             Plan:  Recommended Miralax and Colace to help with bowel movements. Can consider Linzess if OTC recs fail.  Patient would like to defer further endoscopy at this time. Will reach back out to me if she decides to move forward with procedure. Does have pending appts with hem/onc and would like evaluation with them prior to decision for VCE. Procedure explained in detail. Questions answered. Per patient request, will defer at this time. If patient changes her mind she will let me know and we will arrange.   RTC PRN      Thank you for allowing me to participate in this patient's care.    Sincerely,     Nataly Dove NP  Gastroenterology Department  [facility]

## 2023-06-14 NOTE — PROGRESS NOTES
"  Niurka Pedraza presented for a  Medicare AWV and comprehensive Health Risk Assessment today. The following components were reviewed and updated:    Medical history  Family History  Social history  Allergies and Current Medications  Health Risk Assessment  Health Maintenance  Care Team         ** See Completed Assessments for Annual Wellness Visit within the encounter summary.**         The following assessments were completed:  Living Situation  CAGE  Depression Screening  Timed Get Up and Go  Whisper Test  Cognitive Function Screening  Nutrition Screening  ADL Screening  PAQ Screening          Vitals:    06/14/23 1548   BP: 98/60   BP Location: Left arm   Pulse: 85   SpO2: 95%   Weight: 73.1 kg (161 lb 2.5 oz)   Height: 5' 4" (1.626 m)     Body mass index is 27.66 kg/m².    Physical Exam  Vitals reviewed.   Constitutional:       Appearance: She is well-developed.   HENT:      Head: Normocephalic and atraumatic. Not macrocephalic and not microcephalic. No raccoon eyes, Mccurdy's sign, abrasion, contusion, right periorbital erythema, left periorbital erythema or laceration. Hair is normal.      Right Ear: No decreased hearing noted. No laceration, drainage, swelling or tenderness. No middle ear effusion. No foreign body. No mastoid tenderness. No hemotympanum. Tympanic membrane is not injected, scarred, perforated, erythematous, retracted or bulging. Tympanic membrane has normal mobility.      Left Ear: No decreased hearing noted. No laceration, drainage, swelling or tenderness.  No middle ear effusion. No foreign body. No mastoid tenderness. No hemotympanum. Tympanic membrane is not injected, scarred, perforated, erythematous, retracted or bulging. Tympanic membrane has normal mobility.      Nose: Nose normal. No nasal deformity, laceration or mucosal edema.      Mouth/Throat:      Pharynx: Uvula midline.   Eyes:      General: Lids are normal. No scleral icterus.     Conjunctiva/sclera: Conjunctivae normal.   Neck:    "   Thyroid: No thyroid mass or thyromegaly.      Trachea: Trachea normal.   Cardiovascular:      Rate and Rhythm: Normal rate and regular rhythm.   Pulmonary:      Effort: Pulmonary effort is normal. No respiratory distress.      Breath sounds: Normal breath sounds.   Abdominal:      Palpations: Abdomen is soft.   Musculoskeletal:         General: Normal range of motion.      Cervical back: Neck supple. No edema or erythema. No spinous process tenderness or muscular tenderness. Normal range of motion.   Lymphadenopathy:      Head:      Right side of head: No submental, submandibular, tonsillar, preauricular or posterior auricular adenopathy.      Left side of head: No submental, submandibular, tonsillar, preauricular, posterior auricular or occipital adenopathy.   Skin:     General: Skin is warm and dry.   Neurological:      Mental Status: She is alert and oriented to person, place, and time.      Cranial Nerves: No cranial nerve deficit.      Sensory: No sensory deficit.   Psychiatric:         Behavior: Behavior normal.         Thought Content: Thought content normal.         Judgment: Judgment normal.             Diagnoses and health risks identified today and associated recommendations/orders:    1. Encounter for preventive health examination  Annual Health Risk Assessment (HRA) visit today.  Counseling and referral of health maintenance and preventative health measures performed.  Patient given annual wellness paperwork to take home.  Encouraged to return in 1 year for subsequent HRA visit.     2. Aortic atherosclerosis  Chronic. Stable. Continue current treatment plan as previously prescribed by PCP.    3. Thrombocytopenia  Chronic. Stable. Continue current treatment plan as previously prescribed by PCP.    4. Hyperparathyroidism  Chronic. Stable. Continue current treatment plan as previously prescribed by PCP.    5. Tinnitus of right ear  Chronic. Stable. Continue current treatment plan as previously prescribed  by PCP.    6. Hearing loss of right ear, unspecified hearing loss type  Chronic. Stable. Continue current treatment plan as previously prescribed by PCP.    7. Hearing loss, unspecified hearing loss type, unspecified laterality  Chronic. Stable. Continue current treatment plan as previously prescribed by PCP.    8. Epistaxis  Chronic. Stable. Continue current treatment plan as previously prescribed by PCP.    9. RLS (restless legs syndrome)  Chronic. Stable. Continue current treatment plan as previously prescribed by PCP.    10. Neuropathy of both feet  Chronic. Stable. Continue current treatment plan as previously prescribed by PCP.    11. Neuropathy  Chronic. Stable. Continue current treatment plan as previously prescribed by PCP.    12. Chronic cough  Chronic. Stable. Continue current treatment plan as previously prescribed by PCP.    13. Ground glass opacity present on imaging of lung  Chronic. Stable. Continue current treatment plan as previously prescribed by PCP.    14. Hypoxemia  Chronic. Stable. Continue current treatment plan as previously prescribed by PCP.    15. Interstitial lung disease  Chronic. Stable. Continue current treatment plan as previously prescribed by PCP.    16. Lung nodule  Chronic. Stable. Continue current treatment plan as previously prescribed by PCP.    17. Pulmonary fibrosis  Chronic. Stable. Continue current treatment plan as previously prescribed by PCP.    18. Shortness of breath  Chronic. Stable. Continue current treatment plan as previously prescribed by PCP.    19. Essential hypertension  Chronic. Stable. Controlled. Encouraged to increase exercise as tolerated (moderate-intensity aerobic activity and muscle-strengthening activities) improve diet to heart healthy, low sodium diet.  Continue current treatment plan as previously prescribed by PCP.    20. Hyperlipidemia, unspecified hyperlipidemia type  Chronic. Stable. Continue current treatment plan as previously prescribed by  PCP    21. Acute cystitis with hematuria  Chronic. Stable. Continue current treatment plan as previously prescribed by PCP.    22. Lesion of bladder  Chronic. Stable. Continue current treatment plan as previously prescribed by PCP.    23. Microhematuria  Chronic. Stable. Continue current treatment plan as previously prescribed by PCP.    24. OAB (overactive bladder)  Chronic. Stable. Continue current treatment plan as previously prescribed by PCP.    25. Postmenopausal  Chronic. Stable. Continue current treatment plan as previously prescribed by PCP.    26. Recurrent UTI  Chronic. Stable. Continue current treatment plan as previously prescribed by PCP.    27. Urinary tract infection due to extended-spectrum beta lactamase (ESBL) producing Escherichia coli  Chronic. Stable. Continue current treatment plan as previously prescribed by PCP.    28. History of ESBL E. coli infection  Chronic. Stable. Continue current treatment plan as previously prescribed by PCP.    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 Stable. Continue current treatment plan as previously prescribed by PCP.    30. Immunosuppression due to chronic steroid use  Chronic. Stable. Continue current treatment plan as previously prescribed by PCP.    31. Borderline serous cystadenoma of right ovary  Chronic. Stable. Continue current treatment plan as previously prescribed by PCP.    32. Hypothyroidism, unspecified type  Chronic. Stable. Continue current treatment plan as previously prescribed by PCP.    33. Overweight (BMI 25.0-29.9)  Chronic. Stable. Encouraged to increase exercise as tolerated and improve diet to heart healthy, low sodium diet. Continue current treatment plan as previously prescribed by PCP.    34. Dilated bile duct  Chronic. Stable. Continue current treatment plan as previously prescribed by PCP.    35. Fatty liver  Chronic. Stable. Continue current treatment plan as previously prescribed by PCP.    36. Pelvic  mass  Chronic. Stable. Continue current treatment plan as previously prescribed by PCP.    37. Acute right ankle pain  Chronic. Stable. Continue current treatment plan as previously prescribed by PCP.    38. Ankle weakness  Chronic. Stable. Continue current treatment plan as previously prescribed by PCP.    39. Arthritis of carpometacarpal (CMC) joint of right thumb  Chronic. Stable. Continue current treatment plan as previously prescribed by PCP.    40. Osteopenia, unspecified location  Chronic. Stable. Continue current treatment plan as previously prescribed by PCP.    41. Tendonitis, Achilles, right  Chronic. Stable. Continue current treatment plan as previously prescribed by PCP.    42. S/P RATLH/BSO/PLND/Omental Bx (mini lap)  Chronic. Stable. Continue current treatment plan as previously prescribed by PCP.    43. Balance problem  Chronic. Stable. Continue current treatment plan as previously prescribed by PCP.    44. Suspected sleep apnea  Chronic. Stable. Continue current treatment plan as previously prescribed by PCP.      Provided Niurka with a 5-10 year written screening schedule and personal prevention plan. Recommendations were developed using the USPSTF age appropriate recommendations. Education, counseling, and referrals were provided as needed. After Visit Summary printed and given to patient which includes a list of additional screenings\tests needed.      I offered to discuss end of life issues, including information on how to make advance directives that the patient could use to name someone who would make medical decisions on their behalf if they became too ill to make themselves.    ___Patient declined  _X_Patient is interested, I provided paper work and offered to discuss.    Follow up in about 1 year (around 6/14/2024).    APOLINAR Lamar offered to discuss advanced care planning, including how to pick a person who would make decisions for you if you were unable to make them for yourself,  called a health care power of , and what kind of decisions you might make such as use of life sustaining treatments such as ventilators and tube feeding when faced with a life limiting illness recorded on a living will that they will need to know. (How you want to be cared for as you near the end of your natural life)     X Patient is interested in learning more about how to make advanced directives.  I provided them paperwork and offered to discuss this with them.

## 2023-06-15 ENCOUNTER — OFFICE VISIT (OUTPATIENT)
Dept: GASTROENTEROLOGY | Facility: CLINIC | Age: 78
End: 2023-06-15
Payer: MEDICARE

## 2023-06-15 VITALS
HEART RATE: 86 BPM | WEIGHT: 159.38 LBS | HEIGHT: 64 IN | BODY MASS INDEX: 27.21 KG/M2 | OXYGEN SATURATION: 96 % | SYSTOLIC BLOOD PRESSURE: 118 MMHG | DIASTOLIC BLOOD PRESSURE: 85 MMHG

## 2023-06-15 DIAGNOSIS — D64.9 ANEMIA, UNSPECIFIED TYPE: ICD-10-CM

## 2023-06-15 DIAGNOSIS — K59.00 CONSTIPATION, UNSPECIFIED CONSTIPATION TYPE: Primary | ICD-10-CM

## 2023-06-15 LAB — ANA SER QL IF: NORMAL

## 2023-06-15 PROCEDURE — 99214 PR OFFICE/OUTPT VISIT, EST, LEVL IV, 30-39 MIN: ICD-10-PCS | Mod: S$PBB,,,

## 2023-06-15 PROCEDURE — 99214 OFFICE O/P EST MOD 30 MIN: CPT | Mod: PBBFAC

## 2023-06-15 PROCEDURE — 99214 OFFICE O/P EST MOD 30 MIN: CPT | Mod: S$PBB,,,

## 2023-06-15 PROCEDURE — 99999 PR PBB SHADOW E&M-EST. PATIENT-LVL IV: ICD-10-PCS | Mod: PBBFAC,,,

## 2023-06-15 PROCEDURE — 99999 PR PBB SHADOW E&M-EST. PATIENT-LVL IV: CPT | Mod: PBBFAC,,,

## 2023-06-16 LAB
ANTI SM/RNP ANTIBODY: 0.13 RATIO (ref 0–0.99)
ANTI-SM/RNP INTERPRETATION: NEGATIVE
ENA SCL70 IGG SER IA-ACNC: <0.2 U

## 2023-06-19 ENCOUNTER — EXTERNAL HOME HEALTH (OUTPATIENT)
Dept: HOME HEALTH SERVICES | Facility: HOSPITAL | Age: 78
End: 2023-06-19
Payer: MEDICARE

## 2023-06-19 LAB — ENA SCL70 AB SER-ACNC: 0.6 U/ML

## 2023-06-20 DIAGNOSIS — I10 HYPERTENSION, BENIGN: ICD-10-CM

## 2023-06-20 RX ORDER — CARVEDILOL 6.25 MG/1
6.25 TABLET ORAL 2 TIMES DAILY WITH MEALS
Qty: 180 TABLET | Refills: 0 | Status: SHIPPED | OUTPATIENT
Start: 2023-06-20 | End: 2023-09-21 | Stop reason: SDUPTHER

## 2023-06-20 NOTE — TELEPHONE ENCOUNTER
Refill Decision Note   Niurka Pedraza  is requesting a refill authorization.  Brief Assessment and Rationale for Refill:  Approve     Medication Therapy Plan:  Patient was in the ED for UTI. FOVS    Medication Reconciliation Completed: No   Comments:     No Care Gaps recommended.     Note composed:12:37 PM 06/20/2023

## 2023-06-20 NOTE — TELEPHONE ENCOUNTER
No care due was identified.  [facility] Embedded Care Due Messages. Reference number: [id number].   6/20/2023 10:49:34 AM CDT

## 2023-06-21 ENCOUNTER — DOCUMENT SCAN (OUTPATIENT)
Dept: HOME HEALTH SERVICES | Facility: HOSPITAL | Age: 78
End: 2023-06-21
Payer: MEDICARE

## 2023-06-22 ENCOUNTER — LAB VISIT (OUTPATIENT)
Dept: LAB | Facility: OTHER | Age: 78
End: 2023-06-22
Attending: INTERNAL MEDICINE
Payer: MEDICARE

## 2023-06-22 DIAGNOSIS — E78.5 HYPERLIPIDEMIA, UNSPECIFIED HYPERLIPIDEMIA TYPE: ICD-10-CM

## 2023-06-22 DIAGNOSIS — R73.9 HYPERGLYCEMIA: ICD-10-CM

## 2023-06-22 DIAGNOSIS — E03.9 HYPOTHYROIDISM, UNSPECIFIED TYPE: ICD-10-CM

## 2023-06-22 LAB
ANION GAP SERPL CALC-SCNC: 8 MMOL/L (ref 8–16)
BUN SERPL-MCNC: 19 MG/DL (ref 8–23)
CALCIUM SERPL-MCNC: 8.7 MG/DL (ref 8.7–10.5)
CHLORIDE SERPL-SCNC: 106 MMOL/L (ref 95–110)
CHOLEST SERPL-MCNC: 131 MG/DL (ref 120–199)
CHOLEST/HDLC SERPL: 3 {RATIO} (ref 2–5)
CO2 SERPL-SCNC: 24 MMOL/L (ref 23–29)
CREAT SERPL-MCNC: 0.8 MG/DL (ref 0.5–1.4)
EST. GFR  (NO RACE VARIABLE): >60 ML/MIN/1.73 M^2
ESTIMATED AVG GLUCOSE: 100 MG/DL (ref 68–131)
GLUCOSE SERPL-MCNC: 114 MG/DL (ref 70–110)
HBA1C MFR BLD: 5.1 % (ref 4–5.6)
HDLC SERPL-MCNC: 43 MG/DL (ref 40–75)
HDLC SERPL: 32.8 % (ref 20–50)
LDLC SERPL CALC-MCNC: 64.4 MG/DL (ref 63–159)
NONHDLC SERPL-MCNC: 88 MG/DL
POTASSIUM SERPL-SCNC: 3.9 MMOL/L (ref 3.5–5.1)
SODIUM SERPL-SCNC: 138 MMOL/L (ref 136–145)
TRIGL SERPL-MCNC: 118 MG/DL (ref 30–150)
TSH SERPL DL<=0.005 MIU/L-ACNC: 3.35 UIU/ML (ref 0.4–4)

## 2023-06-22 PROCEDURE — 36415 COLL VENOUS BLD VENIPUNCTURE: CPT | Performed by: INTERNAL MEDICINE

## 2023-06-22 PROCEDURE — 80048 BASIC METABOLIC PNL TOTAL CA: CPT | Performed by: INTERNAL MEDICINE

## 2023-06-22 PROCEDURE — 83036 HEMOGLOBIN GLYCOSYLATED A1C: CPT | Performed by: INTERNAL MEDICINE

## 2023-06-22 PROCEDURE — 80061 LIPID PANEL: CPT | Performed by: INTERNAL MEDICINE

## 2023-06-22 PROCEDURE — 84443 ASSAY THYROID STIM HORMONE: CPT | Performed by: INTERNAL MEDICINE

## 2023-06-26 NOTE — HOSPITAL COURSE
Admitted with symptomatic anemia which was negative for hemolysis, had recent Cscope and was planned to follow up with GI for possible upper endoscopy or capsule study. Had UTI with h/o ESBL and ID was consulted, the patient was treated with ertapenem.

## 2023-06-26 NOTE — DISCHARGE SUMMARY
[facility] Medicine  Discharge Summary      Patient Name: Niurka Pedraza  MRN: [id number]  CARLEEN: [id number]  Patient Class: IP- Inpatient  Admission Date: 5/23/2023  Hospital Length of Stay: 2 days  Discharge Date and Time: 5/26/2023  6:12 PM  Attending Physician: No att. providers found   Discharging Provider: Salvador Gilmore MD  Primary Care Provider: Savana Anderson MD    Primary Care Team: Networked reference to record PCT     HPI:   Niurka Pedraza is a 78 year old lady with hx of recurrent UTI with MDRO, interstitial lung disease with chronic cough, pulmonary fibrosis, HTN, HLD, hypothyroidism, immunosuppression due to chronic steroid use referred to ED from PCP for a few complaints. 1) worsening dysuria and frequency 2) worsening SOB, with acute CARDOZO over the past 3-4 days with Pox 94% on RA (baseline 95-97% on RA) 3) Worsening cough over the last 1 month that cause her so much fatigue that she cannot partake in activities 4) Worsening neuropathy in bilateral lower extremities, chronic.     Patient reports last UTI was in Oct 2022. Sees outpatient urology and had cystoscopy done. Patient reports urine now is brownish, and she has suprapubic tenderness and dysuria. No fever and chills. Patient reports she started having SOB and worsening cough with brownish sputum since 1 month ago. Previously had sick contacts in family. Patient reports that since getting started on antibiotics from PCP, sputum has improved from brown to yellowish to clear. She is concerned about the new onset CARDOZO and SOB on rest. Patient has on baseline has been on azithromycin 3x/week, montelukast daily and prednisone 2mg daily for pulmonary fibrosis and has started taking azithromycin daily yesterday PTA.     Afebrile, HR 99, RR 22, /73, Pox 95% on RA on arrival. H/H 7.3/22.9, 3 points lower than H/H 10.8/33.6 on 4/19/23. Plt at baseline. No leukocytosis on arrival but neutropenic today 3.28K. BNP and Trop I  normal. Cr, BUN 0.9, 12 respectively, normal. . UA nitrites positive, 3+ leukocytes, >100WBC. CXR with no new superimposed focal consolidation. Patient was given IV Ertapenam 1g in ED (5/23/23) for UTI, and IV methylprednisolone 125mg with Duonebs for SOB. Patient also received 1 unit of PRBC but recheck today H/H remains at 7.9/24.3. Patient now requires 2L O2 NC to maintain sats above 95%. Urine cultures are in process. Patient denies any hemetemesis, bleeding per vagina or rectum or wounds anywhere. Patient reports last colonoscopy was done in Dec 2022 and had some polyps clipped and biopsied which were benign. Has a upcoming scheduled EGD.      Patient is upgraded from EDOU to inpatient status with Hospital Medicine for management of UTI, new onset CARDOZO and resting SOB and evaluation of symptomatic anemia. Consults are placed for GI and pulmonology.       * No surgery found *      Hospital Course:   Admitted with symptomatic anemia which was negative for hemolysis, had recent Cscope and was planned to follow up with GI for possible upper endoscopy or capsule study. Had UTI with h/o ESBL and ID was consulted, the patient was treated with ertapenem.       Goals of Care Treatment Preferences:  Code Status: Full Code    Living Will: Yes              Consults:   Consults (From admission, onward)        Status Ordering Provider     Inpatient consult to Infectious Diseases  Once        Provider:  (Not yet assigned)    Completed VERONA BOSWELL     Inpatient consult to Pulmonology  Once        Provider:  Barbara Hernandez MD    Completed TERI PEÑA new Assessment & Plan notes have been filed under this hospital service since the last note was generated.  Service: Hospital Medicine    Final Active Diagnoses:    Diagnosis Date Noted POA    PRINCIPAL PROBLEM:  Shortness of breath [R06.02] 05/24/2023 Yes    Neuropathy of both feet [G57.93] 05/25/2023 Yes     Chronic    History of ESBL E. coli infection  [Z86.19] 05/25/2023 Yes    Acute cystitis with hematuria [N30.01] 05/24/2023 Yes    Symptomatic anemia [D64.9] 05/24/2023 Yes    Suspected sleep apnea [R29.818] 05/24/2023 Yes    Immunosuppression due to chronic steroid use [D84.821, T38.0X5A, Z79.52] 03/16/2022 Not Applicable    Pulmonary fibrosis [J84.10] 12/13/2021 Yes    Interstitial lung disease [J84.9] 11/10/2020 Yes    Chronic cough [R05.3] 11/10/2020 Yes    Essential hypertension [I10] 11/22/2017 Yes    Hypothyroidism [E03.9] 11/22/2017 Yes    Hyperlipidemia [E78.5] 12/11/2016 Yes    Recurrent UTI [N39.0] 12/11/2016 Yes      Problems Resolved During this Admission:       Discharged Condition: good    Disposition: Home or Self Care    Follow Up:    Patient Instructions:      Diet Cardiac     Notify your health care provider if you experience any of the following:  temperature >100.4     Notify your health care provider if you experience any of the following:  difficulty breathing or increased cough     Activity as tolerated       Significant Diagnostic Studies: N/A    Pending Diagnostic Studies:     None         Medications:  Reconciled Home Medications:      Medication List      START taking these medications    ascorbic acid (vitamin C) 250 MG tablet  Commonly known as: VITAMIN C  Take 1 tablet (250 mg total) by mouth once daily.        CONTINUE taking these medications    albuterol 90 mcg/actuation inhaler  Commonly known as: VENTOLIN HFA  Inhale 1-2 puffs into the lungs every 6 (six) hours as needed for Wheezing or Shortness of Breath. Rescue     atorvastatin 20 MG tablet  Commonly known as: LIPITOR  TAKE 1 TABLET BY MOUTH EVERY DAY     calcium-vitamin D3 500 mg-5 mcg (200 unit) per tablet  Commonly known as: CALCIUM 500 + D  Take 1 tablet by mouth 2 (two) times daily with meals.     estradioL 0.01 % (0.1 mg/gram) vaginal cream  Commonly known as: ESTRACE  Place 1 g vaginally twice a week.     fluticasone furoate-vilanteroL 100-25 mcg/dose  diskus inhaler  Commonly known as: BREO ELLIPTA  Inhale 1 puff into the lungs once daily. Controller.  PLEASE NOTE IT IS ONCE A DAY!!     gabapentin 100 MG capsule  Commonly known as: NEURONTIN  Take 1 capsule (100 mg total) by mouth every evening.     irbesartan 300 MG tablet  Commonly known as: AVAPRO  Take 1 tablet (300 mg total) by mouth every evening.     levothyroxine 50 MCG tablet  Commonly known as: SYNTHROID  Take 1 tablet (50 mcg total) by mouth before breakfast.     montelukast 10 mg tablet  Commonly known as: SINGULAIR  TAKE 1 TABLET BY MOUTH EVERY DAY IN THE EVENING     multivitamin per tablet  Commonly known as: THERAGRAN  Take 1 tablet by mouth once daily.     oxybutynin 15 MG Tr24  Commonly known as: DITROPAN XL  Take 1 tablet (15 mg total) by mouth once daily.     pantoprazole 20 MG tablet  Commonly known as: PROTONIX  Take 1 tablet (20 mg total) by mouth once daily.     predniSONE 1 MG tablet  Commonly known as: DELTASONE  Take 2 tablets (2 mg total) by mouth once daily.        STOP taking these medications    azithromycin 250 MG tablet  Commonly known as: Z-SEB        ASK your doctor about these medications    LACTINEX 100 million cell packet  Generic drug: lactobacillus acidophilus & bulgar  Take 1 packet (1 each total) by mouth 2 (two) times daily.  Ask about: Should I take this medication?            Indwelling Lines/Drains at time of discharge:   Lines/Drains/Airways     None                 Time spent on the discharge of patient: 25 minutes         Salvador Gilmore MD  Department of Hospital Medicine  [facility]

## 2023-06-28 LAB — RNAP III AB SER-ACNC: <20 UNITS

## 2023-06-29 ENCOUNTER — DOCUMENT SCAN (OUTPATIENT)
Dept: HOME HEALTH SERVICES | Facility: HOSPITAL | Age: 78
End: 2023-06-29
Payer: MEDICARE

## 2023-07-02 NOTE — PROGRESS NOTES
Niurka - your EGD pathology was benign no evidence of celiac sprue no H pylori    1.  DUODENUM, BIOPSY:   - Duodenal mucosa with no significant histopathologic abnormality   - No evidence of intraepithelial lymphocytosis or villous blunting/atrophy     2.  STOMACH, BIOPSY:   - Gastric mucosa with mild chronic inflammation   - No evidence of Helicobacter-like organisms (a properly controlled H. pylori immunohistochemical stain is negative)   Comment: Interp By John Slater M.D., Signed on 06/08/2023

## 2023-07-06 ENCOUNTER — HOSPITAL ENCOUNTER (EMERGENCY)
Facility: OTHER | Age: 78
Discharge: HOME | End: 2023-07-06
Attending: EMERGENCY MEDICINE
Payer: MEDICARE

## 2023-07-06 VITALS
WEIGHT: 162 LBS | SYSTOLIC BLOOD PRESSURE: 145 MMHG | TEMPERATURE: 99 F | RESPIRATION RATE: 15 BRPM | DIASTOLIC BLOOD PRESSURE: 74 MMHG | OXYGEN SATURATION: 96 % | BODY MASS INDEX: 27.66 KG/M2 | HEIGHT: 64 IN | HEART RATE: 69 BPM

## 2023-07-06 DIAGNOSIS — R06.02 SOB (SHORTNESS OF BREATH): ICD-10-CM

## 2023-07-06 DIAGNOSIS — I10 PRIMARY HYPERTENSION: Primary | ICD-10-CM

## 2023-07-06 DIAGNOSIS — R03.0 ELEVATED BLOOD PRESSURE READING: ICD-10-CM

## 2023-07-06 DIAGNOSIS — N30.00 ACUTE CYSTITIS WITHOUT HEMATURIA: ICD-10-CM

## 2023-07-06 DIAGNOSIS — R42 DIZZINESS: ICD-10-CM

## 2023-07-06 LAB
ABO + RH BLD: NORMAL
ALBUMIN SERPL BCP-MCNC: 3.8 G/DL (ref 3.5–5.2)
ALP SERPL-CCNC: 43 U/L (ref 55–135)
ALT SERPL W/O P-5'-P-CCNC: 11 U/L (ref 10–44)
ANION GAP SERPL CALC-SCNC: 10 MMOL/L (ref 8–16)
ANISOCYTOSIS BLD QL SMEAR: SLIGHT
AST SERPL-CCNC: 16 U/L (ref 10–40)
BACTERIA #/AREA URNS HPF: ABNORMAL /HPF
BACTERIA GENITAL QL WET PREP: ABNORMAL
BASOPHILS # BLD AUTO: 0 K/UL (ref 0–0.2)
BASOPHILS NFR BLD: 0 % (ref 0–1.9)
BILIRUB SERPL-MCNC: 0.9 MG/DL (ref 0.1–1)
BILIRUB UR QL STRIP: NEGATIVE
BLD GP AB SCN CELLS X3 SERPL QL: NORMAL
BUN SERPL-MCNC: 17 MG/DL (ref 8–23)
CALCIUM SERPL-MCNC: 9.5 MG/DL (ref 8.7–10.5)
CHLORIDE SERPL-SCNC: 105 MMOL/L (ref 95–110)
CLARITY UR: ABNORMAL
CLUE CELLS VAG QL WET PREP: ABNORMAL
CO2 SERPL-SCNC: 24 MMOL/L (ref 23–29)
COLOR UR: YELLOW
CREAT SERPL-MCNC: 0.9 MG/DL (ref 0.5–1.4)
DIFFERENTIAL METHOD: ABNORMAL
EOSINOPHIL # BLD AUTO: 0 K/UL (ref 0–0.5)
EOSINOPHIL NFR BLD: 0 % (ref 0–8)
ERYTHROCYTE [DISTWIDTH] IN BLOOD BY AUTOMATED COUNT: 19.5 % (ref 11.5–14.5)
EST. GFR  (NO RACE VARIABLE): >60 ML/MIN/1.73 M^2
FILAMENT FUNGI VAG WET PREP-#/AREA: ABNORMAL
GLUCOSE SERPL-MCNC: 117 MG/DL (ref 70–110)
GLUCOSE UR QL STRIP: NEGATIVE
HCT VFR BLD AUTO: 29.5 % (ref 37–48.5)
HGB BLD-MCNC: 9.4 G/DL (ref 12–16)
HGB UR QL STRIP: NEGATIVE
IMM GRANULOCYTES # BLD AUTO: 0.06 K/UL (ref 0–0.04)
IMM GRANULOCYTES NFR BLD AUTO: 2.2 % (ref 0–0.5)
KETONES UR QL STRIP: NEGATIVE
LEUKOCYTE ESTERASE UR QL STRIP: ABNORMAL
LYMPHOCYTES # BLD AUTO: 1.5 K/UL (ref 1–4.8)
LYMPHOCYTES NFR BLD: 56.4 % (ref 18–48)
MCH RBC QN AUTO: 29.5 PG (ref 27–31)
MCHC RBC AUTO-ENTMCNC: 31.9 G/DL (ref 32–36)
MCV RBC AUTO: 93 FL (ref 82–98)
MICROSCOPIC COMMENT: ABNORMAL
MONOCYTES # BLD AUTO: 0.1 K/UL (ref 0.3–1)
MONOCYTES NFR BLD: 3.7 % (ref 4–15)
NEUTROPHILS # BLD AUTO: 1 K/UL (ref 1.8–7.7)
NEUTROPHILS NFR BLD: 37.7 % (ref 38–73)
NITRITE UR QL STRIP: POSITIVE
NRBC BLD-RTO: 0 /100 WBC
PH UR STRIP: 6 [PH] (ref 5–8)
PLATELET # BLD AUTO: 170 K/UL (ref 150–450)
PLATELET BLD QL SMEAR: ABNORMAL
PMV BLD AUTO: 9.2 FL (ref 9.2–12.9)
POTASSIUM SERPL-SCNC: 4.1 MMOL/L (ref 3.5–5.1)
PROT SERPL-MCNC: 7.3 G/DL (ref 6–8.4)
PROT UR QL STRIP: NEGATIVE
RBC # BLD AUTO: 3.19 M/UL (ref 4–5.4)
SODIUM SERPL-SCNC: 139 MMOL/L (ref 136–145)
SP GR UR STRIP: 1.01 (ref 1–1.03)
SPECIMEN OUTDATE: NORMAL
SPECIMEN SOURCE: ABNORMAL
SQUAMOUS #/AREA URNS HPF: 2 /HPF
T VAGINALIS GENITAL QL WET PREP: ABNORMAL
TROPONIN I SERPL DL<=0.01 NG/ML-MCNC: <0.006 NG/ML (ref 0–0.03)
URN SPEC COLLECT METH UR: ABNORMAL
UROBILINOGEN UR STRIP-ACNC: NEGATIVE EU/DL
WBC # BLD AUTO: 2.73 K/UL (ref 3.9–12.7)
WBC #/AREA URNS HPF: 12 /HPF (ref 0–5)
WBC #/AREA VAG WET PREP: ABNORMAL
YEAST GENITAL QL WET PREP: ABNORMAL

## 2023-07-06 PROCEDURE — 93005 ELECTROCARDIOGRAM TRACING: CPT

## 2023-07-06 PROCEDURE — 80053 COMPREHEN METABOLIC PANEL: CPT

## 2023-07-06 PROCEDURE — 87086 URINE CULTURE/COLONY COUNT: CPT

## 2023-07-06 PROCEDURE — 81000 URINALYSIS NONAUTO W/SCOPE: CPT

## 2023-07-06 PROCEDURE — 25000003 PHARM REV CODE 250

## 2023-07-06 PROCEDURE — 99285 EMERGENCY DEPT VISIT HI MDM: CPT | Mod: 25

## 2023-07-06 PROCEDURE — 85025 COMPLETE CBC W/AUTO DIFF WBC: CPT

## 2023-07-06 PROCEDURE — 96360 HYDRATION IV INFUSION INIT: CPT

## 2023-07-06 PROCEDURE — 93010 ELECTROCARDIOGRAM REPORT: CPT | Mod: ,,, | Performed by: INTERNAL MEDICINE

## 2023-07-06 PROCEDURE — 93010 EKG 12-LEAD: ICD-10-PCS | Mod: ,,, | Performed by: INTERNAL MEDICINE

## 2023-07-06 PROCEDURE — 87088 URINE BACTERIA CULTURE: CPT

## 2023-07-06 PROCEDURE — 87077 CULTURE AEROBIC IDENTIFY: CPT

## 2023-07-06 PROCEDURE — 87210 SMEAR WET MOUNT SALINE/INK: CPT

## 2023-07-06 PROCEDURE — 87186 SC STD MICRODIL/AGAR DIL: CPT

## 2023-07-06 PROCEDURE — 86900 BLOOD TYPING SEROLOGIC ABO: CPT

## 2023-07-06 PROCEDURE — 84484 ASSAY OF TROPONIN QUANT: CPT

## 2023-07-06 RX ORDER — SULFAMETHOXAZOLE AND TRIMETHOPRIM 800; 160 MG/1; MG/1
1 TABLET ORAL 2 TIMES DAILY
Qty: 14 TABLET | Refills: 0 | Status: SHIPPED | OUTPATIENT
Start: 2023-07-06 | End: 2023-07-13

## 2023-07-06 RX ORDER — SODIUM CHLORIDE 9 MG/ML
1000 INJECTION, SOLUTION INTRAVENOUS
Status: COMPLETED | OUTPATIENT
Start: 2023-07-06 | End: 2023-07-06

## 2023-07-06 RX ORDER — SULFAMETHOXAZOLE AND TRIMETHOPRIM 800; 160 MG/1; MG/1
1 TABLET ORAL
Status: COMPLETED | OUTPATIENT
Start: 2023-07-06 | End: 2023-07-06

## 2023-07-06 RX ADMIN — SULFAMETHOXAZOLE AND TRIMETHOPRIM 1 TABLET: 800; 160 TABLET ORAL at 10:07

## 2023-07-06 RX ADMIN — SODIUM CHLORIDE 1000 ML: 9 INJECTION, SOLUTION INTRAVENOUS at 08:07

## 2023-07-07 ENCOUNTER — PATIENT MESSAGE (OUTPATIENT)
Dept: INTERNAL MEDICINE | Facility: CLINIC | Age: 78
End: 2023-07-07
Payer: MEDICARE

## 2023-07-07 DIAGNOSIS — R09.02 HYPOXEMIA: ICD-10-CM

## 2023-07-07 DIAGNOSIS — R06.02 SOB (SHORTNESS OF BREATH): Primary | ICD-10-CM

## 2023-07-07 NOTE — ED TRIAGE NOTES
Pt c/o dizziness today accompanied by high BP readings in the 170s systolic, PMH pulmonary fibrosis, HTN, states has been med compliant with BP meds.

## 2023-07-07 NOTE — DISCHARGE INSTRUCTIONS
Please start Bactrim for UTI.  Still awaiting results of urine culture.  Please follow-up with urology for UTI and repeat testing. Can also retest urine with PCP. Please follow up with PCP for blood pressure medication management. Continue home blood pressure medications as prescribed.  Make sure you stay hydrated and continue good nutrition.  Talk to your doctor about nebulizer.

## 2023-07-07 NOTE — ED PROVIDER NOTES
"Encounter Date: 7/6/2023       History     Chief Complaint   Patient presents with    Hypertension     Report increase in BP even after taking medication, reports dizziness and sob. Denies chest pain, nausea and vomiting at this time.      78-year-old female with history of hypertension, hyperlipidemia, pulmonary fibrosis, frequent UTIs, thyroid disease, and history of shortness a breath with exertion presents to the emergency department for evaluation of elevated blood pressure the last few days.  Patient states that she has been compliant with blood pressure medications.  States that she was talking on the phone today with her family and started to feel off balance.  States that she checked her blood pressure and it was elevated.  Patient states her systolic pressures is usually around "110." She currently reports feeling off balance and short of breath.  States that her shortness of breath is worse than usual.  No relief of symptoms with Breo inhaler or albuterol inhaler, last used prior to arrival.  She has been experiencing a dry cough.  She is currently being evaluated for coccidiomycosis by pulmonology and last had appointment yesterday.  Patient states that more labs are being ordered to evaluate coccidiomycosis and she was given a referral to Hematology. Does report few episodes of non bloody diarrhea a few days ago but notes symptom resolved after taking Pepto-bismol. She is also experiencing dysuria and urinary frequency the past few days. She does have history of frequent UTIs. She denies fever, chills, headaches, vision changes, dizziness, paresthesias, weakness, congestion, rhinorrhea, sore throat, chest pain, palpitations, leg swelling, abdominal pain, nausea, pr vomiting.     The history is provided by the patient (daughter at bedside).   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    Breast cancer     mastectomy 2014    Coccidiomycosis, progressive     Elevated CA-125 02/25/2018    Hyperlipidemia     OAB (overactive bladder)     Osteopenia     on Dexa 11/2017    Osteoporosis     pt reports hx of this, declined fosamax in past - treated with calcium and vit D    Pelvic mass 02/25/2018    Pulmonary fibrosis     Pulmonary nodules     SOB (shortness of breath) on exertion     Thyroid disease     hypo    UTI (urinary tract infection)      Past Surgical History:   Procedure Laterality Date    CHOLECYSTECTOMY      COLONOSCOPY N/A 12/09/2022    Procedure: COLONOSCOPY;  Surgeon: Enrique Dominguez MD;  Location: [facility] (4TH FLR);  Service: Endoscopy;  Laterality: N/A;  inst via portal  pt requests after 12/9/22-MS  11/30-pt notified of earlier arrival time-KPvt    CYSTOSCOPY WITH BIOPSY OF BLADDER Bilateral 07/27/2022    Procedure: CYSTOSCOPY, WITH BLADDER BIOPSY; retrograde pyelogram,;  Surgeon: Anaya Oropeza MD;  Location: [facility];  Service: Urology;  Laterality: Bilateral;    ENDOSCOPIC ULTRASOUND OF UPPER GASTROINTESTINAL TRACT N/A 04/08/2021    Procedure: ULTRASOUND, UPPER GI TRACT, ENDOSCOPIC;  Surgeon: Aisha Barajas MD;  Location: [facility] (2ND FLR);  Service: Endoscopy;  Laterality: N/A;  UEUS/ERCP evaluation abn MRCP - Dr Angelika Shah test 4/5/21 at [facility] Pre-admit - pg    ERCP N/A 04/08/2021    Procedure: ERCP (ENDOSCOPIC RETROGRADE CHOLANGIOPANCREATOGRAPHY);  Surgeon: Aisha Barajas MD;  Location: [facility] (2ND FLR);  Service: Endoscopy;  Laterality: N/A;  UEUS/ERCP evaluation abn MRCP - Dr Angelika Shah test 4/5/21 at [facility] Pre-admit - pg    ESOPHAGOGASTRODUODENOSCOPY N/A 04/08/2021    Procedure: EGD (ESOPHAGOGASTRODUODENOSCOPY);  Surgeon:  Aisha Barajas MD;  Location: [facility] (2ND FLR);  Service: Endoscopy;  Laterality: N/A;  UEUS/ERCP evaluation abn MRCP - Dr Carney  Covid-19 test 21 at [facility] Pre-admit - pg    ESOPHAGOGASTRODUODENOSCOPY N/A 2023    Procedure: EGD (ESOPHAGOGASTRODUODENOSCOPY);  Surgeon: Emeka Carney MD;  Location: [facility] (4TH FLR);  Service: Endoscopy;  Laterality: N/A;  She has  history of seromucinous borderline tumor of the ovary. We generally follow  and CEA tumor markers. Her CEA recently became slightly elevated. CT imaging negative and colonoscopy negative.     Talita Barrios    instructions portal-LW  pre    HYSTERECTOMY      MASTECTOMY Right          Family History   Problem Relation Age of Onset    Cancer Mother         colon cancer    Breast cancer Mother     Hypertension Father     Heart disease Father     Stroke Father     No Known Problems Sister     Cancer Brother         lung, ++ tobacco    Hypertension Brother     No Known Problems Daughter     Hypertension Son     Colon cancer Neg Hx     Ovarian cancer Neg Hx      Social History     Tobacco Use    Smoking status: Former     Packs/day: 0.50     Years: 30.00     Pack years: 15.00     Types: Cigarettes     Start date:      Quit date: 2000     Years since quittin.4    Smokeless tobacco: Never    Tobacco comments:     quit in her 50s, after 30 years smoking;   Substance Use Topics    Alcohol use: No    Drug use: No     Review of Systems   Constitutional:  Negative for chills and fever.   HENT:  Negative for congestion, rhinorrhea and sore throat.    Respiratory:  Positive for cough and shortness of breath.    Cardiovascular:  Negative for chest pain.   Gastrointestinal:  Positive for diarrhea (resolved). Negative for abdominal pain, nausea and vomiting.   Genitourinary:  Positive for dysuria and frequency. Negative for flank pain, hematuria, menstrual problem, urgency, vaginal bleeding and vaginal discharge.    Musculoskeletal:  Positive for gait problem (off balance). Negative for back pain.   Skin:  Negative for rash.   Neurological:  Negative for dizziness and headaches.   Psychiatric/Behavioral:  Negative for confusion.      Physical Exam     Initial Vitals [07/06/23 1923]   BP Pulse Resp Temp SpO2   (!) 182/85 82 18 98.5 °F (36.9 °C) 100 %      MAP       --         Physical Exam    Nursing note and vitals reviewed.  Constitutional: She appears well-developed and well-nourished. No distress.   HENT:   Head: Normocephalic and atraumatic.   Mouth/Throat: Oropharynx is clear and moist.   Eyes: Conjunctivae and EOM are normal.   Neck: Neck supple.   Normal range of motion.  Cardiovascular:  Normal rate, regular rhythm, normal heart sounds and intact distal pulses.           Pulses:       Radial pulses are 2+ on the right side and 2+ on the left side.        Dorsalis pedis pulses are 2+ on the right side and 2+ on the left side.   Pulmonary/Chest: Breath sounds normal. No respiratory distress. She has no wheezes. She has no rhonchi. She has no rales. She exhibits no tenderness.   Abdominal: Abdomen is soft. Bowel sounds are normal. She exhibits no distension and no mass. There is no abdominal tenderness. There is no rebound and no guarding.   Musculoskeletal:         General: No edema. Normal range of motion.      Cervical back: Normal range of motion and neck supple.      Comments: No leg edema.  No calf tenderness to palpation.  No CVA tenderness to palpation.     Neurological: She is alert and oriented to person, place, and time. She has normal strength.   Skin: Skin is warm and dry. No rash noted. No erythema.   Psychiatric: She has a normal mood and affect. Her behavior is normal. Judgment and thought content normal.       ED Course   Procedures  Labs Reviewed   VAGINAL SCREEN - Abnormal; Notable for the following components:       Result Value    Bacteria - Vaginal Screen Rare (*)     All other components within  normal limits    Narrative:     Release to patient->Immediate   CBC W/ AUTO DIFFERENTIAL - Abnormal; Notable for the following components:    WBC 2.73 (*)     RBC 3.19 (*)     Hemoglobin 9.4 (*)     Hematocrit 29.5 (*)     MCHC 31.9 (*)     RDW 19.5 (*)     Immature Granulocytes 2.2 (*)     Gran # (ANC) 1.0 (*)     Immature Grans (Abs) 0.06 (*)     Mono # 0.1 (*)     Gran % 37.7 (*)     Lymph % 56.4 (*)     Mono % 3.7 (*)     All other components within normal limits   COMPREHENSIVE METABOLIC PANEL - Abnormal; Notable for the following components:    Glucose 117 (*)     Alkaline Phosphatase 43 (*)     All other components within normal limits   URINALYSIS, REFLEX TO URINE CULTURE - Abnormal; Notable for the following components:    Appearance, UA Cloudy (*)     Nitrite, UA Positive (*)     Leukocytes, UA Trace (*)     All other components within normal limits    Narrative:     Specimen Source->Urine   URINALYSIS MICROSCOPIC - Abnormal; Notable for the following components:    WBC, UA 12 (*)     Bacteria Moderate (*)     All other components within normal limits    Narrative:     Specimen Source->Urine   CULTURE, URINE   TROPONIN I   TYPE & SCREEN     EKG Readings: (Independently Interpreted)   Initial Reading: No STEMI.   Normal sinus rhythm at a rate of 75.  No ST or T-wave changes.  Normal axis.     Imaging Results              X-Ray Chest AP Portable (Final result)  Result time 07/06/23 21:07:40   Procedure changed from X-Ray Chest PA And Lateral     Final result by Kirsten Snow MD (07/06/23 21:07:40)                   Impression:      No appreciable acute lung parenchymal or pleural abnormality noting diffuse interstitial chronic lung opacities and appreciable nodules are not significantly changed as compared with most recent exam 05/23/2023.      Electronically signed by: Kirsten Snow  Date:    07/06/2023  Time:    21:07               Narrative:    EXAMINATION:  XR CHEST AP PORTABLE    CLINICAL  HISTORY:  sob; Shortness of breath    TECHNIQUE:  Single frontal view of the chest was performed.    COMPARISON:  CT chest 05/15/2023 and 05/23/2023, 04/19/2023, 10/25/2021, 10/24/2021, 10/30/2017 chest x-rays    FINDINGS:  The cardiac silhouette is stable and not significantly enlarged.  There are diffuse reticulonodular bilateral opacities again noted as seen on multiple previous examinations.  There is no acute pleural effusion or pneumothorax.  A relatively dominant nodular focus over the right mid lung field remains stable measuring 12 mm.  Multiple nodular foci are identified on prior CT and described as being stable.    There is no visualized acute osseous abnormality with degenerative changes of the spine and shoulders noted.                                    X-Rays:   Independently Interpreted Readings:   Chest X-Ray: Normal heart size. No new infiltrate, effusion, pneumothorax.  Today's chest x-ray looks similar to previous chest x-ray on 05/23/2023.   Medications   0.9%  NaCl infusion (0 mLs Intravenous Stopped 7/6/23 2209)   sulfamethoxazole-trimethoprim 800-160mg per tablet 1 tablet (1 tablet Oral Given 7/6/23 2205)     Medical Decision Making:   Initial Assessment:   Urgent evaluation of 78-year-old female who presents with elevated blood pressure reading the last few days.  Has been compliant with both of her blood pressure medications.  Also experiencing dry cough for weeks, intermittent shortness of breath for the last few days, dysuria, and urinary frequency.  During my exam, her blood pressure is 167/74.  She is well-appearing and nontoxic.  No respiratory distress.  Speaking in full sentences.  Lungs are clear to auscultation.  Oxygen saturation 96% on room air.  No leg edema or calf tenderness.  Neurovascularly intact.  No focal neurological deficits.  Plan for labs, EKG, and chest x-ray.  Will give IV fluids.  Clinical Tests:   Lab Tests: Ordered and Reviewed  Radiological Study: Ordered and  Reviewed  Medical Tests: Ordered and Reviewed  ED Management:  No acute findings on chest x-ray.  Looks similar to previous chest x-ray on 05/23/2023.  EKG unremarkable.  On review of labs, no leukocytosis.  Hemoglobin 9.4 today.  Was 10.1 three weeks ago.  Patient currently denies shortness of breath, chest pain, palpitations, headaches, dizziness, light-headedness, or vision changes at rest.  Glucose elevated at 117.  Rest of CMP unremarkable.  UA does reveal trace leukocytes with nitrites.  Moderate bacteria with 12 white blood cells and only 2 squamous epithelial cells on high-power field.  On further questioning, patient states that she was treated with IV ertapenem, Bactrim, and fluconazole when she was diagnosed with her last UTI in May.  States that urine cultures in May revealed Candida tropicalis.  Patient states she did complete all antibiotics but was never retested to evaluate if UTI had cleared.  Patient states that her urinary symptoms improved significantly but never completely resolved.  She does not have yeast in her urine today.  Case discussed with supervising physician.  Agrees to start the patient on Bactrim for today's UTI.  Will await urine cultures to result.  Will have her follow-up with Urology and her PCP.  I updated the patient and her daughter on all results and plan of care.  Given that her pharmacy is now closed, will give dose of Bactrim here in the ED. Advised her to  Bactrim from the pharmacy tomorrow. Patient verbalized understanding and agreement with this plan of care. She was given specific return precautions. Advised to follow up with PCP as needed for further management of UTI and hypertension. All questions and concerns addressed. Her blood pressure has down trended to 145/74 without antihypertensives in the ED.  She is stable for discharge.            ED Course as of 07/07/23 0144   Thu Jul 06, 2023   2135 Microscopic Comment: SEE COMMENT [CL]      ED Course User  Index  [CL] Mk Gilmore PA-C                 Clinical Impression:   Final diagnoses:  [R42] Dizziness  [R06.02] SOB (shortness of breath)  [I10] Primary hypertension (Primary)  [R03.0] Elevated blood pressure reading  [N30.00] Acute cystitis without hematuria        ED Disposition Condition    Discharge Stable          ED Prescriptions       Medication Sig Dispense Start Date End Date Auth. Provider    sulfamethoxazole-trimethoprim 800-160mg (BACTRIM DS) 800-160 mg Tab Take 1 tablet by mouth 2 (two) times daily. for 7 days 14 tablet 7/6/2023 7/13/2023 Mk Gilmore PA-C          Follow-up Information       Follow up With Specialties Details Why Contact Info    Savana Anderson MD Internal Medicine  As needed, If symptoms worsen [address]  [facility] [address]  [phone]      [facility] Emergency Dept Emergency Medicine  As needed, If symptoms worsen [address] [facility] [id number] [phone]             Mk Gilmore PA-C  07/07/23 0144       Mk Gilmore PA-C  07/07/23 0145

## 2023-07-08 LAB
ANTI-PM/SCL AB: <20 UNITS
ANTI-SS-A 52 KD AB, IGG: <20 UNITS
EJ AB SER QL: NEGATIVE
ENA JO1 AB SER IA-ACNC: <20 UNITS
ENA SM+RNP AB SER IA-ACNC: <20 UNITS
FIBRILLARIN (U3 RNP): NEGATIVE
KU AB SER QL: NEGATIVE
MDA-5 (P140): <20 UNITS
MI2 AB SER QL: NEGATIVE
NXP-2 (P140): <20 UNITS
OJ AB SER QL: NEGATIVE
PL12 AB SER QL: NEGATIVE
PL7 AB SER QL: NEGATIVE
SRP AB SERPL QL: NEGATIVE
TIF1 GAMMA (P155/140): <20 UNITS
U2 SNRNP: NEGATIVE

## 2023-07-10 ENCOUNTER — TELEPHONE (OUTPATIENT)
Dept: HEMATOLOGY/ONCOLOGY | Facility: CLINIC | Age: 78
End: 2023-07-10
Payer: MEDICARE

## 2023-07-10 DIAGNOSIS — D69.6 THROMBOCYTOPENIA: Primary | ICD-10-CM

## 2023-07-10 RX ORDER — ALBUTEROL SULFATE 0.63 MG/3ML
0.63 SOLUTION RESPIRATORY (INHALATION) EVERY 6 HOURS PRN
Qty: 75 ML | Refills: 0 | Status: SHIPPED | OUTPATIENT
Start: 2023-07-10 | End: 2023-07-11

## 2023-07-10 NOTE — NURSING
Received message from Dr. Cazares regarding patient to be seen by Hematology for anemia. Patient declined first avaialble appt on 7/14 with Dr. Morrison. Patient accepted next available on 7/17 at 1:00 with Dr. Morrison. Date, time, and location confirmed with patient.

## 2023-07-10 NOTE — TELEPHONE ENCOUNTER
No care due was identified.  Health [facility] Embedded Care Due Messages. Reference number: [id number].   7/10/2023 8:03:44 AM CDT

## 2023-07-11 NOTE — TELEPHONE ENCOUNTER
Signed orders for duonebs and neb machine orders from refill encounter from today 7/11/2023    - agree with follow up apt for HTN - please help arrange  - did she reach out to her infectious disease doctor who treats her urinary tract infections?

## 2023-07-12 ENCOUNTER — OFFICE VISIT (OUTPATIENT)
Dept: INFECTIOUS DISEASES | Facility: CLINIC | Age: 78
End: 2023-07-12
Payer: MEDICARE

## 2023-07-12 ENCOUNTER — PATIENT MESSAGE (OUTPATIENT)
Dept: INFECTIOUS DISEASES | Facility: CLINIC | Age: 78
End: 2023-07-12

## 2023-07-12 VITALS
HEART RATE: 78 BPM | DIASTOLIC BLOOD PRESSURE: 74 MMHG | WEIGHT: 162.06 LBS | HEIGHT: 64 IN | SYSTOLIC BLOOD PRESSURE: 117 MMHG | TEMPERATURE: 99 F | BODY MASS INDEX: 27.67 KG/M2

## 2023-07-12 DIAGNOSIS — Z16.12 ESBL (EXTENDED SPECTRUM BETA-LACTAMASE) PRODUCING BACTERIA INFECTION: Primary | ICD-10-CM

## 2023-07-12 DIAGNOSIS — A49.9 ESBL (EXTENDED SPECTRUM BETA-LACTAMASE) PRODUCING BACTERIA INFECTION: Primary | ICD-10-CM

## 2023-07-12 DIAGNOSIS — N39.0 RECURRENT UTI: ICD-10-CM

## 2023-07-12 PROCEDURE — 99213 OFFICE O/P EST LOW 20 MIN: CPT | Mod: PBBFAC | Performed by: INTERNAL MEDICINE

## 2023-07-12 PROCEDURE — 99999 PR PBB SHADOW E&M-EST. PATIENT-LVL III: ICD-10-PCS | Mod: PBBFAC,,, | Performed by: INTERNAL MEDICINE

## 2023-07-12 PROCEDURE — 99214 OFFICE O/P EST MOD 30 MIN: CPT | Mod: S$PBB,,, | Performed by: INTERNAL MEDICINE

## 2023-07-12 PROCEDURE — 99999 PR PBB SHADOW E&M-EST. PATIENT-LVL III: CPT | Mod: PBBFAC,,, | Performed by: INTERNAL MEDICINE

## 2023-07-12 PROCEDURE — 99214 PR OFFICE/OUTPT VISIT, EST, LEVL IV, 30-39 MIN: ICD-10-PCS | Mod: S$PBB,,, | Performed by: INTERNAL MEDICINE

## 2023-07-12 RX ORDER — AMOXICILLIN 500 MG/1
500 CAPSULE ORAL 3 TIMES DAILY
Qty: 15 CAPSULE | Refills: 0 | Status: SHIPPED | OUTPATIENT
Start: 2023-07-12 | End: 2023-07-17

## 2023-07-12 NOTE — PROGRESS NOTES
"INFECTIOUS DISEASE CLINIC  7/12/23     Subjective:      Chief Complaint: + urine culture      History of Present Illness:    Patient Niurka Pedraza is a 78 y.o. female who presents today for + urine culture. Says culture was done in ED, was there for hypertension. Was given bactrim x 5 days. Reports urinary freq, burning has persisted. No improvement of freq on bacrim. Freq x weeks. Says "all my life i've had uti infection". Denies cva tenderness or fever. Feels like she has uti and wants to get treated. Would prefer to come to infusion center rather than get picc and home iv abx. Uses estrogen cream. Used to have issues with constpation, now regular bm      Estab with urology, Dr Oropeza  "Cysto with similar appearance of "plaques" on bladder wall. "      Renal US 6/2022  Right kidney: The right kidney measures 9.9 cm. No cortical thinning. No loss of corticomedullary distinction. Resistive index measures 0.75.  No mass. No renal stone. No hydronephrosis.     Left kidney: The left kidney measures 9.8 cm. No cortical thinning. No loss of corticomedullary distinction. Resistive index measures 0.72.  No mass. No renal stone. No hydronephrosis.     The bladder is partially distended at the time of scanning and has an unremarkable appearance.  On initial imaging, bladder volume measures 83 mL.  Postvoid volume measures 51.4 mL.          17 wbc on UA yesterday, 10 squam  Component Ref Range & Units 1 d ago  (7/11/23) 1 d ago  (7/11/23) 6 d ago  (7/6/23) 1 mo ago  (5/27/23) 1 mo ago  (5/23/23) 1 yr ago  (6/17/22) 1 yr ago  (5/31/22)   Squam Epithel, UA /hpf 10  10  2  10  20  25  12        Urine   0 Result Notes      Component 1 d ago   Urine Culture, Routine      Abnormal   GRAM NEGATIVE KAIA   >100,000 cfu/ml   Identification and susceptibility pending   P      Urine Culture, Routine      Abnormal   ENTEROCOCCUS SPECIES   > 100,000 cfu/ml   Identification and susceptibility pending   P      Resulting Agency [facility]    "           From [place] in [place] 1985    Worked at [employer], retired. Research. neurochemistry     Review of Symptoms:  Constitutional: Denies fevers, chills, or weakness.  ENT: Denies dysphagia, nasal discharge, ear pain or discharge.  Cardiovascular: Denies chest pain, palpitations, orthopnea, or claudication.  Respiratory: Denies shortness of breath, cough, hemoptysis, or wheezing.  GI: Denies nausea/vomitting, hematochezia, melena, abd pain, or changes in appetite.  : Denies dysuria, incontinence, or hematuria.  Musculoskeletal: Denies joint pain or myalgias.  Skin/breast: Denies rashes, lumps, lesions, or discharge.  Neurologic: Denies headache, dizziness, vertigo, or paresthesias.    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    Breast cancer     mastectomy 2014    Coccidiomycosis, progressive     Elevated CA-125 02/25/2018    Hyperlipidemia     OAB (overactive bladder)     Osteopenia     on Dexa 11/2017    Osteoporosis     pt reports hx of this, declined fosamax in past - treated with calcium and vit D    Pelvic mass 02/25/2018    Pulmonary fibrosis     Pulmonary nodules     SOB (shortness of breath) on exertion     Thyroid disease     hypo    UTI (urinary tract infection)        Past Surgical History:   Procedure Laterality Date    CHOLECYSTECTOMY      COLONOSCOPY N/A 12/09/2022    Procedure: COLONOSCOPY;  Surgeon: Enrique Dominguez MD;  Location: [address]);  Service: Endoscopy;  Laterality: N/A;  inst via portal  pt requests after 12/9/22-MS  11/30-pt notified of earlier arrival time-KPvt    CYSTOSCOPY WITH BIOPSY OF BLADDER Bilateral 07/27/2022    Procedure: CYSTOSCOPY, WITH BLADDER BIOPSY; retrograde pyelogram,;  Surgeon: Anaya Oropeza MD;  Location: [facility];  Service: Urology;  Laterality: Bilateral;    ENDOSCOPIC ULTRASOUND OF UPPER GASTROINTESTINAL TRACT N/A 04/08/2021    Procedure: ULTRASOUND, UPPER GI TRACT,  ENDOSCOPIC;  Surgeon: Aisha Barajas MD;  Location: [facility] (2ND FLR);  Service: Endoscopy;  Laterality: N/A;  UEUS/ERCP evaluation abn MRCP - Dr Carney  Covid-19 test 4/5/21 at [facility] Pre-admit - pg    ERCP N/A 04/08/2021    Procedure: ERCP (ENDOSCOPIC RETROGRADE CHOLANGIOPANCREATOGRAPHY);  Surgeon: Aisha Barajas MD;  Location: [facility] (2ND FLR);  Service: Endoscopy;  Laterality: N/A;  UEUS/ERCP evaluation abn MRCP - Dr Carney  Covid-19 test 4/5/21 at [facility] Pre-admit - pg    ESOPHAGOGASTRODUODENOSCOPY N/A 04/08/2021    Procedure: EGD (ESOPHAGOGASTRODUODENOSCOPY);  Surgeon: Aisha Barajas MD;  Location: [facility] (2ND FLR);  Service: Endoscopy;  Laterality: N/A;  UEUS/ERCP evaluation abn MRCP - Dr Carney  Covid-19 test 4/5/21 at [facility] Pre-admit - pg    ESOPHAGOGASTRODUODENOSCOPY N/A 06/02/2023    Procedure: EGD (ESOPHAGOGASTRODUODENOSCOPY);  Surgeon: Emeka Carney MD;  Location: [facility] (4TH FLR);  Service: Endoscopy;  Laterality: N/A;  She has  history of seromucinous borderline tumor of the ovary. We generally follow  and CEA tumor markers. Her CEA recently became slightly elevated. CT imaging negative and colonoscopy negative.     Talita Barrios    instructions portal-[facility]  pre    HYSTERECTOMY      MASTECTOMY Right     2014       Family History   Problem Relation Age of Onset    Cancer Mother         colon cancer    Breast cancer Mother     Hypertension Father     Heart disease Father     Stroke Father     No Known Problems Sister     Cancer Brother         lung, ++ tobacco    Hypertension Brother     No Known Problems Daughter     Hypertension Son     Colon cancer Neg Hx     Ovarian cancer Neg Hx        Social History     Socioeconomic History    Marital status:     Number of children: 3   Occupational History    Occupation: retired from [employer], [employer]     Comment: neuro chemistry   Tobacco Use    Smoking status: Former     Packs/day: 0.50      Years: 30.00     Pack years: 15.00     Types: Cigarettes     Start date:      Quit date: 2000     Years since quittin.4    Smokeless tobacco: Never    Tobacco comments:     quit in her 50s, after 30 years smoking;   Substance and Sexual Activity    Alcohol use: No    Drug use: No    Sexual activity: Not Currently     Partners: Male   Social History Narrative    From [place]     Moved to [facility] in  for research    Moved to [place] for period of time    Moved back to [place] Summer 2016 and then to [facility] this year 2017     Social Determinants of Health     Financial Resource Strain: Low Risk     Difficulty of Paying Living Expenses: Not hard at all   Food Insecurity: No Food Insecurity    Worried About Running Out of Food in the Last Year: Never true    Ran Out of Food in the Last Year: Never true   Transportation Needs: No Transportation Needs    Lack of Transportation (Medical): No    Lack of Transportation (Non-Medical): No   Stress: No Stress Concern Present    Feeling of Stress : Not at all   Social Connections: Socially Isolated    Frequency of Communication with Friends and Family: More than three times a week    Frequency of Social Gatherings with Friends and Family: More than three times a week    Attends [employer] Services: Never    Active Member of Clubs or Organizations: No    Attends Club or Organization Meetings: Never    Marital Status:    Housing Stability: Low Risk     Unable to Pay for Housing in the Last Year: No    Number of Places Lived in the Last Year: 1    Unstable Housing in the Last Year: No       Review of patient's allergies indicates:   Allergen Reactions    Ciprofloxacin Other (See Comments)     Right foot pain and edema, tendonitis    Amlodipine Edema and Swelling     BLE  BLE    Lisinopril Other (See Comments)     cough    Nitrofuran analogues          Objective:   /74 (BP Location: Left arm, Patient Position: Sitting)   Pulse 78   Temp 98.8 °F  "(37.1 °C) (Oral)   Ht 5' 4" (1.626 m)   Wt 73.5 kg (162 lb 0.6 oz)   LMP 02/08/2000   BMI 27.81 kg/m²     General: Afebrile, alert, comfortable, no acute distress.   HEENT:  no scleral icterus.  Pulmonary: Non labored  Extremities: Moves all extremities x 4.   Skin: No jaundice, rashes, or visible lesions.   Neurological:  Alert and oriented x 4.     Labs:    Glucose   Date Value Ref Range Status   07/06/2023 117 (H) 70 - 110 mg/dL Final   06/22/2023 114 (H) 70 - 110 mg/dL Final   05/27/2023 106 70 - 110 mg/dL Final       Calcium   Date Value Ref Range Status   07/06/2023 9.5 8.7 - 10.5 mg/dL Final   06/22/2023 8.7 8.7 - 10.5 mg/dL Final   05/27/2023 8.9 8.7 - 10.5 mg/dL Final       Albumin   Date Value Ref Range Status   07/06/2023 3.8 3.5 - 5.2 g/dL Final   05/24/2023 2.7 (L) 3.5 - 5.2 g/dL Final   05/23/2023 2.8 (L) 3.5 - 5.2 g/dL Final       Total Protein   Date Value Ref Range Status   07/06/2023 7.3 6.0 - 8.4 g/dL Final   05/24/2023 7.1 6.0 - 8.4 g/dL Final   05/23/2023 7.3 6.0 - 8.4 g/dL Final       Sodium   Date Value Ref Range Status   07/06/2023 139 136 - 145 mmol/L Final   06/22/2023 138 136 - 145 mmol/L Final   05/27/2023 137 136 - 145 mmol/L Final       Potassium   Date Value Ref Range Status   07/06/2023 4.1 3.5 - 5.1 mmol/L Final   06/22/2023 3.9 3.5 - 5.1 mmol/L Final   05/27/2023 4.1 3.5 - 5.1 mmol/L Final       CO2   Date Value Ref Range Status   07/06/2023 24 23 - 29 mmol/L Final   06/22/2023 24 23 - 29 mmol/L Final   05/27/2023 22 (L) 23 - 29 mmol/L Final       Chloride   Date Value Ref Range Status   07/06/2023 105 95 - 110 mmol/L Final   06/22/2023 106 95 - 110 mmol/L Final   05/27/2023 104 95 - 110 mmol/L Final       BUN   Date Value Ref Range Status   07/06/2023 17 8 - 23 mg/dL Final   06/22/2023 19 8 - 23 mg/dL Final   05/27/2023 13 8 - 23 mg/dL Final       Creatinine   Date Value Ref Range Status   07/06/2023 0.9 0.5 - 1.4 mg/dL Final   06/22/2023 0.8 0.5 - 1.4 mg/dL Final   05/27/2023 " 0.8 0.5 - 1.4 mg/dL Final       Alkaline Phosphatase   Date Value Ref Range Status   07/06/2023 43 (L) 55 - 135 U/L Final   05/24/2023 142 (H) 55 - 135 U/L Final   05/23/2023 139 (H) 55 - 135 U/L Final       ALT   Date Value Ref Range Status   07/06/2023 11 10 - 44 U/L Final   05/24/2023 24 10 - 44 U/L Final   05/23/2023 26 10 - 44 U/L Final       AST   Date Value Ref Range Status   07/06/2023 16 10 - 40 U/L Final   05/24/2023 26 10 - 40 U/L Final   05/23/2023 26 10 - 40 U/L Final       Total Bilirubin   Date Value Ref Range Status   07/06/2023 0.9 0.1 - 1.0 mg/dL Final     Comment:     For infants and newborns, interpretation of results should be based  on gestational age, weight and in agreement with clinical  observations.    Premature Infant recommended reference ranges:  Up to 24 hours.............<8.0 mg/dL  Up to 48 hours............<12.0 mg/dL  3-5 days..................<15.0 mg/dL  6-29 days.................<15.0 mg/dL     05/24/2023 1.3 (H) 0.1 - 1.0 mg/dL Final     Comment:     For infants and newborns, interpretation of results should be based  on gestational age, weight and in agreement with clinical  observations.    Premature Infant recommended reference ranges:  Up to 24 hours.............<8.0 mg/dL  Up to 48 hours............<12.0 mg/dL  3-5 days..................<15.0 mg/dL  6-29 days.................<15.0 mg/dL     05/23/2023 1.4 (H) 0.1 - 1.0 mg/dL Final     Comment:     For infants and newborns, interpretation of results should be based  on gestational age, weight and in agreement with clinical  observations.    Premature Infant recommended reference ranges:  Up to 24 hours.............<8.0 mg/dL  Up to 48 hours............<12.0 mg/dL  3-5 days..................<15.0 mg/dL  6-29 days.................<15.0 mg/dL         WBC   Date Value Ref Range Status   07/06/2023 2.73 (L) 3.90 - 12.70 K/uL Final   06/14/2023 3.54 (L) 3.90 - 12.70 K/uL Final   06/09/2023 4.14 3.90 - 12.70 K/uL Final        Hemoglobin   Date Value Ref Range Status   07/06/2023 9.4 (L) 12.0 - 16.0 g/dL Final   06/14/2023 10.1 (L) 12.0 - 16.0 g/dL Final   06/09/2023 10.6 (L) 12.0 - 16.0 g/dL Final       Hematocrit   Date Value Ref Range Status   07/06/2023 29.5 (L) 37.0 - 48.5 % Final   06/14/2023 31.0 (L) 37.0 - 48.5 % Final   06/09/2023 33.3 (L) 37.0 - 48.5 % Final       MCV   Date Value Ref Range Status   07/06/2023 93 82 - 98 fL Final   06/14/2023 91 82 - 98 fL Final   06/09/2023 91 82 - 98 fL Final       Platelets   Date Value Ref Range Status   07/06/2023 170 150 - 450 K/uL Final   06/14/2023 168 150 - 450 K/uL Final   06/09/2023 208 150 - 450 K/uL Final       Lab Results   Component Value Date    CHOL 131 06/22/2023    CHOL 153 06/30/2022    CHOL 140 08/24/2021       Lab Results   Component Value Date    HDL 43 06/22/2023    HDL 69 06/30/2022    HDL 36 (L) 08/24/2021       Lab Results   Component Value Date    LDLCALC 64.4 06/22/2023    LDLCALC 70.6 06/30/2022    LDLCALC 71.6 08/24/2021       Lab Results   Component Value Date    TRIG 118 06/22/2023    TRIG 67 06/30/2022    TRIG 162 (H) 08/24/2021       Lab Results   Component Value Date    CHOLHDL 32.8 06/22/2023    CHOLHDL 45.1 06/30/2022    CHOLHDL 25.7 08/24/2021       RPR   Date Value Ref Range Status   12/19/2022 Non-reactive Non-reactive Final   12/10/2021 Non-reactive Non-reactive Final     No results found for: QUANTIFERON    Medications:  Current Outpatient Medications on File Prior to Visit   Medication Sig Dispense Refill    albuterol (VENTOLIN HFA) 90 mcg/actuation inhaler Inhale 1-2 puffs into the lungs every 6 (six) hours as needed for Wheezing or Shortness of Breath. Rescue 18 g 11    albuterol-ipratropium (DUO-NEB) 2.5 mg-0.5 mg/3 mL nebulizer solution Take 3 mLs by nebulization every 6 (six) hours as needed for Wheezing or Shortness of Breath. Rescue 75 mL 11    ascorbic acid, vitamin C, (VITAMIN C) 250 MG tablet Take 1 tablet (250 mg total) by mouth once  daily. 30 tablet 2    atorvastatin (LIPITOR) 20 MG tablet TAKE 1 TABLET BY MOUTH EVERY DAY 90 tablet 2    calcium-vitamin D3 (CALCIUM 500 + D) 500 mg(1,250mg) -200 unit per tablet Take 1 tablet by mouth 2 (two) times daily with meals.      carvediloL (COREG) 6.25 MG tablet Take 1 tablet (6.25 mg total) by mouth 2 (two) times daily with meals. 180 tablet 0    estradioL (ESTRACE) 0.01 % (0.1 mg/gram) vaginal cream Place 1 g vaginally twice a week. 42.5 g 11    fluticasone furoate-vilanteroL (BREO ELLIPTA) 100-25 mcg/dose diskus inhaler Inhale 1 puff into the lungs once daily. Controller.  PLEASE NOTE IT IS ONCE A DAY!! (Patient not taking: Reported on 7/5/2023) 60 each 4    fluticasone furoate-vilanteroL (BREO ELLIPTA) 100-25 mcg/dose diskus inhaler Inhale 1 puff into the lungs once daily. 1 each 11    gabapentin (NEURONTIN) 100 MG capsule Take 1 capsule (100 mg total) by mouth every evening. 30 capsule 3    irbesartan (AVAPRO) 300 MG tablet Take 1 tablet (300 mg total) by mouth every evening. 90 tablet 3    levothyroxine (SYNTHROID) 50 MCG tablet Take 1 tablet (50 mcg total) by mouth before breakfast. 90 tablet 1    montelukast (SINGULAIR) 10 mg tablet TAKE 1 TABLET BY MOUTH EVERY DAY IN THE EVENING 90 tablet 2    multivitamin (THERAGRAN) per tablet Take 1 tablet by mouth once daily.      oxybutynin (DITROPAN XL) 15 MG TR24 Take 1 tablet (15 mg total) by mouth once daily. 30 tablet 11    pantoprazole (PROTONIX) 20 MG tablet Take 1 tablet (20 mg total) by mouth once daily. 30 tablet 2    predniSONE (DELTASONE) 1 MG tablet Take 2 tablets (2 mg total) by mouth once daily. 180 tablet 3    sulfamethoxazole-trimethoprim 800-160mg (BACTRIM DS) 800-160 mg Tab Take 1 tablet by mouth 2 (two) times daily. for 7 days 14 tablet 0    [DISCONTINUED] budesonide-formoterol 80-4.5 mcg (SYMBICORT) 80-4.5 mcg/actuation HFAA Inhale 2 puffs into the lungs 2 (two) times daily as needed. Controller 1 Inhaler 6     No current  facility-administered medications on file prior to visit.       Antibiotics:   Antibiotics (From admission, onward)      None            HIV: No components found for: HIV 1/2 AG/AB  Hepatitis C IgG: No components found for: HEPATITIS C  Syphilis:   RPR   Date Value Ref Range Status   12/19/2022 Non-reactive Non-reactive Final   12/10/2021 Non-reactive Non-reactive Final       Hepatitis A IgG: No components found for: HEPATITIS A IGG  Hepatitis Bc IgG: No components found for: HEPATITIS B CORE IGG  Hepatitis Bs IgG:  Quantiferon: No results found for: QUANTIFERON  VZV IgG: No components found for: VARICELLA IGG    No components found for: SEDIMENTATION RATE  No components found for: C-REACTIVE PROTEIN      Microbiology x 7d:   Microbiology Results (last 7 days)       ** No results found for the last 168 hours. **            Immunization History   Administered Date(s) Administered    COVID-19 MRNA, LN-S PF (MODERNA HALF 0.25 ML DOSE) 11/09/2021    COVID-19 Vaccine 09/30/2022    COVID-19, MRNA, LN-S, PF (MODERNA FULL 0.5 ML DOSE) 02/03/2021, 03/03/2021    Influenza - High Dose - PF (65 years and older) 10/27/2020, 09/21/2022    Pneumococcal Conjugate - 13 Valent 11/02/2017    Pneumococcal Conjugate - 20 Valent 09/30/2022    Pneumococcal Polysaccharide - 23 Valent 07/28/2020    Tdap 07/21/2021    Zoster Recombinant 09/21/2022         Reviewed records today as well as relevant labs, cultures, and imaging    Assessment:       Cystitis   Esbl e.coli    Unclear cause for recurrent uti- no nidus for infection on last renal us or cystoscopy. No oral suppressive option    Plan:     Ertapenem and amoxicillin x 5 days, therapy plan placed    Spoke with micro to add-on fosfomycin susceptibilities    Outpatient Antibiotic Therapy Plan:    Please send referral to [facility]     Infection: cystitis    Discharge Antibiotics:    Intravenous antibiotics:  Ertapenem 1gm iv daily     Therapy Duration:  5 days      I have  sent communication to the referring physician and/or primary care provider.     The total time for evaluation and management services performed on 7/12/23 was greater than 30 minutes.  This includes face to face time and non-face to face time preparing to see the patient (eg, review of tests), obtaining and/or reviewing separately obtained history, documenting clinical information in the electronic or other health record, independently interpreting results, and communicating results to the patient/family/caregiver, or care coordination.             Chandni Allen MD, MPH  Infectious Disease

## 2023-07-13 LAB — BACTERIA UR CULT: ABNORMAL

## 2023-07-14 ENCOUNTER — TELEPHONE (OUTPATIENT)
Dept: HEMATOLOGY/ONCOLOGY | Facility: CLINIC | Age: 78
End: 2023-07-14
Payer: MEDICARE

## 2023-07-14 NOTE — TELEPHONE ENCOUNTER
----- Message from America Dominguez sent at 7/31/2020 10:56 AM CDT -----  Regarding: Patient Returning Call  Contact: Patient  Type:  Patient Returning Call    Who Called: Patient     Who Left Message for Patient:  Alyce Carranza,    Does the patient know what this is regarding?:results    Would the patient rather a call back or a response via [facility]? Call back     Best Call Back Number: [phone]           Never smoker

## 2023-07-14 NOTE — NURSING
Returned patient's call. She is unable to make appt on 7/17 rescheduled on 7/31 at 11:00. Date, time, and location confirmed with patient.

## 2023-07-14 NOTE — TELEPHONE ENCOUNTER
----- Message from Charito Dobbins sent at 7/14/2023 10:14 AM CDT -----  Type: Need Medical Advice   Who Called: Luz daughter of patient   Best callback number:   Additional Information: Daughter of patient called to reschedule appointment on 7/17  Please call to further assist with rescheduling, Thanks

## 2023-07-17 ENCOUNTER — INFUSION (OUTPATIENT)
Dept: INFECTIOUS DISEASES | Facility: HOSPITAL | Age: 78
End: 2023-07-17
Payer: MEDICARE

## 2023-07-17 VITALS
BODY MASS INDEX: 28 KG/M2 | SYSTOLIC BLOOD PRESSURE: 125 MMHG | RESPIRATION RATE: 18 BRPM | TEMPERATURE: 98 F | HEART RATE: 76 BPM | OXYGEN SATURATION: 94 % | WEIGHT: 163.13 LBS | DIASTOLIC BLOOD PRESSURE: 61 MMHG

## 2023-07-17 DIAGNOSIS — B96.29 URINARY TRACT INFECTION DUE TO EXTENDED-SPECTRUM BETA LACTAMASE (ESBL) PRODUCING ESCHERICHIA COLI: Primary | ICD-10-CM

## 2023-07-17 DIAGNOSIS — Z16.12 URINARY TRACT INFECTION DUE TO EXTENDED-SPECTRUM BETA LACTAMASE (ESBL) PRODUCING ESCHERICHIA COLI: Primary | ICD-10-CM

## 2023-07-17 DIAGNOSIS — N39.0 URINARY TRACT INFECTION DUE TO EXTENDED-SPECTRUM BETA LACTAMASE (ESBL) PRODUCING ESCHERICHIA COLI: Primary | ICD-10-CM

## 2023-07-17 PROCEDURE — 25000003 PHARM REV CODE 250: Performed by: INTERNAL MEDICINE

## 2023-07-17 PROCEDURE — 63600175 PHARM REV CODE 636 W HCPCS: Performed by: INTERNAL MEDICINE

## 2023-07-17 PROCEDURE — 96365 THER/PROPH/DIAG IV INF INIT: CPT

## 2023-07-17 RX ADMIN — ERTAPENEM SODIUM 1 G: 1 INJECTION INTRAMUSCULAR; INTRAVENOUS at 03:07

## 2023-07-18 ENCOUNTER — INFUSION (OUTPATIENT)
Dept: INFECTIOUS DISEASES | Facility: HOSPITAL | Age: 78
End: 2023-07-18
Payer: MEDICARE

## 2023-07-18 VITALS
OXYGEN SATURATION: 95 % | HEART RATE: 80 BPM | SYSTOLIC BLOOD PRESSURE: 110 MMHG | HEIGHT: 64 IN | TEMPERATURE: 99 F | RESPIRATION RATE: 16 BRPM | BODY MASS INDEX: 27.43 KG/M2 | WEIGHT: 160.69 LBS | DIASTOLIC BLOOD PRESSURE: 55 MMHG

## 2023-07-18 DIAGNOSIS — Z16.12 URINARY TRACT INFECTION DUE TO EXTENDED-SPECTRUM BETA LACTAMASE (ESBL) PRODUCING ESCHERICHIA COLI: Primary | ICD-10-CM

## 2023-07-18 DIAGNOSIS — B96.29 URINARY TRACT INFECTION DUE TO EXTENDED-SPECTRUM BETA LACTAMASE (ESBL) PRODUCING ESCHERICHIA COLI: Primary | ICD-10-CM

## 2023-07-18 DIAGNOSIS — N39.0 URINARY TRACT INFECTION DUE TO EXTENDED-SPECTRUM BETA LACTAMASE (ESBL) PRODUCING ESCHERICHIA COLI: Primary | ICD-10-CM

## 2023-07-18 PROCEDURE — 96365 THER/PROPH/DIAG IV INF INIT: CPT

## 2023-07-18 PROCEDURE — 63600175 PHARM REV CODE 636 W HCPCS: Performed by: INTERNAL MEDICINE

## 2023-07-18 PROCEDURE — 25000003 PHARM REV CODE 250: Performed by: INTERNAL MEDICINE

## 2023-07-18 RX ADMIN — ERTAPENEM SODIUM 1 G: 1 INJECTION INTRAMUSCULAR; INTRAVENOUS at 03:07

## 2023-07-19 ENCOUNTER — INFUSION (OUTPATIENT)
Dept: INFECTIOUS DISEASES | Facility: HOSPITAL | Age: 78
End: 2023-07-19
Payer: MEDICARE

## 2023-07-19 DIAGNOSIS — B96.29 URINARY TRACT INFECTION DUE TO EXTENDED-SPECTRUM BETA LACTAMASE (ESBL) PRODUCING ESCHERICHIA COLI: Primary | ICD-10-CM

## 2023-07-19 DIAGNOSIS — Z16.12 URINARY TRACT INFECTION DUE TO EXTENDED-SPECTRUM BETA LACTAMASE (ESBL) PRODUCING ESCHERICHIA COLI: Primary | ICD-10-CM

## 2023-07-19 DIAGNOSIS — N39.0 URINARY TRACT INFECTION DUE TO EXTENDED-SPECTRUM BETA LACTAMASE (ESBL) PRODUCING ESCHERICHIA COLI: Primary | ICD-10-CM

## 2023-07-19 PROCEDURE — 63600175 PHARM REV CODE 636 W HCPCS: Performed by: INTERNAL MEDICINE

## 2023-07-19 PROCEDURE — 25000003 PHARM REV CODE 250: Performed by: INTERNAL MEDICINE

## 2023-07-19 PROCEDURE — 96365 THER/PROPH/DIAG IV INF INIT: CPT

## 2023-07-19 RX ADMIN — ERTAPENEM SODIUM 1 G: 1 INJECTION INTRAMUSCULAR; INTRAVENOUS at 02:07

## 2023-07-19 NOTE — PLAN OF CARE
Patient counseled on infection prevention by excellent hand hygiene - verbalized understanding

## 2023-07-20 ENCOUNTER — INFUSION (OUTPATIENT)
Dept: INFECTIOUS DISEASES | Facility: HOSPITAL | Age: 78
End: 2023-07-20
Payer: MEDICARE

## 2023-07-20 VITALS
OXYGEN SATURATION: 96 % | BODY MASS INDEX: 27.59 KG/M2 | HEART RATE: 73 BPM | WEIGHT: 160.69 LBS | SYSTOLIC BLOOD PRESSURE: 111 MMHG | TEMPERATURE: 98 F | DIASTOLIC BLOOD PRESSURE: 59 MMHG

## 2023-07-20 DIAGNOSIS — N39.0 URINARY TRACT INFECTION DUE TO EXTENDED-SPECTRUM BETA LACTAMASE (ESBL) PRODUCING ESCHERICHIA COLI: Primary | ICD-10-CM

## 2023-07-20 DIAGNOSIS — Z16.12 URINARY TRACT INFECTION DUE TO EXTENDED-SPECTRUM BETA LACTAMASE (ESBL) PRODUCING ESCHERICHIA COLI: Primary | ICD-10-CM

## 2023-07-20 DIAGNOSIS — B96.29 URINARY TRACT INFECTION DUE TO EXTENDED-SPECTRUM BETA LACTAMASE (ESBL) PRODUCING ESCHERICHIA COLI: Primary | ICD-10-CM

## 2023-07-20 PROCEDURE — 96365 THER/PROPH/DIAG IV INF INIT: CPT

## 2023-07-20 PROCEDURE — 63600175 PHARM REV CODE 636 W HCPCS: Performed by: INTERNAL MEDICINE

## 2023-07-20 PROCEDURE — 25000003 PHARM REV CODE 250: Performed by: INTERNAL MEDICINE

## 2023-07-20 RX ADMIN — ERTAPENEM SODIUM 1 G: 1 INJECTION INTRAMUSCULAR; INTRAVENOUS at 02:07

## 2023-07-21 ENCOUNTER — INFUSION (OUTPATIENT)
Dept: INFECTIOUS DISEASES | Facility: HOSPITAL | Age: 78
End: 2023-07-21
Attending: INTERNAL MEDICINE
Payer: MEDICARE

## 2023-07-21 VITALS
DIASTOLIC BLOOD PRESSURE: 59 MMHG | SYSTOLIC BLOOD PRESSURE: 113 MMHG | TEMPERATURE: 99 F | HEART RATE: 80 BPM | OXYGEN SATURATION: 96 %

## 2023-07-21 DIAGNOSIS — B96.29 URINARY TRACT INFECTION DUE TO EXTENDED-SPECTRUM BETA LACTAMASE (ESBL) PRODUCING ESCHERICHIA COLI: Primary | ICD-10-CM

## 2023-07-21 DIAGNOSIS — Z16.12 URINARY TRACT INFECTION DUE TO EXTENDED-SPECTRUM BETA LACTAMASE (ESBL) PRODUCING ESCHERICHIA COLI: Primary | ICD-10-CM

## 2023-07-21 DIAGNOSIS — N39.0 URINARY TRACT INFECTION DUE TO EXTENDED-SPECTRUM BETA LACTAMASE (ESBL) PRODUCING ESCHERICHIA COLI: Primary | ICD-10-CM

## 2023-07-21 PROCEDURE — 25000003 PHARM REV CODE 250: Performed by: INTERNAL MEDICINE

## 2023-07-21 PROCEDURE — 63600175 PHARM REV CODE 636 W HCPCS: Performed by: INTERNAL MEDICINE

## 2023-07-21 PROCEDURE — 96365 THER/PROPH/DIAG IV INF INIT: CPT

## 2023-07-21 RX ADMIN — ERTAPENEM SODIUM 1 G: 1 INJECTION INTRAMUSCULAR; INTRAVENOUS at 03:07

## 2023-07-26 ENCOUNTER — TELEPHONE (OUTPATIENT)
Dept: INTERNAL MEDICINE | Facility: CLINIC | Age: 78
End: 2023-07-26
Payer: MEDICARE

## 2023-07-31 ENCOUNTER — OFFICE VISIT (OUTPATIENT)
Dept: HEMATOLOGY/ONCOLOGY | Facility: CLINIC | Age: 78
End: 2023-07-31
Payer: MEDICARE

## 2023-07-31 ENCOUNTER — LAB VISIT (OUTPATIENT)
Dept: LAB | Facility: HOSPITAL | Age: 78
End: 2023-07-31
Attending: STUDENT IN AN ORGANIZED HEALTH CARE EDUCATION/TRAINING PROGRAM
Payer: MEDICARE

## 2023-07-31 VITALS
HEART RATE: 67 BPM | SYSTOLIC BLOOD PRESSURE: 105 MMHG | HEIGHT: 64 IN | DIASTOLIC BLOOD PRESSURE: 63 MMHG | TEMPERATURE: 97 F | WEIGHT: 160.06 LBS | RESPIRATION RATE: 16 BRPM | OXYGEN SATURATION: 96 % | BODY MASS INDEX: 27.33 KG/M2

## 2023-07-31 DIAGNOSIS — R91.8 GROUND GLASS OPACITY PRESENT ON IMAGING OF LUNG: ICD-10-CM

## 2023-07-31 DIAGNOSIS — D64.9 NORMOCYTIC ANEMIA: ICD-10-CM

## 2023-07-31 DIAGNOSIS — D69.6 THROMBOCYTOPENIA: ICD-10-CM

## 2023-07-31 DIAGNOSIS — J84.10 PULMONARY FIBROSIS: ICD-10-CM

## 2023-07-31 DIAGNOSIS — N39.0 URINARY TRACT INFECTION DUE TO EXTENDED-SPECTRUM BETA LACTAMASE (ESBL) PRODUCING ESCHERICHIA COLI: ICD-10-CM

## 2023-07-31 DIAGNOSIS — B96.29 URINARY TRACT INFECTION DUE TO EXTENDED-SPECTRUM BETA LACTAMASE (ESBL) PRODUCING ESCHERICHIA COLI: ICD-10-CM

## 2023-07-31 DIAGNOSIS — Z16.12 URINARY TRACT INFECTION DUE TO EXTENDED-SPECTRUM BETA LACTAMASE (ESBL) PRODUCING ESCHERICHIA COLI: ICD-10-CM

## 2023-07-31 DIAGNOSIS — D69.6 THROMBOCYTOPENIA: Primary | ICD-10-CM

## 2023-07-31 DIAGNOSIS — R91.1 LUNG NODULE: ICD-10-CM

## 2023-07-31 LAB
ALBUMIN SERPL BCP-MCNC: 4.1 G/DL (ref 3.5–5.2)
ALP SERPL-CCNC: 38 U/L (ref 55–135)
ALT SERPL W/O P-5'-P-CCNC: 10 U/L (ref 10–44)
ANION GAP SERPL CALC-SCNC: 11 MMOL/L (ref 8–16)
ANISOCYTOSIS BLD QL SMEAR: SLIGHT
AST SERPL-CCNC: 17 U/L (ref 10–40)
BASOPHILS # BLD AUTO: ABNORMAL K/UL (ref 0–0.2)
BASOPHILS NFR BLD: 0 % (ref 0–1.9)
BILIRUB SERPL-MCNC: 1.8 MG/DL (ref 0.1–1)
BUN SERPL-MCNC: 21 MG/DL (ref 8–23)
CALCIUM SERPL-MCNC: 9.3 MG/DL (ref 8.7–10.5)
CHLORIDE SERPL-SCNC: 106 MMOL/L (ref 95–110)
CO2 SERPL-SCNC: 24 MMOL/L (ref 23–29)
CREAT SERPL-MCNC: 0.9 MG/DL (ref 0.5–1.4)
DIFFERENTIAL METHOD: ABNORMAL
EOSINOPHIL # BLD AUTO: ABNORMAL K/UL (ref 0–0.5)
EOSINOPHIL NFR BLD: 0 % (ref 0–8)
ERYTHROCYTE [DISTWIDTH] IN BLOOD BY AUTOMATED COUNT: 20.5 % (ref 11.5–14.5)
EST. GFR  (NO RACE VARIABLE): >60 ML/MIN/1.73 M^2
FERRITIN SERPL-MCNC: 633 NG/ML (ref 20–300)
GLUCOSE SERPL-MCNC: 112 MG/DL (ref 70–110)
HAPTOGLOB SERPL-MCNC: 161 MG/DL (ref 30–250)
HCT VFR BLD AUTO: 28.7 % (ref 37–48.5)
HGB BLD-MCNC: 9 G/DL (ref 12–16)
IMM GRANULOCYTES # BLD AUTO: ABNORMAL K/UL (ref 0–0.04)
IMM GRANULOCYTES NFR BLD AUTO: ABNORMAL % (ref 0–0.5)
IRON SERPL-MCNC: 100 UG/DL (ref 30–160)
LDH SERPL L TO P-CCNC: 231 U/L (ref 110–260)
LYMPHOCYTES # BLD AUTO: ABNORMAL K/UL (ref 1–4.8)
LYMPHOCYTES NFR BLD: 47 % (ref 18–48)
MCH RBC QN AUTO: 29 PG (ref 27–31)
MCHC RBC AUTO-ENTMCNC: 31.4 G/DL (ref 32–36)
MCV RBC AUTO: 93 FL (ref 82–98)
MONOCYTES # BLD AUTO: ABNORMAL K/UL (ref 0.3–1)
MONOCYTES NFR BLD: 1 % (ref 4–15)
NEUTROPHILS NFR BLD: 52 % (ref 38–73)
NRBC BLD-RTO: 0 /100 WBC
PLATELET # BLD AUTO: 132 K/UL (ref 150–450)
PLATELET BLD QL SMEAR: ABNORMAL
PMV BLD AUTO: 9.5 FL (ref 9.2–12.9)
POTASSIUM SERPL-SCNC: 4 MMOL/L (ref 3.5–5.1)
PROT SERPL-MCNC: 7.3 G/DL (ref 6–8.4)
RBC # BLD AUTO: 3.1 M/UL (ref 4–5.4)
RETICS/RBC NFR AUTO: 6.7 % (ref 0.5–2.5)
SATURATED IRON: 33 % (ref 20–50)
SODIUM SERPL-SCNC: 141 MMOL/L (ref 136–145)
TOTAL IRON BINDING CAPACITY: 302 UG/DL (ref 250–450)
TRANSFERRIN SERPL-MCNC: 204 MG/DL (ref 200–375)
WBC # BLD AUTO: 2.49 K/UL (ref 3.9–12.7)

## 2023-07-31 PROCEDURE — 85045 AUTOMATED RETICULOCYTE COUNT: CPT | Mod: PN | Performed by: STUDENT IN AN ORGANIZED HEALTH CARE EDUCATION/TRAINING PROGRAM

## 2023-07-31 PROCEDURE — 99999 PR PBB SHADOW E&M-EST. PATIENT-LVL V: CPT | Mod: PBBFAC,,, | Performed by: STUDENT IN AN ORGANIZED HEALTH CARE EDUCATION/TRAINING PROGRAM

## 2023-07-31 PROCEDURE — 84630 ASSAY OF ZINC: CPT | Performed by: STUDENT IN AN ORGANIZED HEALTH CARE EDUCATION/TRAINING PROGRAM

## 2023-07-31 PROCEDURE — 86334 IMMUNOFIX E-PHORESIS SERUM: CPT | Mod: 26,,, | Performed by: PATHOLOGY

## 2023-07-31 PROCEDURE — 85027 COMPLETE CBC AUTOMATED: CPT | Mod: PN | Performed by: STUDENT IN AN ORGANIZED HEALTH CARE EDUCATION/TRAINING PROGRAM

## 2023-07-31 PROCEDURE — 86334 PATHOLOGIST INTERPRETATION IFE: ICD-10-PCS | Mod: 26,,, | Performed by: PATHOLOGY

## 2023-07-31 PROCEDURE — 84165 PATHOLOGIST INTERPRETATION SPE: ICD-10-PCS | Mod: 26,,, | Performed by: PATHOLOGY

## 2023-07-31 PROCEDURE — 83010 ASSAY OF HAPTOGLOBIN QUANT: CPT | Performed by: STUDENT IN AN ORGANIZED HEALTH CARE EDUCATION/TRAINING PROGRAM

## 2023-07-31 PROCEDURE — 99204 OFFICE O/P NEW MOD 45 MIN: CPT | Mod: S$PBB,,, | Performed by: STUDENT IN AN ORGANIZED HEALTH CARE EDUCATION/TRAINING PROGRAM

## 2023-07-31 PROCEDURE — 83521 IG LIGHT CHAINS FREE EACH: CPT | Performed by: STUDENT IN AN ORGANIZED HEALTH CARE EDUCATION/TRAINING PROGRAM

## 2023-07-31 PROCEDURE — 84165 PROTEIN E-PHORESIS SERUM: CPT | Mod: 26,,, | Performed by: PATHOLOGY

## 2023-07-31 PROCEDURE — 99204 PR OFFICE/OUTPT VISIT, NEW, LEVL IV, 45-59 MIN: ICD-10-PCS | Mod: S$PBB,,, | Performed by: STUDENT IN AN ORGANIZED HEALTH CARE EDUCATION/TRAINING PROGRAM

## 2023-07-31 PROCEDURE — 84165 PROTEIN E-PHORESIS SERUM: CPT | Performed by: STUDENT IN AN ORGANIZED HEALTH CARE EDUCATION/TRAINING PROGRAM

## 2023-07-31 PROCEDURE — 99215 OFFICE O/P EST HI 40 MIN: CPT | Mod: PBBFAC,PN | Performed by: STUDENT IN AN ORGANIZED HEALTH CARE EDUCATION/TRAINING PROGRAM

## 2023-07-31 PROCEDURE — 80053 COMPREHEN METABOLIC PANEL: CPT | Mod: PN | Performed by: STUDENT IN AN ORGANIZED HEALTH CARE EDUCATION/TRAINING PROGRAM

## 2023-07-31 PROCEDURE — 83540 ASSAY OF IRON: CPT | Performed by: STUDENT IN AN ORGANIZED HEALTH CARE EDUCATION/TRAINING PROGRAM

## 2023-07-31 PROCEDURE — 86334 IMMUNOFIX E-PHORESIS SERUM: CPT | Performed by: STUDENT IN AN ORGANIZED HEALTH CARE EDUCATION/TRAINING PROGRAM

## 2023-07-31 PROCEDURE — 85060 BLOOD SMEAR INTERPRETATION: CPT | Mod: ,,, | Performed by: PATHOLOGY

## 2023-07-31 PROCEDURE — 82525 ASSAY OF COPPER: CPT | Performed by: STUDENT IN AN ORGANIZED HEALTH CARE EDUCATION/TRAINING PROGRAM

## 2023-07-31 PROCEDURE — 83615 LACTATE (LD) (LDH) ENZYME: CPT | Mod: PN | Performed by: STUDENT IN AN ORGANIZED HEALTH CARE EDUCATION/TRAINING PROGRAM

## 2023-07-31 PROCEDURE — 99999 PR PBB SHADOW E&M-EST. PATIENT-LVL V: ICD-10-PCS | Mod: PBBFAC,,, | Performed by: STUDENT IN AN ORGANIZED HEALTH CARE EDUCATION/TRAINING PROGRAM

## 2023-07-31 PROCEDURE — 82728 ASSAY OF FERRITIN: CPT | Performed by: STUDENT IN AN ORGANIZED HEALTH CARE EDUCATION/TRAINING PROGRAM

## 2023-07-31 PROCEDURE — 85060 PATHOLOGIST REVIEW: ICD-10-PCS | Mod: ,,, | Performed by: PATHOLOGY

## 2023-07-31 PROCEDURE — 85007 BL SMEAR W/DIFF WBC COUNT: CPT | Mod: PN | Performed by: STUDENT IN AN ORGANIZED HEALTH CARE EDUCATION/TRAINING PROGRAM

## 2023-07-31 PROCEDURE — 84466 ASSAY OF TRANSFERRIN: CPT | Performed by: STUDENT IN AN ORGANIZED HEALTH CARE EDUCATION/TRAINING PROGRAM

## 2023-07-31 NOTE — PROGRESS NOTES
Subjective:                                                                                    Consult note   Patient ID:    Name: Niurka Pedraza  : 1945  MRN: [id number]      HPI:   Niruka Pedraza is a 78 y.o. female presents for evaluation of Pancytopenia    May-,  was feeling bad, went to ER, diagnosed with pulmonary fibrosis, found to have 2 bad molds in her air conditioning system at home. Following up with pulmonary. Had recurrent UTI all her life, received multiple Abx,  leading to developing resistance, ESBL + in the urine, and was given Ertepenem recently ( -) , off note patient was diagnosed with DCIS  s/p surgery mastectomy, doing well. No chemo or radiation, referred now for pancytopenia, new since at least ~2023 patient denies any weight loss or decrease appetite but has fatigued or generalized weakness     Past Medical History:   Diagnosis Date    Arthritis     Borderline serous cystadenoma of right ovary 2018    Breast cancer     mastectomy     Coccidiomycosis, progressive     Elevated CA-125 2018    Hyperlipidemia     OAB (overactive bladder)     Osteopenia     on Dexa 2017    Osteoporosis     pt reports hx of this, declined fosamax in past - treated with calcium and vit D    Pelvic mass 2018    Pulmonary fibrosis     Pulmonary nodules     SOB (shortness of breath) on exertion     Thyroid disease     hypo    UTI (urinary tract infection)        Past Surgical History:   Procedure Laterality Date    CHOLECYSTECTOMY      COLONOSCOPY N/A 2022    Procedure: COLONOSCOPY;  Surgeon: Enrique Dominguez MD;  Location: [address]);  Service: Endoscopy;  Laterality: N/A;  inst via portal  pt requests after 22-MS  -pt notified of earlier arrival time-KPvt    CYSTOSCOPY WITH BIOPSY OF BLADDER Bilateral 2022    Procedure: CYSTOSCOPY, WITH BLADDER BIOPSY; retrograde pyelogram,;  Surgeon: Anaya Oropeza MD;  Location: [facility];   Service: Urology;  Laterality: Bilateral;    ENDOSCOPIC ULTRASOUND OF UPPER GASTROINTESTINAL TRACT N/A 04/08/2021    Procedure: ULTRASOUND, UPPER GI TRACT, ENDOSCOPIC;  Surgeon: Aisha Barajas MD;  Location: [facility] (2ND FLR);  Service: Endoscopy;  Laterality: N/A;  UEUS/ERCP evaluation abn MRCP - Dr Carney  Covid-19 test 4/5/21 at [facility] Pre-admit - pg    ERCP N/A 04/08/2021    Procedure: ERCP (ENDOSCOPIC RETROGRADE CHOLANGIOPANCREATOGRAPHY);  Surgeon: Aisha Barajas MD;  Location: [facility] (2ND FLR);  Service: Endoscopy;  Laterality: N/A;  UEUS/ERCP evaluation abn MRCP - Dr Carney  Covid-19 test 4/5/21 at [facility] Pre-admit - pg    ESOPHAGOGASTRODUODENOSCOPY N/A 04/08/2021    Procedure: EGD (ESOPHAGOGASTRODUODENOSCOPY);  Surgeon: Aisha Barajas MD;  Location: [facility] (2ND FLR);  Service: Endoscopy;  Laterality: N/A;  UEUS/ERCP evaluation abn MRCP - Dr Carney  Covid-19 test 4/5/21 at [facility] Pre-admit - pg    ESOPHAGOGASTRODUODENOSCOPY N/A 06/02/2023    Procedure: EGD (ESOPHAGOGASTRODUODENOSCOPY);  Surgeon: Emeka Carney MD;  Location: [facility] (4TH FLR);  Service: Endoscopy;  Laterality: N/A;  She has  history of seromucinous borderline tumor of the ovary. We generally follow  and CEA tumor markers. Her CEA recently became slightly elevated. CT imaging negative and colonoscopy negative.     Talita SKuldip Barrios    instructions portal-LW  pre    HYSTERECTOMY      MASTECTOMY Right     2014       Family History   Problem Relation Age of Onset    Cancer Mother         colon cancer    Breast cancer Mother     Hypertension Father     Heart disease Father     Stroke Father     No Known Problems Sister     Cancer Brother         lung, ++ tobacco    Hypertension Brother     No Known Problems Daughter     Hypertension Son     Colon cancer Neg Hx     Ovarian cancer Neg Hx        Social History     Socioeconomic History    Marital status:     Number of children: 3   Occupational History     Occupation: retired from [employer], [employer]     Comment: neuro chemistry   Tobacco Use    Smoking status: Former     Current packs/day: 0.00     Average packs/day: 0.5 packs/day for 30.1 years (15.1 ttl pk-yrs)     Types: Cigarettes     Start date:      Quit date: 2000     Years since quittin.5    Smokeless tobacco: Never    Tobacco comments:     quit in her 50s, after 30 years smoking;   Substance and Sexual Activity    Alcohol use: No    Drug use: No    Sexual activity: Not Currently     Partners: Male   Social History Narrative    From [place]     Moved to [facility] in  for research    Moved to [place] for period of time    Moved back to [place] Summer 2016 and then to [place] this year 2017     Social Determinants of Health     Financial Resource Strain: Low Risk  (2023)    Overall Financial Resource Strain (CARDIA)     Difficulty of Paying Living Expenses: Not hard at all   Food Insecurity: No Food Insecurity (2023)    Hunger Vital Sign     Worried About Running Out of Food in the Last Year: Never true     Ran Out of Food in the Last Year: Never true   Transportation Needs: No Transportation Needs (2023)    PRAPARE - Transportation     Lack of Transportation (Medical): No     Lack of Transportation (Non-Medical): No   Stress: No Stress Concern Present (2023)    [employer] [place] [employer] - Occupational Stress Questionnaire     Feeling of Stress : Not at all   Social Connections: Socially Isolated (2023)    Social Connection and Isolation Panel [NHANES]     Frequency of Communication with Friends and Family: More than three times a week     Frequency of Social Gatherings with Friends and Family: More than three times a week     Attends Buddhism Services: Never     Active Member of Clubs or Organizations: No     Attends Club or Organization Meetings: Never     Marital Status:    Housing Stability: Low Risk  (2023)    Housing  "Stability Vital Sign     Unable to Pay for Housing in the Last Year: No     Number of Places Lived in the Last Year: 1     Unstable Housing in the Last Year: No       Review of patient's allergies indicates:   Allergen Reactions    Ciprofloxacin Other (See Comments)     Right foot pain and edema, tendonitis    Amlodipine Edema and Swelling     BLE  BLE    Lisinopril Other (See Comments)     cough    Nitrofuran analogues        Review of Systems   Constitutional: Positive for malaise/fatigue. Negative for decreased appetite, diaphoresis, fever, night sweats and weight gain.   Cardiovascular:  Negative for chest pain, dyspnea on exertion and leg swelling.   Gastrointestinal:  Negative for melena, nausea and vomiting.   Genitourinary:  Negative for flank pain and non-menstrual bleeding.   Neurological:  Positive for weakness. Negative for dizziness, focal weakness, headaches, light-headedness and seizures.            Objective:     Vitals:    07/31/23 1104   BP: 105/63   BP Location: Left arm   Patient Position: Sitting   BP Method: Medium (Automatic)   Pulse: 67   Resp: 16   Temp: 97.4 °F (36.3 °C)   TempSrc: Temporal   SpO2: 96%   Weight: 72.6 kg (160 lb 0.9 oz)   Height: 5' 4" (1.626 m)        Physical Exam  Physical Exam  Constitutional:       General: She is not in acute distress.     Appearance: Normal appearance. She is normal weight.   HENT:      Head: Normocephalic and atraumatic.   Cardiovascular:      Rate and Rhythm: Normal rate and regular rhythm.      Heart sounds: Normal heart sounds.   Pulmonary:      Effort: Pulmonary effort is normal.      Breath sounds: Normal breath sounds. No wheezing.   Chest:      Chest wall: No tenderness.   Abdominal:      General: Abdomen is flat. Bowel sounds are normal. There is no distension.      Palpations: Abdomen is soft. There is no mass.   Musculoskeletal:      Right lower leg: No edema.   Lymphadenopathy:      Cervical: No cervical adenopathy.   Skin:     Coloration: " Skin is not jaundiced or pale.   Psychiatric:         Mood and Affect: Mood normal.         Behavior: Behavior normal.         Current Outpatient Medications on File Prior to Visit   Medication Sig    albuterol (VENTOLIN HFA) 90 mcg/actuation inhaler Inhale 1-2 puffs into the lungs every 6 (six) hours as needed for Wheezing or Shortness of Breath. Rescue    albuterol-ipratropium (DUO-NEB) 2.5 mg-0.5 mg/3 mL nebulizer solution Take 3 mLs by nebulization every 6 (six) hours as needed for Wheezing or Shortness of Breath. Rescue    ascorbic acid, vitamin C, (VITAMIN C) 250 MG tablet Take 1 tablet (250 mg total) by mouth once daily.    atorvastatin (LIPITOR) 20 MG tablet TAKE 1 TABLET BY MOUTH EVERY DAY    calcium-vitamin D3 (CALCIUM 500 + D) 500 mg(1,250mg) -200 unit per tablet Take 1 tablet by mouth 2 (two) times daily with meals.    carvediloL (COREG) 6.25 MG tablet Take 1 tablet (6.25 mg total) by mouth 2 (two) times daily with meals.    fluticasone furoate-vilanteroL (BREO ELLIPTA) 100-25 mcg/dose diskus inhaler Inhale 1 puff into the lungs once daily. Controller.  PLEASE NOTE IT IS ONCE A DAY!!    gabapentin (NEURONTIN) 100 MG capsule Take 1 capsule (100 mg total) by mouth every evening.    irbesartan (AVAPRO) 300 MG tablet Take 1 tablet (300 mg total) by mouth every evening.    levothyroxine (SYNTHROID) 50 MCG tablet Take 1 tablet (50 mcg total) by mouth before breakfast.    montelukast (SINGULAIR) 10 mg tablet TAKE 1 TABLET BY MOUTH EVERY DAY IN THE EVENING    multivitamin (THERAGRAN) per tablet Take 1 tablet by mouth once daily.    oxybutynin (DITROPAN XL) 15 MG TR24 Take 1 tablet (15 mg total) by mouth once daily.    pantoprazole (PROTONIX) 20 MG tablet Take 1 tablet (20 mg total) by mouth once daily.    predniSONE (DELTASONE) 1 MG tablet Take 2 tablets (2 mg total) by mouth once daily.    estradioL (ESTRACE) 0.01 % (0.1 mg/gram) vaginal cream Place 1 g vaginally twice a week.    [DISCONTINUED]  budesonide-formoterol 80-4.5 mcg (SYMBICORT) 80-4.5 mcg/actuation HFAA Inhale 2 puffs into the lungs 2 (two) times daily as needed. Controller     No current facility-administered medications on file prior to visit.       CBC:  Lab Results   Component Value Date    WBC 2.49 (L) 07/31/2023    HGB 9.0 (L) 07/31/2023    HCT 28.7 (L) 07/31/2023    MCV 93 07/31/2023     (L) 07/31/2023         CMP:  Sodium   Date Value Ref Range Status   07/31/2023 141 136 - 145 mmol/L Final     Potassium   Date Value Ref Range Status   07/31/2023 4.0 3.5 - 5.1 mmol/L Final     Chloride   Date Value Ref Range Status   07/31/2023 106 95 - 110 mmol/L Final     CO2   Date Value Ref Range Status   07/31/2023 24 23 - 29 mmol/L Final     Glucose   Date Value Ref Range Status   07/31/2023 112 (H) 70 - 110 mg/dL Final     BUN   Date Value Ref Range Status   07/31/2023 21 8 - 23 mg/dL Final     Creatinine   Date Value Ref Range Status   07/31/2023 0.9 0.5 - 1.4 mg/dL Final     Calcium   Date Value Ref Range Status   07/31/2023 9.3 8.7 - 10.5 mg/dL Final     Total Protein   Date Value Ref Range Status   07/31/2023 7.3 6.0 - 8.4 g/dL Final     Albumin   Date Value Ref Range Status   07/31/2023 4.1 3.5 - 5.2 g/dL Final     Total Bilirubin   Date Value Ref Range Status   07/31/2023 1.8 (H) 0.1 - 1.0 mg/dL Final     Comment:     For infants and newborns, interpretation of results should be based  on gestational age, weight and in agreement with clinical  observations.    Premature Infant recommended reference ranges:  Up to 24 hours.............<8.0 mg/dL  Up to 48 hours............<12.0 mg/dL  3-5 days..................<15.0 mg/dL  6-29 days.................<15.0 mg/dL       Alkaline Phosphatase   Date Value Ref Range Status   07/31/2023 38 (L) 55 - 135 U/L Final     AST   Date Value Ref Range Status   07/31/2023 17 10 - 40 U/L Final     ALT   Date Value Ref Range Status   07/31/2023 10 10 - 44 U/L Final     Anion Gap   Date Value Ref Range  Status   07/31/2023 11 8 - 16 mmol/L Final     eGFR if    Date Value Ref Range Status   06/30/2022 >60 >60 mL/min/1.73 m^2 Final     eGFR if non    Date Value Ref Range Status   06/30/2022 >60 >60 mL/min/1.73 m^2 Final     Comment:     Calculation used to obtain the estimated glomerular filtration  rate (eGFR) is the CKD-EPI equation.          Lab Results   Component Value Date    IRON 100 07/31/2023    TIBC 302 07/31/2023    FERRITIN 633 (H) 07/31/2023       Lab Results   Component Value Date    [id number] 763 05/25/2023   ,  Lab Results   Component Value Date    FOLATE 15.3 05/25/2023       X-Ray Chest AP Portable  Narrative: EXAMINATION:  XR CHEST AP PORTABLE    CLINICAL HISTORY:  sob; Shortness of breath    TECHNIQUE:  Single frontal view of the chest was performed.    COMPARISON:  CT chest 05/15/2023 and 05/23/2023, 04/19/2023, 10/25/2021, 10/24/2021, 10/30/2017 chest x-rays    FINDINGS:  The cardiac silhouette is stable and not significantly enlarged.  There are diffuse reticulonodular bilateral opacities again noted as seen on multiple previous examinations.  There is no acute pleural effusion or pneumothorax.  A relatively dominant nodular focus over the right mid lung field remains stable measuring 12 mm.  Multiple nodular foci are identified on prior CT and described as being stable.    There is no visualized acute osseous abnormality with degenerative changes of the spine and shoulders noted.  Impression: No appreciable acute lung parenchymal or pleural abnormality noting diffuse interstitial chronic lung opacities and appreciable nodules are not significantly changed as compared with most recent exam 05/23/2023.    Electronically signed by: Kirsten Snow  Date:    07/06/2023  Time:    21:07       All pertinent labs and imaging reviewed.    Assessment:       1. Thrombocytopenia    2. Normocytic anemia    3. Urinary tract infection due to extended-spectrum beta lactamase  (ESBL) producing Escherichia coli    4. Lung nodule    5. Ground glass opacity present on imaging of lung    6. Pulmonary fibrosis      # Pancytopenia concerning but unknown etiology   - discussed with patient in details everything and concerning of possible infection vs malignancy in her bone marrow  - will get blood work in details as below if concerning for possible MM or no abnormal findings might consider BMB, patient agreed and understand m will follow up on results   - no blood transfusion or plts needed     # Pulmonary fibrosis following up with pulmonology   # Recurent UTI: following up with ID, on no medication, recently finished ertrapenem    Plan:     Thrombocytopenia  -     Ambulatory referral/consult to Hematology / Oncology  -     CBC w/ DIFF; Future; Expected date: 07/31/2023  -     CMP; Future; Expected date: 07/31/2023  -     SPEP - Protein electrophoresis, serum; Future; Expected date: 07/31/2023  -     DEISY; Future; Expected date: 07/31/2023  -     Immunoglobulin Free LT Chains Blood; Future; Expected date: 07/31/2023  -     FERRITIN; Future; Expected date: 07/31/2023  -     Iron and TIBC; Future; Expected date: 07/31/2023  -     Zinc; Future; Expected date: 07/31/2023  -     COPPER, SERUM; Future; Expected date: 07/31/2023  -     LDH; Future; Expected date: 07/31/2023  -     HAPTOGLOBIN; Future; Expected date: 07/31/2023  -     Reticulocytes; Future; Expected date: 07/31/2023  -     Pathologist Interpretation Differential; Future; Expected date: 07/31/2023    Normocytic anemia  -     CBC w/ DIFF; Future; Expected date: 07/31/2023  -     CMP; Future; Expected date: 07/31/2023  -     SPEP - Protein electrophoresis, serum; Future; Expected date: 07/31/2023  -     DEISY; Future; Expected date: 07/31/2023  -     Immunoglobulin Free LT Chains Blood; Future; Expected date: 07/31/2023  -     FERRITIN; Future; Expected date: 07/31/2023  -     Iron and TIBC; Future; Expected date: 07/31/2023  -     Zinc;  Future; Expected date: 07/31/2023  -     COPPER, SERUM; Future; Expected date: 07/31/2023  -     LDH; Future; Expected date: 07/31/2023  -     HAPTOGLOBIN; Future; Expected date: 07/31/2023  -     Reticulocytes; Future; Expected date: 07/31/2023  -     Pathologist Interpretation Differential; Future; Expected date: 07/31/2023    Urinary tract infection due to extended-spectrum beta lactamase (ESBL) producing Escherichia coli    Lung nodule    Ground glass opacity present on imaging of lung    Pulmonary fibrosis               Patient queried and all questions were answered.    Recommend Advance Care planning/ directives /living will/patient's wishes  being documented.    Recommend COVID guidelines being followed   Recommend  exercise, nutrition and weight management and health maintenance activities and follow-up with PCPs recommendations       Route Chart for Scheduling    Med Onc Chart Routing      Follow up with physician Other. blood work today (all today)   Follow up with [facility] 3 months.   Infusion scheduling note    Injection scheduling note    Labs    Imaging    Pharmacy appointment    Other referrals                Therapy Plan Information  ertapenem (INVANZ) 1 g in sodium chloride 0.9% 100 mL IVPB  1 g, Intravenous, Every visit      Signed:  Brenda Morrison MD  Hematology and Oncology  [facility]  [facility] [place] [facility]

## 2023-08-01 LAB
ALBUMIN SERPL ELPH-MCNC: 4 G/DL (ref 3.35–5.55)
ALPHA1 GLOB SERPL ELPH-MCNC: 0.32 G/DL (ref 0.17–0.41)
ALPHA2 GLOB SERPL ELPH-MCNC: 0.67 G/DL (ref 0.43–0.99)
B-GLOBULIN SERPL ELPH-MCNC: 0.68 G/DL (ref 0.5–1.1)
GAMMA GLOB SERPL ELPH-MCNC: 1.32 G/DL (ref 0.67–1.58)
INTERPRETATION SERPL IFE-IMP: NORMAL
KAPPA LC SER QL IA: 4.41 MG/DL (ref 0.33–1.94)
KAPPA LC/LAMBDA SER IA: 1.81 (ref 0.26–1.65)
LAMBDA LC SER QL IA: 2.43 MG/DL (ref 0.57–2.63)
PATH REV BLD -IMP: NORMAL
PATH REV BLD -IMP: NORMAL
PATHOLOGIST INTERPRETATION IFE: NORMAL
PATHOLOGIST INTERPRETATION SPE: NORMAL
PROT SERPL-MCNC: 7 G/DL (ref 6–8.4)

## 2023-08-03 LAB — ZINC SERPL-MCNC: 58 UG/DL (ref 60–130)

## 2023-08-04 LAB — COPPER SERPL-MCNC: 1157 UG/L (ref 810–1990)

## 2023-08-13 NOTE — PROGRESS NOTES
97.8 Ms. Pedraza was seen for a hearing aid consultation. Recommended a hearing aid for the right ear. Discussed pros and cons of various styles, technology levels and features. Patient was most interested in rechargeable technolgy. Patient elected to proceed with a right Phonak Audeo B50-R. Patient acknowledged understanding of the 30 day trial period, $250 restocking fee from the time of order, and the fact that we do not file insurance on behalf of the patient. Patient was advised to call or email with any questions. She will return on 10/25/2018 for a HAE.

## 2023-08-14 ENCOUNTER — PATIENT MESSAGE (OUTPATIENT)
Dept: HEMATOLOGY/ONCOLOGY | Facility: CLINIC | Age: 78
End: 2023-08-14
Payer: MEDICARE

## 2023-08-14 DIAGNOSIS — D69.6 THROMBOCYTOPENIA: Primary | ICD-10-CM

## 2023-08-18 ENCOUNTER — OFFICE VISIT (OUTPATIENT)
Dept: INTERNAL MEDICINE | Facility: CLINIC | Age: 78
End: 2023-08-18
Attending: INTERNAL MEDICINE
Payer: MEDICARE

## 2023-08-18 ENCOUNTER — LAB VISIT (OUTPATIENT)
Dept: LAB | Facility: OTHER | Age: 78
End: 2023-08-18
Attending: INTERNAL MEDICINE
Payer: MEDICARE

## 2023-08-18 VITALS
DIASTOLIC BLOOD PRESSURE: 78 MMHG | SYSTOLIC BLOOD PRESSURE: 124 MMHG | BODY MASS INDEX: 27.18 KG/M2 | HEIGHT: 64 IN | WEIGHT: 159.19 LBS | OXYGEN SATURATION: 98 % | HEART RATE: 68 BPM

## 2023-08-18 DIAGNOSIS — D64.9 ANEMIA, UNSPECIFIED TYPE: Primary | ICD-10-CM

## 2023-08-18 DIAGNOSIS — D84.821 IMMUNOSUPPRESSION DUE TO CHRONIC STEROID USE: ICD-10-CM

## 2023-08-18 DIAGNOSIS — D64.9 ANEMIA, UNSPECIFIED TYPE: ICD-10-CM

## 2023-08-18 DIAGNOSIS — J84.9 INTERSTITIAL LUNG DISEASE: ICD-10-CM

## 2023-08-18 DIAGNOSIS — T38.0X5A IMMUNOSUPPRESSION DUE TO CHRONIC STEROID USE: ICD-10-CM

## 2023-08-18 DIAGNOSIS — E60 ZINC DEFICIENCY: ICD-10-CM

## 2023-08-18 DIAGNOSIS — N39.0 RECURRENT UTI: ICD-10-CM

## 2023-08-18 DIAGNOSIS — I10 ESSENTIAL HYPERTENSION: ICD-10-CM

## 2023-08-18 DIAGNOSIS — Z79.52 IMMUNOSUPPRESSION DUE TO CHRONIC STEROID USE: ICD-10-CM

## 2023-08-18 LAB
ANISOCYTOSIS BLD QL SMEAR: SLIGHT
BASOPHILS # BLD AUTO: 0 K/UL (ref 0–0.2)
BASOPHILS NFR BLD: 0 % (ref 0–1.9)
DACRYOCYTES BLD QL SMEAR: ABNORMAL
DIFFERENTIAL METHOD: ABNORMAL
EOSINOPHIL # BLD AUTO: 0 K/UL (ref 0–0.5)
EOSINOPHIL NFR BLD: 0 % (ref 0–8)
ERYTHROCYTE [DISTWIDTH] IN BLOOD BY AUTOMATED COUNT: 20.6 % (ref 11.5–14.5)
HCT VFR BLD AUTO: 28.4 % (ref 37–48.5)
HGB BLD-MCNC: 8.5 G/DL (ref 12–16)
HYPOCHROMIA BLD QL SMEAR: ABNORMAL
IMM GRANULOCYTES # BLD AUTO: 0.06 K/UL (ref 0–0.04)
IMM GRANULOCYTES NFR BLD AUTO: 1.6 % (ref 0–0.5)
LYMPHOCYTES # BLD AUTO: 2 K/UL (ref 1–4.8)
LYMPHOCYTES NFR BLD: 54.1 % (ref 18–48)
MCH RBC QN AUTO: 28.1 PG (ref 27–31)
MCHC RBC AUTO-ENTMCNC: 29.9 G/DL (ref 32–36)
MCV RBC AUTO: 94 FL (ref 82–98)
MONOCYTES # BLD AUTO: 0.1 K/UL (ref 0.3–1)
MONOCYTES NFR BLD: 3 % (ref 4–15)
NEUTROPHILS # BLD AUTO: 1.5 K/UL (ref 1.8–7.7)
NEUTROPHILS NFR BLD: 41.3 % (ref 38–73)
NRBC BLD-RTO: 1 /100 WBC
PLATELET # BLD AUTO: 169 K/UL (ref 150–450)
PLATELET BLD QL SMEAR: ABNORMAL
PMV BLD AUTO: 9.3 FL (ref 9.2–12.9)
POIKILOCYTOSIS BLD QL SMEAR: SLIGHT
POLYCHROMASIA BLD QL SMEAR: ABNORMAL
RBC # BLD AUTO: 3.03 M/UL (ref 4–5.4)
STOMATOCYTES BLD QL SMEAR: PRESENT
WBC # BLD AUTO: 3.64 K/UL (ref 3.9–12.7)

## 2023-08-18 PROCEDURE — 99214 OFFICE O/P EST MOD 30 MIN: CPT | Mod: PBBFAC | Performed by: INTERNAL MEDICINE

## 2023-08-18 PROCEDURE — 99214 OFFICE O/P EST MOD 30 MIN: CPT | Mod: S$PBB,,, | Performed by: INTERNAL MEDICINE

## 2023-08-18 PROCEDURE — 36415 COLL VENOUS BLD VENIPUNCTURE: CPT | Performed by: INTERNAL MEDICINE

## 2023-08-18 PROCEDURE — 99214 PR OFFICE/OUTPT VISIT, EST, LEVL IV, 30-39 MIN: ICD-10-PCS | Mod: S$PBB,,, | Performed by: INTERNAL MEDICINE

## 2023-08-18 PROCEDURE — 99999 PR PBB SHADOW E&M-EST. PATIENT-LVL IV: CPT | Mod: PBBFAC,,, | Performed by: INTERNAL MEDICINE

## 2023-08-18 PROCEDURE — 85025 COMPLETE CBC W/AUTO DIFF WBC: CPT | Performed by: INTERNAL MEDICINE

## 2023-08-18 PROCEDURE — 99999 PR PBB SHADOW E&M-EST. PATIENT-LVL IV: ICD-10-PCS | Mod: PBBFAC,,, | Performed by: INTERNAL MEDICINE

## 2023-08-18 NOTE — PROGRESS NOTES
Subjective:   Patient ID: Niurka Pedraza is a 78 y.o. female  Chief complaint:   Chief Complaint   Patient presents with    Hypertension     F/u         HPI  Here for 3 month follow up of htn:     Anemia:   Seen by h/o 7/31/2023 - to have bone bx on 8/29   Had egd 6/2/2023  Cscope 12/2022  No gross bleeding     Recurrent MDR UTI:  Seen by ID 7/12 and completed IV tx   Followed by urology as well  Previously:   Patient was seen by Urology January 18, 2023 for cystoscopy due to recurrent urinary tract infections - cysto showed similar appearance of plaques or bladder wall and plan is to monitor for now and consider removal in OR if urinary tract infections progress  - ditropan increased to 15 mg    Since lcv seen by gyn/onc 5/3    Takes mvi daily   Zinc mildly low     ILD, immunosuppressed due to chronic pdn use:   1mg pdn currently for pulm fib - 2 tabs every morning  Followed closely by pulm   Previously:   Seen by pulm 4/23 - recently tx with augmentin and pdn at that appt for worsening cough   Had ct chest and stable per pulm note when compared to 2021 study   Rec to take prilosec otc and if sx do not improve then restart azithro daily   - today is starting azithro daily   - has not tried ppi yet and will send in protonix for 4 weeks - no burning or gi sx currently  Previously:   Pulmonary fibrosis:  Seen by pulmonology November 21, 2022  Rheumatological eval has been negative for autoimmune condition  - at that appointment they discussed other medication options and patient deferred for now - plans to follow-up in 3 months  - of note oxygen saturation during ambulation decreased the patient did not qualify for oxygen at that time  - was on prednisone, Singulair, and azithromycin - however she stopped all meds now 6 months ago   Previously:   Interstitial lung disease on azithro and pdn:   Followed by pulm   - seen by rheum   - on azithro 3 times per week and montelukast daily and prednisone 2.5 mg TID  - Referred  "to rheum for opinion with +RF on labs     Neuropathy:  Sx stable today  Seen by neuro for neuropathy 3/21/2023 - to return as needed   Previously:   Seen by Neurology December 19, 2022  - gabapentin - never took after read about pot side effects   Previously:   Idiopathic neuropathy:   Has appt with neuro in November  Previously:   emg test for numbness of feet and completed 3/15/2022 - wnl   Sx located in toes B and she reports that sx are progressing to mid foot bilaterally  Labs unremarkable and reviewed for PN work up today    She started to take fish oil and tuna to inc DHA    HTN:   controlled today - home readings stable   Taking Coreg 6.25 and irb 300  Previously:   - stopped Ofev in Jan 18th and bp improved - did not start coreg 12.5mg dose since bp improved  fter d/c this   - edema with amlodipine   - cough from lisinopril     ##### not addressed today ####    Hearing loss:    Followed up with ear nose and throat December 19, 2022 and plan is to recheck hearing in 1 year    Restless leg syndrome:  Started on gabapentin but did not start as concerned about pot side effects  - tried pickle juice - 2 tbs and sx of cramping resolved and stopped     Osteopenia:  Dexa 1/2023     Hypothyroidism:   Blaire levothyroxine   Tsh at goal    ########    HM:  Flu vaccine    Gyn: Melissa  Gyn onc: Denis  Pulm: Hollie  ENT: Carroll nosebleeds   Urology: Sandip  Pod: Feliciano     Review of Systems    Objective:  Vitals:    08/18/23 1040   BP: 124/78   BP Location: Left arm   Patient Position: Sitting   Pulse: 68   SpO2: 98%   Weight: 72.2 kg (159 lb 2.8 oz)   Height: 5' 4" (1.626 m)       Body mass index is 27.32 kg/m².    Physical Exam  Vitals reviewed.   Constitutional:       Appearance: Normal appearance. She is well-developed.   HENT:      Head: Normocephalic and atraumatic.      Right Ear: Tympanic membrane, ear canal and external ear normal.      Left Ear: Tympanic membrane, ear canal and external ear normal.      Nose: Nose " normal. No congestion.      Mouth/Throat:      Mouth: Mucous membranes are moist.      Pharynx: Oropharynx is clear. No oropharyngeal exudate.   Eyes:      Extraocular Movements: Extraocular movements intact.      Conjunctiva/sclera: Conjunctivae normal.   Neck:      Thyroid: No thyromegaly.   Cardiovascular:      Rate and Rhythm: Normal rate and regular rhythm.      Pulses: Normal pulses.      Heart sounds: Normal heart sounds.   Pulmonary:      Effort: Pulmonary effort is normal.      Breath sounds: Normal breath sounds. No wheezing or rhonchi.   Abdominal:      General: Bowel sounds are normal.      Palpations: Abdomen is soft.      Tenderness: There is no right CVA tenderness or left CVA tenderness.   Musculoskeletal:         General: No swelling or tenderness.      Cervical back: Neck supple.   Lymphadenopathy:      Cervical: No cervical adenopathy.   Skin:     General: Skin is warm and dry.      Capillary Refill: Capillary refill takes less than 2 seconds.   Neurological:      General: No focal deficit present.      Mental Status: She is alert and oriented to person, place, and time. Mental status is at baseline.   Psychiatric:         Behavior: Behavior normal.         Thought Content: Thought content normal.     Assessment:  1. Anemia, unspecified type    2. Recurrent UTI    3. Essential hypertension    4. Immunosuppression due to chronic steroid use    5. Interstitial lung disease    6. Zinc deficiency      Plan:    Niurka was seen today for hypertension.    Diagnoses and all orders for this visit:    Anemia, unspecified type  -     CBC Auto Differential; Future  Bm bx as scheduled   She is requesting cbc today - ordered   Monitor for bleeding   Er and rtc prompts given   F/u with h/o     Recurrent UTI  Asymptomatic today   Cont f/u with specialists     Essential hypertension  Stabl e  Cont med     Immunosuppression due to chronic steroid use  Stable   On pdn - managed by pulm     Interstitial lung  disease  Stable   Cont meds     Zinc deficiency  Zinc suppl at 25mg qod to improve level     Health Maintenance   Topic Date Due    Colorectal Cancer Screening  12/09/2025    DEXA Scan  01/19/2026    Lipid Panel  06/22/2028    TETANUS VACCINE  07/21/2031    Hepatitis C Screening  Completed    Shingles Vaccine  Completed

## 2023-08-30 RX ORDER — ATORVASTATIN CALCIUM 20 MG/1
20 TABLET, FILM COATED ORAL DAILY
Qty: 90 TABLET | Refills: 3 | Status: SHIPPED | OUTPATIENT
Start: 2023-08-30

## 2023-08-30 NOTE — TELEPHONE ENCOUNTER
No care due was identified.  [facility] Embedded Care Due Messages. Reference number: [id number].   8/30/2023 8:34:37 AM CDT

## 2023-08-30 NOTE — PROGRESS NOTES
Subjective:       Niurka Pedraza is a 78 y.o. female who is an established patient with [place] urologist, Dr Dhaliwal,  was seen for evaluation of UTI.      She was seen by another urologist for recurrent UTIs/dysuria. Multiple +UCx with different bacteria (E coli, Enterococcus). She has had negative cystoscopy and GONZÁLEZ (1/17). UTIs return soon after treatment. She was started on prophy Keflex in 7/17.     She saw PCP in 11/17 with complaints of UTI. UCx was negative. She now feels a constant pressure in SP area and c/o pain with walking. She also notes pain worse with movement (like hitting a bump when driving). Denies dysuria. Increased frequency to now q1h. Present for 2 months. Denies constipation, occasional diarrhea. Frequent RADHA. Now with UUI. Also with BOBBI - increased coughing with recent lung issue. Nocturia x 4-5. Denies gross hematuria.     She moved here from [place] in 9/17. She requested to be started on abx prophy starting 1/18 (Keflex 250mg).     PVR (bladder scan) initial visit - 32cc, 0cc.    CT uro - no  abnormalities. Lung nodules - followed by pulmonary. Ovarian cyst - referred to gyn (now s/p DARCI-BSO for ovarian cancer).     She has been doing great with the Ditropan - she is very pleased. Was on abx prophy (Keflex) 1/18 x 9 months. Taking probiotics that is helping.     [place] - took Bactrim. Now off prophylactic abx - more frequency, urgency, nocturia, SP pressure. No significant dysuria.     UTIs have become more frequent. Symptoms returning quickly. Prophylactic abx were restarted (Macrobid 50mg).     9/4/2019  She recently finished course of abx but symptoms are now returning just 5 days later. Dysuria improved but pressure and frequency worsened.     1/15/2020  Increase Ditropan to 10mg. Taking Macrobid QHS, started in 9/19. No symptoms today.     8/26/2020  Several recent breakthrough UTIs. Finished abx 2 weeks ago, symptoms have returned.  "Currently on Macrobid. UTI symptoms - frequency, dysuria.   PVR (bladder scan) today - 21cc    9/23/2020  On day 5 of abx, still with some symptoms but improving. CT done - clear.     2/10/2021  Occasional pain and urgency. Macrobid stopped and changed to Keflex. She is requesting UCx though states symptoms are stable.     1/12/2022  Denies current issues with UTI. Nocturia x 3-4. Off prophy abx. Now on new med for pulm fibrosis.     6/29/2022  Now having multiresistant recurrent UTIs requiring IV abx - treated by ID (Dr Rishi Dempsey). Has been treated with IV abx (ertapenem) - 5 days last week. Still with urgency and frequency. Not using Estrace.   PVR (bladder scan) today - 22c    Cytology 7/22 - no malignant cells    Cysto 5/19 - normal, heavy sediment in bladder    Cysto 7/22 - very abnormal appearing bladder mucosa with widespread "scaly" yellow/white areas L>R. Easily detached from bladder wall without bleeding. No tumors.     GONZÁLEZ 4/19 - wnl, PVR 31cc  CT uro 9/20 - negative, pulm nodules (seeing PCP)  GONZÁLEZ 7/22 - no hydro/stones. PVR 51cc    OR 7/27/22 - TUR of large red-brown plaque along entire L lateral wall with white/silver appearance of bladder under plaque. Other plaques noted throoughout bladder, removed. Bladder biopsies - Polypoid keratinizing squamous mucosa with hemorrhagic stroma and reactive epithelial changes. No malignancy. Plaques were keratinaceous debris and blood.     She reports frequency and nocturia q1h. Mild dysuria - improving.     Cysto 1/23 - Similar appearing yellow "plaques" on the bladder wall, easily scraped free of bladder wall with minimal manipulation of scope. Non-bleeding. Less area than previous but still large area in L lateral, small in R lateral    9/13/2023  Continues to have frequent UTIs. ++urgency. On Ditropan 15mg, not very helpful. Recently diagnosis of myelodysplastic syndrome - about to start treatment. No dysuria currently.      UCx:  7/22, 10/22, 5/23, 7/23, " "7/23 - >100k E coli ESBL (multiresistant)  7/21 - >100k Enterococcus  2/21 - 50-99k Proteus  9/20 - >100k Proteus  7/20 - >100k E coli, 10-49k Proteus  7/20 - >100k Proteus   5/20 - 10-49k Klebsiella  9/19 - >100k Klebsiella  8/19 - >100k Klebsiella  6/19 - >100k E coli  5/19 - neg  5/19 - >100k E coli  3/19 - >100k E coli  2/19 - >100k E coli  1/19 - >100k E coli  11/18 - >100k E coli  8/18 - contaminant  7/18 - 50-99k Enterobacter (treated with Bactrim)  11/17 - neg  10/17 - >100k E coli  10/17- >100k Enterococcus  7/17 - >100k E coli  5/17- >100k E coli  5/17 - neg  4/17- 50-99k Enterococcus  3/17 - >100k E coli  12/16 - neg     PVR (bladder scan) last visit - 33cc, 50cc, 0cc      The following portions of the patient's history were reviewed and updated as appropriate: allergies, current medications, past family history, past medical history, past social history, past surgical history and problem list.     Review of Systems  Constitutional: no fever or chills  ENT: no nasal congestion or sore throat  Respiratory: no cough or shortness of breath  Cardiovascular: no chest pain or palpitations  Gastrointestinal: no nausea or vomiting, tolerating diet  Genitourinary: as per HPI  Hematologic/Lymphatic: no easy bruising or lymphadenopathy  Musculoskeletal: no arthralgias or myalgias  Skin: no rashes or lesions  Neurological: no seizures or tremors  Behavioral/Psych: no auditory or visual hallucinations        Objective:    Vitals: /62 (BP Location: Right arm, Patient Position: Sitting, BP Method: Medium (Automatic))   Pulse 91   Resp 16   Ht 5' 4" (1.626 m)   Wt 72 kg (158 lb 11.7 oz)   LMP 02/08/2000   SpO2 (!) 94%   BMI 27.25 kg/m²     Physical Exam   General: well developed, well nourished in no acute distress  Head: normocephalic, atraumatic  Neck: supple, trachea midline, no obvious enlargement of thyroid  HEENT: EOMI, mucus membranes moist, sclera anicteric, no hearing impairment  Lungs: symmetric " "expansion, non-labored breathing  Skin: no rashes or lesions  Neuro: alert and oriented x 3, no gross deficits  Psych: normal judgment and insight, normal mood/affect and non-anxious  Genitourinary:   patient declined exam      Lab Review   Urine analysis today in clinic shows - +nit    Lab Results   Component Value Date    WBC 2.64 (L) 09/13/2023    HGB 8.6 (L) 09/13/2023    HCT 29.6 (L) 09/13/2023    MCV 94 09/13/2023     (L) 09/13/2023     Lab Results   Component Value Date    CREATININE 1.0 09/13/2023    BUN 17 09/13/2023       Imaging  Images and reports were personally reviewed by me and discussed with patient  GONZÁLEZ, CT reviewed       Assessment/Plan:      1. Recurrent UTI    - Discussed UTI prevention strategies.   - Adequate hydration.   - Double voiding. Consider timed voiding.    - Avoid constipation.   - GONZÁLEZ - negative 1/17   - Cystoscopy - negative in 1/17 (by another urologist)   - Cranberry/probiotics.   - Estrace cream 2x weekly - will consider in near future (now with ovarian ca)   - Call with UTI symptoms so UA/UCx can be sent.    - Abx prophy (started 1/18) - Keflex 250mg took for 9 months, stopped 9/18     - Multiple UTIs since stopping prophy, now with UTIs ON prophy   - Repeat workup - GONZÁLEZ and cysto   - GONZÁLEZ 4/19 - wnl   - Cysto 5/19 - wnl   - Continue Estrace (approved by gyn onc). Instructed on use.    - Ellura trial, D-mannose supplement, probiotics     - CT urogram 9/20 - negative   - Again encouraged Estrace, discussed importance. New rx today.    - Was on Macrobid QHS, then Keflex - now stopped.   - Recurrent multiresistant UTIs.    - Repeat GONZÁLEZ 7/22 - wnl   - Office cystoscopy with widespread abnormal bladder mucosa. S/p bladder biopsy/TUR of abnormal areas. OR 7/27/22.   - No malignancy   - Suspect related to ulcerative cystitis     - Cystoscopy 1/23 - continued "plaques" on bladder wall   - Consider repeat cysto/TUR of abnormal areas. Will watch.        2. Microhematuria    - " Discussed etiology and workup of hematuria   - UCx - results above   - Cytology - previously negative   - CT urogram 12/17 - no  abnormalities. GONZÁLEZ was negative from 1/17.   - Office cystoscopy - negative 1/17, 5/19, 7/22 with abnormalities per above        3. Nocturia/frequency/urge incontinence   - Ditropan XL 15mg - discussed SE.   - Reduce PM fluids   - Urgency worsening. Will add Gemtesa. Call if coverage issues.       Follow up in 6 months

## 2023-08-30 NOTE — TELEPHONE ENCOUNTER
Refill Decision Note      Refill Decision Note   Niurka Pedraza  is requesting a refill authorization.  Brief Assessment and Rationale for Refill:  Approve     Medication Therapy Plan:         Comments:     Note composed:9:35 AM 08/30/2023             Appointments     Last Visit   8/18/2023 Savana Anderson MD   Next Visit   10/19/2023 Savana Anderson MD

## 2023-09-04 PROBLEM — E60 ZINC DEFICIENCY: Status: ACTIVE | Noted: 2023-09-04

## 2023-09-06 RX ORDER — OXYBUTYNIN CHLORIDE 15 MG/1
15 TABLET, EXTENDED RELEASE ORAL DAILY
Qty: 30 TABLET | Refills: 11 | Status: SHIPPED | OUTPATIENT
Start: 2023-09-06 | End: 2024-09-05

## 2023-09-08 ENCOUNTER — TELEPHONE (OUTPATIENT)
Dept: HEMATOLOGY/ONCOLOGY | Facility: CLINIC | Age: 78
End: 2023-09-08
Payer: MEDICARE

## 2023-09-08 NOTE — TELEPHONE ENCOUNTER
Received the following message: Hey so this patient BMBX came back as MDS with EB type 2, has 10-12% blast and she is pancytopenia, patient does not mind going to [facility], can she sees you guys next week?   Called & spoke with pt, scheduled to see Dr. Centeno on Wednesday. Reviewed appt details & confirmed.

## 2023-09-11 DIAGNOSIS — D69.6 THROMBOCYTOPENIA: Primary | ICD-10-CM

## 2023-09-11 DIAGNOSIS — D64.9 NORMOCYTIC ANEMIA: ICD-10-CM

## 2023-09-13 ENCOUNTER — TELEPHONE (OUTPATIENT)
Dept: PHARMACY | Facility: CLINIC | Age: 78
End: 2023-09-13
Payer: MEDICARE

## 2023-09-13 ENCOUNTER — OFFICE VISIT (OUTPATIENT)
Dept: UROLOGY | Facility: CLINIC | Age: 78
End: 2023-09-13
Attending: UROLOGY
Payer: MEDICARE

## 2023-09-13 ENCOUNTER — OFFICE VISIT (OUTPATIENT)
Dept: HEMATOLOGY/ONCOLOGY | Facility: CLINIC | Age: 78
End: 2023-09-13
Payer: MEDICARE

## 2023-09-13 ENCOUNTER — LAB VISIT (OUTPATIENT)
Dept: LAB | Facility: HOSPITAL | Age: 78
End: 2023-09-13
Attending: INTERNAL MEDICINE
Payer: MEDICARE

## 2023-09-13 VITALS
SYSTOLIC BLOOD PRESSURE: 127 MMHG | OXYGEN SATURATION: 98 % | RESPIRATION RATE: 16 BRPM | HEART RATE: 74 BPM | DIASTOLIC BLOOD PRESSURE: 68 MMHG | WEIGHT: 157.44 LBS | TEMPERATURE: 98 F | HEIGHT: 64 IN | BODY MASS INDEX: 26.88 KG/M2

## 2023-09-13 VITALS
BODY MASS INDEX: 27.1 KG/M2 | HEART RATE: 91 BPM | RESPIRATION RATE: 16 BRPM | OXYGEN SATURATION: 94 % | DIASTOLIC BLOOD PRESSURE: 62 MMHG | HEIGHT: 64 IN | WEIGHT: 158.75 LBS | SYSTOLIC BLOOD PRESSURE: 125 MMHG

## 2023-09-13 DIAGNOSIS — N39.0 RECURRENT UTI: Primary | ICD-10-CM

## 2023-09-13 DIAGNOSIS — N32.81 OAB (OVERACTIVE BLADDER): ICD-10-CM

## 2023-09-13 DIAGNOSIS — D46.9 MDS (MYELODYSPLASTIC SYNDROME): ICD-10-CM

## 2023-09-13 DIAGNOSIS — R35.1 NOCTURIA: ICD-10-CM

## 2023-09-13 DIAGNOSIS — R30.0 DYSURIA: ICD-10-CM

## 2023-09-13 DIAGNOSIS — N32.9 LESION OF BLADDER: ICD-10-CM

## 2023-09-13 DIAGNOSIS — D84.822 IMMUNODEFICIENCY DUE TO EXTERNAL CAUSE: ICD-10-CM

## 2023-09-13 DIAGNOSIS — D46.9 MDS (MYELODYSPLASTIC SYNDROME): Primary | ICD-10-CM

## 2023-09-13 PROBLEM — R09.02 HYPOXEMIA: Status: RESOLVED | Noted: 2022-11-21 | Resolved: 2023-09-13

## 2023-09-13 PROBLEM — R06.02 SHORTNESS OF BREATH: Status: RESOLVED | Noted: 2023-05-24 | Resolved: 2023-09-13

## 2023-09-13 LAB
ALBUMIN SERPL BCP-MCNC: 3.9 G/DL (ref 3.5–5.2)
ALP SERPL-CCNC: 45 U/L (ref 55–135)
ALT SERPL W/O P-5'-P-CCNC: 9 U/L (ref 10–44)
ANION GAP SERPL CALC-SCNC: 12 MMOL/L (ref 8–16)
ANISOCYTOSIS BLD QL SMEAR: SLIGHT
AST SERPL-CCNC: 16 U/L (ref 10–40)
BASOPHILS # BLD AUTO: 0 K/UL (ref 0–0.2)
BASOPHILS NFR BLD: 0 % (ref 0–1.9)
BILIRUB SERPL-MCNC: 1.8 MG/DL (ref 0.1–1)
BUN SERPL-MCNC: 17 MG/DL (ref 8–23)
CALCIUM SERPL-MCNC: 9.4 MG/DL (ref 8.7–10.5)
CHLORIDE SERPL-SCNC: 104 MMOL/L (ref 95–110)
CO2 SERPL-SCNC: 24 MMOL/L (ref 23–29)
CREAT SERPL-MCNC: 1 MG/DL (ref 0.5–1.4)
DIFFERENTIAL METHOD: ABNORMAL
EOSINOPHIL # BLD AUTO: 0 K/UL (ref 0–0.5)
EOSINOPHIL NFR BLD: 0 % (ref 0–8)
ERYTHROCYTE [DISTWIDTH] IN BLOOD BY AUTOMATED COUNT: 20.4 % (ref 11.5–14.5)
EST. GFR  (NO RACE VARIABLE): 57.3 ML/MIN/1.73 M^2
GLUCOSE SERPL-MCNC: 109 MG/DL (ref 70–110)
HCT VFR BLD AUTO: 29.6 % (ref 37–48.5)
HGB BLD-MCNC: 8.6 G/DL (ref 12–16)
HYPOCHROMIA BLD QL SMEAR: ABNORMAL
IMM GRANULOCYTES # BLD AUTO: 0.13 K/UL (ref 0–0.04)
IMM GRANULOCYTES NFR BLD AUTO: 4.9 % (ref 0–0.5)
LDH SERPL L TO P-CCNC: 264 U/L (ref 110–260)
LYMPHOCYTES # BLD AUTO: 1.4 K/UL (ref 1–4.8)
LYMPHOCYTES NFR BLD: 53.8 % (ref 18–48)
MAGNESIUM SERPL-MCNC: 2.2 MG/DL (ref 1.6–2.6)
MCH RBC QN AUTO: 27.2 PG (ref 27–31)
MCHC RBC AUTO-ENTMCNC: 29.1 G/DL (ref 32–36)
MCV RBC AUTO: 94 FL (ref 82–98)
MONOCYTES # BLD AUTO: 0.1 K/UL (ref 0.3–1)
MONOCYTES NFR BLD: 2.7 % (ref 4–15)
NEUTROPHILS # BLD AUTO: 1 K/UL (ref 1.8–7.7)
NEUTROPHILS NFR BLD: 38.6 % (ref 38–73)
NRBC BLD-RTO: 1 /100 WBC
PHOSPHATE SERPL-MCNC: 3.8 MG/DL (ref 2.7–4.5)
PLATELET # BLD AUTO: 113 K/UL (ref 150–450)
PLATELET BLD QL SMEAR: ABNORMAL
PMV BLD AUTO: 9.4 FL (ref 9.2–12.9)
POIKILOCYTOSIS BLD QL SMEAR: SLIGHT
POLYCHROMASIA BLD QL SMEAR: ABNORMAL
POTASSIUM SERPL-SCNC: 4.2 MMOL/L (ref 3.5–5.1)
PROT SERPL-MCNC: 7.5 G/DL (ref 6–8.4)
RBC # BLD AUTO: 3.16 M/UL (ref 4–5.4)
SODIUM SERPL-SCNC: 140 MMOL/L (ref 136–145)
STOMATOCYTES BLD QL SMEAR: PRESENT
URATE SERPL-MCNC: 5.5 MG/DL (ref 2.4–5.7)
WBC # BLD AUTO: 2.64 K/UL (ref 3.9–12.7)

## 2023-09-13 PROCEDURE — 84550 ASSAY OF BLOOD/URIC ACID: CPT | Performed by: INTERNAL MEDICINE

## 2023-09-13 PROCEDURE — 99214 OFFICE O/P EST MOD 30 MIN: CPT | Mod: PBBFAC | Performed by: INTERNAL MEDICINE

## 2023-09-13 PROCEDURE — 99999 PR PBB SHADOW E&M-EST. PATIENT-LVL IV: CPT | Mod: PBBFAC,,, | Performed by: INTERNAL MEDICINE

## 2023-09-13 PROCEDURE — 83615 LACTATE (LD) (LDH) ENZYME: CPT | Performed by: INTERNAL MEDICINE

## 2023-09-13 PROCEDURE — 99214 PR OFFICE/OUTPT VISIT, EST, LEVL IV, 30-39 MIN: ICD-10-PCS | Mod: S$GLB,,, | Performed by: UROLOGY

## 2023-09-13 PROCEDURE — 80053 COMPREHEN METABOLIC PANEL: CPT | Performed by: INTERNAL MEDICINE

## 2023-09-13 PROCEDURE — 85025 COMPLETE CBC W/AUTO DIFF WBC: CPT | Performed by: INTERNAL MEDICINE

## 2023-09-13 PROCEDURE — 83735 ASSAY OF MAGNESIUM: CPT | Performed by: INTERNAL MEDICINE

## 2023-09-13 PROCEDURE — 99214 OFFICE O/P EST MOD 30 MIN: CPT | Mod: S$GLB,,, | Performed by: UROLOGY

## 2023-09-13 PROCEDURE — 99215 OFFICE O/P EST HI 40 MIN: CPT | Mod: S$PBB,,, | Performed by: INTERNAL MEDICINE

## 2023-09-13 PROCEDURE — 99215 PR OFFICE/OUTPT VISIT, EST, LEVL V, 40-54 MIN: ICD-10-PCS | Mod: S$PBB,,, | Performed by: INTERNAL MEDICINE

## 2023-09-13 PROCEDURE — 84100 ASSAY OF PHOSPHORUS: CPT | Performed by: INTERNAL MEDICINE

## 2023-09-13 PROCEDURE — 99999 PR PBB SHADOW E&M-EST. PATIENT-LVL IV: ICD-10-PCS | Mod: PBBFAC,,, | Performed by: INTERNAL MEDICINE

## 2023-09-13 RX ORDER — ACYCLOVIR 400 MG/1
400 TABLET ORAL 2 TIMES DAILY
Qty: 60 TABLET | Refills: 11 | Status: SHIPPED | OUTPATIENT
Start: 2023-09-13 | End: 2024-09-12

## 2023-09-13 RX ORDER — VIBEGRON 75 MG/1
75 TABLET, FILM COATED ORAL DAILY
Qty: 30 TABLET | Refills: 11 | Status: SHIPPED | OUTPATIENT
Start: 2023-09-13 | End: 2024-02-06

## 2023-09-13 RX ORDER — POSACONAZOLE 100 MG/1
TABLET, DELAYED RELEASE ORAL
Qty: 30 TABLET | Refills: 2 | Status: SHIPPED | OUTPATIENT
Start: 2023-09-13 | End: 2023-10-28 | Stop reason: SDUPTHER

## 2023-09-13 NOTE — PROGRESS NOTES
HEMATOLOGIC MALIGNANCIES CONSULT NOTE    IDENTIFYING STATEMENT   Niurka Castellon) is a 78 y.o. female with a  of 1945 from [place], [place], referred by Dr. Zandra Shanks for evaluation of myelodysplastic syndrome.     HISTORY OF PRESENT ILLNESS:      Ms. 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     She was seen by Dr. Brenda Morrison in consultation for cytopenias, and she had a bone marrow biopsy at the end of August after initial evaluation did not identify a cause. This was consistent with MDS with excess blasts 2. She was referred here for further evaluation.    She has history of pulmonary coccidioidomycosis as well as ILD. She is seen in a wheelchair at this visit. She is accompanied by her daughter.     Past Medical History:   Diagnosis Date    Arthritis     Borderline serous cystadenoma of right ovary 2018    Breast cancer     mastectomy     Coccidiomycosis, progressive     Elevated CA-125 2018    Hyperlipidemia     OAB (overactive bladder)     Osteopenia     on Dexa 2017    Osteoporosis     pt reports hx of this, declined fosamax in past - treated with calcium and vit D    Pelvic mass 2018    Pulmonary fibrosis     Pulmonary nodules     SOB (shortness of breath) on exertion     Thyroid disease     hypo    UTI (urinary tract infection)        Family History   Problem Relation Age of Onset    Cancer Mother         colon cancer    Breast cancer Mother     Hypertension Father     Heart disease Father     Stroke Father     No Known Problems Sister     Cancer Brother         lung, ++ tobacco    Hypertension Brother     No Known Problems Daughter     Hypertension Son     Colon cancer Neg Hx     Ovarian cancer Neg Hx        Social History     Socioeconomic History    Marital status:     Number of children:  3   Occupational History    Occupation: retired from [employer]     Comment: neuro chemistry   Tobacco Use    Smoking status: Former     Current packs/day: 0.00     Average packs/day: 0.5 packs/day for 30.1 years (15.1 ttl pk-yrs)     Types: Cigarettes     Start date:      Quit date: 2000     Years since quittin.6    Smokeless tobacco: Never    Tobacco comments:     quit in her 50s, after 30 years smoking;   Substance and Sexual Activity    Alcohol use: No    Drug use: No    Sexual activity: Not Currently     Partners: Male   Social History Narrative    From [place]     Moved to [facility] in  for research    Moved to [place] for period of time    Moved back to [place] Summer 2016 and then to [facility] this year 2017     Social Determinants of Health     Financial Resource Strain: Low Risk  (2023)    Overall Financial Resource Strain (CARDIA)     Difficulty of Paying Living Expenses: Not hard at all   Food Insecurity: No Food Insecurity (2023)    Hunger Vital Sign     Worried About Running Out of Food in the Last Year: Never true     Ran Out of Food in the Last Year: Never true   Transportation Needs: No Transportation Needs (2023)    PRAPARE - Transportation     Lack of Transportation (Medical): No     Lack of Transportation (Non-Medical): No   Stress: No Stress Concern Present (2023)    [employer] - Occupational Stress Questionnaire     Feeling of Stress : Not at all   Social Connections: Socially Isolated (2023)    Social Connection and Isolation Panel [NHANES]     Frequency of Communication with Friends and Family: More than three times a week     Frequency of Social Gatherings with Friends and Family: More than three times a week     Attends Hoahaoism Services: Never     Active Member of Clubs or Organizations: No     Attends Club or Organization Meetings: Never     Marital Status:    Housing Stability: Low Risk   (5/25/2023)    Housing Stability Vital Sign     Unable to Pay for Housing in the Last Year: No     Number of Places Lived in the Last Year: 1     Unstable Housing in the Last Year: No         MEDICATIONS:     Current Outpatient Medications on File Prior to Visit   Medication Sig Dispense Refill    albuterol (VENTOLIN HFA) 90 mcg/actuation inhaler Inhale 1-2 puffs into the lungs every 6 (six) hours as needed for Wheezing or Shortness of Breath. Rescue 18 g 11    albuterol-ipratropium (DUO-NEB) 2.5 mg-0.5 mg/3 mL nebulizer solution Take 3 mLs by nebulization every 6 (six) hours as needed for Wheezing or Shortness of Breath. Rescue 75 mL 11    atorvastatin (LIPITOR) 20 MG tablet Take 1 tablet (20 mg total) by mouth once daily. 90 tablet 3    azelastine (ASTELIN) 137 mcg (0.1 %) nasal spray 1 spray (137 mcg total) by Nasal route 2 (two) times daily. 30 mL 6    calcium-vitamin D3 (CALCIUM 500 + D) 500 mg(1,250mg) -200 unit per tablet Take 1 tablet by mouth 2 (two) times daily with meals.      carvediloL (COREG) 6.25 MG tablet Take 1 tablet (6.25 mg total) by mouth 2 (two) times daily with meals. 180 tablet 0    fluticasone furoate-vilanteroL (BREO ELLIPTA) 100-25 mcg/dose diskus inhaler Inhale 1 puff into the lungs once daily. Controller.  PLEASE NOTE IT IS ONCE A DAY!! 60 each 4    irbesartan (AVAPRO) 300 MG tablet Take 1 tablet (300 mg total) by mouth every evening. 90 tablet 3    levothyroxine (SYNTHROID) 50 MCG tablet Take 1 tablet (50 mcg total) by mouth before breakfast. 90 tablet 1    montelukast (SINGULAIR) 10 mg tablet TAKE 1 TABLET BY MOUTH EVERY DAY IN THE EVENING 90 tablet 2    multivitamin (THERAGRAN) per tablet Take 1 tablet by mouth once daily.      oxybutynin (DITROPAN XL) 15 MG TR24 Take 1 tablet (15 mg total) by mouth once daily. 30 tablet 11    predniSONE (DELTASONE) 1 MG tablet Take 2 tablets (2 mg total) by mouth once daily. 180 tablet 3    estradioL (ESTRACE) 0.01 % (0.1 mg/gram) vaginal cream Place  "1 g vaginally twice a week. 42.5 g 11    gabapentin (NEURONTIN) 100 MG capsule Take 1 capsule (100 mg total) by mouth every evening. (Patient not taking: Reported on 8/18/2023) 30 capsule 3    pantoprazole (PROTONIX) 20 MG tablet Take 1 tablet (20 mg total) by mouth once daily. (Patient not taking: Reported on 8/18/2023) 30 tablet 2    [DISCONTINUED] budesonide-formoterol 80-4.5 mcg (SYMBICORT) 80-4.5 mcg/actuation HFAA Inhale 2 puffs into the lungs 2 (two) times daily as needed. Controller 1 Inhaler 6     No current facility-administered medications on file prior to visit.       ALLERGIES:   Review of patient's allergies indicates:   Allergen Reactions    Ciprofloxacin Other (See Comments)     Right foot pain and edema, tendonitis    Amlodipine Edema and Swelling     BLE  BLE    Lisinopril Other (See Comments)     cough    Nitrofuran analogues         ROS:       Review of Systems   Constitutional:  Negative for diaphoresis, fatigue, fever and unexpected weight change.   HENT:   Negative for lump/mass and sore throat.    Eyes:  Negative for icterus.   Respiratory:  Negative for cough and shortness of breath.    Cardiovascular:  Negative for chest pain and palpitations.   Gastrointestinal:  Negative for abdominal distention, constipation, diarrhea, nausea and vomiting.   Genitourinary:  Negative for dysuria and frequency.    Musculoskeletal:  Negative for arthralgias, gait problem and myalgias.   Skin:  Negative for rash.   Neurological:  Negative for dizziness, gait problem and headaches.   Hematological:  Negative for adenopathy. Does not bruise/bleed easily.   Psychiatric/Behavioral:  The patient is not nervous/anxious.        PHYSICAL EXAM:  Vitals:    09/13/23 0957   BP: 127/68   Pulse: 74   Resp: 16   Temp: 97.9 °F (36.6 °C)   TempSrc: Oral   SpO2: 98%   Weight: 71.4 kg (157 lb 6.5 oz)   Height: 5' 4" (1.626 m)   PainSc: 0-No pain       Physical Exam  Constitutional:       General: She is not in acute " distress.     Appearance: She is well-developed.   HENT:      Head: Normocephalic and atraumatic.      Mouth/Throat:      Mouth: No oral lesions.   Eyes:      Conjunctiva/sclera: Conjunctivae normal.   Neck:      Thyroid: No thyromegaly.   Cardiovascular:      Rate and Rhythm: Normal rate and regular rhythm.      Heart sounds: Normal heart sounds. No murmur heard.  Pulmonary:      Breath sounds: Normal breath sounds. No wheezing or rales.   Abdominal:      General: There is no distension.      Palpations: Abdomen is soft. There is no hepatomegaly, splenomegaly or mass.      Tenderness: There is no abdominal tenderness.   Lymphadenopathy:      Cervical: No cervical adenopathy.      Right cervical: No deep cervical adenopathy.     Left cervical: No deep cervical adenopathy.   Skin:     Findings: No rash.   Neurological:      Mental Status: She is alert and oriented to person, place, and time.      Cranial Nerves: No cranial nerve deficit.      Coordination: Coordination normal.      Deep Tendon Reflexes: Reflexes are normal and symmetric.         LAB:   Results for orders placed or performed during the hospital encounter of 08/29/23   CBC auto differential   Result Value Ref Range    WBC 2.68 (L) 3.90 - 12.70 K/uL    RBC 2.86 (L) 4.00 - 5.40 M/uL    Hemoglobin 8.1 (L) 12.0 - 16.0 g/dL    Hematocrit 26.2 (L) 37.0 - 48.5 %    MCV 92 82 - 98 fL    MCH 28.3 27.0 - 31.0 pg    MCHC 30.9 (L) 32.0 - 36.0 g/dL    RDW 20.8 (H) 11.5 - 14.5 %    Platelets 128 (L) 150 - 450 K/uL    MPV 9.4 9.2 - 12.9 fL    Immature Granulocytes CANCELED 0.0 - 0.5 %    Gran # (ANC) 1.3 (L) 1.8 - 7.7 K/uL    Immature Grans (Abs) CANCELED 0.00 - 0.04 K/uL    Lymph # CANCELED 1.0 - 4.8 K/uL    Mono # CANCELED 0.3 - 1.0 K/uL    Eos # CANCELED 0.0 - 0.5 K/uL    Baso # CANCELED 0.00 - 0.20 K/uL    nRBC 1 (A) 0 /100 WBC    Gran % 48.0 38.0 - 73.0 %    Lymph % 46.0 18.0 - 48.0 %    Mono % 4.0 4.0 - 15.0 %    Eosinophil % 0.0 0.0 - 8.0 %    Basophil % 0.0  0.0 - 1.9 %    Other 2 %    Aniso Moderate     Poik Slight     Poly Moderate     Hypo Occasional     Ovalocytes Occasional     Tear Drop Cells Occasional     Large/Giant Platelets Present     Differential Method Manual    Protime-INR (PT)   Result Value Ref Range    PT 14.3 11.8 - 14.7 sec    INR 1.1    APTT   Result Value Ref Range    aPTT 28.0 24.6 - 36.7 sec   Chromosome Analysis, Bone Marrow Right Posterior Iliac Crest   Result Value Ref Range    Chromosome Analysis, Bone Marrow See result image under hyperlink    Myelodysplastic Syndrome (MDS), FISH, Bone Marrow   Result Value Ref Range    MDS FISH  Interpretation See result image under hyperlink      *Note: Due to a large number of results and/or encounters for the requested time period, some results have not been displayed. A complete set of results can be found in Results Review.       PROBLEMS ASSESSED THIS VISIT:    No diagnosis found.    IMPRESSION:    1. Myelodysplastic syndrome with increased blasts-2   A. 8/29/2023: Bone marrow biopsy - 65-75% cellular marrow consistent with myelodysplastic syndrome with excess blasts-2; cytogenetics 46,XX,del(3)(q12)[2]/47,sl,+8[18]; NGS not sent; IPSS-R score of 7 = very high risk    2. Neuropathy  3. INterstitial lung disease/pulmonary fibrosis  4. Hypertension  5. Aortic atherosclerosis  6. Pulmonary coccidioidomycosis  7. Hypothyroidism  8. Hyperparathyroidism    PLAN:       Myelodysplastic syndrome with increased blasts-2  Pancytopenia  Ms. 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 We reviewed that this is an aggressive hematologic malignancy with high probability of evolution to acute myeloid leukemia (AML). According to the ICC pathologic designation, this is referred to as MDS/AML.    NGS testing is not yet available but can be performed. We will see if this can be performed on bone marrow biopsy tissue.    For prognostic assessment, we used IPSS-R, with a score of 7 using laboratory findings at time of bone marrow biospy. This  correlates with very high risk disease. Median survival is estimated at 0.8 years, and median time to evolution in 25% of patients is estimated at 0.7 years.     For therapy, I recommend azacitidine + venetoclax, based on the new classification of this disease as akin to AML. We will plan azacitidine 75 mg/m2 SQ on days 1-5 and venetoclax 100 mg daily (adjusted for concurrent posaconazole use) on days 1-14 of each 28 day cycle. We will plan bone marrow biopsy for disease assessment prior to cycle 2.     Ms. Pedraza is agreeable to this treatment plan. We reviewed that toxicity may include cytopenias, nausea/vomiting, risk of infection.     Advance Care Planning     Date: 09/13/2023  Reviewed goals of care - that treatment is not curative but may prolong life. Treatment may carry high toxicity and monitoring needs and is not guaranteed to be effective.      Follow-up  - labs weekly  - Begin aza/angelo once authorized/obtained  - Bone marrow biopsy prior to cycle 2  - MD follow-up prior to initiation of therapy and prior to cycle 2      Mohit Centeno MD  Hematology and Stem Cell Transplant

## 2023-09-13 NOTE — TELEPHONE ENCOUNTER
Nikhil, this is Jami Berrios, clinical pharmacist with [facility] that is part of your care team.  We have begun working on your prescription that your doctor has sent us. Our next steps include:     Working with your insurance company to obtain approval for your medication  Working with you to ensure your medication is affordable     We will be calling you along the way with updates on your medication but if you have any concerns or receive information that you would like to discuss please reach us at [phone].    Welcome call outcome: Patient/caregiver reached

## 2023-09-15 ENCOUNTER — TELEPHONE (OUTPATIENT)
Dept: UROLOGY | Facility: CLINIC | Age: 78
End: 2023-09-15
Payer: MEDICARE

## 2023-09-16 DIAGNOSIS — R06.02 SOB (SHORTNESS OF BREATH): ICD-10-CM

## 2023-09-16 DIAGNOSIS — R09.02 HYPOXEMIA: ICD-10-CM

## 2023-09-16 DIAGNOSIS — J84.10 PULMONARY FIBROSIS: ICD-10-CM

## 2023-09-16 NOTE — TELEPHONE ENCOUNTER
No care due was identified.  [facility] Embedded Care Due Messages. Reference number: [id number].   9/16/2023 2:38:59 PM CDT

## 2023-09-18 RX ORDER — IPRATROPIUM BROMIDE AND ALBUTEROL SULFATE 2.5; .5 MG/3ML; MG/3ML
3 SOLUTION RESPIRATORY (INHALATION) EVERY 6 HOURS PRN
Qty: 75 ML | Refills: 11 | Status: SHIPPED | OUTPATIENT
Start: 2023-09-18 | End: 2024-09-17

## 2023-09-18 NOTE — TELEPHONE ENCOUNTER
Refill Routing Note   Medication(s) are not appropriate for processing by [facility] for the following reason(s):      New or recently adjusted medication    [facility] action(s):  Defer Care Due:  None identified              Appointments  past 12m or future 3m with PCP    Date Provider   Last Visit   8/18/2023 Savana Anderson MD   Next Visit   10/19/2023 Savana Anderson MD   ED visits in past 90 days: 1        Note composed:9:04 AM 09/18/2023

## 2023-09-19 ENCOUNTER — SPECIALTY PHARMACY (OUTPATIENT)
Dept: PHARMACY | Facility: CLINIC | Age: 78
End: 2023-09-19
Payer: MEDICARE

## 2023-09-19 ENCOUNTER — PATIENT MESSAGE (OUTPATIENT)
Dept: INFECTIOUS DISEASES | Facility: CLINIC | Age: 78
End: 2023-09-19
Payer: MEDICARE

## 2023-09-19 ENCOUNTER — PATIENT MESSAGE (OUTPATIENT)
Dept: INTERNAL MEDICINE | Facility: CLINIC | Age: 78
End: 2023-09-19
Payer: MEDICARE

## 2023-09-19 DIAGNOSIS — R11.0 NAUSEA: ICD-10-CM

## 2023-09-19 DIAGNOSIS — D46.9 MDS (MYELODYSPLASTIC SYNDROME): Primary | ICD-10-CM

## 2023-09-19 DIAGNOSIS — N39.0 RECURRENT UTI: Primary | ICD-10-CM

## 2023-09-19 RX ORDER — ONDANSETRON 4 MG/1
4 TABLET, ORALLY DISINTEGRATING ORAL EVERY 6 HOURS PRN
Qty: 1 TABLET | Refills: 0 | Status: SHIPPED | OUTPATIENT
Start: 2023-09-19 | End: 2023-09-20

## 2023-09-19 NOTE — TELEPHONE ENCOUNTER
Specialty Pharmacy - Initial Clinical Assessment    Specialty Medication Orders Linked to Encounter      Flowsheet Row Most Recent Value   Medication #1 venetoclax (VENCLEXTA) 100 mg Tab (Order#[id number], Rx#[id number])          Patient Diagnosis   D46.9 - MDS (myelodysplastic syndrome)    Subjective    Niurka Pedraza is a 78 y.o. female, who is followed by the specialty pharmacy service for management and education.    Recent Encounters       Date Type Provider Description    09/19/2023 Specialty Pharmacy Jami Berrios, Filipe Initial Clinical Assessment    09/19/2023 Specialty Pharmacy Jami Berrios, PharmD Referral Authorization            Current Outpatient Medications   Medication Sig Note    ascorbic acid/zinc (ZINC WITH VITAMIN C ORAL) Take by mouth.     acyclovir (ZOVIRAX) 400 MG tablet Take 1 tablet (400 mg total) by mouth 2 (two) times daily.     albuterol (VENTOLIN HFA) 90 mcg/actuation inhaler Inhale 1-2 puffs into the lungs every 6 (six) hours as needed for Wheezing or Shortness of Breath. Rescue     albuterol-ipratropium (DUO-NEB) 2.5 mg-0.5 mg/3 mL nebulizer solution Take 3 mLs by nebulization every 6 (six) hours as needed for Wheezing or Shortness of Breath. Rescue     atorvastatin (LIPITOR) 20 MG tablet Take 1 tablet (20 mg total) by mouth once daily.     azelastine (ASTELIN) 137 mcg (0.1 %) nasal spray 1 spray (137 mcg total) by Nasal route 2 (two) times daily.     calcium-vitamin D3 (CALCIUM 500 + D) 500 mg(1,250mg) -200 unit per tablet Take 1 tablet by mouth 2 (two) times daily with meals.     carvediloL (COREG) 6.25 MG tablet Take 1 tablet (6.25 mg total) by mouth 2 (two) times daily with meals.     estradioL (ESTRACE) 0.01 % (0.1 mg/gram) vaginal cream Place 1 g vaginally twice a week.     fluticasone furoate-vilanteroL (BREO ELLIPTA) 100-25 mcg/dose diskus inhaler Inhale 1 puff into the lungs once daily. Controller.  PLEASE NOTE IT IS ONCE A DAY!!     gabapentin (NEURONTIN) 100 MG capsule  Take 1 capsule (100 mg total) by mouth every evening. 9/19/2023: Currently not taking     irbesartan (AVAPRO) 300 MG tablet Take 1 tablet (300 mg total) by mouth every evening.     levothyroxine (SYNTHROID) 50 MCG tablet Take 1 tablet (50 mcg total) by mouth before breakfast.     montelukast (SINGULAIR) 10 mg tablet TAKE 1 TABLET BY MOUTH EVERY DAY IN THE EVENING     multivitamin (THERAGRAN) per tablet Take 1 tablet by mouth once daily.     oxybutynin (DITROPAN XL) 15 MG TR24 Take 1 tablet (15 mg total) by mouth once daily.     pantoprazole (PROTONIX) 20 MG tablet Take 1 tablet (20 mg total) by mouth once daily.     posaconazole (NOXAFIL) 100 mg TbEC tablet Take 3 tablets (300 mg) by mouth twice daily on the first day, and then take 300 mg daily thereafter.     predniSONE (DELTASONE) 1 MG tablet Take 2 tablets (2 mg total) by mouth once daily.     venetoclax (VENCLEXTA) 100 mg Tab Take one tablet (100 mg) by mouth daily on days 1-14 of each 28 day cycle of therapy.     vibegron (GEMTESA) 75 mg Tab Take 75 mg by mouth once daily.    Last reviewed on 9/19/2023  1:20 PM by Jami Coley, PharmD    Review of patient's allergies indicates:   Allergen Reactions    Ciprofloxacin Other (See Comments)     Right foot pain and edema, tendonitis    Amlodipine Edema and Swelling     BLE  BLE    Lisinopril Other (See Comments)     cough    Nitrofuran analogues    Last reviewed on  9/19/2023 1:03 PM by Jami Coley    Drug Interactions    Drug interactions evaluated: yes  Clinically relevant drug interactions identified: yes   Interactions list: Venclexta x posaconazole - increased venclexta concentration     Drug management plan: Venclexta dose reduced appropriately    Provided the patient with educational material regarding drug interactions: yes               Adverse Effects          *All other systems reviewed and are negative       Assessment Questions - Documented Responses      Flowsheet Row Most Recent Value    Assessment    Medication Reconciliation completed for patient Yes   During the past 4 weeks, has patient missed any activities due to condition or medication? No   During the past 4 weeks, did patient have any of the following urgent care visits? None   Goals of Therapy Status Discussed (new start)   Status of the patients ability to self-administer: Is Able   All education points have been covered with patient? Yes, supplemental printed education provided   Welcome packet contents reviewed and discussed with patient? Yes   Assesment completed? Yes   Plan Therapy being initiated   Do you need to open a clinical intervention (i-vent)? Yes   Do you want to schedule first shipment? Yes   Medication #1 Assessment Info    Patient status New medication, New to [facility]   Is this medication appropriate for the patient? Yes   Is this medication effective? Not yet started          Refill Questions - Documented Responses      Flowsheet Row Most Recent Value   Patient Availability and HIPAA Verification    Does patient want to proceed with activity? Yes   HIPAA/medical authority confirmed? Yes   Relationship to patient of person spoken to? Child   Refill Screening Questions    When does the patient need to receive the medication? 09/25/23   Refill Delivery Questions    How will the patient receive the medication? MEDRx   When does the patient need to receive the medication? 09/25/23   Shipping Address Home   Address in [facility] confirmed and updated if neccessary? Yes   Expected Copay ($) 0   Is the patient able to afford the medication copay? Yes   Payment Method zero copay   Days supply of Refill 28   Supplies needed? No supplies needed   Refill activity completed? 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    Tried/failed medications: none    BP Readings from Last 4 Encounters:   09/13/23 125/62   09/13/23 127/68   08/29/23 (!) 108/54   08/22/23 120/70     Ht Readings from Last 4 Encounters:   09/13/23 5' 4" (1.626 m)   09/13/23 5' 4" (1.626 m)   08/29/23 5' 4" (1.626 m)   08/22/23 5' 4" (1.626 m)     Wt Readings from Last 4 Encounters:   09/13/23 72 kg (158 lb 11.7 oz)   09/13/23 71.4 kg (157 lb 6.5 oz)   08/29/23 61.7 kg (136 lb)   08/22/23 71.7 kg (158 lb)     Recent Labs   Lab Result Units 09/13/23  1103 08/29/23  0922 08/18/23  1137 07/31/23  1210 07/06/23 2010 07/06/23 2010 06/22/23  0848   RBC M/uL 3.16 L 2.86 L 3.03 L 3.10 L   < > 3.19 L  --    Hemoglobin g/dL 8.6 L 8.1 L 8.5 L 9.0 L   < > 9.4 L  --    Hematocrit % 29.6 L 26.2 L 28.4 L 28.7 L   < > 29.5 L  --    WBC K/uL 2.64 L 2.68 L 3.64 L 2.49 L   < > 2.73 L  --    Gran # (ANC) K/uL 1.0 L 1.3 L 1.5 L  --    < > 1.0 L  --    Gran % % 38.6 48.0 41.3 52.0   < > 37.7 L  --    Platelets K/uL 113 L 128 L 169 132 L   < > 170  --    Sodium mmol/L 140  --   --  141  --  139 138   Potassium mmol/L 4.2  --   --  4.0  --  4.1 3.9   Chloride mmol/L 104  --   --  106  --  105 106   Glucose mg/dL 109  --   --  112 H  --  117 H 114 H   BUN mg/dL 17  --   --  21  --  17 19   Creatinine mg/dL 1.0  --   --  0.9  --  0.9 0.8   Calcium mg/dL 9.4  --   --  9.3  --  9.5 8.7   Total Protein g/dL 7.5  --   --  7.3  --  7.3  --    Albumin g/dL 3.9  --   --  4.1  --  3.8  --    Total Bilirubin mg/dL 1.8 H  --   --  1.8 H  --  0.9  --    Alkaline Phosphatase U/L 45 L  --   --  38 L  --  43 L  --    AST U/L 16  --   --  17  " --  16  --    ALT U/L 9 L  --   --  10  --  11  --     < > = values in this interval not displayed.     The goals of cancer treatment include:  Achieving remission of cancer, if possible  Reducing tumor size and spread of cancer, if remission is not possible  Minimizing pain and symptoms of the cancer  Preventing infection and other complications of treatment  Promoting adequate nutrition  Encouraging proper hydration  Improving or maintaining quality of life  Maintaining optimal therapy adherence  Minimizing and managing side effects    Goals of Therapy Status: Discussed (new start)    Assessment/Plan  Patient plans to start therapy on 09/25/23      Indication, dosage, appropriateness, effectiveness, safety and convenience of her specialty medication(s) were reviewed today.     Patient Education   Patient received education on the following:   Expectations and possible outcomes of therapy  Proper use, timely administration, and missed dose management  Duration of therapy  Side effects, including prevention, minimization, and management  Contraindications and safety precautions  New or changed medications, including prescribe and over the counter medications and supplements  Reviews recommended vaccinations, as appropriate  Storage, safe handling, and disposal    Tasks added this encounter   3/8/2024 - Clinical Assessment (6 month recurrence)   Tasks due within next 3 months   No tasks due.     Jami Berrios, PharmD  [facility]  [address] [facility] [address]  Phone: [phone]  Fax: [phone]

## 2023-09-19 NOTE — TELEPHONE ENCOUNTER
Venclexta PA approved until 9/18/24    [facility] lashanda acquired and on file for use.

## 2023-09-20 ENCOUNTER — LAB VISIT (OUTPATIENT)
Dept: LAB | Facility: HOSPITAL | Age: 78
End: 2023-09-20
Attending: INTERNAL MEDICINE
Payer: MEDICARE

## 2023-09-20 ENCOUNTER — PATIENT MESSAGE (OUTPATIENT)
Dept: HEMATOLOGY/ONCOLOGY | Facility: CLINIC | Age: 78
End: 2023-09-20

## 2023-09-20 ENCOUNTER — OFFICE VISIT (OUTPATIENT)
Dept: HEMATOLOGY/ONCOLOGY | Facility: CLINIC | Age: 78
End: 2023-09-20
Payer: MEDICARE

## 2023-09-20 DIAGNOSIS — D46.9 MDS (MYELODYSPLASTIC SYNDROME): Primary | ICD-10-CM

## 2023-09-20 DIAGNOSIS — N30.90 CYSTITIS: ICD-10-CM

## 2023-09-20 DIAGNOSIS — D64.9 NORMOCYTIC ANEMIA: ICD-10-CM

## 2023-09-20 DIAGNOSIS — R11.0 NAUSEA: ICD-10-CM

## 2023-09-20 DIAGNOSIS — R30.0 DYSURIA: ICD-10-CM

## 2023-09-20 DIAGNOSIS — D61.818 PANCYTOPENIA: ICD-10-CM

## 2023-09-20 DIAGNOSIS — D69.6 THROMBOCYTOPENIA: ICD-10-CM

## 2023-09-20 PROCEDURE — 88325 CONSLTJ COMPRE RVW REC REPRT: CPT | Mod: ,,, | Performed by: PATHOLOGY

## 2023-09-20 PROCEDURE — 88325 CONSLTJ COMPRE RVW REC REPRT: CPT | Performed by: PATHOLOGY

## 2023-09-20 PROCEDURE — 88342 CHG IMMUNOCYTOCHEMISTRY: ICD-10-PCS | Mod: 26,XU,, | Performed by: PATHOLOGY

## 2023-09-20 PROCEDURE — 88342 IMHCHEM/IMCYTCHM 1ST ANTB: CPT | Mod: 59 | Performed by: PATHOLOGY

## 2023-09-20 PROCEDURE — 88325 PR  COMPREHENSIVE REVIEW OF DATA: ICD-10-PCS | Mod: ,,, | Performed by: PATHOLOGY

## 2023-09-20 PROCEDURE — 88341 PR IHC OR ICC EACH ADD'L SINGLE ANTIBODY  STAINPR: ICD-10-PCS | Mod: 26,XU,, | Performed by: PATHOLOGY

## 2023-09-20 PROCEDURE — 88341 IMHCHEM/IMCYTCHM EA ADD ANTB: CPT | Mod: 26,XU,, | Performed by: PATHOLOGY

## 2023-09-20 PROCEDURE — 88342 IMHCHEM/IMCYTCHM 1ST ANTB: CPT | Mod: 26,XU,, | Performed by: PATHOLOGY

## 2023-09-20 PROCEDURE — 88341 IMHCHEM/IMCYTCHM EA ADD ANTB: CPT | Mod: 59 | Performed by: PATHOLOGY

## 2023-09-20 PROCEDURE — 99215 PR OFFICE/OUTPT VISIT, EST, LEVL V, 40-54 MIN: ICD-10-PCS | Mod: 95,,, | Performed by: INTERNAL MEDICINE

## 2023-09-20 PROCEDURE — 99215 OFFICE O/P EST HI 40 MIN: CPT | Mod: 95,,, | Performed by: INTERNAL MEDICINE

## 2023-09-20 RX ORDER — ONDANSETRON 4 MG/1
8 TABLET, ORALLY DISINTEGRATING ORAL EVERY 6 HOURS PRN
Qty: 30 TABLET | Refills: 1 | Status: SHIPPED | OUTPATIENT
Start: 2023-09-20 | End: 2024-02-06

## 2023-09-20 NOTE — PROGRESS NOTES
HEMATOLOGIC MALIGNANCIES PROGRESS NOTE    IDENTIFYING STATEMENT   Niurka Castellon) is a 78 y.o. female with a  of 1945 from [place], [place] with the diagnosis of MDS.      TELEMEDICINE DOCUMENTATION    The patient location is: [place]  The chief complaint leading to consultation is: MDS    Visit type: audiovisual    Face to Face time with patient: 15 minutes  30 minutes of total time spent on the encounter, which includes face to face time and non-face to face time preparing to see the patient (eg, review of tests), Obtaining and/or reviewing separately obtained history, Documenting clinical information in the electronic or other health record, Independently interpreting results (not separately reported) and communicating results to the patient/family/caregiver, or Care coordination (not separately reported).         Each patient to whom he or she provides medical services by telemedicine is:  (1) informed of the relationship between the physician and patient and the respective role of any other health care provider with respect to management of the patient; and (2) notified that he or she may decline to receive medical services by telemedicine and may withdraw from such care at any time.    Notes:       ONCOLOGY HISTORY:    1. Myelodysplastic syndrome with increased blasts-2              A. 2023: Bone marrow biopsy - 65-75% cellular marrow consistent with myelodysplastic syndrome with excess blasts-2; cytogenetics 46,XX,del(3)(q12)[2]/47,sl,+8[18]; NGS not sent; IPSS-R score of 7 = very high risk     2. Neuropathy  3. INterstitial lung disease/pulmonary fibrosis  4. Hypertension  5. Aortic atherosclerosis  6. Pulmonary coccidioidomycosis  7. Hypothyroidism  8. Hyperparathyroidism    INTERVAL HISTORY:      Ms. Pedraza is seen in this virtual visit in follow-up of MDS/AML and in preparation for initiation of azacitidine-venetoclax therapy. She is reporting dysuria and notes her history of recurrent  ESBL UTIs.     Past Medical History, Past Social History and Past Family History have been reviewed and are unchanged except as noted in the interval history.    MEDICATIONS:     Current Outpatient Medications on File Prior to Visit   Medication Sig Dispense Refill    acyclovir (ZOVIRAX) 400 MG tablet Take 1 tablet (400 mg total) by mouth 2 (two) times daily. 60 tablet 11    albuterol (VENTOLIN HFA) 90 mcg/actuation inhaler Inhale 1-2 puffs into the lungs every 6 (six) hours as needed for Wheezing or Shortness of Breath. Rescue 18 g 11    albuterol-ipratropium (DUO-NEB) 2.5 mg-0.5 mg/3 mL nebulizer solution Take 3 mLs by nebulization every 6 (six) hours as needed for Wheezing or Shortness of Breath. Rescue 75 mL 11    ascorbic acid/zinc (ZINC WITH VITAMIN C ORAL) Take by mouth.      atorvastatin (LIPITOR) 20 MG tablet Take 1 tablet (20 mg total) by mouth once daily. 90 tablet 3    azelastine (ASTELIN) 137 mcg (0.1 %) nasal spray 1 spray (137 mcg total) by Nasal route 2 (two) times daily. 30 mL 6    calcium-vitamin D3 (CALCIUM 500 + D) 500 mg(1,250mg) -200 unit per tablet Take 1 tablet by mouth 2 (two) times daily with meals.      estradioL (ESTRACE) 0.01 % (0.1 mg/gram) vaginal cream Place 1 g vaginally twice a week. 42.5 g 11    fluticasone furoate-vilanteroL (BREO ELLIPTA) 100-25 mcg/dose diskus inhaler Inhale 1 puff into the lungs once daily. Controller.  PLEASE NOTE IT IS ONCE A DAY!! 60 each 4    gabapentin (NEURONTIN) 100 MG capsule Take 1 capsule (100 mg total) by mouth every evening. 30 capsule 3    irbesartan (AVAPRO) 300 MG tablet Take 1 tablet (300 mg total) by mouth every evening. 90 tablet 3    levothyroxine (SYNTHROID) 50 MCG tablet Take 1 tablet (50 mcg total) by mouth before breakfast. 90 tablet 1    montelukast (SINGULAIR) 10 mg tablet TAKE 1 TABLET BY MOUTH EVERY DAY IN THE EVENING 90 tablet 2    multivitamin (THERAGRAN) per tablet Take 1 tablet by mouth once daily.      oxybutynin (DITROPAN XL) 15  MG TR24 Take 1 tablet (15 mg total) by mouth once daily. 30 tablet 11    pantoprazole (PROTONIX) 20 MG tablet Take 1 tablet (20 mg total) by mouth once daily. 30 tablet 2    posaconazole (NOXAFIL) 100 mg TbEC tablet Take 3 tablets (300 mg) by mouth twice daily on the first day, and then take 300 mg daily thereafter. 30 tablet 2    predniSONE (DELTASONE) 1 MG tablet Take 2 tablets (2 mg total) by mouth once daily. 180 tablet 3    venetoclax (VENCLEXTA) 100 mg Tab Take one tablet (100 mg) by mouth daily on days 1-14 of each 28 day cycle of therapy. 14 tablet 0    vibegron (GEMTESA) 75 mg Tab Take 75 mg by mouth once daily. 30 tablet 11    [DISCONTINUED] budesonide-formoterol 80-4.5 mcg (SYMBICORT) 80-4.5 mcg/actuation HFAA Inhale 2 puffs into the lungs 2 (two) times daily as needed. Controller 1 Inhaler 6     No current facility-administered medications on file prior to visit.       ALLERGIES:   Review of patient's allergies indicates:   Allergen Reactions    Ciprofloxacin Other (See Comments)     Right foot pain and edema, tendonitis    Amlodipine Edema and Swelling     BLE  BLE    Lisinopril Other (See Comments)     cough    Nitrofuran analogues         ROS:       Review of Systems   Constitutional:  Negative for diaphoresis, fatigue, fever and unexpected weight change.   HENT:   Negative for lump/mass and sore throat.    Eyes:  Negative for icterus.   Respiratory:  Negative for cough and shortness of breath.    Cardiovascular:  Negative for chest pain and palpitations.   Gastrointestinal:  Negative for abdominal distention, constipation, diarrhea, nausea and vomiting.   Genitourinary:  Positive for dysuria. Negative for frequency.    Musculoskeletal:  Negative for arthralgias, gait problem and myalgias.   Skin:  Negative for rash.   Neurological:  Negative for dizziness, gait problem and headaches.   Hematological:  Negative for adenopathy. Does not bruise/bleed easily.   Psychiatric/Behavioral:  The patient is not  nervous/anxious.        PHYSICAL EXAM:  There were no vitals filed for this visit.    Physical Exam  Head and neck visualized, and no abnormalities seen.     LAB:   Results for orders placed or performed in visit on 09/13/23   CBC Auto Differential   Result Value Ref Range    WBC 2.64 (L) 3.90 - 12.70 K/uL    RBC 3.16 (L) 4.00 - 5.40 M/uL    Hemoglobin 8.6 (L) 12.0 - 16.0 g/dL    Hematocrit 29.6 (L) 37.0 - 48.5 %    MCV 94 82 - 98 fL    MCH 27.2 27.0 - 31.0 pg    MCHC 29.1 (L) 32.0 - 36.0 g/dL    RDW 20.4 (H) 11.5 - 14.5 %    Platelets 113 (L) 150 - 450 K/uL    MPV 9.4 9.2 - 12.9 fL    Immature Granulocytes 4.9 (H) 0.0 - 0.5 %    Gran # (ANC) 1.0 (L) 1.8 - 7.7 K/uL    Immature Grans (Abs) 0.13 (H) 0.00 - 0.04 K/uL    Lymph # 1.4 1.0 - 4.8 K/uL    Mono # 0.1 (L) 0.3 - 1.0 K/uL    Eos # 0.0 0.0 - 0.5 K/uL    Baso # 0.00 0.00 - 0.20 K/uL    nRBC 1 (A) 0 /100 WBC    Gran % 38.6 38.0 - 73.0 %    Lymph % 53.8 (H) 18.0 - 48.0 %    Mono % 2.7 (L) 4.0 - 15.0 %    Eosinophil % 0.0 0.0 - 8.0 %    Basophil % 0.0 0.0 - 1.9 %    Platelet Estimate Decreased (A)     Aniso Slight     Poik Slight     Poly Occasional     Hypo Occasional     Stomatocytes Present     Differential Method Automated    Comprehensive Metabolic Panel   Result Value Ref Range    Sodium 140 136 - 145 mmol/L    Potassium 4.2 3.5 - 5.1 mmol/L    Chloride 104 95 - 110 mmol/L    CO2 24 23 - 29 mmol/L    Glucose 109 70 - 110 mg/dL    BUN 17 8 - 23 mg/dL    Creatinine 1.0 0.5 - 1.4 mg/dL    Calcium 9.4 8.7 - 10.5 mg/dL    Total Protein 7.5 6.0 - 8.4 g/dL    Albumin 3.9 3.5 - 5.2 g/dL    Total Bilirubin 1.8 (H) 0.1 - 1.0 mg/dL    Alkaline Phosphatase 45 (L) 55 - 135 U/L    AST 16 10 - 40 U/L    ALT 9 (L) 10 - 44 U/L    eGFR 57.3 (A) >60 mL/min/1.73 m^2    Anion Gap 12 8 - 16 mmol/L   Magnesium   Result Value Ref Range    Magnesium 2.2 1.6 - 2.6 mg/dL   PHOSPHORUS   Result Value Ref Range    Phosphorus 3.8 2.7 - 4.5 mg/dL   Lactate Dehydrogenase   Result Value Ref  Range     (H) 110 - 260 U/L   Uric Acid   Result Value Ref Range    Uric Acid 5.5 2.4 - 5.7 mg/dL     *Note: Due to a large number of results and/or encounters for the requested time period, some results have not been displayed. A complete set of results can be found in Results Review.       PROBLEMS ASSESSED THIS VISIT:    1. MDS (myelodysplastic syndrome)    2. Dysuria    3. Nausea    4. Cystitis    5. Pancytopenia        PLAN:       Myelodysplastic syndrome  Ms. 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 We reviewed that this is an aggressive hematologic malignancy with high probability of evolution to acute myeloid leukemia (AML). According to the [facility] pathologic designation, this is referred to as MDS/AML.     NGS testing is not yet available but can be performed. We will see if this can be performed on bone marrow biopsy tissue. I will also order NGS on peripheral blood for a upcoming lab draw.      For prognostic assessment, we used IPSS-R, with a score of 7 using laboratory findings at time of bone marrow biospy. This correlates with very high risk disease. Median survival is estimated at 0.8 years, and median time to evolution in 25% of patients is estimated at 0.7 years.      For therapy, I recommend azacitidine + venetoclax, based on the new classification of this disease as akin to AML. We will plan azacitidine 75 mg/m2 SQ on days 1-5 and venetoclax 100 mg daily (adjusted for concurrent posaconazole use) on days 1-14 of each 28 day cycle. We will plan bone marrow biopsy for disease assessment prior to cycle 2.      Ms. Pedraza is agreeable to this treatment plan. We reviewed that toxicity may include cytopenias, nausea/vomiting, risk of infection. she has now received venetoclax, and we will begin therapy on 9/25.     Pancytopenia  Due to MDS/AML. Monitor weekly during therapy and transfuse as clinically indicated.     Acute cystitis  History of recurrent ESBL-producing urinary tract infections. I discussed directly with   Cara on the day of this encounter. She is recommending amikacin daily x 5 days. We will try to get this with her azacitidine treatment.     Follow-up    Route Chart for Scheduling    BMT Chart Routing  Urgent    Follow up with physician . Please schedule bone marrow biopsy during week of 10/16. Follow-up visit with me virtually on 10/23. at 8 AM.   Follow up with [facility]    Provider visit type Malignant hem   Infusion scheduling note    Injection scheduling note    Labs CBC, CMP, magnesium, phosphorus, LDH, uric acid and type and screen   Scheduling:  Preferred lab:  Lab interval: once a week  in [place]   Imaging    Pharmacy appointment    Other referrals                  Treatment Plan Information   OP AZACITIDINE 5-DAY (SUB-Q)   Mohit Centeno MD   Upcoming Treatment Dates - OP AZACITIDINE 5-DAY (SUB-Q)    9/26/2023       Pre-Medications       ondansetron tablet 16 mg       Chemotherapy       azaCITIDine (VIDAZA) chemo injection 135 mg  9/27/2023       Pre-Medications       ondansetron tablet 16 mg       Chemotherapy       azaCITIDine (VIDAZA) chemo injection 135 mg  9/28/2023       Pre-Medications       ondansetron tablet 16 mg       Chemotherapy       azaCITIDine (VIDAZA) chemo injection 135 mg  9/29/2023       Pre-Medications       ondansetron tablet 16 mg       Chemotherapy       azaCITIDine (VIDAZA) chemo injection 135 mg    Therapy Plan Information  Medications  amikacin (AMIKIN) in dextrose 5 % (D5W) 100 mL IVPB  15 mg/kg = 1,075 mg, Intravenous, Once  Flushes  dextrose 5 % (D5W) 250 mL flush bag  Intravenous, Every visit  heparin, porcine (PF) 100 unit/mL injection flush 500 Units  500 Units, Intravenous, Every visit  alteplase injection 2 mg  2 mg, Intra-Catheter, Every visit      Mohit Centeno MD  Hematology and Stem Cell Transplant

## 2023-09-21 ENCOUNTER — PATIENT MESSAGE (OUTPATIENT)
Dept: INFECTIOUS DISEASES | Facility: CLINIC | Age: 78
End: 2023-09-21
Payer: MEDICARE

## 2023-09-21 ENCOUNTER — PATIENT MESSAGE (OUTPATIENT)
Dept: INTERNAL MEDICINE | Facility: CLINIC | Age: 78
End: 2023-09-21
Payer: MEDICARE

## 2023-09-21 DIAGNOSIS — R35.0 FREQUENT URINATION: Primary | ICD-10-CM

## 2023-09-21 DIAGNOSIS — I10 HYPERTENSION, BENIGN: ICD-10-CM

## 2023-09-21 RX ORDER — CARVEDILOL 6.25 MG/1
6.25 TABLET ORAL 2 TIMES DAILY WITH MEALS
Qty: 180 TABLET | Refills: 3 | Status: SHIPPED | OUTPATIENT
Start: 2023-09-21 | End: 2023-12-29

## 2023-09-21 NOTE — TELEPHONE ENCOUNTER
No care due was identified.  [facility] Embedded Care Due Messages. Reference number: [id number].   9/21/2023 10:55:02 AM CDT

## 2023-09-21 NOTE — TELEPHONE ENCOUNTER
Refill Decision Note   Niurka Keila  is requesting a refill authorization.  Brief Assessment and Rationale for Refill:  Approve     Medication Therapy Plan:         Comments:     Note composed:12:46 PM 09/21/2023

## 2023-09-22 ENCOUNTER — PATIENT MESSAGE (OUTPATIENT)
Dept: HEMATOLOGY/ONCOLOGY | Facility: CLINIC | Age: 78
End: 2023-09-22
Payer: MEDICARE

## 2023-09-22 RX ORDER — ONDANSETRON HYDROCHLORIDE 8 MG/1
16 TABLET, FILM COATED ORAL
Status: CANCELLED | OUTPATIENT
Start: 2023-09-28

## 2023-09-22 RX ORDER — ONDANSETRON HYDROCHLORIDE 8 MG/1
16 TABLET, FILM COATED ORAL
Status: CANCELLED | OUTPATIENT
Start: 2023-09-27

## 2023-09-22 RX ORDER — AZACITIDINE 100 MG/1
75 INJECTION, POWDER, LYOPHILIZED, FOR SOLUTION INTRAVENOUS; SUBCUTANEOUS
Status: CANCELLED | OUTPATIENT
Start: 2023-09-29

## 2023-09-22 RX ORDER — AZACITIDINE 100 MG/1
75 INJECTION, POWDER, LYOPHILIZED, FOR SOLUTION INTRAVENOUS; SUBCUTANEOUS
Status: CANCELLED | OUTPATIENT
Start: 2023-09-28

## 2023-09-22 RX ORDER — AZACITIDINE 100 MG/1
75 INJECTION, POWDER, LYOPHILIZED, FOR SOLUTION INTRAVENOUS; SUBCUTANEOUS
Status: CANCELLED | OUTPATIENT
Start: 2023-09-25

## 2023-09-22 RX ORDER — ONDANSETRON HYDROCHLORIDE 8 MG/1
16 TABLET, FILM COATED ORAL
Status: CANCELLED | OUTPATIENT
Start: 2023-09-26

## 2023-09-22 RX ORDER — AZACITIDINE 100 MG/1
75 INJECTION, POWDER, LYOPHILIZED, FOR SOLUTION INTRAVENOUS; SUBCUTANEOUS
Status: CANCELLED | OUTPATIENT
Start: 2023-09-27

## 2023-09-22 RX ORDER — ONDANSETRON HYDROCHLORIDE 8 MG/1
16 TABLET, FILM COATED ORAL
Status: CANCELLED | OUTPATIENT
Start: 2023-09-25

## 2023-09-22 RX ORDER — AZACITIDINE 100 MG/1
75 INJECTION, POWDER, LYOPHILIZED, FOR SOLUTION INTRAVENOUS; SUBCUTANEOUS
Status: CANCELLED | OUTPATIENT
Start: 2023-09-26

## 2023-09-22 RX ORDER — ONDANSETRON HYDROCHLORIDE 8 MG/1
16 TABLET, FILM COATED ORAL
Status: CANCELLED | OUTPATIENT
Start: 2023-09-29

## 2023-09-22 RX ORDER — PHENAZOPYRIDINE HYDROCHLORIDE 100 MG/1
100 TABLET, FILM COATED ORAL 3 TIMES DAILY PRN
Qty: 9 TABLET | Refills: 0 | Status: SHIPPED | OUTPATIENT
Start: 2023-09-22 | End: 2023-09-25

## 2023-09-22 NOTE — TELEPHONE ENCOUNTER
Called and lmovm after no answer  Urine cx ordered by lowell on chart review - dirty specimen with mult squam - to be repeated after she contacted ID

## 2023-09-22 NOTE — TELEPHONE ENCOUNTER
Called and lmovm - will send in pyridium to take - er prompts given   To repeat urine culture as per ID

## 2023-09-25 ENCOUNTER — INFUSION (OUTPATIENT)
Dept: INFUSION THERAPY | Facility: HOSPITAL | Age: 78
End: 2023-09-25
Attending: INTERNAL MEDICINE
Payer: MEDICARE

## 2023-09-25 ENCOUNTER — TELEPHONE (OUTPATIENT)
Dept: HEMATOLOGY/ONCOLOGY | Facility: CLINIC | Age: 78
End: 2023-09-25
Payer: MEDICARE

## 2023-09-25 VITALS
BODY MASS INDEX: 27.36 KG/M2 | RESPIRATION RATE: 16 BRPM | TEMPERATURE: 99 F | WEIGHT: 160.25 LBS | HEIGHT: 64 IN | HEART RATE: 69 BPM | SYSTOLIC BLOOD PRESSURE: 139 MMHG | DIASTOLIC BLOOD PRESSURE: 68 MMHG

## 2023-09-25 DIAGNOSIS — D46.9 MDS (MYELODYSPLASTIC SYNDROME): Primary | ICD-10-CM

## 2023-09-25 PROCEDURE — 63600175 PHARM REV CODE 636 W HCPCS: Mod: PN | Performed by: INTERNAL MEDICINE

## 2023-09-25 PROCEDURE — 25000003 PHARM REV CODE 250: Mod: PN | Performed by: INTERNAL MEDICINE

## 2023-09-25 PROCEDURE — 96401 CHEMO ANTI-NEOPL SQ/IM: CPT | Mod: PN

## 2023-09-25 RX ORDER — AZACITIDINE 100 MG/1
75 INJECTION, POWDER, LYOPHILIZED, FOR SOLUTION INTRAVENOUS; SUBCUTANEOUS
Status: COMPLETED | OUTPATIENT
Start: 2023-09-25 | End: 2023-09-25

## 2023-09-25 RX ORDER — ONDANSETRON 8 MG/1
16 TABLET, ORALLY DISINTEGRATING ORAL ONCE
Status: COMPLETED | OUTPATIENT
Start: 2023-09-25 | End: 2023-09-25

## 2023-09-25 RX ORDER — HEPARIN 100 UNIT/ML
500 SYRINGE INTRAVENOUS
Status: CANCELLED | OUTPATIENT
Start: 2023-09-25

## 2023-09-25 RX ORDER — ONDANSETRON 4 MG/1
16 TABLET, FILM COATED ORAL
Status: DISCONTINUED | OUTPATIENT
Start: 2023-09-25 | End: 2023-09-25

## 2023-09-25 RX ADMIN — ONDANSETRON 16 MG: 8 TABLET, ORALLY DISINTEGRATING ORAL at 01:09

## 2023-09-25 RX ADMIN — AZACITIDINE 135 MG: 100 INJECTION, POWDER, LYOPHILIZED, FOR SOLUTION INTRAVENOUS; SUBCUTANEOUS at 01:09

## 2023-09-25 NOTE — TELEPHONE ENCOUNTER
----- Message from Lashonda Jack sent at 9/22/2023  5:11 PM CDT -----  Contact: Pt  Type:  Patient Returning Call    Who Called:Pt  Who Left Message for Patient:Unknown  Does the patient know what this is regarding?:yes  Would the patient rather a call back or a response via [facility]? call  Best Call Back Number:[phone]    Additional Information: Pt is returning a call from the office. Please call pt back to advise.

## 2023-09-25 NOTE — PLAN OF CARE
Problem: Adult Inpatient Plan of Care  Goal: Plan of Care Review  Outcome: Ongoing, Progressing  Flowsheets (Taken 9/25/2023 1153)  Plan of Care Reviewed With:   patient   daughter  Goal: Optimal Comfort and Wellbeing  Outcome: Ongoing, Progressing  Intervention: Provide Person-Centered Care  Flowsheets (Taken 9/25/2023 1153)  Trust Relationship/Rapport:   care explained   questions answered   questions encouraged     Problem: Fatigue  Goal: Improved Activity Tolerance  Outcome: Ongoing, Progressing  Intervention: Promote Improved Energy  Flowsheets (Taken 9/25/2023 1153)  Fatigue Management:   frequent rest breaks encouraged   paced activity encouraged  Sleep/Rest Enhancement: regular sleep/rest pattern promoted  Activity Management: Patient unable to perform activities    Patient to Infusion for Vidaza, accompanied by her dtr. Treatment plan reviewed with patient. Complains of constipation. Provided with some patient education. VSS. Tolerated infusion. Provided with copy of upcoming appointment schedule. Escorted to the front lobby in no distress for discharge to home.

## 2023-09-25 NOTE — TELEPHONE ENCOUNTER
Reviewed chart. Discussed plan of care with ID and [facility] infusion team regarding administering abx.

## 2023-09-26 ENCOUNTER — INFUSION (OUTPATIENT)
Dept: INFUSION THERAPY | Facility: HOSPITAL | Age: 78
End: 2023-09-26
Attending: INTERNAL MEDICINE
Payer: MEDICARE

## 2023-09-26 VITALS
HEIGHT: 64 IN | BODY MASS INDEX: 27.36 KG/M2 | WEIGHT: 160.25 LBS | RESPIRATION RATE: 17 BRPM | DIASTOLIC BLOOD PRESSURE: 63 MMHG | TEMPERATURE: 99 F | OXYGEN SATURATION: 96 % | HEART RATE: 73 BPM | SYSTOLIC BLOOD PRESSURE: 136 MMHG

## 2023-09-26 DIAGNOSIS — D61.818 PANCYTOPENIA: ICD-10-CM

## 2023-09-26 DIAGNOSIS — D46.9 MDS (MYELODYSPLASTIC SYNDROME): Primary | ICD-10-CM

## 2023-09-26 DIAGNOSIS — Z16.12 URINARY TRACT INFECTION DUE TO EXTENDED-SPECTRUM BETA LACTAMASE (ESBL) PRODUCING ESCHERICHIA COLI: Primary | ICD-10-CM

## 2023-09-26 DIAGNOSIS — N39.0 URINARY TRACT INFECTION DUE TO EXTENDED-SPECTRUM BETA LACTAMASE (ESBL) PRODUCING ESCHERICHIA COLI: Primary | ICD-10-CM

## 2023-09-26 DIAGNOSIS — B96.29 URINARY TRACT INFECTION DUE TO EXTENDED-SPECTRUM BETA LACTAMASE (ESBL) PRODUCING ESCHERICHIA COLI: Primary | ICD-10-CM

## 2023-09-26 DIAGNOSIS — D46.9 MDS (MYELODYSPLASTIC SYNDROME): ICD-10-CM

## 2023-09-26 LAB
COMMENT: NORMAL
FINAL PATHOLOGIC DIAGNOSIS: NORMAL
GROSS: NORMAL
Lab: NORMAL
MICROSCOPIC EXAM: NORMAL

## 2023-09-26 PROCEDURE — 96365 THER/PROPH/DIAG IV INF INIT: CPT | Mod: PN

## 2023-09-26 PROCEDURE — 63600175 PHARM REV CODE 636 W HCPCS: Mod: PN | Performed by: INTERNAL MEDICINE

## 2023-09-26 PROCEDURE — 96401 CHEMO ANTI-NEOPL SQ/IM: CPT | Mod: PN

## 2023-09-26 PROCEDURE — 25000003 PHARM REV CODE 250: Mod: PN | Performed by: INTERNAL MEDICINE

## 2023-09-26 RX ORDER — AZACITIDINE 100 MG/1
75 INJECTION, POWDER, LYOPHILIZED, FOR SOLUTION INTRAVENOUS; SUBCUTANEOUS
Status: COMPLETED | OUTPATIENT
Start: 2023-09-26 | End: 2023-09-26

## 2023-09-26 RX ORDER — HYDROCODONE BITARTRATE AND ACETAMINOPHEN 500; 5 MG/1; MG/1
TABLET ORAL ONCE
Status: CANCELLED | OUTPATIENT
Start: 2023-09-26 | End: 2023-09-26

## 2023-09-26 RX ORDER — ONDANSETRON 4 MG/1
16 TABLET, FILM COATED ORAL
Status: DISCONTINUED | OUTPATIENT
Start: 2023-09-26 | End: 2023-09-26 | Stop reason: SDUPTHER

## 2023-09-26 RX ORDER — ONDANSETRON 8 MG/1
16 TABLET, ORALLY DISINTEGRATING ORAL
Status: COMPLETED | OUTPATIENT
Start: 2023-09-26 | End: 2023-09-26

## 2023-09-26 RX ORDER — HEPARIN 100 UNIT/ML
500 SYRINGE INTRAVENOUS
Status: CANCELLED | OUTPATIENT
Start: 2023-09-26

## 2023-09-26 RX ADMIN — AMIKACIN SULFATE 1100 MG: 250 INJECTION, SOLUTION INTRAMUSCULAR; INTRAVENOUS at 11:09

## 2023-09-26 RX ADMIN — DEXTROSE MONOHYDRATE: 5 INJECTION, SOLUTION INTRAVENOUS at 11:09

## 2023-09-26 RX ADMIN — AZACITIDINE 135 MG: 100 INJECTION, POWDER, LYOPHILIZED, FOR SOLUTION INTRAVENOUS; SUBCUTANEOUS at 01:09

## 2023-09-26 RX ADMIN — ONDANSETRON 16 MG: 8 TABLET, ORALLY DISINTEGRATING ORAL at 01:09

## 2023-09-26 NOTE — PLAN OF CARE
Problem: Adult Inpatient Plan of Care  Goal: Plan of Care Review  Outcome: Ongoing, Progressing  Flowsheets (Taken 9/26/2023 1330)  Plan of Care Reviewed With:   patient   daughter   Pt tolerated amikacin infusion and Vidaza subq well.  No adverse reaction noted.   IV flushed with NS and D/C per protocol.  Patient left clinic in no acute distress.

## 2023-09-26 NOTE — PLAN OF CARE
Problem: Adult Inpatient Plan of Care  Goal: Patient-Specific Goal (Individualized)  Outcome: Ongoing, Progressing  Flowsheets (Taken 9/26/2023 1128)  Anxieties, Fears or Concerns: none  Individualized Care Needs: recliner, warm blanket, daughter chairside     Problem: Fatigue  Goal: Improved Activity Tolerance  Outcome: Ongoing, Progressing  Intervention: Promote Improved Energy  Flowsheets (Taken 9/26/2023 1128)  Fatigue Management:   activity schedule adjusted   frequent rest breaks encouraged  Sleep/Rest Enhancement:   awakenings minimized   regular sleep/rest pattern promoted  Activity Management: Ambulated -L4

## 2023-09-27 ENCOUNTER — INFUSION (OUTPATIENT)
Dept: INFUSION THERAPY | Facility: HOSPITAL | Age: 78
End: 2023-09-27
Attending: INTERNAL MEDICINE
Payer: MEDICARE

## 2023-09-27 VITALS
SYSTOLIC BLOOD PRESSURE: 134 MMHG | HEART RATE: 67 BPM | WEIGHT: 160.25 LBS | HEIGHT: 64 IN | TEMPERATURE: 98 F | DIASTOLIC BLOOD PRESSURE: 77 MMHG | BODY MASS INDEX: 27.36 KG/M2 | RESPIRATION RATE: 18 BRPM

## 2023-09-27 DIAGNOSIS — D46.9 MDS (MYELODYSPLASTIC SYNDROME): Primary | ICD-10-CM

## 2023-09-27 PROCEDURE — 63600175 PHARM REV CODE 636 W HCPCS: Mod: PN | Performed by: INTERNAL MEDICINE

## 2023-09-27 PROCEDURE — 25000003 PHARM REV CODE 250: Mod: PN | Performed by: INTERNAL MEDICINE

## 2023-09-27 PROCEDURE — 96401 CHEMO ANTI-NEOPL SQ/IM: CPT | Mod: PN

## 2023-09-27 RX ORDER — ONDANSETRON 8 MG/1
16 TABLET, ORALLY DISINTEGRATING ORAL ONCE
Status: COMPLETED | OUTPATIENT
Start: 2023-09-27 | End: 2023-09-27

## 2023-09-27 RX ORDER — ONDANSETRON 4 MG/1
16 TABLET, FILM COATED ORAL
Status: DISCONTINUED | OUTPATIENT
Start: 2023-09-27 | End: 2023-09-27

## 2023-09-27 RX ORDER — AZACITIDINE 100 MG/1
75 INJECTION, POWDER, LYOPHILIZED, FOR SOLUTION INTRAVENOUS; SUBCUTANEOUS
Status: COMPLETED | OUTPATIENT
Start: 2023-09-27 | End: 2023-09-27

## 2023-09-27 RX ADMIN — ONDANSETRON 16 MG: 8 TABLET, ORALLY DISINTEGRATING ORAL at 12:09

## 2023-09-27 RX ADMIN — AZACITIDINE 135 MG: 100 INJECTION, POWDER, LYOPHILIZED, FOR SOLUTION INTRAVENOUS; SUBCUTANEOUS at 01:09

## 2023-09-27 NOTE — PLAN OF CARE
Problem: Adult Inpatient Plan of Care  Goal: Plan of Care Review  Outcome: Ongoing, Progressing  Flowsheets (Taken 9/27/2023 1230)  Plan of Care Reviewed With:   patient   daughter  Goal: Patient-Specific Goal (Individualized)  Outcome: Ongoing, Progressing  Flowsheets (Taken 9/27/2023 1230)  Anxieties, Fears or Concerns: none  Individualized Care Needs: recliner, blanket,     Problem: Fatigue  Goal: Improved Activity Tolerance  Outcome: Ongoing, Progressing  Intervention: Promote Improved Energy  Flowsheets (Taken 9/27/2023 1230)  Fatigue Management: frequent rest breaks encouraged  Sleep/Rest Enhancement:   family presence promoted   relaxation techniques promoted   noise level reduced  Activity Management:   Up in chair - L3   Walk with assistive devise and /or staff member - L3     Pt tolerated vidaza inj to abd. NAD noted. Pt d/c home, pt aware of appt tomorrow.

## 2023-09-28 ENCOUNTER — OFFICE VISIT (OUTPATIENT)
Dept: INFECTIOUS DISEASES | Facility: CLINIC | Age: 78
End: 2023-09-28
Payer: MEDICARE

## 2023-09-28 ENCOUNTER — DOCUMENTATION ONLY (OUTPATIENT)
Dept: INFUSION THERAPY | Facility: HOSPITAL | Age: 78
End: 2023-09-28
Payer: MEDICARE

## 2023-09-28 ENCOUNTER — INFUSION (OUTPATIENT)
Dept: INFUSION THERAPY | Facility: HOSPITAL | Age: 78
End: 2023-09-28
Attending: INTERNAL MEDICINE
Payer: MEDICARE

## 2023-09-28 VITALS
HEART RATE: 70 BPM | SYSTOLIC BLOOD PRESSURE: 126 MMHG | DIASTOLIC BLOOD PRESSURE: 71 MMHG | HEIGHT: 64 IN | WEIGHT: 160.25 LBS | BODY MASS INDEX: 27.36 KG/M2 | RESPIRATION RATE: 16 BRPM

## 2023-09-28 DIAGNOSIS — D46.9 MDS (MYELODYSPLASTIC SYNDROME): Primary | ICD-10-CM

## 2023-09-28 DIAGNOSIS — N39.0 RECURRENT UTI: Primary | ICD-10-CM

## 2023-09-28 PROCEDURE — 25000003 PHARM REV CODE 250: Mod: PN | Performed by: INTERNAL MEDICINE

## 2023-09-28 PROCEDURE — 96401 CHEMO ANTI-NEOPL SQ/IM: CPT | Mod: PN

## 2023-09-28 PROCEDURE — 99215 OFFICE O/P EST HI 40 MIN: CPT | Mod: 95,,, | Performed by: INTERNAL MEDICINE

## 2023-09-28 PROCEDURE — 63600175 PHARM REV CODE 636 W HCPCS: Mod: PN | Performed by: INTERNAL MEDICINE

## 2023-09-28 PROCEDURE — 99215 PR OFFICE/OUTPT VISIT, EST, LEVL V, 40-54 MIN: ICD-10-PCS | Mod: 95,,, | Performed by: INTERNAL MEDICINE

## 2023-09-28 RX ORDER — HEPARIN 100 UNIT/ML
500 SYRINGE INTRAVENOUS
Status: CANCELLED | OUTPATIENT
Start: 2023-09-29

## 2023-09-28 RX ORDER — ONDANSETRON 4 MG/1
16 TABLET, FILM COATED ORAL
Status: DISCONTINUED | OUTPATIENT
Start: 2023-09-28 | End: 2023-09-28

## 2023-09-28 RX ORDER — AZACITIDINE 100 MG/1
75 INJECTION, POWDER, LYOPHILIZED, FOR SOLUTION INTRAVENOUS; SUBCUTANEOUS
Status: COMPLETED | OUTPATIENT
Start: 2023-09-28 | End: 2023-09-28

## 2023-09-28 RX ORDER — ONDANSETRON 8 MG/1
16 TABLET, ORALLY DISINTEGRATING ORAL ONCE
Status: COMPLETED | OUTPATIENT
Start: 2023-09-28 | End: 2023-09-28

## 2023-09-28 RX ADMIN — ONDANSETRON 16 MG: 8 TABLET, ORALLY DISINTEGRATING ORAL at 11:09

## 2023-09-28 RX ADMIN — AZACITIDINE 135 MG: 100 INJECTION, POWDER, LYOPHILIZED, FOR SOLUTION INTRAVENOUS; SUBCUTANEOUS at 12:09

## 2023-09-28 NOTE — PROGRESS NOTES
Oncology Nutrition   New Patient Education  Niurka Pedraza   1945    Nutrition Education   This is a 78 y.o.female with a medical diagnosis of MDS.     Met w/ pt at chairside to discuss current nutritional status and nutrition as it relates to cancer and cancer treatment. Pt currently low nutrition risk. Pt daughter, Luz, present at time of new pt education. Pt denies any nutrition concerns. RD discussed role in POC.     Wt Readings from Last 10 Encounters:   09/28/23 72.7 kg (160 lb 4.4 oz)   09/27/23 72.7 kg (160 lb 4.4 oz)   09/26/23 72.7 kg (160 lb 4.4 oz)   09/25/23 72.7 kg (160 lb 4.4 oz)   09/13/23 72 kg (158 lb 11.7 oz)   09/13/23 71.4 kg (157 lb 6.5 oz)   08/29/23 61.7 kg (136 lb)   08/22/23 71.7 kg (158 lb)   08/18/23 72.2 kg (159 lb 2.8 oz)   07/31/23 72.6 kg (160 lb 0.9 oz)      [x] PMHx reviewed  [x] Labs reviewed    Educated on food safety and common nutrition impact symptoms associated with chemotherapy treatment. Reinforced the importance of good hydration. Handouts provided.    Answered all nutrition related questions.     Patient provided with dietitian contact number and advised to call with questions or make future appointment if further intervention is needed. RD to follow throughout tx prn.    Pooja Garner, EMPERATRIZ, LDN  09/28/2023  12:19 PM

## 2023-09-28 NOTE — PROGRESS NOTES
The patient location is: [place]  The chief complaint leading to consultation is: UTI    Visit type: audiovisual    Face to Face time with patient: 20  40 minutes of total time spent on the encounter, which includes face to face time and non-face to face time preparing to see the patient (eg, review of tests), Obtaining and/or reviewing separately obtained history, Documenting clinical information in the electronic or other health record, Independently interpreting results (not separately reported) and communicating results to the patient/family/caregiver, or Care coordination (not separately reported).     Each patient to whom he or she provides medical services by telemedicine is:  (1) informed of the relationship between the physician and patient and the respective role of any other health care provider with respect to management of the patient; and (2) notified that he or she may decline to receive medical services by telemedicine and may withdraw from such care at any time.        Subjective:     Patient ID: Niurka Pedraza is a 78 y.o. female    Chief Complaint: UTI    HPI: 78F with hx of recurrent ESBL E. Coli UTIs and recently diagnosed MDS starting chemo this week who is seen today for follow up UTI treatment. She received a dose of amikacin on 9/26 for UTI treatment. Since receiving antibiotics, she is feeling significantly better. She endorses resolution of dysuria, but still having some frequency. She is otherwise feeling well.       Immunization History   Administered Date(s) Administered    COVID-19 MRNA, LN-S PF (MODERNA HALF 0.25 ML DOSE) 11/09/2021    COVID-19 Vaccine 09/30/2022    COVID-19, MRNA, LN-S, PF (MODERNA FULL 0.5 ML DOSE) 02/03/2021, 03/03/2021    Influenza - High Dose - PF (65 years and older) 10/27/2020, 09/21/2022    Pneumococcal Conjugate - 13 Valent 11/02/2017    Pneumococcal Conjugate - 20 Valent 09/30/2022    Pneumococcal Polysaccharide - 23 Valent 07/28/2020    Tdap 07/21/2021    Zoster  Recombinant 09/21/2022        Review of Systems   All other systems reviewed and are negative.       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    Breast cancer     mastectomy 2014    Coccidiomycosis, progressive     Elevated CA-125 02/25/2018    Hyperlipidemia     OAB (overactive bladder)     Osteopenia     on Dexa 11/2017    Osteoporosis     pt reports hx of this, declined fosamax in past - treated with calcium and vit D    Pelvic mass 02/25/2018    Pulmonary fibrosis     Pulmonary nodules     SOB (shortness of breath) on exertion     Thyroid disease     hypo    Urinary tract infection due to extended-spectrum beta lactamase (ESBL) producing Escherichia coli 3/16/2022    UTI (urinary tract infection)      Past Surgical History:   Procedure Laterality Date    CHOLECYSTECTOMY      COLONOSCOPY N/A 12/09/2022    Procedure: COLONOSCOPY;  Surgeon: Enrique Dominguez MD;  Location: [facility] (4TH FLR);  Service: Endoscopy;  Laterality: N/A;  inst via portal  pt requests after 12/9/22-MS  11/30-pt notified of earlier arrival time-KPvt    CYSTOSCOPY WITH BIOPSY OF BLADDER Bilateral 07/27/2022    Procedure: CYSTOSCOPY, WITH BLADDER BIOPSY; retrograde pyelogram,;  Surgeon: Anaya Oropeza MD;  Location: [facility];  Service: Urology;  Laterality: Bilateral;    ENDOSCOPIC ULTRASOUND OF UPPER GASTROINTESTINAL TRACT N/A 04/08/2021    Procedure: ULTRASOUND, UPPER GI TRACT, ENDOSCOPIC;  Surgeon: Aisha Barajas MD;  Location: [facility] (2ND FLR);  Service: Endoscopy;  Laterality: N/A;  UEUS/ERCP evaluation abn MRCP - Dr Angelika Steven19 test 4/5/21 at [facility] Pre-admit - pg    ERCP N/A 04/08/2021    Procedure: ERCP (ENDOSCOPIC RETROGRADE CHOLANGIOPANCREATOGRAPHY);  Surgeon: Aisha Barajas MD;  Location: [facility] (2ND FLR);  Service: Endoscopy;  Laterality: N/A;  UEUS/ERCP evaluation abn MRCP - Dr Angelika Shah test 4/5/21 at [facility]  Pre-admit - pg    ESOPHAGOGASTRODUODENOSCOPY N/A 2021    Procedure: EGD (ESOPHAGOGASTRODUODENOSCOPY);  Surgeon: Aisha Barajas MD;  Location: [facility] (2ND FLR);  Service: Endoscopy;  Laterality: N/A;  UEUS/ERCP evaluation abn MRCP - Dr Carney  Covid-19 test 21 at [facility] Pre-admit - pg    ESOPHAGOGASTRODUODENOSCOPY N/A 2023    Procedure: EGD (ESOPHAGOGASTRODUODENOSCOPY);  Surgeon: Emeka Carney MD;  Location: [facility] (4TH FLR);  Service: Endoscopy;  Laterality: N/A;  She has  history of seromucinous borderline tumor of the ovary. We generally follow  and CEA tumor markers. Her CEA recently became slightly elevated. CT imaging negative and colonoscopy negative.     Talita Barrios    instructions portal-LW  pre    HYSTERECTOMY      MASTECTOMY Right          Family History   Problem Relation Age of Onset    Cancer Mother         colon cancer    Breast cancer Mother     Hypertension Father     Heart disease Father     Stroke Father     No Known Problems Sister     Cancer Brother         lung, ++ tobacco    Hypertension Brother     No Known Problems Daughter     Hypertension Son     Colon cancer Neg Hx     Ovarian cancer Neg Hx      Social History     Tobacco Use    Smoking status: Former     Current packs/day: 0.00     Average packs/day: 0.5 packs/day for 30.1 years (15.1 ttl pk-yrs)     Types: Cigarettes     Start date:      Quit date: 2000     Years since quittin.6    Smokeless tobacco: Never    Tobacco comments:     quit in her 50s, after 30 years smoking;   Substance Use Topics    Alcohol use: No    Drug use: No       Objective:     Physical Exam  Constitutional:       General: She is not in acute distress.     Appearance: Normal appearance. She is well-developed. She is not ill-appearing or diaphoretic.   HENT:      Head: Normocephalic and atraumatic.      Right Ear: External ear normal.      Left Ear: External ear normal.      Nose: Nose normal.   Eyes:       General: No scleral icterus.        Right eye: No discharge.         Left eye: No discharge.      Extraocular Movements: Extraocular movements intact.      Conjunctiva/sclera: Conjunctivae normal.   Pulmonary:      Effort: Pulmonary effort is normal. No respiratory distress.      Breath sounds: No stridor.   Musculoskeletal:         General: Normal range of motion.   Skin:     Findings: No erythema or rash.   Neurological:      General: No focal deficit present.      Mental Status: She is alert and oriented to person, place, and time. Mental status is at baseline.      Cranial Nerves: No cranial nerve deficit.   Psychiatric:         Mood and Affect: Mood normal.         Behavior: Behavior normal.         Thought Content: Thought content normal.         Judgment: Judgment normal.         Data:    All data, including recent labs, radiology, and pathology, has been independently reviewed.    Labs:    Recent Labs   Lab Result Units 07/31/23  1210 08/18/23  1137 08/29/23  0922 09/13/23  1103 09/26/23  1104   WBC K/uL 2.49*   < > 2.68* 2.64* 1.45*   Hemoglobin g/dL 9.0*   < > 8.1* 8.6* 7.0*   Hematocrit % 28.7*   < > 26.2* 29.6* 24.1*   Sodium mmol/L 141  --   --  140 142   Potassium mmol/L 4.0  --   --  4.2 4.1   Chloride mmol/L 106  --   --  104 107   BUN mg/dL 21  --   --  17 15   Creatinine mg/dL 0.9  --   --  1.0 1.0   AST U/L 17  --   --  16 19   ALT U/L 10  --   --  9* 17   Alkaline Phosphatase U/L 38*  --   --  45* 45*   Total Bilirubin mg/dL 1.8*  --   --  1.8* 1.1*   INR   --   --  1.1  --   --     < > = values in this interval not displayed.        Radiology:    No results found in the last 24 hours.     Assessment:    1. Recurrent UTI  Will give one additional dose of amikacin tomorrow - order sent to [facility] infusion and RN notified  Recommended that patient continue taking D-mannose and vitamin C   Will place standing urine culture orders, patient may submit sample to lab anytime she develops symptoms of UTI              Follow up as needed    The total time for evaluation and management services performed on 9/28/23 was greater than 40 minutes.     Farzana Marroquin DO  Transplant Infectious Disease

## 2023-09-28 NOTE — PLAN OF CARE
Problem: Adult Inpatient Plan of Care  Goal: Plan of Care Review  Outcome: Ongoing, Progressing  Flowsheets (Taken 9/28/2023 1204)  Plan of Care Reviewed With:   patient   daughter  Goal: Optimal Comfort and Wellbeing  Outcome: Ongoing, Progressing  Intervention: Provide Person-Centered Care  Flowsheets (Taken 9/28/2023 1204)  Trust Relationship/Rapport:   questions answered   care explained   questions encouraged  Goal: Patient-Specific Goal (Individualized)  Outcome: Ongoing, Progressing  Flowsheets (Taken 9/28/2023 1204)  Anxieties, Fears or Concerns: None  Individualized Care Needs: Recliner, warm blanket, conversation, dtr at chairside.     Problem: Fatigue  Goal: Improved Activity Tolerance  Outcome: Ongoing, Progressing  Intervention: Promote Improved Energy  Flowsheets (Taken 9/28/2023 1204)  Fatigue Management:   frequent rest breaks encouraged   paced activity encouraged  Sleep/Rest Enhancement: regular sleep/rest pattern promoted  Activity Management: Patient unable to perform activities    Patient to Infusion for Vidaza, accompanied by her dtr. She is using a w/c. Treatment plan reviewed with patient. VSS. Tolerated infusion. Provided with copy of upcoming appointment schedule. Escorted to the front lobby in no distress for discharge to home.

## 2023-09-29 ENCOUNTER — PATIENT MESSAGE (OUTPATIENT)
Dept: INFECTIOUS DISEASES | Facility: CLINIC | Age: 78
End: 2023-09-29
Payer: MEDICARE

## 2023-09-29 ENCOUNTER — INFUSION (OUTPATIENT)
Dept: INFUSION THERAPY | Facility: HOSPITAL | Age: 78
End: 2023-09-29
Attending: INTERNAL MEDICINE
Payer: MEDICARE

## 2023-09-29 VITALS
TEMPERATURE: 98 F | SYSTOLIC BLOOD PRESSURE: 135 MMHG | BODY MASS INDEX: 27.36 KG/M2 | WEIGHT: 160.25 LBS | DIASTOLIC BLOOD PRESSURE: 66 MMHG | RESPIRATION RATE: 20 BRPM | HEART RATE: 72 BPM | HEIGHT: 64 IN

## 2023-09-29 DIAGNOSIS — D46.9 MDS (MYELODYSPLASTIC SYNDROME): Primary | ICD-10-CM

## 2023-09-29 DIAGNOSIS — B96.29 URINARY TRACT INFECTION DUE TO EXTENDED-SPECTRUM BETA LACTAMASE (ESBL) PRODUCING ESCHERICHIA COLI: ICD-10-CM

## 2023-09-29 DIAGNOSIS — Z16.12 URINARY TRACT INFECTION DUE TO EXTENDED-SPECTRUM BETA LACTAMASE (ESBL) PRODUCING ESCHERICHIA COLI: ICD-10-CM

## 2023-09-29 DIAGNOSIS — N39.0 URINARY TRACT INFECTION DUE TO EXTENDED-SPECTRUM BETA LACTAMASE (ESBL) PRODUCING ESCHERICHIA COLI: ICD-10-CM

## 2023-09-29 PROCEDURE — 25000003 PHARM REV CODE 250: Mod: PN | Performed by: INTERNAL MEDICINE

## 2023-09-29 PROCEDURE — 96367 TX/PROPH/DG ADDL SEQ IV INF: CPT | Mod: PN

## 2023-09-29 PROCEDURE — 63600175 PHARM REV CODE 636 W HCPCS: Mod: PN | Performed by: INTERNAL MEDICINE

## 2023-09-29 PROCEDURE — 96401 CHEMO ANTI-NEOPL SQ/IM: CPT | Mod: PN

## 2023-09-29 PROCEDURE — 96365 THER/PROPH/DIAG IV INF INIT: CPT | Mod: PN

## 2023-09-29 RX ORDER — HEPARIN 100 UNIT/ML
500 SYRINGE INTRAVENOUS
Status: CANCELLED | OUTPATIENT
Start: 2023-09-29

## 2023-09-29 RX ORDER — ONDANSETRON 8 MG/1
16 TABLET, ORALLY DISINTEGRATING ORAL ONCE
Status: COMPLETED | OUTPATIENT
Start: 2023-09-29 | End: 2023-09-29

## 2023-09-29 RX ORDER — ONDANSETRON 4 MG/1
16 TABLET, FILM COATED ORAL
Status: DISCONTINUED | OUTPATIENT
Start: 2023-09-29 | End: 2023-09-29 | Stop reason: SDUPTHER

## 2023-09-29 RX ORDER — AZACITIDINE 100 MG/1
75 INJECTION, POWDER, LYOPHILIZED, FOR SOLUTION INTRAVENOUS; SUBCUTANEOUS
Status: COMPLETED | OUTPATIENT
Start: 2023-09-29 | End: 2023-09-29

## 2023-09-29 RX ORDER — HEPARIN 100 UNIT/ML
500 SYRINGE INTRAVENOUS
Status: DISCONTINUED | OUTPATIENT
Start: 2023-09-29 | End: 2023-09-29 | Stop reason: HOSPADM

## 2023-09-29 RX ADMIN — AMIKACIN SULFATE 1100 MG: 250 INJECTION, SOLUTION INTRAMUSCULAR; INTRAVENOUS at 12:09

## 2023-09-29 RX ADMIN — DEXTROSE MONOHYDRATE: 5 INJECTION, SOLUTION INTRAVENOUS at 11:09

## 2023-09-29 RX ADMIN — ONDANSETRON 16 MG: 8 TABLET, ORALLY DISINTEGRATING ORAL at 12:09

## 2023-09-29 RX ADMIN — AZACITIDINE 135 MG: 100 INJECTION, POWDER, LYOPHILIZED, FOR SOLUTION INTRAVENOUS; SUBCUTANEOUS at 01:09

## 2023-09-29 NOTE — PLAN OF CARE
Tolerated vidaza/amikacin well.  No reactions noted.  No questions or concerns at this time.  Left unit via w/c in N

## 2023-10-02 ENCOUNTER — LAB VISIT (OUTPATIENT)
Dept: LAB | Facility: HOSPITAL | Age: 78
End: 2023-10-02
Attending: INTERNAL MEDICINE
Payer: MEDICARE

## 2023-10-02 ENCOUNTER — PATIENT MESSAGE (OUTPATIENT)
Dept: INFECTIOUS DISEASES | Facility: CLINIC | Age: 78
End: 2023-10-02
Payer: MEDICARE

## 2023-10-02 DIAGNOSIS — A49.9 ESBL (EXTENDED SPECTRUM BETA-LACTAMASE) PRODUCING BACTERIA INFECTION: Primary | ICD-10-CM

## 2023-10-02 DIAGNOSIS — N39.0 RECURRENT UTI: ICD-10-CM

## 2023-10-02 DIAGNOSIS — Z16.12 ESBL (EXTENDED SPECTRUM BETA-LACTAMASE) PRODUCING BACTERIA INFECTION: Primary | ICD-10-CM

## 2023-10-02 DIAGNOSIS — D46.9 MDS (MYELODYSPLASTIC SYNDROME): ICD-10-CM

## 2023-10-02 LAB
ABO + RH BLD: NORMAL
ALBUMIN SERPL BCP-MCNC: 3.7 G/DL (ref 3.5–5.2)
ALP SERPL-CCNC: 56 U/L (ref 55–135)
ALT SERPL W/O P-5'-P-CCNC: 17 U/L (ref 10–44)
ANION GAP SERPL CALC-SCNC: 9 MMOL/L (ref 8–16)
ANISOCYTOSIS BLD QL SMEAR: SLIGHT
AST SERPL-CCNC: 19 U/L (ref 10–40)
BACTERIA #/AREA URNS HPF: ABNORMAL /HPF
BASOPHILS # BLD AUTO: ABNORMAL K/UL (ref 0–0.2)
BASOPHILS NFR BLD: 0 % (ref 0–1.9)
BILIRUB SERPL-MCNC: 1.8 MG/DL (ref 0.1–1)
BILIRUB UR QL STRIP: NEGATIVE
BLD GP AB SCN CELLS X3 SERPL QL: NORMAL
BUN SERPL-MCNC: 16 MG/DL (ref 8–23)
CALCIUM SERPL-MCNC: 9.1 MG/DL (ref 8.7–10.5)
CHLORIDE SERPL-SCNC: 104 MMOL/L (ref 95–110)
CLARITY UR: CLEAR
CO2 SERPL-SCNC: 26 MMOL/L (ref 23–29)
COLOR UR: YELLOW
CREAT SERPL-MCNC: 0.9 MG/DL (ref 0.5–1.4)
DACRYOCYTES BLD QL SMEAR: ABNORMAL
DIFFERENTIAL METHOD: ABNORMAL
EOSINOPHIL # BLD AUTO: ABNORMAL K/UL (ref 0–0.5)
EOSINOPHIL NFR BLD: 0 % (ref 0–8)
ERYTHROCYTE [DISTWIDTH] IN BLOOD BY AUTOMATED COUNT: 19.5 % (ref 11.5–14.5)
EST. GFR  (NO RACE VARIABLE): >60 ML/MIN/1.73 M^2
GLUCOSE SERPL-MCNC: 102 MG/DL (ref 70–110)
GLUCOSE UR QL STRIP: NEGATIVE
HCT VFR BLD AUTO: 29.7 % (ref 37–48.5)
HGB BLD-MCNC: 8.8 G/DL (ref 12–16)
HGB UR QL STRIP: NEGATIVE
HYPOCHROMIA BLD QL SMEAR: ABNORMAL
IMM GRANULOCYTES # BLD AUTO: ABNORMAL K/UL (ref 0–0.04)
IMM GRANULOCYTES NFR BLD AUTO: ABNORMAL % (ref 0–0.5)
KETONES UR QL STRIP: NEGATIVE
LDH SERPL L TO P-CCNC: 278 U/L (ref 110–260)
LEUKOCYTE ESTERASE UR QL STRIP: ABNORMAL
LYMPHOCYTES # BLD AUTO: ABNORMAL K/UL (ref 1–4.8)
LYMPHOCYTES NFR BLD: 68 % (ref 18–48)
MAGNESIUM SERPL-MCNC: 2.4 MG/DL (ref 1.6–2.6)
MCH RBC QN AUTO: 27.3 PG (ref 27–31)
MCHC RBC AUTO-ENTMCNC: 29.6 G/DL (ref 32–36)
MCV RBC AUTO: 92 FL (ref 82–98)
MICROSCOPIC COMMENT: ABNORMAL
MONOCYTES # BLD AUTO: ABNORMAL K/UL (ref 0.3–1)
MONOCYTES NFR BLD: 1 % (ref 4–15)
NEUTROPHILS NFR BLD: 30 % (ref 38–73)
NEUTS BAND NFR BLD MANUAL: 1 %
NITRITE UR QL STRIP: POSITIVE
NRBC BLD-RTO: 0 /100 WBC
PH UR STRIP: 6 [PH] (ref 5–8)
PHOSPHATE SERPL-MCNC: 3.3 MG/DL (ref 2.7–4.5)
PLATELET # BLD AUTO: 127 K/UL (ref 150–450)
PLATELET BLD QL SMEAR: ABNORMAL
PMV BLD AUTO: 9.5 FL (ref 9.2–12.9)
POIKILOCYTOSIS BLD QL SMEAR: SLIGHT
POLYCHROMASIA BLD QL SMEAR: ABNORMAL
POTASSIUM SERPL-SCNC: 4.1 MMOL/L (ref 3.5–5.1)
PROT SERPL-MCNC: 7.2 G/DL (ref 6–8.4)
PROT UR QL STRIP: ABNORMAL
RBC # BLD AUTO: 3.22 M/UL (ref 4–5.4)
SODIUM SERPL-SCNC: 139 MMOL/L (ref 136–145)
SP GR UR STRIP: 1.01 (ref 1–1.03)
SPECIMEN OUTDATE: NORMAL
SQUAMOUS #/AREA URNS HPF: 2 /HPF
STOMATOCYTES BLD QL SMEAR: PRESENT
URATE SERPL-MCNC: 5.3 MG/DL (ref 2.4–5.7)
URN SPEC COLLECT METH UR: ABNORMAL
WBC # BLD AUTO: 1.58 K/UL (ref 3.9–12.7)
WBC #/AREA URNS HPF: 6 /HPF (ref 0–5)

## 2023-10-02 PROCEDURE — 81000 URINALYSIS NONAUTO W/SCOPE: CPT | Mod: PN | Performed by: INTERNAL MEDICINE

## 2023-10-02 PROCEDURE — 86850 RBC ANTIBODY SCREEN: CPT | Mod: PN | Performed by: INTERNAL MEDICINE

## 2023-10-02 PROCEDURE — 80053 COMPREHEN METABOLIC PANEL: CPT | Mod: PN | Performed by: INTERNAL MEDICINE

## 2023-10-02 PROCEDURE — 84550 ASSAY OF BLOOD/URIC ACID: CPT | Mod: PN | Performed by: INTERNAL MEDICINE

## 2023-10-02 PROCEDURE — 85027 COMPLETE CBC AUTOMATED: CPT | Mod: PN | Performed by: INTERNAL MEDICINE

## 2023-10-02 PROCEDURE — 83735 ASSAY OF MAGNESIUM: CPT | Mod: PN | Performed by: INTERNAL MEDICINE

## 2023-10-02 PROCEDURE — 86901 BLOOD TYPING SEROLOGIC RH(D): CPT | Performed by: INTERNAL MEDICINE

## 2023-10-02 PROCEDURE — 85007 BL SMEAR W/DIFF WBC COUNT: CPT | Mod: PN | Performed by: INTERNAL MEDICINE

## 2023-10-02 PROCEDURE — 83615 LACTATE (LD) (LDH) ENZYME: CPT | Mod: PN | Performed by: INTERNAL MEDICINE

## 2023-10-02 PROCEDURE — 84100 ASSAY OF PHOSPHORUS: CPT | Mod: PN | Performed by: INTERNAL MEDICINE

## 2023-10-02 RX ORDER — GRANULES FOR ORAL 3 G/1
3 POWDER ORAL ONCE
Qty: 3 G | Refills: 0 | Status: SHIPPED | OUTPATIENT
Start: 2023-10-02 | End: 2023-10-02

## 2023-10-10 ENCOUNTER — LAB VISIT (OUTPATIENT)
Dept: LAB | Facility: HOSPITAL | Age: 78
End: 2023-10-10
Attending: INTERNAL MEDICINE
Payer: MEDICARE

## 2023-10-10 DIAGNOSIS — N39.0 RECURRENT UTI: ICD-10-CM

## 2023-10-10 DIAGNOSIS — D46.9 MDS (MYELODYSPLASTIC SYNDROME): ICD-10-CM

## 2023-10-10 LAB
ABO + RH BLD: NORMAL
ALBUMIN SERPL BCP-MCNC: 3.2 G/DL (ref 3.5–5.2)
ALP SERPL-CCNC: 59 U/L (ref 55–135)
ALT SERPL W/O P-5'-P-CCNC: 11 U/L (ref 10–44)
ANION GAP SERPL CALC-SCNC: 10 MMOL/L (ref 8–16)
ANISOCYTOSIS BLD QL SMEAR: SLIGHT
AST SERPL-CCNC: 15 U/L (ref 10–40)
BACTERIA #/AREA URNS HPF: ABNORMAL /HPF
BASO STIPL BLD QL SMEAR: ABNORMAL
BASOPHILS # BLD AUTO: ABNORMAL K/UL (ref 0–0.2)
BASOPHILS NFR BLD: 0 % (ref 0–1.9)
BILIRUB SERPL-MCNC: 1.8 MG/DL (ref 0.1–1)
BILIRUB UR QL STRIP: NEGATIVE
BLD GP AB SCN CELLS X3 SERPL QL: NORMAL
BUN SERPL-MCNC: 14 MG/DL (ref 8–23)
BURR CELLS BLD QL SMEAR: ABNORMAL
CALCIUM SERPL-MCNC: 8.8 MG/DL (ref 8.7–10.5)
CHLORIDE SERPL-SCNC: 105 MMOL/L (ref 95–110)
CLARITY UR: ABNORMAL
CO2 SERPL-SCNC: 25 MMOL/L (ref 23–29)
COLOR UR: YELLOW
CREAT SERPL-MCNC: 0.9 MG/DL (ref 0.5–1.4)
DACRYOCYTES BLD QL SMEAR: ABNORMAL
DIFFERENTIAL METHOD: ABNORMAL
EOSINOPHIL # BLD AUTO: ABNORMAL K/UL (ref 0–0.5)
EOSINOPHIL NFR BLD: 0 % (ref 0–8)
ERYTHROCYTE [DISTWIDTH] IN BLOOD BY AUTOMATED COUNT: 19.6 % (ref 11.5–14.5)
EST. GFR  (NO RACE VARIABLE): >60 ML/MIN/1.73 M^2
GIANT PLATELETS BLD QL SMEAR: PRESENT
GLUCOSE SERPL-MCNC: 141 MG/DL (ref 70–110)
GLUCOSE UR QL STRIP: NEGATIVE
HCT VFR BLD AUTO: 25.2 % (ref 37–48.5)
HGB BLD-MCNC: 7.3 G/DL (ref 12–16)
HGB UR QL STRIP: NEGATIVE
HYPOCHROMIA BLD QL SMEAR: ABNORMAL
IMM GRANULOCYTES # BLD AUTO: ABNORMAL K/UL (ref 0–0.04)
IMM GRANULOCYTES NFR BLD AUTO: ABNORMAL % (ref 0–0.5)
KETONES UR QL STRIP: NEGATIVE
LDH SERPL L TO P-CCNC: 247 U/L (ref 110–260)
LEUKOCYTE ESTERASE UR QL STRIP: ABNORMAL
LYMPHOCYTES # BLD AUTO: ABNORMAL K/UL (ref 1–4.8)
LYMPHOCYTES NFR BLD: 77 % (ref 18–48)
MAGNESIUM SERPL-MCNC: 2 MG/DL (ref 1.6–2.6)
MCH RBC QN AUTO: 27 PG (ref 27–31)
MCHC RBC AUTO-ENTMCNC: 29 G/DL (ref 32–36)
MCV RBC AUTO: 93 FL (ref 82–98)
MICROSCOPIC COMMENT: ABNORMAL
MONOCYTES # BLD AUTO: ABNORMAL K/UL (ref 0.3–1)
MONOCYTES NFR BLD: 3 % (ref 4–15)
NEUTROPHILS NFR BLD: 18 % (ref 38–73)
NEUTS BAND NFR BLD MANUAL: 2 %
NITRITE UR QL STRIP: POSITIVE
NRBC BLD-RTO: 3 /100 WBC
OVALOCYTES BLD QL SMEAR: ABNORMAL
PH UR STRIP: 6 [PH] (ref 5–8)
PHOSPHATE SERPL-MCNC: 2.7 MG/DL (ref 2.7–4.5)
PLATELET # BLD AUTO: 122 K/UL (ref 150–450)
PLATELET BLD QL SMEAR: ABNORMAL
PMV BLD AUTO: 9.9 FL (ref 9.2–12.9)
POIKILOCYTOSIS BLD QL SMEAR: ABNORMAL
POLYCHROMASIA BLD QL SMEAR: ABNORMAL
POTASSIUM SERPL-SCNC: 3.7 MMOL/L (ref 3.5–5.1)
PROT SERPL-MCNC: 6.8 G/DL (ref 6–8.4)
PROT UR QL STRIP: ABNORMAL
RBC # BLD AUTO: 2.7 M/UL (ref 4–5.4)
SCHISTOCYTES BLD QL SMEAR: PRESENT
SODIUM SERPL-SCNC: 140 MMOL/L (ref 136–145)
SP GR UR STRIP: 1.01 (ref 1–1.03)
SPECIMEN OUTDATE: NORMAL
SQUAMOUS #/AREA URNS HPF: 10 /HPF
URATE SERPL-MCNC: 5 MG/DL (ref 2.4–5.7)
URN SPEC COLLECT METH UR: ABNORMAL
WBC # BLD AUTO: 0.77 K/UL (ref 3.9–12.7)
WBC #/AREA URNS HPF: 1 /HPF (ref 0–5)

## 2023-10-10 PROCEDURE — 85027 COMPLETE CBC AUTOMATED: CPT | Mod: PN | Performed by: INTERNAL MEDICINE

## 2023-10-10 PROCEDURE — 83615 LACTATE (LD) (LDH) ENZYME: CPT | Mod: PN | Performed by: INTERNAL MEDICINE

## 2023-10-10 PROCEDURE — 84550 ASSAY OF BLOOD/URIC ACID: CPT | Mod: PN | Performed by: INTERNAL MEDICINE

## 2023-10-10 PROCEDURE — 80053 COMPREHEN METABOLIC PANEL: CPT | Mod: PN | Performed by: INTERNAL MEDICINE

## 2023-10-10 PROCEDURE — 81000 URINALYSIS NONAUTO W/SCOPE: CPT | Mod: PN | Performed by: INTERNAL MEDICINE

## 2023-10-10 PROCEDURE — 86850 RBC ANTIBODY SCREEN: CPT | Performed by: INTERNAL MEDICINE

## 2023-10-10 PROCEDURE — 84100 ASSAY OF PHOSPHORUS: CPT | Mod: PN | Performed by: INTERNAL MEDICINE

## 2023-10-10 PROCEDURE — 85007 BL SMEAR W/DIFF WBC COUNT: CPT | Mod: PN | Performed by: INTERNAL MEDICINE

## 2023-10-10 PROCEDURE — 86850 RBC ANTIBODY SCREEN: CPT | Mod: PN | Performed by: INTERNAL MEDICINE

## 2023-10-10 PROCEDURE — 83735 ASSAY OF MAGNESIUM: CPT | Mod: PN | Performed by: INTERNAL MEDICINE

## 2023-10-11 ENCOUNTER — PATIENT MESSAGE (OUTPATIENT)
Dept: HEMATOLOGY/ONCOLOGY | Facility: CLINIC | Age: 78
End: 2023-10-11
Payer: MEDICARE

## 2023-10-11 DIAGNOSIS — D46.9 MDS (MYELODYSPLASTIC SYNDROME): ICD-10-CM

## 2023-10-16 ENCOUNTER — PATIENT MESSAGE (OUTPATIENT)
Dept: INFECTIOUS DISEASES | Facility: CLINIC | Age: 78
End: 2023-10-16
Payer: MEDICARE

## 2023-10-16 ENCOUNTER — TELEPHONE (OUTPATIENT)
Dept: INTERNAL MEDICINE | Facility: CLINIC | Age: 78
End: 2023-10-16
Payer: MEDICARE

## 2023-10-16 DIAGNOSIS — N39.0 RECURRENT UTI: Primary | ICD-10-CM

## 2023-10-16 NOTE — TELEPHONE ENCOUNTER
Spoke with [employer] and provided them our correct fax number for them to fax forms to.

## 2023-10-16 NOTE — TELEPHONE ENCOUNTER
----- Message from Pooja Cantor sent at 10/13/2023 10:26 AM CDT -----  Regarding: follow up  Type:  Pharmacy Calling to Clarify an RX    Name of Caller: Francis     Pharmacy Name:  [facility].     Prescription Name: DME supplies. Bag, Left Wrist and Left ankle.     What do they need to clarify?: states they faxed over a request multiple times, but have not heard anything back. Please advise     Best Call Back Number:[phone]     Additional Information:

## 2023-10-17 ENCOUNTER — LAB VISIT (OUTPATIENT)
Dept: LAB | Facility: HOSPITAL | Age: 78
End: 2023-10-17
Attending: INTERNAL MEDICINE
Payer: MEDICARE

## 2023-10-17 DIAGNOSIS — N39.0 RECURRENT UTI: ICD-10-CM

## 2023-10-17 LAB
AMORPH CRY URNS QL MICRO: ABNORMAL
BACTERIA #/AREA URNS HPF: ABNORMAL /HPF
BILIRUB UR QL STRIP: NEGATIVE
CLARITY UR: ABNORMAL
COLOR UR: YELLOW
GLUCOSE UR QL STRIP: NEGATIVE
HGB UR QL STRIP: NEGATIVE
KETONES UR QL STRIP: NEGATIVE
LEUKOCYTE ESTERASE UR QL STRIP: ABNORMAL
MICROSCOPIC COMMENT: ABNORMAL
NITRITE UR QL STRIP: POSITIVE
PH UR STRIP: 6 [PH] (ref 5–8)
PROT UR QL STRIP: NEGATIVE
SP GR UR STRIP: 1.02 (ref 1–1.03)
SQUAMOUS #/AREA URNS HPF: 16 /HPF
URN SPEC COLLECT METH UR: ABNORMAL
WBC #/AREA URNS HPF: 4 /HPF (ref 0–5)

## 2023-10-17 PROCEDURE — 87077 CULTURE AEROBIC IDENTIFY: CPT | Performed by: INTERNAL MEDICINE

## 2023-10-17 PROCEDURE — 81000 URINALYSIS NONAUTO W/SCOPE: CPT | Mod: PN | Performed by: INTERNAL MEDICINE

## 2023-10-17 PROCEDURE — 87088 URINE BACTERIA CULTURE: CPT | Performed by: INTERNAL MEDICINE

## 2023-10-17 PROCEDURE — 87186 SC STD MICRODIL/AGAR DIL: CPT | Performed by: INTERNAL MEDICINE

## 2023-10-17 PROCEDURE — 87086 URINE CULTURE/COLONY COUNT: CPT | Performed by: INTERNAL MEDICINE

## 2023-10-18 ENCOUNTER — APPOINTMENT (OUTPATIENT)
Dept: LAB | Facility: HOSPITAL | Age: 78
End: 2023-10-18
Payer: MEDICARE

## 2023-10-18 ENCOUNTER — PROCEDURE VISIT (OUTPATIENT)
Dept: HEMATOLOGY/ONCOLOGY | Facility: CLINIC | Age: 78
End: 2023-10-18
Payer: MEDICARE

## 2023-10-18 ENCOUNTER — TELEPHONE (OUTPATIENT)
Dept: HEMATOLOGY/ONCOLOGY | Facility: CLINIC | Age: 78
End: 2023-10-18
Payer: MEDICARE

## 2023-10-18 VITALS
HEIGHT: 64 IN | DIASTOLIC BLOOD PRESSURE: 67 MMHG | OXYGEN SATURATION: 97 % | TEMPERATURE: 99 F | SYSTOLIC BLOOD PRESSURE: 144 MMHG | BODY MASS INDEX: 27.51 KG/M2 | HEART RATE: 74 BPM

## 2023-10-18 DIAGNOSIS — D46.9 MDS (MYELODYSPLASTIC SYNDROME): Primary | ICD-10-CM

## 2023-10-18 PROCEDURE — 88305 TISSUE EXAM BY PATHOLOGIST: CPT | Mod: 26,,, | Performed by: PATHOLOGY

## 2023-10-18 PROCEDURE — 88311 DECALCIFY TISSUE: CPT | Performed by: PATHOLOGY

## 2023-10-18 PROCEDURE — 88342 IMHCHEM/IMCYTCHM 1ST ANTB: CPT | Mod: 26,59,, | Performed by: PATHOLOGY

## 2023-10-18 PROCEDURE — 38222 DX BONE MARROW BX & ASPIR: CPT | Mod: S$PBB,LT,, | Performed by: NURSE PRACTITIONER

## 2023-10-18 PROCEDURE — 88313 SPECIAL STAINS GROUP 2: CPT | Mod: 59 | Performed by: PATHOLOGY

## 2023-10-18 PROCEDURE — 38222 DX BONE MARROW BX & ASPIR: CPT | Mod: PBBFAC | Performed by: NURSE PRACTITIONER

## 2023-10-18 PROCEDURE — 38222 BONE MARROW: ICD-10-PCS | Mod: S$PBB,LT,, | Performed by: NURSE PRACTITIONER

## 2023-10-18 PROCEDURE — 88342 IMHCHEM/IMCYTCHM 1ST ANTB: CPT | Mod: 59 | Performed by: PATHOLOGY

## 2023-10-18 PROCEDURE — 85097 PR  BONE MARROW,SMEAR INTERPRETATION: ICD-10-PCS | Mod: ,,, | Performed by: PATHOLOGY

## 2023-10-18 PROCEDURE — 88189 PR  FLOWCYTOMETRY/READ, 16 & > MARKERS: ICD-10-PCS | Mod: ,,, | Performed by: PATHOLOGY

## 2023-10-18 PROCEDURE — 88341 PR IHC OR ICC EACH ADD'L SINGLE ANTIBODY  STAINPR: ICD-10-PCS | Mod: 26,,, | Performed by: PATHOLOGY

## 2023-10-18 PROCEDURE — 88341 IMHCHEM/IMCYTCHM EA ADD ANTB: CPT | Performed by: PATHOLOGY

## 2023-10-18 PROCEDURE — 88311 PR  DECALCIFY TISSUE: ICD-10-PCS | Mod: 26,,, | Performed by: PATHOLOGY

## 2023-10-18 PROCEDURE — 88313 PR  SPECIAL STAINS,GROUP II: ICD-10-PCS | Mod: 26,,, | Performed by: PATHOLOGY

## 2023-10-18 PROCEDURE — 88305 TISSUE EXAM BY PATHOLOGIST: CPT | Performed by: PATHOLOGY

## 2023-10-18 PROCEDURE — 88311 DECALCIFY TISSUE: CPT | Mod: 26,,, | Performed by: PATHOLOGY

## 2023-10-18 PROCEDURE — 88342 CHG IMMUNOCYTOCHEMISTRY: ICD-10-PCS | Mod: 26,59,, | Performed by: PATHOLOGY

## 2023-10-18 PROCEDURE — 88184 FLOWCYTOMETRY/ TC 1 MARKER: CPT | Performed by: PATHOLOGY

## 2023-10-18 PROCEDURE — 99999PBSHW PR PBB SHADOW TECHNICAL ONLY FILED TO HB: ICD-10-PCS | Mod: PBBFAC,,,

## 2023-10-18 PROCEDURE — 85097 BONE MARROW INTERPRETATION: CPT | Mod: ,,, | Performed by: PATHOLOGY

## 2023-10-18 PROCEDURE — 88305 TISSUE EXAM BY PATHOLOGIST: ICD-10-PCS | Mod: 26,,, | Performed by: PATHOLOGY

## 2023-10-18 PROCEDURE — 99999PBSHW PR PBB SHADOW TECHNICAL ONLY FILED TO HB: Mod: PBBFAC,,,

## 2023-10-18 PROCEDURE — 88313 SPECIAL STAINS GROUP 2: CPT | Mod: 26,,, | Performed by: PATHOLOGY

## 2023-10-18 PROCEDURE — 88189 FLOWCYTOMETRY/READ 16 & >: CPT | Mod: ,,, | Performed by: PATHOLOGY

## 2023-10-18 PROCEDURE — 88341 IMHCHEM/IMCYTCHM EA ADD ANTB: CPT | Mod: 26,,, | Performed by: PATHOLOGY

## 2023-10-18 PROCEDURE — 88185 FLOWCYTOMETRY/TC ADD-ON: CPT | Performed by: PATHOLOGY

## 2023-10-18 RX ORDER — LIDOCAINE HYDROCHLORIDE 20 MG/ML
10 INJECTION, SOLUTION EPIDURAL; INFILTRATION; INTRACAUDAL; PERINEURAL ONCE
Status: COMPLETED | OUTPATIENT
Start: 2023-10-18 | End: 2023-10-18

## 2023-10-18 RX ADMIN — LIDOCAINE HYDROCHLORIDE 7 ML: 20 INJECTION, SOLUTION EPIDURAL; INFILTRATION; INTRACAUDAL; PERINEURAL at 12:10

## 2023-10-18 NOTE — PROCEDURES
Patient with MDS presented at BMT clinic for restaging bone marrow biopsy. Tolerated procedure well. Not in any distress.  See  note for full history. Reviewed medications, allergies and labs.   She tolerated procedure well, but requested her future marrows under sedation at [facility].     Steffi Esteves NP  Hematology/Oncology/BMT

## 2023-10-18 NOTE — PROCEDURES
Bone marrow    Date/Time: 10/18/2023 9:30 AM    Performed by: Steffi Esteves NP  Authorized by: Steffi Esteves NP    Aspiration?: Yes   Biopsy?: Yes    PROCEDURE NOTE:  Date of Procedure: 10/18/2023   Bone Marrow Biopsy and Aspiration  Indication: MDS restaging  Consent: Informed consent was obtained from patient.  Timeout: Done and documented.  Position: Prone  Site: Left posterior illiac crest.  Prep: Betadine.  Needle used: 11 gauge Jamshidi needle.  Anesthetic: 2% lidocaine 7 cc.  Biopsy: The biopsy needle was introduced into the marrow cavity and an aspirate was obtained without complications and sent for flow cytometry,MDS FISH, NGS, DNA hold and cytogenetics. Core biopsy obtained without difficulty and sent for routine histologic examination.  Complications: None.  Disposition: Left patient with primary nurse,instructed to lay on back for 20 minutes. Advised not to take shower and keep dressing clean, dry & intact for 24 hours.  Blood loss: Minimal.     Steffi Esteves NP  Hematology/Oncology/BMT

## 2023-10-19 ENCOUNTER — OFFICE VISIT (OUTPATIENT)
Dept: INTERNAL MEDICINE | Facility: CLINIC | Age: 78
End: 2023-10-19
Attending: INTERNAL MEDICINE
Payer: MEDICARE

## 2023-10-19 VITALS
SYSTOLIC BLOOD PRESSURE: 130 MMHG | DIASTOLIC BLOOD PRESSURE: 70 MMHG | WEIGHT: 154.31 LBS | HEART RATE: 60 BPM | BODY MASS INDEX: 26.49 KG/M2 | OXYGEN SATURATION: 98 %

## 2023-10-19 DIAGNOSIS — M79.673 PAIN OF FOOT, UNSPECIFIED LATERALITY: Primary | ICD-10-CM

## 2023-10-19 DIAGNOSIS — I10 ESSENTIAL HYPERTENSION: ICD-10-CM

## 2023-10-19 DIAGNOSIS — N39.0 RECURRENT UTI: ICD-10-CM

## 2023-10-19 DIAGNOSIS — D46.9 MDS (MYELODYSPLASTIC SYNDROME): ICD-10-CM

## 2023-10-19 DIAGNOSIS — E60 ZINC DEFICIENCY: ICD-10-CM

## 2023-10-19 PROCEDURE — 99215 OFFICE O/P EST HI 40 MIN: CPT | Mod: PBBFAC | Performed by: INTERNAL MEDICINE

## 2023-10-19 PROCEDURE — 99215 OFFICE O/P EST HI 40 MIN: CPT | Mod: S$PBB,,, | Performed by: INTERNAL MEDICINE

## 2023-10-19 PROCEDURE — 99999 PR PBB SHADOW E&M-EST. PATIENT-LVL V: CPT | Mod: PBBFAC,,, | Performed by: INTERNAL MEDICINE

## 2023-10-19 PROCEDURE — 99999 PR PBB SHADOW E&M-EST. PATIENT-LVL V: ICD-10-PCS | Mod: PBBFAC,,, | Performed by: INTERNAL MEDICINE

## 2023-10-19 PROCEDURE — 99215 PR OFFICE/OUTPT VISIT, EST, LEVL V, 40-54 MIN: ICD-10-PCS | Mod: S$PBB,,, | Performed by: INTERNAL MEDICINE

## 2023-10-19 NOTE — PROGRESS NOTES
Subjective:   Patient ID: Niurka Pedraza is a 78 y.o. female  Chief complaint:   Chief Complaint   Patient presents with    Anemia         HPI  Here for 3 month follow up of htn:     MDS:  Had 2nd bx yesterday for MDS restaging  - followed by h/o   Anemia:   Seen by h/o 7/31/2023 - to have bone bx on 8/29   Had egd 6/2/2023  Cscope 12/2022  No gross bleeding     Recurrent MDR UTI:  Seen by ID    Followed by urology as well  Previously:   Patient was seen by Urology January 18, 2023 for cystoscopy due to recurrent urinary tract infections - cysto showed similar appearance of plaques or bladder wall and plan is to monitor for now and consider removal in OR if urinary tract infections progress  - ditropan increased to 15 mg    For several onths right foot pain after cheo ran over foot with toy when runnign down [place] - since then pain at medial aspect of heal over past 5 days - no redness or mass or bruising     Zinc def:   Taking zinc suppl     HTN:   controlled today - home readings stable   Taking Coreg 6.25 and irb 300  Previously:   - stopped Ofev in Jan 18th and bp improved - did not start coreg 12.5mg dose since bp improved  fter d/c this   - edema with amlodipine   - cough from lisinopril     #### not addressed today #####    Since lcv seen by gyn/onc 5/3    Takes mvi daily   Zinc mildly low     ILD, immunosuppressed due to chronic pdn use:   1mg pdn currently for pulm fib - 2 tabs every morning  Followed closely by pulm   Previously:   Seen by pulm 4/23 - recently tx with augmentin and pdn at that appt for worsening cough   Had ct chest and stable per pulm note when compared to 2021 study   Rec to take prilosec otc and if sx do not improve then restart azithro daily   - today is starting azithro daily   - has not tried ppi yet and will send in protonix for 4 weeks - no burning or gi sx currently  Previously:   Pulmonary fibrosis:  Seen by pulmonology November 21, 2022  Rheumatological eval has been negative  for autoimmune condition  - at that appointment they discussed other medication options and patient deferred for now - plans to follow-up in 3 months  - of note oxygen saturation during ambulation decreased the patient did not qualify for oxygen at that time  - was on prednisone, Singulair, and azithromycin - however she stopped all meds now 6 months ago   Previously:   Interstitial lung disease on azithro and pdn:   Followed by pulm   - seen by rheum   - on azithro 3 times per week and montelukast daily and prednisone 2.5 mg TID  - Referred to rheum for opinion with +RF on labs     Neuropathy:  Sx stable today  Seen by neuro for neuropathy 3/21/2023 - to return as needed   Previously:   Seen by Neurology December 19, 2022  - gabapentin - never took after read about pot side effects   Previously:   Idiopathic neuropathy:   Has appt with neuro in November  Previously:   emg test for numbness of feet and completed 3/15/2022 - wnl   Sx located in toes B and she reports that sx are progressing to mid foot bilaterally  Labs unremarkable and reviewed for PN work up today    She started to take fish oil and tuna to inc DHA    Hearing loss:    Followed up with ear nose and throat December 19, 2022 and plan is to recheck hearing in 1 year    Restless leg syndrome:  Started on gabapentin but did not start as concerned about pot side effects  - tried pickle juice - 2 tbs and sx of cramping resolved and stopped     Osteopenia:  Dexa 1/2023     Hypothyroidism:   Blaire levothyroxine   Tsh at goal    ########    HM:  Flu vaccine    Gyn: Melissa  Gyn onc: Denis  Pulm: Hollie  ENT: Carroll nosebleeds   Urology: Sandip  Pod: Feliciano     Review of Systems    Objective:  Vitals:    10/19/23 1117   BP: 130/70   BP Location: Left arm   Patient Position: Sitting   Pulse: 60   SpO2: 98%   Weight: 70 kg (154 lb 5.2 oz)       Body mass index is 26.49 kg/m².    Physical Exam  Vitals reviewed.   Constitutional:       Appearance: Normal appearance.  She is well-developed.   HENT:      Head: Normocephalic and atraumatic.   Eyes:      Extraocular Movements: Extraocular movements intact.      Conjunctiva/sclera: Conjunctivae normal.   Neck:      Thyroid: No thyromegaly.   Cardiovascular:      Rate and Rhythm: Normal rate and regular rhythm.      Pulses: Normal pulses.      Heart sounds: Normal heart sounds.   Pulmonary:      Effort: Pulmonary effort is normal.      Breath sounds: Normal breath sounds. No wheezing or rhonchi.   Abdominal:      General: Bowel sounds are normal.      Palpations: Abdomen is soft.      Tenderness: There is no right CVA tenderness or left CVA tenderness.   Musculoskeletal:         General: Tenderness present. No swelling.      Cervical back: Neck supple.      Comments: Ttp of medial aspect of foot   No swelling or inc redness or warmth or skin changes    Lymphadenopathy:      Cervical: No cervical adenopathy.   Skin:     General: Skin is warm and dry.      Capillary Refill: Capillary refill takes less than 2 seconds.   Neurological:      General: No focal deficit present.      Mental Status: She is alert and oriented to person, place, and time. Mental status is at baseline.   Psychiatric:         Behavior: Behavior normal.         Thought Content: Thought content normal.     Assessment:  1. Pain of foot, unspecified laterality    2. Zinc deficiency    3. MDS (myelodysplastic syndrome)    4. Recurrent UTI    5. Essential hypertension      Plan:    Niurka was seen today for anemia.    Diagnoses and all orders for this visit:    Pain of foot, unspecified laterality  -     X-Ray Foot Complete 3 view Right; Future  Rice therapy   Apap prn   Cont supportive shoes     Zinc deficiency  -     ZINC; Future  Cont zinc suppl   Check level     MDS (myelodysplastic syndrome)  F/u with h/o   Labs being monitored closely     Recurrent UTI  Stable today   F/u with specialists     Essential hypertension  Stable   Cont meds today and home monitoring   Low  salt diet     43 min spent in care of patient including chart review, history, orders, physical, orders and coordination of care    Health Maintenance   Topic Date Due    Colorectal Cancer Screening  12/09/2025    DEXA Scan  01/19/2026    Lipid Panel  06/22/2028    TETANUS VACCINE  07/21/2031    Hepatitis C Screening  Completed    Shingles Vaccine  Completed

## 2023-10-20 ENCOUNTER — HOSPITAL ENCOUNTER (OUTPATIENT)
Dept: RADIOLOGY | Facility: HOSPITAL | Age: 78
Discharge: HOME | End: 2023-10-20
Attending: INTERNAL MEDICINE
Payer: MEDICARE

## 2023-10-20 DIAGNOSIS — M79.673 PAIN OF FOOT, UNSPECIFIED LATERALITY: ICD-10-CM

## 2023-10-20 LAB
BACTERIA UR CULT: ABNORMAL
BODY SITE - BONE MARROW: NORMAL
CLINICAL DIAGNOSIS - BONE MARROW: NORMAL
DNA/RNA EXTRACT AND HOLD RESULT: NORMAL
DNA/RNA EXTRACTION: NORMAL
EXHR SPECIMEN TYPE: NORMAL
FLOW CYTOMETRY ANTIBODIES ANALYZED - BONE MARROW: NORMAL
FLOW CYTOMETRY COMMENT - BONE MARROW: NORMAL
FLOW CYTOMETRY INTERPRETATION - BONE MARROW: NORMAL

## 2023-10-20 PROCEDURE — 73630 X-RAY EXAM OF FOOT: CPT | Mod: 26,RT,, | Performed by: RADIOLOGY

## 2023-10-20 PROCEDURE — 73630 X-RAY EXAM OF FOOT: CPT | Mod: TC,FY,PO,RT

## 2023-10-20 PROCEDURE — 73630 XR FOOT COMPLETE 3 VIEW RIGHT: ICD-10-PCS | Mod: 26,RT,, | Performed by: RADIOLOGY

## 2023-10-20 RX ORDER — HEPARIN 100 UNIT/ML
500 SYRINGE INTRAVENOUS
Status: CANCELLED | OUTPATIENT
Start: 2023-10-23

## 2023-10-20 RX ORDER — SODIUM CHLORIDE 0.9 % (FLUSH) 0.9 %
10 SYRINGE (ML) INJECTION
Status: CANCELLED | OUTPATIENT
Start: 2023-10-23

## 2023-10-23 ENCOUNTER — OFFICE VISIT (OUTPATIENT)
Dept: HEMATOLOGY/ONCOLOGY | Facility: CLINIC | Age: 78
End: 2023-10-23
Payer: MEDICARE

## 2023-10-23 ENCOUNTER — INFUSION (OUTPATIENT)
Dept: INFUSION THERAPY | Facility: HOSPITAL | Age: 78
End: 2023-10-23
Attending: INTERNAL MEDICINE
Payer: MEDICARE

## 2023-10-23 ENCOUNTER — PATIENT MESSAGE (OUTPATIENT)
Dept: HEMATOLOGY/ONCOLOGY | Facility: CLINIC | Age: 78
End: 2023-10-23

## 2023-10-23 VITALS
BODY MASS INDEX: 26.46 KG/M2 | TEMPERATURE: 98 F | WEIGHT: 155 LBS | RESPIRATION RATE: 18 BRPM | DIASTOLIC BLOOD PRESSURE: 65 MMHG | SYSTOLIC BLOOD PRESSURE: 111 MMHG | HEIGHT: 64 IN | HEART RATE: 73 BPM

## 2023-10-23 DIAGNOSIS — Z16.12 URINARY TRACT INFECTION DUE TO EXTENDED-SPECTRUM BETA LACTAMASE (ESBL) PRODUCING ESCHERICHIA COLI: ICD-10-CM

## 2023-10-23 DIAGNOSIS — D63.0 ANEMIA IN NEOPLASTIC DISEASE: ICD-10-CM

## 2023-10-23 DIAGNOSIS — D75.839 THROMBOCYTOSIS: ICD-10-CM

## 2023-10-23 DIAGNOSIS — D46.9 MDS (MYELODYSPLASTIC SYNDROME): Primary | ICD-10-CM

## 2023-10-23 DIAGNOSIS — D70.1 LEUKOPENIA DUE TO ANTINEOPLASTIC CHEMOTHERAPY: ICD-10-CM

## 2023-10-23 DIAGNOSIS — N39.0 URINARY TRACT INFECTION DUE TO EXTENDED-SPECTRUM BETA LACTAMASE (ESBL) PRODUCING ESCHERICHIA COLI: ICD-10-CM

## 2023-10-23 DIAGNOSIS — B96.29 URINARY TRACT INFECTION DUE TO EXTENDED-SPECTRUM BETA LACTAMASE (ESBL) PRODUCING ESCHERICHIA COLI: ICD-10-CM

## 2023-10-23 DIAGNOSIS — T45.1X5A LEUKOPENIA DUE TO ANTINEOPLASTIC CHEMOTHERAPY: ICD-10-CM

## 2023-10-23 PROCEDURE — 96401 CHEMO ANTI-NEOPL SQ/IM: CPT | Mod: PN

## 2023-10-23 PROCEDURE — 96365 THER/PROPH/DIAG IV INF INIT: CPT | Mod: PN

## 2023-10-23 PROCEDURE — 25000003 PHARM REV CODE 250: Mod: PN | Performed by: INTERNAL MEDICINE

## 2023-10-23 PROCEDURE — 99215 OFFICE O/P EST HI 40 MIN: CPT | Mod: 95,,, | Performed by: INTERNAL MEDICINE

## 2023-10-23 PROCEDURE — 63600175 PHARM REV CODE 636 W HCPCS: Mod: PN | Performed by: INTERNAL MEDICINE

## 2023-10-23 PROCEDURE — 99215 PR OFFICE/OUTPT VISIT, EST, LEVL V, 40-54 MIN: ICD-10-PCS | Mod: 95,,, | Performed by: INTERNAL MEDICINE

## 2023-10-23 RX ORDER — AZACITIDINE 100 MG/1
75 INJECTION, POWDER, LYOPHILIZED, FOR SOLUTION INTRAVENOUS; SUBCUTANEOUS
Status: CANCELLED | OUTPATIENT
Start: 2023-10-25

## 2023-10-23 RX ORDER — ONDANSETRON 8 MG/1
16 TABLET, ORALLY DISINTEGRATING ORAL
Status: COMPLETED | OUTPATIENT
Start: 2023-10-23 | End: 2023-10-23

## 2023-10-23 RX ORDER — AZACITIDINE 100 MG/1
75 INJECTION, POWDER, LYOPHILIZED, FOR SOLUTION INTRAVENOUS; SUBCUTANEOUS
Status: COMPLETED | OUTPATIENT
Start: 2023-10-23 | End: 2023-10-23

## 2023-10-23 RX ORDER — ONDANSETRON 8 MG/1
16 TABLET, ORALLY DISINTEGRATING ORAL
Status: CANCELLED
Start: 2023-10-25

## 2023-10-23 RX ORDER — AZACITIDINE 100 MG/1
75 INJECTION, POWDER, LYOPHILIZED, FOR SOLUTION INTRAVENOUS; SUBCUTANEOUS
Status: CANCELLED | OUTPATIENT
Start: 2023-10-24

## 2023-10-23 RX ORDER — AZACITIDINE 100 MG/1
75 INJECTION, POWDER, LYOPHILIZED, FOR SOLUTION INTRAVENOUS; SUBCUTANEOUS
Status: CANCELLED | OUTPATIENT
Start: 2023-10-27

## 2023-10-23 RX ORDER — SODIUM CHLORIDE 0.9 % (FLUSH) 0.9 %
10 SYRINGE (ML) INJECTION
Status: DISCONTINUED | OUTPATIENT
Start: 2023-10-23 | End: 2023-10-23 | Stop reason: HOSPADM

## 2023-10-23 RX ORDER — ONDANSETRON 8 MG/1
16 TABLET, ORALLY DISINTEGRATING ORAL
Status: CANCELLED
Start: 2023-10-24

## 2023-10-23 RX ORDER — HEPARIN 100 UNIT/ML
500 SYRINGE INTRAVENOUS
Status: CANCELLED | OUTPATIENT
Start: 2023-10-23

## 2023-10-23 RX ORDER — AZACITIDINE 100 MG/1
75 INJECTION, POWDER, LYOPHILIZED, FOR SOLUTION INTRAVENOUS; SUBCUTANEOUS
Status: CANCELLED | OUTPATIENT
Start: 2023-10-26

## 2023-10-23 RX ORDER — ONDANSETRON 8 MG/1
16 TABLET, ORALLY DISINTEGRATING ORAL
Status: CANCELLED
Start: 2023-10-27

## 2023-10-23 RX ORDER — AZACITIDINE 100 MG/1
75 INJECTION, POWDER, LYOPHILIZED, FOR SOLUTION INTRAVENOUS; SUBCUTANEOUS
Status: CANCELLED | OUTPATIENT
Start: 2023-10-23

## 2023-10-23 RX ORDER — SODIUM CHLORIDE 0.9 % (FLUSH) 0.9 %
10 SYRINGE (ML) INJECTION
Status: CANCELLED | OUTPATIENT
Start: 2023-10-23

## 2023-10-23 RX ORDER — ONDANSETRON 8 MG/1
16 TABLET, ORALLY DISINTEGRATING ORAL
Status: CANCELLED
Start: 2023-10-26

## 2023-10-23 RX ORDER — ONDANSETRON 8 MG/1
16 TABLET, ORALLY DISINTEGRATING ORAL
Status: CANCELLED
Start: 2023-10-23

## 2023-10-23 RX ADMIN — AZACITIDINE 135 MG: 100 INJECTION, POWDER, LYOPHILIZED, FOR SOLUTION INTRAVENOUS; SUBCUTANEOUS at 12:10

## 2023-10-23 RX ADMIN — SODIUM CHLORIDE: 9 INJECTION, SOLUTION INTRAVENOUS at 11:10

## 2023-10-23 RX ADMIN — ONDANSETRON 16 MG: 8 TABLET, ORALLY DISINTEGRATING ORAL at 12:10

## 2023-10-23 RX ADMIN — ERTAPENEM 1 G: 1 INJECTION, POWDER, LYOPHILIZED, FOR SOLUTION INTRAMUSCULAR; INTRAVENOUS at 12:10

## 2023-10-23 NOTE — PROGRESS NOTES
Route Chart for Scheduling    BMT Chart Routing  Urgent    Follow up with physician . 11/17 at 1 PM while on hospital rounds. OK to book during this time.   Follow up with [facility]    Provider visit type Malignant hem   Infusion scheduling note    Injection scheduling note    Labs CBC, CMP, magnesium, phosphorus, LDH, uric acid and type and screen   Scheduling:  Preferred lab:  Lab interval: once a week  on [facility]. Schedule beginning this week.   Imaging    Pharmacy appointment    Other referrals                  Treatment Plan Information   OP AZACITIDINE 5-DAY (SUB-Q)   Mohit Centeno MD   Upcoming Treatment Dates - OP AZACITIDINE 5-DAY (SUB-Q)    10/23/2023       Pre-Medications       ondansetron disintegrating tablet 16 mg       Chemotherapy       azaCITIDine (VIDAZA) chemo injection 135 mg  10/24/2023       Pre-Medications       ondansetron disintegrating tablet 16 mg       Chemotherapy       azaCITIDine (VIDAZA) chemo injection 135 mg  10/25/2023       Pre-Medications       ondansetron disintegrating tablet 16 mg       Chemotherapy       azaCITIDine (VIDAZA) chemo injection 135 mg  10/26/2023       Pre-Medications       ondansetron disintegrating tablet 16 mg       Chemotherapy       azaCITIDine (VIDAZA) chemo injection 135 mg    Therapy Plan Information  Medications  ertapenem (INVANZ) 1 g in sodium chloride 0.9% 100 mL IVPB  1 g, Intravenous, Every visit  Flushes  sodium chloride 0.9% 250 mL flush bag  Intravenous, Every visit  sodium chloride 0.9% flush 10 mL  10 mL, Intravenous, Every visit  heparin, porcine (PF) 100 unit/mL injection flush 500 Units  500 Units, Intravenous, Every visit  alteplase injection 2 mg  2 mg, Intra-Catheter, Every visit

## 2023-10-23 NOTE — PLAN OF CARE
Problem: Adult Inpatient Plan of Care  Goal: Plan of Care Review  Flowsheets (Taken 10/23/2023 1150)  Plan of Care Reviewed With: patient  Goal: Optimal Comfort and Wellbeing  Outcome: Ongoing, Progressing  Goal: Patient-Specific Goal (Individualized)  Flowsheets (Taken 10/23/2023 1150)  Anxieties, Fears or Concerns: UTI  Individualized Care Needs: recliner, personal blanket, headphones     Problem: Fatigue  Goal: Improved Activity Tolerance  Outcome: Ongoing, Progressing  Intervention: Promote Improved Energy  Flowsheets (Taken 10/23/2023 1150)  Fatigue Management: paced activity encouraged  Sleep/Rest Enhancement:   noise level reduced   natural light exposure provided   family presence promoted   relaxation techniques promoted  Activity Management:   Ambulated -L4   Up in chair - L3     Pt tolerated vidaza and ertapenem. VSS, pt voiced no new complaints or concerns at this time. NAD noted. Pt d/c home

## 2023-10-24 ENCOUNTER — DOCUMENTATION ONLY (OUTPATIENT)
Dept: INFUSION THERAPY | Facility: HOSPITAL | Age: 78
End: 2023-10-24

## 2023-10-24 ENCOUNTER — DOCUMENTATION ONLY (OUTPATIENT)
Dept: INFUSION THERAPY | Facility: HOSPITAL | Age: 78
End: 2023-10-24
Payer: MEDICARE

## 2023-10-24 ENCOUNTER — INFUSION (OUTPATIENT)
Dept: INFUSION THERAPY | Facility: HOSPITAL | Age: 78
End: 2023-10-24
Attending: INTERNAL MEDICINE
Payer: MEDICARE

## 2023-10-24 VITALS
HEART RATE: 66 BPM | SYSTOLIC BLOOD PRESSURE: 109 MMHG | BODY MASS INDEX: 26.27 KG/M2 | HEIGHT: 64 IN | RESPIRATION RATE: 18 BRPM | TEMPERATURE: 98 F | DIASTOLIC BLOOD PRESSURE: 72 MMHG | WEIGHT: 153.88 LBS

## 2023-10-24 DIAGNOSIS — D46.9 MDS (MYELODYSPLASTIC SYNDROME): ICD-10-CM

## 2023-10-24 DIAGNOSIS — N39.0 URINARY TRACT INFECTION DUE TO EXTENDED-SPECTRUM BETA LACTAMASE (ESBL) PRODUCING ESCHERICHIA COLI: Primary | ICD-10-CM

## 2023-10-24 DIAGNOSIS — Z16.12 URINARY TRACT INFECTION DUE TO EXTENDED-SPECTRUM BETA LACTAMASE (ESBL) PRODUCING ESCHERICHIA COLI: Primary | ICD-10-CM

## 2023-10-24 DIAGNOSIS — B96.29 URINARY TRACT INFECTION DUE TO EXTENDED-SPECTRUM BETA LACTAMASE (ESBL) PRODUCING ESCHERICHIA COLI: Primary | ICD-10-CM

## 2023-10-24 PROCEDURE — 63600175 PHARM REV CODE 636 W HCPCS: Mod: PN | Performed by: INTERNAL MEDICINE

## 2023-10-24 PROCEDURE — 96365 THER/PROPH/DIAG IV INF INIT: CPT | Mod: PN

## 2023-10-24 PROCEDURE — 25000003 PHARM REV CODE 250: Mod: PN | Performed by: INTERNAL MEDICINE

## 2023-10-24 PROCEDURE — 96402 CHEMO HORMON ANTINEOPL SQ/IM: CPT | Mod: PN

## 2023-10-24 RX ORDER — AZACITIDINE 100 MG/1
75 INJECTION, POWDER, LYOPHILIZED, FOR SOLUTION INTRAVENOUS; SUBCUTANEOUS
Status: COMPLETED | OUTPATIENT
Start: 2023-10-24 | End: 2023-10-24

## 2023-10-24 RX ORDER — HEPARIN 100 UNIT/ML
500 SYRINGE INTRAVENOUS
Status: CANCELLED | OUTPATIENT
Start: 2023-10-24

## 2023-10-24 RX ORDER — HEPARIN 100 UNIT/ML
500 SYRINGE INTRAVENOUS
Status: DISCONTINUED | OUTPATIENT
Start: 2023-10-24 | End: 2023-10-24 | Stop reason: HOSPADM

## 2023-10-24 RX ORDER — SODIUM CHLORIDE 0.9 % (FLUSH) 0.9 %
10 SYRINGE (ML) INJECTION
Status: CANCELLED | OUTPATIENT
Start: 2023-10-24

## 2023-10-24 RX ORDER — ONDANSETRON 8 MG/1
16 TABLET, ORALLY DISINTEGRATING ORAL
Status: COMPLETED | OUTPATIENT
Start: 2023-10-24 | End: 2023-10-24

## 2023-10-24 RX ORDER — SODIUM CHLORIDE 0.9 % (FLUSH) 0.9 %
10 SYRINGE (ML) INJECTION
Status: DISCONTINUED | OUTPATIENT
Start: 2023-10-24 | End: 2023-10-24 | Stop reason: HOSPADM

## 2023-10-24 RX ADMIN — AZACITIDINE 135 MG: 100 INJECTION, POWDER, LYOPHILIZED, FOR SOLUTION INTRAVENOUS; SUBCUTANEOUS at 11:10

## 2023-10-24 RX ADMIN — ERTAPENEM 1 G: 1 INJECTION, POWDER, LYOPHILIZED, FOR SOLUTION INTRAMUSCULAR; INTRAVENOUS at 11:10

## 2023-10-24 RX ADMIN — ONDANSETRON 16 MG: 8 TABLET, ORALLY DISINTEGRATING ORAL at 10:10

## 2023-10-24 RX ADMIN — SODIUM CHLORIDE: 9 INJECTION, SOLUTION INTRAVENOUS at 10:10

## 2023-10-24 NOTE — PROGRESS NOTES
2:06 PM    Oncology   [employer] Education  Niurka Pedraza   1945    Social Service Education   This is a 78 y.o.female with a medical diagnosis of MDS.    Current Support System: The pt reported she has three children who support her and today her daughter, Luz is with her at chairside.    Reviewed role of  during cancer treatment. Educated on role of SW on care team and resources available at the cancer center.   The pt said her hair is starting to fall out, but she does not know if it is the chemo and will wait to see what happens. This LCSW informed the pt of the wig des and she declined at this time, however she did choose some chemo beanies in case she loses her hair.  The pt reported no other practical needs at this time.    Patient provided with social contact number and advised to call with questions or make future appointment if further intervention is needed. SW to follow throughout tx.    Estela Gabriel, ERNESTO  10/24/2023  2:06 PM

## 2023-10-24 NOTE — PLAN OF CARE
Tolerated Vidaza/Invanz well. No reactions noted.  No questions or concerns at this time.  Left unit via w/c in NAD.

## 2023-10-24 NOTE — PROGRESS NOTES
HEMATOLOGIC MALIGNANCIES PROGRESS NOTE    IDENTIFYING STATEMENT   Niurka Castellon) is a 78 y.o. female with a  of 1945 from [place], [place] with the diagnosis of MDS.      TELEMEDICINE DOCUMENTATION    The patient location is: [place]  The chief complaint leading to consultation is: MDS    Visit type: audiovisual    Face to Face time with patient: 15 minutes  30 minutes of total time spent on the encounter, which includes face to face time and non-face to face time preparing to see the patient (eg, review of tests), Obtaining and/or reviewing separately obtained history, Documenting clinical information in the electronic or other health record, Independently interpreting results (not separately reported) and communicating results to the patient/family/caregiver, or Care coordination (not separately reported).         Each patient to whom he or she provides medical services by telemedicine is:  (1) informed of the relationship between the physician and patient and the respective role of any other health care provider with respect to management of the patient; and (2) notified that he or she may decline to receive medical services by telemedicine and may withdraw from such care at any time.    Notes:     ONCOLOGY HISTORY:    1. Myelodysplastic syndrome with increased blasts-2              A. 2023: Bone marrow biopsy - 65-75% cellular marrow consistent with myelodysplastic syndrome with excess blasts-2; cytogenetics 46,XX,del(3)(q12)[2]/47,sl,+8[18]; NGS not sent; IPSS-R score of 7 = very high risk   B. 2023: Cycle 1 azacitidine-venetoclax (5 days aza, 14 days angelo) for MDS/AML   C. 10/18/2023: Bone marrow biopsy - 50-60% cellular marrow with no increased blasts and trilineage hematopoiesis with granulocytic hypoplasia and moderate dysgranulopoiesis, mildly left-shifted marked erythroid hyperplasia with severe dyserythropoiesis, and marked megakaryocytic hyperplasia with predominantly variably  sized including small del3q-like monolobated forms; 3-4+ histiocytic iron stores; focal MF-1; cytogenetics pending; NGS pending     2. Neuropathy  3. Interstitial lung disease/pulmonary fibrosis  4. Hypertension  5. Aortic atherosclerosis  6. Pulmonary coccidioidomycosis  7. Hypothyroidism  8. Hyperparathyroidism    INTERVAL HISTORY:      Ms. Pedraza is seen in this virtual visit in follow-up of MDS/AML to review bone marrow biopsy results and prior to cycle 2 of aza-angelo therapy.  She is feeling okay.     Past Medical History, Past Social History and Past Family History have been reviewed and are unchanged except as noted in the interval history.    MEDICATIONS:     Current Outpatient Medications on File Prior to Visit   Medication Sig Dispense Refill    acyclovir (ZOVIRAX) 400 MG tablet Take 1 tablet (400 mg total) by mouth 2 (two) times daily. 60 tablet 11    albuterol (VENTOLIN HFA) 90 mcg/actuation inhaler Inhale 1-2 puffs into the lungs every 6 (six) hours as needed for Wheezing or Shortness of Breath. Rescue 18 g 11    albuterol-ipratropium (DUO-NEB) 2.5 mg-0.5 mg/3 mL nebulizer solution Take 3 mLs by nebulization every 6 (six) hours as needed for Wheezing or Shortness of Breath. Rescue 75 mL 11    ascorbic acid/zinc (ZINC WITH VITAMIN C ORAL) Take by mouth.      atorvastatin (LIPITOR) 20 MG tablet Take 1 tablet (20 mg total) by mouth once daily. 90 tablet 3    azelastine (ASTELIN) 137 mcg (0.1 %) nasal spray 1 spray (137 mcg total) by Nasal route 2 (two) times daily. 30 mL 6    calcium-vitamin D3 (CALCIUM 500 + D) 500 mg(1,250mg) -200 unit per tablet Take 1 tablet by mouth 2 (two) times daily with meals.      carvediloL (COREG) 6.25 MG tablet Take 1 tablet (6.25 mg total) by mouth 2 (two) times daily with meals. 180 tablet 3    estradioL (ESTRACE) 0.01 % (0.1 mg/gram) vaginal cream Place 1 g vaginally twice a week. 42.5 g 11    fluticasone furoate-vilanteroL (BREO ELLIPTA) 100-25 mcg/dose diskus inhaler Inhale  1 puff into the lungs once daily. Controller.  PLEASE NOTE IT IS ONCE A DAY!! 60 each 4    gabapentin (NEURONTIN) 100 MG capsule Take 1 capsule (100 mg total) by mouth every evening. (Patient not taking: Reported on 10/24/2023) 30 capsule 3    irbesartan (AVAPRO) 300 MG tablet Take 1 tablet (300 mg total) by mouth every evening. 90 tablet 3    levothyroxine (SYNTHROID) 50 MCG tablet Take 1 tablet (50 mcg total) by mouth before breakfast. 90 tablet 1    montelukast (SINGULAIR) 10 mg tablet TAKE 1 TABLET BY MOUTH EVERY DAY IN THE EVENING 90 tablet 2    multivitamin (THERAGRAN) per tablet Take 1 tablet by mouth once daily.      ondansetron (ZOFRAN-ODT) 4 MG TbDL Take 2 tablets (8 mg total) by mouth every 6 (six) hours as needed (nausea). 30 tablet 1    oxybutynin (DITROPAN XL) 15 MG TR24 Take 1 tablet (15 mg total) by mouth once daily. 30 tablet 11    pantoprazole (PROTONIX) 20 MG tablet Take 1 tablet (20 mg total) by mouth once daily. (Patient not taking: Reported on 10/24/2023) 30 tablet 2    posaconazole (NOXAFIL) 100 mg TbEC tablet Take 3 tablets (300 mg) by mouth twice daily on the first day, and then take 300 mg daily thereafter. (Patient not taking: Reported on 10/24/2023) 30 tablet 2    predniSONE (DELTASONE) 1 MG tablet Take 2 tablets (2 mg total) by mouth once daily. 180 tablet 3    venetoclax (VENCLEXTA) 100 mg Tab Take one tablet (100 mg) by mouth daily on days 1-14 of each 28 day cycle of therapy. 14 tablet 0    vibegron (GEMTESA) 75 mg Tab Take 75 mg by mouth once daily. 30 tablet 11    [DISCONTINUED] budesonide-formoterol 80-4.5 mcg (SYMBICORT) 80-4.5 mcg/actuation HFAA Inhale 2 puffs into the lungs 2 (two) times daily as needed. Controller 1 Inhaler 6     Current Facility-Administered Medications on File Prior to Visit   Medication Dose Route Frequency Provider Last Rate Last Admin    [DISCONTINUED] ertapenem (INVANZ) 1 g in sodium chloride 0.9% 50 mL IVPB  1 g Intravenous Daily Cocco, Linda Elida,  DO   Stopped at 10/23/23 1301    [DISCONTINUED] sodium chloride 0.9% 250 mL flush bag   Intravenous PRN Cocco, Linda Elida, DO   Stopped at 10/23/23 1310    [DISCONTINUED] sodium chloride 0.9% flush 10 mL  10 mL Intravenous PRN Cocco, Linda Elida, DO           ALLERGIES:   Review of patient's allergies indicates:   Allergen Reactions    Ciprofloxacin Other (See Comments)     Right foot pain and edema, tendonitis    Amlodipine Edema and Swelling     BLE  BLE    Lisinopril Other (See Comments)     cough    Nitrofuran analogues         ROS:       Review of Systems   Constitutional:  Negative for diaphoresis, fatigue, fever and unexpected weight change.   HENT:   Negative for lump/mass and sore throat.    Eyes:  Negative for icterus.   Respiratory:  Negative for cough and shortness of breath.    Cardiovascular:  Negative for chest pain and palpitations.   Gastrointestinal:  Negative for abdominal distention, constipation, diarrhea, nausea and vomiting.   Genitourinary:  Positive for dysuria. Negative for frequency.    Musculoskeletal:  Negative for arthralgias, gait problem and myalgias.   Skin:  Negative for rash.   Neurological:  Negative for dizziness, gait problem and headaches.   Hematological:  Negative for adenopathy. Does not bruise/bleed easily.   Psychiatric/Behavioral:  The patient is not nervous/anxious.        PHYSICAL EXAM:  There were no vitals filed for this visit.    Physical Exam  Head and neck visualized, and no abnormalities seen.     LAB:   Results for orders placed or performed in visit on 10/18/23   Chromosome Analysis, Bone Marrow Left Posterior Iliac Crest   Result Value Ref Range    Reason for Referral MDS    Leukemia/Lymphoma Screen - Bone Marrow Left Posterior Iliac Crest   Result Value Ref Range    Clinical Diagnosis - Bone Marrow MDS     Body Site - Bone Marrow LPI     Bone Marrow Interpretation       BONE MARROW ASPIRATE ([id number]), LEFT POSTEROSUPERIOR ILIAC CREST, FLOW  CYTOMETRY:        -  No increase in blasts        -  No monoclonal B-cell population         -  Decreased CD4:CD8 ratio (1:1) with no aberrant T-cell antigen expression    COMMENT: Please correlate the immunophenotyping results with morphologic, clinical, cytogenetic, and molecular findings for final diagnosis.      Bone Marrow Antibodies Analyzed       All analyzed: CD2, CD3, CD4, CD5, CD7, CD8, CD10, CD13, CD19, CD20, CD34, , KAPPA, LAMBDA,CD45 and 7AAD.    Bone Marrow Comment       Flow differential:  Lymphocytes 35.7%, Monocytes 1.2%, Granulocytes 34.9%, Blasts 1.1%, Debris/nRBC 1%,  Viability 98.2%.   OncoHeme (NGS) Hematologic Neoplasms, BM Diagnosis or Indication for test: MDS   Result Value Ref Range    NGS Specimen Type Bone Marrow    Myelodysplastic Syndrome (MDS), FISH, Bone Marrow   Result Value Ref Range    MDS FISH Reason for Referral (BM) MDS     MDS FISH Specimen Source Bone Marrow    Heme Disorders DNA/RNA Hold, Bone Marrow   Result Value Ref Range    EXHR Specimen Type Bone Marrow     EXHR Extract and Hold Result see method     EXHR Extraction Performed    Specimen to Pathology, Bone Marrow Aspiration/Biopsy   Result Value Ref Range    Final Pathologic Diagnosis       BONE MARROW ASPIRATE, TOUCH PREP, CLOT, AND DECALCIFIED NEEDLE CORE BIOPSY:   LEFT POSTEROSUPERIOR ILIAC CREST  - PERSISTENT MYELODYSPLASTIC NEOPLASM WITH MULTILINEAGE DYSPLASIA (5th WHO, 2023: MDS-MLD) pending final classification with cytogenetics and NGS results in a separate supplemental  - Hypercellular marrow (50-60% total cellularity) with no increased blasts and trilineage hematopoiesis with granulocytic hypoplasia with moderate dysgranulopoiesis, mildly left-shifted marked erythroid hyperplasia with severe dyserythropoiesis, and   marked megakaryocytic hyperplasia with severe dysmegakaryopoiesis with predominantly variably sized including small del3q-like monolobated forms  - Relative lymphocytosis with solitary minute  well-defined lymphoid aggregate (favor benign/reactive)  - Few ring sideroblasts (4%)  - Adequate to mildly increased stainable histiocytic iron stores (3-4+ out of 6+)  - Focal mildly increased increased reticulin fibrosis (focal MF-1)  - Focally osteopenic bon y trabeculae      Gross       Patient ID/MRN:  [id number]  Pathology label MRN:  [id number]     Received in 2 parts:     Part 1:  Patient ID/MRN:  [id number]  Pathology label MRN:  [id number]    The specimen is received in formalin labeled &quot;left clot&quot;.  The specimen consists of one fragment of hemorrhagic material measuring 2 x 1.5 x 1.5 cm.  The specimen is submitted entirely in cassette [id number]    Part 2:  Patient ID/MRN:  [id number]  Pathology label MRN:  [id number]    The specimen is received in formalin labeled &quot;left core&quot;.  The specimen consists of 2 red-brown cores of bone measuring 1.6 x 0.2 x 0.2 cm and 0.8 x 0.2 x 0.2 cm.  The specimen is placed in decal and submitted entirely in cassette   [id number]      Brenda Plasencia      Microscopic Exam       PERIPHERAL BLOOD (10/18/23, EPIC electronic medical record):  WBC: 1.44 k/uL, RBC: 3.04 m/uL, HGB: 7.6 g/dL, HCT: 27.1%, MCV: 89 fL, MCH: 25 pg, MCHC: 28 g/dL, RDW: 20.8%, Platelets: 1008 k/uL, MPV: 9.1 fL, Neutrophils: 36%, Bands: 2%, Lymphocytes: 54%, Monocytes: 6%, Eosinophils: 0%, Basophils: 2%, nRBC: 1/100 WBC    No peripheral blood smear available for review.    BONE MARROW ADEQUACY:  Aspirate (2): Adequate with few cellular spicules  Touch Prep: Adequate with similar findings to aspirate  Clot: Adequate   Biopsy: Adequate; 50-60% total cellularity, hypercellular for age    BONE MARROW ASPIRATE & TOUCH PREP:  BLASTS: No increased blasts or identifiable Tip rods  GRANULOPOIESIS: Decreased number; too few cells to optimally assess maturation or dysplasia but hypogranulation and hyposegmentation seen (likely >10% of granulocytes)  ERYTHROPOIESIS: Increased number, mildly  left-shifted maturation, marked dysplasia (>50%) with megaloblastoid changes, nuclear blebbing, and irregular nu clear contours and rare binucleation  MEGAKARYOPOIESIS: Present (increased) with predominantly monolobated and rarer hypolobated non-dwarf forms and dwarf forms and rare disjointed nuclear segmentation and large hyperlobulated forms  OTHER CELL LINEAGES: Relative lymphocytosis and no increased plasma cells    BONE MARROW ASPIRATE MANUAL DIFFERENTIAL (cell count = 200):  MYELOID SERIES  1% Myeloblasts, 0.5% Promyelocytes, 4.5% Myelocytes, 3% Metamyelocytes, 1% Band granulocytes,   1% Segmented granulocytes, 0.5% Eosinophils and precursors, --% Basophils and precursors    ERYTHROID SERIES  5% Proerythroblasts, 10% Basophilic erythroblasts, 44% Polychromatophilic erythroblasts,   8% Orthochromatic erythroblasts    OTHER SERIES  19.5% Lymphocytes, --% Hematogones, 1% Plasma cells, 1% Monocytes    M:E Ratio: 1:5.8 = 0.2    Iron Stain: Stainable histiocytic iron stores are adequate (3+ out of 6+). Ring sideroblasts (4%) are identified.     BONE MARROW CLOT & CORE BIOPSY: Tissue sections show  hypercellular marrow with trilineage hematopoiesis and a solitary minute well-defined lymphoid aggregate (favor benign/reactive) in the clot section (1A). Granulocytes are decreased in number with too   few precursor cells to optimally assess maturation. Erythroid precursors are present predominantly as erythroid islands and are increased in number with mildly left-shifted to adequate maturation. Megakaryocytes are markedly increased in number with   anisocytosis, predominantly monolobated and rarer hypolobated non-dwarf and dwarf forms, few large megakaryocytes with hyperlobulated nuclei, and rare bulbous nuclei and multiple small loose and rare small tight clusters. No overt increase in plasma   cellsor non-perivascular plasma cell clusters are appreciated.    Tissue sections show no morphologic evidence of metastatic  carcinoma infiltration, granulomata,or necrosis. Bony trabeculae are focally osteopenic.    Immunohistochemical study with stains for CD34, E-cadherin, and CD61 was pe rformed on block 2A (core biopsy) with appropriate controls for increased sensitivity and cellular localization within the cells of interest.      CD34 highlights non-increased predominantly scattered and few minute clusters of CD34+ blasts and stains vascular endothelium highlighting no significantly increased vascularity or sinusoidal dilatation. E-cadherin highlights no increased early erythroid   precursor islands. CD61 stains markedly increased megakaryocytes with multiple small loose and rare small tight clusters and no significantly increased micromegakaryocytes.  Special stain for iron was performed on blocks 1A (clot) and 2A (core biopsy) with appropriate controls.   Iron stain highlights adequate to mildly increased stainable histiocytic iron stores (3-4+ out of 6+). Reticulin special stain highlights focal mildly increased reticulin fibrosis (focal MF-1).      Comment       Per the 1st International Consensus Classification (ICC) of Myeloid Neoplasms, the equivalent diagnosis would be persistent MDS with low blasts (MDS-LB).     Corresponding flow cytometry ([id number]) detects no clonal B-cells, aberrant T-cell antigen expression (CD4:CD8 of 1:1), or increase in blasts.     Flow differential:  Lymphocytes 35.7%, Monocytes 1.2%, Granulocytes 34.9%, Blasts 1.1%,             Debris/nRBC 1%, Viability 98.2%.    Cytogenetic and any other additional ancillary test results will be reported in a separate supplemental report.      Disclaimer       Unless the case is a 'gross only' or additional testing only, the final diagnosis for each specimen is based on a microscopic examination of appropriate tissue sections.     *Note: Due to a large number of results and/or encounters for the requested time period, some results have not been displayed. A  complete set of results can be found in Results Review.       PROBLEMS ASSESSED THIS VISIT:    1. MDS (myelodysplastic syndrome)    2. Leukopenia due to antineoplastic chemotherapy    3. Anemia in neoplastic disease    4. Thrombocytosis      PLAN:       Myelodysplastic syndrome  Ms. 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 We reviewed that this is an aggressive hematologic malignancy with high probability of evolution to acute myeloid leukemia (AML). According to the [facility] pathologic designation, this is referred to as MDS/AML.     She has completed first cycle of azacitidine-venetoclax. Bone marrow biopsy shows resolution of increased blasts with ongoing morphologic changes consistent with MDS. We reviewed that this represents a favorable response to therapy.      Continue with cycle 2 of aza-angelo with close monitoring.    Plan bone marrow biopsy q6months to monitor response.     Continue infection prophylaxis with acyclovir, posaconazole. Antibiotic agents per ID given multi-drug resistant UTI.     Leukopenia  Anemia  Due to MDS/AML. Monitor weekly during therapy and transfuse as clinically indicated.     Thrombocytosis  Unclear significance. I have discussed with Dr. Dyson on the day of this encounter. She is assessing for possibility of underlying MDS/MPN overlap.     Acute cystitis  History of recurrent ESBL-producing urinary tract infections. Dr. Marroquin is helping to manage this. Very challenging and unclear if this can be resolved.      Follow-up  Prior to next cycle of therapy.     Mohit Centeno MD  Hematology and Stem Cell Transplant

## 2023-10-24 NOTE — PROGRESS NOTES
Oncology Nutrition   Chemotherapy Infusion Visit    Nutrition Follow Up   RD met with pt at chairside during infusion tx. Pt states her appetite has decreased. Pt reports she forces herself to eat despite low appetite. Pt with a 4.3% weight loss in 3 1/2 weeks, moderate. RD discussed adding a high calorie ONS or making a smoothie at home. Pt agreeable. Pt states she does deal with constipation and is managed with medication. Pt denies any other nutrition related side effects at this time.       Wt Readings from Last 10 Encounters:   10/24/23 69.8 kg (153 lb 14.1 oz)   10/24/23 69.9 kg (154 lb)   10/23/23 70.3 kg (154 lb 15.7 oz)   10/19/23 70 kg (154 lb 5.2 oz)   09/29/23 72.7 kg (160 lb 4.4 oz)   09/28/23 72.7 kg (160 lb 4.4 oz)   09/27/23 72.7 kg (160 lb 4.4 oz)   09/26/23 72.7 kg (160 lb 4.4 oz)   09/25/23 72.7 kg (160 lb 4.4 oz)   09/13/23 72 kg (158 lb 11.7 oz)       All other nutrition questions/concerns addressed as appropriate. Will continue to monitor prn throughout treatment.     Pooja Garner, EMPERATRIZ, LDN  10/24/2023  12:06 PM

## 2023-10-25 ENCOUNTER — INFUSION (OUTPATIENT)
Dept: INFUSION THERAPY | Facility: HOSPITAL | Age: 78
End: 2023-10-25
Attending: INTERNAL MEDICINE
Payer: MEDICARE

## 2023-10-25 VITALS
DIASTOLIC BLOOD PRESSURE: 69 MMHG | WEIGHT: 153.88 LBS | HEART RATE: 71 BPM | OXYGEN SATURATION: 96 % | BODY MASS INDEX: 26.27 KG/M2 | SYSTOLIC BLOOD PRESSURE: 121 MMHG | RESPIRATION RATE: 16 BRPM | TEMPERATURE: 98 F | HEIGHT: 64 IN

## 2023-10-25 DIAGNOSIS — N39.0 URINARY TRACT INFECTION DUE TO EXTENDED-SPECTRUM BETA LACTAMASE (ESBL) PRODUCING ESCHERICHIA COLI: ICD-10-CM

## 2023-10-25 DIAGNOSIS — B96.29 URINARY TRACT INFECTION DUE TO EXTENDED-SPECTRUM BETA LACTAMASE (ESBL) PRODUCING ESCHERICHIA COLI: ICD-10-CM

## 2023-10-25 DIAGNOSIS — D46.9 MDS (MYELODYSPLASTIC SYNDROME): Primary | ICD-10-CM

## 2023-10-25 DIAGNOSIS — R05.9 COUGH, UNSPECIFIED TYPE: ICD-10-CM

## 2023-10-25 DIAGNOSIS — Z16.12 URINARY TRACT INFECTION DUE TO EXTENDED-SPECTRUM BETA LACTAMASE (ESBL) PRODUCING ESCHERICHIA COLI: ICD-10-CM

## 2023-10-25 PROCEDURE — 63600175 PHARM REV CODE 636 W HCPCS: Mod: PN | Performed by: INTERNAL MEDICINE

## 2023-10-25 PROCEDURE — 96367 TX/PROPH/DG ADDL SEQ IV INF: CPT | Mod: PN

## 2023-10-25 PROCEDURE — 25000003 PHARM REV CODE 250: Mod: PN | Performed by: INTERNAL MEDICINE

## 2023-10-25 PROCEDURE — A4216 STERILE WATER/SALINE, 10 ML: HCPCS | Mod: PN | Performed by: INTERNAL MEDICINE

## 2023-10-25 PROCEDURE — 96401 CHEMO ANTI-NEOPL SQ/IM: CPT | Mod: PN

## 2023-10-25 RX ORDER — ONDANSETRON 8 MG/1
16 TABLET, ORALLY DISINTEGRATING ORAL
Status: COMPLETED | OUTPATIENT
Start: 2023-10-25 | End: 2023-10-25

## 2023-10-25 RX ORDER — SODIUM CHLORIDE 0.9 % (FLUSH) 0.9 %
10 SYRINGE (ML) INJECTION
Status: DISCONTINUED | OUTPATIENT
Start: 2023-10-25 | End: 2023-10-25 | Stop reason: HOSPADM

## 2023-10-25 RX ORDER — HEPARIN 100 UNIT/ML
500 SYRINGE INTRAVENOUS
Status: CANCELLED | OUTPATIENT
Start: 2023-10-25

## 2023-10-25 RX ORDER — AZACITIDINE 100 MG/1
75 INJECTION, POWDER, LYOPHILIZED, FOR SOLUTION INTRAVENOUS; SUBCUTANEOUS
Status: COMPLETED | OUTPATIENT
Start: 2023-10-25 | End: 2023-10-25

## 2023-10-25 RX ORDER — SODIUM CHLORIDE 0.9 % (FLUSH) 0.9 %
10 SYRINGE (ML) INJECTION
Status: CANCELLED | OUTPATIENT
Start: 2023-10-25

## 2023-10-25 RX ORDER — PANTOPRAZOLE SODIUM 20 MG/1
20 TABLET, DELAYED RELEASE ORAL DAILY
Qty: 90 TABLET | Refills: 1 | Status: SHIPPED | OUTPATIENT
Start: 2023-10-25

## 2023-10-25 RX ADMIN — AZACITIDINE 135 MG: 100 INJECTION, POWDER, LYOPHILIZED, FOR SOLUTION INTRAVENOUS; SUBCUTANEOUS at 01:10

## 2023-10-25 RX ADMIN — SODIUM CHLORIDE: 9 INJECTION, SOLUTION INTRAVENOUS at 12:10

## 2023-10-25 RX ADMIN — ONDANSETRON 16 MG: 8 TABLET, ORALLY DISINTEGRATING ORAL at 12:10

## 2023-10-25 RX ADMIN — ERTAPENEM 1 G: 1 INJECTION, POWDER, LYOPHILIZED, FOR SOLUTION INTRAMUSCULAR; INTRAVENOUS at 12:10

## 2023-10-25 RX ADMIN — Medication 10 ML: at 01:10

## 2023-10-25 NOTE — TELEPHONE ENCOUNTER
No care due was identified.  [facility] Embedded Care Due Messages. Reference number: [id number].   10/25/2023 12:02:25 PM CDT

## 2023-10-25 NOTE — TELEPHONE ENCOUNTER
Refill Routing Note   Medication(s) are not appropriate for processing by [facility] for the following reason(s):      Medication outside of protocol: PRN use for PPI outside of scope    [facility] action(s):  Route Care Due:  None identified   Medication Therapy Plan:         Appointments  past 12m or future 3m with PCP    Date Provider   Last Visit   10/19/2023 Savana Anderson MD   Next Visit   1/18/2024 Savana Anderson MD   ED visits in past 90 days: 0        Note composed:2:53 PM 10/25/2023

## 2023-10-25 NOTE — PLAN OF CARE
Problem: Adult Inpatient Plan of Care  Goal: Plan of Care Review  Outcome: Ongoing, Progressing  Flowsheets (Taken 10/25/2023 1320)  Plan of Care Reviewed With:   patient   daughter  Goal: Optimal Comfort and Wellbeing  Outcome: Ongoing, Progressing  Intervention: Provide Person-Centered Care  Flowsheets (Taken 10/25/2023 1320)  Trust Relationship/Rapport:   care explained   questions answered   choices provided   questions encouraged   emotional support provided   reassurance provided   empathic listening provided   thoughts/feelings acknowledged  Goal: Patient-Specific Goal (Individualized)  Outcome: Ongoing, Progressing  Flowsheets (Taken 10/25/2023 1320)  Anxieties, Fears or Concerns: ongoing uti  Individualized Care Needs: recliner, personal blanket, pillow, headphones with phone, daughter at chairside, snacks, wheelchair, education, conversation, ice to injection sites     Problem: Fatigue  Goal: Improved Activity Tolerance  Outcome: Ongoing, Progressing  Intervention: Promote Improved Energy  Flowsheets (Taken 10/25/2023 1320)  Fatigue Management:   paced activity encouraged   frequent rest breaks encouraged   activity assistance provided   fatigue-related activity identified  Sleep/Rest Enhancement:   therapeutic touch utilized   relaxation techniques promoted   noise level reduced   family presence promoted   consistent schedule promoted   natural light exposure provided   room darkened  Activity Management:   Ambulated to bathroom - L4   Up in chair - L3     Problem: Fall Injury Risk  Goal: Absence of Fall and Fall-Related Injury  Outcome: Ongoing, Progressing  Intervention: Promote Injury-Free Environment  Flowsheets (Taken 10/25/2023 1320)  Safety Promotion/Fall Prevention:   instructed to call staff for mobility   supervised activity   room near unit station   medications reviewed   Fall Risk reviewed with patient/family   high risk medications identified   in recliner, wheels locked   lighting  adjusted   family to remain at bedside   assistive device/personal item within reach

## 2023-10-25 NOTE — PLAN OF CARE
Pt arrived to clinic for C2D3 of Vidaza injection and tolerated well with no changes throughout therapy. Pt also received day 3/5 Ertapenem infusion and tolerated well. Pt aware of Venetoclax dose to take for today and other home meds to prevent infection as well. Pt aware of side effects and f/u appts and discharged to home in NAD in wheelchair with daughter.

## 2023-10-26 ENCOUNTER — TELEPHONE (OUTPATIENT)
Dept: INTERNAL MEDICINE | Facility: CLINIC | Age: 78
End: 2023-10-26
Payer: MEDICARE

## 2023-10-26 ENCOUNTER — INFUSION (OUTPATIENT)
Dept: INFUSION THERAPY | Facility: HOSPITAL | Age: 78
End: 2023-10-26
Attending: INTERNAL MEDICINE
Payer: MEDICARE

## 2023-10-26 VITALS
SYSTOLIC BLOOD PRESSURE: 104 MMHG | WEIGHT: 153.88 LBS | HEIGHT: 64 IN | RESPIRATION RATE: 16 BRPM | TEMPERATURE: 98 F | HEART RATE: 68 BPM | DIASTOLIC BLOOD PRESSURE: 61 MMHG | BODY MASS INDEX: 26.27 KG/M2

## 2023-10-26 DIAGNOSIS — D46.9 MDS (MYELODYSPLASTIC SYNDROME): ICD-10-CM

## 2023-10-26 DIAGNOSIS — B96.29 URINARY TRACT INFECTION DUE TO EXTENDED-SPECTRUM BETA LACTAMASE (ESBL) PRODUCING ESCHERICHIA COLI: Primary | ICD-10-CM

## 2023-10-26 DIAGNOSIS — N39.0 URINARY TRACT INFECTION DUE TO EXTENDED-SPECTRUM BETA LACTAMASE (ESBL) PRODUCING ESCHERICHIA COLI: Primary | ICD-10-CM

## 2023-10-26 DIAGNOSIS — Z16.12 URINARY TRACT INFECTION DUE TO EXTENDED-SPECTRUM BETA LACTAMASE (ESBL) PRODUCING ESCHERICHIA COLI: Primary | ICD-10-CM

## 2023-10-26 LAB
CHROM BANDING METHOD: NORMAL
CHROMOSOME ANALYSIS BM ADDITIONAL INFORMATION: NORMAL
CHROMOSOME ANALYSIS BM RELEASED BY: NORMAL
CHROMOSOME ANALYSIS BM RESULT SUMMARY: NORMAL
CLINICAL CYTOGENETICIST REVIEW: NORMAL
CLINICAL CYTOGENETICIST REVIEW: NORMAL
FMDS SPECIMEN: NORMAL
KARYOTYP MAR: NORMAL
MDS FISH ADDITIONAL INFORMATION: NORMAL
MDS FISH DISCLAIMER: NORMAL
MDS FISH REASON FOR REFERRAL (BM): NORMAL
MDS FISH RELEASED BY: NORMAL
MDS FISH RESULT (BM): NORMAL
MDS FISH RESULT TABLE: NORMAL
MOL DX INTERP BLD/T QL: NORMAL
REASON FOR REFERRAL (NARRATIVE): NORMAL
REF LAB TEST METHOD: NORMAL
REF LAB TEST METHOD: NORMAL
SPECIMEN SOURCE: NORMAL
SPECIMEN SOURCE: NORMAL
SPECIMEN: NORMAL

## 2023-10-26 PROCEDURE — 25000003 PHARM REV CODE 250: Mod: PN | Performed by: INTERNAL MEDICINE

## 2023-10-26 PROCEDURE — 96401 CHEMO ANTI-NEOPL SQ/IM: CPT | Mod: PN

## 2023-10-26 PROCEDURE — 63600175 PHARM REV CODE 636 W HCPCS: Mod: PN | Performed by: INTERNAL MEDICINE

## 2023-10-26 PROCEDURE — 96365 THER/PROPH/DIAG IV INF INIT: CPT | Mod: PN

## 2023-10-26 RX ORDER — AZACITIDINE 100 MG/1
75 INJECTION, POWDER, LYOPHILIZED, FOR SOLUTION INTRAVENOUS; SUBCUTANEOUS
Status: COMPLETED | OUTPATIENT
Start: 2023-10-26 | End: 2023-10-26

## 2023-10-26 RX ORDER — SODIUM CHLORIDE 0.9 % (FLUSH) 0.9 %
10 SYRINGE (ML) INJECTION
Status: CANCELLED | OUTPATIENT
Start: 2023-10-26

## 2023-10-26 RX ORDER — ONDANSETRON 8 MG/1
16 TABLET, ORALLY DISINTEGRATING ORAL
Status: COMPLETED | OUTPATIENT
Start: 2023-10-26 | End: 2023-10-26

## 2023-10-26 RX ORDER — SODIUM CHLORIDE 0.9 % (FLUSH) 0.9 %
10 SYRINGE (ML) INJECTION
Status: DISCONTINUED | OUTPATIENT
Start: 2023-10-26 | End: 2023-10-26 | Stop reason: HOSPADM

## 2023-10-26 RX ORDER — HEPARIN 100 UNIT/ML
500 SYRINGE INTRAVENOUS
Status: CANCELLED | OUTPATIENT
Start: 2023-10-26

## 2023-10-26 RX ADMIN — ONDANSETRON 16 MG: 8 TABLET, ORALLY DISINTEGRATING ORAL at 12:10

## 2023-10-26 RX ADMIN — AZACITIDINE 135 MG: 100 INJECTION, POWDER, LYOPHILIZED, FOR SOLUTION INTRAVENOUS; SUBCUTANEOUS at 12:10

## 2023-10-26 RX ADMIN — ERTAPENEM 1 G: 1 INJECTION, POWDER, LYOPHILIZED, FOR SOLUTION INTRAMUSCULAR; INTRAVENOUS at 12:10

## 2023-10-26 RX ADMIN — SODIUM CHLORIDE: 9 INJECTION, SOLUTION INTRAVENOUS at 11:10

## 2023-10-26 NOTE — PLAN OF CARE
Problem: Adult Inpatient Plan of Care  Goal: Plan of Care Review  Outcome: Ongoing, Progressing  Flowsheets (Taken 10/26/2023 1200)  Plan of Care Reviewed With:   patient   daughter  Goal: Patient-Specific Goal (Individualized)  Outcome: Ongoing, Progressing  Flowsheets (Taken 10/26/2023 1200)  Anxieties, Fears or Concerns: Chronic UTI  Individualized Care Needs: Recliner, blanket, pillow, dtr at chairside, conversation, Ice to injection sites     Problem: Fatigue  Goal: Improved Activity Tolerance  Outcome: Ongoing, Progressing  Intervention: Promote Improved Energy  Flowsheets (Taken 10/26/2023 1200)  Fatigue Management:   frequent rest breaks encouraged   paced activity encouraged  Sleep/Rest Enhancement: regular sleep/rest pattern promoted  Activity Management: Patient unable to perform activities     Problem: Fall Injury Risk  Goal: Absence of Fall and Fall-Related Injury  Outcome: Ongoing, Progressing    Patient to Infusion for Vidaza and Invanz, using a w/c for weakness and  accompanied by her dtr. Treatment plan reviewed with patient. VSS. Tolerated infusion and injections. Provided with copy of upcoming appointment schedule. Escorted to the front lobby in no distress for discharge to home.

## 2023-10-26 NOTE — PROGRESS NOTES
I have reviewed the notes, assessments, and/or procedures performed this visit, and I concur with the documentation.   Posterior Auricular Interpolation Flap Text: A decision was made to reconstruct the defect utilizing an interpolation axial flap and a staged reconstruction.  A telfa template was made of the defect.  This telfa template was then used to outline the posterior auricular interpolation flap.  The donor area for the pedicle flap was then injected with anesthesia.  The flap was excised through the skin and subcutaneous tissue down to the layer of the underlying musculature.  The pedicle flap was carefully excised within this deep plane to maintain its blood supply.  The edges of the donor site were undermined.   The donor site was closed in a primary fashion.  The pedicle was then rotated into position and sutured.  Once the tube was sutured into place, adequate blood supply was confirmed with blanching and refill.  The pedicle was then wrapped with xeroform gauze and dressed appropriately with a telfa and gauze bandage to ensure continued blood supply and protect the attached pedicle.

## 2023-10-27 ENCOUNTER — INFUSION (OUTPATIENT)
Dept: INFUSION THERAPY | Facility: HOSPITAL | Age: 78
End: 2023-10-27
Attending: INTERNAL MEDICINE
Payer: MEDICARE

## 2023-10-27 VITALS
HEIGHT: 64 IN | SYSTOLIC BLOOD PRESSURE: 117 MMHG | TEMPERATURE: 98 F | HEART RATE: 68 BPM | DIASTOLIC BLOOD PRESSURE: 63 MMHG | BODY MASS INDEX: 26.27 KG/M2 | WEIGHT: 153.88 LBS | RESPIRATION RATE: 16 BRPM

## 2023-10-27 DIAGNOSIS — Z16.12 URINARY TRACT INFECTION DUE TO EXTENDED-SPECTRUM BETA LACTAMASE (ESBL) PRODUCING ESCHERICHIA COLI: ICD-10-CM

## 2023-10-27 DIAGNOSIS — N39.0 URINARY TRACT INFECTION DUE TO EXTENDED-SPECTRUM BETA LACTAMASE (ESBL) PRODUCING ESCHERICHIA COLI: ICD-10-CM

## 2023-10-27 DIAGNOSIS — B96.29 URINARY TRACT INFECTION DUE TO EXTENDED-SPECTRUM BETA LACTAMASE (ESBL) PRODUCING ESCHERICHIA COLI: ICD-10-CM

## 2023-10-27 DIAGNOSIS — D46.9 MDS (MYELODYSPLASTIC SYNDROME): Primary | ICD-10-CM

## 2023-10-27 PROCEDURE — 25000003 PHARM REV CODE 250: Mod: PN | Performed by: INTERNAL MEDICINE

## 2023-10-27 PROCEDURE — 96365 THER/PROPH/DIAG IV INF INIT: CPT | Mod: PN

## 2023-10-27 PROCEDURE — 63600175 PHARM REV CODE 636 W HCPCS: Mod: PN | Performed by: INTERNAL MEDICINE

## 2023-10-27 PROCEDURE — 96401 CHEMO ANTI-NEOPL SQ/IM: CPT | Mod: PN

## 2023-10-27 RX ORDER — SODIUM CHLORIDE 0.9 % (FLUSH) 0.9 %
10 SYRINGE (ML) INJECTION
Status: CANCELLED | OUTPATIENT
Start: 2023-10-27

## 2023-10-27 RX ORDER — ONDANSETRON 8 MG/1
16 TABLET, ORALLY DISINTEGRATING ORAL
Status: DISCONTINUED | OUTPATIENT
Start: 2023-10-27 | End: 2023-10-27 | Stop reason: HOSPADM

## 2023-10-27 RX ORDER — AZACITIDINE 100 MG/1
75 INJECTION, POWDER, LYOPHILIZED, FOR SOLUTION INTRAVENOUS; SUBCUTANEOUS
Status: COMPLETED | OUTPATIENT
Start: 2023-10-27 | End: 2023-10-27

## 2023-10-27 RX ORDER — HEPARIN 100 UNIT/ML
500 SYRINGE INTRAVENOUS
Status: CANCELLED | OUTPATIENT
Start: 2023-10-27

## 2023-10-27 RX ADMIN — ERTAPENEM 1 G: 1 INJECTION, POWDER, LYOPHILIZED, FOR SOLUTION INTRAMUSCULAR; INTRAVENOUS at 11:10

## 2023-10-27 RX ADMIN — SODIUM CHLORIDE: 9 INJECTION, SOLUTION INTRAVENOUS at 11:10

## 2023-10-27 RX ADMIN — AZACITIDINE 135 MG: 100 INJECTION, POWDER, LYOPHILIZED, FOR SOLUTION INTRAVENOUS; SUBCUTANEOUS at 12:10

## 2023-10-27 NOTE — PLAN OF CARE
Pt tolerated Invanz and Vidaza well.  No s/s of reaction noted.  Instructed to call MD with any problems.

## 2023-10-28 ENCOUNTER — PATIENT MESSAGE (OUTPATIENT)
Dept: ADMINISTRATIVE | Facility: OTHER | Age: 78
End: 2023-10-28
Payer: MEDICARE

## 2023-10-28 ENCOUNTER — NURSE TRIAGE (OUTPATIENT)
Dept: ADMINISTRATIVE | Facility: CLINIC | Age: 78
End: 2023-10-28
Payer: MEDICARE

## 2023-10-28 DIAGNOSIS — D46.9 MDS (MYELODYSPLASTIC SYNDROME): ICD-10-CM

## 2023-10-28 NOTE — TELEPHONE ENCOUNTER
Reason for Disposition   Patient sounds very sick or weak to the triager    Additional Information   Negative: Sounds like a life-threatening emergency to the triager   Negative: [1] Neutropenia known or suspected (e.g., recent cancer chemotherapy) AND [2] fever > 100.4 F (38.0 C)   Negative: [1] Vomiting AND [2] contains bile (green color)    Protocols used: Cancer - Constipation-A-AH  Daughter called re  pt constipated . no BM since last Chacorta 10/23. on chemo, chemo meds caused constipated. pt in pain. pt taking Dulcolax and sennacot- since yest. taking miralax now. today at 0900 took sennacot. +Nausea. rec ED. caller agrees.

## 2023-10-29 ENCOUNTER — PATIENT MESSAGE (OUTPATIENT)
Dept: INTERNAL MEDICINE | Facility: CLINIC | Age: 78
End: 2023-10-29
Payer: MEDICARE

## 2023-10-29 DIAGNOSIS — K59.00 CONSTIPATION, UNSPECIFIED CONSTIPATION TYPE: Primary | ICD-10-CM

## 2023-10-30 ENCOUNTER — PATIENT MESSAGE (OUTPATIENT)
Dept: HEMATOLOGY/ONCOLOGY | Facility: CLINIC | Age: 78
End: 2023-10-30
Payer: MEDICARE

## 2023-10-30 ENCOUNTER — LAB VISIT (OUTPATIENT)
Dept: LAB | Facility: HOSPITAL | Age: 78
End: 2023-10-30
Attending: INTERNAL MEDICINE
Payer: MEDICARE

## 2023-10-30 DIAGNOSIS — D46.9 MDS (MYELODYSPLASTIC SYNDROME): ICD-10-CM

## 2023-10-30 LAB
ABO + RH BLD: NORMAL
ALBUMIN SERPL BCP-MCNC: 3 G/DL (ref 3.5–5.2)
ALP SERPL-CCNC: 84 U/L (ref 55–135)
ALT SERPL W/O P-5'-P-CCNC: 17 U/L (ref 10–44)
ANION GAP SERPL CALC-SCNC: 10 MMOL/L (ref 8–16)
AST SERPL-CCNC: 21 U/L (ref 10–40)
BASOPHILS # BLD AUTO: 0 K/UL (ref 0–0.2)
BASOPHILS NFR BLD: 0 % (ref 0–1.9)
BILIRUB SERPL-MCNC: 2.2 MG/DL (ref 0.1–1)
BLD GP AB SCN CELLS X3 SERPL QL: NORMAL
BUN SERPL-MCNC: 24 MG/DL (ref 8–23)
CALCIUM SERPL-MCNC: 8.5 MG/DL (ref 8.7–10.5)
CHLORIDE SERPL-SCNC: 98 MMOL/L (ref 95–110)
CO2 SERPL-SCNC: 26 MMOL/L (ref 23–29)
CREAT SERPL-MCNC: 1.2 MG/DL (ref 0.5–1.4)
DIFFERENTIAL METHOD: ABNORMAL
EOSINOPHIL # BLD AUTO: 0 K/UL (ref 0–0.5)
EOSINOPHIL NFR BLD: 0 % (ref 0–8)
ERYTHROCYTE [DISTWIDTH] IN BLOOD BY AUTOMATED COUNT: 21.5 % (ref 11.5–14.5)
EST. GFR  (NO RACE VARIABLE): 46 ML/MIN/1.73 M^2
GLUCOSE SERPL-MCNC: 169 MG/DL (ref 70–110)
HCT VFR BLD AUTO: 28 % (ref 37–48.5)
HGB BLD-MCNC: 8.4 G/DL (ref 12–16)
IMM GRANULOCYTES # BLD AUTO: 0.15 K/UL (ref 0–0.04)
IMM GRANULOCYTES NFR BLD AUTO: 1.6 % (ref 0–0.5)
LDH SERPL L TO P-CCNC: 223 U/L (ref 110–260)
LYMPHOCYTES # BLD AUTO: 0.6 K/UL (ref 1–4.8)
LYMPHOCYTES NFR BLD: 6.3 % (ref 18–48)
MAGNESIUM SERPL-MCNC: 2.5 MG/DL (ref 1.6–2.6)
MCH RBC QN AUTO: 26.9 PG (ref 27–31)
MCHC RBC AUTO-ENTMCNC: 30 G/DL (ref 32–36)
MCV RBC AUTO: 90 FL (ref 82–98)
MONOCYTES # BLD AUTO: 0.5 K/UL (ref 0.3–1)
MONOCYTES NFR BLD: 5.5 % (ref 4–15)
NEUTROPHILS # BLD AUTO: 8.1 K/UL (ref 1.8–7.7)
NEUTROPHILS NFR BLD: 86.6 % (ref 38–73)
NRBC BLD-RTO: 0 /100 WBC
PHOSPHATE SERPL-MCNC: 2.4 MG/DL (ref 2.7–4.5)
PLATELET # BLD AUTO: 338 K/UL (ref 150–450)
PMV BLD AUTO: 9 FL (ref 9.2–12.9)
POTASSIUM SERPL-SCNC: 4.1 MMOL/L (ref 3.5–5.1)
PROT SERPL-MCNC: 6.8 G/DL (ref 6–8.4)
RBC # BLD AUTO: 3.12 M/UL (ref 4–5.4)
SODIUM SERPL-SCNC: 134 MMOL/L (ref 136–145)
SPECIMEN OUTDATE: NORMAL
URATE SERPL-MCNC: 5.6 MG/DL (ref 2.4–5.7)
WBC # BLD AUTO: 9.37 K/UL (ref 3.9–12.7)

## 2023-10-30 PROCEDURE — 86850 RBC ANTIBODY SCREEN: CPT | Performed by: INTERNAL MEDICINE

## 2023-10-30 PROCEDURE — 84100 ASSAY OF PHOSPHORUS: CPT | Mod: PN | Performed by: INTERNAL MEDICINE

## 2023-10-30 PROCEDURE — 86901 BLOOD TYPING SEROLOGIC RH(D): CPT | Mod: PN | Performed by: INTERNAL MEDICINE

## 2023-10-30 PROCEDURE — 80053 COMPREHEN METABOLIC PANEL: CPT | Mod: PN | Performed by: INTERNAL MEDICINE

## 2023-10-30 PROCEDURE — 83735 ASSAY OF MAGNESIUM: CPT | Mod: PN | Performed by: INTERNAL MEDICINE

## 2023-10-30 PROCEDURE — 36415 COLL VENOUS BLD VENIPUNCTURE: CPT | Mod: PN | Performed by: INTERNAL MEDICINE

## 2023-10-30 PROCEDURE — 83615 LACTATE (LD) (LDH) ENZYME: CPT | Mod: PN | Performed by: INTERNAL MEDICINE

## 2023-10-30 PROCEDURE — 85025 COMPLETE CBC W/AUTO DIFF WBC: CPT | Mod: PN | Performed by: INTERNAL MEDICINE

## 2023-10-30 PROCEDURE — 84550 ASSAY OF BLOOD/URIC ACID: CPT | Mod: PN | Performed by: INTERNAL MEDICINE

## 2023-10-30 RX ORDER — POSACONAZOLE 100 MG/1
TABLET, DELAYED RELEASE ORAL
Qty: 30 TABLET | Refills: 2 | Status: SHIPPED | OUTPATIENT
Start: 2023-10-30 | End: 2024-01-02 | Stop reason: SDUPTHER

## 2023-10-30 RX ORDER — BISACODYL 10 MG
10 SUPPOSITORY, RECTAL RECTAL DAILY PRN
Qty: 3 SUPPOSITORY | Refills: 0 | Status: CANCELLED | OUTPATIENT
Start: 2023-10-30

## 2023-10-30 RX ORDER — LACTULOSE 10 G/15ML
20 SOLUTION ORAL 2 TIMES DAILY PRN
Qty: 900 ML | Refills: 6 | Status: SHIPPED | OUTPATIENT
Start: 2023-10-30 | End: 2023-11-29

## 2023-10-30 NOTE — TELEPHONE ENCOUNTER
I sent in lactulose to start   She can also try to administer an over the counter enema called Fleets enema that is available at most pharmacies for sooner results

## 2023-11-06 ENCOUNTER — DOCUMENTATION ONLY (OUTPATIENT)
Dept: HEMATOLOGY/ONCOLOGY | Facility: CLINIC | Age: 78
End: 2023-11-06
Payer: MEDICARE

## 2023-11-06 ENCOUNTER — LAB VISIT (OUTPATIENT)
Dept: LAB | Facility: HOSPITAL | Age: 78
End: 2023-11-06
Attending: INTERNAL MEDICINE
Payer: MEDICARE

## 2023-11-06 DIAGNOSIS — D46.9 MDS (MYELODYSPLASTIC SYNDROME): ICD-10-CM

## 2023-11-06 LAB
ABO + RH BLD: NORMAL
ALBUMIN SERPL BCP-MCNC: 3 G/DL (ref 3.5–5.2)
ALP SERPL-CCNC: 66 U/L (ref 55–135)
ALT SERPL W/O P-5'-P-CCNC: 22 U/L (ref 10–44)
ANION GAP SERPL CALC-SCNC: 9 MMOL/L (ref 8–16)
ANISOCYTOSIS BLD QL SMEAR: SLIGHT
ANNOTATION COMMENT IMP: NORMAL
AST SERPL-CCNC: 13 U/L (ref 10–40)
BASOPHILS # BLD AUTO: ABNORMAL K/UL (ref 0–0.2)
BASOPHILS NFR BLD: 0 % (ref 0–1.9)
BILIRUB SERPL-MCNC: 0.8 MG/DL (ref 0.1–1)
BLD GP AB SCN CELLS X3 SERPL QL: NORMAL
BUN SERPL-MCNC: 10 MG/DL (ref 8–23)
CALCIUM SERPL-MCNC: 8.7 MG/DL (ref 8.7–10.5)
CHLORIDE SERPL-SCNC: 107 MMOL/L (ref 95–110)
CO2 SERPL-SCNC: 26 MMOL/L (ref 23–29)
CREAT SERPL-MCNC: 0.9 MG/DL (ref 0.5–1.4)
DACRYOCYTES BLD QL SMEAR: ABNORMAL
DIFFERENTIAL METHOD: ABNORMAL
EOSINOPHIL # BLD AUTO: ABNORMAL K/UL (ref 0–0.5)
EOSINOPHIL NFR BLD: 0 % (ref 0–8)
ERYTHROCYTE [DISTWIDTH] IN BLOOD BY AUTOMATED COUNT: 21.5 % (ref 11.5–14.5)
EST. GFR  (NO RACE VARIABLE): >60 ML/MIN/1.73 M^2
GIANT PLATELETS BLD QL SMEAR: PRESENT
GLUCOSE SERPL-MCNC: 118 MG/DL (ref 70–110)
HCT VFR BLD AUTO: 27.1 % (ref 37–48.5)
HGB BLD-MCNC: 8 G/DL (ref 12–16)
HYPOCHROMIA BLD QL SMEAR: ABNORMAL
IMM GRANULOCYTES # BLD AUTO: ABNORMAL K/UL (ref 0–0.04)
IMM GRANULOCYTES NFR BLD AUTO: ABNORMAL % (ref 0–0.5)
LDH SERPL L TO P-CCNC: 168 U/L (ref 110–260)
LYMPHOCYTES # BLD AUTO: ABNORMAL K/UL (ref 1–4.8)
LYMPHOCYTES NFR BLD: 30 % (ref 18–48)
MAGNESIUM SERPL-MCNC: 1.8 MG/DL (ref 1.6–2.6)
MCH RBC QN AUTO: 26.6 PG (ref 27–31)
MCHC RBC AUTO-ENTMCNC: 29.5 G/DL (ref 32–36)
MCV RBC AUTO: 90 FL (ref 82–98)
MONOCYTES # BLD AUTO: ABNORMAL K/UL (ref 0.3–1)
MONOCYTES NFR BLD: 4 % (ref 4–15)
NEUTROPHILS NFR BLD: 66 % (ref 38–73)
NGS CLINCIAL TRIALS: NORMAL
NGS INDICATION OF TEST: NORMAL
NGS INTERPRETATION: NORMAL
NGS ONCOHEME PANEL GENE LIST: NORMAL
NGS PATHOGENIC MUTATIONS DETECTED: NORMAL
NGS REVIEWED BY:: NORMAL
NGS VARIANTS OF UNKNOWN SIGNIFICANCE: NORMAL
NGSHM RESULT, BONE MARROW: NORMAL
NRBC BLD-RTO: 0 /100 WBC
OVALOCYTES BLD QL SMEAR: ABNORMAL
PHOSPHATE SERPL-MCNC: 2.7 MG/DL (ref 2.7–4.5)
PLATELET # BLD AUTO: 236 K/UL (ref 150–450)
PLATELET BLD QL SMEAR: ABNORMAL
PMV BLD AUTO: 8.6 FL (ref 9.2–12.9)
POIKILOCYTOSIS BLD QL SMEAR: SLIGHT
POLYCHROMASIA BLD QL SMEAR: ABNORMAL
POTASSIUM SERPL-SCNC: 3.5 MMOL/L (ref 3.5–5.1)
PROT SERPL-MCNC: 6.4 G/DL (ref 6–8.4)
RBC # BLD AUTO: 3.01 M/UL (ref 4–5.4)
REF LAB TEST METHOD: NORMAL
SCHISTOCYTES BLD QL SMEAR: PRESENT
SODIUM SERPL-SCNC: 142 MMOL/L (ref 136–145)
SPECIMEN OUTDATE: NORMAL
SPECIMEN SOURCE: NORMAL
SPHEROCYTES BLD QL SMEAR: ABNORMAL
TEST PERFORMANCE INFO SPEC: NORMAL
URATE SERPL-MCNC: 5 MG/DL (ref 2.4–5.7)
WBC # BLD AUTO: 1.93 K/UL (ref 3.9–12.7)

## 2023-11-06 PROCEDURE — 83735 ASSAY OF MAGNESIUM: CPT | Mod: PN | Performed by: INTERNAL MEDICINE

## 2023-11-06 PROCEDURE — 85007 BL SMEAR W/DIFF WBC COUNT: CPT | Mod: PN | Performed by: INTERNAL MEDICINE

## 2023-11-06 PROCEDURE — 84550 ASSAY OF BLOOD/URIC ACID: CPT | Mod: PN | Performed by: INTERNAL MEDICINE

## 2023-11-06 PROCEDURE — 85027 COMPLETE CBC AUTOMATED: CPT | Mod: PN | Performed by: INTERNAL MEDICINE

## 2023-11-06 PROCEDURE — 86900 BLOOD TYPING SEROLOGIC ABO: CPT | Performed by: INTERNAL MEDICINE

## 2023-11-06 PROCEDURE — 84100 ASSAY OF PHOSPHORUS: CPT | Mod: PN | Performed by: INTERNAL MEDICINE

## 2023-11-06 PROCEDURE — 86900 BLOOD TYPING SEROLOGIC ABO: CPT | Mod: PN | Performed by: INTERNAL MEDICINE

## 2023-11-06 PROCEDURE — 83615 LACTATE (LD) (LDH) ENZYME: CPT | Mod: PN | Performed by: INTERNAL MEDICINE

## 2023-11-06 PROCEDURE — 80053 COMPREHEN METABOLIC PANEL: CPT | Mod: PN | Performed by: INTERNAL MEDICINE

## 2023-11-08 ENCOUNTER — LAB VISIT (OUTPATIENT)
Dept: LAB | Facility: OTHER | Age: 78
End: 2023-11-08
Attending: OBSTETRICS & GYNECOLOGY
Payer: MEDICARE

## 2023-11-08 ENCOUNTER — OFFICE VISIT (OUTPATIENT)
Dept: GYNECOLOGIC ONCOLOGY | Facility: CLINIC | Age: 78
End: 2023-11-08
Payer: MEDICARE

## 2023-11-08 VITALS
HEIGHT: 66 IN | WEIGHT: 153.63 LBS | BODY MASS INDEX: 24.69 KG/M2 | SYSTOLIC BLOOD PRESSURE: 143 MMHG | HEART RATE: 72 BPM | DIASTOLIC BLOOD PRESSURE: 79 MMHG

## 2023-11-08 DIAGNOSIS — C56.1 BORDERLINE SEROUS CYSTADENOMA OF RIGHT OVARY: Primary | ICD-10-CM

## 2023-11-08 DIAGNOSIS — C56.1 BORDERLINE SEROUS CYSTADENOMA OF RIGHT OVARY: ICD-10-CM

## 2023-11-08 DIAGNOSIS — R97.0 ELEVATED CEA: ICD-10-CM

## 2023-11-08 LAB
CANCER AG125 SERPL-ACNC: 21 U/ML (ref 0–30)
CEA SERPL-MCNC: 3.1 NG/ML (ref 0–5)

## 2023-11-08 PROCEDURE — 99214 OFFICE O/P EST MOD 30 MIN: CPT | Mod: PBBFAC | Performed by: OBSTETRICS & GYNECOLOGY

## 2023-11-08 PROCEDURE — 36415 COLL VENOUS BLD VENIPUNCTURE: CPT | Performed by: OBSTETRICS & GYNECOLOGY

## 2023-11-08 PROCEDURE — 99214 PR OFFICE/OUTPT VISIT, EST, LEVL IV, 30-39 MIN: ICD-10-PCS | Mod: S$PBB,,, | Performed by: OBSTETRICS & GYNECOLOGY

## 2023-11-08 PROCEDURE — 99999 PR PBB SHADOW E&M-EST. PATIENT-LVL IV: CPT | Mod: PBBFAC,,, | Performed by: OBSTETRICS & GYNECOLOGY

## 2023-11-08 PROCEDURE — 99999 PR PBB SHADOW E&M-EST. PATIENT-LVL IV: ICD-10-PCS | Mod: PBBFAC,,, | Performed by: OBSTETRICS & GYNECOLOGY

## 2023-11-08 PROCEDURE — 82378 CARCINOEMBRYONIC ANTIGEN: CPT | Performed by: OBSTETRICS & GYNECOLOGY

## 2023-11-08 PROCEDURE — 99214 OFFICE O/P EST MOD 30 MIN: CPT | Mod: S$PBB,,, | Performed by: OBSTETRICS & GYNECOLOGY

## 2023-11-08 PROCEDURE — 86304 IMMUNOASSAY TUMOR CA 125: CPT | Performed by: OBSTETRICS & GYNECOLOGY

## 2023-11-08 NOTE — PROGRESS NOTES
Subjective:      Patient ID: Niurka Pedraza is a 78 y.o. female.    Chief Complaint: No chief complaint on file.      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 Without complaints or concerns. Specifically denies N/V, pain, swelling, bleeding, unexpected weight change, SOB, problems with bowel function.   Disease free interval 5.5 years   -10> 21>21  CEA 5.6, previously 7.3 (elevated)> 7.7> 5.3> 6.3> 3.1 (normalized)     Work up for elevated CEA  CT 11/28/2022 no obvious evidence of disease  Colonoscopy 12/2022 negative.   EGD 6/2023 negative.     New diagnosis of MDS/AML- treating with Dr. Centeno.   CT obtained in ED 10/28/2023 for constipation shows no evidence of recurrence ovarian disease.   ___________________________   History:  Patient is a 73yo female originally referred by Dr. Linette Torres for pelvic mass and elevated . Patient was seen by her urologist for recurrent UTIs and imaging was ordered.      Imaging reviewed:   CT urogram abd/pelvis 12/18/17  Impression   Bilateral pulmonary nodules, largest measuring 1.5 cm at the right lower lobe. Comparison with any prior cross-sectional imaging would be beneficial. Close interval followup, PET CT, and/or biopsy may be considered for further evaluation.     Complex cystic lesion in the right adnexa. Pelvic ultrasound would be beneficial for further evaluation.     Focal dilated biliary radical in segment 7. This is of uncertain significance. There are no concerning hepatic lesions.     Pelvic US 1/23/18  Impression    Complex cystic lesion measuring 3.8 cm right ovary corresponds to abnormality seen on CT. While this may represent a hemorrhagic cyst in light of postmenopausal status Clinical correlation and further characterization with MRI and surgical consultation advised.     Left ovary not seen.     UT 5.5cm       41 elevated. CEA 4.8.      Personal history of breast cancer  2014, R mastectomy, no adjuvant treatment.    MM 2017 WNLs.      Prior abdominal surgeries include cholecystectomy.  x 3. Family history mother with breast cancer, no ovarian, uterine, or colon cancer.      S/p RTLH/BSO/BPLND/OMX 3/23/18. Uncomplicated post op course. 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  Review of Systems   Constitutional:  Negative for appetite change, chills, fatigue and fever.   HENT:  Negative for mouth sores.    Respiratory:  Negative for cough and shortness of breath.    Cardiovascular:  Negative for leg swelling.   Gastrointestinal:  Negative for abdominal pain, blood in stool, constipation and diarrhea.   Endocrine: Negative for cold intolerance.   Genitourinary:  Negative for dysuria and vaginal bleeding.   Musculoskeletal:  Negative for myalgias.   Skin:  Negative for rash.   Allergic/Immunologic: Negative.    Neurological:  Negative for weakness and numbness.   Hematological:  Negative for adenopathy. Does not bruise/bleed easily.   Psychiatric/Behavioral:  Negative for confusion.        Objective:   Physical Exam:   Constitutional: She is oriented to person, place, and time. She appears well-developed and well-nourished.    HENT:   Head: Normocephalic and atraumatic.    Eyes: Pupils are equal, round, and reactive to light. EOM are normal.    Neck: No thyromegaly present.    Cardiovascular:  Normal rate, regular rhythm and intact distal pulses.             Pulmonary/Chest: Effort normal and breath sounds normal. No respiratory distress. She has no wheezes.        Abdominal: Soft. Bowel sounds are normal. She exhibits no distension and no mass. There is no abdominal tenderness.     Genitourinary:    Vagina and rectum normal.      Pelvic exam was performed with patient supine.   There is no lesion on the right labia. There is no lesion on the left labia. Right adnexum displays no mass. Left adnexum displays no mass. Vaginal  cuff normal.  Cervix is absent.Uterus is absent.           Musculoskeletal: Normal range of motion and moves all extremeties.      Lymphadenopathy:     She has no cervical adenopathy. No inguinal adenopathy noted on the right or left side.        Right: No supraclavicular adenopathy present.        Left: No supraclavicular adenopathy present.    Neurological: She is alert and oriented to person, place, and time.    Skin: Skin is warm and dry. No rash noted.    Psychiatric: She has a normal mood and affect.       Assessment:     1. Borderline serous cystadenoma of right ovary        Plan:   No orders of the defined types were placed in this encounter.    No evidence of disease on today's exam  Feels well, no new symptoms  Disease free interval 5.5 years  Tumor markers  normal. CEA normal.      RTC 6 months with tumor markers or sooner if needed     I spent approximately 30 minutes reviewing the available records and evaluating the patient, out of which over 50% of the time was spent face to face with the patient in counseling and coordinating this patient's care.

## 2023-11-10 DIAGNOSIS — D46.9 MDS (MYELODYSPLASTIC SYNDROME): Primary | ICD-10-CM

## 2023-11-11 LAB
COMMENT: NORMAL
FINAL PATHOLOGIC DIAGNOSIS: NORMAL
GROSS: NORMAL
Lab: NORMAL
MICROSCOPIC EXAM: NORMAL
SUPPLEMENTAL DIAGNOSIS: NORMAL

## 2023-11-12 ENCOUNTER — PATIENT MESSAGE (OUTPATIENT)
Dept: INFECTIOUS DISEASES | Facility: CLINIC | Age: 78
End: 2023-11-12
Payer: MEDICARE

## 2023-11-13 ENCOUNTER — LAB VISIT (OUTPATIENT)
Dept: LAB | Facility: HOSPITAL | Age: 78
End: 2023-11-13
Attending: INTERNAL MEDICINE
Payer: MEDICARE

## 2023-11-13 DIAGNOSIS — N39.0 RECURRENT UTI: ICD-10-CM

## 2023-11-13 DIAGNOSIS — D46.9 MDS (MYELODYSPLASTIC SYNDROME): ICD-10-CM

## 2023-11-13 LAB
ABO + RH BLD: NORMAL
ALBUMIN SERPL BCP-MCNC: 2.9 G/DL (ref 3.5–5.2)
ALP SERPL-CCNC: 119 U/L (ref 55–135)
ALT SERPL W/O P-5'-P-CCNC: 21 U/L (ref 10–44)
ANION GAP SERPL CALC-SCNC: 10 MMOL/L (ref 8–16)
ANISOCYTOSIS BLD QL SMEAR: SLIGHT
AST SERPL-CCNC: 15 U/L (ref 10–40)
BACTERIA #/AREA URNS HPF: ABNORMAL /HPF
BASOPHILS # BLD AUTO: 0.01 K/UL (ref 0–0.2)
BASOPHILS NFR BLD: 0.5 % (ref 0–1.9)
BILIRUB SERPL-MCNC: 0.7 MG/DL (ref 0.1–1)
BILIRUB UR QL STRIP: ABNORMAL
BLD GP AB SCN CELLS X3 SERPL QL: NORMAL
BUN SERPL-MCNC: 11 MG/DL (ref 8–23)
CALCIUM SERPL-MCNC: 8.7 MG/DL (ref 8.7–10.5)
CHLORIDE SERPL-SCNC: 105 MMOL/L (ref 95–110)
CLARITY UR: ABNORMAL
CO2 SERPL-SCNC: 27 MMOL/L (ref 23–29)
COLOR UR: YELLOW
CREAT SERPL-MCNC: 0.8 MG/DL (ref 0.5–1.4)
DIFFERENTIAL METHOD: ABNORMAL
EOSINOPHIL # BLD AUTO: 0 K/UL (ref 0–0.5)
EOSINOPHIL NFR BLD: 0 % (ref 0–8)
ERYTHROCYTE [DISTWIDTH] IN BLOOD BY AUTOMATED COUNT: 22.3 % (ref 11.5–14.5)
EST. GFR  (NO RACE VARIABLE): >60 ML/MIN/1.73 M^2
GLUCOSE SERPL-MCNC: 132 MG/DL (ref 70–110)
GLUCOSE UR QL STRIP: NEGATIVE
HCT VFR BLD AUTO: 30 % (ref 37–48.5)
HGB BLD-MCNC: 8.8 G/DL (ref 12–16)
HGB UR QL STRIP: ABNORMAL
HYALINE CASTS #/AREA URNS LPF: 0 /LPF
HYPOCHROMIA BLD QL SMEAR: ABNORMAL
IMM GRANULOCYTES # BLD AUTO: 0 K/UL (ref 0–0.04)
IMM GRANULOCYTES NFR BLD AUTO: 0 % (ref 0–0.5)
KETONES UR QL STRIP: ABNORMAL
LDH SERPL L TO P-CCNC: 254 U/L (ref 110–260)
LEUKOCYTE ESTERASE UR QL STRIP: ABNORMAL
LYMPHOCYTES # BLD AUTO: 0.4 K/UL (ref 1–4.8)
LYMPHOCYTES NFR BLD: 22.4 % (ref 18–48)
MAGNESIUM SERPL-MCNC: 1.9 MG/DL (ref 1.6–2.6)
MCH RBC QN AUTO: 26.2 PG (ref 27–31)
MCHC RBC AUTO-ENTMCNC: 29.3 G/DL (ref 32–36)
MCV RBC AUTO: 89 FL (ref 82–98)
MICROSCOPIC COMMENT: ABNORMAL
MONOCYTES # BLD AUTO: 0.1 K/UL (ref 0.3–1)
MONOCYTES NFR BLD: 4.4 % (ref 4–15)
NEUTROPHILS # BLD AUTO: 1.3 K/UL (ref 1.8–7.7)
NEUTROPHILS NFR BLD: 72.7 % (ref 38–73)
NITRITE UR QL STRIP: POSITIVE
NRBC BLD-RTO: 0 /100 WBC
PH UR STRIP: 6 [PH] (ref 5–8)
PHOSPHATE SERPL-MCNC: 2.7 MG/DL (ref 2.7–4.5)
PLATELET # BLD AUTO: 205 K/UL (ref 150–450)
PLATELET BLD QL SMEAR: ABNORMAL
PMV BLD AUTO: 8.9 FL (ref 9.2–12.9)
POIKILOCYTOSIS BLD QL SMEAR: SLIGHT
POLYCHROMASIA BLD QL SMEAR: ABNORMAL
POTASSIUM SERPL-SCNC: 3.1 MMOL/L (ref 3.5–5.1)
PROT SERPL-MCNC: 6.8 G/DL (ref 6–8.4)
PROT UR QL STRIP: ABNORMAL
RBC # BLD AUTO: 3.36 M/UL (ref 4–5.4)
RBC #/AREA URNS HPF: 2 /HPF (ref 0–4)
SODIUM SERPL-SCNC: 142 MMOL/L (ref 136–145)
SP GR UR STRIP: 1.02 (ref 1–1.03)
SPECIMEN OUTDATE: NORMAL
SQUAMOUS #/AREA URNS HPF: 2 /HPF
TARGETS BLD QL SMEAR: ABNORMAL
URATE SERPL-MCNC: 4.5 MG/DL (ref 2.4–5.7)
URN SPEC COLLECT METH UR: ABNORMAL
WBC # BLD AUTO: 1.83 K/UL (ref 3.9–12.7)
WBC #/AREA URNS HPF: 98 /HPF (ref 0–5)
WBC CLUMPS URNS QL MICRO: ABNORMAL

## 2023-11-13 PROCEDURE — 84100 ASSAY OF PHOSPHORUS: CPT | Mod: PN | Performed by: INTERNAL MEDICINE

## 2023-11-13 PROCEDURE — 83735 ASSAY OF MAGNESIUM: CPT | Mod: PN | Performed by: INTERNAL MEDICINE

## 2023-11-13 PROCEDURE — 87088 URINE BACTERIA CULTURE: CPT | Performed by: INTERNAL MEDICINE

## 2023-11-13 PROCEDURE — 87077 CULTURE AEROBIC IDENTIFY: CPT | Performed by: INTERNAL MEDICINE

## 2023-11-13 PROCEDURE — 86850 RBC ANTIBODY SCREEN: CPT | Mod: PN | Performed by: INTERNAL MEDICINE

## 2023-11-13 PROCEDURE — 84550 ASSAY OF BLOOD/URIC ACID: CPT | Mod: PN | Performed by: INTERNAL MEDICINE

## 2023-11-13 PROCEDURE — 81000 URINALYSIS NONAUTO W/SCOPE: CPT | Mod: PN | Performed by: INTERNAL MEDICINE

## 2023-11-13 PROCEDURE — 87186 SC STD MICRODIL/AGAR DIL: CPT | Mod: 59 | Performed by: INTERNAL MEDICINE

## 2023-11-13 PROCEDURE — 80053 COMPREHEN METABOLIC PANEL: CPT | Mod: PN | Performed by: INTERNAL MEDICINE

## 2023-11-13 PROCEDURE — 83615 LACTATE (LD) (LDH) ENZYME: CPT | Mod: PN | Performed by: INTERNAL MEDICINE

## 2023-11-13 PROCEDURE — 87086 URINE CULTURE/COLONY COUNT: CPT | Performed by: INTERNAL MEDICINE

## 2023-11-13 PROCEDURE — 86850 RBC ANTIBODY SCREEN: CPT | Performed by: INTERNAL MEDICINE

## 2023-11-13 PROCEDURE — 85025 COMPLETE CBC W/AUTO DIFF WBC: CPT | Mod: PN | Performed by: INTERNAL MEDICINE

## 2023-11-15 ENCOUNTER — PATIENT MESSAGE (OUTPATIENT)
Dept: HEMATOLOGY/ONCOLOGY | Facility: CLINIC | Age: 78
End: 2023-11-15
Payer: MEDICARE

## 2023-11-15 ENCOUNTER — PATIENT MESSAGE (OUTPATIENT)
Dept: INFECTIOUS DISEASES | Facility: CLINIC | Age: 78
End: 2023-11-15
Payer: MEDICARE

## 2023-11-16 LAB
BACTERIA UR CULT: ABNORMAL
BACTERIA UR CULT: ABNORMAL

## 2023-11-16 RX ORDER — SODIUM CHLORIDE 0.9 % (FLUSH) 0.9 %
10 SYRINGE (ML) INJECTION
Status: CANCELLED | OUTPATIENT
Start: 2023-11-20

## 2023-11-16 RX ORDER — HEPARIN 100 UNIT/ML
500 SYRINGE INTRAVENOUS
Status: CANCELLED | OUTPATIENT
Start: 2023-11-20

## 2023-11-17 ENCOUNTER — OFFICE VISIT (OUTPATIENT)
Dept: HEMATOLOGY/ONCOLOGY | Facility: CLINIC | Age: 78
End: 2023-11-17
Payer: MEDICARE

## 2023-11-17 ENCOUNTER — PATIENT MESSAGE (OUTPATIENT)
Dept: HEMATOLOGY/ONCOLOGY | Facility: CLINIC | Age: 78
End: 2023-11-17

## 2023-11-17 DIAGNOSIS — D63.0 ANEMIA IN NEOPLASTIC DISEASE: ICD-10-CM

## 2023-11-17 DIAGNOSIS — D70.1 LEUKOPENIA DUE TO ANTINEOPLASTIC CHEMOTHERAPY: ICD-10-CM

## 2023-11-17 DIAGNOSIS — T45.1X5A LEUKOPENIA DUE TO ANTINEOPLASTIC CHEMOTHERAPY: ICD-10-CM

## 2023-11-17 DIAGNOSIS — D46.9 MDS (MYELODYSPLASTIC SYNDROME): Primary | ICD-10-CM

## 2023-11-17 PROCEDURE — 99215 PR OFFICE/OUTPT VISIT, EST, LEVL V, 40-54 MIN: ICD-10-PCS | Mod: 95,,, | Performed by: INTERNAL MEDICINE

## 2023-11-17 PROCEDURE — 99215 OFFICE O/P EST HI 40 MIN: CPT | Mod: 95,,, | Performed by: INTERNAL MEDICINE

## 2023-11-17 NOTE — PROGRESS NOTES
HEMATOLOGIC MALIGNANCIES PROGRESS NOTE    IDENTIFYING STATEMENT   Niurka Castellon) is a 78 y.o. female with a  of 1945 from [place], [place] with the diagnosis of MDS.      TELEMEDICINE DOCUMENTATION    The patient location is: [place]  The chief complaint leading to consultation is: MDS    Visit type: audiovisual    Face to Face time with patient: 9 minutes  30 minutes of total time spent on the encounter, which includes face to face time and non-face to face time preparing to see the patient (eg, review of tests), Obtaining and/or reviewing separately obtained history, Documenting clinical information in the electronic or other health record, Independently interpreting results (not separately reported) and communicating results to the patient/family/caregiver, or Care coordination (not separately reported).         Each patient to whom he or she provides medical services by telemedicine is:  (1) informed of the relationship between the physician and patient and the respective role of any other health care provider with respect to management of the patient; and (2) notified that he or she may decline to receive medical services by telemedicine and may withdraw from such care at any time.    Notes:     ONCOLOGY HISTORY:    1. Myelodysplastic syndrome with increased blasts-2              A. 2023: Bone marrow biopsy - 65-75% cellular marrow consistent with myelodysplastic syndrome with excess blasts-2; cytogenetics 46,XX,del(3)(q12)[2]/47,sl,+8[18]; NGS not sent; IPSS-R score of 7 = very high risk   B. 2023: Cycle 1 azacitidine-venetoclax (5 days aza, 14 days angelo) for MDS/AML   C. 10/18/2023: Bone marrow biopsy - 50-60% cellular marrow with no increased blasts and trilineage hematopoiesis with granulocytic hypoplasia and moderate dysgranulopoiesis, mildly left-shifted marked erythroid hyperplasia with severe dyserythropoiesis, and marked megakaryocytic hyperplasia with predominantly variably  "sized including small del3q-like monolobated forms; 3-4+ histiocytic iron stores; focal MF-1; cytogenetics 46,XX[20]; NGS shows ASXL1 (26%), SRSF2 (33%) and STAG2 (3%).     2. Neuropathy  3. Interstitial lung disease/pulmonary fibrosis  4. Hypertension  5. Aortic atherosclerosis  6. Pulmonary coccidioidomycosis  7. Hypothyroidism  8. Hyperparathyroidism    INTERVAL HISTORY:      Ms. Pedraza is seen in this virtual visit in follow-up of MDS/AML prior to cycle 3 of aza-angelo therapy.  She is feeling "so-so. " Having lots of constipation, taking Senokot and magnesium citrate.     Past Medical History, Past Social History and Past Family History have been reviewed and are unchanged except as noted in the interval history.    MEDICATIONS:     Current Outpatient Medications on File Prior to Visit   Medication Sig Dispense Refill    acyclovir (ZOVIRAX) 400 MG tablet Take 1 tablet (400 mg total) by mouth 2 (two) times daily. 60 tablet 11    albuterol (VENTOLIN HFA) 90 mcg/actuation inhaler Inhale 1-2 puffs into the lungs every 6 (six) hours as needed for Wheezing or Shortness of Breath. Rescue 18 g 11    albuterol-ipratropium (DUO-NEB) 2.5 mg-0.5 mg/3 mL nebulizer solution Take 3 mLs by nebulization every 6 (six) hours as needed for Wheezing or Shortness of Breath. Rescue 75 mL 11    ascorbic acid/zinc (ZINC WITH VITAMIN C ORAL) Take by mouth.      atorvastatin (LIPITOR) 20 MG tablet Take 1 tablet (20 mg total) by mouth once daily. 90 tablet 3    azelastine (ASTELIN) 137 mcg (0.1 %) nasal spray 1 spray (137 mcg total) by Nasal route 2 (two) times daily. 30 mL 6    calcium-vitamin D3 (CALCIUM 500 + D) 500 mg(1,250mg) -200 unit per tablet Take 1 tablet by mouth 2 (two) times daily with meals.      carvediloL (COREG) 6.25 MG tablet Take 1 tablet (6.25 mg total) by mouth 2 (two) times daily with meals. 180 tablet 3    estradioL (ESTRACE) 0.01 % (0.1 mg/gram) vaginal cream Place 1 g vaginally twice a week. 42.5 g 11    fluticasone " furoate-vilanteroL (BREO ELLIPTA) 100-25 mcg/dose diskus inhaler Inhale 1 puff into the lungs once daily. Controller.  PLEASE NOTE IT IS ONCE A DAY!! 60 each 4    gabapentin (NEURONTIN) 100 MG capsule Take 1 capsule (100 mg total) by mouth every evening. 30 capsule 3    irbesartan (AVAPRO) 300 MG tablet Take 1 tablet (300 mg total) by mouth every evening. 90 tablet 3    lactulose (CHRONULAC) 20 gram/30 mL Soln Take 30 mLs (20 g total) by mouth 2 (two) times daily as needed (constipation - do not exceed max dose of 60mL/24 hours). 900 mL 6    levothyroxine (SYNTHROID) 50 MCG tablet Take 1 tablet (50 mcg total) by mouth before breakfast. 90 tablet 1    montelukast (SINGULAIR) 10 mg tablet TAKE 1 TABLET BY MOUTH EVERY DAY IN THE EVENING 90 tablet 2    multivitamin (THERAGRAN) per tablet Take 1 tablet by mouth once daily.      ondansetron (ZOFRAN-ODT) 4 MG TbDL Take 2 tablets (8 mg total) by mouth every 6 (six) hours as needed (nausea). 30 tablet 1    oxybutynin (DITROPAN XL) 15 MG TR24 Take 1 tablet (15 mg total) by mouth once daily. 30 tablet 11    pantoprazole (PROTONIX) 20 MG tablet Take 1 tablet (20 mg total) by mouth once daily. 90 tablet 1    posaconazole (NOXAFIL) 100 mg TbEC tablet Take 3 tablets (300 mg) by mouth twice daily on the first day, and then take 300 mg daily thereafter. 30 tablet 2    predniSONE (DELTASONE) 1 MG tablet Take 2 tablets (2 mg total) by mouth once daily. 180 tablet 3    sodium chloride 3% 3 % nebulizer solution Take 4 mLs by nebulization as needed for Other. 120 mL 3    venetoclax (VENCLEXTA) 100 mg Tab Take one tablet (100 mg) by mouth daily on days 1-14 of each 28 day cycle of therapy.. 28 tablet 0    vibegron (GEMTESA) 75 mg Tab Take 75 mg by mouth once daily. 30 tablet 11    [DISCONTINUED] budesonide-formoterol 80-4.5 mcg (SYMBICORT) 80-4.5 mcg/actuation HFAA Inhale 2 puffs into the lungs 2 (two) times daily as needed. Controller 1 Inhaler 6     No current facility-administered  medications on file prior to visit.       ALLERGIES:   Review of patient's allergies indicates:   Allergen Reactions    Ciprofloxacin Other (See Comments)     Right foot pain and edema, tendonitis    Amlodipine Edema and Swelling     BLE  BLE    Lisinopril Other (See Comments)     cough    Nitrofuran analogues         ROS:       Review of Systems   Constitutional:  Negative for diaphoresis, fatigue, fever and unexpected weight change.   HENT:   Negative for lump/mass and sore throat.    Eyes:  Negative for icterus.   Respiratory:  Negative for cough and shortness of breath.    Cardiovascular:  Negative for chest pain and palpitations.   Gastrointestinal:  Positive for constipation. Negative for abdominal distention, diarrhea, nausea and vomiting.   Genitourinary:  Positive for dysuria. Negative for frequency.    Musculoskeletal:  Negative for arthralgias, gait problem and myalgias.   Skin:  Negative for rash.   Neurological:  Negative for dizziness, gait problem and headaches.   Hematological:  Negative for adenopathy. Does not bruise/bleed easily.   Psychiatric/Behavioral:  The patient is not nervous/anxious.        PHYSICAL EXAM:  There were no vitals filed for this visit.    Physical Exam  Head and neck visualized, and no abnormalities seen.     LAB:   Results for orders placed or performed in visit on 11/13/23   Urine culture    Specimen: Urine   Result Value Ref Range    Urine Culture, Routine ESCHERICHIA COLI ESBL  >100,000 cfu/ml   (A)     Urine Culture, Routine KLEBSIELLA PNEUMONIAE  > 100,000 cfu/ml   (A)        Susceptibility    Klebsiella pneumoniae - CULTURE, URINE     Amp/Sulbactam <=8/4 Sensitive mcg/mL     Amox/K Clav'ate <=8/4 Sensitive mcg/mL     Ceftriaxone <=1 Sensitive mcg/mL     Cefazolin <=2 Sensitive mcg/mL     Ciprofloxacin <=1 Sensitive mcg/mL     Cefepime <=2 Sensitive mcg/mL     Ertapenem <=0.5 Sensitive mcg/mL     Nitrofurantoin <=32 Sensitive mcg/mL     Gentamicin <=4 Sensitive mcg/mL      Levofloxacin <=2 Sensitive mcg/mL     Meropenem <=1 Sensitive mcg/mL     Piperacillin/Tazo <=16 Sensitive mcg/mL     Trimeth/Sulfa <=2/38 Sensitive mcg/mL     Tobramycin <=4 Sensitive mcg/mL    Escherichia coli ESBL - CULTURE, URINE     Amp/Sulbactam <=8/4 Resistant mcg/mL     Amikacin <=16 Sensitive mcg/mL     Ampicillin >16 Resistant mcg/mL     Amox/K Clav'ate <=8/4 Resistant mcg/mL     Ceftriaxone >32 Resistant mcg/mL     Cefazolin >16 Resistant mcg/mL     Ciprofloxacin >2 Resistant mcg/mL     Cefepime 4 Resistant mcg/mL     Ertapenem <=0.5 Sensitive mcg/mL     Nitrofurantoin <=32 Sensitive mcg/mL     Gentamicin >8 Resistant mcg/mL     Levofloxacin >4 Resistant mcg/mL     Meropenem <=1 Sensitive mcg/mL     Piperacillin/Tazo <=16 Resistant mcg/mL     Trimeth/Sulfa <=2/38 Sensitive mcg/mL     Tobramycin 8 Intermediate mcg/mL   Urinalysis, Reflex to Urine Culture Urine, Clean Catch    Specimen: Urine   Result Value Ref Range    Specimen UA Urine, Clean Catch     Color, UA Yellow Yellow, Straw, Kourtney    Appearance, UA Hazy (A) Clear    pH, UA 6.0 5.0 - 8.0    Specific Gravity, UA 1.025 1.005 - 1.030    Protein, UA 3+ (A) Negative    Glucose, UA Negative Negative    Ketones, UA Trace (A) Negative    Bilirubin (UA) 1+ (A) Negative    Occult Blood UA 1+ (A) Negative    Nitrite, UA Positive (A) Negative    Leukocytes, UA 2+ (A) Negative   CBC Auto Differential   Result Value Ref Range    WBC 1.83 (LL) 3.90 - 12.70 K/uL    RBC 3.36 (L) 4.00 - 5.40 M/uL    Hemoglobin 8.8 (L) 12.0 - 16.0 g/dL    Hematocrit 30.0 (L) 37.0 - 48.5 %    MCV 89 82 - 98 fL    MCH 26.2 (L) 27.0 - 31.0 pg    MCHC 29.3 (L) 32.0 - 36.0 g/dL    RDW 22.3 (H) 11.5 - 14.5 %    Platelets 205 150 - 450 K/uL    MPV 8.9 (L) 9.2 - 12.9 fL    Immature Granulocytes 0.0 0.0 - 0.5 %    Gran # (ANC) 1.3 (L) 1.8 - 7.7 K/uL    Immature Grans (Abs) 0.00 0.00 - 0.04 K/uL    Lymph # 0.4 (L) 1.0 - 4.8 K/uL    Mono # 0.1 (L) 0.3 - 1.0 K/uL    Eos # 0.0 0.0 - 0.5 K/uL     Baso # 0.01 0.00 - 0.20 K/uL    nRBC 0 0 /100 WBC    Gran % 72.7 38.0 - 73.0 %    Lymph % 22.4 18.0 - 48.0 %    Mono % 4.4 4.0 - 15.0 %    Eosinophil % 0.0 0.0 - 8.0 %    Basophil % 0.5 0.0 - 1.9 %    Platelet Estimate Appears normal     Aniso Slight     Poik Slight     Poly Occasional     Hypo Occasional     Target Cells Occasional     Differential Method Automated    Comprehensive Metabolic Panel   Result Value Ref Range    Sodium 142 136 - 145 mmol/L    Potassium 3.1 (L) 3.5 - 5.1 mmol/L    Chloride 105 95 - 110 mmol/L    CO2 27 23 - 29 mmol/L    Glucose 132 (H) 70 - 110 mg/dL    BUN 11 8 - 23 mg/dL    Creatinine 0.8 0.5 - 1.4 mg/dL    Calcium 8.7 8.7 - 10.5 mg/dL    Total Protein 6.8 6.0 - 8.4 g/dL    Albumin 2.9 (L) 3.5 - 5.2 g/dL    Total Bilirubin 0.7 0.1 - 1.0 mg/dL    Alkaline Phosphatase 119 55 - 135 U/L    AST 15 10 - 40 U/L    ALT 21 10 - 44 U/L    eGFR >60.0 >60 mL/min/1.73 m^2    Anion Gap 10 8 - 16 mmol/L   Magnesium   Result Value Ref Range    Magnesium 1.9 1.6 - 2.6 mg/dL   PHOSPHORUS   Result Value Ref Range    Phosphorus 2.7 2.7 - 4.5 mg/dL   Uric Acid   Result Value Ref Range    Uric Acid 4.5 2.4 - 5.7 mg/dL   Lactate Dehydrogenase   Result Value Ref Range     110 - 260 U/L   Urinalysis Microscopic   Result Value Ref Range    RBC, UA 2 0 - 4 /hpf    WBC, UA 98 (H) 0 - 5 /hpf    WBC Clumps, UA Moderate (A) None-Rare    Bacteria Many (A) None-Occ /hpf    Squam Epithel, UA 2 /hpf    Hyaline Casts, UA 0 0-1/lpf /lpf    Microscopic Comment SEE COMMENT    Type & Screen   Result Value Ref Range    Group & Rh A POS     Indirect Emiliano GEL NEG     Specimen Outdate 11/16/2023 23:59      *Note: Due to a large number of results and/or encounters for the requested time period, some results have not been displayed. A complete set of results can be found in Results Review.       PROBLEMS ASSESSED THIS VISIT:    1. MDS (myelodysplastic syndrome)    2. Leukopenia due to antineoplastic chemotherapy    3.  Anemia in neoplastic disease        PLAN:       Myelodysplastic syndrome  Ms. Pedrzaa has MDS-IB2. We reviewed that this is an aggressive hematologic malignancy with high probability of evolution to acute myeloid leukemia (AML). According to the [facility] pathologic designation, this is referred to as MDS/AML.     Bone marrow biopsy after 1 cycle of aza-angelo showed resolution of increased blasts with ongoing morphologic changes consistent with MDS. We reviewed that this represents a favorable response to therapy. We will therefore proceed with indefinite aza-angelo therapy.      Administer cycle 3 of aza-angelo with close monitoring.    Plan bone marrow biopsy q6months to monitor response (due 4/18/2024).     Continue infection prophylaxis with acyclovir, posaconazole. Antibiotic agents per ID given multi-drug resistant UTI.     Leukopenia  Anemia  Due to MDS/AML. Monitor weekly during therapy and transfuse as clinically indicated.     Thrombocytosis  Unclear significance. I have discussed with Dr. Dyson on the day of this encounter. She is assessing for possibility of underlying MDS/MPN overlap.     Follow-up  Prior to next cycle of therapy.     Mohit Centeno MD  Hematology and Stem Cell Transplant

## 2023-11-17 NOTE — PROGRESS NOTES
Route Chart for Scheduling    BMT Chart Routing  Urgent    Follow up with physician . 12/13   Follow up with [facility]    Provider visit type Malignant hem   Infusion scheduling note   Please schedule azacitidine on [facility] on 12/18, 12/19, 12/20, 12/21, 12/22   Injection scheduling note    Labs CBC, CMP, magnesium, phosphorus, LDH, uric acid and type and screen   Scheduling:  Preferred lab:  Lab interval: once a week  on [facility]   Imaging    Pharmacy appointment    Other referrals                  Treatment Plan Information   OP AZACITIDINE 5-DAY (SUB-Q)   Mohit Centeno MD   Upcoming Treatment Dates - OP AZACITIDINE 5-DAY (SUB-Q)    11/20/2023       Pre-Medications       ondansetron disintegrating tablet 16 mg       Chemotherapy       azaCITIDine (VIDAZA) chemo injection 135 mg  11/21/2023       Pre-Medications       ondansetron disintegrating tablet 16 mg       Chemotherapy       azaCITIDine (VIDAZA) chemo injection 135 mg  11/22/2023       Pre-Medications       ondansetron disintegrating tablet 16 mg       Chemotherapy       azaCITIDine (VIDAZA) chemo injection 135 mg  11/23/2023       Pre-Medications       ondansetron disintegrating tablet 16 mg       Chemotherapy       azaCITIDine (VIDAZA) chemo injection 135 mg    Therapy Plan Information  INF ERTAPENEM (INVANZ) IV  Medications  ertapenem (INVANZ) 1 g in sodium chloride 0.9% 100 mL IVPB  1 g, Intravenous, Every visit  Flushes  sodium chloride 0.9% 250 mL flush bag  Intravenous, Every visit  sodium chloride 0.9% flush 10 mL  10 mL, Intravenous, Every visit  heparin, porcine (PF) 100 unit/mL injection flush 500 Units  500 Units, Intravenous, Every visit  alteplase injection 2 mg  2 mg, Intra-Catheter, Every visit    INF ERTAPENEM (INVANZ) IV  Medications  ertapenem (INVANZ) 1 g in sodium chloride 0.9% 100 mL IVPB  1 g, Intravenous, Every visit  Flushes  sodium chloride 0.9% 250 mL flush bag  Intravenous, Every visit  sodium chloride 0.9% flush 10 mL  10 mL,  Intravenous, Every visit  heparin, porcine (PF) 100 unit/mL injection flush 500 Units  500 Units, Intravenous, Every visit  alteplase injection 2 mg  2 mg, Intra-Catheter, Every visit

## 2023-11-20 ENCOUNTER — INFUSION (OUTPATIENT)
Dept: INFUSION THERAPY | Facility: HOSPITAL | Age: 78
End: 2023-11-20
Attending: INTERNAL MEDICINE
Payer: MEDICARE

## 2023-11-20 VITALS
RESPIRATION RATE: 16 BRPM | BODY MASS INDEX: 24.66 KG/M2 | HEIGHT: 66 IN | DIASTOLIC BLOOD PRESSURE: 72 MMHG | WEIGHT: 153.44 LBS | HEART RATE: 64 BPM | TEMPERATURE: 98 F | SYSTOLIC BLOOD PRESSURE: 135 MMHG

## 2023-11-20 DIAGNOSIS — Z16.12 URINARY TRACT INFECTION DUE TO EXTENDED-SPECTRUM BETA LACTAMASE (ESBL) PRODUCING ESCHERICHIA COLI: Primary | ICD-10-CM

## 2023-11-20 DIAGNOSIS — B96.29 URINARY TRACT INFECTION DUE TO EXTENDED-SPECTRUM BETA LACTAMASE (ESBL) PRODUCING ESCHERICHIA COLI: Primary | ICD-10-CM

## 2023-11-20 DIAGNOSIS — N39.0 URINARY TRACT INFECTION DUE TO EXTENDED-SPECTRUM BETA LACTAMASE (ESBL) PRODUCING ESCHERICHIA COLI: Primary | ICD-10-CM

## 2023-11-20 DIAGNOSIS — D46.9 MDS (MYELODYSPLASTIC SYNDROME): ICD-10-CM

## 2023-11-20 PROCEDURE — 63600175 PHARM REV CODE 636 W HCPCS: Mod: PN | Performed by: INTERNAL MEDICINE

## 2023-11-20 PROCEDURE — 25000003 PHARM REV CODE 250: Mod: PN | Performed by: INTERNAL MEDICINE

## 2023-11-20 PROCEDURE — 96401 CHEMO ANTI-NEOPL SQ/IM: CPT | Mod: PN

## 2023-11-20 PROCEDURE — 96365 THER/PROPH/DIAG IV INF INIT: CPT | Mod: PN

## 2023-11-20 RX ORDER — ONDANSETRON 8 MG/1
16 TABLET, ORALLY DISINTEGRATING ORAL
Status: CANCELLED
Start: 2023-11-21

## 2023-11-20 RX ORDER — AZACITIDINE 100 MG/1
75 INJECTION, POWDER, LYOPHILIZED, FOR SOLUTION INTRAVENOUS; SUBCUTANEOUS
Status: CANCELLED | OUTPATIENT
Start: 2023-11-20

## 2023-11-20 RX ORDER — HEPARIN 100 UNIT/ML
500 SYRINGE INTRAVENOUS
Status: DISCONTINUED | OUTPATIENT
Start: 2023-11-20 | End: 2023-11-20 | Stop reason: HOSPADM

## 2023-11-20 RX ORDER — ONDANSETRON 8 MG/1
16 TABLET, ORALLY DISINTEGRATING ORAL
Status: CANCELLED
Start: 2023-11-27

## 2023-11-20 RX ORDER — AZACITIDINE 100 MG/1
75 INJECTION, POWDER, LYOPHILIZED, FOR SOLUTION INTRAVENOUS; SUBCUTANEOUS
Status: CANCELLED | OUTPATIENT
Start: 2023-11-22

## 2023-11-20 RX ORDER — ONDANSETRON 8 MG/1
16 TABLET, ORALLY DISINTEGRATING ORAL
Status: CANCELLED
Start: 2023-11-24

## 2023-11-20 RX ORDER — AZACITIDINE 100 MG/1
75 INJECTION, POWDER, LYOPHILIZED, FOR SOLUTION INTRAVENOUS; SUBCUTANEOUS
Status: COMPLETED | OUTPATIENT
Start: 2023-11-20 | End: 2023-11-20

## 2023-11-20 RX ORDER — SODIUM CHLORIDE 0.9 % (FLUSH) 0.9 %
10 SYRINGE (ML) INJECTION
Status: CANCELLED | OUTPATIENT
Start: 2023-11-20

## 2023-11-20 RX ORDER — AZACITIDINE 100 MG/1
75 INJECTION, POWDER, LYOPHILIZED, FOR SOLUTION INTRAVENOUS; SUBCUTANEOUS
Status: CANCELLED | OUTPATIENT
Start: 2023-11-24

## 2023-11-20 RX ORDER — HEPARIN 100 UNIT/ML
500 SYRINGE INTRAVENOUS
Status: CANCELLED | OUTPATIENT
Start: 2023-11-20

## 2023-11-20 RX ORDER — SODIUM CHLORIDE 0.9 % (FLUSH) 0.9 %
10 SYRINGE (ML) INJECTION
Status: DISCONTINUED | OUTPATIENT
Start: 2023-11-20 | End: 2023-11-20 | Stop reason: HOSPADM

## 2023-11-20 RX ORDER — ONDANSETRON 8 MG/1
16 TABLET, ORALLY DISINTEGRATING ORAL
Status: COMPLETED | OUTPATIENT
Start: 2023-11-20 | End: 2023-11-20

## 2023-11-20 RX ORDER — ONDANSETRON 8 MG/1
16 TABLET, ORALLY DISINTEGRATING ORAL
Status: CANCELLED
Start: 2023-11-22

## 2023-11-20 RX ORDER — ONDANSETRON 8 MG/1
16 TABLET, ORALLY DISINTEGRATING ORAL
Status: CANCELLED
Start: 2023-11-20

## 2023-11-20 RX ORDER — AZACITIDINE 100 MG/1
75 INJECTION, POWDER, LYOPHILIZED, FOR SOLUTION INTRAVENOUS; SUBCUTANEOUS
Status: CANCELLED | OUTPATIENT
Start: 2023-11-21

## 2023-11-20 RX ORDER — AZACITIDINE 100 MG/1
75 INJECTION, POWDER, LYOPHILIZED, FOR SOLUTION INTRAVENOUS; SUBCUTANEOUS
Status: CANCELLED | OUTPATIENT
Start: 2023-11-27

## 2023-11-20 RX ADMIN — ONDANSETRON 16 MG: 8 TABLET, ORALLY DISINTEGRATING ORAL at 11:11

## 2023-11-20 RX ADMIN — AZACITIDINE 135 MG: 100 INJECTION, POWDER, LYOPHILIZED, FOR SOLUTION INTRAVENOUS; SUBCUTANEOUS at 12:11

## 2023-11-20 RX ADMIN — ERTAPENEM 1 G: 1 INJECTION, POWDER, LYOPHILIZED, FOR SOLUTION INTRAMUSCULAR; INTRAVENOUS at 11:11

## 2023-11-20 RX ADMIN — SODIUM CHLORIDE: 9 INJECTION, SOLUTION INTRAVENOUS at 11:11

## 2023-11-20 NOTE — PLAN OF CARE
Problem: Adult Inpatient Plan of Care  Goal: Plan of Care Review  Outcome: Ongoing, Progressing  Flowsheets (Taken 11/20/2023 1126)  Plan of Care Reviewed With:   patient   daughter  Goal: Optimal Comfort and Wellbeing  Outcome: Ongoing, Progressing  Goal: Patient-Specific Goal (Individualized)  Outcome: Ongoing, Progressing  Flowsheets (Taken 11/20/2023 1126)  Anxieties, Fears or Concerns: UTI again, discouraging  Individualized Care Needs: Recliner, warm blanket, dtr at chairside, conversation, ice to injection sites     Problem: Fatigue  Goal: Improved Activity Tolerance  Outcome: Ongoing, Progressing  Intervention: Promote Improved Energy  Flowsheets (Taken 11/20/2023 1126)  Fatigue Management:   frequent rest breaks encouraged   paced activity encouraged  Sleep/Rest Enhancement: regular sleep/rest pattern promoted  Activity Management: Patient unable to perform activities    Patient to Infusion for Vidaza and Invanz, using a w/c and accompanied by her dtr. Treatment plan reviewed with patient. VSS. Tolerated infusion and injections. Provided with copy of upcoming appointment schedule. Escorted to the front lobby in no distress for discharge to home.

## 2023-11-21 ENCOUNTER — TELEPHONE (OUTPATIENT)
Dept: INFUSION THERAPY | Facility: HOSPITAL | Age: 78
End: 2023-11-21
Payer: MEDICARE

## 2023-11-21 ENCOUNTER — INFUSION (OUTPATIENT)
Dept: INFUSION THERAPY | Facility: HOSPITAL | Age: 78
End: 2023-11-21
Attending: INTERNAL MEDICINE
Payer: MEDICARE

## 2023-11-21 ENCOUNTER — PATIENT MESSAGE (OUTPATIENT)
Dept: INTERNAL MEDICINE | Facility: CLINIC | Age: 78
End: 2023-11-21
Payer: MEDICARE

## 2023-11-21 VITALS
HEIGHT: 66 IN | HEART RATE: 68 BPM | RESPIRATION RATE: 18 BRPM | BODY MASS INDEX: 24.66 KG/M2 | SYSTOLIC BLOOD PRESSURE: 136 MMHG | WEIGHT: 153.44 LBS | DIASTOLIC BLOOD PRESSURE: 75 MMHG | TEMPERATURE: 98 F

## 2023-11-21 DIAGNOSIS — N39.0 URINARY TRACT INFECTION DUE TO EXTENDED-SPECTRUM BETA LACTAMASE (ESBL) PRODUCING ESCHERICHIA COLI: Primary | ICD-10-CM

## 2023-11-21 DIAGNOSIS — B96.29 URINARY TRACT INFECTION DUE TO EXTENDED-SPECTRUM BETA LACTAMASE (ESBL) PRODUCING ESCHERICHIA COLI: Primary | ICD-10-CM

## 2023-11-21 DIAGNOSIS — D46.9 MDS (MYELODYSPLASTIC SYNDROME): ICD-10-CM

## 2023-11-21 DIAGNOSIS — Z16.12 URINARY TRACT INFECTION DUE TO EXTENDED-SPECTRUM BETA LACTAMASE (ESBL) PRODUCING ESCHERICHIA COLI: Primary | ICD-10-CM

## 2023-11-21 DIAGNOSIS — D50.9 IRON DEFICIENCY ANEMIA, UNSPECIFIED IRON DEFICIENCY ANEMIA TYPE: Primary | ICD-10-CM

## 2023-11-21 PROCEDURE — 63600175 PHARM REV CODE 636 W HCPCS: Mod: PN | Performed by: INTERNAL MEDICINE

## 2023-11-21 PROCEDURE — 96365 THER/PROPH/DIAG IV INF INIT: CPT | Mod: PN

## 2023-11-21 PROCEDURE — 96401 CHEMO ANTI-NEOPL SQ/IM: CPT | Mod: PN

## 2023-11-21 PROCEDURE — 25000003 PHARM REV CODE 250: Mod: PN | Performed by: INTERNAL MEDICINE

## 2023-11-21 RX ORDER — ONDANSETRON 8 MG/1
16 TABLET, ORALLY DISINTEGRATING ORAL
Status: COMPLETED | OUTPATIENT
Start: 2023-11-21 | End: 2023-11-21

## 2023-11-21 RX ORDER — AZACITIDINE 100 MG/1
75 INJECTION, POWDER, LYOPHILIZED, FOR SOLUTION INTRAVENOUS; SUBCUTANEOUS
Status: COMPLETED | OUTPATIENT
Start: 2023-11-21 | End: 2023-11-21

## 2023-11-21 RX ORDER — SODIUM CHLORIDE 0.9 % (FLUSH) 0.9 %
10 SYRINGE (ML) INJECTION
Status: CANCELLED | OUTPATIENT
Start: 2023-11-21

## 2023-11-21 RX ORDER — HEPARIN 100 UNIT/ML
500 SYRINGE INTRAVENOUS
Status: DISCONTINUED | OUTPATIENT
Start: 2023-11-21 | End: 2023-11-21 | Stop reason: HOSPADM

## 2023-11-21 RX ORDER — HEPARIN 100 UNIT/ML
500 SYRINGE INTRAVENOUS
Status: CANCELLED | OUTPATIENT
Start: 2023-11-21

## 2023-11-21 RX ORDER — SODIUM CHLORIDE 0.9 % (FLUSH) 0.9 %
10 SYRINGE (ML) INJECTION
Status: DISCONTINUED | OUTPATIENT
Start: 2023-11-21 | End: 2023-11-21 | Stop reason: HOSPADM

## 2023-11-21 RX ADMIN — AZACITIDINE 135 MG: 100 INJECTION, POWDER, LYOPHILIZED, FOR SOLUTION INTRAVENOUS; SUBCUTANEOUS at 12:11

## 2023-11-21 RX ADMIN — ONDANSETRON 16 MG: 8 TABLET, ORALLY DISINTEGRATING ORAL at 11:11

## 2023-11-21 RX ADMIN — SODIUM CHLORIDE: 9 INJECTION, SOLUTION INTRAVENOUS at 11:11

## 2023-11-21 RX ADMIN — ERTAPENEM 1 G: 1 INJECTION, POWDER, LYOPHILIZED, FOR SOLUTION INTRAMUSCULAR; INTRAVENOUS at 12:11

## 2023-11-21 NOTE — TELEPHONE ENCOUNTER
----- Message from Loida Tompkins sent at 11/20/2023  4:25 PM CST -----  Type: Needs Medical Advice  Who Called:  Luz (daughter)  Symptoms (please be specific):    How long has patient had these symptoms:    Pharmacy name and phone #:    Best Call Back Number:   Additional Information: Luz is requesting a call back to reschedule her Mom appts. for an earlier shifts.

## 2023-11-22 ENCOUNTER — INFUSION (OUTPATIENT)
Dept: INFUSION THERAPY | Facility: HOSPITAL | Age: 78
End: 2023-11-22
Attending: INTERNAL MEDICINE
Payer: MEDICARE

## 2023-11-22 VITALS
OXYGEN SATURATION: 98 % | HEIGHT: 66 IN | DIASTOLIC BLOOD PRESSURE: 75 MMHG | RESPIRATION RATE: 18 BRPM | TEMPERATURE: 98 F | SYSTOLIC BLOOD PRESSURE: 149 MMHG | WEIGHT: 155 LBS | BODY MASS INDEX: 24.91 KG/M2 | HEART RATE: 69 BPM

## 2023-11-22 DIAGNOSIS — B96.29 URINARY TRACT INFECTION DUE TO EXTENDED-SPECTRUM BETA LACTAMASE (ESBL) PRODUCING ESCHERICHIA COLI: Primary | ICD-10-CM

## 2023-11-22 DIAGNOSIS — D46.9 MDS (MYELODYSPLASTIC SYNDROME): ICD-10-CM

## 2023-11-22 DIAGNOSIS — Z16.12 URINARY TRACT INFECTION DUE TO EXTENDED-SPECTRUM BETA LACTAMASE (ESBL) PRODUCING ESCHERICHIA COLI: Primary | ICD-10-CM

## 2023-11-22 DIAGNOSIS — N39.0 URINARY TRACT INFECTION DUE TO EXTENDED-SPECTRUM BETA LACTAMASE (ESBL) PRODUCING ESCHERICHIA COLI: Primary | ICD-10-CM

## 2023-11-22 PROCEDURE — 63600175 PHARM REV CODE 636 W HCPCS: Mod: PN | Performed by: INTERNAL MEDICINE

## 2023-11-22 PROCEDURE — 96401 CHEMO ANTI-NEOPL SQ/IM: CPT | Mod: PN

## 2023-11-22 PROCEDURE — 25000003 PHARM REV CODE 250: Mod: PN | Performed by: INTERNAL MEDICINE

## 2023-11-22 PROCEDURE — 96367 TX/PROPH/DG ADDL SEQ IV INF: CPT | Mod: PN

## 2023-11-22 RX ORDER — SODIUM CHLORIDE 0.9 % (FLUSH) 0.9 %
10 SYRINGE (ML) INJECTION
Status: DISCONTINUED | OUTPATIENT
Start: 2023-11-22 | End: 2023-11-22 | Stop reason: HOSPADM

## 2023-11-22 RX ORDER — HEPARIN 100 UNIT/ML
500 SYRINGE INTRAVENOUS
Status: CANCELLED | OUTPATIENT
Start: 2023-11-22

## 2023-11-22 RX ORDER — SODIUM CHLORIDE 0.9 % (FLUSH) 0.9 %
10 SYRINGE (ML) INJECTION
Status: CANCELLED | OUTPATIENT
Start: 2023-11-22

## 2023-11-22 RX ORDER — AZACITIDINE 100 MG/1
75 INJECTION, POWDER, LYOPHILIZED, FOR SOLUTION INTRAVENOUS; SUBCUTANEOUS
Status: COMPLETED | OUTPATIENT
Start: 2023-11-22 | End: 2023-11-22

## 2023-11-22 RX ORDER — ONDANSETRON 8 MG/1
16 TABLET, ORALLY DISINTEGRATING ORAL
Status: DISCONTINUED | OUTPATIENT
Start: 2023-11-22 | End: 2023-11-22 | Stop reason: HOSPADM

## 2023-11-22 RX ADMIN — SODIUM CHLORIDE: 9 INJECTION, SOLUTION INTRAVENOUS at 12:11

## 2023-11-22 RX ADMIN — AZACITIDINE 135 MG: 100 INJECTION, POWDER, LYOPHILIZED, FOR SOLUTION INTRAVENOUS; SUBCUTANEOUS at 12:11

## 2023-11-22 RX ADMIN — ERTAPENEM 1 G: 1 INJECTION, POWDER, LYOPHILIZED, FOR SOLUTION INTRAMUSCULAR; INTRAVENOUS at 12:11

## 2023-11-22 NOTE — TELEPHONE ENCOUNTER
She would benefit from an appointment to assess her symptoms.   On chart review, her albumin is low - this is a marker for protein in the body. When this is low it can contribute to swelling as there is not enough protein in the body to help keep fluid in the blood vessels and it can accumulate in the soft tissues - charla the ankles when one is standing or sitting for long periods of time.     Increasing lean sources of protein (egg whites, baked chicken or fish, low fat dairy are examples) can improve this.  I recommend that she wear compression stockings during the day to help with this.     Please help arrange an appt with provider to assess patient - ok to add as VV to my schedule for kimberly 11/22 at 3:30p if no in person appts available tomorrow

## 2023-11-22 NOTE — PLAN OF CARE
Problem: Adult Inpatient Plan of Care  Goal: Plan of Care Review  Outcome: Ongoing, Progressing  Flowsheets (Taken 11/22/2023 1315)  Plan of Care Reviewed With:   patient   daughter  Goal: Optimal Comfort and Wellbeing  Outcome: Ongoing, Progressing  Intervention: Provide Person-Centered Care  Flowsheets (Taken 11/22/2023 1315)  Trust Relationship/Rapport:   care explained   questions encouraged   choices provided   reassurance provided   emotional support provided   thoughts/feelings acknowledged   empathic listening provided   questions answered  Goal: Patient-Specific Goal (Individualized)  Outcome: Ongoing, Progressing  Flowsheets (Taken 11/22/2023 1315)  Anxieties, Fears or Concerns: UTI again  Individualized Care Needs: recliner, blanket, daughter at chairside, wheelchair, ice to injection sites, water, dim lights, sleep, education, conversation     Problem: Fatigue  Goal: Improved Activity Tolerance  Outcome: Ongoing, Progressing  Intervention: Promote Improved Energy  Flowsheets (Taken 11/22/2023 1315)  Fatigue Management:   paced activity encouraged   fatigue-related activity identified   frequent rest breaks encouraged   activity assistance provided  Sleep/Rest Enhancement:   therapeutic touch utilized   room darkened   relaxation techniques promoted   natural light exposure provided   awakenings minimized   consistent schedule promoted   noise level reduced   family presence promoted  Activity Management:   Ambulated to bathroom - L4   Up in chair - L3     Problem: Fall Injury Risk  Goal: Absence of Fall and Fall-Related Injury  Outcome: Ongoing, Progressing  Intervention: Promote Injury-Free Environment  Flowsheets (Taken 11/22/2023 1315)  Safety Promotion/Fall Prevention:   Fall Risk reviewed with patient/family   family to remain at bedside   instructed to call staff for mobility   room near unit station   supervised activity   high risk medications identified   in recliner, wheels locked   lighting  adjusted   medications reviewed

## 2023-11-22 NOTE — PLAN OF CARE
Pt arrived to clinic for C3D3 Vidaza injection as well as D3 Ertapenem infusion and tolerated both well with no changes throughout therapy. Pt aware of Venetoclax dose to take for today and other home meds to prevent infection as well. Pt aware of side effects and f/u appts and discharged to home in NAD with daughter.

## 2023-11-24 ENCOUNTER — INFUSION (OUTPATIENT)
Dept: INFUSION THERAPY | Facility: HOSPITAL | Age: 78
End: 2023-11-24
Attending: INTERNAL MEDICINE
Payer: MEDICARE

## 2023-11-24 VITALS
HEIGHT: 66 IN | TEMPERATURE: 98 F | HEART RATE: 73 BPM | DIASTOLIC BLOOD PRESSURE: 84 MMHG | BODY MASS INDEX: 24.91 KG/M2 | RESPIRATION RATE: 18 BRPM | SYSTOLIC BLOOD PRESSURE: 146 MMHG | WEIGHT: 155 LBS

## 2023-11-24 DIAGNOSIS — B96.29 URINARY TRACT INFECTION DUE TO EXTENDED-SPECTRUM BETA LACTAMASE (ESBL) PRODUCING ESCHERICHIA COLI: Primary | ICD-10-CM

## 2023-11-24 DIAGNOSIS — Z16.12 URINARY TRACT INFECTION DUE TO EXTENDED-SPECTRUM BETA LACTAMASE (ESBL) PRODUCING ESCHERICHIA COLI: Primary | ICD-10-CM

## 2023-11-24 DIAGNOSIS — D46.9 MDS (MYELODYSPLASTIC SYNDROME): ICD-10-CM

## 2023-11-24 DIAGNOSIS — N39.0 URINARY TRACT INFECTION DUE TO EXTENDED-SPECTRUM BETA LACTAMASE (ESBL) PRODUCING ESCHERICHIA COLI: Primary | ICD-10-CM

## 2023-11-24 PROCEDURE — 25000003 PHARM REV CODE 250: Mod: PN | Performed by: INTERNAL MEDICINE

## 2023-11-24 PROCEDURE — 63600175 PHARM REV CODE 636 W HCPCS: Mod: PN | Performed by: INTERNAL MEDICINE

## 2023-11-24 PROCEDURE — 96401 CHEMO ANTI-NEOPL SQ/IM: CPT | Mod: PN

## 2023-11-24 PROCEDURE — 96365 THER/PROPH/DIAG IV INF INIT: CPT | Mod: PN

## 2023-11-24 PROCEDURE — A4216 STERILE WATER/SALINE, 10 ML: HCPCS | Mod: PN | Performed by: INTERNAL MEDICINE

## 2023-11-24 RX ORDER — AZACITIDINE 100 MG/1
75 INJECTION, POWDER, LYOPHILIZED, FOR SOLUTION INTRAVENOUS; SUBCUTANEOUS
Status: COMPLETED | OUTPATIENT
Start: 2023-11-24 | End: 2023-11-24

## 2023-11-24 RX ORDER — ONDANSETRON 8 MG/1
16 TABLET, ORALLY DISINTEGRATING ORAL
Status: DISCONTINUED | OUTPATIENT
Start: 2023-11-24 | End: 2023-11-24 | Stop reason: HOSPADM

## 2023-11-24 RX ORDER — HEPARIN 100 UNIT/ML
500 SYRINGE INTRAVENOUS
Status: CANCELLED | OUTPATIENT
Start: 2023-11-24

## 2023-11-24 RX ORDER — SODIUM CHLORIDE 0.9 % (FLUSH) 0.9 %
10 SYRINGE (ML) INJECTION
Status: DISCONTINUED | OUTPATIENT
Start: 2023-11-24 | End: 2023-11-24 | Stop reason: HOSPADM

## 2023-11-24 RX ORDER — SODIUM CHLORIDE 0.9 % (FLUSH) 0.9 %
10 SYRINGE (ML) INJECTION
Status: CANCELLED | OUTPATIENT
Start: 2023-11-24

## 2023-11-24 RX ADMIN — ERTAPENEM 1 G: 1 INJECTION, POWDER, LYOPHILIZED, FOR SOLUTION INTRAMUSCULAR; INTRAVENOUS at 12:11

## 2023-11-24 RX ADMIN — AZACITIDINE 135 MG: 100 INJECTION, POWDER, LYOPHILIZED, FOR SOLUTION INTRAVENOUS; SUBCUTANEOUS at 12:11

## 2023-11-24 RX ADMIN — SODIUM CHLORIDE: 9 INJECTION, SOLUTION INTRAVENOUS at 11:11

## 2023-11-24 RX ADMIN — Medication 10 ML: at 11:11

## 2023-11-24 NOTE — PLAN OF CARE
Problem: Fatigue (Oncology Care)  Goal: Improved Activity Tolerance  Intervention: Promote Improved Energy  Flowsheets (Taken 11/24/2023 1221)  Fatigue Management:   activity schedule adjusted   fatigue-related activity identified   activity assistance provided   frequent rest breaks encouraged   paced activity encouraged  Sleep/Rest Enhancement:   relaxation techniques promoted   regular sleep/rest pattern promoted   natural light exposure provided  Activity Management: Up in stretcher chair - L1     Problem: Adult Inpatient Plan of Care  Goal: Patient-Specific Goal (Individualized)  Outcome: Ongoing, Progressing  Flowsheets (Taken 11/24/2023 1221)  Anxieties, Fears or Concerns: none  Individualized Care Needs: recliner, warm blanket, pillow, dim lights, wheelchair, dim lights, snacks, daughter at chairside      10-Oct-2019 00:18 right normal/left normal

## 2023-11-24 NOTE — PLAN OF CARE
Problem: Adult Inpatient Plan of Care  Goal: Plan of Care Review  11/24/2023 1627 by Jolie Huston, RN  Outcome: Ongoing, Progressing  Flowsheets (Taken 11/24/2023 1255)  Plan of Care Reviewed With:   patient   daughter  11/24/2023 1221 by Jolie Huston, RN  Outcome: Ongoing, Progressing   Pt tolerated her Ertapenem infusion and Vidaza injection well, NAD. No new c/o voiced. Pt given a schedule and reviewed, pt verbalized understanding. Pt wheeled out of the clinic via WC by her daughter.

## 2023-11-27 ENCOUNTER — INFUSION (OUTPATIENT)
Dept: INFUSION THERAPY | Facility: HOSPITAL | Age: 78
End: 2023-11-27
Attending: INTERNAL MEDICINE
Payer: MEDICARE

## 2023-11-27 ENCOUNTER — TELEPHONE (OUTPATIENT)
Dept: HEMATOLOGY/ONCOLOGY | Facility: CLINIC | Age: 78
End: 2023-11-27
Payer: MEDICARE

## 2023-11-27 ENCOUNTER — DOCUMENTATION ONLY (OUTPATIENT)
Dept: INFUSION THERAPY | Facility: HOSPITAL | Age: 78
End: 2023-11-27
Payer: MEDICARE

## 2023-11-27 VITALS
TEMPERATURE: 98 F | HEIGHT: 66 IN | RESPIRATION RATE: 18 BRPM | BODY MASS INDEX: 24.8 KG/M2 | SYSTOLIC BLOOD PRESSURE: 149 MMHG | DIASTOLIC BLOOD PRESSURE: 93 MMHG | WEIGHT: 154.31 LBS | HEART RATE: 70 BPM

## 2023-11-27 DIAGNOSIS — I10 HYPERTENSION, BENIGN: ICD-10-CM

## 2023-11-27 DIAGNOSIS — Z16.12 URINARY TRACT INFECTION DUE TO EXTENDED-SPECTRUM BETA LACTAMASE (ESBL) PRODUCING ESCHERICHIA COLI: Primary | ICD-10-CM

## 2023-11-27 DIAGNOSIS — B96.29 URINARY TRACT INFECTION DUE TO EXTENDED-SPECTRUM BETA LACTAMASE (ESBL) PRODUCING ESCHERICHIA COLI: Primary | ICD-10-CM

## 2023-11-27 DIAGNOSIS — N39.0 URINARY TRACT INFECTION DUE TO EXTENDED-SPECTRUM BETA LACTAMASE (ESBL) PRODUCING ESCHERICHIA COLI: Primary | ICD-10-CM

## 2023-11-27 DIAGNOSIS — D46.9 MDS (MYELODYSPLASTIC SYNDROME): ICD-10-CM

## 2023-11-27 PROCEDURE — 96375 TX/PRO/DX INJ NEW DRUG ADDON: CPT | Mod: PN

## 2023-11-27 PROCEDURE — 63600175 PHARM REV CODE 636 W HCPCS: Mod: PN | Performed by: INTERNAL MEDICINE

## 2023-11-27 PROCEDURE — 96367 TX/PROPH/DG ADDL SEQ IV INF: CPT | Mod: PN

## 2023-11-27 PROCEDURE — 96365 THER/PROPH/DIAG IV INF INIT: CPT | Mod: PN

## 2023-11-27 PROCEDURE — 96402 CHEMO HORMON ANTINEOPL SQ/IM: CPT | Mod: PN

## 2023-11-27 PROCEDURE — 25000003 PHARM REV CODE 250: Mod: PN | Performed by: INTERNAL MEDICINE

## 2023-11-27 RX ORDER — HEPARIN 100 UNIT/ML
500 SYRINGE INTRAVENOUS
Status: CANCELLED | OUTPATIENT
Start: 2023-11-27

## 2023-11-27 RX ORDER — PROCHLORPERAZINE EDISYLATE 5 MG/ML
5 INJECTION INTRAMUSCULAR; INTRAVENOUS
Status: DISCONTINUED | OUTPATIENT
Start: 2023-11-27 | End: 2023-11-27 | Stop reason: HOSPADM

## 2023-11-27 RX ORDER — AZACITIDINE 100 MG/1
75 INJECTION, POWDER, LYOPHILIZED, FOR SOLUTION INTRAVENOUS; SUBCUTANEOUS
Status: COMPLETED | OUTPATIENT
Start: 2023-11-27 | End: 2023-11-27

## 2023-11-27 RX ORDER — SODIUM CHLORIDE 0.9 % (FLUSH) 0.9 %
10 SYRINGE (ML) INJECTION
Status: CANCELLED | OUTPATIENT
Start: 2023-11-27

## 2023-11-27 RX ORDER — IRBESARTAN 300 MG/1
300 TABLET ORAL NIGHTLY
Qty: 90 TABLET | Refills: 0 | Status: SHIPPED | OUTPATIENT
Start: 2023-11-27 | End: 2024-02-20

## 2023-11-27 RX ORDER — SODIUM CHLORIDE 0.9 % (FLUSH) 0.9 %
10 SYRINGE (ML) INJECTION
Status: DISCONTINUED | OUTPATIENT
Start: 2023-11-27 | End: 2023-11-27 | Stop reason: HOSPADM

## 2023-11-27 RX ORDER — ONDANSETRON 8 MG/1
16 TABLET, ORALLY DISINTEGRATING ORAL
Status: COMPLETED | OUTPATIENT
Start: 2023-11-27 | End: 2023-11-27

## 2023-11-27 RX ADMIN — ERTAPENEM 1 G: 1 INJECTION, POWDER, LYOPHILIZED, FOR SOLUTION INTRAMUSCULAR; INTRAVENOUS at 12:11

## 2023-11-27 RX ADMIN — PROCHLORPERAZINE EDISYLATE 5 MG: 5 INJECTION INTRAMUSCULAR; INTRAVENOUS at 01:11

## 2023-11-27 RX ADMIN — AZACITIDINE 135 MG: 100 INJECTION, POWDER, LYOPHILIZED, FOR SOLUTION INTRAVENOUS; SUBCUTANEOUS at 01:11

## 2023-11-27 RX ADMIN — SODIUM CHLORIDE: 9 INJECTION, SOLUTION INTRAVENOUS at 12:11

## 2023-11-27 NOTE — TELEPHONE ENCOUNTER
Refill Routing Note   Medication(s) are not appropriate for processing by [facility] for the following reason(s):      Patient seen in ED/Hospital since LOV with provider  Required vitals abnormal    [facility] action(s):  Defer Care Due:  None identified            Appointments  past 12m or future 3m with PCP    Date Provider   Last Visit   10/19/2023 Savana Anderson MD   Next Visit   1/18/2024 Savana Anderson MD   ED visits in past 90 days: 1        Note composed:5:04 PM 11/27/2023

## 2023-11-27 NOTE — PROGRESS NOTES
"Oncology Nutrition   Chemotherapy Infusion Visit    Nutrition Follow Up   RD met with pt at chairside during infusion tx. Pt states her appetite "comes and goes". Pt denies any nutrition related side effects, difficulty chewing, or any food intolerances. Pt weight has been stable.       Wt Readings from Last 10 Encounters:   11/27/23 70 kg (154 lb 5.2 oz)   11/24/23 70.3 kg (154 lb 15.7 oz)   11/22/23 70.3 kg (154 lb 15.7 oz)   11/21/23 69.6 kg (153 lb 7 oz)   11/20/23 69.6 kg (153 lb 7 oz)   11/08/23 69.7 kg (153 lb 9.6 oz)   10/28/23 70 kg (154 lb 5.2 oz)   10/27/23 69.8 kg (153 lb 14.1 oz)   10/26/23 69.8 kg (153 lb 14.1 oz)   10/25/23 69.8 kg (153 lb 14.1 oz)       All other nutrition questions/concerns addressed as appropriate. Will continue to monitor prn throughout treatment.     Pooja Garner, EMPERATRIZ, LDN  11/27/2023  1:44 PM          "

## 2023-11-27 NOTE — TELEPHONE ENCOUNTER
No care due was identified.  [facility] Embedded Care Due Messages. Reference number: [id number].   11/27/2023 9:12:48 AM CST

## 2023-11-28 NOTE — PLAN OF CARE
Problem: Adult Inpatient Plan of Care  Goal: Plan of Care Review  Outcome: Ongoing, Progressing  Flowsheets (Taken 11/27/2023 1340)  Plan of Care Reviewed With:   patient   daughter  Goal: Patient-Specific Goal (Individualized)  Outcome: Ongoing, Progressing  Flowsheets (Taken 11/27/2023 1340)  Anxieties, Fears or Concerns: none  Individualized Care Needs: recliner/warm blanket/snacks/daughter at side     Problem: Fatigue (Oncology Care)  Goal: Improved Activity Tolerance  Outcome: Ongoing, Progressing  Intervention: Promote Improved Energy  Flowsheets (Taken 11/27/2023 0114)  Fatigue Management:   activity schedule adjusted   activity assistance provided   fatigue-related activity identified   frequent rest breaks encouraged   paced activity encouraged  Sleep/Rest Enhancement:   noise level reduced   reading promoted   regular sleep/rest pattern promoted   family presence promoted   relaxation techniques promoted   room darkened  Activity Management:   Ambulated -L4   Ambulated to bathroom - L4   Ambulated in [place]land - L4   Up in stretcher chair - L1   Walk with assistive devise and /or staff member - L3   Pt tolerated Vidaza/Antibiotics well today. NAD/no concerns voiced. Reviewed follow-up appointments. All questions were answered, daughter pushed patient downstairs in wheelchair for discharge.

## 2023-11-30 ENCOUNTER — PATIENT MESSAGE (OUTPATIENT)
Dept: INTERNAL MEDICINE | Facility: CLINIC | Age: 78
End: 2023-11-30
Payer: MEDICARE

## 2023-11-30 RX ORDER — FERROUS GLUCONATE 324(38)MG
324 TABLET ORAL EVERY OTHER DAY
Qty: 90 TABLET | Refills: 3 | Status: SHIPPED | OUTPATIENT
Start: 2023-11-30

## 2023-11-30 RX ORDER — FERROUS SULFATE 325(65) MG
325 TABLET ORAL
Qty: 30 TABLET | Refills: 2 | Status: CANCELLED | OUTPATIENT
Start: 2023-11-30

## 2023-11-30 NOTE — TELEPHONE ENCOUNTER
Sent in oral iron rx   Unfortunately I am overbooked already tomorrow   Daniella Shetty is my colleague in clinic and excellent - I recommend that she f/u with her if she has availability to assess her symptoms of leg swelling

## 2023-12-04 ENCOUNTER — TELEPHONE (OUTPATIENT)
Dept: HEMATOLOGY/ONCOLOGY | Facility: CLINIC | Age: 78
End: 2023-12-04
Payer: MEDICARE

## 2023-12-04 ENCOUNTER — LAB VISIT (OUTPATIENT)
Dept: LAB | Facility: HOSPITAL | Age: 78
End: 2023-12-04
Attending: INTERNAL MEDICINE
Payer: MEDICARE

## 2023-12-04 DIAGNOSIS — N39.0 RECURRENT UTI: ICD-10-CM

## 2023-12-04 DIAGNOSIS — E03.9 HYPOTHYROIDISM, UNSPECIFIED TYPE: ICD-10-CM

## 2023-12-04 DIAGNOSIS — D46.9 MDS (MYELODYSPLASTIC SYNDROME): ICD-10-CM

## 2023-12-04 LAB
ABO + RH BLD: NORMAL
ALBUMIN SERPL BCP-MCNC: 2.8 G/DL (ref 3.5–5.2)
ALP SERPL-CCNC: 146 U/L (ref 55–135)
ALT SERPL W/O P-5'-P-CCNC: 22 U/L (ref 10–44)
ANION GAP SERPL CALC-SCNC: 9 MMOL/L (ref 8–16)
ANISOCYTOSIS BLD QL SMEAR: SLIGHT
AST SERPL-CCNC: 16 U/L (ref 10–40)
BACTERIA #/AREA URNS HPF: ABNORMAL /HPF
BASOPHILS # BLD AUTO: ABNORMAL K/UL (ref 0–0.2)
BASOPHILS NFR BLD: 0 % (ref 0–1.9)
BILIRUB SERPL-MCNC: 1.5 MG/DL (ref 0.1–1)
BILIRUB UR QL STRIP: NEGATIVE
BLD GP AB SCN CELLS X3 SERPL QL: NORMAL
BUN SERPL-MCNC: 14 MG/DL (ref 8–23)
CALCIUM SERPL-MCNC: 9 MG/DL (ref 8.7–10.5)
CHLORIDE SERPL-SCNC: 103 MMOL/L (ref 95–110)
CLARITY UR: ABNORMAL
CO2 SERPL-SCNC: 27 MMOL/L (ref 23–29)
COLOR UR: YELLOW
CREAT SERPL-MCNC: 0.8 MG/DL (ref 0.5–1.4)
DACRYOCYTES BLD QL SMEAR: ABNORMAL
DIFFERENTIAL METHOD: ABNORMAL
EOSINOPHIL # BLD AUTO: ABNORMAL K/UL (ref 0–0.5)
EOSINOPHIL NFR BLD: 0 % (ref 0–8)
ERYTHROCYTE [DISTWIDTH] IN BLOOD BY AUTOMATED COUNT: 23.6 % (ref 11.5–14.5)
EST. GFR  (NO RACE VARIABLE): >60 ML/MIN/1.73 M^2
GLUCOSE SERPL-MCNC: 138 MG/DL (ref 70–110)
GLUCOSE UR QL STRIP: NEGATIVE
HCT VFR BLD AUTO: 26.2 % (ref 37–48.5)
HGB BLD-MCNC: 8.1 G/DL (ref 12–16)
HGB UR QL STRIP: ABNORMAL
HYALINE CASTS #/AREA URNS LPF: 0 /LPF
HYPOCHROMIA BLD QL SMEAR: ABNORMAL
IMM GRANULOCYTES # BLD AUTO: ABNORMAL K/UL (ref 0–0.04)
IMM GRANULOCYTES NFR BLD AUTO: ABNORMAL % (ref 0–0.5)
KETONES UR QL STRIP: NEGATIVE
LDH SERPL L TO P-CCNC: 164 U/L (ref 110–260)
LEUKOCYTE ESTERASE UR QL STRIP: ABNORMAL
LYMPHOCYTES # BLD AUTO: ABNORMAL K/UL (ref 1–4.8)
LYMPHOCYTES NFR BLD: 57 % (ref 18–48)
MAGNESIUM SERPL-MCNC: 1.9 MG/DL (ref 1.6–2.6)
MCH RBC QN AUTO: 24.2 PG (ref 27–31)
MCHC RBC AUTO-ENTMCNC: 30.9 G/DL (ref 32–36)
MCV RBC AUTO: 78 FL (ref 82–98)
MICROSCOPIC COMMENT: ABNORMAL
MONOCYTES # BLD AUTO: ABNORMAL K/UL (ref 0.3–1)
MONOCYTES NFR BLD: 0 % (ref 4–15)
NEUTROPHILS NFR BLD: 43 % (ref 38–73)
NITRITE UR QL STRIP: POSITIVE
NRBC BLD-RTO: 0 /100 WBC
OVALOCYTES BLD QL SMEAR: ABNORMAL
PH UR STRIP: 7 [PH] (ref 5–8)
PHOSPHATE SERPL-MCNC: 3.2 MG/DL (ref 2.7–4.5)
PLATELET # BLD AUTO: 53 K/UL (ref 150–450)
PLATELET BLD QL SMEAR: ABNORMAL
PMV BLD AUTO: 9.4 FL (ref 9.2–12.9)
POIKILOCYTOSIS BLD QL SMEAR: SLIGHT
POLYCHROMASIA BLD QL SMEAR: ABNORMAL
POTASSIUM SERPL-SCNC: 3.6 MMOL/L (ref 3.5–5.1)
PROT SERPL-MCNC: 6.7 G/DL (ref 6–8.4)
PROT UR QL STRIP: ABNORMAL
RBC # BLD AUTO: 3.35 M/UL (ref 4–5.4)
RBC #/AREA URNS HPF: 0 /HPF (ref 0–4)
SODIUM SERPL-SCNC: 139 MMOL/L (ref 136–145)
SP GR UR STRIP: 1.02 (ref 1–1.03)
SPECIMEN OUTDATE: NORMAL
SQUAMOUS #/AREA URNS HPF: 9 /HPF
TARGETS BLD QL SMEAR: ABNORMAL
URATE SERPL-MCNC: 3.8 MG/DL (ref 2.4–5.7)
URN SPEC COLLECT METH UR: ABNORMAL
WBC # BLD AUTO: 0.84 K/UL (ref 3.9–12.7)
WBC #/AREA URNS HPF: >100 /HPF (ref 0–5)
WBC CLUMPS URNS QL MICRO: ABNORMAL

## 2023-12-04 PROCEDURE — 87086 URINE CULTURE/COLONY COUNT: CPT | Performed by: INTERNAL MEDICINE

## 2023-12-04 PROCEDURE — 81000 URINALYSIS NONAUTO W/SCOPE: CPT | Mod: PN | Performed by: INTERNAL MEDICINE

## 2023-12-04 PROCEDURE — 86850 RBC ANTIBODY SCREEN: CPT | Mod: PN | Performed by: INTERNAL MEDICINE

## 2023-12-04 PROCEDURE — 87186 SC STD MICRODIL/AGAR DIL: CPT | Performed by: INTERNAL MEDICINE

## 2023-12-04 PROCEDURE — 83615 LACTATE (LD) (LDH) ENZYME: CPT | Mod: PN | Performed by: INTERNAL MEDICINE

## 2023-12-04 PROCEDURE — 85027 COMPLETE CBC AUTOMATED: CPT | Mod: PN | Performed by: INTERNAL MEDICINE

## 2023-12-04 PROCEDURE — 84550 ASSAY OF BLOOD/URIC ACID: CPT | Mod: PN | Performed by: INTERNAL MEDICINE

## 2023-12-04 PROCEDURE — 83735 ASSAY OF MAGNESIUM: CPT | Mod: PN | Performed by: INTERNAL MEDICINE

## 2023-12-04 PROCEDURE — 80053 COMPREHEN METABOLIC PANEL: CPT | Mod: PN | Performed by: INTERNAL MEDICINE

## 2023-12-04 PROCEDURE — 84100 ASSAY OF PHOSPHORUS: CPT | Mod: PN | Performed by: INTERNAL MEDICINE

## 2023-12-04 PROCEDURE — 86901 BLOOD TYPING SEROLOGIC RH(D): CPT | Performed by: INTERNAL MEDICINE

## 2023-12-04 PROCEDURE — 87088 URINE BACTERIA CULTURE: CPT | Performed by: INTERNAL MEDICINE

## 2023-12-04 PROCEDURE — 85007 BL SMEAR W/DIFF WBC COUNT: CPT | Mod: PN | Performed by: INTERNAL MEDICINE

## 2023-12-04 PROCEDURE — 87077 CULTURE AEROBIC IDENTIFY: CPT | Performed by: INTERNAL MEDICINE

## 2023-12-04 PROCEDURE — 36415 COLL VENOUS BLD VENIPUNCTURE: CPT | Mod: PN | Performed by: INTERNAL MEDICINE

## 2023-12-04 RX ORDER — LEVOTHYROXINE SODIUM 50 UG/1
50 TABLET ORAL
Qty: 90 TABLET | Refills: 1 | Status: SHIPPED | OUTPATIENT
Start: 2023-12-04

## 2023-12-04 NOTE — TELEPHONE ENCOUNTER
No care due was identified.  [facility] Embedded Care Due Messages. Reference number: [id number].   12/04/2023 10:54:54 AM CST

## 2023-12-05 NOTE — TELEPHONE ENCOUNTER
Refill Decision Note   Niurka Pedraza  is requesting a refill authorization.  Brief Assessment and Rationale for Refill:  Approve     Medication Therapy Plan:  TSH WNL 06/22/2023; ED visit unrelated to requested rx; Rx sent to pharmacy requested via patient portal    Medication Reconciliation Completed: No   Comments:     No Care Gaps recommended.     Note composed:8:46 PM 12/04/2023

## 2023-12-06 LAB — BACTERIA UR CULT: ABNORMAL

## 2023-12-07 ENCOUNTER — TELEPHONE (OUTPATIENT)
Dept: INFECTIOUS DISEASES | Facility: CLINIC | Age: 78
End: 2023-12-07
Payer: MEDICARE

## 2023-12-07 DIAGNOSIS — D46.9 MDS (MYELODYSPLASTIC SYNDROME): ICD-10-CM

## 2023-12-07 RX ORDER — AMOXICILLIN 875 MG/1
875 TABLET, FILM COATED ORAL 2 TIMES DAILY
Qty: 10 TABLET | Refills: 0 | Status: SHIPPED | OUTPATIENT
Start: 2023-12-07 | End: 2023-12-12

## 2023-12-07 NOTE — TELEPHONE ENCOUNTER
----- Message from Linda Marroquin, DO sent at 12/7/2023  3:57 PM CST -----  Can you let pt know I sent a prescription for antibiotics for UTI over to her pharmacy

## 2023-12-11 ENCOUNTER — TELEPHONE (OUTPATIENT)
Dept: HEMATOLOGY/ONCOLOGY | Facility: CLINIC | Age: 78
End: 2023-12-11
Payer: MEDICARE

## 2023-12-11 ENCOUNTER — LAB VISIT (OUTPATIENT)
Dept: LAB | Facility: HOSPITAL | Age: 78
End: 2023-12-11
Attending: INTERNAL MEDICINE
Payer: MEDICARE

## 2023-12-11 DIAGNOSIS — D46.9 MDS (MYELODYSPLASTIC SYNDROME): ICD-10-CM

## 2023-12-11 DIAGNOSIS — N39.0 RECURRENT UTI: ICD-10-CM

## 2023-12-11 LAB
ABO + RH BLD: NORMAL
ALBUMIN SERPL BCP-MCNC: 2.7 G/DL (ref 3.5–5.2)
ALP SERPL-CCNC: 152 U/L (ref 55–135)
ALT SERPL W/O P-5'-P-CCNC: 20 U/L (ref 10–44)
ANION GAP SERPL CALC-SCNC: 9 MMOL/L (ref 8–16)
ANISOCYTOSIS BLD QL SMEAR: SLIGHT
AST SERPL-CCNC: 10 U/L (ref 10–40)
BASO STIPL BLD QL SMEAR: ABNORMAL
BASOPHILS # BLD AUTO: ABNORMAL K/UL (ref 0–0.2)
BASOPHILS NFR BLD: 0 % (ref 0–1.9)
BILIRUB SERPL-MCNC: 1.3 MG/DL (ref 0.1–1)
BILIRUB UR QL STRIP: NEGATIVE
BLD GP AB SCN CELLS X3 SERPL QL: NORMAL
BUN SERPL-MCNC: 11 MG/DL (ref 8–23)
CALCIUM SERPL-MCNC: 8.8 MG/DL (ref 8.7–10.5)
CHLORIDE SERPL-SCNC: 102 MMOL/L (ref 95–110)
CLARITY UR: CLEAR
CO2 SERPL-SCNC: 26 MMOL/L (ref 23–29)
COLOR UR: YELLOW
CREAT SERPL-MCNC: 0.8 MG/DL (ref 0.5–1.4)
DIFFERENTIAL METHOD: ABNORMAL
EOSINOPHIL # BLD AUTO: ABNORMAL K/UL (ref 0–0.5)
EOSINOPHIL NFR BLD: 0 % (ref 0–8)
ERYTHROCYTE [DISTWIDTH] IN BLOOD BY AUTOMATED COUNT: 23.9 % (ref 11.5–14.5)
EST. GFR  (NO RACE VARIABLE): >60 ML/MIN/1.73 M^2
GLUCOSE SERPL-MCNC: 184 MG/DL (ref 70–110)
GLUCOSE UR QL STRIP: NEGATIVE
HCT VFR BLD AUTO: 25.5 % (ref 37–48.5)
HGB BLD-MCNC: 7.6 G/DL (ref 12–16)
HGB UR QL STRIP: ABNORMAL
HYPOCHROMIA BLD QL SMEAR: ABNORMAL
IMM GRANULOCYTES # BLD AUTO: ABNORMAL K/UL (ref 0–0.04)
IMM GRANULOCYTES NFR BLD AUTO: ABNORMAL % (ref 0–0.5)
KETONES UR QL STRIP: NEGATIVE
LDH SERPL L TO P-CCNC: 152 U/L (ref 110–260)
LEUKOCYTE ESTERASE UR QL STRIP: NEGATIVE
LYMPHOCYTES # BLD AUTO: ABNORMAL K/UL (ref 1–4.8)
LYMPHOCYTES NFR BLD: 60 % (ref 18–48)
MAGNESIUM SERPL-MCNC: 1.8 MG/DL (ref 1.6–2.6)
MCH RBC QN AUTO: 23 PG (ref 27–31)
MCHC RBC AUTO-ENTMCNC: 29.8 G/DL (ref 32–36)
MCV RBC AUTO: 77 FL (ref 82–98)
MONOCYTES # BLD AUTO: ABNORMAL K/UL (ref 0.3–1)
MONOCYTES NFR BLD: 6 % (ref 4–15)
NEUTROPHILS NFR BLD: 30 % (ref 38–73)
NEUTS BAND NFR BLD MANUAL: 4 %
NITRITE UR QL STRIP: NEGATIVE
NRBC BLD-RTO: 0 /100 WBC
OVALOCYTES BLD QL SMEAR: ABNORMAL
PH UR STRIP: 6 [PH] (ref 5–8)
PHOSPHATE SERPL-MCNC: 2.6 MG/DL (ref 2.7–4.5)
PLATELET # BLD AUTO: 119 K/UL (ref 150–450)
PLATELET BLD QL SMEAR: ABNORMAL
PMV BLD AUTO: 8.6 FL (ref 9.2–12.9)
POIKILOCYTOSIS BLD QL SMEAR: SLIGHT
POLYCHROMASIA BLD QL SMEAR: ABNORMAL
POTASSIUM SERPL-SCNC: 3.5 MMOL/L (ref 3.5–5.1)
PROT SERPL-MCNC: 6.9 G/DL (ref 6–8.4)
PROT UR QL STRIP: ABNORMAL
RBC # BLD AUTO: 3.3 M/UL (ref 4–5.4)
SODIUM SERPL-SCNC: 137 MMOL/L (ref 136–145)
SP GR UR STRIP: <=1.005 (ref 1–1.03)
SPECIMEN OUTDATE: NORMAL
URATE SERPL-MCNC: 3.8 MG/DL (ref 2.4–5.7)
URN SPEC COLLECT METH UR: ABNORMAL
WBC # BLD AUTO: 0.45 K/UL (ref 3.9–12.7)

## 2023-12-11 PROCEDURE — 83735 ASSAY OF MAGNESIUM: CPT | Mod: PN | Performed by: INTERNAL MEDICINE

## 2023-12-11 PROCEDURE — 83615 LACTATE (LD) (LDH) ENZYME: CPT | Mod: PN | Performed by: INTERNAL MEDICINE

## 2023-12-11 PROCEDURE — 86901 BLOOD TYPING SEROLOGIC RH(D): CPT | Mod: PN | Performed by: INTERNAL MEDICINE

## 2023-12-11 PROCEDURE — 81003 URINALYSIS AUTO W/O SCOPE: CPT | Mod: PN | Performed by: INTERNAL MEDICINE

## 2023-12-11 PROCEDURE — 85007 BL SMEAR W/DIFF WBC COUNT: CPT | Mod: PN | Performed by: INTERNAL MEDICINE

## 2023-12-11 PROCEDURE — 80053 COMPREHEN METABOLIC PANEL: CPT | Mod: PN | Performed by: INTERNAL MEDICINE

## 2023-12-11 PROCEDURE — 85027 COMPLETE CBC AUTOMATED: CPT | Mod: PN | Performed by: INTERNAL MEDICINE

## 2023-12-11 PROCEDURE — 84550 ASSAY OF BLOOD/URIC ACID: CPT | Mod: PN | Performed by: INTERNAL MEDICINE

## 2023-12-11 PROCEDURE — 86850 RBC ANTIBODY SCREEN: CPT | Performed by: INTERNAL MEDICINE

## 2023-12-11 PROCEDURE — 36415 COLL VENOUS BLD VENIPUNCTURE: CPT | Mod: PN | Performed by: INTERNAL MEDICINE

## 2023-12-11 PROCEDURE — 84100 ASSAY OF PHOSPHORUS: CPT | Mod: PN | Performed by: INTERNAL MEDICINE

## 2023-12-14 ENCOUNTER — OFFICE VISIT (OUTPATIENT)
Dept: HEMATOLOGY/ONCOLOGY | Facility: CLINIC | Age: 78
End: 2023-12-14
Payer: MEDICARE

## 2023-12-14 DIAGNOSIS — D46.9 MDS (MYELODYSPLASTIC SYNDROME): Primary | ICD-10-CM

## 2023-12-14 DIAGNOSIS — D61.818 PANCYTOPENIA: ICD-10-CM

## 2023-12-14 PROCEDURE — 99215 PR OFFICE/OUTPT VISIT, EST, LEVL V, 40-54 MIN: ICD-10-PCS | Mod: 95,,, | Performed by: INTERNAL MEDICINE

## 2023-12-14 PROCEDURE — 99215 OFFICE O/P EST HI 40 MIN: CPT | Mod: 95,,, | Performed by: INTERNAL MEDICINE

## 2023-12-14 NOTE — PROGRESS NOTES
HEMATOLOGIC MALIGNANCIES PROGRESS NOTE    IDENTIFYING STATEMENT   Niurka Castellon) is a 78 y.o. female with a  of 1945 from [place], [place] with the diagnosis of MDS.      TELEMEDICINE DOCUMENTATION    The patient location is: [place]  The chief complaint leading to consultation is: MDS    Visit type: audiovisual    Face to Face time with patient: 21 minutes  35 minutes of total time spent on the encounter, which includes face to face time and non-face to face time preparing to see the patient (eg, review of tests), Obtaining and/or reviewing separately obtained history, Documenting clinical information in the electronic or other health record, Independently interpreting results (not separately reported) and communicating results to the patient/family/caregiver, or Care coordination (not separately reported).         Each patient to whom he or she provides medical services by telemedicine is:  (1) informed of the relationship between the physician and patient and the respective role of any other health care provider with respect to management of the patient; and (2) notified that he or she may decline to receive medical services by telemedicine and may withdraw from such care at any time.    Notes:     ONCOLOGY HISTORY:    1. Myelodysplastic syndrome with increased blasts-2              A. 2023: Bone marrow biopsy - 65-75% cellular marrow consistent with myelodysplastic syndrome with excess blasts-2; cytogenetics 46,XX,del(3)(q12)[2]/47,sl,+8[18]; NGS not sent; IPSS-R score of 7 = very high risk   B. 2023: Cycle 1 azacitidine-venetoclax (5 days aza, 14 days angelo) for MDS/AML   C. 10/18/2023: Bone marrow biopsy - 50-60% cellular marrow with no increased blasts and trilineage hematopoiesis with granulocytic hypoplasia and moderate dysgranulopoiesis, mildly left-shifted marked erythroid hyperplasia with severe dyserythropoiesis, and marked megakaryocytic hyperplasia with predominantly variably  sized including small del3q-like monolobated forms; 3-4+ histiocytic iron stores; focal MF-1; cytogenetics 46,XX[20]; NGS shows ASXL1 (26%), SRSF2 (33%) and STAG2 (3%).     2. Neuropathy  3. Interstitial lung disease/pulmonary fibrosis  4. Hypertension  5. Aortic atherosclerosis  6. Pulmonary coccidioidomycosis  7. Hypothyroidism  8. Hyperparathyroidism    INTERVAL HISTORY:      Ms. Pedraza is seen in this virtual visit in follow-up of MDS/AML prior to cycle 4 of aza-angelo therapy.  She is reporting loss of appetite and taste. She is fatigued.     Past Medical History, Past Social History and Past Family History have been reviewed and are unchanged except as noted in the interval history.    MEDICATIONS:     Current Outpatient Medications on File Prior to Visit   Medication Sig Dispense Refill    acyclovir (ZOVIRAX) 400 MG tablet Take 1 tablet (400 mg total) by mouth 2 (two) times daily. 60 tablet 11    albuterol (VENTOLIN HFA) 90 mcg/actuation inhaler Inhale 1-2 puffs into the lungs every 6 (six) hours as needed for Wheezing or Shortness of Breath. Rescue 18 g 11    albuterol-ipratropium (DUO-NEB) 2.5 mg-0.5 mg/3 mL nebulizer solution Take 3 mLs by nebulization every 6 (six) hours as needed for Wheezing or Shortness of Breath. Rescue 75 mL 11    ascorbic acid/zinc (ZINC WITH VITAMIN C ORAL) Take by mouth.      atorvastatin (LIPITOR) 20 MG tablet Take 1 tablet (20 mg total) by mouth once daily. 90 tablet 3    azelastine (ASTELIN) 137 mcg (0.1 %) nasal spray 1 spray (137 mcg total) by Nasal route 2 (two) times daily. 30 mL 6    calcium-vitamin D3 (CALCIUM 500 + D) 500 mg(1,250mg) -200 unit per tablet Take 1 tablet by mouth 2 (two) times daily with meals.      carvediloL (COREG) 6.25 MG tablet Take 1 tablet (6.25 mg total) by mouth 2 (two) times daily with meals. 180 tablet 3    estradioL (ESTRACE) 0.01 % (0.1 mg/gram) vaginal cream Place 1 g vaginally twice a week. 42.5 g 11    ferrous gluconate (FERGON) 324 MG tablet  Take 1 tablet (324 mg total) by mouth every other day. 90 tablet 3    fluticasone furoate-vilanteroL (BREO ELLIPTA) 100-25 mcg/dose diskus inhaler Inhale 1 puff into the lungs once daily. Controller.  PLEASE NOTE IT IS ONCE A DAY!! 60 each 4    gabapentin (NEURONTIN) 100 MG capsule Take 1 capsule (100 mg total) by mouth every evening. 30 capsule 3    irbesartan (AVAPRO) 300 MG tablet Take 1 tablet (300 mg total) by mouth every evening. 90 tablet 0    levothyroxine (SYNTHROID) 50 MCG tablet Take 1 tablet (50 mcg total) by mouth before breakfast. 90 tablet 1    montelukast (SINGULAIR) 10 mg tablet TAKE 1 TABLET BY MOUTH EVERY DAY IN THE EVENING 90 tablet 2    multivitamin (THERAGRAN) per tablet Take 1 tablet by mouth once daily.      ondansetron (ZOFRAN-ODT) 4 MG TbDL Take 2 tablets (8 mg total) by mouth every 6 (six) hours as needed (nausea). 30 tablet 1    oxybutynin (DITROPAN XL) 15 MG TR24 Take 1 tablet (15 mg total) by mouth once daily. 30 tablet 11    pantoprazole (PROTONIX) 20 MG tablet Take 1 tablet (20 mg total) by mouth once daily. 90 tablet 1    posaconazole (NOXAFIL) 100 mg TbEC tablet Take 3 tablets (300 mg) by mouth twice daily on the first day, and then take 300 mg daily thereafter. 30 tablet 2    predniSONE (DELTASONE) 1 MG tablet Take 2 tablets (2 mg total) by mouth once daily. 180 tablet 3    sodium chloride 3% 3 % nebulizer solution Take 4 mLs by nebulization as needed for Other. 120 mL 3    venetoclax (VENCLEXTA) 100 mg Tab Take one tablet (100 mg) by mouth daily on days 1-14 of each 28 day cycle of therapy.. 28 tablet 0    vibegron (GEMTESA) 75 mg Tab Take 75 mg by mouth once daily. 30 tablet 11    [DISCONTINUED] budesonide-formoterol 80-4.5 mcg (SYMBICORT) 80-4.5 mcg/actuation HFAA Inhale 2 puffs into the lungs 2 (two) times daily as needed. Controller 1 Inhaler 6     No current facility-administered medications on file prior to visit.       ALLERGIES:   Review of patient's allergies indicates:    Allergen Reactions    Ciprofloxacin Other (See Comments)     Right foot pain and edema, tendonitis    Amlodipine Edema and Swelling     BLE  BLE    Lisinopril Other (See Comments)     cough    Nitrofuran analogues         ROS:       Review of Systems   Constitutional:  Positive for appetite change. Negative for unexpected weight change.   HENT:   Negative for mouth sores.    Respiratory:  Positive for cough and shortness of breath.    Cardiovascular:  Negative for chest pain.   Gastrointestinal:  Positive for abdominal pain. Negative for diarrhea.   Genitourinary:  Positive for frequency.    Musculoskeletal:  Positive for back pain.   Skin:  Negative for rash.   Neurological:  Negative for headaches.   Hematological:  Negative for adenopathy.   Psychiatric/Behavioral:  The patient is not nervous/anxious.        PHYSICAL EXAM:  There were no vitals filed for this visit.    Physical Exam  Head and neck visualized, and no abnormalities seen.     LAB:   Results for orders placed or performed in visit on 12/11/23   CBC Auto Differential   Result Value Ref Range    WBC 0.45 (LL) 3.90 - 12.70 K/uL    RBC 3.30 (L) 4.00 - 5.40 M/uL    Hemoglobin 7.6 (L) 12.0 - 16.0 g/dL    Hematocrit 25.5 (L) 37.0 - 48.5 %    MCV 77 (L) 82 - 98 fL    MCH 23.0 (L) 27.0 - 31.0 pg    MCHC 29.8 (L) 32.0 - 36.0 g/dL    RDW 23.9 (H) 11.5 - 14.5 %    Platelets 119 (L) 150 - 450 K/uL    MPV 8.6 (L) 9.2 - 12.9 fL    Immature Granulocytes CANCELED 0.0 - 0.5 %    Immature Grans (Abs) CANCELED 0.00 - 0.04 K/uL    Lymph # CANCELED 1.0 - 4.8 K/uL    Mono # CANCELED 0.3 - 1.0 K/uL    Eos # CANCELED 0.0 - 0.5 K/uL    Baso # CANCELED 0.00 - 0.20 K/uL    nRBC 0 0 /100 WBC    Gran % 30.0 (L) 38.0 - 73.0 %    Lymph % 60.0 (H) 18.0 - 48.0 %    Mono % 6.0 4.0 - 15.0 %    Eosinophil % 0.0 0.0 - 8.0 %    Basophil % 0.0 0.0 - 1.9 %    Bands 4.0 %    Platelet Estimate Decreased (A)     Aniso Slight     Poik Slight     Poly Occasional     Hypo Moderate      Ovalocytes Occasional     Basophilic Stippling Occasional     Differential Method Manual    Comprehensive Metabolic Panel   Result Value Ref Range    Sodium 137 136 - 145 mmol/L    Potassium 3.5 3.5 - 5.1 mmol/L    Chloride 102 95 - 110 mmol/L    CO2 26 23 - 29 mmol/L    Glucose 184 (H) 70 - 110 mg/dL    BUN 11 8 - 23 mg/dL    Creatinine 0.8 0.5 - 1.4 mg/dL    Calcium 8.8 8.7 - 10.5 mg/dL    Total Protein 6.9 6.0 - 8.4 g/dL    Albumin 2.7 (L) 3.5 - 5.2 g/dL    Total Bilirubin 1.3 (H) 0.1 - 1.0 mg/dL    Alkaline Phosphatase 152 (H) 55 - 135 U/L    AST 10 10 - 40 U/L    ALT 20 10 - 44 U/L    eGFR >60.0 >60 mL/min/1.73 m^2    Anion Gap 9 8 - 16 mmol/L   Magnesium   Result Value Ref Range    Magnesium 1.8 1.6 - 2.6 mg/dL   PHOSPHORUS   Result Value Ref Range    Phosphorus 2.6 (L) 2.7 - 4.5 mg/dL   Lactate Dehydrogenase   Result Value Ref Range     110 - 260 U/L   Uric Acid   Result Value Ref Range    Uric Acid 3.8 2.4 - 5.7 mg/dL   Urinalysis, Reflex to Urine Culture Urine, Clean Catch    Specimen: Urine   Result Value Ref Range    Specimen UA Urine, Clean Catch     Color, UA Yellow Yellow, Straw, Kourtney    Appearance, UA Clear Clear    pH, UA 6.0 5.0 - 8.0    Specific Gravity, UA <=1.005 (A) 1.005 - 1.030    Protein, UA Trace (A) Negative    Glucose, UA Negative Negative    Ketones, UA Negative Negative    Bilirubin (UA) Negative Negative    Occult Blood UA Trace (A) Negative    Nitrite, UA Negative Negative    Leukocytes, UA Negative Negative   Type & Screen   Result Value Ref Range    Group & Rh A POS     Indirect Emiliano GEL NEG     Specimen Outdate 12/14/2023 23:59      *Note: Due to a large number of results and/or encounters for the requested time period, some results have not been displayed. A complete set of results can be found in Results Review.       PROBLEMS ASSESSED THIS VISIT:    1. MDS (myelodysplastic syndrome)    2. Pancytopenia          PLAN:       Myelodysplastic syndrome  Ms. 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  We reviewed that this is an aggressive hematologic malignancy with high probability of evolution to acute myeloid leukemia (AML). According to the [facility] pathologic designation, this is referred to as MDS/AML.     Bone marrow biopsy after 1 cycle of aza-angelo showed resolution of increased blasts with ongoing morphologic changes consistent with MDS. We reviewed that this represents a favorable response to therapy. We will therefore proceed with indefinite aza-angelo therapy.      She has severe neutropenia, so we will need to repeat CBC before giving cycle 4 of aza-angelo.     Plan bone marrow biopsy q6months to monitor response (due 4/18/2024).     Continue infection prophylaxis with acyclovir, posaconazole. Antibiotic agents per ID given multi-drug resistant UTI.     Pancytopenia  Due to MDS/AML. Monitor weekly during therapy and transfuse as clinically indicated.     Follow-up    Route Chart for Scheduling    BMT Chart Routing      Follow up with physician . 1/12/2024. May offer virtual visit   Follow up with [facility]    Provider visit type Malignant hem   Infusion scheduling note   1/15, 1/16, 1/17, 1/18, 1/19 in [place] for azacitidine   Injection scheduling note    Labs CBC, CMP, magnesium, phosphorus, LDH, uric acid and type and screen   Scheduling:  Preferred lab:  Lab interval: once a week  on [facility]   Imaging    Pharmacy appointment    Other referrals                  Treatment Plan Information   OP AZACITIDINE 5-DAY (SUB-Q)   Mohit Centeno MD   Upcoming Treatment Dates - OP AZACITIDINE 5-DAY (SUB-Q)    12/18/2023       Pre-Medications       dexAMETHasone tablet 8 mg       Chemotherapy       azaCITIDine (VIDAZA) chemo injection 135 mg  12/19/2023       Pre-Medications       dexAMETHasone tablet 8 mg       Chemotherapy       azaCITIDine (VIDAZA) chemo injection 135 mg  12/20/2023       Pre-Medications       dexAMETHasone tablet 8 mg       Chemotherapy       azaCITIDine (VIDAZA) chemo injection 135  mg  12/21/2023       Pre-Medications       dexAMETHasone tablet 8 mg       Chemotherapy       azaCITIDine (VIDAZA) chemo injection 135 mg    Therapy Plan Information  Medications  ertapenem (INVANZ) 1 g in sodium chloride 0.9% 100 mL IVPB  1 g, Intravenous, Every visit  Flushes  sodium chloride 0.9% 250 mL flush bag  Intravenous, Every visit  sodium chloride 0.9% flush 10 mL  10 mL, Intravenous, Every visit  heparin, porcine (PF) 100 unit/mL injection flush 500 Units  500 Units, Intravenous, Every visit  alteplase injection 2 mg  2 mg, Intra-Catheter, Every visit      Mohit Centeno MD  Hematology and Stem Cell Transplant

## 2023-12-18 ENCOUNTER — INFUSION (OUTPATIENT)
Dept: INFUSION THERAPY | Facility: HOSPITAL | Age: 78
End: 2023-12-18
Attending: INTERNAL MEDICINE
Payer: MEDICARE

## 2023-12-18 ENCOUNTER — TELEPHONE (OUTPATIENT)
Dept: HEMATOLOGY/ONCOLOGY | Facility: CLINIC | Age: 78
End: 2023-12-18
Payer: MEDICARE

## 2023-12-18 VITALS
HEIGHT: 66 IN | WEIGHT: 154.31 LBS | HEART RATE: 75 BPM | TEMPERATURE: 97 F | RESPIRATION RATE: 20 BRPM | BODY MASS INDEX: 24.8 KG/M2 | DIASTOLIC BLOOD PRESSURE: 85 MMHG | SYSTOLIC BLOOD PRESSURE: 143 MMHG | OXYGEN SATURATION: 99 %

## 2023-12-18 DIAGNOSIS — D46.9 MDS (MYELODYSPLASTIC SYNDROME): Primary | ICD-10-CM

## 2023-12-18 PROCEDURE — 63600175 PHARM REV CODE 636 W HCPCS: Mod: PN | Performed by: INTERNAL MEDICINE

## 2023-12-18 PROCEDURE — 96401 CHEMO ANTI-NEOPL SQ/IM: CPT | Mod: PN

## 2023-12-18 RX ORDER — ONDANSETRON HYDROCHLORIDE 8 MG/1
8 TABLET, FILM COATED ORAL ONCE AS NEEDED
Status: CANCELLED
Start: 2023-12-19

## 2023-12-18 RX ORDER — DEXAMETHASONE 4 MG/1
8 TABLET ORAL
Status: CANCELLED
Start: 2023-12-22

## 2023-12-18 RX ORDER — ONDANSETRON HYDROCHLORIDE 8 MG/1
8 TABLET, FILM COATED ORAL ONCE AS NEEDED
Status: CANCELLED
Start: 2023-12-22

## 2023-12-18 RX ORDER — DEXAMETHASONE 4 MG/1
8 TABLET ORAL
Status: CANCELLED
Start: 2023-12-19

## 2023-12-18 RX ORDER — ONDANSETRON HYDROCHLORIDE 8 MG/1
8 TABLET, FILM COATED ORAL ONCE AS NEEDED
Status: CANCELLED
Start: 2023-12-21

## 2023-12-18 RX ORDER — ONDANSETRON HYDROCHLORIDE 8 MG/1
8 TABLET, FILM COATED ORAL ONCE AS NEEDED
Status: CANCELLED
Start: 2023-12-20

## 2023-12-18 RX ORDER — AZACITIDINE 100 MG/1
75 INJECTION, POWDER, LYOPHILIZED, FOR SOLUTION INTRAVENOUS; SUBCUTANEOUS
Status: CANCELLED | OUTPATIENT
Start: 2023-12-21

## 2023-12-18 RX ORDER — DEXAMETHASONE 4 MG/1
8 TABLET ORAL
Status: COMPLETED | OUTPATIENT
Start: 2023-12-18 | End: 2023-12-18

## 2023-12-18 RX ORDER — DEXAMETHASONE 4 MG/1
8 TABLET ORAL
Status: CANCELLED
Start: 2023-12-18

## 2023-12-18 RX ORDER — AZACITIDINE 100 MG/1
75 INJECTION, POWDER, LYOPHILIZED, FOR SOLUTION INTRAVENOUS; SUBCUTANEOUS
Status: CANCELLED | OUTPATIENT
Start: 2023-12-22

## 2023-12-18 RX ORDER — AZACITIDINE 100 MG/1
75 INJECTION, POWDER, LYOPHILIZED, FOR SOLUTION INTRAVENOUS; SUBCUTANEOUS
Status: CANCELLED | OUTPATIENT
Start: 2023-12-19

## 2023-12-18 RX ORDER — AZACITIDINE 100 MG/1
75 INJECTION, POWDER, LYOPHILIZED, FOR SOLUTION INTRAVENOUS; SUBCUTANEOUS
Status: COMPLETED | OUTPATIENT
Start: 2023-12-18 | End: 2023-12-18

## 2023-12-18 RX ORDER — ONDANSETRON HYDROCHLORIDE 8 MG/1
8 TABLET, FILM COATED ORAL ONCE AS NEEDED
Status: CANCELLED
Start: 2023-12-18

## 2023-12-18 RX ORDER — AZACITIDINE 100 MG/1
75 INJECTION, POWDER, LYOPHILIZED, FOR SOLUTION INTRAVENOUS; SUBCUTANEOUS
Status: CANCELLED | OUTPATIENT
Start: 2023-12-18

## 2023-12-18 RX ORDER — AZACITIDINE 100 MG/1
75 INJECTION, POWDER, LYOPHILIZED, FOR SOLUTION INTRAVENOUS; SUBCUTANEOUS
Status: CANCELLED | OUTPATIENT
Start: 2023-12-20

## 2023-12-18 RX ORDER — DEXAMETHASONE 4 MG/1
8 TABLET ORAL
Status: CANCELLED
Start: 2023-12-20

## 2023-12-18 RX ORDER — DEXAMETHASONE 4 MG/1
8 TABLET ORAL
Status: CANCELLED
Start: 2023-12-21

## 2023-12-18 RX ADMIN — DEXAMETHASONE 8 MG: 4 TABLET ORAL at 11:12

## 2023-12-18 RX ADMIN — AZACITIDINE 135 MG: 100 INJECTION, POWDER, LYOPHILIZED, FOR SOLUTION INTRAVENOUS; SUBCUTANEOUS at 12:12

## 2023-12-19 ENCOUNTER — INFUSION (OUTPATIENT)
Dept: INFUSION THERAPY | Facility: HOSPITAL | Age: 78
End: 2023-12-19
Attending: INTERNAL MEDICINE
Payer: MEDICARE

## 2023-12-19 VITALS
SYSTOLIC BLOOD PRESSURE: 151 MMHG | BODY MASS INDEX: 24.8 KG/M2 | DIASTOLIC BLOOD PRESSURE: 72 MMHG | RESPIRATION RATE: 18 BRPM | OXYGEN SATURATION: 97 % | WEIGHT: 154.31 LBS | HEART RATE: 73 BPM | HEIGHT: 66 IN | TEMPERATURE: 98 F

## 2023-12-19 DIAGNOSIS — D46.9 MDS (MYELODYSPLASTIC SYNDROME): Primary | ICD-10-CM

## 2023-12-19 PROCEDURE — 96401 CHEMO ANTI-NEOPL SQ/IM: CPT | Mod: PN

## 2023-12-19 PROCEDURE — 63600175 PHARM REV CODE 636 W HCPCS: Mod: PN | Performed by: INTERNAL MEDICINE

## 2023-12-19 PROCEDURE — 25000003 PHARM REV CODE 250: Mod: PN | Performed by: INTERNAL MEDICINE

## 2023-12-19 RX ORDER — DEXAMETHASONE 4 MG/1
8 TABLET ORAL
Status: COMPLETED | OUTPATIENT
Start: 2023-12-19 | End: 2023-12-19

## 2023-12-19 RX ORDER — ONDANSETRON 4 MG/1
8 TABLET, FILM COATED ORAL ONCE AS NEEDED
Status: DISCONTINUED | OUTPATIENT
Start: 2023-12-19 | End: 2023-12-19

## 2023-12-19 RX ORDER — AZACITIDINE 100 MG/1
75 INJECTION, POWDER, LYOPHILIZED, FOR SOLUTION INTRAVENOUS; SUBCUTANEOUS
Status: COMPLETED | OUTPATIENT
Start: 2023-12-19 | End: 2023-12-19

## 2023-12-19 RX ORDER — ONDANSETRON 8 MG/1
8 TABLET, ORALLY DISINTEGRATING ORAL ONCE AS NEEDED
Status: COMPLETED | OUTPATIENT
Start: 2023-12-19 | End: 2023-12-19

## 2023-12-19 RX ADMIN — DEXAMETHASONE 8 MG: 4 TABLET ORAL at 11:12

## 2023-12-19 RX ADMIN — AZACITIDINE 135 MG: 100 INJECTION, POWDER, LYOPHILIZED, FOR SOLUTION INTRAVENOUS; SUBCUTANEOUS at 11:12

## 2023-12-19 RX ADMIN — ONDANSETRON 8 MG: 8 TABLET, ORALLY DISINTEGRATING ORAL at 11:12

## 2023-12-19 NOTE — PLAN OF CARE
Problem: Adult Inpatient Plan of Care  Goal: Patient-Specific Goal (Individualized)  Outcome: Ongoing, Progressing  Flowsheets (Taken 12/19/2023 1056)  Anxieties, Fears or Concerns: nausea from taking steroids  Individualized Care Needs: recliner, warm blanket, daughter chairside     Problem: Fatigue (Oncology Care)  Goal: Improved Activity Tolerance  Outcome: Ongoing, Progressing  Intervention: Promote Improved Energy  Flowsheets (Taken 12/19/2023 1056)  Fatigue Management: frequent rest breaks encouraged  Sleep/Rest Enhancement: regular sleep/rest pattern promoted  Activity Management: Ambulated -L4

## 2023-12-19 NOTE — PLAN OF CARE
Problem: Adult Inpatient Plan of Care  Goal: Plan of Care Review  Outcome: Ongoing, Progressing  Flowsheets (Taken 12/19/2023 1200)  Plan of Care Reviewed With:   patient   daughter   Pt tolerated vidaza sub q well.  No adverse reaction noted.   Patient left clinic in no acute distress via wheelchair with daughter.

## 2023-12-20 ENCOUNTER — INFUSION (OUTPATIENT)
Dept: INFUSION THERAPY | Facility: HOSPITAL | Age: 78
End: 2023-12-20
Attending: INTERNAL MEDICINE
Payer: MEDICARE

## 2023-12-20 VITALS
HEIGHT: 66 IN | SYSTOLIC BLOOD PRESSURE: 149 MMHG | HEART RATE: 72 BPM | RESPIRATION RATE: 18 BRPM | WEIGHT: 142.19 LBS | OXYGEN SATURATION: 99 % | BODY MASS INDEX: 22.85 KG/M2 | TEMPERATURE: 98 F | DIASTOLIC BLOOD PRESSURE: 83 MMHG

## 2023-12-20 DIAGNOSIS — D46.9 MDS (MYELODYSPLASTIC SYNDROME): Primary | ICD-10-CM

## 2023-12-20 PROCEDURE — 63600175 PHARM REV CODE 636 W HCPCS: Mod: PN | Performed by: INTERNAL MEDICINE

## 2023-12-20 PROCEDURE — 25000003 PHARM REV CODE 250: Mod: PN | Performed by: INTERNAL MEDICINE

## 2023-12-20 PROCEDURE — 96401 CHEMO ANTI-NEOPL SQ/IM: CPT | Mod: PN

## 2023-12-20 RX ORDER — DEXAMETHASONE 4 MG/1
8 TABLET ORAL
Status: COMPLETED | OUTPATIENT
Start: 2023-12-20 | End: 2023-12-20

## 2023-12-20 RX ORDER — ONDANSETRON 8 MG/1
8 TABLET, ORALLY DISINTEGRATING ORAL ONCE AS NEEDED
Status: COMPLETED | OUTPATIENT
Start: 2023-12-20 | End: 2023-12-20

## 2023-12-20 RX ORDER — AZACITIDINE 100 MG/1
75 INJECTION, POWDER, LYOPHILIZED, FOR SOLUTION INTRAVENOUS; SUBCUTANEOUS
Status: COMPLETED | OUTPATIENT
Start: 2023-12-20 | End: 2023-12-20

## 2023-12-20 RX ADMIN — ONDANSETRON 8 MG: 8 TABLET, ORALLY DISINTEGRATING ORAL at 10:12

## 2023-12-20 RX ADMIN — DEXAMETHASONE 8 MG: 4 TABLET ORAL at 10:12

## 2023-12-20 RX ADMIN — AZACITIDINE 135 MG: 100 INJECTION, POWDER, LYOPHILIZED, FOR SOLUTION INTRAVENOUS; SUBCUTANEOUS at 11:12

## 2023-12-20 NOTE — PLAN OF CARE
Problem: Adult Inpatient Plan of Care  Goal: Plan of Care Review  Outcome: Ongoing, Progressing  Flowsheets (Taken 12/20/2023 1210)  Plan of Care Reviewed With:   patient   daughter  Goal: Patient-Specific Goal (Individualized)  Outcome: Ongoing, Progressing  Flowsheets (Taken 12/20/2023 1210)  Anxieties, Fears or Concerns: nausea from taking food on empty stomach  Individualized Care Needs: recliner, blanket, daughter at chairside, ice to injection sites, sleep, education, conversation, pillow, wheelchair     Problem: Fatigue (Oncology Care)  Goal: Improved Activity Tolerance  Outcome: Ongoing, Progressing  Intervention: Promote Improved Energy  Flowsheets (Taken 12/20/2023 1210)  Fatigue Management:   paced activity encouraged   frequent rest breaks encouraged   fatigue-related activity identified   activity assistance provided  Sleep/Rest Enhancement:   relaxation techniques promoted   natural light exposure provided   awakenings minimized   consistent schedule promoted   noise level reduced   room darkened   therapeutic touch utilized   family presence promoted  Activity Management:   Ambulated -L4   Up in chair - L3     Problem: Fall Injury Risk  Goal: Absence of Fall and Fall-Related Injury  Outcome: Ongoing, Progressing  Intervention: Promote Injury-Free Environment  Flowsheets (Taken 12/20/2023 1210)  Safety Promotion/Fall Prevention:   instructed to call staff for mobility   supervised activity   room near unit station   high risk medications identified   Fall Risk reviewed with patient/family   in recliner, wheels locked   lighting adjusted   medications reviewed   family to remain at bedside

## 2023-12-20 NOTE — PLAN OF CARE
Pt arrived to clinic for C4D2 Vidaza injection and tolerated well with no changes throughout therapy. Pt aware of Venetoclax dose to take for today and other home meds to prevent infection as well. Pt aware of side effects and f/u appts and discharged to home in NAD with daughter in wheelchair.

## 2023-12-21 ENCOUNTER — INFUSION (OUTPATIENT)
Dept: INFUSION THERAPY | Facility: HOSPITAL | Age: 78
End: 2023-12-21
Attending: INTERNAL MEDICINE
Payer: MEDICARE

## 2023-12-21 VITALS
SYSTOLIC BLOOD PRESSURE: 151 MMHG | DIASTOLIC BLOOD PRESSURE: 71 MMHG | RESPIRATION RATE: 18 BRPM | HEART RATE: 60 BPM | TEMPERATURE: 98 F

## 2023-12-21 DIAGNOSIS — D46.9 MDS (MYELODYSPLASTIC SYNDROME): Primary | ICD-10-CM

## 2023-12-21 PROCEDURE — 63600175 PHARM REV CODE 636 W HCPCS: Mod: PN | Performed by: INTERNAL MEDICINE

## 2023-12-21 PROCEDURE — 25000003 PHARM REV CODE 250: Mod: PN | Performed by: INTERNAL MEDICINE

## 2023-12-21 PROCEDURE — 96401 CHEMO ANTI-NEOPL SQ/IM: CPT | Mod: PN

## 2023-12-21 RX ORDER — AZACITIDINE 100 MG/1
75 INJECTION, POWDER, LYOPHILIZED, FOR SOLUTION INTRAVENOUS; SUBCUTANEOUS
Status: COMPLETED | OUTPATIENT
Start: 2023-12-21 | End: 2023-12-21

## 2023-12-21 RX ORDER — ONDANSETRON 8 MG/1
8 TABLET, ORALLY DISINTEGRATING ORAL ONCE AS NEEDED
Status: COMPLETED | OUTPATIENT
Start: 2023-12-21 | End: 2023-12-21

## 2023-12-21 RX ORDER — DEXAMETHASONE 4 MG/1
8 TABLET ORAL
Status: COMPLETED | OUTPATIENT
Start: 2023-12-21 | End: 2023-12-21

## 2023-12-21 RX ADMIN — DEXAMETHASONE 8 MG: 4 TABLET ORAL at 10:12

## 2023-12-21 RX ADMIN — ONDANSETRON 8 MG: 8 TABLET, ORALLY DISINTEGRATING ORAL at 10:12

## 2023-12-21 RX ADMIN — AZACITIDINE 135 MG: 100 INJECTION, POWDER, LYOPHILIZED, FOR SOLUTION INTRAVENOUS; SUBCUTANEOUS at 11:12

## 2023-12-21 NOTE — PLAN OF CARE
Problem: Adult Inpatient Plan of Care  Goal: Plan of Care Review  Outcome: Ongoing, Progressing  Flowsheets (Taken 12/21/2023 1050)  Plan of Care Reviewed With:   patient   daughter  Goal: Patient-Specific Goal (Individualized)  Outcome: Ongoing, Progressing  Flowsheets (Taken 12/21/2023 1050)  Anxieties, Fears or Concerns: none  Individualized Care Needs: recliner, pillow, personal blanket, daughter chairside     Problem: Fatigue (Oncology Care)  Goal: Improved Activity Tolerance  Outcome: Ongoing, Progressing  Intervention: Promote Improved Energy  Flowsheets (Taken 12/21/2023 1050)  Fatigue Management: paced activity encouraged  Sleep/Rest Enhancement:   family presence promoted   natural light exposure provided   noise level reduced   relaxation techniques promoted  Activity Management:   Walk with assistive devise and /or staff member - L3   Up in chair - L3    Pt tolerated vidaza inj to abd. NAD noted, pt d/c home.

## 2023-12-22 ENCOUNTER — DOCUMENTATION ONLY (OUTPATIENT)
Dept: INFUSION THERAPY | Facility: HOSPITAL | Age: 78
End: 2023-12-22
Payer: MEDICARE

## 2023-12-22 ENCOUNTER — INFUSION (OUTPATIENT)
Dept: INFUSION THERAPY | Facility: HOSPITAL | Age: 78
End: 2023-12-22
Attending: INTERNAL MEDICINE
Payer: MEDICARE

## 2023-12-22 VITALS
DIASTOLIC BLOOD PRESSURE: 80 MMHG | SYSTOLIC BLOOD PRESSURE: 165 MMHG | HEART RATE: 70 BPM | TEMPERATURE: 98 F | BODY MASS INDEX: 22.85 KG/M2 | WEIGHT: 142.19 LBS | RESPIRATION RATE: 18 BRPM | HEIGHT: 66 IN

## 2023-12-22 DIAGNOSIS — D46.9 MDS (MYELODYSPLASTIC SYNDROME): Primary | ICD-10-CM

## 2023-12-22 PROCEDURE — 96401 CHEMO ANTI-NEOPL SQ/IM: CPT | Mod: PN

## 2023-12-22 PROCEDURE — 25000003 PHARM REV CODE 250: Mod: PN | Performed by: INTERNAL MEDICINE

## 2023-12-22 PROCEDURE — 63600175 PHARM REV CODE 636 W HCPCS: Mod: PN | Performed by: INTERNAL MEDICINE

## 2023-12-22 RX ORDER — DEXAMETHASONE 4 MG/1
8 TABLET ORAL
Status: COMPLETED | OUTPATIENT
Start: 2023-12-22 | End: 2023-12-22

## 2023-12-22 RX ORDER — AZACITIDINE 100 MG/1
75 INJECTION, POWDER, LYOPHILIZED, FOR SOLUTION INTRAVENOUS; SUBCUTANEOUS
Status: COMPLETED | OUTPATIENT
Start: 2023-12-22 | End: 2023-12-22

## 2023-12-22 RX ORDER — ONDANSETRON 8 MG/1
8 TABLET, ORALLY DISINTEGRATING ORAL ONCE AS NEEDED
Status: DISCONTINUED | OUTPATIENT
Start: 2023-12-22 | End: 2023-12-22 | Stop reason: HOSPADM

## 2023-12-22 RX ORDER — CLONIDINE HYDROCHLORIDE 0.1 MG/1
0.1 TABLET ORAL
Status: COMPLETED | OUTPATIENT
Start: 2023-12-22 | End: 2023-12-22

## 2023-12-22 RX ORDER — CLONIDINE HYDROCHLORIDE 0.1 MG/1
0.1 TABLET ORAL
Status: CANCELLED
Start: 2023-12-22

## 2023-12-22 RX ADMIN — AZACITIDINE 135 MG: 100 INJECTION, POWDER, LYOPHILIZED, FOR SOLUTION INTRAVENOUS; SUBCUTANEOUS at 11:12

## 2023-12-22 RX ADMIN — CLONIDINE HYDROCHLORIDE 0.1 MG: 0.1 TABLET ORAL at 11:12

## 2023-12-22 RX ADMIN — DEXAMETHASONE 8 MG: 4 TABLET ORAL at 10:12

## 2023-12-22 NOTE — PLAN OF CARE
Problem: Adult Inpatient Plan of Care  Goal: Plan of Care Review  Outcome: Ongoing, Progressing  Flowsheets (Taken 12/22/2023 1024)  Plan of Care Reviewed With:   patient   daughter  Goal: Patient-Specific Goal (Individualized)  Outcome: Ongoing, Progressing  Flowsheets (Taken 12/22/2023 1024)  Anxieties, Fears or Concerns: itchy hands  Individualized Care Needs: reclienr/warm blanket/warm blanket/snacks/daughter at chairside     Problem: Fatigue (Oncology Care)  Goal: Improved Activity Tolerance  Outcome: Ongoing, Progressing  Intervention: Promote Improved Energy  Flowsheets (Taken 12/22/2023 1024)  Fatigue Management:   activity schedule adjusted   activity assistance provided   fatigue-related activity identified   frequent rest breaks encouraged   paced activity encouraged  Sleep/Rest Enhancement:   noise level reduced   reading promoted   regular sleep/rest pattern promoted   relaxation techniques promoted   room darkened   family presence promoted  Activity Management: Walk with assistive devise and /or staff member - L3   Pt tolerated Vidaza well today. BP elevated, MD notified and Clonidine given due to patient complaints of dizziness/not feeling well. Patient stated symptoms resolved after 30 min. Instructed daughter if symptoms return or elevated BP go to ED. Verbalized understanding. Reviewed follow-up appointments. All questions were answered, Patient wheeled downstairs in wheelchair by daughter.

## 2023-12-22 NOTE — PROGRESS NOTES
ONCOLOGY NUTRITION   FOLLOW UP VISIT        Niurka Pedraza is a 78 y.o. female.  DATE: 12/22/2023        Oncology Diagnosis: MDS     REFERRAL FROM:   [] Integrative Oncology   [] Med/Heme Oncology  [] Radiation Oncology  [] Surgical Oncology   [] Infusion Nurse    [x] Routine Nutrition follow up    TREATMENT PLAN:   [] Full treatment plan pending  [x] Chemotherapy  [] Immunotherapy  [] Radiation  [] Concurrent  [] Surgery  [] Treatment complete/post-treatment    ANTHROPOMETRICS:  Wt Readings from Last 10 Encounters:   12/22/23 64.5 kg (142 lb 3.2 oz)   12/20/23 64.5 kg (142 lb 3.2 oz)   12/19/23 70 kg (154 lb 5.2 oz)   12/18/23 70 kg (154 lb 5.2 oz)   11/27/23 70 kg (154 lb 5.2 oz)   11/24/23 70.3 kg (154 lb 15.7 oz)   11/22/23 70.3 kg (154 lb 15.7 oz)   11/21/23 69.6 kg (153 lb 7 oz)   11/20/23 69.6 kg (153 lb 7 oz)   11/08/23 69.7 kg (153 lb 9.6 oz)      Weight Changes: has decreased 12 pounds over last month, severe loss    PHYSICAL EXAM:  Muscle Wasting Observed:  [] No Deficit   [x] Mild Deficit   [] Moderate   [] Severe    INTAKE:  [x] PO Intake [] TF Intake  Current Diet: regular diet  Dietary Patterns:  Eating meals/snacks as tolerated  [] Oral nutritional supplements:     SYMPTOMS/COMPLAINTS:   [] No nutritional concerns at current  [] Diarrhea                    [] Constipation           [x] Nausea                 [x] Vomiting                [] Indigestion                [] Reflux              [x] Poor Appetite            [] Anorexia                 [] Early Satiety         [] Gas                       [] Bloating                     [] Dry Mouth    [] Mucositis                   [] Mouth Sores           [] Poor Dentition      [] Difficulty chewing  [] Difficulty Swallowing   [] Pain with swallowing [] Change in taste      [] Change in smell   [] Pain (general)       [] Fatigue                      [] Sleep issues    [x] Weight loss  [] other, please specify-     Nutrition Re-Assessment Risk: Moderate      [x] Labs reviewed   [x] Meds reviewed    Education Provided:   [] No Education Needed at this time  [] Diarrhea                                              [] Constipation                          [x] Nausea/Vomiting  [] Mucositis                [] Dry Mouth    [] Dealing with changes in Taste/Smell  [x] Dealing with Poor Appetite   [] Soft/moist Diet      [x] Weight Loss/Gain     [] Weight Maintenance                           [] Indigestion/GERD                 [] Gas/Bloating          [] Foods High/ Low in specific nutrients [x] Increasing Calories/Protein   [] Milkshake/Smoothies Recipes   [] Nutrition Supplements                        [] Increasing Fluid Intakes         [] Foods that fight cancer    [] Evidence bases resources                 [] Fermented Foods/Probiotics  [] Mediterranean/Plant Based Diet     [] Other, specify                                   [] Handouts provided      [] Samples provided     RD NOTE:  RD met with pt and pt's daughter, John Paul, at chairside during infusion tx. Pt present for cycle 4, day 5 of Vidaza. Pt with a 12# or 7.7% weight loss in 1 month. Pt states the steroids she is taking is making her nauseous. Pt reports a couple of episodes of emesis. Pt states she has lost some muscle mass. Pt says her appetite is getting better. Pt eating at time of RD visit. Daughter states she and family members have been cooking for pt to help increase calorie intake and to make sure patient eats prior to taking medications.       RD Goals:   [] Weight stable                  [x] Weight gain                      [] Weight Loss                               [] Continue adequate Kcal/protein   [x] Increase Kcal/protein      [] Adjust Tube-feeding Rx   [] Tolerate Tube Feedings             [] Increase tube feedings to goal     [] Tolerate Supplements     [x] Symptom Improvement   [] Understand nutrition Education  [x] Offer supportive visits   [] other, please  specify    RECOMMENDATIONS:  Eat small bland snack before taking medications  Eat 5-6 small high calorie, high protein meals/day as discussed  Increase fluid to 64fl/oz/day    Follow up: PRN throughout tx     Pooja Garner RDN, LDN  12/22/2023  11:10 AM

## 2023-12-26 ENCOUNTER — TELEPHONE (OUTPATIENT)
Dept: HEMATOLOGY/ONCOLOGY | Facility: CLINIC | Age: 78
End: 2023-12-26
Payer: MEDICARE

## 2023-12-26 ENCOUNTER — PATIENT MESSAGE (OUTPATIENT)
Dept: INTERNAL MEDICINE | Facility: CLINIC | Age: 78
End: 2023-12-26
Payer: MEDICARE

## 2023-12-26 ENCOUNTER — LAB VISIT (OUTPATIENT)
Dept: LAB | Facility: HOSPITAL | Age: 78
End: 2023-12-26
Attending: INTERNAL MEDICINE
Payer: MEDICARE

## 2023-12-26 DIAGNOSIS — D46.9 MDS (MYELODYSPLASTIC SYNDROME): ICD-10-CM

## 2023-12-26 DIAGNOSIS — N39.0 RECURRENT UTI: ICD-10-CM

## 2023-12-26 LAB
ABO + RH BLD: NORMAL
ALBUMIN SERPL BCP-MCNC: 3 G/DL (ref 3.5–5.2)
ALP SERPL-CCNC: 104 U/L (ref 55–135)
ALT SERPL W/O P-5'-P-CCNC: 14 U/L (ref 10–44)
ANION GAP SERPL CALC-SCNC: 10 MMOL/L (ref 8–16)
ANISOCYTOSIS BLD QL SMEAR: SLIGHT
AST SERPL-CCNC: 9 U/L (ref 10–40)
BACTERIA #/AREA URNS HPF: ABNORMAL /HPF
BASO STIPL BLD QL SMEAR: ABNORMAL
BASOPHILS # BLD AUTO: ABNORMAL K/UL (ref 0–0.2)
BASOPHILS NFR BLD: 0 % (ref 0–1.9)
BILIRUB SERPL-MCNC: 1.2 MG/DL (ref 0.1–1)
BILIRUB UR QL STRIP: NEGATIVE
BLD GP AB SCN CELLS X3 SERPL QL: NORMAL
BUN SERPL-MCNC: 25 MG/DL (ref 8–23)
CALCIUM SERPL-MCNC: 9 MG/DL (ref 8.7–10.5)
CHLORIDE SERPL-SCNC: 104 MMOL/L (ref 95–110)
CLARITY UR: CLEAR
CO2 SERPL-SCNC: 24 MMOL/L (ref 23–29)
COLOR UR: YELLOW
CREAT SERPL-MCNC: 0.9 MG/DL (ref 0.5–1.4)
DACRYOCYTES BLD QL SMEAR: ABNORMAL
DIFFERENTIAL METHOD: ABNORMAL
EOSINOPHIL # BLD AUTO: ABNORMAL K/UL (ref 0–0.5)
EOSINOPHIL NFR BLD: 0 % (ref 0–8)
ERYTHROCYTE [DISTWIDTH] IN BLOOD BY AUTOMATED COUNT: 23.9 % (ref 11.5–14.5)
EST. GFR  (NO RACE VARIABLE): >60 ML/MIN/1.73 M^2
GLUCOSE SERPL-MCNC: 206 MG/DL (ref 70–110)
GLUCOSE UR QL STRIP: NEGATIVE
HCT VFR BLD AUTO: 30.3 % (ref 37–48.5)
HGB BLD-MCNC: 9.4 G/DL (ref 12–16)
HGB UR QL STRIP: ABNORMAL
HYALINE CASTS #/AREA URNS LPF: 0 /LPF
HYPOCHROMIA BLD QL SMEAR: ABNORMAL
IMM GRANULOCYTES # BLD AUTO: ABNORMAL K/UL (ref 0–0.04)
IMM GRANULOCYTES NFR BLD AUTO: ABNORMAL % (ref 0–0.5)
KETONES UR QL STRIP: ABNORMAL
LDH SERPL L TO P-CCNC: 167 U/L (ref 110–260)
LEUKOCYTE ESTERASE UR QL STRIP: ABNORMAL
LYMPHOCYTES # BLD AUTO: ABNORMAL K/UL (ref 1–4.8)
LYMPHOCYTES NFR BLD: 45 % (ref 18–48)
MAGNESIUM SERPL-MCNC: 1.8 MG/DL (ref 1.6–2.6)
MCH RBC QN AUTO: 22.5 PG (ref 27–31)
MCHC RBC AUTO-ENTMCNC: 31 G/DL (ref 32–36)
MCV RBC AUTO: 73 FL (ref 82–98)
MICROSCOPIC COMMENT: ABNORMAL
MONOCYTES # BLD AUTO: ABNORMAL K/UL (ref 0.3–1)
MONOCYTES NFR BLD: 5 % (ref 4–15)
NEUTROPHILS NFR BLD: 49 % (ref 38–73)
NEUTS BAND NFR BLD MANUAL: 1 %
NITRITE UR QL STRIP: POSITIVE
NRBC BLD-RTO: 0 /100 WBC
OVALOCYTES BLD QL SMEAR: ABNORMAL
PH UR STRIP: 6 [PH] (ref 5–8)
PHOSPHATE SERPL-MCNC: 2.5 MG/DL (ref 2.7–4.5)
PLATELET # BLD AUTO: 47 K/UL (ref 150–450)
PLATELET BLD QL SMEAR: ABNORMAL
PMV BLD AUTO: ABNORMAL FL (ref 9.2–12.9)
POIKILOCYTOSIS BLD QL SMEAR: SLIGHT
POLYCHROMASIA BLD QL SMEAR: ABNORMAL
POTASSIUM SERPL-SCNC: 3.7 MMOL/L (ref 3.5–5.1)
PROT SERPL-MCNC: 6.7 G/DL (ref 6–8.4)
PROT UR QL STRIP: ABNORMAL
RBC # BLD AUTO: 4.18 M/UL (ref 4–5.4)
RBC #/AREA URNS HPF: 0 /HPF (ref 0–4)
SCHISTOCYTES BLD QL SMEAR: PRESENT
SODIUM SERPL-SCNC: 138 MMOL/L (ref 136–145)
SP GR UR STRIP: 1.02 (ref 1–1.03)
SPECIMEN OUTDATE: NORMAL
SQUAMOUS #/AREA URNS HPF: 2 /HPF
URATE SERPL-MCNC: 3.6 MG/DL (ref 2.4–5.7)
URN SPEC COLLECT METH UR: ABNORMAL
WBC # BLD AUTO: 1.2 K/UL (ref 3.9–12.7)
WBC #/AREA URNS HPF: 12 /HPF (ref 0–5)

## 2023-12-26 PROCEDURE — 85007 BL SMEAR W/DIFF WBC COUNT: CPT | Mod: PN | Performed by: INTERNAL MEDICINE

## 2023-12-26 PROCEDURE — 87086 URINE CULTURE/COLONY COUNT: CPT | Performed by: INTERNAL MEDICINE

## 2023-12-26 PROCEDURE — 83615 LACTATE (LD) (LDH) ENZYME: CPT | Mod: PN | Performed by: INTERNAL MEDICINE

## 2023-12-26 PROCEDURE — 80053 COMPREHEN METABOLIC PANEL: CPT | Mod: PN | Performed by: INTERNAL MEDICINE

## 2023-12-26 PROCEDURE — 84100 ASSAY OF PHOSPHORUS: CPT | Mod: PN | Performed by: INTERNAL MEDICINE

## 2023-12-26 PROCEDURE — 81000 URINALYSIS NONAUTO W/SCOPE: CPT | Mod: PN | Performed by: INTERNAL MEDICINE

## 2023-12-26 PROCEDURE — 83735 ASSAY OF MAGNESIUM: CPT | Mod: PN | Performed by: INTERNAL MEDICINE

## 2023-12-26 PROCEDURE — 87088 URINE BACTERIA CULTURE: CPT | Performed by: INTERNAL MEDICINE

## 2023-12-26 PROCEDURE — 36415 COLL VENOUS BLD VENIPUNCTURE: CPT | Mod: PN | Performed by: INTERNAL MEDICINE

## 2023-12-26 PROCEDURE — 86850 RBC ANTIBODY SCREEN: CPT | Mod: PN | Performed by: INTERNAL MEDICINE

## 2023-12-26 PROCEDURE — 84550 ASSAY OF BLOOD/URIC ACID: CPT | Mod: PN | Performed by: INTERNAL MEDICINE

## 2023-12-26 PROCEDURE — 85027 COMPLETE CBC AUTOMATED: CPT | Mod: PN | Performed by: INTERNAL MEDICINE

## 2023-12-26 PROCEDURE — 87077 CULTURE AEROBIC IDENTIFY: CPT | Performed by: INTERNAL MEDICINE

## 2023-12-26 PROCEDURE — 86901 BLOOD TYPING SEROLOGIC RH(D): CPT | Performed by: INTERNAL MEDICINE

## 2023-12-26 PROCEDURE — 87186 SC STD MICRODIL/AGAR DIL: CPT | Performed by: INTERNAL MEDICINE

## 2023-12-27 ENCOUNTER — PATIENT MESSAGE (OUTPATIENT)
Dept: INTERNAL MEDICINE | Facility: CLINIC | Age: 78
End: 2023-12-27

## 2023-12-27 ENCOUNTER — OFFICE VISIT (OUTPATIENT)
Dept: INTERNAL MEDICINE | Facility: CLINIC | Age: 78
End: 2023-12-27
Payer: MEDICARE

## 2023-12-27 VITALS
WEIGHT: 142.88 LBS | SYSTOLIC BLOOD PRESSURE: 124 MMHG | HEART RATE: 68 BPM | BODY MASS INDEX: 22.96 KG/M2 | OXYGEN SATURATION: 99 % | HEIGHT: 66 IN | DIASTOLIC BLOOD PRESSURE: 72 MMHG

## 2023-12-27 DIAGNOSIS — K62.89 RECTAL PAIN: Primary | ICD-10-CM

## 2023-12-27 PROCEDURE — 99999 PR PBB SHADOW E&M-EST. PATIENT-LVL V: CPT | Mod: PBBFAC,,,

## 2023-12-27 PROCEDURE — 99999 PR PBB SHADOW E&M-EST. PATIENT-LVL V: ICD-10-PCS | Mod: PBBFAC,,,

## 2023-12-27 PROCEDURE — 99215 OFFICE O/P EST HI 40 MIN: CPT | Mod: PBBFAC

## 2023-12-27 PROCEDURE — 99213 PR OFFICE/OUTPT VISIT, EST, LEVL III, 20-29 MIN: ICD-10-PCS | Mod: S$PBB,,,

## 2023-12-27 PROCEDURE — 99213 OFFICE O/P EST LOW 20 MIN: CPT | Mod: S$PBB,,,

## 2023-12-27 RX ORDER — LIDOCAINE HCL AND HYDROCORTISONE ACETATE 20; 20 MG/G; MG/G
1 CREAM RECTAL 2 TIMES DAILY
Qty: 1 KIT | Refills: 0 | Status: SHIPPED | OUTPATIENT
Start: 2023-12-27 | End: 2024-02-06

## 2023-12-27 NOTE — PROGRESS NOTES
"Subjective     Patient ID: Niurka Pedraza is a 78 y.o. female.    Chief Complaint: Rectal Problems    Pt seen in clinic today for rectal pain. Daughter is CG and is present for visit. Pt reports episode of constipation, prior to current issue, that took her to the ER. Started one week ago with no control of her gas. Noticed her anus was "open". Additionally having no control of stools. Denies blood in stools, rectal pruritus. Reports having regular BM since using preparation H with partial symptom resolution.      Review of Systems   Constitutional: Negative.    Gastrointestinal:  Positive for change in bowel habit and rectal pain.          Objective     Physical Exam  Vitals reviewed.   Constitutional:       Appearance: She is well-developed.   HENT:      Head: Normocephalic and atraumatic.   Eyes:      Conjunctiva/sclera: Conjunctivae normal.   Cardiovascular:      Rate and Rhythm: Normal rate.   Pulmonary:      Effort: Pulmonary effort is normal. No respiratory distress.   Abdominal:      Comments: No visible hemorrhoids, anal sphincter tone intact.    Skin:     General: Skin is warm and dry.      Findings: No rash.   Neurological:      Mental Status: She is alert and oriented to person, place, and time.      Coordination: Coordination normal.   Psychiatric:         Behavior: Behavior normal.            Assessment and Plan     1. Rectal pain  -     lidocaine/hydrocortisone ac (LIDOCAINE HCL-HYDROCORTISON AC) 2 %-2 % (7 gram) Kit; Place 1 Application rectally 2 (two) times a day.  Dispense: 1 kit; Refill: 0                 No follow-ups on file.    "

## 2023-12-28 LAB — BACTERIA UR CULT: ABNORMAL

## 2023-12-29 ENCOUNTER — PATIENT MESSAGE (OUTPATIENT)
Dept: INTERNAL MEDICINE | Facility: CLINIC | Age: 78
End: 2023-12-29
Payer: MEDICARE

## 2023-12-29 ENCOUNTER — PATIENT MESSAGE (OUTPATIENT)
Dept: HEMATOLOGY/ONCOLOGY | Facility: CLINIC | Age: 78
End: 2023-12-29
Payer: MEDICARE

## 2023-12-29 DIAGNOSIS — I10 HYPERTENSION, UNSPECIFIED TYPE: Primary | ICD-10-CM

## 2023-12-29 RX ORDER — CARVEDILOL 12.5 MG/1
12.5 TABLET ORAL 2 TIMES DAILY WITH MEALS
Qty: 180 TABLET | Refills: 1 | Status: SHIPPED | OUTPATIENT
Start: 2023-12-29 | End: 2024-01-12

## 2023-12-29 RX ORDER — HYDRALAZINE HYDROCHLORIDE 25 MG/1
25 TABLET, FILM COATED ORAL EVERY 8 HOURS PRN
Qty: 30 TABLET | Refills: 3 | Status: SHIPPED | OUTPATIENT
Start: 2023-12-29 | End: 2024-02-06

## 2023-12-29 NOTE — TELEPHONE ENCOUNTER
"Called and informed patient's daughter, Ms. Escobar about new medication regimen per PCP:    - I recommend that we increase the coreg to 12.5mg twice daily to start today - I sent in the new prescription to her pharmacy.   It is ok to take two 6.25mg doses together for total of 12.5mg twice daily if unable to  the new 12.5mg dose today/this weekend.      - I am also sending in a prescription for a medication called hydralazine to take AS NEEDED which is ONLY to be taken when blood pressure is  > 180/100 and IF it is not improving with repeating pressure after 15-20 min of resting     Previously had side effects with those below so will avoid:   - edema with amlodipine   - cough from lisinopril      Please add her to my schedule for next Tuesday, Wed or Thurs at 2:30p in person if that works for her."     Added patient to PCP's schedule, Tuesday, January 2, 2024 at 2:30 PM. Ms. Esocbar verbalized understanding.  "

## 2023-12-29 NOTE — TELEPHONE ENCOUNTER
Reviewed her chart: She is currently taking irbesartan 300mg daily and coreg at 6.25mg twice daily     - I recommend that we increase the coreg to 12.5mg twice daily to start today - I sent in the new prescription to her pharmacy.   It is ok to take two 6.25mg doses together for total of 12.5mg twice daily if unable to  the new 12.5mg dose today/this weekend.     - I am also sending in a prescription for a medication called hydralazine to take AS NEEDED which is ONLY to be taken when blood pressure is  > 180/100 and IF it is not improving with repeating pressure after 15-20 min of resting    Previously had side effects with those below so will avoid:   - edema with amlodipine   - cough from lisinopril      - please add her to my schedule for next Tuesday, Wed or Thurs at 2:30p in person if that works for her   - if this time does not, please add her to my schedule for a VV at 3:30p (Tues, Wed, or Thurs of next week)

## 2024-01-02 ENCOUNTER — TELEPHONE (OUTPATIENT)
Dept: INTERNAL MEDICINE | Facility: CLINIC | Age: 79
End: 2024-01-02

## 2024-01-02 ENCOUNTER — OFFICE VISIT (OUTPATIENT)
Dept: INTERNAL MEDICINE | Facility: CLINIC | Age: 79
End: 2024-01-02
Attending: INTERNAL MEDICINE
Payer: MEDICARE

## 2024-01-02 VITALS
HEIGHT: 66 IN | BODY MASS INDEX: 22.92 KG/M2 | OXYGEN SATURATION: 97 % | HEART RATE: 73 BPM | WEIGHT: 142.63 LBS | SYSTOLIC BLOOD PRESSURE: 129 MMHG | DIASTOLIC BLOOD PRESSURE: 60 MMHG

## 2024-01-02 DIAGNOSIS — T38.0X5A IMMUNOSUPPRESSION DUE TO CHRONIC STEROID USE: ICD-10-CM

## 2024-01-02 DIAGNOSIS — D46.9 MDS (MYELODYSPLASTIC SYNDROME): ICD-10-CM

## 2024-01-02 DIAGNOSIS — D84.821 IMMUNOSUPPRESSION DUE TO CHRONIC STEROID USE: ICD-10-CM

## 2024-01-02 DIAGNOSIS — K64.9 HEMORRHOIDS, UNSPECIFIED HEMORRHOID TYPE: ICD-10-CM

## 2024-01-02 DIAGNOSIS — R30.0 DYSURIA: Primary | ICD-10-CM

## 2024-01-02 DIAGNOSIS — I10 ESSENTIAL HYPERTENSION: ICD-10-CM

## 2024-01-02 DIAGNOSIS — N39.0 RECURRENT UTI: ICD-10-CM

## 2024-01-02 DIAGNOSIS — Z79.52 IMMUNOSUPPRESSION DUE TO CHRONIC STEROID USE: ICD-10-CM

## 2024-01-02 LAB
AMORPH CRY UR QL COMP ASSIST: ABNORMAL
BACTERIA #/AREA URNS AUTO: ABNORMAL /HPF
BILIRUB UR QL STRIP: NEGATIVE
CAOX CRY UR QL COMP ASSIST: ABNORMAL
CLARITY UR REFRACT.AUTO: ABNORMAL
COLOR UR AUTO: YELLOW
GLUCOSE UR QL STRIP: NEGATIVE
HGB UR QL STRIP: ABNORMAL
HYALINE CASTS UR QL AUTO: 5 /LPF
KETONES UR QL STRIP: ABNORMAL
LEUKOCYTE ESTERASE UR QL STRIP: NEGATIVE
MICROSCOPIC COMMENT: ABNORMAL
NITRITE UR QL STRIP: POSITIVE
PH UR STRIP: 6 [PH] (ref 5–8)
PROT UR QL STRIP: ABNORMAL
RBC #/AREA URNS AUTO: 3 /HPF (ref 0–4)
SP GR UR STRIP: 1.02 (ref 1–1.03)
SQUAMOUS #/AREA URNS AUTO: 20 /HPF
URN SPEC COLLECT METH UR: ABNORMAL
WBC #/AREA URNS AUTO: 17 /HPF (ref 0–5)
YEAST UR QL AUTO: ABNORMAL

## 2024-01-02 PROCEDURE — 99999 PR PBB SHADOW E&M-EST. PATIENT-LVL III: CPT | Mod: PBBFAC,,, | Performed by: INTERNAL MEDICINE

## 2024-01-02 PROCEDURE — 87186 SC STD MICRODIL/AGAR DIL: CPT | Performed by: INTERNAL MEDICINE

## 2024-01-02 PROCEDURE — 99214 OFFICE O/P EST MOD 30 MIN: CPT | Mod: S$PBB,,, | Performed by: INTERNAL MEDICINE

## 2024-01-02 PROCEDURE — 87088 URINE BACTERIA CULTURE: CPT | Performed by: INTERNAL MEDICINE

## 2024-01-02 PROCEDURE — 87077 CULTURE AEROBIC IDENTIFY: CPT | Performed by: INTERNAL MEDICINE

## 2024-01-02 PROCEDURE — 99213 OFFICE O/P EST LOW 20 MIN: CPT | Mod: PBBFAC | Performed by: INTERNAL MEDICINE

## 2024-01-02 PROCEDURE — 87086 URINE CULTURE/COLONY COUNT: CPT | Performed by: INTERNAL MEDICINE

## 2024-01-02 PROCEDURE — 81001 URINALYSIS AUTO W/SCOPE: CPT | Performed by: INTERNAL MEDICINE

## 2024-01-02 RX ORDER — LACTULOSE 10 G/15ML
SOLUTION ORAL; RECTAL
COMMUNITY
Start: 2023-10-30 | End: 2024-02-06

## 2024-01-02 RX ORDER — HYDROCORTISONE 25 MG/G
CREAM TOPICAL 2 TIMES DAILY
Qty: 28 G | Refills: 3 | Status: SHIPPED | OUTPATIENT
Start: 2024-01-02 | End: 2024-02-06

## 2024-01-02 NOTE — PROGRESS NOTES
"Subjective:   Patient ID: Niurka Pedraza is a 78 y.o. female  Chief complaint:   Chief Complaint   Patient presents with    Dysuria     Had culture done on 12/26 - has not received results yet    Hypertension     F/u       HPI  Here for ER and HTN follow up   Accompanied by her daughter today     Was in infusion clinic receiving tx and bp was quite elevated - referred to the ER   - bp improved   - Bb inc    No further ankle swelling - albumin improved   No changes in breathing or sob    HTN:   controlled today - home readings stable   Taking Coreg 6.25 and irb 300  Previously:   - stopped Ofev in Jan 18th and bp improved - did not start coreg 12.5mg dose since bp improved  fter d/c this   - edema with amlodipine   - cough from lisinopril      Hx of recurrent uti   Followed by id   Requesting results from recent cx   Today reports inc frequency and dysuria over past week or so  Suprapubic ttp   + constipatoin + flatus    Review of Systems    Objective:  Vitals:    01/02/24 1444   BP: 129/60   BP Location: Left arm   Patient Position: Sitting   BP Method: Large (Automatic)   Pulse: 73   SpO2: 97%   Weight: 64.7 kg (142 lb 10.2 oz)   Height: 5' 6" (1.676 m)     Body mass index is 23.02 kg/m².    Physical Exam  Vitals reviewed.   Constitutional:       Appearance: Normal appearance. She is well-developed.   HENT:      Head: Normocephalic and atraumatic.   Eyes:      Extraocular Movements: Extraocular movements intact.      Conjunctiva/sclera: Conjunctivae normal.   Cardiovascular:      Rate and Rhythm: Normal rate and regular rhythm.      Pulses: Normal pulses.   Pulmonary:      Effort: Pulmonary effort is normal.      Breath sounds: Normal breath sounds.   Abdominal:      General: Bowel sounds are normal. There is no distension.      Palpations: Abdomen is soft. There is no mass.      Tenderness: There is no right CVA tenderness or left CVA tenderness.      Comments: + suprapubic ttp   No flank ttp    Musculoskeletal:    "      General: No swelling or tenderness.      Cervical back: Normal range of motion and neck supple.   Skin:     General: Skin is warm and dry.      Capillary Refill: Capillary refill takes less than 2 seconds.      Nails: There is no clubbing.   Neurological:      General: No focal deficit present.      Mental Status: She is alert and oriented to person, place, and time.      Gait: Gait normal.   Psychiatric:         Speech: Speech normal.         Behavior: Behavior normal.         Thought Content: Thought content normal.         Assessment:  1. Dysuria    2. Hemorrhoids, unspecified hemorrhoid type    3. Essential hypertension    4. Immunosuppression due to chronic steroid use    5. Recurrent UTI        Plan:  1. Dysuria  -     Urine culture; Future; Expected date: 01/02/2024  -     Urinalysis; Future; Expected date: 01/02/2024    2. Hemorrhoids, unspecified hemorrhoid type  -     hydrocortisone 2.5 % cream; Apply topically 2 (two) times daily. for 10 days  Dispense: 28 g; Refill: 3    3. Essential hypertension    4. Immunosuppression due to chronic steroid use    5. Recurrent UTI    Other orders  -     Urinalysis Microscopic    Htn:   Blaire higher dose of coreg   Can inc further to 25 twice daily if needed as HR in 70s   Has not needed hydraalzine but can take 1-2 doses every 8 hours prn for highbp instructions    Repeat urine culture- frequency and burning with urination   Poct urine trace leuks  Nitrite + 30 pro 250 blood   Cloudy  Sent message to [facility] specialists about urine results and sx - iv abx ot be arranged by ID through infusion clinic     Health Maintenance   Topic Date Due    Colorectal Cancer Screening  12/09/2025    DEXA Scan  01/19/2026    Lipid Panel  06/22/2028    TETANUS VACCINE  07/21/2031    Hepatitis C Screening  Completed    Shingles Vaccine  Completed

## 2024-01-03 ENCOUNTER — LAB VISIT (OUTPATIENT)
Dept: LAB | Facility: HOSPITAL | Age: 79
End: 2024-01-03
Attending: INTERNAL MEDICINE
Payer: MEDICARE

## 2024-01-03 DIAGNOSIS — D46.9 MDS (MYELODYSPLASTIC SYNDROME): Primary | ICD-10-CM

## 2024-01-03 DIAGNOSIS — D61.818 PANCYTOPENIA: ICD-10-CM

## 2024-01-03 DIAGNOSIS — N39.0 RECURRENT UTI: ICD-10-CM

## 2024-01-03 DIAGNOSIS — D46.9 MDS (MYELODYSPLASTIC SYNDROME): ICD-10-CM

## 2024-01-03 LAB
ABO + RH BLD: NORMAL
ALBUMIN SERPL BCP-MCNC: 2.6 G/DL (ref 3.5–5.2)
ALP SERPL-CCNC: 137 U/L (ref 55–135)
ALT SERPL W/O P-5'-P-CCNC: 23 U/L (ref 10–44)
AMM URATE CRY URNS QL MICRO: ABNORMAL
AMORPH CRY URNS QL MICRO: ABNORMAL
ANION GAP SERPL CALC-SCNC: 10 MMOL/L (ref 8–16)
ANISOCYTOSIS BLD QL SMEAR: SLIGHT
AST SERPL-CCNC: 16 U/L (ref 10–40)
BACTERIA #/AREA URNS HPF: ABNORMAL /HPF
BASO STIPL BLD QL SMEAR: ABNORMAL
BASOPHILS # BLD AUTO: 0 K/UL (ref 0–0.2)
BASOPHILS NFR BLD: 0 % (ref 0–1.9)
BILIRUB SERPL-MCNC: 0.7 MG/DL (ref 0.1–1)
BILIRUB UR QL STRIP: NEGATIVE
BLD GP AB SCN CELLS X3 SERPL QL: NORMAL
BUN SERPL-MCNC: 16 MG/DL (ref 8–23)
CALCIUM SERPL-MCNC: 9 MG/DL (ref 8.7–10.5)
CHLORIDE SERPL-SCNC: 103 MMOL/L (ref 95–110)
CLARITY UR: ABNORMAL
CO2 SERPL-SCNC: 25 MMOL/L (ref 23–29)
COLOR UR: YELLOW
CREAT SERPL-MCNC: 0.8 MG/DL (ref 0.5–1.4)
DACRYOCYTES BLD QL SMEAR: ABNORMAL
DIFFERENTIAL METHOD BLD: ABNORMAL
EOSINOPHIL # BLD AUTO: 0 K/UL (ref 0–0.5)
EOSINOPHIL NFR BLD: 0 % (ref 0–8)
ERYTHROCYTE [DISTWIDTH] IN BLOOD BY AUTOMATED COUNT: 25.3 % (ref 11.5–14.5)
EST. GFR  (NO RACE VARIABLE): >60 ML/MIN/1.73 M^2
GLUCOSE SERPL-MCNC: 144 MG/DL (ref 70–110)
GLUCOSE UR QL STRIP: NEGATIVE
HCT VFR BLD AUTO: 21.2 % (ref 37–48.5)
HGB BLD-MCNC: 6.5 G/DL (ref 12–16)
HGB UR QL STRIP: ABNORMAL
HYALINE CASTS #/AREA URNS LPF: 0 /LPF
HYPOCHROMIA BLD QL SMEAR: ABNORMAL
IMM GRANULOCYTES # BLD AUTO: 0.02 K/UL (ref 0–0.04)
IMM GRANULOCYTES NFR BLD AUTO: 3.2 % (ref 0–0.5)
KETONES UR QL STRIP: ABNORMAL
LDH SERPL L TO P-CCNC: 212 U/L (ref 110–260)
LEUKOCYTE ESTERASE UR QL STRIP: ABNORMAL
LYMPHOCYTES # BLD AUTO: 0.4 K/UL (ref 1–4.8)
LYMPHOCYTES NFR BLD: 55.6 % (ref 18–48)
MAGNESIUM SERPL-MCNC: 1.8 MG/DL (ref 1.6–2.6)
MCH RBC QN AUTO: 22.1 PG (ref 27–31)
MCHC RBC AUTO-ENTMCNC: 30.7 G/DL (ref 32–36)
MCV RBC AUTO: 72 FL (ref 82–98)
MICROSCOPIC COMMENT: ABNORMAL
MONOCYTES # BLD AUTO: 0 K/UL (ref 0.3–1)
MONOCYTES NFR BLD: 4.8 % (ref 4–15)
NEUTROPHILS # BLD AUTO: 0.2 K/UL (ref 1.8–7.7)
NEUTROPHILS NFR BLD: 36.4 % (ref 38–73)
NITRITE UR QL STRIP: POSITIVE
NRBC BLD-RTO: 0 /100 WBC
OVALOCYTES BLD QL SMEAR: ABNORMAL
PH UR STRIP: 6 [PH] (ref 5–8)
PHOSPHATE SERPL-MCNC: 3 MG/DL (ref 2.7–4.5)
PLATELET # BLD AUTO: 22 K/UL (ref 150–450)
PLATELET BLD QL SMEAR: ABNORMAL
PMV BLD AUTO: ABNORMAL FL (ref 9.2–12.9)
POIKILOCYTOSIS BLD QL SMEAR: SLIGHT
POLYCHROMASIA BLD QL SMEAR: ABNORMAL
POTASSIUM SERPL-SCNC: 3.5 MMOL/L (ref 3.5–5.1)
PROT SERPL-MCNC: 6.4 G/DL (ref 6–8.4)
PROT UR QL STRIP: ABNORMAL
RBC # BLD AUTO: 2.94 M/UL (ref 4–5.4)
RBC #/AREA URNS HPF: 1 /HPF (ref 0–4)
SCHISTOCYTES BLD QL SMEAR: PRESENT
SODIUM SERPL-SCNC: 138 MMOL/L (ref 136–145)
SP GR UR STRIP: 1.02 (ref 1–1.03)
SPECIMEN OUTDATE: NORMAL
SQUAMOUS #/AREA URNS HPF: 20 /HPF
URATE SERPL-MCNC: 2.9 MG/DL (ref 2.4–5.7)
URN SPEC COLLECT METH UR: ABNORMAL
WBC # BLD AUTO: 0.63 K/UL (ref 3.9–12.7)
WBC #/AREA URNS HPF: 12 /HPF (ref 0–5)

## 2024-01-03 PROCEDURE — 83615 LACTATE (LD) (LDH) ENZYME: CPT | Mod: PN | Performed by: INTERNAL MEDICINE

## 2024-01-03 PROCEDURE — 84100 ASSAY OF PHOSPHORUS: CPT | Mod: PN | Performed by: INTERNAL MEDICINE

## 2024-01-03 PROCEDURE — 87086 URINE CULTURE/COLONY COUNT: CPT | Performed by: INTERNAL MEDICINE

## 2024-01-03 PROCEDURE — 87077 CULTURE AEROBIC IDENTIFY: CPT | Performed by: INTERNAL MEDICINE

## 2024-01-03 PROCEDURE — 87088 URINE BACTERIA CULTURE: CPT | Performed by: INTERNAL MEDICINE

## 2024-01-03 PROCEDURE — 36415 COLL VENOUS BLD VENIPUNCTURE: CPT | Mod: PN | Performed by: INTERNAL MEDICINE

## 2024-01-03 PROCEDURE — 87186 SC STD MICRODIL/AGAR DIL: CPT | Performed by: INTERNAL MEDICINE

## 2024-01-03 PROCEDURE — 81000 URINALYSIS NONAUTO W/SCOPE: CPT | Mod: PN | Performed by: INTERNAL MEDICINE

## 2024-01-03 PROCEDURE — 84550 ASSAY OF BLOOD/URIC ACID: CPT | Mod: PN | Performed by: INTERNAL MEDICINE

## 2024-01-03 PROCEDURE — 83735 ASSAY OF MAGNESIUM: CPT | Mod: PN | Performed by: INTERNAL MEDICINE

## 2024-01-03 PROCEDURE — 85025 COMPLETE CBC W/AUTO DIFF WBC: CPT | Mod: PN | Performed by: INTERNAL MEDICINE

## 2024-01-03 PROCEDURE — 86850 RBC ANTIBODY SCREEN: CPT | Performed by: INTERNAL MEDICINE

## 2024-01-03 PROCEDURE — 86901 BLOOD TYPING SEROLOGIC RH(D): CPT | Mod: PN | Performed by: INTERNAL MEDICINE

## 2024-01-03 PROCEDURE — 80053 COMPREHEN METABOLIC PANEL: CPT | Mod: PN | Performed by: INTERNAL MEDICINE

## 2024-01-03 RX ORDER — ACETAMINOPHEN 325 MG/1
650 TABLET ORAL
Status: CANCELLED | OUTPATIENT
Start: 2024-01-03

## 2024-01-03 RX ORDER — SODIUM CHLORIDE 0.9 % (FLUSH) 0.9 %
10 SYRINGE (ML) INJECTION
Status: CANCELLED | OUTPATIENT
Start: 2024-01-16

## 2024-01-03 RX ORDER — HYDROCODONE BITARTRATE AND ACETAMINOPHEN 500; 5 MG/1; MG/1
TABLET ORAL ONCE
Status: CANCELLED | OUTPATIENT
Start: 2024-01-03 | End: 2024-01-03

## 2024-01-03 RX ORDER — HEPARIN 100 UNIT/ML
500 SYRINGE INTRAVENOUS
Status: CANCELLED | OUTPATIENT
Start: 2024-01-16

## 2024-01-03 RX ORDER — DIPHENHYDRAMINE HCL 25 MG
25 CAPSULE ORAL
Status: CANCELLED | OUTPATIENT
Start: 2024-01-03

## 2024-01-04 DIAGNOSIS — B37.31 VAGINAL YEAST INFECTION: Primary | ICD-10-CM

## 2024-01-04 RX ORDER — POSACONAZOLE 100 MG/1
TABLET, DELAYED RELEASE ORAL
Qty: 30 TABLET | Refills: 2 | Status: SHIPPED | OUTPATIENT
Start: 2024-01-04

## 2024-01-05 ENCOUNTER — PATIENT MESSAGE (OUTPATIENT)
Dept: HEMATOLOGY/ONCOLOGY | Facility: CLINIC | Age: 79
End: 2024-01-05
Payer: MEDICARE

## 2024-01-05 LAB
BACTERIA UR CULT: ABNORMAL
BACTERIA UR CULT: ABNORMAL

## 2024-01-08 ENCOUNTER — TELEPHONE (OUTPATIENT)
Dept: HEMATOLOGY/ONCOLOGY | Facility: CLINIC | Age: 79
End: 2024-01-08
Payer: MEDICARE

## 2024-01-08 ENCOUNTER — PATIENT MESSAGE (OUTPATIENT)
Dept: INFECTIOUS DISEASES | Facility: CLINIC | Age: 79
End: 2024-01-08
Payer: MEDICARE

## 2024-01-08 ENCOUNTER — LAB VISIT (OUTPATIENT)
Dept: LAB | Facility: HOSPITAL | Age: 79
End: 2024-01-08
Attending: INTERNAL MEDICINE
Payer: MEDICARE

## 2024-01-08 DIAGNOSIS — D46.9 MDS (MYELODYSPLASTIC SYNDROME): ICD-10-CM

## 2024-01-08 LAB
ABO + RH BLD: NORMAL
ALBUMIN SERPL BCP-MCNC: 2.6 G/DL (ref 3.5–5.2)
ALP SERPL-CCNC: 146 U/L (ref 55–135)
ALT SERPL W/O P-5'-P-CCNC: 27 U/L (ref 10–44)
ANION GAP SERPL CALC-SCNC: 10 MMOL/L (ref 8–16)
ANISOCYTOSIS BLD QL SMEAR: SLIGHT
AST SERPL-CCNC: 14 U/L (ref 10–40)
BASO STIPL BLD QL SMEAR: ABNORMAL
BASOPHILS # BLD AUTO: 0.01 K/UL (ref 0–0.2)
BASOPHILS NFR BLD: 1 % (ref 0–1.9)
BILIRUB SERPL-MCNC: 1 MG/DL (ref 0.1–1)
BLD GP AB SCN CELLS X3 SERPL QL: NORMAL
BUN SERPL-MCNC: 16 MG/DL (ref 8–23)
CALCIUM SERPL-MCNC: 9.2 MG/DL (ref 8.7–10.5)
CHLORIDE SERPL-SCNC: 100 MMOL/L (ref 95–110)
CO2 SERPL-SCNC: 27 MMOL/L (ref 23–29)
CREAT SERPL-MCNC: 0.8 MG/DL (ref 0.5–1.4)
DACRYOCYTES BLD QL SMEAR: ABNORMAL
DIFFERENTIAL METHOD BLD: ABNORMAL
EOSINOPHIL # BLD AUTO: 0 K/UL (ref 0–0.5)
EOSINOPHIL NFR BLD: 0 % (ref 0–8)
ERYTHROCYTE [DISTWIDTH] IN BLOOD BY AUTOMATED COUNT: 24.6 % (ref 11.5–14.5)
EST. GFR  (NO RACE VARIABLE): >60 ML/MIN/1.73 M^2
GLUCOSE SERPL-MCNC: 145 MG/DL (ref 70–110)
HCT VFR BLD AUTO: 24.4 % (ref 37–48.5)
HGB BLD-MCNC: 7.7 G/DL (ref 12–16)
HYPOCHROMIA BLD QL SMEAR: ABNORMAL
IMM GRANULOCYTES # BLD AUTO: 0.01 K/UL (ref 0–0.04)
IMM GRANULOCYTES NFR BLD AUTO: 1 % (ref 0–0.5)
LDH SERPL L TO P-CCNC: 195 U/L (ref 110–260)
LYMPHOCYTES # BLD AUTO: 0.4 K/UL (ref 1–4.8)
LYMPHOCYTES NFR BLD: 41 % (ref 18–48)
MAGNESIUM SERPL-MCNC: 1.6 MG/DL (ref 1.6–2.6)
MCH RBC QN AUTO: 22.9 PG (ref 27–31)
MCHC RBC AUTO-ENTMCNC: 31.6 G/DL (ref 32–36)
MCV RBC AUTO: 73 FL (ref 82–98)
MONOCYTES # BLD AUTO: 0.1 K/UL (ref 0.3–1)
MONOCYTES NFR BLD: 10.5 % (ref 4–15)
NEUTROPHILS # BLD AUTO: 0.5 K/UL (ref 1.8–7.7)
NEUTROPHILS NFR BLD: 46.5 % (ref 38–73)
NRBC BLD-RTO: 0 /100 WBC
OVALOCYTES BLD QL SMEAR: ABNORMAL
PHOSPHATE SERPL-MCNC: 3 MG/DL (ref 2.7–4.5)
PLATELET # BLD AUTO: 35 K/UL (ref 150–450)
PLATELET BLD QL SMEAR: ABNORMAL
PMV BLD AUTO: ABNORMAL FL (ref 9.2–12.9)
POIKILOCYTOSIS BLD QL SMEAR: SLIGHT
POLYCHROMASIA BLD QL SMEAR: ABNORMAL
POTASSIUM SERPL-SCNC: 3.6 MMOL/L (ref 3.5–5.1)
PROT SERPL-MCNC: 6.4 G/DL (ref 6–8.4)
RBC # BLD AUTO: 3.36 M/UL (ref 4–5.4)
SCHISTOCYTES BLD QL SMEAR: PRESENT
SODIUM SERPL-SCNC: 137 MMOL/L (ref 136–145)
SPECIMEN OUTDATE: NORMAL
URATE SERPL-MCNC: 3.5 MG/DL (ref 2.4–5.7)
WBC # BLD AUTO: 1.05 K/UL (ref 3.9–12.7)
WBC TOXIC VACUOLES BLD QL SMEAR: PRESENT

## 2024-01-08 PROCEDURE — 80053 COMPREHEN METABOLIC PANEL: CPT | Mod: PN | Performed by: INTERNAL MEDICINE

## 2024-01-08 PROCEDURE — 83735 ASSAY OF MAGNESIUM: CPT | Mod: PN | Performed by: INTERNAL MEDICINE

## 2024-01-08 PROCEDURE — 86850 RBC ANTIBODY SCREEN: CPT | Mod: PN | Performed by: INTERNAL MEDICINE

## 2024-01-08 PROCEDURE — 83615 LACTATE (LD) (LDH) ENZYME: CPT | Mod: PN | Performed by: INTERNAL MEDICINE

## 2024-01-08 PROCEDURE — 36415 COLL VENOUS BLD VENIPUNCTURE: CPT | Mod: PN | Performed by: INTERNAL MEDICINE

## 2024-01-08 PROCEDURE — 85025 COMPLETE CBC W/AUTO DIFF WBC: CPT | Mod: PN | Performed by: INTERNAL MEDICINE

## 2024-01-08 PROCEDURE — 86850 RBC ANTIBODY SCREEN: CPT | Performed by: INTERNAL MEDICINE

## 2024-01-08 PROCEDURE — 84100 ASSAY OF PHOSPHORUS: CPT | Mod: PN | Performed by: INTERNAL MEDICINE

## 2024-01-08 PROCEDURE — 84550 ASSAY OF BLOOD/URIC ACID: CPT | Mod: PN | Performed by: INTERNAL MEDICINE

## 2024-01-09 ENCOUNTER — INFUSION (OUTPATIENT)
Dept: INFUSION THERAPY | Facility: HOSPITAL | Age: 79
End: 2024-01-09
Payer: MEDICARE

## 2024-01-09 VITALS
TEMPERATURE: 98 F | SYSTOLIC BLOOD PRESSURE: 120 MMHG | HEIGHT: 66 IN | DIASTOLIC BLOOD PRESSURE: 67 MMHG | RESPIRATION RATE: 18 BRPM | WEIGHT: 142.63 LBS | BODY MASS INDEX: 22.92 KG/M2 | HEART RATE: 67 BPM

## 2024-01-09 DIAGNOSIS — B96.29 URINARY TRACT INFECTION DUE TO EXTENDED-SPECTRUM BETA LACTAMASE (ESBL) PRODUCING ESCHERICHIA COLI: Primary | ICD-10-CM

## 2024-01-09 DIAGNOSIS — N39.0 URINARY TRACT INFECTION DUE TO EXTENDED-SPECTRUM BETA LACTAMASE (ESBL) PRODUCING ESCHERICHIA COLI: Primary | ICD-10-CM

## 2024-01-09 DIAGNOSIS — Z16.12 URINARY TRACT INFECTION DUE TO EXTENDED-SPECTRUM BETA LACTAMASE (ESBL) PRODUCING ESCHERICHIA COLI: Primary | ICD-10-CM

## 2024-01-09 PROCEDURE — 63600175 PHARM REV CODE 636 W HCPCS: Mod: PN | Performed by: INTERNAL MEDICINE

## 2024-01-09 PROCEDURE — 25000003 PHARM REV CODE 250: Mod: PN | Performed by: INTERNAL MEDICINE

## 2024-01-09 PROCEDURE — 96365 THER/PROPH/DIAG IV INF INIT: CPT | Mod: PN

## 2024-01-09 RX ORDER — SODIUM CHLORIDE 0.9 % (FLUSH) 0.9 %
10 SYRINGE (ML) INJECTION
Status: DISCONTINUED | OUTPATIENT
Start: 2024-01-09 | End: 2024-01-09 | Stop reason: HOSPADM

## 2024-01-09 RX ORDER — HEPARIN 100 UNIT/ML
500 SYRINGE INTRAVENOUS
Status: DISCONTINUED | OUTPATIENT
Start: 2024-01-09 | End: 2024-01-09 | Stop reason: HOSPADM

## 2024-01-09 RX ORDER — SODIUM CHLORIDE 0.9 % (FLUSH) 0.9 %
10 SYRINGE (ML) INJECTION
Status: CANCELLED | OUTPATIENT
Start: 2024-01-09

## 2024-01-09 RX ORDER — HEPARIN 100 UNIT/ML
500 SYRINGE INTRAVENOUS
Status: CANCELLED | OUTPATIENT
Start: 2024-01-09

## 2024-01-09 RX ADMIN — SODIUM CHLORIDE: 9 INJECTION, SOLUTION INTRAVENOUS at 01:01

## 2024-01-09 RX ADMIN — ERTAPENEM 1 G: 1 INJECTION, POWDER, LYOPHILIZED, FOR SOLUTION INTRAMUSCULAR; INTRAVENOUS at 02:01

## 2024-01-09 NOTE — PLAN OF CARE
Tolerated Invanz well.  No reactions noted.  No questions or concerns at this time.  Ambulated off unit in NAD.

## 2024-01-10 ENCOUNTER — INFUSION (OUTPATIENT)
Dept: INFUSION THERAPY | Facility: HOSPITAL | Age: 79
End: 2024-01-10
Attending: INTERNAL MEDICINE
Payer: MEDICARE

## 2024-01-10 VITALS
HEART RATE: 68 BPM | DIASTOLIC BLOOD PRESSURE: 71 MMHG | BODY MASS INDEX: 23.02 KG/M2 | WEIGHT: 142.63 LBS | RESPIRATION RATE: 18 BRPM | SYSTOLIC BLOOD PRESSURE: 156 MMHG | TEMPERATURE: 98 F

## 2024-01-10 DIAGNOSIS — B96.29 URINARY TRACT INFECTION DUE TO EXTENDED-SPECTRUM BETA LACTAMASE (ESBL) PRODUCING ESCHERICHIA COLI: Primary | ICD-10-CM

## 2024-01-10 DIAGNOSIS — Z16.12 URINARY TRACT INFECTION DUE TO EXTENDED-SPECTRUM BETA LACTAMASE (ESBL) PRODUCING ESCHERICHIA COLI: Primary | ICD-10-CM

## 2024-01-10 DIAGNOSIS — N39.0 URINARY TRACT INFECTION DUE TO EXTENDED-SPECTRUM BETA LACTAMASE (ESBL) PRODUCING ESCHERICHIA COLI: Primary | ICD-10-CM

## 2024-01-10 PROCEDURE — 63600175 PHARM REV CODE 636 W HCPCS: Mod: PN | Performed by: INTERNAL MEDICINE

## 2024-01-10 PROCEDURE — 25000003 PHARM REV CODE 250: Mod: PN | Performed by: INTERNAL MEDICINE

## 2024-01-10 PROCEDURE — 96365 THER/PROPH/DIAG IV INF INIT: CPT | Mod: PN

## 2024-01-10 RX ORDER — SODIUM CHLORIDE 0.9 % (FLUSH) 0.9 %
10 SYRINGE (ML) INJECTION
Status: CANCELLED | OUTPATIENT
Start: 2024-01-10

## 2024-01-10 RX ORDER — HEPARIN 100 UNIT/ML
500 SYRINGE INTRAVENOUS
Status: CANCELLED | OUTPATIENT
Start: 2024-01-10

## 2024-01-10 RX ORDER — SODIUM CHLORIDE 0.9 % (FLUSH) 0.9 %
10 SYRINGE (ML) INJECTION
Status: DISCONTINUED | OUTPATIENT
Start: 2024-01-10 | End: 2024-01-10 | Stop reason: HOSPADM

## 2024-01-10 RX ADMIN — ERTAPENEM 1 G: 1 INJECTION, POWDER, LYOPHILIZED, FOR SOLUTION INTRAMUSCULAR; INTRAVENOUS at 02:01

## 2024-01-10 RX ADMIN — SODIUM CHLORIDE: 9 INJECTION, SOLUTION INTRAVENOUS at 01:01

## 2024-01-11 ENCOUNTER — INFUSION (OUTPATIENT)
Dept: INFUSION THERAPY | Facility: HOSPITAL | Age: 79
End: 2024-01-11
Attending: INTERNAL MEDICINE
Payer: MEDICARE

## 2024-01-11 VITALS
TEMPERATURE: 98 F | BODY MASS INDEX: 22.92 KG/M2 | RESPIRATION RATE: 18 BRPM | HEART RATE: 66 BPM | SYSTOLIC BLOOD PRESSURE: 133 MMHG | WEIGHT: 142.63 LBS | DIASTOLIC BLOOD PRESSURE: 79 MMHG | HEIGHT: 66 IN

## 2024-01-11 DIAGNOSIS — B96.29 URINARY TRACT INFECTION DUE TO EXTENDED-SPECTRUM BETA LACTAMASE (ESBL) PRODUCING ESCHERICHIA COLI: Primary | ICD-10-CM

## 2024-01-11 DIAGNOSIS — N39.0 URINARY TRACT INFECTION DUE TO EXTENDED-SPECTRUM BETA LACTAMASE (ESBL) PRODUCING ESCHERICHIA COLI: Primary | ICD-10-CM

## 2024-01-11 DIAGNOSIS — Z16.12 URINARY TRACT INFECTION DUE TO EXTENDED-SPECTRUM BETA LACTAMASE (ESBL) PRODUCING ESCHERICHIA COLI: Primary | ICD-10-CM

## 2024-01-11 PROCEDURE — 63600175 PHARM REV CODE 636 W HCPCS: Mod: PN | Performed by: INTERNAL MEDICINE

## 2024-01-11 PROCEDURE — 25000003 PHARM REV CODE 250: Mod: PN | Performed by: INTERNAL MEDICINE

## 2024-01-11 PROCEDURE — 96365 THER/PROPH/DIAG IV INF INIT: CPT | Mod: PN

## 2024-01-11 PROCEDURE — A4216 STERILE WATER/SALINE, 10 ML: HCPCS | Mod: PN | Performed by: INTERNAL MEDICINE

## 2024-01-11 RX ORDER — SODIUM CHLORIDE 0.9 % (FLUSH) 0.9 %
10 SYRINGE (ML) INJECTION
Status: DISCONTINUED | OUTPATIENT
Start: 2024-01-11 | End: 2024-01-11 | Stop reason: HOSPADM

## 2024-01-11 RX ORDER — HEPARIN 100 UNIT/ML
500 SYRINGE INTRAVENOUS
Status: CANCELLED | OUTPATIENT
Start: 2024-01-11

## 2024-01-11 RX ORDER — SODIUM CHLORIDE 0.9 % (FLUSH) 0.9 %
10 SYRINGE (ML) INJECTION
Status: CANCELLED | OUTPATIENT
Start: 2024-01-11

## 2024-01-11 RX ADMIN — SODIUM CHLORIDE: 9 INJECTION, SOLUTION INTRAVENOUS at 01:01

## 2024-01-11 RX ADMIN — ERTAPENEM 1 G: 1 INJECTION, POWDER, LYOPHILIZED, FOR SOLUTION INTRAMUSCULAR; INTRAVENOUS at 02:01

## 2024-01-11 RX ADMIN — Medication 10 ML: at 01:01

## 2024-01-11 NOTE — PLAN OF CARE
Problem: Fatigue  Goal: Improved Activity Tolerance  Intervention: Promote Improved Energy  Flowsheets (Taken 1/11/2024 1300)  Fatigue Management:   activity schedule adjusted   fatigue-related activity identified   frequent rest breaks encouraged   paced activity encouraged  Sleep/Rest Enhancement:   natural light exposure provided   relaxation techniques promoted   regular sleep/rest pattern promoted  Activity Management:   Ambulated -L4   Ambulated to bathroom - L4   Ambulated in roland - L4   Up in stretcher chair - L1     Problem: Adult Inpatient Plan of Care  Goal: Patient-Specific Goal (Individualized)  Outcome: Ongoing, Progressing  Flowsheets (Taken 1/11/2024 1300)  Anxieties, Fears or Concerns: none  Individualized Care Needs: recliner, warm blanket, pillows, dim lights     Problem: Adult Inpatient Plan of Care  Goal: Plan of Care Review  Outcome: Ongoing, Progressing  Flowsheets (Taken 1/11/2024 1455)  Plan of Care Reviewed With: patient   Pt tolerated her Invanz infusion well, NAD. No new c/o voiced. Pt given a schedule and reviewed, pt verbalized understanding. Pt ambulated out of the clinic without difficulty using her cane.

## 2024-01-12 ENCOUNTER — INFUSION (OUTPATIENT)
Dept: INFUSION THERAPY | Facility: HOSPITAL | Age: 79
End: 2024-01-12
Attending: INTERNAL MEDICINE
Payer: MEDICARE

## 2024-01-12 ENCOUNTER — TELEPHONE (OUTPATIENT)
Dept: INFUSION THERAPY | Facility: HOSPITAL | Age: 79
End: 2024-01-12
Payer: MEDICARE

## 2024-01-12 ENCOUNTER — OFFICE VISIT (OUTPATIENT)
Dept: HEMATOLOGY/ONCOLOGY | Facility: CLINIC | Age: 79
End: 2024-01-12
Payer: MEDICARE

## 2024-01-12 ENCOUNTER — PATIENT MESSAGE (OUTPATIENT)
Dept: INTERNAL MEDICINE | Facility: CLINIC | Age: 79
End: 2024-01-12
Payer: MEDICARE

## 2024-01-12 ENCOUNTER — PATIENT MESSAGE (OUTPATIENT)
Dept: HEMATOLOGY/ONCOLOGY | Facility: CLINIC | Age: 79
End: 2024-01-12

## 2024-01-12 VITALS
HEART RATE: 70 BPM | SYSTOLIC BLOOD PRESSURE: 139 MMHG | TEMPERATURE: 98 F | DIASTOLIC BLOOD PRESSURE: 80 MMHG | RESPIRATION RATE: 16 BRPM | HEIGHT: 66 IN | BODY MASS INDEX: 22.18 KG/M2 | WEIGHT: 138 LBS

## 2024-01-12 VITALS — SYSTOLIC BLOOD PRESSURE: 176 MMHG | WEIGHT: 138 LBS | DIASTOLIC BLOOD PRESSURE: 92 MMHG | BODY MASS INDEX: 22.27 KG/M2

## 2024-01-12 DIAGNOSIS — E21.3 HYPERPARATHYROIDISM: ICD-10-CM

## 2024-01-12 DIAGNOSIS — N39.0 URINARY TRACT INFECTION DUE TO EXTENDED-SPECTRUM BETA LACTAMASE (ESBL) PRODUCING ESCHERICHIA COLI: Primary | ICD-10-CM

## 2024-01-12 DIAGNOSIS — D46.9 MDS (MYELODYSPLASTIC SYNDROME): Primary | ICD-10-CM

## 2024-01-12 DIAGNOSIS — E44.0 MODERATE PROTEIN-CALORIE MALNUTRITION: ICD-10-CM

## 2024-01-12 DIAGNOSIS — D61.818 PANCYTOPENIA: ICD-10-CM

## 2024-01-12 DIAGNOSIS — I10 HYPERTENSION, UNSPECIFIED TYPE: ICD-10-CM

## 2024-01-12 DIAGNOSIS — B96.29 URINARY TRACT INFECTION DUE TO EXTENDED-SPECTRUM BETA LACTAMASE (ESBL) PRODUCING ESCHERICHIA COLI: Primary | ICD-10-CM

## 2024-01-12 DIAGNOSIS — Z16.12 URINARY TRACT INFECTION DUE TO EXTENDED-SPECTRUM BETA LACTAMASE (ESBL) PRODUCING ESCHERICHIA COLI: Primary | ICD-10-CM

## 2024-01-12 PROCEDURE — 99215 OFFICE O/P EST HI 40 MIN: CPT | Mod: 95,,, | Performed by: INTERNAL MEDICINE

## 2024-01-12 PROCEDURE — 63600175 PHARM REV CODE 636 W HCPCS: Mod: PN | Performed by: INTERNAL MEDICINE

## 2024-01-12 PROCEDURE — 96365 THER/PROPH/DIAG IV INF INIT: CPT | Mod: PN

## 2024-01-12 PROCEDURE — 25000003 PHARM REV CODE 250: Mod: PN | Performed by: INTERNAL MEDICINE

## 2024-01-12 RX ORDER — SODIUM CHLORIDE 0.9 % (FLUSH) 0.9 %
10 SYRINGE (ML) INJECTION
Status: DISCONTINUED | OUTPATIENT
Start: 2024-01-12 | End: 2024-01-12 | Stop reason: HOSPADM

## 2024-01-12 RX ORDER — SODIUM CHLORIDE 0.9 % (FLUSH) 0.9 %
10 SYRINGE (ML) INJECTION
OUTPATIENT
Start: 2024-01-12

## 2024-01-12 RX ORDER — HEPARIN 100 UNIT/ML
500 SYRINGE INTRAVENOUS
Status: DISCONTINUED | OUTPATIENT
Start: 2024-01-12 | End: 2024-01-12 | Stop reason: HOSPADM

## 2024-01-12 RX ORDER — HEPARIN 100 UNIT/ML
500 SYRINGE INTRAVENOUS
OUTPATIENT
Start: 2024-01-12

## 2024-01-12 RX ORDER — CARVEDILOL 25 MG/1
25 TABLET ORAL 2 TIMES DAILY WITH MEALS
Qty: 180 TABLET | Refills: 1 | Status: SHIPPED | OUTPATIENT
Start: 2024-01-12 | End: 2025-01-11

## 2024-01-12 RX ADMIN — SODIUM CHLORIDE: 9 INJECTION, SOLUTION INTRAVENOUS at 01:01

## 2024-01-12 RX ADMIN — ERTAPENEM 1 G: 1 INJECTION, POWDER, LYOPHILIZED, FOR SOLUTION INTRAMUSCULAR; INTRAVENOUS at 01:01

## 2024-01-12 NOTE — TELEPHONE ENCOUNTER
----- Message from William Solorzano sent at 1/12/2024  8:53 AM CST -----  Regarding: Appointment Change  Good Morning!    At the request of Dr Centeno, please cancel infusions next week. Need to reschedule to week of 1/22.    Thanks for your assistance...    -Neto

## 2024-01-12 NOTE — PLAN OF CARE
Problem: Adult Inpatient Plan of Care  Goal: Plan of Care Review  Outcome: Ongoing, Progressing  Flowsheets (Taken 1/12/2024 1319)  Plan of Care Reviewed With: patient  Goal: Patient-Specific Goal (Individualized)  Outcome: Ongoing, Progressing  Flowsheets (Taken 1/12/2024 1319)  Anxieties, Fears or Concerns: none  Individualized Care Needs: recliner, warm blanket, pillow, dim lights, OJ     Problem: Fatigue  Goal: Improved Activity Tolerance  Outcome: Ongoing, Progressing  Intervention: Promote Improved Energy  Flowsheets (Taken 1/12/2024 1319)  Fatigue Management: paced activity encouraged  Sleep/Rest Enhancement:   natural light exposure provided   noise level reduced  Activity Management:   Ambulated -L4   Ambulated to bathroom - L4   Ambulated in roland - L4   Pt tolerated Invanz infusion well.   No adverse reaction noted.  PIV de-accessed per protocol.   Pt left clinic in no acute distress.

## 2024-01-12 NOTE — PROGRESS NOTES
HEMATOLOGIC MALIGNANCIES PROGRESS NOTE    IDENTIFYING STATEMENT   Niurka Castellon) is a 78 y.o. female with a  of 1945 from [place], [place] with the diagnosis of MDS.      TELEMEDICINE DOCUMENTATION    The patient location is: [place]  The chief complaint leading to consultation is: MDS    Visit type: audiovisual    Face to Face time with patient: 16 minutes  40 minutes of total time spent on the encounter, which includes face to face time and non-face to face time preparing to see the patient (eg, review of tests), Obtaining and/or reviewing separately obtained history, Documenting clinical information in the electronic or other health record, Independently interpreting results (not separately reported) and communicating results to the patient/family/caregiver, or Care coordination (not separately reported).         Each patient to whom he or she provides medical services by telemedicine is:  (1) informed of the relationship between the physician and patient and the respective role of any other health care provider with respect to management of the patient; and (2) notified that he or she may decline to receive medical services by telemedicine and may withdraw from such care at any time.    Notes:     ONCOLOGY HISTORY:    1. Myelodysplastic syndrome with increased blasts-2              A. 2023: Bone marrow biopsy - 65-75% cellular marrow consistent with myelodysplastic syndrome with excess blasts-2; cytogenetics 46,XX,del(3)(q12)[2]/47,sl,+8[18]; NGS not sent; IPSS-R score of 7 = very high risk   B. 2023: Cycle 1 azacitidine-venetoclax (5 days aza, 14 days angelo) for MDS/AML   C. 10/18/2023: Bone marrow biopsy - 50-60% cellular marrow with no increased blasts and trilineage hematopoiesis with granulocytic hypoplasia and moderate dysgranulopoiesis, mildly left-shifted marked erythroid hyperplasia with severe dyserythropoiesis, and marked megakaryocytic hyperplasia with predominantly variably  sized including small del3q-like monolobated forms; 3-4+ histiocytic iron stores; focal MF-1; cytogenetics 46,XX[20]; NGS shows ASXL1 (26%), SRSF2 (33%) and STAG2 (3%).     2. Neuropathy  3. Interstitial lung disease/pulmonary fibrosis  4. Hypertension  5. Aortic atherosclerosis  6. Pulmonary coccidioidomycosis  7. Hypothyroidism  8. Hyperparathyroidism    INTERVAL HISTORY:      Ms. Pedraza is seen in this virtual visit in follow-up of MDS/AML prior to cycle 5 of aza-angelo therapy.      Was in ER with high blood pressure and epistaxis. They packed nose and blood went into stomach, causing emesis.     Concerned about high blood pressure. Has not taken meds yet today. Now has hydralazine prn. Poor appetite, having to force herself to eat. Difference in taste. No mouth sores.     Slow and losing balance a lot. Walking with a cane. Brain is foggy sometimes.     Past Medical History, Past Social History and Past Family History have been reviewed and are unchanged except as noted in the interval history.    MEDICATIONS:     Current Outpatient Medications on File Prior to Visit   Medication Sig Dispense Refill    acyclovir (ZOVIRAX) 400 MG tablet Take 1 tablet (400 mg total) by mouth 2 (two) times daily. 60 tablet 11    albuterol (VENTOLIN HFA) 90 mcg/actuation inhaler Inhale 1-2 puffs into the lungs every 6 (six) hours as needed for Wheezing or Shortness of Breath. Rescue 18 g 11    albuterol-ipratropium (DUO-NEB) 2.5 mg-0.5 mg/3 mL nebulizer solution Take 3 mLs by nebulization every 6 (six) hours as needed for Wheezing or Shortness of Breath. Rescue 75 mL 11    ascorbic acid/zinc (ZINC WITH VITAMIN C ORAL) Take by mouth.      atorvastatin (LIPITOR) 20 MG tablet Take 1 tablet (20 mg total) by mouth once daily. 90 tablet 3    azelastine (ASTELIN) 137 mcg (0.1 %) nasal spray 1 spray (137 mcg total) by Nasal route 2 (two) times daily. 30 mL 6    calcium-vitamin D3 (CALCIUM 500 + D) 500 mg(1,250mg) -200 unit per tablet Take 1  tablet by mouth 2 (two) times daily with meals.      carvediloL (COREG) 12.5 MG tablet Take 1 tablet (12.5 mg total) by mouth 2 (two) times daily with meals. 180 tablet 1    estradioL (ESTRACE) 0.01 % (0.1 mg/gram) vaginal cream Place 1 g vaginally twice a week. 42.5 g 11    ferrous gluconate (FERGON) 324 MG tablet Take 1 tablet (324 mg total) by mouth every other day. 90 tablet 3    fluticasone furoate-vilanteroL (BREO ELLIPTA) 100-25 mcg/dose diskus inhaler Inhale 1 puff into the lungs once daily. Controller.  PLEASE NOTE IT IS ONCE A DAY!! 60 each 4    gabapentin (NEURONTIN) 100 MG capsule Take 1 capsule (100 mg total) by mouth every evening. 30 capsule 3    hydrALAZINE (APRESOLINE) 25 MG tablet Take 1 tablet (25 mg total) by mouth every 8 (eight) hours as needed (if high blood pressure > 180/100). 30 tablet 3    hydrocortisone 2.5 % cream Apply topically 2 (two) times daily. for 10 days 28 g 3    irbesartan (AVAPRO) 300 MG tablet Take 1 tablet (300 mg total) by mouth every evening. 90 tablet 0    lactulose (CHRONULAC) 10 gram/15 mL solution Take by mouth.      levothyroxine (SYNTHROID) 50 MCG tablet Take 1 tablet (50 mcg total) by mouth before breakfast. 90 tablet 1    lidocaine/hydrocortisone ac (LIDOCAINE HCL-HYDROCORTISON AC) 2 %-2 % (7 gram) Kit Place 1 Application rectally 2 (two) times a day. 1 kit 0    montelukast (SINGULAIR) 10 mg tablet TAKE 1 TABLET BY MOUTH EVERY DAY IN THE EVENING 90 tablet 2    multivitamin (THERAGRAN) per tablet Take 1 tablet by mouth once daily.      ondansetron (ZOFRAN-ODT) 4 MG TbDL Take 2 tablets (8 mg total) by mouth every 6 (six) hours as needed (nausea). 30 tablet 1    oxybutynin (DITROPAN XL) 15 MG TR24 Take 1 tablet (15 mg total) by mouth once daily. 30 tablet 11    pantoprazole (PROTONIX) 20 MG tablet Take 1 tablet (20 mg total) by mouth once daily. 90 tablet 1    posaconazole (NOXAFIL) 100 mg TbEC tablet Take 3 tablets (300 mg) by mouth twice daily on the first day, and  then take 300 mg daily thereafter. 30 tablet 2    predniSONE (DELTASONE) 1 MG tablet Take 2 tablets (2 mg total) by mouth once daily. 180 tablet 3    sodium chloride 3% 3 % nebulizer solution Take 4 mLs by nebulization as needed for Other. 120 mL 3    venetoclax (VENCLEXTA) 100 mg Tab Take one tablet (100 mg) by mouth daily on days 1-14 of each 28 day cycle of therapy.. 28 tablet 0    vibegron (GEMTESA) 75 mg Tab Take 75 mg by mouth once daily. 30 tablet 11    [DISCONTINUED] budesonide-formoterol 80-4.5 mcg (SYMBICORT) 80-4.5 mcg/actuation HFAA Inhale 2 puffs into the lungs 2 (two) times daily as needed. Controller 1 Inhaler 6     Current Facility-Administered Medications on File Prior to Visit   Medication Dose Route Frequency Provider Last Rate Last Admin    [DISCONTINUED] ertapenem (INVANZ) 1 g in sodium chloride 0.9% 50 mL IVPB  1 g Intravenous Daily Cocco, Linda Elida, DO   Stopped at 01/11/24 1435    [DISCONTINUED] sodium chloride 0.9% 250 mL flush bag   Intravenous PRN Cocco, Linda Elida, DO   Stopped at 01/11/24 1446    [DISCONTINUED] sodium chloride 0.9% flush 10 mL  10 mL Intravenous PRN Cocco, Linda Elida, DO   10 mL at 01/11/24 1337       ALLERGIES:   Review of patient's allergies indicates:   Allergen Reactions    Ciprofloxacin Other (See Comments)     Right foot pain and edema, tendonitis    Amlodipine Edema and Swelling     BLE  BLE    Lisinopril Other (See Comments)     cough    Nitrofuran analogues         ROS:       Review of Systems   Constitutional:  Positive for appetite change. Negative for unexpected weight change.   HENT:   Negative for mouth sores.    Respiratory:  Positive for cough and shortness of breath.    Cardiovascular:  Negative for chest pain.   Gastrointestinal:  Positive for abdominal pain. Negative for diarrhea.   Genitourinary:  Positive for frequency.    Musculoskeletal:  Positive for back pain.   Skin:  Negative for rash.   Neurological:  Negative for headaches.    Hematological:  Negative for adenopathy.   Psychiatric/Behavioral:  The patient is not nervous/anxious.        PHYSICAL EXAM:  There were no vitals filed for this visit.    Physical Exam  Head and neck visualized, and no abnormalities seen.     LAB:   Results for orders placed or performed in visit on 01/08/24   CBC Auto Differential   Result Value Ref Range    WBC 1.05 (LL) 3.90 - 12.70 K/uL    RBC 3.36 (L) 4.00 - 5.40 M/uL    Hemoglobin 7.7 (L) 12.0 - 16.0 g/dL    Hematocrit 24.4 (L) 37.0 - 48.5 %    MCV 73 (L) 82 - 98 fL    MCH 22.9 (L) 27.0 - 31.0 pg    MCHC 31.6 (L) 32.0 - 36.0 g/dL    RDW 24.6 (H) 11.5 - 14.5 %    Platelets 35 (LL) 150 - 450 K/uL    MPV SEE COMMENT 9.2 - 12.9 fL    Immature Granulocytes 1.0 (H) 0.0 - 0.5 %    Gran # (ANC) 0.5 (L) 1.8 - 7.7 K/uL    Immature Grans (Abs) 0.01 0.00 - 0.04 K/uL    Lymph # 0.4 (L) 1.0 - 4.8 K/uL    Mono # 0.1 (L) 0.3 - 1.0 K/uL    Eos # 0.0 0.0 - 0.5 K/uL    Baso # 0.01 0.00 - 0.20 K/uL    nRBC 0 0 /100 WBC    Gran % 46.5 38.0 - 73.0 %    Lymph % 41.0 18.0 - 48.0 %    Mono % 10.5 4.0 - 15.0 %    Eosinophil % 0.0 0.0 - 8.0 %    Basophil % 1.0 0.0 - 1.9 %    Platelet Estimate Decreased (A)     Aniso Slight     Poik Slight     Poly Occasional     Hypo Moderate     Ovalocytes Occasional     Tear Drop Cells Occasional     Schistocytes Present     Basophilic Stippling Occasional     Vacuolated Granulocytes Present     Differential Method Automated    Comprehensive Metabolic Panel   Result Value Ref Range    Sodium 137 136 - 145 mmol/L    Potassium 3.6 3.5 - 5.1 mmol/L    Chloride 100 95 - 110 mmol/L    CO2 27 23 - 29 mmol/L    Glucose 145 (H) 70 - 110 mg/dL    BUN 16 8 - 23 mg/dL    Creatinine 0.8 0.5 - 1.4 mg/dL    Calcium 9.2 8.7 - 10.5 mg/dL    Total Protein 6.4 6.0 - 8.4 g/dL    Albumin 2.6 (L) 3.5 - 5.2 g/dL    Total Bilirubin 1.0 0.1 - 1.0 mg/dL    Alkaline Phosphatase 146 (H) 55 - 135 U/L    AST 14 10 - 40 U/L    ALT 27 10 - 44 U/L    eGFR >60.0 >60 mL/min/1.73  m^2    Anion Gap 10 8 - 16 mmol/L   Magnesium   Result Value Ref Range    Magnesium 1.6 1.6 - 2.6 mg/dL   PHOSPHORUS   Result Value Ref Range    Phosphorus 3.0 2.7 - 4.5 mg/dL   Lactate Dehydrogenase   Result Value Ref Range     110 - 260 U/L   Uric Acid   Result Value Ref Range    Uric Acid 3.5 2.4 - 5.7 mg/dL   Type & Screen   Result Value Ref Range    Group & Rh A POS     Indirect Emiliano GEL NEG     Specimen Outdate 01/11/2024 23:59      *Note: Due to a large number of results and/or encounters for the requested time period, some results have not been displayed. A complete set of results can be found in Results Review.       PROBLEMS ASSESSED THIS VISIT:    No diagnosis found.        PLAN:       Myelodysplastic syndrome  Ms. 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 We reviewed that this is an aggressive hematologic malignancy with high probability of evolution to acute myeloid leukemia (AML). According to the [facility] pathologic designation, this is referred to as MDS/AML.     Bone marrow biopsy after 1 cycle of aza-angelo showed resolution of increased blasts with ongoing morphologic changes consistent with MDS. We reviewed that this represents a favorable response to therapy. We will therefore proceed with indefinite aza-angelo therapy.      She has severe neutropenia and thrombocytopenia, so we will delay cycle 5 by one week and reassess prior to giving this cycle. If she fails to recover adequately, she may need repeat bone marrow biopsy to assess for loss of response.     Plan bone marrow biopsy at least q6months to monitor response (due 4/18/2024) even if therapy is able to be continued.    Continue infection prophylaxis with acyclovir, posaconazole. Antibiotic agents per ID given multi-drug resistant UTI.     Pancytopenia  Due to MDS/AML. Monitor weekly during therapy and transfuse as clinically indicated.     Weight loss  Refer to dietician.    Hypertension  Increase carvedilol to 25 mg PO BID. Continue other antihypertensive  agents.     Hyperparathyroidism  Calcium normal at this time. Follow-up with PCP.     Follow-up  Repeat labs and visit next week to assess readiness for cycle 5    Mohit Centeno MD  Hematology and Stem Cell Transplant

## 2024-01-16 ENCOUNTER — LAB VISIT (OUTPATIENT)
Dept: LAB | Facility: HOSPITAL | Age: 79
End: 2024-01-16
Attending: INTERNAL MEDICINE
Payer: MEDICARE

## 2024-01-16 ENCOUNTER — TELEPHONE (OUTPATIENT)
Dept: HEMATOLOGY/ONCOLOGY | Facility: CLINIC | Age: 79
End: 2024-01-16
Payer: MEDICARE

## 2024-01-16 DIAGNOSIS — D46.9 MDS (MYELODYSPLASTIC SYNDROME): ICD-10-CM

## 2024-01-16 LAB
ABO + RH BLD: NORMAL
ALBUMIN SERPL BCP-MCNC: 2.8 G/DL (ref 3.5–5.2)
ALP SERPL-CCNC: 106 U/L (ref 55–135)
ALT SERPL W/O P-5'-P-CCNC: 14 U/L (ref 10–44)
ANION GAP SERPL CALC-SCNC: 9 MMOL/L (ref 8–16)
ANISOCYTOSIS BLD QL SMEAR: SLIGHT
AST SERPL-CCNC: 13 U/L (ref 10–40)
BASOPHILS # BLD AUTO: ABNORMAL K/UL (ref 0–0.2)
BASOPHILS NFR BLD: 0 % (ref 0–1.9)
BILIRUB SERPL-MCNC: 0.7 MG/DL (ref 0.1–1)
BLD GP AB SCN CELLS X3 SERPL QL: NORMAL
BUN SERPL-MCNC: 23 MG/DL (ref 8–23)
CALCIUM SERPL-MCNC: 8.8 MG/DL (ref 8.7–10.5)
CHLORIDE SERPL-SCNC: 104 MMOL/L (ref 95–110)
CO2 SERPL-SCNC: 26 MMOL/L (ref 23–29)
CREAT SERPL-MCNC: 0.8 MG/DL (ref 0.5–1.4)
DACRYOCYTES BLD QL SMEAR: ABNORMAL
DIFFERENTIAL METHOD BLD: ABNORMAL
EOSINOPHIL # BLD AUTO: ABNORMAL K/UL (ref 0–0.5)
EOSINOPHIL NFR BLD: 0 % (ref 0–8)
ERYTHROCYTE [DISTWIDTH] IN BLOOD BY AUTOMATED COUNT: 25.6 % (ref 11.5–14.5)
EST. GFR  (NO RACE VARIABLE): >60 ML/MIN/1.73 M^2
GLUCOSE SERPL-MCNC: 116 MG/DL (ref 70–110)
HCT VFR BLD AUTO: 24.7 % (ref 37–48.5)
HGB BLD-MCNC: 7.5 G/DL (ref 12–16)
HYPOCHROMIA BLD QL SMEAR: ABNORMAL
IMM GRANULOCYTES # BLD AUTO: ABNORMAL K/UL (ref 0–0.04)
IMM GRANULOCYTES NFR BLD AUTO: ABNORMAL % (ref 0–0.5)
LDH SERPL L TO P-CCNC: 189 U/L (ref 110–260)
LYMPHOCYTES # BLD AUTO: ABNORMAL K/UL (ref 1–4.8)
LYMPHOCYTES NFR BLD: 32 % (ref 18–48)
MAGNESIUM SERPL-MCNC: 2 MG/DL (ref 1.6–2.6)
MCH RBC QN AUTO: 22.8 PG (ref 27–31)
MCHC RBC AUTO-ENTMCNC: 30.4 G/DL (ref 32–36)
MCV RBC AUTO: 75 FL (ref 82–98)
MONOCYTES # BLD AUTO: ABNORMAL K/UL (ref 0.3–1)
MONOCYTES NFR BLD: 10 % (ref 4–15)
MYELOCYTES NFR BLD MANUAL: 1 %
NEUTROPHILS NFR BLD: 54 % (ref 38–73)
NEUTS BAND NFR BLD MANUAL: 3 %
NRBC BLD-RTO: 0 /100 WBC
OVALOCYTES BLD QL SMEAR: ABNORMAL
PHOSPHATE SERPL-MCNC: 2.6 MG/DL (ref 2.7–4.5)
PLATELET # BLD AUTO: 123 K/UL (ref 150–450)
PLATELET BLD QL SMEAR: ABNORMAL
PMV BLD AUTO: 8.5 FL (ref 9.2–12.9)
POIKILOCYTOSIS BLD QL SMEAR: SLIGHT
POLYCHROMASIA BLD QL SMEAR: ABNORMAL
POTASSIUM SERPL-SCNC: 3.8 MMOL/L (ref 3.5–5.1)
PROT SERPL-MCNC: 6.4 G/DL (ref 6–8.4)
RBC # BLD AUTO: 3.29 M/UL (ref 4–5.4)
SCHISTOCYTES BLD QL SMEAR: PRESENT
SODIUM SERPL-SCNC: 139 MMOL/L (ref 136–145)
SPECIMEN OUTDATE: NORMAL
TOXIC GRANULES BLD QL SMEAR: PRESENT
URATE SERPL-MCNC: 4.7 MG/DL (ref 2.4–5.7)
WBC # BLD AUTO: 1.89 K/UL (ref 3.9–12.7)

## 2024-01-16 PROCEDURE — 84100 ASSAY OF PHOSPHORUS: CPT | Mod: PN | Performed by: INTERNAL MEDICINE

## 2024-01-16 PROCEDURE — 85007 BL SMEAR W/DIFF WBC COUNT: CPT | Mod: PN | Performed by: INTERNAL MEDICINE

## 2024-01-16 PROCEDURE — 83735 ASSAY OF MAGNESIUM: CPT | Mod: PN | Performed by: INTERNAL MEDICINE

## 2024-01-16 PROCEDURE — 84550 ASSAY OF BLOOD/URIC ACID: CPT | Mod: PN | Performed by: INTERNAL MEDICINE

## 2024-01-16 PROCEDURE — 83615 LACTATE (LD) (LDH) ENZYME: CPT | Mod: PN | Performed by: INTERNAL MEDICINE

## 2024-01-16 PROCEDURE — 85027 COMPLETE CBC AUTOMATED: CPT | Mod: PN | Performed by: INTERNAL MEDICINE

## 2024-01-16 PROCEDURE — 86850 RBC ANTIBODY SCREEN: CPT | Performed by: INTERNAL MEDICINE

## 2024-01-16 PROCEDURE — 36415 COLL VENOUS BLD VENIPUNCTURE: CPT | Mod: PN | Performed by: INTERNAL MEDICINE

## 2024-01-16 PROCEDURE — 86850 RBC ANTIBODY SCREEN: CPT | Mod: PN | Performed by: INTERNAL MEDICINE

## 2024-01-16 PROCEDURE — 80053 COMPREHEN METABOLIC PANEL: CPT | Mod: PN | Performed by: INTERNAL MEDICINE

## 2024-01-16 NOTE — TELEPHONE ENCOUNTER
Spoke with patient in regards to nutrition referral placed by Dr. Mohit Centeno. Pt approved and confirmed scheduling for a virtual visit with Nataly García RD on Friday 1/19 @ 2PM.       [place], [place] Ext [phone]

## 2024-01-19 ENCOUNTER — OFFICE VISIT (OUTPATIENT)
Dept: HEMATOLOGY/ONCOLOGY | Facility: CLINIC | Age: 79
End: 2024-01-19
Payer: MEDICARE

## 2024-01-19 VITALS — DIASTOLIC BLOOD PRESSURE: 78 MMHG | SYSTOLIC BLOOD PRESSURE: 154 MMHG

## 2024-01-19 DIAGNOSIS — D61.818 PANCYTOPENIA: ICD-10-CM

## 2024-01-19 DIAGNOSIS — D46.9 MDS (MYELODYSPLASTIC SYNDROME): Primary | ICD-10-CM

## 2024-01-19 DIAGNOSIS — I10 ESSENTIAL HYPERTENSION: ICD-10-CM

## 2024-01-19 PROCEDURE — 99215 OFFICE O/P EST HI 40 MIN: CPT | Mod: 95,,, | Performed by: INTERNAL MEDICINE

## 2024-01-19 RX ORDER — DEXAMETHASONE 4 MG/1
8 TABLET ORAL
Status: CANCELLED
Start: 2024-01-24

## 2024-01-19 RX ORDER — AZACITIDINE 100 MG/1
75 INJECTION, POWDER, LYOPHILIZED, FOR SOLUTION INTRAVENOUS; SUBCUTANEOUS
Status: CANCELLED | OUTPATIENT
Start: 2024-01-23

## 2024-01-19 RX ORDER — ONDANSETRON HYDROCHLORIDE 8 MG/1
8 TABLET, FILM COATED ORAL ONCE AS NEEDED
Status: CANCELLED
Start: 2024-01-26

## 2024-01-19 RX ORDER — DEXAMETHASONE 4 MG/1
8 TABLET ORAL
Status: CANCELLED
Start: 2024-01-25

## 2024-01-19 RX ORDER — AZACITIDINE 100 MG/1
75 INJECTION, POWDER, LYOPHILIZED, FOR SOLUTION INTRAVENOUS; SUBCUTANEOUS
Status: CANCELLED | OUTPATIENT
Start: 2024-01-22

## 2024-01-19 RX ORDER — ONDANSETRON HYDROCHLORIDE 8 MG/1
8 TABLET, FILM COATED ORAL ONCE AS NEEDED
Status: CANCELLED
Start: 2024-01-24

## 2024-01-19 RX ORDER — ONDANSETRON HYDROCHLORIDE 8 MG/1
8 TABLET, FILM COATED ORAL ONCE AS NEEDED
Status: CANCELLED
Start: 2024-01-25

## 2024-01-19 RX ORDER — ONDANSETRON HYDROCHLORIDE 8 MG/1
8 TABLET, FILM COATED ORAL ONCE AS NEEDED
Status: CANCELLED
Start: 2024-01-23

## 2024-01-19 RX ORDER — DEXAMETHASONE 4 MG/1
8 TABLET ORAL
Status: CANCELLED
Start: 2024-01-22

## 2024-01-19 RX ORDER — DEXAMETHASONE 4 MG/1
8 TABLET ORAL
Status: CANCELLED
Start: 2024-01-23

## 2024-01-19 RX ORDER — ONDANSETRON HYDROCHLORIDE 8 MG/1
8 TABLET, FILM COATED ORAL ONCE AS NEEDED
Status: CANCELLED
Start: 2024-01-22

## 2024-01-19 RX ORDER — AZACITIDINE 100 MG/1
75 INJECTION, POWDER, LYOPHILIZED, FOR SOLUTION INTRAVENOUS; SUBCUTANEOUS
Status: CANCELLED | OUTPATIENT
Start: 2024-01-24

## 2024-01-19 RX ORDER — DEXAMETHASONE 4 MG/1
8 TABLET ORAL
Status: CANCELLED
Start: 2024-01-26

## 2024-01-19 RX ORDER — AZACITIDINE 100 MG/1
75 INJECTION, POWDER, LYOPHILIZED, FOR SOLUTION INTRAVENOUS; SUBCUTANEOUS
Status: CANCELLED | OUTPATIENT
Start: 2024-01-26

## 2024-01-19 RX ORDER — AZACITIDINE 100 MG/1
75 INJECTION, POWDER, LYOPHILIZED, FOR SOLUTION INTRAVENOUS; SUBCUTANEOUS
Status: CANCELLED | OUTPATIENT
Start: 2024-01-25

## 2024-01-19 NOTE — PROGRESS NOTES
Route Chart for Scheduling    BMT Chart Routing  Urgent    Follow up with physician . 2/16. May offer virtual visit.   Follow up with [facility]    Provider visit type Malignant hem   Infusion scheduling note   2/19, 2/20, 2/21, 2/22, 2/23 in [facility] for azacitidine   Injection scheduling note    Labs CBC, CMP, magnesium, phosphorus, LDH, uric acid and type and screen   Scheduling:  Preferred lab:  Lab interval: once a week  on [facility]    Pharmacy appointment    Other referrals                  Treatment Plan Information   OP AZACITIDINE 5-DAY (SUB-Q)   Mohit Centeno MD   Upcoming Treatment Dates - OP AZACITIDINE 5-DAY (SUB-Q)    1/15/2024       Pre-Medications       dexAMETHasone tablet 8 mg       Chemotherapy       azaCITIDine (VIDAZA) chemo injection 135 mg  1/16/2024       Pre-Medications       dexAMETHasone tablet 8 mg       Chemotherapy       azaCITIDine (VIDAZA) chemo injection 135 mg  1/17/2024       Pre-Medications       dexAMETHasone tablet 8 mg       Chemotherapy       azaCITIDine (VIDAZA) chemo injection 135 mg  1/18/2024       Pre-Medications       dexAMETHasone tablet 8 mg       Chemotherapy       azaCITIDine (VIDAZA) chemo injection 135 mg    Therapy Plan Information  Medications  ertapenem (INVANZ) 1 g in sodium chloride 0.9% 100 mL IVPB  1 g, Intravenous, Every visit  Flushes  sodium chloride 0.9% 250 mL flush bag  Intravenous, Every visit  sodium chloride 0.9% flush 10 mL  10 mL, Intravenous, Every visit  heparin, porcine (PF) 100 unit/mL injection flush 500 Units  500 Units, Intravenous, Every visit  alteplase injection 2 mg  2 mg, Intra-Catheter, Every visit

## 2024-01-19 NOTE — PROGRESS NOTES
HEMATOLOGIC MALIGNANCIES PROGRESS NOTE    IDENTIFYING STATEMENT   Niurka Castellon) is a 78 y.o. female with a  of 1945 from [place], [place] with the diagnosis of MDS.      TELEMEDICINE DOCUMENTATION    The patient location is: [place]  The chief complaint leading to consultation is: MDS    Visit type: audiovisual    Face to Face time with patient: 8 minutes  30 minutes of total time spent on the encounter, which includes face to face time and non-face to face time preparing to see the patient (eg, review of tests), Obtaining and/or reviewing separately obtained history, Documenting clinical information in the electronic or other health record, Independently interpreting results (not separately reported) and communicating results to the patient/family/caregiver, or Care coordination (not separately reported).         Each patient to whom he or she provides medical services by telemedicine is:  (1) informed of the relationship between the physician and patient and the respective role of any other health care provider with respect to management of the patient; and (2) notified that he or she may decline to receive medical services by telemedicine and may withdraw from such care at any time.    Notes:     ONCOLOGY HISTORY:    1. Myelodysplastic syndrome with increased blasts-2              A. 2023: Bone marrow biopsy - 65-75% cellular marrow consistent with myelodysplastic syndrome with excess blasts-2; cytogenetics 46,XX,del(3)(q12)[2]/47,sl,+8[18]; NGS not sent; IPSS-R score of 7 = very high risk   B. 2023: Cycle 1 azacitidine-venetoclax (5 days aza, 14 days angelo) for MDS/AML   C. 10/18/2023: Bone marrow biopsy - 50-60% cellular marrow with no increased blasts and trilineage hematopoiesis with granulocytic hypoplasia and moderate dysgranulopoiesis, mildly left-shifted marked erythroid hyperplasia with severe dyserythropoiesis, and marked megakaryocytic hyperplasia with predominantly variably  sized including small del3q-like monolobated forms; 3-4+ histiocytic iron stores; focal MF-1; cytogenetics 46,XX[20]; NGS shows ASXL1 (26%), SRSF2 (33%) and STAG2 (3%).     2. Neuropathy  3. Interstitial lung disease/pulmonary fibrosis  4. Hypertension  5. Aortic atherosclerosis  6. Pulmonary coccidioidomycosis  7. Hypothyroidism  8. Hyperparathyroidism    INTERVAL HISTORY:      Ms. Pedraza is seen in this virtual visit in follow-up of MDS/AML prior to cycle 5 of aza-angelo therapy.      BP remains high - not as severe after taking medication (see vitals)    Reports still some instability with walking. Walking with a cane.     She is feeling much better than she did last week.     Past Medical History, Past Social History and Past Family History have been reviewed and are unchanged except as noted in the interval history.    MEDICATIONS:     Current Outpatient Medications on File Prior to Visit   Medication Sig Dispense Refill    acyclovir (ZOVIRAX) 400 MG tablet Take 1 tablet (400 mg total) by mouth 2 (two) times daily. 60 tablet 11    albuterol (VENTOLIN HFA) 90 mcg/actuation inhaler Inhale 1-2 puffs into the lungs every 6 (six) hours as needed for Wheezing or Shortness of Breath. Rescue 18 g 11    albuterol-ipratropium (DUO-NEB) 2.5 mg-0.5 mg/3 mL nebulizer solution Take 3 mLs by nebulization every 6 (six) hours as needed for Wheezing or Shortness of Breath. Rescue 75 mL 11    ascorbic acid/zinc (ZINC WITH VITAMIN C ORAL) Take by mouth.      atorvastatin (LIPITOR) 20 MG tablet Take 1 tablet (20 mg total) by mouth once daily. 90 tablet 3    azelastine (ASTELIN) 137 mcg (0.1 %) nasal spray 1 spray (137 mcg total) by Nasal route 2 (two) times daily. 30 mL 6    calcium-vitamin D3 (CALCIUM 500 + D) 500 mg(1,250mg) -200 unit per tablet Take 1 tablet by mouth 2 (two) times daily with meals.      carvediloL (COREG) 25 MG tablet Take 1 tablet (25 mg total) by mouth 2 (two) times daily with meals. 180 tablet 1    estradioL  (ESTRACE) 0.01 % (0.1 mg/gram) vaginal cream Place 1 g vaginally twice a week. 42.5 g 11    ferrous gluconate (FERGON) 324 MG tablet Take 1 tablet (324 mg total) by mouth every other day. 90 tablet 3    fluticasone furoate-vilanteroL (BREO ELLIPTA) 100-25 mcg/dose diskus inhaler Inhale 1 puff into the lungs once daily. Controller.  PLEASE NOTE IT IS ONCE A DAY!! 60 each 4    gabapentin (NEURONTIN) 100 MG capsule Take 1 capsule (100 mg total) by mouth every evening. 30 capsule 3    hydrALAZINE (APRESOLINE) 25 MG tablet Take 1 tablet (25 mg total) by mouth every 8 (eight) hours as needed (if high blood pressure > 180/100). 30 tablet 3    hydrocortisone 2.5 % cream Apply topically 2 (two) times daily. for 10 days 28 g 3    irbesartan (AVAPRO) 300 MG tablet Take 1 tablet (300 mg total) by mouth every evening. 90 tablet 0    lactulose (CHRONULAC) 10 gram/15 mL solution Take by mouth.      levothyroxine (SYNTHROID) 50 MCG tablet Take 1 tablet (50 mcg total) by mouth before breakfast. 90 tablet 1    lidocaine/hydrocortisone ac (LIDOCAINE HCL-HYDROCORTISON AC) 2 %-2 % (7 gram) Kit Place 1 Application rectally 2 (two) times a day. 1 kit 0    montelukast (SINGULAIR) 10 mg tablet TAKE 1 TABLET BY MOUTH EVERY DAY IN THE EVENING 90 tablet 2    multivitamin (THERAGRAN) per tablet Take 1 tablet by mouth once daily.      ondansetron (ZOFRAN-ODT) 4 MG TbDL Take 2 tablets (8 mg total) by mouth every 6 (six) hours as needed (nausea). 30 tablet 1    oxybutynin (DITROPAN XL) 15 MG TR24 Take 1 tablet (15 mg total) by mouth once daily. 30 tablet 11    pantoprazole (PROTONIX) 20 MG tablet Take 1 tablet (20 mg total) by mouth once daily. 90 tablet 1    posaconazole (NOXAFIL) 100 mg TbEC tablet Take 3 tablets (300 mg) by mouth twice daily on the first day, and then take 300 mg daily thereafter. 30 tablet 2    predniSONE (DELTASONE) 1 MG tablet Take 2 tablets (2 mg total) by mouth once daily. 180 tablet 3    sodium chloride 3% 3 % nebulizer  solution Take 4 mLs by nebulization as needed for Other. 120 mL 3    vibegron (GEMTESA) 75 mg Tab Take 75 mg by mouth once daily. 30 tablet 11    [DISCONTINUED] budesonide-formoterol 80-4.5 mcg (SYMBICORT) 80-4.5 mcg/actuation HFAA Inhale 2 puffs into the lungs 2 (two) times daily as needed. Controller 1 Inhaler 6     No current facility-administered medications on file prior to visit.       ALLERGIES:   Review of patient's allergies indicates:   Allergen Reactions    Ciprofloxacin Other (See Comments)     Right foot pain and edema, tendonitis    Amlodipine Edema and Swelling     BLE  BLE    Lisinopril Other (See Comments)     cough    Nitrofuran analogues         ROS:       Review of Systems   Constitutional:  Positive for appetite change. Negative for unexpected weight change.   HENT:   Negative for mouth sores.    Respiratory:  Negative for cough and shortness of breath.    Cardiovascular:  Negative for chest pain.   Gastrointestinal:  Negative for abdominal pain and diarrhea.   Genitourinary:  Positive for frequency.    Musculoskeletal:  Positive for back pain.   Skin:  Negative for rash.   Neurological:  Negative for headaches.   Hematological:  Negative for adenopathy.   Psychiatric/Behavioral:  The patient is not nervous/anxious.        PHYSICAL EXAM:  Vitals:    01/19/24 1102   BP: (!) 154/78       Physical Exam  Head and neck visualized, and no abnormalities seen.     LAB:   Results for orders placed or performed in visit on 01/16/24   CBC Auto Differential   Result Value Ref Range    WBC 1.89 (LL) 3.90 - 12.70 K/uL    RBC 3.29 (L) 4.00 - 5.40 M/uL    Hemoglobin 7.5 (L) 12.0 - 16.0 g/dL    Hematocrit 24.7 (L) 37.0 - 48.5 %    MCV 75 (L) 82 - 98 fL    MCH 22.8 (L) 27.0 - 31.0 pg    MCHC 30.4 (L) 32.0 - 36.0 g/dL    RDW 25.6 (H) 11.5 - 14.5 %    Platelets 123 (L) 150 - 450 K/uL    MPV 8.5 (L) 9.2 - 12.9 fL    Immature Granulocytes CANCELED 0.0 - 0.5 %    Immature Grans (Abs) CANCELED 0.00 - 0.04 K/uL    Lymph  # CANCELED 1.0 - 4.8 K/uL    Mono # CANCELED 0.3 - 1.0 K/uL    Eos # CANCELED 0.0 - 0.5 K/uL    Baso # CANCELED 0.00 - 0.20 K/uL    nRBC 0 0 /100 WBC    Gran % 54.0 38.0 - 73.0 %    Lymph % 32.0 18.0 - 48.0 %    Mono % 10.0 4.0 - 15.0 %    Eosinophil % 0.0 0.0 - 8.0 %    Basophil % 0.0 0.0 - 1.9 %    Bands 3.0 %    Myelocytes 1.0 %    Platelet Estimate Decreased (A)     Aniso Slight     Poik Slight     Poly Occasional     Hypo Occasional     Ovalocytes Occasional     Tear Drop Cells Occasional     Schistocytes Present     Toxic Granulation Present     Differential Method Manual    Comprehensive Metabolic Panel   Result Value Ref Range    Sodium 139 136 - 145 mmol/L    Potassium 3.8 3.5 - 5.1 mmol/L    Chloride 104 95 - 110 mmol/L    CO2 26 23 - 29 mmol/L    Glucose 116 (H) 70 - 110 mg/dL    BUN 23 8 - 23 mg/dL    Creatinine 0.8 0.5 - 1.4 mg/dL    Calcium 8.8 8.7 - 10.5 mg/dL    Total Protein 6.4 6.0 - 8.4 g/dL    Albumin 2.8 (L) 3.5 - 5.2 g/dL    Total Bilirubin 0.7 0.1 - 1.0 mg/dL    Alkaline Phosphatase 106 55 - 135 U/L    AST 13 10 - 40 U/L    ALT 14 10 - 44 U/L    eGFR >60.0 >60 mL/min/1.73 m^2    Anion Gap 9 8 - 16 mmol/L   Magnesium   Result Value Ref Range    Magnesium 2.0 1.6 - 2.6 mg/dL   PHOSPHORUS   Result Value Ref Range    Phosphorus 2.6 (L) 2.7 - 4.5 mg/dL   Lactate Dehydrogenase   Result Value Ref Range     110 - 260 U/L   Uric Acid   Result Value Ref Range    Uric Acid 4.7 2.4 - 5.7 mg/dL   Type & Screen   Result Value Ref Range    Group & Rh A POS     Indirect Emiliano GEL NEG     Specimen Outdate 01/19/2024 23:59      *Note: Due to a large number of results and/or encounters for the requested time period, some results have not been displayed. A complete set of results can be found in Results Review.       PROBLEMS ASSESSED THIS VISIT:    1. MDS (myelodysplastic syndrome)    2. Pancytopenia    3. Essential hypertension      PLAN:       Myelodysplastic syndrome  Ms. 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 We reviewed  that this is an aggressive hematologic malignancy with high probability of evolution to acute myeloid leukemia (AML). According to the [facility] pathologic designation, this is referred to as MDS/AML.     Bone marrow biopsy after 1 cycle of aza-angelo showed resolution of increased blasts with ongoing morphologic changes consistent with MDS. We reviewed that this represents a favorable response to therapy. We will therefore proceed with indefinite aza-angelo therapy.     Cycle 5 was delayed by one week due to severe neutropenia and thrombocytopenia. This has recovered adequately to administer. Continue therapy next week with very close lab monitoring for ongoing toxicity.      Plan bone marrow biopsy at least q6months to monitor response (due 4/18/2024).    Continue infection prophylaxis with acyclovir, posaconazole. Antibiotic agents per ID given multi-drug resistant UTI.     Pancytopenia  Due to MDS/AML. Monitor weekly during therapy and transfuse as clinically indicated.     Weight loss  Referred to dietician.    Hypertension  Continue carvedilol to 25 mg PO BID. Continue other antihypertensive agents. Follow-up with PCP.     Follow-up  In 4 weeks prior to cycle 6    Mohit Centeno MD  Hematology and Stem Cell Transplant

## 2024-01-22 ENCOUNTER — DOCUMENTATION ONLY (OUTPATIENT)
Dept: INFUSION THERAPY | Facility: HOSPITAL | Age: 79
End: 2024-01-22
Payer: MEDICARE

## 2024-01-22 ENCOUNTER — INFUSION (OUTPATIENT)
Dept: INFUSION THERAPY | Facility: HOSPITAL | Age: 79
End: 2024-01-22
Attending: NURSE PRACTITIONER
Payer: MEDICARE

## 2024-01-22 VITALS
BODY MASS INDEX: 22.11 KG/M2 | OXYGEN SATURATION: 98 % | HEART RATE: 63 BPM | TEMPERATURE: 98 F | HEIGHT: 66 IN | RESPIRATION RATE: 16 BRPM | DIASTOLIC BLOOD PRESSURE: 63 MMHG | WEIGHT: 137.56 LBS | SYSTOLIC BLOOD PRESSURE: 140 MMHG

## 2024-01-22 DIAGNOSIS — D46.9 MDS (MYELODYSPLASTIC SYNDROME): Primary | ICD-10-CM

## 2024-01-22 PROCEDURE — 96401 CHEMO ANTI-NEOPL SQ/IM: CPT | Mod: PN

## 2024-01-22 PROCEDURE — 63600175 PHARM REV CODE 636 W HCPCS: Mod: PN | Performed by: INTERNAL MEDICINE

## 2024-01-22 RX ORDER — AZACITIDINE 100 MG/1
75 INJECTION, POWDER, LYOPHILIZED, FOR SOLUTION INTRAVENOUS; SUBCUTANEOUS
Status: COMPLETED | OUTPATIENT
Start: 2024-01-22 | End: 2024-01-22

## 2024-01-22 RX ORDER — ONDANSETRON 8 MG/1
8 TABLET, ORALLY DISINTEGRATING ORAL ONCE AS NEEDED
Status: DISCONTINUED | OUTPATIENT
Start: 2024-01-22 | End: 2024-01-22 | Stop reason: HOSPADM

## 2024-01-22 RX ORDER — DEXAMETHASONE 4 MG/1
8 TABLET ORAL
Status: COMPLETED | OUTPATIENT
Start: 2024-01-22 | End: 2024-01-22

## 2024-01-22 RX ADMIN — DEXAMETHASONE 8 MG: 4 TABLET ORAL at 11:01

## 2024-01-22 RX ADMIN — AZACITIDINE 135 MG: 100 INJECTION, POWDER, LYOPHILIZED, FOR SOLUTION INTRAVENOUS; SUBCUTANEOUS at 11:01

## 2024-01-22 NOTE — PLAN OF CARE
Problem: Adult Inpatient Plan of Care  Goal: Patient-Specific Goal (Individualized)  Outcome: Ongoing, Progressing  Flowsheets (Taken 1/22/2024 1200)  Anxieties, Fears or Concerns: None  Individualized Care Needs: Recliner     Problem: Fatigue  Goal: Improved Activity Tolerance  Intervention: Promote Improved Energy  Flowsheets (Taken 1/22/2024 1200)  Fatigue Management:   activity schedule adjusted   frequent rest breaks encouraged   paced activity encouraged  Sleep/Rest Enhancement:   regular sleep/rest pattern promoted   relaxation techniques promoted  Activity Management:   Ambulated -L4   Ambulated in roland - L4     Problem: Adult Inpatient Plan of Care  Goal: Plan of Care Review  Outcome: Ongoing, Progressing  Flowsheets (Taken 1/22/2024 1200)  Plan of Care Reviewed With: patient  Tolerated treatment with no known distress.  D/C from infusion center via w/c.

## 2024-01-22 NOTE — PROGRESS NOTES
11:19 AM    This LCSW met with this pt and her daughter at chairside to check in and provide support.  The pt said she has been feeling well and has no concerns to share at this time.  The pt said she would like to speak to a dietitian today about her diet. This  informed the pt I would see if one can come see her today at chairside.  The pt thanked me for coming to check on her today.

## 2024-01-23 ENCOUNTER — DOCUMENTATION ONLY (OUTPATIENT)
Dept: INFUSION THERAPY | Facility: HOSPITAL | Age: 79
End: 2024-01-23

## 2024-01-23 ENCOUNTER — INFUSION (OUTPATIENT)
Dept: INFUSION THERAPY | Facility: HOSPITAL | Age: 79
End: 2024-01-23
Attending: NURSE PRACTITIONER
Payer: MEDICARE

## 2024-01-23 VITALS
WEIGHT: 137.56 LBS | HEART RATE: 72 BPM | SYSTOLIC BLOOD PRESSURE: 132 MMHG | RESPIRATION RATE: 18 BRPM | OXYGEN SATURATION: 97 % | TEMPERATURE: 98 F | HEIGHT: 66 IN | BODY MASS INDEX: 22.11 KG/M2 | DIASTOLIC BLOOD PRESSURE: 60 MMHG

## 2024-01-23 DIAGNOSIS — D46.9 MDS (MYELODYSPLASTIC SYNDROME): Primary | ICD-10-CM

## 2024-01-23 PROCEDURE — 63600175 PHARM REV CODE 636 W HCPCS: Mod: PN | Performed by: INTERNAL MEDICINE

## 2024-01-23 PROCEDURE — 25000003 PHARM REV CODE 250: Mod: PN | Performed by: INTERNAL MEDICINE

## 2024-01-23 PROCEDURE — 96401 CHEMO ANTI-NEOPL SQ/IM: CPT | Mod: PN

## 2024-01-23 RX ORDER — ONDANSETRON 8 MG/1
8 TABLET, ORALLY DISINTEGRATING ORAL ONCE AS NEEDED
Status: COMPLETED | OUTPATIENT
Start: 2024-01-23 | End: 2024-01-23

## 2024-01-23 RX ORDER — DEXAMETHASONE 4 MG/1
8 TABLET ORAL
Status: COMPLETED | OUTPATIENT
Start: 2024-01-23 | End: 2024-01-23

## 2024-01-23 RX ORDER — AZACITIDINE 100 MG/1
75 INJECTION, POWDER, LYOPHILIZED, FOR SOLUTION INTRAVENOUS; SUBCUTANEOUS
Status: COMPLETED | OUTPATIENT
Start: 2024-01-23 | End: 2024-01-23

## 2024-01-23 RX ADMIN — AZACITIDINE 135 MG: 100 INJECTION, POWDER, LYOPHILIZED, FOR SOLUTION INTRAVENOUS; SUBCUTANEOUS at 02:01

## 2024-01-23 RX ADMIN — ONDANSETRON 8 MG: 8 TABLET, ORALLY DISINTEGRATING ORAL at 01:01

## 2024-01-23 RX ADMIN — DEXAMETHASONE 8 MG: 4 TABLET ORAL at 01:01

## 2024-01-23 NOTE — PLAN OF CARE
Problem: Adult Inpatient Plan of Care  Goal: Patient-Specific Goal (Individualized)  Outcome: Ongoing, Progressing  Flowsheets (Taken 1/23/2024 1331)  Anxieties, Fears or Concerns: none  Individualized Care Needs: recliner, warm blanket, daughter chairside     Problem: Fatigue  Goal: Improved Activity Tolerance  Outcome: Ongoing, Progressing  Intervention: Promote Improved Energy  Flowsheets (Taken 1/23/2024 1331)  Fatigue Management: frequent rest breaks encouraged  Sleep/Rest Enhancement: regular sleep/rest pattern promoted  Activity Management: Ambulated in room - L4

## 2024-01-23 NOTE — PROGRESS NOTES
Oncology Nutrition   Chemotherapy Infusion Visit  Late entry, RD visit on 01/22/24  Nutrition Follow Up   RD met with pt at chairside during infusion tx. Pt present for cycle 5 of vidaza. Pt with a 17# weight loss in 1 month. Last weight of 154# on 12/18/23. Pt reports that her appetite is not very good. Pt is drinking x1-2 Ensure original daily. RD discussed eating smaller meals throughout the day and incorporating higher calorie foods in those meals. RD provided pt with samples of a high calorie ONS and coupons to encourage drinking a high calorie ONS.   Discussed adding in an appetite stimulant. Pt agreeable. RD reached out to pt's provider, Mohit Centeno MD about adding an appetite stimulant.       Wt Readings from Last 10 Encounters:   01/23/24 62.4 kg (137 lb 9.1 oz)   01/22/24 62.4 kg (137 lb 9.1 oz)   01/12/24 62.6 kg (138 lb 0.1 oz)   01/12/24 62.6 kg (138 lb)   01/11/24 64.7 kg (142 lb 10.2 oz)   01/10/24 64.7 kg (142 lb 10.2 oz)   01/09/24 64.7 kg (142 lb 10.2 oz)   01/02/24 64.7 kg (142 lb 10.2 oz)   12/27/23 64.8 kg (142 lb 13.7 oz)   12/22/23 64.5 kg (142 lb 3.2 oz)       All other nutrition questions/concerns addressed as appropriate. Will continue to monitor prn throughout treatment.     Pooja Garner, EMPERATRIZ, LDN  01/23/2024  3:06 PM

## 2024-01-23 NOTE — PLAN OF CARE
Problem: Adult Inpatient Plan of Care  Goal: Plan of Care Review  Outcome: Ongoing, Progressing  Flowsheets (Taken 1/23/2024 8258)  Plan of Care Reviewed With:   patient   daughter   Pt tolerated vidaza sub q well.  No adverse reaction noted.   Patient left clinic in no acute distress via WC with daughter.

## 2024-01-24 ENCOUNTER — INFUSION (OUTPATIENT)
Dept: INFUSION THERAPY | Facility: HOSPITAL | Age: 79
End: 2024-01-24
Attending: INTERNAL MEDICINE
Payer: MEDICARE

## 2024-01-24 VITALS
HEART RATE: 65 BPM | TEMPERATURE: 98 F | BODY MASS INDEX: 22.11 KG/M2 | SYSTOLIC BLOOD PRESSURE: 118 MMHG | HEIGHT: 66 IN | WEIGHT: 137.56 LBS | DIASTOLIC BLOOD PRESSURE: 66 MMHG | RESPIRATION RATE: 18 BRPM

## 2024-01-24 DIAGNOSIS — D46.9 MDS (MYELODYSPLASTIC SYNDROME): Primary | ICD-10-CM

## 2024-01-24 PROCEDURE — 25000003 PHARM REV CODE 250: Mod: PN | Performed by: INTERNAL MEDICINE

## 2024-01-24 PROCEDURE — 96401 CHEMO ANTI-NEOPL SQ/IM: CPT | Mod: PN

## 2024-01-24 PROCEDURE — 63600175 PHARM REV CODE 636 W HCPCS: Mod: PN | Performed by: INTERNAL MEDICINE

## 2024-01-24 RX ORDER — DEXAMETHASONE 4 MG/1
8 TABLET ORAL
Status: COMPLETED | OUTPATIENT
Start: 2024-01-24 | End: 2024-01-24

## 2024-01-24 RX ORDER — ONDANSETRON 8 MG/1
8 TABLET, ORALLY DISINTEGRATING ORAL ONCE AS NEEDED
Status: COMPLETED | OUTPATIENT
Start: 2024-01-24 | End: 2024-01-24

## 2024-01-24 RX ORDER — AZACITIDINE 100 MG/1
75 INJECTION, POWDER, LYOPHILIZED, FOR SOLUTION INTRAVENOUS; SUBCUTANEOUS
Status: COMPLETED | OUTPATIENT
Start: 2024-01-24 | End: 2024-01-24

## 2024-01-24 RX ADMIN — DEXAMETHASONE 8 MG: 4 TABLET ORAL at 02:01

## 2024-01-24 RX ADMIN — AZACITIDINE 135 MG: 100 INJECTION, POWDER, LYOPHILIZED, FOR SOLUTION INTRAVENOUS; SUBCUTANEOUS at 02:01

## 2024-01-24 RX ADMIN — ONDANSETRON 8 MG: 8 TABLET, ORALLY DISINTEGRATING ORAL at 02:01

## 2024-01-24 NOTE — PLAN OF CARE
Tolerated vidaza well.  No reactions noted.  No questions or concerns at this time.  Left unit via w/c in NAD.  
daily

## 2024-01-25 ENCOUNTER — INFUSION (OUTPATIENT)
Dept: INFUSION THERAPY | Facility: HOSPITAL | Age: 79
End: 2024-01-25
Attending: INTERNAL MEDICINE
Payer: MEDICARE

## 2024-01-25 VITALS
OXYGEN SATURATION: 98 % | DIASTOLIC BLOOD PRESSURE: 75 MMHG | WEIGHT: 141.13 LBS | TEMPERATURE: 98 F | SYSTOLIC BLOOD PRESSURE: 147 MMHG | HEIGHT: 66 IN | HEART RATE: 66 BPM | RESPIRATION RATE: 18 BRPM | BODY MASS INDEX: 22.68 KG/M2

## 2024-01-25 DIAGNOSIS — D46.9 MDS (MYELODYSPLASTIC SYNDROME): Primary | ICD-10-CM

## 2024-01-25 PROCEDURE — 63600175 PHARM REV CODE 636 W HCPCS: Mod: PN | Performed by: INTERNAL MEDICINE

## 2024-01-25 PROCEDURE — 96401 CHEMO ANTI-NEOPL SQ/IM: CPT | Mod: PN

## 2024-01-25 PROCEDURE — 25000003 PHARM REV CODE 250: Mod: PN | Performed by: INTERNAL MEDICINE

## 2024-01-25 RX ORDER — ONDANSETRON 8 MG/1
8 TABLET, ORALLY DISINTEGRATING ORAL ONCE AS NEEDED
Status: COMPLETED | OUTPATIENT
Start: 2024-01-25 | End: 2024-01-25

## 2024-01-25 RX ORDER — DEXAMETHASONE 4 MG/1
8 TABLET ORAL
Status: COMPLETED | OUTPATIENT
Start: 2024-01-25 | End: 2024-01-25

## 2024-01-25 RX ORDER — AZACITIDINE 100 MG/1
75 INJECTION, POWDER, LYOPHILIZED, FOR SOLUTION INTRAVENOUS; SUBCUTANEOUS
Status: COMPLETED | OUTPATIENT
Start: 2024-01-25 | End: 2024-01-25

## 2024-01-25 RX ADMIN — AZACITIDINE 135 MG: 100 INJECTION, POWDER, LYOPHILIZED, FOR SOLUTION INTRAVENOUS; SUBCUTANEOUS at 09:01

## 2024-01-25 RX ADMIN — ONDANSETRON 8 MG: 8 TABLET, ORALLY DISINTEGRATING ORAL at 09:01

## 2024-01-25 RX ADMIN — DEXAMETHASONE 8 MG: 4 TABLET ORAL at 09:01

## 2024-01-25 NOTE — PLAN OF CARE
Pt arrived to clinic for C5D4 Vidaza injections and tolerated well with no changes throughout therapy. Pt aware of Venetoclax dose to take for today and other home meds to prevent infection as well. Pt aware of side effects and f/u appts and discharged to home in NAD in wheelchair with daughter.

## 2024-01-26 ENCOUNTER — INFUSION (OUTPATIENT)
Dept: INFUSION THERAPY | Facility: HOSPITAL | Age: 79
End: 2024-01-26
Attending: NURSE PRACTITIONER
Payer: MEDICARE

## 2024-01-26 VITALS
TEMPERATURE: 98 F | HEART RATE: 67 BPM | SYSTOLIC BLOOD PRESSURE: 124 MMHG | RESPIRATION RATE: 18 BRPM | WEIGHT: 141.13 LBS | DIASTOLIC BLOOD PRESSURE: 73 MMHG | HEIGHT: 66 IN | BODY MASS INDEX: 22.68 KG/M2

## 2024-01-26 DIAGNOSIS — D46.9 MDS (MYELODYSPLASTIC SYNDROME): Primary | ICD-10-CM

## 2024-01-26 PROCEDURE — 63600175 PHARM REV CODE 636 W HCPCS: Mod: PN | Performed by: INTERNAL MEDICINE

## 2024-01-26 PROCEDURE — 96401 CHEMO ANTI-NEOPL SQ/IM: CPT | Mod: PN

## 2024-01-26 PROCEDURE — 25000003 PHARM REV CODE 250: Mod: PN | Performed by: INTERNAL MEDICINE

## 2024-01-26 RX ORDER — AZACITIDINE 100 MG/1
75 INJECTION, POWDER, LYOPHILIZED, FOR SOLUTION INTRAVENOUS; SUBCUTANEOUS
Status: COMPLETED | OUTPATIENT
Start: 2024-01-26 | End: 2024-01-26

## 2024-01-26 RX ORDER — DEXAMETHASONE 4 MG/1
8 TABLET ORAL
Status: COMPLETED | OUTPATIENT
Start: 2024-01-26 | End: 2024-01-26

## 2024-01-26 RX ORDER — ONDANSETRON 8 MG/1
8 TABLET, ORALLY DISINTEGRATING ORAL ONCE AS NEEDED
Status: COMPLETED | OUTPATIENT
Start: 2024-01-26 | End: 2024-01-26

## 2024-01-26 RX ADMIN — AZACITIDINE 135 MG: 100 INJECTION, POWDER, LYOPHILIZED, FOR SOLUTION INTRAVENOUS; SUBCUTANEOUS at 02:01

## 2024-01-26 RX ADMIN — ONDANSETRON 8 MG: 8 TABLET, ORALLY DISINTEGRATING ORAL at 01:01

## 2024-01-26 RX ADMIN — DEXAMETHASONE 8 MG: 4 TABLET ORAL at 01:01

## 2024-01-26 NOTE — PLAN OF CARE
Tolerated vidaza injections well.  No reactions noted.  No questions or concerns at this time.  Left unit via w/c in NAD.

## 2024-01-30 ENCOUNTER — LAB VISIT (OUTPATIENT)
Dept: LAB | Facility: HOSPITAL | Age: 79
End: 2024-01-30
Attending: INTERNAL MEDICINE
Payer: MEDICARE

## 2024-01-30 ENCOUNTER — TELEPHONE (OUTPATIENT)
Dept: HEMATOLOGY/ONCOLOGY | Facility: CLINIC | Age: 79
End: 2024-01-30
Payer: MEDICARE

## 2024-01-30 DIAGNOSIS — D46.9 MDS (MYELODYSPLASTIC SYNDROME): ICD-10-CM

## 2024-01-30 LAB
ABO + RH BLD: NORMAL
ALBUMIN SERPL BCP-MCNC: 2.9 G/DL (ref 3.5–5.2)
ALP SERPL-CCNC: 72 U/L (ref 55–135)
ALT SERPL W/O P-5'-P-CCNC: 20 U/L (ref 10–44)
ANION GAP SERPL CALC-SCNC: 7 MMOL/L (ref 8–16)
ANISOCYTOSIS BLD QL SMEAR: ABNORMAL
AST SERPL-CCNC: 11 U/L (ref 10–40)
BASOPHILS # BLD AUTO: ABNORMAL K/UL (ref 0–0.2)
BASOPHILS NFR BLD: 0 % (ref 0–1.9)
BILIRUB SERPL-MCNC: 1 MG/DL (ref 0.1–1)
BLD GP AB SCN CELLS X3 SERPL QL: NORMAL
BUN SERPL-MCNC: 32 MG/DL (ref 8–23)
CALCIUM SERPL-MCNC: 8.9 MG/DL (ref 8.7–10.5)
CHLORIDE SERPL-SCNC: 107 MMOL/L (ref 95–110)
CO2 SERPL-SCNC: 24 MMOL/L (ref 23–29)
CREAT SERPL-MCNC: 0.9 MG/DL (ref 0.5–1.4)
DIFFERENTIAL METHOD BLD: ABNORMAL
DOHLE BOD BLD QL SMEAR: PRESENT
EOSINOPHIL # BLD AUTO: ABNORMAL K/UL (ref 0–0.5)
EOSINOPHIL NFR BLD: 1 % (ref 0–8)
ERYTHROCYTE [DISTWIDTH] IN BLOOD BY AUTOMATED COUNT: 28.5 % (ref 11.5–14.5)
EST. GFR  (NO RACE VARIABLE): >60 ML/MIN/1.73 M^2
GLUCOSE SERPL-MCNC: 123 MG/DL (ref 70–110)
HCT VFR BLD AUTO: 26.6 % (ref 37–48.5)
HGB BLD-MCNC: 8.1 G/DL (ref 12–16)
HYPOCHROMIA BLD QL SMEAR: ABNORMAL
IMM GRANULOCYTES # BLD AUTO: ABNORMAL K/UL (ref 0–0.04)
IMM GRANULOCYTES NFR BLD AUTO: ABNORMAL % (ref 0–0.5)
LDH SERPL L TO P-CCNC: 210 U/L (ref 110–260)
LYMPHOCYTES # BLD AUTO: ABNORMAL K/UL (ref 1–4.8)
LYMPHOCYTES NFR BLD: 26 % (ref 18–48)
MAGNESIUM SERPL-MCNC: 1.8 MG/DL (ref 1.6–2.6)
MCH RBC QN AUTO: 23.6 PG (ref 27–31)
MCHC RBC AUTO-ENTMCNC: 30.5 G/DL (ref 32–36)
MCV RBC AUTO: 78 FL (ref 82–98)
MONOCYTES # BLD AUTO: ABNORMAL K/UL (ref 0.3–1)
MONOCYTES NFR BLD: 9 % (ref 4–15)
NEUTROPHILS # BLD AUTO: ABNORMAL K/UL (ref 1.8–7.7)
NEUTROPHILS NFR BLD: 64 % (ref 38–73)
NRBC BLD-RTO: 0 /100 WBC
OVALOCYTES BLD QL SMEAR: ABNORMAL
PHOSPHATE SERPL-MCNC: 2.7 MG/DL (ref 2.7–4.5)
PLATELET # BLD AUTO: 103 K/UL (ref 150–450)
PLATELET BLD QL SMEAR: ABNORMAL
PMV BLD AUTO: 8.3 FL (ref 9.2–12.9)
POTASSIUM SERPL-SCNC: 3.7 MMOL/L (ref 3.5–5.1)
PROT SERPL-MCNC: 6 G/DL (ref 6–8.4)
RBC # BLD AUTO: 3.43 M/UL (ref 4–5.4)
SODIUM SERPL-SCNC: 138 MMOL/L (ref 136–145)
SPECIMEN OUTDATE: NORMAL
TOXIC GRANULES BLD QL SMEAR: PRESENT
URATE SERPL-MCNC: 4.7 MG/DL (ref 2.4–5.7)
WBC # BLD AUTO: 1.66 K/UL (ref 3.9–12.7)

## 2024-01-30 PROCEDURE — 86850 RBC ANTIBODY SCREEN: CPT | Performed by: INTERNAL MEDICINE

## 2024-01-30 PROCEDURE — 85027 COMPLETE CBC AUTOMATED: CPT | Performed by: INTERNAL MEDICINE

## 2024-01-30 PROCEDURE — 84550 ASSAY OF BLOOD/URIC ACID: CPT | Performed by: INTERNAL MEDICINE

## 2024-01-30 PROCEDURE — 83735 ASSAY OF MAGNESIUM: CPT | Performed by: INTERNAL MEDICINE

## 2024-01-30 PROCEDURE — 36415 COLL VENOUS BLD VENIPUNCTURE: CPT | Performed by: INTERNAL MEDICINE

## 2024-01-30 PROCEDURE — 83615 LACTATE (LD) (LDH) ENZYME: CPT | Performed by: INTERNAL MEDICINE

## 2024-01-30 PROCEDURE — 85007 BL SMEAR W/DIFF WBC COUNT: CPT | Performed by: INTERNAL MEDICINE

## 2024-01-30 PROCEDURE — 80053 COMPREHEN METABOLIC PANEL: CPT | Performed by: INTERNAL MEDICINE

## 2024-01-30 PROCEDURE — 84100 ASSAY OF PHOSPHORUS: CPT | Performed by: INTERNAL MEDICINE

## 2024-02-05 ENCOUNTER — PATIENT MESSAGE (OUTPATIENT)
Dept: INTERNAL MEDICINE | Facility: CLINIC | Age: 79
End: 2024-02-05
Payer: MEDICARE

## 2024-02-05 ENCOUNTER — PATIENT MESSAGE (OUTPATIENT)
Dept: HEMATOLOGY/ONCOLOGY | Facility: CLINIC | Age: 79
End: 2024-02-05
Payer: MEDICARE

## 2024-02-05 DIAGNOSIS — J40 BRONCHITIS: ICD-10-CM

## 2024-02-05 DIAGNOSIS — D61.818 PANCYTOPENIA: Primary | ICD-10-CM

## 2024-02-05 DIAGNOSIS — D46.9 MDS (MYELODYSPLASTIC SYNDROME): ICD-10-CM

## 2024-02-05 RX ORDER — DIPHENHYDRAMINE HCL 25 MG
25 CAPSULE ORAL
Status: CANCELLED | OUTPATIENT
Start: 2024-02-05

## 2024-02-05 RX ORDER — ACETAMINOPHEN 325 MG/1
650 TABLET ORAL
Status: CANCELLED | OUTPATIENT
Start: 2024-02-05

## 2024-02-05 RX ORDER — HYDROCODONE BITARTRATE AND ACETAMINOPHEN 500; 5 MG/1; MG/1
TABLET ORAL ONCE
Status: CANCELLED | OUTPATIENT
Start: 2024-02-05 | End: 2024-02-05

## 2024-02-05 NOTE — TELEPHONE ENCOUNTER
She has a history of interstitial lung disease and is immunocompromised - she needs an in person appt to be appropriately assessed - please arrange an appt with provider with availability as soon as possible - she lives on [facility] so please also check there too to see if sooner appt or can go to an urgent care near them

## 2024-02-06 ENCOUNTER — INFUSION (OUTPATIENT)
Dept: INFUSION THERAPY | Facility: HOSPITAL | Age: 79
End: 2024-02-06
Payer: MEDICARE

## 2024-02-06 ENCOUNTER — HOSPITAL ENCOUNTER (INPATIENT)
Facility: HOSPITAL | Age: 79
LOS: 4 days | Discharge: HOME HEALTH | DRG: 177 | End: 2024-02-10
Attending: EMERGENCY MEDICINE | Admitting: STUDENT IN AN ORGANIZED HEALTH CARE EDUCATION/TRAINING PROGRAM
Payer: MEDICARE

## 2024-02-06 VITALS
SYSTOLIC BLOOD PRESSURE: 87 MMHG | TEMPERATURE: 99 F | DIASTOLIC BLOOD PRESSURE: 49 MMHG | HEART RATE: 83 BPM | RESPIRATION RATE: 16 BRPM

## 2024-02-06 DIAGNOSIS — D61.818 PANCYTOPENIA: ICD-10-CM

## 2024-02-06 DIAGNOSIS — N39.0 URINARY TRACT INFECTION WITH HEMATURIA, SITE UNSPECIFIED: ICD-10-CM

## 2024-02-06 DIAGNOSIS — D46.9 MDS (MYELODYSPLASTIC SYNDROME): ICD-10-CM

## 2024-02-06 DIAGNOSIS — R63.0 DECREASED APPETITE: ICD-10-CM

## 2024-02-06 DIAGNOSIS — I10 ESSENTIAL HYPERTENSION: Chronic | ICD-10-CM

## 2024-02-06 DIAGNOSIS — D69.6 THROMBOCYTOPENIA: Chronic | ICD-10-CM

## 2024-02-06 DIAGNOSIS — R19.7 DIARRHEA, UNSPECIFIED TYPE: ICD-10-CM

## 2024-02-06 DIAGNOSIS — E03.9 ACQUIRED HYPOTHYROIDISM: ICD-10-CM

## 2024-02-06 DIAGNOSIS — E88.09 EDEMA DUE TO HYPOALBUMINEMIA: ICD-10-CM

## 2024-02-06 DIAGNOSIS — J18.9 PNEUMONIA OF LEFT LOWER LOBE DUE TO INFECTIOUS ORGANISM: Primary | ICD-10-CM

## 2024-02-06 DIAGNOSIS — U07.1 COVID-19: ICD-10-CM

## 2024-02-06 DIAGNOSIS — J15.211 STAPHYLOCOCCUS AUREUS PNEUMONIA: ICD-10-CM

## 2024-02-06 DIAGNOSIS — T38.0X5A IMMUNOSUPPRESSION DUE TO CHRONIC STEROID USE: ICD-10-CM

## 2024-02-06 DIAGNOSIS — D46.9 MDS (MYELODYSPLASTIC SYNDROME): Chronic | ICD-10-CM

## 2024-02-06 DIAGNOSIS — J84.10 PULMONARY FIBROSIS: Chronic | ICD-10-CM

## 2024-02-06 DIAGNOSIS — E78.49 OTHER HYPERLIPIDEMIA: ICD-10-CM

## 2024-02-06 DIAGNOSIS — E83.42 HYPOMAGNESEMIA: ICD-10-CM

## 2024-02-06 DIAGNOSIS — Z79.52 IMMUNOSUPPRESSION DUE TO CHRONIC STEROID USE: ICD-10-CM

## 2024-02-06 DIAGNOSIS — D84.821 IMMUNOSUPPRESSION DUE TO CHRONIC STEROID USE: ICD-10-CM

## 2024-02-06 DIAGNOSIS — J12.82 PNEUMONIA DUE TO COVID-19 VIRUS: ICD-10-CM

## 2024-02-06 DIAGNOSIS — R06.02 SHORTNESS OF BREATH: ICD-10-CM

## 2024-02-06 DIAGNOSIS — U07.1 PNEUMONIA DUE TO COVID-19 VIRUS: ICD-10-CM

## 2024-02-06 DIAGNOSIS — R07.9 CHEST PAIN: ICD-10-CM

## 2024-02-06 DIAGNOSIS — E87.6 HYPOKALEMIA: ICD-10-CM

## 2024-02-06 DIAGNOSIS — N32.81 OAB (OVERACTIVE BLADDER): ICD-10-CM

## 2024-02-06 DIAGNOSIS — D64.9 SYMPTOMATIC ANEMIA: ICD-10-CM

## 2024-02-06 DIAGNOSIS — R31.9 URINARY TRACT INFECTION WITH HEMATURIA, SITE UNSPECIFIED: ICD-10-CM

## 2024-02-06 PROBLEM — J18.1 LOBAR PNEUMONIA: Status: ACTIVE | Noted: 2024-02-06

## 2024-02-06 LAB
ALBUMIN SERPL BCP-MCNC: 2.1 G/DL (ref 3.5–5.2)
ALLENS TEST: NORMAL
ALP SERPL-CCNC: 89 U/L (ref 55–135)
ALT SERPL W/O P-5'-P-CCNC: 11 U/L (ref 10–44)
ANION GAP SERPL CALC-SCNC: 9 MMOL/L (ref 8–16)
ANISOCYTOSIS BLD QL SMEAR: SLIGHT
AST SERPL-CCNC: 10 U/L (ref 10–40)
BACTERIA #/AREA URNS AUTO: ABNORMAL /HPF
BASOPHILS # BLD AUTO: 0 K/UL (ref 0–0.2)
BASOPHILS NFR BLD: 0 % (ref 0–1.9)
BILIRUB SERPL-MCNC: 1.8 MG/DL (ref 0.1–1)
BILIRUB UR QL STRIP: NEGATIVE
BLD PROD TYP BPU: NORMAL
BLOOD UNIT EXPIRATION DATE: NORMAL
BLOOD UNIT TYPE CODE: 6200
BLOOD UNIT TYPE: NORMAL
BUN SERPL-MCNC: 23 MG/DL (ref 8–23)
CALCIUM SERPL-MCNC: 8.7 MG/DL (ref 8.7–10.5)
CHLORIDE SERPL-SCNC: 103 MMOL/L (ref 95–110)
CLARITY UR REFRACT.AUTO: ABNORMAL
CO2 SERPL-SCNC: 23 MMOL/L (ref 23–29)
CODING SYSTEM: NORMAL
COLOR UR AUTO: ABNORMAL
CREAT SERPL-MCNC: 0.7 MG/DL (ref 0.5–1.4)
CROSSMATCH INTERPRETATION: NORMAL
DIFFERENTIAL METHOD BLD: ABNORMAL
DISPENSE STATUS: NORMAL
EOSINOPHIL # BLD AUTO: 0 K/UL (ref 0–0.5)
EOSINOPHIL NFR BLD: 0 % (ref 0–8)
ERYTHROCYTE [DISTWIDTH] IN BLOOD BY AUTOMATED COUNT: 30.1 % (ref 11.5–14.5)
EST. GFR  (NO RACE VARIABLE): >60 ML/MIN/1.73 M^2
GLUCOSE SERPL-MCNC: 123 MG/DL (ref 70–110)
GLUCOSE UR QL STRIP: NEGATIVE
HCT VFR BLD AUTO: 16.3 % (ref 37–48.5)
HCV AB SERPL QL IA: NORMAL
HGB BLD-MCNC: 5.1 G/DL (ref 12–16)
HGB UR QL STRIP: ABNORMAL
HYALINE CASTS UR QL AUTO: 0 /LPF
HYPOCHROMIA BLD QL SMEAR: ABNORMAL
IMM GRANULOCYTES # BLD AUTO: 0 K/UL (ref 0–0.04)
IMM GRANULOCYTES NFR BLD AUTO: 0 % (ref 0–0.5)
INFLUENZA A, MOLECULAR: NOT DETECTED
INFLUENZA B, MOLECULAR: NOT DETECTED
KETONES UR QL STRIP: ABNORMAL
LDH SERPL L TO P-CCNC: 0.77 MMOL/L (ref 0.5–2.2)
LEUKOCYTE ESTERASE UR QL STRIP: NEGATIVE
LYMPHOCYTES # BLD AUTO: 0.2 K/UL (ref 1–4.8)
LYMPHOCYTES NFR BLD: 22.2 % (ref 18–48)
MAGNESIUM SERPL-MCNC: 1.3 MG/DL (ref 1.6–2.6)
MCH RBC QN AUTO: 23.9 PG (ref 27–31)
MCHC RBC AUTO-ENTMCNC: 31.3 G/DL (ref 32–36)
MCV RBC AUTO: 77 FL (ref 82–98)
MICROSCOPIC COMMENT: ABNORMAL
MONOCYTES # BLD AUTO: 0 K/UL (ref 0.3–1)
MONOCYTES NFR BLD: 4 % (ref 4–15)
NEUTROPHILS # BLD AUTO: 0.7 K/UL (ref 1.8–7.7)
NEUTROPHILS NFR BLD: 73.8 % (ref 38–73)
NITRITE UR QL STRIP: POSITIVE
NRBC BLD-RTO: 0 /100 WBC
NUM UNITS TRANS PACKED RBC: NORMAL
OHS QRS DURATION: 86 MS
OHS QTC CALCULATION: 415 MS
OVALOCYTES BLD QL SMEAR: ABNORMAL
PH UR STRIP: 5 [PH] (ref 5–8)
PHOSPHATE SERPL-MCNC: 3 MG/DL (ref 2.7–4.5)
PLATELET # BLD AUTO: 64 K/UL (ref 150–450)
PMV BLD AUTO: ABNORMAL FL (ref 9.2–12.9)
POIKILOCYTOSIS BLD QL SMEAR: SLIGHT
POLYCHROMASIA BLD QL SMEAR: ABNORMAL
POTASSIUM SERPL-SCNC: 3.5 MMOL/L (ref 3.5–5.1)
PROT SERPL-MCNC: 5.3 G/DL (ref 6–8.4)
PROT UR QL STRIP: ABNORMAL
RBC # BLD AUTO: 2.13 M/UL (ref 4–5.4)
RBC #/AREA URNS AUTO: 5 /HPF (ref 0–4)
RSV AG BY MOLECULAR METHOD: NOT DETECTED
SAMPLE: NORMAL
SARS-COV-2 RNA RESP QL NAA+PROBE: DETECTED
SITE: NORMAL
SODIUM SERPL-SCNC: 135 MMOL/L (ref 136–145)
SP GR UR STRIP: 1.02 (ref 1–1.03)
SPHEROCYTES BLD QL SMEAR: ABNORMAL
SQUAMOUS #/AREA URNS AUTO: 15 /HPF
URN SPEC COLLECT METH UR: ABNORMAL
WBC # BLD AUTO: 0.99 K/UL (ref 3.9–12.7)
WBC #/AREA URNS AUTO: 19 /HPF (ref 0–5)

## 2024-02-06 PROCEDURE — 87040 BLOOD CULTURE FOR BACTERIA: CPT | Mod: 59 | Performed by: EMERGENCY MEDICINE

## 2024-02-06 PROCEDURE — 63600175 PHARM REV CODE 636 W HCPCS: Performed by: EMERGENCY MEDICINE

## 2024-02-06 PROCEDURE — 93005 ELECTROCARDIOGRAM TRACING: CPT

## 2024-02-06 PROCEDURE — 93010 ELECTROCARDIOGRAM REPORT: CPT | Mod: ,,, | Performed by: INTERNAL MEDICINE

## 2024-02-06 PROCEDURE — 25000003 PHARM REV CODE 250

## 2024-02-06 PROCEDURE — 96365 THER/PROPH/DIAG IV INF INIT: CPT

## 2024-02-06 PROCEDURE — 87077 CULTURE AEROBIC IDENTIFY: CPT

## 2024-02-06 PROCEDURE — 86803 HEPATITIS C AB TEST: CPT | Performed by: PHYSICIAN ASSISTANT

## 2024-02-06 PROCEDURE — 87186 SC STD MICRODIL/AGAR DIL: CPT

## 2024-02-06 PROCEDURE — 85025 COMPLETE CBC W/AUTO DIFF WBC: CPT | Performed by: EMERGENCY MEDICINE

## 2024-02-06 PROCEDURE — P9040 RBC LEUKOREDUCED IRRADIATED: HCPCS | Performed by: EMERGENCY MEDICINE

## 2024-02-06 PROCEDURE — 87070 CULTURE OTHR SPECIMN AEROBIC: CPT

## 2024-02-06 PROCEDURE — 0241U SARS-COV2 (COVID) WITH FLU/RSV BY PCR: CPT | Performed by: EMERGENCY MEDICINE

## 2024-02-06 PROCEDURE — 99285 EMERGENCY DEPT VISIT HI MDM: CPT | Mod: 25

## 2024-02-06 PROCEDURE — 83605 ASSAY OF LACTIC ACID: CPT

## 2024-02-06 PROCEDURE — 63600175 PHARM REV CODE 636 W HCPCS

## 2024-02-06 PROCEDURE — 87086 URINE CULTURE/COLONY COUNT: CPT | Performed by: EMERGENCY MEDICINE

## 2024-02-06 PROCEDURE — 12000002 HC ACUTE/MED SURGE SEMI-PRIVATE ROOM

## 2024-02-06 PROCEDURE — 86920 COMPATIBILITY TEST SPIN: CPT | Performed by: EMERGENCY MEDICINE

## 2024-02-06 PROCEDURE — 30233N1 TRANSFUSION OF NONAUTOLOGOUS RED BLOOD CELLS INTO PERIPHERAL VEIN, PERCUTANEOUS APPROACH: ICD-10-PCS | Performed by: EMERGENCY MEDICINE

## 2024-02-06 PROCEDURE — 80053 COMPREHEN METABOLIC PANEL: CPT | Performed by: EMERGENCY MEDICINE

## 2024-02-06 PROCEDURE — 36430 TRANSFUSION BLD/BLD COMPNT: CPT

## 2024-02-06 PROCEDURE — 27000207 HC ISOLATION

## 2024-02-06 PROCEDURE — 84100 ASSAY OF PHOSPHORUS: CPT | Performed by: EMERGENCY MEDICINE

## 2024-02-06 PROCEDURE — 87205 SMEAR GRAM STAIN: CPT

## 2024-02-06 PROCEDURE — 25000003 PHARM REV CODE 250: Performed by: EMERGENCY MEDICINE

## 2024-02-06 PROCEDURE — 25000003 PHARM REV CODE 250: Performed by: INTERNAL MEDICINE

## 2024-02-06 PROCEDURE — 99900035 HC TECH TIME PER 15 MIN (STAT)

## 2024-02-06 PROCEDURE — XW033E5 INTRODUCTION OF REMDESIVIR ANTI-INFECTIVE INTO PERIPHERAL VEIN, PERCUTANEOUS APPROACH, NEW TECHNOLOGY GROUP 5: ICD-10-PCS | Performed by: STUDENT IN AN ORGANIZED HEALTH CARE EDUCATION/TRAINING PROGRAM

## 2024-02-06 PROCEDURE — 63600175 PHARM REV CODE 636 W HCPCS: Mod: JZ,TB

## 2024-02-06 PROCEDURE — 83735 ASSAY OF MAGNESIUM: CPT | Performed by: EMERGENCY MEDICINE

## 2024-02-06 PROCEDURE — 81001 URINALYSIS AUTO W/SCOPE: CPT | Performed by: EMERGENCY MEDICINE

## 2024-02-06 PROCEDURE — 96361 HYDRATE IV INFUSION ADD-ON: CPT

## 2024-02-06 RX ORDER — PROCHLORPERAZINE MALEATE 5 MG
10 TABLET ORAL EVERY 6 HOURS PRN
Status: DISCONTINUED | OUTPATIENT
Start: 2024-02-06 | End: 2024-02-10 | Stop reason: HOSPADM

## 2024-02-06 RX ORDER — ATORVASTATIN CALCIUM 20 MG/1
20 TABLET, FILM COATED ORAL DAILY
Status: DISCONTINUED | OUTPATIENT
Start: 2024-02-07 | End: 2024-02-10 | Stop reason: HOSPADM

## 2024-02-06 RX ORDER — PREDNISONE 1 MG/1
2 TABLET ORAL DAILY
Status: DISCONTINUED | OUTPATIENT
Start: 2024-02-07 | End: 2024-02-08

## 2024-02-06 RX ORDER — SODIUM CHLORIDE 0.9 % (FLUSH) 0.9 %
10 SYRINGE (ML) INJECTION EVERY 12 HOURS PRN
Status: DISCONTINUED | OUTPATIENT
Start: 2024-02-06 | End: 2024-02-10 | Stop reason: HOSPADM

## 2024-02-06 RX ORDER — HYDROCODONE BITARTRATE AND ACETAMINOPHEN 500; 5 MG/1; MG/1
TABLET ORAL ONCE
Status: COMPLETED | OUTPATIENT
Start: 2024-02-06 | End: 2024-02-06

## 2024-02-06 RX ORDER — ACETAMINOPHEN 325 MG/1
650 TABLET ORAL EVERY 4 HOURS PRN
Status: DISCONTINUED | OUTPATIENT
Start: 2024-02-06 | End: 2024-02-10 | Stop reason: HOSPADM

## 2024-02-06 RX ORDER — IBUPROFEN 200 MG
24 TABLET ORAL
Status: DISCONTINUED | OUTPATIENT
Start: 2024-02-06 | End: 2024-02-10 | Stop reason: HOSPADM

## 2024-02-06 RX ORDER — GLUCAGON 1 MG
1 KIT INJECTION
Status: DISCONTINUED | OUTPATIENT
Start: 2024-02-06 | End: 2024-02-10 | Stop reason: HOSPADM

## 2024-02-06 RX ORDER — ACYCLOVIR 200 MG/1
400 CAPSULE ORAL 2 TIMES DAILY
Status: DISCONTINUED | OUTPATIENT
Start: 2024-02-06 | End: 2024-02-10 | Stop reason: HOSPADM

## 2024-02-06 RX ORDER — DIPHENHYDRAMINE HCL 25 MG
25 CAPSULE ORAL
Status: DISCONTINUED | OUTPATIENT
Start: 2024-02-06 | End: 2024-02-06 | Stop reason: HOSPADM

## 2024-02-06 RX ORDER — LEVOTHYROXINE SODIUM 50 UG/1
50 TABLET ORAL
Status: DISCONTINUED | OUTPATIENT
Start: 2024-02-07 | End: 2024-02-10 | Stop reason: HOSPADM

## 2024-02-06 RX ORDER — BENZONATATE 100 MG/1
100 CAPSULE ORAL 3 TIMES DAILY PRN
Status: DISCONTINUED | OUTPATIENT
Start: 2024-02-06 | End: 2024-02-10 | Stop reason: HOSPADM

## 2024-02-06 RX ORDER — POLYETHYLENE GLYCOL 3350 17 G/17G
17 POWDER, FOR SOLUTION ORAL DAILY
Status: DISCONTINUED | OUTPATIENT
Start: 2024-02-07 | End: 2024-02-08

## 2024-02-06 RX ORDER — MAGNESIUM SULFATE HEPTAHYDRATE 40 MG/ML
2 INJECTION, SOLUTION INTRAVENOUS
Status: COMPLETED | OUTPATIENT
Start: 2024-02-06 | End: 2024-02-06

## 2024-02-06 RX ORDER — ONDANSETRON 8 MG/1
8 TABLET, ORALLY DISINTEGRATING ORAL EVERY 8 HOURS PRN
Status: DISCONTINUED | OUTPATIENT
Start: 2024-02-06 | End: 2024-02-10 | Stop reason: HOSPADM

## 2024-02-06 RX ORDER — HYDROCODONE BITARTRATE AND ACETAMINOPHEN 500; 5 MG/1; MG/1
TABLET ORAL
Status: DISCONTINUED | OUTPATIENT
Start: 2024-02-06 | End: 2024-02-08

## 2024-02-06 RX ORDER — FERROUS GLUCONATE 324(37.5)
324 TABLET ORAL EVERY OTHER DAY
Status: DISCONTINUED | OUTPATIENT
Start: 2024-02-07 | End: 2024-02-10 | Stop reason: HOSPADM

## 2024-02-06 RX ORDER — NALOXONE HCL 0.4 MG/ML
0.02 VIAL (ML) INJECTION
Status: DISCONTINUED | OUTPATIENT
Start: 2024-02-06 | End: 2024-02-10 | Stop reason: HOSPADM

## 2024-02-06 RX ORDER — ASCORBIC ACID 250 MG
250 TABLET ORAL DAILY
Status: DISCONTINUED | OUTPATIENT
Start: 2024-02-07 | End: 2024-02-10 | Stop reason: HOSPADM

## 2024-02-06 RX ORDER — ACETAMINOPHEN 325 MG/1
650 TABLET ORAL
Status: DISCONTINUED | OUTPATIENT
Start: 2024-02-06 | End: 2024-02-06 | Stop reason: HOSPADM

## 2024-02-06 RX ORDER — POSACONAZOLE 100 MG/1
300 TABLET, DELAYED RELEASE ORAL DAILY
Status: DISCONTINUED | OUTPATIENT
Start: 2024-02-07 | End: 2024-02-10 | Stop reason: HOSPADM

## 2024-02-06 RX ORDER — FERROUS SULFATE, DRIED 160(50) MG
1 TABLET, EXTENDED RELEASE ORAL 2 TIMES DAILY WITH MEALS
Status: DISCONTINUED | OUTPATIENT
Start: 2024-02-06 | End: 2024-02-10 | Stop reason: HOSPADM

## 2024-02-06 RX ORDER — IBUPROFEN 200 MG
16 TABLET ORAL
Status: DISCONTINUED | OUTPATIENT
Start: 2024-02-06 | End: 2024-02-10 | Stop reason: HOSPADM

## 2024-02-06 RX ADMIN — REMDESIVIR 200 MG: 100 INJECTION, POWDER, LYOPHILIZED, FOR SOLUTION INTRAVENOUS at 06:02

## 2024-02-06 RX ADMIN — SODIUM CHLORIDE 1000 ML: 9 INJECTION, SOLUTION INTRAVENOUS at 11:02

## 2024-02-06 RX ADMIN — ACETAMINOPHEN 650 MG: 325 TABLET ORAL at 06:02

## 2024-02-06 RX ADMIN — ERTAPENEM 1 G: 1 INJECTION INTRAMUSCULAR; INTRAVENOUS at 10:02

## 2024-02-06 RX ADMIN — Medication 1 TABLET: at 06:02

## 2024-02-06 RX ADMIN — BENZONATATE 100 MG: 100 CAPSULE ORAL at 06:02

## 2024-02-06 RX ADMIN — SODIUM CHLORIDE: 9 INJECTION, SOLUTION INTRAVENOUS at 09:02

## 2024-02-06 RX ADMIN — PIPERACILLIN SODIUM AND TAZOBACTAM SODIUM 4.5 G: 4; .5 INJECTION, POWDER, FOR SOLUTION INTRAVENOUS at 04:02

## 2024-02-06 RX ADMIN — MAGNESIUM SULFATE HEPTAHYDRATE 2 G: 40 INJECTION, SOLUTION INTRAVENOUS at 01:02

## 2024-02-06 RX ADMIN — ACYCLOVIR 400 MG: 200 CAPSULE ORAL at 10:02

## 2024-02-06 RX ADMIN — VANCOMYCIN HYDROCHLORIDE 1500 MG: 1.5 INJECTION, POWDER, LYOPHILIZED, FOR SOLUTION INTRAVENOUS at 04:02

## 2024-02-06 NOTE — ASSESSMENT & PLAN NOTE
With ILD. Takes prednisone 2 mg daily, montelukast 10 mg daily. Does not require home O2. Was supposed to have PFT outpatient but no results recorded.    - Hold inhalers with active COVID  - Will need 6MWT to assess need for home oxygen  - Will need outpatient PFTs

## 2024-02-06 NOTE — ASSESSMENT & PLAN NOTE
Patient's anemia is currently uncontrolled. Has received 2 units of PRBCs on 2/6/24 . Etiology likely d/t chronic disease due to Malignancy  Current CBC reviewed-   Lab Results   Component Value Date    HGB 5.1 (LL) 02/06/2024    HCT 16.3 (LL) 02/06/2024     Monitor serial CBC and transfuse if patient becomes hemodynamically unstable, symptomatic or H/H drops below 7/21 per BMT.

## 2024-02-06 NOTE — PLAN OF CARE
0945 pt iv access with ns infused, occluded. After many attempt to start iv without success. Pt noted bp decreasing, pt has cough with mask on and staff with masks on. Daughter with pt states cough has been present for 3 days. Dr. Centeno notified of pts decline and unable to maintain iv access. Pt sent to the ED for evaluation. Daughter agreed to send her to the ed. Pt transported by her wheelchair by  to the ED.

## 2024-02-06 NOTE — ASSESSMENT & PLAN NOTE
Patient has history of ESBL producing Ecoli with MDR. Patient states she has pain and burning with urination. UA in ED with evidence of infection. Received Vanc/Zosyn in ED.    - ID consulted for ertapenem/abx recs, appreciate assistance

## 2024-02-06 NOTE — ASSESSMENT & PLAN NOTE
On 2 mg prednisone for ILD. Will continue with same dose with close monitoring for sick day applicability. Patient currently afebrile, hypotensive, without vomiting or diarrhea.

## 2024-02-06 NOTE — ASSESSMENT & PLAN NOTE
Suspect 2/2 malignancy and poor nutrition. Patient with decreased albumin from prior presentation. Patient states pitting edema has worsened in the last week.    - Compression stockings  - RD consulted, appreciate recs

## 2024-02-06 NOTE — PLAN OF CARE
0840 pt here for transfusion of one unit of PRBC. Pts chart reviewed allergies, meds, tx, hx. Family with pt during tx. No distress noted.

## 2024-02-06 NOTE — ASSESSMENT & PLAN NOTE
Patient was found to have thrombocytopenia, the likely etiology is primary from bone marrow suppression, will monitor the platelets Daily. Will transfuse if platelet count is <10k per BMT. Hold DVT prophylaxis if platelets are <50k. The patient's platelet results have been reviewed and are listed below.  Recent Labs   Lab 02/06/24  1146   PLT 64*

## 2024-02-06 NOTE — ASSESSMENT & PLAN NOTE
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 High probability of evolution to AML. Per [facility] pathologic designation, referred to as MDS/AML. She is on aza-angelo indefinitely per chart review.     - Continue ppx with acyclovir, posaconazole  - ID consulted for MDR UTI  - Daily CBC with transfusions as needed  - BMT notified of patient's admission  - Hold venetoclax while inpatient  - PRN antiemetics

## 2024-02-06 NOTE — SUBJECTIVE & OBJECTIVE
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    Breast cancer     mastectomy 2014    Coccidiomycosis, progressive     Elevated CA-125 02/25/2018    Hyperlipidemia     OAB (overactive bladder)     Osteopenia     on Dexa 11/2017    Osteoporosis     pt reports hx of this, declined fosamax in past - treated with calcium and vit D    Pelvic mass 02/25/2018    Pulmonary fibrosis     Pulmonary nodules     SOB (shortness of breath) on exertion     Thyroid disease     hypo    Urinary tract infection due to extended-spectrum beta lactamase (ESBL) producing Escherichia coli 3/16/2022    UTI (urinary tract infection)        Past Surgical History:   Procedure Laterality Date    CHOLECYSTECTOMY      COLONOSCOPY N/A 12/09/2022    Procedure: COLONOSCOPY;  Surgeon: Enrique Dominguez MD;  Location: [facility] (4TH FLR);  Service: Endoscopy;  Laterality: N/A;  inst via portal  pt requests after 12/9/22-MS  11/30-pt notified of earlier arrival time-KPvt    CYSTOSCOPY WITH BIOPSY OF BLADDER Bilateral 07/27/2022    Procedure: CYSTOSCOPY, WITH BLADDER BIOPSY; retrograde pyelogram,;  Surgeon: Anaya Oropeza MD;  Location: [facility];  Service: Urology;  Laterality: Bilateral;    ENDOSCOPIC ULTRASOUND OF UPPER GASTROINTESTINAL TRACT N/A 04/08/2021    Procedure: ULTRASOUND, UPPER GI TRACT, ENDOSCOPIC;  Surgeon: Aisha Barajas MD;  Location: [facility] (2ND FLR);  Service: Endoscopy;  Laterality: N/A;  UEUS/ERCP evaluation abn MRCP - Dr nAgelika Zamudio-19 test 4/5/21 at [facility] Pre-admit - pg    ERCP N/A 04/08/2021    Procedure: ERCP (ENDOSCOPIC RETROGRADE CHOLANGIOPANCREATOGRAPHY);  Surgeon: Aisha Barajas MD;  Location: [facility] (2ND FLR);  Service: Endoscopy;  Laterality: N/A;  UEUS/ERCP evaluation abn MRCP - Dr Angelika Steven19 test 4/5/21 at [facility] Pre-admit - pg    ESOPHAGOGASTRODUODENOSCOPY N/A 04/08/2021    Procedure: EGD (ESOPHAGOGASTRODUODENOSCOPY);   Surgeon: Aisha Barajas MD;  Location: [facility] (2ND FLR);  Service: Endoscopy;  Laterality: N/A;  UEUS/ERCP evaluation abn MRCP - Dr Carney  Covid-19 test 4/5/21 at [facility] Pre-admit - pg    ESOPHAGOGASTRODUODENOSCOPY N/A 06/02/2023    Procedure: EGD (ESOPHAGOGASTRODUODENOSCOPY);  Surgeon: Emeka Carney MD;  Location: [facility] (4TH FLR);  Service: Endoscopy;  Laterality: N/A;  She has  history of seromucinous borderline tumor of the ovary. We generally follow  and CEA tumor markers. Her CEA recently became slightly elevated. CT imaging negative and colonoscopy negative.     Talita SKuldip Barrios    instructions portal-LW  pre    HYSTERECTOMY      MASTECTOMY Right     2014       Review of patient's allergies indicates:   Allergen Reactions    Ciprofloxacin Other (See Comments)     Right foot pain and edema, tendonitis    Amlodipine Edema and Swelling     BLE  BLE    Lisinopril Other (See Comments)     cough    Nitrofuran analogues        Current Facility-Administered Medications on File Prior to Encounter   Medication    [COMPLETED] 0.9%  NaCl infusion (for blood administration)    [DISCONTINUED] acetaminophen tablet 650 mg    [DISCONTINUED] diphenhydrAMINE capsule 25 mg     Current Outpatient Medications on File Prior to Encounter   Medication Sig    acyclovir (ZOVIRAX) 400 MG tablet Take 1 tablet (400 mg total) by mouth 2 (two) times daily.    albuterol (VENTOLIN HFA) 90 mcg/actuation inhaler Inhale 1-2 puffs into the lungs every 6 (six) hours as needed for Wheezing or Shortness of Breath. Rescue    albuterol-ipratropium (DUO-NEB) 2.5 mg-0.5 mg/3 mL nebulizer solution Take 3 mLs by nebulization every 6 (six) hours as needed for Wheezing or Shortness of Breath. Rescue    ascorbic acid/zinc (ZINC WITH VITAMIN C ORAL) Take by mouth.    atorvastatin (LIPITOR) 20 MG tablet Take 1 tablet (20 mg total) by mouth once daily.    azelastine (ASTELIN) 137 mcg (0.1 %) nasal spray 1 spray (137 mcg total) by Nasal  route 2 (two) times daily.    calcium-vitamin D3 (CALCIUM 500 + D) 500 mg(1,250mg) -200 unit per tablet Take 1 tablet by mouth 2 (two) times daily with meals.    carvediloL (COREG) 25 MG tablet Take 1 tablet (25 mg total) by mouth 2 (two) times daily with meals.    estradioL (ESTRACE) 0.01 % (0.1 mg/gram) vaginal cream Place 1 g vaginally twice a week.    ferrous gluconate (FERGON) 324 MG tablet Take 1 tablet (324 mg total) by mouth every other day.    fluticasone furoate-vilanteroL (BREO ELLIPTA) 100-25 mcg/dose diskus inhaler Inhale 1 puff into the lungs once daily. Controller.  PLEASE NOTE IT IS ONCE A DAY!!    gabapentin (NEURONTIN) 100 MG capsule Take 1 capsule (100 mg total) by mouth every evening. (Patient not taking: Reported on 1/25/2024)    hydrALAZINE (APRESOLINE) 25 MG tablet Take 1 tablet (25 mg total) by mouth every 8 (eight) hours as needed (if high blood pressure > 180/100). (Patient not taking: Reported on 1/25/2024)    hydrocortisone 2.5 % cream Apply topically 2 (two) times daily. for 10 days    irbesartan (AVAPRO) 300 MG tablet Take 1 tablet (300 mg total) by mouth every evening.    lactulose (CHRONULAC) 10 gram/15 mL solution Take by mouth.    levothyroxine (SYNTHROID) 50 MCG tablet Take 1 tablet (50 mcg total) by mouth before breakfast.    lidocaine/hydrocortisone ac (LIDOCAINE HCL-HYDROCORTISON AC) 2 %-2 % (7 gram) Kit Place 1 Application rectally 2 (two) times a day.    montelukast (SINGULAIR) 10 mg tablet TAKE 1 TABLET BY MOUTH EVERY DAY IN THE EVENING    multivitamin (THERAGRAN) per tablet Take 1 tablet by mouth once daily.    ondansetron (ZOFRAN-ODT) 4 MG TbDL Take 2 tablets (8 mg total) by mouth every 6 (six) hours as needed (nausea). (Patient not taking: Reported on 1/25/2024)    oxybutynin (DITROPAN XL) 15 MG TR24 Take 1 tablet (15 mg total) by mouth once daily.    pantoprazole (PROTONIX) 20 MG tablet Take 1 tablet (20 mg total) by mouth once daily.    posaconazole (NOXAFIL) 100 mg TbEC  tablet Take 3 tablets (300 mg) by mouth twice daily on the first day, and then take 300 mg daily thereafter.    predniSONE (DELTASONE) 1 MG tablet Take 2 tablets (2 mg total) by mouth once daily.    sodium chloride 3% 3 % nebulizer solution Take 4 mLs by nebulization as needed for Other.    venetoclax (VENCLEXTA) 100 mg Tab Take one tablet (100 mg) by mouth daily on days 1-7 of each 28 day cycle of therapy..    vibegron (GEMTESA) 75 mg Tab Take 75 mg by mouth once daily.    [DISCONTINUED] budesonide-formoterol 80-4.5 mcg (SYMBICORT) 80-4.5 mcg/actuation HFAA Inhale 2 puffs into the lungs 2 (two) times daily as needed. Controller     Family History       Problem Relation (Age of Onset)    Breast cancer Mother    Cancer Mother, Brother    Heart disease Father    Hypertension Father, Brother, Son    No Known Problems Sister, Daughter    Stroke Father          Tobacco Use    Smoking status: Former     Current packs/day: 0.00     Average packs/day: 0.5 packs/day for 30.1 years (15.1 ttl pk-yrs)     Types: Cigarettes     Start date:      Quit date: 2000     Years since quittin.0    Smokeless tobacco: Never    Tobacco comments:     quit in her 50s, after 30 years smoking;   Substance and Sexual Activity    Alcohol use: No    Drug use: No    Sexual activity: Not Currently     Partners: Male     Review of Systems   Constitutional:  Negative for chills and fever.   HENT:  Negative for mouth sores.    Respiratory:  Positive for cough and shortness of breath.    Cardiovascular:  Positive for leg swelling. Negative for chest pain.   Gastrointestinal:  Negative for abdominal pain, constipation and diarrhea.   Genitourinary:  Positive for difficulty urinating and dysuria.   Skin:  Negative for color change and wound.   Neurological:  Positive for weakness. Negative for headaches.   Psychiatric/Behavioral:  Negative for agitation and confusion.      Objective:     Vital Signs (Most Recent):  Temp: 98.3 °F (36.8 °C)  (02/06/24 1629)  Pulse: 79 (02/06/24 1629)  Resp: 20 (02/06/24 1629)  BP: (!) 100/59 (02/06/24 1629)  SpO2: 98 % (02/06/24 1629) Vital Signs (24h Range):  Temp:  [98.1 °F (36.7 °C)-99.3 °F (37.4 °C)] 98.3 °F (36.8 °C)  Pulse:  [79-94] 79  Resp:  [16-20] 20  SpO2:  [95 %-99 %] 98 %  BP: ()/(49-59) 100/59     Weight: 64 kg (141 lb)  Body mass index is 22.76 kg/m².     Physical Exam  Constitutional:       Appearance: Normal appearance.   HENT:      Head: Normocephalic and atraumatic.      Mouth/Throat:      Mouth: Mucous membranes are moist.      Pharynx: Oropharynx is clear.   Eyes:      Extraocular Movements: Extraocular movements intact.      Conjunctiva/sclera: Conjunctivae normal.   Cardiovascular:      Rate and Rhythm: Normal rate and regular rhythm.   Pulmonary:      Effort: Pulmonary effort is normal. No respiratory distress.      Breath sounds: Wheezing present.   Abdominal:      General: Abdomen is flat.      Palpations: Abdomen is soft.   Musculoskeletal:      Right lower leg: Edema present.      Left lower leg: Edema present.   Skin:     General: Skin is warm and dry.   Neurological:      General: No focal deficit present.      Mental Status: She is alert and oriented to person, place, and time. Mental status is at baseline.   Psychiatric:         Mood and Affect: Mood normal.         Behavior: Behavior normal.         Thought Content: Thought content normal.             Significant Labs: All pertinent labs within the past 24 hours have been reviewed.  CBC:   Recent Labs   Lab 02/05/24  1129 02/06/24  1146   WBC 1.06* 0.99*   HGB 6.4* 5.1*   HCT 21.7* 16.3*   PLT 52* 64*     CMP:   Recent Labs   Lab 02/05/24  1129 02/06/24  1146    135*   K 3.7 3.5    103   CO2 24 23   * 123*   BUN 18 23   CREATININE 0.7 0.7   CALCIUM 8.8 8.7   PROT 5.8* 5.3*   ALBUMIN 2.5* 2.1*   BILITOT 0.9 1.8*   ALKPHOS 88 89   AST 9* 10   ALT 11 11   ANIONGAP 7* 9     Urine Studies:   Recent Labs   Lab  02/06/24  1410   COLORU Kourtney   APPEARANCEUA Cloudy*   PHUR 5.0   SPECGRAV 1.020   PROTEINUA 1+*   GLUCUA Negative   KETONESU Trace*   BILIRUBINUA Negative   OCCULTUA 1+*   NITRITE Positive*   LEUKOCYTESUR Negative   RBCUA 5*   WBCUA 19*   BACTERIA Moderate*   SQUAMEPITHEL 15   HYALINECASTS 0       Significant Imaging: I have reviewed all pertinent imaging results/findings within the past 24 hours.

## 2024-02-06 NOTE — ED TRIAGE NOTES
Niurka Pedraza, a 78 y.o. female presents to the ED w/ complaint of abnormal lab.     Pt was in clinic for blood transfusion for MDS. On active chemo, last treatment 1/26/24 and chemo pills at home.     Pt c/o cough and congestion that started about 3 days ago. + for blood in mucus.     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

## 2024-02-06 NOTE — H&P
[facility] - Emergency Dept  Hospital Medicine  History & Physical    Patient Name: Niurka Pedraza  MRN: [id number]  Patient Class: IP- Inpatient  Admission Date: 2/6/2024  Attending Physician: Rishi You, *   Primary Care Provider: Savana Anderson MD    Patient information was obtained from patient, relative(s), past medical records, and ER records.     Subjective:     Principal Problem:Symptomatic anemia    Chief Complaint:   Chief Complaint   Patient presents with    Abnormal Lab     Was in clinic to get blood transfusion, on chemo for MDS, cough, congestion hx pul fibrosis        HPI: Ms. Pedraza is a 78 year old female with PMH of myelodysplastic syndrome with excess blasts-2 on chronic steroids, ILD/pulmonary fibrosis, hypertension, MDRO UTIs, pulmonary coccidioidomycosis and hypothyroidism who presented from her transfusion center. She was scheduled to have a blood transfusion for her MDS. The center was unable to obtain IV access and vitals showed notable hypotension thus she was instructed to present to the ED.     In ED, IV access was obtained. Afebrile with hypotension otherwise hemodynamically stable. Pancytopenic, WBC 0.99. Lactate WNL. CXR with lower lobe consolidation. Received IV fluids and started on IV antibiotics. Found to be COVID-19 positive.     At bedside, patient and daughter present. Patient says she felt productive cough and shortness of breath for 3 days prior. Denies sick contacts. No fevers, chills, night sweats. Also states she has burning and pain with urination which is chronic.     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    Breast cancer     mastectomy 2014    Coccidiomycosis, progressive     Elevated CA-125 02/25/2018    Hyperlipidemia     OAB (overactive bladder)     Osteopenia     on Dexa 11/2017    Osteoporosis     pt reports hx of this, declined fosamax in past - treated with calcium and vit D     Pelvic mass 02/25/2018    Pulmonary fibrosis     Pulmonary nodules     SOB (shortness of breath) on exertion     Thyroid disease     hypo    Urinary tract infection due to extended-spectrum beta lactamase (ESBL) producing Escherichia coli 3/16/2022    UTI (urinary tract infection)        Past Surgical History:   Procedure Laterality Date    CHOLECYSTECTOMY      COLONOSCOPY N/A 12/09/2022    Procedure: COLONOSCOPY;  Surgeon: Enrique Dominguez MD;  Location: [facility] (4TH FLR);  Service: Endoscopy;  Laterality: N/A;  inst via portal  pt requests after 12/9/22-MS  11/30-pt notified of earlier arrival time-KPvt    CYSTOSCOPY WITH BIOPSY OF BLADDER Bilateral 07/27/2022    Procedure: CYSTOSCOPY, WITH BLADDER BIOPSY; retrograde pyelogram,;  Surgeon: Anaya Oropeza MD;  [facility];  Service: Urology;  Laterality: Bilateral;    ENDOSCOPIC ULTRASOUND OF UPPER GASTROINTESTINAL TRACT N/A 04/08/2021    Procedure: ULTRASOUND, UPPER GI TRACT, ENDOSCOPIC;  Surgeon: Aisha Barajas MD;  Location: [facility] (2ND FLR);  Service: Endoscopy;  Laterality: N/A;  UEUS/ERCP evaluation abn MRCP - Dr Angelika Steinerid-19 test 4/5/21 at [facility] Pre-admit - pg    ERCP N/A 04/08/2021    Procedure: ERCP (ENDOSCOPIC RETROGRADE CHOLANGIOPANCREATOGRAPHY);  Surgeon: Aisha Barajas MD;  Location: [facility] (2ND FLR);  Service: Endoscopy;  Laterality: N/A;  UEUS/ERCP evaluation abn MRCP - Dr Angelika Steinerid-19 test 4/5/21 at [facility] Pre-admit - pg    ESOPHAGOGASTRODUODENOSCOPY N/A 04/08/2021    Procedure: EGD (ESOPHAGOGASTRODUODENOSCOPY);  Surgeon: Aisha Barajas MD;  Location: [facility] (2ND FLR);  Service: Endoscopy;  Laterality: N/A;  UEUS/ERCP evaluation abn MRCP - Dr Angelika Steinerid-19 test 4/5/21 at [facility] Pre-admit - pg    ESOPHAGOGASTRODUODENOSCOPY N/A 06/02/2023    Procedure: EGD (ESOPHAGOGASTRODUODENOSCOPY);  Surgeon: Emeka Carney MD;  Location: [facility] ([address] [place] [address]);  Service: Endoscopy;  Laterality: N/A;   She has  history of seromucinous borderline tumor of the ovary. We generally follow  and CEA tumor markers. Her CEA recently became slightly elevated. CT imaging negative and colonoscopy negative.     Talita Mcnairde    instructions portal-LW  pre    HYSTERECTOMY      MASTECTOMY Right     2014       Review of patient's allergies indicates:   Allergen Reactions    Ciprofloxacin Other (See Comments)     Right foot pain and edema, tendonitis    Amlodipine Edema and Swelling     BLE  BLE    Lisinopril Other (See Comments)     cough    Nitrofuran analogues        Current Facility-Administered Medications on File Prior to Encounter   Medication    [COMPLETED] 0.9%  NaCl infusion (for blood administration)    [DISCONTINUED] acetaminophen tablet 650 mg    [DISCONTINUED] diphenhydrAMINE capsule 25 mg     Current Outpatient Medications on File Prior to Encounter   Medication Sig    acyclovir (ZOVIRAX) 400 MG tablet Take 1 tablet (400 mg total) by mouth 2 (two) times daily.    albuterol (VENTOLIN HFA) 90 mcg/actuation inhaler Inhale 1-2 puffs into the lungs every 6 (six) hours as needed for Wheezing or Shortness of Breath. Rescue    albuterol-ipratropium (DUO-NEB) 2.5 mg-0.5 mg/3 mL nebulizer solution Take 3 mLs by nebulization every 6 (six) hours as needed for Wheezing or Shortness of Breath. Rescue    ascorbic acid/zinc (ZINC WITH VITAMIN C ORAL) Take by mouth.    atorvastatin (LIPITOR) 20 MG tablet Take 1 tablet (20 mg total) by mouth once daily.    azelastine (ASTELIN) 137 mcg (0.1 %) nasal spray 1 spray (137 mcg total) by Nasal route 2 (two) times daily.    calcium-vitamin D3 (CALCIUM 500 + D) 500 mg(1,250mg) -200 unit per tablet Take 1 tablet by mouth 2 (two) times daily with meals.    carvediloL (COREG) 25 MG tablet Take 1 tablet (25 mg total) by mouth 2 (two) times daily with meals.    estradioL (ESTRACE) 0.01 % (0.1 mg/gram) vaginal cream Place 1 g vaginally twice a week.    ferrous gluconate (FERGON) 324 MG  tablet Take 1 tablet (324 mg total) by mouth every other day.    fluticasone furoate-vilanteroL (BREO ELLIPTA) 100-25 mcg/dose diskus inhaler Inhale 1 puff into the lungs once daily. Controller.  PLEASE NOTE IT IS ONCE A DAY!!    gabapentin (NEURONTIN) 100 MG capsule Take 1 capsule (100 mg total) by mouth every evening. (Patient not taking: Reported on 1/25/2024)    hydrALAZINE (APRESOLINE) 25 MG tablet Take 1 tablet (25 mg total) by mouth every 8 (eight) hours as needed (if high blood pressure > 180/100). (Patient not taking: Reported on 1/25/2024)    hydrocortisone 2.5 % cream Apply topically 2 (two) times daily. for 10 days    irbesartan (AVAPRO) 300 MG tablet Take 1 tablet (300 mg total) by mouth every evening.    lactulose (CHRONULAC) 10 gram/15 mL solution Take by mouth.    levothyroxine (SYNTHROID) 50 MCG tablet Take 1 tablet (50 mcg total) by mouth before breakfast.    lidocaine/hydrocortisone ac (LIDOCAINE HCL-HYDROCORTISON AC) 2 %-2 % (7 gram) Kit Place 1 Application rectally 2 (two) times a day.    montelukast (SINGULAIR) 10 mg tablet TAKE 1 TABLET BY MOUTH EVERY DAY IN THE EVENING    multivitamin (THERAGRAN) per tablet Take 1 tablet by mouth once daily.    ondansetron (ZOFRAN-ODT) 4 MG TbDL Take 2 tablets (8 mg total) by mouth every 6 (six) hours as needed (nausea). (Patient not taking: Reported on 1/25/2024)    oxybutynin (DITROPAN XL) 15 MG TR24 Take 1 tablet (15 mg total) by mouth once daily.    pantoprazole (PROTONIX) 20 MG tablet Take 1 tablet (20 mg total) by mouth once daily.    posaconazole (NOXAFIL) 100 mg TbEC tablet Take 3 tablets (300 mg) by mouth twice daily on the first day, and then take 300 mg daily thereafter.    predniSONE (DELTASONE) 1 MG tablet Take 2 tablets (2 mg total) by mouth once daily.    sodium chloride 3% 3 % nebulizer solution Take 4 mLs by nebulization as needed for Other.    venetoclax (VENCLEXTA) 100 mg Tab Take one tablet (100 mg) by mouth daily on days 1-7 of each 28  day cycle of therapy..    vibegron (GEMTESA) 75 mg Tab Take 75 mg by mouth once daily.    [DISCONTINUED] budesonide-formoterol 80-4.5 mcg (SYMBICORT) 80-4.5 mcg/actuation HFAA Inhale 2 puffs into the lungs 2 (two) times daily as needed. Controller     Family History       Problem Relation (Age of Onset)    Breast cancer Mother    Cancer Mother, Brother    Heart disease Father    Hypertension Father, Brother, Son    No Known Problems Sister, Daughter    Stroke Father          Tobacco Use    Smoking status: Former     Current packs/day: 0.00     Average packs/day: 0.5 packs/day for 30.1 years (15.1 ttl pk-yrs)     Types: Cigarettes     Start date:      Quit date: 2000     Years since quittin.0    Smokeless tobacco: Never    Tobacco comments:     quit in her 50s, after 30 years smoking;   Substance and Sexual Activity    Alcohol use: No    Drug use: No    Sexual activity: Not Currently     Partners: Male     Review of Systems   Constitutional:  Negative for chills and fever.   HENT:  Negative for mouth sores.    Respiratory:  Positive for cough and shortness of breath.    Cardiovascular:  Positive for leg swelling. Negative for chest pain.   Gastrointestinal:  Negative for abdominal pain, constipation and diarrhea.   Genitourinary:  Positive for difficulty urinating and dysuria.   Skin:  Negative for color change and wound.   Neurological:  Positive for weakness. Negative for headaches.   Psychiatric/Behavioral:  Negative for agitation and confusion.      Objective:     Vital Signs (Most Recent):  Temp: 98.3 °F (36.8 °C) (24 1629)  Pulse: 79 (24 162)  Resp: 20 (24 162)  BP: (!) 100/59 (24 162)  SpO2: 98 % (24 162) Vital Signs (24h Range):  Temp:  [98.1 °F (36.7 °C)-99.3 °F (37.4 °C)] 98.3 °F (36.8 °C)  Pulse:  [79-94] 79  Resp:  [16-20] 20  SpO2:  [95 %-99 %] 98 %  BP: ()/(49-59) 100/59     Weight: 64 kg (141 lb)  Body mass index is 22.76 kg/m².     Physical  Exam  Constitutional:       Appearance: Normal appearance.   HENT:      Head: Normocephalic and atraumatic.      Mouth/Throat:      Mouth: Mucous membranes are moist.      Pharynx: Oropharynx is clear.   Eyes:      Extraocular Movements: Extraocular movements intact.      Conjunctiva/sclera: Conjunctivae normal.   Cardiovascular:      Rate and Rhythm: Normal rate and regular rhythm.   Pulmonary:      Effort: Pulmonary effort is normal. No respiratory distress.      Breath sounds: Wheezing present.   Abdominal:      General: Abdomen is flat.      Palpations: Abdomen is soft.   Musculoskeletal:      Right lower leg: Edema present.      Left lower leg: Edema present.   Skin:     General: Skin is warm and dry.   Neurological:      General: No focal deficit present.      Mental Status: She is alert and oriented to person, place, and time. Mental status is at baseline.   Psychiatric:         Mood and Affect: Mood normal.         Behavior: Behavior normal.         Thought Content: Thought content normal.             Significant Labs: All pertinent labs within the past 24 hours have been reviewed.  CBC:   Recent Labs   Lab 02/05/24  1129 02/06/24  1146   WBC 1.06* 0.99*   HGB 6.4* 5.1*   HCT 21.7* 16.3*   PLT 52* 64*     CMP:   Recent Labs   Lab 02/05/24  1129 02/06/24  1146    135*   K 3.7 3.5    103   CO2 24 23   * 123*   BUN 18 23   CREATININE 0.7 0.7   CALCIUM 8.8 8.7   PROT 5.8* 5.3*   ALBUMIN 2.5* 2.1*   BILITOT 0.9 1.8*   ALKPHOS 88 89   AST 9* 10   ALT 11 11   ANIONGAP 7* 9     Urine Studies:   Recent Labs   Lab 02/06/24  1410   COLORU Kourtney   APPEARANCEUA Cloudy*   PHUR 5.0   SPECGRAV 1.020   PROTEINUA 1+*   GLUCUA Negative   KETONESU Trace*   BILIRUBINUA Negative   OCCULTUA 1+*   NITRITE Positive*   LEUKOCYTESUR Negative   RBCUA 5*   WBCUA 19*   BACTERIA Moderate*   SQUAMEPITHEL 15   HYALINECASTS 0       Significant Imaging: I have reviewed all pertinent imaging results/findings within the past  24 hours.  Assessment/Plan:     * Symptomatic anemia  Patient's anemia is currently uncontrolled. Has received 2 units of PRBCs on 2/6/24 . Etiology likely d/t chronic disease due to Malignancy  Current CBC reviewed-   Lab Results   Component Value Date    HGB 5.1 (LL) 02/06/2024    HCT 16.3 (LL) 02/06/2024     Monitor serial CBC and transfuse if patient becomes hemodynamically unstable, symptomatic or H/H drops below 7/21 per BMT.    Edema due to hypoalbuminemia  Suspect 2/2 malignancy and poor nutrition. Patient with decreased albumin from prior presentation. Patient states pitting edema has worsened in the last week.    - Compression stockings  - RD consulted, appreciate recs      Lobar pneumonia  COVID-19 positive with CXR showing lower lobe consolidation. Patient with productive cough and SOB. Received Vanc Zosyn as well as IV fluids in the ED. C/f superimposed bacterial pneumonia with COVID-19.     - F/u respiratory cultures and blood cultures  - F/u MRSA nares   - ID consulted for ESBL UTI and possible ertapenem, f/u recs    Decreased appetite  Per patient's daughter, patient is eating 20% of her normal.     - Boosts with meals  - RD consulted, appreciate recs      COVID-19  Patient is identified as High risk for severe complications of COVID 19 based on COVID risk score of 4. Does not qualify for dexamethasone.  Initiate standard COVID protocols; COVID-19 testing Collection Date: 12/29/2023 Collection Time:   2:58 AM ,Infection Control notification  and isolation- respiratory, contact and droplet per protocol    Management: Maintain oxygen saturations 92-96% via Nasal Cannula  LPM and monitor with continuous/intermittent pulse oximetry.     Advance Care Planning Current advance care plan has been discussed with patient/family/POA and patient currently wishes Full Code. ACP documents from April 2018.     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 High probability of  evolution to AML. Per [facility] pathologic designation, referred to as MDS/AML. She is on aza-angelo indefinitely per chart review.     - Continue ppx with acyclovir, posaconazole  - ID consulted for MDR UTI  - Daily CBC with transfusions as needed  - BMT notified of patient's admission  - Hold venetoclax while inpatient  - PRN antiemetics        Thrombocytopenia  Patient was found to have thrombocytopenia, the likely etiology is primary from bone marrow suppression, will monitor the platelets Daily. Will transfuse if platelet count is <10k per BMT. Hold DVT prophylaxis if platelets are <50k. The patient's platelet results have been reviewed and are listed below.  Recent Labs   Lab 02/06/24  1146   PLT 64*         Immunosuppression due to chronic steroid use  On 2 mg prednisone for ILD. Will continue with same dose with close monitoring for sick day applicability. Patient currently afebrile, hypotensive, without vomiting or diarrhea.     Urinary tract infection due to extended-spectrum beta lactamase (ESBL) producing Escherichia coli  Patient has history of ESBL producing Ecoli with MDR. Patient states she has pain and burning with urination. UA in ED with evidence of infection. Received Vanc/Zosyn in ED.    - ID consulted for ertapenem/abx recs, appreciate assistance    Pulmonary fibrosis  With ILD. Takes prednisone 2 mg daily, montelukast 10 mg daily. Does not require home O2. Was supposed to have PFT outpatient but no results recorded.    - Hold inhalers with active COVID  - Will need 6MWT to assess need for home oxygen  - Will need outpatient PFTs      OAB (overactive bladder)  In the setting of chronic UTI. Will hold home meds in the acute setting and restart when clinically indicated.      Hypothyroidism  Continue home med      Essential hypertension  Chronic, controlled. Latest blood pressure and vitals reviewed-     Temp:  [98.1 °F (36.7 °C)-99.3 °F (37.4 °C)]   Pulse:  [79-94]   Resp:  [16-20]   BP: ()/(49-59)    SpO2:  [95 %-99 %] .   Home meds for hypertension were reviewed and noted below.   Hypertension Medications               carvediloL (COREG) 25 MG tablet Take 1 tablet (25 mg total) by mouth 2 (two) times daily with meals.    hydrALAZINE (APRESOLINE) 25 MG tablet Take 1 tablet (25 mg total) by mouth every 8 (eight) hours as needed (if high blood pressure > 180/100).    irbesartan (AVAPRO) 300 MG tablet Take 1 tablet (300 mg total) by mouth every evening.            While in the hospital, will manage blood pressure as follows; Adjust home antihypertensive regimen as follows- hold home medications for hypotension    Will utilize p.r.n. blood pressure medication only if patient's blood pressure greater than 180/110 and she develops symptoms such as worsening chest pain or shortness of breath.    Hyperlipidemia  Continue home statin and monitor LFTs        VTE Risk Mitigation (From admission, onward)           Ordered     Place RUDY hose  Until discontinued         02/06/24 8169     Reason for No Pharmacological VTE Prophylaxis  Once        Question:  Reasons:  Answer:  Risk of Bleeding    02/06/24 1522     IP VTE HIGH RISK PATIENT  Once         02/06/24 1522     Place sequential compression device  Until discontinued         02/06/24 1522                         Karla Larson MD  Department of Hospital Medicine  [facility] - Emergency Dept

## 2024-02-06 NOTE — Clinical Note
Diagnosis: Symptomatic anemia [[id number]]   Future Attending Provider: ELEUTERIO SANCHEZ [[id number]]   Reason for IP Medical Treatment  (Clinical interventions that can only be accomplished in the IP setting? ) :: PNA, covid, symptomatic anemia, hypotension   I certify that Inpatient services for greater than or equal to 2 midnights are medically necessary:: Yes   Plans for Post-Acute care--if anticipated (pick the single best option):: A. No post acute care anticipated at this time   Special Needs:: Isolation [20]

## 2024-02-06 NOTE — ED PROVIDER NOTES
Chief Complaint   Abnormal Lab (Was in clinic to get blood transfusion, on chemo for MDS, cough, congestion hx pul fibrosis)      History Of Present Illness   Niurka Pedraza is a 78 y.o. female presenting with hypotension, cough, congestion.  The cough and congestion started 3 days ago.  She has been feeling weak and scheduled for a blood transfusion for her myelodysplastic syndrome.  She went to the infusion center this morning and they were unable to obtain IV access, plus she was found to be hypotensive.  She was sent to the ED for further evaluation.  No fever or chills, although she was told that she is spiking this morning.    Independent Historian: Daughter provided most history    Review of patient's allergies indicates:   Allergen Reactions    Ciprofloxacin Other (See Comments)     Right foot pain and edema, tendonitis    Amlodipine Edema and Swelling     BLE  BLE    Lisinopril Other (See Comments)     cough    Nitrofuran analogues        Current Facility-Administered Medications on File Prior to Encounter   Medication Dose Route Frequency Provider Last Rate Last Admin    [COMPLETED] 0.9%  NaCl infusion (for blood administration)   Intravenous Once Rishi Benjamin MD 25 mL/hr at 02/06/24 0938 New Bag at 02/06/24 0938    acetaminophen tablet 650 mg  650 mg Oral PRN Rishi Benjamin MD        diphenhydrAMINE capsule 25 mg  25 mg Oral PRN Rishi Benjamin MD         Current Outpatient Medications on File Prior to Encounter   Medication Sig Dispense Refill    acyclovir (ZOVIRAX) 400 MG tablet Take 1 tablet (400 mg total) by mouth 2 (two) times daily. 60 tablet 11    albuterol (VENTOLIN HFA) 90 mcg/actuation inhaler Inhale 1-2 puffs into the lungs every 6 (six) hours as needed for Wheezing or Shortness of Breath. Rescue 18 g 11    albuterol-ipratropium (DUO-NEB) 2.5 mg-0.5 mg/3 mL nebulizer solution Take 3 mLs by nebulization every 6 (six) hours as needed for Wheezing or Shortness of Breath.  Rescue 75 mL 11    ascorbic acid/zinc (ZINC WITH VITAMIN C ORAL) Take by mouth.      atorvastatin (LIPITOR) 20 MG tablet Take 1 tablet (20 mg total) by mouth once daily. 90 tablet 3    azelastine (ASTELIN) 137 mcg (0.1 %) nasal spray 1 spray (137 mcg total) by Nasal route 2 (two) times daily. 30 mL 6    calcium-vitamin D3 (CALCIUM 500 + D) 500 mg(1,250mg) -200 unit per tablet Take 1 tablet by mouth 2 (two) times daily with meals.      carvediloL (COREG) 25 MG tablet Take 1 tablet (25 mg total) by mouth 2 (two) times daily with meals. 180 tablet 1    estradioL (ESTRACE) 0.01 % (0.1 mg/gram) vaginal cream Place 1 g vaginally twice a week. 42.5 g 11    ferrous gluconate (FERGON) 324 MG tablet Take 1 tablet (324 mg total) by mouth every other day. 90 tablet 3    fluticasone furoate-vilanteroL (BREO ELLIPTA) 100-25 mcg/dose diskus inhaler Inhale 1 puff into the lungs once daily. Controller.  PLEASE NOTE IT IS ONCE A DAY!! 60 each 4    gabapentin (NEURONTIN) 100 MG capsule Take 1 capsule (100 mg total) by mouth every evening. (Patient not taking: Reported on 1/25/2024) 30 capsule 3    hydrALAZINE (APRESOLINE) 25 MG tablet Take 1 tablet (25 mg total) by mouth every 8 (eight) hours as needed (if high blood pressure > 180/100). (Patient not taking: Reported on 1/25/2024) 30 tablet 3    hydrocortisone 2.5 % cream Apply topically 2 (two) times daily. for 10 days 28 g 3    irbesartan (AVAPRO) 300 MG tablet Take 1 tablet (300 mg total) by mouth every evening. 90 tablet 0    lactulose (CHRONULAC) 10 gram/15 mL solution Take by mouth.      levothyroxine (SYNTHROID) 50 MCG tablet Take 1 tablet (50 mcg total) by mouth before breakfast. 90 tablet 1    lidocaine/hydrocortisone ac (LIDOCAINE HCL-HYDROCORTISON AC) 2 %-2 % (7 gram) Kit Place 1 Application rectally 2 (two) times a day. 1 kit 0    montelukast (SINGULAIR) 10 mg tablet TAKE 1 TABLET BY MOUTH EVERY DAY IN THE EVENING 90 tablet 2    multivitamin (THERAGRAN) per tablet Take 1  tablet by mouth once daily.      ondansetron (ZOFRAN-ODT) 4 MG TbDL Take 2 tablets (8 mg total) by mouth every 6 (six) hours as needed (nausea). (Patient not taking: Reported on 1/25/2024) 30 tablet 1    oxybutynin (DITROPAN XL) 15 MG TR24 Take 1 tablet (15 mg total) by mouth once daily. 30 tablet 11    pantoprazole (PROTONIX) 20 MG tablet Take 1 tablet (20 mg total) by mouth once daily. 90 tablet 1    posaconazole (NOXAFIL) 100 mg TbEC tablet Take 3 tablets (300 mg) by mouth twice daily on the first day, and then take 300 mg daily thereafter. 30 tablet 2    predniSONE (DELTASONE) 1 MG tablet Take 2 tablets (2 mg total) by mouth once daily. 180 tablet 3    sodium chloride 3% 3 % nebulizer solution Take 4 mLs by nebulization as needed for Other. 120 mL 3    venetoclax (VENCLEXTA) 100 mg Tab Take one tablet (100 mg) by mouth daily on days 1-7 of each 28 day cycle of therapy.. 28 tablet 0    vibegron (GEMTESA) 75 mg Tab Take 75 mg by mouth once daily. 30 tablet 11    [DISCONTINUED] budesonide-formoterol 80-4.5 mcg (SYMBICORT) 80-4.5 mcg/actuation HFAA Inhale 2 puffs into the lungs 2 (two) times daily as needed. 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    Breast cancer     mastectomy 2014    Coccidiomycosis, progressive     Elevated CA-125 02/25/2018    Hyperlipidemia     OAB (overactive bladder)     Osteopenia     on Dexa 11/2017    Osteoporosis     pt reports hx of this, declined fosamax in past - treated with calcium and vit D    Pelvic mass 02/25/2018    Pulmonary fibrosis     Pulmonary nodules     SOB (shortness of breath) on exertion     Thyroid disease     hypo    Urinary tract infection due to extended-spectrum beta lactamase (ESBL) producing Escherichia coli 3/16/2022    UTI (urinary tract infection)      Past Surgical History:   Procedure Laterality Date    CHOLECYSTECTOMY       COLONOSCOPY N/A 12/09/2022    Procedure: COLONOSCOPY;  Surgeon: Enrique Dominguez MD;  Location: [facility] (4TH FLR);  Service: Endoscopy;  Laterality: N/A;  inst via portal  pt requests after 12/9/22-MS  11/30-pt notified of earlier arrival time-KPvt    CYSTOSCOPY WITH BIOPSY OF BLADDER Bilateral 07/27/2022    Procedure: CYSTOSCOPY, WITH BLADDER BIOPSY; retrograde pyelogram,;  Surgeon: Anaya Oropeza MD;  Location: [facility];  Service: Urology;  Laterality: Bilateral;    ENDOSCOPIC ULTRASOUND OF UPPER GASTROINTESTINAL TRACT N/A 04/08/2021    Procedure: ULTRASOUND, UPPER GI TRACT, ENDOSCOPIC;  Surgeon: Aisha Barajas MD;  Location: [facility] (2ND FLR);  Service: Endoscopy;  Laterality: N/A;  UEUS/ERCP evaluation abn MRCP - Dr Angelika Steinerid-19 test 4/5/21 at [facility] Pre-admit - pg    ERCP N/A 04/08/2021    Procedure: ERCP (ENDOSCOPIC RETROGRADE CHOLANGIOPANCREATOGRAPHY);  Surgeon: Aisha Barajas MD;  Location: [facility] (2ND FLR);  Service: Endoscopy;  Laterality: N/A;  UEUS/ERCP evaluation abn MRCP - Dr Angelika Steinerid-19 test 4/5/21 at [facility] Pre-admit - pg    ESOPHAGOGASTRODUODENOSCOPY N/A 04/08/2021    Procedure: EGD (ESOPHAGOGASTRODUODENOSCOPY);  Surgeon: Aisha Barajas MD;  Location: [facility] (2ND FLR);  Service: Endoscopy;  Laterality: N/A;  UEUS/ERCP evaluation abn MRCP - Dr Carney  Covid-19 test 4/5/21 at [facility] Pre-admit - pg    ESOPHAGOGASTRODUODENOSCOPY N/A 06/02/2023    Procedure: EGD (ESOPHAGOGASTRODUODENOSCOPY);  Surgeon: Emeka Carney MD;  Location: [facility] ENDO (4TH FLR);  Service: Endoscopy;  Laterality: N/A;  She has  history of seromucinous borderline tumor of the ovary. We generally follow  and CEA tumor markers. Her CEA recently became slightly elevated. CT imaging negative and colonoscopy negative.     Talita SKuldip Barrios    instructions portal-LW  pre    HYSTERECTOMY      MASTECTOMY Right     2014       Social History     Tobacco Use    Smoking status: Former  "    Current packs/day: 0.00     Average packs/day: 0.5 packs/day for 30.1 years (15.1 ttl pk-yrs)     Types: Cigarettes     Start date:      Quit date: 2000     Years since quittin.0    Smokeless tobacco: Never    Tobacco comments:     quit in her 50s, after 30 years smoking;   Substance Use Topics    Alcohol use: No    Drug use: No       Family History   Problem Relation Age of Onset    Cancer Mother         colon cancer    Breast cancer Mother     Hypertension Father     Heart disease Father     Stroke Father     No Known Problems Sister     Cancer Brother         lung, ++ tobacco    Hypertension Brother     No Known Problems Daughter     Hypertension Son     Colon cancer Neg Hx     Ovarian cancer Neg Hx        Physical Exam     Vitals:    24 1031 24 1255 24 1316 24 1411   BP: (!) 105/52 (!) 95/52 (!) 107/53 (!) 98/53   BP Location:    Left arm   Patient Position:    Lying   Pulse: 82 82 84 83   Resp: 20 18 18 18   Temp: 99.3 °F (37.4 °C) 98.8 °F (37.1 °C) 98.1 °F (36.7 °C) 98.2 °F (36.8 °C)   TempSrc: Oral Oral Oral Oral   SpO2: 96% 95% 99% 95%   Weight: 64 kg (141 lb)   64 kg (141 lb)   Height: 5' 6" (1.676 m)   5' 6" (1.676 m)     Appearance: No acute distress.  Skin: No rashes seen.  Good turgor.  No abrasions.  No ecchymoses.  Eyes: No conjunctival injection.  ENT: Oropharynx clear.    Chest:  Coarse breath sounds at left base, otherwise clear.  Cardiovascular: Regular rate and rhythm.  No murmurs. No gallops. No rubs.  Abdomen: Soft.  Not distended.  Nontender.  No guarding.  No rebound.  Musculoskeletal: Good range of motion all joints.  No deformities.  Neck supple.  No meningismus.  Neurologic: Motor intact.  Sensation intact.  Cranial nerves intact.  Mental Status:  Alert and oriented x 3.  Appropriate, conversant.      Initial MDM   Cough, cold, congestion, with increasing temperature (not yet a fever) and hypotension.  Patient is known to be anemic requiring " transfusion.  Will do septic workup and transfuse blood.  Given her comorbidities, I anticipate admission.  Most likely cause is pneumonia given the lung exam.  Will follow sepsis protocol.    External Records Reviewed:  Clinic notes from today include blood pressure of 87/49.    Medications Given     Medications   0.9%  NaCl infusion (for blood administration) (has no administration in time range)   0.9%  NaCl infusion (for blood administration) (has no administration in time range)   magnesium sulfate 2g in water 50mL IVPB (premix) (2 g Intravenous New Bag 2/6/24 1358)   piperacillin-tazobactam (ZOSYN) 4.5 g in dextrose 5 % in water (D5W) 100 mL IVPB (MB+) (has no administration in time range)   vancomycin 1,500 mg in dextrose 5 % (D5W) 250 mL IVPB (Vial-Mate) (has no administration in time range)   sodium chloride 0.9% bolus 1,000 mL 1,000 mL (0 mLs Intravenous Stopped 2/6/24 1318)       Results and Course     Labs Reviewed   CBC W/ AUTO DIFFERENTIAL - Abnormal; Notable for the following components:       Result Value    WBC 0.99 (*)     RBC 2.13 (*)     Hemoglobin 5.1 (*)     Hematocrit 16.3 (*)     MCV 77 (*)     MCH 23.9 (*)     MCHC 31.3 (*)     RDW 30.1 (*)     Platelets 64 (*)     Gran # (ANC) 0.7 (*)     Lymph # 0.2 (*)     Mono # 0.0 (*)     Gran % 73.8 (*)     All other components within normal limits    Narrative:     WBC, HGB, HCT CRITICAL CALLED TO KESHIA GÓMEZ RN by [facility] 02/06/2024                   12:27   COMPREHENSIVE METABOLIC PANEL - Abnormal; Notable for the following components:    Sodium 135 (*)     Glucose 123 (*)     Total Protein 5.3 (*)     Albumin 2.1 (*)     Total Bilirubin 1.8 (*)     All other components within normal limits   URINALYSIS, REFLEX TO URINE CULTURE - Abnormal; Notable for the following components:    Appearance, UA Cloudy (*)     Protein, UA 1+ (*)     Ketones, UA Trace (*)     Occult Blood UA 1+ (*)     Nitrite, UA Positive (*)     All other components within normal  limits    Narrative:     Specimen Source->Urine   MAGNESIUM - Abnormal; Notable for the following components:    Magnesium 1.3 (*)     All other components within normal limits   SARS-COV2 (COVID) WITH FLU/RSV BY PCR - Abnormal; Notable for the following components:    SARS-CoV2 (COVID-19) Qualitative PCR Detected (*)     All other components within normal limits   URINALYSIS MICROSCOPIC - Abnormal; Notable for the following components:    RBC, UA 5 (*)     WBC, UA 19 (*)     Bacteria Moderate (*)     All other components within normal limits    Narrative:     Specimen Source->Urine   CULTURE, BLOOD   CULTURE, BLOOD   CULTURE, URINE   HEPATITIS C ANTIBODY    Narrative:     Release to patient->Immediate   PHOSPHORUS   ISTAT LACTATE   PREPARE RBC SOFT   PREPARE RBC SOFT       Imaging Results              X-Ray Chest AP Portable (Final result)  Result time 02/06/24 13:57:14      Final result by Victorino Basilio MD (02/06/24 13:57:14)                   Impression:      Subtle increased density within the left lung base suggestive of subtle left basilar consolidation/pneumonia.  Left basilar atelectasis would also be a consideration.    Pulmonary vascular congestion.      Electronically signed by: Victorino Basilio MD  Date:    02/06/2024  Time:    13:57               Narrative:    EXAMINATION:  XR CHEST AP PORTABLE    CLINICAL HISTORY:  Sepsis;    TECHNIQUE:  Single frontal view of the chest was performed.    COMPARISON:  10/28/2023.    FINDINGS:  The heart and mediastinal structures are unremarkable.  Atherosclerotic calcification is present within the aortic arch.  There is pulmonary vascular congestion present.  There is mild increased density within the left lung base along the left heart border and subtle left basilar consolidation/pneumonia cannot be excluded.  There is no evidence for pneumothorax or pleural effusions.  Bony structures are grossly intact.                                      ED Course as of 02/06/24  1512   Tue Feb 06, 2024   1201 POC Lactate: 0.77 [DC]   1201 EKG 12-lead  EKG shows normal sinus rhythm and no acute ischemia per my independent interpretation.     [DC]   1312 Magnesium (!): 1.3 [DC]   1312 Hemoglobin(!!): 5.1 [DC]   1312 WBC(!!): 0.99 [DC]   1312 Platelet Count(!): 64 [DC]   1312 Creatinine: 0.7 [DC]   1411 X-Ray Chest AP Portable  LLL PNA per my independent interpretation.     [DC]   1421 Discussed with BMT [DC]      ED Course User Index  [DC] Francois Heaton MD       PSI score calculated at 139, class V, recommends hospitalization    Critical Care   Performed by: Francois Heaton MD   Authorized by: Francois Heaton MD    Total critical care time (exclusive of procedural time) : 80 minutes  Critical care was necessary to treat or prevent imminent or life-threatening deterioration of the following conditions:  PNA, symptomatic anemia, pancytopenia, hypotension, COVID      MDM, Impression and Plan   78 y.o. female with new left lower lobe pneumonia, symptomatic anemia requiring 2 units of transfusion, hypomagnesemia which we replaced with IV magnesium, and COVID-19.  Discussed with BMT, but given positive COVID test, will admit to Hospital Medicine per protocol.  Discussed with hospital medicine for admission.         Final diagnoses:  [R06.02] Shortness of breath  [D64.9] Symptomatic anemia  [E83.42] Hypomagnesemia  [J18.9] Pneumonia of left lower lobe due to infectious organism (Primary)  [U07.1] COVID-19  [D61.818] Pancytopenia        ED Disposition Condition    Admit Stable                  Francois Heaton MD  02/06/24 9064

## 2024-02-06 NOTE — ASSESSMENT & PLAN NOTE
In the setting of chronic UTI. Will hold home meds in the acute setting and restart when clinically indicated.

## 2024-02-06 NOTE — ASSESSMENT & PLAN NOTE
Chronic, controlled. Latest blood pressure and vitals reviewed-     Temp:  [98.1 °F (36.7 °C)-99.3 °F (37.4 °C)]   Pulse:  [79-94]   Resp:  [16-20]   BP: ()/(49-59)   SpO2:  [95 %-99 %] .   Home meds for hypertension were reviewed and noted below.   Hypertension Medications               carvediloL (COREG) 25 MG tablet Take 1 tablet (25 mg total) by mouth 2 (two) times daily with meals.    hydrALAZINE (APRESOLINE) 25 MG tablet Take 1 tablet (25 mg total) by mouth every 8 (eight) hours as needed (if high blood pressure > 180/100).    irbesartan (AVAPRO) 300 MG tablet Take 1 tablet (300 mg total) by mouth every evening.            While in the hospital, will manage blood pressure as follows; Adjust home antihypertensive regimen as follows- hold home medications for hypotension    Will utilize p.r.n. blood pressure medication only if patient's blood pressure greater than 180/110 and she develops symptoms such as worsening chest pain or shortness of breath.

## 2024-02-06 NOTE — ASSESSMENT & PLAN NOTE
Per patient's daughter, patient is eating 20% of her normal.     - Boosts with meals  - RD consulted, appreciate recs

## 2024-02-06 NOTE — ASSESSMENT & PLAN NOTE
COVID-19 positive with CXR showing lower lobe consolidation. Patient with productive cough and SOB. Received Vanc Zosyn as well as IV fluids in the ED. C/f superimposed bacterial pneumonia with COVID-19.     - F/u respiratory cultures and blood cultures  - F/u MRSA nares   - ID consulted for ESBL UTI and possible ertapenem, f/u recs

## 2024-02-06 NOTE — ASSESSMENT & PLAN NOTE
Patient is identified as High risk for severe complications of COVID 19 based on COVID risk score of 4. Does not qualify for dexamethasone.  Initiate standard COVID protocols; COVID-19 testing Collection Date: 12/29/2023 Collection Time:   2:58 AM ,Infection Control notification  and isolation- respiratory, contact and droplet per protocol    Management: Maintain oxygen saturations 92-96% via Nasal Cannula  LPM and monitor with continuous/intermittent pulse oximetry.     Advance Care Planning  Current advance care plan has been discussed with patient/family/POA and patient currently wishes Full Code. ACP documents from April 2018.

## 2024-02-06 NOTE — HPI
Ms. Pedraza is a 78 year old female with PMH of myelodysplastic syndrome with excess blasts-2 on chronic steroids, ILD/pulmonary fibrosis, hypertension, MDRO UTIs, pulmonary coccidioidomycosis and hypothyroidism who presented from her transfusion center. She was scheduled to have a blood transfusion for her MDS. The center was unable to obtain IV access and vitals showed notable hypotension thus she was instructed to present to the ED.     In ED, IV access was obtained. Afebrile with hypotension otherwise hemodynamically stable. Pancytopenic, WBC 0.99. Lactate WNL. CXR with lower lobe consolidation. Received IV fluids and started on IV antibiotics. Found to be COVID-19 positive.     At bedside, patient and daughter present. Patient says she felt productive cough and shortness of breath for 3 days prior. Denies sick contacts. No fevers, chills, night sweats. Also states she has burning and pain with urination which is chronic.

## 2024-02-07 PROBLEM — D46.9 MDS (MYELODYSPLASTIC SYNDROME): Chronic | Status: ACTIVE | Noted: 2023-09-13

## 2024-02-07 PROBLEM — R19.7 DIARRHEA: Status: ACTIVE | Noted: 2024-02-07

## 2024-02-07 PROBLEM — D69.6 THROMBOCYTOPENIA: Chronic | Status: ACTIVE | Noted: 2023-06-14

## 2024-02-07 PROBLEM — E03.9 HYPOTHYROIDISM: Chronic | Status: ACTIVE | Noted: 2017-11-22

## 2024-02-07 PROBLEM — I10 ESSENTIAL HYPERTENSION: Chronic | Status: ACTIVE | Noted: 2017-11-22

## 2024-02-07 PROBLEM — J84.10 PULMONARY FIBROSIS: Chronic | Status: ACTIVE | Noted: 2021-12-13

## 2024-02-07 LAB
ALBUMIN SERPL BCP-MCNC: 1.9 G/DL (ref 3.5–5.2)
ALP SERPL-CCNC: 91 U/L (ref 55–135)
ALT SERPL W/O P-5'-P-CCNC: 11 U/L (ref 10–44)
ANION GAP SERPL CALC-SCNC: 9 MMOL/L (ref 8–16)
AST SERPL-CCNC: 13 U/L (ref 10–40)
BILIRUB SERPL-MCNC: 1.1 MG/DL (ref 0.1–1)
BLD PROD TYP BPU: NORMAL
BLOOD UNIT EXPIRATION DATE: NORMAL
BLOOD UNIT TYPE CODE: 6200
BLOOD UNIT TYPE: NORMAL
BUN SERPL-MCNC: 20 MG/DL (ref 8–23)
CALCIUM SERPL-MCNC: 8.2 MG/DL (ref 8.7–10.5)
CHLORIDE SERPL-SCNC: 107 MMOL/L (ref 95–110)
CO2 SERPL-SCNC: 19 MMOL/L (ref 23–29)
CODING SYSTEM: NORMAL
CREAT SERPL-MCNC: 0.7 MG/DL (ref 0.5–1.4)
CROSSMATCH INTERPRETATION: NORMAL
DISPENSE STATUS: NORMAL
ERYTHROCYTE [DISTWIDTH] IN BLOOD BY AUTOMATED COUNT: 23.5 % (ref 11.5–14.5)
ERYTHROCYTE [DISTWIDTH] IN BLOOD BY AUTOMATED COUNT: 24 % (ref 11.5–14.5)
ERYTHROCYTE [DISTWIDTH] IN BLOOD BY AUTOMATED COUNT: 24.1 % (ref 11.5–14.5)
ERYTHROCYTE [DISTWIDTH] IN BLOOD BY AUTOMATED COUNT: 25.2 % (ref 11.5–14.5)
EST. GFR  (NO RACE VARIABLE): >60 ML/MIN/1.73 M^2
GLUCOSE SERPL-MCNC: 119 MG/DL (ref 70–110)
HCT VFR BLD AUTO: 21.1 % (ref 37–48.5)
HCT VFR BLD AUTO: 24.4 % (ref 37–48.5)
HCT VFR BLD AUTO: 24.7 % (ref 37–48.5)
HCT VFR BLD AUTO: 28.8 % (ref 37–48.5)
HGB BLD-MCNC: 7 G/DL (ref 12–16)
HGB BLD-MCNC: 8 G/DL (ref 12–16)
HGB BLD-MCNC: 8.1 G/DL (ref 12–16)
HGB BLD-MCNC: 9.7 G/DL (ref 12–16)
MAGNESIUM SERPL-MCNC: 1.8 MG/DL (ref 1.6–2.6)
MCH RBC QN AUTO: 25.9 PG (ref 27–31)
MCH RBC QN AUTO: 26 PG (ref 27–31)
MCH RBC QN AUTO: 26 PG (ref 27–31)
MCH RBC QN AUTO: 26.4 PG (ref 27–31)
MCHC RBC AUTO-ENTMCNC: 32.8 G/DL (ref 32–36)
MCHC RBC AUTO-ENTMCNC: 32.8 G/DL (ref 32–36)
MCHC RBC AUTO-ENTMCNC: 33.2 G/DL (ref 32–36)
MCHC RBC AUTO-ENTMCNC: 33.7 G/DL (ref 32–36)
MCV RBC AUTO: 78 FL (ref 82–98)
MCV RBC AUTO: 78 FL (ref 82–98)
MCV RBC AUTO: 79 FL (ref 82–98)
MCV RBC AUTO: 79 FL (ref 82–98)
NUM UNITS TRANS PACKED RBC: NORMAL
PHOSPHATE SERPL-MCNC: 2.5 MG/DL (ref 2.7–4.5)
PLATELET # BLD AUTO: 60 K/UL (ref 150–450)
PLATELET # BLD AUTO: 67 K/UL (ref 150–450)
PLATELET # BLD AUTO: 78 K/UL (ref 150–450)
PLATELET # BLD AUTO: 86 K/UL (ref 150–450)
PMV BLD AUTO: 8.3 FL (ref 9.2–12.9)
PMV BLD AUTO: 8.7 FL (ref 9.2–12.9)
PMV BLD AUTO: 9.1 FL (ref 9.2–12.9)
PMV BLD AUTO: 9.2 FL (ref 9.2–12.9)
POTASSIUM SERPL-SCNC: 3.5 MMOL/L (ref 3.5–5.1)
PROT SERPL-MCNC: 5.1 G/DL (ref 6–8.4)
RBC # BLD AUTO: 2.69 M/UL (ref 4–5.4)
RBC # BLD AUTO: 3.08 M/UL (ref 4–5.4)
RBC # BLD AUTO: 3.13 M/UL (ref 4–5.4)
RBC # BLD AUTO: 3.68 M/UL (ref 4–5.4)
SODIUM SERPL-SCNC: 135 MMOL/L (ref 136–145)
WBC # BLD AUTO: 1.07 K/UL (ref 3.9–12.7)
WBC # BLD AUTO: 1.32 K/UL (ref 3.9–12.7)
WBC # BLD AUTO: 1.33 K/UL (ref 3.9–12.7)
WBC # BLD AUTO: 1.42 K/UL (ref 3.9–12.7)

## 2024-02-07 PROCEDURE — 27000207 HC ISOLATION

## 2024-02-07 PROCEDURE — 85027 COMPLETE CBC AUTOMATED: CPT

## 2024-02-07 PROCEDURE — 25000003 PHARM REV CODE 250

## 2024-02-07 PROCEDURE — 21400001 HC TELEMETRY ROOM

## 2024-02-07 PROCEDURE — 36415 COLL VENOUS BLD VENIPUNCTURE: CPT

## 2024-02-07 PROCEDURE — 80053 COMPREHEN METABOLIC PANEL: CPT

## 2024-02-07 PROCEDURE — 11000001 HC ACUTE MED/SURG PRIVATE ROOM

## 2024-02-07 PROCEDURE — 36430 TRANSFUSION BLD/BLD COMPNT: CPT

## 2024-02-07 PROCEDURE — 85027 COMPLETE CBC AUTOMATED: CPT | Mod: 91

## 2024-02-07 PROCEDURE — 63600175 PHARM REV CODE 636 W HCPCS

## 2024-02-07 PROCEDURE — 25000242 PHARM REV CODE 250 ALT 637 W/ HCPCS

## 2024-02-07 PROCEDURE — P9040 RBC LEUKOREDUCED IRRADIATED: HCPCS

## 2024-02-07 PROCEDURE — 83735 ASSAY OF MAGNESIUM: CPT

## 2024-02-07 PROCEDURE — 86920 COMPATIBILITY TEST SPIN: CPT

## 2024-02-07 PROCEDURE — 87040 BLOOD CULTURE FOR BACTERIA: CPT | Mod: 59

## 2024-02-07 PROCEDURE — 84100 ASSAY OF PHOSPHORUS: CPT

## 2024-02-07 RX ORDER — FLUTICASONE FUROATE AND VILANTEROL 100; 25 UG/1; UG/1
1 POWDER RESPIRATORY (INHALATION) DAILY
Status: DISCONTINUED | OUTPATIENT
Start: 2024-02-07 | End: 2024-02-10 | Stop reason: HOSPADM

## 2024-02-07 RX ORDER — ENOXAPARIN SODIUM 100 MG/ML
40 INJECTION SUBCUTANEOUS EVERY 24 HOURS
Status: DISCONTINUED | OUTPATIENT
Start: 2024-02-07 | End: 2024-02-10 | Stop reason: HOSPADM

## 2024-02-07 RX ORDER — GUAIFENESIN AND DEXTROMETHORPHAN HYDROBROMIDE 10; 100 MG/5ML; MG/5ML
5 SYRUP ORAL EVERY 4 HOURS PRN
Status: DISCONTINUED | OUTPATIENT
Start: 2024-02-07 | End: 2024-02-10 | Stop reason: HOSPADM

## 2024-02-07 RX ORDER — HYDROCODONE BITARTRATE AND ACETAMINOPHEN 500; 5 MG/1; MG/1
TABLET ORAL
Status: DISCONTINUED | OUTPATIENT
Start: 2024-02-07 | End: 2024-02-10 | Stop reason: HOSPADM

## 2024-02-07 RX ORDER — SODIUM CHLORIDE 9 MG/ML
INJECTION, SOLUTION INTRAVENOUS CONTINUOUS
Status: DISCONTINUED | OUTPATIENT
Start: 2024-02-07 | End: 2024-02-08

## 2024-02-07 RX ORDER — PANTOPRAZOLE SODIUM 40 MG/1
40 TABLET, DELAYED RELEASE ORAL DAILY
Status: DISCONTINUED | OUTPATIENT
Start: 2024-02-07 | End: 2024-02-10 | Stop reason: HOSPADM

## 2024-02-07 RX ADMIN — Medication 1 TABLET: at 09:02

## 2024-02-07 RX ADMIN — ATORVASTATIN CALCIUM 20 MG: 20 TABLET, FILM COATED ORAL at 09:02

## 2024-02-07 RX ADMIN — PANTOPRAZOLE SODIUM 40 MG: 40 TABLET, DELAYED RELEASE ORAL at 09:02

## 2024-02-07 RX ADMIN — BENZONATATE 100 MG: 100 CAPSULE ORAL at 03:02

## 2024-02-07 RX ADMIN — Medication 250 MG: at 09:02

## 2024-02-07 RX ADMIN — REMDESIVIR 100 MG: 100 INJECTION, POWDER, LYOPHILIZED, FOR SOLUTION INTRAVENOUS at 09:02

## 2024-02-07 RX ADMIN — LEVOTHYROXINE SODIUM 50 MCG: 50 TABLET ORAL at 06:02

## 2024-02-07 RX ADMIN — Medication 2 TABLET: at 09:02

## 2024-02-07 RX ADMIN — MEROPENEM 1 G: 1 INJECTION INTRAVENOUS at 06:02

## 2024-02-07 RX ADMIN — THERA TABS 1 TABLET: TAB at 09:02

## 2024-02-07 RX ADMIN — ACETAMINOPHEN 650 MG: 325 TABLET ORAL at 04:02

## 2024-02-07 RX ADMIN — FLUTICASONE FUROATE AND VILANTEROL TRIFENATATE 1 PUFF: 100; 25 POWDER RESPIRATORY (INHALATION) at 09:02

## 2024-02-07 RX ADMIN — MEROPENEM 1 G: 1 INJECTION INTRAVENOUS at 10:02

## 2024-02-07 RX ADMIN — Medication 1 TABLET: at 04:02

## 2024-02-07 RX ADMIN — POSACONAZOLE 300 MG: 100 TABLET, DELAYED RELEASE ORAL at 10:02

## 2024-02-07 RX ADMIN — Medication 324 MG: at 10:02

## 2024-02-07 RX ADMIN — ACYCLOVIR 400 MG: 200 CAPSULE ORAL at 09:02

## 2024-02-07 RX ADMIN — ONDANSETRON 8 MG: 8 TABLET, ORALLY DISINTEGRATING ORAL at 03:02

## 2024-02-07 RX ADMIN — PREDNISONE 2 MG: 1 TABLET ORAL at 09:02

## 2024-02-07 RX ADMIN — SODIUM CHLORIDE: 9 INJECTION, SOLUTION INTRAVENOUS at 05:02

## 2024-02-07 RX ADMIN — ENOXAPARIN SODIUM 40 MG: 40 INJECTION SUBCUTANEOUS at 05:02

## 2024-02-07 NOTE — NURSING
Pt family member called in nursing staff d/t patient throwing up. Rocky brought emesis bags to room. This nurse walked into room with PRN nausea medicine and found patient covered head to toe in blankets, with only her face showing. PCT came in to assess vitals and found her temp was 103.1 and O2 91%. Primary team made aware of vitals change. PRN tylenol administered and blankets and socks removed from patient. Awaiting blood culture to be pulled and processed.

## 2024-02-07 NOTE — ASSESSMENT & PLAN NOTE
77 y/o F h/o MDS on aza-angelo (ACV/POS trophy) with recurrent ESBL Ecoli UTIs, ILD/pulmonary fibrosis, pulmonary coccidiomycosis, admitted from infusion for hypotension, found to be neutropenic with possible L lower lobe consolidation, and SARS-CoV-2 positive.     Recommendations   Continue remdesivir

## 2024-02-07 NOTE — PROGRESS NOTES
[facility] Medicine  Progress Note    Patient Name: Niurka Pedraza  MRN: [id number]  Patient Class: IP- Inpatient   Admission Date: 2/6/2024  Length of Stay: 1 days  Attending Physician: Rishi You, *  Primary Care Provider: Savana Anderson MD        Subjective:     Principal Problem:Symptomatic anemia        HPI:  Ms. Pedraza is a 78 year old female with PMH of myelodysplastic syndrome with excess blasts-2 on chronic steroids, ILD/pulmonary fibrosis, hypertension, MDRO UTIs, pulmonary coccidioidomycosis and hypothyroidism who presented from her transfusion center. She was scheduled to have a blood transfusion for her MDS. The center was unable to obtain IV access and vitals showed notable hypotension thus she was instructed to present to the ED.     In ED, IV access was obtained. Afebrile with hypotension otherwise hemodynamically stable. Pancytopenic, WBC 0.99. Lactate WNL. CXR with lower lobe consolidation. Received IV fluids and started on IV antibiotics. Found to be COVID-19 positive.     At bedside, patient and daughter present. Patient says she felt productive cough and shortness of breath for 3 days prior. Denies sick contacts. No fevers, chills, night sweats. Also states she has burning and pain with urination which is chronic.     Overview/Hospital Course:  ID consulted for antibiotic escalation. Developed new diarrhea and new fever. Blood pressure stable and abdomen non-tender. Stool studies ordered and IV fluids started.    Interval History: New fever and diarrhea; stool studies ordered and more IV fluids given. Continuing abx per ID. Additional PRN cough meds ordered. On room air.    Review of Systems   Constitutional:  Positive for diaphoresis and fever. Negative for chills.   HENT:  Negative for mouth sores.    Respiratory:  Positive for cough and shortness of breath.    Cardiovascular:  Positive for leg swelling. Negative for chest pain.   Gastrointestinal:  Positive  for diarrhea, nausea and vomiting. Negative for abdominal pain and constipation.   Genitourinary:  Positive for difficulty urinating and dysuria.   Skin:  Negative for color change and wound.   Neurological:  Positive for weakness. Negative for headaches.   Psychiatric/Behavioral:  Negative for agitation and confusion.      Objective:     Vital Signs (Most Recent):  Temp: 99.7 °F (37.6 °C) (02/07/24 1639)  Pulse: 92 (02/07/24 1720)  Resp: 18 (02/07/24 1122)  BP: (!) 168/99 (02/07/24 1608)  SpO2: (!) 91 % (02/07/24 1608) Vital Signs (24h Range):  Temp:  [97.6 °F (36.4 °C)-130.1 °F (54.5 °C)] 99.7 °F (37.6 °C)  Pulse:  [] 92  Resp:  [12-21] 18  SpO2:  [91 %-98 %] 91 %  BP: ()/(50-99) 168/99     Weight: 64 kg (141 lb)  Body mass index is 22.76 kg/m².    Intake/Output Summary (Last 24 hours) at 2/7/2024 1752  Last data filed at 2/7/2024 0626  Gross per 24 hour   Intake 1948.25 ml   Output --   Net 1948.25 ml         Physical Exam  Constitutional:       General: She is in acute distress.      Appearance: She is ill-appearing.   HENT:      Head: Normocephalic and atraumatic.      Nose: Congestion present.      Mouth/Throat:      Mouth: Mucous membranes are moist.      Pharynx: Oropharynx is clear.   Eyes:      Extraocular Movements: Extraocular movements intact.      Conjunctiva/sclera: Conjunctivae normal.   Cardiovascular:      Rate and Rhythm: Normal rate and regular rhythm.   Pulmonary:      Effort: No respiratory distress.      Breath sounds: Wheezing present.   Abdominal:      Palpations: Abdomen is soft.      Tenderness: There is no abdominal tenderness. There is no guarding or rebound.   Musculoskeletal:      Right lower leg: Edema present.      Left lower leg: Edema present.   Skin:     General: Skin is warm and dry.   Neurological:      General: No focal deficit present.      Mental Status: She is alert and oriented to person, place, and time. Mental status is at baseline.   Psychiatric:         Mood  and Affect: Mood normal.         Behavior: Behavior normal.         Thought Content: Thought content normal.             Significant Labs: All pertinent labs within the past 24 hours have been reviewed.    Significant Imaging: I have reviewed all pertinent imaging results/findings within the past 24 hours.    Assessment/Plan:      * Symptomatic anemia  Patient's anemia is currently uncontrolled. Etiology likely d/t chronic disease due to Malignancy.    Current CBC reviewed-   Lab Results   Component Value Date    HGB 8.1 (L) 02/07/2024    HCT 24.7 (L) 02/07/2024     Monitor serial CBC and transfuse if patient becomes hemodynamically unstable, symptomatic or H/H drops below 7/21 per BMT.    Diarrhea  New diarrhea during admission with poor PO intake. Also with associated nausea. In the setting of new fever.    - Continue abx  - F/u stool studies including Cdiff  - Maintenance IV fluids (normal saline as pt receiving blood transfusions)  - IV antiemetics ordered       Edema due to hypoalbuminemia  Suspect 2/2 malignancy and poor nutrition. Patient with decreased albumin from prior presentation. Patient states pitting edema has worsened in the last week.    - Compression stockings  - RD consulted, appreciate recs      Lobar pneumonia  COVID-19 positive with CXR showing lower lobe consolidation. Patient with productive cough and SOB. Received Vanc Zosyn as well as IV fluids in the ED. C/f superimposed bacterial pneumonia with COVID-19.     - F/u respiratory cultures and blood cultures  - F/u MRSA nares   - ID consulted for ESBL UTI , appreciate recs    Decreased appetite  Per patient's daughter, patient is eating 20% of her normal.     - Boosts with meals  - RD consulted, appreciate recs      COVID-19  Patient is identified as High risk for severe complications of COVID 19 based on COVID risk score of 4. Does not qualify for dexamethasone.  Initiate standard COVID protocols; COVID-19 testing Collection Date: 12/29/2023  Collection Time:   2:58 AM ,Infection Control notification  and isolation- respiratory, contact and droplet per protocol    Management: Maintain oxygen saturations 92-96% via Nasal Cannula  LPM and monitor with continuous/intermittent pulse oximetry.     Advance Care Planning Current advance care plan has been discussed with patient/family/POA and patient currently wishes Full Code. ACP documents from April 2018.     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 High probability of evolution to AML. Per [facility] pathologic designation, referred to as MDS/AML. She is on aza-angelo indefinitely per chart review.     - Continue ppx with acyclovir, posaconazole  - ID consulted for MDR UTI  - Daily CBC with transfusions as needed  - BMT notified of patient's admission  - Hold venetoclax while inpatient  - PRN antiemetics        Thrombocytopenia  Patient was found to have thrombocytopenia, the likely etiology is primary from bone marrow suppression, will monitor the platelets Daily. Will transfuse if platelet count is <10k per BMT. Hold DVT prophylaxis if platelets are <50k. The patient's platelet results have been reviewed and are listed below.  Recent Labs   Lab 02/06/24  1146   PLT 64*         Immunosuppression due to chronic steroid use  On 2 mg prednisone for ILD. Will continue with same dose with close monitoring for sick day applicability. Patient currently afebrile, hypotensive, without vomiting or diarrhea.     Urinary tract infection due to extended-spectrum beta lactamase (ESBL) producing Escherichia coli  Patient has history of ESBL producing Ecoli with MDR. Patient states she has pain and burning with urination. UA in ED with evidence of infection. Received Vanc/Zosyn in ED.    - ID consulted for ertapenem/abx recs, appreciate assistance    Pulmonary fibrosis  With ILD. Takes prednisone 2 mg daily, montelukast 10 mg daily. Does not require home O2. Was supposed to have PFT  outpatient but no results recorded.    - Continue home inhaler  - Hold nebs with active COVID  - Will need 6MWT to assess need for home oxygen  - Will need outpatient PFTs      OAB (overactive bladder)  In the setting of chronic UTI. Will hold home meds in the acute setting and restart when clinically indicated.      Hypothyroidism  Continue home med      Essential hypertension  Chronic, controlled. Latest blood pressure and vitals reviewed-     Temp:  [98.1 °F (36.7 °C)-99.3 °F (37.4 °C)]   Pulse:  [79-94]   Resp:  [16-20]   BP: ()/(49-59)   SpO2:  [95 %-99 %] .   Home meds for hypertension were reviewed and noted below.   Hypertension Medications               carvediloL (COREG) 25 MG tablet Take 1 tablet (25 mg total) by mouth 2 (two) times daily with meals.    hydrALAZINE (APRESOLINE) 25 MG tablet Take 1 tablet (25 mg total) by mouth every 8 (eight) hours as needed (if high blood pressure > 180/100).    irbesartan (AVAPRO) 300 MG tablet Take 1 tablet (300 mg total) by mouth every evening.            While in the hospital, will manage blood pressure as follows; Adjust home antihypertensive regimen as follows- hold home medications for hypotension    Will utilize p.r.n. blood pressure medication only if patient's blood pressure greater than 180/110 and she develops symptoms such as worsening chest pain or shortness of breath.    Hyperlipidemia  Continue home statin and monitor LFTs        VTE Risk Mitigation (From admission, onward)           Ordered     enoxaparin injection 40 mg  Every 24 hours         02/07/24 1055     Place RUDY hose  Until discontinued         02/06/24 1735     Reason for No Pharmacological VTE Prophylaxis  Once        Question:  Reasons:  Answer:  Risk of Bleeding    02/06/24 1522     IP VTE HIGH RISK PATIENT  Once         02/06/24 1522     Place sequential compression device  Until discontinued         02/06/24 1522                    Discharge Planning   JAKE:      Code Status: Full  Code   Is the patient medically ready for discharge?: No    Reason for patient still in hospital (select all that apply): Laboratory test and Treatment           Karla Larson MD  Department of Hospital Medicine   [facility]

## 2024-02-07 NOTE — SUBJECTIVE & OBJECTIVE
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    Breast cancer     mastectomy 2014    Coccidiomycosis, progressive     Elevated CA-125 02/25/2018    Hyperlipidemia     OAB (overactive bladder)     Osteopenia     on Dexa 11/2017    Osteoporosis     pt reports hx of this, declined fosamax in past - treated with calcium and vit D    Pelvic mass 02/25/2018    Pulmonary fibrosis     Pulmonary nodules     SOB (shortness of breath) on exertion     Thyroid disease     hypo    Urinary tract infection due to extended-spectrum beta lactamase (ESBL) producing Escherichia coli 3/16/2022    UTI (urinary tract infection)        Past Surgical History:   Procedure Laterality Date    CHOLECYSTECTOMY      COLONOSCOPY N/A 12/09/2022    Procedure: COLONOSCOPY;  Surgeon: Enrique Dominguez MD;  Location: [facility] (4TH FLR);  Service: Endoscopy;  Laterality: N/A;  inst via portal  pt requests after 12/9/22-MS  11/30-pt notified of earlier arrival time-KPvt    CYSTOSCOPY WITH BIOPSY OF BLADDER Bilateral 07/27/2022    Procedure: CYSTOSCOPY, WITH BLADDER BIOPSY; retrograde pyelogram,;  Surgeon: Anaya Oropeza MD;  Location: [facility];  Service: Urology;  Laterality: Bilateral;    ENDOSCOPIC ULTRASOUND OF UPPER GASTROINTESTINAL TRACT N/A 04/08/2021    Procedure: ULTRASOUND, UPPER GI TRACT, ENDOSCOPIC;  Surgeon: Aisha Barajas MD;  Location: [facility] (2ND FLR);  Service: Endoscopy;  Laterality: N/A;  UEUS/ERCP evaluation abn MRCP - Dr Angelika Zamudio-19 test 4/5/21 at [facility] Pre-admit - pg    ERCP N/A 04/08/2021    Procedure: ERCP (ENDOSCOPIC RETROGRADE CHOLANGIOPANCREATOGRAPHY);  Surgeon: Aisha Barajas MD;  Location: [facility] (2ND FLR);  Service: Endoscopy;  Laterality: N/A;  UEUS/ERCP evaluation abn MRCP - Dr Angelika Steven19 test 4/5/21 at [facility] Pre-admit - pg    ESOPHAGOGASTRODUODENOSCOPY N/A 04/08/2021    Procedure: EGD (ESOPHAGOGASTRODUODENOSCOPY);   Surgeon: Aisha Barajas MD;  Location: [facility] (2ND FLR);  Service: Endoscopy;  Laterality: N/A;  UEUS/ERCP evaluation abn MRCP - Dr Carney  Covid-19 test 4/5/21 at [facility] Pre-admit - pg    ESOPHAGOGASTRODUODENOSCOPY N/A 06/02/2023    Procedure: EGD (ESOPHAGOGASTRODUODENOSCOPY);  Surgeon: Emeka Carney MD;  Location: [facility] (4TH FLR);  Service: Endoscopy;  Laterality: N/A;  She has  history of seromucinous borderline tumor of the ovary. We generally follow  and CEA tumor markers. Her CEA recently became slightly elevated. CT imaging negative and colonoscopy negative.     Talita Barrios    instructions portal-LW  pre    HYSTERECTOMY      MASTECTOMY Right     2014       Review of patient's allergies indicates:   Allergen Reactions    Ciprofloxacin Other (See Comments)     Right foot pain and edema, tendonitis    Amlodipine Edema and Swelling     BLE  BLE    Lisinopril Other (See Comments)     cough    Nitrofuran analogues        Medications:  (Not in a hospital admission)    Antibiotics (From admission, onward)      None          Antifungals (From admission, onward)      Start     Stop Route Frequency Ordered    02/07/24 0900  posaconazole EC tablet 300 mg         -- Oral Daily 02/06/24 1526          Antivirals (From admission, onward)          Stop Route Frequency     remdesivir 100 mg        See Hyperspace for full Linked Orders Report.    02/09/24 0859 IV Daily     acyclovir         -- Oral 2 times daily             Immunization History   Administered Date(s) Administered    COVID-19 MRNA, LN-S PF (MODERNA HALF 0.25 ML DOSE) 11/09/2021    COVID-19 Vaccine 09/30/2022    COVID-19, MRNA, LN-S, PF (MODERNA FULL 0.5 ML DOSE) 02/03/2021, 03/03/2021    Influenza (FLUAD) - Quadrivalent - Adjuvanted - PF *Preferred* (65+) 10/24/2023    Influenza - High Dose - PF (65 years and older) 10/27/2020, 09/21/2022    Pneumococcal Conjugate - 13 Valent 11/02/2017    Pneumococcal Conjugate - 20 Valent  2022    Pneumococcal Polysaccharide - 23 Valent 2020    Tdap 2021    Zoster Recombinant 2022       Family History       Problem Relation (Age of Onset)    Breast cancer Mother    Cancer Mother, Brother    Heart disease Father    Hypertension Father, Brother, Son    No Known Problems Sister, Daughter    Stroke Father          Social History     Socioeconomic History    Marital status:     Number of children: 3   Occupational History    Occupation: retired from [employer], [employer]     Comment: neuro chemistry   Tobacco Use    Smoking status: Former     Current packs/day: 0.00     Average packs/day: 0.5 packs/day for 30.1 years (15.1 ttl pk-yrs)     Types: Cigarettes     Start date:      Quit date: 2000     Years since quittin.0    Smokeless tobacco: Never    Tobacco comments:     quit in her 50s, after 30 years smoking;   Substance and Sexual Activity    Alcohol use: No    Drug use: No    Sexual activity: Not Currently     Partners: Male   Social History Narrative    From [place] [facility] [facility]     Moved to [facility] [place] [facility] [place] [facility] in  for research    Moved to [place] for period of time    Moved back to [place] Summer 2016 and then to [place] [facility] [place] this year 2017     Social Determinants of Health     Financial Resource Strain: Low Risk  (2024)    Overall Financial Resource Strain (CARDIA)     Difficulty of Paying Living Expenses: Not very hard   Food Insecurity: No Food Insecurity (2024)    Hunger Vital Sign     Worried About Running Out of Food in the Last Year: Never true     Ran Out of Food in the Last Year: Never true   Transportation Needs: No Transportation Needs (2024)    PRAPARE - Transportation     Lack of Transportation (Medical): No     Lack of Transportation (Non-Medical): No   Physical Activity: Inactive (2024)    Exercise Vital Sign     Days of Exercise per Week: 0 days     Minutes of Exercise per Session: 0 min   Stress: No Stress Concern Present (2023)     [facility] - Occupational Stress Questionnaire     Feeling of Stress : Only a little   Social Connections: Socially Isolated (1/12/2024)    Social Connection and Isolation Panel [NHANES]     Frequency of Communication with Friends and Family: More than three times a week     Frequency of Social Gatherings with Friends and Family: Three times a week     Attends Tenriism Services: Never     Active Member of Clubs or Organizations: No     Attends Club or Organization Meetings: Never     Marital Status:    Housing Stability: Low Risk  (1/12/2024)    Housing Stability Vital Sign     Unable to Pay for Housing in the Last Year: No     Number of Places Lived in the Last Year: 1     Unstable Housing in the Last Year: No     Review of Systems   Constitutional:  Positive for fatigue. Negative for activity change, chills and fever.   HENT:  Negative for congestion, mouth sores, rhinorrhea, sinus pressure and sore throat.    Respiratory:  Positive for cough. Negative for shortness of breath and wheezing.    Cardiovascular:  Negative for chest pain and leg swelling.   Gastrointestinal:  Negative for abdominal distention, abdominal pain, diarrhea, nausea and vomiting.   Endocrine: Negative for polyuria.   Genitourinary:  Positive for dysuria. Negative for decreased urine volume and flank pain.   Musculoskeletal:  Negative for joint swelling and neck pain.   Skin:  Negative for rash.   Neurological:  Negative for dizziness, weakness and headaches.   Psychiatric/Behavioral:  Negative for confusion.      Objective:     Vital Signs (Most Recent):  Temp: 98.2 °F (36.8 °C) (02/07/24 0728)  Pulse: 85 (02/07/24 0728)  Resp: 16 (02/07/24 0626)  BP: 124/70 (02/07/24 0728)  SpO2: (!) 93 % (02/07/24 0728) Vital Signs (24h Range):  Temp:  [97.6 °F (36.4 °C)-99.3 °F (37.4 °C)] 98.2 °F (36.8 °C)  Pulse:  [74-94] 85  Resp:  [12-21] 16  SpO2:  [93 %-99 %] 93 %  BP: ()/(49-70) 124/70     Weight: 64 kg  (141 lb)  Body mass index is 22.76 kg/m².    Estimated Creatinine Clearance: 61 mL/min (based on SCr of 0.7 mg/dL).     Physical Exam  Constitutional:       Appearance: She is well-developed.   HENT:      Head: Normocephalic and atraumatic.   Eyes:      Pupils: Pupils are equal, round, and reactive to light.   Cardiovascular:      Rate and Rhythm: Normal rate.   Pulmonary:      Effort: Pulmonary effort is normal.      Breath sounds: Normal breath sounds.   Abdominal:      General: Bowel sounds are normal.      Palpations: Abdomen is soft.   Musculoskeletal:         General: No tenderness.      Cervical back: Normal range of motion and neck supple.   Skin:     General: Skin is warm and dry.   Neurological:      Mental Status: She is alert and oriented to person, place, and time.          Significant Labs: CBC:   Recent Labs   Lab 02/05/24  1129 02/06/24  1146 02/07/24  0021   WBC 1.06* 0.99* 1.07*   HGB 6.4* 5.1* 7.0*   HCT 21.7* 16.3* 21.1*   PLT 52* 64* 67*     CMP:   Recent Labs   Lab 02/05/24  1129 02/06/24  1146 02/07/24  0328    135* 135*   K 3.7 3.5 3.5    103 107   CO2 24 23 19*   * 123* 119*   BUN 18 23 20   CREATININE 0.7 0.7 0.7   CALCIUM 8.8 8.7 8.2*   PROT 5.8* 5.3* 5.1*   ALBUMIN 2.5* 2.1* 1.9*   BILITOT 0.9 1.8* 1.1*   ALKPHOS 88 89 91   AST 9* 10 13   ALT 11 11 11   ANIONGAP 7* 9 9       Significant Imaging: I have reviewed all pertinent imaging results/findings within the past 24 hours.

## 2024-02-07 NOTE — ASSESSMENT & PLAN NOTE
With ILD. Takes prednisone 2 mg daily, montelukast 10 mg daily. Does not require home O2. Was supposed to have PFT outpatient but no results recorded.    - Continue home inhaler  - Hold nebs with active COVID  - Will need 6MWT to assess need for home oxygen  - Will need outpatient PFTs

## 2024-02-07 NOTE — ASSESSMENT & PLAN NOTE
Patient's anemia is currently uncontrolled. Etiology likely d/t chronic disease due to Malignancy.    Current CBC reviewed-   Lab Results   Component Value Date    HGB 8.1 (L) 02/07/2024    HCT 24.7 (L) 02/07/2024     Monitor serial CBC and transfuse if patient becomes hemodynamically unstable, symptomatic or H/H drops below 7/21 per BMT.

## 2024-02-07 NOTE — HOSPITAL COURSE
"ID consulted for antibiotic escalation. Developed new diarrhea and new fever. IV fludis started and stool studies ordered but patient without further diarrhea. Blood pressure stable and abdomen non-tender.  Treating COVID PNA with remdesivir and for patient's history of MDR UTI covering with meropenem. Treating symptoms with stress dose steroids. Will restart home prednisone 2 mg after five "sick day rules." Will discharge with [facility] transfusion and home health.  "

## 2024-02-07 NOTE — PHARMACY MED REC
"Admission Medication History     The home medication history was taken by Ochoa Jovel.    You may go to "Admission" then "Reconcile Home Medications" tabs to review and/or act upon these items.     The home medication list has been updated by the Pharmacy department.   Please read ALL comments highlighted in yellow.   Please address this information as you see fit.    Feel free to contact us if you have any questions or require assistance.      The medications listed below were removed from the home medication list. Please reorder if appropriate:  Patient reports no longer taking the following medication(s):  ALBUTEROL HFA 90 MCG INHALER  SYMBICORT 80-4.5 MCG INHALER  ESTRADIOL 0.01 % VAGINAL CREAM  GABAPENTIN 100 MG CAPSULE  HYDRALAZINE 25 MG TABLET  HYDROCORTISONE 2.5 % CREAM  LACTULOSE 10 GM/ 15 ML SOLUTION  LIDOCAINE/HYDROCORTISONE AC 2 % KIT  MONTELUKAST 10 MG TABLET  ONDANSETRON ODT 4 MG TABLET  VIBEGRON 75 MG TABLET      Medications listed below were obtained from: Patient/family and Analytic software- [employer]  Current Outpatient Medications on File Prior to Encounter   Medication Sig    acyclovir (ZOVIRAX) 400 MG tablet   Take 1 tablet (400 mg total) by mouth 2 (two) times daily.    albuterol-ipratropium (DUO-NEB) 2.5 mg-0.5 mg/3 mL nebulizer solution   Take 3 mLs by nebulization every 6 (six) hours as needed for wheezing or shortness of breath. Rescue    ascorbic acid/zinc (ZINC WITH VITAMIN C ORAL)   Take 1 tablet by mouth once daily.    atorvastatin (LIPITOR) 20 MG tablet   Take 1 tablet (20 mg total) by mouth once daily.    calcium-vitamin D3 (CALCIUM 500 + D) 500 mg(1,250mg) -200 unit per tablet   Take 1 tablet by mouth 2 (two) times daily with meals.    carvediloL (COREG) 25 MG tablet   Take 1 tablet (25 mg total) by mouth 2 (two) times daily with meals.    ferrous gluconate (FERGON) 324 MG tablet   Take 1 tablet (324 mg total) by mouth every other day.    fluticasone furoate-vilanteroL (BREO " ELLIPTA) 100-25 mcg/dose diskus inhaler   Inhale 1 puff into the lungs once daily. Controller.      irbesartan (AVAPRO) 300 MG tablet   Take 1 tablet (300 mg total) by mouth every evening.    levothyroxine (SYNTHROID) 50 MCG tablet   Take 1 tablet (50 mcg total) by mouth before breakfast.    multivitamin (THERAGRAN) per tablet   Take 1 tablet by mouth once daily.    oxybutynin (DITROPAN XL) 15 MG TR24   Take 1 tablet (15 mg total) by mouth once daily.    pantoprazole (PROTONIX) 20 MG tablet   Take 1 tablet (20 mg total) by mouth once daily.    predniSONE (DELTASONE) 1 MG tablet   Take 2 tablets (2 mg total) by mouth once daily.    sodium chloride 3% 3 % nebulizer solution   Take 4 mLs by nebulization as needed for other.    venetoclax (VENCLEXTA) 100 mg Tab   Take one tablet (100 mg) by mouth daily on days 1-7 of each 28 day cycle of therapy..    albuterol (VENTOLIN HFA) 90 mcg/actuation inhaler     Inhale 1-2 puffs into the lungs every 6 (six) hours as needed for wheezing or shortness of breath. Rescue   (Patient not taking: Reported on 2/6/2024)      azelastine (ASTELIN) 137 mcg (0.1 %) nasal spray     Instill 1 spray (137 mcg total) by nasal route 2 (two) times daily.   (Patient not taking: Reported on 2/6/2024)    posaconazole (NOXAFIL) 100 mg TbEC tablet     Take 3 tablets (300 mg) by mouth twice daily on the first day, and then take 300 mg daily thereafter. (Patient not taking: Reported on 2/6/2024)           Potential issues to be addressed PRIOR TO DISCHARGE  Please discuss with the patient barriers to adherence with medication treatment plans  Patient requires education regarding drug therapies     Ochoa Jovel  [id number]                  .

## 2024-02-07 NOTE — SUBJECTIVE & OBJECTIVE
Interval History: New fever and diarrhea; stool studies ordered and more IV fluids given. Continuing abx per ID. Additional PRN cough meds ordered. On room air.    Review of Systems   Constitutional:  Positive for diaphoresis and fever. Negative for chills.   HENT:  Negative for mouth sores.    Respiratory:  Positive for cough and shortness of breath.    Cardiovascular:  Positive for leg swelling. Negative for chest pain.   Gastrointestinal:  Positive for diarrhea, nausea and vomiting. Negative for abdominal pain and constipation.   Genitourinary:  Positive for difficulty urinating and dysuria.   Skin:  Negative for color change and wound.   Neurological:  Positive for weakness. Negative for headaches.   Psychiatric/Behavioral:  Negative for agitation and confusion.      Objective:     Vital Signs (Most Recent):  Temp: 99.7 °F (37.6 °C) (02/07/24 1639)  Pulse: 92 (02/07/24 1720)  Resp: 18 (02/07/24 1122)  BP: (!) 168/99 (02/07/24 1608)  SpO2: (!) 91 % (02/07/24 1608) Vital Signs (24h Range):  Temp:  [97.6 °F (36.4 °C)-130.1 °F (54.5 °C)] 99.7 °F (37.6 °C)  Pulse:  [] 92  Resp:  [12-21] 18  SpO2:  [91 %-98 %] 91 %  BP: ()/(50-99) 168/99     Weight: 64 kg (141 lb)  Body mass index is 22.76 kg/m².    Intake/Output Summary (Last 24 hours) at 2/7/2024 1752  Last data filed at 2/7/2024 0626  Gross per 24 hour   Intake 1948.25 ml   Output --   Net 1948.25 ml         Physical Exam  Constitutional:       General: She is in acute distress.      Appearance: She is ill-appearing.   HENT:      Head: Normocephalic and atraumatic.      Nose: Congestion present.      Mouth/Throat:      Mouth: Mucous membranes are moist.      Pharynx: Oropharynx is clear.   Eyes:      Extraocular Movements: Extraocular movements intact.      Conjunctiva/sclera: Conjunctivae normal.   Cardiovascular:      Rate and Rhythm: Normal rate and regular rhythm.   Pulmonary:      Effort: No respiratory distress.      Breath sounds: Wheezing  present.   Abdominal:      Palpations: Abdomen is soft.      Tenderness: There is no abdominal tenderness. There is no guarding or rebound.   Musculoskeletal:      Right lower leg: Edema present.      Left lower leg: Edema present.   Skin:     General: Skin is warm and dry.   Neurological:      General: No focal deficit present.      Mental Status: She is alert and oriented to person, place, and time. Mental status is at baseline.   Psychiatric:         Mood and Affect: Mood normal.         Behavior: Behavior normal.         Thought Content: Thought content normal.             Significant Labs: All pertinent labs within the past 24 hours have been reviewed.    Significant Imaging: I have reviewed all pertinent imaging results/findings within the past 24 hours.

## 2024-02-07 NOTE — ASSESSMENT & PLAN NOTE
COVID-19 positive with CXR showing lower lobe consolidation. Patient with productive cough and SOB. Received Vanc Zosyn as well as IV fluids in the ED. C/f superimposed bacterial pneumonia with COVID-19.     - F/u respiratory cultures and blood cultures  - F/u MRSA nares   - ID consulted for ESBL UTI , appreciate recs

## 2024-02-07 NOTE — CLINICAL REVIEW
IP Sepsis Screen (most recent)       Sepsis Screen (IP) - 02/07/24 1630       Is the patient's history or complaint suggestive of a possible infection? Yes  -CB    Are there at least two of the following signs and symptoms present? Yes  -CB    Sepsis signs/symptoms - Hyper or Hypothermia Hyperthermia >100.4 or Hypothermia < 96.8  -CB    Sepsis signs/symptoms - Tachycardia Tachycardia     >90  -CB    Sepsis signs/symptoms - WBC WBC < 4,000 or WBC > 12,000  -CB    Are any of the following organ dysfunction criteria present and not considered to be due to a chronic condition? Yes  -CB    Organ Dysfunction Criteria Total Bilirubin > 2.0 Platelet count < 100,000  -CB    Organ Dysfunction Criteria - O2 O2 Saturation < 95% on room air  -CB    Initiate Sepsis Protocol No  -CB    Reason sepsis not considered Pt. receiving appropriate management  -CB              User Key  (r) = Recorded By, (t) = Taken By, (c) = Cosigned By      Initials Name    CB Petra Simpson RN

## 2024-02-07 NOTE — HPI
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 Infectious disease consulted for "Ertapenem for symptomatic UTI with MDROs".     Patient is complaining of urinary symptoms. Today she feels better than yesterday. Did have fever of 103 yesterday around 4 pm, afebrile since then. Blood cultures NGTD. Urine culture in process. Currently on meropenem. Also getting remdesevir for COVID.   "

## 2024-02-07 NOTE — NURSING
Report given to Sunny on [facility] taking over when patient transfers to Rm 646. Questions addressed, awaiting telemetry box and for lab to collect blood cultures then will put in for transportation.

## 2024-02-07 NOTE — ASSESSMENT & PLAN NOTE
New diarrhea during admission with poor PO intake. Also with associated nausea. In the setting of new fever.    - Continue abx  - F/u stool studies including Cdiff  - Maintenance IV fluids (normal saline as pt receiving blood transfusions)  - IV antiemetics ordered

## 2024-02-08 PROBLEM — E43 SEVERE MALNUTRITION: Status: ACTIVE | Noted: 2024-02-08

## 2024-02-08 PROBLEM — R63.0 DECREASED APPETITE: Status: RESOLVED | Noted: 2024-02-06 | Resolved: 2024-02-08

## 2024-02-08 PROBLEM — E87.6 HYPOKALEMIA: Status: ACTIVE | Noted: 2024-02-08

## 2024-02-08 PROBLEM — J12.82 PNEUMONIA DUE TO COVID-19 VIRUS: Status: ACTIVE | Noted: 2024-02-06

## 2024-02-08 PROBLEM — U07.1 PNEUMONIA DUE TO COVID-19 VIRUS: Status: ACTIVE | Noted: 2024-02-06

## 2024-02-08 LAB
ALBUMIN SERPL BCP-MCNC: 1.7 G/DL (ref 3.5–5.2)
ALP SERPL-CCNC: 85 U/L (ref 55–135)
ALT SERPL W/O P-5'-P-CCNC: 10 U/L (ref 10–44)
ANION GAP SERPL CALC-SCNC: 10 MMOL/L (ref 8–16)
AST SERPL-CCNC: 10 U/L (ref 10–40)
BILIRUB SERPL-MCNC: 1.3 MG/DL (ref 0.1–1)
BUN SERPL-MCNC: 16 MG/DL (ref 8–23)
CALCIUM SERPL-MCNC: 8 MG/DL (ref 8.7–10.5)
CHLORIDE SERPL-SCNC: 109 MMOL/L (ref 95–110)
CO2 SERPL-SCNC: 20 MMOL/L (ref 23–29)
CREAT SERPL-MCNC: 0.6 MG/DL (ref 0.5–1.4)
ERYTHROCYTE [DISTWIDTH] IN BLOOD BY AUTOMATED COUNT: 24.3 % (ref 11.5–14.5)
ERYTHROCYTE [DISTWIDTH] IN BLOOD BY AUTOMATED COUNT: 24.4 % (ref 11.5–14.5)
EST. GFR  (NO RACE VARIABLE): >60 ML/MIN/1.73 M^2
GLUCOSE SERPL-MCNC: 131 MG/DL (ref 70–110)
HCT VFR BLD AUTO: 26.2 % (ref 37–48.5)
HCT VFR BLD AUTO: 26.3 % (ref 37–48.5)
HGB BLD-MCNC: 8.3 G/DL (ref 12–16)
HGB BLD-MCNC: 8.6 G/DL (ref 12–16)
MAGNESIUM SERPL-MCNC: 1.7 MG/DL (ref 1.6–2.6)
MCH RBC QN AUTO: 25.7 PG (ref 27–31)
MCH RBC QN AUTO: 26.5 PG (ref 27–31)
MCHC RBC AUTO-ENTMCNC: 31.6 G/DL (ref 32–36)
MCHC RBC AUTO-ENTMCNC: 32.8 G/DL (ref 32–36)
MCV RBC AUTO: 81 FL (ref 82–98)
MCV RBC AUTO: 81 FL (ref 82–98)
PHOSPHATE SERPL-MCNC: 1.8 MG/DL (ref 2.7–4.5)
PLATELET # BLD AUTO: 86 K/UL (ref 150–450)
PLATELET # BLD AUTO: 90 K/UL (ref 150–450)
PMV BLD AUTO: 8.5 FL (ref 9.2–12.9)
PMV BLD AUTO: 9.3 FL (ref 9.2–12.9)
POTASSIUM SERPL-SCNC: 2.8 MMOL/L (ref 3.5–5.1)
PROT SERPL-MCNC: 4.8 G/DL (ref 6–8.4)
RBC # BLD AUTO: 3.23 M/UL (ref 4–5.4)
RBC # BLD AUTO: 3.24 M/UL (ref 4–5.4)
SODIUM SERPL-SCNC: 139 MMOL/L (ref 136–145)
WBC # BLD AUTO: 1.07 K/UL (ref 3.9–12.7)
WBC # BLD AUTO: 1.23 K/UL (ref 3.9–12.7)

## 2024-02-08 PROCEDURE — 94761 N-INVAS EAR/PLS OXIMETRY MLT: CPT

## 2024-02-08 PROCEDURE — 63600175 PHARM REV CODE 636 W HCPCS: Mod: JZ,TB

## 2024-02-08 PROCEDURE — 36415 COLL VENOUS BLD VENIPUNCTURE: CPT | Mod: XB

## 2024-02-08 PROCEDURE — 63600175 PHARM REV CODE 636 W HCPCS

## 2024-02-08 PROCEDURE — 94640 AIRWAY INHALATION TREATMENT: CPT

## 2024-02-08 PROCEDURE — 99233 SBSQ HOSP IP/OBS HIGH 50: CPT | Mod: GC,,, | Performed by: INTERNAL MEDICINE

## 2024-02-08 PROCEDURE — 25000003 PHARM REV CODE 250

## 2024-02-08 PROCEDURE — 25000242 PHARM REV CODE 250 ALT 637 W/ HCPCS

## 2024-02-08 PROCEDURE — 80053 COMPREHEN METABOLIC PANEL: CPT

## 2024-02-08 PROCEDURE — 27000207 HC ISOLATION

## 2024-02-08 PROCEDURE — 84100 ASSAY OF PHOSPHORUS: CPT

## 2024-02-08 PROCEDURE — 21400001 HC TELEMETRY ROOM

## 2024-02-08 PROCEDURE — 99233 SBSQ HOSP IP/OBS HIGH 50: CPT | Mod: ,,, | Performed by: INTERNAL MEDICINE

## 2024-02-08 PROCEDURE — 85027 COMPLETE CBC AUTOMATED: CPT | Mod: 91

## 2024-02-08 PROCEDURE — 83735 ASSAY OF MAGNESIUM: CPT

## 2024-02-08 RX ORDER — PREDNISONE 20 MG/1
40 TABLET ORAL DAILY
Status: DISCONTINUED | OUTPATIENT
Start: 2024-02-08 | End: 2024-02-10 | Stop reason: HOSPADM

## 2024-02-08 RX ORDER — IPRATROPIUM BROMIDE AND ALBUTEROL SULFATE 2.5; .5 MG/3ML; MG/3ML
3 SOLUTION RESPIRATORY (INHALATION) EVERY 4 HOURS PRN
Status: DISCONTINUED | OUTPATIENT
Start: 2024-02-08 | End: 2024-02-10 | Stop reason: HOSPADM

## 2024-02-08 RX ORDER — SODIUM CHLORIDE 9 MG/ML
INJECTION, SOLUTION INTRAVENOUS CONTINUOUS
Status: ACTIVE | OUTPATIENT
Start: 2024-02-08 | End: 2024-02-08

## 2024-02-08 RX ADMIN — FLUTICASONE FUROATE AND VILANTEROL TRIFENATATE 1 PUFF: 100; 25 POWDER RESPIRATORY (INHALATION) at 09:02

## 2024-02-08 RX ADMIN — IPRATROPIUM BROMIDE AND ALBUTEROL SULFATE 3 ML: .5; 3 SOLUTION RESPIRATORY (INHALATION) at 08:02

## 2024-02-08 RX ADMIN — POTASSIUM PHOSPHATE, MONOBASIC AND POTASSIUM PHOSPHATE, DIBASIC 30 MMOL: 224; 236 INJECTION, SOLUTION, CONCENTRATE INTRAVENOUS at 10:02

## 2024-02-08 RX ADMIN — Medication 1 TABLET: at 06:02

## 2024-02-08 RX ADMIN — THERA TABS 1 TABLET: TAB at 12:02

## 2024-02-08 RX ADMIN — ACYCLOVIR 400 MG: 200 CAPSULE ORAL at 12:02

## 2024-02-08 RX ADMIN — Medication 1 TABLET: at 12:02

## 2024-02-08 RX ADMIN — ENOXAPARIN SODIUM 40 MG: 40 INJECTION SUBCUTANEOUS at 06:02

## 2024-02-08 RX ADMIN — ACYCLOVIR 400 MG: 200 CAPSULE ORAL at 09:02

## 2024-02-08 RX ADMIN — SODIUM CHLORIDE: 9 INJECTION, SOLUTION INTRAVENOUS at 02:02

## 2024-02-08 RX ADMIN — ONDANSETRON 8 MG: 8 TABLET, ORALLY DISINTEGRATING ORAL at 06:02

## 2024-02-08 RX ADMIN — MEROPENEM 1 G: 1 INJECTION INTRAVENOUS at 02:02

## 2024-02-08 RX ADMIN — Medication 250 MG: at 12:02

## 2024-02-08 RX ADMIN — POTASSIUM BICARBONATE 50 MEQ: 978 TABLET, EFFERVESCENT ORAL at 12:02

## 2024-02-08 RX ADMIN — ATORVASTATIN CALCIUM 20 MG: 20 TABLET, FILM COATED ORAL at 12:02

## 2024-02-08 RX ADMIN — LEVOTHYROXINE SODIUM 50 MCG: 50 TABLET ORAL at 05:02

## 2024-02-08 RX ADMIN — PANTOPRAZOLE SODIUM 40 MG: 40 TABLET, DELAYED RELEASE ORAL at 12:02

## 2024-02-08 RX ADMIN — POSACONAZOLE 300 MG: 100 TABLET, DELAYED RELEASE ORAL at 12:02

## 2024-02-08 RX ADMIN — MEROPENEM 1 G: 1 INJECTION INTRAVENOUS at 11:02

## 2024-02-08 RX ADMIN — BENZONATATE 100 MG: 100 CAPSULE ORAL at 05:02

## 2024-02-08 RX ADMIN — SODIUM CHLORIDE: 9 INJECTION, SOLUTION INTRAVENOUS at 10:02

## 2024-02-08 RX ADMIN — PREDNISONE 40 MG: 20 TABLET ORAL at 06:02

## 2024-02-08 RX ADMIN — REMDESIVIR 100 MG: 100 INJECTION, POWDER, LYOPHILIZED, FOR SOLUTION INTRAVENOUS at 08:02

## 2024-02-08 NOTE — ASSESSMENT & PLAN NOTE
Patient was found to have thrombocytopenia, the likely etiology is primary from bone marrow suppression, will monitor the platelets Daily. Will transfuse if platelet count is <10k per BMT. Hold DVT prophylaxis if platelets are <50k. The patient's platelet results have been reviewed and are listed below.  Recent Labs   Lab 02/08/24  0746   PLT 86*

## 2024-02-08 NOTE — NURSING
Nurses Note -- 4 Eyes      2/7/2024   6:47 PM      Skin assessed during: Admit      [] No Altered Skin Integrity Present    []Prevention Measures Documented      [x] Yes- Altered Skin Integrity Present or Discovered   [] LDA Added if Not in Epic (Describe Wound)   [] New Altered Skin Integrity was Present on Admit and Documented in LDA   [] Wound Image Taken      Bilateral upper extremity bruising.    Wound Care Consulted? No    Attending Nurse:  JINNY Correa    Second RN/Staff Member:   JINNY Evans

## 2024-02-08 NOTE — CONSULTS
"[facility]  Infectious Disease  Consult Note    Patient Name: Niurka Pedraza  MRN: [id number]  Admission Date: 2/6/2024  Hospital Length of Stay: 2 days  Attending Physician: Francois Plasencia MD  Primary Care Provider: Savana Anderson MD     Isolation Status: Airborne and Contact and Droplet, Special Contact    Patient information was obtained from patient and ER records.      Inpatient consult to Infectious Diseases  Consult performed by: Bonny Smith DO  Consult ordered by: Karla Larson MD        Assessment/Plan:     Renal/  UTI (urinary tract infection)  Patient is having urinary symptoms. Urine culture in process. Blood cultures NGTD. On meropenem given history of ESBL E coli UTIs.     Recommendations  Continue meropenem for now  Follow up blood and urine cultures    ID  COVID-19  79 y/o F h/o MDS on aza-angelo (ACV/POS trophy) with recurrent ESBL Ecoli UTIs, ILD/pulmonary fibrosis, pulmonary coccidiomycosis, admitted from infusion for hypotension, found to be neutropenic with possible L lower lobe consolidation, and SARS-CoV-2 positive.     Recommendations   Continue remdesivir        Thank you for your consult. I will follow-up with patient. Please contact us if you have any additional questions.    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 Infectious disease consulted for "Ertapenem for symptomatic UTI with MDROs".     Patient is complaining of urinary symptoms. Today she feels better than yesterday. Did have fever of 103 yesterday around 4 pm, afebrile since then. Blood cultures NGTD. Urine culture in process. Currently on meropenem. Also " getting remdesevir for COVID.     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    Breast cancer     mastectomy 2014    Coccidiomycosis, progressive     Elevated CA-125 02/25/2018    Hyperlipidemia     OAB (overactive bladder)     Osteopenia     on Dexa 11/2017    Osteoporosis     pt reports hx of this, declined fosamax in past - treated with calcium and vit D    Pelvic mass 02/25/2018    Pulmonary fibrosis     Pulmonary nodules     SOB (shortness of breath) on exertion     Thyroid disease     hypo    Urinary tract infection due to extended-spectrum beta lactamase (ESBL) producing Escherichia coli 3/16/2022    UTI (urinary tract infection)        Past Surgical History:   Procedure Laterality Date    CHOLECYSTECTOMY      COLONOSCOPY N/A 12/09/2022    Procedure: COLONOSCOPY;  Surgeon: Enrique Dominguez MD;  Location: [facility] (4TH FLR);  Service: Endoscopy;  Laterality: N/A;  inst via portal  pt requests after 12/9/22-MS  11/30-pt notified of earlier arrival time-KPvt    CYSTOSCOPY WITH BIOPSY OF BLADDER Bilateral 07/27/2022    Procedure: CYSTOSCOPY, WITH BLADDER BIOPSY; retrograde pyelogram,;  Surgeon: Anaya Oropeza MD;  [facility];  Service: Urology;  Laterality: Bilateral;    ENDOSCOPIC ULTRASOUND OF UPPER GASTROINTESTINAL TRACT N/A 04/08/2021    Procedure: ULTRASOUND, UPPER GI TRACT, ENDOSCOPIC;  Surgeon: Aisha Barajas MD;  Location: [facility] (2ND FLR);  Service: Endoscopy;  Laterality: N/A;  UEUS/ERCP evaluation abn MRCP - Dr Angelika Zamudio-19 test 4/5/21 at [facility] Pre-admit - pg    ERCP N/A 04/08/2021    Procedure: ERCP (ENDOSCOPIC RETROGRADE CHOLANGIOPANCREATOGRAPHY);  Surgeon: Aisha Barajas MD;  Location: [facility] (2ND FLR);  Service: Endoscopy;  Laterality: N/A;  UEUS/ERCP evaluation abn MRCP - Dr Angelika Steven19 test 4/5/21 at [facility] Pre-admit - pg    ESOPHAGOGASTRODUODENOSCOPY N/A 04/08/2021    Procedure:  EGD (ESOPHAGOGASTRODUODENOSCOPY);  Surgeon: Aisha Barajas MD;  Location: [facility] (2ND FLR);  Service: Endoscopy;  Laterality: N/A;  UEUS/ERCP evaluation abn MRCP - Dr Carney  Covid-19 test 4/5/21 at [facility] Pre-admit - pg    ESOPHAGOGASTRODUODENOSCOPY N/A 06/02/2023    Procedure: EGD (ESOPHAGOGASTRODUODENOSCOPY);  Surgeon: Emeka Carney MD;  Location: [facility] (4TH FLR);  Service: Endoscopy;  Laterality: N/A;  She has  history of seromucinous borderline tumor of the ovary. We generally follow  and CEA tumor markers. Her CEA recently became slightly elevated. CT imaging negative and colonoscopy negative.     Talita Barrios    instructions portal-LW  pre    HYSTERECTOMY      MASTECTOMY Right     2014       Review of patient's allergies indicates:   Allergen Reactions    Ciprofloxacin Other (See Comments)     Right foot pain and edema, tendonitis    Amlodipine Edema and Swelling     BLE  BLE    Lisinopril Other (See Comments)     cough    Nitrofuran analogues        Medications:  (Not in a hospital admission)    Antibiotics (From admission, onward)      None          Antifungals (From admission, onward)      Start     Stop Route Frequency Ordered    02/07/24 0900  posaconazole EC tablet 300 mg         -- Oral Daily 02/06/24 1526          Antivirals (From admission, onward)          Stop Route Frequency     remdesivir 100 mg        See Hyperspace for full Linked Orders Report.    02/09/24 0859 IV Daily     acyclovir         -- Oral 2 times daily             Immunization History   Administered Date(s) Administered    COVID-19 MRNA, LN-S PF (MODERNA HALF 0.25 ML DOSE) 11/09/2021    COVID-19 Vaccine 09/30/2022    COVID-19, MRNA, LN-S, PF (MODERNA FULL 0.5 ML DOSE) 02/03/2021, 03/03/2021    Influenza (FLUAD) - Quadrivalent - Adjuvanted - PF *Preferred* (65+) 10/24/2023    Influenza - High Dose - PF (65 years and older) 10/27/2020, 09/21/2022    Pneumococcal Conjugate - 13 Valent 11/02/2017     Pneumococcal Conjugate - 20 Valent 2022    Pneumococcal Polysaccharide - 23 Valent 2020    Tdap 2021    Zoster Recombinant 2022       Family History       Problem Relation (Age of Onset)    Breast cancer Mother    Cancer Mother, Brother    Heart disease Father    Hypertension Father, Brother, Son    No Known Problems Sister, Daughter    Stroke Father          Social History     Socioeconomic History    Marital status:     Number of children: 3   Occupational History    Occupation: retired from [employer], [employer]     Comment: neuro chemistry   Tobacco Use    Smoking status: Former     Current packs/day: 0.00     Average packs/day: 0.5 packs/day for 30.1 years (15.1 ttl pk-yrs)     Types: Cigarettes     Start date:      Quit date: 2000     Years since quittin.0    Smokeless tobacco: Never    Tobacco comments:     quit in her 50s, after 30 years smoking;   Substance and Sexual Activity    Alcohol use: No    Drug use: No    Sexual activity: Not Currently     Partners: Male   Social History Narrative    From [place]     Moved to [facility] in  for research    Moved to [place] for period of time    Moved back to [place] Summer 2016 and then to [place] this year 2017     Social Determinants of Health     Financial Resource Strain: Low Risk  (2024)    Overall Financial Resource Strain (CARDIA)     Difficulty of Paying Living Expenses: Not very hard   Food Insecurity: No Food Insecurity (2024)    Hunger Vital Sign     Worried About Running Out of Food in the Last Year: Never true     Ran Out of Food in the Last Year: Never true   Transportation Needs: No Transportation Needs (2024)    PRAPARE - Transportation     Lack of Transportation (Medical): No     Lack of Transportation (Non-Medical): No   Physical Activity: Inactive (2024)    Exercise Vital Sign     Days of Exercise per Week: 0 days     Minutes of Exercise per Session: 0 min   Stress: No  Stress Concern Present (11/17/2023)    [employer] [place] [employer] - Occupational Stress Questionnaire     Feeling of Stress : Only a little   Social Connections: Socially Isolated (1/12/2024)    Social Connection and Isolation Panel [NHANES]     Frequency of Communication with Friends and Family: More than three times a week     Frequency of Social Gatherings with Friends and Family: Three times a week     Attends Quaker Services: Never     Active Member of Clubs or Organizations: No     Attends Club or Organization Meetings: Never     Marital Status:    Housing Stability: Low Risk  (1/12/2024)    Housing Stability Vital Sign     Unable to Pay for Housing in the Last Year: No     Number of Places Lived in the Last Year: 1     Unstable Housing in the Last Year: No     Review of Systems   Constitutional:  Positive for fatigue. Negative for activity change, chills and fever.   HENT:  Negative for congestion, mouth sores, rhinorrhea, sinus pressure and sore throat.    Respiratory:  Positive for cough. Negative for shortness of breath and wheezing.    Cardiovascular:  Negative for chest pain and leg swelling.   Gastrointestinal:  Negative for abdominal distention, abdominal pain, diarrhea, nausea and vomiting.   Endocrine: Negative for polyuria.   Genitourinary:  Positive for dysuria. Negative for decreased urine volume and flank pain.   Musculoskeletal:  Negative for joint swelling and neck pain.   Skin:  Negative for rash.   Neurological:  Negative for dizziness, weakness and headaches.   Psychiatric/Behavioral:  Negative for confusion.      Objective:     Vital Signs (Most Recent):  Temp: 98.2 °F (36.8 °C) (02/07/24 0728)  Pulse: 85 (02/07/24 0728)  Resp: 16 (02/07/24 0626)  BP: 124/70 (02/07/24 0728)  SpO2: (!) 93 % (02/07/24 0728) Vital Signs (24h Range):  Temp:  [97.6 °F (36.4 °C)-99.3 °F (37.4 °C)] 98.2 °F (36.8 °C)  Pulse:  [74-94] 85  Resp:  [12-21] 16  SpO2:  [93 %-99 %] 93 %  BP:  ()/(49-70) 124/70     Weight: 64 kg (141 lb)  Body mass index is 22.76 kg/m².    Estimated Creatinine Clearance: 61 mL/min (based on SCr of 0.7 mg/dL).     Physical Exam  Constitutional:       Appearance: She is well-developed.   HENT:      Head: Normocephalic and atraumatic.   Eyes:      Pupils: Pupils are equal, round, and reactive to light.   Cardiovascular:      Rate and Rhythm: Normal rate.   Pulmonary:      Effort: Pulmonary effort is normal.      Breath sounds: Normal breath sounds.   Abdominal:      General: Bowel sounds are normal.      Palpations: Abdomen is soft.   Musculoskeletal:         General: No tenderness.      Cervical back: Normal range of motion and neck supple.   Skin:     General: Skin is warm and dry.   Neurological:      Mental Status: She is alert and oriented to person, place, and time.          Significant Labs: CBC:   Recent Labs   Lab 02/05/24  1129 02/06/24  1146 02/07/24  0021   WBC 1.06* 0.99* 1.07*   HGB 6.4* 5.1* 7.0*   HCT 21.7* 16.3* 21.1*   PLT 52* 64* 67*     CMP:   Recent Labs   Lab 02/05/24  1129 02/06/24  1146 02/07/24  0328    135* 135*   K 3.7 3.5 3.5    103 107   CO2 24 23 19*   * 123* 119*   BUN 18 23 20   CREATININE 0.7 0.7 0.7   CALCIUM 8.8 8.7 8.2*   PROT 5.8* 5.3* 5.1*   ALBUMIN 2.5* 2.1* 1.9*   BILITOT 0.9 1.8* 1.1*   ALKPHOS 88 89 91   AST 9* 10 13   ALT 11 11 11   ANIONGAP 7* 9 9       Significant Imaging: I have reviewed all pertinent imaging results/findings within the past 24 hours.

## 2024-02-08 NOTE — ASSESSMENT & PLAN NOTE
Chronic, controlled. Latest blood pressure and vitals reviewed-     Temp:  [97.4 °F (36.3 °C)-130.1 °F (54.5 °C)]   Pulse:  []   Resp:  [18-24]   BP: (112-168)/(56-99)   SpO2:  [91 %-97 %] .   Home meds for hypertension were reviewed and noted below.   Hypertension Medications               carvediloL (COREG) 25 MG tablet Take 1 tablet (25 mg total) by mouth 2 (two) times daily with meals.    hydrALAZINE (APRESOLINE) 25 MG tablet Take 1 tablet (25 mg total) by mouth every 8 (eight) hours as needed (if high blood pressure > 180/100).    irbesartan (AVAPRO) 300 MG tablet Take 1 tablet (300 mg total) by mouth every evening.            While in the hospital, will manage blood pressure as follows; Adjust home antihypertensive regimen as follows- hold home medications for hypotension    Will utilize p.r.n. blood pressure medication only if patient's blood pressure greater than 180/110 and she develops symptoms such as worsening chest pain or shortness of breath.

## 2024-02-08 NOTE — PLAN OF CARE
Chart reviewed  Unable to assess on edou today  Pt was drowsy and no family at bedside.  Case mgt to complete assessment tomorrow  Has had home health and home infusions in the past  Dispo- pending    Medical team care plan attached below                        Assessment/Plan:      * Symptomatic anemia  Patient's anemia is currently uncontrolled. Etiology likely d/t chronic disease due to Malignancy.     Current CBC reviewed-         Lab Results   Component Value Date     HGB 8.1 (L) 02/07/2024     HCT 24.7 (L) 02/07/2024      Diarrhea  New diarrhea during admission with poor PO intake. Also with associated nausea. In the setting of new fever.     - Continue abx  - F/u stool studies including Cdiff  - Maintenance IV fluids (normal saline as pt receiving blood transfusions)  - IV antiemetics ordered         Edema due to hypoalbuminemia  Suspect 2/2 malignancy and poor nutrition. Patient with decreased albumin from prior presentation. Patient states pitting edema has worsened in the last week.     - Compression stockings  - RD consulted, appreciate recs        Lobar pneumonia  COVID-19 positive with CXR showing lower lobe consolidation. Patient with productive cough and SOB. Received Vanc Zosyn as well as IV fluids in the ED. C/f superimposed bacterial pneumonia with COVID-19.      - F/u respiratory cultures and blood cultures  - F/u MRSA nares   - ID consulted for ESBL UTI , appreciate recs     Decreased appetite  Per patient's daughter, patient is eating 20% of her normal.      - Boosts with meals  - RD consulted, appreciate recs        COVID-19  Patient is identified as High risk for severe complications of COVID 19 based on COVID risk score of 4. Does not qualify for dexamethasone.  Initiate standard COVID protocols; COVID-19 testing Collection Date: 12/29/2023 Collection Time:   2:58 AM ,Infection Control notification  and isolation- respiratory, contact and droplet per protocol     Management: Maintain oxygen  saturations 92-96% via Nasal Cannula  LPM and monitor with continuous/intermittent pulse oximetry.      Advance Care Planning Current advance care plan has been discussed with patient/family/POA and patient currently wishes Full Code. ACP documents from April 2018.      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 High probability of evolution to AML. Per [facility] pathologic designation, referred to as MDS/AML. She is on aza-angelo indefinitely per chart review.      - Continue ppx with acyclovir, posaconazole  - ID consulted for MDR UTI  - Daily CBC with transfusions as needed  - BMT notified of patient's admission  - Hold venetoclax while inpatient  - PRN antiemetics         Thrombocytopenia  Patient was found to have thrombocytopenia, the likely etiology is primary from bone marrow suppression, will monitor the platelets Daily. Will transfuse if platelet count is <10k per BMT. Hold DVT prophylaxis if platelets are <50k. The patient's platelet results have been reviewed and are listed below.      Recent Labs   Lab 02/06/24  1146   PLT 64*            Immunosuppression due to chronic steroid use  On 2 mg prednisone for ILD. Will continue with same dose with close monitoring for sick day applicability. Patient currently afebrile, hypotensive, without vomiting or diarrhea.      Urinary tract infection due to extended-spectrum beta lactamase (ESBL) producing Escherichia coli  Patient has history of ESBL producing Ecoli with MDR. Patient states she has pain and burning with urination. UA in ED with evidence of infection. Received Vanc/Zosyn in ED.     - ID consulted for ertapenem/abx recs, appreciate assistance     Pulmonary fibrosis  With ILD. Takes prednisone 2 mg daily, montelukast 10 mg daily. Does not require home O2. Was supposed to have PFT outpatient but no results recorded.     - Continue home inhaler  - Hold nebs with active COVID  - Will need 6MWT to assess need for home  oxygen  - Will need outpatient PFTs        OAB (overactive bladder)  In the setting of chronic UTI. Will hold home meds in the acute setting and restart when clinically indicated.        Hypothyroidism  Continue home med        Essential hypertension  Chronic, controlled. Latest blood pressure and vitals reviewed-      Temp:  [98.1 °F (36.7 °C)-99.3 °F (37.4 °C)]   Pulse:  [79-94]   Resp:  [16-20]   BP: ()/(49-59)   SpO2:  [95 %-99 %] .   Hyperlipidemia  Continue home statin and monitor LFTs

## 2024-02-08 NOTE — ASSESSMENT & PLAN NOTE
COVID-19 positive with CXR showing lower lobe consolidation. Patient with productive cough and SOB. Received Vanc Zosyn as well as IV fluids in the ED. C/f superimposed bacterial pneumonia with COVID-19.     - F/u respiratory cultures and blood cultures  - F/u MRSA nares   - ID consulted for ESBL UTI , appreciate recs   Refill requested

## 2024-02-08 NOTE — PLAN OF CARE
[facility] - Med Surg  Initial Discharge Assessment       Primary Care Provider: Savana Anderson MD    Admission Diagnosis: Shortness of breath [R06.02]  Hypomagnesemia [E83.42]  Pancytopenia [D61.818]  Chest pain [R07.9]  Symptomatic anemia [D64.9]  Pneumonia of left lower lobe due to infectious organism [J18.9]  COVID-19 [U07.1]    Admission Date: 2/6/2024  Expected Discharge Date:          Payor: MEDICARE / Plan: MEDICARE PART A & B / Product Type: Government /     Extended Emergency Contact Information  Primary Emergency Contact: John Paul Pedraza   [facility]  Home Phone: [phone]  Mobile Phone: [phone]  Relation: Daughter  Secondary Emergency Contact: Luz Pedraza  Address: [address] [place] [address]  Home Phone: [phone]  Mobile Phone: [phone]  Relation: Daughter              [id number] - [place], [place] - [address]  [address]  [facility] [address]  Phone: [phone] Fax: [phone]    [id number] - [place], [place] - [address]  [address]  [facility] [address]  Phone: [phone] Fax: [phone]      Initial Assessment (most recent)       Adult Discharge Assessment - 02/07/24 1858          Discharge Assessment    Assessment Type Discharge Planning Assessment (P)      Source of Information health record (P)      DME Needed Upon Discharge  oxygen (P)    walk test this admit ?       Physical Activity    On average, how many days per week do you engage in moderate to strenuous exercise (like a brisk walk)? Patient unable to answer (P)         Financial Resource Strain    How hard is it for you to pay for the very basics like food, housing, medical care, and heating? Patient unable to answer (P)         Housing Stability    In the last 12 months, was there a time when you were not able to pay the mortgage or rent on time? Patient unable to answer (P)      In the last 12 months,  was there a time when you did not have a steady place to sleep or slept in a shelter (including now)? Patient unable to answer (P)         Transportation Needs    In the past 12 months, has lack of transportation kept you from medical appointments or from getting medications? Patient unable to answer (P)      In the past 12 months, has lack of transportation kept you from meetings, work, or from getting things needed for daily living? Patient unable to answer (P)         Food Insecurity    Within the past 12 months, you worried that your food would run out before you got the money to buy more. Patient unable to answer (P)      Within the past 12 months, the food you bought just didn't last and you didn't have money to get more. Patient unable to answer (P)         Stress    Do you feel stress - tense, restless, nervous, or anxious, or unable to sleep at night because your mind is troubled all the time - these days? Patient unable to answer (P)         Social Connections    In a typical week, how many times do you talk on the phone with family, friends, or neighbors? Patient unable to answer (P)      How often do you get together with friends or relatives? Patient unable to answer (P)      How often do you attend [employer] or [employer]al services? Patient unable to answer (P)      How often do you attend meetings of the clubs or organizations you belong to? Patient unable to answer (P)      Are you , , , , never , or living with a partner? Patient unable to answer (P)         Alcohol Use    Q1: How often do you have a drink containing alcohol? Patient unable to answer (P)      Q2: How many drinks containing alcohol do you have on a typical day when you are drinking? Patient unable to answer (P)      Q3: How often do you have six or more drinks on one occasion? Patient unable to answer (P)

## 2024-02-08 NOTE — ASSESSMENT & PLAN NOTE
Patient's anemia is currently uncontrolled. Etiology likely d/t chronic disease due to Malignancy.    Current CBC reviewed-   Lab Results   Component Value Date    HGB 8.3 (L) 02/08/2024    HCT 26.3 (L) 02/08/2024     Monitor serial CBC and transfuse if patient becomes hemodynamically unstable, symptomatic or H/H drops below 7/21 per BMT.

## 2024-02-08 NOTE — ASSESSMENT & PLAN NOTE
Patient currently afebrile, hypotensive, without vomiting or diarrhea.     - stress dose steroids given to address covid symptoms

## 2024-02-08 NOTE — SUBJECTIVE & OBJECTIVE
Interval History: NAEON, feeling better but not back to BL     Review of Systems   Constitutional:  Positive for diaphoresis and fever. Negative for chills.   HENT:  Negative for mouth sores.    Respiratory:  Positive for cough and shortness of breath.    Cardiovascular:  Positive for leg swelling. Negative for chest pain.   Gastrointestinal:  Positive for diarrhea, nausea and vomiting. Negative for abdominal pain and constipation.   Genitourinary:  Positive for difficulty urinating and dysuria.   Skin:  Negative for color change and wound.   Neurological:  Positive for weakness. Negative for headaches.   Psychiatric/Behavioral:  Negative for agitation and confusion.      Objective:     Vital Signs (Most Recent):  Temp: 98.7 °F (37.1 °C) (02/08/24 1147)  Pulse: 80 (02/08/24 1527)  Resp: 18 (02/08/24 1147)  BP: (!) 160/91 (02/08/24 1147)  SpO2: 95 % (02/08/24 1147) Vital Signs (24h Range):  Temp:  [97.4 °F (36.3 °C)-130.1 °F (54.5 °C)] 98.7 °F (37.1 °C)  Pulse:  [] 80  Resp:  [18-24] 18  SpO2:  [91 %-97 %] 95 %  BP: (112-168)/(56-99) 160/91     Weight: 64 kg (141 lb)  Body mass index is 22.76 kg/m².  No intake or output data in the 24 hours ending 02/08/24 1551      Physical Exam  Constitutional:       General: She is in acute distress.      Appearance: She is ill-appearing.   HENT:      Head: Normocephalic and atraumatic.      Nose: Congestion present.      Mouth/Throat:      Mouth: Mucous membranes are moist.      Pharynx: Oropharynx is clear.   Eyes:      Extraocular Movements: Extraocular movements intact.      Conjunctiva/sclera: Conjunctivae normal.   Cardiovascular:      Rate and Rhythm: Normal rate and regular rhythm.   Pulmonary:      Effort: No respiratory distress.      Breath sounds: Wheezing present.   Abdominal:      Palpations: Abdomen is soft.      Tenderness: There is no abdominal tenderness. There is no guarding or rebound.   Musculoskeletal:      Right lower leg: Edema present.      Left  lower leg: Edema present.   Skin:     General: Skin is warm and dry.   Neurological:      General: No focal deficit present.      Mental Status: She is alert and oriented to person, place, and time. Mental status is at baseline.   Psychiatric:         Mood and Affect: Mood normal.         Behavior: Behavior normal.         Thought Content: Thought content normal.             Significant Labs: All pertinent labs within the past 24 hours have been reviewed.    Significant Imaging: I have reviewed all pertinent imaging results/findings within the past 24 hours.

## 2024-02-08 NOTE — ASSESSMENT & PLAN NOTE
Patient has history of ESBL producing Ecoli with MDR. Patient states she has pain and burning with urination. UA in ED with evidence of infection. Received Vanc/Zosyn in ED.    - ID consulted for abx recs, appreciate assistance  - meek

## 2024-02-08 NOTE — PLAN OF CARE
Recommendations    1. Continue Regular Diet. Encourage high-calorie, high-protein food sources.   a. Small, frequent meals/snacks.   2. Recommend Boost Plus (or alternative) BID- chocolate or vanilla.   3. Recommend appetite stimulant.   4. Continue daily weights.   5. Continue MVI + calcium-vit D3, B-complex may be beneficial.   6. RD following.    Goals: Meet % EEN/EPN by follow-up date.  Nutrition Goal Status: new  Communication of RD Recs: other (comment) (POC)

## 2024-02-08 NOTE — ASSESSMENT & PLAN NOTE
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 High probability of evolution to AML. Per [facility] pathologic designation, referred to as MDS/AML. She is on aza-angelo indefinitely per chart review.     - Continue ppx with acyclovir, posaconazole  - ID consulted for MDR UTI  - Daily CBC with transfusions as needed  - BMT notified of patient's admission  - Hold venetoclax while inpatient  - PRN antiemetics

## 2024-02-08 NOTE — NURSING
"Nursing attempted to administer IV medications (ABT and antivirals) this shift. Pt states, "this is too much can you come back later". Charge nurse aware and team made aware.  "

## 2024-02-08 NOTE — ASSESSMENT & PLAN NOTE
Patient is having urinary symptoms. Urine culture in process. Blood cultures NGTD. On meropenem given history of ECBL E coli UTIs.     Recommendations  Continue meropenem for reji  Follow up blood and urine cultures

## 2024-02-08 NOTE — ASSESSMENT & PLAN NOTE
Malnutrition Type:  Context: chronic illness  Level: severe    Related to (etiology):   Inadequate energy/protein intake    Signs and Symptoms (as evidenced by):   Weight loss and decreased appetite     Malnutrition Characteristic Summary:  Weight Loss (Malnutrition): other (see comments) (10.7% x 1 month)  Energy Intake (Malnutrition): less than 75% for greater than or equal to 1 month  Subcutaneous Fat (Malnutrition): moderate depletion  Muscle Mass (Malnutrition): severe depletion  Hand  Strength, Left (Malnutrition): Fair  Hand  Strength, Right (Malnutrition): Fair      Interventions/Recommendations (treatment strategy):  1. Continue Regular Diet. Encourage high-calorie, high-protein food sources. a. Small, frequent meals/snacks. 2. Recommend Boost Plus (or alternative) BID- chocolate or vanilla. 3. Recommend appetite stimulant. 4. Continue daily weights. 5. Continue MVI + calcium-vit D3, B-complex may be beneficial. 6. RD following.    Nutrition Diagnosis Status:   New

## 2024-02-08 NOTE — NURSING
Pt had a change in respiratory status and is now wheezing on expiration. Dr. Solange Guan notified via page and instant message. Order for inhalation treatment but medication wasn't listed. Respiratory notified as well. Md instant message to notify medication is needed. Awaiting response.

## 2024-02-08 NOTE — ASSESSMENT & PLAN NOTE
Nutrition consulted. Most recent weight and BMI monitored-     Measurements:  Wt Readings from Last 1 Encounters:   02/07/24 64 kg (141 lb)   Body mass index is 22.76 kg/m².    Patient has been screened and assessed by RD.    Malnutrition Type:  Context: chronic illness  Level: severe    Malnutrition Characteristic Summary:  Weight Loss (Malnutrition): other (see comments) (10.7% x 1 month)  Energy Intake (Malnutrition): less than 75% for greater than or equal to 1 month  Subcutaneous Fat (Malnutrition): moderate depletion  Muscle Mass (Malnutrition): severe depletion  Hand  Strength, Left (Malnutrition): Fair  Hand  Strength, Right (Malnutrition): Fair    Interventions/Recommendations (treatment strategy):  1. Continue Regular Diet. Encourage high-calorie, high-protein food sources. a. Small, frequent meals/snacks. 2. Recommend Boost Plus (or alternative) BID- chocolate or vanilla. 3. Recommend appetite stimulant. 4. Continue daily weights. 5. Continue MVI + calcium-vit D3, B-complex may be beneficial. 6. RD following.

## 2024-02-08 NOTE — ASSESSMENT & PLAN NOTE
Patient has hypokalemia which is Acute and currently uncontrolled. Most recent potassium levels reviewed-   Lab Results   Component Value Date    K 2.8 (L) 02/08/2024   . Will continue potassium replacement per protocol and recheck repeat levels after replacement completed.

## 2024-02-08 NOTE — PROGRESS NOTES
[facility]  Progress Note    Patient Name: Niurka Pedraza  MRN: [id number]  Patient Class: IP- Inpatient   Admission Date: 2/6/2024  Length of Stay: 2 days  Attending Physician: Francois Plasencia MD  Primary Care Provider: Savana Anderson MD        Subjective:     Principal Problem:Symptomatic anemia        HPI:  Ms. Pedraza is a 78 year old female with PMH of myelodysplastic syndrome with excess blasts-2 on chronic steroids, ILD/pulmonary fibrosis, hypertension, MDRO UTIs, pulmonary coccidioidomycosis and hypothyroidism who presented from her transfusion center. She was scheduled to have a blood transfusion for her MDS. The center was unable to obtain IV access and vitals showed notable hypotension thus she was instructed to present to the ED.     In ED, IV access was obtained. Afebrile with hypotension otherwise hemodynamically stable. Pancytopenic, WBC 0.99. Lactate WNL. CXR with lower lobe consolidation. Received IV fluids and started on IV antibiotics. Found to be COVID-19 positive.     At bedside, patient and daughter present. Patient says she felt productive cough and shortness of breath for 3 days prior. Denies sick contacts. No fevers, chills, night sweats. Also states she has burning and pain with urination which is chronic.     Overview/Hospital Course:  ID consulted for antibiotic escalation. Developed new diarrhea and new fever. Stool studies ordered. Blood pressure stable and abdomen non-tender. Stool studies ordered and IV fluids started. Treating COVID PNA with remdesivir and for patient's history of MDR UTI covering with meropenem. Treating symptoms with stress dose steroids. On IVF for diarrhea and hypotension. Will restart home prednisone 2 mg at discharge.     Interval History: NAEON, feeling better but not back to BL     Review of Systems   Constitutional:  Positive for diaphoresis and fever. Negative for chills.   HENT:  Negative for mouth sores.    Respiratory:   Positive for cough and shortness of breath.    Cardiovascular:  Positive for leg swelling. Negative for chest pain.   Gastrointestinal:  Positive for diarrhea, nausea and vomiting. Negative for abdominal pain and constipation.   Genitourinary:  Positive for difficulty urinating and dysuria.   Skin:  Negative for color change and wound.   Neurological:  Positive for weakness. Negative for headaches.   Psychiatric/Behavioral:  Negative for agitation and confusion.      Objective:     Vital Signs (Most Recent):  Temp: 98.7 °F (37.1 °C) (02/08/24 1147)  Pulse: 80 (02/08/24 1527)  Resp: 18 (02/08/24 1147)  BP: (!) 160/91 (02/08/24 1147)  SpO2: 95 % (02/08/24 1147) Vital Signs (24h Range):  Temp:  [97.4 °F (36.3 °C)-130.1 °F (54.5 °C)] 98.7 °F (37.1 °C)  Pulse:  [] 80  Resp:  [18-24] 18  SpO2:  [91 %-97 %] 95 %  BP: (112-168)/(56-99) 160/91     Weight: 64 kg (141 lb)  Body mass index is 22.76 kg/m².  No intake or output data in the 24 hours ending 02/08/24 1551      Physical Exam  Constitutional:       General: She is in acute distress.      Appearance: She is ill-appearing.   HENT:      Head: Normocephalic and atraumatic.      Nose: Congestion present.      Mouth/Throat:      Mouth: Mucous membranes are moist.      Pharynx: Oropharynx is clear.   Eyes:      Extraocular Movements: Extraocular movements intact.      Conjunctiva/sclera: Conjunctivae normal.   Cardiovascular:      Rate and Rhythm: Normal rate and regular rhythm.   Pulmonary:      Effort: No respiratory distress.      Breath sounds: Wheezing present.   Abdominal:      Palpations: Abdomen is soft.      Tenderness: There is no abdominal tenderness. There is no guarding or rebound.   Musculoskeletal:      Right lower leg: Edema present.      Left lower leg: Edema present.   Skin:     General: Skin is warm and dry.   Neurological:      General: No focal deficit present.      Mental Status: She is alert and oriented to person, place, and time. Mental status  is at baseline.   Psychiatric:         Mood and Affect: Mood normal.         Behavior: Behavior normal.         Thought Content: Thought content normal.             Significant Labs: All pertinent labs within the past 24 hours have been reviewed.    Significant Imaging: I have reviewed all pertinent imaging results/findings within the past 24 hours.    Assessment/Plan:      * Symptomatic anemia  Patient's anemia is currently uncontrolled. Etiology likely d/t chronic disease due to Malignancy.    Current CBC reviewed-   Lab Results   Component Value Date    HGB 8.3 (L) 02/08/2024    HCT 26.3 (L) 02/08/2024     Monitor serial CBC and transfuse if patient becomes hemodynamically unstable, symptomatic or H/H drops below 7/21 per BMT.    Severe malnutrition  Nutrition consulted. Most recent weight and BMI monitored-     Measurements:  Wt Readings from Last 1 Encounters:   02/07/24 64 kg (141 lb)   Body mass index is 22.76 kg/m².    Patient has been screened and assessed by RD.    Malnutrition Type:  Context: chronic illness  Level: severe    Malnutrition Characteristic Summary:  Weight Loss (Malnutrition): other (see comments) (10.7% x 1 month)  Energy Intake (Malnutrition): less than 75% for greater than or equal to 1 month  Subcutaneous Fat (Malnutrition): moderate depletion  Muscle Mass (Malnutrition): severe depletion  Hand  Strength, Left (Malnutrition): Fair  Hand  Strength, Right (Malnutrition): Fair    Interventions/Recommendations (treatment strategy):  1. Continue Regular Diet. Encourage high-calorie, high-protein food sources. a. Small, frequent meals/snacks. 2. Recommend Boost Plus (or alternative) BID- chocolate or vanilla. 3. Recommend appetite stimulant. 4. Continue daily weights. 5. Continue MVI + calcium-vit D3, B-complex may be beneficial. 6. RD following.      Hypokalemia  Patient has hypokalemia which is Acute and currently uncontrolled. Most recent potassium levels reviewed-   Lab Results    Component Value Date    K 2.8 (L) 02/08/2024   . Will continue potassium replacement per protocol and recheck repeat levels after replacement completed.     Diarrhea  New diarrhea during admission with poor PO intake. Also with associated nausea. In the setting of new fever.    - Continue abx  - F/u stool studies including Cdiff  - Maintenance IV fluids (normal saline as pt receiving blood transfusions)  - IV antiemetics ordered       Edema due to hypoalbuminemia  Suspect 2/2 malignancy and poor nutrition. Patient with decreased albumin from prior presentation. Patient states pitting edema has worsened in the last week.    - Compression stockings  - RD consulted, appreciate recs      Pneumonia due to COVID-19 virus  COVID-19 positive with CXR showing lower lobe consolidation. Patient with productive cough and SOB. Received Vanc Zosyn as well as IV fluids in the ED. C/f superimposed bacterial pneumonia with COVID-19.     - F/u respiratory cultures and blood cultures  - F/u MRSA nares   - ID consulted for ESBL UTI , appreciate recs    MDS/AML  Biopsy in 2023 consistent with myelodysplastic syndrome with excess blasts-2. High probability of evolution to AML. 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 She is on aza-angelo indefinitely per chart review.     - Continue ppx with acyclovir, posaconazole  - ID consulted for MDR UTI  - Daily CBC with transfusions as needed  - BMT notified of patient's admission  - Hold venetoclax while inpatient  - PRN antiemetics        Thrombocytopenia  Patient was found to have thrombocytopenia, the likely etiology is primary from bone marrow suppression, will monitor the platelets Daily. Will transfuse if platelet count is <10k per BMT. Hold DVT prophylaxis if platelets are <50k. The patient's platelet results have been reviewed and are listed below.  Recent Labs   Lab 02/08/24  0746   PLT 86*           Immunosuppression due to chronic steroid use  Patient currently afebrile,  hypotensive, without vomiting or diarrhea.     - stress dose steroids given to address covid symptoms     UTI (urinary tract infection)  Patient has history of ESBL producing Ecoli with MDR. Patient states she has pain and burning with urination. UA in ED with evidence of infection. Received Vanc/Zosyn in ED.    - ID consulted for abx recs, appreciate assistance  - meropenum    Pulmonary fibrosis  With ILD. Takes prednisone 2 mg daily, montelukast 10 mg daily. Does not require home O2. Was supposed to have PFT outpatient but no results recorded.    - Continue home inhaler  - Hold nebs with active COVID  - Will need 6MWT to assess need for home oxygen  - Will need outpatient PFTs      OAB (overactive bladder)  In the setting of chronic UTI. Will hold home meds in the acute setting and restart when clinically indicated.      Acquired hypothyroidism  Continue home med      Essential hypertension  Chronic, controlled. Latest blood pressure and vitals reviewed-     Temp:  [97.4 °F (36.3 °C)-130.1 °F (54.5 °C)]   Pulse:  []   Resp:  [18-24]   BP: (112-168)/(56-99)   SpO2:  [91 %-97 %] .   Home meds for hypertension were reviewed and noted below.   Hypertension Medications               carvediloL (COREG) 25 MG tablet Take 1 tablet (25 mg total) by mouth 2 (two) times daily with meals.    hydrALAZINE (APRESOLINE) 25 MG tablet Take 1 tablet (25 mg total) by mouth every 8 (eight) hours as needed (if high blood pressure > 180/100).    irbesartan (AVAPRO) 300 MG tablet Take 1 tablet (300 mg total) by mouth every evening.            While in the hospital, will manage blood pressure as follows; Adjust home antihypertensive regimen as follows- hold home medications for hypotension    Will utilize p.r.n. blood pressure medication only if patient's blood pressure greater than 180/110 and she develops symptoms such as worsening chest pain or shortness of breath.    Other hyperlipidemia  Continue home statin and monitor  LFTs        VTE Risk Mitigation (From admission, onward)           Ordered     enoxaparin injection 40 mg  Every 24 hours         02/07/24 1055     Place RUDY hose  Until discontinued         02/06/24 1735     Reason for No Pharmacological VTE Prophylaxis  Once        Question:  Reasons:  Answer:  Risk of Bleeding    02/06/24 1522     IP VTE HIGH RISK PATIENT  Once         02/06/24 1522     Place sequential compression device  Until discontinued         02/06/24 1522                    Discharge Planning   JAKE: 2/10/2024     Code Status: Full Code   Is the patient medically ready for discharge?: No    Reason for patient still in hospital (select all that apply): Treatment                     Manju Maher DO  Department of Hospital Medicine   [facility]

## 2024-02-08 NOTE — CONSULTS
[facility] - Med Surg  Adult Nutrition  Consult Note    SUMMARY     Recommendations    1. Continue Regular Diet. Encourage high-calorie, high-protein food sources.   a. Small, frequent meals/snacks.   2. Recommend Boost Plus (or alternative) BID- chocolate or vanilla.   3. Recommend appetite stimulant.   4. Continue daily weights.   5. Continue MVI + calcium-vit D3, B-complex may be beneficial.   6. RD following.    Goals: Meet % EEN/EPN by follow-up date.  Nutrition Goal Status: new  Communication of RD Recs: other (comment) (POC)    Assessment and Plan    Endocrine  Severe malnutrition  Malnutrition Type:  Context: chronic illness  Level: severe    Related to (etiology):   Inadequate energy/protein intake    Signs and Symptoms (as evidenced by):   Weight loss and decreased appetite     Malnutrition Characteristic Summary:  Weight Loss (Malnutrition): other (see comments) (10.7% x 1 month)  Energy Intake (Malnutrition): less than 75% for greater than or equal to 1 month  Subcutaneous Fat (Malnutrition): moderate depletion  Muscle Mass (Malnutrition): severe depletion  Hand  Strength, Left (Malnutrition): Fair  Hand  Strength, Right (Malnutrition): Fair      Interventions/Recommendations (treatment strategy):  1. Continue Regular Diet. Encourage high-calorie, high-protein food sources. a. Small, frequent meals/snacks. 2. Recommend Boost Plus (or alternative) BID- chocolate or vanilla. 3. Recommend appetite stimulant. 4. Continue daily weights. 5. Continue MVI + calcium-vit D3, B-complex may be beneficial. 6. RD following.    Nutrition Diagnosis Status:   New         Malnutrition Assessment  Malnutrition Context: chronic illness  Malnutrition Level: severe  Skin (Micronutrient): bruised, thinned, turgor reduced  Nails (Micronutrient): none  Hair/Scalp (Micronutrient): dry, dull, other (see comments) (Hair loss r/t chemotherapy)  Eyes (Micronutrient): none  Extraoral (Micronutrient): none  Gums  (Micronutrient): none  Lips/Mucous Membranes (Micronutrient): none  Teeth (Micronutrient): none  Tongue (Micronutrient): none  Neck/Chest (Micronutrient): muscle wasting, subcutaneous fat loss   Micronutrient Evaluation Summary: suspected deficiency  Micronutrient Evaluation Comments: B vitamins (B1, B2, B3, B5, B6, B7, B9, B12) calcium, phosphorus, vitamin C, iron   Weight Loss (Malnutrition): other (see comments) (10.7% x 1 month)  Energy Intake (Malnutrition): less than 75% for greater than or equal to 1 month  Subcutaneous Fat (Malnutrition): moderate depletion  Muscle Mass (Malnutrition): severe depletion  Hand  Strength, Left (Malnutrition): Fair  Hand  Strength, Right (Malnutrition): Fair   Orbital Region (Subcutaneous Fat Loss): moderate depletion  Upper Arm Region (Subcutaneous Fat Loss): moderate depletion  Thoracic and Lumbar Region: moderate depletion   [place] Region (Muscle Loss): mild depletion  Clavicle Bone Region (Muscle Loss): severe depletion  Clavicle and Acromion Bone Region (Muscle Loss): severe depletion  Scapular Bone Region (Muscle Loss): severe depletion  Dorsal Hand (Muscle Loss): moderate depletion  Patellar Region (Muscle Loss): severe depletion  Anterior Thigh Region (Muscle Loss): severe depletion  Posterior Calf Region (Muscle Loss): severe depletion                 Reason for Assessment    Reason For Assessment: consult  Diagnosis: cancer diagnosis/related complications (Symptomatic anemia)  Relevant Medical History: HTN, HLD  Interdisciplinary Rounds: did not attend  General Information Comments: RD consulted for MDS patient with decreased PO intake. Reports weight loss 17lbs x 1 month since beginning treatment. Lack of appetite. No c/o N/V/C/D. RD encouraged ONS patient woudl like to add BID with alternating flavors. Regular diet. NFPE performed 2/8. Please see PES for details.  Nutrition Discharge Planning: Pending Clinical Course    Nutrition Risk Screen    Nutrition Risk  "Screen: no indicators present    Nutrition/Diet History    Patient Reported Diet/Restrictions/Preferences: general  Spiritual, Cultural Beliefs, Christianity Practices, Values that Affect Care: no  Supplemental Drinks or Food Habits: Ensure Original (1-2/ day)  Food Allergies: NKFA  Factors Affecting Nutritional Intake: decreased appetite    Anthropometrics    Temp: 98.7 °F (37.1 °C)  Height Method: Stated  Height: 5' 6" (167.6 cm)  Height (inches): 66 in  Weight Method: Bed Scale  Weight: 64 kg (141 lb)  Weight (lb): 141 lb  Ideal Body Weight (IBW), Female: 130 lb  % Ideal Body Weight, Female (lb): 108.46 %  BMI (Calculated): 22.8       Lab/Procedures/Meds    Pertinent Labs Reviewed: reviewed  Pertinent Labs Comments: H/H: 8.3/26.3, MCH 25.7, MCHC 31.6, K 2.8, CO2 20, Glu 131, Ca 8.0, P 1.8, TP 4.8, TBili 1.3  Pertinent Medications Reviewed: reviewed  Pertinent Medications Comments: levothyroxine, vitamin C, meropenem, MVI, pantoprazole, prednisone, calcium-vitamin D3, enoxaparin, ferrous gluconate, atorvastatin      Estimated/Assessed Needs    Weight Used For Calorie Calculations: 64 kg (141 lb)  Energy Calorie Requirements (kcal): 1792 kcal/d (28 kcal/kg)  Energy Need Method: Kcal/kg  Protein Requirements: 70 g/d (1.1 g/kg)  Weight Used For Protein Calculations: 64 kg (141 lb)        RDA Method (mL): 1792         Nutrition Prescription Ordered    Current Diet Order: Regular    Evaluation of Received Nutrient/Fluid Intake    Comments: LBM: 2/7  % Intake of Estimated Energy Needs: 75 - 100 %  % Meal Intake: 0 - 25 %    Nutrition Risk    Level of Risk/Frequency of Follow-up: moderate (1-2x/ week)       Monitor and Evaluation    Food and Nutrient Intake: energy intake, food and beverage intake  Food and Nutrient Adminstration: diet order  Physical Activity and Function: nutrition-related ADLs and IADLs, factors affecting access to physical activity  Anthropometric Measurements: weight, body mass index, weight " change  Biochemical Data, Medical Tests and Procedures: lipid profile, inflammatory profile, glucose/endocrine profile, gastrointestinal profile, electrolyte and renal panel  Nutrition-Focused Physical Findings: head and eyes, extremities, muscles and bones, skin, overall appearance       Nutrition Follow-Up    RD Follow-up?: Yes    Gaetano Hobbs Registration Eligible, Provisional LDN

## 2024-02-09 PROBLEM — J15.211 STAPHYLOCOCCUS AUREUS PNEUMONIA: Status: ACTIVE | Noted: 2024-02-09

## 2024-02-09 LAB
ALBUMIN SERPL BCP-MCNC: 1.7 G/DL (ref 3.5–5.2)
ALP SERPL-CCNC: 88 U/L (ref 55–135)
ALT SERPL W/O P-5'-P-CCNC: 12 U/L (ref 10–44)
ANION GAP SERPL CALC-SCNC: 10 MMOL/L (ref 8–16)
AST SERPL-CCNC: 12 U/L (ref 10–40)
BACTERIA SPEC AEROBE CULT: ABNORMAL
BACTERIA SPEC AEROBE CULT: ABNORMAL
BILIRUB SERPL-MCNC: 0.5 MG/DL (ref 0.1–1)
BUN SERPL-MCNC: 14 MG/DL (ref 8–23)
CALCIUM SERPL-MCNC: 7.7 MG/DL (ref 8.7–10.5)
CHLORIDE SERPL-SCNC: 109 MMOL/L (ref 95–110)
CO2 SERPL-SCNC: 20 MMOL/L (ref 23–29)
CREAT SERPL-MCNC: 0.6 MG/DL (ref 0.5–1.4)
ERYTHROCYTE [DISTWIDTH] IN BLOOD BY AUTOMATED COUNT: 24.1 % (ref 11.5–14.5)
ERYTHROCYTE [DISTWIDTH] IN BLOOD BY AUTOMATED COUNT: 24.2 % (ref 11.5–14.5)
ERYTHROCYTE [DISTWIDTH] IN BLOOD BY AUTOMATED COUNT: 24.3 % (ref 11.5–14.5)
EST. GFR  (NO RACE VARIABLE): >60 ML/MIN/1.73 M^2
GLUCOSE SERPL-MCNC: 169 MG/DL (ref 70–110)
GRAM STN SPEC: ABNORMAL
HCT VFR BLD AUTO: 25.4 % (ref 37–48.5)
HCT VFR BLD AUTO: 25.8 % (ref 37–48.5)
HCT VFR BLD AUTO: 27.2 % (ref 37–48.5)
HGB BLD-MCNC: 8.3 G/DL (ref 12–16)
HGB BLD-MCNC: 8.5 G/DL (ref 12–16)
HGB BLD-MCNC: 8.9 G/DL (ref 12–16)
MAGNESIUM SERPL-MCNC: 1.7 MG/DL (ref 1.6–2.6)
MCH RBC QN AUTO: 26.3 PG (ref 27–31)
MCH RBC QN AUTO: 26.3 PG (ref 27–31)
MCH RBC QN AUTO: 26.7 PG (ref 27–31)
MCHC RBC AUTO-ENTMCNC: 32.7 G/DL (ref 32–36)
MCHC RBC AUTO-ENTMCNC: 32.7 G/DL (ref 32–36)
MCHC RBC AUTO-ENTMCNC: 32.9 G/DL (ref 32–36)
MCV RBC AUTO: 80 FL (ref 82–98)
MCV RBC AUTO: 81 FL (ref 82–98)
MCV RBC AUTO: 81 FL (ref 82–98)
PHOSPHATE SERPL-MCNC: 2.7 MG/DL (ref 2.7–4.5)
PLATELET # BLD AUTO: 100 K/UL (ref 150–450)
PLATELET # BLD AUTO: 113 K/UL (ref 150–450)
PLATELET # BLD AUTO: 120 K/UL (ref 150–450)
PMV BLD AUTO: 8.6 FL (ref 9.2–12.9)
PMV BLD AUTO: 9.2 FL (ref 9.2–12.9)
PMV BLD AUTO: 9.3 FL (ref 9.2–12.9)
POTASSIUM SERPL-SCNC: 3.5 MMOL/L (ref 3.5–5.1)
PROT SERPL-MCNC: 4.8 G/DL (ref 6–8.4)
RBC # BLD AUTO: 3.16 M/UL (ref 4–5.4)
RBC # BLD AUTO: 3.18 M/UL (ref 4–5.4)
RBC # BLD AUTO: 3.38 M/UL (ref 4–5.4)
SODIUM SERPL-SCNC: 139 MMOL/L (ref 136–145)
WBC # BLD AUTO: 0.95 K/UL (ref 3.9–12.7)
WBC # BLD AUTO: 1.01 K/UL (ref 3.9–12.7)
WBC # BLD AUTO: 1.23 K/UL (ref 3.9–12.7)

## 2024-02-09 PROCEDURE — 36415 COLL VENOUS BLD VENIPUNCTURE: CPT | Mod: XB

## 2024-02-09 PROCEDURE — 84100 ASSAY OF PHOSPHORUS: CPT

## 2024-02-09 PROCEDURE — 25000003 PHARM REV CODE 250

## 2024-02-09 PROCEDURE — 25000242 PHARM REV CODE 250 ALT 637 W/ HCPCS

## 2024-02-09 PROCEDURE — 99233 SBSQ HOSP IP/OBS HIGH 50: CPT | Mod: GC,,, | Performed by: INTERNAL MEDICINE

## 2024-02-09 PROCEDURE — 63600175 PHARM REV CODE 636 W HCPCS: Mod: JZ,TB

## 2024-02-09 PROCEDURE — 36415 COLL VENOUS BLD VENIPUNCTURE: CPT

## 2024-02-09 PROCEDURE — 36410 VNPNXR 3YR/> PHY/QHP DX/THER: CPT

## 2024-02-09 PROCEDURE — 99900035 HC TECH TIME PER 15 MIN (STAT)

## 2024-02-09 PROCEDURE — 76937 US GUIDE VASCULAR ACCESS: CPT

## 2024-02-09 PROCEDURE — 94640 AIRWAY INHALATION TREATMENT: CPT

## 2024-02-09 PROCEDURE — 85027 COMPLETE CBC AUTOMATED: CPT

## 2024-02-09 PROCEDURE — 63600175 PHARM REV CODE 636 W HCPCS

## 2024-02-09 PROCEDURE — 27000207 HC ISOLATION

## 2024-02-09 PROCEDURE — 83735 ASSAY OF MAGNESIUM: CPT

## 2024-02-09 PROCEDURE — 63600175 PHARM REV CODE 636 W HCPCS: Performed by: INTERNAL MEDICINE

## 2024-02-09 PROCEDURE — 94761 N-INVAS EAR/PLS OXIMETRY MLT: CPT

## 2024-02-09 PROCEDURE — 21400001 HC TELEMETRY ROOM

## 2024-02-09 PROCEDURE — 25000003 PHARM REV CODE 250: Performed by: INTERNAL MEDICINE

## 2024-02-09 PROCEDURE — 85027 COMPLETE CBC AUTOMATED: CPT | Mod: 91

## 2024-02-09 PROCEDURE — C1751 CATH, INF, PER/CENT/MIDLINE: HCPCS

## 2024-02-09 PROCEDURE — 80053 COMPREHEN METABOLIC PANEL: CPT

## 2024-02-09 RX ORDER — PREDNISONE 1 MG/1
2 TABLET ORAL DAILY
Qty: 180 TABLET | Refills: 3
Start: 2024-02-09 | End: 2024-02-10

## 2024-02-09 RX ORDER — SODIUM CHLORIDE 0.9 % (FLUSH) 0.9 %
10 SYRINGE (ML) INJECTION
Status: DISCONTINUED | OUTPATIENT
Start: 2024-02-09 | End: 2024-02-10 | Stop reason: HOSPADM

## 2024-02-09 RX ORDER — SODIUM CHLORIDE 0.9 % (FLUSH) 0.9 %
10 SYRINGE (ML) INJECTION EVERY 6 HOURS
Status: DISCONTINUED | OUTPATIENT
Start: 2024-02-10 | End: 2024-02-10 | Stop reason: HOSPADM

## 2024-02-09 RX ORDER — PREDNISONE 20 MG/1
40 TABLET ORAL DAILY
Qty: 6 TABLET | Refills: 0 | Status: SHIPPED | OUTPATIENT
Start: 2024-02-10 | End: 2024-02-10 | Stop reason: HOSPADM

## 2024-02-09 RX ORDER — MIRTAZAPINE 7.5 MG/1
7.5 TABLET, FILM COATED ORAL NIGHTLY
Qty: 30 TABLET | Refills: 1 | Status: SHIPPED | OUTPATIENT
Start: 2024-02-09 | End: 2024-02-15 | Stop reason: SDUPTHER

## 2024-02-09 RX ORDER — GUAIFENESIN AND DEXTROMETHORPHAN HYDROBROMIDE 10; 100 MG/5ML; MG/5ML
5 SYRUP ORAL EVERY 4 HOURS PRN
Refills: 0 | COMMUNITY
Start: 2024-02-09 | End: 2024-02-19

## 2024-02-09 RX ORDER — MIRTAZAPINE 7.5 MG/1
7.5 TABLET, FILM COATED ORAL NIGHTLY
Status: DISCONTINUED | OUTPATIENT
Start: 2024-02-09 | End: 2024-02-10 | Stop reason: HOSPADM

## 2024-02-09 RX ADMIN — MIRTAZAPINE 7.5 MG: 7.5 TABLET, FILM COATED ORAL at 09:02

## 2024-02-09 RX ADMIN — MEROPENEM 1 G: 1 INJECTION INTRAVENOUS at 04:02

## 2024-02-09 RX ADMIN — MEROPENEM 1 G: 1 INJECTION INTRAVENOUS at 09:02

## 2024-02-09 RX ADMIN — REMDESIVIR 100 MG: 100 INJECTION, POWDER, LYOPHILIZED, FOR SOLUTION INTRAVENOUS at 09:02

## 2024-02-09 RX ADMIN — Medication 1 TABLET: at 10:02

## 2024-02-09 RX ADMIN — ATORVASTATIN CALCIUM 20 MG: 20 TABLET, FILM COATED ORAL at 10:02

## 2024-02-09 RX ADMIN — VANCOMYCIN HYDROCHLORIDE 750 MG: 750 INJECTION, POWDER, LYOPHILIZED, FOR SOLUTION INTRAVENOUS at 09:02

## 2024-02-09 RX ADMIN — POSACONAZOLE 300 MG: 100 TABLET, DELAYED RELEASE ORAL at 10:02

## 2024-02-09 RX ADMIN — THERA TABS 1 TABLET: TAB at 10:02

## 2024-02-09 RX ADMIN — ACYCLOVIR 400 MG: 200 CAPSULE ORAL at 09:02

## 2024-02-09 RX ADMIN — LEVOTHYROXINE SODIUM 50 MCG: 50 TABLET ORAL at 05:02

## 2024-02-09 RX ADMIN — Medication 324 MG: at 10:02

## 2024-02-09 RX ADMIN — Medication 250 MG: at 10:02

## 2024-02-09 RX ADMIN — PANTOPRAZOLE SODIUM 40 MG: 40 TABLET, DELAYED RELEASE ORAL at 10:02

## 2024-02-09 RX ADMIN — ACYCLOVIR 400 MG: 200 CAPSULE ORAL at 10:02

## 2024-02-09 RX ADMIN — PREDNISONE 40 MG: 20 TABLET ORAL at 10:02

## 2024-02-09 RX ADMIN — FLUTICASONE FUROATE AND VILANTEROL TRIFENATATE 1 PUFF: 100; 25 POWDER RESPIRATORY (INHALATION) at 10:02

## 2024-02-09 NOTE — SUBJECTIVE & OBJECTIVE
Interval History: Patient with persistent but improved cough. Has been afebrile and patient feeling better. Has not had diarrhea in 2 days. Will order midline consult for home meropenem per ID recs.    Review of Systems   Constitutional:  Positive for diaphoresis and fever. Negative for chills.   HENT:  Negative for mouth sores.    Respiratory:  Positive for cough.    Cardiovascular:  Positive for leg swelling. Negative for chest pain.   Gastrointestinal:  Negative for abdominal pain, constipation, diarrhea, nausea and vomiting.   Genitourinary:  Positive for difficulty urinating and dysuria.   Skin:  Negative for color change and wound.   Neurological:  Positive for weakness. Negative for headaches.   Psychiatric/Behavioral:  Negative for agitation and confusion.      Objective:     Vital Signs (Most Recent):  Temp: 97.7 °F (36.5 °C) (02/09/24 1148)  Pulse: 61 (02/09/24 1157)  Resp: 16 (02/09/24 1148)  BP: 131/83 (02/09/24 1148)  SpO2: 96 % (02/09/24 1148) Vital Signs (24h Range):  Temp:  [97.7 °F (36.5 °C)-99 °F (37.2 °C)] 97.7 °F (36.5 °C)  Pulse:  [61-92] 61  Resp:  [16-18] 16  SpO2:  [93 %-96 %] 96 %  BP: (131-158)/(78-83) 131/83     Weight: 64 kg (141 lb)  Body mass index is 22.76 kg/m².    Intake/Output Summary (Last 24 hours) at 2/9/2024 1508  Last data filed at 2/9/2024 1012  Gross per 24 hour   Intake 240 ml   Output --   Net 240 ml         Physical Exam  Constitutional:       General: She is not in acute distress.     Appearance: Normal appearance. She is normal weight.   HENT:      Head: Normocephalic and atraumatic.      Nose: Congestion present.      Mouth/Throat:      Mouth: Mucous membranes are moist.      Pharynx: Oropharynx is clear.   Eyes:      Extraocular Movements: Extraocular movements intact.      Conjunctiva/sclera: Conjunctivae normal.   Cardiovascular:      Rate and Rhythm: Normal rate and regular rhythm.   Pulmonary:      Effort: No respiratory distress.      Breath sounds: Wheezing  present.   Abdominal:      Palpations: Abdomen is soft.      Tenderness: There is no abdominal tenderness. There is no guarding or rebound.   Musculoskeletal:      Right lower leg: Edema present.      Left lower leg: Edema present.   Skin:     General: Skin is warm and dry.   Neurological:      General: No focal deficit present.      Mental Status: She is alert and oriented to person, place, and time. Mental status is at baseline.   Psychiatric:         Mood and Affect: Mood normal.         Behavior: Behavior normal.         Thought Content: Thought content normal.             Significant Labs: All pertinent labs within the past 24 hours have been reviewed.    Significant Imaging: I have reviewed all pertinent imaging results/findings within the past 24 hours.

## 2024-02-09 NOTE — ASSESSMENT & PLAN NOTE
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 High probability of evolution to AML. 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 She is on aza-angelo indefinitely per chart review.     - Continue ppx with acyclovir, posaconazole  - ID consulted for MDR UTI, on oksana  - Q8H CBC with transfusions as needed  - BMT notified of patient's admission  - Hold venetoclax while inpatient  - PRN antiemetics

## 2024-02-09 NOTE — PLAN OF CARE
Outpatient Antibiotic Therapy Plan:    Please send referral to [facility].    1) Infection: ESBL UTI, MSSA PNA    2) Discharge Antibiotics:    Intravenous antibiotics:  Meropenem 1g IV q 8 hours       3) Therapy Duration:  10 days    Estimated end date of IV antibiotics: 02/16/24    4) Outpatient Weekly Labs:    Order the following labs to be drawn on Mondays:   CBC  CMP       5) Fax Lab Results to Infectious Diseases Provider: Dr. Smith    [facility] Fax Number: [phone]    6) Outpatient Infectious Diseases Follow-up    Follow-up appointment will be arranged by the ID clinic and will be found in the patient's appointments tab.    Prior to discharge, please ensure the patient's follow-up has been scheduled.    If there is still no follow-up scheduled prior to discharge, please send an EPIC message to Kanwal Esposito in Infectious Diseases.

## 2024-02-09 NOTE — PROGRESS NOTES
VANCOMYCIN DOSING BY PHARMACY DISCONTINUATION NOTE    Niurka Pedraza is a 78 y.o. female who had been consulted for vancomycin dosing.    The pharmacy consult for vancomycin dosing has been discontinued.     Vancomycin Dosing by Pharmacy Consult will sign-off. Please reconsult if necessary. Thank you for allowing us to participate in this patient's care.     Francis Hunt, PharmD, BCPS  Ext. [phone]

## 2024-02-09 NOTE — PROGRESS NOTES
[facility] Medicine  Progress Note    Patient Name: Niurka Pedraza  MRN: [id number]  Patient Class: IP- Inpatient   Admission Date: 2/6/2024  Length of Stay: 3 days  Attending Physician: Francois Plasencia MD  Primary Care Provider: Savana Anderson MD        Subjective:     Principal Problem:Symptomatic anemia        HPI:  Ms. Pedraza is a 78 year old female with PMH of myelodysplastic syndrome with excess blasts-2 on chronic steroids, ILD/pulmonary fibrosis, hypertension, MDRO UTIs, pulmonary coccidioidomycosis and hypothyroidism who presented from her transfusion center. She was scheduled to have a blood transfusion for her MDS. The center was unable to obtain IV access and vitals showed notable hypotension thus she was instructed to present to the ED.     In ED, IV access was obtained. Afebrile with hypotension otherwise hemodynamically stable. Pancytopenic, WBC 0.99. Lactate WNL. CXR with lower lobe consolidation. Received IV fluids and started on IV antibiotics. Found to be COVID-19 positive.     At bedside, patient and daughter present. Patient says she felt productive cough and shortness of breath for 3 days prior. Denies sick contacts. No fevers, chills, night sweats. Also states she has burning and pain with urination which is chronic.     Overview/Hospital Course:  ID consulted for antibiotic escalation. Developed new diarrhea and new fever. IV fludis started and stool studies ordered but patient without further diarrhea. Blood pressure stable and abdomen non-tender.  Treating COVID PNA with remdesivir and for patient's history of MDR UTI covering with meropenem. Treating symptoms with stress dose steroids. Will restart home prednisone 2 mg at discharge. Will need 10 day course of meropenem.    Interval History: Patient with persistent but improved cough. Has been afebrile and patient feeling better. Has not had diarrhea in 2 days. Will order midline consult for home meropenem per ID  recs.    Review of Systems   Constitutional:  Positive for diaphoresis and fever. Negative for chills.   HENT:  Negative for mouth sores.    Respiratory:  Positive for cough.    Cardiovascular:  Positive for leg swelling. Negative for chest pain.   Gastrointestinal:  Negative for abdominal pain, constipation, diarrhea, nausea and vomiting.   Genitourinary:  Positive for difficulty urinating and dysuria.   Skin:  Negative for color change and wound.   Neurological:  Positive for weakness. Negative for headaches.   Psychiatric/Behavioral:  Negative for agitation and confusion.      Objective:     Vital Signs (Most Recent):  Temp: 97.7 °F (36.5 °C) (02/09/24 1148)  Pulse: 61 (02/09/24 1157)  Resp: 16 (02/09/24 1148)  BP: 131/83 (02/09/24 1148)  SpO2: 96 % (02/09/24 1148) Vital Signs (24h Range):  Temp:  [97.7 °F (36.5 °C)-99 °F (37.2 °C)] 97.7 °F (36.5 °C)  Pulse:  [61-92] 61  Resp:  [16-18] 16  SpO2:  [93 %-96 %] 96 %  BP: (131-158)/(78-83) 131/83     Weight: 64 kg (141 lb)  Body mass index is 22.76 kg/m².    Intake/Output Summary (Last 24 hours) at 2/9/2024 1508  Last data filed at 2/9/2024 1012  Gross per 24 hour   Intake 240 ml   Output --   Net 240 ml         Physical Exam  Constitutional:       General: She is not in acute distress.     Appearance: Normal appearance. She is normal weight.   HENT:      Head: Normocephalic and atraumatic.      Nose: Congestion present.      Mouth/Throat:      Mouth: Mucous membranes are moist.      Pharynx: Oropharynx is clear.   Eyes:      Extraocular Movements: Extraocular movements intact.      Conjunctiva/sclera: Conjunctivae normal.   Cardiovascular:      Rate and Rhythm: Normal rate and regular rhythm.   Pulmonary:      Effort: No respiratory distress.      Breath sounds: Wheezing present.   Abdominal:      Palpations: Abdomen is soft.      Tenderness: There is no abdominal tenderness. There is no guarding or rebound.   Musculoskeletal:      Right lower leg: Edema present.       Left lower leg: Edema present.   Skin:     General: Skin is warm and dry.   Neurological:      General: No focal deficit present.      Mental Status: She is alert and oriented to person, place, and time. Mental status is at baseline.   Psychiatric:         Mood and Affect: Mood normal.         Behavior: Behavior normal.         Thought Content: Thought content normal.             Significant Labs: All pertinent labs within the past 24 hours have been reviewed.    Significant Imaging: I have reviewed all pertinent imaging results/findings within the past 24 hours.    Assessment/Plan:      * Symptomatic anemia  Patient's anemia is currently uncontrolled. Etiology likely d/t chronic disease due to Malignancy.    Current CBC reviewed-   Lab Results   Component Value Date    HGB 8.3 (L) 02/08/2024    HCT 26.3 (L) 02/08/2024     Monitor serial CBC and transfuse if patient becomes hemodynamically unstable, symptomatic or H/H drops below 7/21 per BMT.    Staphylococcus aureus pneumonia  COVID-19 with superimposed bacterial pneumonia.   Symptomatically improving on BSAbx.   Has not fevered since starting meropenem.  Has been on room air during hospitalization.     - Continuing meropenem per ID  - PRN cough suppresants      Severe malnutrition  Nutrition consulted. Most recent weight and BMI monitored-     Measurements:  Wt Readings from Last 1 Encounters:   02/07/24 64 kg (141 lb)   Body mass index is 22.76 kg/m².    Patient has been screened and assessed by RD.    Malnutrition Type:  Context: chronic illness  Level: severe    Malnutrition Characteristic Summary:  Weight Loss (Malnutrition): other (see comments) (10.7% x 1 month)  Energy Intake (Malnutrition): less than 75% for greater than or equal to 1 month  Subcutaneous Fat (Malnutrition): moderate depletion  Muscle Mass (Malnutrition): severe depletion  Hand  Strength, Left (Malnutrition): Fair  Hand  Strength, Right (Malnutrition):  Fair    Interventions/Recommendations (treatment strategy):  1. Continue Regular Diet. Encourage high-calorie, high-protein food sources. a. Small, frequent meals/snacks. 2. Recommend Boost Plus (or alternative) BID- chocolate or vanilla. 3. Recommend appetite stimulant. 4. Continue daily weights. 5. Continue MVI + calcium-vit D3, B-complex may be beneficial. 6. RD following.    Will add remeron 7.5 mg     Hypokalemia  Patient has hypokalemia which is Acute and currently uncontrolled. Most recent potassium levels reviewed-   Lab Results   Component Value Date    K 3.5 02/09/2024   . Will continue potassium replacement per protocol and recheck repeat levels after replacement completed.     Diarrhea  New diarrhea during admission with poor PO intake. Also with associated nausea. In the setting of new fever on 2/7. Has not fevered or had diarrhea since.    - Continue abx  - F/u stool studies including Cdiff if able to provide sample  - Maintenance IV fluids (normal saline as pt receiving blood transfusions)  - IV antiemetics ordered       Edema due to hypoalbuminemia  Suspect 2/2 malignancy and poor nutrition. Patient with decreased albumin from prior presentation. Patient states pitting edema has worsened in the last week.    - Compression stockings  - RD consulted, appreciate recs      Pneumonia due to COVID-19 virus  COVID-19 positive with CXR showing lower lobe consolidation. Patient with productive cough and SOB. Received Vanc Zosyn as well as IV fluids in the ED. C/f superimposed bacterial pneumonia with COVID-19.     - F/u respiratory cultures and blood cultures  - F/u MRSA nares   - ID consulted for ESBL UTI , appreciate recs    MDS/AML  Biopsy in 2023 consistent with myelodysplastic syndrome with excess blasts-2. High probability of evolution to AML. 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 She is on aza-angelo indefinitely per chart review.     - Continue ppx with acyclovir, posaconazole  - ID  consulted for MDR UTI, on oksana  - Q8H CBC with transfusions as needed  - BMT notified of patient's admission  - Hold venetoclax while inpatient  - PRN antiemetics        Thrombocytopenia  Patient was found to have thrombocytopenia, the likely etiology is primary from bone marrow suppression, will monitor the platelets Daily. Will transfuse if platelet count is <10k per BMT. Hold DVT prophylaxis if platelets are <50k. The patient's platelet results have been reviewed and are listed below.  Recent Labs   Lab 02/08/24  0746   PLT 86*           Immunosuppression due to chronic steroid use  Patient currently afebrile, hypotensive, without vomiting or diarrhea.     - stress dose steroids given to address covid symptoms     UTI (urinary tract infection)  Patient has history of ESBL producing Ecoli with MDR. Patient states she has pain and burning with urination. UA in ED with evidence of infection. Received Vanc/Zosyn in ED.    - ID consulted for abx recs, appreciate assistance  - Meropenum for 10 day course total  - Will discharge to  with meropenem transfusion    Pulmonary fibrosis  With ILD. Takes prednisone 2 mg daily, montelukast 10 mg daily. Does not require home O2. Was supposed to have PFT outpatient but no results recorded.    - Continue home inhaler  - Hold nebs with active COVID  - Will need 6MWT to assess need for home oxygen  - Will need outpatient PFTs      OAB (overactive bladder)  In the setting of chronic UTI. Will hold home meds in the acute setting and restart when clinically indicated.      Acquired hypothyroidism  Continue home med      Essential hypertension  Chronic, controlled. Latest blood pressure and vitals reviewed-     Temp:  [97.4 °F (36.3 °C)-130.1 °F (54.5 °C)]   Pulse:  []   Resp:  [18-24]   BP: (112-168)/(56-99)   SpO2:  [91 %-97 %] .   Home meds for hypertension were reviewed and noted below.   Hypertension Medications               carvediloL (COREG) 25 MG tablet Take 1 tablet  (25 mg total) by mouth 2 (two) times daily with meals.    hydrALAZINE (APRESOLINE) 25 MG tablet Take 1 tablet (25 mg total) by mouth every 8 (eight) hours as needed (if high blood pressure > 180/100).    irbesartan (AVAPRO) 300 MG tablet Take 1 tablet (300 mg total) by mouth every evening.            While in the hospital, will manage blood pressure as follows; Adjust home antihypertensive regimen as follows- hold home medications for hypotension    Will utilize p.r.n. blood pressure medication only if patient's blood pressure greater than 180/110 and she develops symptoms such as worsening chest pain or shortness of breath.    Other hyperlipidemia  Continue home statin, daily CMPs        VTE Risk Mitigation (From admission, onward)           Ordered     enoxaparin injection 40 mg  Every 24 hours         02/07/24 1055     Place RUDY hose  Until discontinued         02/06/24 1735     Reason for No Pharmacological VTE Prophylaxis  Once        Question:  Reasons:  Answer:  Risk of Bleeding    02/06/24 1522     IP VTE HIGH RISK PATIENT  Once         02/06/24 1522     Place sequential compression device  Until discontinued         02/06/24 1522                    Discharge Planning   JAKE: 2/10/2024     Code Status: Full Code   Is the patient medically ready for discharge?: No    Reason for patient still in hospital (select all that apply): Treatment             Karla Larson MD  Department of Hospital Medicine   [facility]

## 2024-02-09 NOTE — ASSESSMENT & PLAN NOTE
Nutrition consulted. Most recent weight and BMI monitored-     Measurements:  Wt Readings from Last 1 Encounters:   02/07/24 64 kg (141 lb)   Body mass index is 22.76 kg/m².    Patient has been screened and assessed by RD.    Malnutrition Type:  Context: chronic illness  Level: severe    Malnutrition Characteristic Summary:  Weight Loss (Malnutrition): other (see comments) (10.7% x 1 month)  Energy Intake (Malnutrition): less than 75% for greater than or equal to 1 month  Subcutaneous Fat (Malnutrition): moderate depletion  Muscle Mass (Malnutrition): severe depletion  Hand  Strength, Left (Malnutrition): Fair  Hand  Strength, Right (Malnutrition): Fair    Interventions/Recommendations (treatment strategy):  1. Continue Regular Diet. Encourage high-calorie, high-protein food sources. a. Small, frequent meals/snacks. 2. Recommend Boost Plus (or alternative) BID- chocolate or vanilla. 3. Recommend appetite stimulant. 4. Continue daily weights. 5. Continue MVI + calcium-vit D3, B-complex may be beneficial. 6. RD following.    Will add remeron 7.5 mg

## 2024-02-09 NOTE — PLAN OF CARE
Patient will discharge to home with [facility] health and [facility] home infusion.    Called and provided [place] with the address patient will discharge to.  [address] [place] [address]    Noam Hillman, RNCM  Case Management  [phone]

## 2024-02-09 NOTE — ASSESSMENT & PLAN NOTE
New diarrhea during admission with poor PO intake. Also with associated nausea. In the setting of new fever on 2/7. Has not fevered or had diarrhea since.    - Continue abx  - F/u stool studies including Cdiff if able to provide sample  - Maintenance IV fluids (normal saline as pt receiving blood transfusions)  - IV antiemetics ordered

## 2024-02-09 NOTE — PLAN OF CARE
[facility]      HOME HEALTH ORDERS  FACE TO FACE ENCOUNTER    Patient Name: Niurka Pedraza  YOB: 1945    PCP: Savana Anderson MD   [facility] [address] 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      Immunosuppression due to chronic steroid use [D84.821, T38.0X5A, Z79.52]  Not Applicable    UTI (urinary tract infection) [N39.0]  Yes    Pulmonary fibrosis [J84.10]  Yes     Chronic    OAB (overactive bladder) [N32.81]  Yes    Essential hypertension [I10]  Yes     Chronic    Acquired hypothyroidism [E03.9]  Yes    Other hyperlipidemia [E78.49]  Yes      Resolved Hospital Problems    Diagnosis Date Resolved POA    Decreased appetite [R63.0] 02/08/2024 Yes       Follow Up Appointments:  Future Appointments   Date Time Provider Department Center   2/12/2024  8:40 AM LAB, HEMONC CANCER BLDG [facility]   2/16/2024  4:40 PM Mohit Centeno MD [facility] HC BMT [facility]   2/19/2024 12:50 PM [facility]   2/19/2024  1:45 PM INJECTION, [facility]   2/20/2024  1:45 PM INJECTION, [facility]   2/21/2024  1:45 PM INJECTION, [facility]   2/22/2024  1:45 PM INJECTION, [facility]   2/23/2024  1:45 PM INJECTION, [facility] INFUSION [facility] CHEMO [facility]   2/26/2024 10:00 AM [facility] LAB BMT [facility] LABBMT [facility]   3/4/2024  9:15 AM  LAB, [facility] OHS DRAW STATION [facility] LAB [facility] at [facility]   4/3/2024 11:20 AM Anaya Oropeza MD [facility] UROLOGY [facility]   5/9/2024 12:00 PM LAB, [facility] LAB [place]   5/9/2024  1:00 PM Talita Barrios MD [facility] GYNONC [facility] at [facility]   6/17/2024 10:30 AM Nemesio Cazares MD [facility] NLPUL [facility]       Allergies:  Review of patient's allergies indicates:   Allergen Reactions    Ciprofloxacin Other (See Comments)     Right foot pain and edema, tendonitis    Amlodipine Edema and Swelling     BLE  BLE    Lisinopril Other (See Comments)     cough    Nitrofuran analogues        Medications: Review discharge medications with patient and family and provide education.    Current Facility-Administered Medications   Medication Dose Route Frequency Provider Last Rate Last Admin    0.9%  NaCl infusion (for blood administration)   Intravenous Q24H PRN Kirby Evans MD        acetaminophen tablet 650 mg  650 mg Oral Q4H PRN Karla Larson MD   650 mg at 02/07/24 1608    acyclovir capsule 400 mg  400 mg Oral BID Karla Larson MD   400 mg at 02/09/24 1000    albuterol-ipratropium 2.5 mg-0.5 mg/3 mL nebulizer solution 3 mL  3 mL Nebulization Q4H PRN Janine Collado MD   3 mL at 02/08/24 0828    ascorbic acid (vitamin C) tablet 250 mg  250 mg Oral Daily Karla Larson MD   250 mg at 02/09/24 1000    atorvastatin tablet 20 mg  20 mg Oral Daily Karla Larson MD   20 mg at 02/09/24 1000    benzonatate capsule 100 mg  100 mg Oral TID PRN Manju Maher DO   100 mg at 02/08/24 0546    calcium-vitamin D3 500 mg-5 mcg (200 unit) per tablet 1 tablet  1 tablet Oral BID  Karla Larson MD   1 tablet at 02/09/24 1000    dextromethorphan-guaiFENesin  mg/5 ml liquid 5 mL  5 mL Oral Q4H PRN Karla Larson MD        enoxaparin injection 40 mg  40 mg Subcutaneous Q24H (prophylaxis, 1700) Karla Larson MD   40 mg at 02/08/24 1829    ferrous gluconate tablet 324 mg  324 mg Oral Every other day Karla Larson MD   324 mg at  02/09/24 1000    fluticasone furoate-vilanteroL 100-25 mcg/dose diskus inhaler 1 puff  1 puff Inhalation Daily Manju Maher DO   1 puff at 02/09/24 1006    glucagon (human recombinant) injection 1 mg  1 mg Intramuscular PRN Karla Larson MD        glucose chewable tablet 16 g  16 g Oral PRN Karla Larson MD        glucose chewable tablet 24 g  24 g Oral PRN Karla Larson MD        levothyroxine tablet 50 mcg  50 mcg Oral Before breakfast Karla Larson MD   50 mcg at 02/09/24 0527    meropenem (MERREM) 1 g in sodium chloride 0.9 % 100 mL IVPB (MB+)  1 g Intravenous Q8H Manju Maher DO   Stopped at 02/09/24 1000    mirtazapine tablet 7.5 mg  7.5 mg Oral QHS Karla Larson MD        multivitamin tablet  1 tablet Oral Daily Karla Larson MD   1 tablet at 02/09/24 1000    naloxone 0.4 mg/mL injection 0.02 mg  0.02 mg Intravenous PRN Karla Larson MD        ondansetron disintegrating tablet 8 mg  8 mg Oral Q8H PRN Karla Larson MD   8 mg at 02/08/24 1832    pantoprazole EC tablet 40 mg  40 mg Oral Daily Manju Maher,    40 mg at 02/09/24 1000    posaconazole EC tablet 300 mg  300 mg Oral Daily Karla Larson MD   300 mg at 02/09/24 1000    predniSONE tablet 40 mg  40 mg Oral Daily Manju Maher, DO   40 mg at 02/09/24 1000    prochlorperazine tablet 10 mg  10 mg Oral Q6H PRN Karla Larson MD        remdesivir 100 mg in sodium chloride 0.9% 250 mL infusion  100 mg Intravenous Q24H Karla Larson MD        sodium chloride 0.9% flush 10 mL  10 mL Intravenous Q12H PRN Karla Larson MD         Current Discharge Medication List        START taking these medications    Details   dextromethorphan-guaiFENesin  mg/5 ml (ROBITUSSIN-DM)  mg/5 mL liquid Take 5 mLs by mouth every 4 (four) hours as needed (cough).  Refills: 0      mirtazapine (REMERON) 7.5 MG Tab Take 1 tablet (7.5 mg total) by mouth every evening.  Qty: 30 tablet, Refills: 1      sodium chloride 0.9 % PgBk 100 mL with  meropenem 1 gram SolR 1 g Inject 1 g into the vein every 8 (eight) hours. (End date 2/16/24) for 7 days           CONTINUE these medications which have CHANGED    Details   !! predniSONE (DELTASONE) 1 MG tablet Take 2 tablets (2 mg total) by mouth once daily. Resume 2 mg daily on February 13th after sick day rules completed  Qty: 180 tablet, Refills: 3      !! predniSONE (DELTASONE) 20 MG tablet Take 2 tablets (40 mg total) by mouth once daily. for 3 days  Qty: 6 tablet, Refills: 0       !! - Potential duplicate medications found. Please discuss with provider.        CONTINUE these medications which have NOT CHANGED    Details   acyclovir (ZOVIRAX) 400 MG tablet Take 1 tablet (400 mg total) by mouth 2 (two) times daily.  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 Rescue  Qty: 75 mL, Refills: 11    Associated Diagnoses: SOB (shortness of breath); Hypoxemia; Pulmonary fibrosis      ascorbic acid/zinc (ZINC WITH VITAMIN C ORAL) Take 1 tablet by mouth Daily.      atorvastatin (LIPITOR) 20 MG tablet Take 1 tablet (20 mg total) by mouth once daily.  Qty: 90 tablet, Refills: 3      calcium-vitamin D3 (CALCIUM 500 + D) 500 mg(1,250mg) -200 unit per tablet Take 1 tablet by mouth 2 (two) times daily with meals.      carvediloL (COREG) 25 MG tablet Take 1 tablet (25 mg total) by mouth 2 (two) times daily with meals.  Qty: 180 tablet, Refills: 1    Comments: .  Associated Diagnoses: Hypertension, unspecified type      ferrous gluconate (FERGON) 324 MG tablet Take 1 tablet (324 mg total) by mouth every other day.  Qty: 90 tablet, Refills: 3    Associated Diagnoses: Iron deficiency anemia, unspecified iron deficiency anemia type      fluticasone furoate-vilanteroL (BREO ELLIPTA) 100-25 mcg/dose diskus inhaler Inhale  1 puff into the lungs once daily. Controller.  PLEASE NOTE IT IS ONCE A DAY!!  Qty: 60 each, Refills: 4      irbesartan (AVAPRO) 300 MG tablet Take 1 tablet (300 mg total) by mouth every evening.  Qty: 90 tablet, Refills: 0    Comments: .  Associated Diagnoses: Hypertension, benign      levothyroxine (SYNTHROID) 50 MCG tablet Take 1 tablet (50 mcg total) by mouth before breakfast.  Qty: 90 tablet, Refills: 1    Associated Diagnoses: Hypothyroidism, unspecified type      multivitamin (THERAGRAN) per tablet Take 1 tablet by mouth once daily.      oxybutynin (DITROPAN XL) 15 MG TR24 Take 1 tablet (15 mg total) by mouth once daily.  Qty: 30 tablet, Refills: 11      pantoprazole (PROTONIX) 20 MG tablet Take 1 tablet (20 mg total) by mouth once daily.  Qty: 90 tablet, Refills: 1    Associated Diagnoses: Cough, unspecified type      sodium chloride 3% 3 % nebulizer solution Take 4 mLs by nebulization as needed for Other.  Qty: 120 mL, Refills: 3      venetoclax (VENCLEXTA) 100 mg Tab Take one tablet (100 mg) by mouth daily on days 1-7 of each 28 day cycle of therapy..  Qty: 28 tablet, Refills: 0    Associated Diagnoses: MDS (myelodysplastic syndrome)      azelastine (ASTELIN) 137 mcg (0.1 %) nasal spray 1 spray (137 mcg total) by Nasal route 2 (two) times daily.  Qty: 30 mL, Refills: 6      posaconazole (NOXAFIL) 100 mg TbEC tablet Take 3 tablets (300 mg) by mouth twice daily on the first day, and then take 300 mg daily thereafter.  Qty: 30 tablet, Refills: 2    Associated Diagnoses: MDS (myelodysplastic syndrome)               I have seen and examined this patient within the last 30 days. My clinical findings that support the need for the home health skilled services and home bound status are the following:no   Weakness/numbness causing balance and gait disturbance due to Weakness/Debility and Malignancy/Cancer making it taxing to leave home.     Diet:   regular diet    Labs:  Report Lab results to PCP.    Referrals/  Consults  Physical Therapy to evaluate and treat. Evaluate for home safety and equipment needs; Establish/upgrade home exercise program. Perform / instruct on therapeutic exercises, gait training, transfer training, and Range of Motion.  Occupational Therapy to evaluate and treat. Evaluate home environment for safety and equipment needs. Perform/Instruct on transfers, ADL training, ROM, and therapeutic exercises.  Aide to provide assistance with personal care, ADLs, and vital signs.    Activities:   activity as tolerated    Nursing:   Agency to admit patient within 24 hours of hospital discharge unless specified on physician order or at patient request    SN to complete comprehensive assessment including routine vital signs. Instruct on disease process and s/s of complications to report to MD. Review/verify medication list sent home with the patient at time of discharge  and instruct patient/caregiver as needed. Frequency may be adjusted depending on start of care date.     Skilled nurse to perform up to 3 visits PRN for symptoms related to diagnosis    Notify MD if SBP > 160 or < 90; DBP > 90 or < 50; HR > 120 or < 50; Temp > 101; O2 < 88%;  Ok to schedule additional visits based on staff availability and patient request on consecutive days within the home health episode.    When multiple disciplines ordered:    Start of Care occurs on Sunday - Wednesday schedule remaining discipline evaluations as ordered on separate consecutive days following the start of care.    Thursday SOC -schedule subsequent evaluations Friday and Monday the following week.     Friday - Saturday SOC - schedule subsequent discipline evaluations on consecutive days starting Monday of the following week.    For all post-discharge communication and subsequent orders please contact patient's primary care physician. If unable to reach primary care physician or do not receive response within 30 minutes, please contact [facility],  Hospital Medicine for clinical staff order clarification    Miscellaneous   Home Infusion Therapy:   SN to perform Infusion Therapy/Central Line Care.  Review Central Line Care & Central Line Flush with patient.    Administer (drug and dose): Meropenem 1g IV q8 hours    Last dose given: 02/09/2024                         Home dose due: 02/10/2024    Estimated end date of IV antibiotics: 02/16/2024    Scrub the Hub: Prior to accessing the line, always perform a 30 second alcohol scrub  Each lumen of the central line is to be flushed at least daily with 10 mL Normal Saline and 3 mL Heparin flush (10 units/mL)  Skilled Nurse (SN) may draw blood from IV access  Blood Draw Procedure:   - Aspirate at least 5 mL of blood   - Discard   - Obtain specimen   - Change injection cap   - Flush with 20 mL Normal Saline followed by a                 3-5 mL Heparin flush (10 units/mL)  Central :   - Sterile dressing changes are done weekly and as needed.   - Use chlor-hexadine scrub to cleanse site, apply Biopatch to insertion site,       apply securement device dressing   - Injection caps are changed weekly and after EVERY lab draw.   - If sterile gauze is under dressing to control oozing,                 dressing change must be performed every 24 hours until gauze is not needed.    Home Health Aide:  Nursing Three times weekly, Physical Therapy Three times weekly, and Occupational Therapy Three times weekly    Wound Care Orders  no    I certify that this patient is confined to her home and needs intermittent skilled nursing care, physical therapy, and occupational therapy.

## 2024-02-09 NOTE — PLAN OF CARE
Infectious Disease Note      Ms. Pedraza is a 78F h/o MDS on aza-angelo (ACV/POS trophy) with recurrent ESBL Ecoli UTIs, ILD/pulmonary fibrosis, pulmonary coccidiomycosis, admitted from infusion for hypotension, found to be neutropenic with possible L lower lobe consolidation, and SARS-CoV-2 positive. Respiratory culture with MSSA. Urine culture unfortunately not sent to micro lab after UA was done.     Recommendations  Will continue to treat for ESBL UTI given her history, as well as MSSA PNA - meropenem x 10 days  Continue remdesivir       Infectious Disease team will sign off. Please call or re-consult with any further questions.       Bonny Smith  Infectious Disease Fellow PGY4

## 2024-02-09 NOTE — ASSESSMENT & PLAN NOTE
Patient has history of ESBL producing Ecoli with MDR. Patient states she has pain and burning with urination. UA in ED with evidence of infection. Received Vanc/Zosyn in ED.    - ID consulted for abx recs, appreciate assistance  - Meropenum for 10 day course total  - Will discharge to  with meropenem transfusion

## 2024-02-09 NOTE — CONSULTS
Pharmacokinetic Initial Assessment: IV Vancomycin    Assessment/Plan:    Initiate intravenous vancomycin 750 mg q12h   Desired empiric serum trough concentration is 15 to 20 mcg/mL  Draw vancomycin trough level 60 min prior to fourth dose on 2/10 at approximately 2000    Pharmacy will continue to follow and monitor vancomycin.      Please contact pharmacy at extension [phone] with any questions regarding this assessment.     Thank you for the consult,   Izabella Noonan     Patient brief summary:  Niurka Pedraza is a 78 y.o. female initiated on antimicrobial therapy with IV Vancomycin for treatment of suspected lower respiratory infection    Drug Allergies:   Review of patient's allergies indicates:   Allergen Reactions    Ciprofloxacin Other (See Comments)     Right foot pain and edema, tendonitis    Amlodipine Edema and Swelling     BLE  BLE    Lisinopril Other (See Comments)     cough    Nitrofuran analogues        Actual Body Weight:   Wt Readings from Last 3 Encounters:   02/07/24 1834 64 kg (141 lb)   02/06/24 1411 64 kg (141 lb)   02/06/24 1031 64 kg (141 lb)   01/26/24 1321 64 kg (141 lb 1.5 oz)   01/25/24 0845 64 kg (141 lb 1.5 oz)     Renal Function:   Estimated Creatinine Clearance: 71.2 mL/min (based on SCr of 0.6 mg/dL).,     Dialysis Method (if applicable):  N/A    CBC (last 72 hours):  Recent Labs   Lab Result Units 02/06/24  1146 02/07/24  0021 02/07/24  0931 02/07/24  1600 02/07/24  2312 02/08/24  0746 02/08/24  1558 02/09/24  0025   WBC K/uL 0.99* 1.07* 1.32* 1.42* 1.33* 1.07* 1.23* 1.01*   Hemoglobin g/dL 5.1* 7.0* 8.1* 9.7* 8.0* 8.3* 8.6* 8.5*   Hematocrit % 16.3* 21.1* 24.7* 28.8* 24.4* 26.3* 26.2* 25.8*   Platelets K/uL 64* 67* 60* 78* 86* 86* 90* 100*   Gran % % 73.8*  --   --   --   --   --   --   --    Lymph % % 22.2  --   --   --   --   --   --   --    Mono % % 4.0  --   --   --   --   --   --   --    Eosinophil % % 0.0  --   --   --   --   --   --   --    Basophil % % 0.0  --   --   --   --   " --   --   --    Differential Method  Automated  --   --   --   --   --   --   --        Metabolic Panel (last 72 hours):  Recent Labs   Lab Result Units 02/06/24  1146 02/06/24  1410 02/07/24  0328 02/08/24  0643 02/09/24  0526   Sodium mmol/L 135*  --  135* 139 139   Potassium mmol/L 3.5  --  3.5 2.8* 3.5   Chloride mmol/L 103  --  107 109 109   CO2 mmol/L 23  --  19* 20* 20*   Glucose mg/dL 123*  --  119* 131* 169*   Glucose, UA   --  Negative  --   --   --    BUN mg/dL 23  --  20 16 14   Creatinine mg/dL 0.7  --  0.7 0.6 0.6   Albumin g/dL 2.1*  --  1.9* 1.7* 1.7*   Total Bilirubin mg/dL 1.8*  --  1.1* 1.3* 0.5   Alkaline Phosphatase U/L 89  --  91 85 88   AST U/L 10  --  13 10 12   ALT U/L 11  --  11 10 12   Magnesium mg/dL 1.3*  --  1.8 1.7 1.7   Phosphorus mg/dL 3.0  --  2.5* 1.8* 2.7       Drug levels (last 3 results):  No results for input(s): "VANCOMYCINRA", "VANCORANDOM", "VANCOMYCINPE", "VANCOPEAK", "VANCOMYCINTR", "VANCOTROUGH" in the last 72 hours.    Microbiologic Results:  Microbiology Results (last 7 days)       Procedure Component Value Units Date/Time    Blood culture [[id number]] Collected: 02/07/24 1611    Order Status: Completed Specimen: Blood Updated: 02/08/24 2012     Blood Culture, Routine No Growth to date      No Growth to date    Blood culture [[id number]] Collected: 02/07/24 1611    Order Status: Completed Specimen: Blood Updated: 02/08/24 2012     Blood Culture, Routine No Growth to date      No Growth to date    Blood culture x two cultures. Draw prior to antibiotics. [[id number]] Collected: 02/06/24 1157    Order Status: Completed Specimen: Blood from Peripheral, Forearm, Right Updated: 02/08/24 1412     Blood Culture, Routine No Growth to date      No Growth to date      No Growth to date    Narrative:      Aerobic and anaerobic    Blood culture x two cultures. Draw prior to antibiotics. [[id number]] Collected: 02/06/24 1146    Order Status: Completed Specimen: Blood from " Peripheral, Hand, Right Updated: 02/08/24 1412     Blood Culture, Routine No Growth to date      No Growth to date      No Growth to date    Narrative:      Aerobic and anaerobic    Culture, Respiratory with Gram Stain [[id number]]  (Abnormal) Collected: 02/06/24 1805    Order Status: Completed Specimen: Sputum, Expectorated Updated: 02/08/24 1123     Respiratory Culture No Pseudomonas isolated.      STAPHYLOCOCCUS AUREUS  Moderate  Susceptibility pending  Normal respiratory bonnie also present       Gram Stain (Respiratory) <10 epithelial cells per low power field.     Gram Stain (Respiratory) Rare WBC's     Gram Stain (Respiratory) Many Gram positive rods     Gram Stain (Respiratory) Few Gram positive cocci     Gram Stain (Respiratory) Rare yeast    Clostridium difficile EIA [[id number]]     Order Status: Canceled Specimen: Stool     Stool culture [[id number]]     Order Status: No result Specimen: Stool     Culture, MRSA [[id number]]     Order Status: No result Specimen: MRSA source from Nares, Right     Urine culture [[id number]] Collected: 02/06/24 1410    Order Status: No result Specimen: Urine Updated: 02/06/24 1432

## 2024-02-09 NOTE — PLAN OF CARE
Problem: Adult Inpatient Plan of Care  Goal: Plan of Care Review  Outcome: Ongoing, Progressing  Goal: Patient-Specific Goal (Individualized)  Outcome: Ongoing, Progressing  Goal: Absence of Hospital-Acquired Illness or Injury  Outcome: Ongoing, Progressing  Goal: Optimal Comfort and Wellbeing  Outcome: Ongoing, Progressing  Goal: Readiness for Transition of Care  Outcome: Ongoing, Progressing     Problem: Skin Injury Risk Increased  Goal: Skin Health and Integrity  Outcome: Ongoing, Progressing     Problem: Infection  Goal: Absence of Infection Signs and Symptoms  Outcome: Ongoing, Progressing     Problem: Fall Injury Risk  Goal: Absence of Fall and Fall-Related Injury  Outcome: Ongoing, Progressing     No bowel movement on this shift. Daughter remains at bedside. Bed in lowest position with sr up x2 and call light in reach.  Plan of care ongoing.

## 2024-02-09 NOTE — ASSESSMENT & PLAN NOTE
COVID-19 with superimposed bacterial pneumonia.   Symptomatically improving on BSAbx.   Has not fevered since starting meropenem.  Has been on room air during hospitalization.     - Continuing meropenem per ID  - PRN cough suppresants

## 2024-02-09 NOTE — ASSESSMENT & PLAN NOTE
Patient has hypokalemia which is Acute and currently uncontrolled. Most recent potassium levels reviewed-   Lab Results   Component Value Date    K 3.5 02/09/2024   . Will continue potassium replacement per protocol and recheck repeat levels after replacement completed.

## 2024-02-09 NOTE — NURSING
MD Marilee from team 1 notified of pt's critical WBC - 1.01 at 0151. I received this critical value from lab.

## 2024-02-10 VITALS
WEIGHT: 141 LBS | HEART RATE: 83 BPM | DIASTOLIC BLOOD PRESSURE: 89 MMHG | RESPIRATION RATE: 16 BRPM | OXYGEN SATURATION: 97 % | BODY MASS INDEX: 22.66 KG/M2 | TEMPERATURE: 99 F | SYSTOLIC BLOOD PRESSURE: 167 MMHG | HEIGHT: 66 IN

## 2024-02-10 LAB
ALBUMIN SERPL BCP-MCNC: 1.8 G/DL (ref 3.5–5.2)
ALP SERPL-CCNC: 86 U/L (ref 55–135)
ALT SERPL W/O P-5'-P-CCNC: 19 U/L (ref 10–44)
ANION GAP SERPL CALC-SCNC: 10 MMOL/L (ref 8–16)
AST SERPL-CCNC: 16 U/L (ref 10–40)
BILIRUB SERPL-MCNC: 0.5 MG/DL (ref 0.1–1)
BUN SERPL-MCNC: 15 MG/DL (ref 8–23)
CALCIUM SERPL-MCNC: 8.3 MG/DL (ref 8.7–10.5)
CHLORIDE SERPL-SCNC: 107 MMOL/L (ref 95–110)
CO2 SERPL-SCNC: 23 MMOL/L (ref 23–29)
CREAT SERPL-MCNC: 0.6 MG/DL (ref 0.5–1.4)
ERYTHROCYTE [DISTWIDTH] IN BLOOD BY AUTOMATED COUNT: 24.1 % (ref 11.5–14.5)
ERYTHROCYTE [DISTWIDTH] IN BLOOD BY AUTOMATED COUNT: 24.2 % (ref 11.5–14.5)
EST. GFR  (NO RACE VARIABLE): >60 ML/MIN/1.73 M^2
GLUCOSE SERPL-MCNC: 146 MG/DL (ref 70–110)
HCT VFR BLD AUTO: 26.9 % (ref 37–48.5)
HCT VFR BLD AUTO: 29.5 % (ref 37–48.5)
HGB BLD-MCNC: 8.7 G/DL (ref 12–16)
HGB BLD-MCNC: 9.1 G/DL (ref 12–16)
MAGNESIUM SERPL-MCNC: 1.7 MG/DL (ref 1.6–2.6)
MCH RBC QN AUTO: 25.6 PG (ref 27–31)
MCH RBC QN AUTO: 26 PG (ref 27–31)
MCHC RBC AUTO-ENTMCNC: 30.8 G/DL (ref 32–36)
MCHC RBC AUTO-ENTMCNC: 32.3 G/DL (ref 32–36)
MCV RBC AUTO: 80 FL (ref 82–98)
MCV RBC AUTO: 83 FL (ref 82–98)
PHOSPHATE SERPL-MCNC: 2.4 MG/DL (ref 2.7–4.5)
PLATELET # BLD AUTO: 105 K/UL (ref 150–450)
PLATELET # BLD AUTO: 128 K/UL (ref 150–450)
PMV BLD AUTO: 8.9 FL (ref 9.2–12.9)
PMV BLD AUTO: 9.3 FL (ref 9.2–12.9)
POTASSIUM SERPL-SCNC: 3.2 MMOL/L (ref 3.5–5.1)
PROT SERPL-MCNC: 5 G/DL (ref 6–8.4)
RBC # BLD AUTO: 3.35 M/UL (ref 4–5.4)
RBC # BLD AUTO: 3.55 M/UL (ref 4–5.4)
SODIUM SERPL-SCNC: 140 MMOL/L (ref 136–145)
WBC # BLD AUTO: 1.39 K/UL (ref 3.9–12.7)
WBC # BLD AUTO: 1.45 K/UL (ref 3.9–12.7)

## 2024-02-10 PROCEDURE — 63600175 PHARM REV CODE 636 W HCPCS

## 2024-02-10 PROCEDURE — 25000003 PHARM REV CODE 250: Performed by: INTERNAL MEDICINE

## 2024-02-10 PROCEDURE — 83735 ASSAY OF MAGNESIUM: CPT

## 2024-02-10 PROCEDURE — 36415 COLL VENOUS BLD VENIPUNCTURE: CPT

## 2024-02-10 PROCEDURE — 94761 N-INVAS EAR/PLS OXIMETRY MLT: CPT

## 2024-02-10 PROCEDURE — 80053 COMPREHEN METABOLIC PANEL: CPT

## 2024-02-10 PROCEDURE — 85027 COMPLETE CBC AUTOMATED: CPT

## 2024-02-10 PROCEDURE — A4216 STERILE WATER/SALINE, 10 ML: HCPCS | Performed by: INTERNAL MEDICINE

## 2024-02-10 PROCEDURE — 36415 COLL VENOUS BLD VENIPUNCTURE: CPT | Mod: XB

## 2024-02-10 PROCEDURE — 99239 HOSP IP/OBS DSCHRG MGMT >30: CPT | Mod: GC,,, | Performed by: INTERNAL MEDICINE

## 2024-02-10 PROCEDURE — 84100 ASSAY OF PHOSPHORUS: CPT

## 2024-02-10 PROCEDURE — 94640 AIRWAY INHALATION TREATMENT: CPT

## 2024-02-10 PROCEDURE — 25000003 PHARM REV CODE 250

## 2024-02-10 RX ORDER — POLYETHYLENE GLYCOL 3350 17 G/17G
17 POWDER, FOR SOLUTION ORAL DAILY
Status: DISCONTINUED | OUTPATIENT
Start: 2024-02-10 | End: 2024-02-10 | Stop reason: HOSPADM

## 2024-02-10 RX ORDER — PREDNISONE 1 MG/1
2 TABLET ORAL DAILY
Qty: 180 TABLET | Refills: 3
Start: 2024-02-10

## 2024-02-10 RX ORDER — POTASSIUM CHLORIDE 1500 MG/1
20 TABLET, EXTENDED RELEASE ORAL DAILY
Qty: 14 TABLET | Refills: 0 | Status: SHIPPED | OUTPATIENT
Start: 2024-02-10 | End: 2024-02-15 | Stop reason: SDUPTHER

## 2024-02-10 RX ADMIN — Medication 250 MG: at 10:02

## 2024-02-10 RX ADMIN — Medication 1 TABLET: at 12:02

## 2024-02-10 RX ADMIN — POSACONAZOLE 300 MG: 100 TABLET, DELAYED RELEASE ORAL at 10:02

## 2024-02-10 RX ADMIN — Medication 1 TABLET: at 10:02

## 2024-02-10 RX ADMIN — GUAIFENESIN AND DEXTROMETHORPHAN 5 ML: 100; 10 SYRUP ORAL at 03:02

## 2024-02-10 RX ADMIN — Medication 10 ML: at 11:02

## 2024-02-10 RX ADMIN — BENZONATATE 100 MG: 100 CAPSULE ORAL at 12:02

## 2024-02-10 RX ADMIN — GUAIFENESIN AND DEXTROMETHORPHAN 5 ML: 100; 10 SYRUP ORAL at 10:02

## 2024-02-10 RX ADMIN — PANTOPRAZOLE SODIUM 40 MG: 40 TABLET, DELAYED RELEASE ORAL at 10:02

## 2024-02-10 RX ADMIN — ACYCLOVIR 400 MG: 200 CAPSULE ORAL at 10:02

## 2024-02-10 RX ADMIN — THERA TABS 1 TABLET: TAB at 10:02

## 2024-02-10 RX ADMIN — POTASSIUM BICARBONATE 40 MEQ: 391 TABLET, EFFERVESCENT ORAL at 10:02

## 2024-02-10 RX ADMIN — ATORVASTATIN CALCIUM 20 MG: 20 TABLET, FILM COATED ORAL at 10:02

## 2024-02-10 RX ADMIN — Medication 10 ML: at 06:02

## 2024-02-10 RX ADMIN — FLUTICASONE FUROATE AND VILANTEROL TRIFENATATE 1 PUFF: 100; 25 POWDER RESPIRATORY (INHALATION) at 08:02

## 2024-02-10 RX ADMIN — MEROPENEM 1 G: 1 INJECTION INTRAVENOUS at 10:02

## 2024-02-10 RX ADMIN — LEVOTHYROXINE SODIUM 50 MCG: 50 TABLET ORAL at 06:02

## 2024-02-10 RX ADMIN — ENOXAPARIN SODIUM 40 MG: 40 INJECTION SUBCUTANEOUS at 12:02

## 2024-02-10 RX ADMIN — Medication 10 ML: at 12:02

## 2024-02-10 RX ADMIN — POLYETHYLENE GLYCOL 3350 17 G: 17 POWDER, FOR SOLUTION ORAL at 10:02

## 2024-02-10 RX ADMIN — PREDNISONE 40 MG: 20 TABLET ORAL at 10:02

## 2024-02-10 RX ADMIN — MEROPENEM 1 G: 1 INJECTION INTRAVENOUS at 12:02

## 2024-02-10 RX ADMIN — BENZONATATE 100 MG: 100 CAPSULE ORAL at 10:02

## 2024-02-10 NOTE — ASSESSMENT & PLAN NOTE
Patient has hypokalemia which is Acute and currently uncontrolled. Most recent potassium levels reviewed-   Lab Results   Component Value Date    K 3.2 (L) 02/10/2024   . Will continue potassium replacement per protocol and recheck repeat levels after replacement completed.

## 2024-02-10 NOTE — PLAN OF CARE
Patient cleared for discharge from case management standpoint.      Gilma Geiger RN  Weekend  - [facility]  Spectralink: [phone]

## 2024-02-10 NOTE — DISCHARGE SUMMARY
"[facility]  Discharge Summary      Patient Name: Niurka Pedraza  MRN: [id number]  CARLEEN: [id number]  Patient Class: IP- Inpatient  Admission Date: 2/6/2024  Hospital Length of Stay: 4 days  Discharge Date and Time:  02/10/2024 9:09 AM  Attending Physician: Francois Plasencia MD   Discharging Provider: Karla Larson MD  Primary Care Provider: Savana Anderson MD  [facility] Medicine Team: [facility] 1 Karla Larson MD  Primary Care Team: [facility] 1    HPI:   Ms. Pedraza is a 78 year old female with PMH of myelodysplastic syndrome with excess blasts-2 on chronic steroids, ILD/pulmonary fibrosis, hypertension, MDRO UTIs, pulmonary coccidioidomycosis and hypothyroidism who presented from her transfusion center. She was scheduled to have a blood transfusion for her MDS. The center was unable to obtain IV access and vitals showed notable hypotension thus she was instructed to present to the ED.     In ED, IV access was obtained. Afebrile with hypotension otherwise hemodynamically stable. Pancytopenic, WBC 0.99. Lactate WNL. CXR with lower lobe consolidation. Received IV fluids and started on IV antibiotics. Found to be COVID-19 positive.     At bedside, patient and daughter present. Patient says she felt productive cough and shortness of breath for 3 days prior. Denies sick contacts. No fevers, chills, night sweats. Also states she has burning and pain with urination which is chronic.     * No surgery found *      Hospital Course:   ID consulted for antibiotic escalation. Developed new diarrhea and new fever. IV fludis started and stool studies ordered but patient without further diarrhea. Blood pressure stable and abdomen non-tender.  Treating COVID PNA with remdesivir and for patient's history of MDR UTI covering with meropenem. Treating symptoms with stress dose steroids. Will restart home prednisone 2 mg after five "sick day rules." Will discharge with midline, [facility] transfusion and " home health.     Goals of Care Treatment Preferences:  Code Status: Full Code    Living Will: Yes          Vital Signs (Most Recent):  Temp: 97.7 °F (36.5 °C) (02/09/24 2343)  Pulse: 78 (02/10/24 0800)  Resp: 18 (02/10/24 0800)  BP: (!) 166/87 (02/10/24 0612)  SpO2: 97 % (02/10/24 0800) Vital Signs (24h Range):  Temp:  [97.3 °F (36.3 °C)-97.7 °F (36.5 °C)] 97.7 °F (36.5 °C)  Pulse:  [61-86] 78  Resp:  [16-20] 18  SpO2:  [96 %-97 %] 97 %  BP: (131-166)/(83-97) 166/87     Weight: 64 kg (141 lb)  Body mass index is 22.76 kg/m².    Intake/Output Summary (Last 24 hours) at 2/10/2024 0901  Last data filed at 2/9/2024 1012  Gross per 24 hour   Intake 240 ml   Output --   Net 240 ml         Physical Exam  Constitutional:       General: She is not in acute distress.     Appearance: Normal appearance. She is normal weight.   HENT:      Head: Normocephalic and atraumatic.      Nose: Congestion present.      Mouth/Throat:      Mouth: Mucous membranes are moist.      Pharynx: Oropharynx is clear.   Eyes:      Extraocular Movements: Extraocular movements intact.      Conjunctiva/sclera: Conjunctivae normal.   Cardiovascular:      Rate and Rhythm: Normal rate and regular rhythm.   Pulmonary:      Effort: No respiratory distress.      Breath sounds: Wheezing present.   Abdominal:      Palpations: Abdomen is soft.      Tenderness: There is no abdominal tenderness. There is no guarding or rebound.   Musculoskeletal:      Right lower leg: Edema present.      Left lower leg: Edema present.   Skin:     General: Skin is warm and dry.   Neurological:      General: No focal deficit present.      Mental Status: She is alert and oriented to person, place, and time. Mental status is at baseline.   Psychiatric:         Mood and Affect: Mood normal.         Behavior: Behavior normal.         Thought Content: Thought content normal.       Consults:   Consults (From admission, onward)          Status Ordering Provider     Inpatient consult to  Midline team  Once        Provider:  (Not yet assigned)    Completed FRANCIA MARTÍNEZ     Inpatient consult to Registered Dietitian/Nutritionist  Once        Provider:  (Not yet assigned)    Completed FRANCIA MARTÍNEZ     Inpatient consult to Infectious Diseases  Once        Provider:  (Not yet assigned)    Completed FRANCIA MARTÍNEZ            Pulmonary  Staphylococcus aureus pneumonia  COVID-19 with superimposed bacterial pneumonia.   Symptomatically improving on BSAbx.   Has not fevered since starting meropenem.  Has been on room air during hospitalization.     - Continuing meropenem per ID  - PRN cough suppresants      Pneumonia due to COVID-19 virus  COVID-19 positive with CXR showing lower lobe consolidation. Patient with productive cough and SOB. Received Vanc Zosyn as well as IV fluids in the ED. C/f superimposed bacterial pneumonia with COVID-19.     - Received remdesivir while inpatient, no indication for dexamethasone  - ID consulted, will be discharged on meropenem for ESBL UTI   - PRN cough medicine for symptom management    Pulmonary fibrosis  With ILD. Takes prednisone 2 mg daily, montelukast 10 mg daily. Does not require home O2. Was supposed to have PFT outpatient but no results recorded.    - Continue home inhaler  - Hold nebs with active COVID  - Will need outpatient PFTs      Cardiac/Vascular  Essential hypertension  Chronic, controlled. Latest blood pressure and vitals reviewed-     Temp:  [97.3 °F (36.3 °C)-97.7 °F (36.5 °C)]   Pulse:  [61-86]   Resp:  [16-20]   BP: (131-166)/(83-97)   SpO2:  [96 %-97 %] .   Home meds for hypertension were reviewed and noted below.   Hypertension Medications               carvediloL (COREG) 25 MG tablet Take 1 tablet (25 mg total) by mouth 2 (two) times daily with meals.    hydrALAZINE (APRESOLINE) 25 MG tablet Take 1 tablet (25 mg total) by mouth every 8 (eight) hours as needed (if high blood pressure > 180/100).    irbesartan (AVAPRO) 300 MG tablet Take 1 tablet  (300 mg total) by mouth every evening.            While in the hospital, will manage blood pressure as follows; Adjust home antihypertensive regimen as follows- hold home medications for hypotension . BP stable during admission, resume at d/c.    Will utilize p.r.n. blood pressure medication only if patient's blood pressure greater than 180/110 and she develops symptoms such as worsening chest pain or shortness of breath.    Other hyperlipidemia  Continue home statin and monitor LFTs      Renal/  Hypokalemia  Patient has hypokalemia which is Acute and currently uncontrolled. Most recent potassium levels reviewed-   Lab Results   Component Value Date    K 3.2 (L) 02/10/2024   . Will continue potassium replacement per protocol and recheck repeat levels after replacement completed.     UTI (urinary tract infection)  Patient has history of ESBL producing Ecoli with MDR. Patient states she has pain and burning with urination. UA in ED with evidence of infection. Received Vanc/Zosyn in ED.    - ID consulted for abx recs, appreciate assistance  - Meropenum for 10 day course total  - Will discharge to  with meropenem transfusion    OAB (overactive bladder)  In the setting of chronic UTI. Resume at discharge      Immunology/Multi System  Immunosuppression due to chronic steroid use      - stress dose steroids given to address covid symptoms for 5 day total course    Oncology  MDS/AML  Biopsy in 2023 consistent with myelodysplastic syndrome with excess blasts-2. High probability of evolution to AML. 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 She is on aza-angelo indefinitely per chart review.     - Continue ppx with acyclovir, posaconazole  - ID consulted for MDR UTI, on oksana and will discharge with IV infusions  - BMT notified of patient's admission  - Hold venetoclax while inpatient  - PRN antiemetics        Endocrine  Severe malnutrition  Nutrition consulted. Most recent weight and BMI monitored-      Measurements:  Wt Readings from Last 1 Encounters:   02/07/24 64 kg (141 lb)   Body mass index is 22.76 kg/m².    Patient has been screened and assessed by RD.    Malnutrition Type:  Context: chronic illness  Level: severe    Malnutrition Characteristic Summary:  Weight Loss (Malnutrition): other (see comments) (10.7% x 1 month)  Energy Intake (Malnutrition): less than 75% for greater than or equal to 1 month  Subcutaneous Fat (Malnutrition): moderate depletion  Muscle Mass (Malnutrition): severe depletion  Hand  Strength, Left (Malnutrition): Fair  Hand  Strength, Right (Malnutrition): Fair    Interventions/Recommendations (treatment strategy):  1. Continue Regular Diet. Encourage high-calorie, high-protein food sources. a. Small, frequent meals/snacks. 2. Recommend Boost Plus (or alternative) BID- chocolate or vanilla. 3. Recommend appetite stimulant. 4. Continue daily weights. 5. Continue MVI + calcium-vit D3, B-complex may be beneficial. 6. RD following.    Will add remeron 7.5 mg     Edema due to hypoalbuminemia  Suspect 2/2 malignancy and poor nutrition. Patient with decreased albumin from prior presentation. Patient states pitting edema has worsened in the last week.    - Compression stockings        Acquired hypothyroidism  Continue home med      GI  Diarrhea  New diarrhea during admission with poor PO intake. Also with associated nausea. In the setting of new fever on 2/7. Has not fevered or had diarrhea since.    - Laxatives held as Cdiff rule out, however patient did not have further diarrhea  - Maintenance IV fluids (normal saline as pt receiving blood transfusions)  - IV antiemetics ordered         Final Active Diagnoses:    Diagnosis Date Noted POA    PRINCIPAL PROBLEM:  Symptomatic anemia [D64.9] 05/24/2023 Yes    Staphylococcus aureus pneumonia [J15.211] 02/09/2024 Yes    Hypokalemia [E87.6] 02/08/2024 No    Severe malnutrition [E43] 02/08/2024 Yes    Diarrhea [R19.7] 02/07/2024 No     Pneumonia due to COVID-19 virus [U07.1, J12.82] 02/06/2024 Yes    Edema due to hypoalbuminemia [E88.09] 02/06/2024 Yes    MDS/AML [D46.9] 09/13/2023 Yes     Chronic    Thrombocytopenia [D69.6] 06/14/2023 Yes     Chronic    Immunosuppression due to chronic steroid use [D84.821, T38.0X5A, Z79.52] 03/16/2022 Not Applicable    UTI (urinary tract infection) [N39.0] 03/16/2022 Yes    Pulmonary fibrosis [J84.10] 12/13/2021 Yes     Chronic    OAB (overactive bladder) [N32.81] 02/07/2018 Yes    Essential hypertension [I10] 11/22/2017 Yes     Chronic    Acquired hypothyroidism [E03.9] 11/22/2017 Yes    Other hyperlipidemia [E78.49] 12/11/2016 Yes      Problems Resolved During this Admission:    Diagnosis Date Noted Date Resolved POA    Decreased appetite [R63.0] 02/06/2024 02/08/2024 Yes       Discharged Condition: stable    Disposition: home with home health      Significant Diagnostic Studies: N/A    Pending Diagnostic Studies:       Procedure Component Value Units Date/Time    CBC Without Differential [[id number]]     Order Status: Sent Lab Status: No result     Specimen: Blood            Medications:  Reconciled Home Medications:      Medication List        START taking these medications      dextromethorphan-guaiFENesin  mg/5 ml  mg/5 mL liquid  Commonly known as: ROBITUSSIN-DM  Take 5 mLs by mouth every 4 (four) hours as needed (cough).     mirtazapine 7.5 MG Tab  Commonly known as: REMERON  Take 1 tablet (7.5 mg total) by mouth every evening.     potassium bicarbonate disintegrating tablet  Commonly known as: K-LYTE  Take 1 tablet (20 mEq total) by mouth once daily.     sodium chloride 0.9 % PgBk 100 mL with meropenem 1 gram SolR 1 g  Inject 1 g into the vein every 8 (eight) hours. (End date 2/16/24) for 7 days            CONTINUE taking these medications      acyclovir 400 MG tablet  Commonly known as: ZOVIRAX  Take 1 tablet (400 mg total) by mouth 2 (two) times daily.     albuterol-ipratropium 2.5 mg-0.5  mg/3 mL nebulizer solution  Commonly known as: DUO-NEB  Take 3 mLs by nebulization every 6 (six) hours as needed for Wheezing or Shortness of Breath. Rescue     atorvastatin 20 MG tablet  Commonly known as: LIPITOR  Take 1 tablet (20 mg total) by mouth once daily.     calcium-vitamin D3 500 mg-5 mcg (200 unit) per tablet  Commonly known as: CALCIUM 500 + D  Take 1 tablet by mouth 2 (two) times daily with meals.     carvediloL 25 MG tablet  Commonly known as: COREG  Take 1 tablet (25 mg total) by mouth 2 (two) times daily with meals.     ferrous gluconate 324 MG tablet  Commonly known as: FERGON  Take 1 tablet (324 mg total) by mouth every other day.     fluticasone furoate-vilanteroL 100-25 mcg/dose diskus inhaler  Commonly known as: BREO ELLIPTA  Inhale 1 puff into the lungs once daily. Controller.  PLEASE NOTE IT IS ONCE A DAY!!     irbesartan 300 MG tablet  Commonly known as: AVAPRO  Take 1 tablet (300 mg total) by mouth every evening.     levothyroxine 50 MCG tablet  Commonly known as: SYNTHROID  Take 1 tablet (50 mcg total) by mouth before breakfast.     multivitamin per tablet  Commonly known as: THERAGRAN  Take 1 tablet by mouth once daily.     oxybutynin 15 MG Tr24  Commonly known as: DITROPAN XL  Take 1 tablet (15 mg total) by mouth once daily.     pantoprazole 20 MG tablet  Commonly known as: PROTONIX  Take 1 tablet (20 mg total) by mouth once daily.     predniSONE 1 MG tablet  Commonly known as: DELTASONE  Take 2 tablets (2 mg total) by mouth once daily.     sodium chloride 3% 3 % nebulizer solution  Take 4 mLs by nebulization as needed for Other.     venetoclax 100 mg Tab  Commonly known as: VENCLEXTA  Take one tablet (100 mg) by mouth daily on days 1-7 of each 28 day cycle of therapy..     ZINC WITH VITAMIN C ORAL  Take 1 tablet by mouth Daily.            ASK your doctor about these medications      azelastine 137 mcg (0.1 %) nasal spray  Commonly known as: ASTELIN  1 spray (137 mcg total) by Nasal  route 2 (two) times daily.     posaconazole 100 mg Tbec tablet  Commonly known as: NOXAFIL  Take 3 tablets (300 mg) by mouth twice daily on the first day, and then take 300 mg daily thereafter.              Indwelling Lines/Drains at time of discharge:   Lines/Drains/Airways       Midline                   Time spent on the discharge of patient: 45 minutes         Karla Larson MD  Department of Hospital Medicine  [facility]

## 2024-02-10 NOTE — ASSESSMENT & PLAN NOTE
Chronic, controlled. Latest blood pressure and vitals reviewed-     Temp:  [97.3 °F (36.3 °C)-97.7 °F (36.5 °C)]   Pulse:  [61-86]   Resp:  [16-20]   BP: (131-166)/(83-97)   SpO2:  [96 %-97 %] .   Home meds for hypertension were reviewed and noted below.   Hypertension Medications               carvediloL (COREG) 25 MG tablet Take 1 tablet (25 mg total) by mouth 2 (two) times daily with meals.    hydrALAZINE (APRESOLINE) 25 MG tablet Take 1 tablet (25 mg total) by mouth every 8 (eight) hours as needed (if high blood pressure > 180/100).    irbesartan (AVAPRO) 300 MG tablet Take 1 tablet (300 mg total) by mouth every evening.            While in the hospital, will manage blood pressure as follows; Adjust home antihypertensive regimen as follows- hold home medications for hypotension . BP stable during admission, resume at d/c.    Will utilize p.r.n. blood pressure medication only if patient's blood pressure greater than 180/110 and she develops symptoms such as worsening chest pain or shortness of breath.

## 2024-02-10 NOTE — ASSESSMENT & PLAN NOTE
New diarrhea during admission with poor PO intake. Also with associated nausea. In the setting of new fever on 2/7. Has not fevered or had diarrhea since.    - Laxatives held as Cdiff rule out, however patient did not have further diarrhea  - Maintenance IV fluids (normal saline as pt receiving blood transfusions)  - IV antiemetics ordered

## 2024-02-10 NOTE — PROGRESS NOTES
[facility]    [facility] educator met with the patient and daughter and discussed the discharge plan for home IVABX. Niurka Pedraza will discharge home with family support.  The patient will infuse her medication via Elastomeric Pump. The patient and daughter were educated on the S.A.S.H procedure and a S.A.S.H mat was provided. The patient education checklist was reviewed and acknowledged by the above person(s) and they are agreeable to discharge with the home infusion plan of care. The IV administration process using aspetic technique was reviewed with successful return demonstration. The patient feels comfortable with doing her home infusions at home. The patient will discharge home with Meropenem 1 gm IV every 8 hours for an estimated end of therapy date of 2/16/24. Dosing schedule times are 0600, 1400 and 2200. An extension was left at the bedside for the PICC line to place to her single lumen midline. [facility] will follow patient for weekly dressing changes and lab draws. Time was allotted for questions. The patient's nurse and case management team were notified that the teaching has been completed.     Medication delivery will be made to the home after discharge.    Patient accepted to care by [facility] and report was called to Pooja   Phone number [phone]    [facility]  Babita Landry RN, Clinical Educator  Cell [phone]  Office [phone]  Fax [phone]

## 2024-02-10 NOTE — CONSULTS
Single lumen 18G, 10CM midline placed in the left cephalic vein. Needle advanced into the vessel under real time ultrasound guidance. Image recorded and saved.    Max dwell date 03/09/24.  Lot number: [id number]

## 2024-02-10 NOTE — PLAN OF CARE
Daughter already picked up medications from pharmacy downstairs. Potassium changed to tablet. Pt's Midline will stay in place due to pt receiving IV antibiotics for the next 7 days. Taught pt how to use incentive spirometer and oscillator (for mucus).  Discharge paperwork explained to patient and daughter at bedside. Currently waiting for wheelchair escort.   Problem: Adult Inpatient Plan of Care  Goal: Plan of Care Review  Outcome: Met  Goal: Patient-Specific Goal (Individualized)  Outcome: Met  Goal: Absence of Hospital-Acquired Illness or Injury  Outcome: Met  Goal: Optimal Comfort and Wellbeing  Outcome: Met  Goal: Readiness for Transition of Care  Outcome: Met     Problem: Skin Injury Risk Increased  Goal: Skin Health and Integrity  Outcome: Met     Problem: Infection  Goal: Absence of Infection Signs and Symptoms  Outcome: Met     Problem: Fall Injury Risk  Goal: Absence of Fall and Fall-Related Injury  Outcome: Met

## 2024-02-10 NOTE — ASSESSMENT & PLAN NOTE
With ILD. Takes prednisone 2 mg daily, montelukast 10 mg daily. Does not require home O2. Was supposed to have PFT outpatient but no results recorded.    - Continue home inhaler  - Hold nebs with active COVID  - Will need outpatient PFTs

## 2024-02-10 NOTE — ASSESSMENT & PLAN NOTE
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 High probability of evolution to AML. 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 She is on aza-angelo indefinitely per chart review.     - Continue ppx with acyclovir, posaconazole  - ID consulted for MDR UTI, on oksana and will discharge with IV infusions  - BMT notified of patient's admission  - Hold venetoclax while inpatient  - PRN antiemetics

## 2024-02-10 NOTE — SUBJECTIVE & OBJECTIVE
Interval History: midline inserted, transfusion and home health confirmed. Will order miralax as patient has not had BM in several days (previously had diarrhea which is why laxatives were held)    Review of Systems   Constitutional:  Negative for chills, diaphoresis and fever.   HENT:  Negative for mouth sores.    Respiratory:  Positive for cough.    Cardiovascular:  Positive for leg swelling. Negative for chest pain.   Gastrointestinal:  Negative for abdominal pain, constipation, diarrhea, nausea and vomiting.   Genitourinary:  Positive for difficulty urinating and dysuria.   Skin:  Negative for color change and wound.   Neurological:  Positive for weakness. Negative for headaches.   Psychiatric/Behavioral:  Negative for agitation and confusion.      Objective:     Vital Signs (Most Recent):  Temp: 97.7 °F (36.5 °C) (02/09/24 2343)  Pulse: 78 (02/10/24 0800)  Resp: 18 (02/10/24 0800)  BP: (!) 166/87 (02/10/24 0612)  SpO2: 97 % (02/10/24 0800) Vital Signs (24h Range):  Temp:  [97.3 °F (36.3 °C)-97.7 °F (36.5 °C)] 97.7 °F (36.5 °C)  Pulse:  [61-86] 78  Resp:  [16-20] 18  SpO2:  [96 %-97 %] 97 %  BP: (131-166)/(83-97) 166/87     Weight: 64 kg (141 lb)  Body mass index is 22.76 kg/m².    Intake/Output Summary (Last 24 hours) at 2/10/2024 0901  Last data filed at 2/9/2024 1012  Gross per 24 hour   Intake 240 ml   Output --   Net 240 ml         Physical Exam  Constitutional:       General: She is not in acute distress.     Appearance: Normal appearance. She is normal weight.   HENT:      Head: Normocephalic and atraumatic.      Nose: Congestion present.      Mouth/Throat:      Mouth: Mucous membranes are moist.      Pharynx: Oropharynx is clear.   Eyes:      Extraocular Movements: Extraocular movements intact.      Conjunctiva/sclera: Conjunctivae normal.   Cardiovascular:      Rate and Rhythm: Normal rate and regular rhythm.   Pulmonary:      Effort: No respiratory distress.      Breath sounds: Wheezing present.    Abdominal:      Palpations: Abdomen is soft.      Tenderness: There is no abdominal tenderness. There is no guarding or rebound.   Musculoskeletal:      Right lower leg: Edema present.      Left lower leg: Edema present.   Skin:     General: Skin is warm and dry.   Neurological:      General: No focal deficit present.      Mental Status: She is alert and oriented to person, place, and time. Mental status is at baseline.   Psychiatric:         Mood and Affect: Mood normal.         Behavior: Behavior normal.         Thought Content: Thought content normal.             Significant Labs: All pertinent labs within the past 24 hours have been reviewed.    Significant Imaging: I have reviewed all pertinent imaging results/findings within the past 24 hours.

## 2024-02-10 NOTE — ASSESSMENT & PLAN NOTE
COVID-19 positive with CXR showing lower lobe consolidation. Patient with productive cough and SOB. Received Vanc Zosyn as well as IV fluids in the ED. C/f superimposed bacterial pneumonia with COVID-19.     - Received remdesivir while inpatient, no indication for dexamethasone  - ID consulted, will be discharged on meropenem for ESBL UTI   - PRN cough medicine for symptom management

## 2024-02-10 NOTE — DISCHARGE INSTRUCTIONS
Over-the-counter medications such as benzonatate an dextromethorphan-guaifenesin may alleviate cough symptoms.   Please call your doctor or return to the ED if you experience worsening fevers, new pain, difficulty breathing, lightheadedness, dizziness, or syncope (fainting).

## 2024-02-10 NOTE — ASSESSMENT & PLAN NOTE
Suspect 2/2 malignancy and poor nutrition. Patient with decreased albumin from prior presentation. Patient states pitting edema has worsened in the last week.    - Compression stockings

## 2024-02-10 NOTE — PLAN OF CARE
CM received call from accepting HH agency, [facility]. Inquired re: discharge status. Attending team confirmed d/c today around noon. Updated Hammad at [facility] [phone] and faxed HH orders to [phone].      Gilma Geiger RN  Weekend  - [facility]  Spectralink: [phone]

## 2024-02-11 ENCOUNTER — PATIENT MESSAGE (OUTPATIENT)
Dept: INTERNAL MEDICINE | Facility: CLINIC | Age: 79
End: 2024-02-11
Payer: MEDICARE

## 2024-02-11 ENCOUNTER — PATIENT MESSAGE (OUTPATIENT)
Dept: HEMATOLOGY/ONCOLOGY | Facility: CLINIC | Age: 79
End: 2024-02-11
Payer: MEDICARE

## 2024-02-11 LAB
BACTERIA BLD CULT: NORMAL
BACTERIA BLD CULT: NORMAL
BACTERIA UR CULT: NO GROWTH

## 2024-02-11 NOTE — PLAN OF CARE
[facility] Surg  Discharge Final Note    Primary Care Provider: Savana Anderson MD    Expected Discharge Date: 2/10/2024    Final Discharge Note (most recent)       Final Note - 02/10/24 1530          Final Note    Assessment Type Final Discharge Note     Anticipated Discharge Disposition Home-Health Care [facility]     What phone number can be called within the next 1-3 days to see how you are doing after discharge? [phone]     Hospital Resources/Appts/Education Provided Provided patient/caregiver with written discharge plan information;Post-Acute resouces added to AVS;Appointments scheduled and added to AVS        Post-Acute Status    Home Health Status Set-up Complete/Auth obtained     Discharge Delays None known at this time                   Important Message from Medicare        Contact Info       [facility]ALTH [facility]   Specialty: Home Health Services, Home Therapy Services, Home Living Aide Services    [address], [address]  [facility] [address]   Phone: [phone]       Next Steps: Follow up          Future Appointments   Date Time Provider Department Center   2/12/2024  8:40 AM LAB, HEMONC CANCER BLDG [facility] HO [facility]   2/16/2024  4:40 PM Mohit Centeno MD [facility] HC BMT [facility]   2/19/2024 12:50 PM [facility] BMT [facility]   2/19/2024  1:45 PM INJECTION, [facility] INFUSION [facility]   2/20/2024  1:45 PM INJECTION, [facility] INF[facility]ION [facility]   2/21/2024  1:45 PM INJECTION, [facility] INF[facility]ION [facility]   2/22/2024  1:45 PM INJECTION, [facility] INFUSION [facility]   2/23/2024  1:45 PM INJECTION, [facility] INF[facility]ION [facility]   2/26/2024 10:00 AM [facility] LAB BMT [facility] LAB[facility]   3/4/2024  9:15 AM LAB, [facility] DRAW STATION OST LAB [facility] at [facility]   4/3/2024 11:20 AM Anaya Oropeza MD [facility] UROLOGY [facility]   5/9/2024 12:00 PM LAB, [facility] LAB [place]   5/9/2024  1:00 PM Talita Barrios MD [facility]  GYNONC [facility] at [facility]   6/17/2024 10:30 AM Nemesio Cazares MD [facility] NLPUL [facility]     Gilma Geiger RN  Weekend  - [facility]ake[facility]  Spectralink: [phone]

## 2024-02-12 LAB
BACTERIA BLD CULT: NORMAL
BACTERIA BLD CULT: NORMAL

## 2024-02-12 NOTE — TELEPHONE ENCOUNTER
Please see if can come this Thursday for appt with me - ok to add for 2:30p if earlier slot not available

## 2024-02-14 ENCOUNTER — LAB VISIT (OUTPATIENT)
Dept: LAB | Facility: HOSPITAL | Age: 79
End: 2024-02-14
Attending: INTERNAL MEDICINE
Payer: MEDICARE

## 2024-02-14 DIAGNOSIS — D46.9 MDS (MYELODYSPLASTIC SYNDROME): ICD-10-CM

## 2024-02-14 LAB
ABO + RH BLD: NORMAL
ALBUMIN SERPL BCP-MCNC: 2.1 G/DL (ref 3.5–5.2)
ALP SERPL-CCNC: 112 U/L (ref 55–135)
ALT SERPL W/O P-5'-P-CCNC: 14 U/L (ref 10–44)
ANION GAP SERPL CALC-SCNC: 6 MMOL/L (ref 8–16)
ANISOCYTOSIS BLD QL SMEAR: SLIGHT
AST SERPL-CCNC: 13 U/L (ref 10–40)
BASOPHILS # BLD AUTO: 0.02 K/UL (ref 0–0.2)
BASOPHILS NFR BLD: 1.1 % (ref 0–1.9)
BILIRUB SERPL-MCNC: 1 MG/DL (ref 0.1–1)
BLD GP AB SCN CELLS X3 SERPL QL: NORMAL
BUN SERPL-MCNC: 20 MG/DL (ref 8–23)
CALCIUM SERPL-MCNC: 8.3 MG/DL (ref 8.7–10.5)
CHLORIDE SERPL-SCNC: 102 MMOL/L (ref 95–110)
CO2 SERPL-SCNC: 28 MMOL/L (ref 23–29)
CREAT SERPL-MCNC: 0.6 MG/DL (ref 0.5–1.4)
DACRYOCYTES BLD QL SMEAR: ABNORMAL
DIFFERENTIAL METHOD BLD: ABNORMAL
EOSINOPHIL # BLD AUTO: 0 K/UL (ref 0–0.5)
EOSINOPHIL NFR BLD: 0 % (ref 0–8)
ERYTHROCYTE [DISTWIDTH] IN BLOOD BY AUTOMATED COUNT: 23.6 % (ref 11.5–14.5)
EST. GFR  (NO RACE VARIABLE): >60 ML/MIN/1.73 M^2
GLUCOSE SERPL-MCNC: 158 MG/DL (ref 70–110)
HCT VFR BLD AUTO: 31.1 % (ref 37–48.5)
HGB BLD-MCNC: 9.6 G/DL (ref 12–16)
IMM GRANULOCYTES # BLD AUTO: 0.04 K/UL (ref 0–0.04)
IMM GRANULOCYTES NFR BLD AUTO: 2.1 % (ref 0–0.5)
LDH SERPL L TO P-CCNC: 305 U/L (ref 110–260)
LYMPHOCYTES # BLD AUTO: 0.6 K/UL (ref 1–4.8)
LYMPHOCYTES NFR BLD: 30.5 % (ref 18–48)
MAGNESIUM SERPL-MCNC: 2 MG/DL (ref 1.6–2.6)
MCH RBC QN AUTO: 25.6 PG (ref 27–31)
MCHC RBC AUTO-ENTMCNC: 30.9 G/DL (ref 32–36)
MCV RBC AUTO: 83 FL (ref 82–98)
MONOCYTES # BLD AUTO: 0.2 K/UL (ref 0.3–1)
MONOCYTES NFR BLD: 8 % (ref 4–15)
NEUTROPHILS # BLD AUTO: 1.1 K/UL (ref 1.8–7.7)
NEUTROPHILS NFR BLD: 58.3 % (ref 38–73)
NRBC BLD-RTO: 0 /100 WBC
OVALOCYTES BLD QL SMEAR: ABNORMAL
PHOSPHATE SERPL-MCNC: 2.4 MG/DL (ref 2.7–4.5)
PLATELET # BLD AUTO: 139 K/UL (ref 150–450)
PMV BLD AUTO: 9.2 FL (ref 9.2–12.9)
POIKILOCYTOSIS BLD QL SMEAR: SLIGHT
POLYCHROMASIA BLD QL SMEAR: ABNORMAL
POTASSIUM SERPL-SCNC: 3.2 MMOL/L (ref 3.5–5.1)
PROT SERPL-MCNC: 5.7 G/DL (ref 6–8.4)
RBC # BLD AUTO: 3.75 M/UL (ref 4–5.4)
SODIUM SERPL-SCNC: 136 MMOL/L (ref 136–145)
SPECIMEN OUTDATE: NORMAL
SPHEROCYTES BLD QL SMEAR: ABNORMAL
URATE SERPL-MCNC: 3.7 MG/DL (ref 2.4–5.7)
WBC # BLD AUTO: 1.87 K/UL (ref 3.9–12.7)

## 2024-02-14 PROCEDURE — 84550 ASSAY OF BLOOD/URIC ACID: CPT | Performed by: INTERNAL MEDICINE

## 2024-02-14 PROCEDURE — 85025 COMPLETE CBC W/AUTO DIFF WBC: CPT | Performed by: INTERNAL MEDICINE

## 2024-02-14 PROCEDURE — 86901 BLOOD TYPING SEROLOGIC RH(D): CPT | Performed by: INTERNAL MEDICINE

## 2024-02-14 PROCEDURE — 80053 COMPREHEN METABOLIC PANEL: CPT | Performed by: INTERNAL MEDICINE

## 2024-02-14 PROCEDURE — 84100 ASSAY OF PHOSPHORUS: CPT | Performed by: INTERNAL MEDICINE

## 2024-02-14 PROCEDURE — 83735 ASSAY OF MAGNESIUM: CPT | Performed by: INTERNAL MEDICINE

## 2024-02-14 PROCEDURE — 83615 LACTATE (LD) (LDH) ENZYME: CPT | Performed by: INTERNAL MEDICINE

## 2024-02-15 ENCOUNTER — OFFICE VISIT (OUTPATIENT)
Dept: INTERNAL MEDICINE | Facility: CLINIC | Age: 79
End: 2024-02-15
Attending: INTERNAL MEDICINE
Payer: MEDICARE

## 2024-02-15 VITALS
BODY MASS INDEX: 23.57 KG/M2 | DIASTOLIC BLOOD PRESSURE: 82 MMHG | OXYGEN SATURATION: 93 % | HEIGHT: 66 IN | SYSTOLIC BLOOD PRESSURE: 162 MMHG | HEART RATE: 69 BPM | WEIGHT: 146.63 LBS

## 2024-02-15 DIAGNOSIS — E83.39 HYPOPHOSPHATEMIA: ICD-10-CM

## 2024-02-15 DIAGNOSIS — N39.0 RECURRENT UTI: ICD-10-CM

## 2024-02-15 DIAGNOSIS — M54.9 CHRONIC BACK PAIN, UNSPECIFIED BACK LOCATION, UNSPECIFIED BACK PAIN LATERALITY: ICD-10-CM

## 2024-02-15 DIAGNOSIS — Z09 HOSPITAL DISCHARGE FOLLOW-UP: Primary | ICD-10-CM

## 2024-02-15 DIAGNOSIS — E87.6 HYPOKALEMIA: ICD-10-CM

## 2024-02-15 DIAGNOSIS — G89.29 CHRONIC BACK PAIN, UNSPECIFIED BACK LOCATION, UNSPECIFIED BACK PAIN LATERALITY: ICD-10-CM

## 2024-02-15 DIAGNOSIS — J84.10 PULMONARY FIBROSIS: Chronic | ICD-10-CM

## 2024-02-15 DIAGNOSIS — Z79.52 IMMUNOSUPPRESSION DUE TO CHRONIC STEROID USE: ICD-10-CM

## 2024-02-15 DIAGNOSIS — J15.211 PNEUMONIA DUE TO METHICILLIN SUSCEPTIBLE STAPHYLOCOCCUS AUREUS (MSSA), UNSPECIFIED LATERALITY, UNSPECIFIED PART OF LUNG: ICD-10-CM

## 2024-02-15 DIAGNOSIS — T38.0X5A IMMUNOSUPPRESSION DUE TO CHRONIC STEROID USE: ICD-10-CM

## 2024-02-15 DIAGNOSIS — D84.821 IMMUNOSUPPRESSION DUE TO CHRONIC STEROID USE: ICD-10-CM

## 2024-02-15 DIAGNOSIS — I70.0 AORTIC ATHEROSCLEROSIS: ICD-10-CM

## 2024-02-15 DIAGNOSIS — D46.9 MDS (MYELODYSPLASTIC SYNDROME): Chronic | ICD-10-CM

## 2024-02-15 PROCEDURE — 99214 OFFICE O/P EST MOD 30 MIN: CPT | Mod: PBBFAC | Performed by: INTERNAL MEDICINE

## 2024-02-15 PROCEDURE — 99999 PR PBB SHADOW E&M-EST. PATIENT-LVL IV: CPT | Mod: PBBFAC,,, | Performed by: INTERNAL MEDICINE

## 2024-02-15 PROCEDURE — 99215 OFFICE O/P EST HI 40 MIN: CPT | Mod: S$PBB,,, | Performed by: INTERNAL MEDICINE

## 2024-02-15 RX ORDER — POTASSIUM CHLORIDE 1500 MG/1
20 TABLET, EXTENDED RELEASE ORAL DAILY
Qty: 90 TABLET | Refills: 3 | Status: ON HOLD | OUTPATIENT
Start: 2024-02-15 | End: 2024-05-03 | Stop reason: HOSPADM

## 2024-02-15 RX ORDER — MELOXICAM 15 MG/1
15 TABLET ORAL DAILY PRN
Qty: 15 TABLET | Refills: 3 | Status: ON HOLD | OUTPATIENT
Start: 2024-02-15 | End: 2024-03-13 | Stop reason: HOSPADM

## 2024-02-15 RX ORDER — METHOCARBAMOL 500 MG/1
500 TABLET, FILM COATED ORAL 3 TIMES DAILY PRN
Qty: 30 TABLET | Refills: 3 | Status: SHIPPED | OUTPATIENT
Start: 2024-02-15 | End: 2024-02-25

## 2024-02-15 RX ORDER — MIRTAZAPINE 7.5 MG/1
7.5 TABLET, FILM COATED ORAL NIGHTLY
Qty: 90 TABLET | Refills: 3 | Status: SHIPPED | OUTPATIENT
Start: 2024-02-15 | End: 2025-02-14

## 2024-02-15 NOTE — PROGRESS NOTES
"Subjective:   Patient ID: Niurka Pedraza is a 78 y.o. female  Chief complaint:   Chief Complaint   Patient presents with    Hospital Follow Up       HPI  Pt here for hospital discharge follow up   Accompanied by daughter today who is primary caregiver      Admitted 2/6  - 2/10   DC to home to complete IV meropenem for recurrent UTI and MSSA pneumonia per ID.  Asymptomatic COVID infection.  On room air with normal oxygen saturation.  Home health infusion set up.     HPI:   Ms. Pedraza is a 78 year old female with PMH of myelodysplastic syndrome with excess blasts-2 on chronic steroids, ILD/pulmonary fibrosis, hypertension, MDRO UTIs, pulmonary coccidioidomycosis and hypothyroidism who presented from her transfusion center. She was scheduled to have a blood transfusion for her MDS. The center was unable to obtain IV access and vitals showed notable hypotension thus she was instructed to present to the ED.   In ED, IV access was obtained. Afebrile with hypotension otherwise hemodynamically stable. Pancytopenic, WBC 0.99. Lactate WNL. CXR with lower lobe consolidation. Received IV fluids and started on IV antibiotics. Found to be COVID-19 positive.   At bedside, patient and daughter present. Patient says she felt productive cough and shortness of breath for 3 days prior. Denies sick contacts. No fevers, chills, night sweats. Also states she has burning and pain with urination which is chronic.    Hospital Course:   ID consulted for antibiotic escalation. Developed new diarrhea and new fever. IV fludis started and stool studies ordered but patient without further diarrhea. Blood pressure stable and abdomen non-tender.  Treating COVID PNA with remdesivir and for patient's history of MDR UTI covering with meropenem. Treating symptoms with stress dose steroids. Will restart home prednisone 2 mg after five "sick day rules." Will discharge with midline, [facility] transfusion and home health.     PNA due to covid:   Cxr with lower " "lobe consolidations   - Received remdesivir while inpatient, no indication for dexamethasone  - stress dose steroids given while admitted for 5 day course - to resume pdn 2mg dose  - ID consulted, discharged on meropenem for ESBL UTI   - PRN cough medicine for symptom management  Today sx improved and tolerating IV abx at home     ESBL -  Ecoli with MDR  - ID consulted for abx recs  - Meropenum for 10 day course total  - discharged to  with meropenem transfusion  Today: receiving home IV abx   Has f/u with urology     ILD:   - Takes prednisone 2 mg daily at baseline and resumed dosing as above, montelukast 10 mg daily  - Does not require home O2  - to f/u with pulm     HTN:  At times bp was getting too low when taking meds without checking bp    Malnutrition: nutrition consulted     MDS/AML  Biopsy in 2023: MDS myelodysplastic syndrome with excess blasts-2 - High prob of evolution to AML - refer to as MDS/AML  - on aza-angelo    - Continue ppx with acyclovir, posaconazole  - ID consulted for MDR UTI, on oksana and will discharge with IV infusions  - BMT notified of patient's admission  - Hold venetoclax while inpatient  - PRN antiemetics     Edema due to hypoalbuminemia  - Suspect 2/2 malignancy and poor nutrition  - Compression stockings     Diarrhea while inpt subsided before Cdiff rule out - asymptomatic since then     Weight loss:   Started on mirtazapine 7.5mg and tolerating    Oral potassium   Robitussin dm     Review of Systems    Objective:  Vitals:    02/15/24 1334   BP: (!) 162/82   BP Location: Right arm   Patient Position: Sitting   Pulse: 69   SpO2: (!) 93%   Weight: 66.5 kg (146 lb 9.7 oz)   Height: 5' 6" (1.676 m)     Body mass index is 23.66 kg/m².    Physical Exam  Vitals reviewed.   Constitutional:       Appearance: Normal appearance. She is well-developed.   HENT:      Head: Normocephalic and atraumatic.   Eyes:      Extraocular Movements: Extraocular movements intact.      Conjunctiva/sclera: " Conjunctivae normal.   Cardiovascular:      Rate and Rhythm: Normal rate and regular rhythm.      Pulses: Normal pulses.      Heart sounds: Normal heart sounds.   Pulmonary:      Effort: Pulmonary effort is normal. No respiratory distress.   Abdominal:      General: Bowel sounds are normal.      Palpations: Abdomen is soft.   Musculoskeletal:         General: No tenderness or signs of injury.      Cervical back: Normal range of motion and neck supple.   Skin:     General: Skin is warm and dry.      Nails: There is no clubbing.   Neurological:      General: No focal deficit present.      Mental Status: She is alert and oriented to person, place, and time.      Gait: Gait normal.   Psychiatric:         Speech: Speech normal.         Behavior: Behavior normal.         Thought Content: Thought content normal.       Assessment:  1. Hospital discharge follow-up    2. Chronic back pain, unspecified back location, unspecified back pain laterality    3. Hypokalemia    4. Hypophosphatemia    5. Recurrent UTI    6. Pneumonia due to methicillin susceptible Staphylococcus aureus (MSSA), unspecified laterality, unspecified part of lung    7. Pulmonary fibrosis    8. MDS/AML    9. Immunosuppression due to chronic steroid use    10. Aortic atherosclerosis        Plan:  1. Hospital discharge follow-up  Cont hh and iv abx course   F/u with specialists - urology, h/o     2. Chronic back pain, unspecified back location, unspecified back pain laterality  -     COMMODE FOR HOME USE  -     meloxicam (MOBIC) 15 MG tablet; Take 1 tablet (15 mg total) by mouth daily as needed for Pain. (Anti-inflammatory medication) take with food  Dispense: 15 tablet; Refill: 3  -     methocarbamoL (ROBAXIN) 500 MG Tab; Take 1 tablet (500 mg total) by mouth 3 (three) times daily as needed (muscle spasm).  Dispense: 30 tablet; Refill: 3  Sleeping at rental and uncomfortable mattress   No red flag sx or spinal ttp     3. Hypokalemia  -     potassium chloride  (K-TAB) 20 mEq; Take 1 tablet (20 mEq total) by mouth once daily.  Dispense: 90 tablet; Refill: 3  -     POTASSIUM; Future; Expected date: 02/22/2024    4. Hypophosphatemia  -     PHOSPHORUS; Future; Expected date: 02/22/2024    5. Recurrent UTI  As per prob #1    6. Pneumonia due to methicillin susceptible Staphylococcus aureus (MSSA), unspecified laterality, unspecified part of lung  As per prob #1  Improved     7. Pulmonary fibrosis  Followed by pulm   Cont mgmt and meds per med card     8. MDS/AML  F/u with h/o as planned     9. Immunosuppression due to chronic steroid use  Cont current reigmen f/u with h/o     10. Aortic atherosclerosis  Stable   Cont statin     Other orders  -     mirtazapine (REMERON) 7.5 MG Tab; Take 1 tablet (7.5 mg total) by mouth every evening.  Dispense: 90 tablet; Refill: 3  Tolerating = cont med     53 min spent in care of patient including chart review, history, orders, physical, orders and coordination of care    Health Maintenance   Topic Date Due    Colorectal Cancer Screening  12/09/2025    DEXA Scan  01/19/2026    Lipid Panel  06/22/2028    TETANUS VACCINE  07/21/2031    Hepatitis C Screening  Completed    Shingles Vaccine  Completed

## 2024-02-16 ENCOUNTER — OFFICE VISIT (OUTPATIENT)
Dept: HEMATOLOGY/ONCOLOGY | Facility: CLINIC | Age: 79
End: 2024-02-16
Payer: MEDICARE

## 2024-02-16 DIAGNOSIS — D46.9 MDS (MYELODYSPLASTIC SYNDROME): Primary | Chronic | ICD-10-CM

## 2024-02-16 DIAGNOSIS — D61.818 PANCYTOPENIA: ICD-10-CM

## 2024-02-16 PROCEDURE — 99215 OFFICE O/P EST HI 40 MIN: CPT | Mod: 95,,, | Performed by: INTERNAL MEDICINE

## 2024-02-19 ENCOUNTER — LAB VISIT (OUTPATIENT)
Dept: LAB | Facility: HOSPITAL | Age: 79
End: 2024-02-19
Attending: INTERNAL MEDICINE
Payer: MEDICARE

## 2024-02-19 DIAGNOSIS — D46.9 MDS (MYELODYSPLASTIC SYNDROME): ICD-10-CM

## 2024-02-19 LAB
ABO + RH BLD: NORMAL
ALBUMIN SERPL BCP-MCNC: 2.7 G/DL (ref 3.5–5.2)
ALP SERPL-CCNC: 139 U/L (ref 55–135)
ALT SERPL W/O P-5'-P-CCNC: 15 U/L (ref 10–44)
ANION GAP SERPL CALC-SCNC: 6 MMOL/L (ref 8–16)
AST SERPL-CCNC: 14 U/L (ref 10–40)
BASOPHILS # BLD AUTO: 0.02 K/UL (ref 0–0.2)
BASOPHILS NFR BLD: 0.6 % (ref 0–1.9)
BILIRUB SERPL-MCNC: 1.2 MG/DL (ref 0.1–1)
BLD GP AB SCN CELLS X3 SERPL QL: NORMAL
BUN SERPL-MCNC: 31 MG/DL (ref 8–23)
CALCIUM SERPL-MCNC: 9.3 MG/DL (ref 8.7–10.5)
CHLORIDE SERPL-SCNC: 100 MMOL/L (ref 95–110)
CO2 SERPL-SCNC: 29 MMOL/L (ref 23–29)
CREAT SERPL-MCNC: 0.7 MG/DL (ref 0.5–1.4)
DIFFERENTIAL METHOD BLD: ABNORMAL
EOSINOPHIL # BLD AUTO: 0 K/UL (ref 0–0.5)
EOSINOPHIL NFR BLD: 0 % (ref 0–8)
ERYTHROCYTE [DISTWIDTH] IN BLOOD BY AUTOMATED COUNT: 22.3 % (ref 11.5–14.5)
EST. GFR  (NO RACE VARIABLE): >60 ML/MIN/1.73 M^2
GLUCOSE SERPL-MCNC: 92 MG/DL (ref 70–110)
HCT VFR BLD AUTO: 31.2 % (ref 37–48.5)
HGB BLD-MCNC: 9.7 G/DL (ref 12–16)
IMM GRANULOCYTES # BLD AUTO: 0.03 K/UL (ref 0–0.04)
IMM GRANULOCYTES NFR BLD AUTO: 0.9 % (ref 0–0.5)
LDH SERPL L TO P-CCNC: 253 U/L (ref 110–260)
LYMPHOCYTES # BLD AUTO: 1 K/UL (ref 1–4.8)
LYMPHOCYTES NFR BLD: 28.5 % (ref 18–48)
MAGNESIUM SERPL-MCNC: 2.4 MG/DL (ref 1.6–2.6)
MCH RBC QN AUTO: 26.1 PG (ref 27–31)
MCHC RBC AUTO-ENTMCNC: 31.1 G/DL (ref 32–36)
MCV RBC AUTO: 84 FL (ref 82–98)
MONOCYTES # BLD AUTO: 0.4 K/UL (ref 0.3–1)
MONOCYTES NFR BLD: 10.7 % (ref 4–15)
NEUTROPHILS # BLD AUTO: 2 K/UL (ref 1.8–7.7)
NEUTROPHILS NFR BLD: 59.3 % (ref 38–73)
NRBC BLD-RTO: 0 /100 WBC
PHOSPHATE SERPL-MCNC: 3.8 MG/DL (ref 2.7–4.5)
PLATELET # BLD AUTO: 154 K/UL (ref 150–450)
PMV BLD AUTO: 9.2 FL (ref 9.2–12.9)
POTASSIUM SERPL-SCNC: 4.4 MMOL/L (ref 3.5–5.1)
PROT SERPL-MCNC: 6.8 G/DL (ref 6–8.4)
RBC # BLD AUTO: 3.71 M/UL (ref 4–5.4)
SODIUM SERPL-SCNC: 135 MMOL/L (ref 136–145)
SPECIMEN OUTDATE: NORMAL
URATE SERPL-MCNC: 4.5 MG/DL (ref 2.4–5.7)
WBC # BLD AUTO: 3.37 K/UL (ref 3.9–12.7)

## 2024-02-19 PROCEDURE — 84100 ASSAY OF PHOSPHORUS: CPT | Performed by: INTERNAL MEDICINE

## 2024-02-19 PROCEDURE — 83615 LACTATE (LD) (LDH) ENZYME: CPT | Performed by: INTERNAL MEDICINE

## 2024-02-19 PROCEDURE — 86850 RBC ANTIBODY SCREEN: CPT | Performed by: INTERNAL MEDICINE

## 2024-02-19 PROCEDURE — 83735 ASSAY OF MAGNESIUM: CPT | Performed by: INTERNAL MEDICINE

## 2024-02-19 PROCEDURE — 80053 COMPREHEN METABOLIC PANEL: CPT | Performed by: INTERNAL MEDICINE

## 2024-02-19 PROCEDURE — 84550 ASSAY OF BLOOD/URIC ACID: CPT | Performed by: INTERNAL MEDICINE

## 2024-02-19 PROCEDURE — 85025 COMPLETE CBC W/AUTO DIFF WBC: CPT | Performed by: INTERNAL MEDICINE

## 2024-02-20 DIAGNOSIS — I10 HYPERTENSION, BENIGN: ICD-10-CM

## 2024-02-20 PROBLEM — D64.9 SYMPTOMATIC ANEMIA: Status: RESOLVED | Noted: 2023-05-24 | Resolved: 2024-02-20

## 2024-02-20 RX ORDER — AZACITIDINE 100 MG/1
75 INJECTION, POWDER, LYOPHILIZED, FOR SOLUTION INTRAVENOUS; SUBCUTANEOUS
Status: CANCELLED | OUTPATIENT
Start: 2024-02-29

## 2024-02-20 RX ORDER — ONDANSETRON HYDROCHLORIDE 8 MG/1
8 TABLET, FILM COATED ORAL ONCE AS NEEDED
Status: CANCELLED
Start: 2024-02-26

## 2024-02-20 RX ORDER — ONDANSETRON HYDROCHLORIDE 8 MG/1
8 TABLET, FILM COATED ORAL ONCE AS NEEDED
Status: CANCELLED
Start: 2024-02-29

## 2024-02-20 RX ORDER — DEXAMETHASONE 4 MG/1
8 TABLET ORAL
Status: CANCELLED
Start: 2024-02-29

## 2024-02-20 RX ORDER — ONDANSETRON HYDROCHLORIDE 8 MG/1
8 TABLET, FILM COATED ORAL ONCE AS NEEDED
Status: CANCELLED
Start: 2024-03-01

## 2024-02-20 RX ORDER — ONDANSETRON HYDROCHLORIDE 8 MG/1
8 TABLET, FILM COATED ORAL ONCE AS NEEDED
Status: CANCELLED
Start: 2024-02-27

## 2024-02-20 RX ORDER — AZACITIDINE 100 MG/1
75 INJECTION, POWDER, LYOPHILIZED, FOR SOLUTION INTRAVENOUS; SUBCUTANEOUS
Status: CANCELLED | OUTPATIENT
Start: 2024-02-28

## 2024-02-20 RX ORDER — DEXAMETHASONE 4 MG/1
8 TABLET ORAL
Status: CANCELLED
Start: 2024-02-26

## 2024-02-20 RX ORDER — DEXAMETHASONE 4 MG/1
8 TABLET ORAL
Status: CANCELLED
Start: 2024-02-28

## 2024-02-20 RX ORDER — AZACITIDINE 100 MG/1
75 INJECTION, POWDER, LYOPHILIZED, FOR SOLUTION INTRAVENOUS; SUBCUTANEOUS
Status: CANCELLED | OUTPATIENT
Start: 2024-02-27

## 2024-02-20 RX ORDER — DEXAMETHASONE 4 MG/1
8 TABLET ORAL
Status: CANCELLED
Start: 2024-03-01

## 2024-02-20 RX ORDER — IRBESARTAN 300 MG/1
300 TABLET ORAL NIGHTLY
Qty: 90 TABLET | Refills: 3 | Status: ON HOLD | OUTPATIENT
Start: 2024-02-20 | End: 2024-03-13 | Stop reason: HOSPADM

## 2024-02-20 RX ORDER — ONDANSETRON HYDROCHLORIDE 8 MG/1
8 TABLET, FILM COATED ORAL ONCE AS NEEDED
Status: CANCELLED
Start: 2024-02-28

## 2024-02-20 RX ORDER — AZACITIDINE 100 MG/1
75 INJECTION, POWDER, LYOPHILIZED, FOR SOLUTION INTRAVENOUS; SUBCUTANEOUS
Status: CANCELLED | OUTPATIENT
Start: 2024-02-26

## 2024-02-20 RX ORDER — AZACITIDINE 100 MG/1
75 INJECTION, POWDER, LYOPHILIZED, FOR SOLUTION INTRAVENOUS; SUBCUTANEOUS
Status: CANCELLED | OUTPATIENT
Start: 2024-03-01

## 2024-02-20 RX ORDER — DEXAMETHASONE 4 MG/1
8 TABLET ORAL
Status: CANCELLED
Start: 2024-02-27

## 2024-02-20 NOTE — TELEPHONE ENCOUNTER
No care due was identified.  [facility] Embedded Care Due Messages. Reference number: [id number].   2/20/2024 11:35:45 AM CST

## 2024-02-20 NOTE — PROGRESS NOTES
HEMATOLOGIC MALIGNANCIES PROGRESS NOTE    IDENTIFYING STATEMENT   Niurka Castellon) is a 78 y.o. female with a  of 1945 from [place], [place] with the diagnosis of MDS.      TELEMEDICINE DOCUMENTATION    The patient location is: [place]  The chief complaint leading to consultation is: MDS    Visit type: audiovisual    Face to Face time with patient: 10 minutes  40 minutes of total time spent on the encounter, which includes face to face time and non-face to face time preparing to see the patient (eg, review of tests), Obtaining and/or reviewing separately obtained history, Documenting clinical information in the electronic or other health record, Independently interpreting results (not separately reported) and communicating results to the patient/family/caregiver, or Care coordination (not separately reported).         Each patient to whom he or she provides medical services by telemedicine is:  (1) informed of the relationship between the physician and patient and the respective role of any other health care provider with respect to management of the patient; and (2) notified that he or she may decline to receive medical services by telemedicine and may withdraw from such care at any time.    Notes:     ONCOLOGY HISTORY:    1. Myelodysplastic syndrome with increased blasts-2              A. 2023: Bone marrow biopsy - 65-75% cellular marrow consistent with myelodysplastic syndrome with excess blasts-2; cytogenetics 46,XX,del(3)(q12)[2]/47,sl,+8[18]; NGS not sent; IPSS-R score of 7 = very high risk   B. 2023: Cycle 1 azacitidine-venetoclax (5 days aza, 14 days angelo) for MDS/AML   C. 10/18/2023: Bone marrow biopsy - 50-60% cellular marrow with no increased blasts and trilineage hematopoiesis with granulocytic hypoplasia and moderate dysgranulopoiesis, mildly left-shifted marked erythroid hyperplasia with severe dyserythropoiesis, and marked megakaryocytic hyperplasia with predominantly variably  sized including small del3q-like monolobated forms; 3-4+ histiocytic iron stores; focal MF-1; cytogenetics 46,XX[20]; NGS shows ASXL1 (26%), SRSF2 (33%) and STAG2 (3%).     2. Neuropathy  3. Interstitial lung disease/pulmonary fibrosis  4. Hypertension  5. Aortic atherosclerosis  6. Pulmonary coccidioidomycosis  7. Hypothyroidism  8. Hyperparathyroidism    INTERVAL HISTORY:      Ms. Pedraza is seen in this virtual visit in follow-up of MDS/AML prior to cycle 6 of aza-angelo therapy.      She acquired COVID infection and is still recovering at this time. She was hospitalized from  - 2/10. Her energy remains diminished. She is with daughter at an [facility].     Past Medical History, Past Social History and Past Family History have been reviewed and are unchanged except as noted in the interval history.    MEDICATIONS:     Current Outpatient Medications on File Prior to Visit   Medication Sig Dispense Refill    acyclovir (ZOVIRAX) 400 MG tablet Take 1 tablet (400 mg total) by mouth 2 (two) times daily. 60 tablet 11    albuterol-ipratropium (DUO-NEB) 2.5 mg-0.5 mg/3 mL nebulizer solution Take 3 mLs by nebulization every 6 (six) hours as needed for Wheezing or Shortness of Breath. Rescue (Patient not taking: Reported on 2/15/2024) 75 mL 11    ascorbic acid/zinc (ZINC WITH VITAMIN C ORAL) Take 1 tablet by mouth Daily.      atorvastatin (LIPITOR) 20 MG tablet Take 1 tablet (20 mg total) by mouth once daily. 90 tablet 3    azelastine (ASTELIN) 137 mcg (0.1 %) nasal spray 1 spray (137 mcg total) by Nasal route 2 (two) times daily. (Patient not taking: Reported on 2024) 30 mL 6    calcium-vitamin D3 (CALCIUM 500 + D) 500 mg(1,250mg) -200 unit per tablet Take 1 tablet by mouth 2 (two) times daily with meals.      carvediloL (COREG) 25 MG tablet Take 1 tablet (25 mg total) by mouth 2 (two) times daily with meals. 180 tablet 1    [] dextromethorphan-guaiFENesin  mg/5 ml (ROBITUSSIN-DM)  mg/5 mL liquid Take 5  mLs by mouth every 4 (four) hours as needed (cough).  0    ferrous gluconate (FERGON) 324 MG tablet Take 1 tablet (324 mg total) by mouth every other day. 90 tablet 3    fluticasone furoate-vilanteroL (BREO ELLIPTA) 100-25 mcg/dose diskus inhaler Inhale 1 puff into the lungs once daily. Controller.  PLEASE NOTE IT IS ONCE A DAY!! 60 each 4    irbesartan (AVAPRO) 300 MG tablet Take 1 tablet (300 mg total) by mouth every evening. (Patient taking differently: Take 300 mg by mouth every evening. PRN) 90 tablet 0    levothyroxine (SYNTHROID) 50 MCG tablet Take 1 tablet (50 mcg total) by mouth before breakfast. 90 tablet 1    meloxicam (MOBIC) 15 MG tablet Take 1 tablet (15 mg total) by mouth daily as needed for Pain. (Anti-inflammatory medication) take with food 15 tablet 3    methocarbamoL (ROBAXIN) 500 MG Tab Take 1 tablet (500 mg total) by mouth 3 (three) times daily as needed (muscle spasm). 30 tablet 3    mirtazapine (REMERON) 7.5 MG Tab Take 1 tablet (7.5 mg total) by mouth every evening. 90 tablet 3    multivitamin (THERAGRAN) per tablet Take 1 tablet by mouth once daily.      oxybutynin (DITROPAN XL) 15 MG TR24 Take 1 tablet (15 mg total) by mouth once daily. 30 tablet 11    pantoprazole (PROTONIX) 20 MG tablet Take 1 tablet (20 mg total) by mouth once daily. 90 tablet 1    posaconazole (NOXAFIL) 100 mg TbEC tablet Take 3 tablets (300 mg) by mouth twice daily on the first day, and then take 300 mg daily thereafter. 30 tablet 2    potassium chloride (K-TAB) 20 mEq Take 1 tablet (20 mEq total) by mouth once daily. 90 tablet 3    predniSONE (DELTASONE) 1 MG tablet Take 2 tablets (2 mg total) by mouth once daily. 180 tablet 3    sodium chloride 3% 3 % nebulizer solution Take 4 mLs by nebulization as needed for Other. 120 mL 3    venetoclax (VENCLEXTA) 100 mg Tab Take one tablet (100 mg) by mouth daily on days 1-7 of each 28 day cycle of therapy.. 28 tablet 0    [DISCONTINUED] budesonide-formoterol 80-4.5 mcg  (SYMBICORT) 80-4.5 mcg/actuation HFAA Inhale 2 puffs into the lungs 2 (two) times daily as needed. Controller 1 Inhaler 6     No current facility-administered medications on file prior to visit.       ALLERGIES:   Review of patient's allergies indicates:   Allergen Reactions    Ciprofloxacin Other (See Comments)     Right foot pain and edema, tendonitis    Amlodipine Edema and Swelling     BLE  BLE    Lisinopril Other (See Comments)     cough    Nitrofuran analogues         ROS:       Review of Systems   Constitutional:  Positive for appetite change. Negative for unexpected weight change.   HENT:   Negative for mouth sores.    Respiratory:  Negative for cough and shortness of breath.    Cardiovascular:  Negative for chest pain.   Gastrointestinal:  Negative for abdominal pain and diarrhea.   Genitourinary:  Positive for frequency.    Musculoskeletal:  Positive for back pain.   Skin:  Negative for rash.   Neurological:  Negative for headaches.   Hematological:  Negative for adenopathy.   Psychiatric/Behavioral:  The patient is not nervous/anxious.        PHYSICAL EXAM:  There were no vitals filed for this visit.      Physical Exam  Head and neck visualized, and no abnormalities seen.     LAB:   Results for orders placed or performed in visit on 02/14/24   CBC Auto Differential   Result Value Ref Range    WBC 1.87 (LL) 3.90 - 12.70 K/uL    RBC 3.75 (L) 4.00 - 5.40 M/uL    Hemoglobin 9.6 (L) 12.0 - 16.0 g/dL    Hematocrit 31.1 (L) 37.0 - 48.5 %    MCV 83 82 - 98 fL    MCH 25.6 (L) 27.0 - 31.0 pg    MCHC 30.9 (L) 32.0 - 36.0 g/dL    RDW 23.6 (H) 11.5 - 14.5 %    Platelets 139 (L) 150 - 450 K/uL    MPV 9.2 9.2 - 12.9 fL    Immature Granulocytes 2.1 (H) 0.0 - 0.5 %    Gran # (ANC) 1.1 (L) 1.8 - 7.7 K/uL    Immature Grans (Abs) 0.04 0.00 - 0.04 K/uL    Lymph # 0.6 (L) 1.0 - 4.8 K/uL    Mono # 0.2 (L) 0.3 - 1.0 K/uL    Eos # 0.0 0.0 - 0.5 K/uL    Baso # 0.02 0.00 - 0.20 K/uL    nRBC 0 0 /100 WBC    Gran % 58.3 38.0 - 73.0 %     Lymph % 30.5 18.0 - 48.0 %    Mono % 8.0 4.0 - 15.0 %    Eosinophil % 0.0 0.0 - 8.0 %    Basophil % 1.1 0.0 - 1.9 %    Aniso Slight     Poik Slight     Poly Occasional     Ovalocytes Occasional     Tear Drop Cells Occasional     Spherocytes Occasional     Differential Method Automated    Comprehensive Metabolic Panel   Result Value Ref Range    Sodium 136 136 - 145 mmol/L    Potassium 3.2 (L) 3.5 - 5.1 mmol/L    Chloride 102 95 - 110 mmol/L    CO2 28 23 - 29 mmol/L    Glucose 158 (H) 70 - 110 mg/dL    BUN 20 8 - 23 mg/dL    Creatinine 0.6 0.5 - 1.4 mg/dL    Calcium 8.3 (L) 8.7 - 10.5 mg/dL    Total Protein 5.7 (L) 6.0 - 8.4 g/dL    Albumin 2.1 (L) 3.5 - 5.2 g/dL    Total Bilirubin 1.0 0.1 - 1.0 mg/dL    Alkaline Phosphatase 112 55 - 135 U/L    AST 13 10 - 40 U/L    ALT 14 10 - 44 U/L    eGFR >60.0 >60 mL/min/1.73 m^2    Anion Gap 6 (L) 8 - 16 mmol/L   Magnesium   Result Value Ref Range    Magnesium 2.0 1.6 - 2.6 mg/dL   PHOSPHORUS   Result Value Ref Range    Phosphorus 2.4 (L) 2.7 - 4.5 mg/dL   Lactate Dehydrogenase   Result Value Ref Range     (H) 110 - 260 U/L   Uric Acid   Result Value Ref Range    Uric Acid 3.7 2.4 - 5.7 mg/dL   Type & Screen   Result Value Ref Range    Group & Rh A POS     Indirect Emiliano NEG     Specimen Outdate 02/17/2024 23:59      *Note: Due to a large number of results and/or encounters for the requested time period, some results have not been displayed. A complete set of results can be found in Results Review.       PROBLEMS ASSESSED THIS VISIT:    No diagnosis found.    PLAN:       Myelodysplastic syndrome  Ms. 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 We reviewed that this is an aggressive hematologic malignancy with high probability of evolution to acute myeloid leukemia (AML). According to the ICC pathologic designation, this is referred to as MDS/AML.     Bone marrow biopsy after 1 cycle of aza-angelo showed resolution of increased blasts with ongoing morphologic changes consistent with MDS. We  reviewed that this represents a favorable response to therapy. We will therefore proceed with indefinite aza-angelo therapy.     Current cycle complicated by COVID infection. She has adequate cytologic recovery to proceed with her next cycle of therapy, but we will delay by an additional week as she has reduced performance status and requests more time for recovery prior to proceeding.     Plan bone marrow biopsy at least q6months to monitor response (due 4/18/2024).    Continue infection prophylaxis with acyclovir, posaconazole. Antibiotic agents per ID given multi-drug resistant UTI.     Pancytopenia  Due to MDS/AML. Monitor weekly during therapy and transfuse as clinically indicated.     Follow-up  Prior to cycle 7    Route Chart for Scheduling    BMT Chart Routing      Follow up with physician . 3/20   Follow up with [facility]    Provider visit type Malignant hem   Infusion scheduling note    Injection scheduling note 3/25, 3/26, 3/27, 3/28, 3/29   Labs CBC, CMP, magnesium, phosphorus, LDH and uric acid   Scheduling:  Preferred lab:  Lab interval: once a week     Imaging    Pharmacy appointment    Other referrals                  Treatment Plan Information   OP AZACITIDINE 5-DAY (SUB-Q)   Mohit Centeno MD   Upcoming Treatment Dates - OP AZACITIDINE 5-DAY (SUB-Q)    2/26/2024       Pre-Medications       dexAMETHasone tablet 8 mg       Chemotherapy       azaCITIDine (VIDAZA) chemo injection 135 mg  2/27/2024       Pre-Medications       dexAMETHasone tablet 8 mg       Chemotherapy       azaCITIDine (VIDAZA) chemo injection 135 mg  2/28/2024       Pre-Medications       dexAMETHasone tablet 8 mg       Chemotherapy       azaCITIDine (VIDAZA) chemo injection 135 mg  2/29/2024       Pre-Medications       dexAMETHasone tablet 8 mg       Chemotherapy       azaCITIDine (VIDAZA) chemo injection 135 mg    Therapy Plan Information  Medications  ertapenem (INVANZ) 1 g in sodium chloride 0.9% 100 mL IVPB  1 g, Intravenous, Every  visit  Flushes  sodium chloride 0.9% 250 mL flush bag  Intravenous, Every visit  sodium chloride 0.9% flush 10 mL  10 mL, Intravenous, Every visit  heparin, porcine (PF) 100 unit/mL injection flush 500 Units  500 Units, Intravenous, Every visit  alteplase injection 2 mg  2 mg, Intra-Catheter, Every visit      Mohit Centeno MD  Hematology and Stem Cell Transplant

## 2024-02-20 NOTE — TELEPHONE ENCOUNTER
Refill Routing Note   Medication(s) are not appropriate for processing by [facility] for the following reason(s):        Required vitals abnormal  Clarification of medication (Rx) details    [facility] action(s):  Defer      Medication Therapy Plan: PT REPORTED TAKING PRN      Appointments  past 12m or future 3m with PCP    Date Provider   Last Visit   2/15/2024 Savana Anderson MD   Next Visit   Visit date not found Savana Anderson MD   ED visits in past 90 days: 1        Note composed:11:50 AM 02/20/2024

## 2024-02-21 ENCOUNTER — LAB VISIT (OUTPATIENT)
Dept: LAB | Facility: HOSPITAL | Age: 79
End: 2024-02-21
Payer: MEDICARE

## 2024-02-21 DIAGNOSIS — J84.10 PULMONARY FIBROSIS: ICD-10-CM

## 2024-02-21 DIAGNOSIS — R05.9 COUGH, UNSPECIFIED TYPE: ICD-10-CM

## 2024-02-21 LAB
BACTERIA SPEC AEROBE CULT: NORMAL
GRAM STN SPEC: NORMAL
GRAM STN SPEC: NORMAL

## 2024-02-21 PROCEDURE — 87205 SMEAR GRAM STAIN: CPT | Performed by: INTERNAL MEDICINE

## 2024-02-21 PROCEDURE — 87070 CULTURE OTHR SPECIMN AEROBIC: CPT | Performed by: INTERNAL MEDICINE

## 2024-02-22 ENCOUNTER — HOSPITAL ENCOUNTER (OUTPATIENT)
Dept: RADIOLOGY | Facility: OTHER | Age: 79
Discharge: HOME | End: 2024-02-22
Attending: INTERNAL MEDICINE
Payer: MEDICARE

## 2024-02-22 DIAGNOSIS — R05.9 COUGH, UNSPECIFIED TYPE: ICD-10-CM

## 2024-02-22 DIAGNOSIS — J84.10 PULMONARY FIBROSIS: ICD-10-CM

## 2024-02-22 PROCEDURE — 71046 X-RAY EXAM CHEST 2 VIEWS: CPT | Mod: TC,FY

## 2024-02-22 PROCEDURE — 71046 X-RAY EXAM CHEST 2 VIEWS: CPT | Mod: 26,,, | Performed by: RADIOLOGY

## 2024-02-26 ENCOUNTER — INFUSION (OUTPATIENT)
Dept: INFUSION THERAPY | Facility: HOSPITAL | Age: 79
End: 2024-02-26
Payer: MEDICARE

## 2024-02-26 ENCOUNTER — OFFICE VISIT (OUTPATIENT)
Dept: INFECTIOUS DISEASES | Facility: CLINIC | Age: 79
End: 2024-02-26
Payer: MEDICARE

## 2024-02-26 ENCOUNTER — HOSPITAL ENCOUNTER (OUTPATIENT)
Dept: RADIOLOGY | Facility: HOSPITAL | Age: 79
Discharge: HOME | End: 2024-02-26
Attending: INTERNAL MEDICINE
Payer: MEDICARE

## 2024-02-26 VITALS — DIASTOLIC BLOOD PRESSURE: 67 MMHG | SYSTOLIC BLOOD PRESSURE: 132 MMHG | HEART RATE: 81 BPM

## 2024-02-26 VITALS — BODY MASS INDEX: 22.11 KG/M2 | WEIGHT: 137 LBS

## 2024-02-26 VITALS
TEMPERATURE: 98 F | HEART RATE: 69 BPM | HEIGHT: 66 IN | BODY MASS INDEX: 22.15 KG/M2 | WEIGHT: 137.81 LBS | SYSTOLIC BLOOD PRESSURE: 155 MMHG | DIASTOLIC BLOOD PRESSURE: 75 MMHG

## 2024-02-26 DIAGNOSIS — M25.551 RIGHT HIP PAIN: ICD-10-CM

## 2024-02-26 DIAGNOSIS — D46.9 MDS (MYELODYSPLASTIC SYNDROME): Primary | ICD-10-CM

## 2024-02-26 DIAGNOSIS — M25.551 RIGHT HIP PAIN: Primary | ICD-10-CM

## 2024-02-26 PROCEDURE — 99999 PR PBB SHADOW E&M-EST. PATIENT-LVL IV: CPT | Mod: PBBFAC,,, | Performed by: INTERNAL MEDICINE

## 2024-02-26 PROCEDURE — 96401 CHEMO ANTI-NEOPL SQ/IM: CPT

## 2024-02-26 PROCEDURE — 25000003 PHARM REV CODE 250: Performed by: INTERNAL MEDICINE

## 2024-02-26 PROCEDURE — 73723 MRI JOINT LWR EXTR W/O&W/DYE: CPT | Mod: TC,RT

## 2024-02-26 PROCEDURE — 99214 OFFICE O/P EST MOD 30 MIN: CPT | Mod: PBBFAC,25 | Performed by: INTERNAL MEDICINE

## 2024-02-26 PROCEDURE — 63600175 PHARM REV CODE 636 W HCPCS: Performed by: INTERNAL MEDICINE

## 2024-02-26 PROCEDURE — A9585 GADOBUTROL INJECTION: HCPCS | Performed by: INTERNAL MEDICINE

## 2024-02-26 PROCEDURE — 99214 OFFICE O/P EST MOD 30 MIN: CPT | Mod: S$PBB,,, | Performed by: INTERNAL MEDICINE

## 2024-02-26 PROCEDURE — 25500020 PHARM REV CODE 255: Performed by: INTERNAL MEDICINE

## 2024-02-26 PROCEDURE — 73723 MRI JOINT LWR EXTR W/O&W/DYE: CPT | Mod: 26,RT,, | Performed by: RADIOLOGY

## 2024-02-26 RX ORDER — AZACITIDINE 100 MG/1
75 INJECTION, POWDER, LYOPHILIZED, FOR SOLUTION INTRAVENOUS; SUBCUTANEOUS
Status: COMPLETED | OUTPATIENT
Start: 2024-02-26 | End: 2024-02-26

## 2024-02-26 RX ORDER — DEXAMETHASONE 4 MG/1
8 TABLET ORAL
Status: COMPLETED | OUTPATIENT
Start: 2024-02-26 | End: 2024-02-26

## 2024-02-26 RX ORDER — ONDANSETRON 4 MG/1
8 TABLET, FILM COATED ORAL ONCE AS NEEDED
Status: COMPLETED | OUTPATIENT
Start: 2024-02-26 | End: 2024-02-26

## 2024-02-26 RX ORDER — GADOBUTROL 604.72 MG/ML
7 INJECTION INTRAVENOUS
Status: COMPLETED | OUTPATIENT
Start: 2024-02-26 | End: 2024-02-26

## 2024-02-26 RX ADMIN — DEXAMETHASONE 8 MG: 4 TABLET ORAL at 04:02

## 2024-02-26 RX ADMIN — AZACITIDINE 135 MG: 100 INJECTION, POWDER, LYOPHILIZED, FOR SOLUTION INTRAVENOUS; SUBCUTANEOUS at 04:02

## 2024-02-26 RX ADMIN — ONDANSETRON HYDROCHLORIDE 8 MG: 4 TABLET, FILM COATED ORAL at 04:02

## 2024-02-26 RX ADMIN — GADOBUTROL 7 ML: 604.72 INJECTION INTRAVENOUS at 09:02

## 2024-02-26 NOTE — PROGRESS NOTES
INFECTIOUS DISEASE CLINIC  2/26/24     Subjective:      Chief Complaint: hospital follow up       History of Present Illness:    Patient Niurka Pedraza is a 78 y.o. female with MDS and recurrent UTI who presents today for hospital follow up.     Admitted 2/6 - 2/10 with cough and sob. Dx with covid and MSSA pneumonia. There was also question about possible uti, but pt denies symptoms and UA not c/w uti (19 wbc, contaminated 15 squam). Was d/c'd on iv meropenem until 2/16 per ID recs.     Picc line removed. Pt reports severe R hip pain and unable to bear weight and is using wheelchair. Said pain started while in the hospital. Denies f/c. Ongoing cough with some phlegm, but improving. Had episode of diarrhea while on abx, now resolved. Denies indwelling hardware. Denies issues with picc line.       R hip pain, difficult to walk    Getting urine infections less frequently than in the past    Lots of coughing. Brown mucous            From [place] in [place]    Worked at hospitals, retired. Research. neurochemistry     Review of Symptoms:  Constitutional: Denies fevers, chills, or weakness.  ENT: Denies dysphagia, nasal discharge, ear pain or discharge.  Cardiovascular: Denies chest pain, palpitations, orthopnea, or claudication.  Respiratory: Denies shortness of breath, cough, hemoptysis, or wheezing.  GI: Denies nausea/vomitting, hematochezia, melena, abd pain, or changes in appetite.  : Denies dysuria, incontinence, or hematuria.  Musculoskeletal: Denies joint pain or myalgias.  Skin/breast: Denies rashes, lumps, lesions, or discharge.  Neurologic: Denies headache, dizziness, vertigo, or paresthesias.    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    Breast cancer     mastectomy 2014    Coccidiomycosis, progressive     Elevated CA-125 02/25/2018    Hyperlipidemia     OAB (overactive bladder)     Osteopenia     on Dexa 11/2017    Osteoporosis     pt  reports hx of this, declined fosamax in past - treated with calcium and vit D    Pelvic mass 02/25/2018    Pulmonary fibrosis     Pulmonary nodules     SOB (shortness of breath) on exertion     Thyroid disease     hypo    Urinary tract infection due to extended-spectrum beta lactamase (ESBL) producing Escherichia coli 3/16/2022    UTI (urinary tract infection)        Past Surgical History:   Procedure Laterality Date    CHOLECYSTECTOMY      COLONOSCOPY N/A 12/09/2022    Procedure: COLONOSCOPY;  Surgeon: Enrique Dominguez MD;  Location: [facility] (4TH FLR);  Service: Endoscopy;  Laterality: N/A;  inst via portal  pt requests after 12/9/22-MS  11/30-pt notified of earlier arrival time-KPvt    CYSTOSCOPY WITH BIOPSY OF BLADDER Bilateral 07/27/2022    Procedure: CYSTOSCOPY, WITH BLADDER BIOPSY; retrograde pyelogram,;  Surgeon: Anaya Oropeza MD;  Location: [facility];  Service: Urology;  Laterality: Bilateral;    ENDOSCOPIC ULTRASOUND OF UPPER GASTROINTESTINAL TRACT N/A 04/08/2021    Procedure: ULTRASOUND, UPPER GI TRACT, ENDOSCOPIC;  Surgeon: Aisha Barajas MD;  Location: [facility] (2ND FLR);  Service: Endoscopy;  Laterality: N/A;  UEUS/ERCP evaluation abn MRCP - Dr Angelika Zamudio-19 test 4/5/21 at [facility], [place], operated by [facility] Pre-admit - pg    ERCP N/A 04/08/2021    Procedure: ERCP (ENDOSCOPIC RETROGRADE CHOLANGIOPANCREATOGRAPHY);  Surgeon: Aisha Baraajs MD;  Location: [facility] (2ND FLR);  Service: Endoscopy;  Laterality: N/A;  UEUS/ERCP evaluation abn MRCP - Dr Angelika Zamudio-19 test 4/5/21 at [facility], [place], operated by [facility] Pre-admit - pg    ESOPHAGOGASTRODUODENOSCOPY N/A 04/08/2021    Procedure: EGD (ESOPHAGOGASTRODUODENOSCOPY);  Surgeon: Aisha Barajas MD;  Location: [facility] (2ND FLR);  Service: Endoscopy;  Laterality: N/A;  UEUS/ERCP evaluation abn MRCP Hortencia Zamudio-19 test 4/5/21 at [facility], [place], operated by [facility] Pre-admit - pg    ESOPHAGOGASTRODUODENOSCOPY N/A 06/02/2023    Procedure: EGD (ESOPHAGOGASTRODUODENOSCOPY);  Surgeon: Emeka MARTINEZ  MD Angelika;  Location: [facility] (4TH FLR);  Service: Endoscopy;  Laterality: N/A;  She has  history of seromucinous borderline tumor of the ovary. We generally follow  and CEA tumor markers. Her CEA recently became slightly elevated. CT imaging negative and colonoscopy negative.     Talita Barrios    instructions portal-LW  pre    HYSTERECTOMY      MASTECTOMY Right            Family History   Problem Relation Age of Onset    Cancer Mother         colon cancer    Breast cancer Mother     Hypertension Father     Heart disease Father     Stroke Father     No Known Problems Sister     Cancer Brother         lung, ++ tobacco    Hypertension Brother     No Known Problems Daughter     Hypertension Son     Colon cancer Neg Hx     Ovarian cancer Neg Hx        Social History     Socioeconomic History    Marital status:     Number of children: 3   Occupational History    Occupation: retired from [employer], [employer] [place] [employer]     Comment: neuro chemistry   Tobacco Use    Smoking status: Former     Current packs/day: 0.00     Average packs/day: 0.5 packs/day for 30.1 years (15.1 ttl pk-yrs)     Types: Cigarettes     Start date:      Quit date: 2000     Years since quittin.0    Smokeless tobacco: Never    Tobacco comments:     quit in her 50s, after 30 years smoking;   Substance and Sexual Activity    Alcohol use: No    Drug use: No    Sexual activity: Not Currently     Partners: Male   Social History Narrative    From [place]     Moved to [facility] [facility] in  for research    Moved to [place] for period of time    Moved back to [place] Summer 2016 and then to [place] [facility] this year 2017     Social Determinants of Health     Financial Resource Strain: Patient Unable To Answer (2024)    Overall Financial Resource Strain (CARDIA)     Difficulty of Paying Living Expenses: Patient unable to answer   Food Insecurity: Patient Unable To Answer (2024)    Hunger Vital Sign     Worried About Running Out of  Food in the Last Year: Patient unable to answer     Ran Out of Food in the Last Year: Patient unable to answer   Transportation Needs: Patient Unable To Answer (2/7/2024)    PRAPARE - Transportation     Lack of Transportation (Medical): Patient unable to answer     Lack of Transportation (Non-Medical): Patient unable to answer   Physical Activity: Unknown (2/7/2024)    Exercise Vital Sign     Days of Exercise per Week: Patient unable to answer     Minutes of Exercise per Session: 0 min   Recent Concern: Physical Activity - Inactive (1/12/2024)    Exercise Vital Sign     Days of Exercise per Week: 0 days     Minutes of Exercise per Session: 0 min   Stress: Patient Unable To Answer (2/7/2024)    Citizen of [place] [employer] - Occupational Stress Questionnaire     Feeling of Stress : Patient unable to answer   Social Connections: Unknown (2/7/2024)    Social Connection and Isolation Panel [NHANES]     Frequency of Communication with Friends and Family: Patient unable to answer     Frequency of Social Gatherings with Friends and Family: Patient unable to answer     Attends Yarsani Services: Patient unable to answer     Active Member of Clubs or Organizations: No     Attends Club or Organization Meetings: Patient unable to answer     Marital Status: Patient unable to answer   Recent Concern: Social Connections - Socially Isolated (1/12/2024)    Social Connection and Isolation Panel [NHANES]     Frequency of Communication with Friends and Family: More than three times a week     Frequency of Social Gatherings with Friends and Family: Three times a week     Attends Yarsani Services: Never     Active Member of Clubs or Organizations: No     Attends Club or Organization Meetings: Never     Marital Status:    Housing Stability: Patient Unable To Answer (2/7/2024)    Housing Stability Vital Sign     Unable to Pay for Housing in the Last Year: Patient unable to answer     Number of Places Lived in the  "Last Year: 1     Unstable Housing in the Last Year: Patient unable to answer       Review of patient's allergies indicates:   Allergen Reactions    Ciprofloxacin Other (See Comments)     Right foot pain and edema, tendonitis    Amlodipine Edema and Swelling     BLE  BLE    Lisinopril Other (See Comments)     cough    Nitrofuran analogues          Objective:   BP (!) 155/75 (BP Location: Left arm)   Pulse 69   Temp 97.6 °F (36.4 °C) (Oral)   Ht 5' 6" (1.676 m)   Wt 62.5 kg (137 lb 12.6 oz)   LMP 02/08/2000   BMI 22.24 kg/m²     General: Afebrile, alert, comfortable, no acute distress.   HEENT:  no scleral icterus.  Pulmonary: Non labored, CTAB  Cv: rrr no m/r/g  Extremities: Moves all extremities x 4. Tender to palpation to the R of L-spine and R hip  Skin: No jaundice, rashes, or visible lesions.   Neurological:  Alert and oriented x 4.     Labs:    Glucose   Date Value Ref Range Status   02/19/2024 92 70 - 110 mg/dL Final   02/14/2024 158 (H) 70 - 110 mg/dL Final   02/10/2024 146 (H) 70 - 110 mg/dL Final       Calcium   Date Value Ref Range Status   02/19/2024 9.3 8.7 - 10.5 mg/dL Final   02/14/2024 8.3 (L) 8.7 - 10.5 mg/dL Final   02/10/2024 8.3 (L) 8.7 - 10.5 mg/dL Final       Albumin   Date Value Ref Range Status   02/19/2024 2.7 (L) 3.5 - 5.2 g/dL Final   02/14/2024 2.1 (L) 3.5 - 5.2 g/dL Final   02/10/2024 1.8 (L) 3.5 - 5.2 g/dL Final       Total Protein   Date Value Ref Range Status   02/19/2024 6.8 6.0 - 8.4 g/dL Final   02/14/2024 5.7 (L) 6.0 - 8.4 g/dL Final   02/10/2024 5.0 (L) 6.0 - 8.4 g/dL Final       Sodium   Date Value Ref Range Status   02/19/2024 135 (L) 136 - 145 mmol/L Final   02/14/2024 136 136 - 145 mmol/L Final   02/10/2024 140 136 - 145 mmol/L Final       Potassium   Date Value Ref Range Status   02/19/2024 4.4 3.5 - 5.1 mmol/L Final   02/14/2024 3.2 (L) 3.5 - 5.1 mmol/L Final   02/10/2024 3.2 (L) 3.5 - 5.1 mmol/L Final       CO2   Date Value Ref Range Status   02/19/2024 29 23 - 29 " mmol/L Final   02/14/2024 28 23 - 29 mmol/L Final   02/10/2024 23 23 - 29 mmol/L Final       Chloride   Date Value Ref Range Status   02/19/2024 100 95 - 110 mmol/L Final   02/14/2024 102 95 - 110 mmol/L Final   02/10/2024 107 95 - 110 mmol/L Final       BUN   Date Value Ref Range Status   02/19/2024 31 (H) 8 - 23 mg/dL Final   02/14/2024 20 8 - 23 mg/dL Final   02/10/2024 15 8 - 23 mg/dL Final       Creatinine   Date Value Ref Range Status   02/19/2024 0.7 0.5 - 1.4 mg/dL Final   02/14/2024 0.6 0.5 - 1.4 mg/dL Final   02/10/2024 0.6 0.5 - 1.4 mg/dL Final       Alkaline Phosphatase   Date Value Ref Range Status   02/19/2024 139 (H) 55 - 135 U/L Final   02/14/2024 112 55 - 135 U/L Final   02/10/2024 86 55 - 135 U/L Final       ALT   Date Value Ref Range Status   02/19/2024 15 10 - 44 U/L Final   02/14/2024 14 10 - 44 U/L Final   02/10/2024 19 10 - 44 U/L Final       AST   Date Value Ref Range Status   02/19/2024 14 10 - 40 U/L Final   02/14/2024 13 10 - 40 U/L Final   02/10/2024 16 10 - 40 U/L Final       Total Bilirubin   Date Value Ref Range Status   02/19/2024 1.2 (H) 0.1 - 1.0 mg/dL Final     Comment:     For infants and newborns, interpretation of results should be based  on gestational age, weight and in agreement with clinical  observations.    Premature Infant recommended reference ranges:  Up to 24 hours.............<8.0 mg/dL  Up to 48 hours............<12.0 mg/dL  3-5 days..................<15.0 mg/dL  6-29 days.................<15.0 mg/dL     02/14/2024 1.0 0.1 - 1.0 mg/dL Final     Comment:     For infants and newborns, interpretation of results should be based  on gestational age, weight and in agreement with clinical  observations.    Premature Infant recommended reference ranges:  Up to 24 hours.............<8.0 mg/dL  Up to 48 hours............<12.0 mg/dL  3-5 days..................<15.0 mg/dL  6-29 days.................<15.0 mg/dL     02/10/2024 0.5 0.1 - 1.0 mg/dL Final     Comment:     For infants  and newborns, interpretation of results should be based  on gestational age, weight and in agreement with clinical  observations.    Premature Infant recommended reference ranges:  Up to 24 hours.............<8.0 mg/dL  Up to 48 hours............<12.0 mg/dL  3-5 days..................<15.0 mg/dL  6-29 days.................<15.0 mg/dL         WBC   Date Value Ref Range Status   02/19/2024 3.37 (L) 3.90 - 12.70 K/uL Final   02/14/2024 1.87 (LL) 3.90 - 12.70 K/uL Final     Comment:     Previously reported results for this analyte were similarly   abnormal.  Call not needed.  Documented by [email or web address] 02/14/2024 14:01     02/10/2024 1.45 (LL) 3.90 - 12.70 K/uL Final     Comment:     WBC  critical result(s) called and verbal readback obtained from   DAV BOSCH RN by [facility] 02/10/2024 10:38         Hemoglobin   Date Value Ref Range Status   02/19/2024 9.7 (L) 12.0 - 16.0 g/dL Final   02/14/2024 9.6 (L) 12.0 - 16.0 g/dL Final   02/10/2024 9.1 (L) 12.0 - 16.0 g/dL Final       Hematocrit   Date Value Ref Range Status   02/19/2024 31.2 (L) 37.0 - 48.5 % Final   02/14/2024 31.1 (L) 37.0 - 48.5 % Final   02/10/2024 29.5 (L) 37.0 - 48.5 % Final       MCV   Date Value Ref Range Status   02/19/2024 84 82 - 98 fL Final   02/14/2024 83 82 - 98 fL Final   02/10/2024 83 82 - 98 fL Final       Platelets   Date Value Ref Range Status   02/19/2024 154 150 - 450 K/uL Final   02/14/2024 139 (L) 150 - 450 K/uL Final   02/10/2024 105 (L) 150 - 450 K/uL Final       Lab Results   Component Value Date    CHOL 131 06/22/2023    CHOL 153 06/30/2022    CHOL 140 08/24/2021       Lab Results   Component Value Date    HDL 43 06/22/2023    HDL 69 06/30/2022    HDL 36 (L) 08/24/2021       Lab Results   Component Value Date    LDLCALC 64.4 06/22/2023    LDLCALC 70.6 06/30/2022    LDLCALC 71.6 08/24/2021       Lab Results   Component Value Date    TRIG 118 06/22/2023    TRIG 67 06/30/2022    TRIG 162 (H) 08/24/2021       Lab Results   Component Value Date     "CHOLHDL 32.8 2023    CHOLHDL 45.1 2022    CHOLHDL 25.7 2021       RPR   Date Value Ref Range Status   2022 Non-reactive Non-reactive Final   12/10/2021 Non-reactive Non-reactive Final     No results found for: "QUANTIFERON"    Medications:  Current Outpatient Medications on File Prior to Visit   Medication Sig Dispense Refill    acyclovir (ZOVIRAX) 400 MG tablet Take 1 tablet (400 mg total) by mouth 2 (two) times daily. 60 tablet 11    albuterol-ipratropium (DUO-NEB) 2.5 mg-0.5 mg/3 mL nebulizer solution Take 3 mLs by nebulization every 6 (six) hours as needed for Wheezing or Shortness of Breath. Rescue (Patient not taking: Reported on 2/15/2024) 75 mL 11    ascorbic acid/zinc (ZINC WITH VITAMIN C ORAL) Take 1 tablet by mouth Daily.      atorvastatin (LIPITOR) 20 MG tablet Take 1 tablet (20 mg total) by mouth once daily. 90 tablet 3    azelastine (ASTELIN) 137 mcg (0.1 %) nasal spray 1 spray (137 mcg total) by Nasal route 2 (two) times daily. (Patient not taking: Reported on 2024) 30 mL 6    calcium-vitamin D3 (CALCIUM 500 + D) 500 mg(1,250mg) -200 unit per tablet Take 1 tablet by mouth 2 (two) times daily with meals.      carvediloL (COREG) 25 MG tablet Take 1 tablet (25 mg total) by mouth 2 (two) times daily with meals. 180 tablet 1    ferrous gluconate (FERGON) 324 MG tablet Take 1 tablet (324 mg total) by mouth every other day. 90 tablet 3    fluticasone furoate-vilanteroL (BREO ELLIPTA) 100-25 mcg/dose diskus inhaler Inhale 1 puff into the lungs once daily. Controller.  PLEASE NOTE IT IS ONCE A DAY!! 60 each 4    irbesartan (AVAPRO) 300 MG tablet Take 1 tablet (300 mg total) by mouth every evening. 90 tablet 3    levothyroxine (SYNTHROID) 50 MCG tablet Take 1 tablet (50 mcg total) by mouth before breakfast. 90 tablet 1    meloxicam (MOBIC) 15 MG tablet Take 1 tablet (15 mg total) by mouth daily as needed for Pain. (Anti-inflammatory medication) take with food 15 tablet 3    [] " "methocarbamoL (ROBAXIN) 500 MG Tab Take 1 tablet (500 mg total) by mouth 3 (three) times daily as needed (muscle spasm). 30 tablet 3    mirtazapine (REMERON) 7.5 MG Tab Take 1 tablet (7.5 mg total) by mouth every evening. 90 tablet 3    multivitamin (THERAGRAN) per tablet Take 1 tablet by mouth once daily.      oxybutynin (DITROPAN XL) 15 MG TR24 Take 1 tablet (15 mg total) by mouth once daily. 30 tablet 11    pantoprazole (PROTONIX) 20 MG tablet Take 1 tablet (20 mg total) by mouth once daily. 90 tablet 1    posaconazole (NOXAFIL) 100 mg TbEC tablet Take 3 tablets (300 mg) by mouth twice daily on the first day, and then take 1 tablet by mouth once daily thereafter. 30 tablet 2    potassium chloride (K-TAB) 20 mEq Take 1 tablet (20 mEq total) by mouth once daily. 90 tablet 3    predniSONE (DELTASONE) 1 MG tablet Take 2 tablets (2 mg total) by mouth once daily. 180 tablet 3    sodium chloride 3% 3 % nebulizer solution Take 4 mLs by nebulization as needed for Other. 120 mL 3    venetoclax (VENCLEXTA) 100 mg Tab Take one tablet (100 mg) by mouth daily on days 1-7 of each 28 day cycle of therapy.. 28 tablet 0    [DISCONTINUED] budesonide-formoterol 80-4.5 mcg (SYMBICORT) 80-4.5 mcg/actuation HFAA Inhale 2 puffs into the lungs 2 (two) times daily as needed. Controller 1 Inhaler 6     No current facility-administered medications on file prior to visit.       Antibiotics:   Antibiotics (From admission, onward)      None            HIV: No components found for: "HIV 1/2 AG/AB"  Hepatitis C IgG: No components found for: "HEPATITIS C"  Syphilis:   RPR   Date Value Ref Range Status   12/19/2022 Non-reactive Non-reactive Final   12/10/2021 Non-reactive Non-reactive Final       Hepatitis A IgG: No components found for: "HEPATITIS A IGG"  Hepatitis Bc IgG: No components found for: "HEPATITIS B CORE IGG"  Hepatitis Bs IgG:  Quantiferon: No results found for: "QUANTIFERON"  VZV IgG: No components found for: "VARICELLA IGG"    No " "components found for: "SEDIMENTATION RATE"  No components found for: "C-REACTIVE PROTEIN"      Microbiology x 7d:   Microbiology Results (last 7 days)       ** No results found for the last 168 hours. **            Immunization History   Administered Date(s) Administered    COVID-19 MRNA, LN-S PF (MODERNA HALF 0.25 ML DOSE) 11/09/2021    COVID-19 Vaccine 09/30/2022    COVID-19, MRNA, LN-S, PF (MODERNA FULL 0.5 ML DOSE) 02/03/2021, 03/03/2021    Influenza (FLUAD) - Quadrivalent - Adjuvanted - PF *Preferred* (65+) 10/24/2023    Influenza - High Dose - PF (65 years and older) 10/27/2020, 09/21/2022    Pneumococcal Conjugate - 13 Valent 11/02/2017    Pneumococcal Conjugate - 20 Valent 09/30/2022    Pneumococcal Polysaccharide - 23 Valent 07/28/2020    Tdap 07/21/2021    Zoster Recombinant 09/21/2022         Reviewed records today as well as relevant labs, cultures, and imaging    Assessment:     Covid   CAP, MSSA    Resp symptoms improving with treatment. No fever or systemic signs of infection. Bcx during hospitalization were negative    Worried about R sided lower back and hip pain    Plan:     Agree with stopping antibiotics and pull picc    Lumbar spine and R hip imaging ordered to work up pain and symptoms.    I have sent communication to the referring physician and/or primary care provider.     The total time for evaluation and management services performed on 2/26/24 was greater than 30 minutes.  This includes face to face time and non-face to face time preparing to see the patient (eg, review of tests), obtaining and/or reviewing separately obtained history, documenting clinical information in the electronic or other health record, independently interpreting results, and communicating results to the patient/family/caregiver, or care coordination.             Chandni Allen MD, MPH  Infectious Disease      "

## 2024-02-26 NOTE — NURSING
Pt here for C6D1 vidaza injection.  Labs reviewed and VSS.  Vidaza administered to abdominal tissue x 3 injections.  Pt tolerated.  Escorted out in W/C by family member.

## 2024-02-27 ENCOUNTER — PATIENT MESSAGE (OUTPATIENT)
Dept: INTERNAL MEDICINE | Facility: CLINIC | Age: 79
End: 2024-02-27
Payer: MEDICARE

## 2024-02-27 ENCOUNTER — INFUSION (OUTPATIENT)
Dept: INFUSION THERAPY | Facility: HOSPITAL | Age: 79
End: 2024-02-27
Payer: MEDICARE

## 2024-02-27 VITALS
HEART RATE: 71 BPM | RESPIRATION RATE: 16 BRPM | DIASTOLIC BLOOD PRESSURE: 60 MMHG | SYSTOLIC BLOOD PRESSURE: 133 MMHG | TEMPERATURE: 98 F

## 2024-02-27 DIAGNOSIS — D46.9 MDS (MYELODYSPLASTIC SYNDROME): Primary | ICD-10-CM

## 2024-02-27 PROCEDURE — 96401 CHEMO ANTI-NEOPL SQ/IM: CPT

## 2024-02-27 PROCEDURE — 25000003 PHARM REV CODE 250: Performed by: INTERNAL MEDICINE

## 2024-02-27 PROCEDURE — 63600175 PHARM REV CODE 636 W HCPCS: Performed by: INTERNAL MEDICINE

## 2024-02-27 RX ORDER — DEXAMETHASONE 4 MG/1
8 TABLET ORAL
Status: COMPLETED | OUTPATIENT
Start: 2024-02-27 | End: 2024-02-27

## 2024-02-27 RX ORDER — AZACITIDINE 100 MG/1
75 INJECTION, POWDER, LYOPHILIZED, FOR SOLUTION INTRAVENOUS; SUBCUTANEOUS
Status: COMPLETED | OUTPATIENT
Start: 2024-02-27 | End: 2024-02-27

## 2024-02-27 RX ORDER — ONDANSETRON 4 MG/1
8 TABLET, FILM COATED ORAL ONCE AS NEEDED
Status: COMPLETED | OUTPATIENT
Start: 2024-02-27 | End: 2024-02-27

## 2024-02-27 RX ADMIN — DEXAMETHASONE 8 MG: 4 TABLET ORAL at 03:02

## 2024-02-27 RX ADMIN — ONDANSETRON HYDROCHLORIDE 8 MG: 4 TABLET, FILM COATED ORAL at 03:02

## 2024-02-27 RX ADMIN — AZACITIDINE 135 MG: 100 INJECTION, POWDER, LYOPHILIZED, FOR SOLUTION INTRAVENOUS; SUBCUTANEOUS at 03:02

## 2024-02-27 NOTE — NURSING
Pt here for D2 vidaza injection.  VSS.  Vidaza administered to abdominal tissue x 3 injections.  Pt tolerated.  Escorted pt out in W/C with family members.

## 2024-02-28 ENCOUNTER — INFUSION (OUTPATIENT)
Dept: INFUSION THERAPY | Facility: HOSPITAL | Age: 79
End: 2024-02-28
Payer: MEDICARE

## 2024-02-28 DIAGNOSIS — D46.9 MDS (MYELODYSPLASTIC SYNDROME): Primary | ICD-10-CM

## 2024-02-28 PROBLEM — M54.31 SCIATICA OF RIGHT SIDE: Status: ACTIVE | Noted: 2024-02-28

## 2024-02-28 PROCEDURE — 25000003 PHARM REV CODE 250: Performed by: INTERNAL MEDICINE

## 2024-02-28 PROCEDURE — 96401 CHEMO ANTI-NEOPL SQ/IM: CPT

## 2024-02-28 PROCEDURE — 63600175 PHARM REV CODE 636 W HCPCS: Performed by: INTERNAL MEDICINE

## 2024-02-28 RX ORDER — DEXAMETHASONE 4 MG/1
8 TABLET ORAL
Status: COMPLETED | OUTPATIENT
Start: 2024-02-28 | End: 2024-02-28

## 2024-02-28 RX ORDER — ONDANSETRON 4 MG/1
8 TABLET, FILM COATED ORAL ONCE AS NEEDED
Status: COMPLETED | OUTPATIENT
Start: 2024-02-28 | End: 2024-02-28

## 2024-02-28 RX ORDER — AZACITIDINE 100 MG/1
75 INJECTION, POWDER, LYOPHILIZED, FOR SOLUTION INTRAVENOUS; SUBCUTANEOUS
Status: COMPLETED | OUTPATIENT
Start: 2024-02-28 | End: 2024-02-28

## 2024-02-28 RX ADMIN — DEXAMETHASONE 8 MG: 4 TABLET ORAL at 03:02

## 2024-02-28 RX ADMIN — ONDANSETRON HYDROCHLORIDE 8 MG: 4 TABLET, FILM COATED ORAL at 03:02

## 2024-02-28 RX ADMIN — AZACITIDINE 135 MG: 100 INJECTION, POWDER, LYOPHILIZED, FOR SOLUTION INTRAVENOUS; SUBCUTANEOUS at 03:02

## 2024-02-28 NOTE — NURSING
Pt here for D3 vidaza injection.  VSS.  Vidaza administered to abdominal tissue.  Pt tolerated.  Escorted out in W/C with family members.

## 2024-02-29 ENCOUNTER — INFUSION (OUTPATIENT)
Dept: INFUSION THERAPY | Facility: HOSPITAL | Age: 79
End: 2024-02-29
Payer: MEDICARE

## 2024-02-29 DIAGNOSIS — D46.9 MDS (MYELODYSPLASTIC SYNDROME): ICD-10-CM

## 2024-02-29 DIAGNOSIS — D46.9 MDS (MYELODYSPLASTIC SYNDROME): Primary | ICD-10-CM

## 2024-02-29 PROCEDURE — 96401 CHEMO ANTI-NEOPL SQ/IM: CPT

## 2024-02-29 PROCEDURE — 25000003 PHARM REV CODE 250: Performed by: INTERNAL MEDICINE

## 2024-02-29 PROCEDURE — 63600175 PHARM REV CODE 636 W HCPCS: Performed by: INTERNAL MEDICINE

## 2024-02-29 RX ORDER — DEXAMETHASONE 4 MG/1
8 TABLET ORAL
Status: COMPLETED | OUTPATIENT
Start: 2024-02-29 | End: 2024-02-29

## 2024-02-29 RX ORDER — ONDANSETRON 4 MG/1
8 TABLET, FILM COATED ORAL ONCE AS NEEDED
Status: COMPLETED | OUTPATIENT
Start: 2024-02-29 | End: 2024-02-29

## 2024-02-29 RX ORDER — AZACITIDINE 100 MG/1
75 INJECTION, POWDER, LYOPHILIZED, FOR SOLUTION INTRAVENOUS; SUBCUTANEOUS
Status: COMPLETED | OUTPATIENT
Start: 2024-02-29 | End: 2024-02-29

## 2024-02-29 RX ADMIN — ONDANSETRON HYDROCHLORIDE 8 MG: 4 TABLET, FILM COATED ORAL at 03:02

## 2024-02-29 RX ADMIN — AZACITIDINE 135 MG: 100 INJECTION, POWDER, LYOPHILIZED, FOR SOLUTION INTRAVENOUS; SUBCUTANEOUS at 03:02

## 2024-02-29 RX ADMIN — DEXAMETHASONE 8 MG: 4 TABLET ORAL at 03:02

## 2024-02-29 NOTE — NURSING
Pt here for D4 vidaza injection.  Vidaza administered to abdominal tissue x 3 injections.  Pt left unit in W/C with family members.

## 2024-03-01 ENCOUNTER — PATIENT MESSAGE (OUTPATIENT)
Dept: ADMINISTRATIVE | Facility: HOSPITAL | Age: 79
End: 2024-03-01
Payer: MEDICARE

## 2024-03-01 ENCOUNTER — INFUSION (OUTPATIENT)
Dept: INFUSION THERAPY | Facility: HOSPITAL | Age: 79
End: 2024-03-01
Payer: MEDICARE

## 2024-03-01 VITALS
TEMPERATURE: 98 F | DIASTOLIC BLOOD PRESSURE: 61 MMHG | RESPIRATION RATE: 16 BRPM | SYSTOLIC BLOOD PRESSURE: 121 MMHG | HEART RATE: 70 BPM

## 2024-03-01 DIAGNOSIS — D46.9 MDS (MYELODYSPLASTIC SYNDROME): Primary | ICD-10-CM

## 2024-03-01 PROCEDURE — 63600175 PHARM REV CODE 636 W HCPCS: Performed by: INTERNAL MEDICINE

## 2024-03-01 PROCEDURE — 96401 CHEMO ANTI-NEOPL SQ/IM: CPT

## 2024-03-01 PROCEDURE — 25000003 PHARM REV CODE 250: Performed by: INTERNAL MEDICINE

## 2024-03-01 RX ORDER — AZACITIDINE 100 MG/1
75 INJECTION, POWDER, LYOPHILIZED, FOR SOLUTION INTRAVENOUS; SUBCUTANEOUS
Status: COMPLETED | OUTPATIENT
Start: 2024-03-01 | End: 2024-03-01

## 2024-03-01 RX ORDER — ONDANSETRON 4 MG/1
8 TABLET, FILM COATED ORAL ONCE AS NEEDED
Status: COMPLETED | OUTPATIENT
Start: 2024-03-01 | End: 2024-03-01

## 2024-03-01 RX ORDER — DEXAMETHASONE 4 MG/1
8 TABLET ORAL
Status: COMPLETED | OUTPATIENT
Start: 2024-03-01 | End: 2024-03-01

## 2024-03-01 RX ADMIN — AZACITIDINE 135 MG: 100 INJECTION, POWDER, LYOPHILIZED, FOR SOLUTION INTRAVENOUS; SUBCUTANEOUS at 03:03

## 2024-03-01 RX ADMIN — ONDANSETRON HYDROCHLORIDE 8 MG: 4 TABLET, FILM COATED ORAL at 03:03

## 2024-03-01 RX ADMIN — DEXAMETHASONE 8 MG: 4 TABLET ORAL at 03:03

## 2024-03-01 NOTE — NURSING
Pt here for D5 Vidaza. Assessment complete and VSS. Administered injections in abdominal tissue. No questions or concerns. Pt left unit in WC accompanied by her daughter.

## 2024-03-04 DIAGNOSIS — D46.9 MDS (MYELODYSPLASTIC SYNDROME): ICD-10-CM

## 2024-03-04 DIAGNOSIS — D46.9 MDS (MYELODYSPLASTIC SYNDROME): Primary | ICD-10-CM

## 2024-03-04 DIAGNOSIS — D63.0 ANEMIA IN NEOPLASTIC DISEASE: Primary | ICD-10-CM

## 2024-03-05 ENCOUNTER — HOSPITAL ENCOUNTER (INPATIENT)
Facility: OTHER | Age: 79
LOS: 8 days | Discharge: HOME HEALTH | DRG: 391 | End: 2024-03-13
Attending: EMERGENCY MEDICINE | Admitting: INTERNAL MEDICINE
Payer: MEDICARE

## 2024-03-05 DIAGNOSIS — M54.31 SCIATICA OF RIGHT SIDE: ICD-10-CM

## 2024-03-05 DIAGNOSIS — R26.89 BALANCE PROBLEM: ICD-10-CM

## 2024-03-05 DIAGNOSIS — G57.93 NEUROPATHY OF BOTH FEET: Chronic | ICD-10-CM

## 2024-03-05 DIAGNOSIS — G62.9 NEUROPATHY: ICD-10-CM

## 2024-03-05 DIAGNOSIS — S32.020A COMPRESSION FRACTURE OF L2 VERTEBRA, INITIAL ENCOUNTER: ICD-10-CM

## 2024-03-05 DIAGNOSIS — K57.20 DIVERTICULITIS OF LARGE INTESTINE WITH PERFORATION WITHOUT BLEEDING: Primary | ICD-10-CM

## 2024-03-05 DIAGNOSIS — G93.41 METABOLIC ENCEPHALOPATHY: ICD-10-CM

## 2024-03-05 DIAGNOSIS — M18.11 ARTHRITIS OF CARPOMETACARPAL (CMC) JOINT OF RIGHT THUMB: ICD-10-CM

## 2024-03-05 DIAGNOSIS — R07.9 CHEST PAIN: ICD-10-CM

## 2024-03-05 DIAGNOSIS — R53.83 FATIGUE: ICD-10-CM

## 2024-03-05 DIAGNOSIS — K57.32 DIVERTICULITIS LARGE INTESTINE W/O PERFORATION OR ABSCESS W/O BLEEDING: ICD-10-CM

## 2024-03-05 PROBLEM — R93.89 OPACITY NOTED ON IMAGING STUDY: Status: ACTIVE | Noted: 2024-03-05

## 2024-03-05 LAB
BACTERIA #/AREA URNS HPF: ABNORMAL /HPF
BILIRUB UR QL STRIP: NEGATIVE
CLARITY UR: ABNORMAL
COLOR UR: YELLOW
GLUCOSE UR QL STRIP: NEGATIVE
HCV AB SERPL QL IA: NEGATIVE
HGB UR QL STRIP: NEGATIVE
HIV 1+2 AB+HIV1 P24 AG SERPL QL IA: NEGATIVE
KETONES UR QL STRIP: NEGATIVE
LACTATE SERPL-SCNC: 1.2 MMOL/L (ref 0.5–2.2)
LEUKOCYTE ESTERASE UR QL STRIP: NEGATIVE
MICROSCOPIC COMMENT: ABNORMAL
NITRITE UR QL STRIP: POSITIVE
PH UR STRIP: 6 [PH] (ref 5–8)
PROT UR QL STRIP: NEGATIVE
RBC #/AREA URNS HPF: 1 /HPF (ref 0–4)
SP GR UR STRIP: 1.02 (ref 1–1.03)
SQUAMOUS #/AREA URNS HPF: 2 /HPF
URN SPEC COLLECT METH UR: ABNORMAL
UROBILINOGEN UR STRIP-ACNC: 1 EU/DL
WBC #/AREA URNS HPF: 1 /HPF (ref 0–5)
YEAST URNS QL MICRO: ABNORMAL

## 2024-03-05 PROCEDURE — 99233 SBSQ HOSP IP/OBS HIGH 50: CPT | Mod: ,,, | Performed by: SURGERY

## 2024-03-05 PROCEDURE — 63600175 PHARM REV CODE 636 W HCPCS: Performed by: EMERGENCY MEDICINE

## 2024-03-05 PROCEDURE — 86803 HEPATITIS C AB TEST: CPT | Performed by: EMERGENCY MEDICINE

## 2024-03-05 PROCEDURE — 96365 THER/PROPH/DIAG IV INF INIT: CPT

## 2024-03-05 PROCEDURE — 25500020 PHARM REV CODE 255: Performed by: EMERGENCY MEDICINE

## 2024-03-05 PROCEDURE — 99285 EMERGENCY DEPT VISIT HI MDM: CPT | Mod: 25

## 2024-03-05 PROCEDURE — 83605 ASSAY OF LACTIC ACID: CPT | Performed by: EMERGENCY MEDICINE

## 2024-03-05 PROCEDURE — 25000003 PHARM REV CODE 250: Performed by: PHYSICIAN ASSISTANT

## 2024-03-05 PROCEDURE — 81000 URINALYSIS NONAUTO W/SCOPE: CPT | Performed by: EMERGENCY MEDICINE

## 2024-03-05 PROCEDURE — 25000003 PHARM REV CODE 250: Performed by: EMERGENCY MEDICINE

## 2024-03-05 PROCEDURE — 87389 HIV-1 AG W/HIV-1&-2 AB AG IA: CPT | Performed by: EMERGENCY MEDICINE

## 2024-03-05 PROCEDURE — 11000001 HC ACUTE MED/SURG PRIVATE ROOM

## 2024-03-05 PROCEDURE — 27000207 HC ISOLATION

## 2024-03-05 PROCEDURE — 96361 HYDRATE IV INFUSION ADD-ON: CPT

## 2024-03-05 RX ORDER — IBUPROFEN 200 MG
16 TABLET ORAL
Status: DISCONTINUED | OUTPATIENT
Start: 2024-03-05 | End: 2024-03-13 | Stop reason: HOSPADM

## 2024-03-05 RX ORDER — IBUPROFEN 200 MG
24 TABLET ORAL
Status: DISCONTINUED | OUTPATIENT
Start: 2024-03-05 | End: 2024-03-13 | Stop reason: HOSPADM

## 2024-03-05 RX ORDER — GLUCAGON 1 MG
1 KIT INJECTION
Status: DISCONTINUED | OUTPATIENT
Start: 2024-03-05 | End: 2024-03-13 | Stop reason: HOSPADM

## 2024-03-05 RX ORDER — SODIUM CHLORIDE FOR INHALATION 3 %
4 VIAL, NEBULIZER (ML) INHALATION
Status: DISCONTINUED | OUTPATIENT
Start: 2024-03-05 | End: 2024-03-13 | Stop reason: HOSPADM

## 2024-03-05 RX ORDER — ATORVASTATIN CALCIUM 20 MG/1
20 TABLET, FILM COATED ORAL DAILY
Status: DISCONTINUED | OUTPATIENT
Start: 2024-03-06 | End: 2024-03-13 | Stop reason: HOSPADM

## 2024-03-05 RX ORDER — OXYBUTYNIN CHLORIDE 5 MG/1
15 TABLET, EXTENDED RELEASE ORAL DAILY
Status: DISCONTINUED | OUTPATIENT
Start: 2024-03-06 | End: 2024-03-13 | Stop reason: HOSPADM

## 2024-03-05 RX ORDER — ACETAMINOPHEN 325 MG/1
650 TABLET ORAL EVERY 4 HOURS PRN
Status: DISCONTINUED | OUTPATIENT
Start: 2024-03-05 | End: 2024-03-13 | Stop reason: HOSPADM

## 2024-03-05 RX ORDER — ACYCLOVIR 400 MG/1
400 TABLET ORAL 2 TIMES DAILY
Status: DISCONTINUED | OUTPATIENT
Start: 2024-03-05 | End: 2024-03-13 | Stop reason: HOSPADM

## 2024-03-05 RX ORDER — PROCHLORPERAZINE EDISYLATE 5 MG/ML
5 INJECTION INTRAMUSCULAR; INTRAVENOUS EVERY 6 HOURS PRN
Status: DISCONTINUED | OUTPATIENT
Start: 2024-03-05 | End: 2024-03-13 | Stop reason: HOSPADM

## 2024-03-05 RX ORDER — PANTOPRAZOLE SODIUM 40 MG/1
40 TABLET, DELAYED RELEASE ORAL DAILY
Status: DISCONTINUED | OUTPATIENT
Start: 2024-03-06 | End: 2024-03-13 | Stop reason: HOSPADM

## 2024-03-05 RX ORDER — ONDANSETRON 8 MG/1
8 TABLET, ORALLY DISINTEGRATING ORAL EVERY 8 HOURS PRN
Status: DISCONTINUED | OUTPATIENT
Start: 2024-03-05 | End: 2024-03-13 | Stop reason: HOSPADM

## 2024-03-05 RX ORDER — IPRATROPIUM BROMIDE AND ALBUTEROL SULFATE 2.5; .5 MG/3ML; MG/3ML
3 SOLUTION RESPIRATORY (INHALATION) EVERY 4 HOURS PRN
Status: DISCONTINUED | OUTPATIENT
Start: 2024-03-05 | End: 2024-03-13 | Stop reason: HOSPADM

## 2024-03-05 RX ORDER — BENZONATATE 100 MG/1
100 CAPSULE ORAL ONCE
Status: COMPLETED | OUTPATIENT
Start: 2024-03-06 | End: 2024-03-06

## 2024-03-05 RX ORDER — FERROUS SULFATE, DRIED 160(50) MG
1 TABLET, EXTENDED RELEASE ORAL 2 TIMES DAILY WITH MEALS
Status: DISCONTINUED | OUTPATIENT
Start: 2024-03-05 | End: 2024-03-13 | Stop reason: HOSPADM

## 2024-03-05 RX ORDER — FLUTICASONE FUROATE AND VILANTEROL 100; 25 UG/1; UG/1
1 POWDER RESPIRATORY (INHALATION) DAILY
Status: DISCONTINUED | OUTPATIENT
Start: 2024-03-06 | End: 2024-03-13 | Stop reason: HOSPADM

## 2024-03-05 RX ORDER — LEVOTHYROXINE SODIUM 50 UG/1
50 TABLET ORAL
Status: DISCONTINUED | OUTPATIENT
Start: 2024-03-06 | End: 2024-03-13 | Stop reason: HOSPADM

## 2024-03-05 RX ORDER — PANTOPRAZOLE SODIUM 20 MG/1
20 TABLET, DELAYED RELEASE ORAL DAILY
Status: DISCONTINUED | OUTPATIENT
Start: 2024-03-06 | End: 2024-03-05

## 2024-03-05 RX ORDER — PREDNISONE 1 MG/1
2 TABLET ORAL DAILY
Status: DISCONTINUED | OUTPATIENT
Start: 2024-03-06 | End: 2024-03-13 | Stop reason: HOSPADM

## 2024-03-05 RX ORDER — LANOLIN ALCOHOL/MO/W.PET/CERES
1 CREAM (GRAM) TOPICAL DAILY
Status: DISCONTINUED | OUTPATIENT
Start: 2024-03-06 | End: 2024-03-13 | Stop reason: HOSPADM

## 2024-03-05 RX ORDER — SODIUM CHLORIDE 9 MG/ML
INJECTION, SOLUTION INTRAVENOUS CONTINUOUS
Status: ACTIVE | OUTPATIENT
Start: 2024-03-05 | End: 2024-03-06

## 2024-03-05 RX ORDER — TALC
6 POWDER (GRAM) TOPICAL NIGHTLY PRN
Status: DISCONTINUED | OUTPATIENT
Start: 2024-03-05 | End: 2024-03-13 | Stop reason: HOSPADM

## 2024-03-05 RX ORDER — SODIUM CHLORIDE 9 MG/ML
1000 INJECTION, SOLUTION INTRAVENOUS
Status: COMPLETED | OUTPATIENT
Start: 2024-03-05 | End: 2024-03-05

## 2024-03-05 RX ORDER — NALOXONE HCL 0.4 MG/ML
0.02 VIAL (ML) INJECTION
Status: DISCONTINUED | OUTPATIENT
Start: 2024-03-05 | End: 2024-03-13 | Stop reason: HOSPADM

## 2024-03-05 RX ORDER — ALUMINUM HYDROXIDE, MAGNESIUM HYDROXIDE, AND SIMETHICONE 1200; 120; 1200 MG/30ML; MG/30ML; MG/30ML
30 SUSPENSION ORAL 4 TIMES DAILY PRN
Status: DISCONTINUED | OUTPATIENT
Start: 2024-03-05 | End: 2024-03-13 | Stop reason: HOSPADM

## 2024-03-05 RX ORDER — AZELASTINE 1 MG/ML
1 SPRAY, METERED NASAL 2 TIMES DAILY
Status: DISCONTINUED | OUTPATIENT
Start: 2024-03-05 | End: 2024-03-13 | Stop reason: HOSPADM

## 2024-03-05 RX ORDER — AZELASTINE 1 MG/ML
1 SPRAY, METERED NASAL 2 TIMES DAILY
Status: DISCONTINUED | OUTPATIENT
Start: 2024-03-05 | End: 2024-03-05

## 2024-03-05 RX ORDER — SIMETHICONE 80 MG
1 TABLET,CHEWABLE ORAL 4 TIMES DAILY PRN
Status: DISCONTINUED | OUTPATIENT
Start: 2024-03-05 | End: 2024-03-13 | Stop reason: HOSPADM

## 2024-03-05 RX ORDER — SODIUM CHLORIDE 0.9 % (FLUSH) 0.9 %
5 SYRINGE (ML) INJECTION
Status: DISCONTINUED | OUTPATIENT
Start: 2024-03-05 | End: 2024-03-13 | Stop reason: HOSPADM

## 2024-03-05 RX ORDER — MIRTAZAPINE 7.5 MG/1
7.5 TABLET, FILM COATED ORAL NIGHTLY
Status: DISCONTINUED | OUTPATIENT
Start: 2024-03-05 | End: 2024-03-13 | Stop reason: HOSPADM

## 2024-03-05 RX ADMIN — SODIUM CHLORIDE 1000 ML: 0.9 INJECTION, SOLUTION INTRAVENOUS at 01:03

## 2024-03-05 RX ADMIN — PIPERACILLIN SODIUM AND TAZOBACTAM SODIUM 4.5 G: 4; .5 INJECTION, POWDER, LYOPHILIZED, FOR SOLUTION INTRAVENOUS at 05:03

## 2024-03-05 RX ADMIN — AZELASTINE HYDROCHLORIDE 137 MCG: 137 SPRAY, METERED NASAL at 10:03

## 2024-03-05 RX ADMIN — MIRTAZAPINE 7.5 MG: 7.5 TABLET ORAL at 09:03

## 2024-03-05 RX ADMIN — SODIUM CHLORIDE: 9 INJECTION, SOLUTION INTRAVENOUS at 05:03

## 2024-03-05 RX ADMIN — SODIUM CHLORIDE: 9 INJECTION, SOLUTION INTRAVENOUS at 09:03

## 2024-03-05 RX ADMIN — Medication 1 TABLET: at 09:03

## 2024-03-05 RX ADMIN — ACYCLOVIR 400 MG: 400 TABLET ORAL at 09:03

## 2024-03-05 RX ADMIN — IOHEXOL 75 ML: 350 INJECTION, SOLUTION INTRAVENOUS at 02:03

## 2024-03-05 NOTE — ED PROVIDER NOTES
SCRIBE #1 NOTE: I, Debbie Summers, am scribing for, and in the presence of,  Broderick Mancini DO. I have scribed the following portions of the note - Other sections scribed: HPI, ROS, PE.         Source of History:  Patient's daughter    Chief complaint:  Fatigue (X1 week, currently receiving chemo. Family reporting foul smelling urine. )      HPI:  Niurka Pedraza is a 78 y.o. 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 Patient was hospitalized last month with pneumonia secondary to COVID and has since required more assistance getting around for the past 1.5 weeks due to back pain. She was started on hydrocodone one week ago and has not had a bowel movement since. Patient underwent her most recent chemotherapy infusion four days ago. While this typically results in a week-long period of confusion, her daughter is concerned that this episode is worse than prior. She recounts that patient having difficulty determining where she is, what she is doing, or who she is with. Patient adds that she has recently began to have dysuria as well as pelvic pain with urination, and she reports a history of UTI with similar symptoms. PSHx of cholecystectomy.     This is the extent to the patients complaints today here in the emergency department.    ROS:   See HPI.    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    Breast cancer     mastectomy 2014    Coccidiomycosis, progressive     Elevated CA-125 02/25/2018    Hyperlipidemia     Hypertension     Liver disease     OAB (overactive bladder)     Osteopenia      on Dexa 11/2017    Osteoporosis     pt reports hx of this, declined fosamax in past - treated with calcium and vit D    Pelvic mass 02/25/2018    Pulmonary fibrosis     Pulmonary nodules     SOB (shortness of breath) on exertion     Thyroid disease     hypo    Urinary tract infection due to extended-spectrum beta lactamase (ESBL) producing Escherichia coli 03/16/2022    UTI (urinary tract infection)      Past Surgical History:   Procedure Laterality Date    CHOLECYSTECTOMY      COLONOSCOPY N/A 12/09/2022    Procedure: COLONOSCOPY;  Surgeon: Enrique Dominguez MD;  Location: [facility] (4TH FLR);  Service: Endoscopy;  Laterality: N/A;  inst via portal  pt requests after 12/9/22-MS  11/30-pt notified of earlier arrival time-KPvt    CYSTOSCOPY WITH BIOPSY OF BLADDER Bilateral 07/27/2022    Procedure: CYSTOSCOPY, WITH BLADDER BIOPSY; retrograde pyelogram,;  Surgeon: Anaya Oropeza MD;  Location: [facility];  Service: Urology;  Laterality: Bilateral;    ENDOSCOPIC ULTRASOUND OF UPPER GASTROINTESTINAL TRACT N/A 04/08/2021    Procedure: ULTRASOUND, UPPER GI TRACT, ENDOSCOPIC;  Surgeon: Aisha Barajas MD;  Location: [facility] (2ND FLR);  Service: Endoscopy;  Laterality: N/A;  UEUS/ERCP evaluation abn MRCP - Dr Angelika Zamudio-19 test 4/5/21 at [facility] Pre-admit - pg    ERCP N/A 04/08/2021    Procedure: ERCP (ENDOSCOPIC RETROGRADE CHOLANGIOPANCREATOGRAPHY);  Surgeon: Aisha Barajas MD;  Location: [facility] (2ND FLR);  Service: Endoscopy;  Laterality: N/A;  UEUS/ERCP evaluation abn MRCP - Dr Angelika Steven19 test 4/5/21 at [facility] Pre-admit - pg    ESOPHAGOGASTRODUODENOSCOPY N/A 04/08/2021    Procedure: EGD (ESOPHAGOGASTRODUODENOSCOPY);  Surgeon: Aisha Barajas MD;  Location: [facility] (2ND FLR);  Service: Endoscopy;  Laterality: N/A;  UEUS/ERCP evaluation abn MRCP - Dr Angelika Steven19 test 4/5/21 at [facility] Pre-admit - pg    ESOPHAGOGASTRODUODENOSCOPY N/A 06/02/2023    Procedure: EGD  "(ESOPHAGOGASTRODUODENOSCOPY);  Surgeon: Emeka Carney MD;  Location: [facility] ([address] [place] [address]);  Service: Endoscopy;  Laterality: N/A;  She has  history of seromucinous borderline tumor of the ovary. We generally follow  and CEA tumor markers. Her CEA recently became slightly elevated. CT imaging negative and colonoscopy negative.     Talita Barrios    instructions portal-LW  pre    HYSTERECTOMY      MASTECTOMY Right            Social History     Tobacco Use    Smoking status: Former     Current packs/day: 0.00     Average packs/day: 0.5 packs/day for 30.1 years (15.1 ttl pk-yrs)     Types: Cigarettes     Start date:      Quit date: 2000     Years since quittin.0    Smokeless tobacco: Never    Tobacco comments:     quit in her 50s, after 30 years smoking;   Substance Use Topics    Alcohol use: No    Drug use: No       Physical Exam:    BP (!) 152/90   Pulse 83   Temp 98.5 °F (36.9 °C)   Resp 18   Ht 5' 6" (1.676 m)   Wt 63.4 kg (139 lb 12.4 oz)   LMP 2000   SpO2 95%   Breastfeeding No   BMI 22.56 kg/m²   Nursing note and vital signs reviewed.  Constitutional: Chronically ill appearing. Nontoxic  ENT: Dry, tacky mucous membranes.  Cardiovascular: Regular rate and rhythm.  No murmurs. No gallops. No rubs  Respiratory: Clear to auscultation bilaterally.  Good air movement.  No wheezes.  No rhonchi. No rales. No accessory muscle use..  Abdomen: Soft.  Not distended.  Right upper quadrant, right lower quadrant, and suprapubic tenderness with mild voluntary guarding. Non-peritoneal.  Musculoskeletal: Good range of motion all joints.  No deformities.  Neck supple.  No meningismus.  Extremities: No pitting edema  Skin: Pale appearing.  Neuro: Alert and oriented to person, time, and place.    Summary of Previous Medical Records:  Reviewed the patient's outpatient labs that were drawn just prior to arrival today.  CBC shows a of 2300.  Hemoglobin is 8.5.  Eight days ago was 8.9.  Platelets " are 118.  No bandemia noted and no left shift.    CMP has a sodium of 134.  She has a BUN of 69 as well as creatinine of 1.2.  Eight days ago her BUN was 20 and creatinine 0.8 consistent with acute kidney injury.  Total bilirubin is slightly elevated at 1.5 compared to 8 days ago of 0.9.     02/06/2024-02/10/2024 [facility] discharge summary.  Patient was admitted for COVID as well as pneumonia started on IV antibiotics.  She did develop diarrhea during the hospitalization.  She was treated for COVID pneumonia with remdesivir and was covered with meropenem for MDR UTI.    Differential Dx considered but not limited to:    Metabolic derangement, encephalopathy secondary to pain medication, intra-abdominal infection such as appendicitis.  Also possibly abdominal pain due to constipation.  Also on differential would include urinary tract infection.    MDM/ Workup:  Will get labs.  Reviewed the labs that were drawn just earlier today.  Will start IV fluids and get a CT abdomen pelvis as well as chest x-ray and observed.      ED Course as of 03/09/24 0605   Tue Mar 05, 2024   1436 Lactic Acid Level: 1.2 [SM]   1456 X-Ray Chest AP Portable  Chest x-ray independently interpreted by myself shows normal cardiac silhouette, increased interstitial markings that are fibrotic in nature.  Increased markings in the right lower lobe as well as the left heart border however when comparing to previous chest x-ray on February 22, 2024 they do not appear to be significantly change. [SM]   5219 I discussed the case with Hospital Medicine Dr. Gilmore.  Will sign out to Dr. Zabala results of the CT scan as well as urinalysis. [SM]   7213 Received patient's care at 3:00 p.m. pending results of CT.  This shows segment of acute diverticulitis, significant constipation, and free air concerning for perforation.  Case discussed with Dr. Vargas who say of general surgery, who reviewed the images and recommends medical  management for now, with IV antibiotics, NPO status.  Case discussed with Dr. Gilmore of the hospitalist service to whom she will be admitted [TH]      ED Course User Index  [SM] Broderick Mancini, DO  [TH] Edwardo Zabala II, MD Scribe Attestation:   Scribe #1: I performed the above scribed service and the documentation accurately describes the services I performed. I attest to the accuracy of the note.    Physician Attestation for Scribe: I, Dr Broderick Mancini, reviewed documentation as scribed in my presence, which is both accurate and complete.    Diagnostic Impression:    1. Metabolic encephalopathy    2. Fatigue    3. Chest pain    4. Diverticulitis of large intestine with perforation without bleeding         ED Disposition Condition    Admit                   Broderick Mancini,   03/05/24 1500       Broderick Mancini,   03/09/24 0605

## 2024-03-05 NOTE — ED NOTES
Pt presents with lower back and right hip pain that began 1-2 weeks ago since last hospitalization for pna. MRI was done and nothing was found, per daughter. Pt's daughter noticed pt worsening confusion since beginning chemo 5 months ago. Pt has chronic UTIs, is currently taking antibiotics for UTI, reports ongoing irritation with urination and foul smelling urine.

## 2024-03-06 LAB
ALBUMIN SERPL BCP-MCNC: 2.5 G/DL (ref 3.5–5.2)
ALP SERPL-CCNC: 139 U/L (ref 55–135)
ALT SERPL W/O P-5'-P-CCNC: 16 U/L (ref 10–44)
ANION GAP SERPL CALC-SCNC: 10 MMOL/L (ref 8–16)
ANISOCYTOSIS BLD QL SMEAR: SLIGHT
AST SERPL-CCNC: 15 U/L (ref 10–40)
BASOPHILS # BLD AUTO: ABNORMAL K/UL (ref 0–0.2)
BASOPHILS NFR BLD: 0 % (ref 0–1.9)
BILIRUB SERPL-MCNC: 1.5 MG/DL (ref 0.1–1)
BUN SERPL-MCNC: 40 MG/DL (ref 8–23)
CALCIUM SERPL-MCNC: 9.1 MG/DL (ref 8.7–10.5)
CHLORIDE SERPL-SCNC: 103 MMOL/L (ref 95–110)
CO2 SERPL-SCNC: 23 MMOL/L (ref 23–29)
CREAT SERPL-MCNC: 0.9 MG/DL (ref 0.5–1.4)
DIFFERENTIAL METHOD BLD: ABNORMAL
EOSINOPHIL # BLD AUTO: ABNORMAL K/UL (ref 0–0.5)
EOSINOPHIL NFR BLD: 0 % (ref 0–8)
ERYTHROCYTE [DISTWIDTH] IN BLOOD BY AUTOMATED COUNT: 21.3 % (ref 11.5–14.5)
EST. GFR  (NO RACE VARIABLE): >60 ML/MIN/1.73 M^2
GLUCOSE SERPL-MCNC: 116 MG/DL (ref 70–110)
HCT VFR BLD AUTO: 25.9 % (ref 37–48.5)
HGB BLD-MCNC: 8.1 G/DL (ref 12–16)
IMM GRANULOCYTES # BLD AUTO: ABNORMAL K/UL (ref 0–0.04)
IMM GRANULOCYTES NFR BLD AUTO: ABNORMAL % (ref 0–0.5)
LYMPHOCYTES # BLD AUTO: ABNORMAL K/UL (ref 1–4.8)
LYMPHOCYTES NFR BLD: 43 % (ref 18–48)
MCH RBC QN AUTO: 27.3 PG (ref 27–31)
MCHC RBC AUTO-ENTMCNC: 31.3 G/DL (ref 32–36)
MCV RBC AUTO: 87 FL (ref 82–98)
MONOCYTES # BLD AUTO: ABNORMAL K/UL (ref 0.3–1)
MONOCYTES NFR BLD: 10 % (ref 4–15)
NEUTROPHILS NFR BLD: 47 % (ref 38–73)
NRBC BLD-RTO: 0 /100 WBC
PLATELET # BLD AUTO: 110 K/UL (ref 150–450)
PLATELET BLD QL SMEAR: ABNORMAL
PMV BLD AUTO: 9 FL (ref 9.2–12.9)
POTASSIUM SERPL-SCNC: 4.2 MMOL/L (ref 3.5–5.1)
PROT SERPL-MCNC: 6.7 G/DL (ref 6–8.4)
RBC # BLD AUTO: 2.97 M/UL (ref 4–5.4)
SODIUM SERPL-SCNC: 136 MMOL/L (ref 136–145)
WBC # BLD AUTO: 1.38 K/UL (ref 3.9–12.7)

## 2024-03-06 PROCEDURE — 99222 1ST HOSP IP/OBS MODERATE 55: CPT | Mod: ,,, | Performed by: INTERNAL MEDICINE

## 2024-03-06 PROCEDURE — 36415 COLL VENOUS BLD VENIPUNCTURE: CPT | Performed by: PHYSICIAN ASSISTANT

## 2024-03-06 PROCEDURE — 94761 N-INVAS EAR/PLS OXIMETRY MLT: CPT

## 2024-03-06 PROCEDURE — 94640 AIRWAY INHALATION TREATMENT: CPT

## 2024-03-06 PROCEDURE — 25000003 PHARM REV CODE 250: Performed by: PHYSICIAN ASSISTANT

## 2024-03-06 PROCEDURE — 99900035 HC TECH TIME PER 15 MIN (STAT)

## 2024-03-06 PROCEDURE — 11000001 HC ACUTE MED/SURG PRIVATE ROOM

## 2024-03-06 PROCEDURE — 63600175 PHARM REV CODE 636 W HCPCS: Performed by: PHYSICIAN ASSISTANT

## 2024-03-06 PROCEDURE — 25000242 PHARM REV CODE 250 ALT 637 W/ HCPCS: Performed by: PHYSICIAN ASSISTANT

## 2024-03-06 PROCEDURE — 80053 COMPREHEN METABOLIC PANEL: CPT | Performed by: PHYSICIAN ASSISTANT

## 2024-03-06 PROCEDURE — 85027 COMPLETE CBC AUTOMATED: CPT | Performed by: PHYSICIAN ASSISTANT

## 2024-03-06 PROCEDURE — 27000207 HC ISOLATION

## 2024-03-06 PROCEDURE — 85007 BL SMEAR W/DIFF WBC COUNT: CPT | Performed by: PHYSICIAN ASSISTANT

## 2024-03-06 PROCEDURE — 99233 SBSQ HOSP IP/OBS HIGH 50: CPT | Mod: ,,, | Performed by: SURGERY

## 2024-03-06 RX ADMIN — LEVOTHYROXINE SODIUM 50 MCG: 50 TABLET ORAL at 06:03

## 2024-03-06 RX ADMIN — BENZONATATE 100 MG: 100 CAPSULE ORAL at 12:03

## 2024-03-06 RX ADMIN — PIPERACILLIN SODIUM AND TAZOBACTAM SODIUM 4.5 G: 4; .5 INJECTION, POWDER, LYOPHILIZED, FOR SOLUTION INTRAVENOUS at 05:03

## 2024-03-06 RX ADMIN — ACYCLOVIR 400 MG: 400 TABLET ORAL at 08:03

## 2024-03-06 RX ADMIN — MIRTAZAPINE 7.5 MG: 7.5 TABLET ORAL at 08:03

## 2024-03-06 RX ADMIN — PIPERACILLIN SODIUM AND TAZOBACTAM SODIUM 4.5 G: 4; .5 INJECTION, POWDER, LYOPHILIZED, FOR SOLUTION INTRAVENOUS at 09:03

## 2024-03-06 RX ADMIN — Medication 1 TABLET: at 05:03

## 2024-03-06 RX ADMIN — FLUTICASONE FUROATE AND VILANTEROL TRIFENATATE 1 PUFF: 100; 25 POWDER RESPIRATORY (INHALATION) at 07:03

## 2024-03-06 RX ADMIN — AZELASTINE 137 MCG: 1 SPRAY, METERED NASAL at 08:03

## 2024-03-06 RX ADMIN — ACETAMINOPHEN 650 MG: 325 TABLET, FILM COATED ORAL at 09:03

## 2024-03-06 RX ADMIN — PIPERACILLIN SODIUM AND TAZOBACTAM SODIUM 4.5 G: 4; .5 INJECTION, POWDER, LYOPHILIZED, FOR SOLUTION INTRAVENOUS at 12:03

## 2024-03-06 NOTE — ASSESSMENT & PLAN NOTE
History of COVID/pneumonia that required admission at [facility] about a month ago  CT AP this admission Patchy confluent opacity left lower lobe, new concerning for aspiration or pneumonia.   Can consider SLP consult once on PO

## 2024-03-06 NOTE — ASSESSMENT & PLAN NOTE
"Incidental on CT AP "Abnormal severe compression fracture deformity of L2 and the moderate compression fracture deformity of L1, new from the prior study.  No apparent resulting severe canal stenosis"  Consider ortho inpt consult/ PTOT     "

## 2024-03-06 NOTE — HOSPITAL COURSE
Admitted for medical management of Acute diverticulitis with microperforation and free air, new LLL consolidation. Placed NPO with IVF and Zosyn. Surgery consulted. Her abdomen resolved with medical management. She developed severe pancytopenia requiring blood transfusions and monitoring, heme/onc was consulted who recommended against any GCSF and to hold her home venclexta. She required 1U RBC transfusion on 3/7 and 3/8 with improvement in her hemoglobin. Developed some constipation and given milk of magnesia for regimen and a suppository. Awaiting return of bowel function and stabilization of WBC prior to discharge. PT/OT recommending ZULY, patient and family prefer to go home with HH PT/OT and hospital bed (not sure hosp bed will be covered).

## 2024-03-06 NOTE — SUBJECTIVE & OBJECTIVE
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    Breast cancer     mastectomy 2014    Coccidiomycosis, progressive     Elevated CA-125 02/25/2018    Hyperlipidemia     OAB (overactive bladder)     Osteopenia     on Dexa 11/2017    Osteoporosis     pt reports hx of this, declined fosamax in past - treated with calcium and vit D    Pelvic mass 02/25/2018    Pulmonary fibrosis     Pulmonary nodules     SOB (shortness of breath) on exertion     Thyroid disease     hypo    Urinary tract infection due to extended-spectrum beta lactamase (ESBL) producing Escherichia coli 3/16/2022    UTI (urinary tract infection)        Past Surgical History:   Procedure Laterality Date    CHOLECYSTECTOMY      COLONOSCOPY N/A 12/09/2022    Procedure: COLONOSCOPY;  Surgeon: Enrique Dominguez MD;  Location: [facility] (4TH FLR);  Service: Endoscopy;  Laterality: N/A;  inst via portal  pt requests after 12/9/22-MS  11/30-pt notified of earlier arrival time-KPvt    CYSTOSCOPY WITH BIOPSY OF BLADDER Bilateral 07/27/2022    Procedure: CYSTOSCOPY, WITH BLADDER BIOPSY; retrograde pyelogram,;  Surgeon: Anaya Oropeza MD;  Location: [facility];  Service: Urology;  Laterality: Bilateral;    ENDOSCOPIC ULTRASOUND OF UPPER GASTROINTESTINAL TRACT N/A 04/08/2021    Procedure: ULTRASOUND, UPPER GI TRACT, ENDOSCOPIC;  Surgeon: Aisha Barajas MD;  Location: [facility] (2ND FLR);  Service: Endoscopy;  Laterality: N/A;  UEUS/ERCP evaluation abn MRCP - Dr Angelika Zamudio-19 test 4/5/21 at [facility] [place] Pre-admit - pg    ERCP N/A 04/08/2021    Procedure: ERCP (ENDOSCOPIC RETROGRADE CHOLANGIOPANCREATOGRAPHY);  Surgeon: Aisha Barajas MD;  Location: [facility] (2ND FLR);  Service: Endoscopy;  Laterality: N/A;  UEUS/ERCP evaluation abn MRCP - Dr Angelika Steven19 test 4/5/21 at [facility] [place] Pre-admit - pg    ESOPHAGOGASTRODUODENOSCOPY N/A 04/08/2021    Procedure: EGD (ESOPHAGOGASTRODUODENOSCOPY);   Surgeon: Aisha Barajas MD;  Location: [facility] (2ND FLR);  Service: Endoscopy;  Laterality: N/A;  UEUS/ERCP evaluation abn MRCP - Dr Carney  Covid-19 test 4/5/21 at [facility] Pre-admit - pg    ESOPHAGOGASTRODUODENOSCOPY N/A 06/02/2023    Procedure: EGD (ESOPHAGOGASTRODUODENOSCOPY);  Surgeon: Emeka Carney MD;  Location: [facility] (4TH FLR);  Service: Endoscopy;  Laterality: N/A;  She has  history of seromucinous borderline tumor of the ovary. We generally follow  and CEA tumor markers. Her CEA recently became slightly elevated. CT imaging negative and colonoscopy negative.     Talita SKuldip Barrios    instructions portal-LW  pre    HYSTERECTOMY      MASTECTOMY Right     2014       Review of patient's allergies indicates:   Allergen Reactions    Ciprofloxacin Other (See Comments)     Right foot pain and edema, tendonitis    Amlodipine Edema and Swelling     BLE  BLE    Lisinopril Other (See Comments)     cough    Nitrofuran analogues        No current facility-administered medications on file prior to encounter.     Current Outpatient Medications on File Prior to Encounter   Medication Sig    acyclovir (ZOVIRAX) 400 MG tablet Take 1 tablet (400 mg total) by mouth 2 (two) times daily.    albuterol-ipratropium (DUO-NEB) 2.5 mg-0.5 mg/3 mL nebulizer solution Take 3 mLs by nebulization every 6 (six) hours as needed for Wheezing or Shortness of Breath. Rescue    ascorbic acid/zinc (ZINC WITH VITAMIN C ORAL) Take 1 tablet by mouth Daily.    atorvastatin (LIPITOR) 20 MG tablet Take 1 tablet (20 mg total) by mouth once daily.    azelastine (ASTELIN) 137 mcg (0.1 %) nasal spray 1 spray (137 mcg total) by Nasal route 2 (two) times daily.    calcium-vitamin D3 (CALCIUM 500 + D) 500 mg(1,250mg) -200 unit per tablet Take 1 tablet by mouth 2 (two) times daily with meals.    carvediloL (COREG) 25 MG tablet Take 1 tablet (25 mg total) by mouth 2 (two) times daily with meals.    ferrous gluconate (FERGON) 324 MG tablet  Take 1 tablet (324 mg total) by mouth every other day.    fluticasone furoate-vilanteroL (BREO ELLIPTA) 100-25 mcg/dose diskus inhaler Inhale 1 puff into the lungs once daily. Controller.  PLEASE NOTE IT IS ONCE A DAY!!    HYDROcodone-acetaminophen (NORCO) 7.5-325 mg per tablet Take 1 tablet by mouth every 6 (six) hours as needed for Pain.    irbesartan (AVAPRO) 300 MG tablet Take 1 tablet (300 mg total) by mouth every evening.    levothyroxine (SYNTHROID) 50 MCG tablet Take 1 tablet (50 mcg total) by mouth before breakfast.    meloxicam (MOBIC) 15 MG tablet Take 1 tablet (15 mg total) by mouth daily as needed for Pain. (Anti-inflammatory medication) take with food    methocarbamoL (ROBAXIN) 500 MG Tab     mirtazapine (REMERON) 7.5 MG Tab Take 1 tablet (7.5 mg total) by mouth every evening.    multivitamin (THERAGRAN) per tablet Take 1 tablet by mouth once daily.    oxybutynin (DITROPAN XL) 15 MG TR24 Take 1 tablet (15 mg total) by mouth once daily.    pantoprazole (PROTONIX) 20 MG tablet Take 1 tablet (20 mg total) by mouth once daily.    posaconazole (NOXAFIL) 100 mg TbEC tablet Take 3 tablets (300 mg) by mouth twice daily on the first day, and then take 1 tablet by mouth once daily thereafter.    potassium chloride (K-TAB) 20 mEq Take 1 tablet (20 mEq total) by mouth once daily.    predniSONE (DELTASONE) 1 MG tablet Take 2 tablets (2 mg total) by mouth once daily.    sodium chloride 3% 3 % nebulizer solution Take 4 mLs by nebulization as needed for Other.    venetoclax (VENCLEXTA) 100 mg Tab Take one tablet (100 mg) by mouth daily on days 1-14 of each 28 day cycle of therapy..    [DISCONTINUED] budesonide-formoterol 80-4.5 mcg (SYMBICORT) 80-4.5 mcg/actuation HFAA Inhale 2 puffs into the lungs 2 (two) times daily as needed. Controller     Family History       Problem Relation (Age of Onset)    Breast cancer Mother    Cancer Mother, Brother    Heart disease Father    Hypertension Father, Brother, Son    No Known  Problems Sister, Daughter    Stroke Father          Tobacco Use    Smoking status: Former     Current packs/day: 0.00     Average packs/day: 0.5 packs/day for 30.1 years (15.1 ttl pk-yrs)     Types: Cigarettes     Start date:      Quit date: 2000     Years since quittin.0    Smokeless tobacco: Never    Tobacco comments:     quit in her 50s, after 30 years smoking;   Substance and Sexual Activity    Alcohol use: No    Drug use: No    Sexual activity: Not Currently     Partners: Male     Review of Systems   Constitutional:  Positive for activity change, appetite change and fatigue. Negative for fever.   Respiratory:  Positive for cough. Negative for shortness of breath.    Cardiovascular:  Negative for chest pain and leg swelling.   Gastrointestinal:  Positive for abdominal distention, abdominal pain and constipation. Negative for blood in stool, diarrhea, nausea and vomiting.   Genitourinary:  Positive for dysuria. Negative for difficulty urinating.   Musculoskeletal:  Positive for arthralgias and gait problem.   Skin:  Negative for color change.   Neurological:  Positive for weakness. Negative for syncope.   Psychiatric/Behavioral:  Negative for agitation and confusion.      Objective:     Vital Signs (Most Recent):  Temp: 99.6 °F (37.6 °C) (24)  Pulse: 93 (24)  Resp: 20 (24)  BP: 113/63 (24)  SpO2: 95 % (24) Vital Signs (24h Range):  Temp:  [98.2 °F (36.8 °C)-99.6 °F (37.6 °C)] 99.6 °F (37.6 °C)  Pulse:  [87-97] 93  Resp:  [19-22] 20  SpO2:  [95 %-97 %] 95 %  BP: (110-131)/(57-70) 113/63     Weight: 61.7 kg (136 lb)  Body mass index is 21.95 kg/m².     Physical Exam  Constitutional:       General: She is not in acute distress.     Appearance: She is ill-appearing.   HENT:      Head: Normocephalic and atraumatic.   Eyes:      Extraocular Movements: Extraocular movements intact.   Cardiovascular:      Rate and Rhythm: Normal rate and regular rhythm.    Pulmonary:      Effort: Pulmonary effort is normal. No respiratory distress.   Abdominal:      General: Bowel sounds are normal. There is distension.      Palpations: Abdomen is soft.      Tenderness: There is abdominal tenderness.   Musculoskeletal:      Right lower leg: No edema.      Left lower leg: No edema.   Skin:     General: Skin is warm and dry.   Neurological:      General: No focal deficit present.      Mental Status: She is alert.   Psychiatric:         Mood and Affect: Mood normal.         Behavior: Behavior normal.                Significant Labs: All pertinent labs within the past 24 hours have been reviewed.    Significant Imaging: I have reviewed all pertinent imaging results/findings within the past 24 hours.

## 2024-03-06 NOTE — HPI
78 y.o. 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 Patient was hospitalized last month with pneumonia secondary to COVID and has since required more assistance getting around for the past 1.5 weeks due to back pain. She was started on hydrocodone one week ago and has not had a bowel movement since. Patient underwent her most recent chemotherapy infusion four days ago. While this typically results in a week-long period of confusion, her daughter is concerned that this episode is worse than prior. She recounts that patient having difficulty determining where she is, what she is doing, or who she is with. Patient adds that she has recently began to have dysuria as well as pelvic pain with urination, and she reports a history of UTI with similar symptoms. PSHx of cholecystectomy.

## 2024-03-06 NOTE — PLAN OF CARE
Pt remained free of falls and injuries throughout shift. AAOx4. Pt calm and cooperative. Purposeful hourly rounding performed. Pt swallows meds whole. Pt received IV Zosyn as ordered, NS infusing @ 75 mL/hr. Managed c/o cough with one time dose Tessalon. Patient ambulates with standby assist, pure wick in place to suction. Daughter at bedside participating with care. VSS on RA. Pt resting comfortably in bed, denies pain, denies needs at this time. Bed low and locked, call light in reach. Side rails up x2. Will continue to monitor.

## 2024-03-06 NOTE — ASSESSMENT & PLAN NOTE
Niurka Pedraza presents with lethargy,abdominal bloating and pain    Afebrile without leukocytosis  MAPs 70-80s  CT AP with possible acute diverticulitis, underlying malignancy not excluded.  There is a large amount of retained stool throughout the colon and FREE AIR  concerning for perforation  Given IV Zosyn in ER, continue  Surgery consulted, NPO, IV abx

## 2024-03-06 NOTE — SUBJECTIVE & OBJECTIVE
No current facility-administered medications on file prior to encounter.     Current Outpatient Medications on File Prior to Encounter   Medication Sig    acyclovir (ZOVIRAX) 400 MG tablet Take 1 tablet (400 mg total) by mouth 2 (two) times daily.    albuterol-ipratropium (DUO-NEB) 2.5 mg-0.5 mg/3 mL nebulizer solution Take 3 mLs by nebulization every 6 (six) hours as needed for Wheezing or Shortness of Breath. Rescue    ascorbic acid/zinc (ZINC WITH VITAMIN C ORAL) Take 1 tablet by mouth Daily.    atorvastatin (LIPITOR) 20 MG tablet Take 1 tablet (20 mg total) by mouth once daily.    azelastine (ASTELIN) 137 mcg (0.1 %) nasal spray 1 spray (137 mcg total) by Nasal route 2 (two) times daily.    calcium-vitamin D3 (CALCIUM 500 + D) 500 mg(1,250mg) -200 unit per tablet Take 1 tablet by mouth 2 (two) times daily with meals.    carvediloL (COREG) 25 MG tablet Take 1 tablet (25 mg total) by mouth 2 (two) times daily with meals.    ferrous gluconate (FERGON) 324 MG tablet Take 1 tablet (324 mg total) by mouth every other day.    fluticasone furoate-vilanteroL (BREO ELLIPTA) 100-25 mcg/dose diskus inhaler Inhale 1 puff into the lungs once daily. Controller.  PLEASE NOTE IT IS ONCE A DAY!!    HYDROcodone-acetaminophen (NORCO) 7.5-325 mg per tablet Take 1 tablet by mouth every 6 (six) hours as needed for Pain.    irbesartan (AVAPRO) 300 MG tablet Take 1 tablet (300 mg total) by mouth every evening.    levothyroxine (SYNTHROID) 50 MCG tablet Take 1 tablet (50 mcg total) by mouth before breakfast.    meloxicam (MOBIC) 15 MG tablet Take 1 tablet (15 mg total) by mouth daily as needed for Pain. (Anti-inflammatory medication) take with food    methocarbamoL (ROBAXIN) 500 MG Tab     mirtazapine (REMERON) 7.5 MG Tab Take 1 tablet (7.5 mg total) by mouth every evening.    multivitamin (THERAGRAN) per tablet Take 1 tablet by mouth once daily.    oxybutynin (DITROPAN XL) 15 MG TR24 Take 1 tablet (15 mg total) by mouth once daily.     pantoprazole (PROTONIX) 20 MG tablet Take 1 tablet (20 mg total) by mouth once daily.    posaconazole (NOXAFIL) 100 mg TbEC tablet Take 3 tablets (300 mg) by mouth twice daily on the first day, and then take 1 tablet by mouth once daily thereafter.    potassium chloride (K-TAB) 20 mEq Take 1 tablet (20 mEq total) by mouth once daily.    predniSONE (DELTASONE) 1 MG tablet Take 2 tablets (2 mg total) by mouth once daily.    sodium chloride 3% 3 % nebulizer solution Take 4 mLs by nebulization as needed for Other.    venetoclax (VENCLEXTA) 100 mg Tab Take one tablet (100 mg) by mouth daily on days 1-14 of each 28 day cycle of therapy..    [DISCONTINUED] budesonide-formoterol 80-4.5 mcg (SYMBICORT) 80-4.5 mcg/actuation HFAA Inhale 2 puffs into the lungs 2 (two) times daily as needed. 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    Breast cancer     mastectomy 2014    Coccidiomycosis, progressive     Elevated CA-125 02/25/2018    Hyperlipidemia     OAB (overactive bladder)     Osteopenia     on Dexa 11/2017    Osteoporosis     pt reports hx of this, declined fosamax in past - treated with calcium and vit D    Pelvic mass 02/25/2018    Pulmonary fibrosis     Pulmonary nodules     SOB (shortness of breath) on exertion     Thyroid disease     hypo    Urinary tract infection due to extended-spectrum beta lactamase (ESBL) producing Escherichia coli 3/16/2022    UTI (urinary tract infection)      Past Surgical History:   Procedure Laterality Date    CHOLECYSTECTOMY      COLONOSCOPY N/A 12/09/2022    Procedure: COLONOSCOPY;  Surgeon: Enrique Dominguez MD;  Location: [address]  [facility]);  Service: Endoscopy;  Laterality: N/A;  inst via portal  pt requests after 12/9/22-MS  11/30-pt notified of earlier arrival time-KPvt    CYSTOSCOPY WITH BIOPSY OF BLADDER Bilateral 07/27/2022    Procedure: CYSTOSCOPY, WITH BLADDER BIOPSY; retrograde pyelogram,;  Surgeon: Anaya Oropeza MD;  Location: [facility];  Service: Urology;  Laterality: Bilateral;    ENDOSCOPIC ULTRASOUND OF UPPER GASTROINTESTINAL TRACT N/A 04/08/2021    Procedure: ULTRASOUND, UPPER GI TRACT, ENDOSCOPIC;  Surgeon: Aisha Barajas MD;  Location: [facility] (2ND FLR);  Service: Endoscopy;  Laterality: N/A;  UEUS/ERCP evaluation abn MRCP - Dr Angelika Steinerid-19 test 4/5/21 at [facility] Pre-admit - pg    ERCP N/A 04/08/2021    Procedure: ERCP (ENDOSCOPIC RETROGRADE CHOLANGIOPANCREATOGRAPHY);  Surgeon: Aisha Barajas MD;  Location: [facility] (2ND FLR);  Service: Endoscopy;  Laterality: N/A;  UEUS/ERCP evaluation abn MRCP - Dr Angelika Steinerid-19 test 4/5/21 at [facility] Pre-admit - pg    ESOPHAGOGASTRODUODENOSCOPY N/A 04/08/2021    Procedure: EGD (ESOPHAGOGASTRODUODENOSCOPY);  Surgeon: Aisha Barajas MD;  Location: [facility] (2ND FLR);  Service: Endoscopy;  Laterality: N/A;  UEUS/ERCP evaluation abn MRCP - Dr Carney  Covid-19 test 4/5/21 at [facility] Pre-admit - pg    ESOPHAGOGASTRODUODENOSCOPY N/A 06/02/2023    Procedure: EGD (ESOPHAGOGASTRODUODENOSCOPY);  Surgeon: Emeka Carney MD;  Location: [facility] (4TH FLR);  Service: Endoscopy;  Laterality: N/A;  She has  history of seromucinous borderline tumor of the ovary. We generally follow  and CEA tumor markers. Her CEA recently became slightly elevated. CT imaging negative and colonoscopy negative.     Talita SKuldip Barrios    instructions portal-LW  pre    HYSTERECTOMY      MASTECTOMY Right     2014     Family History       Problem Relation (Age of Onset)    Breast cancer Mother    Cancer Mother, Brother    Heart disease Father    Hypertension Father, Brother, Son    No Known  Problems Sister, Daughter    Stroke Father          Tobacco Use    Smoking status: Former     Current packs/day: 0.00     Average packs/day: 0.5 packs/day for 30.1 years (15.1 ttl pk-yrs)     Types: Cigarettes     Start date:      Quit date: 2000     Years since quittin.0    Smokeless tobacco: Never    Tobacco comments:     quit in her 50s, after 30 years smoking;   Substance and Sexual Activity    Alcohol use: No    Drug use: No    Sexual activity: Not Currently     Partners: Male     Review of Systems   Constitutional:  Negative for appetite change, fatigue, fever and unexpected weight change.   HENT:  Negative for sore throat and trouble swallowing.    Eyes: Negative.    Respiratory:  Negative for cough, shortness of breath and wheezing.    Cardiovascular:  Negative for chest pain and leg swelling.   Gastrointestinal:  Positive for abdominal pain (mostly lower, mild). Negative for abdominal distention, blood in stool, constipation, diarrhea, nausea and vomiting.   Endocrine: Negative.    Genitourinary: Negative.    Musculoskeletal:  Negative for back pain.   Skin: Negative.  Negative for rash.   Allergic/Immunologic: Negative.    Neurological: Negative.    Hematological: Negative.    Psychiatric/Behavioral:  Negative for confusion.      Objective:     Vital Signs (Most Recent):  Temp: 99.6 °F (37.6 °C) (24)  Pulse: 93 (24)  Resp: 20 (24)  BP: 113/63 (24)  SpO2: 95 % (24) Vital Signs (24h Range):  Temp:  [98.2 °F (36.8 °C)-99.6 °F (37.6 °C)] 99.6 °F (37.6 °C)  Pulse:  [87-97] 93  Resp:  [19-22] 20  SpO2:  [95 %-97 %] 95 %  BP: (110-131)/(57-70) 113/63     Weight: 61.7 kg (136 lb)  Body mass index is 21.95 kg/m².     Physical Exam  Vitals and nursing note reviewed.   Constitutional:       Appearance: She is well-developed.   HENT:      Head: Normocephalic and atraumatic.   Cardiovascular:      Rate and Rhythm: Normal rate.      Heart sounds: Normal  heart sounds.   Pulmonary:      Effort: Pulmonary effort is normal.   Abdominal:      General: Bowel sounds are normal. There is no distension.      Palpations: Abdomen is soft.      Tenderness: There is abdominal tenderness. Rebound: mild in lower abdomen.   Musculoskeletal:         General: Normal range of motion.      Cervical back: Normal range of motion.   Skin:     General: Skin is warm and dry.      Capillary Refill: Capillary refill takes less than 2 seconds.   Neurological:      Mental Status: She is alert and oriented to person, place, and time.   Psychiatric:         Behavior: Behavior normal.            I have reviewed all pertinent lab results within the past 24 hours.  CBC:   Recent Labs   Lab 03/05/24  1200   WBC 2.32*   RBC 3.19*   HGB 8.5*   HCT 27.0*   *   MCV 85   MCH 26.6*   MCHC 31.5*     CMP:   Recent Labs   Lab 03/05/24  1200   *   CALCIUM 9.3   ALBUMIN 2.7*   PROT 6.8   *   K 5.1   CO2 23      BUN 69*   CREATININE 1.2   ALKPHOS 109   ALT 13   AST 14   BILITOT 1.5*       Significant Diagnostics:  I have reviewed all pertinent imaging results/findings within the past 24 hours.  CT: I have reviewed all pertinent results/findings within the past 24 hours and my personal findings are:  Acute diverticulitis with some pockets of intraperitoneal air

## 2024-03-06 NOTE — ASSESSMENT & PLAN NOTE
Moderate Leukopenia  Thrombocytopenia  Anemia    Does not believe she takes posaconazole anymore  Takes venetoclax days 1-14 of each 28 day cycle of therapy   Continue prednisone 2mg daily, acyclovir as prevention     Will discuss with oncology about continuing her venetoclax and whether she would benefit from support of her moderate leukopenia

## 2024-03-06 NOTE — ASSESSMENT & PLAN NOTE
Given acute illness and normal pressures, hold home BP meds for now        carvediloL (COREG) 25 MG tablet Take 1 tablet (25 mg total) by mouth 2 (two) times daily with meals.    irbesartan (AVAPRO) 300 MG tablet Take 1 tablet (300 mg total) by mouth every evening.

## 2024-03-06 NOTE — ASSESSMENT & PLAN NOTE
History of COVID/pneumonia that required admission at [facility] about a month ago  CT AP this admission Patchy confluent opacity left lower lobe, new concerning for aspiration or pneumonia.   Can consider SLP consult once on PO

## 2024-03-06 NOTE — NURSING
Nurses Note -- 4 Eyes      3/6/2024   9:58 AM      Skin assessed during: Admit      [x] No Altered Skin Integrity Present    []Prevention Measures Documented      [] Yes- Altered Skin Integrity Present or Discovered   [] LDA Added if Not in Epic (Describe Wound)   [] New Altered Skin Integrity was Present on Admit and Documented in LDA   [] Wound Image Taken    Wound Care Consulted? No    Attending Nurse:  Lucretia Jackson RN/Staff Member:   AQUILES Isaac

## 2024-03-06 NOTE — ASSESSMENT & PLAN NOTE
Serial abdominal exams   Okay for soft diet   Continue IV antibiotics   We will monitor closely and any sign of deterioration we would consider surgical intervention.    Patient seems to be doing well so far.

## 2024-03-06 NOTE — PLAN OF CARE
MSW met with the patient at the bedside. The patient's daughter Luz is also at the bedside.     Patient is  not alert and oriented at the moment.     Patient has DME at home. (Wheelchair ,Rolling Walker & seat riser)     Patients PCP is correct on the face sheet. Patient choice pharmacy is bedside delivery.     Patients' family will transport the patient home at discharge.     CM team will continue to follow.      03/06/24 9327   Discharge Assessment   Assessment Type Discharge Planning Assessment   Confirmed/corrected address, phone number and insurance Yes   Confirmed Demographics Correct on Facesheet   Source of Information family;health record   People in Home child(kit), adult   Do you expect to return to your current living situation? Yes   Do you have help at home or someone to help you manage your care at home? Yes   Prior to hospitilization cognitive status: Alert/Oriented   Current cognitive status: Alert/Oriented   Walking or Climbing Stairs Difficulty yes   Walking or Climbing Stairs ambulation difficulty, requires equipment   Dressing/Bathing Difficulty yes   Dressing/Bathing bathing difficulty, requires equipment   Equipment Currently Used at Home 3-in-1 commode;walker, rolling;cane, straight;wheelchair   Readmission within 30 days? No   Patient currently being followed by outpatient case management? No   Do you currently have service(s) that help you manage your care at home? No   Do you take prescription medications? Yes   Do you have prescription coverage? Yes   Do you have any problems affording any of your prescribed medications? No   Is the patient taking medications as prescribed? yes   How do you get to doctors appointments? family or friend will provide   Are you on dialysis? No   Do you take coumadin? No   Discharge Plan A Home with family   Discharge Plan B Home Health   DME Needed Upon Discharge  none   Discharge Plan discussed with: Adult children   Transition of Care Barriers None

## 2024-03-06 NOTE — HPI
Niurka Pedraza is a 78F with myelodysplastic syndrome with excess blasts-2 on chronic steroids, ILD/pulmonary fibrosis, hypertension, MDRO UTIs, pulmonary coccidioidomycosis and hypothyroidism who presents with her daughter for increasing weakness and lethargy over the past week. She used to be able to walk with a cane or walker, but now has felt so weak she struggles to walk short distances. Cannot recall her last bowel movement, usually takes dulcolax at home. She reports some lower abdominal discomfort, bloating, burning with urination. Her last chemo infusion was on Friday, four days ago. Denies chest pain, shortness of breath, nausea, vomiting, diarrhea. Daughter Luz at bedside notes she has not had much of an appetite    Of note she was recently hospitalized at [facility] for COVID/pneumonia, UTI and discharged with Meropenem IV to complete treatment for MSSA pneumonia and recurrent UTI. Treatment completed 2/16/2024 but patient reports residual productive cough and dysuria. Patchy confluent opacity left lower lobe, new concerning for aspiration or pneumonia.     In ER she's afebrile without leukocytosis, CBC shows thrombocytopenia consistent with baseline; CMP with elevated T. Bili otherwise LFTs WNL and Cr at baseline. UA unremarkable; CXR: Nonspecific patchy opacities at the lung bases could relate to atelectasis, aspiration or pneumonia. CT AP with possible acute diverticulitis, underlying malignancy not excluded.  There is a large amount of retained stool throughout the colon and FREE AIR  concerning for perforation. Patchy confluent opacity left lower lobe, new concerning for aspiration or pneumonia. Abnormal severe compression fracture deformity of L2 and the moderate compression fracture deformity of L1, new from the prior study. ; Surgery consulted recommend NPO and IV abx

## 2024-03-06 NOTE — PROGRESS NOTES
[facility] ([place])  General Surgery  Progress Note    Subjective:     History of Present Illness:  78 y.o. 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 Patient was hospitalized last month with pneumonia secondary to COVID and has since required more assistance getting around for the past 1.5 weeks due to back pain. She was started on hydrocodone one week ago and has not had a bowel movement since. Patient underwent her most recent chemotherapy infusion four days ago. While this typically results in a week-long period of confusion, her daughter is concerned that this episode is worse than prior. She recounts that patient having difficulty determining where she is, what she is doing, or who she is with. Patient adds that she has recently began to have dysuria as well as pelvic pain with urination, and she reports a history of UTI with similar symptoms. PSHx of cholecystectomy.      Post-Op Info:  * No surgery found *         Interval History:  Patient with minimal abdominal pain.    States she is hungry.    Tolerating liquid diet.    No nausea or vomiting.        Medications:  Continuous Infusions:   sodium chloride 0.9% 75 mL/hr at 03/05/24 2106     Scheduled Meds:   acyclovir  400 mg Oral BID    atorvastatin  20 mg Oral Daily    azelastine  1 spray Nasal BID    calcium-vitamin D3  1 tablet Oral BID WM    ferrous sulfate  1 tablet Oral Daily    fluticasone furoate-vilanteroL  1 puff Inhalation Daily    levothyroxine  50 mcg Oral Before breakfast    mirtazapine  7.5 mg Oral QHS    multivitamin  1 tablet Oral Daily    oxybutynin  15 mg Oral Daily    pantoprazole  40 mg Oral Daily    piperacillin-tazobactam (Zosyn) IV (PEDS and ADULTS) (extended infusion is not appropriate)  4.5 g Intravenous Q8H    predniSONE  2 mg Oral Daily     PRN Meds:acetaminophen, albuterol-ipratropium, aluminum-magnesium hydroxide-simethicone, dextrose 10%, dextrose 10%,  glucagon (human recombinant), glucose, glucose, melatonin, naloxone, ondansetron, prochlorperazine, simethicone, sodium chloride 0.9%, sodium chloride 3%     Review of patient's allergies indicates:   Allergen Reactions    Ciprofloxacin Other (See Comments)     Right foot pain and edema, tendonitis    Amlodipine Edema and Swelling     BLE  BLE    Lisinopril Other (See Comments)     cough    Nitrofuran analogues      Objective:     Vital Signs (Most Recent):  Temp: 98.4 °F (36.9 °C) (03/06/24 1147)  Pulse: 90 (03/06/24 1147)  Resp: 18 (03/06/24 1147)  BP: (!) 119/58 (03/06/24 1147)  SpO2: (!) 94 % (03/06/24 1147) Vital Signs (24h Range):  Temp:  [98.1 °F (36.7 °C)-99.6 °F (37.6 °C)] 98.4 °F (36.9 °C)  Pulse:  [] 90  Resp:  [16-22] 18  SpO2:  [92 %-97 %] 94 %  BP: (110-158)/(55-75) 119/58     Weight: 63.4 kg (139 lb 12.4 oz)  Body mass index is 22.56 kg/m².    Intake/Output - Last 3 Shifts         03/04 0700  03/05 0659 03/05 0700  03/06 0659 03/06 0700  03/07 0659    P.O.  225     I.V. (mL/kg)  786.9 (12.4)     IV Piggyback  100     Total Intake(mL/kg)  1111.9 (17.5)     Net  +1111.9            Urine Occurrence  3 x     Stool Occurrence  1 x     Emesis Occurrence  0 x              Physical Exam  Vitals and nursing note reviewed.   Constitutional:       Appearance: She is well-developed.   HENT:      Head: Normocephalic and atraumatic.   Cardiovascular:      Rate and Rhythm: Normal rate.      Heart sounds: Normal heart sounds.   Pulmonary:      Effort: Pulmonary effort is normal.   Abdominal:      General: Bowel sounds are normal. There is no distension.      Palpations: Abdomen is soft.      Tenderness: There is no abdominal tenderness.   Musculoskeletal:         General: Normal range of motion.      Cervical back: Normal range of motion.   Skin:     General: Skin is warm and dry.      Capillary Refill: Capillary refill takes less than 2 seconds.   Neurological:      Mental Status: She is alert and oriented to  person, place, and time.   Psychiatric:         Behavior: Behavior normal.          Significant Labs:  I have reviewed all pertinent lab results within the past 24 hours.  CBC:   Recent Labs   Lab 03/06/24  0434   WBC 1.38*   RBC 2.97*   HGB 8.1*   HCT 25.9*   *   MCV 87   MCH 27.3   MCHC 31.3*     CMP:   Recent Labs   Lab 03/06/24  0434   *   CALCIUM 9.1   ALBUMIN 2.5*   PROT 6.7      K 4.2   CO2 23      BUN 40*   CREATININE 0.9   ALKPHOS 139*   ALT 16   AST 15   BILITOT 1.5*       Significant Diagnostics:  I have reviewed all pertinent imaging results/findings within the past 24 hours.  Assessment/Plan:     * Diverticulitis of large intestine with perforation  Serial abdominal exams   Okay for soft diet   Continue IV antibiotics   We will monitor closely and any sign of deterioration we would consider surgical intervention.    Patient seems to be doing well so far.              Michael Worrell MD  General Surgery  [facility] ([place])

## 2024-03-06 NOTE — SUBJECTIVE & OBJECTIVE
Interval History:  Patient with minimal abdominal pain.    States she is hungry.    Tolerating liquid diet.    No nausea or vomiting.        Medications:  Continuous Infusions:   sodium chloride 0.9% 75 mL/hr at 03/05/24 2106     Scheduled Meds:   acyclovir  400 mg Oral BID    atorvastatin  20 mg Oral Daily    azelastine  1 spray Nasal BID    calcium-vitamin D3  1 tablet Oral BID WM    ferrous sulfate  1 tablet Oral Daily    fluticasone furoate-vilanteroL  1 puff Inhalation Daily    levothyroxine  50 mcg Oral Before breakfast    mirtazapine  7.5 mg Oral QHS    multivitamin  1 tablet Oral Daily    oxybutynin  15 mg Oral Daily    pantoprazole  40 mg Oral Daily    piperacillin-tazobactam (Zosyn) IV (PEDS and ADULTS) (extended infusion is not appropriate)  4.5 g Intravenous Q8H    predniSONE  2 mg Oral Daily     PRN Meds:acetaminophen, albuterol-ipratropium, aluminum-magnesium hydroxide-simethicone, dextrose 10%, dextrose 10%, glucagon (human recombinant), glucose, glucose, melatonin, naloxone, ondansetron, prochlorperazine, simethicone, sodium chloride 0.9%, sodium chloride 3%     Review of patient's allergies indicates:   Allergen Reactions    Ciprofloxacin Other (See Comments)     Right foot pain and edema, tendonitis    Amlodipine Edema and Swelling     BLE  BLE    Lisinopril Other (See Comments)     cough    Nitrofuran analogues      Objective:     Vital Signs (Most Recent):  Temp: 98.4 °F (36.9 °C) (03/06/24 1147)  Pulse: 90 (03/06/24 1147)  Resp: 18 (03/06/24 1147)  BP: (!) 119/58 (03/06/24 1147)  SpO2: (!) 94 % (03/06/24 1147) Vital Signs (24h Range):  Temp:  [98.1 °F (36.7 °C)-99.6 °F (37.6 °C)] 98.4 °F (36.9 °C)  Pulse:  [] 90  Resp:  [16-22] 18  SpO2:  [92 %-97 %] 94 %  BP: (110-158)/(55-75) 119/58     Weight: 63.4 kg (139 lb 12.4 oz)  Body mass index is 22.56 kg/m².    Intake/Output - Last 3 Shifts         03/04 0700 03/05 0659 03/05 0700 03/06 0659 03/06 0700 03/07 0659    P.O.  225     I.V.  (mL/kg)  786.9 (12.4)     IV Piggyback  100     Total Intake(mL/kg)  1111.9 (17.5)     Net  +1111.9            Urine Occurrence  3 x     Stool Occurrence  1 x     Emesis Occurrence  0 x              Physical Exam  Vitals and nursing note reviewed.   Constitutional:       Appearance: She is well-developed.   HENT:      Head: Normocephalic and atraumatic.   Cardiovascular:      Rate and Rhythm: Normal rate.      Heart sounds: Normal heart sounds.   Pulmonary:      Effort: Pulmonary effort is normal.   Abdominal:      General: Bowel sounds are normal. There is no distension.      Palpations: Abdomen is soft.      Tenderness: There is no abdominal tenderness.   Musculoskeletal:         General: Normal range of motion.      Cervical back: Normal range of motion.   Skin:     General: Skin is warm and dry.      Capillary Refill: Capillary refill takes less than 2 seconds.   Neurological:      Mental Status: She is alert and oriented to person, place, and time.   Psychiatric:         Behavior: Behavior normal.          Significant Labs:  I have reviewed all pertinent lab results within the past 24 hours.  CBC:   Recent Labs   Lab 03/06/24  0434   WBC 1.38*   RBC 2.97*   HGB 8.1*   HCT 25.9*   *   MCV 87   MCH 27.3   MCHC 31.3*     CMP:   Recent Labs   Lab 03/06/24  0434   *   CALCIUM 9.1   ALBUMIN 2.5*   PROT 6.7      K 4.2   CO2 23      BUN 40*   CREATININE 0.9   ALKPHOS 139*   ALT 16   AST 15   BILITOT 1.5*       Significant Diagnostics:  I have reviewed all pertinent imaging results/findings within the past 24 hours.

## 2024-03-06 NOTE — CONSULTS
Inpatient consult to Hematology/Oncology  Consult performed by: Lexi Argueta MD  Consult ordered by: Salvador Gilmore MD  Reason for consult: MDS/AML      Hematology / Oncology   Consult Note    Consult Requested By: IM  Reason for Consult: MDS/AML    SUBJECTIVE:   Admit date: 3/5/2024  History of Present Illness: Niurka Pedraza is a 78 y.o. female w/ MDS/AML followed by Dr Centeno at [facility].   She is currently on maintenance  treatment with azacitadine/venetoclax. Last took venetoclax on 3/4/24  She presented with abdominal pain. See HPI 3/5/24    Review of labs shows pancytopenia. She has been afebrile  Recently admitted at [facility] for COVID     Review of Systems  Review of Systems   Constitutional: Negative.    Respiratory: Negative.     Cardiovascular: Negative.    Gastrointestinal:  Positive for abdominal distention and abdominal pain. Negative for nausea.   Neurological: Negative.    Hematological: Negative.    All other systems reviewed and are negative.      PTA Medications   Medication Sig    acyclovir (ZOVIRAX) 400 MG tablet Take 1 tablet (400 mg total) by mouth 2 (two) times daily.    azelastine (ASTELIN) 137 mcg (0.1 %) nasal spray 1 spray (137 mcg total) by Nasal route 2 (two) times daily.    calcium-vitamin D3 (CALCIUM 500 + D) 500 mg(1,250mg) -200 unit per tablet Take 1 tablet by mouth 2 (two) times daily with meals.    HYDROcodone-acetaminophen (NORCO) 7.5-325 mg per tablet Take 1 tablet by mouth every 6 (six) hours as needed for Pain.    levothyroxine (SYNTHROID) 50 MCG tablet Take 1 tablet (50 mcg total) by mouth before breakfast.    mirtazapine (REMERON) 7.5 MG Tab Take 1 tablet (7.5 mg total) by mouth every evening.    albuterol-ipratropium (DUO-NEB) 2.5 mg-0.5 mg/3 mL nebulizer solution Take 3 mLs by nebulization every 6 (six) hours as needed for Wheezing or Shortness of Breath. Rescue    ascorbic acid/zinc (ZINC WITH VITAMIN C ORAL) Take 1 tablet by mouth Daily.    atorvastatin (LIPITOR) 20  MG tablet Take 1 tablet (20 mg total) by mouth once daily.    carvediloL (COREG) 25 MG tablet Take 1 tablet (25 mg total) by mouth 2 (two) times daily with meals.    ferrous gluconate (FERGON) 324 MG tablet Take 1 tablet (324 mg total) by mouth every other day.    fluticasone furoate-vilanteroL (BREO ELLIPTA) 100-25 mcg/dose diskus inhaler Inhale 1 puff into the lungs once daily. Controller.  PLEASE NOTE IT IS ONCE A DAY!!    irbesartan (AVAPRO) 300 MG tablet Take 1 tablet (300 mg total) by mouth every evening.    meloxicam (MOBIC) 15 MG tablet Take 1 tablet (15 mg total) by mouth daily as needed for Pain. (Anti-inflammatory medication) take with food    methocarbamoL (ROBAXIN) 500 MG Tab     multivitamin (THERAGRAN) per tablet Take 1 tablet by mouth once daily.    oxybutynin (DITROPAN XL) 15 MG TR24 Take 1 tablet (15 mg total) by mouth once daily.    pantoprazole (PROTONIX) 20 MG tablet Take 1 tablet (20 mg total) by mouth once daily.    posaconazole (NOXAFIL) 100 mg TbEC tablet Take 3 tablets (300 mg) by mouth twice daily on the first day, and then take 1 tablet by mouth once daily thereafter.    potassium chloride (K-TAB) 20 mEq Take 1 tablet (20 mEq total) by mouth once daily.    predniSONE (DELTASONE) 1 MG tablet Take 2 tablets (2 mg total) by mouth once daily.    sodium chloride 3% 3 % nebulizer solution Take 4 mLs by nebulization as needed for Other.    venetoclax (VENCLEXTA) 100 mg Tab Take one tablet (100 mg) by mouth daily on days 1-14 of each 28 day cycle of therapy..      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    Breast cancer     mastectomy 2014    Coccidiomycosis,  progressive     Elevated CA-125 02/25/2018    Hyperlipidemia     Hypertension     Liver disease     OAB (overactive bladder)     Osteopenia     on Dexa 11/2017    Osteoporosis     pt reports hx of this, declined fosamax in past - treated with calcium and vit D    Pelvic mass 02/25/2018    Pulmonary fibrosis     Pulmonary nodules     SOB (shortness of breath) on exertion     Thyroid disease     hypo    Urinary tract infection due to extended-spectrum beta lactamase (ESBL) producing Escherichia coli 03/16/2022    UTI (urinary tract infection)        Past Surgical History:   Procedure Laterality Date    CHOLECYSTECTOMY      COLONOSCOPY N/A 12/09/2022    Procedure: COLONOSCOPY;  Surgeon: Enrique Dominguez MD;  Location: [facility] (4TH FLR);  Service: Endoscopy;  Laterality: N/A;  inst via portal  pt requests after 12/9/22-MS  11/30-pt notified of earlier arrival time-KPvt    CYSTOSCOPY WITH BIOPSY OF BLADDER Bilateral 07/27/2022    Procedure: CYSTOSCOPY, WITH BLADDER BIOPSY; retrograde pyelogram,;  Surgeon: Anaya Oropeza MD;  Location: [facility];  Service: Urology;  Laterality: Bilateral;    ENDOSCOPIC ULTRASOUND OF UPPER GASTROINTESTINAL TRACT N/A 04/08/2021    Procedure: ULTRASOUND, UPPER GI TRACT, ENDOSCOPIC;  Surgeon: Aisha Barajas MD;  Location: [facility] (2ND FLR);  Service: Endoscopy;  Laterality: N/A;  UEUS/ERCP evaluation abn MRCP - Dr Carney  Covid-19 test 4/5/21 at [facility] Pre-admit - pg    ERCP N/A 04/08/2021    Procedure: ERCP (ENDOSCOPIC RETROGRADE CHOLANGIOPANCREATOGRAPHY);  Surgeon: Aisha Barajas MD;  Location: [facility] (2ND FLR);  Service: Endoscopy;  Laterality: N/A;  UEUS/ERCP evaluation abn MRCP - Dr Carney  Covid-19 test 4/5/21 at [facility] Pre-admit - pg    ESOPHAGOGASTRODUODENOSCOPY N/A 04/08/2021    Procedure: EGD (ESOPHAGOGASTRODUODENOSCOPY);  Surgeon: Aisha Barajas MD;  Location: [facility] (2ND FLR);  Service: Endoscopy;  Laterality: N/A;  UEUS/ERCP evaluation abn  "MRC - Dr Carney  Covid-19 test 21 at [facility] Pre-admit - pg    ESOPHAGOGASTRODUODENOSCOPY N/A 2023    Procedure: EGD (ESOPHAGOGASTRODUODENOSCOPY);  Surgeon: Emeka Carney MD;  Location: [facility] ([address] [place] [address]);  Service: Endoscopy;  Laterality: N/A;  She has  history of seromucinous borderline tumor of the ovary. We generally follow  and CEA tumor markers. Her CEA recently became slightly elevated. CT imaging negative and colonoscopy negative.     Talita Barrios    instructions portal-LW  pre    HYSTERECTOMY      MASTECTOMY Right           Family History   Problem Relation Age of Onset    Cancer Mother         colon cancer    Breast cancer Mother     Hypertension Father     Heart disease Father     Stroke Father     No Known Problems Sister     Cancer Brother         lung, ++ tobacco    Hypertension Brother     No Known Problems Daughter     Hypertension Son     Colon cancer Neg Hx     Ovarian cancer Neg Hx        Social History     Tobacco Use    Smoking status: Former     Current packs/day: 0.00     Average packs/day: 0.5 packs/day for 30.1 years (15.1 ttl pk-yrs)     Types: Cigarettes     Start date:      Quit date: 2000     Years since quittin.0    Smokeless tobacco: Never    Tobacco comments:     quit in her 50s, after 30 years smoking;   Substance Use Topics    Alcohol use: No    Drug use: No        OBJECTIVE:     Vital Signs (Most Recent)   Temp: 98.4 °F (36.9 °C) (24 1147)  Pulse: 90 (24 1147)  Resp: 18 (24 1147)  BP: (!) 119/58 (24 1147)  SpO2: (!) 94 % (24 1147)  Body mass index is 22.56 kg/m².      Physical Exam:  BP (!) 119/58   Pulse 90   Temp 98.4 °F (36.9 °C) (Oral)   Resp 18   Ht 5' 6" (1.676 m)   Wt 63.4 kg (139 lb 12.4 oz)   LMP 2000   SpO2 (!) 94%   Breastfeeding No   BMI 22.56 kg/m²     General Appearance:    Alert, cooperative, no distress, appears stated age   Head:    Normocephalic, without obvious abnormality, " "atraumatic   Eyes:    PERRL, conjunctiva/corneas clear   Nose:   Nares normal, septum midline   Throat:   Lips, mucosa, and tongue normal; teeth and gums normal   Lungs:     Respirations unlabored   Extremities:   Extremities normal, atraumatic, no cyanosis or edema   Pulses:   2+ and symmetric all extremities   Skin:   Skin color, texture, turgor normal, no rashes or lesions   Neurologic:   CNII-XII intact, normal gait         Cardiac Enzymes: Ejection Fractions:    No results for input(s): "CPK", "CPKMB", "MB", "TROPONINI" in the last 72 hours. EF   Date Value Ref Range Status   05/25/2023 60 % Final        Chemistries:   Recent Labs   Lab 03/05/24  1200 03/06/24  0434   * 136   K 5.1 4.2    103   CO2 23 23   BUN 69* 40*   CREATININE 1.2 0.9   CALCIUM 9.3 9.1   PROT 6.8 6.7   BILITOT 1.5* 1.5*   ALKPHOS 109 139*   ALT 13 16   AST 14 15   MG 2.3  --    PHOS 4.6*  --         CBC/Anemia Labs: Coags:    Recent Labs   Lab 03/05/24  1200 03/06/24  0434   WBC 2.32* 1.38*   HGB 8.5* 8.1*   HCT 27.0* 25.9*   * 110*   MCV 85 87   RDW 21.4* 21.3*    No results for input(s): "PT", "INR", "APTT" in the last 168 hours.     POCT Glucose: HbA1c:    No results for input(s): "POCTGLUCOSE" in the last 168 hours. Hemoglobin A1C   Date Value Ref Range Status   06/22/2023 5.1 4.0 - 5.6 % Final     Comment:     ADA Screening Guidelines:  5.7-6.4%  Consistent with prediabetes  >or=6.5%  Consistent with diabetes    High levels of fetal hemoglobin interfere with the HbA1C  assay. Heterozygous hemoglobin variants (HbS, HgC, etc)do  not significantly interfere with this assay.   However, presence of multiple variants may affect accuracy.     06/30/2022 4.9 4.0 - 5.6 % Final     Comment:     ADA Screening Guidelines:  5.7-6.4%  Consistent with prediabetes  >or=6.5%  Consistent with diabetes    High levels of fetal hemoglobin interfere with the HbA1C  assay. Heterozygous hemoglobin variants (HbS, HgC, etc)do  not " significantly interfere with this assay.   However, presence of multiple variants may affect accuracy.     08/11/2020 5.0 4.0 - 5.6 % Final     Comment:     ADA Screening Guidelines:  5.7-6.4%  Consistent with prediabetes  >or=6.5%  Consistent with diabetes  High levels of fetal hemoglobin interfere with the HbA1C  assay. Heterozygous hemoglobin variants (HbS, HgC, etc)do  not significantly interfere with this assay.   However, presence of multiple variants may affect accuracy.          Lines/Drains:       Peripheral IV - Single Lumen 03/05/24 2300 20 G Distal;Posterior;Right Forearm (Active)   Site Assessment Clean;Intact;No redness;No swelling;Dry 03/06/24 0750   Extremity Assessment Distal to IV No abnormal discoloration;No swelling;No warmth;No redness 03/06/24 0750   Line Status Infusing 03/06/24 0750   Dressing Status Clean;Dry;Intact 03/06/24 0750   Dressing Intervention Integrity maintained 03/06/24 0750   Site Change Due 03/09/24 03/06/24 0750   Number of days: 0       Female External Urinary Catheter w/ Suction 03/05/24 2100 (Active)   Skin no redness;no breakdown 03/06/24 0750   Tolerance no signs/symptoms of discomfort 03/06/24 0750   Suction Continuous suction at 60 mmHg 03/06/24 0750   Date of last wick change 03/06/24 03/06/24 0750   Time of last wick change 0800 03/06/24 0750   Number of days: 0         ASSESSMENT/PLAN:     Active Hospital Problems    Diagnosis  POA    *Diverticulitis of large intestine with perforation [K57.20]  Yes    Opacity noted on imaging study [R93.89]  Yes    Compression fracture of L2 [S32.020A]  Yes    MDS/AML [D46.9]  Yes     Chronic    Thrombocytopenia [D69.6]  Yes     Chronic     Last Platelets=134 from 5/22/23.      Immunosuppression due to chronic steroid use [D84.821, T38.0X5A, Z79.52]  Not Applicable    Pulmonary fibrosis [J84.10]  Yes     Chronic    Interstitial lung disease [J84.9]  Yes     Post infectious from coccidiomycosis      Essential hypertension [I10]  Yes      Chronic    Acquired hypothyroidism [E03.9]  Yes      Resolved Hospital Problems   No resolved problems to display.       SUMMARY: 78 y.o. female here with Diverticulitis of large intestine with perforation  MDS/AML: patient followed by Dr Centeno. Currently on a treatment with azacitadine/venetoclax  - Hold venetoclax while inpatient  - Discussed case with Dr Centeno     2. Pancytopenia  - baseline anemia, thrombocytopenia, leukopenia  - afebrile since admission. No indication for GCSF at this time  - low/normal labs at time to admission may be falsely elevated due to underlying infection    Will continue to monitor labs    Lexi Argueta MD  03/06/2024  Hematology/ Oncology

## 2024-03-06 NOTE — PLAN OF CARE
Medicare Message     Important Message from Medicare regarding Discharge Appeal Rights Given to patient/caregiver; Explained to patient/caregiver; Signed/date by patient/caregiver   Date IMM was signed 3/6/2024   Time IMM was signed [id number]

## 2024-03-06 NOTE — HOSPITAL COURSE
Patient seen and examined.    Having some mild lower abdominal flank pains.    No signs of peritonitis

## 2024-03-06 NOTE — CONSULTS
[facility] ([place])  General Surgery  Consult Note    Patient Name: Niurka Pedraza  MRN: [id number]  Code Status: Full Code  Admission Date: 3/5/2024  Hospital Length of Stay: 0 days  Attending Physician: Salvador Gilmore MD  Primary Care Provider: Savana Anderson MD    Patient information was obtained from patient and ER records.     Consults  Subjective:     Principal Problem: Diverticulitis of large intestine with perforation    History of Present Illness: 78 y.o. 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 Patient was hospitalized last month with pneumonia secondary to COVID and has since required more assistance getting around for the past 1.5 weeks due to back pain. She was started on hydrocodone one week ago and has not had a bowel movement since. Patient underwent her most recent chemotherapy infusion four days ago. While this typically results in a week-long period of confusion, her daughter is concerned that this episode is worse than prior. She recounts that patient having difficulty determining where she is, what she is doing, or who she is with. Patient adds that she has recently began to have dysuria as well as pelvic pain with urination, and she reports a history of UTI with similar symptoms. PSHx of cholecystectomy.      No current facility-administered medications on file prior to encounter.     Current Outpatient Medications on File Prior to Encounter   Medication Sig    acyclovir (ZOVIRAX) 400 MG tablet Take 1 tablet (400 mg total) by mouth 2 (two) times daily.    albuterol-ipratropium (DUO-NEB) 2.5 mg-0.5 mg/3 mL nebulizer solution Take 3 mLs by nebulization every 6 (six) hours as needed for Wheezing or Shortness of Breath. Rescue    ascorbic acid/zinc (ZINC WITH VITAMIN C ORAL) Take 1 tablet by mouth Daily.    atorvastatin (LIPITOR) 20 MG tablet Take 1 tablet (20 mg total) by mouth once daily.    azelastine (ASTELIN) 137  mcg (0.1 %) nasal spray 1 spray (137 mcg total) by Nasal route 2 (two) times daily.    calcium-vitamin D3 (CALCIUM 500 + D) 500 mg(1,250mg) -200 unit per tablet Take 1 tablet by mouth 2 (two) times daily with meals.    carvediloL (COREG) 25 MG tablet Take 1 tablet (25 mg total) by mouth 2 (two) times daily with meals.    ferrous gluconate (FERGON) 324 MG tablet Take 1 tablet (324 mg total) by mouth every other day.    fluticasone furoate-vilanteroL (BREO ELLIPTA) 100-25 mcg/dose diskus inhaler Inhale 1 puff into the lungs once daily. Controller.  PLEASE NOTE IT IS ONCE A DAY!!    HYDROcodone-acetaminophen (NORCO) 7.5-325 mg per tablet Take 1 tablet by mouth every 6 (six) hours as needed for Pain.    irbesartan (AVAPRO) 300 MG tablet Take 1 tablet (300 mg total) by mouth every evening.    levothyroxine (SYNTHROID) 50 MCG tablet Take 1 tablet (50 mcg total) by mouth before breakfast.    meloxicam (MOBIC) 15 MG tablet Take 1 tablet (15 mg total) by mouth daily as needed for Pain. (Anti-inflammatory medication) take with food    methocarbamoL (ROBAXIN) 500 MG Tab     mirtazapine (REMERON) 7.5 MG Tab Take 1 tablet (7.5 mg total) by mouth every evening.    multivitamin (THERAGRAN) per tablet Take 1 tablet by mouth once daily.    oxybutynin (DITROPAN XL) 15 MG TR24 Take 1 tablet (15 mg total) by mouth once daily.    pantoprazole (PROTONIX) 20 MG tablet Take 1 tablet (20 mg total) by mouth once daily.    posaconazole (NOXAFIL) 100 mg TbEC tablet Take 3 tablets (300 mg) by mouth twice daily on the first day, and then take 1 tablet by mouth once daily thereafter.    potassium chloride (K-TAB) 20 mEq Take 1 tablet (20 mEq total) by mouth once daily.    predniSONE (DELTASONE) 1 MG tablet Take 2 tablets (2 mg total) by mouth once daily.    sodium chloride 3% 3 % nebulizer solution Take 4 mLs by nebulization as needed for Other.    venetoclax (VENCLEXTA) 100 mg Tab Take one tablet (100 mg) by mouth daily on days 1-14 of each 28  day cycle of therapy..    [DISCONTINUED] budesonide-formoterol 80-4.5 mcg (SYMBICORT) 80-4.5 mcg/actuation HFAA Inhale 2 puffs into the lungs 2 (two) times daily as needed. 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    Breast cancer     mastectomy 2014    Coccidiomycosis, progressive     Elevated CA-125 02/25/2018    Hyperlipidemia     OAB (overactive bladder)     Osteopenia     on Dexa 11/2017    Osteoporosis     pt reports hx of this, declined fosamax in past - treated with calcium and vit D    Pelvic mass 02/25/2018    Pulmonary fibrosis     Pulmonary nodules     SOB (shortness of breath) on exertion     Thyroid disease     hypo    Urinary tract infection due to extended-spectrum beta lactamase (ESBL) producing Escherichia coli 3/16/2022    UTI (urinary tract infection)      Past Surgical History:   Procedure Laterality Date    CHOLECYSTECTOMY      COLONOSCOPY N/A 12/09/2022    Procedure: COLONOSCOPY;  Surgeon: Enrique Dominguez MD;  Location: [facility] (4TH FLR);  Service: Endoscopy;  Laterality: N/A;  inst via portal  pt requests after 12/9/22-MS  11/30-pt notified of earlier arrival time-KPvt    CYSTOSCOPY WITH BIOPSY OF BLADDER Bilateral 07/27/2022    Procedure: CYSTOSCOPY, WITH BLADDER BIOPSY; retrograde pyelogram,;  Surgeon: Anaya Oropeza MD;  Location: [facility];  Service: Urology;  Laterality: Bilateral;    ENDOSCOPIC ULTRASOUND OF UPPER GASTROINTESTINAL TRACT N/A 04/08/2021    Procedure: ULTRASOUND, UPPER GI TRACT, ENDOSCOPIC;  Surgeon: Aisha Barajas MD;  Location: [facility] ENDO (2ND FLR);  Service: Endoscopy;  Laterality: N/A;  UEUS/ERCP evaluation  abn MRCP - Dr Carney  Covid-19 test 21 at [facility] Pre-admit - pg    ERCP N/A 2021    Procedure: ERCP (ENDOSCOPIC RETROGRADE CHOLANGIOPANCREATOGRAPHY);  Surgeon: Aisha Barajas MD;  [facility]  Service: Endoscopy;  Laterality: N/A;  UEUS/ERCP evaluation abn MRCP - Dr Carney  Covid-19 test 21 at [facility] ([place] Pre-admit - pg    ESOPHAGOGASTRODUODENOSCOPY N/A 2021    Procedure: EGD (ESOPHAGOGASTRODUODENOSCOPY);  Surgeon: Aisha Barajas MD;  [facility] ([facility] F[facility]);  Service: Endoscopy;  Laterality: N/A;  UEUS/ERCP evaluation abn MRCP - Dr Carney  Covid-19 test 21 at [facility] ([place] Pre-admit - pg    ESOPHAGOGASTRODUODENOSCOPY N/A 2023    Procedure: EGD (ESOPHAGOGASTRODUODENOSCOPY);  Surgeon: Emeka Carney MD;  [facility] ([facility]);  Service: Endoscopy;  Laterality: N/A;  She has  history of seromucinous borderline tumor of the ovary. We generally follow  and CEA tumor markers. Her CEA recently became slightly elevated. CT imaging negative and colonoscopy negative.     Talita Barrios    instructions portal-LW  pre    HYSTERECTOMY      MASTECTOMY Right          Family History       Problem Relation (Age of Onset)    Breast cancer Mother    Cancer Mother, Brother    Heart disease Father    Hypertension Father, Brother, Son    No Known Problems Sister, Daughter    Stroke Father          Tobacco Use    Smoking status: Former     Current packs/day: 0.00     Average packs/day: 0.5 packs/day for 30.1 years (15.1 ttl pk-yrs)     Types: Cigarettes     Start date:      Quit date: 2000     Years since quittin.0    Smokeless tobacco: Never    Tobacco comments:     quit in her 50s, after 30 years smoking;   Substance and Sexual Activity    Alcohol use: No    Drug use: No    Sexual activity: Not Currently     Partners: Male     Review of Systems   Constitutional:  Negative for appetite change, fatigue, fever and unexpected weight change.    HENT:  Negative for sore throat and trouble swallowing.    Eyes: Negative.    Respiratory:  Negative for cough, shortness of breath and wheezing.    Cardiovascular:  Negative for chest pain and leg swelling.   Gastrointestinal:  Positive for abdominal pain (mostly lower, mild). Negative for abdominal distention, blood in stool, constipation, diarrhea, nausea and vomiting.   Endocrine: Negative.    Genitourinary: Negative.    Musculoskeletal:  Negative for back pain.   Skin: Negative.  Negative for rash.   Allergic/Immunologic: Negative.    Neurological: Negative.    Hematological: Negative.    Psychiatric/Behavioral:  Negative for confusion.      Objective:     Vital Signs (Most Recent):  Temp: 99.6 °F (37.6 °C) (03/05/24 1946)  Pulse: 93 (03/05/24 1946)  Resp: 20 (03/05/24 1946)  BP: 113/63 (03/05/24 1946)  SpO2: 95 % (03/05/24 1946) Vital Signs (24h Range):  Temp:  [98.2 °F (36.8 °C)-99.6 °F (37.6 °C)] 99.6 °F (37.6 °C)  Pulse:  [87-97] 93  Resp:  [19-22] 20  SpO2:  [95 %-97 %] 95 %  BP: (110-131)/(57-70) 113/63     Weight: 61.7 kg (136 lb)  Body mass index is 21.95 kg/m².     Physical Exam  Vitals and nursing note reviewed.   Constitutional:       Appearance: She is well-developed.   HENT:      Head: Normocephalic and atraumatic.   Cardiovascular:      Rate and Rhythm: Normal rate.      Heart sounds: Normal heart sounds.   Pulmonary:      Effort: Pulmonary effort is normal.   Abdominal:      General: Bowel sounds are normal. There is no distension.      Palpations: Abdomen is soft.      Tenderness: There is abdominal tenderness. Rebound: mild in lower abdomen.   Musculoskeletal:         General: Normal range of motion.      Cervical back: Normal range of motion.   Skin:     General: Skin is warm and dry.      Capillary Refill: Capillary refill takes less than 2 seconds.   Neurological:      Mental Status: She is alert and oriented to person, place, and time.   Psychiatric:         Behavior: Behavior normal.             I have reviewed all pertinent lab results within the past 24 hours.  CBC:   Recent Labs   Lab 03/05/24  1200   WBC 2.32*   RBC 3.19*   HGB 8.5*   HCT 27.0*   *   MCV 85   MCH 26.6*   MCHC 31.5*     CMP:   Recent Labs   Lab 03/05/24  1200   *   CALCIUM 9.3   ALBUMIN 2.7*   PROT 6.8   *   K 5.1   CO2 23      BUN 69*   CREATININE 1.2   ALKPHOS 109   ALT 13   AST 14   BILITOT 1.5*       Significant Diagnostics:  I have reviewed all pertinent imaging results/findings within the past 24 hours.  CT: I have reviewed all pertinent results/findings within the past 24 hours and my personal findings are:  Acute diverticulitis with some pockets of intraperitoneal air    Assessment/Plan:     * Diverticulitis of large intestine with perforation  Serial abdominal exams   Okay for ice chips and sips of water, otherwise NPO  Continue IV antibiotics   We will monitor closely and any sign of deterioration we would consider surgical intervention.          VTE Risk Mitigation (From admission, onward)           Ordered     IP VTE HIGH RISK PATIENT  Once         03/05/24 1062                    Thank you for your consult. I will follow-up with patient. Please contact us if you have any additional questions.    Michael Worrell MD  General Surgery  [facility]

## 2024-03-06 NOTE — ASSESSMENT & PLAN NOTE
Niurka Pedraza presents with lethargy,abdominal bloating and pain    Afebrile without leukocytosis  CT AP with possible acute diverticulitis, underlying malignancy not excluded.  There is a large amount of retained stool throughout the colon and FREE AIR  concerning for perforation  Given IV Zosyn in ER, continue  Surgery consulted, NPO, IV abx    Having some bowel function, possibly ready for some liquids today pending surgery eval

## 2024-03-06 NOTE — ASSESSMENT & PLAN NOTE
Does not believe she takes posaconazole anymore  Takes venetoclax days 1-14 of each 28 day cycle of therapy   Continue prednisone 2mg daily, acyclovir as prevention

## 2024-03-06 NOTE — SUBJECTIVE & OBJECTIVE
Interval History: pain in abdomen decreased, still distended. +flatus and + BM, no N/V, no fevers. Has been coughing with sputum production at home for the past week.    Review of Systems   Constitutional:  Negative for chills and fever.   HENT:  Negative for sore throat and trouble swallowing.    Respiratory:  Positive for cough and shortness of breath. Negative for wheezing.    Cardiovascular:  Negative for chest pain and palpitations.   Gastrointestinal:  Positive for abdominal distention and abdominal pain. Negative for constipation, diarrhea, nausea and vomiting.   Skin:  Negative for rash.   Neurological:  Negative for speech difficulty, light-headedness and headaches.     Objective:     Vital Signs (Most Recent):  Temp: 98.1 °F (36.7 °C) (03/06/24 0445)  Pulse: 96 (03/06/24 0445)  Resp: 18 (03/06/24 0445)  BP: 115/69 (03/06/24 0445)  SpO2: 96 % (03/06/24 0445) Vital Signs (24h Range):  Temp:  [98.1 °F (36.7 °C)-99.6 °F (37.6 °C)] 98.1 °F (36.7 °C)  Pulse:  [] 96  Resp:  [18-22] 18  SpO2:  [95 %-97 %] 96 %  BP: (110-158)/(57-75) 115/69     Weight: 63.4 kg (139 lb 12.4 oz)  Body mass index is 22.56 kg/m².    Intake/Output Summary (Last 24 hours) at 3/6/2024 0707  Last data filed at 3/6/2024 0627  Gross per 24 hour   Intake 1111.85 ml   Output --   Net 1111.85 ml         Physical Exam  Constitutional:       General: She is not in acute distress.     Appearance: She is ill-appearing.   Cardiovascular:      Rate and Rhythm: Normal rate and regular rhythm.   Pulmonary:      Effort: Pulmonary effort is normal. No respiratory distress.   Abdominal:      General: Bowel sounds are normal. There is distension.      Palpations: Abdomen is soft.      Tenderness: There is abdominal tenderness.   Musculoskeletal:      Right lower leg: No edema.      Left lower leg: No edema.   Skin:     General: Skin is warm and dry.   Neurological:      General: No focal deficit present.      Mental Status: She is alert.              Significant Labs: All pertinent labs within the past 24 hours have been reviewed.    Significant Imaging: I have reviewed all pertinent imaging results/findings within the past 24 hours.

## 2024-03-06 NOTE — ASSESSMENT & PLAN NOTE
Serial abdominal exams   Okay for ice chips and sips of water, otherwise NPO  Continue IV antibiotics   We will monitor closely and any sign of deterioration we would consider surgical intervention.

## 2024-03-06 NOTE — H&P
[facility], [place] [facility]  History & Physical    Patient Name: Niurka Pedraza  MRN: [id number]  Patient Class: IP- Inpatient  Admission Date: 3/5/2024  Attending Physician: Salvador Gilmore MD   Primary Care Provider: Savana Anderson MD         Patient information was obtained from patient, past medical records, and ER records.     Subjective:     Principal Problem:Diverticulitis of large intestine with perforation    Chief Complaint:   Chief Complaint   Patient presents with    Fatigue     X1 week, currently receiving chemo. Family reporting foul smelling urine.         HPI: Niurka Pedraza is a 78F with myelodysplastic syndrome with excess blasts-2 on chronic steroids, ILD/pulmonary fibrosis, hypertension, MDRO UTIs, pulmonary coccidioidomycosis and hypothyroidism who presents with her daughter for increasing weakness and lethargy over the past week. She used to be able to walk with a cane or walker, but now has felt so weak she struggles to walk short distances. Cannot recall her last bowel movement, usually takes dulcolax at home. She reports some lower abdominal discomfort, bloating, burning with urination. Her last chemo infusion was on Friday, four days ago. Denies chest pain, shortness of breath, nausea, vomiting, diarrhea. Daughter Luz at bedside notes she has not had much of an appetite    Of note she was recently hospitalized at [facility] for COVID/pneumonia, UTI and discharged with Meropenem IV to complete treatment for MSSA pneumonia and recurrent UTI. Treatment completed 2/16/2024 but patient reports residual productive cough and dysuria. Patchy confluent opacity left lower lobe, new concerning for aspiration or pneumonia.     In ER she's afebrile without leukocytosis, CBC shows thrombocytopenia consistent with baseline; CMP with elevated T. Bili otherwise LFTs WNL and Cr at baseline. UA unremarkable; CXR: Nonspecific patchy opacities at the lung bases could relate to atelectasis,  aspiration or pneumonia. CT AP with possible acute diverticulitis, underlying malignancy not excluded.  There is a large amount of retained stool throughout the colon and FREE AIR  concerning for perforation. Patchy confluent opacity left lower lobe, new concerning for aspiration or pneumonia. Abnormal severe compression fracture deformity of L2 and the moderate compression fracture deformity of L1, new from the prior study. 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    Breast cancer     mastectomy 2014    Coccidiomycosis, progressive     Elevated CA-125 02/25/2018    Hyperlipidemia     OAB (overactive bladder)     Osteopenia     on Dexa 11/2017    Osteoporosis     pt reports hx of this, declined fosamax in past - treated with calcium and vit D    Pelvic mass 02/25/2018    Pulmonary fibrosis     Pulmonary nodules     SOB (shortness of breath) on exertion     Thyroid disease     hypo    Urinary tract infection due to extended-spectrum beta lactamase (ESBL) producing Escherichia coli 3/16/2022    UTI (urinary tract infection)        Past Surgical History:   Procedure Laterality Date    CHOLECYSTECTOMY      COLONOSCOPY N/A 12/09/2022    Procedure: COLONOSCOPY;  Surgeon: Enrique Dominguez MD;  Location: [facility] (4TH FLR);  Service: Endoscopy;  Laterality: N/A;  inst via portal  pt requests after 12/9/22-MS  11/30-pt notified of earlier arrival time-KPvt    CYSTOSCOPY WITH BIOPSY OF BLADDER Bilateral 07/27/2022    Procedure: CYSTOSCOPY, WITH BLADDER BIOPSY; retrograde pyelogram,;  Surgeon: Anaya Oropeza MD;  Location: [facility];  Service: Urology;  Laterality: Bilateral;    ENDOSCOPIC ULTRASOUND OF UPPER GASTROINTESTINAL TRACT N/A 04/08/2021    Procedure: ULTRASOUND, UPPER GI TRACT, ENDOSCOPIC;  Surgeon: Aisha Barajas MD;  Location: [facility] (2ND FLR);  Service: Endoscopy;  Laterality:  N/A;  UEUS/ERCP evaluation abn MRCP - Dr Angelika Steinerid-19 test 4/5/21 at [facility] Pre-admit - pg    ERCP N/A 04/08/2021    Procedure: ERCP (ENDOSCOPIC RETROGRADE CHOLANGIOPANCREATOGRAPHY);  Surgeon: Aisha Barajas MD;  Location: [facility] (2ND FLR);  Service: Endoscopy;  Laterality: N/A;  UEUS/ERCP evaluation abn MRCP - Dr Carney  Covid-19 test 4/5/21 at [facility] Pre-admit - pg    ESOPHAGOGASTRODUODENOSCOPY N/A 04/08/2021    Procedure: EGD (ESOPHAGOGASTRODUODENOSCOPY);  Surgeon: Aisha Barajas MD;  Location: [facility] (2ND FLR);  Service: Endoscopy;  Laterality: N/A;  UEUS/ERCP evaluation abn MRCP - Dr Carney  Covid-19 test 4/5/21 at [facility] Pre-admit - pg    ESOPHAGOGASTRODUODENOSCOPY N/A 06/02/2023    Procedure: EGD (ESOPHAGOGASTRODUODENOSCOPY);  Surgeon: Emeka Carney MD;  Location: [facility] (4TH FLR);  Service: Endoscopy;  Laterality: N/A;  She has  history of seromucinous borderline tumor of the ovary. We generally follow  and CEA tumor markers. Her CEA recently became slightly elevated. CT imaging negative and colonoscopy negative.     Talita CONDE Denis    instructions portal-LW  pre    HYSTERECTOMY      MASTECTOMY Right     2014       Review of patient's allergies indicates:   Allergen Reactions    Ciprofloxacin Other (See Comments)     Right foot pain and edema, tendonitis    Amlodipine Edema and Swelling     BLE  BLE    Lisinopril Other (See Comments)     cough    Nitrofuran analogues        No current facility-administered medications on file prior to encounter.     Current Outpatient Medications on File Prior to Encounter   Medication Sig    acyclovir (ZOVIRAX) 400 MG tablet Take 1 tablet (400 mg total) by mouth 2 (two) times daily.    albuterol-ipratropium (DUO-NEB) 2.5 mg-0.5 mg/3 mL nebulizer solution Take 3 mLs by nebulization every 6 (six) hours as needed for Wheezing or Shortness of Breath. Rescue    ascorbic acid/zinc (ZINC WITH VITAMIN C ORAL) Take 1 tablet by mouth Daily.     atorvastatin (LIPITOR) 20 MG tablet Take 1 tablet (20 mg total) by mouth once daily.    azelastine (ASTELIN) 137 mcg (0.1 %) nasal spray 1 spray (137 mcg total) by Nasal route 2 (two) times daily.    calcium-vitamin D3 (CALCIUM 500 + D) 500 mg(1,250mg) -200 unit per tablet Take 1 tablet by mouth 2 (two) times daily with meals.    carvediloL (COREG) 25 MG tablet Take 1 tablet (25 mg total) by mouth 2 (two) times daily with meals.    ferrous gluconate (FERGON) 324 MG tablet Take 1 tablet (324 mg total) by mouth every other day.    fluticasone furoate-vilanteroL (BREO ELLIPTA) 100-25 mcg/dose diskus inhaler Inhale 1 puff into the lungs once daily. Controller.  PLEASE NOTE IT IS ONCE A DAY!!    HYDROcodone-acetaminophen (NORCO) 7.5-325 mg per tablet Take 1 tablet by mouth every 6 (six) hours as needed for Pain.    irbesartan (AVAPRO) 300 MG tablet Take 1 tablet (300 mg total) by mouth every evening.    levothyroxine (SYNTHROID) 50 MCG tablet Take 1 tablet (50 mcg total) by mouth before breakfast.    meloxicam (MOBIC) 15 MG tablet Take 1 tablet (15 mg total) by mouth daily as needed for Pain. (Anti-inflammatory medication) take with food    methocarbamoL (ROBAXIN) 500 MG Tab     mirtazapine (REMERON) 7.5 MG Tab Take 1 tablet (7.5 mg total) by mouth every evening.    multivitamin (THERAGRAN) per tablet Take 1 tablet by mouth once daily.    oxybutynin (DITROPAN XL) 15 MG TR24 Take 1 tablet (15 mg total) by mouth once daily.    pantoprazole (PROTONIX) 20 MG tablet Take 1 tablet (20 mg total) by mouth once daily.    posaconazole (NOXAFIL) 100 mg TbEC tablet Take 3 tablets (300 mg) by mouth twice daily on the first day, and then take 1 tablet by mouth once daily thereafter.    potassium chloride (K-TAB) 20 mEq Take 1 tablet (20 mEq total) by mouth once daily.    predniSONE (DELTASONE) 1 MG tablet Take 2 tablets (2 mg total) by mouth once daily.    sodium chloride 3% 3 % nebulizer solution Take 4 mLs by nebulization as needed  for Other.    venetoclax (VENCLEXTA) 100 mg Tab Take one tablet (100 mg) by mouth daily on days 1-14 of each 28 day cycle of therapy..    [DISCONTINUED] budesonide-formoterol 80-4.5 mcg (SYMBICORT) 80-4.5 mcg/actuation HFAA Inhale 2 puffs into the lungs 2 (two) times daily as needed. Controller     Family History       Problem Relation (Age of Onset)    Breast cancer Mother    Cancer Mother, Brother    Heart disease Father    Hypertension Father, Brother, Son    No Known Problems Sister, Daughter    Stroke Father          Tobacco Use    Smoking status: Former     Current packs/day: 0.00     Average packs/day: 0.5 packs/day for 30.1 years (15.1 ttl pk-yrs)     Types: Cigarettes     Start date:      Quit date: 2000     Years since quittin.0    Smokeless tobacco: Never    Tobacco comments:     quit in her 50s, after 30 years smoking;   Substance and Sexual Activity    Alcohol use: No    Drug use: No    Sexual activity: Not Currently     Partners: Male     Review of Systems   Constitutional:  Positive for activity change, appetite change and fatigue. Negative for fever.   Respiratory:  Positive for cough. Negative for shortness of breath.    Cardiovascular:  Negative for chest pain and leg swelling.   Gastrointestinal:  Positive for abdominal distention, abdominal pain and constipation. Negative for blood in stool, diarrhea, nausea and vomiting.   Genitourinary:  Positive for dysuria. Negative for difficulty urinating.   Musculoskeletal:  Positive for arthralgias and gait problem.   Skin:  Negative for color change.   Neurological:  Positive for weakness. Negative for syncope.   Psychiatric/Behavioral:  Negative for agitation and confusion.      Objective:     Vital Signs (Most Recent):  Temp: 99.6 °F (37.6 °C) (24)  Pulse: 93 (24)  Resp: 20 (24)  BP: 113/63 (24)  SpO2: 95 % (24) Vital Signs (24h Range):  Temp:  [98.2 °F (36.8 °C)-99.6 °F (37.6 °C)]  99.6 °F (37.6 °C)  Pulse:  [87-97] 93  Resp:  [19-22] 20  SpO2:  [95 %-97 %] 95 %  BP: (110-131)/(57-70) 113/63     Weight: 61.7 kg (136 lb)  Body mass index is 21.95 kg/m².     Physical Exam  Constitutional:       General: She is not in acute distress.     Appearance: She is ill-appearing.   HENT:      Head: Normocephalic and atraumatic.   Eyes:      Extraocular Movements: Extraocular movements intact.   Cardiovascular:      Rate and Rhythm: Normal rate and regular rhythm.   Pulmonary:      Effort: Pulmonary effort is normal. No respiratory distress.   Abdominal:      General: Bowel sounds are normal. There is distension.      Palpations: Abdomen is soft.      Tenderness: There is abdominal tenderness.   Musculoskeletal:      Right lower leg: No edema.      Left lower leg: No edema.   Skin:     General: Skin is warm and dry.   Neurological:      General: No focal deficit present.      Mental Status: She is alert.   Psychiatric:         Mood and Affect: Mood normal.         Behavior: Behavior normal.                Significant Labs: All pertinent labs within the past 24 hours have been reviewed.    Significant Imaging: I have reviewed all pertinent imaging results/findings within the past 24 hours.  Assessment/Plan:     * Diverticulitis of large intestine with perforation  Niurka Pedraza presents with lethargy,abdominal bloating and pain    Afebrile without leukocytosis  MAPs 70-80s  CT AP with possible acute diverticulitis, underlying malignancy not excluded.  There is a large amount of retained stool throughout the colon and FREE AIR  concerning for perforation  Given IV Zosyn in ER, continue  Surgery consulted, NPO, IV abx      Opacity noted on imaging study  History of COVID/pneumonia that required admission at [facility] about a month ago  CT AP this admission Patchy confluent opacity left lower lobe, new concerning for aspiration or pneumonia.   Can consider SLP consult once on PO      Compression fracture of  "L2  Incidental on CT AP "Abnormal severe compression fracture deformity of L2 and the moderate compression fracture deformity of L1, new from the prior study.  No apparent resulting severe canal stenosis"  Consider ortho inpt consult/ PTOT       MDS/AML  Does not believe she takes posaconazole anymore  Takes venetoclax days 1-14 of each 28 day cycle of therapy   Continue prednisone 2mg daily, acyclovir as prevention       Essential hypertension  Given acute illness and normal pressures, hold home BP meds for now        carvediloL (COREG) 25 MG tablet Take 1 tablet (25 mg total) by mouth 2 (two) times daily with meals.    irbesartan (AVAPRO) 300 MG tablet Take 1 tablet (300 mg total) by mouth every evening.       Acquired hypothyroidism  Continue synthroid      Interstitial lung disease  Continue fluticasone vilanterol daily and saline nebs PRN         VTE Risk Mitigation (From admission, onward)           Ordered     IP VTE HIGH RISK PATIENT  Once         03/05/24 7574                                    America Perez PA-C  Department of Hospital Medicine  [facility] ([place])          "

## 2024-03-07 LAB
ABO + RH BLD: NORMAL
ALBUMIN SERPL BCP-MCNC: 2 G/DL (ref 3.5–5.2)
ALBUMIN SERPL BCP-MCNC: 2 G/DL (ref 3.5–5.2)
ALP SERPL-CCNC: 135 U/L (ref 55–135)
ALP SERPL-CCNC: 151 U/L (ref 55–135)
ALT SERPL W/O P-5'-P-CCNC: 15 U/L (ref 10–44)
ALT SERPL W/O P-5'-P-CCNC: 17 U/L (ref 10–44)
ANION GAP SERPL CALC-SCNC: 7 MMOL/L (ref 8–16)
ANION GAP SERPL CALC-SCNC: 9 MMOL/L (ref 8–16)
ANISOCYTOSIS BLD QL SMEAR: SLIGHT
AST SERPL-CCNC: 15 U/L (ref 10–40)
AST SERPL-CCNC: 16 U/L (ref 10–40)
BASOPHILS # BLD AUTO: 0 K/UL (ref 0–0.2)
BASOPHILS NFR BLD: 0 % (ref 0–1.9)
BILIRUB SERPL-MCNC: 1 MG/DL (ref 0.1–1)
BILIRUB SERPL-MCNC: 1.1 MG/DL (ref 0.1–1)
BLD GP AB SCN CELLS X3 SERPL QL: NORMAL
BLD PROD TYP BPU: NORMAL
BLOOD UNIT EXPIRATION DATE: NORMAL
BLOOD UNIT TYPE CODE: 6200
BLOOD UNIT TYPE: NORMAL
BUN SERPL-MCNC: 27 MG/DL (ref 8–23)
BUN SERPL-MCNC: 27 MG/DL (ref 8–23)
CALCIUM SERPL-MCNC: 8.3 MG/DL (ref 8.7–10.5)
CALCIUM SERPL-MCNC: 8.5 MG/DL (ref 8.7–10.5)
CHLORIDE SERPL-SCNC: 105 MMOL/L (ref 95–110)
CHLORIDE SERPL-SCNC: 105 MMOL/L (ref 95–110)
CO2 SERPL-SCNC: 22 MMOL/L (ref 23–29)
CO2 SERPL-SCNC: 23 MMOL/L (ref 23–29)
CODING SYSTEM: NORMAL
CREAT SERPL-MCNC: 0.7 MG/DL (ref 0.5–1.4)
CREAT SERPL-MCNC: 0.7 MG/DL (ref 0.5–1.4)
CROSSMATCH INTERPRETATION: NORMAL
DIFFERENTIAL METHOD BLD: ABNORMAL
DISPENSE STATUS: NORMAL
EOSINOPHIL # BLD AUTO: 0 K/UL (ref 0–0.5)
EOSINOPHIL NFR BLD: 0 % (ref 0–8)
ERYTHROCYTE [DISTWIDTH] IN BLOOD BY AUTOMATED COUNT: 20.7 % (ref 11.5–14.5)
EST. GFR  (NO RACE VARIABLE): >60 ML/MIN/1.73 M^2
EST. GFR  (NO RACE VARIABLE): >60 ML/MIN/1.73 M^2
GLUCOSE SERPL-MCNC: 134 MG/DL (ref 70–110)
GLUCOSE SERPL-MCNC: 135 MG/DL (ref 70–110)
HCT VFR BLD AUTO: 18.6 % (ref 37–48.5)
HGB BLD-MCNC: 6.1 G/DL (ref 12–16)
IMM GRANULOCYTES # BLD AUTO: 0 K/UL (ref 0–0.04)
IMM GRANULOCYTES NFR BLD AUTO: 0 % (ref 0–0.5)
LYMPHOCYTES # BLD AUTO: 0.3 K/UL (ref 1–4.8)
LYMPHOCYTES NFR BLD: 35.4 % (ref 18–48)
MCH RBC QN AUTO: 27.9 PG (ref 27–31)
MCHC RBC AUTO-ENTMCNC: 32.8 G/DL (ref 32–36)
MCV RBC AUTO: 85 FL (ref 82–98)
MONOCYTES # BLD AUTO: 0.2 K/UL (ref 0.3–1)
MONOCYTES NFR BLD: 22 % (ref 4–15)
NEUTROPHILS # BLD AUTO: 0.4 K/UL (ref 1.8–7.7)
NEUTROPHILS NFR BLD: 42.6 % (ref 38–73)
NRBC BLD-RTO: 0 /100 WBC
NUM UNITS TRANS PACKED RBC: NORMAL
PLATELET # BLD AUTO: 49 K/UL (ref 150–450)
PLATELET BLD QL SMEAR: ABNORMAL
PMV BLD AUTO: 8 FL (ref 9.2–12.9)
POTASSIUM SERPL-SCNC: 3.2 MMOL/L (ref 3.5–5.1)
POTASSIUM SERPL-SCNC: 3.2 MMOL/L (ref 3.5–5.1)
PROT SERPL-MCNC: 5.5 G/DL (ref 6–8.4)
PROT SERPL-MCNC: 5.5 G/DL (ref 6–8.4)
RBC # BLD AUTO: 2.19 M/UL (ref 4–5.4)
SODIUM SERPL-SCNC: 135 MMOL/L (ref 136–145)
SODIUM SERPL-SCNC: 136 MMOL/L (ref 136–145)
SPECIMEN OUTDATE: NORMAL
WBC # BLD AUTO: 0.82 K/UL (ref 3.9–12.7)

## 2024-03-07 PROCEDURE — 11000001 HC ACUTE MED/SURG PRIVATE ROOM

## 2024-03-07 PROCEDURE — 25000003 PHARM REV CODE 250: Performed by: INTERNAL MEDICINE

## 2024-03-07 PROCEDURE — 99233 SBSQ HOSP IP/OBS HIGH 50: CPT | Mod: ,,, | Performed by: SURGERY

## 2024-03-07 PROCEDURE — P9040 RBC LEUKOREDUCED IRRADIATED: HCPCS | Performed by: INTERNAL MEDICINE

## 2024-03-07 PROCEDURE — 99900035 HC TECH TIME PER 15 MIN (STAT)

## 2024-03-07 PROCEDURE — 86920 COMPATIBILITY TEST SPIN: CPT | Performed by: INTERNAL MEDICINE

## 2024-03-07 PROCEDURE — 25000003 PHARM REV CODE 250: Performed by: NURSE PRACTITIONER

## 2024-03-07 PROCEDURE — 85025 COMPLETE CBC W/AUTO DIFF WBC: CPT | Performed by: INTERNAL MEDICINE

## 2024-03-07 PROCEDURE — 25000242 PHARM REV CODE 250 ALT 637 W/ HCPCS: Performed by: PHYSICIAN ASSISTANT

## 2024-03-07 PROCEDURE — 94640 AIRWAY INHALATION TREATMENT: CPT

## 2024-03-07 PROCEDURE — 36415 COLL VENOUS BLD VENIPUNCTURE: CPT | Mod: XB | Performed by: INTERNAL MEDICINE

## 2024-03-07 PROCEDURE — 97530 THERAPEUTIC ACTIVITIES: CPT

## 2024-03-07 PROCEDURE — 97165 OT EVAL LOW COMPLEX 30 MIN: CPT

## 2024-03-07 PROCEDURE — 36415 COLL VENOUS BLD VENIPUNCTURE: CPT | Performed by: PHYSICIAN ASSISTANT

## 2024-03-07 PROCEDURE — 80053 COMPREHEN METABOLIC PANEL: CPT | Performed by: PHYSICIAN ASSISTANT

## 2024-03-07 PROCEDURE — 36430 TRANSFUSION BLD/BLD COMPNT: CPT

## 2024-03-07 PROCEDURE — 97162 PT EVAL MOD COMPLEX 30 MIN: CPT

## 2024-03-07 PROCEDURE — 80053 COMPREHEN METABOLIC PANEL: CPT | Mod: 91 | Performed by: INTERNAL MEDICINE

## 2024-03-07 PROCEDURE — 63600175 PHARM REV CODE 636 W HCPCS: Performed by: PHYSICIAN ASSISTANT

## 2024-03-07 PROCEDURE — 94761 N-INVAS EAR/PLS OXIMETRY MLT: CPT

## 2024-03-07 PROCEDURE — 27000207 HC ISOLATION

## 2024-03-07 PROCEDURE — 25000003 PHARM REV CODE 250: Performed by: PHYSICIAN ASSISTANT

## 2024-03-07 PROCEDURE — 86850 RBC ANTIBODY SCREEN: CPT | Performed by: INTERNAL MEDICINE

## 2024-03-07 RX ORDER — HYDROCODONE BITARTRATE AND ACETAMINOPHEN 500; 5 MG/1; MG/1
TABLET ORAL
Status: DISCONTINUED | OUTPATIENT
Start: 2024-03-07 | End: 2024-03-13 | Stop reason: HOSPADM

## 2024-03-07 RX ORDER — POTASSIUM CHLORIDE 20 MEQ/1
40 TABLET, EXTENDED RELEASE ORAL ONCE
Status: COMPLETED | OUTPATIENT
Start: 2024-03-07 | End: 2024-03-07

## 2024-03-07 RX ORDER — SODIUM CHLORIDE 9 MG/ML
INJECTION, SOLUTION INTRAVENOUS CONTINUOUS
Status: DISCONTINUED | OUTPATIENT
Start: 2024-03-07 | End: 2024-03-09

## 2024-03-07 RX ORDER — HYDROCODONE BITARTRATE AND ACETAMINOPHEN 7.5; 325 MG/1; MG/1
1 TABLET ORAL EVERY 6 HOURS PRN
Status: DISCONTINUED | OUTPATIENT
Start: 2024-03-07 | End: 2024-03-13 | Stop reason: HOSPADM

## 2024-03-07 RX ADMIN — FLUTICASONE FUROATE AND VILANTEROL TRIFENATATE 1 PUFF: 100; 25 POWDER RESPIRATORY (INHALATION) at 07:03

## 2024-03-07 RX ADMIN — Medication 1 TABLET: at 04:03

## 2024-03-07 RX ADMIN — POTASSIUM CHLORIDE 40 MEQ: 1500 TABLET, EXTENDED RELEASE ORAL at 08:03

## 2024-03-07 RX ADMIN — PIPERACILLIN SODIUM AND TAZOBACTAM SODIUM 4.5 G: 4; .5 INJECTION, POWDER, LYOPHILIZED, FOR SOLUTION INTRAVENOUS at 12:03

## 2024-03-07 RX ADMIN — ACETAMINOPHEN 650 MG: 325 TABLET, FILM COATED ORAL at 10:03

## 2024-03-07 RX ADMIN — OXYBUTYNIN CHLORIDE 15 MG: 5 TABLET, EXTENDED RELEASE ORAL at 08:03

## 2024-03-07 RX ADMIN — FERROUS SULFATE TAB 325 MG (65 MG ELEMENTAL FE) 1 EACH: 325 (65 FE) TAB at 08:03

## 2024-03-07 RX ADMIN — ACYCLOVIR 400 MG: 400 TABLET ORAL at 10:03

## 2024-03-07 RX ADMIN — Medication 1 TABLET: at 08:03

## 2024-03-07 RX ADMIN — SODIUM CHLORIDE: 9 INJECTION, SOLUTION INTRAVENOUS at 08:03

## 2024-03-07 RX ADMIN — ATORVASTATIN CALCIUM 20 MG: 20 TABLET, FILM COATED ORAL at 08:03

## 2024-03-07 RX ADMIN — PREDNISONE 2 MG: 1 TABLET ORAL at 08:03

## 2024-03-07 RX ADMIN — AZELASTINE 137 MCG: 1 SPRAY, METERED NASAL at 10:03

## 2024-03-07 RX ADMIN — PIPERACILLIN SODIUM AND TAZOBACTAM SODIUM 4.5 G: 4; .5 INJECTION, POWDER, LYOPHILIZED, FOR SOLUTION INTRAVENOUS at 08:03

## 2024-03-07 RX ADMIN — MIRTAZAPINE 7.5 MG: 7.5 TABLET ORAL at 10:03

## 2024-03-07 RX ADMIN — THERA TABS 1 TABLET: TAB at 08:03

## 2024-03-07 RX ADMIN — LEVOTHYROXINE SODIUM 50 MCG: 50 TABLET ORAL at 05:03

## 2024-03-07 RX ADMIN — PANTOPRAZOLE SODIUM 40 MG: 40 TABLET, DELAYED RELEASE ORAL at 08:03

## 2024-03-07 RX ADMIN — PIPERACILLIN SODIUM AND TAZOBACTAM SODIUM 4.5 G: 4; .5 INJECTION, POWDER, LYOPHILIZED, FOR SOLUTION INTRAVENOUS at 04:03

## 2024-03-07 RX ADMIN — HYDROCODONE BITARTRATE AND ACETAMINOPHEN 1 TABLET: 7.5; 325 TABLET ORAL at 11:03

## 2024-03-07 RX ADMIN — ACYCLOVIR 400 MG: 400 TABLET ORAL at 08:03

## 2024-03-07 RX ADMIN — AZELASTINE 137 MCG: 1 SPRAY, METERED NASAL at 08:03

## 2024-03-07 NOTE — ASSESSMENT & PLAN NOTE
Niurka Pedraza presents with lethargy,abdominal bloating and pain    Afebrile without leukocytosis  CT AP with possible acute diverticulitis, underlying malignancy not excluded.  There is a large amount of retained stool throughout the colon and FREE AIR  concerning for perforation  Given IV Zosyn in ER, continue  Surgery consulted, NPO, IV abx    +Bms and tolerating oral diet. Continue on zosyn for now

## 2024-03-07 NOTE — PHYSICIAN QUERY
PT Name: Niurka Pedraza  MR #: [id number]     DOCUMENTATION CLARIFICATION     CDS/: Meri Sun               Contact information: [email or web address]  This form is a permanent document in the medical record.    Query Date: March 7, 2024    By submitting this query, we are merely seeking further clarification of documentation.  Please utilize your independent clinical judgment when addressing the question(s) below.  The Medical Record contains the following:   Indicators   Supporting Clinical Findings Location in Medical Record   x Pneumonia documented Opacity noted on imaging study  History of COVID/pneumonia that required admission at [facility] about a month ago  CT AP this admission Patchy confluent opacity left lower lobe, new concerning for aspiration or pneumonia.   Can consider SLP consult once on PO     Hospital Medicine H&P 03/05     x Chest X-Ray/CT Scan CXR: Nonspecific patchy opacities at the lung bases could relate to atelectasis, aspiration or pneumonia       Prominence and indistinctness of central pulmonary vasculature.  Appearance of suggested ovoid lucency at the lateral left lung base appears stable dating back to at least 12/28/2023 radiograph.  This corresponds to finding on 03/05/2024 abdomen/pelvis CT.  Chronic interstitial coarsening again appreciated.  Ill-defined opacities at the lung bases.  No definite pneumothorax or large volume pleural effusion.  Impression:  Nonspecific patchy opacities at the lung bases could relate to atelectasis, aspiration or pneumonia.      Patchy confluent opacity left lower lobe, new concerning for aspiration or pneumonia.    [facility] Medicine H&P 03/05        CXR 03/05                    CT Abdomen Pelvis 03/05    PaO2    PaCO2     O2 sat     x WBC Without leukocytosis    [facility] Medicine H&P 03/05     x Vital Signs Afebrile   Resp:  [19-22]   SpO2:  [95 %-97 %]   Hospital Medicine H&P 03/05      Cultures      x Treatment  Diverticulitis of large  intestine with perforation - Given IV Zosyn in ER, continue    Hospital Medicine H&P 03/05     x Supplemental O2 Room Air   VS Flowsheet    Dysphagia/Swallow study     x Other Denies chest pain, shortness of breath   Recently hospitalized at [facility] for COVID/pneumonia, UTI and discharged with Meropenem IV to complete treatment for MSSA pneumonia and recurrent UTI. Treatment completed 2/16/2024 but patient reports residual productive cough and dysuria. Patchy confluent opacity left lower lobe, new concerning for aspiration or pneumonia.    Positive for cough  Pulomonary: Effort: Pulmonary effort is normal. No respiratory distress.    [facility] Medicine H&P 03/05       Provider, please provide the diagnosis related to the above clinical indicators:    [   ] Pneumonia Ruled Out   [   ] Aspiration Pneumonia/Pneumonitis   [   ] Bacterial Pneumonia (please further specify organism being treated):______   [   ] Unspecified Pneumonia   [   ] Other type of pneumonia (please specify): ________   [   ] Other respiratory diagnosis (please specify): _________   [ x ] Clinically undetermined           Please document in your progress notes daily for the duration of treatment, until resolved, and include in your discharge summary.     Form No. [id number]                                                                                                                                                                     Tissue Cultured Epidermal Autograft Text: The defect edges were debeveled with a #15 scalpel blade.  Given the location of the defect, shape of the defect and the proximity to free margins a tissue cultured epidermal autograft was deemed most appropriate.  The graft was then trimmed to fit the size of the defect.  The graft was then placed in the primary defect and oriented appropriately.

## 2024-03-07 NOTE — SUBJECTIVE & OBJECTIVE
Interval History:  patient with pancytopenia due to chemotherapy she is receiving.    Minimal abdominal pain at all.    Patient receiving Blood products today.    Continue to monitor labs.    No need for acute surgical intervention for diverticulitis.           Medications:  Continuous Infusions:   sodium chloride 0.9% 75 mL/hr at 03/07/24 0838     Scheduled Meds:   acyclovir  400 mg Oral BID    atorvastatin  20 mg Oral Daily    azelastine  1 spray Nasal BID    calcium-vitamin D3  1 tablet Oral BID WM    ferrous sulfate  1 tablet Oral Daily    fluticasone furoate-vilanteroL  1 puff Inhalation Daily    levothyroxine  50 mcg Oral Before breakfast    mirtazapine  7.5 mg Oral QHS    multivitamin  1 tablet Oral Daily    oxybutynin  15 mg Oral Daily    pantoprazole  40 mg Oral Daily    piperacillin-tazobactam (Zosyn) IV (PEDS and ADULTS) (extended infusion is not appropriate)  4.5 g Intravenous Q8H    predniSONE  2 mg Oral Daily     PRN Meds:0.9%  NaCl infusion (for blood administration), acetaminophen, albuterol-ipratropium, aluminum-magnesium hydroxide-simethicone, dextrose 10%, dextrose 10%, glucagon (human recombinant), glucose, glucose, melatonin, naloxone, ondansetron, prochlorperazine, simethicone, sodium chloride 0.9%, sodium chloride 3%     Review of patient's allergies indicates:   Allergen Reactions    Ciprofloxacin Other (See Comments)     Right foot pain and edema, tendonitis    Amlodipine Edema and Swelling     BLE  BLE    Lisinopril Other (See Comments)     cough    Nitrofuran analogues      Objective:     Vital Signs (Most Recent):  Temp: 97.8 °F (36.6 °C) (03/07/24 1307)  Pulse: 100 (03/07/24 1407)  Resp: 18 (03/07/24 1307)  BP: (!) 110/57 (03/07/24 1407)  SpO2: 96 % (03/07/24 1307) Vital Signs (24h Range):  Temp:  [97.8 °F (36.6 °C)-99.5 °F (37.5 °C)] 97.8 °F (36.6 °C)  Pulse:  [] 100  Resp:  [16-18] 18  SpO2:  [93 %-98 %] 96 %  BP: ()/(44-73) 110/57     Weight: 63.4 kg (139 lb 12.4 oz)  Body  mass index is 22.56 kg/m².    Intake/Output - Last 3 Shifts         03/05 0700  03/06 0659 03/06 0700  03/07 0659 03/07 0700  03/08 0659    P.O. 225 180     I.V. (mL/kg) 786.9 (12.4)      IV Piggyback 100 100     Total Intake(mL/kg) 1111.9 (17.5) 280 (4.4)     Net +1111.9 +280            Urine Occurrence 3 x 4 x     Stool Occurrence 1 x 1 x 1 x    Emesis Occurrence 0 x 0 x              Physical Exam  Vitals and nursing note reviewed.   Constitutional:       Appearance: She is well-developed.   HENT:      Head: Normocephalic and atraumatic.   Cardiovascular:      Rate and Rhythm: Normal rate.      Heart sounds: Normal heart sounds.   Pulmonary:      Effort: Pulmonary effort is normal.   Abdominal:      General: Bowel sounds are normal. There is no distension.      Palpations: Abdomen is soft.      Tenderness: There is no abdominal tenderness.   Musculoskeletal:         General: Normal range of motion.      Cervical back: Normal range of motion.   Skin:     General: Skin is warm and dry.      Capillary Refill: Capillary refill takes less than 2 seconds.   Neurological:      Mental Status: She is alert and oriented to person, place, and time.   Psychiatric:         Behavior: Behavior normal.          Significant Labs:  I have reviewed all pertinent lab results within the past 24 hours.  CBC:   Recent Labs   Lab 03/07/24  0608   WBC 0.82*   RBC 2.19*   HGB 6.1*   HCT 18.6*   PLT 49*   MCV 85   MCH 27.9   MCHC 32.8     CMP:   Recent Labs   Lab 03/07/24  0608   *   CALCIUM 8.5*   ALBUMIN 2.0*   PROT 5.5*      K 3.2*   CO2 22*      BUN 27*   CREATININE 0.7   ALKPHOS 151*   ALT 17   AST 16   BILITOT 1.0     LFTs:   Recent Labs   Lab 03/07/24  0608   ALT 17   AST 16   ALKPHOS 151*   BILITOT 1.0   PROT 5.5*   ALBUMIN 2.0*       Significant Diagnostics:  I have reviewed all pertinent imaging results/findings within the past 24 hours.

## 2024-03-07 NOTE — ASSESSMENT & PLAN NOTE
Moderate Leukopenia  Thrombocytopenia  Anemia    Does not believe she takes posaconazole anymore  Takes venetoclax days 1-14 of each 28 day cycle of therapy   Continue prednisone 2mg daily, acyclovir as prevention     Hold venetoclax while inpatient. Appreciate heme/onc input.  1U RBC today for anemia, no GI bleeding seen and likely related to her pancytopenia, oncologic therapy, active infection and antibiotics. Continue to monitor

## 2024-03-07 NOTE — PLAN OF CARE
Problem: Occupational Therapy  Goal: Occupational Therapy Goal  Description: Goals to be met by: 3/20/2024     Patient will increase functional independence with ADLs by performing:    UE Dressing with Minimal Assistance.  LE Dressing with Moderate Assistance.  Grooming while standing at sink with Contact Guard Assistance.  Toileting from toilet with Minimal Assistance for hygiene and clothing management.   Toilet transfer to toilet with Minimal Assistance.    Outcome: Ongoing, Progressing     OT evaluation complete and POC established.  PTA daughter reports that pt was independent with ADLs and Mod I with SPC for mobility; however, for the last month pt has required increased assist for ADLs and mobility after recent hospitalization at [facility].  Daughter states pt has mostly been using her wheelchair since then.      Pt would benefit from skilled OT services to address deficits and increase independence with ADLs.  Moderate intensity therapy (pending pt and family desire) is recommended upon d/c from acute care to further address deficits and help pt improve overall functional independence.     FABIANA Molina  3/7/2024

## 2024-03-07 NOTE — ASSESSMENT & PLAN NOTE
Serial abdominal exams   Patient tolerating regular diet.     Continue IV antibiotics     Patient seems to be doing well so far.

## 2024-03-07 NOTE — PROGRESS NOTES
[facility] Medicine  Progress Note    Patient Name: Niurka Pedraza  MRN: [id number]  Patient Class: IP- Inpatient   Admission Date: 3/5/2024  Length of Stay: 2 days  Attending Physician: Salvador Gilmore MD  Primary Care Provider: Savana Anderson MD        Subjective:     Principal Problem:Diverticulitis of large intestine with perforation        HPI:  Niurka Pedraza is a 78F with myelodysplastic syndrome with excess blasts-2 on chronic steroids, ILD/pulmonary fibrosis, hypertension, MDRO UTIs, pulmonary coccidioidomycosis and hypothyroidism who presents with her daughter for increasing weakness and lethargy over the past week. She used to be able to walk with a cane or walker, but now has felt so weak she struggles to walk short distances. Cannot recall her last bowel movement, usually takes dulcolax at home. She reports some lower abdominal discomfort, bloating, burning with urination. Her last chemo infusion was on Friday, four days ago. Denies chest pain, shortness of breath, nausea, vomiting, diarrhea. Daughter Luz at bedside notes she has not had much of an appetite    Of note she was recently hospitalized at [facility] for COVID/pneumonia, UTI and discharged with Meropenem IV to complete treatment for MSSA pneumonia and recurrent UTI. Treatment completed 2/16/2024 but patient reports residual productive cough and dysuria. Patchy confluent opacity left lower lobe, new concerning for aspiration or pneumonia.     In ER she's afebrile without leukocytosis, CBC shows thrombocytopenia consistent with baseline; CMP with elevated T. Bili otherwise LFTs WNL and Cr at baseline. UA unremarkable; CXR: Nonspecific patchy opacities at the lung bases could relate to atelectasis, aspiration or pneumonia. CT AP with possible acute diverticulitis, underlying malignancy not excluded.  There is a large amount of retained stool throughout the colon and FREE AIR  concerning for perforation. Patchy confluent  opacity left lower lobe, new concerning for aspiration or pneumonia. Abnormal severe compression fracture deformity of L2 and the moderate compression fracture deformity of L1, new from the prior study. ; Surgery consulted recommend NPO and IV abx    Overview/Hospital Course:  Admitted for medical management of Acute diverticulitis with microperforation and free air, new LLL consolidation. Placed NPO with IVF and Zosyn. Surgery consulted.     Interval History: +Bms which have been loose, denies BRBPR. Abd pain much improved with some occasional cramping pain in her lower abdomen. Tolerating diet without issue. Feels weak. She wants to be back at home for her birthday in 3 days.    Review of Systems   Constitutional:  Negative for chills and fever.   HENT:  Negative for sore throat and trouble swallowing.    Respiratory:  Positive for cough and shortness of breath. Negative for wheezing.    Cardiovascular:  Negative for chest pain and palpitations.   Gastrointestinal:  Positive for abdominal distention and abdominal pain. Negative for constipation, diarrhea, nausea and vomiting.   Skin:  Negative for rash.   Neurological:  Negative for speech difficulty, light-headedness and headaches.     Objective:     Vital Signs (Most Recent):  Temp: 98.9 °F (37.2 °C) (03/07/24 1217)  Pulse: 82 (03/07/24 1217)  Resp: 18 (03/07/24 1217)  BP: (!) 88/53 (03/07/24 1217)  SpO2: (!) 94 % (03/07/24 1217) Vital Signs (24h Range):  Temp:  [97.9 °F (36.6 °C)-99.5 °F (37.5 °C)] 98.9 °F (37.2 °C)  Pulse:  [] 82  Resp:  [16-18] 18  SpO2:  [93 %-98 %] 94 %  BP: ()/(44-73) 88/53     Weight: 63.4 kg (139 lb 12.4 oz)  Body mass index is 22.56 kg/m².    Intake/Output Summary (Last 24 hours) at 3/7/2024 1307  Last data filed at 3/7/2024 0555  Gross per 24 hour   Intake 280 ml   Output --   Net 280 ml         Physical Exam  Constitutional:       General: She is not in acute distress.     Appearance: She is ill-appearing.  "  Cardiovascular:      Rate and Rhythm: Normal rate and regular rhythm.   Pulmonary:      Effort: Pulmonary effort is normal. No respiratory distress.   Abdominal:      General: Bowel sounds are normal. There is distension.      Palpations: Abdomen is soft.      Tenderness: There is abdominal tenderness.   Musculoskeletal:      Right lower leg: No edema.      Left lower leg: No edema.   Skin:     General: Skin is warm and dry.   Neurological:      General: No focal deficit present.      Mental Status: She is alert.             Significant Labs: All pertinent labs within the past 24 hours have been reviewed.    Significant Imaging: I have reviewed all pertinent imaging results/findings within the past 24 hours.    Assessment/Plan:      * Diverticulitis of large intestine with perforation  Niurka Pedraza presents with lethargy,abdominal bloating and pain    Afebrile without leukocytosis  CT AP with possible acute diverticulitis, underlying malignancy not excluded.  There is a large amount of retained stool throughout the colon and FREE AIR  concerning for perforation  Given IV Zosyn in ER, continue  Surgery consulted, NPO, IV abx    +Bms and tolerating oral diet. Continue on zosyn for now    Compression fracture of L2  Incidental on CT AP "Abnormal severe compression fracture deformity of L2 and the moderate compression fracture deformity of L1, new from the prior study.  No apparent resulting severe canal stenosis"  Consider ortho inpt consult/ PTOT       Opacity noted on imaging study  History of COVID/pneumonia that required admission at [facility] about a month ago  CT AP this admission Patchy confluent opacity left lower lobe, new concerning for aspiration or pneumonia.   Can consider SLP consult once on PO      MDS/AML  Moderate Leukopenia  Thrombocytopenia  Anemia    Does not believe she takes posaconazole anymore  Takes venetoclax days 1-14 of each 28 day cycle of therapy   Continue prednisone 2mg daily, " acyclovir as prevention     Hold venetoclax while inpatient. Appreciate heme/onc input.  1U RBC today for anemia, no GI bleeding seen and likely related to her pancytopenia, oncologic therapy, active infection and antibiotics. Continue to monitor    Thrombocytopenia  Chronic, monitor      Immunosuppression due to chronic steroid use        Pulmonary fibrosis  Continue steroids       Interstitial lung disease  Continue fluticasone vilanterol daily and saline nebs PRN       Acquired hypothyroidism  Continue synthroid      Essential hypertension  Coreg 25 and avapro 300 being held due to soft Bps, restart when able.      VTE Risk Mitigation (From admission, onward)           Ordered     IP VTE HIGH RISK PATIENT  Once         03/05/24 1742                    Discharge Planning   JAKE: 3/11/2024     Code Status: Full Code   Is the patient medically ready for discharge?:     Reason for patient still in hospital (select all that apply): Treatment  Discharge Plan A: Home with family                  Salvador Gilmore MD  Department of Hospital Medicine   [facility] ([place])

## 2024-03-07 NOTE — PT/OT/SLP EVAL
Occupational Therapy   Evaluation    Name: Niurka Pedraza  MRN: [id number]  Admitting Diagnosis: Diverticulitis of large intestine with perforation  Recent Surgery: * No surgery found *      Recommendations:     Discharge Recommendations: Moderate Intensity Therapy  Discharge Equipment Recommendations:  to be determined by next level of care  Barriers to discharge:   none    Assessment:     Niurka Pedraza is a 78 y.o. female with a medical diagnosis of Diverticulitis of large intestine with perforation.  She presents with significant pain all over body, but especially in lower back. Performance deficits affecting function: weakness, impaired endurance, impaired self care skills, impaired functional mobility, gait instability, impaired balance, decreased lower extremity function, decreased upper extremity function, decreased ROM.   Pt agreeable to participating in therapy upon arrival to room. Pt demonstrates strength and ROM in (B) UE needed for ADLs that is WFL; however, increased effort required for all activity.  Mod A, required to perform supine <> sit transfer.  While seated at EOB pt required Total A to adjust socks and pull upwards.  Pt required Min A, RW to perform sit <> stand transfer with CGA, RW required to take side steps.  Pt deferred further mobility 2* feeling too fatigued.    Pt tolerated therapy well, but impacted by fatigue and back pain this date.  PTA daughter reports that pt was independent with ADLs and Mod I with SPC for mobility; however, for the last month pt has required increased assist for ADLs and mobility after recent hospitalization at [facility].  Daughter states pt has mostly been using her wheelchair since then.  Pt would benefit from skilled OT services to address problems listed above and increase independence with ADLs.  Moderate intensity therapy (pending pt and family desire) is recommended upon d/c from acute care to further address deficits and help pt improve overall  "functional independence.         Rehab Prognosis: Good; patient would benefit from acute skilled OT services to address these deficits and reach maximum level of function.       Plan:     Patient to be seen   to address the above listed problems via self-care/home management, therapeutic activities, therapeutic exercises  Plan of Care Expires:    Plan of Care Reviewed with: patient, daughter    Subjective     Chief Complaint: fatigue, back pain  Patient/Family Comments/goals: Pt wants to get stronger before going home    Occupational Profile:  Living Environment: Pt lives with son, nephew, and now daughter will be coming to stay in [facility], [address].  Bathroom has walk-in shower.  Previous level of function:   *Prior to recent hospitalization at [facility] pt was mostly independent with ADLs and was utilizing a SPC.  *Since hospitalization pt requires increased assist for ADLs and mobility.  Pt has mostly been using a wheelchair.  Roles and Routines: Mother, enjoys crafts and knitting  Equipment Used at Home: wheelchair, walker, rolling, cane, straight (elevated toilet set)  Assistance upon Discharge: Daughter, son, and nephew able to provide assist; daughter home during night, son home during the day    Pain/Comfort:  Pain Rating 1:  (pt indicated pain "everywhere", but especially at lower back.  Did not rate)  Pain Addressed 1: Pre-medicate for activity  Pain Rating Post-Intervention 1:  (pain remained consistent, especially at lower back)    Patients cultural, spiritual, [employer]ybeterian conflicts given the current situation: no    Objective:     Communicated with: RN (INES) prior to session.  Patient found HOB elevated with peripheral IV upon OT entry to room.  *Daughter present during session    General Precautions: Standard, fall  Orthopedic Precautions: N/A  Braces: N/A  Respiratory Status: Room air    Occupational Performance:    Bed Mobility:    Supine <> sit: Mod A  Scooting: Total A-CGA seated towards EOB  Sit <> supine: Max " A    Functional Mobility/Transfers:  Sit <> stand: Min A, RW x 1 trial from EOB  Cues for technique and position provided  Functional Mobility: Pt took 3 side steps to left towards HOB with CGA, RW.  Increased fatigue noted  Further mobility deferred 2* pt requested to get back in bed    Activities of Daily Living:  Lower Body Dressing: total assistance for adjusting socks and pulling upwards while seated at EOB.  Pt with significant low back pain    Cognitive/Visual Perceptual:  Cognitive/Psychosocial Skills:    -       Oriented to: Person, Place, Time, and Situation   -       Follows Commands/attention: Follows 100% of one step commands  -       Communication: clear/fluent  -       Memory: No Deficits noted  -       Safety awareness/insight to disability: intact   -       Mood/Affect/Coping skills/emotional control: Appropriate to situation    Physical Exam:  Postural examination/scapula alignment:   -       Forward head  Skin integrity: Visible skin intact  Edema:  None noted  Sensation: -       Intact  Motor Planning: -       WNL  Dominant hand: -       right  Upper Extremity Range of Motion:    -       Right Upper Extremity: WFL  -       Left Upper Extremity: WFL  Upper Extremity Strength:   -       Right Upper Extremity: WFL; grossly 4/5 deltoids; 3+/5 biceps, triceps  -       Left Upper Extremity: WFL; grossly 4/5 deltoids; 3+/5 biceps, triceps   Strength: WFL  Fine Motor Coordination: Intact  Gross motor coordination: WFL  Balance: Sitting- SBA; Standing- Min A-CGA    AMPAC 6 Click ADL:  AMPAC Total Score: 16    Treatment & Education:  *Pt educated on role of OT in acute care setting  *Pt educated on log rolling technique then practiced during supine <> sit transfer  *Pt performed sit <> stand transfer from EOB; cues for technique and position provided  *Pt took side steps to left   *POC reviewed with pt     Patient left HOB elevated with all lines intact, call button in reach, and RN (INES)  notified    GOALS:   Multidisciplinary Problems       Occupational Therapy Goals          Problem: Occupational Therapy    Goal Priority Disciplines Outcome Interventions   Occupational Therapy Goal     OT, PT/OT Ongoing, Progressing    Description: Goals to be met by: 3/20/2024     Patient will increase functional independence with ADLs by performing:    UE Dressing with Minimal Assistance.  LE Dressing with Moderate Assistance.  Grooming while standing at sink with Contact Guard Assistance.  Toileting from toilet with Minimal Assistance for hygiene and clothing management.   Toilet transfer to toilet with Minimal Assistance.                         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    Breast cancer     mastectomy 2014    Coccidiomycosis, progressive     Elevated CA-125 02/25/2018    Hyperlipidemia     Hypertension     Liver disease     OAB (overactive bladder)     Osteopenia     on Dexa 11/2017    Osteoporosis     pt reports hx of this, declined fosamax in past - treated with calcium and vit D    Pelvic mass 02/25/2018    Pulmonary fibrosis     Pulmonary nodules     SOB (shortness of breath) on exertion     Thyroid disease     hypo    Urinary tract infection due to extended-spectrum beta lactamase (ESBL) producing Escherichia coli 03/16/2022    UTI (urinary tract infection)          Past Surgical History:   Procedure Laterality Date    CHOLECYSTECTOMY      COLONOSCOPY N/A 12/09/2022    Procedure: COLONOSCOPY;  Surgeon: Enrique Dominguez MD;  Location: [facility] ([address] [place] [address]);  Service: Endoscopy;  Laterality: N/A;  inst via portal  pt requests after 12/9/22-MS  11/30-pt notified of earlier arrival time-[facility]    CYSTOSCOPY WITH BIOPSY OF BLADDER Bilateral 07/27/2022    Procedure: CYSTOSCOPY, WITH BLADDER BIOPSY; retrograde pyelogram,;  Surgeon: Anaya Oropeza MD;  Location: [facility];  Service: Urology;  Laterality:  Bilateral;    ENDOSCOPIC ULTRASOUND OF UPPER GASTROINTESTINAL TRACT N/A 04/08/2021    Procedure: ULTRASOUND, UPPER GI TRACT, ENDOSCOPIC;  Surgeon: Aisha Barajas MD;  Location: [facility] (2ND FLR);  Service: Endoscopy;  Laterality: N/A;  UEUS/ERCP evaluation abn MRCP - Dr Carney  Covid-19 test 4/5/21 at [facility] Pre-admit - pg    ERCP N/A 04/08/2021    Procedure: ERCP (ENDOSCOPIC RETROGRADE CHOLANGIOPANCREATOGRAPHY);  Surgeon: Aisha Barajas MD;  Location: [facility] (2ND FLR);  Service: Endoscopy;  Laterality: N/A;  UEUS/ERCP evaluation abn MRCP - Dr Carney  Covid-19 test 4/5/21 at [facility] Pre-admit - pg    ESOPHAGOGASTRODUODENOSCOPY N/A 04/08/2021    Procedure: EGD (ESOPHAGOGASTRODUODENOSCOPY);  Surgeon: Aisha Barajas MD;  Location: [facility] (2ND FLR);  Service: Endoscopy;  Laterality: N/A;  UEUS/ERCP evaluation abn MRCP - Dr Carney  Covid-19 test 4/5/21 at [facility] Pre-admit - pg    ESOPHAGOGASTRODUODENOSCOPY N/A 06/02/2023    Procedure: EGD (ESOPHAGOGASTRODUODENOSCOPY);  Surgeon: Emeka Carney MD;  Location: [facility] (4TH FLR);  Service: Endoscopy;  Laterality: N/A;  She has  history of seromucinous borderline tumor of the ovary. We generally follow  and CEA tumor markers. Her CEA recently became slightly elevated. CT imaging negative and colonoscopy negative.     Talita SKuldip Barrios    instructions portal-LW  pre    HYSTERECTOMY      MASTECTOMY Right     2014       Time Tracking:     OT Date of Treatment: 03/07/24  OT Start Time: 1023  OT Stop Time: 1041  OT Total Time (min): 18 min    Billable Minutes:Evaluation 10  Therapeutic Activity 8    FABIANA Molina  3/7/2024

## 2024-03-07 NOTE — PHYSICIAN QUERY
PT Name: Niurka Pedraza  MR #: [id number]    DOCUMENTATION CLARIFICATION     CDS/: Meri Sun               Contact information: [email or web address]  This form is a permanent document in the medical record.     Query Date: March 7, 2024    By submitting this query, we are merely seeking further clarification of documentation.  Please utilize your independent clinical judgment when addressing the question(s) below.      The Medical Record reflects the following:  Clinical Information Location in Medical Record    78 y.o. 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   Patient underwent her most recent chemotherapy infusion four days ago. While this typically results in a week-long period of confusion, her daughter is concerned that this episode is worse than prior. She recounts that patient having difficulty determining where she is, what she is doing, or who she is with. Patient adds that she has recently began to have dysuria as well as pelvic pain with urination, and she reports a history of UTI with similar symptoms.     Metabolic encephalopathy     ED Provider Note 03/05   78F with myelodysplastic syndrome with excess blasts-2 on chronic steroids, ILD/pulmonary fibrosis, hypertension, MDRO UTIs, pulmonary coccidioidomycosis and hypothyroidism who presents with her daughter for increasing weakness and lethargy over the past week.   Neurological:  Positive for weakness. Negative for syncope.   Psychiatric/Behavioral:  Negative for agitation and confusion.      Diverticulitis of large intestine with perforation   CT AP this admission Patchy confluent opacity left lower lobe, new concerning for aspiration or pneumonia.       Hospital Medicine H&P 03/05     Please clarify/confirm the Emergency Medicine diagnosis of ____Metabolic encephalopathy___?     [   ] Diagnosis ruled in   [   ] Diagnosis ruled out   [   ] Other diagnosis (please  specify): _____________   [ x ] Clinically undetermined

## 2024-03-07 NOTE — PROGRESS NOTES
[facility] ([place])  General Surgery  Progress Note    Subjective:     History of Present Illness:  78 y.o. 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 Patient was hospitalized last month with pneumonia secondary to COVID and has since required more assistance getting around for the past 1.5 weeks due to back pain. She was started on hydrocodone one week ago and has not had a bowel movement since. Patient underwent her most recent chemotherapy infusion four days ago. While this typically results in a week-long period of confusion, her daughter is concerned that this episode is worse than prior. She recounts that patient having difficulty determining where she is, what she is doing, or who she is with. Patient adds that she has recently began to have dysuria as well as pelvic pain with urination, and she reports a history of UTI with similar symptoms. PSHx of cholecystectomy.      Post-Op Info:  * No surgery found *         Interval History:  patient with pancytopenia due to chemotherapy she is receiving.    Minimal abdominal pain at all.    Patient receiving Blood products today.    Continue to monitor labs.    No need for acute surgical intervention for diverticulitis.           Medications:  Continuous Infusions:   sodium chloride 0.9% 75 mL/hr at 03/07/24 0838     Scheduled Meds:   acyclovir  400 mg Oral BID    atorvastatin  20 mg Oral Daily    azelastine  1 spray Nasal BID    calcium-vitamin D3  1 tablet Oral BID WM    ferrous sulfate  1 tablet Oral Daily    fluticasone furoate-vilanteroL  1 puff Inhalation Daily    levothyroxine  50 mcg Oral Before breakfast    mirtazapine  7.5 mg Oral QHS    multivitamin  1 tablet Oral Daily    oxybutynin  15 mg Oral Daily    pantoprazole  40 mg Oral Daily    piperacillin-tazobactam (Zosyn) IV (PEDS and ADULTS) (extended infusion is not appropriate)  4.5 g Intravenous Q8H    predniSONE  2 mg Oral Daily      PRN Meds:0.9%  NaCl infusion (for blood administration), acetaminophen, albuterol-ipratropium, aluminum-magnesium hydroxide-simethicone, dextrose 10%, dextrose 10%, glucagon (human recombinant), glucose, glucose, melatonin, naloxone, ondansetron, prochlorperazine, simethicone, sodium chloride 0.9%, sodium chloride 3%     Review of patient's allergies indicates:   Allergen Reactions    Ciprofloxacin Other (See Comments)     Right foot pain and edema, tendonitis    Amlodipine Edema and Swelling     BLE  BLE    Lisinopril Other (See Comments)     cough    Nitrofuran analogues      Objective:     Vital Signs (Most Recent):  Temp: 97.8 °F (36.6 °C) (03/07/24 1307)  Pulse: 100 (03/07/24 1407)  Resp: 18 (03/07/24 1307)  BP: (!) 110/57 (03/07/24 1407)  SpO2: 96 % (03/07/24 1307) Vital Signs (24h Range):  Temp:  [97.8 °F (36.6 °C)-99.5 °F (37.5 °C)] 97.8 °F (36.6 °C)  Pulse:  [] 100  Resp:  [16-18] 18  SpO2:  [93 %-98 %] 96 %  BP: ()/(44-73) 110/57     Weight: 63.4 kg (139 lb 12.4 oz)  Body mass index is 22.56 kg/m².    Intake/Output - Last 3 Shifts         03/05 0700  03/06 0659 03/06 0700  03/07 0659 03/07 0700  03/08 0659    P.O. 225 180     I.V. (mL/kg) 786.9 (12.4)      IV Piggyback 100 100     Total Intake(mL/kg) 1111.9 (17.5) 280 (4.4)     Net +1111.9 +280            Urine Occurrence 3 x 4 x     Stool Occurrence 1 x 1 x 1 x    Emesis Occurrence 0 x 0 x              Physical Exam  Vitals and nursing note reviewed.   Constitutional:       Appearance: She is well-developed.   HENT:      Head: Normocephalic and atraumatic.   Cardiovascular:      Rate and Rhythm: Normal rate.      Heart sounds: Normal heart sounds.   Pulmonary:      Effort: Pulmonary effort is normal.   Abdominal:      General: Bowel sounds are normal. There is no distension.      Palpations: Abdomen is soft.      Tenderness: There is no abdominal tenderness.   Musculoskeletal:         General: Normal range of motion.      Cervical back:  Normal range of motion.   Skin:     General: Skin is warm and dry.      Capillary Refill: Capillary refill takes less than 2 seconds.   Neurological:      Mental Status: She is alert and oriented to person, place, and time.   Psychiatric:         Behavior: Behavior normal.          Significant Labs:  I have reviewed all pertinent lab results within the past 24 hours.  CBC:   Recent Labs   Lab 03/07/24  0608   WBC 0.82*   RBC 2.19*   HGB 6.1*   HCT 18.6*   PLT 49*   MCV 85   MCH 27.9   MCHC 32.8     CMP:   Recent Labs   Lab 03/07/24  0608   *   CALCIUM 8.5*   ALBUMIN 2.0*   PROT 5.5*      K 3.2*   CO2 22*      BUN 27*   CREATININE 0.7   ALKPHOS 151*   ALT 17   AST 16   BILITOT 1.0     LFTs:   Recent Labs   Lab 03/07/24  0608   ALT 17   AST 16   ALKPHOS 151*   BILITOT 1.0   PROT 5.5*   ALBUMIN 2.0*       Significant Diagnostics:  I have reviewed all pertinent imaging results/findings within the past 24 hours.  Assessment/Plan:     * Diverticulitis of large intestine with perforation  Serial abdominal exams   Patient tolerating regular diet.     Continue IV antibiotics     Patient seems to be doing well so far.              Michael Worrell MD  General Surgery  [facility] ([place])

## 2024-03-07 NOTE — SUBJECTIVE & OBJECTIVE
Interval History: +Bms which have been loose, denies BRBPR. Abd pain much improved with some occasional cramping pain in her lower abdomen. Tolerating diet without issue. Feels weak. She wants to be back at home for her birthday in 3 days.    Review of Systems   Constitutional:  Negative for chills and fever.   HENT:  Negative for sore throat and trouble swallowing.    Respiratory:  Positive for cough and shortness of breath. Negative for wheezing.    Cardiovascular:  Negative for chest pain and palpitations.   Gastrointestinal:  Positive for abdominal distention and abdominal pain. Negative for constipation, diarrhea, nausea and vomiting.   Skin:  Negative for rash.   Neurological:  Negative for speech difficulty, light-headedness and headaches.     Objective:     Vital Signs (Most Recent):  Temp: 98.9 °F (37.2 °C) (03/07/24 1217)  Pulse: 82 (03/07/24 1217)  Resp: 18 (03/07/24 1217)  BP: (!) 88/53 (03/07/24 1217)  SpO2: (!) 94 % (03/07/24 1217) Vital Signs (24h Range):  Temp:  [97.9 °F (36.6 °C)-99.5 °F (37.5 °C)] 98.9 °F (37.2 °C)  Pulse:  [] 82  Resp:  [16-18] 18  SpO2:  [93 %-98 %] 94 %  BP: ()/(44-73) 88/53     Weight: 63.4 kg (139 lb 12.4 oz)  Body mass index is 22.56 kg/m².    Intake/Output Summary (Last 24 hours) at 3/7/2024 1307  Last data filed at 3/7/2024 0555  Gross per 24 hour   Intake 280 ml   Output --   Net 280 ml         Physical Exam  Constitutional:       General: She is not in acute distress.     Appearance: She is ill-appearing.   Cardiovascular:      Rate and Rhythm: Normal rate and regular rhythm.   Pulmonary:      Effort: Pulmonary effort is normal. No respiratory distress.   Abdominal:      General: Bowel sounds are normal. There is distension.      Palpations: Abdomen is soft.      Tenderness: There is abdominal tenderness.   Musculoskeletal:      Right lower leg: No edema.      Left lower leg: No edema.   Skin:     General: Skin is warm and dry.   Neurological:      General: No  focal deficit present.      Mental Status: She is alert.             Significant Labs: All pertinent labs within the past 24 hours have been reviewed.    Significant Imaging: I have reviewed all pertinent imaging results/findings within the past 24 hours.

## 2024-03-07 NOTE — PLAN OF CARE
Pt remained free of falls and injuries throughout shift. AAOx4. Pt calm and cooperative. Purposeful hourly rounding performed. Pt swallows meds whole. Pt c/o lower abd pain, PRN Tylenol given. Pt received IV Zosyn as ordered, IV flushed and saline locked. Pt ambulates with standby assist. Daughter at bedside, supportive of pt and participating in care. VSS on RA. Pt resting comfortably in bed, denies pain, denies needs at this time. Bed low and locked, call light in reach. Side rails up x2. Will continue to monitor.

## 2024-03-08 LAB
ALBUMIN SERPL BCP-MCNC: 1.8 G/DL (ref 3.5–5.2)
ALP SERPL-CCNC: 138 U/L (ref 55–135)
ALT SERPL W/O P-5'-P-CCNC: 20 U/L (ref 10–44)
ANION GAP SERPL CALC-SCNC: 6 MMOL/L (ref 8–16)
AST SERPL-CCNC: 18 U/L (ref 10–40)
BASOPHILS # BLD AUTO: 0 K/UL (ref 0–0.2)
BASOPHILS NFR BLD: 0 % (ref 0–1.9)
BILIRUB SERPL-MCNC: 0.7 MG/DL (ref 0.1–1)
BLD PROD TYP BPU: NORMAL
BLOOD UNIT EXPIRATION DATE: NORMAL
BLOOD UNIT TYPE CODE: 6200
BLOOD UNIT TYPE: NORMAL
BUN SERPL-MCNC: 21 MG/DL (ref 8–23)
CALCIUM SERPL-MCNC: 8.3 MG/DL (ref 8.7–10.5)
CHLORIDE SERPL-SCNC: 108 MMOL/L (ref 95–110)
CO2 SERPL-SCNC: 22 MMOL/L (ref 23–29)
CODING SYSTEM: NORMAL
CREAT SERPL-MCNC: 0.7 MG/DL (ref 0.5–1.4)
CROSSMATCH INTERPRETATION: NORMAL
DIFFERENTIAL METHOD BLD: ABNORMAL
DISPENSE STATUS: NORMAL
EOSINOPHIL # BLD AUTO: 0 K/UL (ref 0–0.5)
EOSINOPHIL NFR BLD: 0 % (ref 0–8)
ERYTHROCYTE [DISTWIDTH] IN BLOOD BY AUTOMATED COUNT: 20.4 % (ref 11.5–14.5)
EST. GFR  (NO RACE VARIABLE): >60 ML/MIN/1.73 M^2
GLUCOSE SERPL-MCNC: 125 MG/DL (ref 70–110)
HCT VFR BLD AUTO: 20.6 % (ref 37–48.5)
HGB BLD-MCNC: 6.6 G/DL (ref 12–16)
IMM GRANULOCYTES # BLD AUTO: 0 K/UL (ref 0–0.04)
IMM GRANULOCYTES NFR BLD AUTO: 0 % (ref 0–0.5)
LYMPHOCYTES # BLD AUTO: 0.4 K/UL (ref 1–4.8)
LYMPHOCYTES NFR BLD: 41.7 % (ref 18–48)
MCH RBC QN AUTO: 27 PG (ref 27–31)
MCHC RBC AUTO-ENTMCNC: 32 G/DL (ref 32–36)
MCV RBC AUTO: 84 FL (ref 82–98)
MONOCYTES # BLD AUTO: 0.1 K/UL (ref 0.3–1)
MONOCYTES NFR BLD: 10.4 % (ref 4–15)
NEUTROPHILS # BLD AUTO: 0.5 K/UL (ref 1.8–7.7)
NEUTROPHILS NFR BLD: 47.9 % (ref 38–73)
NRBC BLD-RTO: 0 /100 WBC
NUM UNITS TRANS PACKED RBC: NORMAL
PLATELET # BLD AUTO: 49 K/UL (ref 150–450)
PLATELET BLD QL SMEAR: ABNORMAL
PMV BLD AUTO: 9.3 FL (ref 9.2–12.9)
POTASSIUM SERPL-SCNC: 3.5 MMOL/L (ref 3.5–5.1)
PROT SERPL-MCNC: 5.2 G/DL (ref 6–8.4)
RBC # BLD AUTO: 2.44 M/UL (ref 4–5.4)
SODIUM SERPL-SCNC: 136 MMOL/L (ref 136–145)
WBC # BLD AUTO: 0.96 K/UL (ref 3.9–12.7)

## 2024-03-08 PROCEDURE — 36415 COLL VENOUS BLD VENIPUNCTURE: CPT | Performed by: PHYSICIAN ASSISTANT

## 2024-03-08 PROCEDURE — 36430 TRANSFUSION BLD/BLD COMPNT: CPT

## 2024-03-08 PROCEDURE — A4216 STERILE WATER/SALINE, 10 ML: HCPCS | Performed by: PHYSICIAN ASSISTANT

## 2024-03-08 PROCEDURE — 27000207 HC ISOLATION

## 2024-03-08 PROCEDURE — 97530 THERAPEUTIC ACTIVITIES: CPT | Mod: CQ

## 2024-03-08 PROCEDURE — 94761 N-INVAS EAR/PLS OXIMETRY MLT: CPT

## 2024-03-08 PROCEDURE — 97116 GAIT TRAINING THERAPY: CPT | Mod: CQ

## 2024-03-08 PROCEDURE — 86920 COMPATIBILITY TEST SPIN: CPT | Performed by: INTERNAL MEDICINE

## 2024-03-08 PROCEDURE — 63600175 PHARM REV CODE 636 W HCPCS: Performed by: PHYSICIAN ASSISTANT

## 2024-03-08 PROCEDURE — 85025 COMPLETE CBC W/AUTO DIFF WBC: CPT | Performed by: PHYSICIAN ASSISTANT

## 2024-03-08 PROCEDURE — 97530 THERAPEUTIC ACTIVITIES: CPT | Mod: CO

## 2024-03-08 PROCEDURE — 80053 COMPREHEN METABOLIC PANEL: CPT | Performed by: PHYSICIAN ASSISTANT

## 2024-03-08 PROCEDURE — P9040 RBC LEUKOREDUCED IRRADIATED: HCPCS | Performed by: INTERNAL MEDICINE

## 2024-03-08 PROCEDURE — 94640 AIRWAY INHALATION TREATMENT: CPT

## 2024-03-08 PROCEDURE — 11000001 HC ACUTE MED/SURG PRIVATE ROOM

## 2024-03-08 PROCEDURE — 25000003 PHARM REV CODE 250: Performed by: PHYSICIAN ASSISTANT

## 2024-03-08 RX ORDER — HYDROCODONE BITARTRATE AND ACETAMINOPHEN 500; 5 MG/1; MG/1
TABLET ORAL
Status: DISCONTINUED | OUTPATIENT
Start: 2024-03-08 | End: 2024-03-13 | Stop reason: HOSPADM

## 2024-03-08 RX ADMIN — PIPERACILLIN SODIUM AND TAZOBACTAM SODIUM 4.5 G: 4; .5 INJECTION, POWDER, LYOPHILIZED, FOR SOLUTION INTRAVENOUS at 01:03

## 2024-03-08 RX ADMIN — OXYBUTYNIN CHLORIDE 15 MG: 5 TABLET, EXTENDED RELEASE ORAL at 09:03

## 2024-03-08 RX ADMIN — AZELASTINE 137 MCG: 1 SPRAY, METERED NASAL at 09:03

## 2024-03-08 RX ADMIN — PREDNISONE 2 MG: 1 TABLET ORAL at 09:03

## 2024-03-08 RX ADMIN — ACYCLOVIR 400 MG: 400 TABLET ORAL at 09:03

## 2024-03-08 RX ADMIN — PIPERACILLIN SODIUM AND TAZOBACTAM SODIUM 4.5 G: 4; .5 INJECTION, POWDER, LYOPHILIZED, FOR SOLUTION INTRAVENOUS at 05:03

## 2024-03-08 RX ADMIN — MIRTAZAPINE 7.5 MG: 7.5 TABLET ORAL at 09:03

## 2024-03-08 RX ADMIN — FLUTICASONE FUROATE AND VILANTEROL TRIFENATATE 1 PUFF: 100; 25 POWDER RESPIRATORY (INHALATION) at 07:03

## 2024-03-08 RX ADMIN — Medication 5 ML: at 09:03

## 2024-03-08 RX ADMIN — LEVOTHYROXINE SODIUM 50 MCG: 50 TABLET ORAL at 06:03

## 2024-03-08 RX ADMIN — Medication 1 TABLET: at 09:03

## 2024-03-08 RX ADMIN — Medication 1 TABLET: at 05:03

## 2024-03-08 RX ADMIN — PIPERACILLIN SODIUM AND TAZOBACTAM SODIUM 4.5 G: 4; .5 INJECTION, POWDER, LYOPHILIZED, FOR SOLUTION INTRAVENOUS at 09:03

## 2024-03-08 RX ADMIN — FERROUS SULFATE TAB 325 MG (65 MG ELEMENTAL FE) 1 EACH: 325 (65 FE) TAB at 09:03

## 2024-03-08 RX ADMIN — PANTOPRAZOLE SODIUM 40 MG: 40 TABLET, DELAYED RELEASE ORAL at 09:03

## 2024-03-08 RX ADMIN — THERA TABS 1 TABLET: TAB at 09:03

## 2024-03-08 RX ADMIN — ATORVASTATIN CALCIUM 20 MG: 20 TABLET, FILM COATED ORAL at 09:03

## 2024-03-08 NOTE — PT/OT/SLP EVAL
Physical Therapy Evaluation and Treatment    Patient Name:  Niurka Pedraza   MRN:  [id number]    Recommendations:     Discharge Recommendations: Moderate Intensity Therapy (unless able to have day time assistance at home when dtr leaves (leaving March 13th))   Discharge Equipment Recommendations: to be determined by next level of care, hospital bed   Barriers to discharge: Inaccessible home and Decreased caregiver support    Assessment:     Niurka Pedraza is a 78 y.o. female admitted with a medical diagnosis of Diverticulitis of large intestine with perforation. Pmhx pertinent for MDS/AML on maintenance chemo and on steroids, ILD, HTN, recent covid/pneumonia requiring hospital stay  She presents with the following impairments/functional limitations: weakness, impaired endurance, impaired self care skills, impaired functional mobility, gait instability, impaired balance, decreased coordination, decreased upper extremity function, decreased lower extremity function, decreased safety awareness, pain, decreased ROM, impaired joint extensibility, orthopedic precautions.    Patient evaluated by PT and goals established. Patient with multiple areas of pain, most notably back and R hip that worsen with mobility. Instructed in log roll technique 2/2 compression fractures with good effort by pt. Ambulated within room with RW and CGA but increased fatigue noted after short distances. PT will continue to follow and progress as tolerated. Rec for dc to Moderate Intensity Therapy.     Rehab Prognosis: Fair; patient would benefit from acute skilled PT services to address these deficits and reach maximum level of function.    Recent Surgery: * No surgery found *      Plan:     During this hospitalization, patient to be seen 5 x/week to address the identified rehab impairments via gait training, therapeutic activities, therapeutic exercises, neuromuscular re-education and progress toward the following goals:    Plan of Care Expires:   04/06/24    Subjective     Chief Complaint: Pain in several places, her sister is visiting from [place] but she's been too sick to do what they planned  Patient/Family Comments/goals: Goal to return home for her birthday (in 3 days), worried about her dtr leaving soon (March 13th) and they're discussing potential sitters during the day; Patient agreeable to evaluation.  Pain/Comfort:  Pain Rating 1:  (unrated)  Location 1: back (abd, R hip)  Pain Addressed 1: Reposition, Distraction, Cessation of Activity  Pain Rating Post-Intervention 1:  (appears cmofortable at rest)    Patients cultural, spiritual, Uatsdin conflicts given the current situation: no    Living Environment:  Pt lives with family in a single story home with no steps to enter  Upon discharge, patient will have assistance from her son and dtr, but per pt dtr leaving soon and she won't have assistance during day.  Prior level of function:  Ambulation: Mod(I) with SPC prior to recent hospitalization, in past week has been increasingly weak requiring use of WC  Falls: Denies falls  Equipment used at home: walker, rolling, wheelchair, raised toilet, cane, straight.      Objective:     Communicated with RN INES prior to session.  Patient found HOB elevated with peripheral IV  upon PT entry to room.    General Precautions: Standard, fall  Orthopedic Precautions:N/A (treated conservatively with spinal precautions)   Braces: N/A  Respiratory Status: Room air    Patient donned non slip socks and gait belt for OOB mobility.    Exams:  Cognitive Exam:  Patient is oriented to Person, Place, Time, and Situation  Gross Motor Coordination:  WFL  Postural Exam:  Patient presented with the following abnormalities:    -       Rounded shoulders  -       Forward head  -       Posterior pelvic tilt  -       lumbar flexion  RLE ROM: WFL  RLE Strength: WFL  LLE ROM: WFL  LLE Strength: WFL    Functional Mobility:  Bed Mobility:     Supine to Sit: minimum assistance  Sit to  Supine: minimum assistance  Transfers:     Sit to Stand:  minimum assistance and moderate assistance with rolling walker and grab bars  Shaista from EOB, modA from low toilet  Multiple attempts to come to standing  Gait: 2x12 ft with RW and CGA.   Slow gait with increased forward flexion of trunk.   Tendency to maintain RW anterior to COM.   No overt LOB, but general gait instability requiring hands on assistance for safety.   Increased fatigued after minimal ambulation distance  Balance: Static standing at sink x2 min with CGA and variable UE support. No overt sway or LOB, when hands not available for support pt leans trunk against counter      AM-PAC 6 CLICK MOBILITY  Total Score:17       Treatment & Education:  Second gait bout for increased endurance, standing at sink for increased endurance and strength  PT educated patient re:   PT plan of care/role of PT  Safety with OOB mobility  Use of RW, HB features  Discharge disposition    Pt verbalized understanding       Patient left HOB elevated with all lines intact, call button in reach, RN notified, and sister present.    GOALS:   Multidisciplinary Problems       Physical Therapy Goals          Problem: Physical Therapy    Goal Priority Disciplines Outcome Goal Variances Interventions   Physical Therapy Goal     PT, PT/OT Ongoing, Progressing     Description: Goals to be met by: 24    Patient will increase functional independence with mobility by performin. Supine<>sit with SBA without use of HB features via log roll technique.   2. Sit<>stand with SBA with RW.  3. Gait x 50 feet with SBA with RW.  4. Tolerate sitting in chair x2 hours without significant increase in pain.                       History:     Past Medical History:   Diagnosis Date    Arthritis     Borderline serous cystadenoma of right ovary 2018    Breast cancer     mastectomy 2014    Coccidiomycosis, progressive     Elevated CA-125 2018    Hyperlipidemia     Hypertension      Liver disease     OAB (overactive bladder)     Osteopenia     on Dexa 11/2017    Osteoporosis     pt reports hx of this, declined fosamax in past - treated with calcium and vit D    Pelvic mass 02/25/2018    Pulmonary fibrosis     Pulmonary nodules     SOB (shortness of breath) on exertion     Thyroid disease     hypo    Urinary tract infection due to extended-spectrum beta lactamase (ESBL) producing Escherichia coli 03/16/2022    UTI (urinary tract infection)        Past Surgical History:   Procedure Laterality Date    CHOLECYSTECTOMY      COLONOSCOPY N/A 12/09/2022    Procedure: COLONOSCOPY;  Surgeon: Enrique Dominguez MD;  Location: [facility] (4TH FLR);  Service: Endoscopy;  Laterality: N/A;  inst via portal  pt requests after 12/9/22-MS  11/30-pt notified of earlier arrival time-KPvt    CYSTOSCOPY WITH BIOPSY OF BLADDER Bilateral 07/27/2022    Procedure: CYSTOSCOPY, WITH BLADDER BIOPSY; retrograde pyelogram,;  Surgeon: Anaya Oropeza MD;  Location: [facility];  Service: Urology;  Laterality: Bilateral;    ENDOSCOPIC ULTRASOUND OF UPPER GASTROINTESTINAL TRACT N/A 04/08/2021    Procedure: ULTRASOUND, UPPER GI TRACT, ENDOSCOPIC;  Surgeon: Aisha Barajas MD;  Location: [facility] (2ND FLR);  Service: Endoscopy;  Laterality: N/A;  UEUS/ERCP evaluation abn MRCP - Dr Angelika Zamudio-19 test 4/5/21 at [facility] Pre-admit - pg    ERCP N/A 04/08/2021    Procedure: ERCP (ENDOSCOPIC RETROGRADE CHOLANGIOPANCREATOGRAPHY);  Surgeon: Aisha Barajas MD;  Location: [facility] (2ND FLR);  Service: Endoscopy;  Laterality: N/A;  UEUS/ERCP evaluation abn MRCP - Dr Angelika Zamudio-19 test 4/5/21 at [facility] Pre-admit - pg    ESOPHAGOGASTRODUODENOSCOPY N/A 04/08/2021    Procedure: EGD (ESOPHAGOGASTRODUODENOSCOPY);  Surgeon: Aisha Barajas MD;  Location: [facility] (2ND FLR);  Service: Endoscopy;  Laterality: N/A;  UEUS/ERCP evaluation abn MRCP Hortencia Zamudio-19 test 4/5/21 at [facility] Pre-admit - pg     ESOPHAGOGASTRODUODENOSCOPY N/A 06/02/2023    Procedure: EGD (ESOPHAGOGASTRODUODENOSCOPY);  Surgeon: Emeka Carney MD;  Location: [facility] ([address] [place] [address]);  Service: Endoscopy;  Laterality: N/A;  She has  history of seromucinous borderline tumor of the ovary. We generally follow  and CEA tumor markers. Her CEA recently became slightly elevated. CT imaging negative and colonoscopy negative.     Talita SKuldip Barrios    instructions portal-LW  pre    HYSTERECTOMY      MASTECTOMY Right     2014       Time Tracking:     PT Received On: 03/07/24  PT Start Time: 1659     PT Stop Time: 1725  PT Total Time (min): 26 min     Billable Minutes: Evaluation 16 and Therapeutic Activity 10      03/07/2024

## 2024-03-08 NOTE — PT/OT/SLP PROGRESS
"Physical Therapy Treatment    Patient Name:  Niurka Pedraza   MRN:  [id number]    Recommendations:     Discharge Recommendations: Moderate Intensity Therapy  Discharge Equipment Recommendations: to be determined by next level of care  Barriers to discharge: Decreased caregiver support    Assessment:     Niurka Pedraza is a 78 y.o. female admitted with a medical diagnosis of Diverticulitis of large intestine with perforation.  She presents with the following impairments/functional limitations: weakness, impaired endurance, impaired self care skills, impaired functional mobility, gait instability, impaired balance, decreased lower extremity function, pain, decreased ROM ;pt with fair/good mobility today, limited mostly by pain in low back.    Rehab Prognosis: Good; patient would benefit from acute skilled PT services to address these deficits and reach maximum level of function.    Recent Surgery: * No surgery found *      Plan:     During this hospitalization, patient to be seen 5 x/week to address the identified rehab impairments via gait training, therapeutic activities, therapeutic exercises, neuromuscular re-education and progress toward the following goals:    Plan of Care Expires:  04/06/24    Subjective     Chief Complaint: pain  Patient/Family Comments/goals: pt agreeable to session, though stating she's in "constant" pain in her back.  Pain/Comfort:  Pain Rating 1: 5/10  Location - Orientation 1: lower  Location 1: back  Pain Addressed 1: Reposition, Distraction, Pre-medicate for activity  Pain Rating Post-Intervention 1: 5/10      Objective:     Communicated with nurse prior to session.  Patient found HOB elevated with peripheral IV upon PT entry to room.     General Precautions: Standard, fall  Orthopedic Precautions:  (pt treated conservatively w/ spinal precautions for low back)  Braces: N/A  Respiratory Status: Room air     Functional Mobility:  Bed Mobility:     Supine to Sit: minimum assistance and for " log roll, with use of bedrail  Transfers:     Sit to Stand:  minimum assistance and moderate assistance with rolling walker  Gait: amb'd ~40' x 2 w/ rollator and CGA, needing min A to turn to sit on rollator midway. Pt wearing mask in hallway per precautions.       AM-PAC 6 CLICK MOBILITY  Turning over in bed (including adjusting bedclothes, sheets and blankets)?: 3  Sitting down on and standing up from a chair with arms (e.g., wheelchair, bedside commode, etc.): 3  Moving from lying on back to sitting on the side of the bed?: 3  Moving to and from a bed to a chair (including a wheelchair)?: 3  Need to walk in hospital room?: 3  Climbing 3-5 steps with a railing?: 2  Basic Mobility Total Score: 17       Treatment & Education:  Pt inst'd in safety w/ rollator (pt owns one at home).   Pt perf'd t/f's out of bed w/ inc time to perform and min/modA    Patient left  sitting up in w/c (pt's own)  with all lines intact, call button in reach, and family member present..    GOALS:   Multidisciplinary Problems       Physical Therapy Goals          Problem: Physical Therapy    Goal Priority Disciplines Outcome Goal Variances Interventions   Physical Therapy Goal     PT, PT/OT Ongoing, Progressing     Description: Goals to be met by: 24    Patient will increase functional independence with mobility by performin. Supine<>sit with SBA without use of HB features via log roll technique.   2. Sit<>stand with SBA with RW.  3. Gait x 50 feet with SBA with RW.  4. Tolerate sitting in chair x2 hours without significant increase in pain.                       Time Tracking:     PT Received On: 24  PT Start Time: 1207     PT Stop Time: 1232  PT Total Time (min): 25 min     Billable Minutes: Gait Training 15 and Therapeutic Activity 10    Treatment Type: Treatment  PT/PTA: PTA     Number of PTA visits since last PT visit: 2024

## 2024-03-08 NOTE — PT/OT/SLP PROGRESS
"Occupational Therapy   Treatment    Name: Niurka Pedraza  MRN: [id number]  Admitting Diagnosis:  Diverticulitis of large intestine with perforation       Recommendations:     Discharge Recommendations: Moderate Intensity Therapy  Discharge Equipment Recommendations:  to be determined by next level of care, hospital bed  Barriers to discharge:       Assessment:     Niurka Pedraza is a 78 y.o. female with a medical diagnosis of Diverticulitis of large intestine with perforation.  She presents with continuous back pain. Performance deficits affecting function are weakness, impaired endurance, impaired self care skills, impaired functional mobility, gait instability, impaired balance, decreased coordination, decreased upper extremity function, decreased lower extremity function, pain, decreased safety awareness, decreased ROM.     Rehab Prognosis:  Fair and Guarded ; patient would benefit from acute skilled OT services to address these deficits and reach maximum level of function.       Plan:     Patient to be seen 5 x/week to address the above listed problems via self-care/home management, therapeutic activities, therapeutic exercises  Plan of Care Expires:    Plan of Care Reviewed with: patient, daughter    Subjective     Chief Complaint: "Every time I get up, intense pain flows through me"  Patient/Family Comments/goals: agreeable to participate in therapy for OT intervention   Pain/Comfort:  Pain Rating 1:  ("continous pain")  Location - Side 1: Bilateral  Location - Orientation 1: anterior  Location 1: back  Pain Addressed 1: Reposition, Distraction, Cessation of Activity, Nurse notified  Pain Rating Post-Intervention 1:  (no changes)    Objective:     Communicated with: RN prior to session.  Patient found supine with peripheral IV upon OT entry to room.    General Precautions: Standard, fall    Orthopedic Precautions:N/A (treated conservatively with spinal precautions)  Braces: N/A  Respiratory Status: Room air   "   Occupational Performance:     Bed Mobility:    Supine > Sit: Min A with log roll  Sit > Supine: Mod A with log roll     Functional Mobility/Transfers:  Sit <> Stand: CGA with increase of time   Functional Mobility: CGA, RW     Activities of Daily Living:  Patient stating she had already perform ADLs prior MARGARET entry      [facility] 6 Click ADL: 16    Treatment & Education:  OT role, plan of care, progression of goals, importance of continued OOB activity, ADL/functional transfer and mobility retraining, discharge recommendation, safety precautions, fall prevention.     Patient left HOB elevated with all lines intact, call button in reach, bed alarm on, and RN present    GOALS:   Multidisciplinary Problems       Occupational Therapy Goals          Problem: Occupational Therapy    Goal Priority Disciplines Outcome Interventions   Occupational Therapy Goal     OT, PT/OT Ongoing, Progressing    Description: Goals to be met by: 3/20/2024     Patient will increase functional independence with ADLs by performing:    UE Dressing with Minimal Assistance.  LE Dressing with Moderate Assistance.  Grooming while standing at sink with Contact Guard Assistance.  Toileting from toilet with Minimal Assistance for hygiene and clothing management.   Toilet transfer to toilet with Minimal Assistance.                         Time Tracking:     OT Date of Treatment: 03/08/24  OT Start Time: 0911  OT Stop Time: 0927  OT Total Time (min): 16 min    Billable Minutes:Therapeutic Activity 16 min    OT/MARGARET: MARGARET     Number of MARGARET visits since last OT visit: 1    3/8/2024

## 2024-03-08 NOTE — PLAN OF CARE
03/08/24 1003   Post-Acute Status   Post-Acute Authorization Placement   Post-Acute Placement Status Patient List Provided   Discharge Delays (!) Post-Acute Set-up   Discharge Plan   Discharge Plan A Skilled Nursing Facility   Discharge Plan B Home Health     SW met with patient & daughter (Luz) at bedside concerning the discharge recommendations. (Skilled Nursing facility) SW provided the family with a list of  skilled nursing facilities in the [facility].

## 2024-03-08 NOTE — PROGRESS NOTES
[facility] Medicine  Progress Note    Patient Name: Niruka Pedraza  MRN: [id number]  Patient Class: IP- Inpatient   Admission Date: 3/5/2024  Length of Stay: 3 days  Attending Physician: Salvador Gilmore MD  Primary Care Provider: Savana Anderson MD        Subjective:     Principal Problem:Diverticulitis of large intestine with perforation        HPI:  Niurka Pedraza is a 78F with myelodysplastic syndrome with excess blasts-2 on chronic steroids, ILD/pulmonary fibrosis, hypertension, MDRO UTIs, pulmonary coccidioidomycosis and hypothyroidism who presents with her daughter for increasing weakness and lethargy over the past week. She used to be able to walk with a cane or walker, but now has felt so weak she struggles to walk short distances. Cannot recall her last bowel movement, usually takes dulcolax at home. She reports some lower abdominal discomfort, bloating, burning with urination. Her last chemo infusion was on Friday, four days ago. Denies chest pain, shortness of breath, nausea, vomiting, diarrhea. Daughter Luz at bedside notes she has not had much of an appetite    Of note she was recently hospitalized at [facility] for COVID/pneumonia, UTI and discharged with Meropenem IV to complete treatment for MSSA pneumonia and recurrent UTI. Treatment completed 2/16/2024 but patient reports residual productive cough and dysuria. Patchy confluent opacity left lower lobe, new concerning for aspiration or pneumonia.     In ER she's afebrile without leukocytosis, CBC shows thrombocytopenia consistent with baseline; CMP with elevated T. Bili otherwise LFTs WNL and Cr at baseline. UA unremarkable; CXR: Nonspecific patchy opacities at the lung bases could relate to atelectasis, aspiration or pneumonia. CT AP with possible acute diverticulitis, underlying malignancy not excluded.  There is a large amount of retained stool throughout the colon and FREE AIR  concerning for perforation. Patchy confluent  opacity left lower lobe, new concerning for aspiration or pneumonia. Abnormal severe compression fracture deformity of L2 and the moderate compression fracture deformity of L1, new from the prior study. ; Surgery consulted recommend NPO and IV abx    Overview/Hospital Course:  Admitted for medical management of Acute diverticulitis with microperforation and free air, new LLL consolidation. Placed NPO with IVF and Zosyn. Surgery consulted. Her abdomen resolved with medical management. She developed severe pancytopenia requiring blood transfusions.    Interval History: some lower abd pain, continues with soft Bms. Poor appetite. No s/s of bleeding    Review of Systems   Constitutional:  Negative for chills and fever.   HENT:  Negative for sore throat and trouble swallowing.    Respiratory:  Negative for cough, shortness of breath and wheezing.    Cardiovascular:  Negative for chest pain and palpitations.   Gastrointestinal:  Positive for abdominal pain. Negative for abdominal distention, constipation, diarrhea, nausea and vomiting.   Skin:  Negative for rash.   Neurological:  Negative for speech difficulty, light-headedness and headaches.     Objective:     Vital Signs (Most Recent):  Temp: 98.7 °F (37.1 °C) (03/08/24 1407)  Pulse: 87 (03/08/24 1407)  Resp: 16 (03/08/24 1407)  BP: 130/68 (03/08/24 1407)  SpO2: (!) 94 % (03/08/24 1407) Vital Signs (24h Range):  Temp:  [97.8 °F (36.6 °C)-99 °F (37.2 °C)] 98.7 °F (37.1 °C)  Pulse:  [] 87  Resp:  [16-18] 16  SpO2:  [94 %-96 %] 94 %  BP: (107-142)/(65-82) 130/68     Weight: 63.4 kg (139 lb 12.4 oz)  Body mass index is 22.56 kg/m².    Intake/Output Summary (Last 24 hours) at 3/8/2024 1428  Last data filed at 3/8/2024 0648  Gross per 24 hour   Intake 470 ml   Output --   Net 470 ml         Physical Exam  Constitutional:       General: She is not in acute distress.     Appearance: She is ill-appearing.   Cardiovascular:      Rate and Rhythm: Normal rate and regular  "rhythm.   Pulmonary:      Effort: Pulmonary effort is normal. No respiratory distress.   Abdominal:      General: Bowel sounds are normal. There is no distension.      Palpations: Abdomen is soft.      Tenderness: There is abdominal tenderness (very mild).   Musculoskeletal:      Right lower leg: No edema.      Left lower leg: No edema.   Skin:     General: Skin is warm and dry.   Neurological:      General: No focal deficit present.      Mental Status: She is alert.             Significant Labs: All pertinent labs within the past 24 hours have been reviewed.    Significant Imaging: I have reviewed all pertinent imaging results/findings within the past 24 hours.    Assessment/Plan:      * Diverticulitis of large intestine with perforation  Niurka Pedraza presents with lethargy,abdominal bloating and pain    Afebrile without leukocytosis  CT AP with possible acute diverticulitis, underlying malignancy not excluded.  There is a large amount of retained stool throughout the colon and FREE AIR  concerning for perforation  Given IV Zosyn in ER, continue  Surgery consulted, NPO, IV abx    +Bms and tolerating oral diet. Continue on zosyn for now    Compression fracture of L2  Incidental on CT AP "Abnormal severe compression fracture deformity of L2 and the moderate compression fracture deformity of L1, new from the prior study.  No apparent resulting severe canal stenosis"  Consider ortho inpt consult/ PTOT       Opacity noted on imaging study  History of COVID/pneumonia that required admission at [facility] about a month ago  CT AP this admission Patchy confluent opacity left lower lobe, new concerning for aspiration or pneumonia.   Can consider SLP consult once on PO      MDS/AML  Moderate Leukopenia  Thrombocytopenia  Anemia    Does not believe she takes posaconazole anymore  Takes venetoclax days 1-14 of each 28 day cycle of therapy   Continue prednisone 2mg daily, acyclovir as prevention     Hold venetoclax while " inpatient. Appreciate heme/onc input.  1U RBC again today for anemia, no GI bleeding seen and likely related to her pancytopenia, oncologic therapy, active infection and antibiotics. Continue to monitor    Thrombocytopenia  Chronic, monitor      Immunosuppression due to chronic steroid use        Pulmonary fibrosis  Continue steroids       Interstitial lung disease  Continue fluticasone vilanterol daily and saline nebs PRN       Acquired hypothyroidism  Continue synthroid      Essential hypertension  Coreg 25 and avapro 300 being held due to soft Bps, restart when able.      VTE Risk Mitigation (From admission, onward)           Ordered     IP VTE HIGH RISK PATIENT  Once         03/05/24 1742                    Discharge Planning   JAKE: 3/11/2024     Code Status: Full Code   Is the patient medically ready for discharge?:     Reason for patient still in hospital (select all that apply): Treatment  Discharge Plan A: Skilled Nursing Facility   Discharge Delays: (!) Post-Acute Set-up              Salvador Gilmore MD  Department of Hospital Medicine   [facility] ([place])

## 2024-03-08 NOTE — SUBJECTIVE & OBJECTIVE
Interval History: some lower abd pain, continues with soft Bms. Poor appetite. No s/s of bleeding    Review of Systems   Constitutional:  Negative for chills and fever.   HENT:  Negative for sore throat and trouble swallowing.    Respiratory:  Negative for cough, shortness of breath and wheezing.    Cardiovascular:  Negative for chest pain and palpitations.   Gastrointestinal:  Positive for abdominal pain. Negative for abdominal distention, constipation, diarrhea, nausea and vomiting.   Skin:  Negative for rash.   Neurological:  Negative for speech difficulty, light-headedness and headaches.     Objective:     Vital Signs (Most Recent):  Temp: 98.7 °F (37.1 °C) (03/08/24 1407)  Pulse: 87 (03/08/24 1407)  Resp: 16 (03/08/24 1407)  BP: 130/68 (03/08/24 1407)  SpO2: (!) 94 % (03/08/24 1407) Vital Signs (24h Range):  Temp:  [97.8 °F (36.6 °C)-99 °F (37.2 °C)] 98.7 °F (37.1 °C)  Pulse:  [] 87  Resp:  [16-18] 16  SpO2:  [94 %-96 %] 94 %  BP: (107-142)/(65-82) 130/68     Weight: 63.4 kg (139 lb 12.4 oz)  Body mass index is 22.56 kg/m².    Intake/Output Summary (Last 24 hours) at 3/8/2024 1428  Last data filed at 3/8/2024 0648  Gross per 24 hour   Intake 470 ml   Output --   Net 470 ml         Physical Exam  Constitutional:       General: She is not in acute distress.     Appearance: She is ill-appearing.   Cardiovascular:      Rate and Rhythm: Normal rate and regular rhythm.   Pulmonary:      Effort: Pulmonary effort is normal. No respiratory distress.   Abdominal:      General: Bowel sounds are normal. There is no distension.      Palpations: Abdomen is soft.      Tenderness: There is abdominal tenderness (very mild).   Musculoskeletal:      Right lower leg: No edema.      Left lower leg: No edema.   Skin:     General: Skin is warm and dry.   Neurological:      General: No focal deficit present.      Mental Status: She is alert.             Significant Labs: All pertinent labs within the past 24 hours have been  reviewed.    Significant Imaging: I have reviewed all pertinent imaging results/findings within the past 24 hours.

## 2024-03-08 NOTE — PLAN OF CARE
Pt remained free of falls and injuries throughout shift. AAOx4. Pt calm and cooperative. Purposeful hourly rounding performed. Pt swallows meds whole. Pt c/o lower abd pain and fullness, PRN [place] given. Pt received IV Zosyn as ordered, IV flushed and saline locked. Pt ambulates with standby assist. Daughter at bedside, supportive of pt and participating in care. VSS on RA. Pt sleeping in bed, even rise and fall of chest. Bed low and locked, call light in reach. Side rails up x2. Will continue to monitor.

## 2024-03-08 NOTE — PLAN OF CARE
Medicare Message     Important Message from Medicare regarding Discharge Appeal Rights Given to patient/caregiver; Explained to patient/caregiver; Signed/date by patient/caregiver Given to patient/caregiver; Explained to patient/caregiver; Signed/date by patient/caregiver   Date IMM was signed 3/6/2024 3/8/2024   Time IMM was signed [id number]

## 2024-03-08 NOTE — ASSESSMENT & PLAN NOTE
Moderate Leukopenia  Thrombocytopenia  Anemia    Does not believe she takes posaconazole anymore  Takes venetoclax days 1-14 of each 28 day cycle of therapy   Continue prednisone 2mg daily, acyclovir as prevention     Hold venetoclax while inpatient. Appreciate heme/onc input.  1U RBC again today for anemia, no GI bleeding seen and likely related to her pancytopenia, oncologic therapy, active infection and antibiotics. Continue to monitor

## 2024-03-09 LAB
ALBUMIN SERPL BCP-MCNC: 1.9 G/DL (ref 3.5–5.2)
ALP SERPL-CCNC: 159 U/L (ref 55–135)
ALT SERPL W/O P-5'-P-CCNC: 28 U/L (ref 10–44)
ANION GAP SERPL CALC-SCNC: 10 MMOL/L (ref 8–16)
ANISOCYTOSIS BLD QL SMEAR: SLIGHT
AST SERPL-CCNC: 20 U/L (ref 10–40)
BASOPHILS # BLD AUTO: 0 K/UL (ref 0–0.2)
BASOPHILS NFR BLD: 0 % (ref 0–1.9)
BILIRUB SERPL-MCNC: 1 MG/DL (ref 0.1–1)
BUN SERPL-MCNC: 15 MG/DL (ref 8–23)
CALCIUM SERPL-MCNC: 8.3 MG/DL (ref 8.7–10.5)
CHLORIDE SERPL-SCNC: 106 MMOL/L (ref 95–110)
CO2 SERPL-SCNC: 21 MMOL/L (ref 23–29)
CREAT SERPL-MCNC: 0.6 MG/DL (ref 0.5–1.4)
DIFFERENTIAL METHOD BLD: ABNORMAL
EOSINOPHIL # BLD AUTO: 0 K/UL (ref 0–0.5)
EOSINOPHIL NFR BLD: 0 % (ref 0–8)
ERYTHROCYTE [DISTWIDTH] IN BLOOD BY AUTOMATED COUNT: 18.7 % (ref 11.5–14.5)
EST. GFR  (NO RACE VARIABLE): >60 ML/MIN/1.73 M^2
GLUCOSE SERPL-MCNC: 105 MG/DL (ref 70–110)
HCT VFR BLD AUTO: 24.5 % (ref 37–48.5)
HGB BLD-MCNC: 8.1 G/DL (ref 12–16)
IMM GRANULOCYTES # BLD AUTO: 0 K/UL (ref 0–0.04)
IMM GRANULOCYTES NFR BLD AUTO: 0 % (ref 0–0.5)
LYMPHOCYTES # BLD AUTO: 0.5 K/UL (ref 1–4.8)
LYMPHOCYTES NFR BLD: 40.4 % (ref 18–48)
MCH RBC QN AUTO: 27.6 PG (ref 27–31)
MCHC RBC AUTO-ENTMCNC: 33.1 G/DL (ref 32–36)
MCV RBC AUTO: 83 FL (ref 82–98)
MONOCYTES # BLD AUTO: 0.1 K/UL (ref 0.3–1)
MONOCYTES NFR BLD: 9.6 % (ref 4–15)
NEUTROPHILS # BLD AUTO: 0.6 K/UL (ref 1.8–7.7)
NEUTROPHILS NFR BLD: 50 % (ref 38–73)
NRBC BLD-RTO: 0 /100 WBC
PLATELET # BLD AUTO: 54 K/UL (ref 150–450)
PLATELET BLD QL SMEAR: ABNORMAL
PMV BLD AUTO: 9.5 FL (ref 9.2–12.9)
POTASSIUM SERPL-SCNC: 3.3 MMOL/L (ref 3.5–5.1)
PROT SERPL-MCNC: 5.4 G/DL (ref 6–8.4)
RBC # BLD AUTO: 2.94 M/UL (ref 4–5.4)
SODIUM SERPL-SCNC: 137 MMOL/L (ref 136–145)
WBC # BLD AUTO: 1.14 K/UL (ref 3.9–12.7)

## 2024-03-09 PROCEDURE — 36415 COLL VENOUS BLD VENIPUNCTURE: CPT | Performed by: PHYSICIAN ASSISTANT

## 2024-03-09 PROCEDURE — 25000003 PHARM REV CODE 250: Performed by: INTERNAL MEDICINE

## 2024-03-09 PROCEDURE — 11000001 HC ACUTE MED/SURG PRIVATE ROOM

## 2024-03-09 PROCEDURE — 80053 COMPREHEN METABOLIC PANEL: CPT | Performed by: PHYSICIAN ASSISTANT

## 2024-03-09 PROCEDURE — 25000003 PHARM REV CODE 250: Performed by: PHYSICIAN ASSISTANT

## 2024-03-09 PROCEDURE — 94761 N-INVAS EAR/PLS OXIMETRY MLT: CPT

## 2024-03-09 PROCEDURE — 97530 THERAPEUTIC ACTIVITIES: CPT | Mod: CQ

## 2024-03-09 PROCEDURE — 85025 COMPLETE CBC W/AUTO DIFF WBC: CPT | Performed by: PHYSICIAN ASSISTANT

## 2024-03-09 PROCEDURE — 63600175 PHARM REV CODE 636 W HCPCS: Performed by: PHYSICIAN ASSISTANT

## 2024-03-09 PROCEDURE — 25000003 PHARM REV CODE 250: Performed by: NURSE PRACTITIONER

## 2024-03-09 PROCEDURE — 27000207 HC ISOLATION

## 2024-03-09 PROCEDURE — 27000221 HC OXYGEN, UP TO 24 HOURS

## 2024-03-09 RX ORDER — CARVEDILOL 12.5 MG/1
12.5 TABLET ORAL 2 TIMES DAILY
Status: DISCONTINUED | OUTPATIENT
Start: 2024-03-09 | End: 2024-03-10

## 2024-03-09 RX ORDER — POTASSIUM CHLORIDE 20 MEQ/1
40 TABLET, EXTENDED RELEASE ORAL ONCE
Status: COMPLETED | OUTPATIENT
Start: 2024-03-09 | End: 2024-03-09

## 2024-03-09 RX ADMIN — AZELASTINE 137 MCG: 1 SPRAY, METERED NASAL at 09:03

## 2024-03-09 RX ADMIN — ACYCLOVIR 400 MG: 400 TABLET ORAL at 08:03

## 2024-03-09 RX ADMIN — POTASSIUM CHLORIDE 40 MEQ: 1500 TABLET, EXTENDED RELEASE ORAL at 08:03

## 2024-03-09 RX ADMIN — OXYBUTYNIN CHLORIDE 15 MG: 5 TABLET, EXTENDED RELEASE ORAL at 08:03

## 2024-03-09 RX ADMIN — Medication 1 TABLET: at 08:03

## 2024-03-09 RX ADMIN — PIPERACILLIN SODIUM AND TAZOBACTAM SODIUM 4.5 G: 4; .5 INJECTION, POWDER, LYOPHILIZED, FOR SOLUTION INTRAVENOUS at 01:03

## 2024-03-09 RX ADMIN — HYDROCODONE BITARTRATE AND ACETAMINOPHEN 1 TABLET: 7.5; 325 TABLET ORAL at 09:03

## 2024-03-09 RX ADMIN — THERA TABS 1 TABLET: TAB at 08:03

## 2024-03-09 RX ADMIN — FLUTICASONE FUROATE AND VILANTEROL TRIFENATATE 1 PUFF: 100; 25 POWDER RESPIRATORY (INHALATION) at 07:03

## 2024-03-09 RX ADMIN — MIRTAZAPINE 7.5 MG: 7.5 TABLET ORAL at 09:03

## 2024-03-09 RX ADMIN — AZELASTINE 137 MCG: 1 SPRAY, METERED NASAL at 08:03

## 2024-03-09 RX ADMIN — PIPERACILLIN SODIUM AND TAZOBACTAM SODIUM 4.5 G: 4; .5 INJECTION, POWDER, LYOPHILIZED, FOR SOLUTION INTRAVENOUS at 08:03

## 2024-03-09 RX ADMIN — ACYCLOVIR 400 MG: 400 TABLET ORAL at 09:03

## 2024-03-09 RX ADMIN — PANTOPRAZOLE SODIUM 40 MG: 40 TABLET, DELAYED RELEASE ORAL at 08:03

## 2024-03-09 RX ADMIN — Medication 6 MG: at 09:03

## 2024-03-09 RX ADMIN — PREDNISONE 2 MG: 1 TABLET ORAL at 08:03

## 2024-03-09 RX ADMIN — FERROUS SULFATE TAB 325 MG (65 MG ELEMENTAL FE) 1 EACH: 325 (65 FE) TAB at 08:03

## 2024-03-09 RX ADMIN — HYDROCODONE BITARTRATE AND ACETAMINOPHEN 1 TABLET: 7.5; 325 TABLET ORAL at 05:03

## 2024-03-09 RX ADMIN — Medication 1 TABLET: at 05:03

## 2024-03-09 RX ADMIN — CARVEDILOL 12.5 MG: 12.5 TABLET, FILM COATED ORAL at 12:03

## 2024-03-09 RX ADMIN — CARVEDILOL 12.5 MG: 12.5 TABLET, FILM COATED ORAL at 09:03

## 2024-03-09 RX ADMIN — ATORVASTATIN CALCIUM 20 MG: 20 TABLET, FILM COATED ORAL at 08:03

## 2024-03-09 RX ADMIN — LEVOTHYROXINE SODIUM 50 MCG: 50 TABLET ORAL at 05:03

## 2024-03-09 RX ADMIN — PIPERACILLIN SODIUM AND TAZOBACTAM SODIUM 4.5 G: 4; .5 INJECTION, POWDER, LYOPHILIZED, FOR SOLUTION INTRAVENOUS at 05:03

## 2024-03-09 NOTE — ASSESSMENT & PLAN NOTE
Niurka Pedraza presents with lethargy,abdominal bloating and pain    Afebrile without leukocytosis  CT AP with possible acute diverticulitis, underlying malignancy not excluded.  There is a large amount of retained stool throughout the colon and FREE AIR  concerning for perforation  Given IV Zosyn in ER, continue  Surgery consulted, NPO, IV abx    Get a KUB for now to assess for new distention, follow up with surgery  Continues on zosyn, will transition to oral for completion of therapy once her abdominal symptoms have improved

## 2024-03-09 NOTE — ASSESSMENT & PLAN NOTE
Coreg 25 and avapro 300 have been held due to soft pressures, will restart low dose coreg as her pressures are now recovering and hypertensive

## 2024-03-09 NOTE — PROGRESS NOTES
[facility] Medicine  Progress Note    Patient Name: Niurka Pedraza  MRN: [id number]  Patient Class: IP- Inpatient   Admission Date: 3/5/2024  Length of Stay: 4 days  Attending Physician: Salvador Gilmore MD  Primary Care Provider: Savana Anderson MD        Subjective:     Principal Problem:Diverticulitis of large intestine with perforation        HPI:  Niurka Pedraza is a 78F with myelodysplastic syndrome with excess blasts-2 on chronic steroids, ILD/pulmonary fibrosis, hypertension, MDRO UTIs, pulmonary coccidioidomycosis and hypothyroidism who presents with her daughter for increasing weakness and lethargy over the past week. She used to be able to walk with a cane or walker, but now has felt so weak she struggles to walk short distances. Cannot recall her last bowel movement, usually takes dulcolax at home. She reports some lower abdominal discomfort, bloating, burning with urination. Her last chemo infusion was on Friday, four days ago. Denies chest pain, shortness of breath, nausea, vomiting, diarrhea. Daughter Luz at bedside notes she has not had much of an appetite    Of note she was recently hospitalized at [facility] for COVID/pneumonia, UTI and discharged with Meropenem IV to complete treatment for MSSA pneumonia and recurrent UTI. Treatment completed 2/16/2024 but patient reports residual productive cough and dysuria. Patchy confluent opacity left lower lobe, new concerning for aspiration or pneumonia.     In ER she's afebrile without leukocytosis, CBC shows thrombocytopenia consistent with baseline; CMP with elevated T. Bili otherwise LFTs WNL and Cr at baseline. UA unremarkable; CXR: Nonspecific patchy opacities at the lung bases could relate to atelectasis, aspiration or pneumonia. CT AP with possible acute diverticulitis, underlying malignancy not excluded.  There is a large amount of retained stool throughout the colon and FREE AIR  concerning for perforation. Patchy confluent  opacity left lower lobe, new concerning for aspiration or pneumonia. Abnormal severe compression fracture deformity of L2 and the moderate compression fracture deformity of L1, new from the prior study. ; Surgery consulted recommend NPO and IV abx    Overview/Hospital Course:  Admitted for medical management of Acute diverticulitis with microperforation and free air, new LLL consolidation. Placed NPO with IVF and Zosyn. Surgery consulted. Her abdomen resolved with medical management. She developed severe pancytopenia requiring blood transfusions and monitoring, heme/onc was consulted who recommended against any GCSF and to hold her home venclexta.    Interval History: new abd distention and tenderness today. She says she's not passing flatus anymore, had two very small BM's yesterday. She is tolerating diet with some minor lower abd cramping without N/V.    Review of Systems   Constitutional:  Negative for chills and fever.   Respiratory:  Negative for cough and shortness of breath.    Cardiovascular:  Negative for chest pain and palpitations.   Gastrointestinal:  Positive for abdominal distention and abdominal pain. Negative for diarrhea, nausea and vomiting.   Neurological:  Positive for weakness. Negative for dizziness and light-headedness.     Objective:     Vital Signs (Most Recent):  Temp: 99 °F (37.2 °C) (03/09/24 0818)  Pulse: 82 (03/09/24 0818)  Resp: 16 (03/09/24 0818)  BP: 137/74 (03/09/24 0818)  SpO2: 95 % (03/09/24 0818) Vital Signs (24h Range):  Temp:  [98 °F (36.7 °C)-99 °F (37.2 °C)] 99 °F (37.2 °C)  Pulse:  [80-88] 82  Resp:  [16-18] 16  SpO2:  [94 %-97 %] 95 %  BP: (117-160)/(58-90) 137/74     Weight: 63.4 kg (139 lb 12.4 oz)  Body mass index is 22.56 kg/m².    Intake/Output Summary (Last 24 hours) at 3/9/2024 0913  Last data filed at 3/8/2024 1944  Gross per 24 hour   Intake 467.26 ml   Output --   Net 467.26 ml         Physical Exam  Constitutional:       General: She is not in acute distress.     " Appearance: She is ill-appearing.   Cardiovascular:      Rate and Rhythm: Normal rate and regular rhythm.   Pulmonary:      Effort: Pulmonary effort is normal. No respiratory distress.   Abdominal:      General: Bowel sounds are normal. There is distension (new distention).      Palpations: Abdomen is soft.      Tenderness: There is abdominal tenderness (very mild).   Musculoskeletal:      Right lower leg: No edema.      Left lower leg: No edema.   Skin:     General: Skin is warm and dry.   Neurological:      General: No focal deficit present.      Mental Status: She is alert.             Significant Labs: All pertinent labs within the past 24 hours have been reviewed.    Significant Imaging: I have reviewed all pertinent imaging results/findings within the past 24 hours.    Assessment/Plan:      * Diverticulitis of large intestine with perforation  Niurka Pedraza presents with lethargy,abdominal bloating and pain    Afebrile without leukocytosis  CT AP with possible acute diverticulitis, underlying malignancy not excluded.  There is a large amount of retained stool throughout the colon and FREE AIR  concerning for perforation  Given IV Zosyn in ER, continue  Surgery consulted, NPO, IV abx    Get a KUB for now to assess for new distention, follow up with surgery  Continues on zosyn, will transition to oral for completion of therapy once her abdominal symptoms have improved    Compression fracture of L2  Incidental on CT AP "Abnormal severe compression fracture deformity of L2 and the moderate compression fracture deformity of L1, new from the prior study.  No apparent resulting severe canal stenosis"  Consider ortho inpt consult/ PTOT       Opacity noted on imaging study  History of COVID/pneumonia that required admission at [facility] about a month ago  CT AP this admission Patchy confluent opacity left lower lobe, new concerning for aspiration or pneumonia.   Can consider SLP consult once on " PO      MDS/AML  Moderate Leukopenia  Thrombocytopenia  Anemia    Does not believe she takes posaconazole anymore  Takes venetoclax days 1-14 of each 28 day cycle of therapy   Continue prednisone 2mg daily, acyclovir as prevention     Hold venetoclax while inpatient. Appreciate heme/onc input.  1U RBC  3/7 and again 3/8. Hb stable today, improving WBC and platelets as well    Thrombocytopenia  Chronic, monitor  Appears reached wood      Immunosuppression due to chronic steroid use        Pulmonary fibrosis  Continue steroids       Interstitial lung disease  Continue fluticasone vilanterol daily and saline nebs PRN       Acquired hypothyroidism  Continue synthroid      Essential hypertension  Coreg 25 and avapro 300 have been held due to soft pressures, will restart low dose coreg as her pressures are now recovering and hypertensive      VTE Risk Mitigation (From admission, onward)           Ordered     IP VTE HIGH RISK PATIENT  Once         03/05/24 1742                    Discharge Planning   JAKE: 3/11/2024     Code Status: Full Code   Is the patient medically ready for discharge?:     Reason for patient still in hospital (select all that apply): Treatment  Discharge Plan A: Skilled Nursing Facility   Discharge Delays: (!) Post-Acute Set-up              Salvador Gilmore MD  Department of Hospital Medicine   [facility] ([place])

## 2024-03-09 NOTE — ASSESSMENT & PLAN NOTE
Moderate Leukopenia  Thrombocytopenia  Anemia    Does not believe she takes posaconazole anymore  Takes venetoclax days 1-14 of each 28 day cycle of therapy   Continue prednisone 2mg daily, acyclovir as prevention     Hold venetoclax while inpatient. Appreciate heme/onc input.  1U RBC  3/7 and again 3/8. Hb stable today, improving WBC and platelets as well

## 2024-03-09 NOTE — PT/OT/SLP PROGRESS
Physical Therapy Treatment    Patient Name:  Niurka Pedraza   MRN:  [id number]    Recommendations:     Discharge Recommendations: Moderate Intensity Therapy  Discharge Equipment Recommendations: bedside commode, hospital bed  Barriers to discharge: Decreased caregiver support    Assessment:     Niurka Pedraza is a 78 y.o. female admitted with a medical diagnosis of Diverticulitis of large intestine with perforation.  She presents with the following impairments/functional limitations: weakness, gait instability, pain, impaired balance, impaired endurance, impaired functional mobility, impaired self care skills, decreased lower extremity function, decreased ROM.    No functional mobility performed this visit; pt eating lunch and unwilling to participate at this time. Discussed DME needs and discharge plans with pt and daughters (by phone).  Rec Moderate Intensity Therapy    Rehab Prognosis: Good; patient would benefit from acute skilled PT services to address these deficits and reach maximum level of function.    Recent Surgery: * No surgery found *      Plan:     During this hospitalization, patient to be seen 5 x/week to address the identified rehab impairments via gait training, therapeutic activities, therapeutic exercises, neuromuscular re-education and progress toward the following goals:    Plan of Care Expires:  04/06/24    Subjective     Chief Complaint: back pain in many positions  Patient/Family Comments/goals: hoping to go home tomorrow (birthday)  Pain/Comfort:  Pain Rating 1: other (see comments) (pain not reported or rated)  Pain Rating Post-Intervention 1: other (see comments) (not reported or rated)      Objective:     Communicated with nurse Gonzalez prior to session.  Patient found HOB elevated with peripheral IV upon PT entry to room.     General Precautions: Standard, fall  Orthopedic Precautions:  (pt treated conservatively w/ spinal precautions for low back)  Braces: N/A  Respiratory Status: Room  air     Functional Mobility:  No functional mobility performed      AM-PAC 6 CLICK MOBILITY  Turning over in bed (including adjusting bedclothes, sheets and blankets)?: 3  Sitting down on and standing up from a chair with arms (e.g., wheelchair, bedside commode, etc.): 3  Moving from lying on back to sitting on the side of the bed?: 3  Moving to and from a bed to a chair (including a wheelchair)?: 3  Need to walk in hospital room?: 3  Climbing 3-5 steps with a railing?: 2  Basic Mobility Total Score: 17       Treatment & Education:  Education on DME needs at home, role of PT, discharge plans  Spoke with patient and withw 2 daughters by phone    Patient left HOB elevated with all lines intact, call button in reach, and nurse Lisa notified..    GOALS:   Multidisciplinary Problems       Physical Therapy Goals          Problem: Physical Therapy    Goal Priority Disciplines Outcome Goal Variances Interventions   Physical Therapy Goal     PT, PT/OT Ongoing, Progressing     Description: Goals to be met by: 24    Patient will increase functional independence with mobility by performin. Supine<>sit with SBA without use of HB features via log roll technique.   2. Sit<>stand with SBA with RW.  3. Gait x 50 feet with SBA with RW.  4. Tolerate sitting in chair x2 hours without significant increase in pain.                       Time Tracking:     PT Received On: 24  PT Start Time: 1250     PT Stop Time: 1303  PT Total Time (min): 13 min     Billable Minutes: Therapeutic Activity 13    Treatment Type: Treatment  PT/PTA: PTA     Number of PTA visits since last PT visit: 2     2024

## 2024-03-09 NOTE — SUBJECTIVE & OBJECTIVE
Interval History: new abd distention and tenderness today. She says she's not passing flatus anymore, had two very small BM's yesterday. She is tolerating diet with some minor lower abd cramping without N/V.    Review of Systems   Constitutional:  Negative for chills and fever.   Respiratory:  Negative for cough and shortness of breath.    Cardiovascular:  Negative for chest pain and palpitations.   Gastrointestinal:  Positive for abdominal distention and abdominal pain. Negative for diarrhea, nausea and vomiting.   Neurological:  Positive for weakness. Negative for dizziness and light-headedness.     Objective:     Vital Signs (Most Recent):  Temp: 99 °F (37.2 °C) (03/09/24 0818)  Pulse: 82 (03/09/24 0818)  Resp: 16 (03/09/24 0818)  BP: 137/74 (03/09/24 0818)  SpO2: 95 % (03/09/24 0818) Vital Signs (24h Range):  Temp:  [98 °F (36.7 °C)-99 °F (37.2 °C)] 99 °F (37.2 °C)  Pulse:  [80-88] 82  Resp:  [16-18] 16  SpO2:  [94 %-97 %] 95 %  BP: (117-160)/(58-90) 137/74     Weight: 63.4 kg (139 lb 12.4 oz)  Body mass index is 22.56 kg/m².    Intake/Output Summary (Last 24 hours) at 3/9/2024 0949  Last data filed at 3/8/2024 1944  Gross per 24 hour   Intake 467.26 ml   Output --   Net 467.26 ml         Physical Exam  Constitutional:       General: She is not in acute distress.     Appearance: She is ill-appearing.   Cardiovascular:      Rate and Rhythm: Normal rate and regular rhythm.   Pulmonary:      Effort: Pulmonary effort is normal. No respiratory distress.   Abdominal:      General: Bowel sounds are normal. There is distension (new distention).      Palpations: Abdomen is soft.      Tenderness: There is abdominal tenderness (very mild).   Musculoskeletal:      Right lower leg: No edema.      Left lower leg: No edema.   Skin:     General: Skin is warm and dry.   Neurological:      General: No focal deficit present.      Mental Status: She is alert.             Significant Labs: All pertinent labs within the past 24 hours  have been reviewed.    Significant Imaging: I have reviewed all pertinent imaging results/findings within the past 24 hours.

## 2024-03-10 LAB
ALBUMIN SERPL BCP-MCNC: 1.9 G/DL (ref 3.5–5.2)
ALP SERPL-CCNC: 151 U/L (ref 55–135)
ALT SERPL W/O P-5'-P-CCNC: 24 U/L (ref 10–44)
ANION GAP SERPL CALC-SCNC: 7 MMOL/L (ref 8–16)
AST SERPL-CCNC: 13 U/L (ref 10–40)
BASOPHILS # BLD AUTO: 0.01 K/UL (ref 0–0.2)
BASOPHILS NFR BLD: 0.9 % (ref 0–1.9)
BILIRUB SERPL-MCNC: 0.7 MG/DL (ref 0.1–1)
BUN SERPL-MCNC: 15 MG/DL (ref 8–23)
CALCIUM SERPL-MCNC: 8.7 MG/DL (ref 8.7–10.5)
CHLORIDE SERPL-SCNC: 105 MMOL/L (ref 95–110)
CO2 SERPL-SCNC: 25 MMOL/L (ref 23–29)
CREAT SERPL-MCNC: 0.7 MG/DL (ref 0.5–1.4)
DIFFERENTIAL METHOD BLD: ABNORMAL
EOSINOPHIL # BLD AUTO: 0 K/UL (ref 0–0.5)
EOSINOPHIL NFR BLD: 0 % (ref 0–8)
ERYTHROCYTE [DISTWIDTH] IN BLOOD BY AUTOMATED COUNT: 18.7 % (ref 11.5–14.5)
EST. GFR  (NO RACE VARIABLE): >60 ML/MIN/1.73 M^2
GLUCOSE SERPL-MCNC: 127 MG/DL (ref 70–110)
HCT VFR BLD AUTO: 25.2 % (ref 37–48.5)
HGB BLD-MCNC: 8.1 G/DL (ref 12–16)
IMM GRANULOCYTES # BLD AUTO: 0.01 K/UL (ref 0–0.04)
IMM GRANULOCYTES NFR BLD AUTO: 0.9 % (ref 0–0.5)
LYMPHOCYTES # BLD AUTO: 0.5 K/UL (ref 1–4.8)
LYMPHOCYTES NFR BLD: 44.4 % (ref 18–48)
MCH RBC QN AUTO: 27.6 PG (ref 27–31)
MCHC RBC AUTO-ENTMCNC: 32.1 G/DL (ref 32–36)
MCV RBC AUTO: 86 FL (ref 82–98)
MONOCYTES # BLD AUTO: 0.1 K/UL (ref 0.3–1)
MONOCYTES NFR BLD: 6.8 % (ref 4–15)
NEUTROPHILS # BLD AUTO: 0.6 K/UL (ref 1.8–7.7)
NEUTROPHILS NFR BLD: 47 % (ref 38–73)
NRBC BLD-RTO: 0 /100 WBC
PLATELET # BLD AUTO: 67 K/UL (ref 150–450)
PLATELET BLD QL SMEAR: ABNORMAL
PMV BLD AUTO: 9 FL (ref 9.2–12.9)
POTASSIUM SERPL-SCNC: 3.9 MMOL/L (ref 3.5–5.1)
PROT SERPL-MCNC: 5.5 G/DL (ref 6–8.4)
RBC # BLD AUTO: 2.93 M/UL (ref 4–5.4)
SODIUM SERPL-SCNC: 137 MMOL/L (ref 136–145)
WBC # BLD AUTO: 1.17 K/UL (ref 3.9–12.7)

## 2024-03-10 PROCEDURE — 94761 N-INVAS EAR/PLS OXIMETRY MLT: CPT

## 2024-03-10 PROCEDURE — 25000003 PHARM REV CODE 250: Performed by: INTERNAL MEDICINE

## 2024-03-10 PROCEDURE — 25000003 PHARM REV CODE 250: Performed by: PHYSICIAN ASSISTANT

## 2024-03-10 PROCEDURE — 25000242 PHARM REV CODE 250 ALT 637 W/ HCPCS: Performed by: PHYSICIAN ASSISTANT

## 2024-03-10 PROCEDURE — 94640 AIRWAY INHALATION TREATMENT: CPT

## 2024-03-10 PROCEDURE — 80053 COMPREHEN METABOLIC PANEL: CPT | Performed by: PHYSICIAN ASSISTANT

## 2024-03-10 PROCEDURE — 36415 COLL VENOUS BLD VENIPUNCTURE: CPT | Performed by: PHYSICIAN ASSISTANT

## 2024-03-10 PROCEDURE — 85025 COMPLETE CBC W/AUTO DIFF WBC: CPT | Performed by: PHYSICIAN ASSISTANT

## 2024-03-10 PROCEDURE — 11000001 HC ACUTE MED/SURG PRIVATE ROOM

## 2024-03-10 PROCEDURE — 27000207 HC ISOLATION

## 2024-03-10 PROCEDURE — 63600175 PHARM REV CODE 636 W HCPCS: Performed by: PHYSICIAN ASSISTANT

## 2024-03-10 RX ORDER — ADHESIVE BANDAGE
30 BANDAGE TOPICAL ONCE
Status: COMPLETED | OUTPATIENT
Start: 2024-03-10 | End: 2024-03-10

## 2024-03-10 RX ORDER — BISACODYL 10 MG/1
10 SUPPOSITORY RECTAL ONCE
Status: DISCONTINUED | OUTPATIENT
Start: 2024-03-10 | End: 2024-03-10

## 2024-03-10 RX ORDER — CARVEDILOL 12.5 MG/1
25 TABLET ORAL 2 TIMES DAILY
Status: DISCONTINUED | OUTPATIENT
Start: 2024-03-10 | End: 2024-03-13 | Stop reason: HOSPADM

## 2024-03-10 RX ADMIN — ATORVASTATIN CALCIUM 20 MG: 20 TABLET, FILM COATED ORAL at 08:03

## 2024-03-10 RX ADMIN — MAGNESIUM HYDROXIDE 2400 MG: 400 SUSPENSION ORAL at 09:03

## 2024-03-10 RX ADMIN — ACYCLOVIR 400 MG: 400 TABLET ORAL at 08:03

## 2024-03-10 RX ADMIN — PIPERACILLIN SODIUM AND TAZOBACTAM SODIUM 4.5 G: 4; .5 INJECTION, POWDER, LYOPHILIZED, FOR SOLUTION INTRAVENOUS at 05:03

## 2024-03-10 RX ADMIN — MAGNESIUM HYDROXIDE 2400 MG: 400 SUSPENSION ORAL at 02:03

## 2024-03-10 RX ADMIN — CARVEDILOL 25 MG: 12.5 TABLET, FILM COATED ORAL at 09:03

## 2024-03-10 RX ADMIN — AZELASTINE 137 MCG: 1 SPRAY, METERED NASAL at 09:03

## 2024-03-10 RX ADMIN — AZELASTINE 137 MCG: 1 SPRAY, METERED NASAL at 08:03

## 2024-03-10 RX ADMIN — Medication 1 TABLET: at 08:03

## 2024-03-10 RX ADMIN — FLUTICASONE FUROATE AND VILANTEROL TRIFENATATE 1 PUFF: 100; 25 POWDER RESPIRATORY (INHALATION) at 08:03

## 2024-03-10 RX ADMIN — PIPERACILLIN SODIUM AND TAZOBACTAM SODIUM 4.5 G: 4; .5 INJECTION, POWDER, LYOPHILIZED, FOR SOLUTION INTRAVENOUS at 01:03

## 2024-03-10 RX ADMIN — THERA TABS 1 TABLET: TAB at 08:03

## 2024-03-10 RX ADMIN — CARVEDILOL 25 MG: 12.5 TABLET, FILM COATED ORAL at 08:03

## 2024-03-10 RX ADMIN — ACYCLOVIR 400 MG: 400 TABLET ORAL at 09:03

## 2024-03-10 RX ADMIN — OXYBUTYNIN CHLORIDE 15 MG: 5 TABLET, EXTENDED RELEASE ORAL at 08:03

## 2024-03-10 RX ADMIN — PIPERACILLIN SODIUM AND TAZOBACTAM SODIUM 4.5 G: 4; .5 INJECTION, POWDER, LYOPHILIZED, FOR SOLUTION INTRAVENOUS at 09:03

## 2024-03-10 RX ADMIN — FERROUS SULFATE TAB 325 MG (65 MG ELEMENTAL FE) 1 EACH: 325 (65 FE) TAB at 08:03

## 2024-03-10 RX ADMIN — Medication 1 TABLET: at 05:03

## 2024-03-10 RX ADMIN — PREDNISONE 2 MG: 1 TABLET ORAL at 08:03

## 2024-03-10 RX ADMIN — MIRTAZAPINE 7.5 MG: 7.5 TABLET ORAL at 09:03

## 2024-03-10 RX ADMIN — LEVOTHYROXINE SODIUM 50 MCG: 50 TABLET ORAL at 06:03

## 2024-03-10 RX ADMIN — PANTOPRAZOLE SODIUM 40 MG: 40 TABLET, DELAYED RELEASE ORAL at 08:03

## 2024-03-10 NOTE — SUBJECTIVE & OBJECTIVE
Interval History: still no BM, is having flatus and tolerating quite a bit of diet. She is not very active and works intermittently with PT. Discussed making about SNF vs home with patient and family and that they have had the recommendation for several days and a decision needs to be made today about disposition on discharge.    Review of Systems   Constitutional:  Negative for chills and fever.   Respiratory:  Negative for cough and shortness of breath.    Cardiovascular:  Negative for chest pain and palpitations.   Gastrointestinal:  Positive for abdominal distention and constipation. Negative for abdominal pain, diarrhea, nausea and vomiting.   Neurological:  Positive for weakness. Negative for dizziness and light-headedness.     Objective:     Vital Signs (Most Recent):  Temp: 98.9 °F (37.2 °C) (03/10/24 1138)  Pulse: 76 (03/10/24 1138)  Resp: 18 (03/10/24 1138)  BP: (!) 142/75 (03/10/24 1138)  SpO2: (!) 93 % (03/10/24 1138) Vital Signs (24h Range):  Temp:  [97.7 °F (36.5 °C)-98.9 °F (37.2 °C)] 98.9 °F (37.2 °C)  Pulse:  [68-82] 76  Resp:  [16-18] 18  SpO2:  [93 %-99 %] 93 %  BP: (134-154)/(68-78) 142/75     Weight: 63.4 kg (139 lb 12.4 oz)  Body mass index is 22.56 kg/m².    Intake/Output Summary (Last 24 hours) at 3/10/2024 1147  Last data filed at 3/10/2024 0609  Gross per 24 hour   Intake 398.71 ml   Output --   Net 398.71 ml         Physical Exam  Constitutional:       General: She is not in acute distress.     Appearance: She is ill-appearing (chronic).   Cardiovascular:      Rate and Rhythm: Normal rate and regular rhythm.   Pulmonary:      Effort: Pulmonary effort is normal. No respiratory distress.      Breath sounds: No wheezing or rales.   Abdominal:      General: Bowel sounds are normal. There is distension (improved).      Palpations: Abdomen is soft.      Tenderness: There is abdominal tenderness (very mild in lower quadrants).   Musculoskeletal:      Right lower leg: No edema.      Left lower  leg: No edema.   Skin:     General: Skin is warm and dry.   Neurological:      General: No focal deficit present.      Mental Status: She is alert.             Significant Labs: All pertinent labs within the past 24 hours have been reviewed.    Significant Imaging: I have reviewed all pertinent imaging results/findings within the past 24 hours.

## 2024-03-10 NOTE — PLAN OF CARE
Problem: Adult Inpatient Plan of Care  Goal: Plan of Care Review  Outcome: Ongoing, Progressing  Goal: Patient-Specific Goal (Individualized)  Outcome: Ongoing, Progressing  Goal: Absence of Hospital-Acquired Illness or Injury  Outcome: Ongoing, Progressing  Goal: Readiness for Transition of Care  Outcome: Ongoing, Progressing     Problem: Infection  Goal: Absence of Infection Signs and Symptoms  Outcome: Ongoing, Progressing     Problem: Fall Injury Risk  Goal: Absence of Fall and Fall-Related Injury  Outcome: Ongoing, Progressing     Problem: Adult Inpatient Plan of Care  Goal: Optimal Comfort and Wellbeing  Outcome: Ongoing, Not Progressing

## 2024-03-10 NOTE — ASSESSMENT & PLAN NOTE
Coreg 25 and avapro 300 have been held due to soft pressures,   Had restarted coreg at 12.5mg and will increase today to 25mg due to continued hypertension.

## 2024-03-10 NOTE — PROGRESS NOTES
[facility] Medicine  Progress Note    Patient Name: Niurka Pedraza  MRN: [id number]  Patient Class: IP- Inpatient   Admission Date: 3/5/2024  Length of Stay: 5 days  Attending Physician: Salvador Gilmore MD  Primary Care Provider: Savana Anderson MD        Subjective:     Principal Problem:Diverticulitis of large intestine with perforation        HPI:  Niurka Pedraza is a 78F with myelodysplastic syndrome with excess blasts-2 on chronic steroids, ILD/pulmonary fibrosis, hypertension, MDRO UTIs, pulmonary coccidioidomycosis and hypothyroidism who presents with her daughter for increasing weakness and lethargy over the past week. She used to be able to walk with a cane or walker, but now has felt so weak she struggles to walk short distances. Cannot recall her last bowel movement, usually takes dulcolax at home. She reports some lower abdominal discomfort, bloating, burning with urination. Her last chemo infusion was on Friday, four days ago. Denies chest pain, shortness of breath, nausea, vomiting, diarrhea. Daughter Luz at bedside notes she has not had much of an appetite    Of note she was recently hospitalized at [facility] for COVID/pneumonia, UTI and discharged with Meropenem IV to complete treatment for MSSA pneumonia and recurrent UTI. Treatment completed 2/16/2024 but patient reports residual productive cough and dysuria. Patchy confluent opacity left lower lobe, new concerning for aspiration or pneumonia.     In ER she's afebrile without leukocytosis, CBC shows thrombocytopenia consistent with baseline; CMP with elevated T. Bili otherwise LFTs WNL and Cr at baseline. UA unremarkable; CXR: Nonspecific patchy opacities at the lung bases could relate to atelectasis, aspiration or pneumonia. CT AP with possible acute diverticulitis, underlying malignancy not excluded.  There is a large amount of retained stool throughout the colon and FREE AIR  concerning for perforation. Patchy confluent  opacity left lower lobe, new concerning for aspiration or pneumonia. Abnormal severe compression fracture deformity of L2 and the moderate compression fracture deformity of L1, new from the prior study. ; Surgery consulted recommend NPO and IV abx    Overview/Hospital Course:  Admitted for medical management of Acute diverticulitis with microperforation and free air, new LLL consolidation. Placed NPO with IVF and Zosyn. Surgery consulted. Her abdomen resolved with medical management. She developed severe pancytopenia requiring blood transfusions and monitoring, heme/onc was consulted who recommended against any GCSF and to hold her home venclexta. She required 1U RBC transfusion on 3/7 and 3/8 with improvement in her hemoglobin. Developed some constipation and given milk of magnesia for regimen. PT/OT recommending ZULY, patient and family have been deferring decision making about this.    Interval History: still no BM, is having flatus and tolerating quite a bit of diet. She is not very active and works intermittently with PT. Discussed making about SNF vs home with patient and family and that they have had the recommendation for several days and a decision needs to be made today about disposition on discharge.    Review of Systems   Constitutional:  Negative for chills and fever.   Respiratory:  Negative for cough and shortness of breath.    Cardiovascular:  Negative for chest pain and palpitations.   Gastrointestinal:  Positive for abdominal distention and constipation. Negative for abdominal pain, diarrhea, nausea and vomiting.   Neurological:  Positive for weakness. Negative for dizziness and light-headedness.     Objective:     Vital Signs (Most Recent):  Temp: 98.9 °F (37.2 °C) (03/10/24 1138)  Pulse: 76 (03/10/24 1138)  Resp: 18 (03/10/24 1138)  BP: (!) 142/75 (03/10/24 1138)  SpO2: (!) 93 % (03/10/24 1138) Vital Signs (24h Range):  Temp:  [97.7 °F (36.5 °C)-98.9 °F (37.2 °C)] 98.9 °F (37.2 °C)  Pulse:   [68-82] 76  Resp:  [16-18] 18  SpO2:  [93 %-99 %] 93 %  BP: (134-154)/(68-78) 142/75     Weight: 63.4 kg (139 lb 12.4 oz)  Body mass index is 22.56 kg/m².    Intake/Output Summary (Last 24 hours) at 3/10/2024 1147  Last data filed at 3/10/2024 0609  Gross per 24 hour   Intake 398.71 ml   Output --   Net 398.71 ml         Physical Exam  Constitutional:       General: She is not in acute distress.     Appearance: She is ill-appearing (chronic).   Cardiovascular:      Rate and Rhythm: Normal rate and regular rhythm.   Pulmonary:      Effort: Pulmonary effort is normal. No respiratory distress.      Breath sounds: No wheezing or rales.   Abdominal:      General: Bowel sounds are normal. There is distension (improved).      Palpations: Abdomen is soft.      Tenderness: There is abdominal tenderness (very mild in lower quadrants).   Musculoskeletal:      Right lower leg: No edema.      Left lower leg: No edema.   Skin:     General: Skin is warm and dry.   Neurological:      General: No focal deficit present.      Mental Status: She is alert.             Significant Labs: All pertinent labs within the past 24 hours have been reviewed.    Significant Imaging: I have reviewed all pertinent imaging results/findings within the past 24 hours.    Assessment/Plan:      * Diverticulitis of large intestine with perforation  Niurka Pedraza presents with lethargy,abdominal bloating and pain    Afebrile without leukocytosis  CT AP with possible acute diverticulitis, underlying malignancy not excluded.  There is a large amount of retained stool throughout the colon and FREE AIR  concerning for perforation  Given IV Zosyn in ER, continue  Surgery consulted, NPO, IV abx    She continues on zosyn, is having constipation now so will give her some milk of magnesia as a regimen and monitor response. Would like to transition to single agent PO coverage tomorrow if she has some improved bowel function. If no improvement may need to repeat  "imaging to evaluate evolution of her condition    Compression fracture of L2  Incidental on CT AP "Abnormal severe compression fracture deformity of L2 and the moderate compression fracture deformity of L1, new from the prior study.  No apparent resulting severe canal stenosis"  Consider ortho inpt consult/ PTOT       Opacity noted on imaging study  History of COVID/pneumonia that required admission at [facility] about a month ago  CT AP this admission Patchy confluent opacity left lower lobe, new concerning for aspiration or pneumonia.   Can consider SLP consult once on PO      MDS/AML  Moderate Leukopenia  Thrombocytopenia  Anemia    Does not believe she takes posaconazole anymore  Takes venetoclax days 1-14 of each 28 day cycle of therapy   Continue prednisone 2mg daily, acyclovir as prevention     Hold venetoclax while inpatient. Appreciate heme/onc input.  1U RBC  3/7 and again 3/8. Hb stable today, improving WBC and platelets as well    Thrombocytopenia  Chronic, monitor  Appears reached wood and slowly improving      Immunosuppression due to chronic steroid use        Pulmonary fibrosis  Continue steroids       Interstitial lung disease  Continue fluticasone vilanterol daily and saline nebs PRN       Acquired hypothyroidism  Continue synthroid      Essential hypertension  Coreg 25 and avapro 300 have been held due to soft pressures,   Had restarted coreg at 12.5mg and will increase today to 25mg due to continued hypertension.      VTE Risk Mitigation (From admission, onward)           Ordered     IP VTE HIGH RISK PATIENT  Once         03/05/24 1742                    Discharge Planning   JAKE: 3/11/2024     Code Status: Full Code   Is the patient medically ready for discharge?:     Reason for patient still in hospital (select all that apply): Treatment  Discharge Plan A: Skilled Nursing Facility   Discharge Delays: (!) Post-Acute Set-up              Salvador Gilmore MD  Department of Hospital Medicine   [facility] - " Med Surg ([place])

## 2024-03-10 NOTE — CARE UPDATE
03/10/24 0824   Patient Assessment/Suction   Level of Consciousness (AVPU) alert   Respiratory Effort Normal;Unlabored   All Lung Fields Breath Sounds clear   PRE-TX-O2   Device (Oxygen Therapy) room air   SpO2 98 %   Pulse Oximetry Type Intermittent   $ Pulse Oximetry - Multiple Charge Pulse Oximetry - Multiple   Pulse 78   Resp 16   Inhaler   $ Inhaler Charges MDI (Metered Dose Inahler) Treatment;Mouth rinsed post treatment   Daily Review of Necessity (Inhaler) completed   Respiratory Treatment Status (Inhaler) given   Treatment Route (Inhaler) mouthpiece   Patient Position (Inhaler) semi-Marie's   Post Treatment Assessment (Inhaler) breath sounds unchanged   Signs of Intolerance (Inhaler) none

## 2024-03-10 NOTE — PLAN OF CARE
Problem: Adult Inpatient Plan of Care  Goal: Plan of Care Review  Outcome: Ongoing, Progressing  Goal: Patient-Specific Goal (Individualized)  Outcome: Ongoing, Progressing  Goal: Absence of Hospital-Acquired Illness or Injury  Outcome: Ongoing, Progressing  Goal: Optimal Comfort and Wellbeing  Outcome: Ongoing, Progressing  Goal: Readiness for Transition of Care  Outcome: Ongoing, Progressing     Problem: Infection  Goal: Absence of Infection Signs and Symptoms  Outcome: Ongoing, Progressing     Problem: Fall Injury Risk  Goal: Absence of Fall and Fall-Related Injury  Outcome: Ongoing, Progressing     Problem: Pain Acute  Goal: Acceptable Pain Control and Functional Ability  Outcome: Ongoing, Progressing     Pt remained free from falls or injuries this shift. Pt has been ambulating to bathroom w family assist. Reports abdominal  cramoing, 2 doses of milk of magnesium givenw no MD TARYN aware. Third dose ordered for tonight. No skin breakdown noticed.

## 2024-03-10 NOTE — ASSESSMENT & PLAN NOTE
Niurka Pedraza presents with lethargy,abdominal bloating and pain    Afebrile without leukocytosis  CT AP with possible acute diverticulitis, underlying malignancy not excluded.  There is a large amount of retained stool throughout the colon and FREE AIR  concerning for perforation  Given IV Zosyn in ER, continue  Surgery consulted, NPO, IV abx    She continues on zosyn, is having constipation now so will give her some milk of magnesia as a regimen and monitor response. Would like to transition to single agent PO coverage tomorrow if she has some improved bowel function. If no improvement may need to repeat imaging to evaluate evolution of her condition

## 2024-03-10 NOTE — PT/OT/SLP PROGRESS
Physical Therapy      Patient Name:  Niurka Pedraza   MRN:  [id number]    Patient not seen today secondary to Patient unwilling to participate, Patient fatigue. Will follow-up Monday if willing. Patient kindly stated that she has family in the room and does not want to move around at this time.

## 2024-03-11 ENCOUNTER — DOCUMENT SCAN (OUTPATIENT)
Dept: HOME HEALTH SERVICES | Facility: HOSPITAL | Age: 79
End: 2024-03-11
Payer: MEDICARE

## 2024-03-11 LAB
ALBUMIN SERPL BCP-MCNC: 2 G/DL (ref 3.5–5.2)
ALP SERPL-CCNC: 143 U/L (ref 55–135)
ALT SERPL W/O P-5'-P-CCNC: 25 U/L (ref 10–44)
ANION GAP SERPL CALC-SCNC: 8 MMOL/L (ref 8–16)
ANISOCYTOSIS BLD QL SMEAR: SLIGHT
AST SERPL-CCNC: 15 U/L (ref 10–40)
BASOPHILS # BLD AUTO: 0 K/UL (ref 0–0.2)
BASOPHILS NFR BLD: 0 % (ref 0–1.9)
BILIRUB SERPL-MCNC: 0.6 MG/DL (ref 0.1–1)
BUN SERPL-MCNC: 12 MG/DL (ref 8–23)
CALCIUM SERPL-MCNC: 8.8 MG/DL (ref 8.7–10.5)
CHLORIDE SERPL-SCNC: 102 MMOL/L (ref 95–110)
CO2 SERPL-SCNC: 25 MMOL/L (ref 23–29)
CREAT SERPL-MCNC: 0.7 MG/DL (ref 0.5–1.4)
DIFFERENTIAL METHOD BLD: ABNORMAL
EOSINOPHIL # BLD AUTO: 0 K/UL (ref 0–0.5)
EOSINOPHIL NFR BLD: 0 % (ref 0–8)
ERYTHROCYTE [DISTWIDTH] IN BLOOD BY AUTOMATED COUNT: 18.8 % (ref 11.5–14.5)
EST. GFR  (NO RACE VARIABLE): >60 ML/MIN/1.73 M^2
GLUCOSE SERPL-MCNC: 151 MG/DL (ref 70–110)
HCT VFR BLD AUTO: 25.9 % (ref 37–48.5)
HGB BLD-MCNC: 8.5 G/DL (ref 12–16)
IMM GRANULOCYTES # BLD AUTO: 0 K/UL (ref 0–0.04)
IMM GRANULOCYTES NFR BLD AUTO: 0 % (ref 0–0.5)
LYMPHOCYTES # BLD AUTO: 0.6 K/UL (ref 1–4.8)
LYMPHOCYTES NFR BLD: 46.8 % (ref 18–48)
MCH RBC QN AUTO: 28.1 PG (ref 27–31)
MCHC RBC AUTO-ENTMCNC: 32.8 G/DL (ref 32–36)
MCV RBC AUTO: 86 FL (ref 82–98)
MONOCYTES # BLD AUTO: 0.1 K/UL (ref 0.3–1)
MONOCYTES NFR BLD: 4.8 % (ref 4–15)
NEUTROPHILS # BLD AUTO: 0.6 K/UL (ref 1.8–7.7)
NEUTROPHILS NFR BLD: 48.4 % (ref 38–73)
NRBC BLD-RTO: 0 /100 WBC
PLATELET # BLD AUTO: 59 K/UL (ref 150–450)
PLATELET BLD QL SMEAR: ABNORMAL
PMV BLD AUTO: 9.3 FL (ref 9.2–12.9)
POTASSIUM SERPL-SCNC: 3.9 MMOL/L (ref 3.5–5.1)
PROT SERPL-MCNC: 5.7 G/DL (ref 6–8.4)
RBC # BLD AUTO: 3.03 M/UL (ref 4–5.4)
SODIUM SERPL-SCNC: 135 MMOL/L (ref 136–145)
WBC # BLD AUTO: 1.26 K/UL (ref 3.9–12.7)

## 2024-03-11 PROCEDURE — 97530 THERAPEUTIC ACTIVITIES: CPT | Mod: CQ

## 2024-03-11 PROCEDURE — 80053 COMPREHEN METABOLIC PANEL: CPT | Performed by: PHYSICIAN ASSISTANT

## 2024-03-11 PROCEDURE — 11000001 HC ACUTE MED/SURG PRIVATE ROOM

## 2024-03-11 PROCEDURE — 99231 SBSQ HOSP IP/OBS SF/LOW 25: CPT | Mod: ,,, | Performed by: SPECIALIST

## 2024-03-11 PROCEDURE — 25000003 PHARM REV CODE 250: Performed by: NURSE PRACTITIONER

## 2024-03-11 PROCEDURE — 27000207 HC ISOLATION

## 2024-03-11 PROCEDURE — 25000003 PHARM REV CODE 250: Performed by: PHYSICIAN ASSISTANT

## 2024-03-11 PROCEDURE — 36415 COLL VENOUS BLD VENIPUNCTURE: CPT | Performed by: PHYSICIAN ASSISTANT

## 2024-03-11 PROCEDURE — 97530 THERAPEUTIC ACTIVITIES: CPT | Mod: CO

## 2024-03-11 PROCEDURE — 85025 COMPLETE CBC W/AUTO DIFF WBC: CPT | Performed by: PHYSICIAN ASSISTANT

## 2024-03-11 PROCEDURE — 25000003 PHARM REV CODE 250: Performed by: INTERNAL MEDICINE

## 2024-03-11 PROCEDURE — 63600175 PHARM REV CODE 636 W HCPCS: Performed by: PHYSICIAN ASSISTANT

## 2024-03-11 PROCEDURE — 97116 GAIT TRAINING THERAPY: CPT | Mod: CQ

## 2024-03-11 RX ORDER — ADHESIVE BANDAGE
30 BANDAGE TOPICAL 2 TIMES DAILY
Status: DISCONTINUED | OUTPATIENT
Start: 2024-03-11 | End: 2024-03-13 | Stop reason: HOSPADM

## 2024-03-11 RX ORDER — BISACODYL 10 MG/1
10 SUPPOSITORY RECTAL ONCE
Status: COMPLETED | OUTPATIENT
Start: 2024-03-11 | End: 2024-03-11

## 2024-03-11 RX ADMIN — Medication 6 MG: at 11:03

## 2024-03-11 RX ADMIN — CARVEDILOL 25 MG: 12.5 TABLET, FILM COATED ORAL at 09:03

## 2024-03-11 RX ADMIN — FLUTICASONE FUROATE AND VILANTEROL TRIFENATATE 1 PUFF: 100; 25 POWDER RESPIRATORY (INHALATION) at 08:03

## 2024-03-11 RX ADMIN — PREDNISONE 2 MG: 1 TABLET ORAL at 12:03

## 2024-03-11 RX ADMIN — PANTOPRAZOLE SODIUM 40 MG: 40 TABLET, DELAYED RELEASE ORAL at 09:03

## 2024-03-11 RX ADMIN — AZELASTINE 137 MCG: 1 SPRAY, METERED NASAL at 10:03

## 2024-03-11 RX ADMIN — HYDROCODONE BITARTRATE AND ACETAMINOPHEN 1 TABLET: 7.5; 325 TABLET ORAL at 04:03

## 2024-03-11 RX ADMIN — MAGNESIUM HYDROXIDE 2400 MG: 400 SUSPENSION ORAL at 12:03

## 2024-03-11 RX ADMIN — Medication 1 TABLET: at 09:03

## 2024-03-11 RX ADMIN — HYDROCODONE BITARTRATE AND ACETAMINOPHEN 1 TABLET: 7.5; 325 TABLET ORAL at 11:03

## 2024-03-11 RX ADMIN — ATORVASTATIN CALCIUM 20 MG: 20 TABLET, FILM COATED ORAL at 09:03

## 2024-03-11 RX ADMIN — MIRTAZAPINE 7.5 MG: 7.5 TABLET ORAL at 10:03

## 2024-03-11 RX ADMIN — Medication 1 TABLET: at 05:03

## 2024-03-11 RX ADMIN — MAGNESIUM HYDROXIDE 2400 MG: 400 SUSPENSION ORAL at 11:03

## 2024-03-11 RX ADMIN — ACYCLOVIR 400 MG: 400 TABLET ORAL at 10:03

## 2024-03-11 RX ADMIN — THERA TABS 1 TABLET: TAB at 09:03

## 2024-03-11 RX ADMIN — PIPERACILLIN SODIUM AND TAZOBACTAM SODIUM 4.5 G: 4; .5 INJECTION, POWDER, LYOPHILIZED, FOR SOLUTION INTRAVENOUS at 12:03

## 2024-03-11 RX ADMIN — LEVOTHYROXINE SODIUM 50 MCG: 50 TABLET ORAL at 05:03

## 2024-03-11 RX ADMIN — OXYBUTYNIN CHLORIDE 15 MG: 5 TABLET, EXTENDED RELEASE ORAL at 09:03

## 2024-03-11 RX ADMIN — ACYCLOVIR 400 MG: 400 TABLET ORAL at 09:03

## 2024-03-11 RX ADMIN — CARVEDILOL 25 MG: 12.5 TABLET, FILM COATED ORAL at 10:03

## 2024-03-11 RX ADMIN — BISACODYL 10 MG: 10 SUPPOSITORY RECTAL at 05:03

## 2024-03-11 RX ADMIN — FERROUS SULFATE TAB 325 MG (65 MG ELEMENTAL FE) 1 EACH: 325 (65 FE) TAB at 09:03

## 2024-03-11 RX ADMIN — PIPERACILLIN SODIUM AND TAZOBACTAM SODIUM 4.5 G: 4; .5 INJECTION, POWDER, LYOPHILIZED, FOR SOLUTION INTRAVENOUS at 09:03

## 2024-03-11 RX ADMIN — PIPERACILLIN SODIUM AND TAZOBACTAM SODIUM 4.5 G: 4; .5 INJECTION, POWDER, LYOPHILIZED, FOR SOLUTION INTRAVENOUS at 05:03

## 2024-03-11 RX ADMIN — AZELASTINE 137 MCG: 1 SPRAY, METERED NASAL at 09:03

## 2024-03-11 NOTE — PLAN OF CARE
Problem: Adult Inpatient Plan of Care  Goal: Plan of Care Review  Outcome: Ongoing, Progressing  Goal: Patient-Specific Goal (Individualized)  Outcome: Ongoing, Progressing  Goal: Absence of Hospital-Acquired Illness or Injury  Outcome: Ongoing, Progressing  Goal: Optimal Comfort and Wellbeing  Outcome: Ongoing, Progressing  Goal: Readiness for Transition of Care  Outcome: Adequate for Care Transition     Problem: Infection  Goal: Absence of Infection Signs and Symptoms  Outcome: Ongoing, Progressing     Problem: Fall Injury Risk  Goal: Absence of Fall and Fall-Related Injury  Outcome: Ongoing, Progressing

## 2024-03-11 NOTE — PROGRESS NOTES
House day 7.  Diagnosis-diverticulitis with microperforation  Comorbid conditions-myelodysplastic syndrome, I LD pulmonary fibrosis, hypertension, MDRO UTIs, pulmonary coccidiomycosis.      Subjective   Complain of bloating and distention  No nausea  Appetite poor  No BM and several days      PE  Afebrile  Vital signs stable  Abdomen-soft, mild tenderness lower abdomen, both quadrant  No rigidity, no guarding, no rebound      Impression/plan  Acute diverticulitis, no evidence of compelling surgical abdomen   Continue antibiotics, may need to back off on diet

## 2024-03-11 NOTE — PROGRESS NOTES
[facility] Medicine  Progress Note    Patient Name: Niurka Pedraza  MRN: [id number]  Patient Class: IP- Inpatient   Admission Date: 3/5/2024  Length of Stay: 6 days  Attending Physician: Salvador Gilmore MD  Primary Care Provider: Savana Anderson MD        Subjective:     Principal Problem:Diverticulitis of large intestine with perforation        HPI:  Niurka Pedraza is a 78F with myelodysplastic syndrome with excess blasts-2 on chronic steroids, ILD/pulmonary fibrosis, hypertension, MDRO UTIs, pulmonary coccidioidomycosis and hypothyroidism who presents with her daughter for increasing weakness and lethargy over the past week. She used to be able to walk with a cane or walker, but now has felt so weak she struggles to walk short distances. Cannot recall her last bowel movement, usually takes dulcolax at home. She reports some lower abdominal discomfort, bloating, burning with urination. Her last chemo infusion was on Friday, four days ago. Denies chest pain, shortness of breath, nausea, vomiting, diarrhea. Daughter Luz at bedside notes she has not had much of an appetite    Of note she was recently hospitalized at [facility] for COVID/pneumonia, UTI and discharged with Meropenem IV to complete treatment for MSSA pneumonia and recurrent UTI. Treatment completed 2/16/2024 but patient reports residual productive cough and dysuria. Patchy confluent opacity left lower lobe, new concerning for aspiration or pneumonia.     In ER she's afebrile without leukocytosis, CBC shows thrombocytopenia consistent with baseline; CMP with elevated T. Bili otherwise LFTs WNL and Cr at baseline. UA unremarkable; CXR: Nonspecific patchy opacities at the lung bases could relate to atelectasis, aspiration or pneumonia. CT AP with possible acute diverticulitis, underlying malignancy not excluded.  There is a large amount of retained stool throughout the colon and FREE AIR  concerning for perforation. Patchy confluent  opacity left lower lobe, new concerning for aspiration or pneumonia. Abnormal severe compression fracture deformity of L2 and the moderate compression fracture deformity of L1, new from the prior study. ; Surgery consulted recommend NPO and IV abx    Overview/Hospital Course:  Admitted for medical management of Acute diverticulitis with microperforation and free air, new LLL consolidation. Placed NPO with IVF and Zosyn. Surgery consulted. Her abdomen resolved with medical management. She developed severe pancytopenia requiring blood transfusions and monitoring, heme/onc was consulted who recommended against any GCSF and to hold her home venclexta. She required 1U RBC transfusion on 3/7 and 3/8 with improvement in her hemoglobin. Developed some constipation and given milk of magnesia for regimen and a suppository. Awaiting return of bowel function and stabilization of WBC prior to discharge. PT/OT recommending ZULY, patient and family prefer to go home with HH PT/OT and hospital bed (not sure hosp bed will be covered).     Interval History: still no BM, borderline neutropenia. Otherwise she has no complaints other than she was not home for her birthday yesterday    Review of Systems   Constitutional:  Negative for chills and fever.   Respiratory:  Negative for cough and shortness of breath.    Cardiovascular:  Negative for chest pain and palpitations.   Gastrointestinal:  Positive for abdominal distention and constipation. Negative for abdominal pain, diarrhea, nausea and vomiting.   Neurological:  Positive for weakness. Negative for dizziness and light-headedness.     Objective:     Vital Signs (Most Recent):  Temp: 98.4 °F (36.9 °C) (03/11/24 1205)  Pulse: 69 (03/11/24 1205)  Resp: 18 (03/11/24 1205)  BP: 123/63 (03/11/24 1205)  SpO2: 95 % (03/11/24 1205) Vital Signs (24h Range):  Temp:  [97.7 °F (36.5 °C)-99 °F (37.2 °C)] 98.4 °F (36.9 °C)  Pulse:  [68-77] 69  Resp:  [16-20] 18  SpO2:  [95 %-96 %] 95 %  BP:  (123-142)/(63-72) 123/63     Weight: 63.4 kg (139 lb 12.4 oz)  Body mass index is 22.56 kg/m².    Intake/Output Summary (Last 24 hours) at 3/11/2024 1451  Last data filed at 3/11/2024 0548  Gross per 24 hour   Intake 292 ml   Output --   Net 292 ml         Physical Exam  Constitutional:       General: She is not in acute distress.     Appearance: She is ill-appearing (chronic).   Cardiovascular:      Rate and Rhythm: Normal rate and regular rhythm.   Pulmonary:      Effort: Pulmonary effort is normal. No respiratory distress.      Breath sounds: No wheezing or rales.   Abdominal:      General: Bowel sounds are normal. There is distension (improved).      Palpations: Abdomen is soft.      Tenderness: There is abdominal tenderness (very mild in lower quadrants).   Musculoskeletal:      Right lower leg: No edema.      Left lower leg: No edema.   Skin:     General: Skin is warm and dry.   Neurological:      General: No focal deficit present.      Mental Status: She is alert.             Significant Labs: All pertinent labs within the past 24 hours have been reviewed.    Significant Imaging: I have reviewed all pertinent imaging results/findings within the past 24 hours.    Assessment/Plan:      * Diverticulitis of large intestine with perforation  Niurka Pedraza presents with lethargy,abdominal bloating and pain    Afebrile without leukocytosis  CT AP with possible acute diverticulitis, underlying malignancy not excluded.  There is a large amount of retained stool throughout the colon and FREE AIR  concerning for perforation  Given IV Zosyn in ER, continue  Surgery consulted, NPO, IV abx    She continues on zosyn, is having constipation now so will give her some milk of magnesia and a suppository today as milk of magnesia was not effective yesterday. Would like to transition to single agent PO coverage tomorrow with augmentin if she has some improved bowel function. If no improvement may need to repeat imaging to  "evaluate evolution of her condition. If bowel function returns and cytopenias are stable likely discharge in next 24-48H    Compression fracture of L2  Incidental on CT AP "Abnormal severe compression fracture deformity of L2 and the moderate compression fracture deformity of L1, new from the prior study.  No apparent resulting severe canal stenosis"  Consider ortho inpt consult/ PTOT       Opacity noted on imaging study  History of COVID/pneumonia that required admission at [facility] about a month ago  CT AP this admission Patchy confluent opacity left lower lobe, new concerning for aspiration or pneumonia.   Can consider SLP consult once on PO      MDS/AML  Moderate Leukopenia  Thrombocytopenia  Anemia    Does not believe she takes posaconazole anymore  Takes venetoclax days 1-14 of each 28 day cycle of therapy   Continue prednisone 2mg daily, acyclovir as prevention     Hold venetoclax while inpatient. Appreciate heme/onc input.  1U RBC  3/7 and again 3/8. Hb stable today, improving WBC and platelets as well    Thrombocytopenia  Chronic, monitor  Appears reached wood and slowly improving      Immunosuppression due to chronic steroid use        Pulmonary fibrosis  Continue steroids       Interstitial lung disease  Continue fluticasone vilanterol daily and saline nebs PRN       Acquired hypothyroidism  Continue synthroid      Essential hypertension  Coreg 25 and avapro 300 have been held due to soft pressures, have restarted coreg at 25mg and she is tolerating well. Monitor and restart ARB as tolerated      VTE Risk Mitigation (From admission, onward)           Ordered     IP VTE HIGH RISK PATIENT  Once         03/05/24 1742                    Discharge Planning   JAKE: 3/12/2024     Code Status: Full Code   Is the patient medically ready for discharge?:     Reason for patient still in hospital (select all that apply): Treatment  Discharge Plan A: Home Health   Discharge Delays: (!) Post-Acute " Set-up              Salvador Gilmore MD  Department of Hospital Medicine   [facility] - Med Surg ([place])

## 2024-03-11 NOTE — PLAN OF CARE
03/11/24 0907   Post-Acute Status   Post-Acute Authorization Placement;Home Health   Post-Acute Placement Status Patient List Provided   Discharge Delays (!) Post-Acute Set-up   Discharge Plan   Discharge Plan A Home Health   Discharge Plan B Skilled Nursing Facility     SW contacted patient daughter (Luz) concerning the patient discharge placement.  Luz stated she would like for her mother to have home health instead of skilled nursing. Luz went on to state she want to resume services with [facility].

## 2024-03-11 NOTE — SUBJECTIVE & OBJECTIVE
Interval History: still no BM, borderline neutropenia. Otherwise she has no complaints other than she was not home for her birthday yesterday    Review of Systems   Constitutional:  Negative for chills and fever.   Respiratory:  Negative for cough and shortness of breath.    Cardiovascular:  Negative for chest pain and palpitations.   Gastrointestinal:  Positive for abdominal distention and constipation. Negative for abdominal pain, diarrhea, nausea and vomiting.   Neurological:  Positive for weakness. Negative for dizziness and light-headedness.     Objective:     Vital Signs (Most Recent):  Temp: 98.4 °F (36.9 °C) (03/11/24 1205)  Pulse: 69 (03/11/24 1205)  Resp: 18 (03/11/24 1205)  BP: 123/63 (03/11/24 1205)  SpO2: 95 % (03/11/24 1205) Vital Signs (24h Range):  Temp:  [97.7 °F (36.5 °C)-99 °F (37.2 °C)] 98.4 °F (36.9 °C)  Pulse:  [68-77] 69  Resp:  [16-20] 18  SpO2:  [95 %-96 %] 95 %  BP: (123-142)/(63-72) 123/63     Weight: 63.4 kg (139 lb 12.4 oz)  Body mass index is 22.56 kg/m².    Intake/Output Summary (Last 24 hours) at 3/11/2024 1451  Last data filed at 3/11/2024 0548  Gross per 24 hour   Intake 292 ml   Output --   Net 292 ml         Physical Exam  Constitutional:       General: She is not in acute distress.     Appearance: She is ill-appearing (chronic).   Cardiovascular:      Rate and Rhythm: Normal rate and regular rhythm.   Pulmonary:      Effort: Pulmonary effort is normal. No respiratory distress.      Breath sounds: No wheezing or rales.   Abdominal:      General: Bowel sounds are normal. There is distension (improved).      Palpations: Abdomen is soft.      Tenderness: There is abdominal tenderness (very mild in lower quadrants).   Musculoskeletal:      Right lower leg: No edema.      Left lower leg: No edema.   Skin:     General: Skin is warm and dry.   Neurological:      General: No focal deficit present.      Mental Status: She is alert.             Significant Labs: All pertinent labs within  the past 24 hours have been reviewed.    Significant Imaging: I have reviewed all pertinent imaging results/findings within the past 24 hours.

## 2024-03-11 NOTE — PLAN OF CARE
03/11/24 1405   Post-Acute Status   Post-Acute Authorization Home Health   Home Health Status Referrals Sent   Patient choice form signed by patient/caregiver List with quality metrics by geographic area provided;List from CMS Compare   Discharge Delays (!) Post-Acute Set-up   Discharge Plan   Discharge Plan A Home Health   Discharge Plan B Skilled Nursing Facility       SW sent pt referral to [facility]. Pt referral under review.

## 2024-03-11 NOTE — ASSESSMENT & PLAN NOTE
Coreg 25 and avapro 300 have been held due to soft pressures, have restarted coreg at 25mg and she is tolerating well. Monitor and restart ARB as tolerated

## 2024-03-11 NOTE — ASSESSMENT & PLAN NOTE
Niurka Pedraza presents with lethargy,abdominal bloating and pain    Afebrile without leukocytosis  CT AP with possible acute diverticulitis, underlying malignancy not excluded.  There is a large amount of retained stool throughout the colon and FREE AIR  concerning for perforation  Given IV Zosyn in ER, continue  Surgery consulted, NPO, IV abx    She continues on zosyn, is having constipation now so will give her some milk of magnesia and a suppository today as milk of magnesia was not effective yesterday. Would like to transition to single agent PO coverage tomorrow with augmentin if she has some improved bowel function. If no improvement may need to repeat imaging to evaluate evolution of her condition. If bowel function returns and cytopenias are stable likely discharge in next 24-48H

## 2024-03-11 NOTE — PT/OT/SLP PROGRESS
"Occupational Therapy   Treatment    Name: Niurka Pedraza  MRN: [id number]  Admitting Diagnosis:  Diverticulitis of large intestine with perforation       Recommendations:     Discharge Recommendations: Moderate Intensity Therapy  Discharge Equipment Recommendations:  bedside commode, hospital bed  Barriers to discharge:       Assessment:     Niurka Pedraza is a 79 y.o. female with a medical diagnosis of Diverticulitis of large intestine with perforation.  She presents with tailbone pain. Performance deficits affecting function are weakness, impaired endurance, impaired functional mobility, impaired self care skills, decreased lower extremity function, pain, decreased ROM, impaired balance. Patient with SBA, RW for functional mobility with short household distance of 12ft x2. Patient defer ADL tasks d/t already performed with daughter's assistance prior MARGARET entry.     Rehab Prognosis:  Good and Fair; patient would benefit from acute skilled OT services to address these deficits and reach maximum level of function.       Plan:     Patient to be seen 5 x/week to address the above listed problems via self-care/home management, therapeutic activities, therapeutic exercises  Plan of Care Expires:    Plan of Care Reviewed with: patient    Subjective     Chief Complaint: "I'm a little tired"  Patient/Family Comments/goals: agreeable to participate in therapy for OT intervention   Pain/Comfort:  Pain Rating 1:  (patient stating "So so")  Location - Side 1: Bilateral  Location - Orientation 1: lower  Location 1: gluteal  Pain Addressed 1: Reposition, Distraction, Cessation of Activity  Pain Rating Post-Intervention 1: 0/10 (at rest)    Objective:     Communicated with: RN prior to session.  Patient found supine with peripheral IV upon OT entry to room.    General Precautions: Standard, fall    Orthopedic Precautions: (pt treated conservatively w/ spinal precautions for low back)  Braces: N/A  Respiratory Status: Room air   "   Occupational Performance:     Bed Mobility:    Supine > Sit: SBA with increase of time for log roll   Sit > Supine: Min A with log roll for BLE  Scoot EOB: SBA with increase of time d/t pain      Functional Mobility/Transfers:  Sit <> Stand: CGA  Functional Mobility: SBA, RW with distance of 12ft x2    Activities of Daily Living:  Defer d/t already performed with daughter's assistance prior to OT session      [facility] 6 Click ADL: 16    Treatment & Education:  OT role, plan of care, progression of goals, importance of continued OOB activity, ADL/functional transfer and mobility retraining, discharge recommendation, safety precautions, fall prevention.     Patient left HOB elevated with all lines intact, call button in reach, bed alarm on, and RN notified    GOALS:   Multidisciplinary Problems       Occupational Therapy Goals          Problem: Occupational Therapy    Goal Priority Disciplines Outcome Interventions   Occupational Therapy Goal     OT, PT/OT Ongoing, Progressing    Description: Goals to be met by: 3/20/2024     Patient will increase functional independence with ADLs by performing:    UE Dressing with Minimal Assistance.  LE Dressing with Moderate Assistance.  Grooming while standing at sink with Contact Guard Assistance.  Toileting from toilet with Minimal Assistance for hygiene and clothing management.   Toilet transfer to toilet with Minimal Assistance.                         Time Tracking:     OT Date of Treatment: 03/11/24  OT Start Time: 1154  OT Stop Time: 1205  OT Total Time (min): 11 min    Billable Minutes:Therapeutic Activity 11 min    OT/MARGARET: MARGARET     Number of MARGARET visits since last OT visit: 2    3/11/2024

## 2024-03-11 NOTE — PT/OT/SLP PROGRESS
Physical Therapy Treatment    Patient Name:  Niurka Pedraza   MRN:  [id number]    Recommendations:     Discharge Recommendations: Moderate Intensity Therapy  Discharge Equipment Recommendations: bedside commode, hospital bed  Barriers to discharge: Decreased caregiver support (pt will need day time assistance if discharged home)    Assessment:     Niurka Pedraza is a 79 y.o. female admitted with a medical diagnosis of Diverticulitis of large intestine with perforation.  She presents with the following impairments/functional limitations: weakness, impaired endurance, impaired self care skills, impaired functional mobility, gait instability, impaired balance, decreased coordination, decreased lower extremity function, decreased safety awareness, pain, impaired cardiopulmonary response to activity ;pt with good mobility today, amb'd in hallway w/ rollator and CGA, though fatigued easily and needing cueing for safety w/ rollator.    Rehab Prognosis: Good; patient would benefit from acute skilled PT services to address these deficits and reach maximum level of function.    Recent Surgery: * No surgery found *      Plan:     During this hospitalization, patient to be seen 5 x/week to address the identified rehab impairments via gait training, therapeutic activities, therapeutic exercises, neuromuscular re-education and progress toward the following goals:    Plan of Care Expires:  04/06/24    Subjective     Chief Complaint: some pain reported  Patient/Family Comments/goals: pt agreeable to session, pt's sister present in room  Pain/Comfort:  Pain Rating 1:  (did not rate)  Location - Orientation 1: lower  Location 1: back (and/or gluteal region)  Pain Addressed 1: Reposition, Distraction, Cessation of Activity      Objective:     Communicated with nurse prior to session.  Patient found sitting edge of bed with peripheral IV upon PT entry to room.     General Precautions: Standard, fall  Orthopedic Precautions:  (pt treated  "conservatively with spinal prec for low back)  Braces: N/A  Respiratory Status: Room air     Functional Mobility:  Bed Mobility:     Sit to Supine: minimum assistance and at LE's  Transfers:     Sit to Stand:  contact guard assistance with 4 wheeled walker  Gait: amb'd `50' x 2 w/ rollator and CGA, needing Ning to turn to sit/rest on rollator      AM-PAC 6 CLICK MOBILITY  Turning over in bed (including adjusting bedclothes, sheets and blankets)?: 3  Sitting down on and standing up from a chair with arms (e.g., wheelchair, bedside commode, etc.): 3  Moving from lying on back to sitting on the side of the bed?: 3  Moving to and from a bed to a chair (including a wheelchair)?: 3  Need to walk in hospital room?: 3  Climbing 3-5 steps with a railing?: 2  Basic Mobility Total Score: 17       Treatment & Education:  Pt perf'd seated heelraises, AP's, LAQ"s x 10 ea.   Pt educated on safety w/ rollator.    Patient left HOB elevated with all lines intact, call button in reach, and sister present..    GOALS:   Multidisciplinary Problems       Physical Therapy Goals          Problem: Physical Therapy    Goal Priority Disciplines Outcome Goal Variances Interventions   Physical Therapy Goal     PT, PT/OT Ongoing, Progressing     Description: Goals to be met by: 24    Patient will increase functional independence with mobility by performin. Supine<>sit with SBA without use of HB features via log roll technique.   2. Sit<>stand with SBA with RW.  3. Gait x 50 feet with SBA with RW.  4. Tolerate sitting in chair x2 hours without significant increase in pain.                       Time Tracking:     PT Received On: 24  PT Start Time: 1332     PT Stop Time: 1358  PT Total Time (min): 26 min     Billable Minutes: Gait Training 16 and Therapeutic Activity 10    Treatment Type: Treatment  PT/PTA: PTA     Number of PTA visits since last PT visit: 3     2024  "

## 2024-03-12 PROBLEM — R53.81 DEBILITY: Status: ACTIVE | Noted: 2024-03-12

## 2024-03-12 PROBLEM — K57.32 DIVERTICULITIS LARGE INTESTINE W/O PERFORATION OR ABSCESS W/O BLEEDING: Status: ACTIVE | Noted: 2024-03-05

## 2024-03-12 LAB
ALBUMIN SERPL BCP-MCNC: 2.3 G/DL (ref 3.5–5.2)
ALP SERPL-CCNC: 155 U/L (ref 55–135)
ALT SERPL W/O P-5'-P-CCNC: 24 U/L (ref 10–44)
ANION GAP SERPL CALC-SCNC: 10 MMOL/L (ref 8–16)
AST SERPL-CCNC: 14 U/L (ref 10–40)
BASOPHILS # BLD AUTO: 0 K/UL (ref 0–0.2)
BASOPHILS NFR BLD: 0 % (ref 0–1.9)
BILIRUB SERPL-MCNC: 0.6 MG/DL (ref 0.1–1)
BUN SERPL-MCNC: 13 MG/DL (ref 8–23)
CALCIUM SERPL-MCNC: 8.9 MG/DL (ref 8.7–10.5)
CHLORIDE SERPL-SCNC: 99 MMOL/L (ref 95–110)
CO2 SERPL-SCNC: 27 MMOL/L (ref 23–29)
CREAT SERPL-MCNC: 0.8 MG/DL (ref 0.5–1.4)
DIFFERENTIAL METHOD BLD: ABNORMAL
EOSINOPHIL # BLD AUTO: 0 K/UL (ref 0–0.5)
EOSINOPHIL NFR BLD: 0 % (ref 0–8)
ERYTHROCYTE [DISTWIDTH] IN BLOOD BY AUTOMATED COUNT: 19.3 % (ref 11.5–14.5)
EST. GFR  (NO RACE VARIABLE): >60 ML/MIN/1.73 M^2
GLUCOSE SERPL-MCNC: 129 MG/DL (ref 70–110)
HCT VFR BLD AUTO: 28.9 % (ref 37–48.5)
HGB BLD-MCNC: 9.1 G/DL (ref 12–16)
IMM GRANULOCYTES # BLD AUTO: 0.01 K/UL (ref 0–0.04)
IMM GRANULOCYTES NFR BLD AUTO: 0.6 % (ref 0–0.5)
LYMPHOCYTES # BLD AUTO: 0.8 K/UL (ref 1–4.8)
LYMPHOCYTES NFR BLD: 51.3 % (ref 18–48)
MCH RBC QN AUTO: 27.3 PG (ref 27–31)
MCHC RBC AUTO-ENTMCNC: 31.5 G/DL (ref 32–36)
MCV RBC AUTO: 87 FL (ref 82–98)
MONOCYTES # BLD AUTO: 0 K/UL (ref 0.3–1)
MONOCYTES NFR BLD: 2.6 % (ref 4–15)
NEUTROPHILS # BLD AUTO: 0.7 K/UL (ref 1.8–7.7)
NEUTROPHILS NFR BLD: 45.5 % (ref 38–73)
NRBC BLD-RTO: 0 /100 WBC
PLATELET # BLD AUTO: 78 K/UL (ref 150–450)
PMV BLD AUTO: 8.8 FL (ref 9.2–12.9)
POTASSIUM SERPL-SCNC: 4.2 MMOL/L (ref 3.5–5.1)
PROT SERPL-MCNC: 6.1 G/DL (ref 6–8.4)
RBC # BLD AUTO: 3.33 M/UL (ref 4–5.4)
SODIUM SERPL-SCNC: 136 MMOL/L (ref 136–145)
WBC # BLD AUTO: 1.54 K/UL (ref 3.9–12.7)

## 2024-03-12 PROCEDURE — 36415 COLL VENOUS BLD VENIPUNCTURE: CPT | Performed by: PHYSICIAN ASSISTANT

## 2024-03-12 PROCEDURE — 27000207 HC ISOLATION

## 2024-03-12 PROCEDURE — 25000003 PHARM REV CODE 250: Performed by: PHYSICIAN ASSISTANT

## 2024-03-12 PROCEDURE — 80053 COMPREHEN METABOLIC PANEL: CPT | Performed by: PHYSICIAN ASSISTANT

## 2024-03-12 PROCEDURE — 94761 N-INVAS EAR/PLS OXIMETRY MLT: CPT

## 2024-03-12 PROCEDURE — 11000001 HC ACUTE MED/SURG PRIVATE ROOM

## 2024-03-12 PROCEDURE — 25000003 PHARM REV CODE 250: Performed by: NURSE PRACTITIONER

## 2024-03-12 PROCEDURE — 25000003 PHARM REV CODE 250: Performed by: HOSPITALIST

## 2024-03-12 PROCEDURE — 99231 SBSQ HOSP IP/OBS SF/LOW 25: CPT | Mod: ,,, | Performed by: SPECIALIST

## 2024-03-12 PROCEDURE — 25000003 PHARM REV CODE 250: Performed by: INTERNAL MEDICINE

## 2024-03-12 PROCEDURE — 85025 COMPLETE CBC W/AUTO DIFF WBC: CPT | Performed by: PHYSICIAN ASSISTANT

## 2024-03-12 PROCEDURE — 63600175 PHARM REV CODE 636 W HCPCS: Performed by: PHYSICIAN ASSISTANT

## 2024-03-12 PROCEDURE — 97116 GAIT TRAINING THERAPY: CPT | Mod: CQ

## 2024-03-12 PROCEDURE — 97535 SELF CARE MNGMENT TRAINING: CPT | Mod: CO

## 2024-03-12 PROCEDURE — 97530 THERAPEUTIC ACTIVITIES: CPT | Mod: CQ

## 2024-03-12 RX ORDER — DOCUSATE SODIUM 100 MG/1
100 CAPSULE, LIQUID FILLED ORAL 2 TIMES DAILY
Status: DISCONTINUED | OUTPATIENT
Start: 2024-03-12 | End: 2024-03-13 | Stop reason: HOSPADM

## 2024-03-12 RX ORDER — POLYETHYLENE GLYCOL 3350 17 G/17G
17 POWDER, FOR SOLUTION ORAL 2 TIMES DAILY
Status: DISCONTINUED | OUTPATIENT
Start: 2024-03-12 | End: 2024-03-13 | Stop reason: HOSPADM

## 2024-03-12 RX ADMIN — AZELASTINE 137 MCG: 1 SPRAY, METERED NASAL at 09:03

## 2024-03-12 RX ADMIN — HYDROCODONE BITARTRATE AND ACETAMINOPHEN 1 TABLET: 7.5; 325 TABLET ORAL at 02:03

## 2024-03-12 RX ADMIN — LEVOTHYROXINE SODIUM 50 MCG: 50 TABLET ORAL at 05:03

## 2024-03-12 RX ADMIN — MAGNESIUM HYDROXIDE 2400 MG: 400 SUSPENSION ORAL at 09:03

## 2024-03-12 RX ADMIN — PIPERACILLIN SODIUM AND TAZOBACTAM SODIUM 4.5 G: 4; .5 INJECTION, POWDER, LYOPHILIZED, FOR SOLUTION INTRAVENOUS at 05:03

## 2024-03-12 RX ADMIN — PIPERACILLIN SODIUM AND TAZOBACTAM SODIUM 4.5 G: 4; .5 INJECTION, POWDER, LYOPHILIZED, FOR SOLUTION INTRAVENOUS at 02:03

## 2024-03-12 RX ADMIN — MIRTAZAPINE 7.5 MG: 7.5 TABLET ORAL at 10:03

## 2024-03-12 RX ADMIN — ACYCLOVIR 400 MG: 400 TABLET ORAL at 10:03

## 2024-03-12 RX ADMIN — FERROUS SULFATE TAB 325 MG (65 MG ELEMENTAL FE) 1 EACH: 325 (65 FE) TAB at 09:03

## 2024-03-12 RX ADMIN — PIPERACILLIN SODIUM AND TAZOBACTAM SODIUM 4.5 G: 4; .5 INJECTION, POWDER, LYOPHILIZED, FOR SOLUTION INTRAVENOUS at 09:03

## 2024-03-12 RX ADMIN — HYDROCODONE BITARTRATE AND ACETAMINOPHEN 1 TABLET: 7.5; 325 TABLET ORAL at 09:03

## 2024-03-12 RX ADMIN — PREDNISONE 2 MG: 1 TABLET ORAL at 09:03

## 2024-03-12 RX ADMIN — CARVEDILOL 25 MG: 12.5 TABLET, FILM COATED ORAL at 10:03

## 2024-03-12 RX ADMIN — OXYBUTYNIN CHLORIDE 15 MG: 5 TABLET, EXTENDED RELEASE ORAL at 09:03

## 2024-03-12 RX ADMIN — Medication 1 TABLET: at 09:03

## 2024-03-12 RX ADMIN — AZELASTINE 137 MCG: 1 SPRAY, METERED NASAL at 10:03

## 2024-03-12 RX ADMIN — POLYETHYLENE GLYCOL 3350 17 G: 17 POWDER, FOR SOLUTION ORAL at 09:03

## 2024-03-12 RX ADMIN — Medication 6 MG: at 10:03

## 2024-03-12 RX ADMIN — PANTOPRAZOLE SODIUM 40 MG: 40 TABLET, DELAYED RELEASE ORAL at 09:03

## 2024-03-12 RX ADMIN — ACYCLOVIR 400 MG: 400 TABLET ORAL at 09:03

## 2024-03-12 RX ADMIN — CARVEDILOL 25 MG: 12.5 TABLET, FILM COATED ORAL at 09:03

## 2024-03-12 RX ADMIN — THERA TABS 1 TABLET: TAB at 09:03

## 2024-03-12 RX ADMIN — FLUTICASONE FUROATE AND VILANTEROL TRIFENATATE 1 PUFF: 100; 25 POWDER RESPIRATORY (INHALATION) at 08:03

## 2024-03-12 RX ADMIN — Medication 1 TABLET: at 05:03

## 2024-03-12 RX ADMIN — ATORVASTATIN CALCIUM 20 MG: 20 TABLET, FILM COATED ORAL at 09:03

## 2024-03-12 NOTE — PLAN OF CARE
Medicare Message     Important Message from Medicare regarding Discharge Appeal Rights Given to patient/caregiver; Explained to patient/caregiver; Signed/date by patient/caregiver Given to patient/caregiver; Explained to patient/caregiver; Signed/date by patient/caregiver Given to patient/caregiver; Explained to patient/caregiver; Signed/date by patient/caregiver   Date IMM was signed 3/6/2024 3/8/2024 3/12/2024   Time IMM was signed [id number]

## 2024-03-12 NOTE — PLAN OF CARE
Problem: Adult Inpatient Plan of Care  Goal: Plan of Care Review  Outcome: Ongoing, Progressing  Goal: Patient-Specific Goal (Individualized)  Outcome: Ongoing, Progressing  Goal: Absence of Hospital-Acquired Illness or Injury  Outcome: Ongoing, Progressing  Goal: Optimal Comfort and Wellbeing  Outcome: Ongoing, Progressing  Goal: Readiness for Transition of Care  Outcome: Ongoing, Progressing     Problem: Infection  Goal: Absence of Infection Signs and Symptoms  Outcome: Ongoing, Progressing     Problem: Fall Injury Risk  Goal: Absence of Fall and Fall-Related Injury  Outcome: Ongoing, Progressing     Problem: Pain Acute  Goal: Acceptable Pain Control and Functional Ability  Outcome: Ongoing, Progressing   Pt AOX4, denies pain, OOB X1 assist with walker.  Pt remains fall free during shift.  Pt had 2 large bowel movements during shift, tolerating diet, denies N/V and pain. Pt on contact and neutropenic precautions.

## 2024-03-12 NOTE — ASSESSMENT & PLAN NOTE
Moderate Leukopenia  Thrombocytopenia  Anemia  -Does not believe she takes posaconazole anymore, but does take venetoclax days 1-14 of each 28 day cycle of therapy as well as prednisone 2mg daily and acyclovir as prevention   -Heme/onc consulted and input appreciated  -Hold venetoclax while inpatient.   -Required 1U PRBC  3/7 and 3/8.   -Hb improved and up to 9.1 today.  WBC and platelets improved a bit today as well  -Continue neutropenic precautions.

## 2024-03-12 NOTE — PROGRESS NOTES
House Day #8  DX--acute diverticulitis sigmoid colon with microperforation.  Co-morbid Dx---myelodysplastic syndrome, ILD pulmonary fibrosis, HTN, MDRO UTI's, pulmonary coccidiomycosis    Subjective  Pain, bloating somewhat improved  + BM  Tolerating diet    P  Afebrile  Vital signs stable  Abd--soft, no distention,no guarding, no rebound    I/p  Continued modest improvement  No immediately compelling surgical abdomen

## 2024-03-12 NOTE — PT/OT/SLP PROGRESS
Occupational Therapy   Treatment    Name: Niurka Pedraza  MRN: [id number]  Admitting Diagnosis:  Diverticulitis large intestine w/o perforation or abscess w/o bleeding       Recommendations:     Discharge Recommendations: Moderate Intensity Therapy  Discharge Equipment Recommendations:  hospital bed, bedside commode  Barriers to discharge:       Assessment:     Niurka Pedraza is a 79 y.o. female with a medical diagnosis of Diverticulitis large intestine w/o perforation or abscess w/o bleeding.  She presents with lower back pain. Performance deficits affecting function are weakness, impaired endurance, impaired self care skills, impaired functional mobility, gait instability, impaired balance, decreased coordination, decreased lower extremity function, pain, decreased ROM, decreased safety awareness, impaired cardiopulmonary response to activity.     Rehab Prognosis:  Good; patient would benefit from acute skilled OT services to address these deficits and reach maximum level of function.       Plan:     Patient to be seen 5 x/week to address the above listed problems via self-care/home management, therapeutic activities, therapeutic exercises  Plan of Care Expires:    Plan of Care Reviewed with: patient    Subjective     Chief Complaint: difficulties getting comfortable in bed   Patient/Family Comments/goals: agreeable to participate in therapy for OT intervention   Pain/Comfort:  Pain Rating 1:  (unrated)  Location - Side 1: Bilateral  Location - Orientation 1: lower  Location 1: back  Pain Addressed 1: Reposition, Distraction, Cessation of Activity    Objective:     Communicated with: RN prior to session.  Patient found supine with peripheral IV upon OT entry to room.    General Precautions: Standard, fall    Orthopedic Precautions: (pt treated conservatively with spinal prec for low back)  Braces: N/A  Respiratory Status: Room air     Occupational Performance:     Bed Mobility:    Supine > Sit: Min A   Sit > Supine:  Max A d/t lower back pain   Scoot HOB: Max A with trendelenburg     Functional Mobility/Transfers:  Sit <> Stand: CGA  Functional Mobility: SBA, RW   Toiler transfer: SBA, grab bars     Activities of Daily Living:  Toielting: SBA for clothing management and seated hygiene   Grooming: SBA for hand washing at the sink   LB Dressing: Max A to don/doff socks seated EOB; patient attempt with figure four then onset of lower back pain      [facility] 6 Click ADL: 18    Treatment & Education:  OT role, plan of care, progression of goals, importance of continued OOB activity, ADL/functional transfer and mobility retraining, discharge recommendation, call don't fall, safety precautions, fall prevention.      Patient left HOB elevated with all lines intact, call button in reach, and RN notified    GOALS:   Multidisciplinary Problems       Occupational Therapy Goals          Problem: Occupational Therapy    Goal Priority Disciplines Outcome Interventions   Occupational Therapy Goal     OT, PT/OT Ongoing, Progressing    Description: Goals to be met by: 3/20/2024     Patient will increase functional independence with ADLs by performing:    UE Dressing with Minimal Assistance.  LE Dressing with Moderate Assistance.  Grooming while standing at sink with Contact Guard Assistance.  Toileting from toilet with Minimal Assistance for hygiene and clothing management.   Toilet transfer to toilet with Minimal Assistance.                         Time Tracking:     OT Date of Treatment: 03/12/24  OT Start Time: 1118  OT Stop Time: 1131  OT Total Time (min): 13 min    Billable Minutes:Self Care/Home Management 13 min    OT/MARGARET: MARGARET     Number of MARGARET visits since last OT visit: 3    3/12/2024

## 2024-03-12 NOTE — ASSESSMENT & PLAN NOTE
-History of [place] [facility] [place]  -CT AP this admission Patchy confluent opacity left lower lobe, new concerning for aspiration or pneumonia.   -No evidence of aspiration

## 2024-03-12 NOTE — PLAN OF CARE
Pt remained free of falls and injuries throughout shift. AAOx4. Pt calm and cooperative, hopeful to be going home today. Purposeful hourly rounding performed. Pt swallows meds whole. Pt c/o lower back pain, PRN Norco given.  IV Zosyn infusing at this time. Pt ambulates with standby assist, educated to call for assist ambulating, demonstrates understanding. No BM this shift. Daughter at bedside, supportive of pt and participating in care. VSS on RA. Pt sleeping in bed, even rise and fall of chest. Bed low and locked, call light in reach. Side rails up x2. Will continue to monitor.

## 2024-03-12 NOTE — PROGRESS NOTES
[facility]  Progress Note    Patient Name: Niurka Pedraza  MRN: [id number]  Patient Class: IP- Inpatient   Admission Date: 3/5/2024  Length of Stay: 7 days  Attending Physician: Chandra Porras MD  Primary Care Provider: Savana Anderson MD        Subjective:     Principal Problem:Diverticulitis large intestine w/o perforation or abscess w/o bleeding        HPI:  Niurka Pedraza is a 78F with myelodysplastic syndrome with excess blasts-2 on chronic steroids, ILD/pulmonary fibrosis, hypertension, MDRO UTIs, pulmonary coccidioidomycosis and hypothyroidism who presents with her daughter for increasing weakness and lethargy over the past week. She used to be able to walk with a cane or walker, but now has felt so weak she struggles to walk short distances. Cannot recall her last bowel movement, usually takes dulcolax at home. She reports some lower abdominal discomfort, bloating, burning with urination. Her last chemo infusion was on Friday, four days ago. Denies chest pain, shortness of breath, nausea, vomiting, diarrhea. Daughter Luz at bedside notes she has not had much of an appetite    Of note she was recently hospitalized at [facility] for COVID/pneumonia, UTI and discharged with Meropenem IV to complete treatment for MSSA pneumonia and recurrent UTI. Treatment completed 2/16/2024 but patient reports residual productive cough and dysuria. Patchy confluent opacity left lower lobe, new concerning for aspiration or pneumonia.     In ER she's afebrile without leukocytosis, CBC shows thrombocytopenia consistent with baseline; CMP with elevated T. Bili otherwise LFTs WNL and Cr at baseline. UA unremarkable; CXR: Nonspecific patchy opacities at the lung bases could relate to atelectasis, aspiration or pneumonia. CT AP with possible acute diverticulitis, underlying malignancy not excluded.  There is a large amount of retained stool throughout the colon and FREE AIR  concerning for  perforation. Patchy confluent opacity left lower lobe, new concerning for aspiration or pneumonia. Abnormal severe compression fracture deformity of L2 and the moderate compression fracture deformity of L1, new from the prior study. ; Surgery consulted recommend NPO and IV abx    Overview/Hospital Course:  Admitted for medical management of Acute diverticulitis with microperforation and free air, new LLL consolidation. Placed NPO with IVF and Zosyn. Surgery consulted. Her abdomen resolved with medical management. She developed severe pancytopenia requiring blood transfusions and monitoring, heme/onc was consulted who recommended against any GCSF and to hold her home venclexta. She required 1U RBC transfusion on 3/7 and 3/8 with improvement in her hemoglobin. Developed some constipation and given milk of magnesia for regimen and a suppository. Awaiting return of bowel function and stabilization of WBC prior to discharge. PT/OT recommending ZULY, patient and family prefer to go home with HH PT/OT and hospital bed (not sure hosp bed will be covered).     Interval History: No acute events overnight.  Had a very small bowel movement yesterday evening.  Stabes abdomen feels very full - but denies pain and is in good spirits.  All questions answered and patient had no further complaints.    Objective:     Vital Signs (Most Recent):  Temp: 97.5 °F (36.4 °C) (03/12/24 1243)  Pulse: 65 (03/12/24 1243)  Resp: 16 (03/12/24 1243)  BP: 121/60 (03/12/24 1243)  SpO2: 96 % (03/12/24 1243) Vital Signs (24h Range):  Temp:  [97.5 °F (36.4 °C)-98.8 °F (37.1 °C)] 97.5 °F (36.4 °C)  Pulse:  [65-76] 65  Resp:  [14-18] 16  SpO2:  [93 %-97 %] 96 %  BP: (105-155)/(56-87) 121/60     Weight: 63.4 kg (139 lb 12.4 oz)  Body mass index is 22.56 kg/m².    Intake/Output Summary (Last 24 hours) at 3/12/2024 1451  Last data filed at 3/12/2024 0522  Gross per 24 hour   Intake 181.56 ml   Output --   Net 181.56 ml           Physical Exam  Vitals and  nursing note reviewed.   Constitutional:       General: She is not in acute distress.     Appearance: She is not ill-appearing (chronic), toxic-appearing or diaphoretic.   HENT:      Mouth/Throat:      Mouth: Mucous membranes are moist.   Eyes:      Extraocular Movements: Extraocular movements intact.   Cardiovascular:      Rate and Rhythm: Normal rate and regular rhythm.   Pulmonary:      Effort: Pulmonary effort is normal. No respiratory distress.      Breath sounds: No wheezing or rales.   Abdominal:      General: Bowel sounds are normal. Distension: improved.      Palpations: Abdomen is soft.      Tenderness: There is abdominal tenderness.      Comments: Soft and no significant distention or tenderness   Musculoskeletal:      Cervical back: Normal range of motion.      Right lower leg: No edema.      Left lower leg: No edema.   Skin:     General: Skin is warm and dry.   Neurological:      General: No focal deficit present.      Mental Status: She is alert and oriented to person, place, and time.   Psychiatric:         Mood and Affect: Mood normal.         Behavior: Behavior normal.             Significant Labs: All pertinent labs within the past 24 hours have been reviewed.    Significant Imaging: I have reviewed all pertinent imaging results/findings within the past 24 hours.    Assessment/Plan:      * Diverticulitis large intestine w/o perforation or abscess w/o bleeding  -Admitted to inpatient status  -Presented with with lethargy, abdominal bloating and pain without fever or leukocytosis  -CT AP showed possible acute diverticulitis, underlying malignancy not excluded.  There is a large amount of retained stool throughout the colon and FREE AIR concerning for perforation  -Surgery consulted and input appreciated.  Thus far no indications for surgical treatment  -Treated with IV zosyn, initially bowel rest and serial abdominal exams  -Clinically feels much better now, but still with evidence of significant  "constipation and abdominal fullness  -Discussed with Dr. Diaz and agree with adding miralax and colace to assist with bowel movements.  MOM has not been helpful thus far.  -Continue zosyn for now.  Hopefully able to transition to augmentin tomorrow and discharge her home with home health if sufficient bowel movement.    MDS/AML  Moderate Leukopenia  Thrombocytopenia  Anemia  -Does not believe she takes posaconazole anymore, but does take venetoclax days 1-14 of each 28 day cycle of therapy as well as prednisone 2mg daily and acyclovir as prevention   -Heme/onc consulted and input appreciated  -Hold venetoclax while inpatient.   -Required 1U PRBC  3/7 and 3/8.   -Hb improved and up to 9.1 today.  WBC and platelets improved a bit today as well  -Continue neutropenic precautions.    Thrombocytopenia  -Treatment as above.    Compression fracture of L2  -Incidental finding on CT AP "abnormal severe compression fracture deformity of L2 and the moderate compression fracture deformity of L1, new from the prior study.  No apparent resulting severe canal stenosis"  -No complaints of pain today  -PT/OT consulted  -Plan outpatient referral to orthopedic surgery.    Interstitial lung disease  -History noted  -Breathing comfortably  -Continue fluticasone vilanterol daily and saline nebs PRN    Pulmonary fibrosis  -Continue steroids    Immunosuppression due to chronic steroid use  -Noted    Opacity noted on imaging study  -History of [place] [facility] [place]  -CT AP this admission Patchy confluent opacity left lower lobe, new concerning for aspiration or pneumonia.   -No evidence of aspiration    Debility  -PT/OT consulted and recommended SNF.  However family wish to pursue care at home with home health and a walker.    Acquired hypothyroidism  -Continue synthroid    Essential hypertension  -BP generally well controlled with occasional mild elevations  -Continue coreg, but still holding " avapro 300mg.    -Continue to monitor.      VTE Risk Mitigation (From admission, onward)           Ordered     IP VTE HIGH RISK PATIENT  Once         03/05/24 5662                    Discharge Planning   JAKE: 3/12/2024     Code Status: Full Code   Is the patient medically ready for discharge?:     Reason for patient still in hospital (select all that apply): Treatment  Discharge Plan A: Home Health   Discharge Delays: (!) Post-Acute Set-up              Chandra Porras MD  Department of Hospital Medicine   [facility] ([place])

## 2024-03-12 NOTE — ASSESSMENT & PLAN NOTE
-History noted  -Breathing comfortably  -Continue fluticasone vilanterol daily and saline nebs PRN

## 2024-03-12 NOTE — SUBJECTIVE & OBJECTIVE
Interval History: No acute events overnight.  Had a very small bowel movement yesterday evening.  Stabes abdomen feels very full - but denies pain and is in good spirits.  All questions answered and patient had no further complaints.    Objective:     Vital Signs (Most Recent):  Temp: 97.5 °F (36.4 °C) (03/12/24 1243)  Pulse: 65 (03/12/24 1243)  Resp: 16 (03/12/24 1243)  BP: 121/60 (03/12/24 1243)  SpO2: 96 % (03/12/24 1243) Vital Signs (24h Range):  Temp:  [97.5 °F (36.4 °C)-98.8 °F (37.1 °C)] 97.5 °F (36.4 °C)  Pulse:  [65-76] 65  Resp:  [14-18] 16  SpO2:  [93 %-97 %] 96 %  BP: (105-155)/(56-87) 121/60     Weight: 63.4 kg (139 lb 12.4 oz)  Body mass index is 22.56 kg/m².    Intake/Output Summary (Last 24 hours) at 3/12/2024 1451  Last data filed at 3/12/2024 0522  Gross per 24 hour   Intake 181.56 ml   Output --   Net 181.56 ml           Physical Exam  Vitals and nursing note reviewed.   Constitutional:       General: She is not in acute distress.     Appearance: She is not ill-appearing (chronic), toxic-appearing or diaphoretic.   HENT:      Mouth/Throat:      Mouth: Mucous membranes are moist.   Eyes:      Extraocular Movements: Extraocular movements intact.   Cardiovascular:      Rate and Rhythm: Normal rate and regular rhythm.   Pulmonary:      Effort: Pulmonary effort is normal. No respiratory distress.      Breath sounds: No wheezing or rales.   Abdominal:      General: Bowel sounds are normal. Distension: improved.      Palpations: Abdomen is soft.      Tenderness: There is abdominal tenderness.      Comments: Soft and no significant distention or tenderness   Musculoskeletal:      Cervical back: Normal range of motion.      Right lower leg: No edema.      Left lower leg: No edema.   Skin:     General: Skin is warm and dry.   Neurological:      General: No focal deficit present.      Mental Status: She is alert and oriented to person, place, and time.   Psychiatric:         Mood and Affect: Mood normal.          Behavior: Behavior normal.             Significant Labs: All pertinent labs within the past 24 hours have been reviewed.    Significant Imaging: I have reviewed all pertinent imaging results/findings within the past 24 hours.

## 2024-03-12 NOTE — PT/OT/SLP PROGRESS
Physical Therapy Treatment    Patient Name:  Niurka Pedraza   MRN:  [id number]    Recommendations:     Discharge Recommendations: Moderate Intensity Therapy  Discharge Equipment Recommendations: bedside commode, hospital bed  Barriers to discharge: Decreased caregiver support (if pt goes home, will need 24/7 assistance)    Assessment:     Niurka ePdraza is a 79 y.o. female admitted with a medical diagnosis of Diverticulitis large intestine w/o perforation or abscess w/o bleeding.  She presents with the following impairments/functional limitations: weakness, impaired endurance, impaired self care skills, impaired functional mobility, gait instability, impaired balance, decreased coordination, decreased lower extremity function, pain, impaired cardiopulmonary response to activity ;pt with fair/good mobility today, amb'd in hallway w/ rollator, though still req's CGA for safety and seated rest midway 2/2 dec endurance, mild SOB, O2:93% on RA.    Rehab Prognosis: Good and Fair; patient would benefit from acute skilled PT services to address these deficits and reach maximum level of function.    Recent Surgery: * No surgery found *      Plan:     During this hospitalization, patient to be seen 5 x/week to address the identified rehab impairments via gait training, therapeutic activities, therapeutic exercises, neuromuscular re-education and progress toward the following goals:    Plan of Care Expires:  04/06/24    Subjective     Chief Complaint: min c/o's back pain  Patient/Family Comments/goals: pt agreeable to amb. On 3rd attempt today (had just ret'd to bed on 1st attempt, and visiting family on 2nd attempt)  Pain/Comfort:  Pain Rating 1:  (did not rate)  Location - Side 1: Bilateral  Location - Orientation 1: lower  Location 1: back  Pain Addressed 1: Reposition, Distraction      Objective:     Communicated with nurse prior to session.  Patient found HOB elevated with peripheral IV upon PT entry to room.     General  Precautions: Standard, fall  Orthopedic Precautions:  (pt treated conservatively for spinal prec. for low back)  Braces: N/A  Respiratory Status: Room air     Functional Mobility:  Bed Mobility:     Scooting: minimum assistance, moderate assistance, and in supine, cueing for using LE's to assist  Supine to Sit: minimum assistance and with use of bedrail and HOB partially up  Sit to Supine: moderate assistance and at LE's  Transfers:     Sit to Stand:  contact guard assistance and minimum assistance with 4 wheeled walker  Gait: amb'd 50' x 2 w/ rollator and CGA/occ Ning for safety, cueing for using hand brakes when sitting down.      AM-PAC 6 CLICK MOBILITY  Turning over in bed (including adjusting bedclothes, sheets and blankets)?: 3  Sitting down on and standing up from a chair with arms (e.g., wheelchair, bedside commode, etc.): 3  Moving from lying on back to sitting on the side of the bed?: 3  Moving to and from a bed to a chair (including a wheelchair)?: 3  Need to walk in hospital room?: 3  Climbing 3-5 steps with a railing?: 2  Basic Mobility Total Score: 17       Treatment & Education:  Pt inst'd in log rolling for sup to sit to sup, req'd min/modA.  Pt educated on breathing techniques to help w/ keeping O2 sats up.   O2:93% on RA w/ amb., HR:73    Patient left HOB elevated with all lines intact, call button in reach, and nurse notified, heels elevated off bed w/ pillow. (Pt declined sitting up in chair)    GOALS:   Multidisciplinary Problems       Physical Therapy Goals          Problem: Physical Therapy    Goal Priority Disciplines Outcome Goal Variances Interventions   Physical Therapy Goal     PT, PT/OT Ongoing, Progressing     Description: Goals to be met by: 24    Patient will increase functional independence with mobility by performin. Supine<>sit with SBA without use of HB features via log roll technique.   2. Sit<>stand with SBA with RW.  3. Gait x 50 feet with SBA with RW.  4. Tolerate  sitting in chair x2 hours without significant increase in pain.                       Time Tracking:     PT Received On: 03/12/24  PT Start Time: 1322     PT Stop Time: 1345  PT Total Time (min): 23 min     Billable Minutes: Gait Training 15 and Therapeutic Activity 8    Treatment Type: Treatment  PT/PTA: PTA     Number of PTA visits since last PT visit: 4     03/12/2024

## 2024-03-12 NOTE — ASSESSMENT & PLAN NOTE
-PT/OT consulted and recommended SNF.  However family wish to pursue care at home with home health and a walker.

## 2024-03-12 NOTE — ASSESSMENT & PLAN NOTE
"-Incidental finding on CT AP "abnormal severe compression fracture deformity of L2 and the moderate compression fracture deformity of L1, new from the prior study.  No apparent resulting severe canal stenosis"  -No complaints of pain today  -PT/OT consulted  -Plan outpatient referral to orthopedic surgery.  "

## 2024-03-12 NOTE — ASSESSMENT & PLAN NOTE
-BP generally well controlled with occasional mild elevations  -Continue coreg, but still holding avapro 300mg.    -Continue to monitor.

## 2024-03-12 NOTE — ASSESSMENT & PLAN NOTE
-Admitted to inpatient status  -Presented with with lethargy, abdominal bloating and pain without fever or leukocytosis  -CT AP showed possible acute diverticulitis, underlying malignancy not excluded.  There is a large amount of retained stool throughout the colon and FREE AIR concerning for perforation  -Surgery consulted and input appreciated.  Thus far no indications for surgical treatment  -Treated with IV zosyn, initially bowel rest and serial abdominal exams  -Clinically feels much better now, but still with evidence of significant constipation and abdominal fullness  -Discussed with Dr. Diaz and agree with adding miralax and colace to assist with bowel movements.  MOM has not been helpful thus far.  -Continue zosyn for now.  Hopefully able to transition to augmentin tomorrow and discharge her home with home health if sufficient bowel movement.

## 2024-03-13 VITALS
WEIGHT: 139.75 LBS | RESPIRATION RATE: 16 BRPM | OXYGEN SATURATION: 94 % | BODY MASS INDEX: 22.46 KG/M2 | HEIGHT: 66 IN | HEART RATE: 68 BPM | DIASTOLIC BLOOD PRESSURE: 60 MMHG | TEMPERATURE: 98 F | SYSTOLIC BLOOD PRESSURE: 118 MMHG

## 2024-03-13 LAB
ALBUMIN SERPL BCP-MCNC: 2.2 G/DL (ref 3.5–5.2)
ALP SERPL-CCNC: 134 U/L (ref 55–135)
ALT SERPL W/O P-5'-P-CCNC: 21 U/L (ref 10–44)
ANION GAP SERPL CALC-SCNC: 9 MMOL/L (ref 8–16)
ANISOCYTOSIS BLD QL SMEAR: SLIGHT
AST SERPL-CCNC: 11 U/L (ref 10–40)
BASOPHILS # BLD AUTO: 0.01 K/UL (ref 0–0.2)
BASOPHILS NFR BLD: 0.6 % (ref 0–1.9)
BILIRUB SERPL-MCNC: 0.6 MG/DL (ref 0.1–1)
BUN SERPL-MCNC: 15 MG/DL (ref 8–23)
CALCIUM SERPL-MCNC: 8.6 MG/DL (ref 8.7–10.5)
CHLORIDE SERPL-SCNC: 102 MMOL/L (ref 95–110)
CO2 SERPL-SCNC: 25 MMOL/L (ref 23–29)
CREAT SERPL-MCNC: 0.8 MG/DL (ref 0.5–1.4)
DIFFERENTIAL METHOD BLD: ABNORMAL
EOSINOPHIL # BLD AUTO: 0 K/UL (ref 0–0.5)
EOSINOPHIL NFR BLD: 0 % (ref 0–8)
ERYTHROCYTE [DISTWIDTH] IN BLOOD BY AUTOMATED COUNT: 19.4 % (ref 11.5–14.5)
EST. GFR  (NO RACE VARIABLE): >60 ML/MIN/1.73 M^2
GLUCOSE SERPL-MCNC: 112 MG/DL (ref 70–110)
HCT VFR BLD AUTO: 25.3 % (ref 37–48.5)
HGB BLD-MCNC: 8 G/DL (ref 12–16)
IMM GRANULOCYTES # BLD AUTO: 0.02 K/UL (ref 0–0.04)
IMM GRANULOCYTES NFR BLD AUTO: 1.3 % (ref 0–0.5)
LYMPHOCYTES # BLD AUTO: 0.8 K/UL (ref 1–4.8)
LYMPHOCYTES NFR BLD: 50.6 % (ref 18–48)
MCH RBC QN AUTO: 27.8 PG (ref 27–31)
MCHC RBC AUTO-ENTMCNC: 31.6 G/DL (ref 32–36)
MCV RBC AUTO: 88 FL (ref 82–98)
MONOCYTES # BLD AUTO: 0 K/UL (ref 0.3–1)
MONOCYTES NFR BLD: 2.6 % (ref 4–15)
NEUTROPHILS # BLD AUTO: 0.7 K/UL (ref 1.8–7.7)
NEUTROPHILS NFR BLD: 44.9 % (ref 38–73)
NRBC BLD-RTO: 0 /100 WBC
PLATELET # BLD AUTO: 77 K/UL (ref 150–450)
PLATELET BLD QL SMEAR: ABNORMAL
PMV BLD AUTO: 8.9 FL (ref 9.2–12.9)
POTASSIUM SERPL-SCNC: 3.9 MMOL/L (ref 3.5–5.1)
PROT SERPL-MCNC: 5.6 G/DL (ref 6–8.4)
RBC # BLD AUTO: 2.88 M/UL (ref 4–5.4)
SODIUM SERPL-SCNC: 136 MMOL/L (ref 136–145)
WBC # BLD AUTO: 1.54 K/UL (ref 3.9–12.7)

## 2024-03-13 PROCEDURE — 85025 COMPLETE CBC W/AUTO DIFF WBC: CPT | Performed by: PHYSICIAN ASSISTANT

## 2024-03-13 PROCEDURE — 36415 COLL VENOUS BLD VENIPUNCTURE: CPT | Performed by: PHYSICIAN ASSISTANT

## 2024-03-13 PROCEDURE — 97535 SELF CARE MNGMENT TRAINING: CPT | Mod: CO

## 2024-03-13 PROCEDURE — 25000003 PHARM REV CODE 250: Performed by: NURSE PRACTITIONER

## 2024-03-13 PROCEDURE — 94761 N-INVAS EAR/PLS OXIMETRY MLT: CPT

## 2024-03-13 PROCEDURE — 94640 AIRWAY INHALATION TREATMENT: CPT

## 2024-03-13 PROCEDURE — 25000003 PHARM REV CODE 250: Performed by: PHYSICIAN ASSISTANT

## 2024-03-13 PROCEDURE — 25000003 PHARM REV CODE 250: Performed by: INTERNAL MEDICINE

## 2024-03-13 PROCEDURE — 80053 COMPREHEN METABOLIC PANEL: CPT | Performed by: PHYSICIAN ASSISTANT

## 2024-03-13 PROCEDURE — 63600175 PHARM REV CODE 636 W HCPCS: Performed by: PHYSICIAN ASSISTANT

## 2024-03-13 PROCEDURE — 25000003 PHARM REV CODE 250: Performed by: HOSPITALIST

## 2024-03-13 RX ORDER — DIPHENHYDRAMINE HCL 25 MG
25 CAPSULE ORAL ONCE
Status: COMPLETED | OUTPATIENT
Start: 2024-03-13 | End: 2024-03-13

## 2024-03-13 RX ORDER — ONDANSETRON 4 MG/1
4 TABLET, ORALLY DISINTEGRATING ORAL EVERY 6 HOURS PRN
Qty: 30 TABLET | Refills: 0 | Status: SHIPPED | OUTPATIENT
Start: 2024-03-13

## 2024-03-13 RX ORDER — AMOXICILLIN AND CLAVULANATE POTASSIUM 875; 125 MG/1; MG/1
1 TABLET, FILM COATED ORAL EVERY 12 HOURS
Qty: 12 TABLET | Refills: 0 | Status: SHIPPED | OUTPATIENT
Start: 2024-03-13 | End: 2024-03-19

## 2024-03-13 RX ORDER — POLYETHYLENE GLYCOL 3350 17 G/17G
17 POWDER, FOR SOLUTION ORAL 2 TIMES DAILY PRN
Qty: 60 EACH | Refills: 0 | Status: SHIPPED | OUTPATIENT
Start: 2024-03-13 | End: 2024-03-19

## 2024-03-13 RX ADMIN — FLUTICASONE FUROATE AND VILANTEROL TRIFENATATE 1 PUFF: 100; 25 POWDER RESPIRATORY (INHALATION) at 08:03

## 2024-03-13 RX ADMIN — PANTOPRAZOLE SODIUM 40 MG: 40 TABLET, DELAYED RELEASE ORAL at 09:03

## 2024-03-13 RX ADMIN — HYDROCODONE BITARTRATE AND ACETAMINOPHEN 1 TABLET: 7.5; 325 TABLET ORAL at 03:03

## 2024-03-13 RX ADMIN — PIPERACILLIN SODIUM AND TAZOBACTAM SODIUM 4.5 G: 4; .5 INJECTION, POWDER, LYOPHILIZED, FOR SOLUTION INTRAVENOUS at 09:03

## 2024-03-13 RX ADMIN — DIPHENHYDRAMINE HYDROCHLORIDE 25 MG: 25 CAPSULE ORAL at 03:03

## 2024-03-13 RX ADMIN — OXYBUTYNIN CHLORIDE 15 MG: 5 TABLET, EXTENDED RELEASE ORAL at 09:03

## 2024-03-13 RX ADMIN — THERA TABS 1 TABLET: TAB at 09:03

## 2024-03-13 RX ADMIN — POLYETHYLENE GLYCOL 3350 17 G: 17 POWDER, FOR SOLUTION ORAL at 09:03

## 2024-03-13 RX ADMIN — MAGNESIUM HYDROXIDE 2400 MG: 400 SUSPENSION ORAL at 09:03

## 2024-03-13 RX ADMIN — ACYCLOVIR 400 MG: 400 TABLET ORAL at 09:03

## 2024-03-13 RX ADMIN — LEVOTHYROXINE SODIUM 50 MCG: 50 TABLET ORAL at 06:03

## 2024-03-13 RX ADMIN — Medication 1 TABLET: at 09:03

## 2024-03-13 RX ADMIN — DOCUSATE SODIUM 100 MG: 100 CAPSULE, LIQUID FILLED ORAL at 09:03

## 2024-03-13 RX ADMIN — PIPERACILLIN SODIUM AND TAZOBACTAM SODIUM 4.5 G: 4; .5 INJECTION, POWDER, LYOPHILIZED, FOR SOLUTION INTRAVENOUS at 02:03

## 2024-03-13 RX ADMIN — PREDNISONE 2 MG: 1 TABLET ORAL at 09:03

## 2024-03-13 RX ADMIN — FERROUS SULFATE TAB 325 MG (65 MG ELEMENTAL FE) 1 EACH: 325 (65 FE) TAB at 09:03

## 2024-03-13 RX ADMIN — CARVEDILOL 25 MG: 12.5 TABLET, FILM COATED ORAL at 09:03

## 2024-03-13 RX ADMIN — ATORVASTATIN CALCIUM 20 MG: 20 TABLET, FILM COATED ORAL at 09:03

## 2024-03-13 RX ADMIN — AZELASTINE 137 MCG: 1 SPRAY, METERED NASAL at 09:03

## 2024-03-13 NOTE — PT/OT/SLP PROGRESS
"Occupational Therapy   Treatment    Name: Niurka Pedraza  MRN: [id number]  Admitting Diagnosis:  Diverticulitis large intestine w/o perforation or abscess w/o bleeding       Recommendations:     Discharge Recommendations: Moderate Intensity Therapy  Discharge Equipment Recommendations:  bedside commode, hospital bed  Barriers to discharge:       Assessment:     Niurka Pedraza is a 79 y.o. female with a medical diagnosis of Diverticulitis large intestine w/o perforation or abscess w/o bleeding.  She presents with daughter present. Performance deficits affecting function are weakness, impaired endurance, impaired self care skills, impaired functional mobility, gait instability, impaired balance, decreased ROM, decreased coordination, impaired cardiopulmonary response to activity, pain, decreased lower extremity function.     Rehab Prognosis:  Good; patient would benefit from acute skilled OT services to address these deficits and reach maximum level of function.       Plan:     Patient to be seen 5 x/week to address the above listed problems via self-care/home management, therapeutic activities, therapeutic exercises  Plan of Care Expires:    Plan of Care Reviewed with: patient, daughter    Subjective     Chief Complaint: "I'm waiting for the doctors to tell me I can discharge"  Patient/Family Comments/goals: "I'm ready to go home"  Pain/Comfort:  Pain Rating 1: 0/10    Objective:     Communicated with: RN prior to session.  Patient found HOB elevated with peripheral IV upon OT entry to room.    General Precautions: Standard, fall    Orthopedic Precautions:N/A  Braces: N/A  Respiratory Status: Room air     Occupational Performance:     Bed Mobility:    Supine > Sit: SBA with log roll  Sit > Supine: CGA with BLE for log roll    Functional Mobility/Transfers:  Sit <> Stand: CGA  Functional Mobility: SBA, RW with occ. CGA   BSC Transfer: SBA     Activities of Daily Living:  Toileting: SBA for seated hygiene and clothing " management   Grooming: SBA, RW for hand washing       [facility] 6 Click ADL: 18    Treatment & Education:  OT role, plan of care, progression of goals, importance of continued OOB activity, ADL/functional transfer and mobility retraining, discharge recommendation, call don't fall, safety precautions, fall prevention.     Patient left HOB elevated with all lines intact, call button in reach, RN notified, and daughter present    GOALS:   Multidisciplinary Problems       Occupational Therapy Goals          Problem: Occupational Therapy    Goal Priority Disciplines Outcome Interventions   Occupational Therapy Goal     OT, PT/OT Ongoing, Progressing    Description: Goals to be met by: 3/20/2024     Patient will increase functional independence with ADLs by performing:    UE Dressing with Minimal Assistance.  LE Dressing with Moderate Assistance.    Grooming while standing at sink with Contact Guard Assistance. Met 3/12/2024  Toileting from toilet with Minimal Assistance for hygiene and clothing management. Met 3/12/2024  Toilet transfer to toilet with Minimal Assistance. Met 3/12/2024                         Time Tracking:     OT Date of Treatment: 03/13/24  OT Start Time: 1017  OT Stop Time: 1031  OT Total Time (min): 14 min    Billable Minutes:Self Care/Home Management 14 min    OT/MARGARET: MARGARET     Number of MARGARET visits since last OT visit: 4    3/13/2024

## 2024-03-13 NOTE — PLAN OF CARE
Pt remained free of falls and injuries. AAOx4. Pt calm, quiet and cooperative. Purposeful hourly rounding performed. Pt swallows meds whole. Managing c/o lower back pain with PRN Norco. Pt receiving IV Zosyn at this time. Pt ambulates with standby assist, pt calling for assist appropriately. Pt and daughter report pt had a large BM this shift. Very supportive daughter at bedside participating in care. VSS on RA. Pt resting in bed, denies needs at this time, NADN. Bed low and locked, call light in reach. Side rails up x2. Will continue to monitor.

## 2024-03-13 NOTE — PLAN OF CARE
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       Follow Up Appointments:  Future Appointments   Date Time Provider Department Center   3/18/2024  9:15 AM LAB, [facility] OHS DRAW STATION [facility] LAB [facility] at [facility]   3/19/2024 11:30 AM Savana Anderson MD [facility] IM [facility]   3/19/2024  1:40 PM Mohit Centeno MD [facility] BMT [place]   3/25/2024  9:15 AM LAB, [facility] OHS DRAW STATION [facility] LAB [facility] at [facility]   3/25/2024  9:30 AM CHAIR 22, [facility] INFUSION [facility] INF [facility] at [facility]   3/26/2024  9:30 AM CHAIR 16, [facility] INFUSION [facility] INF [facility] at [facility]   3/27/2024  9:30 AM CHAIR 13, [facility] INFUSION [facility] INF [facility] at [facility]   3/28/2024  9:30 AM CHAIR 14, OST[facility] INFUSION OST INF [facility] at [facility]   4/1/2024  9:25 AM LAB, [facility] OHS DRAW STATION [facility] LAB [facility] at [facility]    4/1/2024  9:30 AM CHAIR 16, OST[facility] INFUSION [facility] INF [facility] at [facility]   4/3/2024 11:20 AM Anaya Oropeza MD [facility] UROLOGY [facility]   4/8/2024  9:15 AM LAB, [facility] OHS DRAW STATION OSTH LAB [facility] at [facility]   4/15/2024  9:15 AM LAB, [facility] OHS DRAW STATION OST[facility] LAB [facility] at [facility]   4/19/2024  1:40 PM Mohit Centeno MD [facility] [place]   4/22/2024  9:15 AM LAB, [facility] OHS DRAW STATION OST[facility] LAB [facility] at [facility]   4/22/2024  9:30 AM CHAIR 15, OST[facility] INFUSION OST[facility] INF [facility] at [facility]   4/23/2024  9:30 AM CHAIR 10, OST[facility] INFUSION OST[facility] INF [facility] at [facility]   4/24/2024  9:30 AM CHAIR 10, OST[facility] INFUSION OST[facility] INF [facility] at [facility]   4/25/2024  9:30 AM CHAIR 10, OSTH INFUSION OST[facility] INF [facility] at [facility]   4/26/2024  9:30 AM CHAIR 15, OSTH INFUSION OST[facility] INF [facility] at [facility]   5/9/2024 12:00 PM LAB, [facility] LAB [place]   5/9/2024  1:00 PM Talita Barrios MD [facility] GYNONC OHS at [facility]   6/17/2024 10:30 AM Nemesio Cazares MD [facility] NLPUL MBP       Allergies:  Review of patient's allergies indicates:   Allergen Reactions    Ciprofloxacin Other (See Comments)     Right foot pain and edema, tendonitis    Amlodipine Edema and Swelling     BLE  BLE    Lisinopril Other (See Comments)     cough    Nitrofuran analogues        Medications: Review discharge medications with patient and family and provide education.      Current Discharge Medication List        START taking these medications    Details   amoxicillin-clavulanate 875-125mg (AUGMENTIN) 875-125 mg per tablet Take 1 tablet by mouth every 12 (twelve) hours. for 6 days  Qty: 12 tablet, Refills: 0      ondansetron (ZOFRAN-ODT) 4 MG TbDL Take 1 tablet (4 mg total) by mouth every 6 (six) hours as needed (nausea).  Qty: 30 tablet, Refills: 0      polyethylene glycol (GLYCOLAX) 17 gram PwPk Take 17 g by mouth 2 (two) times daily as needed for Constipation.  Qty: 60 each, Refills: 0           CONTINUE these medications which have NOT CHANGED    Details   acyclovir (ZOVIRAX) 400 MG tablet Take 1 tablet (400 mg  total) by mouth 2 (two) times daily.  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  Qty: 30 mL, Refills: 6      calcium-vitamin D3 (CALCIUM 500 + D) 500 mg(1,250mg) -200 unit per tablet Take 1 tablet by mouth 2 (two) times daily with meals.      HYDROcodone-acetaminophen (NORCO) 7.5-325 mg per tablet Take 1 tablet by mouth every 6 (six) hours as needed for Pain.  Qty: 24 tablet, Refills: 0    Comments: Quantity prescribed more than 7 day supply? No  Associated Diagnoses: Sciatica of right side; Myelodysplastic syndrome      levothyroxine (SYNTHROID) 50 MCG tablet Take 1 tablet (50 mcg total) by mouth before breakfast.  Qty: 90 tablet, Refills: 1    Associated Diagnoses: Hypothyroidism, unspecified type      mirtazapine (REMERON) 7.5 MG Tab Take 1 tablet (7.5 mg total) by mouth every evening.  Qty: 90 tablet, Refills: 3      albuterol-ipratropium (DUO-NEB) 2.5 mg-0.5 mg/3 mL nebulizer solution Take 3 mLs by nebulization every 6 (six) hours as needed for Wheezing or Shortness of Breath. Rescue  Qty: 75 mL, Refills: 11    Associated Diagnoses: SOB (shortness of breath); Hypoxemia; Pulmonary fibrosis      ascorbic acid/zinc (ZINC WITH VITAMIN C ORAL) Take 1 tablet by mouth Daily.      atorvastatin (LIPITOR) 20 MG tablet Take 1 tablet (20 mg total) by mouth once daily.  Qty: 90 tablet, Refills: 3      carvediloL (COREG) 25 MG tablet Take 1 tablet (25 mg total) by mouth 2 (two) times daily with meals.  Qty: 180 tablet, Refills: 1    Comments: .  Associated Diagnoses: Hypertension, unspecified type      ferrous gluconate (FERGON) 324 MG tablet Take 1 tablet (324 mg total) by mouth every other day.  Qty: 90 tablet, Refills: 3    Associated Diagnoses: Iron deficiency anemia, unspecified iron deficiency anemia type      fluticasone furoate-vilanteroL (BREO ELLIPTA)  100-25 mcg/dose diskus inhaler Inhale 1 puff into the lungs once daily. Controller.  PLEASE NOTE IT IS ONCE A DAY!!  Qty: 60 each, Refills: 4      multivitamin (THERAGRAN) per tablet Take 1 tablet by mouth once daily.      oxybutynin (DITROPAN XL) 15 MG TR24 Take 1 tablet (15 mg total) by mouth once daily.  Qty: 30 tablet, Refills: 11      pantoprazole (PROTONIX) 20 MG tablet Take 1 tablet (20 mg total) by mouth once daily.  Qty: 90 tablet, Refills: 1    Associated Diagnoses: Cough, unspecified type      posaconazole (NOXAFIL) 100 mg TbEC tablet Take 3 tablets (300 mg) by mouth twice daily on the first day, and then take 1 tablet by mouth once daily thereafter.  Qty: 30 tablet, Refills: 2    Associated Diagnoses: MDS (myelodysplastic syndrome)      potassium chloride (K-TAB) 20 mEq Take 1 tablet (20 mEq total) by mouth once daily.  Qty: 90 tablet, Refills: 3    Associated Diagnoses: Hypokalemia      predniSONE (DELTASONE) 1 MG tablet Take 2 tablets (2 mg total) by mouth once daily.  Qty: 180 tablet, Refills: 3      sodium chloride 3% 3 % nebulizer solution Take 4 mLs by nebulization as needed for Other.  Qty: 120 mL, Refills: 3      venetoclax (VENCLEXTA) 100 mg Tab Take one tablet (100 mg) by mouth daily on days 1-14 of each 28 day cycle of therapy..  Qty: 28 tablet, Refills: 0    Associated Diagnoses: MDS (myelodysplastic syndrome)           STOP taking these medications       irbesartan (AVAPRO) 300 MG tablet Comments:   Reason for Stopping:         meloxicam (MOBIC) 15 MG tablet Comments:   Reason for Stopping:         methocarbamoL (ROBAXIN) 500 MG Tab Comments:   Reason for Stopping:                 I have seen and examined this patient within the last 30 days. My clinical findings that support the need for the home health skilled services and home bound status are the following:no   Weakness/numbness causing balance and gait disturbance due to Infection and Weakness/Debility making it taxing to leave home.      Diet:   cardiac diet and soft diet    Referrals/ Consults  Physical Therapy to evaluate and treat. Evaluate for home safety and equipment needs; Establish/upgrade home exercise program. Perform / instruct on therapeutic exercises, gait training, transfer training, and Range of Motion.  Occupational Therapy to evaluate and treat. Evaluate home environment for safety and equipment needs. Perform/Instruct on transfers, ADL training, ROM, and therapeutic exercises.  Aide to provide assistance with personal care, ADLs, and vital signs.    Activities:   ambulate in house with assistance    Nursing:   Agency to admit patient within 24 hours of hospital discharge unless specified on physician order or at patient request    SN to complete comprehensive assessment including routine vital signs. Instruct on disease process and s/s of complications to report to MD. Review/verify medication list sent home with the patient at time of discharge  and instruct patient/caregiver as needed. Frequency may be adjusted depending on start of care date.     Skilled nurse to perform up to 3 visits PRN for symptoms related to diagnosis    Notify MD if SBP > 160 or < 90; DBP > 90 or < 50; HR > 120 or < 50; Temp > 101; O2 < 88%; Other:       Ok to schedule additional visits based on staff availability and patient request on consecutive days within the home health episode.    When multiple disciplines ordered:    Start of Care occurs on Sunday - Wednesday schedule remaining discipline evaluations as ordered on separate consecutive days following the start of care.    Thursday SOC -schedule subsequent evaluations Friday and Monday the following week.     Friday - Saturday SOC - schedule subsequent discipline evaluations on consecutive days starting Monday of the following week.    For all post-discharge communication and subsequent orders please contact patient's primary care physician.     Home Health Aide:  Nursing Weekly, Physical Therapy  Three times weekly, Occupational Therapy Three times weekly, and Home Health Aide Weekly    I certify that this patient is confined to her home and needs intermittent skilled nursing care, physical therapy, and occupational therapy.

## 2024-03-13 NOTE — PLAN OF CARE
CM met with pt for final discharge planning assessment. Pt will discharge home today.    Pt is current with [facility], [facility] is willing to take pt [facility].    Family to provide transportation home.    Follow-up apts are scheduled and in AVS.    Pt's daughter signed the Pt choice form and selected [facility] as provider.    I certify I provided patient choice and a list to the patient/family of [facility].  Patient/Family signed Patient's Choice Disclosure Form choosing the following  1.[facility]    Meds sent to pt's outside retail Pharmacy in [place].    Pt is ready for discharge from CM perspective.[facility] ([place])  Discharge Final Note    Primary Care Provider: Savana Anderson MD    Expected Discharge Date: 3/13/2024    Final Discharge Note (most recent)       Final Note - 03/13/24 1017          Final Note    Assessment Type Final Discharge Note (P)      Anticipated Discharge Disposition Home-Health Care Svc (P)      Hospital Resources/Appts/Education Provided Provided patient/caregiver with written discharge plan information;Provided education on problems/symptoms using teachback;Appointments scheduled and added to AVS (P)         Post-Acute Status    Post-Acute Authorization Home Health (P)      Home Health Status Set-up Complete/Auth obtained (P)      Patient choice form signed by patient/caregiver List with quality metrics by geographic area provided;List from CMS Compare;List from System Post-Acute Care (P)      Discharge Delays None known at this time (P)                      Important Message from Medicare  Important Message from Medicare regarding Discharge Appeal Rights: Given to patient/caregiver, Explained to patient/caregiver, Signed/date by patient/caregiver     Date IMM was signed: 03/12/24  Time IMM was signed: 0844 03/13/24 1017   Final Note   Assessment Type Final Discharge Note   Anticipated Discharge Disposition Home-Health   Hospital Resources/Appts/Education Provided  Provided patient/caregiver with written discharge plan information;Provided education on problems/symptoms using teachback;Appointments scheduled and added to AVS   Post-Acute Status   Post-Acute Authorization Home Health   Home Health Status Set-up Complete/Auth obtained   Patient choice form signed by patient/caregiver List with quality metrics by geographic area provided;List from CMS Compare;List from System Post-Acute Care   Discharge Delays None known at this time

## 2024-03-13 NOTE — PLAN OF CARE
Problem: Occupational Therapy  Goal: Occupational Therapy Goal  Description: Goals to be met by: 3/20/2024     Patient will increase functional independence with ADLs by performing:    UE Dressing with Minimal Assistance.  LE Dressing with Moderate Assistance.    Grooming while standing at sink with Contact Guard Assistance. Met 3/12/2024  Toileting from toilet with Minimal Assistance for hygiene and clothing management. Met 3/12/2024  Toilet transfer to toilet with Minimal Assistance. Met 3/12/2024    Outcome: Ongoing, Progressing

## 2024-03-13 NOTE — DISCHARGE INSTRUCTIONS
Take all medications as prescribed.  Eat a strict low salt cardiac diet.  Follow up with your physicians as scheduled - pcp within 1 week, general surgeon within 2 weeks, your hematologist as they recommend.  We placed referrals to gastroenterology and orthopedic surgery.  Thank you for trusting [facility] and Dr. Porras with your care.  We are honored that you entrusted us with your healthcare needs. Your satisfaction is very important to us and we hope you have been very pleased with your experience at [facility]. After your discharge you may receive a survey asking you to rate your hospital experience. We encourage you to take the time to complete the survey as your feedback allows us to identify areas for improvement as well as recognize our staff.   We hope that you have received the very best care possible during your hospitalization at [facility], as your satisfaction is our top priority.

## 2024-03-13 NOTE — PLAN OF CARE
Patient educated on discharge instructions and verbalized understanding.  All questions answered to patient's satisfaction.  IV removed without complication. Patient to be transported off unit via wheelchair.    Problem: Adult Inpatient Plan of Care  Goal: Plan of Care Review  Outcome: Met  Goal: Patient-Specific Goal (Individualized)  Outcome: Met  Goal: Absence of Hospital-Acquired Illness or Injury  Outcome: Met  Goal: Optimal Comfort and Wellbeing  Outcome: Met  Goal: Readiness for Transition of Care  Outcome: Met     Problem: Infection  Goal: Absence of Infection Signs and Symptoms  Outcome: Met     Problem: Fall Injury Risk  Goal: Absence of Fall and Fall-Related Injury  Outcome: Met     Problem: Pain Acute  Goal: Acceptable Pain Control and Functional Ability  Outcome: Met     Problem: Skin Injury Risk Increased  Goal: Skin Health and Integrity  Outcome: Met

## 2024-03-14 ENCOUNTER — PATIENT MESSAGE (OUTPATIENT)
Dept: ADMINISTRATIVE | Facility: OTHER | Age: 79
End: 2024-03-14
Payer: MEDICARE

## 2024-03-14 ENCOUNTER — TELEPHONE (OUTPATIENT)
Dept: ORTHOPEDICS | Facility: CLINIC | Age: 79
End: 2024-03-14
Payer: MEDICARE

## 2024-03-15 ENCOUNTER — TELEPHONE (OUTPATIENT)
Dept: SPINE | Facility: CLINIC | Age: 79
End: 2024-03-15
Payer: MEDICARE

## 2024-03-15 ENCOUNTER — TELEPHONE (OUTPATIENT)
Dept: INTERNAL MEDICINE | Facility: CLINIC | Age: 79
End: 2024-03-15
Payer: MEDICARE

## 2024-03-15 ENCOUNTER — TELEPHONE (OUTPATIENT)
Dept: ORTHOPEDICS | Facility: CLINIC | Age: 79
End: 2024-03-15
Payer: MEDICARE

## 2024-03-15 DIAGNOSIS — M54.30 SCIATICA, UNSPECIFIED LATERALITY: Primary | ICD-10-CM

## 2024-03-15 RX ORDER — TRAMADOL HYDROCHLORIDE 50 MG/1
50 TABLET ORAL EVERY 6 HOURS PRN
Qty: 21 EACH | Refills: 0 | Status: SHIPPED | OUTPATIENT
Start: 2024-03-15 | End: 2024-04-01 | Stop reason: SDUPTHER

## 2024-03-15 NOTE — TELEPHONE ENCOUNTER
----- Message from Libertadericka Xavier sent at 3/15/2024  1:46 PM CDT -----  Regarding: Call Back  Name of Who is Calling:  Cathy CARRIZALES [facility]          What is the request in detail:  Cathy would like to speak with Dr. Anderson about PT for the patient and also Rx for Tramadol and have it sent to [facility]/pharmacy #[id number] - [place], [place] - [address]            Can the clinic reply by [facility]: No            What Number to Call Back if not in [facility]:[phone]

## 2024-03-15 NOTE — TELEPHONE ENCOUNTER
I called patient to confirm the appointment of what she is coming in for. She did not answer I left a voicemail.

## 2024-03-15 NOTE — TELEPHONE ENCOUNTER
Spoke with Fartun, she just got pt back from hospital. She saw her today. They are wondering if they can have request for PT. She was chatting to pt about pain and she is in pain all the time - 6/10 and mainly her back. Pt doesn't like to take norco because it knocks her out. She does take it at night to get sleep. They were wondering if something lighter can be sent in. Lidocaine patches don't work for her pain.

## 2024-03-15 NOTE — TELEPHONE ENCOUNTER
Sent in tramadol   Is she receiving Home health? If yes then ok to give verbal for home PT   If not then please let me know and I can sign order for HH with home PT/OT   She is still due for MRI lumbar spine - recommend thst this be scheduled when is able to complete

## 2024-03-15 NOTE — TELEPHONE ENCOUNTER
No care due was identified.  [facility] Embedded Care Due Messages. Reference number: [id number].   3/15/2024 2:36:42 PM CDT

## 2024-03-17 NOTE — DISCHARGE SUMMARY
[facility] Medicine  Discharge Summary      Patient Name: Niurka Pedraza  MRN: [id number]  CARLEEN: [id number]  Patient Class: IP- Inpatient  Admission Date: 3/5/2024  Hospital Length of Stay: 8 days  Discharge Date and Time: 3/13/2024  2:58 PM  Attending Physician: No att. providers found   Discharging Provider: Chandra Porras MD  Primary Care Provider: Savana Anderson MD    Primary Care Team: Networked reference to record PCT     HPI:   Niurka Pedraza is a 78F with myelodysplastic syndrome with excess blasts-2 on chronic steroids, ILD/pulmonary fibrosis, hypertension, MDRO UTIs, pulmonary coccidioidomycosis and hypothyroidism who presents with her daughter for increasing weakness and lethargy over the past week. She used to be able to walk with a cane or walker, but now has felt so weak she struggles to walk short distances. Cannot recall her last bowel movement, usually takes dulcolax at home. She reports some lower abdominal discomfort, bloating, burning with urination. Her last chemo infusion was on Friday, four days ago. Denies chest pain, shortness of breath, nausea, vomiting, diarrhea. Daughter Luz at bedside notes she has not had much of an appetite    Of note she was recently hospitalized at [facility] for COVID/pneumonia, UTI and discharged with Meropenem IV to complete treatment for MSSA pneumonia and recurrent UTI. Treatment completed 2/16/2024 but patient reports residual productive cough and dysuria. Patchy confluent opacity left lower lobe, new concerning for aspiration or pneumonia.     In ER she's afebrile without leukocytosis, CBC shows thrombocytopenia consistent with baseline; CMP with elevated T. Bili otherwise LFTs WNL and Cr at baseline. UA unremarkable; CXR: Nonspecific patchy opacities at the lung bases could relate to atelectasis, aspiration or pneumonia. CT AP with possible acute diverticulitis, underlying malignancy not excluded.  There is a large amount of  retained stool throughout the colon and FREE AIR  concerning for perforation. Patchy confluent opacity left lower lobe, new concerning for aspiration or pneumonia. Abnormal severe compression fracture deformity of L2 and the moderate compression fracture deformity of L1, new from the prior study. ; Surgery consulted recommend NPO and IV abx    Goals of Care Treatment Preferences:  Code Status: Full Code    Living Will: Yes              Consults:   Consults (From admission, onward)          Status Ordering Provider     Inpatient consult to Hematology/Oncology  Once        Provider:  (Not yet assigned)    Completed [facility] [facility] Course By Problem:   * Diverticulitis large intestine w/o perforation or abscess w/o bleeding  -Admitted to inpatient status  -Presented with with lethargy, abdominal bloating and pain without fever or leukocytosis  -CT AP showed possible acute diverticulitis, underlying malignancy not excluded.  There is a large amount of retained stool throughout the colon and FREE AIR concerning for perforation  -Surgery consulted and input appreciated.  Thus far no indications for surgical treatment  -Treated with IV zosyn, initially bowel rest and serial abdominal exams  -Stay prolonged awaiting return of bowel function.  Bowel regimen added and had large bowel movement.  -Medically stable for discharge.  Complete course of antibiotics with augmentin, ordered HH at discharge.  Follow up with pcp within 1 week and general surgery     MDS/AML  Moderate Leukopenia  Thrombocytopenia  Anemia  -Does not believe she takes posaconazole anymore, but does take venetoclax days 1-14 of each 28 day cycle of therapy as well as prednisone 2mg daily and acyclovir as prevention   -Heme/onc consulted and input appreciated  -Hold venetoclax while inpatient.   -Required 1U PRBC  3/7 and 3/8.   -Hb improved and up to 8.0 at discharge  -Follow up with heme/onc in clinic     Thrombocytopenia  -Treatment as  "above.     Compression fracture of L2  -Incidental finding on CT AP "abnormal severe compression fracture deformity of L2 and the moderate compression fracture deformity of L1, new from the prior study.  No apparent resulting severe canal stenosis"  -No complaints of pain today  -PT/OT consulted and ordered home health at discharge as well as RIVER at home  -Placed outpatient referral to orthopedic surgery.     Interstitial lung disease  -History noted  -Breathing comfortably  -Continue fluticasone vilanterol daily      Pulmonary fibrosis  -Continue home steroids  -Breathing comfortably     Immunosuppression due to chronic steroid use  -Noted     Opacity noted on imaging study  -History of COVID/pneumonia that required admission at [facility] about a month ago  -CT AP this admission Patchy confluent opacity left lower lobe, new concerning for aspiration or pneumonia.   -No evidence of aspiration     Debility  -PT/OT consulted and recommended SNF.  However family wish to pursue care at home with home health and a walker.     Acquired hypothyroidism  -Continue synthroid     Essential hypertension  -BP generally well controlled with occasional mild elevations  -Continue coreg.  Did not resume irbesartan at discharge    Final Active Diagnoses:    Diagnosis Date Noted POA    PRINCIPAL PROBLEM:  Diverticulitis large intestine w/o perforation or abscess w/o bleeding [K57.32] 03/05/2024 Yes    MDS/AML [D46.9] 09/13/2023 Yes     Chronic    Thrombocytopenia [D69.6] 06/14/2023 Yes     Chronic    Compression fracture of L2 [S32.020A] 03/05/2024 Yes    Interstitial lung disease [J84.9] 11/10/2020 Yes    Pulmonary fibrosis [J84.10] 12/13/2021 Yes     Chronic    Immunosuppression due to chronic steroid use [D84.821, T38.0X5A, Z79.52] 03/16/2022 Not Applicable    Opacity noted on imaging study [R93.89] 03/05/2024 Yes    Debility [R53.81] 03/12/2024 Yes    Essential hypertension [I10] 11/22/2017 Yes     Chronic    Acquired hypothyroidism " "[E03.9] 11/22/2017 Yes      Problems Resolved During this Admission:       Discharged Condition: stable    Disposition: Home-Health Care [facility]    Follow Up:   Follow-up Information       [facility] Follow up.    Specialties: Home Health Services, Home Therapy Services, Home Living Aide Services  Contact information:  [address], [address]  [facility] [address]  [phone]                         Patient Instructions:      HOSPITAL BED FOR HOME USE     Order Specific Question Answer Comments   Type: Electric    Length of need (1-99 months): 99    Does patient have medical equipment at home? walker, rolling    Does patient have medical equipment at home? wheelchair    Does patient have medical equipment at home? raised toilet    Does patient have medical equipment at home? cane, straight    Height: 5' 6" (1.676 m)    Weight: 63.4 kg (139 lb 12.4 oz)    Please check all that apply: Patient requires the head of bed to be elevated more than 30 degrees most of the time due to congestive heart failure, chronic pulmonary disease, or aspiration.  Pillows and wedges have been considered and ruled out.    Please check all that apply: Patient requires, for the alleviation of pain, positioning of the body in ways not feasible in an ordinary bed.      COMMODE FOR HOME USE     Order Specific Question Answer Comments   Type: Standard    Height: 5' 6" (1.676 m)    Weight: 63.4 kg (139 lb 12.4 oz)    Does patient have medical equipment at home? walker, rolling    Does patient have medical equipment at home? wheelchair    Does patient have medical equipment at home? raised toilet    Does patient have medical equipment at home? cane, straight    Length of need (1-99 months): 99      Ambulatory referral/consult to Gastroenterology   Standing Status: Future   Referral Priority: Routine Referral Type: Consultation   Referral Reason: Specialty Services Required   Requested Specialty: Gastroenterology   Number of " "Visits Requested: 1     Ambulatory referral/consult to Orthopedics   Standing Status: Future   Referral Priority: Routine Referral Type: Consultation   Requested Specialty: Orthopedic Surgery   Number of Visits Requested: 1     Ambulatory referral/consult to [facility]   Standing Status: Future   Referral Priority: Routine Referral Type: Consultation   Referral Reason: Specialty Services Required   Number of Visits Requested: 1     Diet Cardiac     Notify your health care provider if you experience any of the following:  increased confusion or weakness     Notify your health care provider if you experience any of the following:  persistent dizziness, light-headedness, or visual disturbances     Notify your health care provider if you experience any of the following:  worsening rash     Notify your health care provider if you experience any of the following:  severe persistent headache     Notify your health care provider if you experience any of the following:  difficulty breathing or increased cough     Notify your health care provider if you experience any of the following:  severe uncontrolled pain     Notify your health care provider if you experience any of the following:  persistent nausea and vomiting or diarrhea     Notify your health care provider if you experience any of the following:  temperature >100.4     Activity as tolerated       Significant Diagnostic Studies: Labs: BMP: No results for input(s): "GLU", "NA", "K", "CL", "CO2", "BUN", "CREATININE", "CALCIUM", "MG" in the last 48 hours., CMP No results for input(s): "NA", "K", "CL", "CO2", "GLU", "BUN", "CREATININE", "CALCIUM", "PROT", "ALBUMIN", "BILITOT", "ALKPHOS", "AST", "ALT", "ANIONGAP", "ESTGFRAFRICA", "EGFRNONAA" in the last 48 hours., CBC No results for input(s): "WBC", "HGB", "HCT", "PLT" in the last 48 hours., INR   Lab Results   Component Value Date    INR 1.1 08/29/2023    INR 1.0 10/25/2021   , Lipid Panel   Lab Results " "  Component Value Date    CHOL 131 06/22/2023    HDL 43 06/22/2023    LDLCALC 64.4 06/22/2023    TRIG 118 06/22/2023    CHOLHDL 32.8 06/22/2023   , Troponin No results for input(s): "TROPONINI" in the last 168 hours., and A1C: No results for input(s): "HGBA1C" in the last 4320 hours.    Pending Diagnostic Studies:       None           Medications:  Reconciled Home Medications:      Medication List        START taking these medications      amoxicillin-clavulanate 875-125mg 875-125 mg per tablet  Commonly known as: AUGMENTIN  Take 1 tablet by mouth every 12 (twelve) hours. for 6 days     ondansetron 4 MG Tbdl  Commonly known as: ZOFRAN-ODT  Take 1 tablet (4 mg total) by mouth every 6 (six) hours as needed (nausea).     polyethylene glycol 17 gram Pwpk  Commonly known as: GLYCOLAX  Take 17 g by mouth 2 (two) times daily as needed for Constipation.            CONTINUE taking these medications      acyclovir 400 MG tablet  Commonly known as: ZOVIRAX  Take 1 tablet (400 mg total) by mouth 2 (two) times daily.     albuterol-ipratropium 2.5 mg-0.5 mg/3 mL nebulizer solution  Commonly known as: DUO-NEB  Take 3 mLs by nebulization every 6 (six) hours as needed for Wheezing or Shortness of Breath. Rescue     atorvastatin 20 MG tablet  Commonly known as: LIPITOR  Take 1 tablet (20 mg total) by mouth once daily.     azelastine 137 mcg (0.1 %) nasal spray  Commonly known as: ASTELIN  1 spray (137 mcg total) by Nasal route 2 (two) times daily.     calcium-vitamin D3 500 mg-5 mcg (200 unit) per tablet  Commonly known as: CALCIUM 500 + D  Take 1 tablet by mouth 2 (two) times daily with meals.     carvediloL 25 MG tablet  Commonly known as: COREG  Take 1 tablet (25 mg total) by mouth 2 (two) times daily with meals.     ferrous gluconate 324 MG tablet  Commonly known as: FERGON  Take 1 tablet (324 mg total) by mouth every other day.     fluticasone furoate-vilanteroL 100-25 mcg/dose diskus inhaler  Commonly known as: BREO " ELLIPTA  Inhale 1 puff into the lungs once daily. Controller.  PLEASE NOTE IT IS ONCE A DAY!!     levothyroxine 50 MCG tablet  Commonly known as: SYNTHROID  Take 1 tablet (50 mcg total) by mouth before breakfast.     mirtazapine 7.5 MG Tab  Commonly known as: REMERON  Take 1 tablet (7.5 mg total) by mouth every evening.     multivitamin per tablet  Commonly known as: THERAGRAN  Take 1 tablet by mouth once daily.     oxybutynin 15 MG Tr24  Commonly known as: DITROPAN XL  Take 1 tablet (15 mg total) by mouth once daily.     pantoprazole 20 MG tablet  Commonly known as: PROTONIX  Take 1 tablet (20 mg total) by mouth once daily.     potassium chloride 20 mEq  Commonly known as: K-TAB  Take 1 tablet (20 mEq total) by mouth once daily.     predniSONE 1 MG tablet  Commonly known as: DELTASONE  Take 2 tablets (2 mg total) by mouth once daily.     sodium chloride 3% 3 % nebulizer solution  Take 4 mLs by nebulization as needed for Other.     venetoclax 100 mg Tab  Commonly known as: VENCLEXTA  Take one tablet (100 mg) by mouth daily on days 1-14 of each 28 day cycle of therapy..     ZINC WITH VITAMIN C ORAL  Take 1 tablet by mouth Daily.            STOP taking these medications      irbesartan 300 MG tablet  Commonly known as: AVAPRO     meloxicam 15 MG tablet  Commonly known as: MOBIC     methocarbamoL 500 MG Tab  Commonly known as: ROBAXIN            ASK your doctor about these medications      posaconazole 100 mg Tbec tablet  Commonly known as: NOXAFIL  Take 3 tablets (300 mg) by mouth twice daily on the first day, and then take 1 tablet by mouth once daily thereafter.              Indwelling Lines/Drains at time of discharge:   Lines/Drains/Airways       None                   Time spent on the discharge of patient: 35 minutes         Chandra Porras MD  Department of Hospital Medicine  [facility] ([place])

## 2024-03-18 ENCOUNTER — LAB VISIT (OUTPATIENT)
Dept: LAB | Facility: HOSPITAL | Age: 79
End: 2024-03-18
Attending: INTERNAL MEDICINE
Payer: MEDICARE

## 2024-03-18 DIAGNOSIS — D46.9 MDS (MYELODYSPLASTIC SYNDROME): Chronic | ICD-10-CM

## 2024-03-18 LAB
ALBUMIN SERPL BCP-MCNC: 2.7 G/DL (ref 3.5–5.2)
ALP SERPL-CCNC: 127 U/L (ref 55–135)
ALT SERPL W/O P-5'-P-CCNC: 17 U/L (ref 10–44)
ANION GAP SERPL CALC-SCNC: 9 MMOL/L (ref 8–16)
ANISOCYTOSIS BLD QL SMEAR: SLIGHT
AST SERPL-CCNC: 14 U/L (ref 10–40)
BASO STIPL BLD QL SMEAR: ABNORMAL
BASOPHILS # BLD AUTO: 0.02 K/UL (ref 0–0.2)
BASOPHILS NFR BLD: 1.1 % (ref 0–1.9)
BILIRUB SERPL-MCNC: 0.5 MG/DL (ref 0.1–1)
BUN SERPL-MCNC: 16 MG/DL (ref 8–23)
CALCIUM SERPL-MCNC: 9 MG/DL (ref 8.7–10.5)
CHLORIDE SERPL-SCNC: 100 MMOL/L (ref 95–110)
CO2 SERPL-SCNC: 29 MMOL/L (ref 23–29)
CREAT SERPL-MCNC: 0.8 MG/DL (ref 0.5–1.4)
DACRYOCYTES BLD QL SMEAR: ABNORMAL
DIFFERENTIAL METHOD BLD: ABNORMAL
EOSINOPHIL # BLD AUTO: 0 K/UL (ref 0–0.5)
EOSINOPHIL NFR BLD: 0 % (ref 0–8)
ERYTHROCYTE [DISTWIDTH] IN BLOOD BY AUTOMATED COUNT: 19.9 % (ref 11.5–14.5)
EST. GFR  (NO RACE VARIABLE): >60 ML/MIN/1.73 M^2
GLUCOSE SERPL-MCNC: 121 MG/DL (ref 70–110)
HCT VFR BLD AUTO: 29.6 % (ref 37–48.5)
HGB BLD-MCNC: 9.6 G/DL (ref 12–16)
HYPOCHROMIA BLD QL SMEAR: ABNORMAL
IMM GRANULOCYTES # BLD AUTO: 0.03 K/UL (ref 0–0.04)
IMM GRANULOCYTES NFR BLD AUTO: 1.6 % (ref 0–0.5)
LDH SERPL L TO P-CCNC: 294 U/L (ref 110–260)
LYMPHOCYTES # BLD AUTO: 0.9 K/UL (ref 1–4.8)
LYMPHOCYTES NFR BLD: 50.5 % (ref 18–48)
MAGNESIUM SERPL-MCNC: 1.8 MG/DL (ref 1.6–2.6)
MCH RBC QN AUTO: 28 PG (ref 27–31)
MCHC RBC AUTO-ENTMCNC: 32.4 G/DL (ref 32–36)
MCV RBC AUTO: 86 FL (ref 82–98)
MONOCYTES # BLD AUTO: 0.1 K/UL (ref 0.3–1)
MONOCYTES NFR BLD: 3.2 % (ref 4–15)
NEUTROPHILS # BLD AUTO: 0.8 K/UL (ref 1.8–7.7)
NEUTROPHILS NFR BLD: 43.6 % (ref 38–73)
NRBC BLD-RTO: 0 /100 WBC
OVALOCYTES BLD QL SMEAR: ABNORMAL
PHOSPHATE SERPL-MCNC: 2.9 MG/DL (ref 2.7–4.5)
PLATELET # BLD AUTO: 111 K/UL (ref 150–450)
PLATELET BLD QL SMEAR: ABNORMAL
PMV BLD AUTO: 9.1 FL (ref 9.2–12.9)
POIKILOCYTOSIS BLD QL SMEAR: SLIGHT
POLYCHROMASIA BLD QL SMEAR: ABNORMAL
POTASSIUM SERPL-SCNC: 4.2 MMOL/L (ref 3.5–5.1)
PROT SERPL-MCNC: 6.5 G/DL (ref 6–8.4)
RBC # BLD AUTO: 3.43 M/UL (ref 4–5.4)
SODIUM SERPL-SCNC: 138 MMOL/L (ref 136–145)
URATE SERPL-MCNC: 4.7 MG/DL (ref 2.4–5.7)
WBC # BLD AUTO: 1.86 K/UL (ref 3.9–12.7)

## 2024-03-18 PROCEDURE — 80053 COMPREHEN METABOLIC PANEL: CPT | Mod: PN | Performed by: INTERNAL MEDICINE

## 2024-03-18 PROCEDURE — 85025 COMPLETE CBC W/AUTO DIFF WBC: CPT | Mod: PN | Performed by: INTERNAL MEDICINE

## 2024-03-18 PROCEDURE — 83615 LACTATE (LD) (LDH) ENZYME: CPT | Mod: PN | Performed by: INTERNAL MEDICINE

## 2024-03-18 PROCEDURE — 84550 ASSAY OF BLOOD/URIC ACID: CPT | Mod: PN | Performed by: INTERNAL MEDICINE

## 2024-03-18 PROCEDURE — 84100 ASSAY OF PHOSPHORUS: CPT | Mod: PN | Performed by: INTERNAL MEDICINE

## 2024-03-18 PROCEDURE — 83735 ASSAY OF MAGNESIUM: CPT | Mod: PN | Performed by: INTERNAL MEDICINE

## 2024-03-19 ENCOUNTER — OFFICE VISIT (OUTPATIENT)
Dept: HEMATOLOGY/ONCOLOGY | Facility: CLINIC | Age: 79
End: 2024-03-19
Payer: MEDICARE

## 2024-03-19 ENCOUNTER — OFFICE VISIT (OUTPATIENT)
Dept: ORTHOPEDICS | Facility: CLINIC | Age: 79
End: 2024-03-19
Payer: MEDICARE

## 2024-03-19 VITALS
OXYGEN SATURATION: 95 % | HEIGHT: 66 IN | HEART RATE: 74 BPM | TEMPERATURE: 98 F | BODY MASS INDEX: 21.62 KG/M2 | WEIGHT: 134.5 LBS | SYSTOLIC BLOOD PRESSURE: 128 MMHG | DIASTOLIC BLOOD PRESSURE: 60 MMHG

## 2024-03-19 VITALS — WEIGHT: 134.38 LBS | BODY MASS INDEX: 22.94 KG/M2 | HEIGHT: 64 IN

## 2024-03-19 DIAGNOSIS — D46.9 MDS (MYELODYSPLASTIC SYNDROME): Primary | ICD-10-CM

## 2024-03-19 DIAGNOSIS — R26.89 BALANCE PROBLEM: ICD-10-CM

## 2024-03-19 DIAGNOSIS — G95.9 CERVICAL MYELOPATHY: ICD-10-CM

## 2024-03-19 DIAGNOSIS — D61.818 PANCYTOPENIA: ICD-10-CM

## 2024-03-19 DIAGNOSIS — M54.16 LUMBAR RADICULOPATHY: Primary | ICD-10-CM

## 2024-03-19 PROCEDURE — 99999 PR PBB SHADOW E&M-EST. PATIENT-LVL IV: CPT | Mod: PBBFAC,,, | Performed by: PHYSICIAN ASSISTANT

## 2024-03-19 PROCEDURE — 99999 PR PBB SHADOW E&M-EST. PATIENT-LVL IV: CPT | Mod: PBBFAC,,, | Performed by: INTERNAL MEDICINE

## 2024-03-19 PROCEDURE — 99214 OFFICE O/P EST MOD 30 MIN: CPT | Mod: PBBFAC | Performed by: PHYSICIAN ASSISTANT

## 2024-03-19 PROCEDURE — 99215 OFFICE O/P EST HI 40 MIN: CPT | Mod: S$PBB,,, | Performed by: INTERNAL MEDICINE

## 2024-03-19 PROCEDURE — 99214 OFFICE O/P EST MOD 30 MIN: CPT | Mod: PBBFAC,27 | Performed by: INTERNAL MEDICINE

## 2024-03-19 PROCEDURE — 99204 OFFICE O/P NEW MOD 45 MIN: CPT | Mod: S$PBB,,, | Performed by: PHYSICIAN ASSISTANT

## 2024-03-19 NOTE — PROGRESS NOTES
DATE: 3/19/2024  PATIENT: Niurka Pedraza    Supervising Physician: Alexy Blue M.D.    CHIEF COMPLAINT: Lower back pain and balance problems    HISTORY:  Niurka Pedraza is a 79 y.o. female with history of neuropathy here for initial evaluation of constant 7/10 back pain.The pain has been present for 2 months.  The pain is worse with movement and improved by Norco, tramadol and resting. There is  associated numbness and tingling to bilateral lower extremities. Pt reports that the numbness and tingling to legs has been occurring before the back pain. There is subjective weakness to lower extremities.  Pt states that she has been sick with PNA for the past two months and has been hospitalized twice for two weeks at a time for the issue. On 3/5/24, a CT of the abdomen and pelvis was done which showed compression fracture of L2 and L1. Pt states URI is improving with treatment and denies any fever or chills at this time.    The patient reports myelopathic symptoms such as handwriting changes. She denies difficulty with buttons/coins/keys. She does report difficulty with balance.  She uses a walker at home.  She says these symptoms have worsened over the last 6 months since she started chemotherapy.  Denies perineal paresthesias, bowel/bladder dysfunction.    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    Breast cancer     mastectomy 2014    Coccidiomycosis, progressive     Elevated CA-125 02/25/2018    Hyperlipidemia     Hypertension     Liver disease     OAB (overactive bladder)     Osteopenia     on Dexa 11/2017    Osteoporosis     pt reports hx of this, declined fosamax in past - treated with calcium and vit D    Pelvic mass 02/25/2018    Pulmonary fibrosis     Pulmonary nodules     SOB (shortness of breath) on exertion     Thyroid disease     hypo    Urinary tract infection due to extended-spectrum beta lactamase  (ESBL) producing Escherichia coli 03/16/2022    UTI (urinary tract infection)      Past Surgical History:   Procedure Laterality Date    CHOLECYSTECTOMY      COLONOSCOPY N/A 12/09/2022    Procedure: COLONOSCOPY;  Surgeon: Enrique Dominguez MD;  Location: [facility] (4TH FLR);  Service: Endoscopy;  Laterality: N/A;  inst via portal  pt requests after 12/9/22-MS  11/30-pt notified of earlier arrival time-KPvt    CYSTOSCOPY WITH BIOPSY OF BLADDER Bilateral 07/27/2022    Procedure: CYSTOSCOPY, WITH BLADDER BIOPSY; retrograde pyelogram,;  Surgeon: Anaya Oropeza MD;  Location: [facility];  Service: Urology;  Laterality: Bilateral;    ENDOSCOPIC ULTRASOUND OF UPPER GASTROINTESTINAL TRACT N/A 04/08/2021    Procedure: ULTRASOUND, UPPER GI TRACT, ENDOSCOPIC;  Surgeon: Aisha Barajas MD;  Location: [facility] (2ND FLR);  Service: Endoscopy;  Laterality: N/A;  UEUS/ERCP evaluation abn MRCP - Dr Angelika Steinerid-19 test 4/5/21 at [facility] Pre-admit - pg    ERCP N/A 04/08/2021    Procedure: ERCP (ENDOSCOPIC RETROGRADE CHOLANGIOPANCREATOGRAPHY);  Surgeon: Aisha Barajas MD;  Location: [facility] (2ND FLR);  Service: Endoscopy;  Laterality: N/A;  UEUS/ERCP evaluation abn MRCP - Dr Angelika Zamudio-19 test 4/5/21 at [facility] Pre-admit - pg    ESOPHAGOGASTRODUODENOSCOPY N/A 04/08/2021    Procedure: EGD (ESOPHAGOGASTRODUODENOSCOPY);  Surgeon: Aisha Barajas MD;  Location: [facility] (2ND FLR);  Service: Endoscopy;  Laterality: N/A;  UEUS/ERCP evaluation abn MRCP - Dr Angelika Zamudio-19 test 4/5/21 at [facility] Pre-admit - pg    ESOPHAGOGASTRODUODENOSCOPY N/A 06/02/2023    Procedure: EGD (ESOPHAGOGASTRODUODENOSCOPY);  Surgeon: Emeka Carney MD;  Location: [facility] (4TH FLR);  Service: Endoscopy;  Laterality: N/A;  She has  history of seromucinous borderline tumor of the ovary. We generally follow  and CEA tumor markers. Her CEA recently became slightly elevated. CT imaging negative and colonoscopy negative.      Talita Barrios    instructions portal-LW  pre    HYSTERECTOMY      MASTECTOMY Right     2014       Medications:  Current Outpatient Medications on File Prior to Visit   Medication Sig Dispense Refill    acyclovir (ZOVIRAX) 400 MG tablet Take 1 tablet (400 mg total) by mouth 2 (two) times daily. 60 tablet 11    albuterol-ipratropium (DUO-NEB) 2.5 mg-0.5 mg/3 mL nebulizer solution Take 3 mLs by nebulization every 6 (six) hours as needed for Wheezing or Shortness of Breath. Rescue 75 mL 11    amoxicillin-clavulanate 875-125mg (AUGMENTIN) 875-125 mg per tablet Take 1 tablet by mouth every 12 (twelve) hours. for 6 days 12 tablet 0    ascorbic acid/zinc (ZINC WITH VITAMIN C ORAL) Take 1 tablet by mouth Daily.      atorvastatin (LIPITOR) 20 MG tablet Take 1 tablet (20 mg total) by mouth once daily. 90 tablet 3    azelastine (ASTELIN) 137 mcg (0.1 %) nasal spray 1 spray (137 mcg total) by Nasal route 2 (two) times daily. 30 mL 6    calcium-vitamin D3 (CALCIUM 500 + D) 500 mg(1,250mg) -200 unit per tablet Take 1 tablet by mouth 2 (two) times daily with meals.      carvediloL (COREG) 25 MG tablet Take 1 tablet (25 mg total) by mouth 2 (two) times daily with meals. 180 tablet 1    ferrous gluconate (FERGON) 324 MG tablet Take 1 tablet (324 mg total) by mouth every other day. 90 tablet 3    fluticasone furoate-vilanteroL (BREO ELLIPTA) 100-25 mcg/dose diskus inhaler Inhale 1 puff into the lungs once daily. Controller.  PLEASE NOTE IT IS ONCE A DAY!! 60 each 4    levothyroxine (SYNTHROID) 50 MCG tablet Take 1 tablet (50 mcg total) by mouth before breakfast. 90 tablet 1    mirtazapine (REMERON) 7.5 MG Tab Take 1 tablet (7.5 mg total) by mouth every evening. 90 tablet 3    multivitamin (THERAGRAN) per tablet Take 1 tablet by mouth once daily.      ondansetron (ZOFRAN-ODT) 4 MG TbDL Take 1 tablet (4 mg total) by mouth every 6 (six) hours as needed (nausea). 30 tablet 0    oxybutynin (DITROPAN XL) 15 MG TR24 Take 1 tablet (15 mg  total) by mouth once daily. 30 tablet 11    pantoprazole (PROTONIX) 20 MG tablet Take 1 tablet (20 mg total) by mouth once daily. 90 tablet 1    posaconazole (NOXAFIL) 100 mg TbEC tablet Take 3 tablets (300 mg) by mouth twice daily on the first day, and then take 1 tablet by mouth once daily thereafter. 30 tablet 2    potassium chloride (K-TAB) 20 mEq Take 1 tablet (20 mEq total) by mouth once daily. 90 tablet 3    predniSONE (DELTASONE) 1 MG tablet Take 2 tablets (2 mg total) by mouth once daily. 180 tablet 3    sodium chloride 3% 3 % nebulizer solution Take 4 mLs by nebulization as needed for Other. 120 mL 3    traMADoL (ULTRAM) 50 mg tablet Take 1 tablet (50 mg total) by mouth every 6 (six) hours as needed for Pain. 21 each 0    venetoclax (VENCLEXTA) 100 mg Tab Take one tablet (100 mg) by mouth daily on days 1-14 of each 28 day cycle of therapy.. 28 tablet 0    [DISCONTINUED] budesonide-formoterol 80-4.5 mcg (SYMBICORT) 80-4.5 mcg/actuation HFAA Inhale 2 puffs into the lungs 2 (two) times daily as needed. Controller 1 Inhaler 6    [DISCONTINUED] polyethylene glycol (GLYCOLAX) 17 gram PwPk Take 17 g by mouth 2 (two) times daily as needed for Constipation. 60 each 0     No current facility-administered medications on file prior to visit.       Social History:   Social History     Socioeconomic History    Marital status:     Number of children: 3   Occupational History    Occupation: retired from [employer], [employer]     Comment: neuro chemistry   Tobacco Use    Smoking status: Former     Current packs/day: 0.00     Average packs/day: 0.5 packs/day for 30.1 years (15.1 ttl pk-yrs)     Types: Cigarettes     Start date:      Quit date: 2000     Years since quittin.1    Smokeless tobacco: Never    Tobacco comments:     quit in her 50s, after 30 years smoking;   Substance and Sexual Activity    Alcohol use: No    Drug use: No    Sexual activity: Not Currently     Partners: Male   Social  History Narrative    From [place]     Moved to [facility] in 1985 for research    Moved to [place] for period of time    Moved back to [place] Summer 2016 and then to [facility] this year Sept 2017     Social Determinants of Health     Financial Resource Strain: Patient Unable To Answer (2/7/2024)    Overall Financial Resource Strain (CARDIA)     Difficulty of Paying Living Expenses: Patient unable to answer   Food Insecurity: Patient Unable To Answer (2/7/2024)    Hunger Vital Sign     Worried About Running Out of Food in the Last Year: Patient unable to answer     Ran Out of Food in the Last Year: Patient unable to answer   Transportation Needs: Patient Unable To Answer (2/7/2024)    PRAPARE - Transportation     Lack of Transportation (Medical): Patient unable to answer     Lack of Transportation (Non-Medical): Patient unable to answer   Physical Activity: Unknown (2/7/2024)    Exercise Vital Sign     Days of Exercise per Week: Patient unable to answer     Minutes of Exercise per Session: 0 min   Recent Concern: Physical Activity - Inactive (1/12/2024)    Exercise Vital Sign     Days of Exercise per Week: 0 days     Minutes of Exercise per Session: 0 min   Stress: Patient Unable To Answer (2/7/2024)    [employer] [place] [employer] - Occupational Stress Questionnaire     Feeling of Stress : Patient unable to answer   Social Connections: Unknown (2/7/2024)    Social Connection and Isolation Panel [NHANES]     Frequency of Communication with Friends and Family: Patient unable to answer     Frequency of Social Gatherings with Friends and Family: Patient unable to answer     Attends Orthodoxy Services: Patient unable to answer     Active Member of Clubs or Organizations: No     Attends Club or Organization Meetings: Patient unable to answer     Marital Status: Patient unable to answer   Recent Concern: Social Connections - Socially Isolated (1/12/2024)    Social Connection and Isolation Panel [NHANES]     Frequency  "of Communication with Friends and Family: More than three times a week     Frequency of Social Gatherings with Friends and Family: Three times a week     Attends Gnosticist Services: Never     Active Member of Clubs or Organizations: No     Attends Club or Organization Meetings: Never     Marital Status:    Housing Stability: Patient Unable To Answer (2/7/2024)    Housing Stability Vital Sign     Unable to Pay for Housing in the Last Year: Patient unable to answer     Number of Places Lived in the Last Year: 1     Unstable Housing in the Last Year: Patient unable to answer       REVIEW OF SYSTEMS:  Constitution: Negative. Negative for chills, fever and night sweats.   Cardiovascular: Negative for chest pain and syncope.   Respiratory: Negative for cough and shortness of breath.   Gastrointestinal: See HPI. Negative for nausea/vomiting. Negative for abdominal pain.  Genitourinary: See HPI. Negative for discoloration or dysuria.  Skin: Negative for dry skin, itching and rash.   Hematologic/Lymphatic: Negative for bleeding problem. Does not bruise/bleed easily.   Musculoskeletal: (+) muscle weakness, back pain. Negative for falls or trauma to back.  Neurological: See HPI. No seizures.   Endocrine: Negative for polydipsia, polyphagia and polyuria.   Allergic/Immunologic: Negative for hives and persistent infections.  Psychiatric/Behavioral: Negative for depression and insomnia.         EXAM:  Ht 5' 4" (1.626 m)   Wt 61 kg (134 lb 6.4 oz)   LMP 02/08/2000   BMI 23.07 kg/m²     General: The patient is a  79 y.o. female in no apparent distress, the patient is oriented to person, place and time.  Psych: Normal mood and affect  HEENT: Vision grossly intact, hearing intact to the spoken word.  Lungs: Respirations unlabored.  Gait: Antalgic shuffling gait.   Skin: Cervical and lumbar skin negative for rashes, lesions, hairy patches and surgical scars.  Range of motion: Cervical and lumbar range of motion is " acceptable. There is not tenderness to palpation.  Spinal Balance: Global saggital and coronal spinal balance acceptable, no significant for scoliosis and kyphosis.  Musculoskeletal: No pain with the range of motion of the bilateral hips and knees. Normal bulk and contour of the bilateral hands and feet.  Strength 4/5 to bilateral lower extremities.   Vascular: Bilateral hands and feet warm and well perfused, radial pulses 2+ bilaterally.  Neurological: Normal strength and tone in all major motor groups in the bilateral upper and lower extremities. Normal sensation to light touch in the C5-T1 and L2-S1 dermatomes bilaterally.  Deep tendon reflexes symmetric in the bilateral upper and lower extremities.  Negative Inverted Radial Reflex and Ram's bilaterally. Negative Babinski bilaterally.     IMAGING:   No new imaging today.     Body mass index is 23.07 kg/m².    Hemoglobin A1C   Date Value Ref Range Status   06/22/2023 5.1 4.0 - 5.6 % Final     Comment:     ADA Screening Guidelines:  5.7-6.4%  Consistent with prediabetes  >or=6.5%  Consistent with diabetes    High levels of fetal hemoglobin interfere with the HbA1C  assay. Heterozygous hemoglobin variants (HbS, HgC, etc)do  not significantly interfere with this assay.   However, presence of multiple variants may affect accuracy.     06/30/2022 4.9 4.0 - 5.6 % Final     Comment:     ADA Screening Guidelines:  5.7-6.4%  Consistent with prediabetes  >or=6.5%  Consistent with diabetes    High levels of fetal hemoglobin interfere with the HbA1C  assay. Heterozygous hemoglobin variants (HbS, HgC, etc)do  not significantly interfere with this assay.   However, presence of multiple variants may affect accuracy.     08/11/2020 5.0 4.0 - 5.6 % Final     Comment:     ADA Screening Guidelines:  5.7-6.4%  Consistent with prediabetes  >or=6.5%  Consistent with diabetes  High levels of fetal hemoglobin interfere with the HbA1C  assay. Heterozygous hemoglobin variants (HbS,  HgC, etc)do  not significantly interfere with this assay.   However, presence of multiple variants may affect accuracy.             ASSESSMENT/PLAN:    Niurka was seen today for low-back pain.    Diagnoses and all orders for this visit:    Lumbar radiculopathy  -     X-Ray Lumbar Spine Ap Lateral w/Flex Ext; Future    Balance problem  -     Ambulatory referral/consult to Orthopedics    Cervical myelopathy  -     MRI Cervical Spine Without Contrast; Future      The patient has signs and symptoms concerning for myelopathy. I would like to get an MRI cervical and lumbar spine.  I will also get lumbar films. I will call with results.

## 2024-03-19 NOTE — PROGRESS NOTES
HEMATOLOGIC MALIGNANCIES PROGRESS NOTE    IDENTIFYING STATEMENT   Niurka Castellon) is a 79 y.o. female with a  of 1945 from [place], [place] with the diagnosis of MDS.        ONCOLOGY HISTORY:    1. Myelodysplastic syndrome with increased blasts-2              A. 2023: Bone marrow biopsy - 65-75% cellular marrow consistent with myelodysplastic syndrome with excess blasts-2; cytogenetics 46,XX,del(3)(q12)[2]/47,sl,+8[18]; NGS not sent; IPSS-R score of 7 = very high risk   B. 2023: Cycle 1 azacitidine-venetoclax (5 days aza, 14 days angelo) for MDS/AML   C. 10/18/2023: Bone marrow biopsy - 50-60% cellular marrow with no increased blasts and trilineage hematopoiesis with granulocytic hypoplasia and moderate dysgranulopoiesis, mildly left-shifted marked erythroid hyperplasia with severe dyserythropoiesis, and marked megakaryocytic hyperplasia with predominantly variably sized including small del3q-like monolobated forms; 3-4+ histiocytic iron stores; focal MF-1; cytogenetics 46,XX[20]; NGS shows ASXL1 (26%), SRSF2 (33%) and STAG2 (3%).     2. Neuropathy  3. Interstitial lung disease/pulmonary fibrosis  4. Hypertension  5. Aortic atherosclerosis  6. Pulmonary coccidioidomycosis  7. Hypothyroidism  8. Hyperparathyroidism    INTERVAL HISTORY:      Ms. Pedraza is seen in this virtual visit in follow-up of MDS/AML prior to cycle 7 of aza-angelo therapy.      - 3/5 - 3/13: Hospitalized at [facility] with diverticulitis  - 2024: Pulm visit -     Past Medical History, Past Social History and Past Family History have been reviewed and are unchanged except as noted in the interval history.    MEDICATIONS:     Current Outpatient Medications on File Prior to Visit   Medication Sig Dispense Refill    acyclovir (ZOVIRAX) 400 MG tablet Take 1 tablet (400 mg total) by mouth 2 (two) times daily. 60 tablet 11    albuterol-ipratropium (DUO-NEB) 2.5 mg-0.5 mg/3 mL nebulizer solution Take 3 mLs by nebulization every 6 (six)  hours as needed for Wheezing or Shortness of Breath. Rescue 75 mL 11    amoxicillin-clavulanate 875-125mg (AUGMENTIN) 875-125 mg per tablet Take 1 tablet by mouth every 12 (twelve) hours. for 6 days 12 tablet 0    ascorbic acid/zinc (ZINC WITH VITAMIN C ORAL) Take 1 tablet by mouth Daily.      atorvastatin (LIPITOR) 20 MG tablet Take 1 tablet (20 mg total) by mouth once daily. 90 tablet 3    azelastine (ASTELIN) 137 mcg (0.1 %) nasal spray 1 spray (137 mcg total) by Nasal route 2 (two) times daily. 30 mL 6    calcium-vitamin D3 (CALCIUM 500 + D) 500 mg(1,250mg) -200 unit per tablet Take 1 tablet by mouth 2 (two) times daily with meals.      carvediloL (COREG) 25 MG tablet Take 1 tablet (25 mg total) by mouth 2 (two) times daily with meals. 180 tablet 1    ferrous gluconate (FERGON) 324 MG tablet Take 1 tablet (324 mg total) by mouth every other day. 90 tablet 3    fluticasone furoate-vilanteroL (BREO ELLIPTA) 100-25 mcg/dose diskus inhaler Inhale 1 puff into the lungs once daily. Controller.  PLEASE NOTE IT IS ONCE A DAY!! 60 each 4    levothyroxine (SYNTHROID) 50 MCG tablet Take 1 tablet (50 mcg total) by mouth before breakfast. 90 tablet 1    mirtazapine (REMERON) 7.5 MG Tab Take 1 tablet (7.5 mg total) by mouth every evening. 90 tablet 3    multivitamin (THERAGRAN) per tablet Take 1 tablet by mouth once daily.      ondansetron (ZOFRAN-ODT) 4 MG TbDL Take 1 tablet (4 mg total) by mouth every 6 (six) hours as needed (nausea). 30 tablet 0    oxybutynin (DITROPAN XL) 15 MG TR24 Take 1 tablet (15 mg total) by mouth once daily. 30 tablet 11    pantoprazole (PROTONIX) 20 MG tablet Take 1 tablet (20 mg total) by mouth once daily. 90 tablet 1    polyethylene glycol (GLYCOLAX) 17 gram PwPk Take 17 g by mouth 2 (two) times daily as needed for Constipation. 60 each 0    posaconazole (NOXAFIL) 100 mg TbEC tablet Take 3 tablets (300 mg) by mouth twice daily on the first day, and then take 1 tablet by mouth once daily  "thereafter. 30 tablet 2    potassium chloride (K-TAB) 20 mEq Take 1 tablet (20 mEq total) by mouth once daily. 90 tablet 3    predniSONE (DELTASONE) 1 MG tablet Take 2 tablets (2 mg total) by mouth once daily. 180 tablet 3    sodium chloride 3% 3 % nebulizer solution Take 4 mLs by nebulization as needed for Other. 120 mL 3    traMADoL (ULTRAM) 50 mg tablet Take 1 tablet (50 mg total) by mouth every 6 (six) hours as needed for Pain. 21 each 0    venetoclax (VENCLEXTA) 100 mg Tab Take one tablet (100 mg) by mouth daily on days 1-14 of each 28 day cycle of therapy.. 28 tablet 0    [DISCONTINUED] budesonide-formoterol 80-4.5 mcg (SYMBICORT) 80-4.5 mcg/actuation HFAA Inhale 2 puffs into the lungs 2 (two) times daily as needed. Controller 1 Inhaler 6     No current facility-administered medications on file prior to visit.       ALLERGIES:   Review of patient's allergies indicates:   Allergen Reactions    Ciprofloxacin Other (See Comments)     Right foot pain and edema, tendonitis    Amlodipine Edema and Swelling     BLE  BLE    Lisinopril Other (See Comments)     cough    Nitrofuran analogues         ROS:       Review of Systems   Constitutional:  Positive for appetite change. Negative for unexpected weight change.   HENT:   Negative for mouth sores.    Respiratory:  Negative for cough and shortness of breath.    Cardiovascular:  Negative for chest pain.   Gastrointestinal:  Negative for abdominal pain and diarrhea.   Genitourinary:  Positive for frequency.    Musculoskeletal:  Positive for back pain.   Skin:  Negative for rash.   Neurological:  Negative for headaches.   Hematological:  Negative for adenopathy.   Psychiatric/Behavioral:  The patient is not nervous/anxious.        PHYSICAL EXAM:  Vitals:    03/19/24 1328   BP: 128/60   Pulse: 74   Temp: 97.9 °F (36.6 °C)   SpO2: 95%   Weight: 61 kg (134 lb 7.7 oz)   Height: 5' 6" (1.676 m)   PainSc:   7   PainLoc: Back         Physical Exam  Constitutional:       General: " She is not in acute distress.     Appearance: She is well-developed.   HENT:      Head: Normocephalic and atraumatic.      Mouth/Throat:      Mouth: No oral lesions.   Eyes:      Conjunctiva/sclera: Conjunctivae normal.   Neck:      Thyroid: No thyromegaly.   Cardiovascular:      Rate and Rhythm: Normal rate and regular rhythm.      Heart sounds: Normal heart sounds. No murmur heard.  Pulmonary:      Breath sounds: Normal breath sounds. No wheezing or rales.   Abdominal:      General: There is no distension.      Palpations: Abdomen is soft. There is no hepatomegaly, splenomegaly or mass.      Tenderness: There is no abdominal tenderness.   Lymphadenopathy:      Cervical: No cervical adenopathy.      Right cervical: No deep cervical adenopathy.     Left cervical: No deep cervical adenopathy.   Skin:     Findings: No rash.   Neurological:      Mental Status: She is alert and oriented to person, place, and time.      Cranial Nerves: No cranial nerve deficit.      Coordination: Coordination normal.      Deep Tendon Reflexes: Reflexes are normal and symmetric.         LAB:   Results for orders placed or performed in visit on 03/18/24   CBC Auto Differential   Result Value Ref Range    WBC 1.86 (LL) 3.90 - 12.70 K/uL    RBC 3.43 (L) 4.00 - 5.40 M/uL    Hemoglobin 9.6 (L) 12.0 - 16.0 g/dL    Hematocrit 29.6 (L) 37.0 - 48.5 %    MCV 86 82 - 98 fL    MCH 28.0 27.0 - 31.0 pg    MCHC 32.4 32.0 - 36.0 g/dL    RDW 19.9 (H) 11.5 - 14.5 %    Platelets 111 (L) 150 - 450 K/uL    MPV 9.1 (L) 9.2 - 12.9 fL    Immature Granulocytes 1.6 (H) 0.0 - 0.5 %    Gran # (ANC) 0.8 (L) 1.8 - 7.7 K/uL    Immature Grans (Abs) 0.03 0.00 - 0.04 K/uL    Lymph # 0.9 (L) 1.0 - 4.8 K/uL    Mono # 0.1 (L) 0.3 - 1.0 K/uL    Eos # 0.0 0.0 - 0.5 K/uL    Baso # 0.02 0.00 - 0.20 K/uL    nRBC 0 0 /100 WBC    Gran % 43.6 38.0 - 73.0 %    Lymph % 50.5 (H) 18.0 - 48.0 %    Mono % 3.2 (L) 4.0 - 15.0 %    Eosinophil % 0.0 0.0 - 8.0 %    Basophil % 1.1 0.0 - 1.9 %     Platelet Estimate Decreased (A)     Aniso Slight     Poik Slight     Poly Occasional     Hypo Occasional     Ovalocytes Occasional     Tear Drop Cells Occasional     Basophilic Stippling Occasional     Differential Method Automated    Comprehensive Metabolic Panel   Result Value Ref Range    Sodium 138 136 - 145 mmol/L    Potassium 4.2 3.5 - 5.1 mmol/L    Chloride 100 95 - 110 mmol/L    CO2 29 23 - 29 mmol/L    Glucose 121 (H) 70 - 110 mg/dL    BUN 16 8 - 23 mg/dL    Creatinine 0.8 0.5 - 1.4 mg/dL    Calcium 9.0 8.7 - 10.5 mg/dL    Total Protein 6.5 6.0 - 8.4 g/dL    Albumin 2.7 (L) 3.5 - 5.2 g/dL    Total Bilirubin 0.5 0.1 - 1.0 mg/dL    Alkaline Phosphatase 127 55 - 135 U/L    AST 14 10 - 40 U/L    ALT 17 10 - 44 U/L    eGFR >60.0 >60 mL/min/1.73 m^2    Anion Gap 9 8 - 16 mmol/L   Magnesium   Result Value Ref Range    Magnesium 1.8 1.6 - 2.6 mg/dL   PHOSPHORUS   Result Value Ref Range    Phosphorus 2.9 2.7 - 4.5 mg/dL   Lactate Dehydrogenase   Result Value Ref Range     (H) 110 - 260 U/L   Uric Acid   Result Value Ref Range    Uric Acid 4.7 2.4 - 5.7 mg/dL     *Note: Due to a large number of results and/or encounters for the requested time period, some results have not been displayed. A complete set of results can be found in Results Review.       PROBLEMS ASSESSED THIS VISIT:    No diagnosis found.    PLAN:       Myelodysplastic syndrome  Ms. 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 We reviewed that this is an aggressive hematologic malignancy with high probability of evolution to acute myeloid leukemia (AML). According to the ICC pathologic designation, this is referred to as MDS/AML.     Bone marrow biopsy after 1 cycle of aza-angelo showed resolution of increased blasts with ongoing morphologic changes consistent with MDS. We reviewed that this represents a favorable response to therapy. We will therefore proceed with indefinite aza-angelo therapy.     Current cycle complicated by diverticulitis. Hematologic recovery has been  slow. We will obtain bone marrow biopsy to assess disease status and help determine appropriate therapy recommendations.     Continue infection prophylaxis with acyclovir, posaconazole. Antibiotic agents per ID given multi-drug resistant UTI.     Pancytopenia  Due to MDS/AML. Monitor weekly during therapy and transfuse as clinically indicated.     Follow-up  After bone marrow biopsy      Mohit Centeno MD  Hematology and Stem Cell Transplant

## 2024-03-19 NOTE — H&P (VIEW-ONLY)
HEMATOLOGIC MALIGNANCIES PROGRESS NOTE    IDENTIFYING STATEMENT   Niurka Castellon) is a 79 y.o. female with a  of 1945 from [place], [place] with the diagnosis of MDS.        ONCOLOGY HISTORY:    1. Myelodysplastic syndrome with increased blasts-2              A. 2023: Bone marrow biopsy - 65-75% cellular marrow consistent with myelodysplastic syndrome with excess blasts-2; cytogenetics 46,XX,del(3)(q12)[2]/47,sl,+8[18]; NGS not sent; IPSS-R score of 7 = very high risk   B. 2023: Cycle 1 azacitidine-venetoclax (5 days aza, 14 days angelo) for MDS/AML   C. 10/18/2023: Bone marrow biopsy - 50-60% cellular marrow with no increased blasts and trilineage hematopoiesis with granulocytic hypoplasia and moderate dysgranulopoiesis, mildly left-shifted marked erythroid hyperplasia with severe dyserythropoiesis, and marked megakaryocytic hyperplasia with predominantly variably sized including small del3q-like monolobated forms; 3-4+ histiocytic iron stores; focal MF-1; cytogenetics 46,XX[20]; NGS shows ASXL1 (26%), SRSF2 (33%) and STAG2 (3%).     2. Neuropathy  3. Interstitial lung disease/pulmonary fibrosis  4. Hypertension  5. Aortic atherosclerosis  6. Pulmonary coccidioidomycosis  7. Hypothyroidism  8. Hyperparathyroidism    INTERVAL HISTORY:      Ms. Pedraza is seen in this virtual visit in follow-up of MDS/AML prior to cycle 7 of aza-angelo therapy.      - 3/5 - 3/13: Hospitalized at [facility] with diverticulitis  - 2024: Pulm visit -     Past Medical History, Past Social History and Past Family History have been reviewed and are unchanged except as noted in the interval history.    MEDICATIONS:     Current Outpatient Medications on File Prior to Visit   Medication Sig Dispense Refill    acyclovir (ZOVIRAX) 400 MG tablet Take 1 tablet (400 mg total) by mouth 2 (two) times daily. 60 tablet 11    albuterol-ipratropium (DUO-NEB) 2.5 mg-0.5 mg/3 mL nebulizer solution Take 3 mLs by nebulization every 6 (six)  hours as needed for Wheezing or Shortness of Breath. Rescue 75 mL 11    amoxicillin-clavulanate 875-125mg (AUGMENTIN) 875-125 mg per tablet Take 1 tablet by mouth every 12 (twelve) hours. for 6 days 12 tablet 0    ascorbic acid/zinc (ZINC WITH VITAMIN C ORAL) Take 1 tablet by mouth Daily.      atorvastatin (LIPITOR) 20 MG tablet Take 1 tablet (20 mg total) by mouth once daily. 90 tablet 3    azelastine (ASTELIN) 137 mcg (0.1 %) nasal spray 1 spray (137 mcg total) by Nasal route 2 (two) times daily. 30 mL 6    calcium-vitamin D3 (CALCIUM 500 + D) 500 mg(1,250mg) -200 unit per tablet Take 1 tablet by mouth 2 (two) times daily with meals.      carvediloL (COREG) 25 MG tablet Take 1 tablet (25 mg total) by mouth 2 (two) times daily with meals. 180 tablet 1    ferrous gluconate (FERGON) 324 MG tablet Take 1 tablet (324 mg total) by mouth every other day. 90 tablet 3    fluticasone furoate-vilanteroL (BREO ELLIPTA) 100-25 mcg/dose diskus inhaler Inhale 1 puff into the lungs once daily. Controller.  PLEASE NOTE IT IS ONCE A DAY!! 60 each 4    levothyroxine (SYNTHROID) 50 MCG tablet Take 1 tablet (50 mcg total) by mouth before breakfast. 90 tablet 1    mirtazapine (REMERON) 7.5 MG Tab Take 1 tablet (7.5 mg total) by mouth every evening. 90 tablet 3    multivitamin (THERAGRAN) per tablet Take 1 tablet by mouth once daily.      ondansetron (ZOFRAN-ODT) 4 MG TbDL Take 1 tablet (4 mg total) by mouth every 6 (six) hours as needed (nausea). 30 tablet 0    oxybutynin (DITROPAN XL) 15 MG TR24 Take 1 tablet (15 mg total) by mouth once daily. 30 tablet 11    pantoprazole (PROTONIX) 20 MG tablet Take 1 tablet (20 mg total) by mouth once daily. 90 tablet 1    polyethylene glycol (GLYCOLAX) 17 gram PwPk Take 17 g by mouth 2 (two) times daily as needed for Constipation. 60 each 0    posaconazole (NOXAFIL) 100 mg TbEC tablet Take 3 tablets (300 mg) by mouth twice daily on the first day, and then take 1 tablet by mouth once daily  "thereafter. 30 tablet 2    potassium chloride (K-TAB) 20 mEq Take 1 tablet (20 mEq total) by mouth once daily. 90 tablet 3    predniSONE (DELTASONE) 1 MG tablet Take 2 tablets (2 mg total) by mouth once daily. 180 tablet 3    sodium chloride 3% 3 % nebulizer solution Take 4 mLs by nebulization as needed for Other. 120 mL 3    traMADoL (ULTRAM) 50 mg tablet Take 1 tablet (50 mg total) by mouth every 6 (six) hours as needed for Pain. 21 each 0    venetoclax (VENCLEXTA) 100 mg Tab Take one tablet (100 mg) by mouth daily on days 1-14 of each 28 day cycle of therapy.. 28 tablet 0    [DISCONTINUED] budesonide-formoterol 80-4.5 mcg (SYMBICORT) 80-4.5 mcg/actuation HFAA Inhale 2 puffs into the lungs 2 (two) times daily as needed. Controller 1 Inhaler 6     No current facility-administered medications on file prior to visit.       ALLERGIES:   Review of patient's allergies indicates:   Allergen Reactions    Ciprofloxacin Other (See Comments)     Right foot pain and edema, tendonitis    Amlodipine Edema and Swelling     BLE  BLE    Lisinopril Other (See Comments)     cough    Nitrofuran analogues         ROS:       Review of Systems   Constitutional:  Positive for appetite change. Negative for unexpected weight change.   HENT:   Negative for mouth sores.    Respiratory:  Negative for cough and shortness of breath.    Cardiovascular:  Negative for chest pain.   Gastrointestinal:  Negative for abdominal pain and diarrhea.   Genitourinary:  Positive for frequency.    Musculoskeletal:  Positive for back pain.   Skin:  Negative for rash.   Neurological:  Negative for headaches.   Hematological:  Negative for adenopathy.   Psychiatric/Behavioral:  The patient is not nervous/anxious.        PHYSICAL EXAM:  Vitals:    03/19/24 1328   BP: 128/60   Pulse: 74   Temp: 97.9 °F (36.6 °C)   SpO2: 95%   Weight: 61 kg (134 lb 7.7 oz)   Height: 5' 6" (1.676 m)   PainSc:   7   PainLoc: Back         Physical Exam  Constitutional:       General: " She is not in acute distress.     Appearance: She is well-developed.   HENT:      Head: Normocephalic and atraumatic.      Mouth/Throat:      Mouth: No oral lesions.   Eyes:      Conjunctiva/sclera: Conjunctivae normal.   Neck:      Thyroid: No thyromegaly.   Cardiovascular:      Rate and Rhythm: Normal rate and regular rhythm.      Heart sounds: Normal heart sounds. No murmur heard.  Pulmonary:      Breath sounds: Normal breath sounds. No wheezing or rales.   Abdominal:      General: There is no distension.      Palpations: Abdomen is soft. There is no hepatomegaly, splenomegaly or mass.      Tenderness: There is no abdominal tenderness.   Lymphadenopathy:      Cervical: No cervical adenopathy.      Right cervical: No deep cervical adenopathy.     Left cervical: No deep cervical adenopathy.   Skin:     Findings: No rash.   Neurological:      Mental Status: She is alert and oriented to person, place, and time.      Cranial Nerves: No cranial nerve deficit.      Coordination: Coordination normal.      Deep Tendon Reflexes: Reflexes are normal and symmetric.         LAB:   Results for orders placed or performed in visit on 03/18/24   CBC Auto Differential   Result Value Ref Range    WBC 1.86 (LL) 3.90 - 12.70 K/uL    RBC 3.43 (L) 4.00 - 5.40 M/uL    Hemoglobin 9.6 (L) 12.0 - 16.0 g/dL    Hematocrit 29.6 (L) 37.0 - 48.5 %    MCV 86 82 - 98 fL    MCH 28.0 27.0 - 31.0 pg    MCHC 32.4 32.0 - 36.0 g/dL    RDW 19.9 (H) 11.5 - 14.5 %    Platelets 111 (L) 150 - 450 K/uL    MPV 9.1 (L) 9.2 - 12.9 fL    Immature Granulocytes 1.6 (H) 0.0 - 0.5 %    Gran # (ANC) 0.8 (L) 1.8 - 7.7 K/uL    Immature Grans (Abs) 0.03 0.00 - 0.04 K/uL    Lymph # 0.9 (L) 1.0 - 4.8 K/uL    Mono # 0.1 (L) 0.3 - 1.0 K/uL    Eos # 0.0 0.0 - 0.5 K/uL    Baso # 0.02 0.00 - 0.20 K/uL    nRBC 0 0 /100 WBC    Gran % 43.6 38.0 - 73.0 %    Lymph % 50.5 (H) 18.0 - 48.0 %    Mono % 3.2 (L) 4.0 - 15.0 %    Eosinophil % 0.0 0.0 - 8.0 %    Basophil % 1.1 0.0 - 1.9 %     Platelet Estimate Decreased (A)     Aniso Slight     Poik Slight     Poly Occasional     Hypo Occasional     Ovalocytes Occasional     Tear Drop Cells Occasional     Basophilic Stippling Occasional     Differential Method Automated    Comprehensive Metabolic Panel   Result Value Ref Range    Sodium 138 136 - 145 mmol/L    Potassium 4.2 3.5 - 5.1 mmol/L    Chloride 100 95 - 110 mmol/L    CO2 29 23 - 29 mmol/L    Glucose 121 (H) 70 - 110 mg/dL    BUN 16 8 - 23 mg/dL    Creatinine 0.8 0.5 - 1.4 mg/dL    Calcium 9.0 8.7 - 10.5 mg/dL    Total Protein 6.5 6.0 - 8.4 g/dL    Albumin 2.7 (L) 3.5 - 5.2 g/dL    Total Bilirubin 0.5 0.1 - 1.0 mg/dL    Alkaline Phosphatase 127 55 - 135 U/L    AST 14 10 - 40 U/L    ALT 17 10 - 44 U/L    eGFR >60.0 >60 mL/min/1.73 m^2    Anion Gap 9 8 - 16 mmol/L   Magnesium   Result Value Ref Range    Magnesium 1.8 1.6 - 2.6 mg/dL   PHOSPHORUS   Result Value Ref Range    Phosphorus 2.9 2.7 - 4.5 mg/dL   Lactate Dehydrogenase   Result Value Ref Range     (H) 110 - 260 U/L   Uric Acid   Result Value Ref Range    Uric Acid 4.7 2.4 - 5.7 mg/dL     *Note: Due to a large number of results and/or encounters for the requested time period, some results have not been displayed. A complete set of results can be found in Results Review.       PROBLEMS ASSESSED THIS VISIT:    No diagnosis found.    PLAN:       Myelodysplastic syndrome  Ms. 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 We reviewed that this is an aggressive hematologic malignancy with high probability of evolution to acute myeloid leukemia (AML). According to the ICC pathologic designation, this is referred to as MDS/AML.     Bone marrow biopsy after 1 cycle of aza-angelo showed resolution of increased blasts with ongoing morphologic changes consistent with MDS. We reviewed that this represents a favorable response to therapy. We will therefore proceed with indefinite aza-angelo therapy.     Current cycle complicated by diverticulitis. Hematologic recovery has been  slow. We will obtain bone marrow biopsy to assess disease status and help determine appropriate therapy recommendations.     Continue infection prophylaxis with acyclovir, posaconazole. Antibiotic agents per ID given multi-drug resistant UTI.     Pancytopenia  Due to MDS/AML. Monitor weekly during therapy and transfuse as clinically indicated.     Follow-up  After bone marrow biopsy      Mohit Centeno MD  Hematology and Stem Cell Transplant

## 2024-03-19 NOTE — TELEPHONE ENCOUNTER
Spoke with home health nurse, they had a verbal understanding. Gave verbal for PT via home health.

## 2024-03-19 NOTE — PROGRESS NOTES
Route Chart for Scheduling    BMT Chart Routing  Urgent    Follow up with physician . Please schedule next available bone marrow biopsy in endoscopy. Follow-up with me AT LEAST one week after marrow to discuss results.   Follow up with [facility]    Provider visit type Malignant hem   Infusion scheduling note    Injection scheduling note    Labs CBC   Scheduling:  Preferred lab:  Lab interval:  CBC at main campus lab on day of bone marrow biopsy please   Imaging    Pharmacy appointment    Other referrals

## 2024-03-20 ENCOUNTER — TELEPHONE (OUTPATIENT)
Dept: INTERNAL MEDICINE | Facility: CLINIC | Age: 79
End: 2024-03-20
Payer: MEDICARE

## 2024-03-22 ENCOUNTER — OFFICE VISIT (OUTPATIENT)
Dept: INTERNAL MEDICINE | Facility: CLINIC | Age: 79
End: 2024-03-22
Attending: INTERNAL MEDICINE
Payer: MEDICARE

## 2024-03-22 DIAGNOSIS — S32.020A CLOSED COMPRESSION FRACTURE OF L2 VERTEBRA, INITIAL ENCOUNTER: ICD-10-CM

## 2024-03-22 DIAGNOSIS — Z09 HOSPITAL DISCHARGE FOLLOW-UP: Primary | ICD-10-CM

## 2024-03-22 DIAGNOSIS — M54.31 SCIATICA OF RIGHT SIDE: ICD-10-CM

## 2024-03-22 DIAGNOSIS — K57.92 DIVERTICULITIS: ICD-10-CM

## 2024-03-22 PROCEDURE — 99214 OFFICE O/P EST MOD 30 MIN: CPT | Mod: 95,,, | Performed by: INTERNAL MEDICINE

## 2024-03-22 NOTE — PROGRESS NOTES
Subjective:   Patient ID: Niurka Pedraza is a 79 y.o. female  Chief complaint: No chief complaint on file.  The patient location is: [place]  The chief complaint leading to consultation is: hospital follow up     Visit type: audio only - switched to audio in middle of visit due to audio tech issues    Face to Face time with patient: 25 min  27 minutes of total time spent on the encounter, which includes face to face time and non-face to face time preparing to see the patient (eg, review of tests), Obtaining and/or reviewing separately obtained history, Documenting clinical information in the electronic or other health record, Independently interpreting results (not separately reported) and communicating results to the patient/family/caregiver, or Care coordination (not separately reported).     Each patient to whom he or she provides medical services by telemedicine is:  (1) informed of the relationship between the physician and patient and the respective role of any other health care provider with respect to management of the patient; and (2) notified that he or she may decline to receive medical services by telemedicine and may withdraw from such care at any time.    HPI  Saw h/o and spine clinic - to have mri c spine and L spine completed     - bm bx scheduled for 4/10/2024  - herre for hosptial discharge - admitted for diverticulitis bt 3/5-3/13  - to have bm bx q6m at least to monitor for repsonse to aza-angelo therapy for MDS/AML  - saw pulm 2/28/2024    Taking hydrocodone at night - and tramadol in am - tramadol not very helpful   Taking miralax on days with no bm      HPI:   Niurka Pedraza is a 78F with myelodysplastic syndrome with excess blasts-2 on chronic steroids, ILD/pulmonary fibrosis, hypertension, MDRO UTIs, pulmonary coccidioidomycosis and hypothyroidism who presents with her daughter for increasing weakness and lethargy over the past week. She used to be able to walk with a cane or walker, but now  has felt so weak she struggles to walk short distances. Cannot recall her last bowel movement, usually takes dulcolax at home. She reports some lower abdominal discomfort, bloating, burning with urination. Her last chemo infusion was on Friday, four days ago. Denies chest pain, shortness of breath, nausea, vomiting, diarrhea. Daughter Luz at bedside notes she has not had much of an appetite   Of note she was recently hospitalized at [facility] for COVID/pneumonia, UTI and discharged with Meropenem IV to complete treatment for MSSA pneumonia and recurrent UTI. Treatment completed 2/16/2024 but patient reports residual productive cough and dysuria. Patchy confluent opacity left lower lobe, new concerning for aspiration or pneumonia.    In ER she's afebrile without leukocytosis, CBC shows thrombocytopenia consistent with baseline; CMP with elevated T. Bili otherwise LFTs WNL and Cr at baseline. UA unremarkable; CXR: Nonspecific patchy opacities at the lung bases could relate to atelectasis, aspiration or pneumonia. CT AP with possible acute diverticulitis, underlying malignancy not excluded.  There is a large amount of retained stool throughout the colon and FREE AIR  concerning for perforation. Patchy confluent opacity left lower lobe, new concerning for aspiration or pneumonia. Abnormal severe compression fracture deformity of L2 and the moderate compression fracture deformity of L1, new from the prior study. ; Surgery consulted recommend NPO and IV abx   Hospital Course By Problem:   * Diverticulitis large intestine w/o perforation or abscess w/o bleeding  -Admitted to inpatient status  -Presented with with lethargy, abdominal bloating and pain without fever or leukocytosis  -CT AP showed possible acute diverticulitis, underlying malignancy not excluded.  There is a large amount of retained stool throughout the colon and FREE AIR concerning for perforation  -Surgery consulted and input appreciated.  Thus far no  "indications for surgical treatment  -Treated with IV zosyn, initially bowel rest and serial abdominal exams  -Stay prolonged awaiting return of bowel function.  Bowel regimen added and had large bowel movement.  -Medically stable for discharge.  Complete course of antibiotics with augmentin, ordered HH at discharge.  Follow up with pcp within 1 week and general surgery  MDS/AML  Moderate Leukopenia  Thrombocytopenia  Anemia  -Does not believe she takes posaconazole anymore, but does take venetoclax days 1-14 of each 28 day cycle of therapy as well as prednisone 2mg daily and acyclovir as prevention   -Heme/onc consulted and input appreciated  -Hold venetoclax while inpatient.   -Required 1U PRBC  3/7 and 3/8.   -Hb improved and up to 8.0 at discharge  -Follow up with heme/onc in clinic  Compression fracture of L2  -Incidental finding on CT AP "abnormal severe compression fracture deformity of L2 and the moderate compression fracture deformity of L1, new from the prior study.  No apparent resulting severe canal stenosis"  -No complaints of pain today  -PT/OT consulted and ordered home health at discharge as well as RIVER at home  -Placed outpatient referral to orthopedic surgery.  Opacity noted on imaging study  -History of COVID/pneumonia that required admission at [facility] about a month ago  -CT AP this admission Patchy confluent opacity left lower lobe, new concerning for aspiration or pneumonia.   -No evidence of aspiration  Debility  -PT/OT consulted and recommended SNF.  However family wish to pursue care at home with home health and a walker.  Essential hypertension  -BP generally well controlled with occasional mild elevations  -Continue coreg.  Did not resume irbesartan at discharge    ######    Today: given norco for nightly use for pain mgmt and prev was on tramadol during the day   MRI L spine ordered and to be completed     ILD:   - Takes prednisone 2 mg daily at baseline and resumed dosing as above, " montelukast 10 mg daily  - Does not require home O2  - to f/u with pulm     HTN:  At times bp was getting too low when taking meds without checking bp    Malnutrition: nutrition consulted     MDS/AML  Biopsy in 2023: MDS myelodysplastic syndrome with excess blasts-2 - High prob of evolution to AML - refer to as MDS/AML  - on aza-angelo    - Continue ppx with acyclovir, posaconazole  - ID consulted for MDR UTI, on oksana and will discharge with IV infusions  - BMT notified of patient's admission  - Hold venetoclax while inpatient  - PRN antiemetics     Edema due to hypoalbuminemia  - Suspect 2/2 malignancy and poor nutrition  - Compression stockings     Diarrhea while inpt subsided before Cdiff rule out - asymptomatic since then     Weight loss:   Started on mirtazapine 7.5mg and tolerating    Oral potassium   Robitussin dm     Review of Systems   Constitutional:  Positive for activity change. Negative for unexpected weight change.   HENT:  Negative for hearing loss, rhinorrhea and trouble swallowing.    Eyes:  Negative for discharge and visual disturbance.   Respiratory:  Negative for chest tightness and wheezing.    Cardiovascular:  Negative for chest pain and palpitations.   Gastrointestinal:  Negative for blood in stool, constipation, diarrhea and vomiting.   Endocrine: Negative for polydipsia and polyuria.   Genitourinary:  Negative for difficulty urinating, dysuria, hematuria and menstrual problem.   Musculoskeletal:  Positive for arthralgias and neck pain. Negative for joint swelling.   Neurological:  Positive for weakness. Negative for headaches.   Psychiatric/Behavioral:  Positive for confusion. Negative for dysphoric mood.        Objective:  There were no vitals filed for this visit.  There is no height or weight on file to calculate BMI.    Physical Exam  Constitutional:       General: She is not in acute distress.     Appearance: She is well-developed. She is not diaphoretic.   Eyes:      General:          Right eye: No discharge.         Left eye: No discharge.      Conjunctiva/sclera: Conjunctivae normal.   Pulmonary:      Effort: Pulmonary effort is normal. No respiratory distress.   Skin:     Findings: No erythema or rash.   Neurological:      Mental Status: She is alert and oriented to person, place, and time.   Psychiatric:         Behavior: Behavior normal.         Thought Content: Thought content normal.         Judgment: Judgment normal.       Assessment:  1. Hospital discharge follow-up    2. Diverticulitis    3. Sciatica of right side    4. Closed compression fracture of L2 vertebra, initial encounter        Plan:  1. Hospital discharge follow-up    2. Diverticulitis    3. Sciatica of right side    4. Closed compression fracture of L2 vertebra, initial encounter        Cont iwht hh and pt/ot  F/u with specialists   Quarles see if surgery appt can be switched to NS location   Norco at night, tramadol 1-2 tabs in am as norco makes too drowsy.   If trys 2 tramadol at night and effective will stop norco - they will let me know    reviewed and consistent   Has hoem medical equipment   Completed abx   Complete MRI l spine as planned     Health Maintenance   Topic Date Due    Colorectal Cancer Screening  12/09/2025    DEXA Scan  01/19/2026    Lipid Panel  06/22/2028    TETANUS VACCINE  07/21/2031    Shingles Vaccine  Completed

## 2024-03-24 ENCOUNTER — HOSPITAL ENCOUNTER (OUTPATIENT)
Dept: RADIOLOGY | Facility: HOSPITAL | Age: 79
Discharge: HOME | End: 2024-03-24
Attending: PHYSICIAN ASSISTANT
Payer: MEDICARE

## 2024-03-24 ENCOUNTER — HOSPITAL ENCOUNTER (OUTPATIENT)
Dept: RADIOLOGY | Facility: HOSPITAL | Age: 79
Discharge: HOME | End: 2024-03-24
Attending: INTERNAL MEDICINE
Payer: MEDICARE

## 2024-03-24 DIAGNOSIS — M25.551 RIGHT HIP PAIN: ICD-10-CM

## 2024-03-24 DIAGNOSIS — G95.9 CERVICAL MYELOPATHY: ICD-10-CM

## 2024-03-24 PROCEDURE — 72158 MRI LUMBAR SPINE W/O & W/DYE: CPT | Mod: TC

## 2024-03-24 PROCEDURE — 72141 MRI NECK SPINE W/O DYE: CPT | Mod: 26,,, | Performed by: RADIOLOGY

## 2024-03-24 PROCEDURE — 72141 MRI NECK SPINE W/O DYE: CPT | Mod: TC

## 2024-03-24 PROCEDURE — 25500020 PHARM REV CODE 255: Performed by: INTERNAL MEDICINE

## 2024-03-24 PROCEDURE — A9585 GADOBUTROL INJECTION: HCPCS | Performed by: INTERNAL MEDICINE

## 2024-03-24 PROCEDURE — 72158 MRI LUMBAR SPINE W/O & W/DYE: CPT | Mod: 26,,, | Performed by: RADIOLOGY

## 2024-03-24 RX ORDER — GADOBUTROL 604.72 MG/ML
6 INJECTION INTRAVENOUS
Status: COMPLETED | OUTPATIENT
Start: 2024-03-24 | End: 2024-03-24

## 2024-03-24 RX ADMIN — GADOBUTROL 6 ML: 604.72 INJECTION INTRAVENOUS at 02:03

## 2024-03-25 ENCOUNTER — HOSPITAL ENCOUNTER (OUTPATIENT)
Dept: RADIOLOGY | Facility: HOSPITAL | Age: 79
Discharge: HOME | End: 2024-03-25
Attending: PHYSICIAN ASSISTANT
Payer: MEDICARE

## 2024-03-25 DIAGNOSIS — M54.16 LUMBAR RADICULOPATHY: ICD-10-CM

## 2024-03-25 PROCEDURE — 72110 X-RAY EXAM L-2 SPINE 4/>VWS: CPT | Mod: 26,,, | Performed by: RADIOLOGY

## 2024-03-25 PROCEDURE — 72110 X-RAY EXAM L-2 SPINE 4/>VWS: CPT | Mod: TC,FY,PO

## 2024-03-26 ENCOUNTER — TELEPHONE (OUTPATIENT)
Dept: INFECTIOUS DISEASES | Facility: HOSPITAL | Age: 79
End: 2024-03-26
Payer: MEDICARE

## 2024-03-26 ENCOUNTER — PATIENT MESSAGE (OUTPATIENT)
Dept: INFECTIOUS DISEASES | Facility: CLINIC | Age: 79
End: 2024-03-26
Payer: MEDICARE

## 2024-03-26 NOTE — TELEPHONE ENCOUNTER
Tried to call patient about mri l-spine results, no answer. Will send portal message and forward results to ortho spine, whom she is established with

## 2024-03-27 ENCOUNTER — TELEPHONE (OUTPATIENT)
Dept: SURGERY | Facility: CLINIC | Age: 79
End: 2024-03-27
Payer: MEDICARE

## 2024-03-27 ENCOUNTER — PATIENT MESSAGE (OUTPATIENT)
Dept: INTERNAL MEDICINE | Facility: CLINIC | Age: 79
End: 2024-03-27
Payer: MEDICARE

## 2024-03-27 NOTE — TELEPHONE ENCOUNTER
Spoke with patients daughter and appointment changed to virtual and rescheduled. Details viewable in portal.

## 2024-03-27 NOTE — TELEPHONE ENCOUNTER
----- Message from Gertrude Nicholas sent at 3/27/2024  1:13 PM CDT -----  Regarding: Pt wld like to resche appt to Virtual  Contact: [phone]  Name of Who is Calling:Luz (Daughter)     What is the request in detail:Pt called states wld like to change appt to virtual. Please advise         Can the clinic reply by [facility]:Yes         What Number to Call Back if not in [employer]: Telephone Information:  Mobile         [phone]

## 2024-03-29 ENCOUNTER — PATIENT MESSAGE (OUTPATIENT)
Dept: INTERNAL MEDICINE | Facility: CLINIC | Age: 79
End: 2024-03-29
Payer: MEDICARE

## 2024-03-29 DIAGNOSIS — M54.30 SCIATICA, UNSPECIFIED LATERALITY: ICD-10-CM

## 2024-04-01 ENCOUNTER — LAB VISIT (OUTPATIENT)
Dept: LAB | Facility: HOSPITAL | Age: 79
End: 2024-04-01
Attending: INTERNAL MEDICINE
Payer: MEDICARE

## 2024-04-01 DIAGNOSIS — D46.9 MDS (MYELODYSPLASTIC SYNDROME): Chronic | ICD-10-CM

## 2024-04-01 LAB
ALBUMIN SERPL BCP-MCNC: 3.1 G/DL (ref 3.5–5.2)
ALP SERPL-CCNC: 139 U/L (ref 55–135)
ALT SERPL W/O P-5'-P-CCNC: 10 U/L (ref 10–44)
ANION GAP SERPL CALC-SCNC: 10 MMOL/L (ref 8–16)
AST SERPL-CCNC: 12 U/L (ref 10–40)
BASOPHILS # BLD AUTO: 0.02 K/UL (ref 0–0.2)
BASOPHILS NFR BLD: 0.4 % (ref 0–1.9)
BILIRUB SERPL-MCNC: 0.7 MG/DL (ref 0.1–1)
BUN SERPL-MCNC: 15 MG/DL (ref 8–23)
CALCIUM SERPL-MCNC: 9 MG/DL (ref 8.7–10.5)
CHLORIDE SERPL-SCNC: 102 MMOL/L (ref 95–110)
CO2 SERPL-SCNC: 27 MMOL/L (ref 23–29)
CREAT SERPL-MCNC: 0.8 MG/DL (ref 0.5–1.4)
DIFFERENTIAL METHOD BLD: ABNORMAL
EOSINOPHIL # BLD AUTO: 0 K/UL (ref 0–0.5)
EOSINOPHIL NFR BLD: 0.4 % (ref 0–8)
ERYTHROCYTE [DISTWIDTH] IN BLOOD BY AUTOMATED COUNT: 20.6 % (ref 11.5–14.5)
EST. GFR  (NO RACE VARIABLE): >60 ML/MIN/1.73 M^2
GLUCOSE SERPL-MCNC: 124 MG/DL (ref 70–110)
HCT VFR BLD AUTO: 30.3 % (ref 37–48.5)
HGB BLD-MCNC: 9.7 G/DL (ref 12–16)
IMM GRANULOCYTES # BLD AUTO: 0.07 K/UL (ref 0–0.04)
IMM GRANULOCYTES NFR BLD AUTO: 1.5 % (ref 0–0.5)
LDH SERPL L TO P-CCNC: 173 U/L (ref 110–260)
LYMPHOCYTES # BLD AUTO: 1.4 K/UL (ref 1–4.8)
LYMPHOCYTES NFR BLD: 30.3 % (ref 18–48)
MAGNESIUM SERPL-MCNC: 2 MG/DL (ref 1.6–2.6)
MCH RBC QN AUTO: 28.7 PG (ref 27–31)
MCHC RBC AUTO-ENTMCNC: 32 G/DL (ref 32–36)
MCV RBC AUTO: 90 FL (ref 82–98)
MONOCYTES # BLD AUTO: 0.4 K/UL (ref 0.3–1)
MONOCYTES NFR BLD: 9.4 % (ref 4–15)
NEUTROPHILS # BLD AUTO: 2.7 K/UL (ref 1.8–7.7)
NEUTROPHILS NFR BLD: 58 % (ref 38–73)
NRBC BLD-RTO: 0 /100 WBC
PHOSPHATE SERPL-MCNC: 3.9 MG/DL (ref 2.7–4.5)
PLATELET # BLD AUTO: 176 K/UL (ref 150–450)
PMV BLD AUTO: 9 FL (ref 9.2–12.9)
POTASSIUM SERPL-SCNC: 4.2 MMOL/L (ref 3.5–5.1)
PROT SERPL-MCNC: 6.4 G/DL (ref 6–8.4)
RBC # BLD AUTO: 3.38 M/UL (ref 4–5.4)
SODIUM SERPL-SCNC: 139 MMOL/L (ref 136–145)
URATE SERPL-MCNC: 5.1 MG/DL (ref 2.4–5.7)
WBC # BLD AUTO: 4.68 K/UL (ref 3.9–12.7)

## 2024-04-01 PROCEDURE — 83615 LACTATE (LD) (LDH) ENZYME: CPT | Mod: PN | Performed by: INTERNAL MEDICINE

## 2024-04-01 PROCEDURE — 84550 ASSAY OF BLOOD/URIC ACID: CPT | Mod: PN | Performed by: INTERNAL MEDICINE

## 2024-04-01 PROCEDURE — 83735 ASSAY OF MAGNESIUM: CPT | Mod: PN | Performed by: INTERNAL MEDICINE

## 2024-04-01 PROCEDURE — 36415 COLL VENOUS BLD VENIPUNCTURE: CPT | Mod: PN | Performed by: INTERNAL MEDICINE

## 2024-04-01 PROCEDURE — 85025 COMPLETE CBC W/AUTO DIFF WBC: CPT | Mod: PN | Performed by: INTERNAL MEDICINE

## 2024-04-01 PROCEDURE — 80053 COMPREHEN METABOLIC PANEL: CPT | Mod: PN | Performed by: INTERNAL MEDICINE

## 2024-04-01 PROCEDURE — 84100 ASSAY OF PHOSPHORUS: CPT | Mod: PN | Performed by: INTERNAL MEDICINE

## 2024-04-01 RX ORDER — TRAMADOL HYDROCHLORIDE 50 MG/1
50 TABLET ORAL 3 TIMES DAILY
Qty: 90 EACH | Refills: 0 | Status: SHIPPED | OUTPATIENT
Start: 2024-04-01 | End: 2024-05-14 | Stop reason: SDUPTHER

## 2024-04-01 NOTE — TELEPHONE ENCOUNTER
No care due was identified.  [facility] Embedded Care Due Messages. Reference number: [id number].   4/01/2024 7:23:42 AM CDT

## 2024-04-02 ENCOUNTER — OFFICE VISIT (OUTPATIENT)
Dept: ORTHOPEDICS | Facility: CLINIC | Age: 79
End: 2024-04-02
Payer: MEDICARE

## 2024-04-02 DIAGNOSIS — S32.020A COMPRESSION FRACTURE OF L2 VERTEBRA, INITIAL ENCOUNTER: Primary | ICD-10-CM

## 2024-04-02 PROCEDURE — 99441 PR PHYSICIAN TELEPHONE EVALUATION 5-10 MIN: CPT | Mod: 95,,, | Performed by: PHYSICIAN ASSISTANT

## 2024-04-02 NOTE — PROGRESS NOTES
Established Patient - Audio Only Telehealth Visit     The patient location is: [place]  The chief complaint leading to consultation is: MRI results  Visit type: Virtual visit with audio only (telephone)  Total time spent with patient: 10 minutes       The reason for the audio only service rather than synchronous audio and video virtual visit was related to technical difficulties or patient preference/necessity.     Each patient to whom I provide medical services by telemedicine is:  (1) informed of the relationship between the physician and patient and the respective role of any other health care provider with respect to management of the patient; and (2) notified that they may decline to receive medical services by telemedicine and may withdraw from such care at any time. Patient verbally consented to receive this service via voice-only telephone call.       HPI: the patient presents for MRI results.    MRI cervical and lumbar spine were reviewed.  There is left foraminal narrowing at C4/5. No significant cervical spinal stenosis.  There is recent compression fractures of L1 and L2.       Assessment and plan:      Referral placed today for kyphoplasty.  Follow up as needed.             This service was not originating from a related E/M service provided within the previous 7 days nor will  to an E/M service or procedure within the next 24 hours or my soonest available appointment.  Prevailing standard of care was able to be met in this audio-only visit.

## 2024-04-04 ENCOUNTER — PATIENT MESSAGE (OUTPATIENT)
Dept: HEMATOLOGY/ONCOLOGY | Facility: CLINIC | Age: 79
End: 2024-04-04
Payer: MEDICARE

## 2024-04-05 ENCOUNTER — TELEPHONE (OUTPATIENT)
Dept: HEMATOLOGY/ONCOLOGY | Facility: CLINIC | Age: 79
End: 2024-04-05
Payer: MEDICARE

## 2024-04-05 NOTE — TELEPHONE ENCOUNTER
LVM to have pt take BP today as the alerted Bp was taken on 4/3/24 but submitted today. Advised pt is anything wasn't working or if anything was wrong to call us back.

## 2024-04-08 ENCOUNTER — LAB VISIT (OUTPATIENT)
Dept: LAB | Facility: HOSPITAL | Age: 79
End: 2024-04-08
Attending: INTERNAL MEDICINE
Payer: MEDICARE

## 2024-04-08 DIAGNOSIS — D46.9 MDS (MYELODYSPLASTIC SYNDROME): ICD-10-CM

## 2024-04-08 DIAGNOSIS — E83.39 HYPOPHOSPHATEMIA: ICD-10-CM

## 2024-04-08 LAB
ALBUMIN SERPL BCP-MCNC: 3.1 G/DL (ref 3.5–5.2)
ALP SERPL-CCNC: 166 U/L (ref 55–135)
ALT SERPL W/O P-5'-P-CCNC: 17 U/L (ref 10–44)
ANION GAP SERPL CALC-SCNC: 10 MMOL/L (ref 8–16)
AST SERPL-CCNC: 12 U/L (ref 10–40)
BASOPHILS # BLD AUTO: 0.02 K/UL (ref 0–0.2)
BASOPHILS NFR BLD: 0.3 % (ref 0–1.9)
BILIRUB SERPL-MCNC: 0.9 MG/DL (ref 0.1–1)
BUN SERPL-MCNC: 19 MG/DL (ref 8–23)
CALCIUM SERPL-MCNC: 9.8 MG/DL (ref 8.7–10.5)
CHLORIDE SERPL-SCNC: 101 MMOL/L (ref 95–110)
CO2 SERPL-SCNC: 27 MMOL/L (ref 23–29)
CREAT SERPL-MCNC: 0.8 MG/DL (ref 0.5–1.4)
DIFFERENTIAL METHOD BLD: ABNORMAL
EOSINOPHIL # BLD AUTO: 0 K/UL (ref 0–0.5)
EOSINOPHIL NFR BLD: 0.3 % (ref 0–8)
ERYTHROCYTE [DISTWIDTH] IN BLOOD BY AUTOMATED COUNT: 21 % (ref 11.5–14.5)
EST. GFR  (NO RACE VARIABLE): >60 ML/MIN/1.73 M^2
GLUCOSE SERPL-MCNC: 166 MG/DL (ref 70–110)
HCT VFR BLD AUTO: 29.8 % (ref 37–48.5)
HGB BLD-MCNC: 9.6 G/DL (ref 12–16)
IMM GRANULOCYTES # BLD AUTO: 0.08 K/UL (ref 0–0.04)
IMM GRANULOCYTES NFR BLD AUTO: 1.3 % (ref 0–0.5)
LDH SERPL L TO P-CCNC: 240 U/L (ref 110–260)
LYMPHOCYTES # BLD AUTO: 1.4 K/UL (ref 1–4.8)
LYMPHOCYTES NFR BLD: 22.3 % (ref 18–48)
MAGNESIUM SERPL-MCNC: 1.7 MG/DL (ref 1.6–2.6)
MCH RBC QN AUTO: 28.7 PG (ref 27–31)
MCHC RBC AUTO-ENTMCNC: 32.2 G/DL (ref 32–36)
MCV RBC AUTO: 89 FL (ref 82–98)
MONOCYTES # BLD AUTO: 0.5 K/UL (ref 0.3–1)
MONOCYTES NFR BLD: 7.6 % (ref 4–15)
NEUTROPHILS # BLD AUTO: 4.3 K/UL (ref 1.8–7.7)
NEUTROPHILS NFR BLD: 68.2 % (ref 38–73)
NRBC BLD-RTO: 0 /100 WBC
PHOSPHATE SERPL-MCNC: 3.4 MG/DL (ref 2.7–4.5)
PHOSPHATE SERPL-MCNC: 3.5 MG/DL (ref 2.7–4.5)
PLATELET # BLD AUTO: 143 K/UL (ref 150–450)
PMV BLD AUTO: 8.8 FL (ref 9.2–12.9)
POTASSIUM SERPL-SCNC: 4.2 MMOL/L (ref 3.5–5.1)
PROT SERPL-MCNC: 7 G/DL (ref 6–8.4)
RBC # BLD AUTO: 3.34 M/UL (ref 4–5.4)
SODIUM SERPL-SCNC: 138 MMOL/L (ref 136–145)
URATE SERPL-MCNC: 5 MG/DL (ref 2.4–5.7)
WBC # BLD AUTO: 6.33 K/UL (ref 3.9–12.7)

## 2024-04-08 PROCEDURE — 83735 ASSAY OF MAGNESIUM: CPT | Mod: PN | Performed by: INTERNAL MEDICINE

## 2024-04-08 PROCEDURE — 80053 COMPREHEN METABOLIC PANEL: CPT | Mod: PN | Performed by: INTERNAL MEDICINE

## 2024-04-08 PROCEDURE — 83615 LACTATE (LD) (LDH) ENZYME: CPT | Mod: PN | Performed by: INTERNAL MEDICINE

## 2024-04-08 PROCEDURE — 85025 COMPLETE CBC W/AUTO DIFF WBC: CPT | Mod: PN | Performed by: INTERNAL MEDICINE

## 2024-04-08 PROCEDURE — 84100 ASSAY OF PHOSPHORUS: CPT | Mod: 91,PN | Performed by: INTERNAL MEDICINE

## 2024-04-08 PROCEDURE — 84100 ASSAY OF PHOSPHORUS: CPT | Mod: PN | Performed by: INTERNAL MEDICINE

## 2024-04-08 PROCEDURE — 84550 ASSAY OF BLOOD/URIC ACID: CPT | Mod: PN | Performed by: INTERNAL MEDICINE

## 2024-04-08 PROCEDURE — 36415 COLL VENOUS BLD VENIPUNCTURE: CPT | Mod: PN | Performed by: INTERNAL MEDICINE

## 2024-04-09 RX ORDER — CYPROHEPTADINE HYDROCHLORIDE 4 MG/1
4 TABLET ORAL 3 TIMES DAILY PRN
Qty: 90 TABLET | Refills: 2 | Status: ON HOLD | OUTPATIENT
Start: 2024-04-09 | End: 2024-05-03 | Stop reason: HOSPADM

## 2024-04-09 NOTE — TELEPHONE ENCOUNTER
----- Message from Marium Tobin sent at 4/9/2024  2:16 PM CDT -----  Regarding: call back  Type: Patient Call Back    Who called: Rama Morgan [facility]     What is the request in detail: requesting rx for an appetite stimulant, states pt is losing 1 lb weekly due to not having an appetite     Can the clinic reply by [facility]?no    Would the patient rather a call back or a response via [facility]? call    Best call back number: [phone], Cathy cell     Additional Information:

## 2024-04-09 NOTE — TELEPHONE ENCOUNTER
No care due was identified.  [facility] Embedded Care Due Messages. Reference number: [id number].   4/09/2024 2:34:54 PM CDT

## 2024-04-10 ENCOUNTER — ANESTHESIA EVENT (OUTPATIENT)
Dept: ENDOSCOPY | Facility: HOSPITAL | Age: 79
End: 2024-04-10
Payer: MEDICARE

## 2024-04-10 ENCOUNTER — ANESTHESIA (OUTPATIENT)
Dept: ENDOSCOPY | Facility: HOSPITAL | Age: 79
End: 2024-04-10
Payer: MEDICARE

## 2024-04-10 ENCOUNTER — HOSPITAL ENCOUNTER (OUTPATIENT)
Facility: HOSPITAL | Age: 79
Discharge: HOME | End: 2024-04-10
Attending: INTERNAL MEDICINE | Admitting: INTERNAL MEDICINE
Payer: MEDICARE

## 2024-04-10 ENCOUNTER — PATIENT MESSAGE (OUTPATIENT)
Dept: ADMINISTRATIVE | Facility: OTHER | Age: 79
End: 2024-04-10
Payer: MEDICARE

## 2024-04-10 VITALS
DIASTOLIC BLOOD PRESSURE: 73 MMHG | HEIGHT: 64 IN | RESPIRATION RATE: 18 BRPM | OXYGEN SATURATION: 95 % | BODY MASS INDEX: 20.66 KG/M2 | HEART RATE: 63 BPM | SYSTOLIC BLOOD PRESSURE: 163 MMHG | TEMPERATURE: 98 F | WEIGHT: 121 LBS

## 2024-04-10 DIAGNOSIS — D46.9 MDS (MYELODYSPLASTIC SYNDROME): ICD-10-CM

## 2024-04-10 PROCEDURE — 88305 TISSUE EXAM BY PATHOLOGIST: CPT | Mod: 26,ICN,, | Performed by: PATHOLOGY

## 2024-04-10 PROCEDURE — 63600175 PHARM REV CODE 636 W HCPCS: Performed by: REGISTERED NURSE

## 2024-04-10 PROCEDURE — 88311 DECALCIFY TISSUE: CPT | Performed by: PATHOLOGY

## 2024-04-10 PROCEDURE — 88237 TISSUE CULTURE BONE MARROW: CPT

## 2024-04-10 PROCEDURE — 88313 SPECIAL STAINS GROUP 2: CPT | Mod: 26,ICN,, | Performed by: PATHOLOGY

## 2024-04-10 PROCEDURE — 88299 UNLISTED CYTOGENETIC STUDY: CPT

## 2024-04-10 PROCEDURE — 81450 HL NEO GSAP 5-50DNA/DNA&RNA: CPT

## 2024-04-10 PROCEDURE — 88189 FLOWCYTOMETRY/READ 16 & >: CPT | Mod: ,,, | Performed by: PATHOLOGY

## 2024-04-10 PROCEDURE — E9220 PRA ENDO ANESTHESIA: HCPCS | Mod: ,,, | Performed by: REGISTERED NURSE

## 2024-04-10 PROCEDURE — 88342 IMHCHEM/IMCYTCHM 1ST ANTB: CPT | Performed by: PATHOLOGY

## 2024-04-10 PROCEDURE — 37000008 HC ANESTHESIA 1ST 15 MINUTES: Performed by: INTERNAL MEDICINE

## 2024-04-10 PROCEDURE — 37000009 HC ANESTHESIA EA ADD 15 MINS: Performed by: INTERNAL MEDICINE

## 2024-04-10 PROCEDURE — 88341 IMHCHEM/IMCYTCHM EA ADD ANTB: CPT | Mod: 59 | Performed by: PATHOLOGY

## 2024-04-10 PROCEDURE — 88185 FLOWCYTOMETRY/TC ADD-ON: CPT | Performed by: PATHOLOGY

## 2024-04-10 PROCEDURE — 25000003 PHARM REV CODE 250: Performed by: REGISTERED NURSE

## 2024-04-10 PROCEDURE — 85097 BONE MARROW INTERPRETATION: CPT | Mod: ICN,,, | Performed by: PATHOLOGY

## 2024-04-10 PROCEDURE — 88311 DECALCIFY TISSUE: CPT | Mod: 26,ICN,, | Performed by: PATHOLOGY

## 2024-04-10 PROCEDURE — 38222 DX BONE MARROW BX & ASPIR: CPT | Performed by: INTERNAL MEDICINE

## 2024-04-10 PROCEDURE — 38222 DX BONE MARROW BX & ASPIR: CPT | Mod: RT,,,

## 2024-04-10 PROCEDURE — 88342 IMHCHEM/IMCYTCHM 1ST ANTB: CPT | Mod: 26,59,ICN, | Performed by: PATHOLOGY

## 2024-04-10 PROCEDURE — 88184 FLOWCYTOMETRY/ TC 1 MARKER: CPT | Performed by: PATHOLOGY

## 2024-04-10 PROCEDURE — 88341 IMHCHEM/IMCYTCHM EA ADD ANTB: CPT | Mod: 26,59,ICN, | Performed by: PATHOLOGY

## 2024-04-10 PROCEDURE — 88264 CHROMOSOME ANALYSIS 20-25: CPT

## 2024-04-10 PROCEDURE — 88271 CYTOGENETICS DNA PROBE: CPT

## 2024-04-10 PROCEDURE — 88305 TISSUE EXAM BY PATHOLOGIST: CPT | Mod: 59 | Performed by: PATHOLOGY

## 2024-04-10 PROCEDURE — 88313 SPECIAL STAINS GROUP 2: CPT | Performed by: PATHOLOGY

## 2024-04-10 RX ORDER — PROPOFOL 10 MG/ML
INJECTION, EMULSION INTRAVENOUS CONTINUOUS PRN
Status: DISCONTINUED | OUTPATIENT
Start: 2024-04-10 | End: 2024-04-10

## 2024-04-10 RX ORDER — PROPOFOL 10 MG/ML
VIAL (ML) INTRAVENOUS
Status: DISCONTINUED | OUTPATIENT
Start: 2024-04-10 | End: 2024-04-10

## 2024-04-10 RX ORDER — PHENYLEPHRINE HYDROCHLORIDE 10 MG/ML
INJECTION INTRAVENOUS
Status: DISCONTINUED | OUTPATIENT
Start: 2024-04-10 | End: 2024-04-10

## 2024-04-10 RX ORDER — LIDOCAINE HYDROCHLORIDE 20 MG/ML
INJECTION INTRAVENOUS
Status: DISCONTINUED | OUTPATIENT
Start: 2024-04-10 | End: 2024-04-10

## 2024-04-10 RX ORDER — SODIUM CHLORIDE 9 MG/ML
INJECTION, SOLUTION INTRAVENOUS CONTINUOUS
Status: DISCONTINUED | OUTPATIENT
Start: 2024-04-10 | End: 2024-04-10 | Stop reason: HOSPADM

## 2024-04-10 RX ADMIN — LIDOCAINE HYDROCHLORIDE 40 MG: 20 INJECTION INTRAVENOUS at 12:04

## 2024-04-10 RX ADMIN — PHENYLEPHRINE HYDROCHLORIDE 100 MCG: 10 INJECTION INTRAVENOUS at 12:04

## 2024-04-10 RX ADMIN — PROPOFOL 150 MCG/KG/MIN: 10 INJECTION, EMULSION INTRAVENOUS at 12:04

## 2024-04-10 RX ADMIN — PROPOFOL 50 MG: 10 INJECTION, EMULSION INTRAVENOUS at 12:04

## 2024-04-10 RX ADMIN — SODIUM CHLORIDE: 9 INJECTION, SOLUTION INTRAVENOUS at 12:04

## 2024-04-10 NOTE — PROCEDURES
PROCEDURE NOTE:  Date of Procedure: 04/10/2024   Bone Marrow Biopsy and Aspiration  Indication: Myelodysplastic Syndrome   Consent: Informed consent was obtained from patient.  Timeout: Done and documented.  Position: Left lateral   Site: Right posterior illiac crest.  Prep: Betadine.  Needle used: 11 gauge Jamshidi needle.  Anesthetic: 1% lidocaine 5 cc.  Biopsy: The biopsy needle was introduced into the marrow cavity and 25cc of aspirate was obtained without complications and sent for flow cytometry, AML FISH, DNA/RNA hold, NGS, and cytogenetics. Core biopsy obtained without difficulty and sent for routine histologic examination.  Complications: None.  Disposition: The patient was discharged home per anesthesia protocol.  Blood loss: Minimal.     OSBALDO Quiñonez  Hematology/Oncology/Bone Marrow Transplant

## 2024-04-10 NOTE — ANESTHESIA POSTPROCEDURE EVALUATION
Anesthesia Post Evaluation    Patient: Niurka Pedraza    Procedure(s) Performed: Procedure(s) (LRB):  Biopsy-bone marrow (N/A)    Final Anesthesia Type: general      Patient location during evaluation: PACU  Patient participation: Yes- Able to Participate  Level of consciousness: awake and alert  Post-procedure vital signs: reviewed and stable  Pain management: adequate  Airway patency: patent    PONV status at discharge: No PONV  Anesthetic complications: no      Cardiovascular status: blood pressure returned to baseline  Respiratory status: room air  Hydration status: euvolemic  Follow-up not needed.              Vitals Value Taken Time   /73 04/10/24 1314   Temp 36.6 °C (97.9 °F) 04/10/24 1244   Pulse 63 04/10/24 1257   Resp 18 04/10/24 1314   SpO2 95 % 04/10/24 1314         Event Time   Out of Recovery 13:21:57         Pain/Luann Score: Luann Score: 10 (4/10/2024 12:59 PM)

## 2024-04-10 NOTE — DISCHARGE SUMMARY
[facility] 4th Floor  Hematology  Bone Marrow Transplant  Discharge Summary      Patient Name: Niurka Pedraza  MRN: [id number]  Admission Date: 4/10/2024  Hospital Length of Stay: 0 days  Discharge Date and Time: 4/10/2024  1:22 PM  Attending Physician: Mohit Centeno MD   Discharging Provider: Zaida Suarez NP  Primary Care Provider: Savana Anderson MD    HPI: Myelodysplastic syndrome with increased blasts-2              A. 8/29/2023: Bone marrow biopsy - 65-75% cellular marrow consistent with myelodysplastic syndrome with excess blasts-2; cytogenetics 46,XX,del(3)(q12)[2]/47,sl,+8[18]; NGS not sent; IPSS-R score of 7 = very high risk              B. 9/25/2023: Cycle 1 azacitidine-venetoclax (5 days aza, 14 days angelo) for MDS/AML              C. 10/18/2023: Bone marrow biopsy - 50-60% cellular marrow with no increased blasts and trilineage hematopoiesis with granulocytic hypoplasia and moderate dysgranulopoiesis, mildly left-shifted marked erythroid hyperplasia with severe dyserythropoiesis, and marked megakaryocytic hyperplasia with predominantly variably sized including small del3q-like monolobated forms; 3-4+ histiocytic iron stores; focal MF-1; cytogenetics 46,XX[20]; NGS shows ASXL1 (26%), SRSF2 (33%) and STAG2 (3%).    Repeat BMBX today.     Procedure(s) (LRB):  Biopsy-bone marrow (Right)     Hospital Course: Patient admitted to endoscopy suite today for a bone marrow aspiration and biopsy. Pt was consented for a bone marrow biopsy. Patient was sedated per anesthesia and a bone marrow biopsy and aspiration was performed in the endoscopy suite procedure room (see procedure note). Patient was then transferred to post op recovery area and discharged home when appropriate per anesthesia.      Goals of Care Treatment Preferences:  Code Status: Full Code    Living Will: Yes                  Significant Diagnostic Studies:   Specimen (24h ago, onward)       Start     Ordered    04/10/24 0042   Specimen to Pathology, Bone Marrow Aspiration/Biopsy  Once        Question Answer Comment   Specimen Source: Bone Marrow Aspirate, Right Iliac Crest    Clinical Information: MDS    Release to patient Immediate        04/10/24 1205                    Pending Diagnostic Studies:       Procedure Component Value Units Date/Time    AML FISH, Bone Marrow (Ages over 30 yrs) [[id number]] Collected: 04/10/24 1208    Order Status: Sent Lab Status: In process Updated: 04/10/24 1208    Specimen: Bone Marrow     Chromosome Analysis, Bone Marrow Right Posterior Iliac Crest [[id number]] Collected: 04/10/24 1208    Order Status: Sent Lab Status: In process Updated: 04/10/24 1208    Specimen: Bone Marrow     Heme Disorders DNA/RNA Hold, Bone Marrow [[id number]] Collected: 04/10/24 1208    Order Status: Sent Lab Status: In process Updated: 04/10/24 1208    Specimen: Bone Marrow     Leukemia/Lymphoma Screen - Bone Marrow Right Posterior Iliac Crest [[id number]] Collected: 04/10/24 1208    Order Status: Sent Lab Status: In process Updated: 04/10/24 1208    Specimen: Bone Marrow     OncoHeme (NGS) Hematologic Neoplasms, BM Diagnosis or Indication for test: MDS [[id number]] Collected: 04/10/24 1208    Order Status: Sent Lab Status: In process Updated: 04/10/24 1208    Specimen: Bone Marrow     Specimen to Pathology, Bone Marrow Aspiration/Biopsy [[id number]] Collected: 04/10/24 1207    Order Status: Sent Lab Status: In process Updated: 04/10/24 1208    Specimen: Bone Marrow           There are no hospital problems to display for this patient.     Discharged Condition: stable    Disposition: Home or Self Care    Follow Up:   Future Appointments   Date Time Provider Department Center   4/12/2024  1:20 PM MATA Walters MD [facility] COLON [address]   4/15/2024  9:15 AM LAB, [facility]RAW [facility] OST LAB [facility]   4/19/2024 11:40 AM Mohit Centeno MD Count includes the [facility] BMT [facility] [address]   4/22/2024  9:15 AM LAB, [facility] DRAW STATION OST LAB  [facility]   5/9/2024 12:00 PM LAB, [facility] LAB [place]   5/9/2024  1:00 PM Talita Barrios MD [facility] GYNONC [facility] at [facility]   5/22/2024  2:20 PM Anaya Oropeza MD [facility] UROLOGY [facility]   6/17/2024 10:30 AM Nemesio Cazares MD [facility] NLPUL MBP        Patient Instructions:      Diet Adult Regular     Notify your health care provider if you experience any of the following:  temperature >100.4     Notify your health care provider if you experience any of the following:  severe uncontrolled pain     Notify your health care provider if you experience any of the following:  redness, tenderness, or signs of infection (pain, swelling, redness, odor or green/yellow discharge around incision site)     Remove dressing in 24 hours     Activity as tolerated     Shower on day dressing removed (No bath)     Medications:  Reconciled Home Medications:      Medication List        ASK your doctor about these medications      acyclovir 400 MG tablet  Commonly known as: ZOVIRAX  Take 1 tablet (400 mg total) by mouth 2 (two) times daily.     albuterol-ipratropium 2.5 mg-0.5 mg/3 mL nebulizer solution  Commonly known as: DUO-NEB  Take 3 mLs by nebulization every 6 (six) hours as needed for Wheezing or Shortness of Breath. Rescue     atorvastatin 20 MG tablet  Commonly known as: LIPITOR  Take 1 tablet (20 mg total) by mouth once daily.     azelastine 137 mcg (0.1 %) nasal spray  Commonly known as: ASTELIN  1 spray (137 mcg total) by Nasal route 2 (two) times daily.     calcium-vitamin D3 500 mg-5 mcg (200 unit) per tablet  Commonly known as: CALCIUM 500 + D  Take 1 tablet by mouth 2 (two) times daily with meals.     carvediloL 25 MG tablet  Commonly known as: COREG  Take 1 tablet (25 mg total) by mouth 2 (two) times daily with meals.     cyproheptadine 4 mg tablet  Commonly known as: PERIACTIN  Take 1 tablet (4 mg total) by mouth 3 (three) times daily as needed.     ferrous gluconate 324 MG tablet  Commonly known as:  FERGON  Take 1 tablet (324 mg total) by mouth every other day.     fluticasone furoate-vilanteroL 100-25 mcg/dose diskus inhaler  Commonly known as: BREO ELLIPTA  Inhale 1 puff into the lungs once daily. Controller.  PLEASE NOTE IT IS ONCE A DAY!!     levothyroxine 50 MCG tablet  Commonly known as: SYNTHROID  Take 1 tablet (50 mcg total) by mouth before breakfast.     mirtazapine 7.5 MG Tab  Commonly known as: REMERON  Take 1 tablet (7.5 mg total) by mouth every evening.     multivitamin per tablet  Commonly known as: THERAGRAN  Take 1 tablet by mouth once daily.     ondansetron 4 MG Tbdl  Commonly known as: ZOFRAN-ODT  Take 1 tablet (4 mg total) by mouth every 6 (six) hours as needed (nausea).     oxybutynin 15 MG Tr24  Commonly known as: DITROPAN XL  Take 1 tablet (15 mg total) by mouth once daily.     pantoprazole 20 MG tablet  Commonly known as: PROTONIX  Take 1 tablet (20 mg total) by mouth once daily.     posaconazole 100 mg Tbec tablet  Commonly known as: NOXAFIL  Take 3 tablets (300 mg) by mouth twice daily on the first day, and then take 1 tablet by mouth once daily thereafter.     potassium chloride 20 mEq  Commonly known as: K-TAB  Take 1 tablet (20 mEq total) by mouth once daily.     predniSONE 1 MG tablet  Commonly known as: DELTASONE  Take 2 tablets (2 mg total) by mouth once daily.     sodium chloride 3% 3 % nebulizer solution  Take 4 mLs by nebulization as needed for Other.     traMADoL 50 mg tablet  Commonly known as: ULTRAM  Take 1 tablet (50 mg total) by mouth 3 (three) times daily.     ZINC WITH VITAMIN C ORAL  Take 1 tablet by mouth Daily.              Zaida Suarez, NP  Bone Marrow Transplant  [facility] 4th Floor

## 2024-04-10 NOTE — DISCHARGE INSTRUCTIONS
Discharge instructions for having a Bone Marrow Aspiration / Biopsy:    Keep Bandage in place for 24 hours.  - Do not shower or take a tub bath during this time (you may sponge bathe).  - Call the nurse or physician for excessive bleeding or pain at biopsy site.  - You may take Tylenol as needed for pain unless otherwise specified by your doctor.     During procedure today you have received medication to sedate you. These medications may affect your judgment, balance, and/or coordination. Therefore, for the next 24 hours, you have the follow restrictions:  - Do not drive a car or operate heavy machinery for the rest of the day.  - Do not make legal/financial decisions, sign important papers, or drink alcohol.  - You may resume other activities as tolerated.    You can call [phone] for any problems during the hours of 8:00 AM-5:00PM.    For an emergency after 5:00 PM you can call [phone] and have the  page the Hematologist / Oncologist on call.

## 2024-04-10 NOTE — TRANSFER OF CARE
"Anesthesia Transfer of Care Note    Patient: Niurka Pedraza    Procedure(s) Performed: Procedure(s) (LRB):  Biopsy-bone marrow (N/A)    Patient location: GI    Anesthesia Type: general    Transport from OR: Transported from OR on room air with adequate spontaneous ventilation    Post pain: adequate analgesia    Post assessment: no apparent anesthetic complications and tolerated procedure well    Post vital signs: stable    Level of consciousness: responds to stimulation    Nausea/Vomiting: no nausea/vomiting    Complications: none    Transfer of care protocol was followedComments: Nurse at bedside, VSS, spont reg resp noted    Last vitals: Visit Vitals  BP (!) 95/52 (BP Location: Left arm, Patient Position: Lying)   Pulse 71   Temp 36.6 °C (97.9 °F) (Temporal)   Resp 18   Ht 5' 4" (1.626 m)   Wt 54.9 kg (121 lb)   LMP 02/08/2000   SpO2 95%   Breastfeeding No   BMI 20.77 kg/m²     "

## 2024-04-10 NOTE — ANESTHESIA PREPROCEDURE EVALUATION
04/10/2024  Niurka Pedraza is a 79 y.o., female. [facility]  Anesthesia Pre-Operative Evaluation       Patient Name: Niurka Pedraza  YOB: 1945  MRN: [id number]  [facility]: [id number]      Code Status: Prior   Date of Procedure: 4/10/2024  Anesthesia: Choice Procedure: Procedure(s) (LRB):  Biopsy-bone marrow (N/A)  Pre-Operative Diagnosis: MDS (myelodysplastic syndrome) [D46.9]  Proceduralist: Surgeon(s) and Role:     * Mohit Centeno MD - Primary        SUBJECTIVE:   Niurka Pedraza is a 79 y.o. female who  has a past medical history of Arthritis, Borderline serous cystadenoma of right ovary (04/03/2018), Breast cancer, Coccidiomycosis, progressive, Elevated CA-125 (02/25/2018), Hyperlipidemia, Hypertension, Liver disease, OAB (overactive bladder), Osteopenia, Osteoporosis, Pelvic mass (02/25/2018), Pulmonary fibrosis, Pulmonary nodules, SOB (shortness of breath) on exertion, Thyroid disease, Urinary tract infection due to extended-spectrum beta lactamase (ESBL) producing Escherichia coli (03/16/2022), and UTI (urinary tract infection). No notes on file    No current facility-administered medications for this encounter.       she has a current medication list which includes the following long-term medication(s): acyclovir, albuterol-ipratropium, atorvastatin, azelastine, calcium-vitamin d3, carvedilol, fluticasone furoate-vilanterol, levothyroxine, mirtazapine, oxybutynin, pantoprazole, and [DISCONTINUED] budesonide-formoterol 80-4.5 mcg.   ALLERGIES:     Review of patient's allergies indicates:   Allergen Reactions    Ciprofloxacin Other (See Comments)     Right foot pain and edema, tendonitis    Amlodipine Edema and Swelling     BLE  BLE    Lisinopril Other (See Comments)     cough    Nitrofuran analogues      LDA:          Lines/Drains/Airways       None                  MEDICATIONS:     Antibiotics (From admission, onward)      None          VTE Risk Mitigation (From admission, onward)      None          No current facility-administered medications for this encounter.     Current Outpatient Medications   Medication Sig Dispense Refill    acyclovir (ZOVIRAX) 400 MG tablet Take 1 tablet (400 mg total) by mouth 2 (two) times daily. 60 tablet 11    albuterol-ipratropium (DUO-NEB) 2.5 mg-0.5 mg/3 mL nebulizer solution Take 3 mLs by nebulization every 6 (six) hours as needed for Wheezing or Shortness of Breath. Rescue 75 mL 11    ascorbic acid/zinc (ZINC WITH VITAMIN C ORAL) Take 1 tablet by mouth Daily.      atorvastatin (LIPITOR) 20 MG tablet Take 1 tablet (20 mg total) by mouth once daily. 90 tablet 3    azelastine (ASTELIN) 137 mcg (0.1 %) nasal spray 1 spray (137 mcg total) by Nasal route 2 (two) times daily. 30 mL 6    calcium-vitamin D3 (CALCIUM 500 + D) 500 mg(1,250mg) -200 unit per tablet Take 1 tablet by mouth 2 (two) times daily with meals. (Patient not taking: Reported on 3/22/2024)      carvediloL (COREG) 25 MG tablet Take 1 tablet (25 mg total) by mouth 2 (two) times daily with meals. 180 tablet 1    cyproheptadine (PERIACTIN) 4 mg tablet Take 1 tablet (4 mg total) by mouth 3 (three) times daily as needed. 90 tablet 2    ferrous gluconate (FERGON) 324 MG tablet Take 1 tablet (324 mg total) by mouth every other day. (Patient not taking: Reported on 3/22/2024) 90 tablet 3    fluticasone furoate-vilanteroL (BREO ELLIPTA) 100-25 mcg/dose diskus inhaler Inhale 1 puff into the lungs once daily. Controller.  PLEASE NOTE IT IS ONCE A DAY!! 60 each 4    levothyroxine (SYNTHROID) 50 MCG tablet Take 1 tablet (50 mcg total) by mouth before breakfast. 90 tablet 1    mirtazapine (REMERON) 7.5 MG Tab Take 1 tablet (7.5 mg total) by mouth every evening. 90 tablet 3    multivitamin (THERAGRAN) per tablet Take 1 tablet by mouth once daily.      ondansetron (ZOFRAN-ODT) 4 MG TbDL Take 1  tablet (4 mg total) by mouth every 6 (six) hours as needed (nausea). 30 tablet 0    oxybutynin (DITROPAN XL) 15 MG TR24 Take 1 tablet (15 mg total) by mouth once daily. 30 tablet 11    pantoprazole (PROTONIX) 20 MG tablet Take 1 tablet (20 mg total) by mouth once daily. 90 tablet 1    posaconazole (NOXAFIL) 100 mg TbEC tablet Take 3 tablets (300 mg) by mouth twice daily on the first day, and then take 1 tablet by mouth once daily thereafter. (Patient not taking: Reported on 3/22/2024) 30 tablet 2    potassium chloride (K-TAB) 20 mEq Take 1 tablet (20 mEq total) by mouth once daily. 90 tablet 3    predniSONE (DELTASONE) 1 MG tablet Take 2 tablets (2 mg total) by mouth once daily. 180 tablet 3    sodium chloride 3% 3 % nebulizer solution Take 4 mLs by nebulization as needed for Other. 120 mL 3    traMADoL (ULTRAM) 50 mg tablet Take 1 tablet (50 mg total) by mouth 3 (three) times daily. 90 each 0          History:   There are no hospital problems to display for this patient.    Surgical History:    has a past surgical history that includes Cholecystectomy; Mastectomy (Right); Esophagogastroduodenoscopy (N/A, 04/08/2021); Endoscopic ultrasound of upper gastrointestinal tract (N/A, 04/08/2021); ERCP (N/A, 04/08/2021); Hysterectomy; Cystoscopy with biopsy of bladder (Bilateral, 07/27/2022); Colonoscopy (N/A, 12/09/2022); and Esophagogastroduodenoscopy (N/A, 06/02/2023).   Social History:    reports that she is not currently sexually active and has had partner(s) who are male. She reports using the following method of birth control/protection: None.  reports that she quit smoking about 24 years ago. Her smoking use included cigarettes. She started smoking about 54 years ago. She has a 15.1 pack-year smoking history. She has never used smokeless tobacco. She reports that she does not drink alcohol and does not use drugs.     OBJECTIVE:     Vital Signs (Most Recent):    Vital Signs Range (Last 24H):          There is no  height or weight on file to calculate BMI.   Wt Readings from Last 4 Encounters:   03/19/24 61 kg (134 lb 6.4 oz)   03/19/24 61 kg (134 lb 7.7 oz)   03/06/24 63.4 kg (139 lb 12.4 oz)   02/28/24 61.7 kg (136 lb)     Significant Labs:  Lab Results   Component Value Date    WBC 6.33 04/08/2024    HGB 9.6 (L) 04/08/2024    HCT 29.8 (L) 04/08/2024     (L) 04/08/2024     04/08/2024    K 4.2 04/08/2024     04/08/2024    CREATININE 0.8 04/08/2024    BUN 19 04/08/2024    CO2 27 04/08/2024     (H) 04/08/2024    CALCIUM 9.8 04/08/2024    MG 1.7 04/08/2024    PHOS 3.5 04/08/2024    PHOS 3.4 04/08/2024    ALKPHOS 166 (H) 04/08/2024    ALT 17 04/08/2024    AST 12 04/08/2024    ALBUMIN 3.1 (L) 04/08/2024    INR 1.1 08/29/2023    APTT 28.0 08/29/2023    HGBA1C 5.1 06/22/2023    CPK 38 06/15/2022    TROPONINI <0.012 12/29/2023    BNP 98 05/23/2023    NTPROBNP 1490 12/29/2023     Patient's last menstrual period was 02/08/2000.  Recent Results (from the past 72 hour(s))   CBC Auto Differential    Collection Time: 04/08/24 12:28 PM   Result Value Ref Range    WBC 6.33 3.90 - 12.70 K/uL    RBC 3.34 (L) 4.00 - 5.40 M/uL    Hemoglobin 9.6 (L) 12.0 - 16.0 g/dL    Hematocrit 29.8 (L) 37.0 - 48.5 %    MCV 89 82 - 98 fL    MCH 28.7 27.0 - 31.0 pg    MCHC 32.2 32.0 - 36.0 g/dL    RDW 21.0 (H) 11.5 - 14.5 %    Platelets 143 (L) 150 - 450 K/uL    MPV 8.8 (L) 9.2 - 12.9 fL    Immature Granulocytes 1.3 (H) 0.0 - 0.5 %    Gran # (ANC) 4.3 1.8 - 7.7 K/uL    Immature Grans (Abs) 0.08 (H) 0.00 - 0.04 K/uL    Lymph # 1.4 1.0 - 4.8 K/uL    Mono # 0.5 0.3 - 1.0 K/uL    Eos # 0.0 0.0 - 0.5 K/uL    Baso # 0.02 0.00 - 0.20 K/uL    nRBC 0 0 /100 WBC    Gran % 68.2 38.0 - 73.0 %    Lymph % 22.3 18.0 - 48.0 %    Mono % 7.6 4.0 - 15.0 %    Eosinophil % 0.3 0.0 - 8.0 %    Basophil % 0.3 0.0 - 1.9 %    Differential Method Automated    Comprehensive Metabolic Panel    Collection Time: 04/08/24 12:28 PM   Result Value Ref Range    Sodium  138 136 - 145 mmol/L    Potassium 4.2 3.5 - 5.1 mmol/L    Chloride 101 95 - 110 mmol/L    CO2 27 23 - 29 mmol/L    Glucose 166 (H) 70 - 110 mg/dL    BUN 19 8 - 23 mg/dL    Creatinine 0.8 0.5 - 1.4 mg/dL    Calcium 9.8 8.7 - 10.5 mg/dL    Total Protein 7.0 6.0 - 8.4 g/dL    Albumin 3.1 (L) 3.5 - 5.2 g/dL    Total Bilirubin 0.9 0.1 - 1.0 mg/dL    Alkaline Phosphatase 166 (H) 55 - 135 U/L    AST 12 10 - 40 U/L    ALT 17 10 - 44 U/L    eGFR >60.0 >60 mL/min/1.73 m^2    Anion Gap 10 8 - 16 mmol/L   Magnesium    Collection Time: 04/08/24 12:28 PM   Result Value Ref Range    Magnesium 1.7 1.6 - 2.6 mg/dL   PHOSPHORUS    Collection Time: 04/08/24 12:28 PM   Result Value Ref Range    Phosphorus 3.5 2.7 - 4.5 mg/dL   Lactate Dehydrogenase    Collection Time: 04/08/24 12:28 PM   Result Value Ref Range     110 - 260 U/L   Uric Acid    Collection Time: 04/08/24 12:28 PM   Result Value Ref Range    Uric Acid 5.0 2.4 - 5.7 mg/dL   PHOSPHORUS    Collection Time: 04/08/24 12:28 PM   Result Value Ref Range    Phosphorus 3.4 2.7 - 4.5 mg/dL       EKG:   Results for orders placed or performed during the hospital encounter of 02/06/24   EKG 12-lead    Collection Time: 02/06/24 11:17 AM   Result Value Ref Range    QRS Duration 86 ms    [facility] QTC Calculation 415 ms    Narrative    Test Reason : R06.02,    Vent. Rate : 079 BPM     Atrial Rate : 079 BPM     P-R Int : 134 ms          QRS Dur : 086 ms      QT Int : 362 ms       P-R-T Axes : 065 033 015 degrees     QTc Int : 415 ms    Normal sinus rhythm  Abnormal R wave progression in the precordial leads likely lead placement  Abnormal ECG  When compared with ECG of 28-DEC-2023 21:50,  T wave inversion now evident in Inferior leads  Nonspecific T wave abnormality now evident in Anterior leads  Confirmed by FRANC AVILA, HOMEYAR ([id number]) on 2/6/2024 1:30:11 PM    Referred By: [email or web address]   SELF           Confirmed By:ISAIAS BRUNER MD       TTE:  Results for orders placed or performed during  the hospital encounter of 05/23/23   Echo Saline Bubble? No   Result Value Ref Range    BSA 1.84 m2    TDI SEPTAL 0.08 m/s    LV LATERAL E/E' RATIO 11.00 m/s    LV SEPTAL E/E' RATIO 11.00 m/s    LA WIDTH 3.60 cm    IVC diameter 1.45 cm    Left Ventricular Outflow Tract Mean Velocity 0.66 cm/s    Left Ventricular Outflow Tract Mean Gradient 1.91 mmHg    Pulmonary Valve Mean Velocity 0.66 m/s    TDI LATERAL 0.08 m/s    PV PEAK VELOCITY 0.81 cm/s    LVIDd 4.83 3.5 - 6.0 cm    IVS 0.83 0.6 - 1.1 cm    Posterior Wall 0.76 0.6 - 1.1 cm    LVIDs 2.94 2.1 - 4.0 cm    FS 39 28 - 44 %    LA volume 62.75 cm3    Sinus 2.80 cm    STJ 2.49 cm    Ascending aorta 2.95 cm    LV mass 127.01 g    LA size 4.01 cm    RVDD 3.33 cm    TAPSE 1.40 cm    Left Ventricle Relative Wall Thickness 0.31 cm    AV mean gradient 7 mmHg    AV valve area 1.78 cm2    AV Velocity Ratio 0.52     AV index (prosthetic) 0.57     E/A ratio 1.63     Mean e' 0.08 m/s    E wave deceleration time 255.58 msec    IVRT 110.37 msec    LVOT diameter 2.00 cm    LVOT area 3.1 cm2    LVOT peak daryn 0.89 m/s    LVOT peak VTI 22.60 cm    Ao peak daryn 1.72 m/s    Ao VTI 39.8 cm    Mr max daryn 4.32 m/s    LVOT stroke volume 70.96 cm3    AV peak gradient 12 mmHg    E/E' ratio 11.00 m/s    MV Peak E Daryn 0.88 m/s    TR Max Daryn 2.72 m/s    MV Peak A Daryn 0.54 m/s    LV Systolic Volume 33.27 mL    LV Systolic Volume Index 18.5 mL/m2    LV Diastolic Volume 108.97 mL    LV Diastolic Volume Index 60.54 mL/m2    LA Volume Index 34.9 mL/m2    LV Mass Index 71 g/m2    RA Major Axis 4.41 cm    Left Atrium Minor Axis 5.42 cm    Left Atrium Major Axis 4.84 cm    Triscuspid Valve Regurgitation Peak Gradient 30 mmHg    LA Volume Index (Mod) 25.6 mL/m2    LA volume (mod) 46.00 cm3    RA Width 3.40 cm    Right Atrial Pressure (from IVC) 3 mmHg    EF 60 %    TV resting pulmonary artery pressure 33 mmHg    Narrative    · The left ventricle is normal in size with normal systolic function.  · The  "estimated ejection fraction is 60%.  · The left ventricular global longitudinal strain is -15%.  · Grade II left ventricular diastolic dysfunction.  · Mild left atrial enlargement.  · Normal right ventricular size with normal right ventricular systolic   function.  · Mild right atrial enlargement.  · The estimated PA systolic pressure is 33 mmHg.  · Normal central venous pressure (3 mmHg).        EF   Date Value Ref Range Status   05/25/2023 60 % Final      No results found. However, due to the size of the patient record, not all encounters were searched. Please check Results Review for a complete set of results.  MALORIE:  No results found. However, due to the size of the patient record, not all encounters were searched. Please check Results Review for a complete set of results.  Stress Test:  No results found for this or any previous visit.     LHC:  No results found for this or any previous visit.     PFT:  No results found for: "FEV1", "FVC", "UHL7SWF", "TLC", "DLCO"   ASSESSMENT/PLAN:          Pre-op Assessment    I have reviewed the Patient Summary Reports.    I have reviewed the NPO Status.   I have reviewed the Medications.     Review of Systems  Anesthesia Hx:  No problems with previous Anesthesia   History of prior surgery of interest to airway management or planning:          Denies Family Hx of Anesthesia complications.    Denies Personal Hx of Anesthesia complications.                    Social:  Non-Smoker, Social Alcohol Use       Hematology/Oncology:  Hematology Normal                     Current/Recent Cancer.            Oncology Comments: MDS (myelodysplastic syndrome)     EENT/Dental:  EENT/Dental Normal           Cardiovascular:     Hypertension                                        Pulmonary:  Pneumonia    Shortness of breath                  Renal/:  Renal/ Normal                 Hepatic/GI:      Liver Disease, (Fatty liver disease)  Admission to [facility], [place], operated by [facility] 3 weeks ago  (3/17/24) for diverticulitis " with microperforation          Musculoskeletal:  Musculoskeletal Normal                Neurological:    Neuromuscular Disease,                                   Endocrine:   Hypothyroidism        Denies Obesity / BMI > 30  Dermatological:  Skin Normal    Psych:  Psychiatric Normal                    Physical Exam  General: Well nourished, Cooperative, Alert and Oriented    Airway:  Mallampati: II   Mouth Opening: Normal  TM Distance: Normal  Tongue: Normal  Neck ROM: Normal ROM    Dental:  Intact    Chest/Lungs:  Normal Respiratory Rate    Heart:  Rate: Normal        Anesthesia Plan  Type of Anesthesia, risks & benefits discussed:    Anesthesia Type: Gen ETT, Gen Natural Airway  Intra-op Monitoring Plan: Standard ASA Monitors  Post Op Pain Control Plan: multimodal analgesia  Induction:  IV  ASA Score: 3  Day of Surgery Review of History & Physical: H&P Update referred to the surgeon/provider.I have interviewed and examined the patient. I have reviewed the patient's H&P dated:     Ready For Surgery From Anesthesia Perspective.     .

## 2024-04-11 NOTE — PROGRESS NOTES
The patient location is: home  The chief complaint leading to consultation is:  Diverticulitis    Visit type: audiovisual    Face to Face time with patient: 15 minutes  30 minutes of total time spent on the encounter, which includes face to face time and non-face to face time preparing to see the patient (eg, review of tests), Obtaining and/or reviewing separately obtained history, Documenting clinical information in the electronic or other health record, Independently interpreting results (not separately reported) and communicating results to the patient/family/caregiver, or Care coordination (not separately reported).         Each patient to whom he or she provides medical services by telemedicine is:  (1) informed of the relationship between the physician and patient and the respective role of any other health care provider with respect to management of the patient; and (2) notified that he or she may decline to receive medical services by telemedicine and may withdraw from such care at any time.    Notes:       CRS Office Visit History and Physical    Referring Md:   Self, Aaareferral  No address on file    SUBJECTIVE:     Chief Complaint: diverticulitis    History of Present Illness:  The patient is a new patient to this practice.   Course is as follows:  Patient is a 79 y.o. female presents with diverticulitis  Has a history of myelodysplastic syndrome with excess blasts-2 on chronic steroids, ILD/pulmonary fibrosis, hypertension, MDRO UTIs, pulmonary coccidioidomycosis and hypothyroidism   03/05/2024:  Admitted to the hospital with failure to thrive.  CT scan was performed consistent with segmental colitis of the sigmoid colon consistent with diverticular disease.  He is on her prior imaging, she is 1 prior similar episode in October 2023.  Presents for evaluation virtually with her daughter.  Her daughter lives with her full-time over the past 3 months.  She is nonambulatory and mostly bed-bound requiring  full assistance for all activities of her daily living prior to her arrival in March, she was having significant constipation.  She is now taking MiraLax intermittently as well as a probiotic.  She is having bowel movements daily that are soft.  No bleeding.  No abdominal pain.  Most of the history was performed with the daughter since the patient has poor recollection.      Last Colonoscopy: 9/12/2022  Impression:            - The examined portion of the ileum was normal.                          - Two 6 to 10 mm polyps in the ascending colon,                          removed with a cold snare. Resected and retrieved.                          - Two 5 to 7 mm polyps in the transverse colon,                          removed with a cold snare. Resected and retrieved.                          - Diverticulosis in the entire examined colon.                          - The examination was otherwise normal on direct                          and retroflexion views.     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    Breast cancer     mastectomy 2014    Coccidiomycosis, progressive     Elevated CA-125 02/25/2018    Hyperlipidemia     Hypertension     Liver disease     OAB (overactive bladder)     Osteopenia     on Dexa 11/2017    Osteoporosis     pt reports hx of this, declined fosamax in past - treated with calcium and vit D    Pelvic mass 02/25/2018    Pulmonary fibrosis     Pulmonary nodules     SOB (shortness of breath) on exertion     Thyroid disease     hypo    Urinary tract infection due to extended-spectrum beta lactamase (ESBL) producing Escherichia coli 03/16/2022    UTI (urinary tract  infection)      Past Surgical History:   Procedure Laterality Date    BONE MARROW BIOPSY N/A 4/10/2024    Procedure: Biopsy-bone marrow;  Surgeon: Mohit Centeno MD;  Location: [facility] (4TH FLR);  Service: Oncology;  Laterality: N/A;  4/4-precall complete-MS    CHOLECYSTECTOMY      COLONOSCOPY N/A 12/09/2022    Procedure: COLONOSCOPY;  Surgeon: Enrique Dominguez MD;  Location: [facility] (4TH FLR);  Service: Endoscopy;  Laterality: N/A;  inst via portal  pt requests after 12/9/22-MS  11/30-pt notified of earlier arrival time-KPvt    CYSTOSCOPY WITH BIOPSY OF BLADDER Bilateral 07/27/2022    Procedure: CYSTOSCOPY, WITH BLADDER BIOPSY; retrograde pyelogram,;  Surgeon: Anaya Oropeza MD;  Location: [facility];  Service: Urology;  Laterality: Bilateral;    ENDOSCOPIC ULTRASOUND OF UPPER GASTROINTESTINAL TRACT N/A 04/08/2021    Procedure: ULTRASOUND, UPPER GI TRACT, ENDOSCOPIC;  Surgeon: Aisha Barajas MD;  Location: [facility] (2ND FLR);  Service: Endoscopy;  Laterality: N/A;  UEUS/ERCP evaluation abn MRCP - Dr Angelika Zamudio-19 test 4/5/21 at [facility] Pre-admit - pg    ERCP N/A 04/08/2021    Procedure: ERCP (ENDOSCOPIC RETROGRADE CHOLANGIOPANCREATOGRAPHY);  Surgeon: Aisha Barajas MD;  Location: [facility] (2ND FLR);  Service: Endoscopy;  Laterality: N/A;  UEUS/ERCP evaluation abn MRCP - Dr Angelika Zamudio-19 test 4/5/21 at [facility] Pre-admit - pg    ESOPHAGOGASTRODUODENOSCOPY N/A 04/08/2021    Procedure: EGD (ESOPHAGOGASTRODUODENOSCOPY);  Surgeon: Aisha Barajas MD;  Location: [facility] (2ND FLR);  Service: Endoscopy;  Laterality: N/A;  UEUS/ERCP evaluation abn MRCP Hortencia Zamudio-19 test 4/5/21 at [facility] Pre-admit - pg    ESOPHAGOGASTRODUODENOSCOPY N/A 06/02/2023    Procedure: EGD (ESOPHAGOGASTRODUODENOSCOPY);  Surgeon: Emeka Carney MD;  Location: [facility] ([address] [place] [address]);  Service: Endoscopy;  Laterality: N/A;  She has  history of seromucinous borderline tumor of the ovary. We generally  follow  and CEA tumor markers. Her CEA recently became slightly elevated. CT imaging negative and colonoscopy negative.     Talita Barrios    instructions portal-LW  pre    HYSTERECTOMY      MASTECTOMY Right          Family History   Problem Relation Age of Onset    Cancer Mother         colon cancer    Breast cancer Mother     Hypertension Father     Heart disease Father     Stroke Father     No Known Problems Sister     Cancer Brother         lung, ++ tobacco    Hypertension Brother     No Known Problems Daughter     Hypertension Son     Colon cancer Neg Hx     Ovarian cancer Neg Hx      Social History     Tobacco Use    Smoking status: Former     Current packs/day: 0.00     Average packs/day: 0.5 packs/day for 30.1 years (15.1 ttl pk-yrs)     Types: Cigarettes     Start date: 1970     Quit date: 2000     Years since quittin.1    Smokeless tobacco: Never    Tobacco comments:     quit in her 50s, after 30 years smoking;   Substance Use Topics    Alcohol use: No    Drug use: No        Review of Systems:  Review of Systems   Constitutional:  Positive for malaise/fatigue and weight loss. Negative for chills, diaphoresis and fever.   HENT:  Negative for congestion.    Respiratory:  Negative for shortness of breath.    Cardiovascular:  Negative for chest pain.   Gastrointestinal:  Negative for abdominal pain, blood in stool, constipation, diarrhea, nausea and vomiting.   Genitourinary:  Negative for dysuria.   Musculoskeletal:  Negative for back pain and myalgias.   Skin:  Negative for rash.   Neurological:  Positive for weakness. Negative for dizziness.   Endo/Heme/Allergies:  Does not bruise/bleed easily.   Psychiatric/Behavioral:  Positive for memory loss. Negative for depression.        OBJECTIVE:   Telemedicine visit.  Physical exam therefore limited.    Vital Signs (Most Recent)  [facility]     Physical Exam:  General: White female in no distress   Neuro: alert and oriented x 4.  Moves  all extremities.     HEENT: no icterus.  Trachea midline  Respiratory: respirations are even and unlabored    Labs: H&H 9 and 29.  Alb 3.1.  normal renal function.     Imaging:   CT abd pelvis 3/5/24: Inflammatory changes involving a small segment of the sigmoid colon and adjacent fat stranding concerning for possible acute diverticulitis. On my review, very tortuous sigmoid with large volume of stool.   CT abd pelvis 10/28/23: Pericolonic fat stranding about the sigmoid colon and rectum suggesting proctocolitis.  On my review, appears mostly mid sigmoid colon  CT abd pelvis 1/28/22: Mild wall thickening involves a short segment of the ascending colon without obstruction. This may be related to peristalsis.         ASSESSMENT/PLAN:     Diagnoses and all orders for this visit:    Diverticulitis of large intestine with perforation without bleeding  -     Ambulatory referral/consult to Gastroenterology        79 y.o. female with myelodysplastic syndrome with excess blasts-2 on chronic steroids, ILD/pulmonary fibrosis, hypertension, MDRO UTIs, pulmonary coccidioidomycosis and hypothyroidism who presents with 2 episodes of diverticulitis that were both managed conservatively.  Knife.  Agree with intermittent MiraLax as well as probiotics in order to keep her bowel movements soft for surgery given the uncomplicated nature of her diverticular disease as well as her multiple other medical comorbidities.  She does not need repeat colonoscopy since her last colonoscopy was done in September of 2022.    She is welcome to let me know if she has any recurrent abdominal pain in the left side and we can prescribe empiric antibiotics in an effort to avoid recurrent hospitalization.    MATA Walters MD, FACS, FASCRS  Staff Surgeon  Colon & Rectal Surgery

## 2024-04-12 ENCOUNTER — OFFICE VISIT (OUTPATIENT)
Dept: SURGERY | Facility: CLINIC | Age: 79
End: 2024-04-12
Payer: MEDICARE

## 2024-04-12 DIAGNOSIS — K57.20 DIVERTICULITIS OF LARGE INTESTINE WITH PERFORATION WITHOUT BLEEDING: ICD-10-CM

## 2024-04-12 LAB
AML FISH REASON FOR REFERRAL (BM): NORMAL
ANNOTATION COMMENT IMP: NORMAL
CELLS W CYTOGENETIC ABNL BLD/T: NORMAL
CHROM ANALY RESULT (ISCN): NORMAL
CLINICAL CYTOGENETICIST REVIEW: NORMAL
DNA/RNA EXTRACT AND HOLD RESULT: NORMAL
DNA/RNA EXTRACTION: NORMAL
EXHR SPECIMEN TYPE: NORMAL
LAB TEST METHOD: NORMAL
MOL DX INTERP BLD/T QL: NORMAL
PROVIDER SIGNING NAME: NORMAL
SPECIMEN SOURCE: NORMAL
TEST PERFORMANCE INFO SPEC: NORMAL

## 2024-04-12 PROCEDURE — 99204 OFFICE O/P NEW MOD 45 MIN: CPT | Mod: 95,,, | Performed by: COLON & RECTAL SURGERY

## 2024-04-15 ENCOUNTER — TELEPHONE (OUTPATIENT)
Dept: HEMATOLOGY/ONCOLOGY | Facility: CLINIC | Age: 79
End: 2024-04-15

## 2024-04-15 ENCOUNTER — LAB VISIT (OUTPATIENT)
Dept: LAB | Facility: HOSPITAL | Age: 79
End: 2024-04-15
Attending: INTERNAL MEDICINE
Payer: MEDICARE

## 2024-04-15 DIAGNOSIS — D46.9 MDS (MYELODYSPLASTIC SYNDROME): Primary | ICD-10-CM

## 2024-04-15 DIAGNOSIS — D46.9 MDS (MYELODYSPLASTIC SYNDROME): ICD-10-CM

## 2024-04-15 PROBLEM — S32.020A CLOSED COMPRESSION FRACTURE OF SECOND LUMBAR VERTEBRA: Status: ACTIVE | Noted: 2024-04-15

## 2024-04-15 LAB
ALBUMIN SERPL BCP-MCNC: 3.1 G/DL (ref 3.5–5.2)
ALP SERPL-CCNC: 178 U/L (ref 55–135)
ALT SERPL W/O P-5'-P-CCNC: 14 U/L (ref 10–44)
ANION GAP SERPL CALC-SCNC: 10 MMOL/L (ref 8–16)
AST SERPL-CCNC: 12 U/L (ref 10–40)
BASOPHILS # BLD AUTO: 0.02 K/UL (ref 0–0.2)
BASOPHILS NFR BLD: 0.3 % (ref 0–1.9)
BILIRUB SERPL-MCNC: 0.8 MG/DL (ref 0.1–1)
BODY SITE - BONE MARROW: NORMAL
BUN SERPL-MCNC: 17 MG/DL (ref 8–23)
CALCIUM SERPL-MCNC: 9.6 MG/DL (ref 8.7–10.5)
CHLORIDE SERPL-SCNC: 102 MMOL/L (ref 95–110)
CHROM BANDING METHOD: NORMAL
CHROMOSOME ANALYSIS BM ADDITIONAL INFORMATION: NORMAL
CHROMOSOME ANALYSIS BM RELEASED BY: NORMAL
CHROMOSOME ANALYSIS BM RESULT SUMMARY: NORMAL
CLINICAL CYTOGENETICIST REVIEW: NORMAL
CLINICAL DIAGNOSIS - BONE MARROW: NORMAL
CO2 SERPL-SCNC: 28 MMOL/L (ref 23–29)
CREAT SERPL-MCNC: 0.8 MG/DL (ref 0.5–1.4)
DIFFERENTIAL METHOD BLD: ABNORMAL
EOSINOPHIL # BLD AUTO: 0 K/UL (ref 0–0.5)
EOSINOPHIL NFR BLD: 0.2 % (ref 0–8)
ERYTHROCYTE [DISTWIDTH] IN BLOOD BY AUTOMATED COUNT: 21.5 % (ref 11.5–14.5)
EST. GFR  (NO RACE VARIABLE): >60 ML/MIN/1.73 M^2
FLOW CYTOMETRY ANTIBODIES ANALYZED - BONE MARROW: NORMAL
FLOW CYTOMETRY COMMENT - BONE MARROW: NORMAL
FLOW CYTOMETRY INTERPRETATION - BONE MARROW: NORMAL
GLUCOSE SERPL-MCNC: 126 MG/DL (ref 70–110)
HCT VFR BLD AUTO: 27.4 % (ref 37–48.5)
HGB BLD-MCNC: 9.2 G/DL (ref 12–16)
IMM GRANULOCYTES # BLD AUTO: 0.1 K/UL (ref 0–0.04)
IMM GRANULOCYTES NFR BLD AUTO: 1.6 % (ref 0–0.5)
KARYOTYP MAR: NORMAL
LDH SERPL L TO P-CCNC: 238 U/L (ref 110–260)
LYMPHOCYTES # BLD AUTO: 1.5 K/UL (ref 1–4.8)
LYMPHOCYTES NFR BLD: 23.8 % (ref 18–48)
MAGNESIUM SERPL-MCNC: 1.9 MG/DL (ref 1.6–2.6)
MCH RBC QN AUTO: 29.7 PG (ref 27–31)
MCHC RBC AUTO-ENTMCNC: 33.6 G/DL (ref 32–36)
MCV RBC AUTO: 88 FL (ref 82–98)
MONOCYTES # BLD AUTO: 0.7 K/UL (ref 0.3–1)
MONOCYTES NFR BLD: 10.5 % (ref 4–15)
NEUTROPHILS # BLD AUTO: 4 K/UL (ref 1.8–7.7)
NEUTROPHILS NFR BLD: 63.6 % (ref 38–73)
NRBC BLD-RTO: 0 /100 WBC
PHOSPHATE SERPL-MCNC: 3.5 MG/DL (ref 2.7–4.5)
PLATELET # BLD AUTO: 169 K/UL (ref 150–450)
PMV BLD AUTO: 8.7 FL (ref 9.2–12.9)
POTASSIUM SERPL-SCNC: 4.2 MMOL/L (ref 3.5–5.1)
PROT SERPL-MCNC: 6.9 G/DL (ref 6–8.4)
RBC # BLD AUTO: 3.1 M/UL (ref 4–5.4)
REASON FOR REFERRAL (NARRATIVE): NORMAL
REF LAB TEST METHOD: NORMAL
SODIUM SERPL-SCNC: 140 MMOL/L (ref 136–145)
SPECIMEN SOURCE: NORMAL
SPECIMEN: NORMAL
URATE SERPL-MCNC: 6.1 MG/DL (ref 2.4–5.7)
WBC # BLD AUTO: 6.21 K/UL (ref 3.9–12.7)

## 2024-04-15 PROCEDURE — 83735 ASSAY OF MAGNESIUM: CPT | Mod: PN | Performed by: INTERNAL MEDICINE

## 2024-04-15 PROCEDURE — 84100 ASSAY OF PHOSPHORUS: CPT | Mod: PN | Performed by: INTERNAL MEDICINE

## 2024-04-15 PROCEDURE — 85025 COMPLETE CBC W/AUTO DIFF WBC: CPT | Mod: PN | Performed by: INTERNAL MEDICINE

## 2024-04-15 PROCEDURE — 83615 LACTATE (LD) (LDH) ENZYME: CPT | Mod: PN | Performed by: INTERNAL MEDICINE

## 2024-04-15 PROCEDURE — 80053 COMPREHEN METABOLIC PANEL: CPT | Mod: PN | Performed by: INTERNAL MEDICINE

## 2024-04-15 PROCEDURE — 84550 ASSAY OF BLOOD/URIC ACID: CPT | Mod: PN | Performed by: INTERNAL MEDICINE

## 2024-04-15 PROCEDURE — 36415 COLL VENOUS BLD VENIPUNCTURE: CPT | Mod: PN | Performed by: INTERNAL MEDICINE

## 2024-04-15 NOTE — TELEPHONE ENCOUNTER
----- Message from Bibi Rose sent at 4/15/2024 12:44 PM CDT -----  Regarding: Consult/Advisory  Contact: [email or web address]     Consult/Advisory     [facility]        Contact Preference:[phone]     Nature of call:[facility] see if [facility] would be ok for home health to do labs. Patient daughter called home health and stated that it is getting difficult to transport her mother around and asked for assist. Requesting a call back

## 2024-04-16 ENCOUNTER — DOCUMENTATION ONLY (OUTPATIENT)
Dept: ONCOLOGY | Facility: HOSPITAL | Age: 79
End: 2024-04-16
Payer: MEDICARE

## 2024-04-16 NOTE — PROGRESS NOTES
The  received a request from Dudley Peralta RN to send a subsequent home health order to [facility] for the patient. The order #[id number]). The  spoke with Lupis with [place] @ [phone], and faxed the order to [phone].

## 2024-04-17 ENCOUNTER — TELEPHONE (OUTPATIENT)
Dept: INTERNAL MEDICINE | Facility: CLINIC | Age: 79
End: 2024-04-17

## 2024-04-17 DIAGNOSIS — R30.0 DYSURIA: Primary | ICD-10-CM

## 2024-04-17 NOTE — TELEPHONE ENCOUNTER
----- Message from Malissa Galindo sent at 4/17/2024 11:47 AM CDT -----  Regarding: Order for UTI  Type:  Patient Returning Call      Name of who is calling:Eliana/ [employer]        What is request in detail:Eliana is requesting call back in regards to getting orders for  urinalysis  for patient        Can clinic reply by [facility]:        What number to call back if not in [facility]:[phone]

## 2024-04-19 ENCOUNTER — OFFICE VISIT (OUTPATIENT)
Dept: HEMATOLOGY/ONCOLOGY | Facility: CLINIC | Age: 79
End: 2024-04-19
Payer: MEDICARE

## 2024-04-19 ENCOUNTER — TELEPHONE (OUTPATIENT)
Dept: HEMATOLOGY/ONCOLOGY | Facility: CLINIC | Age: 79
End: 2024-04-19
Payer: MEDICARE

## 2024-04-19 VITALS
HEART RATE: 80 BPM | TEMPERATURE: 98 F | HEIGHT: 62 IN | OXYGEN SATURATION: 96 % | SYSTOLIC BLOOD PRESSURE: 116 MMHG | WEIGHT: 127 LBS | BODY MASS INDEX: 23.37 KG/M2 | DIASTOLIC BLOOD PRESSURE: 59 MMHG

## 2024-04-19 DIAGNOSIS — D61.818 PANCYTOPENIA: ICD-10-CM

## 2024-04-19 DIAGNOSIS — D46.9 MDS (MYELODYSPLASTIC SYNDROME): Primary | ICD-10-CM

## 2024-04-19 DIAGNOSIS — Z71.89 GOALS OF CARE, COUNSELING/DISCUSSION: ICD-10-CM

## 2024-04-19 PROCEDURE — 99999 PR PBB SHADOW E&M-EST. PATIENT-LVL V: CPT | Mod: PBBFAC,,, | Performed by: INTERNAL MEDICINE

## 2024-04-19 PROCEDURE — 99215 OFFICE O/P EST HI 40 MIN: CPT | Mod: PBBFAC | Performed by: INTERNAL MEDICINE

## 2024-04-19 PROCEDURE — 99215 OFFICE O/P EST HI 40 MIN: CPT | Mod: S$PBB,,, | Performed by: INTERNAL MEDICINE

## 2024-04-19 NOTE — PROGRESS NOTES
HEMATOLOGIC MALIGNANCIES PROGRESS NOTE    IDENTIFYING STATEMENT   Niurka Castellon) is a 79 y.o. female with a  of 1945 from [place], [place] with the diagnosis of MDS.        ONCOLOGY HISTORY:    1. Myelodysplastic syndrome with increased blasts-2              A. 2023: Bone marrow biopsy - 65-75% cellular marrow consistent with myelodysplastic syndrome with excess blasts-2; cytogenetics 46,XX,del(3)(q12)[2]/47,sl,+8[18]; NGS not sent; IPSS-R score of 7 = very high risk   B. 2023: Cycle 1 azacitidine-venetoclax (5 days aza, 14 days angelo) for MDS/AML   C. 10/18/2023: Bone marrow biopsy - 50-60% cellular marrow with no increased blasts and trilineage hematopoiesis with granulocytic hypoplasia and moderate dysgranulopoiesis, mildly left-shifted marked erythroid hyperplasia with severe dyserythropoiesis, and marked megakaryocytic hyperplasia with predominantly variably sized including small del3q-like monolobated forms; 3-4+ histiocytic iron stores; focal MF-1; cytogenetics 46,XX[20]; NGS shows ASXL1 (26%), SRSF2 (33%) and STAG2 (3%).   D. 4/10/24: Bone marrow biopsy: persistent MDS without increased blasts. Increased ring sideroblasts (37%). Diploid karyotype.      2. Neuropathy  3. Interstitial lung disease/pulmonary fibrosis  4. Hypertension  5. Aortic atherosclerosis  6. Pulmonary coccidioidomycosis  7. Hypothyroidism  8. Hyperparathyroidism    INTERVAL HISTORY:      Ms. Pedraza is seen to follow-up regarding MDS/AML prior to cycle 7 of aza-angelo therapy.      - 3/5 - 3/13: Hospitalized at [facility] with diverticulitis    She returns for follow up at this time. She is feeling well today, but her daughter notes she has been weaker, more fatigued, and continues to have intermittent confusion. Ms. Pedraza also reports chronic back pain.     Past Medical History, Past Social History and Past Family History have been reviewed and are unchanged except as noted in the interval history.    MEDICATIONS:     Current  Outpatient Medications on File Prior to Visit   Medication Sig Dispense Refill    acyclovir (ZOVIRAX) 400 MG tablet Take 1 tablet (400 mg total) by mouth 2 (two) times daily. 60 tablet 11    albuterol-ipratropium (DUO-NEB) 2.5 mg-0.5 mg/3 mL nebulizer solution Take 3 mLs by nebulization every 6 (six) hours as needed for Wheezing or Shortness of Breath. Rescue 75 mL 11    ascorbic acid/zinc (ZINC WITH VITAMIN C ORAL) Take 1 tablet by mouth Daily.      atorvastatin (LIPITOR) 20 MG tablet Take 1 tablet (20 mg total) by mouth once daily. 90 tablet 3    azelastine (ASTELIN) 137 mcg (0.1 %) nasal spray 1 spray (137 mcg total) by Nasal route 2 (two) times daily. 30 mL 6    calcium-vitamin D3 (CALCIUM 500 + D) 500 mg(1,250mg) -200 unit per tablet Take 1 tablet by mouth 2 (two) times daily with meals.      carvediloL (COREG) 25 MG tablet Take 1 tablet (25 mg total) by mouth 2 (two) times daily with meals. 180 tablet 1    cyproheptadine (PERIACTIN) 4 mg tablet Take 1 tablet (4 mg total) by mouth 3 (three) times daily as needed. 90 tablet 2    ferrous gluconate (FERGON) 324 MG tablet Take 1 tablet (324 mg total) by mouth every other day. 90 tablet 3    fluticasone furoate-vilanteroL (BREO ELLIPTA) 100-25 mcg/dose diskus inhaler Inhale 1 puff into the lungs once daily. Controller.  PLEASE NOTE IT IS ONCE A DAY!! 60 each 4    levothyroxine (SYNTHROID) 50 MCG tablet Take 1 tablet (50 mcg total) by mouth before breakfast. 90 tablet 1    mirtazapine (REMERON) 7.5 MG Tab Take 1 tablet (7.5 mg total) by mouth every evening. 90 tablet 3    multivitamin (THERAGRAN) per tablet Take 1 tablet by mouth once daily.      ondansetron (ZOFRAN-ODT) 4 MG TbDL Take 1 tablet (4 mg total) by mouth every 6 (six) hours as needed (nausea). 30 tablet 0    oxybutynin (DITROPAN XL) 15 MG TR24 Take 1 tablet (15 mg total) by mouth once daily. 30 tablet 11    pantoprazole (PROTONIX) 20 MG tablet Take 1 tablet (20 mg total) by mouth once daily. 90 tablet 1  "   posaconazole (NOXAFIL) 100 mg TbEC tablet Take 3 tablets (300 mg) by mouth twice daily on the first day, and then take 1 tablet by mouth once daily thereafter. 30 tablet 2    potassium chloride (K-TAB) 20 mEq Take 1 tablet (20 mEq total) by mouth once daily. 90 tablet 3    predniSONE (DELTASONE) 1 MG tablet Take 2 tablets (2 mg total) by mouth once daily. 180 tablet 3    sodium chloride 3% 3 % nebulizer solution Take 4 mLs by nebulization as needed for Other. 120 mL 3    traMADoL (ULTRAM) 50 mg tablet Take 1 tablet (50 mg total) by mouth 3 (three) times daily. 90 each 0    [DISCONTINUED] budesonide-formoterol 80-4.5 mcg (SYMBICORT) 80-4.5 mcg/actuation HFAA Inhale 2 puffs into the lungs 2 (two) times daily as needed. Controller 1 Inhaler 6     No current facility-administered medications on file prior to visit.       ALLERGIES:   Review of patient's allergies indicates:   Allergen Reactions    Ciprofloxacin Other (See Comments)     Right foot pain and edema, tendonitis    Amlodipine Edema and Swelling     BLE  BLE    Lisinopril Other (See Comments)     cough    Nitrofuran analogues         ROS:       Review of Systems   Constitutional:  Positive for appetite change. Negative for unexpected weight change.   HENT:   Negative for mouth sores.    Respiratory:  Negative for cough and shortness of breath.    Cardiovascular:  Negative for chest pain.   Gastrointestinal:  Negative for abdominal pain and diarrhea.   Genitourinary:  Positive for frequency.    Musculoskeletal:  Positive for back pain.   Skin:  Negative for rash.   Neurological:  Negative for headaches.   Hematological:  Negative for adenopathy.   Psychiatric/Behavioral:  The patient is not nervous/anxious.        PHYSICAL EXAM:  Vitals:    04/19/24 1537   BP: (!) 116/59   Pulse: 80   Temp: 98.1 °F (36.7 °C)   SpO2: 96%   Weight: 57.6 kg (126 lb 15.8 oz)   Height: 5' 2" (1.575 m)   PainSc:   7   PainLoc: Abdomen         Physical Exam  Constitutional:       " General: She is not in acute distress.     Appearance: She is well-developed.   HENT:      Head: Normocephalic and atraumatic.      Mouth/Throat:      Mouth: No oral lesions.   Eyes:      Conjunctiva/sclera: Conjunctivae normal.   Neck:      Thyroid: No thyromegaly.   Cardiovascular:      Rate and Rhythm: Normal rate and regular rhythm.      Heart sounds: Normal heart sounds. No murmur heard.  Pulmonary:      Breath sounds: Normal breath sounds. No wheezing or rales.   Abdominal:      General: There is no distension.      Palpations: Abdomen is soft. There is no hepatomegaly, splenomegaly or mass.      Tenderness: There is no abdominal tenderness.   Lymphadenopathy:      Cervical: No cervical adenopathy.      Right cervical: No deep cervical adenopathy.     Left cervical: No deep cervical adenopathy.   Skin:     Findings: No rash.   Neurological:      Mental Status: She is alert and oriented to person, place, and time.      Cranial Nerves: No cranial nerve deficit.      Coordination: Coordination normal.      Deep Tendon Reflexes: Reflexes are normal and symmetric.         LAB:   Results for orders placed or performed in visit on 04/17/24   Urine culture    Specimen: Urine, Clean Catch   Result Value Ref Range    Urine Culture, Routine ENTEROCOCCUS FAECALIS  >100,000 cfu/ml   (A)        Susceptibility    Enterococcus faecalis - CULTURE, URINE     Ampicillin <=2 Sensitive mcg/mL     Nitrofurantoin <=32 Sensitive mcg/mL     Vancomycin 2 Sensitive mcg/mL   URINALYSIS   Result Value Ref Range    Specimen UA Urine, Clean Catch     Color, UA Yellow Yellow, Straw, Kourtney    Appearance, UA Cloudy (A) Clear    pH, UA 6.0 5.0 - 8.0    Specific Gravity, UA >=1.030 (A) 1.005 - 1.030    Protein, UA 1+ (A) Negative    Glucose, UA Negative Negative    Ketones, UA Trace (A) Negative    Bilirubin (UA) Negative Negative    Occult Blood UA 1+ (A) Negative    Nitrite, UA Negative Negative    Urobilinogen, UA Negative <2.0 EU/dL     Leukocytes, UA 1+ (A) Negative     *Note: Due to a large number of results and/or encounters for the requested time period, some results have not been displayed. A complete set of results can be found in Results Review.       PROBLEMS ASSESSED THIS VISIT:    1. MDS (myelodysplastic syndrome)    2. Pancytopenia    3. 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 We reviewed that this is an aggressive hematologic malignancy with high probability of evolution to acute myeloid leukemia (AML). According to the [facility] pathologic designation, this is referred to as MDS/AML.     Bone marrow biopsy after 1 cycle of aza-angelo showed resolution of increased blasts with ongoing morphologic changes consistent with MDS. We reviewed that this represents a favorable response to therapy; therefore, indefinite aza-angelo therapy was recommended. Last cycle complicated by diverticulitis.    She and her daughter were unsure if they want to continue therapy. She reports that despite a good response on her bone marrow biopsy results, she has not felt better and continues to decline overall. We reviewed various options including reduced angelo with aza vs aza monotherapy vs lusaptercept/DEVON vs supportive care alone. At this time they would like to discuss her goals of care as a family. I also offered support from [facility], which they agreed to.    Continue infection prophylaxis with acyclovir, posaconazole. Antibiotic agents per ID given multi-drug resistant UTI.     Pancytopenia  Due to MDS/AML. Monitor weekly during therapy and transfuse as clinically indicated.     Follow-up  3 weeks with Dr. Centeno.      Anthony Gaytan MD  Hematology and Stem Cell Transplant    Route Chart for Scheduling    BMT Chart Routing  Urgent    Follow up with physician 3 weeks. with Dr. Centeno   Follow up with [facility]    Provider visit type    Infusion scheduling note    Injection  scheduling note    Labs CBC, CMP, phosphorus and magnesium   Scheduling:  Preferred lab:  Lab interval:  Prior to visit   Imaging    Pharmacy appointment    Other referrals                  Treatment Plan Information   OP AZACITIDINE 5-DAY (SUB-Q)   Mohit Centeno MD   Upcoming Treatment Dates - OP AZACITIDINE 5-DAY (SUB-Q)    3/25/2024       Pre-Medications       dexAMETHasone tablet 8 mg       Chemotherapy       azaCITIDine (VIDAZA) chemo injection 135 mg  3/26/2024       Pre-Medications       dexAMETHasone tablet 8 mg       Chemotherapy       azaCITIDine (VIDAZA) chemo injection 135 mg  3/27/2024       Pre-Medications       dexAMETHasone tablet 8 mg       Chemotherapy       azaCITIDine (VIDAZA) chemo injection 135 mg  3/28/2024       Pre-Medications       dexAMETHasone tablet 8 mg       Chemotherapy       azaCITIDine (VIDAZA) chemo injection 135 mg    Therapy Plan Information  Medications  ertapenem (INVANZ) 1 g in sodium chloride 0.9% 100 mL IVPB  1 g, Intravenous, Every visit  Flushes  sodium chloride 0.9% 250 mL flush bag  Intravenous, Every visit  sodium chloride 0.9% flush 10 mL  10 mL, Intravenous, Every visit  heparin, porcine (PF) 100 unit/mL injection flush 500 Units  500 Units, Intravenous, Every visit  alteplase injection 2 mg  2 mg, Intra-Catheter, Every visit

## 2024-04-22 ENCOUNTER — PATIENT MESSAGE (OUTPATIENT)
Dept: ORTHOPEDICS | Facility: CLINIC | Age: 79
End: 2024-04-22
Payer: MEDICARE

## 2024-04-23 ENCOUNTER — TELEPHONE (OUTPATIENT)
Dept: HEMATOLOGY/ONCOLOGY | Facility: CLINIC | Age: 79
End: 2024-04-23
Payer: MEDICARE

## 2024-04-23 NOTE — NURSING
JINNY Frye spoke to patient's daughter Luz about referral to [facility]. Patient and daughter prefer to be scheduled at the [facility] location under Idalmis Gonzalez NP as this is closer to home. JINNY Frye sent message to [facility] team for scheduling. Patient's daughter Luz best contact as she maintains patient's scheduling, JINNY Cruz.

## 2024-04-24 ENCOUNTER — TELEPHONE (OUTPATIENT)
Dept: HEMATOLOGY/ONCOLOGY | Facility: CLINIC | Age: 79
End: 2024-04-24
Payer: MEDICARE

## 2024-04-24 LAB
ANNOTATION COMMENT IMP: NORMAL
NGS CLINCIAL TRIALS: NORMAL
NGS INDICATION OF TEST: NORMAL
NGS INTERPRETATION: NORMAL
NGS ONCOHEME PANEL GENE LIST: NORMAL
NGS PATHOGENIC MUTATIONS DETECTED: NORMAL
NGS REVIEWED BY:: NORMAL
NGS VARIANTS OF UNKNOWN SIGNIFICANCE: NORMAL
NGSHM RESULT, BONE MARROW: NORMAL
REF LAB TEST METHOD: NORMAL
SPECIMEN SOURCE: NORMAL
TEST PERFORMANCE INFO SPEC: NORMAL

## 2024-04-24 NOTE — TELEPHONE ENCOUNTER
Spk to damir about her Mom's  appt with Idalmis on 4/25/24. Damir confirmed appt. Also, asked that since her Mom is on home health would Medicare pay for her visit to see Idalmis. I told the daugh that yes, Medicare would pay for her visit here bc its considered a doctor visit. Damir verbalized understanding and confirmed appt

## 2024-04-25 ENCOUNTER — PATIENT MESSAGE (OUTPATIENT)
Dept: HEMATOLOGY/ONCOLOGY | Facility: CLINIC | Age: 79
End: 2024-04-25

## 2024-04-25 ENCOUNTER — TELEPHONE (OUTPATIENT)
Dept: HEMATOLOGY/ONCOLOGY | Facility: CLINIC | Age: 79
End: 2024-04-25

## 2024-04-25 ENCOUNTER — OFFICE VISIT (OUTPATIENT)
Dept: HEMATOLOGY/ONCOLOGY | Facility: CLINIC | Age: 79
End: 2024-04-25
Payer: MEDICARE

## 2024-04-25 ENCOUNTER — PATIENT MESSAGE (OUTPATIENT)
Dept: INTERNAL MEDICINE | Facility: CLINIC | Age: 79
End: 2024-04-25
Payer: MEDICARE

## 2024-04-25 VITALS
TEMPERATURE: 97 F | OXYGEN SATURATION: 96 % | HEART RATE: 83 BPM | DIASTOLIC BLOOD PRESSURE: 70 MMHG | WEIGHT: 126.63 LBS | SYSTOLIC BLOOD PRESSURE: 118 MMHG | BODY MASS INDEX: 23.3 KG/M2 | HEIGHT: 62 IN

## 2024-04-25 DIAGNOSIS — R53.0 NEOPLASTIC MALIGNANT RELATED FATIGUE: ICD-10-CM

## 2024-04-25 DIAGNOSIS — G62.9 NEUROPATHY: ICD-10-CM

## 2024-04-25 DIAGNOSIS — M54.41 CHRONIC MIDLINE LOW BACK PAIN WITH RIGHT-SIDED SCIATICA: Primary | ICD-10-CM

## 2024-04-25 DIAGNOSIS — D46.9 MDS (MYELODYSPLASTIC SYNDROME): ICD-10-CM

## 2024-04-25 DIAGNOSIS — G89.29 CHRONIC MIDLINE LOW BACK PAIN WITH RIGHT-SIDED SCIATICA: Primary | ICD-10-CM

## 2024-04-25 PROBLEM — M54.50 CHRONIC MIDLINE LOW BACK PAIN WITHOUT SCIATICA: Status: ACTIVE | Noted: 2024-04-25

## 2024-04-25 PROCEDURE — 99215 OFFICE O/P EST HI 40 MIN: CPT | Mod: PBBFAC,PN | Performed by: NURSE PRACTITIONER

## 2024-04-25 PROCEDURE — 99999 PR PBB SHADOW E&M-EST. PATIENT-LVL V: CPT | Mod: PBBFAC,,, | Performed by: NURSE PRACTITIONER

## 2024-04-25 PROCEDURE — 99215 OFFICE O/P EST HI 40 MIN: CPT | Mod: S$PBB,,, | Performed by: NURSE PRACTITIONER

## 2024-04-25 NOTE — TELEPHONE ENCOUNTER
It may be that her insurance is not covering this - this is a safer appetite stimulating medication compared to others   - if insurance is not covering then they can use a coupon through [employer] and pay out of pocket - looks like price is around $10 for 30 tabs

## 2024-04-25 NOTE — TELEPHONE ENCOUNTER
Lmovm for the patient about getting an acup appt scheduled per referral.I informed the patient that I would also send a portal message in regards to the appointment.

## 2024-04-25 NOTE — PROGRESS NOTES
"Niurka Pedraza  79 y.o. is here to seek an integrative approach to discuss side effects related to MDS cancer treatment. Niurka Pedraza  was referred by Dr. Centeno     HPI  Mrs. Pedraza reports she had breast cancer 6 years ago and had a single mastectomy. Her daughter reports she called her doctor due to fatigue and being short of breath. She had her first bone marrow biopsy in September of 2023.Treatment is currently paused and a repeat biopsy was performed on 4/10/2024. Her daughter states, "we have some decisions to make on quality or quantity of life."  She has low back pain and bilateral hip pain. She has numbness and tingling that started in her toes and his now gone up her legs to the mid calf. She reports she sleeps well, 10 hours nightly. She is also napping during the day. She has a poor appetite. Her daughter ensures anything she eats does have protein. Her activity level is low. She has poor balance and pain which makes it difficult to be active.   She has 2 daughters and 1 son. She lives with her son and her daughter moved in with her 3 months ago. They are planning on moving in with her ex- and the kids father who Mrs. Bah has a good relationship with.     Pillars Assessment    Sleep  How many hours of sleep per night? 10 hours  Do you have trouble falling asleep, staying asleep or waking up earlier than you need to? no  Do you have daytime fatigue? yes  Do you need medication for sleep? no  Do you use any supplements or other interventions for sleep? no    Resilience  Rate your current level of stress- low    Spirituality-  Mosque    Nutrition   Food allergies or sensitivities: no  Do you adhere to a particular type of diet? no  Do you have any concerns with your eating habits? yes, low appetite    Exercise  How would you describe your physical activity level? low    Past Medical History  Past Medical History:   Diagnosis Date    Arthritis     Borderline serous cystadenoma of right ovary " 04/03/2018    Breast cancer     mastectomy 2014    Coccidiomycosis, progressive     Elevated CA-125 02/25/2018    Hyperlipidemia     Hypertension     Liver disease     OAB (overactive bladder)     Osteopenia     on Dexa 11/2017    Osteoporosis     pt reports hx of this, declined fosamax in past - treated with calcium and vit D    Pelvic mass 02/25/2018    Pulmonary fibrosis     Pulmonary nodules     SOB (shortness of breath) on exertion     Thyroid disease     hypo    Urinary tract infection due to extended-spectrum beta lactamase (ESBL) producing Escherichia coli 03/16/2022    UTI (urinary tract infection)       Past Surgical History   Past Surgical History:   Procedure Laterality Date    BONE MARROW BIOPSY N/A 4/10/2024    Procedure: Biopsy-bone marrow;  Surgeon: Mohit Centeno MD;  Location: [facility] (4TH FLR);  Service: Oncology;  Laterality: N/A;  4/4-precall complete-MS    CHOLECYSTECTOMY      COLONOSCOPY N/A 12/09/2022    Procedure: COLONOSCOPY;  Surgeon: Enrique Dominguez MD;  Location: [facility] (4TH FLR);  Service: Endoscopy;  Laterality: N/A;  inst via portal  pt requests after 12/9/22-MS  11/30-pt notified of earlier arrival time-KPvt    CYSTOSCOPY WITH BIOPSY OF BLADDER Bilateral 07/27/2022    Procedure: CYSTOSCOPY, WITH BLADDER BIOPSY; retrograde pyelogram,;  Surgeon: Anaya Oropeza MD;  Location: [facility];  Service: Urology;  Laterality: Bilateral;    ENDOSCOPIC ULTRASOUND OF UPPER GASTROINTESTINAL TRACT N/A 04/08/2021    Procedure: ULTRASOUND, UPPER GI TRACT, ENDOSCOPIC;  Surgeon: Aisha Barajas MD;  Location: [facility] (2ND FLR);  Service: Endoscopy;  Laterality: N/A;  UEUS/ERCP evaluation abn MRCP - Dr Carney  Covid-19 test 4/5/21 at [facility] Pre-admit - pg    ERCP N/A 04/08/2021    Procedure: ERCP (ENDOSCOPIC RETROGRADE CHOLANGIOPANCREATOGRAPHY);  Surgeon: Aisha Barajas MD;  Location: [facility] (2ND FLR);  Service: Endoscopy;  Laterality: N/A;  UEUS/ERCP evaluation abn MRCP  - Dr Angelika Steinerid-19 test 4/5/21 at [facility] Pre-admit - pg    ESOPHAGOGASTRODUODENOSCOPY N/A 04/08/2021    Procedure: EGD (ESOPHAGOGASTRODUODENOSCOPY);  Surgeon: Aisha Barajas MD;  Location: [facility] (2ND FLR);  Service: Endoscopy;  Laterality: N/A;  UEUS/ERCP evaluation abn MRCP - Dr Angelika Zamudio-19 test 4/5/21 at [facility] Pre-admit - pg    ESOPHAGOGASTRODUODENOSCOPY N/A 06/02/2023    Procedure: EGD (ESOPHAGOGASTRODUODENOSCOPY);  Surgeon: Emeka Carney MD;  Location: [facility] (4TH FLR);  Service: Endoscopy;  Laterality: N/A;  She has  history of seromucinous borderline tumor of the ovary. We generally follow  and CEA tumor markers. Her CEA recently became slightly elevated. CT imaging negative and colonoscopy negative.     Talita Barrios    instructions portal-LW  pre    HYSTERECTOMY      MASTECTOMY Right     2014      Family History   Family History   Problem Relation Name Age of Onset    Cancer Mother Jessica         colon cancer    Breast cancer Mother Jessica     Hypertension Father Yonas     Heart disease Father Yonas     Stroke Father Yonas     No Known Problems Sister      Cancer Brother Yonas         lung, ++ tobacco    Hypertension Brother Gary     No Known Problems Daughter      Hypertension Son Lavell     Colon cancer Neg Hx      Ovarian cancer Neg Hx        Allergies  Review of patient's allergies indicates:   Allergen Reactions    Ciprofloxacin Other (See Comments)     Right foot pain and edema, tendonitis    Amlodipine Edema and Swelling     BLE  BLE    Lisinopril Other (See Comments)     cough    Nitrofuran analogues       Current Medications:    Current Outpatient Medications:     acyclovir (ZOVIRAX) 400 MG tablet, Take 1 tablet (400 mg total) by mouth 2 (two) times daily., Disp: 60 tablet, Rfl: 11    albuterol-ipratropium (DUO-NEB) 2.5 mg-0.5 mg/3 mL nebulizer solution, Take 3 mLs by nebulization every 6 (six) hours as needed for Wheezing or Shortness of Breath. Rescue, Disp:  75 mL, Rfl: 11    ascorbic acid/zinc (ZINC WITH VITAMIN C ORAL), Take 1 tablet by mouth Daily., Disp: , Rfl:     atorvastatin (LIPITOR) 20 MG tablet, Take 1 tablet (20 mg total) by mouth once daily., Disp: 90 tablet, Rfl: 3    azelastine (ASTELIN) 137 mcg (0.1 %) nasal spray, 1 spray (137 mcg total) by Nasal route 2 (two) times daily., Disp: 30 mL, Rfl: 6    calcium-vitamin D3 (CALCIUM 500 + D) 500 mg(1,250mg) -200 unit per tablet, Take 1 tablet by mouth 2 (two) times daily with meals., Disp: , Rfl:     carvediloL (COREG) 25 MG tablet, Take 1 tablet (25 mg total) by mouth 2 (two) times daily with meals., Disp: 180 tablet, Rfl: 1    ferrous gluconate (FERGON) 324 MG tablet, Take 1 tablet (324 mg total) by mouth every other day., Disp: 90 tablet, Rfl: 3    fluticasone furoate-vilanteroL (BREO ELLIPTA) 100-25 mcg/dose diskus inhaler, Inhale 1 puff into the lungs once daily. Controller. PLEASE NOTE IT IS ONCE A DAY!!, Disp: 60 each, Rfl: 4    levothyroxine (SYNTHROID) 50 MCG tablet, Take 1 tablet (50 mcg total) by mouth before breakfast., Disp: 90 tablet, Rfl: 1    mirtazapine (REMERON) 7.5 MG Tab, Take 1 tablet (7.5 mg total) by mouth every evening., Disp: 90 tablet, Rfl: 3    multivitamin (THERAGRAN) per tablet, Take 1 tablet by mouth once daily., Disp: , Rfl:     ondansetron (ZOFRAN-ODT) 4 MG TbDL, Take 1 tablet (4 mg total) by mouth every 6 (six) hours as needed (nausea)., Disp: 30 tablet, Rfl: 0    oxybutynin (DITROPAN XL) 15 MG TR24, Take 1 tablet (15 mg total) by mouth once daily., Disp: 30 tablet, Rfl: 11    pantoprazole (PROTONIX) 20 MG tablet, Take 1 tablet (20 mg total) by mouth once daily., Disp: 90 tablet, Rfl: 1    posaconazole (NOXAFIL) 100 mg TbEC tablet, Take 3 tablets (300 mg) by mouth twice daily on the first day, and then take 1 tablet by mouth once daily thereafter., Disp: 30 tablet, Rfl: 2    potassium chloride (K-TAB) 20 mEq, Take 1 tablet (20 mEq total) by mouth once daily., Disp: 90 tablet, Rfl:  "3    predniSONE (DELTASONE) 1 MG tablet, Take 2 tablets (2 mg total) by mouth once daily., Disp: 180 tablet, Rfl: 3    sodium chloride 3% 3 % nebulizer solution, Take 4 mLs by nebulization as needed for Other., Disp: 120 mL, Rfl: 3    traMADoL (ULTRAM) 50 mg tablet, Take 1 tablet (50 mg total) by mouth 3 (three) times daily., Disp: 90 each, Rfl: 0    cyproheptadine (PERIACTIN) 4 mg tablet, Take 1 tablet (4 mg total) by mouth 3 (three) times daily as needed. (Patient not taking: Reported on 4/25/2024), Disp: 90 tablet, Rfl: 2     Review of Systems  Review of Systems   Constitutional:  Positive for malaise/fatigue.   HENT: Negative.     Eyes: Negative.    Respiratory:  Positive for cough.    Cardiovascular: Negative.    Gastrointestinal: Negative.    Genitourinary: Negative.    Musculoskeletal:  Positive for back pain and joint pain.   Skin: Negative.       Physical Exam      Vitals:    04/25/24 1346   BP: 118/70   BP Location: Right arm   Patient Position: Sitting   BP Method: Medium (Manual)   Pulse: 83   Temp: 97 °F (36.1 °C)   TempSrc: Temporal   SpO2: 96%   Weight: 57.4 kg (126 lb 9.6 oz)   Height: 5' 2" (1.575 m)     Body mass index is 23.16 kg/m².  Physical Exam  Vitals reviewed.   Constitutional:       Appearance: Normal appearance.   Neurological:      Mental Status: She is alert.   Psychiatric:         Mood and Affect: Mood normal.         Behavior: Behavior normal.        ASSESSMENT :  1. Chronic midline low back pain with right-sided sciatica    2. Neuropathy    3. Neoplastic malignant related fatigue    4. MDS (myelodysplastic syndrome)      PLAN:  Reviewed all information discussed at today's visit and all questions were answered.    Counseled on healthy lifestyle and behavior modifications   Referral to Acupuncture  I explained acupuncture can reduce fatigue,  pain, and neuropathy. It can also assist with behavioral health such as depression and anxiety and improve overall sleep quality. Acupuncture " helps improve overall symptoms from treatment.   Encouraged a daily protein drink  Luz requests palliative care appt. At the [facility]    Follow up with Integrative Services as needed    I spent a total of 40 minutes on the day of the visit.This includes face to face time and non-face to face time preparing to see the patient (eg, review of tests), obtaining and/or reviewing separately obtained history, documenting clinical information in the electronic or other health record, independently interpreting results and communicating results to the patient/family/caregiver, or care coordinator.

## 2024-04-26 ENCOUNTER — PATIENT MESSAGE (OUTPATIENT)
Dept: HEMATOLOGY/ONCOLOGY | Facility: CLINIC | Age: 79
End: 2024-04-26
Payer: MEDICARE

## 2024-04-28 PROBLEM — Z71.89 ACP (ADVANCE CARE PLANNING): Status: ACTIVE | Noted: 2024-04-28

## 2024-04-28 PROBLEM — A41.9 SEPSIS: Status: ACTIVE | Noted: 2024-04-28

## 2024-04-28 PROBLEM — K35.80 ACUTE APPENDICITIS: Status: ACTIVE | Noted: 2024-04-28

## 2024-04-29 ENCOUNTER — TELEPHONE (OUTPATIENT)
Dept: HEMATOLOGY/ONCOLOGY | Facility: CLINIC | Age: 79
End: 2024-04-29
Payer: MEDICARE

## 2024-04-29 ENCOUNTER — PATIENT MESSAGE (OUTPATIENT)
Dept: ADMINISTRATIVE | Facility: OTHER | Age: 79
End: 2024-04-29
Payer: MEDICARE

## 2024-04-29 NOTE — TELEPHONE ENCOUNTER
----- Message from Ayesha Jovel sent at 4/29/2024  8:34 AM CDT -----  Regarding: Consult/Advisory  Contact: Lupis @ [facility]  Consult/Advisory    Name Of Caller: Lupis Ontiveros      Contact Preference: [phone]    Nature of call: Lupis Ontiveros informing that patient is currently admitted at [facility] and will not be able to draw her labs as she has an order for weekly labs.

## 2024-04-30 PROBLEM — R53.81 DEBILITY: Status: RESOLVED | Noted: 2024-03-12 | Resolved: 2024-04-30

## 2024-04-30 PROBLEM — D62 ACUTE BLOOD LOSS ANEMIA: Status: ACTIVE | Noted: 2024-04-30

## 2024-04-30 PROBLEM — A41.9 SEPSIS: Status: RESOLVED | Noted: 2024-04-28 | Resolved: 2024-04-30

## 2024-05-06 ENCOUNTER — TELEPHONE (OUTPATIENT)
Dept: NEUROSURGERY | Facility: CLINIC | Age: 79
End: 2024-05-06
Payer: MEDICARE

## 2024-05-06 DIAGNOSIS — S32.020A COMPRESSION FRACTURE OF L2 VERTEBRA, INITIAL ENCOUNTER: Primary | ICD-10-CM
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
## 2024-05-06 NOTE — TELEPHONE ENCOUNTER
----- Message from Anaya Edwards NP sent at 5/6/2024  3:39 PM CDT -----  Regarding: Follow up with spencer with repeat CT scan lumbar spine  Multiple fracture TLSO brace. Kyphoplasty. Need follow up in 4-6 weejs with repeat lumbar spine CT please.

## 2024-05-07 NOTE — PROGRESS NOTES
Subjective:       Niurka Pedraza is a 76 y.o. female who is an established patient with [facility] urologist, Dr Dhaliwal,  was seen for evaluation of UTI.      She was seen by another urologist for recurrent UTIs/dysuria. Multiple +UCx with different bacteria (E coli, Enterococcus). She has had negative cystoscopy and GONZÁLEZ (1/17). UTIs return soon after treatment. She was started on prophy Keflex in 7/17.     She saw PCP in 11/17 with complaints of UTI. UCx was negative. She now feels a constant pressure in SP area and c/o pain with walking. She also notes pain worse with movement (like hitting a bump when driving). Denies dysuria. Increased frequency to now q1h. Present for 2 months. Denies constipation, occasional diarrhea. Frequent RADHA. Now with UUI. Also with BOBBI - increased coughing with recent lung issue. Nocturia x 4-5. Denies gross hematuria.     She moved here from [place] in 9/17. She requested to be started on abx prophy starting 1/18 (Keflex 250mg).     PVR (bladder scan) initial visit - 32cc, 0cc.    CT uro - no  abnormalities. Lung nodules - followed by pulmonary. Ovarian cyst - referred to gyn (now s/p DARCI-BSO for ovarian cancer).     She has been doing great with the Ditropan - she is very pleased. Was on abx prophy (Keflex) 1/18 x 9 months. Taking probiotics that is helping.     She reports U[place] in [place] ([place] of [place] - took Bactrim. Now off prophylactic abx - more frequency, urgency, nocturia, SP pressure. No significant dysuria.     UTIs have become more frequent. Symptoms returning quickly. Prophylactic abx were restarted (Macrobid 50mg).     9/4/2019  She recently finished course of abx but symptoms are now returning just 5 days later. Dysuria improved but pressure and frequency worsened.     1/15/2020  Increase Ditropan to 10mg. Taking Macrobid QHS, started in 9/19. No symptoms today.     8/26/2020  Several recent breakthrough UTIs. Finished abx 2 weeks ago, symptoms have returned.  Currently on Macrobid. UTI symptoms - frequency, dysuria.   PVR (bladder scan) today - 21cc    9/23/2020  On day 5 of abx, still with some symptoms but improving. CT done - clear.     2/10/2021  Occasional pain and urgency. Macrobid stopped and changed to Keflex. She is requesting UCx though states symptoms are stable.     1/12/2022  Denies current issues with UTI. Nocturia x 3-4. Off prophy abx. Now on new med for pulm fibrosis.       Cysto 5/19 - normal, heavy sediment in bladder    GONZÁLEZ 4/19 - wnl, PVR 31cc  CT uro 9/20 - negative, pulm nodules (seeing PCP)     UCx:  7/21 - >100k Enterococcus  2/21 - 50-99k Proteus  9/20 - >100k Proteus  7/20 - >100k E coli, 10-49k Proteus  7/20 - >100k Proteus   5/20 - 10-49k Klebsiella  9/19 - >100k Klebsiella  8/19 - >100k Klebsiella  6/19 - >100k E coli  5/19 - neg  5/19 - >100k E coli  3/19 - >100k E coli  2/19 - >100k E coli  1/19 - >100k E coli  11/18 - >100k E coli  8/18 - contaminant  7/18 - 50-99k Enterobacter (treated with Bactrim)  11/17 - neg  10/17 - >100k E coli  10/17- >100k Enterococcus  7/17 - >100k E coli  5/17- >100k E coli  5/17 - neg  4/17- 50-99k Enterococcus  3/17 - >100k E coli  12/16 - neg     PVR (bladder scan) last visit - 33cc, 50cc, 0cc      The following portions of the patient's history were reviewed and updated as appropriate: allergies, current medications, past family history, past medical history, past social history, past surgical history and problem list.     Review of Systems  Constitutional: no fever or chills  ENT: no nasal congestion or sore throat  Respiratory: no cough or shortness of breath  Cardiovascular: no chest pain or palpitations  Gastrointestinal: no nausea or vomiting, tolerating diet  Genitourinary: as per HPI  Hematologic/Lymphatic: no easy bruising or lymphadenopathy  Musculoskeletal: no arthralgias or myalgias  Skin: no rashes or lesions  Neurological: no seizures or tremors  Behavioral/Psych: no auditory or visual  "hallucinations        Objective:    Vitals: /87 (BP Location: Left arm, Patient Position: Sitting, BP Method: Large (Automatic))   Pulse 69   Ht 5' 4" (1.626 m)   Wt 70.9 kg (156 lb 4.9 oz)   LMP 02/08/2000   BMI 26.83 kg/m²     Physical Exam   General: well developed, well nourished in no acute distress  Head: normocephalic, atraumatic  Neck: supple, trachea midline, no obvious enlargement of thyroid  HEENT: EOMI, mucus membranes moist, sclera anicteric, no hearing impairment  Lungs: symmetric expansion, non-labored breathing  Cardiovascular: regular rate and rhythm, normal pulses  Abdomen: soft, non tender, non distended, no palpable masses, no hepatosplenomegaly, no hernias, no CVA tenderness  Musculoskeletal: no peripheral edema, normal ROM in bilateral upper and lower extremities  Lymphatics: no cervical or inguinal lymphadenopathy  Skin: no rashes or lesions  Neuro: alert and oriented x 3, no gross deficits  Psych: normal judgment and insight, normal mood/affect and non-anxious  Genitourinary:   patient declined exam      Lab Review   Urine analysis today in clinic shows - trace LE, +nit, 50 RBC (asymptomatic)    Lab Results   Component Value Date    WBC 10.68 10/15/2021    HGB 12.6 10/15/2021    HCT 37.8 10/15/2021    MCV 87 10/15/2021     10/15/2021     Lab Results   Component Value Date    CREATININE 0.9 10/15/2021    BUN 16 10/15/2021       Imaging  Images and reports were personally reviewed by me and discussed with patient  GONZÁLEZ reviewed       Assessment/Plan:      1. Recurrent UTI    - Discussed UTI prevention strategies.   - Adequate hydration.   - Double voiding. Consider timed voiding.    - Avoid constipation.   - GONZÁLEZ - negative 1/17   - Cystoscopy - negative in 1/17 (by another urologist)   - Cranberry/probiotics.   - Estrace cream 2x weekly - will consider in near future (now with ovarian ca)   - Call with UTI symptoms so UA/UCx can be sent.    - Abx prophy (started 1/18) - Keflex " 250mg took for 9 months, stopped 9/18     - Multiple UTIs since stopping prophy, now with UTIs ON prophy   - Repeat workup - GONZÁLEZ and cysto   - GONZÁLEZ 4/19 - wnl   - Cysto 5/19 - wnl   - Continue Estrace (approved by gyn onc). Instructed on use.    - Ellura trial, D-mannose supplement, probiotics     - CT urogram 9/20 - negative   - Again encouraged Estrace   - Was on Macrobid QHS, then Keflex - now stopped.       2. Microhematuria    - Discussed etiology and workup of hematuria   - UCx - results above   - Cytology - previously negative   - CT urogram 12/17 - no  abnormalities. GONZÁLEZ was negative from 1/17.   - Office cystoscopy - negative 1/17, 5/19        3. Nocturia/frequency/urge incontinence   - Ditropan XL 10mg - discussed SE. Declines adjustment of medication.   - Reduce PM fluids      Follow up in 6 months     108.6

## 2024-05-10 ENCOUNTER — TELEPHONE (OUTPATIENT)
Dept: INTERNAL MEDICINE | Facility: CLINIC | Age: 79
End: 2024-05-10
Payer: MEDICARE

## 2024-05-10 ENCOUNTER — OUTPATIENT CASE MANAGEMENT (OUTPATIENT)
Dept: ADMINISTRATIVE | Facility: OTHER | Age: 79
End: 2024-05-10
Payer: MEDICARE

## 2024-05-10 ENCOUNTER — TELEPHONE (OUTPATIENT)
Dept: NEUROSURGERY | Facility: CLINIC | Age: 79
End: 2024-05-10
Payer: MEDICARE

## 2024-05-10 NOTE — LETTER
Niurka Pedraza  [address] [address] [place] [id number]      Dear Niurka Pedraza,     I work with [facility] Outpatient Care Management Department. We received a referral to call you to discuss your medical history. These services are free of charge and are offered to [facility] patients who have recently been discharged from any of our facilities or who have medical conditions that may require the skill of a nurse to assist with management.     I am a Registered Nurse who specializes in connecting patients with available medical and financial resources as well as addressing any educational needs that may be indicated.    I attempted to reach you by telephone, but I was unsuccessful. Please call our department so that we can go over some questions with you, regarding your health.    The Outpatient Care Management Department can be reached at [phone], from 8:00AM to 4:30 PM, on Monday thru Friday.     Additionally, [facility] also has a program where a nurse is available 24/7 to answer questions or provide medical advice, their number is [phone].      Thanks,    Adela Davis RN  Outpatient Care Management  Phone #: [phone]

## 2024-05-10 NOTE — TELEPHONE ENCOUNTER
-called Ms. Baugh back from [facility]  -Informed her that Dr. Ching & the Neurosurgery team didn't handle the pt kyphoplasty  -informed Ms. Baugh that Dr. Villavicencio's team looked like they requested the surgery from Interventional Radiology    ----- Message from Kami Baron sent at 5/10/2024 12:28 PM CDT -----  Contact: [facility]  Type: Needs Medical Advice  Who Called:  [facility]  Best Call Back Number: [phone]  Additional Information: pt was discharged from [facility] with out any wound care orders.would like to know if there are any specific orders.please call

## 2024-05-10 NOTE — TELEPHONE ENCOUNTER
----- Message from Michael Calvillo sent at 5/9/2024  4:35 PM CDT -----  Regarding: sooner appt  Contact: Niurka at [phone]  Type:  Sooner Appointment Request    Caller is requesting a sooner appointment.  Caller declined first available appointment listed below.  Caller will not accept being placed on the waitlist and is requesting a message be sent to doctor.    Name of Caller:  KATT Castellanos / Alyce    When is the first available appointment?  5/20/24 with Dr Mitchell    Symptoms:  hosp f/u discharge [facility] 5/9    Would the patient rather a call back or a response via [facility]? call  Best Call Back Number:  [phone]    Additional Information:

## 2024-05-13 ENCOUNTER — PATIENT MESSAGE (OUTPATIENT)
Dept: HEMATOLOGY/ONCOLOGY | Facility: CLINIC | Age: 79
End: 2024-05-13
Payer: MEDICARE

## 2024-05-13 PROBLEM — J15.211 STAPHYLOCOCCUS AUREUS PNEUMONIA: Status: RESOLVED | Noted: 2024-02-09 | Resolved: 2024-05-13

## 2024-05-14 DIAGNOSIS — S32.020D COMPRESSION FRACTURE OF L2 VERTEBRA WITH ROUTINE HEALING, SUBSEQUENT ENCOUNTER: Primary | ICD-10-CM

## 2024-05-14 DIAGNOSIS — M54.30 SCIATICA, UNSPECIFIED LATERALITY: ICD-10-CM

## 2024-05-14 RX ORDER — TRAMADOL HYDROCHLORIDE 50 MG/1
50 TABLET ORAL EVERY 8 HOURS PRN
Qty: 60 EACH | Refills: 0 | Status: SHIPPED | OUTPATIENT
Start: 2024-05-14 | End: 2024-05-16

## 2024-05-14 NOTE — TELEPHONE ENCOUNTER
No care due was identified.  [facility] Embedded Care Due Messages. Reference number: [id number].   5/14/2024 11:45:43 AM CDT

## 2024-05-16 ENCOUNTER — PATIENT OUTREACH (OUTPATIENT)
Dept: ADMINISTRATIVE | Facility: HOSPITAL | Age: 79
End: 2024-05-16
Payer: MEDICARE

## 2024-05-16 ENCOUNTER — OFFICE VISIT (OUTPATIENT)
Dept: INTERNAL MEDICINE | Facility: CLINIC | Age: 79
End: 2024-05-16
Attending: INTERNAL MEDICINE
Payer: MEDICARE

## 2024-05-16 DIAGNOSIS — T38.0X5A IMMUNOSUPPRESSION DUE TO CHRONIC STEROID USE: ICD-10-CM

## 2024-05-16 DIAGNOSIS — Z90.49 STATUS POST APPENDECTOMY: ICD-10-CM

## 2024-05-16 DIAGNOSIS — M54.30 SCIATICA, UNSPECIFIED LATERALITY: ICD-10-CM

## 2024-05-16 DIAGNOSIS — Z79.52 IMMUNOSUPPRESSION DUE TO CHRONIC STEROID USE: ICD-10-CM

## 2024-05-16 DIAGNOSIS — D46.9 MDS (MYELODYSPLASTIC SYNDROME): Chronic | ICD-10-CM

## 2024-05-16 DIAGNOSIS — S22.39XS CLOSED FRACTURE OF ONE RIB, UNSPECIFIED LATERALITY, SEQUELA: Primary | ICD-10-CM

## 2024-05-16 DIAGNOSIS — D84.821 IMMUNOSUPPRESSION DUE TO CHRONIC STEROID USE: ICD-10-CM

## 2024-05-16 DIAGNOSIS — S32.020D COMPRESSION FRACTURE OF L2 VERTEBRA WITH ROUTINE HEALING, SUBSEQUENT ENCOUNTER: ICD-10-CM

## 2024-05-16 PROCEDURE — 99214 OFFICE O/P EST MOD 30 MIN: CPT | Mod: 95,,, | Performed by: INTERNAL MEDICINE

## 2024-05-16 RX ORDER — NALOXONE HYDROCHLORIDE 4 MG/.1ML
SPRAY NASAL
Qty: 1 EACH | Refills: 11 | Status: SHIPPED | OUTPATIENT
Start: 2024-05-16

## 2024-05-16 RX ORDER — TRAMADOL HYDROCHLORIDE 50 MG/1
TABLET ORAL
Qty: 60 EACH | Refills: 0 | Status: SHIPPED | OUTPATIENT
Start: 2024-05-16

## 2024-05-16 NOTE — PROGRESS NOTES
The patient location is: [place]  The chief complaint leading to consultation is: hospital discharge follow up     Visit type: audiovisual    Face to Face time with patient: 34 min  36 minutes of total time spent on the encounter, which includes face to face time and non-face to face time preparing to see the patient (eg, review of tests), Obtaining and/or reviewing separately obtained history, Documenting clinical information in the electronic or other health record, Independently interpreting results (not separately reported) and communicating results to the patient/family/caregiver, or Care coordination (not separately reported).         Each patient to whom he or she provides medical services by telemedicine is:  (1) informed of the relationship between the physician and patient and the respective role of any other health care provider with respect to management of the patient; and (2) notified that he or she may decline to receive medical services by telemedicine and may withdraw from such care at any time.    Notes: Subjective:   Patient ID: Niurka Pedraza is a 79 y.o. female  Chief complaint: No chief complaint on file.      HPI        Admitted 5/5 - 5/9  HPI:   78-year-old female with myelodysplastic syndrome, pulmonary fibrosis, hypertension, and multiple infections including MDRO UTIs and pulmonary coccidioidomycosis presented to the emergency room after a fall at home.  Patient was recently admitted for acute appendicitis underwent a lap appendectomy.  She was discharged home however earlier today she was ambulating with her walker when she tripped and fell forward striking her chest against the walker.  Following the fall she had severe pain to her chest abdomen and back.  She reports of chronic back pain secondary to compression fractures which she is in the process of scheduling a kyphoplasty.  In the emergency room imaging was notable for multiple rib fractures.  Due to her pain as well as fall,  Hospital Medicine consulted for observation.  Procedure(s):  Kyphoplasty- 3 levels regular kypho T11-L1 with anesthesia    Hospital Course:   She was admitted for further eval and treatment of pain after a fall with new broken rib (9th rib on right) and new thoracic and lumbar vertebral fractures. Neurosurgery consulted and recommends IR for kyphoplasty, TLSO brace, PT/OT and pain control. Follow-up in 4-6 weeks. She is s/p IR kyphoplasty of T11, T12, and L1 on 5/8. She worked with PT/OT on 5/9 and low intensity therapy recommended. All questions answered with patient and daughter at bedside, and they are in agreement with the discharge plan.     Goals of Care Treatment Preferences:  Code Status: Full Code  Living Will: Yes    - discharged with HH and palliative are consult    CT abd/pelvis 5/5:       Impression:      1. Postoperative changes consistent with recent appendectomy are identified.  Small amount of free air in the peritoneal cavity is consistent with the postoperative nature of this examination.  2. Inflammatory changes are identified in the sigmoid colon consistent with colitis.  3. Splenomegaly persists.  4. Focal area of duct ectasia in the right lobe of the liver with some associated peripheral enhancement is unchanged.  5. Stable L2 compression fracture is noted.  There has been interval progression in the compression fracture of L1 as well as development of compression fractures of T11 and L5.  No retropulsed fragments are seen.           Chest xray 5/5:  Impression:      Chronic compression fractures are identified.   Mildly angulated, nondisplaced fracture of the right 9th rib is seen.   Chronic appearing interstitial changes are again identified in the lung fields.        Admitted 4/27 - 5/3:  HPI:   Patient is an 79-year-old with  chronic  medical problems was brought to the emergency room by paramedics for fevers of up to 101 with associated fatigue, confusion and nausea with multiple episodes  of vomiting-with the last day - patient took Tylenol EN route, on arrival here-she is tachycardic 114, temp 99.6°-stable blood pressure-saturating 96% on room air  Lab workup with normal white count, otherwise unremarkable for her, UA showing pyuria bacteria-chest x-ray with  chronic changes -patient tested COVID positive--[had COVID pneumonia February 2024-treated with remdesivir/ MSSA pneumonia] currently with no respiratory symptoms- -recent frequent hospitalization withn ESBL UTI,-and for  diverticulitis without perforation- -complaints of mid back pain with nausea and vomiting -has  dysuria and lower abdominal discomfort - after obtaining blood cultures has received IV antibiotics-MERREM.  Hospital Medicine as to evaluate and admit patient  History obtained from reviewing extensive records and talking to patient with her daughter bedside.  Patient is immunocompromised- MDS with AML received last chemo in March of 2024, had bone marrow biopsy 2 weeks ago-chemo on hold patient's debility-with recent hospitalizations-on acyclovir and panconozole .  --I LD with history of coccidiomycosis-severe osteoporosis with L1-L2 compression fractures scheduled for kyphoplasty-  CT of the lung abdomen and pelvis -was ordered.   Procedure(s) (LRB):  APPENDECTOMY, LAPAROSCOPIC (N/A)    Hospital Course:   Acute appendicitis status post laparoscopic appendectomy with preoperative anemia worsening postop requiring 2 units of blood.  COVID positive SM asymptomatic not requiring oxygen, did have prolonged hospitalization secondary COVID in March 2024 [facility].  Post transfusion with stability of hemoglobin.  Postoperative rehab doing well.  Able to tolerate diet, stability of hemoglobin post blood transfusion.  Resume home health on discharge, good supportive family      Had palliative care appt today - discussed hospice with inpt palliative care team but not ready to pursue just yet - has appt with h/o scheduled and planning to  revisit hospice conversation at that time    Vertebral compression fx:   Blaire tramadol   Hydrocodone caused sleepiness   Starting miralax for constipation ppx    reviewed and consistent     Living will on file    S/p kypoplasty   S/p appy - had appt with gen surg 5/14  Has appt kimberly h/o     Receiving  iwht PT     Review of Systems   Constitutional:  Positive for activity change and unexpected weight change.   HENT:  Positive for trouble swallowing. Negative for hearing loss and rhinorrhea.    Eyes:  Negative for discharge and visual disturbance.   Respiratory:  Negative for chest tightness and wheezing.    Cardiovascular:  Negative for chest pain and palpitations.   Gastrointestinal:  Negative for blood in stool, constipation, diarrhea and vomiting.   Endocrine: Negative for polydipsia and polyuria.   Genitourinary:  Negative for difficulty urinating, dysuria, hematuria and menstrual problem.   Musculoskeletal:  Positive for neck pain. Negative for arthralgias and joint swelling.   Neurological:  Positive for weakness. Negative for headaches.   Psychiatric/Behavioral:  Positive for confusion. Negative for dysphoric mood.        Objective:  There were no vitals filed for this visit.  There is no height or weight on file to calculate BMI.    Physical Exam  Constitutional:       General: She is not in acute distress.     Appearance: She is well-developed. She is not diaphoretic.   Eyes:      General:         Right eye: No discharge.         Left eye: No discharge.      Conjunctiva/sclera: Conjunctivae normal.   Pulmonary:      Effort: Pulmonary effort is normal. No respiratory distress.   Skin:     Findings: No erythema or rash.   Neurological:      Mental Status: She is alert and oriented to person, place, and time.   Psychiatric:         Behavior: Behavior normal.         Thought Content: Thought content normal.         Judgment: Judgment normal.         Assessment:  1. Closed fracture of one rib, unspecified  laterality, sequela    2. Sciatica, unspecified laterality    3. Compression fracture of L2 vertebra with routine healing, subsequent encounter    4. MDS/AML    5. Immunosuppression due to chronic steroid use    6. Status post appendectomy        Plan:  1. Closed fracture of one rib, unspecified laterality, sequela  -     traMADoL (ULTRAM) 50 mg tablet; Take 1-2 tabs by mouth every 8 hours as needed for pain  Dispense: 60 each; Refill: 0  -     naloxone (NARCAN) 4 mg/actuation Spry; 4mg by nasal route as needed for opioid overdose; may repeat every 2-3 minutes in alternating nostrils until medical help arrives. Call 911  Dispense: 1 each; Refill: 11    2. Sciatica, unspecified laterality  -     traMADoL (ULTRAM) 50 mg tablet; Take 1-2 tabs by mouth every 8 hours as needed for pain  Dispense: 60 each; Refill: 0    3. Compression fracture of L2 vertebra with routine healing, subsequent encounter  -     traMADoL (ULTRAM) 50 mg tablet; Take 1-2 tabs by mouth every 8 hours as needed for pain  Dispense: 60 each; Refill: 0  -     naloxone (NARCAN) 4 mg/actuation Spry; 4mg by nasal route as needed for opioid overdose; may repeat every 2-3 minutes in alternating nostrils until medical help arrives. Call 911  Dispense: 1 each; Refill: 11    4. MDS/AML    5. Immunosuppression due to chronic steroid use    6. Status post appendectomy        Change tramadol to 1-2 tabs per dose prn   Moving to daughter's home and ex  and has great fam support   Cont f/u with specialists and palliative care   Will let me know if decides upon hospice but to discuss further with oncologist at upcoming appt   Cont current regimen     Health Maintenance   Topic Date Due    Colorectal Cancer Screening  12/09/2025    DEXA Scan  01/19/2026    Lipid Panel  06/22/2028    TETANUS VACCINE  07/21/2031    Shingles Vaccine  Completed

## 2024-05-16 NOTE — PROGRESS NOTES
Health Maintenance Due   Topic Date Due    RSV Vaccine (Age 60+ and Pregnant patients) (1 - 1-dose 60+ series) Never done    COVID-19 Vaccine (7 - 2023-24 season) 09/01/2023   Health Maintenance reviewed, updated and links triggered. HM'S up to date. ([place]) 5/16/24

## 2024-05-17 ENCOUNTER — LAB VISIT (OUTPATIENT)
Dept: LAB | Facility: HOSPITAL | Age: 79
End: 2024-05-17
Attending: INTERNAL MEDICINE
Payer: MEDICARE

## 2024-05-17 ENCOUNTER — OFFICE VISIT (OUTPATIENT)
Dept: HEMATOLOGY/ONCOLOGY | Facility: CLINIC | Age: 79
End: 2024-05-17
Payer: MEDICARE

## 2024-05-17 VITALS
HEART RATE: 70 BPM | SYSTOLIC BLOOD PRESSURE: 120 MMHG | TEMPERATURE: 98 F | WEIGHT: 126.44 LBS | HEIGHT: 62 IN | DIASTOLIC BLOOD PRESSURE: 60 MMHG | OXYGEN SATURATION: 95 % | BODY MASS INDEX: 23.27 KG/M2

## 2024-05-17 DIAGNOSIS — D63.0 ANEMIA IN NEOPLASTIC DISEASE: ICD-10-CM

## 2024-05-17 DIAGNOSIS — D46.9 MDS (MYELODYSPLASTIC SYNDROME): ICD-10-CM

## 2024-05-17 DIAGNOSIS — D46.9 MDS (MYELODYSPLASTIC SYNDROME): Chronic | ICD-10-CM

## 2024-05-17 DIAGNOSIS — D46.9 MDS (MYELODYSPLASTIC SYNDROME): Primary | ICD-10-CM

## 2024-05-17 LAB
ALBUMIN SERPL BCP-MCNC: 2.6 G/DL (ref 3.5–5.2)
ALP SERPL-CCNC: 252 U/L (ref 55–135)
ALT SERPL W/O P-5'-P-CCNC: 7 U/L (ref 10–44)
ANION GAP SERPL CALC-SCNC: 12 MMOL/L (ref 8–16)
AST SERPL-CCNC: 10 U/L (ref 10–40)
BASOPHILS # BLD AUTO: 0.04 K/UL (ref 0–0.2)
BASOPHILS NFR BLD: 0.4 % (ref 0–1.9)
BILIRUB SERPL-MCNC: 0.7 MG/DL (ref 0.1–1)
BUN SERPL-MCNC: 12 MG/DL (ref 8–23)
CALCIUM SERPL-MCNC: 9 MG/DL (ref 8.7–10.5)
CHLORIDE SERPL-SCNC: 100 MMOL/L (ref 95–110)
CO2 SERPL-SCNC: 23 MMOL/L (ref 23–29)
CREAT SERPL-MCNC: 0.7 MG/DL (ref 0.5–1.4)
DIFFERENTIAL METHOD BLD: ABNORMAL
EOSINOPHIL # BLD AUTO: 0 K/UL (ref 0–0.5)
EOSINOPHIL NFR BLD: 0.2 % (ref 0–8)
ERYTHROCYTE [DISTWIDTH] IN BLOOD BY AUTOMATED COUNT: 18.9 % (ref 11.5–14.5)
EST. GFR  (NO RACE VARIABLE): >60 ML/MIN/1.73 M^2
GLUCOSE SERPL-MCNC: 138 MG/DL (ref 70–110)
HCT VFR BLD AUTO: 29.2 % (ref 37–48.5)
HGB BLD-MCNC: 9.1 G/DL (ref 12–16)
IMM GRANULOCYTES # BLD AUTO: 0.19 K/UL (ref 0–0.04)
IMM GRANULOCYTES NFR BLD AUTO: 1.9 % (ref 0–0.5)
LYMPHOCYTES # BLD AUTO: 1.2 K/UL (ref 1–4.8)
LYMPHOCYTES NFR BLD: 11.7 % (ref 18–48)
MAGNESIUM SERPL-MCNC: 1.9 MG/DL (ref 1.6–2.6)
MCH RBC QN AUTO: 28.6 PG (ref 27–31)
MCHC RBC AUTO-ENTMCNC: 31.2 G/DL (ref 32–36)
MCV RBC AUTO: 92 FL (ref 82–98)
MONOCYTES # BLD AUTO: 0.9 K/UL (ref 0.3–1)
MONOCYTES NFR BLD: 9.3 % (ref 4–15)
NEUTROPHILS # BLD AUTO: 7.7 K/UL (ref 1.8–7.7)
NEUTROPHILS NFR BLD: 76.5 % (ref 38–73)
NRBC BLD-RTO: 0 /100 WBC
PHOSPHATE SERPL-MCNC: 3.3 MG/DL (ref 2.7–4.5)
PLATELET # BLD AUTO: 215 K/UL (ref 150–450)
PMV BLD AUTO: 8.7 FL (ref 9.2–12.9)
POTASSIUM SERPL-SCNC: 3.6 MMOL/L (ref 3.5–5.1)
PROT SERPL-MCNC: 6.8 G/DL (ref 6–8.4)
RBC # BLD AUTO: 3.18 M/UL (ref 4–5.4)
SODIUM SERPL-SCNC: 135 MMOL/L (ref 136–145)
WBC # BLD AUTO: 10.08 K/UL (ref 3.9–12.7)

## 2024-05-17 PROCEDURE — 84100 ASSAY OF PHOSPHORUS: CPT | Performed by: INTERNAL MEDICINE

## 2024-05-17 PROCEDURE — 99999 PR PBB SHADOW E&M-EST. PATIENT-LVL IV: CPT | Mod: PBBFAC,,, | Performed by: INTERNAL MEDICINE

## 2024-05-17 PROCEDURE — 83735 ASSAY OF MAGNESIUM: CPT | Performed by: INTERNAL MEDICINE

## 2024-05-17 PROCEDURE — 99215 OFFICE O/P EST HI 40 MIN: CPT | Mod: S$PBB,,, | Performed by: INTERNAL MEDICINE

## 2024-05-17 PROCEDURE — 80053 COMPREHEN METABOLIC PANEL: CPT | Performed by: INTERNAL MEDICINE

## 2024-05-17 PROCEDURE — 36415 COLL VENOUS BLD VENIPUNCTURE: CPT | Performed by: INTERNAL MEDICINE

## 2024-05-17 PROCEDURE — 99214 OFFICE O/P EST MOD 30 MIN: CPT | Mod: PBBFAC | Performed by: INTERNAL MEDICINE

## 2024-05-17 PROCEDURE — 85025 COMPLETE CBC W/AUTO DIFF WBC: CPT | Performed by: INTERNAL MEDICINE

## 2024-05-17 PROCEDURE — G2211 COMPLEX E/M VISIT ADD ON: HCPCS | Mod: S$PBB,,, | Performed by: INTERNAL MEDICINE

## 2024-05-17 NOTE — PROGRESS NOTES
Route Chart for Scheduling    BMT Chart Routing      Follow up with physician 2 months. Please schedule as virtual visit   Follow up with [facility]    Provider visit type    Infusion scheduling note    Injection scheduling note    Labs   Scheduling:  Preferred lab:  Lab interval:  Getting labs from home health   Imaging    Pharmacy appointment    Other referrals

## 2024-05-17 NOTE — PROGRESS NOTES
HEMATOLOGIC MALIGNANCIES PROGRESS NOTE    IDENTIFYING STATEMENT   Niurka Castellon) is a 79 y.o. female with a  of 1945 from [place], [place] with the diagnosis of MDS.        ONCOLOGY HISTORY:    1. Myelodysplastic syndrome with increased blasts-2              A. 2023: Bone marrow biopsy - 65-75% cellular marrow consistent with myelodysplastic syndrome with excess blasts-2; cytogenetics 46,XX,del(3)(q12)[2]/47,sl,+8[18]; NGS not sent; IPSS-R score of 7 = very high risk   B. 2023: Cycle 1 azacitidine-venetoclax (5 days aza, 14 days angelo) for MDS/AML   C. 10/18/2023: Bone marrow biopsy - 50-60% cellular marrow with no increased blasts and trilineage hematopoiesis with granulocytic hypoplasia and moderate dysgranulopoiesis, mildly left-shifted marked erythroid hyperplasia with severe dyserythropoiesis, and marked megakaryocytic hyperplasia with predominantly variably sized including small del3q-like monolobated forms; 3-4+ histiocytic iron stores; focal MF-1; cytogenetics 46,XX[20]; NGS shows ASXL1 (26%), SRSF2 (33%) and STAG2 (3%).     2. Neuropathy  3. Interstitial lung disease/pulmonary fibrosis  4. Hypertension  5. Aortic atherosclerosis  6. Pulmonary coccidioidomycosis  7. Hypothyroidism  8. Hyperparathyroidism    INTERVAL HISTORY:      Ms. Pedraza is seen in this virtual visit in follow-up of MDS/AML.    Since her last visit:     - 2024: PCP visit -   - 2024: Palliative care visit - not ready for hospice  - 2024: T11-L1 kyphoplasty  - 2024 - 5/3/2024: Hospitalized for acute appendicitis requiring lap appy; COVID positive;     She has not fully recovered. She is very fatigued. She is not eating well.     Past Medical History, Past Social History and Past Family History have been reviewed and are unchanged except as noted in the interval history.    MEDICATIONS:     Current Outpatient Medications on File Prior to Visit   Medication Sig Dispense Refill    acetaminophen (TYLENOL)  325 MG tablet Take 2 tablets (650 mg total) by mouth every 6 (six) hours as needed for Pain.      acyclovir (ZOVIRAX) 400 MG tablet Take 1 tablet (400 mg total) by mouth 2 (two) times daily. 60 tablet 11    albuterol-ipratropium (DUO-NEB) 2.5 mg-0.5 mg/3 mL nebulizer solution Take 3 mLs by nebulization every 6 (six) hours as needed for Wheezing or Shortness of Breath. Rescue 75 mL 11    ascorbic acid/zinc (ZINC WITH VITAMIN C ORAL) Take 1 tablet by mouth Daily.      atorvastatin (LIPITOR) 20 MG tablet Take 1 tablet (20 mg total) by mouth once daily. 90 tablet 3    azelastine (ASTELIN) 137 mcg (0.1 %) nasal spray 1 spray (137 mcg total) by Nasal route 2 (two) times daily. 30 mL 6    bisacodyL (DULCOLAX) 10 mg Supp Place 1 suppository (10 mg total) rectally daily as needed (constipation).      calcium-vitamin D3 (CALCIUM 500 + D) 500 mg(1,250mg) -200 unit per tablet Take 1 tablet by mouth 2 (two) times daily with meals.      carvediloL (COREG) 25 MG tablet Take 1 tablet (25 mg total) by mouth 2 (two) times daily with meals. 180 tablet 1    docusate sodium (COLACE) 100 MG capsule Take 1 capsule (100 mg total) by mouth 2 (two) times daily. 60 capsule 0    ferrous gluconate (FERGON) 324 MG tablet Take 1 tablet (324 mg total) by mouth every other day. 90 tablet 3    fluticasone furoate-vilanteroL (BREO ELLIPTA) 100-25 mcg/dose diskus inhaler Inhale 1 puff into the lungs once daily. Controller.  PLEASE NOTE IT IS ONCE A DAY!! 60 each 4    levothyroxine (SYNTHROID) 50 MCG tablet Take 1 tablet (50 mcg total) by mouth before breakfast. 90 tablet 1    mirtazapine (REMERON) 7.5 MG Tab Take 1 tablet (7.5 mg total) by mouth every evening. 90 tablet 3    multivitamin (THERAGRAN) per tablet Take 1 tablet by mouth once daily.      naloxone (NARCAN) 4 mg/actuation Spry 4mg by nasal route as needed for opioid overdose; may repeat every 2-3 minutes in alternating nostrils until medical help arrives. Call 911 1 each 11    ondansetron  (ZOFRAN-ODT) 4 MG TbDL Take 1 tablet (4 mg total) by mouth every 6 (six) hours as needed (nausea). 30 tablet 0    oxybutynin (DITROPAN XL) 15 MG TR24 Take 1 tablet (15 mg total) by mouth once daily. 30 tablet 11    pantoprazole (PROTONIX) 20 MG tablet Take 1 tablet (20 mg total) by mouth once daily. 90 tablet 1    polyethylene glycol (GLYCOLAX) 17 gram PwPk Take 17 g by mouth once daily. 30 packet 0    predniSONE (DELTASONE) 1 MG tablet Take 2 tablets (2 mg total) by mouth once daily. 180 tablet 3    sodium chloride 3% 3 % nebulizer solution Take 4 mLs by nebulization as needed for Other. 120 mL 3    traMADoL (ULTRAM) 50 mg tablet Take 1-2 tabs by mouth every 8 hours as needed for pain 60 each 0    [DISCONTINUED] budesonide-formoterol 80-4.5 mcg (SYMBICORT) 80-4.5 mcg/actuation HFAA Inhale 2 puffs into the lungs 2 (two) times daily as needed. Controller 1 Inhaler 6     No current facility-administered medications on file prior to visit.       ALLERGIES:   Review of patient's allergies indicates:   Allergen Reactions    Ciprofloxacin Other (See Comments)     Right foot pain and edema, tendonitis    Amlodipine Edema and Swelling     BLE  BLE    Lisinopril Other (See Comments)     cough    Nitrofuran analogues         ROS:       Review of Systems   Constitutional:  Positive for appetite change. Negative for unexpected weight change.   HENT:   Negative for mouth sores.    Respiratory:  Negative for cough and shortness of breath.    Cardiovascular:  Negative for chest pain.   Gastrointestinal:  Negative for abdominal pain and diarrhea.   Genitourinary:  Positive for frequency.    Musculoskeletal:  Positive for back pain.   Skin:  Negative for rash.   Neurological:  Negative for headaches.   Hematological:  Negative for adenopathy.   Psychiatric/Behavioral:  The patient is not nervous/anxious.        PHYSICAL EXAM:  Vitals:    05/17/24 1433   BP: 120/60   Pulse: 70   Temp: 97.9 °F (36.6 °C)   SpO2: 95%   Weight: 57.3 kg  "(126 lb 6.9 oz)   Height: 5' 2" (1.575 m)   PainSc:   9   PainLoc: Back         Physical Exam  Constitutional:       General: She is not in acute distress.     Appearance: She is well-developed.   HENT:      Head: Normocephalic and atraumatic.      Mouth/Throat:      Mouth: No oral lesions.   Eyes:      Conjunctiva/sclera: Conjunctivae normal.   Neck:      Thyroid: No thyromegaly.   Cardiovascular:      Rate and Rhythm: Normal rate and regular rhythm.      Heart sounds: Normal heart sounds. No murmur heard.  Pulmonary:      Breath sounds: Normal breath sounds. No wheezing or rales.   Abdominal:      General: There is no distension.      Palpations: Abdomen is soft. There is no hepatomegaly, splenomegaly or mass.      Tenderness: There is no abdominal tenderness.   Lymphadenopathy:      Cervical: No cervical adenopathy.      Right cervical: No deep cervical adenopathy.     Left cervical: No deep cervical adenopathy.   Skin:     Findings: No rash.   Neurological:      Mental Status: She is alert and oriented to person, place, and time.      Cranial Nerves: No cranial nerve deficit.      Coordination: Coordination normal.      Deep Tendon Reflexes: Reflexes are normal and symmetric.         LAB:   Results for orders placed or performed in visit on 05/17/24   Comprehensive Metabolic Panel   Result Value Ref Range    Sodium 135 (L) 136 - 145 mmol/L    Potassium 3.6 3.5 - 5.1 mmol/L    Chloride 100 95 - 110 mmol/L    CO2 23 23 - 29 mmol/L    Glucose 138 (H) 70 - 110 mg/dL    BUN 12 8 - 23 mg/dL    Creatinine 0.7 0.5 - 1.4 mg/dL    Calcium 9.0 8.7 - 10.5 mg/dL    Total Protein 6.8 6.0 - 8.4 g/dL    Albumin 2.6 (L) 3.5 - 5.2 g/dL    Total Bilirubin 0.7 0.1 - 1.0 mg/dL    Alkaline Phosphatase 252 (H) 55 - 135 U/L    AST 10 10 - 40 U/L    ALT 7 (L) 10 - 44 U/L    eGFR >60.0 >60 mL/min/1.73 m^2    Anion Gap 12 8 - 16 mmol/L   Magnesium   Result Value Ref Range    Magnesium 1.9 1.6 - 2.6 mg/dL   PHOSPHORUS   Result Value Ref " Range    Phosphorus 3.3 2.7 - 4.5 mg/dL   CBC Auto Differential   Result Value Ref Range    WBC 10.08 3.90 - 12.70 K/uL    RBC 3.18 (L) 4.00 - 5.40 M/uL    Hemoglobin 9.1 (L) 12.0 - 16.0 g/dL    Hematocrit 29.2 (L) 37.0 - 48.5 %    MCV 92 82 - 98 fL    MCH 28.6 27.0 - 31.0 pg    MCHC 31.2 (L) 32.0 - 36.0 g/dL    RDW 18.9 (H) 11.5 - 14.5 %    Platelets 215 150 - 450 K/uL    MPV 8.7 (L) 9.2 - 12.9 fL    Immature Granulocytes 1.9 (H) 0.0 - 0.5 %    Gran # (ANC) 7.7 1.8 - 7.7 K/uL    Immature Grans (Abs) 0.19 (H) 0.00 - 0.04 K/uL    Lymph # 1.2 1.0 - 4.8 K/uL    Mono # 0.9 0.3 - 1.0 K/uL    Eos # 0.0 0.0 - 0.5 K/uL    Baso # 0.04 0.00 - 0.20 K/uL    nRBC 0 0 /100 WBC    Gran % 76.5 (H) 38.0 - 73.0 %    Lymph % 11.7 (L) 18.0 - 48.0 %    Mono % 9.3 4.0 - 15.0 %    Eosinophil % 0.2 0.0 - 8.0 %    Basophil % 0.4 0.0 - 1.9 %    Differential Method Automated      *Note: Due to a large number of results and/or encounters for the requested time period, some results have not been displayed. A complete set of results can be found in Results Review.       PROBLEMS ASSESSED THIS VISIT:    1. MDS (myelodysplastic syndrome)    2. Anemia in neoplastic disease        PLAN:       Myelodysplastic syndrome  Ms. 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 We reviewed that this is an aggressive hematologic malignancy with high probability of evolution to acute myeloid leukemia (AML). According to the [facility] pathologic designation, this is referred to as MDS/AML.     Bone marrow biopsy after 1 cycle of aza-angelo showed resolution of increased blasts with ongoing morphologic changes consistent with MDS. We reviewed that this represents a favorable response to therapy. We therefore continued aza-angelo therapy, and she completed 6 cycles. Therapy was interrupted due to infectious complications with diverticulitis, appendicitis, and hospitalization.     Bone marrow biopsy on 4/10/2024 demonstrated 40-50% cellular marrow with dysplastic changes but no increase in blasts. ASXL1,  JAK2, SRSF2 mutations were detected. No increase in blasts.     She presently has moderate anemia but no significant leukopenia or thrombocytopenia. Based on her worsened performance status, recent complicated hospitalizations, we discussed that we will forgo additional therapy at this time and monitor her disease. We can discuss management if she develops worsening cytopenias or progression towards AML.     Continue infection prophylaxis with acyclovir.     Anemia   Due to MDS/AML. Moderate. Continue to monitor.     Follow-up  - continue labs with home health every other week  - Follow-up in 2months     Mohit Centeno MD  Hematology and Stem Cell Transplant    Visit today included increased complexity associated with the care of the episodic problem MDS addressed and managing the longitudinal care of the patient due to the serious and/or complex managed problem(s) MDS.    Over 40 minutes spent in patient care on the day of this encounter.

## 2024-05-20 ENCOUNTER — DOCUMENTATION ONLY (OUTPATIENT)
Dept: HEMATOLOGY/ONCOLOGY | Facility: CLINIC | Age: 79
End: 2024-05-20
Payer: MEDICARE

## 2024-05-20 PROBLEM — N30.01 ACUTE CYSTITIS WITH HEMATURIA: Status: RESOLVED | Noted: 2023-05-24 | Resolved: 2024-05-20

## 2024-05-20 PROBLEM — Z90.49 STATUS POST APPENDECTOMY: Status: ACTIVE | Noted: 2024-05-20

## 2024-05-20 NOTE — PROGRESS NOTES
Subsequent orders placed by Dr. Centeno reducing labs to every other week. ANGEL faxed to [facility] at [phone].

## 2024-05-21 ENCOUNTER — OUTPATIENT CASE MANAGEMENT (OUTPATIENT)
Dept: ADMINISTRATIVE | Facility: OTHER | Age: 79
End: 2024-05-21
Payer: MEDICARE

## 2024-05-21 NOTE — PROGRESS NOTES
Outpatient Care Management  Patient Does Not Consent    Patient: Niurka Pedraza  MRN:  [id number]  Date of Service:  5/21/2024  Completed by:  Mariely Davis RN    Chief Complaint   Patient presents with    Case Closure       Patient Summary           Consent Received:  Decline  Unable to reach patient.

## 2024-05-23 ENCOUNTER — PATIENT MESSAGE (OUTPATIENT)
Dept: HEMATOLOGY/ONCOLOGY | Facility: CLINIC | Age: 79
End: 2024-05-23
Payer: MEDICARE

## 2024-05-23 DIAGNOSIS — L89.151 PRESSURE INJURY OF SACRAL REGION, STAGE 1: Primary | ICD-10-CM

## 2024-05-23 RX ORDER — GUAIFENESIN 600 MG/1
600 TABLET, EXTENDED RELEASE ORAL 2 TIMES DAILY
Qty: 20 TABLET | Refills: 0 | Status: SHIPPED | OUTPATIENT
Start: 2024-05-23 | End: 2024-06-02

## 2024-05-23 RX ORDER — MENTHOL AND ZINC OXIDE .44; 20.625 G/100G; G/100G
OINTMENT TOPICAL DAILY
Qty: 71 G | Refills: 1 | Status: SHIPPED | OUTPATIENT
Start: 2024-05-23 | End: 2024-06-22

## 2024-05-23 NOTE — PROGRESS NOTES
Called and spoke to home health nurse Becki.  She reports patient is developing a stage I sacral ulcer.  She was educated patient's caregiver on regular turning.  I have ordered Calmoseptine barrier cream to be used for the ulcer as well.     Patient is coughing up some brown tinted sputum but did not have fever today.  She was using incentive spirometer.  Okay with Mucinex to facilitate mucus clearance.  I have ordered this as well.  Eliana reports discussed signs and symptoms of pneumonia including fever with caregiver.  Low threshold to treat for pneumonia should she develop you were given rib fractures.       Scooby Guillen MD  Internal Medicine  [facility]  05/23/2024 4:34 PM

## 2024-05-24 ENCOUNTER — OFFICE VISIT (OUTPATIENT)
Dept: PALLIATIVE MEDICINE | Facility: CLINIC | Age: 79
End: 2024-05-24
Payer: MEDICARE

## 2024-05-24 DIAGNOSIS — J84.10 PULMONARY FIBROSIS: ICD-10-CM

## 2024-05-24 DIAGNOSIS — Z51.5 PALLIATIVE CARE BY SPECIALIST: Primary | ICD-10-CM

## 2024-05-24 DIAGNOSIS — D46.9 MDS (MYELODYSPLASTIC SYNDROME): ICD-10-CM

## 2024-05-24 DIAGNOSIS — S32.020S COMPRESSION FRACTURE OF L2 VERTEBRA, SEQUELA: ICD-10-CM

## 2024-05-24 PROCEDURE — 99214 OFFICE O/P EST MOD 30 MIN: CPT | Mod: 95,,, | Performed by: STUDENT IN AN ORGANIZED HEALTH CARE EDUCATION/TRAINING PROGRAM

## 2024-05-24 NOTE — PROGRESS NOTES
The patient location is: home    Visit type: audiovisual    Face to Face time with patient: 10 minutes  30 minutes of total time spent on the encounter, which includes face to face time and non-face to face time preparing to see the patient (eg, review of tests), Obtaining and/or reviewing separately obtained history, Documenting clinical information in the electronic or other health record, Independently interpreting results (not separately reported) and communicating results to the patient/family/caregiver, or Care coordination (not separately reported).         Each patient to whom he or she provides medical services by telemedicine is:  (1) informed of the relationship between the physician and patient and the respective role of any other health care provider with respect to management of the patient; and (2) notified that he or she may decline to receive medical services by telemedicine and may withdraw from such care at any time.    Notes:     Office Visit  [facility]      Consult Requested By: No ref. provider found  Reason for Consult: goals of care      ASSESSMENT/PLAN:     Plan/Recommendations:  Diagnoses and all orders for this visit:    Palliative care by specialist    MDS (myelodysplastic syndrome)    Compression fracture of L2 vertebra, sequela    Pulmonary fibrosis      # Palliative care by specialist  # Myelodysplastic syndrome  # Pulmonary fibrosis  Patient with h/o MDS currently under management of Oncology, well supported by her children, her goal is to focus on quality of life, she is unsure if she wants to continue treatment and will discuss with Dr Centeno. Had an appointment with Dr. Centeno recently and they have decided to hold chemotherapy until she is stronger.  - Follow up Dr. Centeno July 23rd  - Follow up goals of care and hospice at next appt    # Vertebral compression fractures  Improving pain with continued use of prn tramadol. Variable bowel movements. Using miralax as  "needed.   - Continue tramadol 50mg PO TID prn for pain  - Continue miralax PO daily for OIC ppx  - Follow up PCP    # Advance care planning  Living Will and HCPOA on file     Follow up: 2 months or earlier if needed    Patient's encounter and above plan of care discussed with patient's daughter, Luz.     SUBJECTIVE:     History of Present Illness:  Patient is a 79 y.o. year old female presenting with myelodysplastic syndrome, pulmonary fibrosis, hypertension, and multiple infections including MDRO UTIs and pulmonary coccidioidomycosis. Recently admitted to [facility] from 05/05/24-05/09/24. Presents via virtual visit for follow up.     Saw Dr. Centeno since our last visit. Dr. Centeno wants to pause chemotherapy while she regains strength. Her blood counts are improving. They have a follow up in July to touch base again.     She is feeling weak overall but has gained some strength in the past week. She was vomiting last night. She is still in pain. She is still taking tramadol twice per day. She takes it when she eats. She is also taking extra strength tylenol in the morning and at night. She has not had a bowel movement in the last two days. She had diarrhea before.    PT is going well. She is getting exhausted from PT. Getting discharged from PT OT next week. Plans to have acupuncture done soon.     Vomiting occasionally from chest congestion that makes her gag. She is going to start taking mucinex.     Previous visit:     Hospital discharge summary:  "She was admitted for further eval and treatment of pain after a fall with new broken rib (9th rib on right) and new thoracic and lumbar vertebral fractures. Neurosurgery consulted and recommends IR for kyphoplasty, TLSO brace, PT/OT and pain control. Follow-up in 4-6 weeks. She is s/p IR kyphoplasty of T11, T12, and L1 on 5/8. She worked with PT/OT on 5/9 and low intensity therapy recommended. All questions answered with patient and daughter at bedside, and they are in " "agreement with the discharge plan. "     She presents via virtual visit with her daughter Luz and evaluated by inpatient palliative care team during aforementioned hospital stay.     The patient has an appointment with Dr. Centeno tomorrow morning at 8AM but is not currently in the shape to go to [place] for that visit. Plan to reschedule.     Rehabilitating well since hospital discharge. Was hard to stand up from sitting. Now walking with assistance. She is getting stronger but slowly. She gets tired very easily. Luz is trying to take things slow.     She has an appointment today virtually with her primary care Dr. Anderson as well.     Admits to pain post kyphoplasty. The pain is the same. If she does not move she is able to rest. If she moves, she has severe pain. She is using tramadol for pain control but it is not helping much. It helps a little bit. She is taking tramadol three times per day. She is having daily bowel movements.    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    Breast cancer     mastectomy 2014    Coccidiomycosis, progressive     Elevated CA-125 02/25/2018    Hyperlipidemia     Hypertension     Liver disease     OAB (overactive bladder)     Osteopenia     on Dexa 11/2017    Osteoporosis     pt reports hx of this, declined fosamax in past - treated with calcium and vit D    Pelvic mass 02/25/2018    Pulmonary fibrosis     Pulmonary nodules     SOB (shortness of breath) on exertion     Thyroid disease     hypo    Urinary tract infection due to extended-spectrum beta lactamase (ESBL) producing Escherichia coli 03/16/2022    UTI (urinary tract infection)      Past Surgical History:   Procedure Laterality Date    BONE MARROW BIOPSY N/A 4/10/2024    Procedure: Biopsy-bone marrow;  Surgeon: Mohit Centeno MD;  Location: [facility] ([address] [place] [address]);  Service: Oncology;  Laterality: N/A;  4/4-precall complete-MS    CHOLECYSTECTOMY      " COLONOSCOPY N/A 12/09/2022    Procedure: COLONOSCOPY;  Surgeon: Enrique Dominguez MD;  Location: [facility] (4TH FLR);  Service: Endoscopy;  Laterality: N/A;  inst via portal  pt requests after 12/9/22-MS  11/30-pt notified of earlier arrival time-KPvt    CYSTOSCOPY WITH BIOPSY OF BLADDER Bilateral 07/27/2022    Procedure: CYSTOSCOPY, WITH BLADDER BIOPSY; retrograde pyelogram,;  Surgeon: Anaya Oropeza MD;  Location: [facility];  Service: Urology;  Laterality: Bilateral;    ENDOSCOPIC ULTRASOUND OF UPPER GASTROINTESTINAL TRACT N/A 04/08/2021    Procedure: ULTRASOUND, UPPER GI TRACT, ENDOSCOPIC;  Surgeon: Aisha Barajas MD;  Location: [facility] (2ND FLR);  Service: Endoscopy;  Laterality: N/A;  UEUS/ERCP evaluation abn MRCP - Dr Angelika Steinerid-19 test 4/5/21 at [facility] Pre-admit - pg    ERCP N/A 04/08/2021    Procedure: ERCP (ENDOSCOPIC RETROGRADE CHOLANGIOPANCREATOGRAPHY);  Surgeon: Aisha Barajas MD;  Location: [facility] (2ND FLR);  Service: Endoscopy;  Laterality: N/A;  UEUS/ERCP evaluation abn MRCP - Dr Angelika Steinerid-19 test 4/5/21 at [facility] Pre-admit - pg    ESOPHAGOGASTRODUODENOSCOPY N/A 04/08/2021    Procedure: EGD (ESOPHAGOGASTRODUODENOSCOPY);  Surgeon: Aisha Barajas MD;  Location: [facility] (2ND FLR);  Service: Endoscopy;  Laterality: N/A;  UEUS/ERCP evaluation abn MRCP - Dr Carney  Covid-19 test 4/5/21 at [facility] Pre-admit - pg    ESOPHAGOGASTRODUODENOSCOPY N/A 06/02/2023    Procedure: EGD (ESOPHAGOGASTRODUODENOSCOPY);  Surgeon: Emeka Carney MD;  Location: [facility] ENDO (4TH FLR);  Service: Endoscopy;  Laterality: N/A;  She has  history of seromucinous borderline tumor of the ovary. We generally follow  and CEA tumor markers. Her CEA recently became slightly elevated. CT imaging negative and colonoscopy negative.     Talita Barrios    instructions portal-LW  pre    FIXATION KYPHOPLASTY  5/8/2024    Procedure: Kyphoplasty- 3 levels regular kypho T11-L1 with anesthesia;   Surgeon: Chandra Villavicencio MD;  Location: [facility] CATH;  Service: Interventional Radiology;;    HYSTERECTOMY      LAPAROSCOPIC APPENDECTOMY N/A 4/28/2024    Procedure: APPENDECTOMY, LAPAROSCOPIC;  Surgeon: BALJEET Haji MD;  Location: [facility] OR;  Service: General;  Laterality: N/A;    MASTECTOMY Right     2014     Family History   Problem Relation Name Age of Onset    Cancer Mother Jessica         colon cancer    Breast cancer Mother Jessica     Hypertension Father Yonas     Heart disease Father Yonas     Stroke Father Yonas     No Known Problems Sister      Cancer Brother Yonas         lung, ++ tobacco    Hypertension Brother Gary     No Known Problems Daughter      Hypertension Son Lavell     Colon cancer Neg Hx      Ovarian cancer Neg Hx       Review of patient's allergies indicates:   Allergen Reactions    Ciprofloxacin Other (See Comments)     Right foot pain and edema, tendonitis    Amlodipine Edema and Swelling     BLE  BLE    Lisinopril Other (See Comments)     cough    Nitrofuran analogues      Social Determinants of Health     Tobacco Use: Medium Risk (5/21/2024)    Patient History     Smoking Tobacco Use: Former     Smokeless Tobacco Use: Never     Passive Exposure: Not on file   Alcohol Use: Patient Unable To Answer (2/7/2024)    AUDIT-C     Frequency of Alcohol Consumption: Patient unable to answer     Average Number of Drinks: Patient unable to answer     Frequency of Binge Drinking: Patient unable to answer   Financial Resource Strain: Patient Unable To Answer (2/7/2024)    Overall Financial Resource Strain (CARDIA)     Difficulty of Paying Living Expenses: Patient unable to answer   Food Insecurity: No Food Insecurity (4/29/2024)    Hunger Vital Sign     Worried About Running Out of Food in the Last Year: Never true     Ran Out of Food in the Last Year: Never true   Transportation Needs: No Transportation Needs (4/29/2024)    PRAPARE - Transportation     Lack of Transportation (Medical): No     Lack of  Transportation (Non-Medical): No   Physical Activity: Unknown (2/7/2024)    Exercise Vital Sign     Days of Exercise per Week: Patient unable to answer     Minutes of Exercise per Session: 0 min   Recent Concern: Physical Activity - Inactive (1/12/2024)    Exercise Vital Sign     Days of Exercise per Week: 0 days     Minutes of Exercise per Session: 0 min   Stress: Patient Unable To Answer (2/7/2024)    [employer] - Occupational Stress Questionnaire     Feeling of Stress : Patient unable to answer   Housing Stability: Low Risk  (4/29/2024)    Housing Stability Vital Sign     Unable to Pay for Housing in the Last Year: No     Homeless in the Last Year: No   Depression: Low Risk  (1/2/2024)    Depression     Last PHQ-4: Flowsheet Data: 2   Utilities: Not on file   Health Literacy: Not on file   Social Isolation: Not on file          Medications:    Current Outpatient Medications:     acetaminophen (TYLENOL) 325 MG tablet, Take 2 tablets (650 mg total) by mouth every 6 (six) hours as needed for Pain., Disp: , Rfl:     acyclovir (ZOVIRAX) 400 MG tablet, Take 1 tablet (400 mg total) by mouth 2 (two) times daily., Disp: 60 tablet, Rfl: 11    albuterol-ipratropium (DUO-NEB) 2.5 mg-0.5 mg/3 mL nebulizer solution, Take 3 mLs by nebulization every 6 (six) hours as needed for Wheezing or Shortness of Breath. Rescue, Disp: 75 mL, Rfl: 11    ascorbic acid/zinc (ZINC WITH VITAMIN C ORAL), Take 1 tablet by mouth Daily., Disp: , Rfl:     atorvastatin (LIPITOR) 20 MG tablet, Take 1 tablet (20 mg total) by mouth once daily., Disp: 90 tablet, Rfl: 3    azelastine (ASTELIN) 137 mcg (0.1 %) nasal spray, 1 spray (137 mcg total) by Nasal route 2 (two) times daily., Disp: 30 mL, Rfl: 6    bisacodyL (DULCOLAX) 10 mg Supp, Place 1 suppository (10 mg total) rectally daily as needed (constipation)., Disp: , Rfl:     calcium-vitamin D3 (CALCIUM 500 + D) 500 mg(1,250mg) -200 unit per tablet, Take 1 tablet by mouth 2  (two) times daily with meals., Disp: , Rfl:     carvediloL (COREG) 25 MG tablet, Take 1 tablet (25 mg total) by mouth 2 (two) times daily with meals., Disp: 180 tablet, Rfl: 1    docusate sodium (COLACE) 100 MG capsule, Take 1 capsule (100 mg total) by mouth 2 (two) times daily., Disp: 60 capsule, Rfl: 0    ferrous gluconate (FERGON) 324 MG tablet, Take 1 tablet (324 mg total) by mouth every other day., Disp: 90 tablet, Rfl: 3    fluticasone furoate-vilanteroL (BREO ELLIPTA) 100-25 mcg/dose diskus inhaler, Inhale 1 puff into the lungs once daily. Controller. PLEASE NOTE IT IS ONCE A DAY!!, Disp: 60 each, Rfl: 4    guaiFENesin (MUCINEX) 600 mg 12 hr tablet, Take 1 tablet (600 mg total) by mouth 2 (two) times daily. for 10 days, Disp: 20 tablet, Rfl: 0    levothyroxine (SYNTHROID) 50 MCG tablet, Take 1 tablet (50 mcg total) by mouth before breakfast., Disp: 90 tablet, Rfl: 1    menthol-zinc oxide (CALMOSEPTINE) 0.44-20.6 % Oint, Apply topically once daily., Disp: 71 g, Rfl: 1    mirtazapine (REMERON) 7.5 MG Tab, Take 1 tablet (7.5 mg total) by mouth every evening., Disp: 90 tablet, Rfl: 3    multivitamin (THERAGRAN) per tablet, Take 1 tablet by mouth once daily., Disp: , Rfl:     naloxone (NARCAN) 4 mg/actuation Spry, 4mg by nasal route as needed for opioid overdose; may repeat every 2-3 minutes in alternating nostrils until medical help arrives. Call 911, Disp: 1 each, Rfl: 11    ondansetron (ZOFRAN-ODT) 4 MG TbDL, Take 1 tablet (4 mg total) by mouth every 6 (six) hours as needed (nausea)., Disp: 30 tablet, Rfl: 0    oxybutynin (DITROPAN XL) 15 MG TR24, Take 1 tablet (15 mg total) by mouth once daily., Disp: 30 tablet, Rfl: 11    pantoprazole (PROTONIX) 20 MG tablet, Take 1 tablet (20 mg total) by mouth once daily., Disp: 90 tablet, Rfl: 1    polyethylene glycol (GLYCOLAX) 17 gram PwPk, Take 17 g by mouth once daily., Disp: 30 packet, Rfl: 0    predniSONE (DELTASONE) 1 MG tablet, Take 2 tablets (2 mg total) by mouth  once daily., Disp: 180 tablet, Rfl: 3    sodium chloride 3% 3 % nebulizer solution, Take 4 mLs by nebulization as needed for Other., Disp: 120 mL, Rfl: 3    traMADoL (ULTRAM) 50 mg tablet, Take 1-2 tabs by mouth every 8 hours as needed for pain, Disp: 60 each, Rfl: 0    OBJECTIVE:       ROS:  Review of Systems   Constitutional:  Positive for appetite change and unexpected weight change.   HENT:  Negative for mouth sores.    Eyes:  Negative for visual disturbance.   Respiratory:  Positive for cough. Negative for shortness of breath.    Cardiovascular:  Negative for chest pain.   Gastrointestinal:  Negative for abdominal pain and diarrhea.   Genitourinary:  Positive for frequency.   Musculoskeletal:  Positive for back pain and gait problem.   Skin:  Negative for rash.   Neurological:  Negative for headaches.   Hematological:  Negative for adenopathy.   Psychiatric/Behavioral:  Negative for sleep disturbance. The patient is not nervous/anxious.        Review of Symptoms      Symptom Assessment (ESAS 0-10 Scale)  Pain:  0  Dyspnea:  0  Anxiety:  0  Nausea:  0  Depression:  0  Anorexia:  0  Fatigue:  0  Insomnia:  0  Restlessness:  0  Agitation:  0       Anxiety:  Is not nervous/anxious    Living Arrangements:  Lives with family    Psychosocial/Cultural:   See Palliative Psychosocial Note: Yes  **Primary  to Follow**  Palliative Care  Consult: No     Time-Based Charting:  Yes  Chart Review: 20 minutes  Face to Face: 10 minutes    Total Time Spent: 30 minutes      Advance Care Planning   Advance Directives:   Living Will: Yes        Copy on chart: Yes      Decision Making:  Patient answered questions  Goals of Care: What is most important right now is to focus on spending time at home, avoiding the hospital, remaining as independent as possible. Accordingly, we have decided that the best plan to meet the patient's goals includes continuing with treatment.              Physical Exam:  Vitals:     Physical Exam  Constitutional:       General: She is not in acute distress.     Appearance: Normal appearance. She is not ill-appearing or diaphoretic.   Musculoskeletal:      Cervical back: Normal range of motion.   Skin:     Coloration: Skin is not jaundiced or pale.   Neurological:      Mental Status: She is alert and oriented to person, place, and time.   Psychiatric:         Mood and Affect: Mood normal.         Behavior: Behavior normal.         Thought Content: Thought content normal.         Judgment: Judgment normal.         Labs:  CBC:   WBC   Date Value Ref Range Status   05/20/2024 7.90 3.90 - 12.70 K/uL Final     Hemoglobin   Date Value Ref Range Status   05/20/2024 8.5 (L) 12.0 - 16.0 g/dL Final     Hematocrit   Date Value Ref Range Status   05/20/2024 27.2 (L) 37.0 - 48.5 % Final     MCV   Date Value Ref Range Status   05/20/2024 91 82 - 98 fL Final     Platelets   Date Value Ref Range Status   05/20/2024 224 150 - 450 K/uL Final       LFT:   Lab Results   Component Value Date    AST 22 05/20/2024    ALKPHOS 262 (H) 05/20/2024    BILITOT 0.6 05/20/2024       Albumin:   Albumin   Date Value Ref Range Status   05/20/2024 3.3 (L) 3.5 - 5.2 g/dL Final     Protein:   Total Protein   Date Value Ref Range Status   05/20/2024 6.7 6.0 - 8.4 g/dL Final       Radiology:None      30 minutes of total time spent on the encounter, which includes face to face time and non-face to face time preparing to see the patient (eg, review of tests), Obtaining and/or reviewing separately obtained history, Documenting clinical information in the electronic or other health record, Independently interpreting results (not separately reported) and communicating results to the patient/family/caregiver, or Care coordination (not separately reported).    0 minutes spent in discussing ACP    Signature: Fifi Pacheco DO

## 2024-05-31 ENCOUNTER — LAB VISIT (OUTPATIENT)
Dept: LAB | Facility: HOSPITAL | Age: 79
End: 2024-05-31
Attending: INTERNAL MEDICINE
Payer: MEDICARE

## 2024-05-31 DIAGNOSIS — D46.9 MDS (MYELODYSPLASTIC SYNDROME): ICD-10-CM

## 2024-05-31 LAB
ABO + RH BLD: NORMAL
ALBUMIN SERPL BCP-MCNC: 3 G/DL (ref 3.5–5.2)
ALP SERPL-CCNC: 387 U/L (ref 55–135)
ALT SERPL W/O P-5'-P-CCNC: 14 U/L (ref 10–44)
ANION GAP SERPL CALC-SCNC: 12 MMOL/L (ref 8–16)
ANISOCYTOSIS BLD QL SMEAR: ABNORMAL
AST SERPL-CCNC: 13 U/L (ref 10–40)
BASOPHILS # BLD AUTO: 0.02 K/UL (ref 0–0.2)
BASOPHILS NFR BLD: 0.2 % (ref 0–1.9)
BILIRUB SERPL-MCNC: 0.7 MG/DL (ref 0.1–1)
BLD GP AB SCN CELLS X3 SERPL QL: NORMAL
BUN SERPL-MCNC: 16 MG/DL (ref 8–23)
CALCIUM SERPL-MCNC: 9.2 MG/DL (ref 8.7–10.5)
CHLORIDE SERPL-SCNC: 103 MMOL/L (ref 95–110)
CO2 SERPL-SCNC: 25 MMOL/L (ref 23–29)
CREAT SERPL-MCNC: 0.7 MG/DL (ref 0.5–1.4)
DACRYOCYTES BLD QL SMEAR: ABNORMAL
DIFFERENTIAL METHOD BLD: ABNORMAL
EOSINOPHIL # BLD AUTO: 0 K/UL (ref 0–0.5)
EOSINOPHIL NFR BLD: 0.2 % (ref 0–8)
ERYTHROCYTE [DISTWIDTH] IN BLOOD BY AUTOMATED COUNT: 22.3 % (ref 11.5–14.5)
EST. GFR  (NO RACE VARIABLE): >60 ML/MIN/1.73 M^2
GLUCOSE SERPL-MCNC: 114 MG/DL (ref 70–110)
HCT VFR BLD AUTO: 26.3 % (ref 37–48.5)
HGB BLD-MCNC: 8.3 G/DL (ref 12–16)
HYPOCHROMIA BLD QL SMEAR: ABNORMAL
IMM GRANULOCYTES # BLD AUTO: 0.22 K/UL (ref 0–0.04)
IMM GRANULOCYTES NFR BLD AUTO: 1.9 % (ref 0–0.5)
LDH SERPL L TO P-CCNC: 196 U/L (ref 110–260)
LYMPHOCYTES # BLD AUTO: 1.3 K/UL (ref 1–4.8)
LYMPHOCYTES NFR BLD: 10.9 % (ref 18–48)
MAGNESIUM SERPL-MCNC: 1.8 MG/DL (ref 1.6–2.6)
MCH RBC QN AUTO: 27.9 PG (ref 27–31)
MCHC RBC AUTO-ENTMCNC: 31.6 G/DL (ref 32–36)
MCV RBC AUTO: 88 FL (ref 82–98)
MONOCYTES # BLD AUTO: 1.1 K/UL (ref 0.3–1)
MONOCYTES NFR BLD: 9 % (ref 4–15)
NEUTROPHILS # BLD AUTO: 9.2 K/UL (ref 1.8–7.7)
NEUTROPHILS NFR BLD: 77.8 % (ref 38–73)
NRBC BLD-RTO: 0 /100 WBC
OVALOCYTES BLD QL SMEAR: ABNORMAL
PHOSPHATE SERPL-MCNC: 3.6 MG/DL (ref 2.7–4.5)
PLATELET # BLD AUTO: 210 K/UL (ref 150–450)
PLATELET BLD QL SMEAR: ABNORMAL
PMV BLD AUTO: 8.7 FL (ref 9.2–12.9)
POLYCHROMASIA BLD QL SMEAR: ABNORMAL
POTASSIUM SERPL-SCNC: 3.8 MMOL/L (ref 3.5–5.1)
PROT SERPL-MCNC: 6.9 G/DL (ref 6–8.4)
RBC # BLD AUTO: 2.98 M/UL (ref 4–5.4)
SCHISTOCYTES BLD QL SMEAR: ABNORMAL
SODIUM SERPL-SCNC: 140 MMOL/L (ref 136–145)
SPECIMEN OUTDATE: NORMAL
URATE SERPL-MCNC: 5.6 MG/DL (ref 2.4–5.7)
WBC # BLD AUTO: 11.8 K/UL (ref 3.9–12.7)

## 2024-05-31 PROCEDURE — 83735 ASSAY OF MAGNESIUM: CPT | Mod: PN | Performed by: INTERNAL MEDICINE

## 2024-05-31 PROCEDURE — 83615 LACTATE (LD) (LDH) ENZYME: CPT | Mod: PN | Performed by: INTERNAL MEDICINE

## 2024-05-31 PROCEDURE — 86901 BLOOD TYPING SEROLOGIC RH(D): CPT | Mod: PN | Performed by: INTERNAL MEDICINE

## 2024-05-31 PROCEDURE — 85025 COMPLETE CBC W/AUTO DIFF WBC: CPT | Mod: PN | Performed by: INTERNAL MEDICINE

## 2024-05-31 PROCEDURE — 84100 ASSAY OF PHOSPHORUS: CPT | Mod: PN | Performed by: INTERNAL MEDICINE

## 2024-05-31 PROCEDURE — 86901 BLOOD TYPING SEROLOGIC RH(D): CPT | Performed by: INTERNAL MEDICINE

## 2024-05-31 PROCEDURE — 80053 COMPREHEN METABOLIC PANEL: CPT | Mod: PN | Performed by: INTERNAL MEDICINE

## 2024-05-31 PROCEDURE — 84550 ASSAY OF BLOOD/URIC ACID: CPT | Mod: PN | Performed by: INTERNAL MEDICINE

## 2024-06-10 ENCOUNTER — HOSPITAL ENCOUNTER (OUTPATIENT)
Dept: RADIOLOGY | Facility: HOSPITAL | Age: 79
Discharge: HOME | End: 2024-06-10
Attending: NURSE PRACTITIONER
Payer: MEDICARE

## 2024-06-10 ENCOUNTER — HOSPITAL ENCOUNTER (OUTPATIENT)
Dept: RADIOLOGY | Facility: HOSPITAL | Age: 79
Discharge: HOME | End: 2024-06-10
Attending: NEUROLOGICAL SURGERY
Payer: MEDICARE

## 2024-06-10 ENCOUNTER — OFFICE VISIT (OUTPATIENT)
Dept: NEUROSURGERY | Facility: CLINIC | Age: 79
End: 2024-06-10
Attending: NEUROLOGICAL SURGERY
Payer: MEDICARE

## 2024-06-10 VITALS
SYSTOLIC BLOOD PRESSURE: 123 MMHG | HEIGHT: 62 IN | WEIGHT: 126.31 LBS | BODY MASS INDEX: 23.24 KG/M2 | HEART RATE: 77 BPM | RESPIRATION RATE: 18 BRPM | DIASTOLIC BLOOD PRESSURE: 70 MMHG

## 2024-06-10 DIAGNOSIS — S32.020A COMPRESSION FRACTURE OF L2 VERTEBRA, INITIAL ENCOUNTER: Primary | ICD-10-CM

## 2024-06-10 DIAGNOSIS — S32.020A COMPRESSION FRACTURE OF L2 VERTEBRA, INITIAL ENCOUNTER: ICD-10-CM

## 2024-06-10 DIAGNOSIS — S22.080D CLOSED WEDGE COMPRESSION FRACTURE OF T12 VERTEBRA WITH ROUTINE HEALING, SUBSEQUENT ENCOUNTER: ICD-10-CM

## 2024-06-10 PROCEDURE — 72080 X-RAY EXAM THORACOLMB 2/> VW: CPT | Mod: 26,,, | Performed by: RADIOLOGY

## 2024-06-10 PROCEDURE — 72131 CT LUMBAR SPINE W/O DYE: CPT | Mod: 26,,, | Performed by: RADIOLOGY

## 2024-06-10 PROCEDURE — 72080 X-RAY EXAM THORACOLMB 2/> VW: CPT | Mod: TC,PO

## 2024-06-10 PROCEDURE — 72131 CT LUMBAR SPINE W/O DYE: CPT | Mod: TC,PO

## 2024-06-10 PROCEDURE — G0179 MD RECERTIFICATION HHA PT: HCPCS | Mod: ,,, | Performed by: INTERNAL MEDICINE

## 2024-06-10 PROCEDURE — 99214 OFFICE O/P EST MOD 30 MIN: CPT | Mod: S$PBB,,, | Performed by: NURSE PRACTITIONER

## 2024-06-10 NOTE — PROGRESS NOTES
Neurosurgery History & Physical    Patient ID: Niurka Pedraza is a 79 y.o. female.    Chief Complaint   Patient presents with    Follow-up       History of Present Illness:   Ms. Pedraza is an 79 year old female who presents today for hospital follow up with thoracolumbar compression fractures. Last month she was admitted to [facility] following a fall. She had acute onset back pain into the hips. Neuroimaging demonstrated multiple compression fractures in the thoracolumbar spine, some acute, chronic and acute on chronic. While inpatient, IR performed kypho at T11, T12 and L1. She reports this did not help with pain.     She was placed in TLSO and instructed to fu with CT scan  in 4-6 weeks. She had CT performed this AM.     She is not wearing her TLSO brace. Reportedly she rarely wears it because she is not out of bed much. This is not new for her.     She did have HHPT help with exercises but this ended about 10 days ago. She is not doing the exercises taught to her during PT.     The pain is in low back, worse with activity. Sometimes the pain goes into the hips. Denies radiating pain into the legs, numbness/tingling, weakness. She is generally deconditioned d/t inactivity and the pain has only exacerbated this.     She is osteopenic according to DXA from 1/2023. She takes supplements for this.     Review of Systems   Constitutional:  Negative for activity change, fatigue and fever.   HENT:  Negative for trouble swallowing.    Eyes:  Negative for visual disturbance.   Respiratory:  Negative for chest tightness and shortness of breath.    Cardiovascular:  Negative for chest pain.   Gastrointestinal:  Negative for nausea and vomiting.   Genitourinary:  Negative for difficulty urinating.   Musculoskeletal:  Positive for back pain and myalgias. Negative for gait problem and neck pain.   Skin:  Negative for wound.   Neurological:  Negative for weakness, numbness and headaches.   Psychiatric/Behavioral:  Negative for  confusion.        Past Medical History:   Diagnosis Date    Arthritis     Borderline serous cystadenoma of right ovary 2018    Breast cancer     mastectomy 2014    Coccidiomycosis, progressive     Elevated CA-125 2018    Hyperlipidemia     Hypertension     Liver disease     OAB (overactive bladder)     Osteopenia     on Dexa 2017    Osteoporosis     pt reports hx of this, declined fosamax in past - treated with calcium and vit D    Pelvic mass 2018    Pulmonary fibrosis     Pulmonary nodules     SOB (shortness of breath) on exertion     Thyroid disease     hypo    Urinary tract infection due to extended-spectrum beta lactamase (ESBL) producing Escherichia coli 2022    UTI (urinary tract infection)      Social History     Socioeconomic History    Marital status:     Number of children: 3   Occupational History    Occupation: retired from [employer], [employer]     Comment: neuro chemistry   Tobacco Use    Smoking status: Former     Current packs/day: 0.00     Average packs/day: 0.5 packs/day for 30.1 years (15.1 ttl pk-yrs)     Types: Cigarettes     Start date: 1970     Quit date: 2000     Years since quittin.3    Smokeless tobacco: Never    Tobacco comments:     quit in her 50s, after 30 years smoking;   Substance and Sexual Activity    Alcohol use: No    Drug use: No    Sexual activity: Not Currently     Partners: Male     Birth control/protection: None   Social History Narrative    From [place]     Moved to [facility] in  for research    Moved to [place] for period of time    Moved back to [place] Summer 2016 and then to [facility] this year 2017     Social Determinants of Health     Financial Resource Strain: Patient Unable To Answer (2024)    Overall Financial Resource Strain (CARDIA)     Difficulty of Paying Living Expenses: Patient unable to answer   Food Insecurity: No Food Insecurity (2024)    Hunger Vital Sign     Worried About Running Out of  Food in the Last Year: Never true     Ran Out of Food in the Last Year: Never true   Transportation Needs: No Transportation Needs (4/29/2024)    PRAPARE - Transportation     Lack of Transportation (Medical): No     Lack of Transportation (Non-Medical): No   Physical Activity: Unknown (2/7/2024)    Exercise Vital Sign     Days of Exercise per Week: Patient unable to answer     Minutes of Exercise per Session: 0 min   Recent Concern: Physical Activity - Inactive (1/12/2024)    Exercise Vital Sign     Days of Exercise per Week: 0 days     Minutes of Exercise per Session: 0 min   Stress: Patient Unable To Answer (2/7/2024)    [employer] - Occupational Stress Questionnaire     Feeling of Stress : Patient unable to answer   Housing Stability: Low Risk  (4/29/2024)    Housing Stability Vital Sign     Unable to Pay for Housing in the Last Year: No     Homeless in the Last Year: No     Family History   Problem Relation Name Age of Onset    Cancer Mother Jessica         colon cancer    Breast cancer Mother Jessica     Hypertension Father Yonas     Heart disease Father Yonas     Stroke Father Yonas     No Known Problems Sister      Cancer Brother Yonas         lung, ++ tobacco    Hypertension Brother Gary     No Known Problems Daughter      Hypertension Son Lavell     Colon cancer Neg Hx      Ovarian cancer Neg Hx       Review of patient's allergies indicates:   Allergen Reactions    Ciprofloxacin Other (See Comments)     Right foot pain and edema, tendonitis    Amlodipine Edema and Swelling     BLE  BLE    Lisinopril Other (See Comments)     cough    Nitrofuran analogues        Current Outpatient Medications:     acetaminophen (TYLENOL) 325 MG tablet, Take 2 tablets (650 mg total) by mouth every 6 (six) hours as needed for Pain., Disp: , Rfl:     acyclovir (ZOVIRAX) 400 MG tablet, Take 1 tablet (400 mg total) by mouth 2 (two) times daily., Disp: 60 tablet, Rfl: 11    albuterol-ipratropium  (DUO-NEB) 2.5 mg-0.5 mg/3 mL nebulizer solution, Take 3 mLs by nebulization every 6 (six) hours as needed for Wheezing or Shortness of Breath. Rescue, Disp: 75 mL, Rfl: 11    ascorbic acid/zinc (ZINC WITH VITAMIN C ORAL), Take 1 tablet by mouth Daily., Disp: , Rfl:     atorvastatin (LIPITOR) 20 MG tablet, Take 1 tablet (20 mg total) by mouth once daily., Disp: 90 tablet, Rfl: 3    azelastine (ASTELIN) 137 mcg (0.1 %) nasal spray, 1 spray (137 mcg total) by Nasal route 2 (two) times daily., Disp: 30 mL, Rfl: 6    bisacodyL (DULCOLAX) 10 mg Supp, Place 1 suppository (10 mg total) rectally daily as needed (constipation)., Disp: , Rfl:     calcium-vitamin D3 (CALCIUM 500 + D) 500 mg(1,250mg) -200 unit per tablet, Take 1 tablet by mouth 2 (two) times daily with meals., Disp: , Rfl:     carvediloL (COREG) 25 MG tablet, Take 1 tablet (25 mg total) by mouth 2 (two) times daily with meals., Disp: 180 tablet, Rfl: 1    docusate sodium (COLACE) 100 MG capsule, Take 1 capsule (100 mg total) by mouth 2 (two) times daily., Disp: 60 capsule, Rfl: 0    ferrous gluconate (FERGON) 324 MG tablet, Take 1 tablet (324 mg total) by mouth every other day., Disp: 90 tablet, Rfl: 3    fluticasone furoate-vilanteroL (BREO ELLIPTA) 100-25 mcg/dose diskus inhaler, Inhale 1 puff into the lungs once daily. Controller. PLEASE NOTE IT IS ONCE A DAY!!, Disp: 60 each, Rfl: 4    levothyroxine (SYNTHROID) 50 MCG tablet, Take 1 tablet (50 mcg total) by mouth before breakfast., Disp: 90 tablet, Rfl: 1    menthol-zinc oxide (CALMOSEPTINE) 0.44-20.6 % Oint, Apply topically once daily., Disp: 71 g, Rfl: 1    mirtazapine (REMERON) 7.5 MG Tab, Take 1 tablet (7.5 mg total) by mouth every evening., Disp: 90 tablet, Rfl: 3    multivitamin (THERAGRAN) per tablet, Take 1 tablet by mouth once daily., Disp: , Rfl:     naloxone (NARCAN) 4 mg/actuation Spry, 4mg by nasal route as needed for opioid overdose; may repeat every 2-3 minutes in alternating nostrils until  "medical help arrives. Call 911, Disp: 1 each, Rfl: 11    ondansetron (ZOFRAN-ODT) 4 MG TbDL, Take 1 tablet (4 mg total) by mouth every 6 (six) hours as needed (nausea)., Disp: 30 tablet, Rfl: 0    oxybutynin (DITROPAN XL) 15 MG TR24, Take 1 tablet (15 mg total) by mouth once daily., Disp: 30 tablet, Rfl: 11    pantoprazole (PROTONIX) 20 MG tablet, Take 1 tablet (20 mg total) by mouth once daily., Disp: 90 tablet, Rfl: 1    polyethylene glycol (GLYCOLAX) 17 gram PwPk, Take 17 g by mouth once daily., Disp: 30 packet, Rfl: 0    predniSONE (DELTASONE) 1 MG tablet, Take 2 tablets (2 mg total) by mouth once daily., Disp: 180 tablet, Rfl: 3    sodium chloride 3% 3 % nebulizer solution, Take 4 mLs by nebulization as needed for Other., Disp: 120 mL, Rfl: 3    traMADoL (ULTRAM) 50 mg tablet, Take 1-2 tabs by mouth every 8 hours as needed for pain, Disp: 60 each, Rfl: 0  Blood pressure 123/70, pulse 77, resp. rate 18, height 5' 2" (1.575 m), weight 57.3 kg (126 lb 5.2 oz), last menstrual period 02/08/2000.      Neurologic Exam     Mental Status   Oriented to person, place, and time.   Level of consciousness: alert    Cranial Nerves     CN III, IV, VI   Extraocular motions are normal.     CN VII   Facial expression full, symmetric.     Motor Exam   Muscle bulk: normal  Overall muscle tone: normal    Strength   Strength 5/5 except as noted.   4/5 BLE     Sensory Exam   Light touch normal.     Gait, Coordination, and Reflexes     Gait  Gait: (deferred)    Reflexes   Right brachioradialis: 2+  Left brachioradialis: 2+  Right biceps: 2+  Left biceps: 2+  Right patellar: 2+  Left patellar: 2+  Right Ram: absent  Left Ram: absent  Right ankle clonus: absent  Left ankle clonus: absent  Minimal TTP lower back         Imaging personally reviewed and discussed with the patient:   CT L spine dated today compared to MRI from 5/2024:  FINDINGS:  Interval postoperative changes of vertebral body cement augmentation for " stable-appearing superior endplate compression fractures involving the T11, T12 and L1 vertebral bodies.  Splint material extends into the T12-L1 disc space and left paraspinous soft tissues.     Stable severe L2 biconcave compression fracture without significant progressive height loss or retropulsion.  Additional stable-appearing minimal L4 and mild L5 superior endplate compression fractures.  No definite new fracture subluxation.     Stable mild levoconvex curvature of the lumbar spine.  Stable mild anterolisthesis of T12 on L1.  Multilevel degenerative disc disease is noted, most pronounced at L5-S1 with marked disc space narrowing, vacuum disc phenomenon, degenerative endplate change marginal osteophyte formation.     The surrounding visualized paravertebral soft tissue structures demonstrate no pathology. Limited views of the abdomen demonstrate no significant abnormality.  Aortoiliac calcific atherosclerosis.     T11-12: Mild diffuse disc bulge.  Mild bilateral facet arthropathy.  Mild left neural foraminal stenosis.  No significant spinal canal stenosis.     T12-L1: Mild diffuse disc bulge and retropulsion of the posterosuperior L1 cortex into the ventral spinal canal.  Mild bilateral facet arthropathy.  No significant neural foraminal or spinal canal stenosis.     L1-2: Mild diffuse disc bulge and retropulsion of the posterosuperior L2 cortex into the ventral spinal canal.  Mild bilateral facet arthropathy.  No significant neural foraminal or spinal canal stenosis.     L2-3: Left asymmetric diffuse disc bulge and minimal retropulsion the posteroinferior L2 cortex.  Mild bilateral facet arthropathy and ligamentum flavum thickening.  No significant neural foraminal or spinal canal stenosis.     L3-4: Right asymmetric diffuse disc bulge with osteophytic ridging.  Mild bilateral facet arthropathy.  Mild left neural foraminal stenosis.  Mild spinal canal stenosis.     L4-5: Diffuse disc bulge with osteophytic  ridging and superimposed left central/subarticular disc protrusion.  Marked bilateral facet arthropathy and ligamentum flavum thickening.  Mild left greater than right neural foraminal stenosis.  Mild spinal canal and moderate left lateral recess stenosis.     L5-S1: Left asymmetric diffuse disc bulge with osteophytic ridging.  Moderate bilateral facet arthropathy.  Moderate bilateral neural foraminal stenosis.  No spinal canal stenosis.     Impression:     1. Interval postoperative changes of vertebral body cement augmentation for stable-appearing superior endplate compression fractures involving the T11, T12 and L1 vertebral bodies.  No significant progressive height loss or retropulsion.  2. Stable severe L2 compression fracture and minimal L4 and mild L5 superior endplate compression fractures.  No significant progressive height loss or retropulsion.  No discrete new fracture or abnormal subluxation.  3. Levoscoliosis and multilevel degenerative changes of the lumbar spine, as detailed above, without significant interval change as compared to the prior exam on 05/07/2024.          Assessment & Plan:   79 year old female with multiple T/L compression fx s/p kypho with persistent low back, hip pain. Will obtain upright XRs now and in 4 weeks with return visit to examine and discuss XR results as these were not performed in the hospital for current comparison.     Stressed importance of treatment of decreased bone density and weight bearing activity.    She will continue with Palliative care for pain management.

## 2024-06-17 ENCOUNTER — PATIENT MESSAGE (OUTPATIENT)
Dept: INTERNAL MEDICINE | Facility: CLINIC | Age: 79
End: 2024-06-17
Payer: MEDICARE

## 2024-06-17 ENCOUNTER — TELEPHONE (OUTPATIENT)
Dept: HEMATOLOGY/ONCOLOGY | Facility: CLINIC | Age: 79
End: 2024-06-17
Payer: MEDICARE

## 2024-06-17 ENCOUNTER — PATIENT MESSAGE (OUTPATIENT)
Dept: HEMATOLOGY/ONCOLOGY | Facility: CLINIC | Age: 79
End: 2024-06-17
Payer: MEDICARE

## 2024-06-17 DIAGNOSIS — N39.0 URINARY TRACT INFECTION WITHOUT HEMATURIA, SITE UNSPECIFIED: Primary | ICD-10-CM

## 2024-06-17 DIAGNOSIS — D46.9 MDS (MYELODYSPLASTIC SYNDROME): Primary | ICD-10-CM

## 2024-06-17 RX ORDER — AMOXICILLIN AND CLAVULANATE POTASSIUM 875; 125 MG/1; MG/1
1 TABLET, FILM COATED ORAL EVERY 12 HOURS
Qty: 14 TABLET | Refills: 0 | Status: SHIPPED | OUTPATIENT
Start: 2024-06-17 | End: 2024-06-24

## 2024-06-17 NOTE — TELEPHONE ENCOUNTER
"Left a voicemail, asking patient & caregivers to call [facility] to see if Augmentin prescription was picked up & if patient began taking this medication.    [employer] message sent.     "  Savana Anderson MD Physician [id number]:16 AM     Delete     Copy     What are her current symptoms so this can be documented? Any fevers, burning with urination, frequency, flank pain ?   - reviewed culture results and can give oral augmentin to treat both bacteria that are in culture   - sent in antibiotic to her pharmacy            Galilea Rose MA Medical Assistant Bjvgdy07:26 AM     Delete     Copy     Routing as high priority to PCP to advise       "  "

## 2024-06-17 NOTE — TELEPHONE ENCOUNTER
----- Message from Milagro Antonio sent at 6/14/2024  3:44 PM CDT -----  Regarding: FW: Scheduling Request  Contact: [email or web address]@ [email or web address]    ----- Message -----  From: Bibi Rose  Sent: 6/14/2024   2:35 PM CDT  To: [facility]  Pool  Subject: Scheduling Request                               Scheduling Request           Appt Type:  Labs     Date/Time Preference:any     Treating Provider:Jacobo     Caller Name:[email or web address]@ [email or web address]     Contact Preference:[phone]     Comments/notes:Tiff is calling to have labs ordered again on the [facility] because there not able to do it anymore. Requesting a call  back

## 2024-06-17 NOTE — TELEPHONE ENCOUNTER
What are her current symptoms so this can be documented? Any fevers, burning with urination, frequency, flank pain ?   - reviewed culture results and can give oral augmentin to treat both bacteria that are in culture   - sent in antibiotic to her pharmacy

## 2024-06-17 NOTE — TELEPHONE ENCOUNTER
----- Message from Bibi Rose sent at 6/17/2024 11:28 AM CDT -----  Regarding: Consult/Advisory  Contact: [email or web address]     Consult/Advisory     Name Of Caller:[email or web address]        Contact Preference:[phone]     Nature of call:Lupis is calling to office know that patients daughter is calling to cancel labs by home health. Daughter states she will take mother into [facility] [address] to do labs. Requesting a call back

## 2024-06-18 ENCOUNTER — DOCUMENT SCAN (OUTPATIENT)
Dept: HOME HEALTH SERVICES | Facility: HOSPITAL | Age: 79
End: 2024-06-18
Payer: MEDICARE

## 2024-06-19 ENCOUNTER — DOCUMENT SCAN (OUTPATIENT)
Dept: HOME HEALTH SERVICES | Facility: HOSPITAL | Age: 79
End: 2024-06-19
Payer: MEDICARE

## 2024-06-19 ENCOUNTER — LAB VISIT (OUTPATIENT)
Dept: LAB | Facility: HOSPITAL | Age: 79
End: 2024-06-19
Attending: INTERNAL MEDICINE
Payer: MEDICARE

## 2024-06-19 DIAGNOSIS — D46.9 MDS (MYELODYSPLASTIC SYNDROME): ICD-10-CM

## 2024-06-19 LAB
ALBUMIN SERPL BCP-MCNC: 3.2 G/DL (ref 3.5–5.2)
ALP SERPL-CCNC: 315 U/L (ref 55–135)
ALT SERPL W/O P-5'-P-CCNC: 8 U/L (ref 10–44)
ANION GAP SERPL CALC-SCNC: 11 MMOL/L (ref 8–16)
AST SERPL-CCNC: 12 U/L (ref 10–40)
BASOPHILS # BLD AUTO: 0.03 K/UL (ref 0–0.2)
BASOPHILS NFR BLD: 0.2 % (ref 0–1.9)
BILIRUB SERPL-MCNC: 0.7 MG/DL (ref 0.1–1)
BUN SERPL-MCNC: 14 MG/DL (ref 8–23)
CALCIUM SERPL-MCNC: 9.4 MG/DL (ref 8.7–10.5)
CHLORIDE SERPL-SCNC: 102 MMOL/L (ref 95–110)
CO2 SERPL-SCNC: 26 MMOL/L (ref 23–29)
CREAT SERPL-MCNC: 0.8 MG/DL (ref 0.5–1.4)
DIFFERENTIAL METHOD BLD: ABNORMAL
EOSINOPHIL # BLD AUTO: 0 K/UL (ref 0–0.5)
EOSINOPHIL NFR BLD: 0.2 % (ref 0–8)
ERYTHROCYTE [DISTWIDTH] IN BLOOD BY AUTOMATED COUNT: 27.5 % (ref 11.5–14.5)
EST. GFR  (NO RACE VARIABLE): >60 ML/MIN/1.73 M^2
GLUCOSE SERPL-MCNC: 115 MG/DL (ref 70–110)
HCT VFR BLD AUTO: 27.2 % (ref 37–48.5)
HGB BLD-MCNC: 8.3 G/DL (ref 12–16)
IMM GRANULOCYTES # BLD AUTO: 0.28 K/UL (ref 0–0.04)
IMM GRANULOCYTES NFR BLD AUTO: 2.3 % (ref 0–0.5)
LDH SERPL L TO P-CCNC: 175 U/L (ref 110–260)
LYMPHOCYTES # BLD AUTO: 1.5 K/UL (ref 1–4.8)
LYMPHOCYTES NFR BLD: 12 % (ref 18–48)
MAGNESIUM SERPL-MCNC: 1.8 MG/DL (ref 1.6–2.6)
MCH RBC QN AUTO: 27.1 PG (ref 27–31)
MCHC RBC AUTO-ENTMCNC: 30.5 G/DL (ref 32–36)
MCV RBC AUTO: 89 FL (ref 82–98)
MONOCYTES # BLD AUTO: 1.3 K/UL (ref 0.3–1)
MONOCYTES NFR BLD: 10.7 % (ref 4–15)
NEUTROPHILS # BLD AUTO: 9.1 K/UL (ref 1.8–7.7)
NEUTROPHILS NFR BLD: 74.6 % (ref 38–73)
NRBC BLD-RTO: 0 /100 WBC
PHOSPHATE SERPL-MCNC: 4.1 MG/DL (ref 2.7–4.5)
PLATELET # BLD AUTO: 225 K/UL (ref 150–450)
PMV BLD AUTO: 8.3 FL (ref 9.2–12.9)
POTASSIUM SERPL-SCNC: 4.1 MMOL/L (ref 3.5–5.1)
PROT SERPL-MCNC: 6.9 G/DL (ref 6–8.4)
RBC # BLD AUTO: 3.06 M/UL (ref 4–5.4)
SODIUM SERPL-SCNC: 139 MMOL/L (ref 136–145)
URATE SERPL-MCNC: 6.1 MG/DL (ref 2.4–5.7)
WBC # BLD AUTO: 12.25 K/UL (ref 3.9–12.7)

## 2024-06-19 PROCEDURE — 84100 ASSAY OF PHOSPHORUS: CPT | Mod: PN | Performed by: INTERNAL MEDICINE

## 2024-06-19 PROCEDURE — 36415 COLL VENOUS BLD VENIPUNCTURE: CPT | Mod: PN | Performed by: INTERNAL MEDICINE

## 2024-06-19 PROCEDURE — 83615 LACTATE (LD) (LDH) ENZYME: CPT | Mod: PN | Performed by: INTERNAL MEDICINE

## 2024-06-19 PROCEDURE — 85025 COMPLETE CBC W/AUTO DIFF WBC: CPT | Mod: PN | Performed by: INTERNAL MEDICINE

## 2024-06-19 PROCEDURE — 80053 COMPREHEN METABOLIC PANEL: CPT | Mod: PN | Performed by: INTERNAL MEDICINE

## 2024-06-19 PROCEDURE — 84550 ASSAY OF BLOOD/URIC ACID: CPT | Mod: PN | Performed by: INTERNAL MEDICINE

## 2024-06-19 PROCEDURE — 83735 ASSAY OF MAGNESIUM: CPT | Mod: PN | Performed by: INTERNAL MEDICINE

## 2024-06-24 ENCOUNTER — DOCUMENT SCAN (OUTPATIENT)
Dept: HOME HEALTH SERVICES | Facility: HOSPITAL | Age: 79
End: 2024-06-24
Payer: MEDICARE

## 2024-06-25 ENCOUNTER — TELEPHONE (OUTPATIENT)
Dept: INTERNAL MEDICINE | Facility: CLINIC | Age: 79
End: 2024-06-25
Payer: MEDICARE

## 2024-06-25 DIAGNOSIS — Z00.00 ENCOUNTER FOR MEDICARE ANNUAL WELLNESS EXAM: ICD-10-CM

## 2024-06-25 NOTE — TELEPHONE ENCOUNTER
----- Message from Deena Zhou LPN sent at 6/25/2024  9:30 AM CDT -----  Contact: Irene  Do you have orders for this patient that was faxed from  [facility]  ----- Message -----  From: Ghazal Collazo  Sent: 6/25/2024   9:22 AM CDT  To: Monica Sawant Staff    Type:  Patient Call          Who Called: [facility]          Does the patient know what this is regarding?: Requesting a call back about orders that were faxed on 6/12 ; she said that the orders were faxed back on 6/25 unsigned she would like to know what she josué to do to have the orders signed ; please advise           Best Call Back Number: [phone]          Additional Information:

## 2024-06-26 ENCOUNTER — LAB VISIT (OUTPATIENT)
Dept: LAB | Facility: HOSPITAL | Age: 79
End: 2024-06-26
Attending: INTERNAL MEDICINE
Payer: MEDICARE

## 2024-06-26 DIAGNOSIS — D46.9 MDS (MYELODYSPLASTIC SYNDROME): ICD-10-CM

## 2024-06-26 LAB
ALBUMIN SERPL BCP-MCNC: 3.3 G/DL (ref 3.5–5.2)
ALP SERPL-CCNC: 331 U/L (ref 55–135)
ALT SERPL W/O P-5'-P-CCNC: 11 U/L (ref 10–44)
ANION GAP SERPL CALC-SCNC: 12 MMOL/L (ref 8–16)
AST SERPL-CCNC: 11 U/L (ref 10–40)
BASOPHILS # BLD AUTO: 0.04 K/UL (ref 0–0.2)
BASOPHILS NFR BLD: 0.4 % (ref 0–1.9)
BILIRUB SERPL-MCNC: 0.6 MG/DL (ref 0.1–1)
BUN SERPL-MCNC: 18 MG/DL (ref 8–23)
CALCIUM SERPL-MCNC: 9.3 MG/DL (ref 8.7–10.5)
CHLORIDE SERPL-SCNC: 104 MMOL/L (ref 95–110)
CO2 SERPL-SCNC: 24 MMOL/L (ref 23–29)
CREAT SERPL-MCNC: 0.8 MG/DL (ref 0.5–1.4)
DIFFERENTIAL METHOD BLD: ABNORMAL
EOSINOPHIL # BLD AUTO: 0 K/UL (ref 0–0.5)
EOSINOPHIL NFR BLD: 0.2 % (ref 0–8)
ERYTHROCYTE [DISTWIDTH] IN BLOOD BY AUTOMATED COUNT: 26.1 % (ref 11.5–14.5)
EST. GFR  (NO RACE VARIABLE): >60 ML/MIN/1.73 M^2
GLUCOSE SERPL-MCNC: 115 MG/DL (ref 70–110)
HCT VFR BLD AUTO: 27 % (ref 37–48.5)
HGB BLD-MCNC: 8.2 G/DL (ref 12–16)
IMM GRANULOCYTES # BLD AUTO: 0.17 K/UL (ref 0–0.04)
IMM GRANULOCYTES NFR BLD AUTO: 1.7 % (ref 0–0.5)
LDH SERPL L TO P-CCNC: 324 U/L (ref 110–260)
LYMPHOCYTES # BLD AUTO: 1.3 K/UL (ref 1–4.8)
LYMPHOCYTES NFR BLD: 13.2 % (ref 18–48)
MAGNESIUM SERPL-MCNC: 2 MG/DL (ref 1.6–2.6)
MCH RBC QN AUTO: 27.3 PG (ref 27–31)
MCHC RBC AUTO-ENTMCNC: 30.4 G/DL (ref 32–36)
MCV RBC AUTO: 90 FL (ref 82–98)
MONOCYTES # BLD AUTO: 1 K/UL (ref 0.3–1)
MONOCYTES NFR BLD: 9.3 % (ref 4–15)
NEUTROPHILS # BLD AUTO: 7.7 K/UL (ref 1.8–7.7)
NEUTROPHILS NFR BLD: 75.2 % (ref 38–73)
NRBC BLD-RTO: 0 /100 WBC
PHOSPHATE SERPL-MCNC: 3.6 MG/DL (ref 2.7–4.5)
PLATELET # BLD AUTO: 229 K/UL (ref 150–450)
PMV BLD AUTO: 8.5 FL (ref 9.2–12.9)
POTASSIUM SERPL-SCNC: 4 MMOL/L (ref 3.5–5.1)
PROT SERPL-MCNC: 6.9 G/DL (ref 6–8.4)
RBC # BLD AUTO: 3 M/UL (ref 4–5.4)
SODIUM SERPL-SCNC: 140 MMOL/L (ref 136–145)
URATE SERPL-MCNC: 5.4 MG/DL (ref 2.4–5.7)
WBC # BLD AUTO: 10.17 K/UL (ref 3.9–12.7)

## 2024-06-26 PROCEDURE — 36415 COLL VENOUS BLD VENIPUNCTURE: CPT | Mod: PN | Performed by: INTERNAL MEDICINE

## 2024-06-26 PROCEDURE — 84100 ASSAY OF PHOSPHORUS: CPT | Mod: PN | Performed by: INTERNAL MEDICINE

## 2024-06-26 PROCEDURE — 85025 COMPLETE CBC W/AUTO DIFF WBC: CPT | Mod: PN | Performed by: INTERNAL MEDICINE

## 2024-06-26 PROCEDURE — 83615 LACTATE (LD) (LDH) ENZYME: CPT | Mod: PN | Performed by: INTERNAL MEDICINE

## 2024-06-26 PROCEDURE — 84550 ASSAY OF BLOOD/URIC ACID: CPT | Mod: PN | Performed by: INTERNAL MEDICINE

## 2024-06-26 PROCEDURE — 80053 COMPREHEN METABOLIC PANEL: CPT | Mod: PN | Performed by: INTERNAL MEDICINE

## 2024-06-26 PROCEDURE — 83735 ASSAY OF MAGNESIUM: CPT | Mod: PN | Performed by: INTERNAL MEDICINE

## 2024-06-27 ENCOUNTER — TELEPHONE (OUTPATIENT)
Dept: HEMATOLOGY/ONCOLOGY | Facility: CLINIC | Age: 79
End: 2024-06-27
Payer: MEDICARE

## 2024-06-27 NOTE — TELEPHONE ENCOUNTER
Faxed off documents for Home Health to [facility] regarding labs to [phone].

## 2024-07-03 ENCOUNTER — DOCUMENT SCAN (OUTPATIENT)
Dept: HOME HEALTH SERVICES | Facility: HOSPITAL | Age: 79
End: 2024-07-03
Payer: MEDICARE

## 2024-07-05 ENCOUNTER — EXTERNAL HOME HEALTH (OUTPATIENT)
Dept: HOME HEALTH SERVICES | Facility: HOSPITAL | Age: 79
End: 2024-07-05
Payer: MEDICARE

## 2024-07-05 ENCOUNTER — LAB VISIT (OUTPATIENT)
Dept: LAB | Facility: HOSPITAL | Age: 79
End: 2024-07-05
Attending: INTERNAL MEDICINE
Payer: MEDICARE

## 2024-07-05 DIAGNOSIS — D46.9 MDS (MYELODYSPLASTIC SYNDROME): ICD-10-CM

## 2024-07-05 LAB
ALBUMIN SERPL BCP-MCNC: 3.2 G/DL (ref 3.5–5.2)
ALP SERPL-CCNC: 394 U/L (ref 55–135)
ALT SERPL W/O P-5'-P-CCNC: 17 U/L (ref 10–44)
ANION GAP SERPL CALC-SCNC: 13 MMOL/L (ref 8–16)
AST SERPL-CCNC: 16 U/L (ref 10–40)
BASOPHILS # BLD AUTO: 0.06 K/UL (ref 0–0.2)
BASOPHILS NFR BLD: 0.6 % (ref 0–1.9)
BILIRUB SERPL-MCNC: 0.5 MG/DL (ref 0.1–1)
BUN SERPL-MCNC: 23 MG/DL (ref 8–23)
CALCIUM SERPL-MCNC: 9.1 MG/DL (ref 8.7–10.5)
CHLORIDE SERPL-SCNC: 105 MMOL/L (ref 95–110)
CO2 SERPL-SCNC: 23 MMOL/L (ref 23–29)
CREAT SERPL-MCNC: 0.8 MG/DL (ref 0.5–1.4)
DIFFERENTIAL METHOD BLD: ABNORMAL
EOSINOPHIL # BLD AUTO: 0 K/UL (ref 0–0.5)
EOSINOPHIL NFR BLD: 0.4 % (ref 0–8)
ERYTHROCYTE [DISTWIDTH] IN BLOOD BY AUTOMATED COUNT: 25.2 % (ref 11.5–14.5)
EST. GFR  (NO RACE VARIABLE): >60 ML/MIN/1.73 M^2
GLUCOSE SERPL-MCNC: 161 MG/DL (ref 70–110)
HCT VFR BLD AUTO: 27.9 % (ref 37–48.5)
HGB BLD-MCNC: 8.7 G/DL (ref 12–16)
IMM GRANULOCYTES # BLD AUTO: 0.3 K/UL (ref 0–0.04)
IMM GRANULOCYTES NFR BLD AUTO: 3.1 % (ref 0–0.5)
LDH SERPL L TO P-CCNC: 176 U/L (ref 110–260)
LYMPHOCYTES # BLD AUTO: 1.4 K/UL (ref 1–4.8)
LYMPHOCYTES NFR BLD: 14.5 % (ref 18–48)
MAGNESIUM SERPL-MCNC: 1.8 MG/DL (ref 1.6–2.6)
MCH RBC QN AUTO: 28.5 PG (ref 27–31)
MCHC RBC AUTO-ENTMCNC: 31.2 G/DL (ref 32–36)
MCV RBC AUTO: 92 FL (ref 82–98)
MONOCYTES # BLD AUTO: 0.9 K/UL (ref 0.3–1)
MONOCYTES NFR BLD: 9.3 % (ref 4–15)
NEUTROPHILS # BLD AUTO: 6.9 K/UL (ref 1.8–7.7)
NEUTROPHILS NFR BLD: 72.1 % (ref 38–73)
NRBC BLD-RTO: 0 /100 WBC
PHOSPHATE SERPL-MCNC: 3.8 MG/DL (ref 2.7–4.5)
PLATELET # BLD AUTO: 199 K/UL (ref 150–450)
PMV BLD AUTO: 8.5 FL (ref 9.2–12.9)
POTASSIUM SERPL-SCNC: 3.6 MMOL/L (ref 3.5–5.1)
PROT SERPL-MCNC: 6.9 G/DL (ref 6–8.4)
RBC # BLD AUTO: 3.05 M/UL (ref 4–5.4)
SODIUM SERPL-SCNC: 141 MMOL/L (ref 136–145)
URATE SERPL-MCNC: 5.8 MG/DL (ref 2.4–5.7)
WBC # BLD AUTO: 9.53 K/UL (ref 3.9–12.7)

## 2024-07-05 PROCEDURE — 84100 ASSAY OF PHOSPHORUS: CPT | Mod: PN | Performed by: INTERNAL MEDICINE

## 2024-07-05 PROCEDURE — 80053 COMPREHEN METABOLIC PANEL: CPT | Mod: PN | Performed by: INTERNAL MEDICINE

## 2024-07-05 PROCEDURE — 36415 COLL VENOUS BLD VENIPUNCTURE: CPT | Mod: PN | Performed by: INTERNAL MEDICINE

## 2024-07-05 PROCEDURE — 83735 ASSAY OF MAGNESIUM: CPT | Mod: PN | Performed by: INTERNAL MEDICINE

## 2024-07-05 PROCEDURE — 85025 COMPLETE CBC W/AUTO DIFF WBC: CPT | Mod: PN | Performed by: INTERNAL MEDICINE

## 2024-07-05 PROCEDURE — 84550 ASSAY OF BLOOD/URIC ACID: CPT | Mod: PN | Performed by: INTERNAL MEDICINE

## 2024-07-05 PROCEDURE — 83615 LACTATE (LD) (LDH) ENZYME: CPT | Mod: PN | Performed by: INTERNAL MEDICINE

## 2024-07-06 ENCOUNTER — DOCUMENT SCAN (OUTPATIENT)
Dept: HOME HEALTH SERVICES | Facility: HOSPITAL | Age: 79
End: 2024-07-06
Payer: MEDICARE

## 2024-07-10 ENCOUNTER — HOSPITAL ENCOUNTER (OUTPATIENT)
Dept: RADIOLOGY | Facility: HOSPITAL | Age: 79
Discharge: HOME | End: 2024-07-10
Attending: NURSE PRACTITIONER
Payer: MEDICARE

## 2024-07-10 ENCOUNTER — OFFICE VISIT (OUTPATIENT)
Dept: NEUROSURGERY | Facility: CLINIC | Age: 79
End: 2024-07-10
Payer: MEDICARE

## 2024-07-10 VITALS
RESPIRATION RATE: 18 BRPM | WEIGHT: 128.06 LBS | DIASTOLIC BLOOD PRESSURE: 84 MMHG | SYSTOLIC BLOOD PRESSURE: 122 MMHG | BODY MASS INDEX: 23.57 KG/M2 | HEART RATE: 100 BPM | HEIGHT: 62 IN

## 2024-07-10 DIAGNOSIS — R53.81 PHYSICAL DECONDITIONING: ICD-10-CM

## 2024-07-10 DIAGNOSIS — R26.81 GAIT INSTABILITY: ICD-10-CM

## 2024-07-10 DIAGNOSIS — S32.020A COMPRESSION FRACTURE OF L2 VERTEBRA, INITIAL ENCOUNTER: Primary | ICD-10-CM

## 2024-07-10 DIAGNOSIS — M54.30 SCIATICA, UNSPECIFIED LATERALITY: ICD-10-CM

## 2024-07-10 DIAGNOSIS — S22.39XS CLOSED FRACTURE OF ONE RIB, UNSPECIFIED LATERALITY, SEQUELA: ICD-10-CM

## 2024-07-10 DIAGNOSIS — S32.020D COMPRESSION FRACTURE OF L2 VERTEBRA WITH ROUTINE HEALING, SUBSEQUENT ENCOUNTER: ICD-10-CM

## 2024-07-10 DIAGNOSIS — S22.080D CLOSED WEDGE COMPRESSION FRACTURE OF T12 VERTEBRA WITH ROUTINE HEALING, SUBSEQUENT ENCOUNTER: ICD-10-CM

## 2024-07-10 PROCEDURE — 72080 X-RAY EXAM THORACOLMB 2/> VW: CPT | Mod: 26,,, | Performed by: RADIOLOGY

## 2024-07-10 PROCEDURE — 99214 OFFICE O/P EST MOD 30 MIN: CPT | Mod: S$PBB,,, | Performed by: NURSE PRACTITIONER

## 2024-07-10 PROCEDURE — 72080 X-RAY EXAM THORACOLMB 2/> VW: CPT | Mod: TC,PO

## 2024-07-10 RX ORDER — TRAMADOL HYDROCHLORIDE 50 MG/1
TABLET ORAL
Qty: 60 EACH | Refills: 0 | Status: SHIPPED | OUTPATIENT
Start: 2024-07-10

## 2024-07-10 NOTE — TELEPHONE ENCOUNTER
Care Due:                  Date            Visit Type   Department     Provider  --------------------------------------------------------------------------------                                ESTABLISHED                              PATIENT -    [facility] INTERNAL  Last Visit: 05-      [facility]      MEDICINE       Savana Anderson  Next Visit: None Scheduled  None         None Found                                                            Last  Test          Frequency    Reason                     Performed    Due Date  --------------------------------------------------------------------------------    Lipid Panel.  12 months..  atorvastatin.............  06- 06-    TSH.........  12 months..  levothyroxine............  06- 06-    [employer] Embedded Care Due Messages. Reference number: [id number].   7/10/2024 10:36:19 AM CDT

## 2024-07-10 NOTE — PROGRESS NOTES
"    Neurosurgery History & Physical    Patient ID: Niurka Pedraza is a 79 y.o. female.    Chief Complaint   Patient presents with    Follow-up     4 week f/u with imaging     Interval history 7/10/24:  Ms. Pedraza presents for 4 week f/u of T/L compression fractures. She is 8 weeks from injury. She has been working with home health and appears improved overall from our last visit. She reports she has been OOB more often and is walking with a cane.     She is not wearing her brace.    She did fall 2 days ago onto her right side but denies any new pain or injury. She reports this was a "soft" fall.     She also reports significant weight loss over the last few months. She denies change in diet. Denies recent illness or travel outside of the country. Denies cough, night sweats or other symptom.     She has chronic constipation followed by episodes of diarrhea but this is long term. She has not discussed this with any of her other providers.     History of Present Illness 6/10/24:   Ms. Pedraza is an 79 year old female who presents today for hospital follow up with thoracolumbar compression fractures. Last month she was admitted to [facility] following a fall. She had acute onset back pain into the hips. Neuroimaging demonstrated multiple compression fractures in the thoracolumbar spine, some acute, chronic and acute on chronic. While inpatient, IR performed kypho at T11, T12 and L1. She reports this did not help with pain.     She was placed in TLSO and instructed to fu with CT scan  in 4-6 weeks. She had CT performed this AM.     She is not wearing her TLSO brace. Reportedly she rarely wears it because she is not out of bed much. This is not new for her.     She did have HHPT help with exercises but this ended about 10 days ago. She is not doing the exercises taught to her during PT.     The pain is in low back, worse with activity. Sometimes the pain goes into the hips. Denies radiating pain into the legs, numbness/tingling, " weakness. She is generally deconditioned d/t inactivity and the pain has only exacerbated this.     She is osteopenic according to DXA from 1/2023. She takes supplements for this.     Review of Systems   Constitutional:  Negative for activity change, fatigue and fever.   HENT:  Negative for trouble swallowing.    Eyes:  Negative for visual disturbance.   Respiratory:  Negative for chest tightness and shortness of breath.    Cardiovascular:  Negative for chest pain.   Gastrointestinal:  Negative for nausea and vomiting.   Genitourinary:  Negative for difficulty urinating.   Musculoskeletal:  Positive for back pain and myalgias. Negative for gait problem and neck pain.   Skin:  Negative for wound.   Neurological:  Negative for weakness, numbness and headaches.   Psychiatric/Behavioral:  Negative for confusion.        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    Breast cancer     mastectomy 2014    Coccidiomycosis, progressive     Elevated CA-125 02/25/2018    Hyperlipidemia     Hypertension     Liver disease     OAB (overactive bladder)     Osteopenia     on Dexa 11/2017    Osteoporosis     pt reports hx of this, declined fosamax in past - treated with calcium and vit D    Pelvic mass 02/25/2018    Pulmonary fibrosis     Pulmonary nodules     SOB (shortness of breath) on exertion     Thyroid disease     hypo    Urinary tract infection due to extended-spectrum beta lactamase (ESBL) producing Escherichia coli 03/16/2022    UTI (urinary tract infection)      Social History     Socioeconomic History    Marital status:     Number of children: 3   Occupational History    Occupation: retired from [employer], [employer]     Comment: neuro chemistry   Tobacco Use    Smoking status: Former     Current packs/day: 0.00     Average packs/day: 0.5 packs/day for 30.1 years (15.1 ttl pk-yrs)     Types: Cigarettes     Start date: 1/1/1970     Quit  date: 2000     Years since quittin.4    Smokeless tobacco: Never    Tobacco comments:     quit in her 50s, after 30 years smoking;   Substance and Sexual Activity    Alcohol use: No    Drug use: No    Sexual activity: Not Currently     Partners: Male     Birth control/protection: None   Social History Narrative    From [place]     Moved to [facility] in  for research    Moved to [place] for period of time    Moved back to [place] Summer 2016 and then to [facility] this year 2017     Social Determinants of Health     Financial Resource Strain: Patient Unable To Answer (2024)    Overall Financial Resource Strain (CARDIA)     Difficulty of Paying Living Expenses: Patient unable to answer   Food Insecurity: No Food Insecurity (2024)    Hunger Vital Sign     Worried About Running Out of Food in the Last Year: Never true     Ran Out of Food in the Last Year: Never true   Transportation Needs: No Transportation Needs (2024)    PRAPARE - Transportation     Lack of Transportation (Medical): No     Lack of Transportation (Non-Medical): No   Physical Activity: Unknown (2024)    Exercise Vital Sign     Days of Exercise per Week: Patient unable to answer     Minutes of Exercise per Session: 0 min   Recent Concern: Physical Activity - Inactive (2024)    Exercise Vital Sign     Days of Exercise per Week: 0 days     Minutes of Exercise per Session: 0 min   Stress: Patient Unable To Answer (2024)    [employer] [place] [employer] - Occupational Stress Questionnaire     Feeling of Stress : Patient unable to answer   Housing Stability: Low Risk  (2024)    Housing Stability Vital Sign     Unable to Pay for Housing in the Last Year: No     Homeless in the Last Year: No     Family History   Problem Relation Name Age of Onset    Cancer Mother Jessica         colon cancer    Breast cancer Mother Jessica     Hypertension Father Yonas     Heart disease Father Yonas     Stroke Father Yonas      No Known Problems Sister      Cancer Brother Yonas         lung, ++ tobacco    Hypertension Brother Gary     No Known Problems Daughter      Hypertension Son Lavell     Colon cancer Neg Hx      Ovarian cancer Neg Hx       Review of patient's allergies indicates:   Allergen Reactions    Ciprofloxacin Other (See Comments)     Right foot pain and edema, tendonitis    Amlodipine Edema and Swelling     BLE  BLE    Lisinopril Other (See Comments)     cough    Nitrofuran analogues        Current Outpatient Medications:     acetaminophen (TYLENOL) 325 MG tablet, Take 2 tablets (650 mg total) by mouth every 6 (six) hours as needed for Pain., Disp: , Rfl:     acyclovir (ZOVIRAX) 400 MG tablet, Take 1 tablet (400 mg total) by mouth 2 (two) times daily., Disp: 60 tablet, Rfl: 11    albuterol-ipratropium (DUO-NEB) 2.5 mg-0.5 mg/3 mL nebulizer solution, Take 3 mLs by nebulization every 6 (six) hours as needed for Wheezing or Shortness of Breath. Rescue, Disp: 75 mL, Rfl: 11    ascorbic acid/zinc (ZINC WITH VITAMIN C ORAL), Take 1 tablet by mouth Daily., Disp: , Rfl:     atorvastatin (LIPITOR) 20 MG tablet, Take 1 tablet (20 mg total) by mouth once daily., Disp: 90 tablet, Rfl: 3    azelastine (ASTELIN) 137 mcg (0.1 %) nasal spray, 1 spray (137 mcg total) by Nasal route 2 (two) times daily., Disp: 30 mL, Rfl: 6    bisacodyL (DULCOLAX) 10 mg Supp, Place 1 suppository (10 mg total) rectally daily as needed (constipation)., Disp: , Rfl:     calcium-vitamin D3 (CALCIUM 500 + D) 500 mg(1,250mg) -200 unit per tablet, Take 1 tablet by mouth 2 (two) times daily with meals., Disp: , Rfl:     carvediloL (COREG) 25 MG tablet, Take 1 tablet (25 mg total) by mouth 2 (two) times daily with meals., Disp: 180 tablet, Rfl: 1    docusate sodium (COLACE) 100 MG capsule, Take 1 capsule (100 mg total) by mouth 2 (two) times daily., Disp: 60 capsule, Rfl: 0    ferrous gluconate (FERGON) 324 MG tablet, Take 1 tablet (324 mg total) by mouth every other  "day., Disp: 90 tablet, Rfl: 3    levothyroxine (SYNTHROID) 50 MCG tablet, Take 1 tablet (50 mcg total) by mouth before breakfast., Disp: 90 tablet, Rfl: 1    mirtazapine (REMERON) 7.5 MG Tab, Take 1 tablet (7.5 mg total) by mouth every evening., Disp: 90 tablet, Rfl: 3    multivitamin (THERAGRAN) per tablet, Take 1 tablet by mouth once daily., Disp: , Rfl:     naloxone (NARCAN) 4 mg/actuation Spry, 4mg by nasal route as needed for opioid overdose; may repeat every 2-3 minutes in alternating nostrils until medical help arrives. Call 911, Disp: 1 each, Rfl: 11    ondansetron (ZOFRAN-ODT) 4 MG TbDL, Take 1 tablet (4 mg total) by mouth every 6 (six) hours as needed (nausea)., Disp: 30 tablet, Rfl: 0    oxybutynin (DITROPAN XL) 15 MG TR24, Take 1 tablet (15 mg total) by mouth once daily., Disp: 30 tablet, Rfl: 11    pantoprazole (PROTONIX) 20 MG tablet, Take 1 tablet (20 mg total) by mouth once daily., Disp: 90 tablet, Rfl: 1    polyethylene glycol (GLYCOLAX) 17 gram PwPk, Take 17 g by mouth once daily., Disp: 30 packet, Rfl: 0    potassium chloride (K-TAB) 20 mEq, Take 20 mEq by mouth., Disp: , Rfl:     predniSONE (DELTASONE) 1 MG tablet, Take 2 tablets (2 mg total) by mouth once daily., Disp: 180 tablet, Rfl: 3    sodium chloride 3% 3 % nebulizer solution, Take 4 mLs by nebulization as needed for Other., Disp: 120 mL, Rfl: 3    traMADoL (ULTRAM) 50 mg tablet, Take 1-2 tabs by mouth every 8 hours as needed for pain, Disp: 60 each, Rfl: 0    fluticasone furoate-vilanteroL (BREO ELLIPTA) 100-25 mcg/dose diskus inhaler, Inhale 1 puff into the lungs once daily. Controller. PLEASE NOTE IT IS ONCE A DAY!! (Patient not taking: Reported on 7/10/2024), Disp: 60 each, Rfl: 4  Blood pressure 122/84, pulse 100, resp. rate 18, height 5' 2" (1.575 m), weight 58.1 kg (128 lb 1.4 oz), last menstrual period 02/08/2000.      Neurologic Exam     Mental Status   Oriented to person, place, and time.   Level of consciousness: " alert    Cranial Nerves     CN III, IV, VI   Extraocular motions are normal.     CN VII   Facial expression full, symmetric.     Motor Exam   Muscle bulk: normal  Overall muscle tone: normal    Strength   Strength 5/5 except as noted.   4/5 BLE     Sensory Exam   Light touch normal.     Gait, Coordination, and Reflexes     Gait  Gait: (deferred)    Reflexes   Right brachioradialis: 2+  Left brachioradialis: 2+  Right biceps: 2+  Left biceps: 2+  Right patellar: 2+  Left patellar: 2+  Right Ram: absent  Left Ram: absent  Right ankle clonus: absent  Left ankle clonus: absent  No TTP lower back         Imaging personally reviewed and discussed with the patient:   CT L spine dated today compared to MRI from 5/2024:  FINDINGS:  Interval postoperative changes of vertebral body cement augmentation for stable-appearing superior endplate compression fractures involving the T11, T12 and L1 vertebral bodies.  Splint material extends into the T12-L1 disc space and left paraspinous soft tissues.     Stable severe L2 biconcave compression fracture without significant progressive height loss or retropulsion.  Additional stable-appearing minimal L4 and mild L5 superior endplate compression fractures.  No definite new fracture subluxation.     Stable mild levoconvex curvature of the lumbar spine.  Stable mild anterolisthesis of T12 on L1.  Multilevel degenerative disc disease is noted, most pronounced at L5-S1 with marked disc space narrowing, vacuum disc phenomenon, degenerative endplate change marginal osteophyte formation.     The surrounding visualized paravertebral soft tissue structures demonstrate no pathology. Limited views of the abdomen demonstrate no significant abnormality.  Aortoiliac calcific atherosclerosis.     T11-12: Mild diffuse disc bulge.  Mild bilateral facet arthropathy.  Mild left neural foraminal stenosis.  No significant spinal canal stenosis.     T12-L1: Mild diffuse disc bulge and retropulsion of  the posterosuperior L1 cortex into the ventral spinal canal.  Mild bilateral facet arthropathy.  No significant neural foraminal or spinal canal stenosis.     L1-2: Mild diffuse disc bulge and retropulsion of the posterosuperior L2 cortex into the ventral spinal canal.  Mild bilateral facet arthropathy.  No significant neural foraminal or spinal canal stenosis.     L2-3: Left asymmetric diffuse disc bulge and minimal retropulsion the posteroinferior L2 cortex.  Mild bilateral facet arthropathy and ligamentum flavum thickening.  No significant neural foraminal or spinal canal stenosis.     L3-4: Right asymmetric diffuse disc bulge with osteophytic ridging.  Mild bilateral facet arthropathy.  Mild left neural foraminal stenosis.  Mild spinal canal stenosis.     L4-5: Diffuse disc bulge with osteophytic ridging and superimposed left central/subarticular disc protrusion.  Marked bilateral facet arthropathy and ligamentum flavum thickening.  Mild left greater than right neural foraminal stenosis.  Mild spinal canal and moderate left lateral recess stenosis.     L5-S1: Left asymmetric diffuse disc bulge with osteophytic ridging.  Moderate bilateral facet arthropathy.  Moderate bilateral neural foraminal stenosis.  No spinal canal stenosis.     Impression:     1. Interval postoperative changes of vertebral body cement augmentation for stable-appearing superior endplate compression fractures involving the T11, T12 and L1 vertebral bodies.  No significant progressive height loss or retropulsion.  2. Stable severe L2 compression fracture and minimal L4 and mild L5 superior endplate compression fractures.  No significant progressive height loss or retropulsion.  No discrete new fracture or abnormal subluxation.  3. Levoscoliosis and multilevel degenerative changes of the lumbar spine, as detailed above, without significant interval change as compared to the prior exam on 05/07/2024.          Assessment & Plan:   79 year old  female with multiple T/L compression fx s/p kypho with improvement in overall functional status and back pain. Will obtain upright XRs again in 4 weeks to confirm stability at 3 months post injury. She should wear brace when upright and with activity.     Stressed importance of treatment of decreased bone density and weight bearing activity. She should discuss osteopenia with PCP as well as recent weight loss for further workup.     I will send another HHPT referral as she reports this helps increase her activity and getting out of bed.

## 2024-07-12 ENCOUNTER — PATIENT MESSAGE (OUTPATIENT)
Dept: INTERNAL MEDICINE | Facility: CLINIC | Age: 79
End: 2024-07-12
Payer: MEDICARE

## 2024-07-12 DIAGNOSIS — N39.0 URINARY TRACT INFECTION WITHOUT HEMATURIA, SITE UNSPECIFIED: Primary | ICD-10-CM

## 2024-07-12 RX ORDER — AMOXICILLIN AND CLAVULANATE POTASSIUM 875; 125 MG/1; MG/1
1 TABLET, FILM COATED ORAL 2 TIMES DAILY
Qty: 14 TABLET | Refills: 0 | Status: SHIPPED | OUTPATIENT
Start: 2024-07-12 | End: 2024-07-19

## 2024-07-17 ENCOUNTER — OFFICE VISIT (OUTPATIENT)
Dept: INTERNAL MEDICINE | Facility: CLINIC | Age: 79
End: 2024-07-17
Attending: INTERNAL MEDICINE
Payer: MEDICARE

## 2024-07-17 VITALS — SYSTOLIC BLOOD PRESSURE: 110 MMHG | DIASTOLIC BLOOD PRESSURE: 60 MMHG

## 2024-07-17 DIAGNOSIS — R30.0 DYSURIA: Primary | ICD-10-CM

## 2024-07-17 DIAGNOSIS — M80.00XA AGE-RELATED OSTEOPOROSIS WITH CURRENT PATHOLOGICAL FRACTURE, INITIAL ENCOUNTER: ICD-10-CM

## 2024-07-17 DIAGNOSIS — R63.4 WEIGHT LOSS: ICD-10-CM

## 2024-07-17 PROCEDURE — 99214 OFFICE O/P EST MOD 30 MIN: CPT | Mod: 95,,, | Performed by: INTERNAL MEDICINE

## 2024-07-17 RX ORDER — CYPROHEPTADINE HYDROCHLORIDE 4 MG/1
4 TABLET ORAL 3 TIMES DAILY PRN
Qty: 30 TABLET | Refills: 3 | Status: SHIPPED | OUTPATIENT
Start: 2024-07-17

## 2024-07-17 NOTE — PROGRESS NOTES
The patient location is: [place]  The chief complaint leading to consultation is: osteoporosis    Visit type: audiovisual    Face to Face time with patient: 41 min  43 minutes of total time spent on the encounter, which includes face to face time and non-face to face time preparing to see the patient (eg, review of tests), Obtaining and/or reviewing separately obtained history, Documenting clinical information in the electronic or other health record, Independently interpreting results (not separately reported) and communicating results to the patient/family/caregiver, or Care coordination (not separately reported).     Each patient to whom he or she provides medical services by telemedicine is:  (1) informed of the relationship between the physician and patient and the respective role of any other health care provider with respect to management of the patient; and (2) notified that he or she may decline to receive medical services by telemedicine and may withdraw from such care at any time.    Notes: Subjective:   Patient ID: Niurka Pedraza is a 79 y.o. female  Chief complaint:   Chief Complaint   Patient presents with    Osteoporosis     F/u       HPI  Pt here to discuss porosis mgmt   Daughter accompanying her today     Osteoporosis - compression fx with hx of penia:   - discussed role of fosamax, pot se and in agreement to start   Reviewed yue and vit d recs   Limited exercise given pain, fatigue, deconditioning   In past: Hx of porosis that improved prev to penia on dexa  - had prev declined tx with fosamax several years ago and improved with ca/vit d and exercise      Receiving HH     Saw nsx 7/10/2024    UTI:   Treated with augmentin after cx results returned     ILD:   Saw pulm 6/17/2024  - was on breo and plan was to stop med after comletes last inh and only use albuterol   - stopped breo at time of pulm appt and doing well off of it     Palliative care consult 5/24/2024    MDS:  Saw h/o 5/2024    Hm: rsv,  covid     Review of Systems   Constitutional:  Positive for activity change and unexpected weight change.   HENT:  Negative for hearing loss, rhinorrhea and trouble swallowing.    Eyes:  Negative for discharge and visual disturbance.   Respiratory:  Negative for chest tightness and wheezing.    Cardiovascular:  Negative for chest pain and palpitations.   Gastrointestinal:  Positive for constipation. Negative for blood in stool, diarrhea and vomiting.   Endocrine: Negative for polydipsia and polyuria.   Genitourinary:  Positive for difficulty urinating and dysuria. Negative for hematuria and menstrual problem.   Musculoskeletal:  Negative for arthralgias, joint swelling and neck pain.   Neurological:  Positive for weakness. Negative for headaches.   Psychiatric/Behavioral:  Positive for confusion. Negative for dysphoric mood.        Objective:  Vitals:    07/17/24 1324   BP: 110/60     There is no height or weight on file to calculate BMI.    Physical Exam  Constitutional:       General: She is not in acute distress.     Appearance: She is well-developed. She is not diaphoretic.   Eyes:      Extraocular Movements: Extraocular movements intact.      Conjunctiva/sclera: Conjunctivae normal.   Pulmonary:      Effort: Pulmonary effort is normal. No respiratory distress.   Skin:     Findings: No erythema or rash.   Neurological:      Mental Status: She is alert. Mental status is at baseline.   Psychiatric:         Behavior: Behavior normal.         Thought Content: Thought content normal.         Judgment: Judgment normal.         Assessment:  1. Dysuria    2. Age-related osteoporosis with current pathological fracture, initial encounter    3. Weight loss        Plan:  1. Dysuria  -     Urine culture; Future; Expected date: 07/18/2024    2. Age-related osteoporosis with current pathological fracture, initial encounter  -     Ambulatory referral/consult to Endocrinology; Future; Expected date: 07/24/2024  Discussed med  options, pot    She would like to work on yue and vit d and f/u with endo to discuss other med options     3. Weight loss  -     cyproheptadine (PERIACTIN) 4 mg tablet; Take 1 tablet (4 mg total) by mouth 3 (three) times daily as needed (decreased appetite).  Dispense: 30 tablet; Refill: 3  Inc snacks   Trial of periactin    hypokalemia  Repeat labs and will plan to start small potassium pill if still low - prefers this over liquid for now   Address diet potassium rich foods (milk, beans, peas, bananas, avocados, potatoes) for now     Health Maintenance   Topic Date Due    Colorectal Cancer Screening  12/09/2025    DEXA Scan  01/19/2026    Lipid Panel  06/22/2028    TETANUS VACCINE  07/21/2031    Shingles Vaccine  Completed

## 2024-07-18 ENCOUNTER — LAB VISIT (OUTPATIENT)
Dept: LAB | Facility: HOSPITAL | Age: 79
End: 2024-07-18
Attending: INTERNAL MEDICINE
Payer: MEDICARE

## 2024-07-18 DIAGNOSIS — D46.9 MDS (MYELODYSPLASTIC SYNDROME): ICD-10-CM

## 2024-07-18 LAB
ALBUMIN SERPL BCP-MCNC: 3.4 G/DL (ref 3.5–5.2)
ALP SERPL-CCNC: 270 U/L (ref 55–135)
ALT SERPL W/O P-5'-P-CCNC: 18 U/L (ref 10–44)
ANION GAP SERPL CALC-SCNC: 11 MMOL/L (ref 8–16)
AST SERPL-CCNC: 18 U/L (ref 10–40)
BASOPHILS # BLD AUTO: 0.07 K/UL (ref 0–0.2)
BASOPHILS NFR BLD: 0.7 % (ref 0–1.9)
BILIRUB SERPL-MCNC: 0.6 MG/DL (ref 0.1–1)
BUN SERPL-MCNC: 19 MG/DL (ref 8–23)
CALCIUM SERPL-MCNC: 9 MG/DL (ref 8.7–10.5)
CHLORIDE SERPL-SCNC: 104 MMOL/L (ref 95–110)
CO2 SERPL-SCNC: 25 MMOL/L (ref 23–29)
CREAT SERPL-MCNC: 0.8 MG/DL (ref 0.5–1.4)
DIFFERENTIAL METHOD BLD: ABNORMAL
EOSINOPHIL # BLD AUTO: 0 K/UL (ref 0–0.5)
EOSINOPHIL NFR BLD: 0.3 % (ref 0–8)
ERYTHROCYTE [DISTWIDTH] IN BLOOD BY AUTOMATED COUNT: 22.6 % (ref 11.5–14.5)
EST. GFR  (NO RACE VARIABLE): >60 ML/MIN/1.73 M^2
GLUCOSE SERPL-MCNC: 116 MG/DL (ref 70–110)
HCT VFR BLD AUTO: 31.8 % (ref 37–48.5)
HGB BLD-MCNC: 9.7 G/DL (ref 12–16)
IMM GRANULOCYTES # BLD AUTO: 0.22 K/UL (ref 0–0.04)
IMM GRANULOCYTES NFR BLD AUTO: 2.3 % (ref 0–0.5)
LDH SERPL L TO P-CCNC: 163 U/L (ref 110–260)
LYMPHOCYTES # BLD AUTO: 1.5 K/UL (ref 1–4.8)
LYMPHOCYTES NFR BLD: 15.4 % (ref 18–48)
MAGNESIUM SERPL-MCNC: 2.1 MG/DL (ref 1.6–2.6)
MCH RBC QN AUTO: 29 PG (ref 27–31)
MCHC RBC AUTO-ENTMCNC: 30.5 G/DL (ref 32–36)
MCV RBC AUTO: 95 FL (ref 82–98)
MONOCYTES # BLD AUTO: 0.9 K/UL (ref 0.3–1)
MONOCYTES NFR BLD: 9.1 % (ref 4–15)
NEUTROPHILS # BLD AUTO: 7 K/UL (ref 1.8–7.7)
NEUTROPHILS NFR BLD: 72.2 % (ref 38–73)
NRBC BLD-RTO: 0 /100 WBC
PHOSPHATE SERPL-MCNC: 4 MG/DL (ref 2.7–4.5)
PLATELET # BLD AUTO: 199 K/UL (ref 150–450)
PMV BLD AUTO: 8.2 FL (ref 9.2–12.9)
POTASSIUM SERPL-SCNC: 4.1 MMOL/L (ref 3.5–5.1)
PROT SERPL-MCNC: 6.8 G/DL (ref 6–8.4)
RBC # BLD AUTO: 3.35 M/UL (ref 4–5.4)
SODIUM SERPL-SCNC: 140 MMOL/L (ref 136–145)
URATE SERPL-MCNC: 6.4 MG/DL (ref 2.4–5.7)
WBC # BLD AUTO: 9.63 K/UL (ref 3.9–12.7)

## 2024-07-18 PROCEDURE — 36415 COLL VENOUS BLD VENIPUNCTURE: CPT | Mod: PN | Performed by: INTERNAL MEDICINE

## 2024-07-18 PROCEDURE — 83615 LACTATE (LD) (LDH) ENZYME: CPT | Mod: PN | Performed by: INTERNAL MEDICINE

## 2024-07-18 PROCEDURE — 84100 ASSAY OF PHOSPHORUS: CPT | Mod: PN | Performed by: INTERNAL MEDICINE

## 2024-07-18 PROCEDURE — 84550 ASSAY OF BLOOD/URIC ACID: CPT | Mod: PN | Performed by: INTERNAL MEDICINE

## 2024-07-18 PROCEDURE — 80053 COMPREHEN METABOLIC PANEL: CPT | Mod: PN | Performed by: INTERNAL MEDICINE

## 2024-07-18 PROCEDURE — 83735 ASSAY OF MAGNESIUM: CPT | Mod: PN | Performed by: INTERNAL MEDICINE

## 2024-07-18 PROCEDURE — 85025 COMPLETE CBC W/AUTO DIFF WBC: CPT | Mod: PN | Performed by: INTERNAL MEDICINE

## 2024-07-19 ENCOUNTER — LAB VISIT (OUTPATIENT)
Dept: LAB | Facility: HOSPITAL | Age: 79
End: 2024-07-19
Attending: INTERNAL MEDICINE
Payer: MEDICARE

## 2024-07-19 DIAGNOSIS — R30.0 DYSURIA: ICD-10-CM

## 2024-07-19 PROCEDURE — 87088 URINE BACTERIA CULTURE: CPT | Performed by: INTERNAL MEDICINE

## 2024-07-19 PROCEDURE — 87186 SC STD MICRODIL/AGAR DIL: CPT | Performed by: INTERNAL MEDICINE

## 2024-07-19 PROCEDURE — 87086 URINE CULTURE/COLONY COUNT: CPT | Performed by: INTERNAL MEDICINE

## 2024-07-22 NOTE — PROGRESS NOTES
Please CALL pt or her daughter to ensure that they receive message today    - she just finished augmentin for uti   - looks like she submitted another urine specimen over the weekend after uti treated and it is growing a small amount of bacteria called pseudomonas     That said, I need to know what are her symptoms at this time - is she having urinary frequency, urgency, burning with urination, blood in urine, foul smelling urine, cloudy urine, suprapubic pain or pressure flank pain?   In future when they submit a urine specimen I need to what symptoms are to help determine next best steps and appropriate treatment.    - please let me know - I will reach out to her ID specialists once I receive feedback about symptoms

## 2024-07-23 ENCOUNTER — TELEPHONE (OUTPATIENT)
Dept: INTERNAL MEDICINE | Facility: CLINIC | Age: 79
End: 2024-07-23
Payer: MEDICARE

## 2024-07-23 DIAGNOSIS — N30.00 ACUTE CYSTITIS WITHOUT HEMATURIA: Primary | ICD-10-CM

## 2024-07-23 NOTE — TELEPHONE ENCOUNTER
Spoke to Luz, pt has a burning sensation when urinating - not as much as before but still having this a little. She does have frequent urination - seems to be urinating a little more than usual. Pt felt fine last night. Luz states she had no visible blood in urine sample, but 2 weeks ago she noticed a bit of blood in pt's pad after urinating. Daughter not sure about smell of urine. Urine has a little bit of cloudiness, but not as much as when she is in a lot of pain. Urine is very yellow. Some pain in flank area, but not sure if related to urinary symptoms or chronic pain. Pt is not receiving IV treatments.

## 2024-07-23 NOTE — TELEPHONE ENCOUNTER
----- Message from Savana Anderson MD sent at 7/22/2024  2:43 PM CDT -----  Please CALL pt or her daughter to ensure that they receive message today    - she just finished augmentin for uti   - looks like she submitted another urine specimen over the weekend after uti treated and it is growing a small amount of bacteria called pseudomonas     That said, I need to know what are her symptoms at this time - is she having urinary frequency, urgency, burning with urination, blood in urine, foul smelling urine, cloudy urine, suprapubic pain or pressure flank pain?   In future when they submit a urine specimen I need to what symptoms are to help determine next best steps and appropriate treatment.    - please let me know - I will reach out to her ID specialists once I receive feedback about symptoms

## 2024-07-23 NOTE — TELEPHONE ENCOUNTER
Secure chat to her ID specialist   - only culture processed 7/19 - no ua results returned for review   - it was recommend to check a urinalysis to evaluate for white blood cells to confirm that has a true bladder infection   - if ua is concerning for UTI as well then will f/u with ID to arrange treatment as will need IV abx since has intolerance to cipro and meds in that class       Please let pt know asap that needs to repeat urine studies as clean catch for ua and will get cx as well

## 2024-07-24 ENCOUNTER — LAB VISIT (OUTPATIENT)
Dept: LAB | Facility: HOSPITAL | Age: 79
End: 2024-07-24
Attending: INTERNAL MEDICINE
Payer: MEDICARE

## 2024-07-24 DIAGNOSIS — D46.9 MDS (MYELODYSPLASTIC SYNDROME): ICD-10-CM

## 2024-07-24 LAB
ALBUMIN SERPL BCP-MCNC: 3.3 G/DL (ref 3.5–5.2)
ALP SERPL-CCNC: 296 U/L (ref 55–135)
ALT SERPL W/O P-5'-P-CCNC: 18 U/L (ref 10–44)
ANION GAP SERPL CALC-SCNC: 13 MMOL/L (ref 8–16)
AST SERPL-CCNC: 16 U/L (ref 10–40)
BACTERIA UR CULT: ABNORMAL
BASOPHILS # BLD AUTO: 0.07 K/UL (ref 0–0.2)
BASOPHILS NFR BLD: 0.5 % (ref 0–1.9)
BILIRUB SERPL-MCNC: 0.6 MG/DL (ref 0.1–1)
BUN SERPL-MCNC: 22 MG/DL (ref 8–23)
CALCIUM SERPL-MCNC: 9.2 MG/DL (ref 8.7–10.5)
CHLORIDE SERPL-SCNC: 104 MMOL/L (ref 95–110)
CO2 SERPL-SCNC: 24 MMOL/L (ref 23–29)
CREAT SERPL-MCNC: 0.8 MG/DL (ref 0.5–1.4)
DIFFERENTIAL METHOD BLD: ABNORMAL
EOSINOPHIL # BLD AUTO: 0 K/UL (ref 0–0.5)
EOSINOPHIL NFR BLD: 0.2 % (ref 0–8)
ERYTHROCYTE [DISTWIDTH] IN BLOOD BY AUTOMATED COUNT: 22.1 % (ref 11.5–14.5)
EST. GFR  (NO RACE VARIABLE): >60 ML/MIN/1.73 M^2
GLUCOSE SERPL-MCNC: 102 MG/DL (ref 70–110)
HCT VFR BLD AUTO: 34.3 % (ref 37–48.5)
HGB BLD-MCNC: 10.8 G/DL (ref 12–16)
IMM GRANULOCYTES # BLD AUTO: 0.25 K/UL (ref 0–0.04)
IMM GRANULOCYTES NFR BLD AUTO: 1.8 % (ref 0–0.5)
LDH SERPL L TO P-CCNC: 207 U/L (ref 110–260)
LYMPHOCYTES # BLD AUTO: 1.9 K/UL (ref 1–4.8)
LYMPHOCYTES NFR BLD: 13.5 % (ref 18–48)
MAGNESIUM SERPL-MCNC: 1.8 MG/DL (ref 1.6–2.6)
MCH RBC QN AUTO: 29.6 PG (ref 27–31)
MCHC RBC AUTO-ENTMCNC: 31.5 G/DL (ref 32–36)
MCV RBC AUTO: 94 FL (ref 82–98)
MONOCYTES # BLD AUTO: 1.8 K/UL (ref 0.3–1)
MONOCYTES NFR BLD: 12.5 % (ref 4–15)
NEUTROPHILS # BLD AUTO: 10.2 K/UL (ref 1.8–7.7)
NEUTROPHILS NFR BLD: 71.5 % (ref 38–73)
NRBC BLD-RTO: 0 /100 WBC
PHOSPHATE SERPL-MCNC: 4 MG/DL (ref 2.7–4.5)
PLATELET # BLD AUTO: 221 K/UL (ref 150–450)
PMV BLD AUTO: 8.7 FL (ref 9.2–12.9)
POTASSIUM SERPL-SCNC: 3.6 MMOL/L (ref 3.5–5.1)
PROT SERPL-MCNC: 7.2 G/DL (ref 6–8.4)
RBC # BLD AUTO: 3.65 M/UL (ref 4–5.4)
SODIUM SERPL-SCNC: 141 MMOL/L (ref 136–145)
URATE SERPL-MCNC: 6.3 MG/DL (ref 2.4–5.7)
WBC # BLD AUTO: 14.21 K/UL (ref 3.9–12.7)

## 2024-07-24 PROCEDURE — 83735 ASSAY OF MAGNESIUM: CPT | Mod: PN | Performed by: INTERNAL MEDICINE

## 2024-07-24 PROCEDURE — 80053 COMPREHEN METABOLIC PANEL: CPT | Mod: PN | Performed by: INTERNAL MEDICINE

## 2024-07-24 PROCEDURE — 84550 ASSAY OF BLOOD/URIC ACID: CPT | Mod: PN | Performed by: INTERNAL MEDICINE

## 2024-07-24 PROCEDURE — 83615 LACTATE (LD) (LDH) ENZYME: CPT | Mod: PN | Performed by: INTERNAL MEDICINE

## 2024-07-24 PROCEDURE — 85025 COMPLETE CBC W/AUTO DIFF WBC: CPT | Mod: PN | Performed by: INTERNAL MEDICINE

## 2024-07-24 PROCEDURE — 84100 ASSAY OF PHOSPHORUS: CPT | Mod: PN | Performed by: INTERNAL MEDICINE

## 2024-07-24 NOTE — TELEPHONE ENCOUNTER
Left message on voicemail x2    Have not spoken with pt or daughter, but I see she already had blood labs scheduled today so I scheduled our urine test orders since they will already be there.

## 2024-07-25 ENCOUNTER — TELEPHONE (OUTPATIENT)
Dept: INTERNAL MEDICINE | Facility: CLINIC | Age: 79
End: 2024-07-25
Payer: MEDICARE

## 2024-07-25 NOTE — TELEPHONE ENCOUNTER
----- Message from Savana Anderson MD sent at 7/25/2024  4:10 PM CDT -----  Urine specimen very poor with several skin cells and contaminants   Needs to be repeated   Orders signed for both the lab and through Home Health and can be completed by either as a clean catch before upcoming ID appt     - please fax orders to home health and notify pt / her daughter that this can be collected through HH or at lab near them but please try best effort to collect as clean catch to help determine if antibiotics are needed

## 2024-07-25 NOTE — TELEPHONE ENCOUNTER
"Left voicemails at both patient's and daughter's numbers, requesting the call [facility] to go over Dr. Anderson's orders about repeating a Clean Catch Urine.     END[facility] message sent.     Fax sent to both [facility] & [facility]. Alexys. rec'd:  "  Your fax has been successfully sent to [phone] at [phone].  ------------------------------------------------------------  From: [id number]  ------------------------------------------------------------  7/25/2024 4:36:27 PM Transmission Record          Sent to [phone] with remote ID "[facility] "          Result: (0/339;0/0) Success          Page record: 1 - 5          Elapsed time: 01:33 on channel 14  "  "  Your fax has been successfully sent to [phone] at [phone].  ------------------------------------------------------------  From: [id number]  ------------------------------------------------------------  7/25/2024 4:41:10 PM Transmission Record          Sent to [phone] with remote ID "[facility] Fax "          Result: (0/339;0/0) Success          Page record: 1 - 5          Elapsed time: 01:33 on channel 55  "  "

## 2024-07-28 DIAGNOSIS — E03.9 HYPOTHYROIDISM, UNSPECIFIED TYPE: ICD-10-CM

## 2024-07-28 DIAGNOSIS — R05.9 COUGH, UNSPECIFIED TYPE: ICD-10-CM

## 2024-07-28 DIAGNOSIS — M54.30 SCIATICA, UNSPECIFIED LATERALITY: ICD-10-CM

## 2024-07-28 DIAGNOSIS — S32.020D COMPRESSION FRACTURE OF L2 VERTEBRA WITH ROUTINE HEALING, SUBSEQUENT ENCOUNTER: ICD-10-CM

## 2024-07-28 DIAGNOSIS — S22.39XS CLOSED FRACTURE OF ONE RIB, UNSPECIFIED LATERALITY, SEQUELA: ICD-10-CM

## 2024-07-28 NOTE — TELEPHONE ENCOUNTER
No care due was identified.  [facility] Embedded Care Due Messages. Reference number: [id number].   7/28/2024 12:40:36 PM CDT

## 2024-07-28 NOTE — TELEPHONE ENCOUNTER
No care due was identified.  [facility] Embedded Care Due Messages. Reference number: [id number].   7/28/2024 12:43:30 PM CDT

## 2024-07-29 DIAGNOSIS — N30.00 ACUTE CYSTITIS WITHOUT HEMATURIA: Primary | ICD-10-CM

## 2024-07-29 RX ORDER — LEVOTHYROXINE SODIUM 50 UG/1
50 TABLET ORAL
Qty: 90 TABLET | Refills: 0 | Status: SHIPPED | OUTPATIENT
Start: 2024-07-29

## 2024-07-29 RX ORDER — SULFAMETHOXAZOLE AND TRIMETHOPRIM 800; 160 MG/1; MG/1
1 TABLET ORAL 2 TIMES DAILY
Qty: 14 TABLET | Refills: 0 | Status: SHIPPED | OUTPATIENT
Start: 2024-07-29 | End: 2024-08-05

## 2024-07-29 NOTE — TELEPHONE ENCOUNTER
Refill Routing Note   Medication(s) are not appropriate for processing by [facility] for the following reason(s):        Required labs outdated    [facility] action(s):  Defer               Appointments  past 12m or future 3m with PCP    Date Provider   Last Visit   7/17/2024 Savana Anderson MD   Next Visit   Visit date not found Savana Anderson MD   ED visits in past 90 days: 0        Note composed:4:43 PM 07/29/2024

## 2024-07-30 ENCOUNTER — TELEPHONE (OUTPATIENT)
Dept: UROLOGY | Facility: CLINIC | Age: 79
End: 2024-07-30
Payer: MEDICARE

## 2024-07-30 ENCOUNTER — TELEPHONE (OUTPATIENT)
Dept: INTERNAL MEDICINE | Facility: CLINIC | Age: 79
End: 2024-07-30
Payer: MEDICARE

## 2024-07-30 NOTE — TELEPHONE ENCOUNTER
Davidm regarding urine results and get patient scheduled with Dr. Oropeza for a follow up appt.

## 2024-07-30 NOTE — TELEPHONE ENCOUNTER
----- Message from Rica Mitchell MD sent at 7/29/2024  5:42 PM CDT -----  Please let patient/her family know that her urinalysis returned with E coli. I have sent bactrim to her pharmacy based on culture data, but she will need to follow up with urologist/ID as recommended. Please let patient know, thank you!

## 2024-07-31 ENCOUNTER — LAB VISIT (OUTPATIENT)
Dept: LAB | Facility: HOSPITAL | Age: 79
End: 2024-07-31
Attending: INTERNAL MEDICINE
Payer: MEDICARE

## 2024-07-31 DIAGNOSIS — D46.9 MDS (MYELODYSPLASTIC SYNDROME): ICD-10-CM

## 2024-07-31 LAB
ALBUMIN SERPL BCP-MCNC: 3.3 G/DL (ref 3.5–5.2)
ALP SERPL-CCNC: 335 U/L (ref 55–135)
ALT SERPL W/O P-5'-P-CCNC: 24 U/L (ref 10–44)
ANION GAP SERPL CALC-SCNC: 12 MMOL/L (ref 8–16)
AST SERPL-CCNC: 23 U/L (ref 10–40)
BASOPHILS # BLD AUTO: 0.07 K/UL (ref 0–0.2)
BASOPHILS NFR BLD: 0.6 % (ref 0–1.9)
BILIRUB SERPL-MCNC: 0.7 MG/DL (ref 0.1–1)
BUN SERPL-MCNC: 21 MG/DL (ref 8–23)
CALCIUM SERPL-MCNC: 9.3 MG/DL (ref 8.7–10.5)
CHLORIDE SERPL-SCNC: 102 MMOL/L (ref 95–110)
CO2 SERPL-SCNC: 24 MMOL/L (ref 23–29)
CREAT SERPL-MCNC: 0.8 MG/DL (ref 0.5–1.4)
DIFFERENTIAL METHOD BLD: ABNORMAL
EOSINOPHIL # BLD AUTO: 0 K/UL (ref 0–0.5)
EOSINOPHIL NFR BLD: 0.2 % (ref 0–8)
ERYTHROCYTE [DISTWIDTH] IN BLOOD BY AUTOMATED COUNT: 20.3 % (ref 11.5–14.5)
EST. GFR  (NO RACE VARIABLE): >60 ML/MIN/1.73 M^2
GLUCOSE SERPL-MCNC: 123 MG/DL (ref 70–110)
HCT VFR BLD AUTO: 34.3 % (ref 37–48.5)
HGB BLD-MCNC: 10.6 G/DL (ref 12–16)
IMM GRANULOCYTES # BLD AUTO: 0.24 K/UL (ref 0–0.04)
IMM GRANULOCYTES NFR BLD AUTO: 1.9 % (ref 0–0.5)
LDH SERPL L TO P-CCNC: 173 U/L (ref 110–260)
LYMPHOCYTES # BLD AUTO: 1.5 K/UL (ref 1–4.8)
LYMPHOCYTES NFR BLD: 12.2 % (ref 18–48)
MAGNESIUM SERPL-MCNC: 1.8 MG/DL (ref 1.6–2.6)
MCH RBC QN AUTO: 29.4 PG (ref 27–31)
MCHC RBC AUTO-ENTMCNC: 30.9 G/DL (ref 32–36)
MCV RBC AUTO: 95 FL (ref 82–98)
MONOCYTES # BLD AUTO: 1 K/UL (ref 0.3–1)
MONOCYTES NFR BLD: 8.3 % (ref 4–15)
NEUTROPHILS # BLD AUTO: 9.6 K/UL (ref 1.8–7.7)
NEUTROPHILS NFR BLD: 76.8 % (ref 38–73)
NRBC BLD-RTO: 0 /100 WBC
PHOSPHATE SERPL-MCNC: 3.8 MG/DL (ref 2.7–4.5)
PLATELET # BLD AUTO: 188 K/UL (ref 150–450)
PMV BLD AUTO: 8.4 FL (ref 9.2–12.9)
POTASSIUM SERPL-SCNC: 4.2 MMOL/L (ref 3.5–5.1)
PROT SERPL-MCNC: 7.2 G/DL (ref 6–8.4)
RBC # BLD AUTO: 3.6 M/UL (ref 4–5.4)
SODIUM SERPL-SCNC: 138 MMOL/L (ref 136–145)
URATE SERPL-MCNC: 6.1 MG/DL (ref 2.4–5.7)
WBC # BLD AUTO: 12.5 K/UL (ref 3.9–12.7)

## 2024-07-31 PROCEDURE — 83615 LACTATE (LD) (LDH) ENZYME: CPT | Mod: PN | Performed by: INTERNAL MEDICINE

## 2024-07-31 PROCEDURE — 84100 ASSAY OF PHOSPHORUS: CPT | Mod: PN | Performed by: INTERNAL MEDICINE

## 2024-07-31 PROCEDURE — 83735 ASSAY OF MAGNESIUM: CPT | Mod: PN | Performed by: INTERNAL MEDICINE

## 2024-07-31 PROCEDURE — 85025 COMPLETE CBC W/AUTO DIFF WBC: CPT | Mod: PN | Performed by: INTERNAL MEDICINE

## 2024-07-31 PROCEDURE — 84550 ASSAY OF BLOOD/URIC ACID: CPT | Mod: PN | Performed by: INTERNAL MEDICINE

## 2024-07-31 PROCEDURE — 80053 COMPREHEN METABOLIC PANEL: CPT | Mod: PN | Performed by: INTERNAL MEDICINE

## 2024-07-31 PROCEDURE — 36415 COLL VENOUS BLD VENIPUNCTURE: CPT | Mod: PN | Performed by: INTERNAL MEDICINE

## 2024-07-31 RX ORDER — TRAMADOL HYDROCHLORIDE 50 MG/1
TABLET ORAL
Qty: 60 EACH | Refills: 0 | Status: SHIPPED | OUTPATIENT
Start: 2024-07-31

## 2024-07-31 RX ORDER — PANTOPRAZOLE SODIUM 20 MG/1
20 TABLET, DELAYED RELEASE ORAL DAILY
Qty: 90 TABLET | Refills: 1 | Status: SHIPPED | OUTPATIENT
Start: 2024-07-31

## 2024-08-07 ENCOUNTER — TELEPHONE (OUTPATIENT)
Dept: NEUROSURGERY | Facility: CLINIC | Age: 79
End: 2024-08-07
Payer: MEDICARE

## 2024-08-08 ENCOUNTER — HOSPITAL ENCOUNTER (OUTPATIENT)
Dept: RADIOLOGY | Facility: HOSPITAL | Age: 79
Discharge: HOME | End: 2024-08-08
Attending: NURSE PRACTITIONER
Payer: MEDICARE

## 2024-08-08 DIAGNOSIS — S32.020A COMPRESSION FRACTURE OF L2 VERTEBRA, INITIAL ENCOUNTER: ICD-10-CM

## 2024-08-08 PROCEDURE — 72080 X-RAY EXAM THORACOLMB 2/> VW: CPT | Mod: 26,,, | Performed by: RADIOLOGY

## 2024-08-08 PROCEDURE — 72080 X-RAY EXAM THORACOLMB 2/> VW: CPT | Mod: TC,PO

## 2024-08-09 PROCEDURE — G0179 MD RECERTIFICATION HHA PT: HCPCS | Mod: ,,, | Performed by: INTERNAL MEDICINE

## 2024-08-10 ENCOUNTER — PATIENT MESSAGE (OUTPATIENT)
Dept: HEMATOLOGY/ONCOLOGY | Facility: CLINIC | Age: 79
End: 2024-08-10
Payer: MEDICARE

## 2024-08-15 ENCOUNTER — TELEPHONE (OUTPATIENT)
Dept: NEUROSURGERY | Facility: CLINIC | Age: 79
End: 2024-08-15
Payer: MEDICARE

## 2024-08-15 ENCOUNTER — LAB VISIT (OUTPATIENT)
Dept: LAB | Facility: HOSPITAL | Age: 79
End: 2024-08-15
Attending: INTERNAL MEDICINE
Payer: MEDICARE

## 2024-08-15 ENCOUNTER — TELEPHONE (OUTPATIENT)
Dept: INTERNAL MEDICINE | Facility: CLINIC | Age: 79
End: 2024-08-15
Payer: MEDICARE

## 2024-08-15 ENCOUNTER — PATIENT MESSAGE (OUTPATIENT)
Dept: NEUROSURGERY | Facility: CLINIC | Age: 79
End: 2024-08-15
Payer: MEDICARE

## 2024-08-15 DIAGNOSIS — D46.9 MDS (MYELODYSPLASTIC SYNDROME): ICD-10-CM

## 2024-08-15 DIAGNOSIS — R30.0 DYSURIA: Primary | ICD-10-CM

## 2024-08-15 LAB
ALBUMIN SERPL BCP-MCNC: 3.4 G/DL (ref 3.5–5.2)
ALP SERPL-CCNC: 288 U/L (ref 55–135)
ALT SERPL W/O P-5'-P-CCNC: 23 U/L (ref 10–44)
ANION GAP SERPL CALC-SCNC: 12 MMOL/L (ref 8–16)
ANISOCYTOSIS BLD QL SMEAR: ABNORMAL
AST SERPL-CCNC: 21 U/L (ref 10–40)
BASOPHILS NFR BLD: 0 % (ref 0–1.9)
BILIRUB SERPL-MCNC: 0.6 MG/DL (ref 0.1–1)
BUN SERPL-MCNC: 19 MG/DL (ref 8–23)
CALCIUM SERPL-MCNC: 9.4 MG/DL (ref 8.7–10.5)
CHLORIDE SERPL-SCNC: 104 MMOL/L (ref 95–110)
CO2 SERPL-SCNC: 27 MMOL/L (ref 23–29)
CREAT SERPL-MCNC: 0.9 MG/DL (ref 0.5–1.4)
DACRYOCYTES BLD QL SMEAR: ABNORMAL
DIFFERENTIAL METHOD BLD: ABNORMAL
EOSINOPHIL NFR BLD: 1 % (ref 0–8)
ERYTHROCYTE [DISTWIDTH] IN BLOOD BY AUTOMATED COUNT: 19 % (ref 11.5–14.5)
EST. GFR  (NO RACE VARIABLE): >60 ML/MIN/1.73 M^2
GLUCOSE SERPL-MCNC: 116 MG/DL (ref 70–110)
HCT VFR BLD AUTO: 36 % (ref 37–48.5)
HGB BLD-MCNC: 11.5 G/DL (ref 12–16)
IMM GRANULOCYTES # BLD AUTO: ABNORMAL K/UL (ref 0–0.04)
IMM GRANULOCYTES NFR BLD AUTO: ABNORMAL % (ref 0–0.5)
LDH SERPL L TO P-CCNC: 198 U/L (ref 110–260)
LYMPHOCYTES NFR BLD: 12 % (ref 18–48)
MAGNESIUM SERPL-MCNC: 1.8 MG/DL (ref 1.6–2.6)
MCH RBC QN AUTO: 30.3 PG (ref 27–31)
MCHC RBC AUTO-ENTMCNC: 31.9 G/DL (ref 32–36)
MCV RBC AUTO: 95 FL (ref 82–98)
METAMYELOCYTES NFR BLD MANUAL: 2 %
MONOCYTES NFR BLD: 15 % (ref 4–15)
MYELOCYTES NFR BLD MANUAL: 1 %
NEUTROPHILS NFR BLD: 69 % (ref 38–73)
NRBC BLD-RTO: 0 /100 WBC
OVALOCYTES BLD QL SMEAR: ABNORMAL
PHOSPHATE SERPL-MCNC: 4.5 MG/DL (ref 2.7–4.5)
PLATELET # BLD AUTO: 209 K/UL (ref 150–450)
PLATELET BLD QL SMEAR: ABNORMAL
PMV BLD AUTO: 8.4 FL (ref 9.2–12.9)
POIKILOCYTOSIS BLD QL SMEAR: SLIGHT
POLYCHROMASIA BLD QL SMEAR: ABNORMAL
POTASSIUM SERPL-SCNC: 3.8 MMOL/L (ref 3.5–5.1)
PROT SERPL-MCNC: 7.1 G/DL (ref 6–8.4)
RBC # BLD AUTO: 3.79 M/UL (ref 4–5.4)
SODIUM SERPL-SCNC: 143 MMOL/L (ref 136–145)
STOMATOCYTES BLD QL SMEAR: PRESENT
URATE SERPL-MCNC: 7.4 MG/DL (ref 2.4–5.7)
WBC # BLD AUTO: 11.38 K/UL (ref 3.9–12.7)

## 2024-08-15 PROCEDURE — 85027 COMPLETE CBC AUTOMATED: CPT | Mod: PN | Performed by: INTERNAL MEDICINE

## 2024-08-15 PROCEDURE — 83735 ASSAY OF MAGNESIUM: CPT | Mod: PN | Performed by: INTERNAL MEDICINE

## 2024-08-15 PROCEDURE — 83615 LACTATE (LD) (LDH) ENZYME: CPT | Mod: PN | Performed by: INTERNAL MEDICINE

## 2024-08-15 PROCEDURE — 36415 COLL VENOUS BLD VENIPUNCTURE: CPT | Mod: PN | Performed by: INTERNAL MEDICINE

## 2024-08-15 PROCEDURE — 84100 ASSAY OF PHOSPHORUS: CPT | Mod: PN | Performed by: INTERNAL MEDICINE

## 2024-08-15 PROCEDURE — 80053 COMPREHEN METABOLIC PANEL: CPT | Mod: PN | Performed by: INTERNAL MEDICINE

## 2024-08-15 PROCEDURE — 84550 ASSAY OF BLOOD/URIC ACID: CPT | Mod: PN | Performed by: INTERNAL MEDICINE

## 2024-08-15 PROCEDURE — 85007 BL SMEAR W/DIFF WBC COUNT: CPT | Mod: PN | Performed by: INTERNAL MEDICINE

## 2024-08-15 NOTE — TELEPHONE ENCOUNTER
Please let nurse chirag know with [facility] that urinalysis was a very contaminated specimen and cannot be interpreted - did the patient use a hat instead of clean catch technique to collect the specimen?     Need to collect another specimen as a clean catch specimen asap - can be collected through HH or at an [facility] lab per her preference     Instructions:   1. Wash hands with soap and warm water.   2. Spread the labia (folds of skin) apart with 1 hand and wipe with the towelette provided.  Wipe from front to back.  3.  Continue holding the labia apart. As you start to urinate, allow a small amount of urine to fall into the toilet bowl.  This clears the urethra of contaminants.  Do not touch the inside of the cup.  4.  After the urine stream is well-established, urinate into the cup.  Once an adequate amount of urine fills the cup (the cup only needs to be half full), removed the cup from the urine stream.  5.  Pass the remaining urine into the toilet.  6.  Screw the lid on the cup tightly.  Do not touch the inside of the cup or lid.  Please bring the specimen to the lab.

## 2024-08-15 NOTE — TELEPHONE ENCOUNTER
Called patient's daughter in regards to appointment scheduled 9/9 with Gabrielle Rees NP. Per previous message XR result were discussed. Appointment canceled. LVM to call [phone] with any questions

## 2024-08-15 NOTE — TELEPHONE ENCOUNTER
----- Message from Yelitza French sent at 8/15/2024  1:39 PM CDT -----   Name of Who is Calling:     What is the request in detail:  calling in reference to yesterday visit with patient complaining  of symptoms of UTI  / patient urine was sampled / treatment is needed / results in the portal and culture pending Please contact to further discuss and advise      Can the clinic reply by [facility]:     What Number to Call Back if not in [facility]:  chirag / barby / [phone]

## 2024-08-16 ENCOUNTER — OFFICE VISIT (OUTPATIENT)
Dept: HEMATOLOGY/ONCOLOGY | Facility: CLINIC | Age: 79
End: 2024-08-16
Payer: MEDICARE

## 2024-08-16 DIAGNOSIS — S22.39XS CLOSED FRACTURE OF ONE RIB, UNSPECIFIED LATERALITY, SEQUELA: ICD-10-CM

## 2024-08-16 DIAGNOSIS — D63.0 ANEMIA IN NEOPLASTIC DISEASE: ICD-10-CM

## 2024-08-16 DIAGNOSIS — S32.020D COMPRESSION FRACTURE OF L2 VERTEBRA WITH ROUTINE HEALING, SUBSEQUENT ENCOUNTER: ICD-10-CM

## 2024-08-16 DIAGNOSIS — D46.9 MDS (MYELODYSPLASTIC SYNDROME): Primary | ICD-10-CM

## 2024-08-16 DIAGNOSIS — M54.30 SCIATICA, UNSPECIFIED LATERALITY: ICD-10-CM

## 2024-08-16 PROCEDURE — 99215 OFFICE O/P EST HI 40 MIN: CPT | Mod: 95,,, | Performed by: INTERNAL MEDICINE

## 2024-08-16 PROCEDURE — G2211 COMPLEX E/M VISIT ADD ON: HCPCS | Mod: 95,,, | Performed by: INTERNAL MEDICINE

## 2024-08-16 RX ORDER — SULFAMETHOXAZOLE AND TRIMETHOPRIM 800; 160 MG/1; MG/1
1 TABLET ORAL 2 TIMES DAILY
Qty: 14 TABLET | Refills: 0 | Status: SHIPPED | OUTPATIENT
Start: 2024-08-16

## 2024-08-16 RX ORDER — TRAMADOL HYDROCHLORIDE 50 MG/1
TABLET ORAL
Qty: 60 EACH | Refills: 0 | Status: SHIPPED | OUTPATIENT
Start: 2024-08-16

## 2024-08-16 NOTE — TELEPHONE ENCOUNTER
Spoke to Lupis, she was not sure if hat was used or clean catch technique was used. She states patient is not completely bed bound, wears briefs and is incontinent. It is hard to get good sample. She can try collecting again, or can do an in and out cath for true clean catch. Verbal ok to give if ok with PCP.

## 2024-08-16 NOTE — TELEPHONE ENCOUNTER
Spoke to Lupis and gave verbal for this. They will try to get this today, but said it may be tomorrow before they can collect.

## 2024-08-16 NOTE — TELEPHONE ENCOUNTER
Can try to collect clean catch but if unable to do so then recommend in and out cath to obtain clean sample to help guide potential treatment     - please give verbal for above   - orders for urine studies to be collected by HH signed

## 2024-08-16 NOTE — PROGRESS NOTES
Route Chart for Scheduling    BMT Chart Routing      Follow up with physician 3 months. may offer virtual visit   Follow up with [facility]    Provider visit type Malignant hem   Infusion scheduling note    Injection scheduling note    Labs CBC, CMP, magnesium, phosphorus, LDH and uric acid   Scheduling:  Preferred lab:  Lab interval: every 4 weeks     Imaging    Pharmacy appointment    Other referrals

## 2024-08-16 NOTE — TELEPHONE ENCOUNTER
Will send in bactrim to take as prior cx sensitive to this however they should go ahaed and submit a specimen as soon as possible in case there is resistant bacteria to this antibiotic   - please let pt know

## 2024-08-20 NOTE — PROGRESS NOTES
HEMATOLOGIC MALIGNANCIES PROGRESS NOTE    IDENTIFYING STATEMENT   Niurka Castellon) is a 79 y.o. female with a  of 1945 from [place], [place] with the diagnosis of MDS.      TELEMEDICINE DOCUMENTATION    The patient location is: [place]  The chief complaint leading to consultation is: MDS    Visit type: audiovisual    Face to Face time with patient: 10 minutes  25 minutes of total time spent on the encounter, which includes face to face time and non-face to face time preparing to see the patient (eg, review of tests), Obtaining and/or reviewing separately obtained history, Documenting clinical information in the electronic or other health record, Independently interpreting results (not separately reported) and communicating results to the patient/family/caregiver, or Care coordination (not separately reported).         Each patient to whom he or she provides medical services by telemedicine is:  (1) informed of the relationship between the physician and patient and the respective role of any other health care provider with respect to management of the patient; and (2) notified that he or she may decline to receive medical services by telemedicine and may withdraw from such care at any time.    Notes:       ONCOLOGY HISTORY:    1. Myelodysplastic syndrome with increased blasts-2              A. 2023: Bone marrow biopsy - 65-75% cellular marrow consistent with myelodysplastic syndrome with excess blasts-2; cytogenetics 46,XX,del(3)(q12)[2]/47,sl,+8[18]; NGS not sent; IPSS-R score of 7 = very high risk   B. 2023: Cycle 1 azacitidine-venetoclax (5 days aza, 14 days angelo) for MDS/AML   C. 10/18/2023: Bone marrow biopsy - 50-60% cellular marrow with no increased blasts and trilineage hematopoiesis with granulocytic hypoplasia and moderate dysgranulopoiesis, mildly left-shifted marked erythroid hyperplasia with severe dyserythropoiesis, and marked megakaryocytic hyperplasia with predominantly variably  sized including small del3q-like monolobated forms; 3-4+ histiocytic iron stores; focal MF-1; cytogenetics 46,XX[20]; NGS shows ASXL1 (26%), SRSF2 (33%) and STAG2 (3%).     2. Neuropathy  3. Interstitial lung disease/pulmonary fibrosis  4. Hypertension  5. Aortic atherosclerosis  6. Pulmonary coccidioidomycosis  7. Hypothyroidism  8. Hyperparathyroidism    INTERVAL HISTORY:      Ms. Pedrzaa is seen in this virtual visit in follow-up of MDS/AML.    Since her last visit:     - 5/16/2024: PCP visit -   - 5/16/2024: Palliative care visit - not ready for hospice  - 5/8/2024: T11-L1 kyphoplasty  - 4/27/2024 - 5/3/2024: Hospitalized for acute appendicitis requiring lap appy; COVID positive;     Past Medical History, Past Social History and Past Family History have been reviewed and are unchanged except as noted in the interval history.    MEDICATIONS:     Current Outpatient Medications on File Prior to Visit   Medication Sig Dispense Refill    acetaminophen (TYLENOL) 325 MG tablet Take 2 tablets (650 mg total) by mouth every 6 (six) hours as needed for Pain.      acyclovir (ZOVIRAX) 400 MG tablet Take 1 tablet (400 mg total) by mouth 2 (two) times daily. 60 tablet 11    albuterol-ipratropium (DUO-NEB) 2.5 mg-0.5 mg/3 mL nebulizer solution Take 3 mLs by nebulization every 6 (six) hours as needed for Wheezing or Shortness of Breath. Rescue 75 mL 11    ascorbic acid/zinc (ZINC WITH VITAMIN C ORAL) Take 1 tablet by mouth Daily. (Patient not taking: Reported on 7/17/2024)      atorvastatin (LIPITOR) 20 MG tablet Take 1 tablet (20 mg total) by mouth once daily. 90 tablet 3    azelastine (ASTELIN) 137 mcg (0.1 %) nasal spray 1 spray (137 mcg total) by Nasal route 2 (two) times daily. (Patient not taking: Reported on 7/17/2024) 30 mL 6    bisacodyL (DULCOLAX) 10 mg Supp Place 1 suppository (10 mg total) rectally daily as needed (constipation).      calcium-vitamin D3 (CALCIUM 500 + D) 500 mg(1,250mg) -200 unit per tablet Take 1  tablet by mouth 2 (two) times daily with meals.      carvediloL (COREG) 25 MG tablet Take 1 tablet (25 mg total) by mouth 2 (two) times daily with meals. 180 tablet 1    cyproheptadine (PERIACTIN) 4 mg tablet Take 1 tablet (4 mg total) by mouth 3 (three) times daily as needed (decreased appetite). 30 tablet 3    docusate sodium (COLACE) 100 MG capsule Take 1 capsule (100 mg total) by mouth 2 (two) times daily. (Patient not taking: Reported on 7/17/2024) 60 capsule 0    ferrous gluconate (FERGON) 324 MG tablet Take 1 tablet (324 mg total) by mouth every other day. 90 tablet 3    levothyroxine (SYNTHROID) 50 MCG tablet Take 1 tablet (50 mcg total) by mouth before breakfast. 90 tablet 0    mirtazapine (REMERON) 7.5 MG Tab Take 1 tablet (7.5 mg total) by mouth every evening. 90 tablet 3    multivitamin (THERAGRAN) per tablet Take 1 tablet by mouth once daily.      naloxone (NARCAN) 4 mg/actuation Spry 4mg by nasal route as needed for opioid overdose; may repeat every 2-3 minutes in alternating nostrils until medical help arrives. Call 911 1 each 11    ondansetron (ZOFRAN-ODT) 4 MG TbDL Take 1 tablet (4 mg total) by mouth every 6 (six) hours as needed (nausea). 30 tablet 0    oxybutynin (DITROPAN XL) 15 MG TR24 Take 1 tablet (15 mg total) by mouth once daily. 30 tablet 11    pantoprazole (PROTONIX) 20 MG tablet Take 1 tablet (20 mg total) by mouth once daily. 90 tablet 1    polyethylene glycol (GLYCOLAX) 17 gram PwPk Take 17 g by mouth once daily. 30 packet 0    potassium chloride (K-TAB) 20 mEq Take 20 mEq by mouth.      predniSONE (DELTASONE) 1 MG tablet Take 2 tablets (2 mg total) by mouth once daily. 180 tablet 3    sodium chloride 3% 3 % nebulizer solution Take 4 mLs by nebulization as needed for Other. 120 mL 3    sulfamethoxazole-trimethoprim 800-160mg (BACTRIM DS) 800-160 mg Tab Take 1 tablet by mouth 2 (two) times daily. 14 tablet 0    [DISCONTINUED] budesonide-formoterol 80-4.5 mcg (SYMBICORT) 80-4.5  mcg/actuation HFAA Inhale 2 puffs into the lungs 2 (two) times daily as needed. Controller 1 Inhaler 6     No current facility-administered medications on file prior to visit.       ALLERGIES:   Review of patient's allergies indicates:   Allergen Reactions    Ciprofloxacin Other (See Comments)     Right foot pain and edema, tendonitis    Amlodipine Edema and Swelling     BLE  BLE    Lisinopril Other (See Comments)     cough    Nitrofuran analogues         ROS:       Review of Systems   Constitutional:  Positive for appetite change. Negative for unexpected weight change.   HENT:   Negative for mouth sores.    Respiratory:  Negative for cough and shortness of breath.    Cardiovascular:  Negative for chest pain.   Gastrointestinal:  Negative for abdominal pain and diarrhea.   Genitourinary:  Positive for frequency.    Musculoskeletal:  Positive for back pain.   Skin:  Negative for rash.   Neurological:  Negative for headaches.   Hematological:  Negative for adenopathy.   Psychiatric/Behavioral:  The patient is not nervous/anxious.        PHYSICAL EXAM:  There were no vitals filed for this visit.    Physical Exam  Head and neck visualized. No abnormalities seen.     LAB:   Results for orders placed or performed in visit on 08/15/24   CBC Auto Differential   Result Value Ref Range    WBC 11.38 3.90 - 12.70 K/uL    RBC 3.79 (L) 4.00 - 5.40 M/uL    Hemoglobin 11.5 (L) 12.0 - 16.0 g/dL    Hematocrit 36.0 (L) 37.0 - 48.5 %    MCV 95 82 - 98 fL    MCH 30.3 27.0 - 31.0 pg    MCHC 31.9 (L) 32.0 - 36.0 g/dL    RDW 19.0 (H) 11.5 - 14.5 %    Platelets 209 150 - 450 K/uL    MPV 8.4 (L) 9.2 - 12.9 fL    Immature Granulocytes CANCELED 0.0 - 0.5 %    Immature Grans (Abs) CANCELED 0.00 - 0.04 K/uL    nRBC 0 0 /100 WBC    Gran % 69.0 38.0 - 73.0 %    Lymph % 12.0 (L) 18.0 - 48.0 %    Mono % 15.0 4.0 - 15.0 %    Eosinophil % 1.0 0.0 - 8.0 %    Basophil % 0.0 0.0 - 1.9 %    Metamyelocytes 2.0 %    Myelocytes 1.0 %    Platelet Estimate  Appears normal     Aniso Moderate     Poik Slight     Poly Occasional     Ovalocytes Occasional     Tear Drop Cells Occasional     Stomatocytes Present     Differential Method Manual    Comprehensive Metabolic Panel   Result Value Ref Range    Sodium 143 136 - 145 mmol/L    Potassium 3.8 3.5 - 5.1 mmol/L    Chloride 104 95 - 110 mmol/L    CO2 27 23 - 29 mmol/L    Glucose 116 (H) 70 - 110 mg/dL    BUN 19 8 - 23 mg/dL    Creatinine 0.9 0.5 - 1.4 mg/dL    Calcium 9.4 8.7 - 10.5 mg/dL    Total Protein 7.1 6.0 - 8.4 g/dL    Albumin 3.4 (L) 3.5 - 5.2 g/dL    Total Bilirubin 0.6 0.1 - 1.0 mg/dL    Alkaline Phosphatase 288 (H) 55 - 135 U/L    AST 21 10 - 40 U/L    ALT 23 10 - 44 U/L    eGFR >60.0 >60 mL/min/1.73 m^2    Anion Gap 12 8 - 16 mmol/L   Magnesium   Result Value Ref Range    Magnesium 1.8 1.6 - 2.6 mg/dL   Phosphorus   Result Value Ref Range    Phosphorus 4.5 2.7 - 4.5 mg/dL   Lactate Dehydrogenase   Result Value Ref Range     110 - 260 U/L   Uric Acid   Result Value Ref Range    Uric Acid 7.4 (H) 2.4 - 5.7 mg/dL     *Note: Due to a large number of results and/or encounters for the requested time period, some results have not been displayed. A complete set of results can be found in Results Review.       PROBLEMS ASSESSED THIS VISIT:    1. MDS (myelodysplastic syndrome)    2. Sciatica, unspecified laterality    3. Compression fracture of L2 vertebra with routine healing, subsequent encounter    4. Closed fracture of one rib, unspecified laterality, sequela        PLAN:       Myelodysplastic syndrome  Ms. 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 We reviewed that this is an aggressive hematologic malignancy with high probability of evolution to acute myeloid leukemia (AML). According to the [facility] pathologic designation, this is referred to as MDS/AML.     Bone marrow biopsy after 1 cycle of aza-angelo showed resolution of increased blasts with ongoing morphologic changes consistent with MDS. We reviewed that this represents a favorable  response to therapy. We therefore continued aza-angelo therapy, and she completed 6 cycles. Therapy was interrupted due to infectious complications with diverticulitis, appendicitis, and hospitalization.     Bone marrow biopsy on 4/10/2024 demonstrated 40-50% cellular marrow with dysplastic changes but no increase in blasts. ASXL1, JAK2, SRSF2 mutations were detected. No increase in blasts.     She presently has only mild anemia but no significant leukopenia or thrombocytopenia. Based on her worsened performance status, complicated hospitalizations, and reduced performance status, we discussed that we will forgo additional therapy at this time and monitor her disease. We can discuss management if she develops worsening cytopenias or progression towards AML.     Continue infection prophylaxis with acyclovir.     Anemia   Due to MDS/AML. Mild. Continue to monitor.     Follow-up  - continue labs with home health monthly  - Follow-up in 3 months     Mohit Centeno MD  Hematology and Stem Cell Transplant    Visit today included increased complexity associated with the care of the episodic problem MDS addressed and managing the longitudinal care of the patient due to the serious and/or complex managed problem(s) MDS.    Over 40 minutes spent in patient care on the day of this encounter.

## 2024-09-03 ENCOUNTER — TELEPHONE (OUTPATIENT)
Dept: ORTHOPEDICS | Facility: CLINIC | Age: 79
End: 2024-09-03
Payer: MEDICARE

## 2024-09-04 ENCOUNTER — PATIENT MESSAGE (OUTPATIENT)
Dept: INFECTIOUS DISEASES | Facility: CLINIC | Age: 79
End: 2024-09-04
Payer: MEDICARE

## 2024-09-04 ENCOUNTER — DOCUMENT SCAN (OUTPATIENT)
Dept: HOME HEALTH SERVICES | Facility: HOSPITAL | Age: 79
End: 2024-09-04
Payer: MEDICARE

## 2024-09-06 ENCOUNTER — EXTERNAL HOME HEALTH (OUTPATIENT)
Dept: HOME HEALTH SERVICES | Facility: HOSPITAL | Age: 79
End: 2024-09-06
Payer: MEDICARE

## 2024-09-10 ENCOUNTER — LAB VISIT (OUTPATIENT)
Dept: LAB | Facility: HOSPITAL | Age: 79
End: 2024-09-10
Attending: INTERNAL MEDICINE
Payer: MEDICARE

## 2024-09-10 DIAGNOSIS — D46.9 MDS (MYELODYSPLASTIC SYNDROME): ICD-10-CM

## 2024-09-10 LAB
ALBUMIN SERPL BCP-MCNC: 3.4 G/DL (ref 3.5–5.2)
ALP SERPL-CCNC: 242 U/L (ref 55–135)
ALT SERPL W/O P-5'-P-CCNC: 19 U/L (ref 10–44)
ANION GAP SERPL CALC-SCNC: 9 MMOL/L (ref 8–16)
AST SERPL-CCNC: 15 U/L (ref 10–40)
BASOPHILS # BLD AUTO: 0.06 K/UL (ref 0–0.2)
BASOPHILS NFR BLD: 0.6 % (ref 0–1.9)
BILIRUB SERPL-MCNC: 0.5 MG/DL (ref 0.1–1)
BUN SERPL-MCNC: 23 MG/DL (ref 8–23)
CALCIUM SERPL-MCNC: 9 MG/DL (ref 8.7–10.5)
CHLORIDE SERPL-SCNC: 107 MMOL/L (ref 95–110)
CO2 SERPL-SCNC: 25 MMOL/L (ref 23–29)
CREAT SERPL-MCNC: 0.7 MG/DL (ref 0.5–1.4)
DIFFERENTIAL METHOD BLD: ABNORMAL
EOSINOPHIL # BLD AUTO: 0 K/UL (ref 0–0.5)
EOSINOPHIL NFR BLD: 0.3 % (ref 0–8)
ERYTHROCYTE [DISTWIDTH] IN BLOOD BY AUTOMATED COUNT: 18.8 % (ref 11.5–14.5)
EST. GFR  (NO RACE VARIABLE): >60 ML/MIN/1.73 M^2
GLUCOSE SERPL-MCNC: 121 MG/DL (ref 70–110)
HCT VFR BLD AUTO: 34.6 % (ref 37–48.5)
HGB BLD-MCNC: 10.8 G/DL (ref 12–16)
IMM GRANULOCYTES # BLD AUTO: 0.22 K/UL (ref 0–0.04)
IMM GRANULOCYTES NFR BLD AUTO: 2.3 % (ref 0–0.5)
LDH SERPL L TO P-CCNC: 193 U/L (ref 110–260)
LYMPHOCYTES # BLD AUTO: 1.5 K/UL (ref 1–4.8)
LYMPHOCYTES NFR BLD: 14.9 % (ref 18–48)
MAGNESIUM SERPL-MCNC: 1.9 MG/DL (ref 1.6–2.6)
MCH RBC QN AUTO: 30.6 PG (ref 27–31)
MCHC RBC AUTO-ENTMCNC: 31.2 G/DL (ref 32–36)
MCV RBC AUTO: 98 FL (ref 82–98)
MONOCYTES # BLD AUTO: 0.9 K/UL (ref 0.3–1)
MONOCYTES NFR BLD: 9.2 % (ref 4–15)
NEUTROPHILS # BLD AUTO: 7.1 K/UL (ref 1.8–7.7)
NEUTROPHILS NFR BLD: 72.7 % (ref 38–73)
NRBC BLD-RTO: 0 /100 WBC
PHOSPHATE SERPL-MCNC: 3.9 MG/DL (ref 2.7–4.5)
PLATELET # BLD AUTO: 174 K/UL (ref 150–450)
PMV BLD AUTO: 9.1 FL (ref 9.2–12.9)
POTASSIUM SERPL-SCNC: 3.6 MMOL/L (ref 3.5–5.1)
PROT SERPL-MCNC: 7 G/DL (ref 6–8.4)
RBC # BLD AUTO: 3.53 M/UL (ref 4–5.4)
SODIUM SERPL-SCNC: 141 MMOL/L (ref 136–145)
URATE SERPL-MCNC: 6.4 MG/DL (ref 2.4–5.7)
WBC # BLD AUTO: 9.71 K/UL (ref 3.9–12.7)

## 2024-09-10 PROCEDURE — 83615 LACTATE (LD) (LDH) ENZYME: CPT | Mod: PN | Performed by: INTERNAL MEDICINE

## 2024-09-10 PROCEDURE — 84550 ASSAY OF BLOOD/URIC ACID: CPT | Mod: PN | Performed by: INTERNAL MEDICINE

## 2024-09-10 PROCEDURE — 80053 COMPREHEN METABOLIC PANEL: CPT | Mod: PN | Performed by: INTERNAL MEDICINE

## 2024-09-10 PROCEDURE — 83735 ASSAY OF MAGNESIUM: CPT | Mod: PN | Performed by: INTERNAL MEDICINE

## 2024-09-10 PROCEDURE — 85025 COMPLETE CBC W/AUTO DIFF WBC: CPT | Mod: PN | Performed by: INTERNAL MEDICINE

## 2024-09-10 PROCEDURE — 84100 ASSAY OF PHOSPHORUS: CPT | Mod: PN | Performed by: INTERNAL MEDICINE

## 2024-09-13 ENCOUNTER — OFFICE VISIT (OUTPATIENT)
Dept: INFECTIOUS DISEASES | Facility: CLINIC | Age: 79
End: 2024-09-13
Payer: MEDICARE

## 2024-09-13 DIAGNOSIS — N39.0 RECURRENT UTI: Primary | ICD-10-CM

## 2024-09-13 PROCEDURE — 99214 OFFICE O/P EST MOD 30 MIN: CPT | Mod: 95,,, | Performed by: INTERNAL MEDICINE

## 2024-09-13 RX ORDER — GRANULES FOR ORAL 3 G/1
3 POWDER ORAL
Qty: 9 G | Refills: 0 | Status: SHIPPED | OUTPATIENT
Start: 2024-09-13 | End: 2024-09-18

## 2024-09-13 NOTE — PROGRESS NOTES
"The patient location is: [facility]  The chief complaint leading to consultation is: recurrent uti    Visit type: audiovisual    Face to Face time with patient: 19 min  30 minutes of total time spent on the encounter, which includes face to face time and non-face to face time preparing to see the patient (eg, review of tests), Obtaining and/or reviewing separately obtained history, Documenting clinical information in the electronic or other health record, Independently interpreting results (not separately reported) and communicating results to the patient/family/caregiver, or Care coordination (not separately reported).         Each patient to whom he or she provides medical services by telemedicine is:  (1) informed of the relationship between the physician and patient and the respective role of any other health care provider with respect to management of the patient; and (2) notified that he or she may decline to receive medical services by telemedicine and may withdraw from such care at any time.    Notes:       INFECTIOUS DISEASE CLINIC  9/13/24     Subjective:      Chief Complaint: hospital follow up       History of Present Illness:    Patient Niurka Pedraza is a 79 y.o. female with MDS and recurrent UTI who presents today for f/u of recurrent uti.  Reports uinary freq x 2 days. Couldn't sleep because of freq, but "now I'm ok". No burning no cva tenderness. Last abx about a month ago- took bactrim, helped. Symptom Smell            From [place] in [place] 1985    Worked at [employer], retired. Research. neurochemistry     Review of Symptoms:  Constitutional: Denies fevers, chills, or weakness.  ENT: Denies dysphagia, nasal discharge, ear pain or discharge.  Cardiovascular: Denies chest pain, palpitations, orthopnea, or claudication.  Respiratory: Denies shortness of breath, cough, hemoptysis, or wheezing.  GI: Denies nausea/vomitting, hematochezia, melena, abd pain, or changes in appetite.  :as per hpi  Musculoskeletal: " Denies joint pain or myalgias.  Skin/breast: Denies rashes, lumps, lesions, or discharge.  Neurologic: Denies headache, dizziness, vertigo, or paresthesias.    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    Breast cancer     mastectomy 2014    Coccidiomycosis, progressive     Elevated CA-125 02/25/2018    Hyperlipidemia     Hypertension     Liver disease     OAB (overactive bladder)     Osteopenia     on Dexa 11/2017    Osteoporosis     pt reports hx of this, declined fosamax in past - treated with calcium and vit D    Pelvic mass 02/25/2018    Pulmonary fibrosis     Pulmonary nodules     SOB (shortness of breath) on exertion     Thyroid disease     hypo    Urinary tract infection due to extended-spectrum beta lactamase (ESBL) producing Escherichia coli 03/16/2022    UTI (urinary tract infection)        Past Surgical History:   Procedure Laterality Date    BONE MARROW BIOPSY N/A 4/10/2024    Procedure: Biopsy-bone marrow;  Surgeon: Mohit Centeno MD;  Location: [facility] (4TH FLR);  Service: Oncology;  Laterality: N/A;  4/4-precall complete-MS    CHOLECYSTECTOMY      COLONOSCOPY N/A 12/09/2022    Procedure: COLONOSCOPY;  Surgeon: Enrique Dominguez MD;  Location: [facility] (4TH FLR);  Service: Endoscopy;  Laterality: N/A;  inst via portal  pt requests after 12/9/22-MS  11/30-pt notified of earlier arrival time-KPvt    CYSTOSCOPY WITH BIOPSY OF BLADDER Bilateral 07/27/2022    Procedure: CYSTOSCOPY, WITH BLADDER BIOPSY; retrograde pyelogram,;  Surgeon: Anaya Oropeza MD;  Location: [facility];  Service: Urology;  Laterality: Bilateral;    ENDOSCOPIC ULTRASOUND OF UPPER GASTROINTESTINAL TRACT N/A 04/08/2021    Procedure: ULTRASOUND, UPPER GI TRACT, ENDOSCOPIC;  Surgeon: Aisha Barajas MD;  Location: [facility] (2ND FLR);  Service: Endoscopy;  Laterality: N/A;  UEUS/ERCP evaluation abn MRCP - Dr Angelika Steinerid-19 test 4/5/21 at [facility]  Pre-admit - pg    ERCP N/A 04/08/2021    Procedure: ERCP (ENDOSCOPIC RETROGRADE CHOLANGIOPANCREATOGRAPHY);  Surgeon: Aisha Barajas MD;  Location: [facility] (2ND FLR);  Service: Endoscopy;  Laterality: N/A;  UEUS/ERCP evaluation abn MRCP - Dr Carney  Covid-19 test 4/5/21 at [facility] Pre-admit - pg    ESOPHAGOGASTRODUODENOSCOPY N/A 04/08/2021    Procedure: EGD (ESOPHAGOGASTRODUODENOSCOPY);  Surgeon: Aisha Barajas MD;  Location: [facility] (2ND FLR);  Service: Endoscopy;  Laterality: N/A;  UEUS/ERCP evaluation abn MRCP - Dr Carney  Covid-19 test 4/5/21 at [facility] Pre-admit - pg    ESOPHAGOGASTRODUODENOSCOPY N/A 06/02/2023    Procedure: EGD (ESOPHAGOGASTRODUODENOSCOPY);  Surgeon: Emeka Carney MD;  Location: [facility] (4TH FLR);  Service: Endoscopy;  Laterality: N/A;  She has  history of seromucinous borderline tumor of the ovary. We generally follow  and CEA tumor markers. Her CEA recently became slightly elevated. CT imaging negative and colonoscopy negative.     Talita S. Denis    instructions portal-LW  pre    FIXATION KYPHOPLASTY  5/8/2024    Procedure: Kyphoplasty- 3 levels regular kypho T11-L1 with anesthesia;  Surgeon: Chandra Villavicencio MD;  Location: [facility] CATH;  Service: Interventional Radiology;;    HYSTERECTOMY      LAPAROSCOPIC APPENDECTOMY N/A 4/28/2024    Procedure: APPENDECTOMY, LAPAROSCOPIC;  Surgeon: BALJEET Haji MD;  Location: [facility] OR;  Service: General;  Laterality: N/A;    MASTECTOMY Right     2014       Family History   Problem Relation Name Age of Onset    Cancer Mother Jessica         colon cancer    Breast cancer Mother Jessica     Hypertension Father Yonas     Heart disease Father Yonas     Stroke Father Yonas     No Known Problems Sister      Cancer Brother Yonas         lung, ++ tobacco    Hypertension Brother Gary     No Known Problems Daughter      Hypertension Son Lavell     Colon cancer Neg Hx      Ovarian cancer Neg Hx         Social History     Socioeconomic History     Marital status:     Number of children: 3   Occupational History    Occupation: retired from [employer], [employer]     Comment: neuro chemistry   Tobacco Use    Smoking status: Former     Current packs/day: 0.00     Average packs/day: 0.5 packs/day for 30.1 years (15.1 ttl pk-yrs)     Types: Cigarettes     Start date: 1970     Quit date: 2000     Years since quittin.6    Smokeless tobacco: Never    Tobacco comments:     quit in her 50s, after 30 years smoking;   Substance and Sexual Activity    Alcohol use: No    Drug use: No    Sexual activity: Not Currently     Partners: Male     Birth control/protection: None   Social History Narrative    From [place]     Moved to [place] in  for research    Moved to [place] for period of time    Moved back to [place] Summer 2016 and then to [place] this year 2017     Social Determinants of Health     Financial Resource Strain: Patient Unable To Answer (2024)    Overall Financial Resource Strain (CARDIA)     Difficulty of Paying Living Expenses: Patient unable to answer   Food Insecurity: No Food Insecurity (2024)    Hunger Vital Sign     Worried About Running Out of Food in the Last Year: Never true     Ran Out of Food in the Last Year: Never true   Transportation Needs: No Transportation Needs (2024)    PRAPARE - Transportation     Lack of Transportation (Medical): No     Lack of Transportation (Non-Medical): No   Physical Activity: Unknown (2024)    Exercise Vital Sign     Days of Exercise per Week: Patient unable to answer     Minutes of Exercise per Session: 0 min   Recent Concern: Physical Activity - Inactive (2024)    Exercise Vital Sign     Days of Exercise per Week: 0 days     Minutes of Exercise per Session: 0 min   Stress: Patient Unable To Answer (2024)    [employer] [place] [employer] - Occupational Stress Questionnaire     Feeling of Stress : Patient unable to answer   Housing Stability:  Low Risk  (4/29/2024)    Housing Stability Vital Sign     Unable to Pay for Housing in the Last Year: No     Homeless in the Last Year: No       Review of patient's allergies indicates:   Allergen Reactions    Ciprofloxacin Other (See Comments)     Right foot pain and edema, tendonitis    Amlodipine Edema and Swelling     BLE  BLE    Lisinopril Other (See Comments)     cough    Nitrofuran analogues          Objective:   LMP 02/08/2000     General: alert, comfortable, no acute distress.   Pulmonary: Non labored  Neurological:  Alert and oriented x 4.   Exam limited 2/2 telemedicine    Labs:    Glucose   Date Value Ref Range Status   09/10/2024 121 (H) 70 - 110 mg/dL Final   08/15/2024 116 (H) 70 - 110 mg/dL Final   08/08/2024 138 (H) 70 - 110 mg/dL Final       Calcium   Date Value Ref Range Status   09/10/2024 9.0 8.7 - 10.5 mg/dL Final   08/15/2024 9.4 8.7 - 10.5 mg/dL Final   08/08/2024 9.3 8.7 - 10.5 mg/dL Final       Albumin   Date Value Ref Range Status   09/10/2024 3.4 (L) 3.5 - 5.2 g/dL Final   08/15/2024 3.4 (L) 3.5 - 5.2 g/dL Final   08/08/2024 3.5 3.5 - 5.2 g/dL Final       Total Protein   Date Value Ref Range Status   09/10/2024 7.0 6.0 - 8.4 g/dL Final   08/15/2024 7.1 6.0 - 8.4 g/dL Final   08/08/2024 7.1 6.0 - 8.4 g/dL Final       Sodium   Date Value Ref Range Status   09/10/2024 141 136 - 145 mmol/L Final   08/15/2024 143 136 - 145 mmol/L Final   08/08/2024 137 136 - 145 mmol/L Final       Potassium   Date Value Ref Range Status   09/10/2024 3.6 3.5 - 5.1 mmol/L Final   08/15/2024 3.8 3.5 - 5.1 mmol/L Final   08/08/2024 3.9 3.5 - 5.1 mmol/L Final       CO2   Date Value Ref Range Status   09/10/2024 25 23 - 29 mmol/L Final   08/15/2024 27 23 - 29 mmol/L Final   08/08/2024 24 23 - 29 mmol/L Final       Chloride   Date Value Ref Range Status   09/10/2024 107 95 - 110 mmol/L Final   08/15/2024 104 95 - 110 mmol/L Final   08/08/2024 103 95 - 110 mmol/L Final       BUN   Date Value Ref Range Status    09/10/2024 23 8 - 23 mg/dL Final   08/15/2024 19 8 - 23 mg/dL Final   08/08/2024 18 8 - 23 mg/dL Final       Creatinine   Date Value Ref Range Status   09/10/2024 0.7 0.5 - 1.4 mg/dL Final   08/15/2024 0.9 0.5 - 1.4 mg/dL Final   08/08/2024 0.9 0.5 - 1.4 mg/dL Final       Alkaline Phosphatase   Date Value Ref Range Status   09/10/2024 242 (H) 55 - 135 U/L Final   08/15/2024 288 (H) 55 - 135 U/L Final   08/08/2024 366 (H) 55 - 135 U/L Final       ALT   Date Value Ref Range Status   09/10/2024 19 10 - 44 U/L Final   08/15/2024 23 10 - 44 U/L Final   08/08/2024 19 10 - 44 U/L Final       AST   Date Value Ref Range Status   09/10/2024 15 10 - 40 U/L Final   08/15/2024 21 10 - 40 U/L Final   08/08/2024 17 10 - 40 U/L Final       Total Bilirubin   Date Value Ref Range Status   09/10/2024 0.5 0.1 - 1.0 mg/dL Final     Comment:     For infants and newborns, interpretation of results should be based  on gestational age, weight and in agreement with clinical  observations.    Premature Infant recommended reference ranges:  Up to 24 hours.............<8.0 mg/dL  Up to 48 hours............<12.0 mg/dL  3-5 days..................<15.0 mg/dL  6-29 days.................<15.0 mg/dL     08/15/2024 0.6 0.1 - 1.0 mg/dL Final     Comment:     For infants and newborns, interpretation of results should be based  on gestational age, weight and in agreement with clinical  observations.    Premature Infant recommended reference ranges:  Up to 24 hours.............<8.0 mg/dL  Up to 48 hours............<12.0 mg/dL  3-5 days..................<15.0 mg/dL  6-29 days.................<15.0 mg/dL     08/08/2024 0.6 0.1 - 1.0 mg/dL Final     Comment:     For infants and newborns, interpretation of results should be based  on gestational age, weight and in agreement with clinical  observations.    Premature Infant recommended reference ranges:  Up to 24 hours.............<8.0 mg/dL  Up to 48 hours............<12.0 mg/dL  3-5  "days..................<15.0 mg/dL  6-29 days.................<15.0 mg/dL         WBC   Date Value Ref Range Status   09/10/2024 9.71 3.90 - 12.70 K/uL Final   08/15/2024 11.38 3.90 - 12.70 K/uL Final   08/08/2024 11.63 3.90 - 12.70 K/uL Final       Hemoglobin   Date Value Ref Range Status   09/10/2024 10.8 (L) 12.0 - 16.0 g/dL Final   08/15/2024 11.5 (L) 12.0 - 16.0 g/dL Final   08/08/2024 11.1 (L) 12.0 - 16.0 g/dL Final       Hematocrit   Date Value Ref Range Status   09/10/2024 34.6 (L) 37.0 - 48.5 % Final   08/15/2024 36.0 (L) 37.0 - 48.5 % Final   08/08/2024 35.6 (L) 37.0 - 48.5 % Final       MCV   Date Value Ref Range Status   09/10/2024 98 82 - 98 fL Final   08/15/2024 95 82 - 98 fL Final   08/08/2024 95 82 - 98 fL Final       Platelets   Date Value Ref Range Status   09/10/2024 174 150 - 450 K/uL Final   08/15/2024 209 150 - 450 K/uL Final   08/08/2024 202 150 - 450 K/uL Final       Lab Results   Component Value Date    CHOL 131 06/22/2023    CHOL 153 06/30/2022    CHOL 140 08/24/2021       Lab Results   Component Value Date    HDL 43 06/22/2023    HDL 69 06/30/2022    HDL 36 (L) 08/24/2021       Lab Results   Component Value Date    LDLCALC 64.4 06/22/2023    LDLCALC 70.6 06/30/2022    LDLCALC 71.6 08/24/2021       Lab Results   Component Value Date    TRIG 118 06/22/2023    TRIG 67 06/30/2022    TRIG 162 (H) 08/24/2021       Lab Results   Component Value Date    CHOLHDL 32.8 06/22/2023    CHOLHDL 45.1 06/30/2022    CHOLHDL 25.7 08/24/2021       RPR   Date Value Ref Range Status   12/19/2022 Non-reactive Non-reactive Final   12/10/2021 Non-reactive Non-reactive Final     No results found for: "QUANTIFERON"    Medications:  Current Outpatient Medications on File Prior to Visit   Medication Sig Dispense Refill    acetaminophen (TYLENOL) 325 MG tablet Take 2 tablets (650 mg total) by mouth every 6 (six) hours as needed for Pain.      acyclovir (ZOVIRAX) 400 MG tablet Take 1 tablet (400 mg total) by mouth 2 " (two) times daily. 60 tablet 11    albuterol-ipratropium (DUO-NEB) 2.5 mg-0.5 mg/3 mL nebulizer solution Take 3 mLs by nebulization every 6 (six) hours as needed for Wheezing or Shortness of Breath. Rescue 75 mL 11    ascorbic acid/zinc (ZINC WITH VITAMIN C ORAL) Take 1 tablet by mouth Daily. (Patient not taking: Reported on 7/17/2024)      atorvastatin (LIPITOR) 20 MG tablet Take 1 tablet (20 mg total) by mouth once daily. 90 tablet 3    azelastine (ASTELIN) 137 mcg (0.1 %) nasal spray 1 spray (137 mcg total) by Nasal route 2 (two) times daily. (Patient not taking: Reported on 7/17/2024) 30 mL 6    bisacodyL (DULCOLAX) 10 mg Supp Place 1 suppository (10 mg total) rectally daily as needed (constipation).      calcium-vitamin D3 (CALCIUM 500 + D) 500 mg(1,250mg) -200 unit per tablet Take 1 tablet by mouth 2 (two) times daily with meals.      carvediloL (COREG) 25 MG tablet Take 1 tablet (25 mg total) by mouth 2 (two) times daily with meals. 180 tablet 1    cyproheptadine (PERIACTIN) 4 mg tablet Take 1 tablet (4 mg total) by mouth 3 (three) times daily as needed (decreased appetite). 30 tablet 3    docusate sodium (COLACE) 100 MG capsule Take 1 capsule (100 mg total) by mouth 2 (two) times daily. (Patient not taking: Reported on 7/17/2024) 60 capsule 0    ferrous gluconate (FERGON) 324 MG tablet Take 1 tablet (324 mg total) by mouth every other day. 90 tablet 3    levothyroxine (SYNTHROID) 50 MCG tablet Take 1 tablet (50 mcg total) by mouth before breakfast. 90 tablet 0    mirtazapine (REMERON) 7.5 MG Tab Take 1 tablet (7.5 mg total) by mouth every evening. 90 tablet 3    multivitamin (THERAGRAN) per tablet Take 1 tablet by mouth once daily.      naloxone (NARCAN) 4 mg/actuation Spry 4mg by nasal route as needed for opioid overdose; may repeat every 2-3 minutes in alternating nostrils until medical help arrives. Call 911 1 each 11    ondansetron (ZOFRAN-ODT) 4 MG TbDL Take 1 tablet (4 mg total) by mouth every 6 (six)  "hours as needed (nausea). 30 tablet 0    oxybutynin (DITROPAN XL) 15 MG TR24 Take 1 tablet (15 mg total) by mouth once daily. 30 tablet 11    pantoprazole (PROTONIX) 20 MG tablet Take 1 tablet (20 mg total) by mouth once daily. 90 tablet 1    polyethylene glycol (GLYCOLAX) 17 gram PwPk Take 17 g by mouth once daily. 30 packet 0    potassium chloride (K-TAB) 20 mEq Take 20 mEq by mouth.      predniSONE (DELTASONE) 1 MG tablet Take 2 tablets (2 mg total) by mouth once daily. 180 tablet 3    sodium chloride 3% 3 % nebulizer solution Take 4 mLs by nebulization as needed for Other. 120 mL 3    sulfamethoxazole-trimethoprim 800-160mg (BACTRIM DS) 800-160 mg Tab Take 1 tablet by mouth 2 (two) times daily. 14 tablet 0    traMADoL (ULTRAM) 50 mg tablet Take 1-2 tabs by mouth every 8 hours as needed for pain 60 each 0    [DISCONTINUED] budesonide-formoterol 80-4.5 mcg (SYMBICORT) 80-4.5 mcg/actuation HFAA Inhale 2 puffs into the lungs 2 (two) times daily as needed. Controller 1 Inhaler 6     No current facility-administered medications on file prior to visit.       Antibiotics:   Antibiotics (From admission, onward)      None            HIV: No components found for: "HIV 1/2 AG/AB"  Hepatitis C IgG: No components found for: "HEPATITIS C"  Syphilis:   RPR   Date Value Ref Range Status   12/19/2022 Non-reactive Non-reactive Final   12/10/2021 Non-reactive Non-reactive Final       Hepatitis A IgG: No components found for: "HEPATITIS A IGG"  Hepatitis Bc IgG: No components found for: "HEPATITIS B CORE IGG"  Hepatitis Bs IgG:  Quantiferon: No results found for: "QUANTIFERON"  VZV IgG: No components found for: "VARICELLA IGG"    No components found for: "SEDIMENTATION RATE"  No components found for: "C-REACTIVE PROTEIN"      Microbiology x 7d:   Microbiology Results (last 7 days)       ** No results found for the last 168 hours. **            Immunization History   Administered Date(s) Administered    COVID-19 MRNA, LN-S PF " (MODERNA HALF 0.25 ML DOSE) 11/09/2021    COVID-19 Vaccine 09/30/2022    COVID-19, MRNA, LN-S, PF (MODERNA FULL 0.5 ML DOSE) 02/03/2021, 03/03/2021    COVID-19, mRNA, LNP-S, bivalent booster, PF (Moderna Omicron)12 + YEARS 09/30/2022, 05/16/2023    Influenza (FLUAD) - Quadrivalent - Adjuvanted - PF *Preferred* (65+) 10/24/2023    Influenza - High Dose - PF (65 years and older) 10/27/2020, 09/21/2022    Pneumococcal Conjugate - 13 Valent 11/02/2017    Pneumococcal Conjugate - 20 Valent 09/30/2022    Pneumococcal Polysaccharide - 23 Valent 07/28/2020    Tdap 07/21/2021    Zoster Recombinant 09/21/2022, 05/09/2023         Reviewed records today as well as relevant labs, cultures, and imaging    Assessment:     Recurrent uti    Plan:       Have ordered UA/culture and patient given urine culture container and lab stickers to drop off sample if she has recurrent symptoms.       Orders Placed This Encounter   Procedures    Urinalysis, Reflex to Urine Culture Urine, Clean Catch     Standing Status:   Future     Standing Expiration Date:   11/12/2025     Order Specific Question:   Preferred Collection Type     Answer:   Urine, Clean Catch     Order Specific Question:   Specimen Source     Answer:   Urine       I have sent communication to the referring physician and/or primary care provider.     The total time for evaluation and management services performed on 9/13/24 was greater than 30 minutes.  This includes face to face time and non-face to face time preparing to see the patient (eg, review of tests), obtaining and/or reviewing separately obtained history, documenting clinical information in the electronic or other health record, independently interpreting results, and communicating results to the patient/family/caregiver, or care coordination.             Chandni Allen MD, MPH  Infectious Disease

## 2024-09-17 DIAGNOSIS — D84.822 IMMUNODEFICIENCY DUE TO EXTERNAL CAUSE: ICD-10-CM

## 2024-09-17 RX ORDER — ACYCLOVIR 400 MG/1
400 TABLET ORAL 2 TIMES DAILY
Qty: 60 TABLET | Refills: 11 | Status: CANCELLED | OUTPATIENT
Start: 2024-09-17 | End: 2025-09-17

## 2024-09-17 NOTE — PROGRESS NOTES
Subjective:       Niurka Pedraza is a 79 y.o. female who is an established patient with [facility] urologist, Dr Dhaliwal,  was seen for evaluation of UTI.      She was seen by another urologist for recurrent UTIs/dysuria. Multiple +UCx with different bacteria (E coli, Enterococcus). She has had negative cystoscopy and GONZÁLEZ (1/17). UTIs return soon after treatment. She was started on prophy Keflex in 7/17.     She saw PCP in 11/17 with complaints of UTI. UCx was negative. She now feels a constant pressure in SP area and c/o pain with walking. She also notes pain worse with movement (like hitting a bump when driving). Denies dysuria. Increased frequency to now q1h. Present for 2 months. Denies constipation, occasional diarrhea. Frequent RADHA. Now with UUI. Also with BOBBI - increased coughing with recent lung issue. Nocturia x 4-5. Denies gross hematuria.     She moved here from [place] in 9/17. She requested to be started on abx prophy starting 1/18 (Keflex 250mg).     PVR (bladder scan) initial visit - 32cc, 0cc.    CT uro - no  abnormalities. Lung nodules - followed by pulmonary. Ovarian cyst - referred to gyn (now s/p DARCI-BSO for ovarian cancer).     She has been doing great with the Ditropan - she is very pleased. Was on abx prophy (Keflex) 1/18 x 9 months. Taking probiotics that is helping.     She [place] ([place] - took Bactrim. Now off prophylactic abx - more frequency, urgency, nocturia, SP pressure. No significant dysuria.     UTIs have become more frequent. Symptoms returning quickly. Prophylactic abx were restarted (Macrobid 50mg).     9/4/2019  She recently finished course of abx but symptoms are now returning just 5 days later. Dysuria improved but pressure and frequency worsened.     1/15/2020  Increase Ditropan to 10mg. Taking Macrobid QHS, started in 9/19. No symptoms today.     8/26/2020  Several recent breakthrough UTIs. Finished abx 2 weeks ago, symptoms have returned.  "Currently on Macrobid. UTI symptoms - frequency, dysuria.   PVR (bladder scan) today - 21cc    9/23/2020  On day 5 of abx, still with some symptoms but improving. CT done - clear.     2/10/2021  Occasional pain and urgency. Macrobid stopped and changed to Keflex. She is requesting UCx though states symptoms are stable.     1/12/2022  Denies current issues with UTI. Nocturia x 3-4. Off prophy abx. Now on new med for pulm fibrosis.     6/29/2022  Now having multiresistant recurrent UTIs requiring IV abx - treated by ID (Dr Rishi Dempsey). Has been treated with IV abx (ertapenem) - 5 days last week. Still with urgency and frequency. Not using Estrace.   PVR (bladder scan) today - 22c    Cytology 7/22 - no malignant cells    Cysto 5/19 - normal, heavy sediment in bladder    Cysto 7/22 - very abnormal appearing bladder mucosa with widespread "scaly" yellow/white areas L>R. Easily detached from bladder wall without bleeding. No tumors.     GONZÁLEZ 4/19 - wnl, PVR 31cc  CT uro 9/20 - negative, pulm nodules (seeing PCP)  GONZÁLEZ 7/22 - no hydro/stones. PVR 51cc    OR 7/27/22 - TUR of large red-brown plaque along entire L lateral wall with white/silver appearance of bladder under plaque. Other plaques noted throoughout bladder, removed. Bladder biopsies - Polypoid keratinizing squamous mucosa with hemorrhagic stroma and reactive epithelial changes. No malignancy. Plaques were keratinaceous debris and blood.     She reports frequency and nocturia q1h. Mild dysuria - improving.     Cysto 1/23 - Similar appearing yellow "plaques" on the bladder wall, easily scraped free of bladder wall with minimal manipulation of scope. Non-bleeding. Less area than previous but still large area in L lateral, small in R lateral    9/13/2023  Continues to have frequent UTIs. ++urgency. On Ditropan 15mg, not very helpful. Recently diagnosis of myelodysplastic syndrome - about to start treatment. No dysuria currently.     9/18/2024  Virtual visit. Last seen " 1 year ago. Still with many multiresistant E coli UTIs. Seeing ID. Seeing heme/onc for MDS/AML cancer. Reports she was recommended for repeat cysto. Remains on Ditropan 15mg - +frequency q2h, nocturia.      UCx:  Multiple UTIs in last year (8+) - mostly multiresistant ESBL E coli.   7/22, 10/22, 5/23, 7/23, 7/23 - >100k E coli ESBL (multiresistant)  7/21 - >100k Enterococcus  2/21 - 50-99k Proteus  9/20 - >100k Proteus  7/20 - >100k E coli, 10-49k Proteus  7/20 - >100k Proteus   5/20 - 10-49k Klebsiella  9/19 - >100k Klebsiella  8/19 - >100k Klebsiella  6/19 - >100k E coli  5/19 - neg  5/19 - >100k E coli  3/19 - >100k E coli  2/19 - >100k E coli  1/19 - >100k E coli  11/18 - >100k E coli  8/18 - contaminant  7/18 - 50-99k Enterobacter (treated with Bactrim)  11/17 - neg  10/17 - >100k E coli  10/17- >100k Enterococcus  7/17 - >100k E coli  5/17- >100k E coli  5/17 - neg  4/17- 50-99k Enterococcus  3/17 - >100k E coli  12/16 - neg     PVR (bladder scan) last visit - 33cc, 50cc, 0cc      The following portions of the patient's history were reviewed and updated as appropriate: allergies, current medications, past family history, past medical history, past social history, past surgical history and problem list.     Review of Systems  Constitutional: no fever or chills  ENT: no nasal congestion or sore throat  Respiratory: no cough or shortness of breath  Cardiovascular: no chest pain or palpitations  Gastrointestinal: no nausea or vomiting, tolerating diet  Genitourinary: as per HPI  Hematologic/Lymphatic: no easy bruising or lymphadenopathy  Musculoskeletal: no arthralgias or myalgias  Skin: no rashes or lesions  Neurological: no seizures or tremors  Behavioral/Psych: no auditory or visual hallucinations        Objective:    Vitals: LMP 02/08/2000     Physical Exam   General: well developed, well nourished in no acute distress  Head: normocephalic, atraumatic  Neck: supple, trachea midline, no obvious enlargement of  "thyroid  HEENT: EOMI, mucus membranes moist, sclera anicteric, no hearing impairment  Lungs: symmetric expansion, non-labored breathing  Neuro: alert and oriented x 3, no gross deficits  Psych: normal judgment and insight, normal mood/affect and non-anxious  Genitourinary:  deferred      Lab Review   Urine analysis today in clinic shows - no urine    Lab Results   Component Value Date    WBC 9.71 09/10/2024    HGB 10.8 (L) 09/10/2024    HCT 34.6 (L) 09/10/2024    MCV 98 09/10/2024     09/10/2024     Lab Results   Component Value Date    CREATININE 0.7 09/10/2024    BUN 23 09/10/2024       Imaging  Images and reports were personally reviewed by me and discussed with patient  GONZÁLEZ, CT reviewed       Assessment/Plan:      1. Recurrent UTI    - Discussed UTI prevention strategies.   - Adequate hydration.   - Double voiding. Consider timed voiding.    - Avoid constipation.   - GONZÁLEZ - negative 1/17   - Cystoscopy - negative in 1/17 (by another urologist)   - Cranberry/probiotics.   - Estrace cream 2x weekly - +ovarian ca, approved by gyn onc.    - Call with UTI symptoms so UA/UCx can be sent.      - GONZÁLEZ 4/19 - wnl   - Cysto 5/19 - wnl   - Continue Estrace (approved by gyn onc). Instructed on use.    - Ellura trial, D-mannose supplement, probiotics     - CT urogram 9/20 - negative   - Recurrent multiresistant UTIs.    - Repeat GONZÁLEZ 7/22 - wnl   - Office cystoscopy with widespread abnormal bladder mucosa. S/p bladder biopsy/TUR of abnormal areas. OR 7/27/22.   - No malignancy   - Suspect related to ulcerative cystitis     - Cystoscopy 1/23 - continued "plaques" on bladder wall   - Consider repeat cysto/TUR of abnormal areas. Discussed.   - Repeat cystoscopy now. Suspect bladder will continue to appear abnormal.        2. Microhematuria    - Discussed etiology and workup of hematuria   - UCx - results above   - Cytology - previously negative   - CT urogram 12/17 - no  abnormalities. GONZÁLEZ was negative from 1/17.   - " Office cystoscopy - negative 1/17, 5/19, 7/22 with abnormalities per above        3. Nocturia/frequency/urge incontinence   - Ditropan XL 15mg - discussed SE, caution use in elderly.   - Reduce PM fluids   - Urgency worsening. Will add Gemtesa. Call if coverage issues.       Follow up for cystoscopy    Visit today included increased complexity associated with the care of the episodic problem Susan/OAB addressed and managing the longitudinal care of the patient due to the serious and/or complex managed problem(s) Susan/OAB.    The patient location is: [place]  The chief complaint leading to consultation is: OAB, rUTIs    Visit type: audiovisual    Face to Face time with patient: 10min  16 minutes of total time spent on the encounter, which includes face to face time and non-face to face time preparing to see the patient (eg, review of tests), Obtaining and/or reviewing separately obtained history, Documenting clinical information in the electronic or other health record, Independently interpreting results (not separately reported) and communicating results to the patient/family/caregiver, or Care coordination (not separately reported).         Each patient to whom he or she provides medical services by telemedicine is:  (1) informed of the relationship between the physician and patient and the respective role of any other health care provider with respect to management of the patient; and (2) notified that he or she may decline to receive medical services by telemedicine and may withdraw from such care at any time.    Notes:

## 2024-09-18 ENCOUNTER — OFFICE VISIT (OUTPATIENT)
Dept: UROLOGY | Facility: CLINIC | Age: 79
End: 2024-09-18
Attending: UROLOGY
Payer: MEDICARE

## 2024-09-18 ENCOUNTER — TELEPHONE (OUTPATIENT)
Dept: UROLOGY | Facility: CLINIC | Age: 79
End: 2024-09-18

## 2024-09-18 DIAGNOSIS — N32.81 OAB (OVERACTIVE BLADDER): ICD-10-CM

## 2024-09-18 DIAGNOSIS — N32.9 LESION OF BLADDER: Primary | ICD-10-CM

## 2024-09-18 DIAGNOSIS — R35.1 NOCTURIA: ICD-10-CM

## 2024-09-18 DIAGNOSIS — N39.0 RECURRENT UTI: ICD-10-CM

## 2024-09-18 PROCEDURE — 99214 OFFICE O/P EST MOD 30 MIN: CPT | Mod: 95,,, | Performed by: UROLOGY

## 2024-09-18 PROCEDURE — G2211 COMPLEX E/M VISIT ADD ON: HCPCS | Mod: 95,,, | Performed by: UROLOGY

## 2024-09-18 RX ORDER — VIBEGRON 75 MG/1
75 TABLET, FILM COATED ORAL DAILY
Qty: 30 TABLET | Refills: 11 | Status: SHIPPED | OUTPATIENT
Start: 2024-09-18

## 2024-09-18 RX ORDER — ACYCLOVIR 400 MG/1
400 TABLET ORAL 2 TIMES DAILY
Qty: 60 TABLET | Refills: 11 | Status: SHIPPED | OUTPATIENT
Start: 2024-09-18 | End: 2025-09-18

## 2024-09-18 NOTE — TELEPHONE ENCOUNTER
----- Message from Maria Dolores Marrero CMA sent at 9/18/2024 10:23 AM CDT -----    ----- Message -----  From: Anaya Oropeza MD  Sent: 9/18/2024  10:18 AM CDT  To: [facility] Staff    Please schedule for office cysto (Sandip). Okay to overbook if needed.

## 2024-09-25 ENCOUNTER — PATIENT MESSAGE (OUTPATIENT)
Dept: HEMATOLOGY/ONCOLOGY | Facility: CLINIC | Age: 79
End: 2024-09-25
Payer: MEDICARE

## 2024-09-25 ENCOUNTER — PATIENT MESSAGE (OUTPATIENT)
Dept: INTERNAL MEDICINE | Facility: CLINIC | Age: 79
End: 2024-09-25
Payer: MEDICARE

## 2024-09-25 ENCOUNTER — OFFICE VISIT (OUTPATIENT)
Dept: INFECTIOUS DISEASES | Facility: CLINIC | Age: 79
End: 2024-09-25
Payer: MEDICARE

## 2024-09-25 DIAGNOSIS — S32.020D COMPRESSION FRACTURE OF L2 VERTEBRA WITH ROUTINE HEALING, SUBSEQUENT ENCOUNTER: ICD-10-CM

## 2024-09-25 DIAGNOSIS — M54.30 SCIATICA, UNSPECIFIED LATERALITY: ICD-10-CM

## 2024-09-25 DIAGNOSIS — S22.39XS CLOSED FRACTURE OF ONE RIB, UNSPECIFIED LATERALITY, SEQUELA: ICD-10-CM

## 2024-09-25 DIAGNOSIS — N39.0 RECURRENT UTI: Primary | ICD-10-CM

## 2024-09-25 PROCEDURE — 99214 OFFICE O/P EST MOD 30 MIN: CPT | Mod: 95,,, | Performed by: INTERNAL MEDICINE

## 2024-09-25 NOTE — PROGRESS NOTES
The patient location is: [place]  The chief complaint leading to consultation is: dysuria    Visit type: audiovisual    Face to Face time with patient: 10  30 minutes of total time spent on the encounter, which includes face to face time and non-face to face time preparing to see the patient (eg, review of tests), Obtaining and/or reviewing separately obtained history, Documenting clinical information in the electronic or other health record, Independently interpreting results (not separately reported) and communicating results to the patient/family/caregiver, or Care coordination (not separately reported).     Each patient to whom he or she provides medical services by telemedicine is:  (1) informed of the relationship between the physician and patient and the respective role of any other health care provider with respect to management of the patient; and (2) notified that he or she may decline to receive medical services by telemedicine and may withdraw from such care at any time.    Notes:     Subjective:     Patient ID: Niurka Pedraza is a 79 y.o. female    Chief Complaint: urinary frequency    HPI: 79F known to me, recently seen by Dr. Allen for recurrent UTI, referred back to urology w/ plans for cysto in November. She denies symptoms of UTI, endorsing urinary frequency every 1-2 hours but no dysuria that she normally has w/ UTI. Otherwise doing well      Immunization History   Administered Date(s) Administered    COVID-19 MRNA, LN-S PF (MODERNA HALF 0.25 ML DOSE) 11/09/2021    COVID-19 Vaccine 09/30/2022    COVID-19, MRNA, LN-S, PF (MODERNA FULL 0.5 ML DOSE) 02/03/2021, 03/03/2021    COVID-19, mRNA, LNP-S, bivalent booster, PF (Moderna Omicron)12 + YEARS 09/30/2022, 05/16/2023    Influenza (FLUAD) - Quadrivalent - Adjuvanted - PF *Preferred* (65+) 10/24/2023    Influenza - Trivalent - Fluzone High Dose - PF (65 years and older) 10/27/2020, 09/21/2022    Pneumococcal Conjugate - 13 Valent 11/02/2017    Pneumococcal  Conjugate - 20 Valent 09/30/2022    Pneumococcal Polysaccharide - 23 Valent 07/28/2020    Tdap 07/21/2021    Zoster Recombinant 09/21/2022, 05/09/2023        Review of Systems   All other systems reviewed and are negative.       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    Breast cancer     mastectomy 2014    Coccidiomycosis, progressive     Elevated CA-125 02/25/2018    Hyperlipidemia     Hypertension     Liver disease     OAB (overactive bladder)     Osteopenia     on Dexa 11/2017    Osteoporosis     pt reports hx of this, declined fosamax in past - treated with calcium and vit D    Pelvic mass 02/25/2018    Pulmonary fibrosis     Pulmonary nodules     SOB (shortness of breath) on exertion     Thyroid disease     hypo    Urinary tract infection due to extended-spectrum beta lactamase (ESBL) producing Escherichia coli 03/16/2022    UTI (urinary tract infection)      Past Surgical History:   Procedure Laterality Date    BONE MARROW BIOPSY N/A 4/10/2024    Procedure: Biopsy-bone marrow;  Surgeon: Mohit Centeno MD;  Location: [facility] (4TH FLR);  Service: Oncology;  Laterality: N/A;  4/4-precall complete-MS    CHOLECYSTECTOMY      COLONOSCOPY N/A 12/09/2022    Procedure: COLONOSCOPY;  Surgeon: Enrique Dominguez MD;  Location: [facility] (4TH FLR);  Service: Endoscopy;  Laterality: N/A;  inst via portal  pt requests after 12/9/22-MS  11/30-pt notified of earlier arrival time-KPvt    CYSTOSCOPY WITH BIOPSY OF BLADDER Bilateral 07/27/2022    Procedure: CYSTOSCOPY, WITH BLADDER BIOPSY; retrograde pyelogram,;  Surgeon: Anaya Oropeza MD;  Location: [facility] OR;  Service: Urology;  Laterality: Bilateral;    ENDOSCOPIC ULTRASOUND OF UPPER GASTROINTESTINAL TRACT N/A 04/08/2021    Procedure: ULTRASOUND, UPPER GI TRACT, ENDOSCOPIC;  Surgeon: Aisha Barajas MD;  Location: [facility] (2ND FLR);  Service: Endoscopy;  Laterality: N/A;  UEUS/ERCP  evaluation abn MRCP - Dr Carney  Covid-19 test 4/5/21 at [facility] Pre-admit - pg    ERCP N/A 04/08/2021    Procedure: ERCP (ENDOSCOPIC RETROGRADE CHOLANGIOPANCREATOGRAPHY);  Surgeon: Aisha Barajas MD;  Location: [facility] (2ND FLR);  Service: Endoscopy;  Laterality: N/A;  UEUS/ERCP evaluation abn MRCP - Dr Carney  Covid-19 test 4/5/21 at [facility] Pre-admit - pg    ESOPHAGOGASTRODUODENOSCOPY N/A 04/08/2021    Procedure: EGD (ESOPHAGOGASTRODUODENOSCOPY);  Surgeon: Aisha Barajas MD;  Location: [facility] (2ND FLR);  Service: Endoscopy;  Laterality: N/A;  UEUS/ERCP evaluation abn MRCP - Dr Carney  Covid-19 test 4/5/21 at [facility] Pre-admit - pg    ESOPHAGOGASTRODUODENOSCOPY N/A 06/02/2023    Procedure: EGD (ESOPHAGOGASTRODUODENOSCOPY);  Surgeon: Emeka Carney MD;  Location: [facility] (4TH FLR);  Service: Endoscopy;  Laterality: N/A;  She has  history of seromucinous borderline tumor of the ovary. We generally follow  and CEA tumor markers. Her CEA recently became slightly elevated. CT imaging negative and colonoscopy negative.     Talita S. Denis    instructions portal-LW  pre    FIXATION KYPHOPLASTY  5/8/2024    Procedure: Kyphoplasty- 3 levels regular kypho T11-L1 with anesthesia;  Surgeon: Chandra Villavicencio MD;  Location: [facility] CATH;  Service: Interventional Radiology;;    HYSTERECTOMY      LAPAROSCOPIC APPENDECTOMY N/A 4/28/2024    Procedure: APPENDECTOMY, LAPAROSCOPIC;  Surgeon: BALJEET Haji MD;  Location: [facility] OR;  Service: General;  Laterality: N/A;    MASTECTOMY Right     2014     Family History   Problem Relation Name Age of Onset    Cancer Mother Jessica         colon cancer    Breast cancer Mother Jessica     Hypertension Father Yonas     Heart disease Father Yonas     Stroke Father Yonas     No Known Problems Sister      Cancer Brother Yonas         lung, ++ tobacco    Hypertension Brother Gary     No Known Problems Daughter      Hypertension Son Lavell     Colon cancer Neg Hx       Ovarian cancer Neg Hx       Social History     Tobacco Use    Smoking status: Former     Current packs/day: 0.00     Average packs/day: 0.5 packs/day for 30.1 years (15.1 ttl pk-yrs)     Types: Cigarettes     Start date: 1970     Quit date: 2000     Years since quittin.6    Smokeless tobacco: Never    Tobacco comments:     quit in her 50s, after 30 years smoking;   Substance Use Topics    Alcohol use: No    Drug use: No       Objective:     Physical Exam  Constitutional:       General: She is not in acute distress.     Appearance: Normal appearance. She is well-developed. She is not ill-appearing or diaphoretic.   HENT:      Head: Normocephalic and atraumatic.      Right Ear: External ear normal.      Left Ear: External ear normal.      Nose: Nose normal.   Eyes:      General: No scleral icterus.        Right eye: No discharge.         Left eye: No discharge.      Extraocular Movements: Extraocular movements intact.      Conjunctiva/sclera: Conjunctivae normal.   Pulmonary:      Effort: Pulmonary effort is normal. No respiratory distress.      Breath sounds: No stridor.   Musculoskeletal:         General: Normal range of motion.   Skin:     Findings: No erythema or rash.   Neurological:      General: No focal deficit present.      Mental Status: She is alert and oriented to person, place, and time. Mental status is at baseline.      Cranial Nerves: No cranial nerve deficit.   Psychiatric:         Mood and Affect: Mood normal.         Behavior: Behavior normal.         Thought Content: Thought content normal.         Judgment: Judgment normal.         Data:    All data, including recent labs, radiology, and pathology, has been independently reviewed.    Labs:    Recent Labs   Lab Result Units 24  1104 08/15/24  1456 09/10/24  1501   WBC K/uL 11.63 11.38 9.71   Hemoglobin g/dL 11.1* 11.5* 10.8*   Hematocrit % 35.6* 36.0* 34.6*   Sodium mmol/L 137 143 141   Potassium mmol/L 3.9 3.8 3.6   Chloride  mmol/L 103 104 107   BUN mg/dL 18 19 23   Creatinine mg/dL 0.9 0.9 0.7   AST U/L 17 21 15   ALT U/L 19 23 19   Alkaline Phosphatase U/L 366* 288* 242*   Total Bilirubin mg/dL 0.6 0.6 0.5        Radiology:    No results found in the last 24 hours.     Assessment:    1. Recurrent UTI  No symptoms of UTI today  Has cysto in November  Follow up w/ ID after cysto  We are available to help with antibiotics prior to procedure if needed           Follow up in 2 months    The total time for evaluation and management services performed on 9/25/24 was greater than 30 minutes.     Farzana Thomaso DO  Infectious Disease

## 2024-09-26 ENCOUNTER — PATIENT MESSAGE (OUTPATIENT)
Dept: INTERNAL MEDICINE | Facility: CLINIC | Age: 79
End: 2024-09-26
Payer: MEDICARE

## 2024-09-26 DIAGNOSIS — I10 HYPERTENSION, UNSPECIFIED TYPE: ICD-10-CM

## 2024-09-26 DIAGNOSIS — Z76.89 ENCOUNTER FOR NAIL CARE: Primary | ICD-10-CM

## 2024-09-26 DIAGNOSIS — G57.93 NEUROPATHY OF BOTH FEET: Chronic | ICD-10-CM

## 2024-09-26 RX ORDER — TRAMADOL HYDROCHLORIDE 50 MG/1
TABLET ORAL
Qty: 60 EACH | Refills: 0 | Status: SHIPPED | OUTPATIENT
Start: 2024-09-26

## 2024-09-26 RX ORDER — CARVEDILOL 25 MG/1
25 TABLET ORAL 2 TIMES DAILY WITH MEALS
Qty: 180 TABLET | Refills: 1 | Status: SHIPPED | OUTPATIENT
Start: 2024-09-26

## 2024-09-26 RX ORDER — TRAMADOL HYDROCHLORIDE 50 MG/1
TABLET ORAL
Qty: 60 EACH | Refills: 0 | OUTPATIENT
Start: 2024-09-26

## 2024-09-26 NOTE — TELEPHONE ENCOUNTER
Care Due:                  Date            Visit Type   Department     Provider  --------------------------------------------------------------------------------                                ESTABLISHED                              PATIENT -    [facility] INTERNAL  Last Visit: 07-      [facility]      MEDICINE       Savana Anderson  Next Visit: None Scheduled  None         None Found                                                            Last  Test          Frequency    Reason                     Performed    Due Date  --------------------------------------------------------------------------------    Lipid Panel.  12 months..  atorvastatin.............  06- 06-    TSH.........  12 months..  levothyroxine............  06- 06-    [employer] Embedded Care Due Messages. Reference number: [id number].   9/26/2024 7:06:34 AM CDT

## 2024-09-27 ENCOUNTER — TELEPHONE (OUTPATIENT)
Dept: ORTHOPEDICS | Facility: CLINIC | Age: 79
End: 2024-09-27
Payer: MEDICARE

## 2024-10-10 ENCOUNTER — LAB VISIT (OUTPATIENT)
Dept: LAB | Facility: HOSPITAL | Age: 79
End: 2024-10-10
Attending: INTERNAL MEDICINE
Payer: MEDICARE

## 2024-10-10 DIAGNOSIS — D46.9 MDS (MYELODYSPLASTIC SYNDROME): ICD-10-CM

## 2024-10-10 LAB
ALBUMIN SERPL BCP-MCNC: 3.6 G/DL (ref 3.5–5.2)
ALP SERPL-CCNC: 183 U/L (ref 55–135)
ALT SERPL W/O P-5'-P-CCNC: 24 U/L (ref 10–44)
ANION GAP SERPL CALC-SCNC: 13 MMOL/L (ref 8–16)
AST SERPL-CCNC: 22 U/L (ref 10–40)
BASOPHILS # BLD AUTO: 0.13 K/UL (ref 0–0.2)
BASOPHILS NFR BLD: 1.1 % (ref 0–1.9)
BILIRUB SERPL-MCNC: 0.8 MG/DL (ref 0.1–1)
BUN SERPL-MCNC: 21 MG/DL (ref 8–23)
CALCIUM SERPL-MCNC: 8.9 MG/DL (ref 8.7–10.5)
CHLORIDE SERPL-SCNC: 103 MMOL/L (ref 95–110)
CO2 SERPL-SCNC: 25 MMOL/L (ref 23–29)
CREAT SERPL-MCNC: 0.8 MG/DL (ref 0.5–1.4)
DIFFERENTIAL METHOD BLD: ABNORMAL
EOSINOPHIL # BLD AUTO: 0 K/UL (ref 0–0.5)
EOSINOPHIL NFR BLD: 0.2 % (ref 0–8)
ERYTHROCYTE [DISTWIDTH] IN BLOOD BY AUTOMATED COUNT: 18.5 % (ref 11.5–14.5)
EST. GFR  (NO RACE VARIABLE): >60 ML/MIN/1.73 M^2
GLUCOSE SERPL-MCNC: 123 MG/DL (ref 70–110)
HCT VFR BLD AUTO: 37.5 % (ref 37–48.5)
HGB BLD-MCNC: 11.9 G/DL (ref 12–16)
IMM GRANULOCYTES # BLD AUTO: 0.37 K/UL (ref 0–0.04)
IMM GRANULOCYTES NFR BLD AUTO: 3.2 % (ref 0–0.5)
LDH SERPL L TO P-CCNC: 210 U/L (ref 110–260)
LYMPHOCYTES # BLD AUTO: 1.6 K/UL (ref 1–4.8)
LYMPHOCYTES NFR BLD: 14 % (ref 18–48)
MAGNESIUM SERPL-MCNC: 1.8 MG/DL (ref 1.6–2.6)
MCH RBC QN AUTO: 30.7 PG (ref 27–31)
MCHC RBC AUTO-ENTMCNC: 31.7 G/DL (ref 32–36)
MCV RBC AUTO: 97 FL (ref 82–98)
MONOCYTES # BLD AUTO: 0.8 K/UL (ref 0.3–1)
MONOCYTES NFR BLD: 7.1 % (ref 4–15)
NEUTROPHILS # BLD AUTO: 8.5 K/UL (ref 1.8–7.7)
NEUTROPHILS NFR BLD: 74.4 % (ref 38–73)
NRBC BLD-RTO: 0 /100 WBC
PHOSPHATE SERPL-MCNC: 4.1 MG/DL (ref 2.7–4.5)
PLATELET # BLD AUTO: 203 K/UL (ref 150–450)
PMV BLD AUTO: 8.8 FL (ref 9.2–12.9)
POTASSIUM SERPL-SCNC: 3.8 MMOL/L (ref 3.5–5.1)
PROT SERPL-MCNC: 7.3 G/DL (ref 6–8.4)
RBC # BLD AUTO: 3.87 M/UL (ref 4–5.4)
SODIUM SERPL-SCNC: 141 MMOL/L (ref 136–145)
URATE SERPL-MCNC: 6.4 MG/DL (ref 2.4–5.7)
WBC # BLD AUTO: 11.46 K/UL (ref 3.9–12.7)

## 2024-10-10 PROCEDURE — 84550 ASSAY OF BLOOD/URIC ACID: CPT | Mod: PN | Performed by: INTERNAL MEDICINE

## 2024-10-10 PROCEDURE — 83615 LACTATE (LD) (LDH) ENZYME: CPT | Mod: PN | Performed by: INTERNAL MEDICINE

## 2024-10-10 PROCEDURE — 84100 ASSAY OF PHOSPHORUS: CPT | Mod: PN | Performed by: INTERNAL MEDICINE

## 2024-10-10 PROCEDURE — 83735 ASSAY OF MAGNESIUM: CPT | Mod: PN | Performed by: INTERNAL MEDICINE

## 2024-10-10 PROCEDURE — 80053 COMPREHEN METABOLIC PANEL: CPT | Mod: PN | Performed by: INTERNAL MEDICINE

## 2024-10-10 PROCEDURE — 85025 COMPLETE CBC W/AUTO DIFF WBC: CPT | Mod: PN | Performed by: INTERNAL MEDICINE

## 2024-10-17 RX ORDER — OXYBUTYNIN CHLORIDE 15 MG/1
15 TABLET, EXTENDED RELEASE ORAL
Qty: 90 TABLET | Refills: 3 | Status: SHIPPED | OUTPATIENT
Start: 2024-10-17

## 2024-10-21 ENCOUNTER — OFFICE VISIT (OUTPATIENT)
Dept: PODIATRY | Facility: CLINIC | Age: 79
End: 2024-10-21
Payer: MEDICARE

## 2024-10-21 VITALS — HEIGHT: 62 IN | BODY MASS INDEX: 23.57 KG/M2 | WEIGHT: 128.06 LBS

## 2024-10-21 DIAGNOSIS — G57.93 NEUROPATHY OF BOTH FEET: Chronic | ICD-10-CM

## 2024-10-21 DIAGNOSIS — R63.4 WEIGHT LOSS: ICD-10-CM

## 2024-10-21 DIAGNOSIS — Z76.89 ENCOUNTER FOR NAIL CARE: ICD-10-CM

## 2024-10-21 PROCEDURE — 99214 OFFICE O/P EST MOD 30 MIN: CPT | Mod: PBBFAC,PO | Performed by: PODIATRIST

## 2024-10-21 PROCEDURE — 99999 PR PBB SHADOW E&M-EST. PATIENT-LVL IV: CPT | Mod: PBBFAC,,, | Performed by: PODIATRIST

## 2024-10-21 RX ORDER — CYPROHEPTADINE HYDROCHLORIDE 4 MG/1
4 TABLET ORAL 3 TIMES DAILY PRN
Qty: 30 TABLET | Refills: 3 | Status: SHIPPED | OUTPATIENT
Start: 2024-10-21

## 2024-10-21 NOTE — TELEPHONE ENCOUNTER
Chief complaint: Follow up MRI Rt shoulder    History of Present Illness  This a pleasant 24-year-old right-hand-dominant male who comes in for follow-up of his MRI of his right shoulder.  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  Patient completed his MRI patient states he still having pain in the shoulder he has been wearing a sling.  Patient reports his pain is a 5/10 today.        Exam: Deferred    MRI results:  FINDINGS: Motion artifact moderately degrades image quality.     ROTATOR CUFF: Tendinopathy and partial-thickness tears of the supraspinatus  tendon. About 10% of the tendon is involved. The infraspinatus tendon  demonstrates a rim rent tear at the insertional fibers, best seen on series  9 image 16 and series 7 image 24, measuring about 0.6 cm AP by 0.4 cm  transverse dimension. This involves about 20% of the tendon thickness.  Teres minor tendon is intact. Subscapularis tendon is intact. Normal     AC JOINT: Marked AC joint hypertrophic arthropathy. Associated moderate  mass effect upon the underlying supraspinatus. Mild fluid at the  acromioclavicular joint. Coracoclavicular ligament is intact. The acromion  is laterally downsloping, type III.     BICEPS TENDON: LHBT is intact and normally situated within the bicipital  groove.     LABRUM: There is subtle irregularity identified along the superior labrum,  best seen on series 10 image 15, concerning for a separation type tear. The  remainder of the labrum appears intact     GLENOHUMERAL JOINT: Cartilaginous surfaces are grossly intact.     FLUID: Intra-articular contrast is noted in the glenohumeral joint.  Significant contrast has extravasated along the undersurface of the  subscapularis muscle.     OSSEOUS: No significant marrow signal abnormality. Minimal/mild subcortical  cyst formation deep to the rotator cuff insertion.        IMPRESSION:  1.  No care due was identified.  [facility] Embedded Care Due Messages. Reference number: [id number].   10/21/2024 9:44:58 AM CDT   Subtle partial-thickness tears of the supraspinatus and infraspinatus  tendons.  2. Minimal separation type tear noted along the superior labrum.  3. Additional details above.       Assessment  Right shoulder pain  Labral tear, right shoulder    Plan  Patient will be referred to Dr. Hermosillo for evaluation female and possible surgical intervention.    Patient vies continues to sling as needed    Patient advised no heavy lifting with the right arm    Patient advised to call for any other issues

## 2024-10-21 NOTE — TELEPHONE ENCOUNTER
Refill Routing Note   Medication(s) are not appropriate for processing by [facility] for the following reason(s):        Outside of protocol    [facility] action(s):  Route             Appointments  past 12m or future 3m with PCP    Date Provider   Last Visit   7/17/2024 Savana Anderson MD   Next Visit   Visit date not found Savana Anderson MD   ED visits in past 90 days: 0        Note composed:3:49 PM 10/21/2024

## 2024-10-22 ENCOUNTER — PATIENT MESSAGE (OUTPATIENT)
Dept: UROLOGY | Facility: CLINIC | Age: 79
End: 2024-10-22
Payer: MEDICARE

## 2024-10-22 DIAGNOSIS — N39.0 RECURRENT UTI: ICD-10-CM

## 2024-10-22 DIAGNOSIS — N32.9 LESION OF BLADDER: Primary | ICD-10-CM

## 2024-10-24 ENCOUNTER — EXTERNAL HOME HEALTH (OUTPATIENT)
Dept: HOME HEALTH SERVICES | Facility: HOSPITAL | Age: 79
End: 2024-10-24
Payer: MEDICARE

## 2024-10-26 DIAGNOSIS — E03.9 HYPOTHYROIDISM, UNSPECIFIED TYPE: ICD-10-CM

## 2024-10-27 RX ORDER — LEVOTHYROXINE SODIUM 50 UG/1
50 TABLET ORAL
Qty: 90 TABLET | Refills: 0 | Status: SHIPPED | OUTPATIENT
Start: 2024-10-27

## 2024-10-29 ENCOUNTER — PATIENT MESSAGE (OUTPATIENT)
Dept: INTERNAL MEDICINE | Facility: CLINIC | Age: 79
End: 2024-10-29
Payer: MEDICARE

## 2024-10-29 DIAGNOSIS — S22.39XS CLOSED FRACTURE OF ONE RIB, UNSPECIFIED LATERALITY, SEQUELA: ICD-10-CM

## 2024-10-29 DIAGNOSIS — M54.30 SCIATICA, UNSPECIFIED LATERALITY: ICD-10-CM

## 2024-10-29 DIAGNOSIS — S32.020D COMPRESSION FRACTURE OF L2 VERTEBRA WITH ROUTINE HEALING, SUBSEQUENT ENCOUNTER: ICD-10-CM

## 2024-10-29 RX ORDER — TRAMADOL HYDROCHLORIDE 50 MG/1
TABLET ORAL
Qty: 60 EACH | Refills: 0 | Status: SHIPPED | OUTPATIENT
Start: 2024-10-29

## 2024-10-30 ENCOUNTER — OFFICE VISIT (OUTPATIENT)
Dept: UROLOGY | Facility: CLINIC | Age: 79
End: 2024-10-30
Payer: MEDICARE

## 2024-10-30 VITALS — WEIGHT: 125 LBS | HEIGHT: 62 IN | BODY MASS INDEX: 23 KG/M2

## 2024-10-30 DIAGNOSIS — N32.9 LESION OF BLADDER: ICD-10-CM

## 2024-10-30 DIAGNOSIS — N39.0 RECURRENT UTI: ICD-10-CM

## 2024-10-30 PROCEDURE — 99214 OFFICE O/P EST MOD 30 MIN: CPT | Mod: S$PBB,,,

## 2024-10-30 PROCEDURE — 99999 PR PBB SHADOW E&M-EST. PATIENT-LVL IV: CPT | Mod: PBBFAC,,,

## 2024-10-30 PROCEDURE — 99214 OFFICE O/P EST MOD 30 MIN: CPT | Mod: PBBFAC,PO

## 2024-10-30 RX ORDER — ESTRADIOL 0.1 MG/G
CREAM VAGINAL
Qty: 42.5 G | Refills: 3 | Status: SHIPPED | OUTPATIENT
Start: 2024-10-30

## 2024-10-31 ENCOUNTER — OFFICE VISIT (OUTPATIENT)
Facility: CLINIC | Age: 79
End: 2024-10-31
Payer: MEDICARE

## 2024-10-31 DIAGNOSIS — E03.9 ACQUIRED HYPOTHYROIDISM: ICD-10-CM

## 2024-10-31 DIAGNOSIS — M80.00XA AGE-RELATED OSTEOPOROSIS WITH CURRENT PATHOLOGICAL FRACTURE, INITIAL ENCOUNTER: ICD-10-CM

## 2024-10-31 DIAGNOSIS — S32.020S COMPRESSION FRACTURE OF L2 VERTEBRA, SEQUELA: ICD-10-CM

## 2024-10-31 DIAGNOSIS — D46.9 MDS (MYELODYSPLASTIC SYNDROME): Primary | Chronic | ICD-10-CM

## 2024-10-31 PROCEDURE — G2211 COMPLEX E/M VISIT ADD ON: HCPCS | Mod: 95,,, | Performed by: STUDENT IN AN ORGANIZED HEALTH CARE EDUCATION/TRAINING PROGRAM

## 2024-10-31 PROCEDURE — 99204 OFFICE O/P NEW MOD 45 MIN: CPT | Mod: 95,,, | Performed by: STUDENT IN AN ORGANIZED HEALTH CARE EDUCATION/TRAINING PROGRAM

## 2024-11-04 ENCOUNTER — PATIENT MESSAGE (OUTPATIENT)
Dept: INTERNAL MEDICINE | Facility: CLINIC | Age: 79
End: 2024-11-04
Payer: MEDICARE

## 2024-11-04 DIAGNOSIS — R30.0 DYSURIA: Primary | ICD-10-CM

## 2024-11-04 NOTE — TELEPHONE ENCOUNTER
Orders signed     Please arrange     Instructions:   1. Wash hands with soap and warm water.   2. Spread the labia (folds of skin) apart with 1 hand and wipe with the towelette provided.  Wipe from front to back.  3.  Continue holding the labia apart. As you start to urinate, allow a small amount of urine to fall into the toilet bowl.  This clears the urethra of contaminants.  Do not touch the inside of the cup.  4.  After the urine stream is well-established, urinate into the cup.  Once an adequate amount of urine fills the cup (the cup only needs to be half full), removed the cup from the urine stream.  5.  Pass the remaining urine into the toilet.  6.  Screw the lid on the cup tightly.  Do not touch the inside of the cup or lid.  Please bring the specimen to the lab.

## 2024-11-05 ENCOUNTER — LAB VISIT (OUTPATIENT)
Dept: LAB | Facility: HOSPITAL | Age: 79
End: 2024-11-05
Attending: INTERNAL MEDICINE
Payer: MEDICARE

## 2024-11-05 DIAGNOSIS — R30.0 DYSURIA: ICD-10-CM

## 2024-11-05 LAB
BACTERIA #/AREA URNS HPF: ABNORMAL /HPF
BILIRUB UR QL STRIP: NEGATIVE
CLARITY UR: CLEAR
COLOR UR: YELLOW
GLUCOSE UR QL STRIP: NEGATIVE
HGB UR QL STRIP: ABNORMAL
KETONES UR QL STRIP: ABNORMAL
LEUKOCYTE ESTERASE UR QL STRIP: ABNORMAL
MICROSCOPIC COMMENT: ABNORMAL
NITRITE UR QL STRIP: NEGATIVE
PH UR STRIP: 7 [PH] (ref 5–8)
PROT UR QL STRIP: ABNORMAL
RBC #/AREA URNS HPF: 3 /HPF (ref 0–4)
SP GR UR STRIP: 1.02 (ref 1–1.03)
SQUAMOUS #/AREA URNS HPF: 9 /HPF
URN SPEC COLLECT METH UR: ABNORMAL
WBC #/AREA URNS HPF: 2 /HPF (ref 0–5)

## 2024-11-05 PROCEDURE — 81000 URINALYSIS NONAUTO W/SCOPE: CPT | Mod: PN | Performed by: INTERNAL MEDICINE

## 2024-11-05 PROCEDURE — 87086 URINE CULTURE/COLONY COUNT: CPT | Performed by: INTERNAL MEDICINE

## 2024-11-05 PROCEDURE — 87088 URINE BACTERIA CULTURE: CPT | Performed by: INTERNAL MEDICINE

## 2024-11-06 ENCOUNTER — OFFICE VISIT (OUTPATIENT)
Dept: URGENT CARE | Facility: CLINIC | Age: 79
End: 2024-11-06
Payer: MEDICARE

## 2024-11-06 ENCOUNTER — PATIENT MESSAGE (OUTPATIENT)
Dept: UROLOGY | Facility: CLINIC | Age: 79
End: 2024-11-06
Payer: MEDICARE

## 2024-11-06 VITALS
DIASTOLIC BLOOD PRESSURE: 63 MMHG | OXYGEN SATURATION: 93 % | BODY MASS INDEX: 23 KG/M2 | WEIGHT: 125 LBS | HEART RATE: 76 BPM | TEMPERATURE: 98 F | HEIGHT: 62 IN | SYSTOLIC BLOOD PRESSURE: 102 MMHG | RESPIRATION RATE: 12 BRPM

## 2024-11-06 DIAGNOSIS — N30.00 ACUTE CYSTITIS WITHOUT HEMATURIA: Primary | ICD-10-CM

## 2024-11-06 DIAGNOSIS — R05.9 COUGH, UNSPECIFIED TYPE: ICD-10-CM

## 2024-11-06 DIAGNOSIS — Z20.828 EXPOSURE TO THE FLU: ICD-10-CM

## 2024-11-06 PROCEDURE — 99214 OFFICE O/P EST MOD 30 MIN: CPT | Mod: S$GLB,,, | Performed by: FAMILY MEDICINE

## 2024-11-06 RX ORDER — BALOXAVIR MARBOXIL 40 MG/1
40 TABLET, FILM COATED ORAL ONCE
Qty: 1 TABLET | Refills: 0 | Status: SHIPPED | OUTPATIENT
Start: 2024-11-06 | End: 2024-11-06

## 2024-11-06 RX ORDER — OSELTAMIVIR PHOSPHATE 75 MG/1
75 CAPSULE ORAL 2 TIMES DAILY
Qty: 10 CAPSULE | Refills: 0 | Status: SHIPPED | OUTPATIENT
Start: 2024-11-06 | End: 2024-11-11

## 2024-11-06 NOTE — PROGRESS NOTES
"Subjective:      Patient ID: Niurka Pedraza is a 79 y.o. female.    Vitals:  height is 5' 2" (1.575 m) and weight is 56.7 kg (125 lb). Her oral temperature is 98.2 °F (36.8 °C). Her blood pressure is 102/63 and her pulse is 76. Her respiration is 12 and oxygen saturation is 93% (abnormal).     Chief Complaint: Cough    Patient reports to the Urgent Care with Productive Cough and Nasal congestion for 1 day. Patient's daughter stated her Father had the Flu. Patient has Insomnia and Pulmonary Fibrosis. Robitussin and Expectorants have been given but minimal relief    Cough  This is a new problem. The current episode started yesterday. The problem has been unchanged. The problem occurs constantly. The cough is Productive of purulent sputum. Associated symptoms include nasal congestion. Pertinent negatives include no chest pain, chills, ear congestion, ear pain, fever, headaches, heartburn, hemoptysis, myalgias, postnasal drip, rash, rhinorrhea, sore throat, shortness of breath, sweats, weight loss or wheezing. The symptoms are aggravated by lying down. She has tried OTC cough suppressant for the symptoms. The treatment provided mild relief. Her past medical history is significant for bronchitis and pneumonia. There is no history of asthma, bronchiectasis, COPD, emphysema or environmental allergies.       Constitution: Negative for chills and fever.   HENT:  Negative for ear pain, postnasal drip and sore throat.    Cardiovascular:  Negative for chest pain.   Respiratory:  Positive for cough. Negative for bloody sputum, shortness of breath and wheezing.    Gastrointestinal:  Negative for heartburn.   Musculoskeletal:  Negative for muscle ache.   Skin:  Negative for rash.   Allergic/Immunologic: Negative for environmental allergies.   Neurological:  Negative for headaches.      Objective:     Physical Exam    Physical Exam  Vitals signs and nursing note reviewed.   Constitutional:       Appearance: Pt is well-developed. " Alert, NAD.  Pt is cooperative.  Non-toxic appearance.  HENT:      Head: Normocephalic and atraumatic. .      Right Ear: External ear normal.      Left Ear: External ear normal.   Eyes:      General: Lids are normal.      Conjunctiva/sclera: Conjunctivae normal. Visual tracking is normal. Right eye exhibits no exudate. Left eye exhibits no exudate. No scleral icterus.     Pupils: Pupils are equal, round  Neck:      Musculoskeletal: range of motion without pain and neck supple.      Trachea: Trachea and phonation normal.   Throat: No apparent pharyngeal edema or swelling no tonsil swelling or asymmetry  Cardiovascular:      Rate and Rhythm: Normal Rhythm. Extremities well perfused.   Pulmonary:      Effort: Pulmonary effort is normal. No respiratory distress. NO stridor or difficulty breathing     Breath sounds: Normal breath sounds.   Abdomen: NO obvious distention.  Musculoskeletal: Normal range of motion. No ambulation issues  Skin:     General: Skin is warm and dry. No open wounds or abrasions. No petechiae No cyanosis  no jaundice not diaphoretic, not pale, not purpuric  Neurological:      Mental Status:Pt is alert and oriented to person, place, and time.   Psychiatric:         Speech: Speech normal.         Behavior: Behavior normal.         Thought Content: Thought content normal.         Judgment: Judgment normal.       Assessment:     1. Acute cystitis without hematuria    2. Cough, unspecified type    3. Exposure to the flu        Plan:       Acute cystitis without hematuria    Cough, unspecified type    Exposure to the flu    Other orders  -     baloxavir marboxiL (XOFLUZA) 40 mg tablet; Take 1 tablet (40 mg total) by mouth once. for 1 dose  Dispense: 1 tablet; Refill: 0  -     oseltamivir (TAMIFLU) 75 MG capsule; Take 1 capsule (75 mg total) by mouth 2 (two) times daily. for 5 days  Dispense: 10 capsule; Refill: 0

## 2024-11-07 DIAGNOSIS — R09.02 HYPOXEMIA: ICD-10-CM

## 2024-11-07 DIAGNOSIS — R06.02 SOB (SHORTNESS OF BREATH): ICD-10-CM

## 2024-11-07 DIAGNOSIS — J84.10 PULMONARY FIBROSIS: ICD-10-CM

## 2024-11-07 LAB — BACTERIA UR CULT: ABNORMAL

## 2024-11-07 RX ORDER — IPRATROPIUM BROMIDE AND ALBUTEROL SULFATE 2.5; .5 MG/3ML; MG/3ML
3 SOLUTION RESPIRATORY (INHALATION) EVERY 6 HOURS PRN
Qty: 75 ML | Refills: 11 | Status: SHIPPED | OUTPATIENT
Start: 2024-11-07 | End: 2025-11-07

## 2024-11-07 NOTE — TELEPHONE ENCOUNTER
No care due was identified.  [facility] Embedded Care Due Messages. Reference number: [id number].   11/07/2024 7:37:32 AM CST

## 2024-11-08 ENCOUNTER — OFFICE VISIT (OUTPATIENT)
Dept: HEMATOLOGY/ONCOLOGY | Facility: CLINIC | Age: 79
End: 2024-11-08
Payer: MEDICARE

## 2024-11-08 DIAGNOSIS — D63.0 ANEMIA IN NEOPLASTIC DISEASE: ICD-10-CM

## 2024-11-08 DIAGNOSIS — D46.9 MDS (MYELODYSPLASTIC SYNDROME): Primary | ICD-10-CM

## 2024-11-08 PROCEDURE — 99214 OFFICE O/P EST MOD 30 MIN: CPT | Mod: 95,,, | Performed by: INTERNAL MEDICINE

## 2024-11-08 NOTE — PROGRESS NOTES
Route Chart for Scheduling    BMT Chart Routing      Follow up with physician 3 months. May offer virtual visit   Follow up with [facility]    Provider visit type Malignant hem   Infusion scheduling note    Injection scheduling note    Labs CBC, CMP, magnesium, phosphorus, LDH and uric acid   Scheduling:  Preferred lab:  Lab interval:  labs 1-2 days before visit   Imaging    Pharmacy appointment    Other referrals

## 2024-11-16 DIAGNOSIS — E78.49 OTHER HYPERLIPIDEMIA: Primary | ICD-10-CM

## 2024-11-16 NOTE — TELEPHONE ENCOUNTER
No care due was identified.  [facility] Embedded Care Due Messages. Reference number: [id number].   11/16/2024 8:30:00 AM CST

## 2024-11-18 RX ORDER — ATORVASTATIN CALCIUM 20 MG/1
20 TABLET, FILM COATED ORAL DAILY
Qty: 90 TABLET | Refills: 0 | Status: SHIPPED | OUTPATIENT
Start: 2024-11-18

## 2024-11-18 NOTE — TELEPHONE ENCOUNTER
Refill Routing Note   Medication(s) are not appropriate for processing by [facility] for the following reason(s):        Required labs outdated    [facility] action(s):  Defer               Appointments  past 12m or future 3m with PCP    Date Provider   Last Visit   7/17/2024 Savana Anderson MD   Next Visit   1/23/2025 Savana Anderson MD   ED visits in past 90 days: 0        Note composed:11:18 AM 11/18/2024

## 2024-11-18 NOTE — PROGRESS NOTES
HEMATOLOGIC MALIGNANCIES PROGRESS NOTE    IDENTIFYING STATEMENT   Niurka Castellon) is a 79 y.o. female with a  of 1945 from [place], [place] with the diagnosis of MDS.      TELEMEDICINE DOCUMENTATION    The patient location is: [place]  The chief complaint leading to consultation is: MDS    Visit type: audiovisual    Face to Face time with patient: 7 minutes  25 minutes of total time spent on the encounter, which includes face to face time and non-face to face time preparing to see the patient (eg, review of tests), Obtaining and/or reviewing separately obtained history, Documenting clinical information in the electronic or other health record, Independently interpreting results (not separately reported) and communicating results to the patient/family/caregiver, or Care coordination (not separately reported).         Each patient to whom he or she provides medical services by telemedicine is:  (1) informed of the relationship between the physician and patient and the respective role of any other health care provider with respect to management of the patient; and (2) notified that he or she may decline to receive medical services by telemedicine and may withdraw from such care at any time.    Notes:       ONCOLOGY HISTORY:    1. Myelodysplastic syndrome with increased blasts-2              A. 2023: Bone marrow biopsy - 65-75% cellular marrow consistent with myelodysplastic syndrome with excess blasts-2; cytogenetics 46,XX,del(3)(q12)[2]/47,sl,+8[18]; NGS not sent; IPSS-R score of 7 = very high risk   B. 2023: Cycle 1 azacitidine-venetoclax (5 days aza, 14 days angelo) for MDS/AML   C. 10/18/2023: Bone marrow biopsy - 50-60% cellular marrow with no increased blasts and trilineage hematopoiesis with granulocytic hypoplasia and moderate dysgranulopoiesis, mildly left-shifted marked erythroid hyperplasia with severe dyserythropoiesis, and marked megakaryocytic hyperplasia with predominantly variably  sized including small del3q-like monolobated forms; 3-4+ histiocytic iron stores; focal MF-1; cytogenetics 46,XX[20]; NGS shows ASXL1 (26%), SRSF2 (33%) and STAG2 (3%).     2. Neuropathy  3. Interstitial lung disease/pulmonary fibrosis  4. Hypertension  5. Aortic atherosclerosis  6. Pulmonary coccidioidomycosis  7. Hypothyroidism  8. Hyperparathyroidism    INTERVAL HISTORY:      Ms. Pedraza is seen in this virtual visit in follow-up of MDS/AML.    She is continuing to struggle with urinary symptoms and UTIs. She has not been hospitalized since last visit. She is chronically fatigued.     Past Medical History, Past Social History and Past Family History have been reviewed and are unchanged except as noted in the interval history.    MEDICATIONS:     Current Outpatient Medications on File Prior to Visit   Medication Sig Dispense Refill    acetaminophen (TYLENOL) 325 MG tablet Take 2 tablets (650 mg total) by mouth every 6 (six) hours as needed for Pain.      acyclovir (ZOVIRAX) 400 MG tablet Take 1 tablet (400 mg total) by mouth 2 (two) times daily. 60 tablet 11    albuterol-ipratropium (DUO-NEB) 2.5 mg-0.5 mg/3 mL nebulizer solution Take 3 mLs by nebulization every 6 (six) hours as needed for Wheezing or Shortness of Breath. Rescue 75 mL 11    [] amoxicillin (AMOXIL) 500 MG Tab Take 1 tablet (500 mg total) by mouth every 8 (eight) hours. for 10 days 30 tablet 0    ascorbic acid/zinc (ZINC WITH VITAMIN C ORAL) Take 1 tablet by mouth Daily.      atorvastatin (LIPITOR) 20 MG tablet Take 1 tablet (20 mg total) by mouth once daily. 90 tablet 3    azelastine (ASTELIN) 137 mcg (0.1 %) nasal spray 1 spray (137 mcg total) by Nasal route 2 (two) times daily. 30 mL 6    bisacodyL (DULCOLAX) 10 mg Supp Place 1 suppository (10 mg total) rectally daily as needed (constipation).      calcium-vitamin D3 (CALCIUM 500 + D) 500 mg(1,250mg) -200 unit per tablet Take 1 tablet by mouth 2 (two) times daily with meals.      carvediloL  (COREG) 25 MG tablet TAKE 1 TABLET BY MOUTH TWICE A DAY WITH FOOD 180 tablet 1    cyproheptadine (PERIACTIN) 4 mg tablet Take 1 tablet (4 mg total) by mouth 3 (three) times daily as needed (decreased appetite). 30 tablet 3    docusate sodium (COLACE) 100 MG capsule Take 1 capsule (100 mg total) by mouth 2 (two) times daily. 60 capsule 0    estradioL (ESTRACE) 0.01 % (0.1 mg/gram) vaginal cream Place 1 fingertip of cream first at urethral opening and then external vagina. Please do not use plastic applicator 42.5 g 3    ferrous gluconate (FERGON) 324 MG tablet Take 1 tablet (324 mg total) by mouth every other day. 90 tablet 3    levothyroxine (SYNTHROID) 50 MCG tablet TAKE 1 TABLET BY MOUTH BEFORE BREAKFAST. 90 tablet 0    mirtazapine (REMERON) 7.5 MG Tab Take 1 tablet (7.5 mg total) by mouth every evening. 90 tablet 3    multivitamin (THERAGRAN) per tablet Take 1 tablet by mouth once daily.      naloxone (NARCAN) 4 mg/actuation Spry 4mg by nasal route as needed for opioid overdose; may repeat every 2-3 minutes in alternating nostrils until medical help arrives. Call 911 1 each 11    ondansetron (ZOFRAN-ODT) 4 MG TbDL Take 1 tablet (4 mg total) by mouth every 6 (six) hours as needed (nausea). 30 tablet 0    pantoprazole (PROTONIX) 20 MG tablet Take 1 tablet (20 mg total) by mouth once daily. 90 tablet 1    polyethylene glycol (GLYCOLAX) 17 gram PwPk Take 17 g by mouth once daily. 30 packet 0    potassium chloride (K-TAB) 20 mEq Take 20 mEq by mouth.      predniSONE (DELTASONE) 1 MG tablet Take 2 tablets (2 mg total) by mouth once daily. 180 tablet 3    sodium chloride 3% 3 % nebulizer solution Take 4 mLs by nebulization as needed for Other. 120 mL 3    traMADoL (ULTRAM) 50 mg tablet Take 1-2 tabs by mouth every 8 hours as needed for pain 60 each 0    vibegron (GEMTESA) 75 mg Tab Take 1 tablet (75 mg total) by mouth once daily. 30 tablet 11    [DISCONTINUED] budesonide-formoterol 80-4.5 mcg (SYMBICORT) 80-4.5  mcg/actuation HFAA Inhale 2 puffs into the lungs 2 (two) times daily as needed. Controller 1 Inhaler 6     No current facility-administered medications on file prior to visit.       ALLERGIES:   Review of patient's allergies indicates:   Allergen Reactions    Ciprofloxacin Other (See Comments)     Right foot pain and edema, tendonitis    Amlodipine Edema and Swelling     BLE  BLE    Lisinopril Other (See Comments)     cough    Nitrofuran analogues         ROS:       Review of Systems   Constitutional:  Positive for appetite change. Negative for unexpected weight change.   HENT:   Negative for mouth sores.    Respiratory:  Negative for cough and shortness of breath.    Cardiovascular:  Negative for chest pain.   Gastrointestinal:  Negative for abdominal pain and diarrhea.   Genitourinary:  Positive for frequency.    Musculoskeletal:  Positive for back pain.   Skin:  Negative for rash.   Neurological:  Negative for headaches.   Hematological:  Negative for adenopathy.   Psychiatric/Behavioral:  The patient is not nervous/anxious.        PHYSICAL EXAM:  There were no vitals filed for this visit.    Physical Exam  Head and neck visualized. No abnormalities seen.     LAB:   Results for orders placed or performed in visit on 11/05/24   CBC Auto Differential    Collection Time: 11/05/24  3:42 PM   Result Value Ref Range    WBC 17.55 (H) 3.90 - 12.70 K/uL    RBC 3.83 (L) 4.00 - 5.40 M/uL    Hemoglobin 11.9 (L) 12.0 - 16.0 g/dL    Hematocrit 37.0 37.0 - 48.5 %    MCV 97 82 - 98 fL    MCH 31.1 (H) 27.0 - 31.0 pg    MCHC 32.2 32.0 - 36.0 g/dL    RDW 17.6 (H) 11.5 - 14.5 %    Platelets 208 150 - 450 K/uL    MPV 9.0 (L) 9.2 - 12.9 fL    Immature Granulocytes 3.1 (H) 0.0 - 0.5 %    Gran # (ANC) 13.3 (H) 1.8 - 7.7 K/uL    Immature Grans (Abs) 0.54 (H) 0.00 - 0.04 K/uL    Lymph # 1.7 1.0 - 4.8 K/uL    Mono # 1.8 (H) 0.3 - 1.0 K/uL    Eos # 0.0 0.0 - 0.5 K/uL    Baso # 0.16 0.00 - 0.20 K/uL    nRBC 0 0 /100 WBC    Gran % 75.9 (H)  38.0 - 73.0 %    Lymph % 9.7 (L) 18.0 - 48.0 %    Mono % 10.3 4.0 - 15.0 %    Eosinophil % 0.1 0.0 - 8.0 %    Basophil % 0.9 0.0 - 1.9 %    Differential Method Automated    Comprehensive Metabolic Panel    Collection Time: 11/05/24  3:42 PM   Result Value Ref Range    Sodium 138 136 - 145 mmol/L    Potassium 4.4 3.5 - 5.1 mmol/L    Chloride 105 95 - 110 mmol/L    CO2 21 (L) 23 - 29 mmol/L    Glucose 109 70 - 110 mg/dL    BUN 17 8 - 23 mg/dL    Creatinine 0.8 0.5 - 1.4 mg/dL    Calcium 9.1 8.7 - 10.5 mg/dL    Total Protein 7.5 6.0 - 8.4 g/dL    Albumin 3.6 3.5 - 5.2 g/dL    Total Bilirubin 0.6 0.1 - 1.0 mg/dL    Alkaline Phosphatase 209 (H) 40 - 150 U/L    AST 27 10 - 40 U/L    ALT 26 10 - 44 U/L    eGFR >60.0 >60 mL/min/1.73 m^2    Anion Gap 12 8 - 16 mmol/L   Magnesium    Collection Time: 11/05/24  3:42 PM   Result Value Ref Range    Magnesium 2.0 1.6 - 2.6 mg/dL   PHOSPHORUS    Collection Time: 11/05/24  3:42 PM   Result Value Ref Range    Phosphorus 3.7 2.7 - 4.5 mg/dL   Lactate Dehydrogenase    Collection Time: 11/05/24  3:42 PM   Result Value Ref Range     (H) 110 - 260 U/L   Uric Acid    Collection Time: 11/05/24  3:42 PM   Result Value Ref Range    Uric Acid 5.6 2.4 - 5.7 mg/dL     *Note: Due to a large number of results and/or encounters for the requested time period, some results have not been displayed. A complete set of results can be found in Results Review.       PROBLEMS ASSESSED THIS VISIT:    1. MDS (myelodysplastic syndrome)    2. Anemia in neoplastic disease        PLAN:       Myelodysplastic syndrome  Ms. 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 We reviewed that this is an aggressive hematologic malignancy with high probability of evolution to acute myeloid leukemia (AML). According to the [facility] pathologic designation, this is referred to as MDS/AML.     Bone marrow biopsy after 1 cycle of aza-angelo showed resolution of increased blasts with ongoing morphologic changes consistent with MDS. We reviewed that this  represents a favorable response to therapy. We therefore continued aza-angelo therapy, and she completed 6 cycles. Therapy was interrupted due to infectious complications with diverticulitis, appendicitis, and hospitalization.     Bone marrow biopsy on 4/10/2024 demonstrated 40-50% cellular marrow with dysplastic changes but no increase in blasts. ASXL1, JAK2, SRSF2 mutations were detected. No increase in blasts.     She presently has only mild anemia but no significant leukopenia or thrombocytopenia. Based on her worsened performance status, complicated hospitalizations, and reduced performance status, we discussed that we will forgo additional therapy at this time and monitor her disease. We can discuss management if she develops worsening cytopenias or progression towards AML.     Continue infection prophylaxis with acyclovir.     Anemia   Due to MDS/AML. Mild. Continue to monitor.     Follow-up  3 months     Mohit Centeno MD  Hematology and Stem Cell Transplant    Visit today included increased complexity associated with the care of the episodic problem MDS addressed and managing the longitudinal care of the patient due to the serious and/or complex managed problem(s) MDS.    Over 40 minutes spent in patient care on the day of this encounter.

## 2024-11-18 NOTE — TELEPHONE ENCOUNTER
Appointment Information   Name: NEETA PINTO MRN: [id number]   Date: 1/23/2025 Status: Lydia   Appt Time: 12:00 PM Length: 30   Visit Type: EP - PRIMARY CARE ([facility]) [479]     Provider: Savana Anderson MD Dept: [facility], Internal Medicine       Dept Address: [address] [place] [id number]       Dept Phone Number: [phone]       Spoke to Daughter of  an scheduled annual appointment for pt.    Daughter states mother is very elderly and may have a hard time getting here.    Daughter of Pt wants to know if it is possible to do annual virtually.

## 2024-11-18 NOTE — TELEPHONE ENCOUNTER
Refill Encounter    PCP Visits: Recent Visits  Date Type Provider Dept   07/17/24 Office Visit Savana Anderson MD [facility] Internal Medicine   05/16/24 Office Visit Savana Anderson MD [facility] Internal Medicine   03/22/24 Office Visit Savana Anderson MD [facility] Internal Medicine   02/15/24 Office Visit Savana Anderson MD [facility] Internal Medicine   01/02/24 Office Visit Savana Anderson MD [facility] Internal Medicine   Showing recent visits within past 360 days and meeting all other requirements  Future Appointments  Date Type Provider Dept   01/23/25 Appointment Savana Anderson MD [facility] Internal Medicine   Showing future appointments within next 720 days and meeting all other requirements     Last 3 Blood Pressure:   BP Readings from Last 3 Encounters:   11/06/24 102/63   07/17/24 110/60   07/10/24 122/84     Preferred Pharmacy:   [facility]/pharmacy #[id number] - [place] [id number] [place] [address]  [address] [address]  Phone: [phone] Fax: [phone]    Requested RX:  Requested Prescriptions     Pending Prescriptions Disp Refills    atorvastatin (LIPITOR) 20 MG tablet 90 tablet 3     Sig: Take 1 tablet (20 mg total) by mouth once daily.      RX Route: Normal

## 2024-11-27 ENCOUNTER — TELEPHONE (OUTPATIENT)
Dept: INTERNAL MEDICINE | Facility: CLINIC | Age: 79
End: 2024-11-27
Payer: MEDICARE

## 2024-11-27 ENCOUNTER — PATIENT MESSAGE (OUTPATIENT)
Dept: INTERNAL MEDICINE | Facility: CLINIC | Age: 79
End: 2024-11-27
Payer: MEDICARE

## 2024-11-27 DIAGNOSIS — N30.00 ACUTE CYSTITIS WITHOUT HEMATURIA: Primary | ICD-10-CM

## 2024-11-27 DIAGNOSIS — R30.0 DYSURIA: Primary | ICD-10-CM

## 2024-11-27 RX ORDER — SULFAMETHOXAZOLE AND TRIMETHOPRIM 800; 160 MG/1; MG/1
1 TABLET ORAL 2 TIMES DAILY
Qty: 14 TABLET | Refills: 0 | Status: SHIPPED | OUTPATIENT
Start: 2024-11-27 | End: 2024-12-04

## 2024-11-27 NOTE — TELEPHONE ENCOUNTER
Signed orders for ua and culture  - signed orders to be completed through lab OR as home health as not sure which was needed

## 2024-11-27 NOTE — TELEPHONE ENCOUNTER
Please let pt know that I sent in bactrim to her pharmacy to start taking       Addendum:   I sent message through patient portal informing them that abx sent in

## 2024-11-27 NOTE — TELEPHONE ENCOUNTER
Caller: Patient (Today,  2:39 PM)  Type: Nurse          Who Called: Becki ([facility])      Does the patient know what this is regarding? Requesting a back call bc she just left the pt's house and the pt has possible UTI and Becki dropped the urine off at the lab but she states her office will be closing at 4 today up until Monday. Please advise          Does the patient rather a call back or a response via [facility]?        Best Call Back Number: [phone]        Additional Information: The pt is (Niurka Pedraza  1945)

## 2024-12-04 ENCOUNTER — PATIENT MESSAGE (OUTPATIENT)
Dept: UROLOGY | Facility: CLINIC | Age: 79
End: 2024-12-04
Payer: MEDICARE

## 2024-12-05 ENCOUNTER — IMMUNIZATION (OUTPATIENT)
Dept: FAMILY MEDICINE | Facility: CLINIC | Age: 79
End: 2024-12-05
Payer: MEDICARE

## 2024-12-05 ENCOUNTER — LAB VISIT (OUTPATIENT)
Dept: LAB | Facility: HOSPITAL | Age: 79
End: 2024-12-05
Attending: INTERNAL MEDICINE
Payer: MEDICARE

## 2024-12-05 DIAGNOSIS — Z23 NEED FOR VACCINATION: Primary | ICD-10-CM

## 2024-12-05 DIAGNOSIS — D46.9 MDS (MYELODYSPLASTIC SYNDROME): ICD-10-CM

## 2024-12-05 LAB
ALBUMIN SERPL BCP-MCNC: 3.4 G/DL (ref 3.5–5.2)
ALP SERPL-CCNC: 207 U/L (ref 40–150)
ALT SERPL W/O P-5'-P-CCNC: 18 U/L (ref 10–44)
ANION GAP SERPL CALC-SCNC: 10 MMOL/L (ref 8–16)
AST SERPL-CCNC: 16 U/L (ref 10–40)
BASOPHILS # BLD AUTO: 0.14 K/UL (ref 0–0.2)
BASOPHILS NFR BLD: 1.2 % (ref 0–1.9)
BILIRUB SERPL-MCNC: 0.6 MG/DL (ref 0.1–1)
BUN SERPL-MCNC: 16 MG/DL (ref 8–23)
CALCIUM SERPL-MCNC: 9.1 MG/DL (ref 8.7–10.5)
CHLORIDE SERPL-SCNC: 102 MMOL/L (ref 95–110)
CO2 SERPL-SCNC: 27 MMOL/L (ref 23–29)
CREAT SERPL-MCNC: 0.8 MG/DL (ref 0.5–1.4)
DIFFERENTIAL METHOD BLD: ABNORMAL
EOSINOPHIL # BLD AUTO: 0 K/UL (ref 0–0.5)
EOSINOPHIL NFR BLD: 0.3 % (ref 0–8)
ERYTHROCYTE [DISTWIDTH] IN BLOOD BY AUTOMATED COUNT: 17.1 % (ref 11.5–14.5)
EST. GFR  (NO RACE VARIABLE): >60 ML/MIN/1.73 M^2
GLUCOSE SERPL-MCNC: 125 MG/DL (ref 70–110)
HCT VFR BLD AUTO: 36.4 % (ref 37–48.5)
HGB BLD-MCNC: 11.4 G/DL (ref 12–16)
IMM GRANULOCYTES # BLD AUTO: 0.46 K/UL (ref 0–0.04)
IMM GRANULOCYTES NFR BLD AUTO: 4.1 % (ref 0–0.5)
LDH SERPL L TO P-CCNC: 216 U/L (ref 110–260)
LYMPHOCYTES # BLD AUTO: 1.5 K/UL (ref 1–4.8)
LYMPHOCYTES NFR BLD: 13.3 % (ref 18–48)
MAGNESIUM SERPL-MCNC: 1.9 MG/DL (ref 1.6–2.6)
MCH RBC QN AUTO: 30.2 PG (ref 27–31)
MCHC RBC AUTO-ENTMCNC: 31.3 G/DL (ref 32–36)
MCV RBC AUTO: 97 FL (ref 82–98)
MONOCYTES # BLD AUTO: 0.8 K/UL (ref 0.3–1)
MONOCYTES NFR BLD: 7.1 % (ref 4–15)
NEUTROPHILS # BLD AUTO: 8.3 K/UL (ref 1.8–7.7)
NEUTROPHILS NFR BLD: 74 % (ref 38–73)
NRBC BLD-RTO: 0 /100 WBC
PHOSPHATE SERPL-MCNC: 3.8 MG/DL (ref 2.7–4.5)
PLATELET # BLD AUTO: 226 K/UL (ref 150–450)
PMV BLD AUTO: 8.7 FL (ref 9.2–12.9)
POTASSIUM SERPL-SCNC: 4.3 MMOL/L (ref 3.5–5.1)
PROT SERPL-MCNC: 7.3 G/DL (ref 6–8.4)
RBC # BLD AUTO: 3.77 M/UL (ref 4–5.4)
SODIUM SERPL-SCNC: 139 MMOL/L (ref 136–145)
URATE SERPL-MCNC: 5.8 MG/DL (ref 2.4–5.7)
WBC # BLD AUTO: 11.21 K/UL (ref 3.9–12.7)

## 2024-12-05 PROCEDURE — 84100 ASSAY OF PHOSPHORUS: CPT | Mod: PN | Performed by: INTERNAL MEDICINE

## 2024-12-05 PROCEDURE — 80053 COMPREHEN METABOLIC PANEL: CPT | Mod: PN | Performed by: INTERNAL MEDICINE

## 2024-12-05 PROCEDURE — 99999PBSHW FLU VACCINE - ADJUVANTED: Mod: PBBFAC,,,

## 2024-12-05 PROCEDURE — 83615 LACTATE (LD) (LDH) ENZYME: CPT | Mod: PN | Performed by: INTERNAL MEDICINE

## 2024-12-05 PROCEDURE — 90653 IIV ADJUVANT VACCINE IM: CPT | Mod: PBBFAC,PO

## 2024-12-05 PROCEDURE — 83735 ASSAY OF MAGNESIUM: CPT | Mod: PN | Performed by: INTERNAL MEDICINE

## 2024-12-05 PROCEDURE — 36415 COLL VENOUS BLD VENIPUNCTURE: CPT | Mod: PN | Performed by: INTERNAL MEDICINE

## 2024-12-05 PROCEDURE — 85025 COMPLETE CBC W/AUTO DIFF WBC: CPT | Mod: PN | Performed by: INTERNAL MEDICINE

## 2024-12-05 PROCEDURE — 84550 ASSAY OF BLOOD/URIC ACID: CPT | Mod: PN | Performed by: INTERNAL MEDICINE

## 2024-12-07 PROCEDURE — G0179 MD RECERTIFICATION HHA PT: HCPCS | Mod: ,,, | Performed by: INTERNAL MEDICINE

## 2024-12-09 ENCOUNTER — PATIENT MESSAGE (OUTPATIENT)
Facility: CLINIC | Age: 79
End: 2024-12-09
Payer: MEDICARE

## 2024-12-14 DIAGNOSIS — S22.39XS CLOSED FRACTURE OF ONE RIB, UNSPECIFIED LATERALITY, SEQUELA: ICD-10-CM

## 2024-12-14 DIAGNOSIS — S32.020D COMPRESSION FRACTURE OF L2 VERTEBRA WITH ROUTINE HEALING, SUBSEQUENT ENCOUNTER: ICD-10-CM

## 2024-12-14 DIAGNOSIS — M54.30 SCIATICA, UNSPECIFIED LATERALITY: ICD-10-CM

## 2024-12-14 NOTE — TELEPHONE ENCOUNTER
Care Due:                  Date            Visit Type   Department     Provider  --------------------------------------------------------------------------------                                ESTABLISHED                              PATIENT -    [facility] INTERNAL  Last Visit: 07-      VIRTUAL      MEDICINE       Savana Anderson                              EP -                              PRIMARY      [facility] INTERNAL  Next Visit: 01-      CARE ([facility])   MEDICINE       Savana Anderson                                                            Last  Test          Frequency    Reason                     Performed    Due Date  --------------------------------------------------------------------------------    Lipid Panel.  12 months..  atorvastatin.............  06- 06-    TSH.........  12 months..  levothyroxine............  06- 06-    [employer] Embedded Care Due Messages. Reference number: [id number].   12/14/2024 12:19:38 PM CST

## 2024-12-16 RX ORDER — TRAMADOL HYDROCHLORIDE 50 MG/1
TABLET ORAL
Qty: 60 EACH | Refills: 0 | Status: SHIPPED | OUTPATIENT
Start: 2024-12-16

## 2024-12-26 NOTE — LETTER
December 7, 2017      Savana Anderson MD  [address]  [facility] [address]           [facility] - Orthopedics  [address]  [facility] [address]  Phone: [phone]          Patient: Niurka Pedraza   MR Number: [id number]   YOB: 1945   Date of Visit: 12/5/2017       Dear Dr. Savana Anderson:    Thank you for referring Niurka Pedraza to me for evaluation. Attached you will find relevant portions of my assessment and plan of care.    If you have questions, please do not hesitate to call me. I look forward to following Niurka Pedraza along with you.    Sincerely,    Idalmis Hendricks PA-C    Enclosure  CC:  No Recipients    If you would like to receive this communication electronically, please contact [email or web address] or [phone] to request more information on [email or web address] Link access.    For providers and/or their staff who would like to refer a patient to [facility], please contact us through our one-stop-shop provider referral line, [facility], at [phone].    If you feel you have received this communication in error or would no longer like to receive these types of communications, please e-mail [email or web address]         
n/a

## 2025-01-02 ENCOUNTER — PATIENT MESSAGE (OUTPATIENT)
Dept: UROLOGY | Facility: CLINIC | Age: 80
End: 2025-01-02
Payer: MEDICARE

## 2025-01-02 DIAGNOSIS — N39.0 RECURRENT UTI: Primary | ICD-10-CM

## 2025-01-08 ENCOUNTER — IMMUNIZATION (OUTPATIENT)
Dept: INTERNAL MEDICINE | Facility: CLINIC | Age: 80
End: 2025-01-08
Payer: MEDICARE

## 2025-01-08 DIAGNOSIS — Z23 NEED FOR VACCINATION: Primary | ICD-10-CM

## 2025-01-08 PROCEDURE — 91320 SARSCV2 VAC 30MCG TRS-SUC IM: CPT | Mod: PBBFAC

## 2025-01-08 PROCEDURE — 99999PBSHW COVID-19 VAC, TRIS 2023 (PFIZER)(PF) 30 MCG/0.3 ML IM SUSR (12+): Mod: PBBFAC,,,

## 2025-01-08 PROCEDURE — 90480 ADMN SARSCOV2 VAC 1/ONLY CMP: CPT | Mod: PBBFAC

## 2025-01-15 ENCOUNTER — EXTERNAL HOME HEALTH (OUTPATIENT)
Dept: HOME HEALTH SERVICES | Facility: HOSPITAL | Age: 80
End: 2025-01-15
Payer: MEDICARE

## 2025-01-24 ENCOUNTER — TELEPHONE (OUTPATIENT)
Dept: INTERNAL MEDICINE | Facility: CLINIC | Age: 80
End: 2025-01-24
Payer: MEDICARE

## 2025-01-24 NOTE — TELEPHONE ENCOUNTER
----- Message from BerylCostaneiram sent at 1/24/2025  2:35 PM CST -----  Contact: Becki  Type: Patient Call          Who Called:Becki- [facility]      Does the patient know what this is regarding? Requesting a call back because pt experiences elevated BP and her daughter withholds the medication, carvediloL (COREG) 25 MG tablet. The home health nurse would like to  know the specifics on when and when not to withhold the medication.  Please advise          Does the patient rather a call back or a response via [facility]? call        Best Call Back Number: [phone] ( Becki)        Additional Information:

## 2025-01-26 DIAGNOSIS — E03.9 HYPOTHYROIDISM, UNSPECIFIED TYPE: ICD-10-CM

## 2025-01-26 DIAGNOSIS — S22.39XS CLOSED FRACTURE OF ONE RIB, UNSPECIFIED LATERALITY, SEQUELA: ICD-10-CM

## 2025-01-26 DIAGNOSIS — S32.020D COMPRESSION FRACTURE OF L2 VERTEBRA WITH ROUTINE HEALING, SUBSEQUENT ENCOUNTER: ICD-10-CM

## 2025-01-26 DIAGNOSIS — M54.30 SCIATICA, UNSPECIFIED LATERALITY: ICD-10-CM

## 2025-01-26 DIAGNOSIS — D50.9 IRON DEFICIENCY ANEMIA, UNSPECIFIED IRON DEFICIENCY ANEMIA TYPE: ICD-10-CM

## 2025-01-26 NOTE — TELEPHONE ENCOUNTER
No care due was identified.  [facility] Embedded Care Due Messages. Reference number: [id number].   1/26/2025 11:51:52 AM CST

## 2025-01-26 NOTE — TELEPHONE ENCOUNTER
No care due was identified.  [facility] Embedded Care Due Messages. Reference number: [id number].   1/26/2025 11:53:45 AM CST

## 2025-01-27 RX ORDER — LEVOTHYROXINE SODIUM 50 UG/1
50 TABLET ORAL
Qty: 90 TABLET | Refills: 1 | Status: SHIPPED | OUTPATIENT
Start: 2025-01-27

## 2025-01-27 RX ORDER — FERROUS GLUCONATE 324(38)MG
324 TABLET ORAL EVERY OTHER DAY
Qty: 90 TABLET | Refills: 3 | Status: SHIPPED | OUTPATIENT
Start: 2025-01-27

## 2025-01-27 RX ORDER — TRAMADOL HYDROCHLORIDE 50 MG/1
TABLET ORAL
Qty: 60 EACH | Refills: 0 | Status: SHIPPED | OUTPATIENT
Start: 2025-01-27

## 2025-01-27 NOTE — TELEPHONE ENCOUNTER
Can hold coreg if bp < 100/60 before taking  If pressure if fluctuating often then we can decrease the coreg from 25 to 12.5mg   Please let HH nurse know     I will also plan to discuss with pt at her upcoming appt

## 2025-01-28 ENCOUNTER — TELEPHONE (OUTPATIENT)
Dept: INTERNAL MEDICINE | Facility: CLINIC | Age: 80
End: 2025-01-28
Payer: MEDICARE

## 2025-01-28 NOTE — TELEPHONE ENCOUNTER
Spoke to Eliana   and informed her per Dr. Anderson   Can hold coreg if bp < 100/60 before taking  If pressure if fluctuating often then we can decrease the coreg from 25 to 12.5mg   Please let HH nurse know      I will also plan to discuss with pt at her upcoming appt       Eliana with  nurse states understanding with no further questions or concerns.

## 2025-01-28 NOTE — TELEPHONE ENCOUNTER
----- Message from Sara sent at 1/28/2025  3:10 PM CST -----  Who called:leon aniceto shereen     What is the request in detail:pt would like for you to give her a call      Can the clinic reply by [facility]?     Would the patient rather a call back or a response via [facility]?callback     Best call back number:[phone]     Additional Information:

## 2025-01-28 NOTE — TELEPHONE ENCOUNTER
Spoke to Ms. Pedraza daughter after 2 attempts to contact the  nurse, and informed her per Dr. Anderson that   Can hold coreg if bp < 100/60 before taking  If pressure if fluctuating often then we can decrease the coreg from 25 to 12.5mg   Please let  nurse know      I will also plan to discuss with pt at her upcoming appt    Daughter states that she give patient 12.5mg in the morning and 12.5 mg in the evening.  Daughter states if Blood pressure is greater than 140/90 in the morning she gives her mom the coreg 25 mg.  Daughter states that mom blood pressure has been running 120's systolic.

## 2025-01-30 ENCOUNTER — PROCEDURE VISIT (OUTPATIENT)
Dept: UROLOGY | Facility: CLINIC | Age: 80
End: 2025-01-30
Payer: MEDICARE

## 2025-01-30 VITALS — WEIGHT: 134.06 LBS | HEIGHT: 62 IN | BODY MASS INDEX: 24.67 KG/M2

## 2025-01-30 DIAGNOSIS — N39.0 RECURRENT UTI: ICD-10-CM

## 2025-01-30 DIAGNOSIS — N32.9 LESION OF BLADDER: ICD-10-CM

## 2025-01-30 PROCEDURE — 52000 CYSTOURETHROSCOPY: CPT | Mod: S$PBB,,, | Performed by: UROLOGY

## 2025-01-30 PROCEDURE — 52000 CYSTOURETHROSCOPY: CPT | Mod: PBBFAC,PO | Performed by: UROLOGY

## 2025-01-30 NOTE — PROCEDURES
Cystoscopy    Date/Time: 1/30/2025 2:00 PM    Performed by: MATA Dhaliwal MD  Authorized by: Yudy White NP    Consent Done?:  Yes (Written)  Timeout: prior to procedure the correct patient, procedure, and site was verified    Prep: patient was prepped and draped in usual sterile fashion    Indications: recurrent UTIs and dysuria    Position:  Other  Patient sedated?: No    Preparation: Patient was prepped and draped in usual sterile fashion    Scope type:  Flexible cystoscope   patient tolerated the procedure well with no immediate complications    Blood Loss:  None    The patients clinic history and physical were reviewed and there are no changes.    The patient was placed in the frog-leg position.  The genitals were prepped and draped in a sterile fashion.   Using a flexible scope, a careful cystoscopic exam was then performed.  The entire bladder mucosa was systematically visualized.  At the trigone of the bladder extending to the posterior wall there was a large amount of abnormal appearing mucosa.  Had a plaque-like appearance with some overlying calcification.  She has previously been treated by Dr. Oropeza and this tissue had previously been resected.  The pathology was squamous metaplasia. The cystoscope was then removed and I examined the entire length of the urethra.  The urethra appeared normal.  She tolerated the procedure well.  There were no complications.    Impression:  Severe squamous metaplasia with overlying calcifications.    Plan:  We discussed repeat TURBT.  Previous procedure did not seem to improve her bladder pain.  She will consider options for now.

## 2025-02-05 ENCOUNTER — OFFICE VISIT (OUTPATIENT)
Dept: INTERNAL MEDICINE | Facility: CLINIC | Age: 80
End: 2025-02-05
Attending: INTERNAL MEDICINE
Payer: MEDICARE

## 2025-02-05 VITALS
DIASTOLIC BLOOD PRESSURE: 72 MMHG | SYSTOLIC BLOOD PRESSURE: 120 MMHG | BODY MASS INDEX: 24.52 KG/M2 | OXYGEN SATURATION: 95 % | HEART RATE: 69 BPM | HEIGHT: 62 IN

## 2025-02-05 DIAGNOSIS — G89.29 CHRONIC MIDLINE LOW BACK PAIN WITH RIGHT-SIDED SCIATICA: ICD-10-CM

## 2025-02-05 DIAGNOSIS — E03.9 HYPOTHYROIDISM, UNSPECIFIED TYPE: ICD-10-CM

## 2025-02-05 DIAGNOSIS — C56.1 BORDERLINE SEROUS CYSTADENOMA OF RIGHT OVARY: ICD-10-CM

## 2025-02-05 DIAGNOSIS — D84.821 IMMUNOSUPPRESSION DUE TO CHRONIC STEROID USE: ICD-10-CM

## 2025-02-05 DIAGNOSIS — D46.9 MDS (MYELODYSPLASTIC SYNDROME): Chronic | ICD-10-CM

## 2025-02-05 DIAGNOSIS — M54.41 CHRONIC MIDLINE LOW BACK PAIN WITH RIGHT-SIDED SCIATICA: ICD-10-CM

## 2025-02-05 DIAGNOSIS — I70.0 AORTIC ATHEROSCLEROSIS: ICD-10-CM

## 2025-02-05 DIAGNOSIS — S22.39XS CLOSED FRACTURE OF ONE RIB, UNSPECIFIED LATERALITY, SEQUELA: ICD-10-CM

## 2025-02-05 DIAGNOSIS — T38.0X5A IMMUNOSUPPRESSION DUE TO CHRONIC STEROID USE: ICD-10-CM

## 2025-02-05 DIAGNOSIS — Z79.52 IMMUNOSUPPRESSION DUE TO CHRONIC STEROID USE: ICD-10-CM

## 2025-02-05 DIAGNOSIS — G95.9 CERVICAL MYELOPATHY: ICD-10-CM

## 2025-02-05 DIAGNOSIS — M54.30 SCIATICA, UNSPECIFIED LATERALITY: ICD-10-CM

## 2025-02-05 DIAGNOSIS — J84.112 UIP (USUAL INTERSTITIAL PNEUMONITIS): ICD-10-CM

## 2025-02-05 DIAGNOSIS — I10 ESSENTIAL HYPERTENSION: Chronic | ICD-10-CM

## 2025-02-05 DIAGNOSIS — N30.20 CHRONIC CYSTITIS: Primary | ICD-10-CM

## 2025-02-05 DIAGNOSIS — S32.020D COMPRESSION FRACTURE OF L2 VERTEBRA WITH ROUTINE HEALING, SUBSEQUENT ENCOUNTER: ICD-10-CM

## 2025-02-05 DIAGNOSIS — D61.818 PANCYTOPENIA: ICD-10-CM

## 2025-02-05 DIAGNOSIS — E78.49 OTHER HYPERLIPIDEMIA: ICD-10-CM

## 2025-02-05 PROCEDURE — 99215 OFFICE O/P EST HI 40 MIN: CPT | Mod: PBBFAC | Performed by: INTERNAL MEDICINE

## 2025-02-05 PROCEDURE — 99999 PR PBB SHADOW E&M-EST. PATIENT-LVL V: CPT | Mod: PBBFAC,,, | Performed by: INTERNAL MEDICINE

## 2025-02-05 PROCEDURE — G0179 MD RECERTIFICATION HHA PT: HCPCS | Mod: ,,, | Performed by: INTERNAL MEDICINE

## 2025-02-05 RX ORDER — TRAMADOL HYDROCHLORIDE 50 MG/1
50 TABLET ORAL EVERY 6 HOURS PRN
Qty: 90 EACH | Refills: 0 | Status: SHIPPED | OUTPATIENT
Start: 2025-02-17 | End: 2025-02-17 | Stop reason: SDUPTHER

## 2025-02-05 NOTE — PROGRESS NOTES
Subjective:   Patient ID: Niurka Pedraza is a 79 y.o. female  Chief complaint:   Chief Complaint   Patient presents with    Annual Exam       HPI  Here for htn follow up   Accomp by meena today     Stil:   Anitha with daughter   Has home med equipment:   Bed  Shower chair  rollator  Wheelchair    Receiving palliative care and home health currently    Recurrent uti, lesion of bladder, dysuria:   - on gemtessa and d mannose   - off of oxybutynin   - they are taking otc potassium citrate 99mg/day and she will check otc dosing and will f/u with urology to confirm if ok to continue  - Seen by urology and s/p cystoscopy 1/30/25  Impression:  Severe squamous metaplasia with overlying calcifications.  Plan:  We discussed repeat TURBT.  Previous procedure did not seem to improve her bladder pain.  She will consider options for now.    Osteoporosis - compression fx with hx of penia:   - To f/u with dentist and compelte labs by endo   Ptrb:  - discussed role of fosamax, pot se   Reviewed yue and vit d recs   Limited exercise given pain, fatigue, deconditioning   In past: Hx of porosis that improved prev to penia on dexa  - had prev declined tx with fosamax several years ago and improved with ca/vit d and exercise     Back pain: taking 1 tab daily tramadol - if btp will take extra dose     ILD:   Saw pulm 1/2025  - improved and no inhalers currently  Prev:  - was on breo and plan was to stop med after comletes last inh and only use albuterol   - stopped breo at time of pulm appt and doing well off of it     HTN:  Had episode of low bp systolic < 90 and rested and then realized bp was in good range and when bp was at goal would hold and would give it   Then noticed fluctations and then cut coreg in 1/2     MDS:  Chemo pause 2/2 to intol   Bm bx planned  Saw h/o     Saw nsx 7/10/2024    UTI:   Treated with augmentin after cx results returned     Weight loss: improved   Taking periactin     Neuropathy  Stable  "    Hypothyroidism:  Stable   C/w medication     Hld:   Stable   C/w medication    Hm:   utd    Review of Systems    Objective:  Vitals:    02/05/25 1405   BP: 120/72   BP Location: Right arm   Patient Position: Sitting   Pulse: 69   SpO2: 95%   Height: 5' 2" (1.575 m)     Body mass index is 24.52 kg/m².    Physical Exam  Vitals reviewed.   Constitutional:       Appearance: Normal appearance. She is well-developed.   HENT:      Head: Normocephalic and atraumatic.      Right Ear: Tympanic membrane, ear canal and external ear normal.      Left Ear: Tympanic membrane, ear canal and external ear normal.      Nose: Nose normal. No congestion.      Mouth/Throat:      Mouth: Mucous membranes are moist.      Pharynx: Oropharynx is clear. No oropharyngeal exudate.   Eyes:      Extraocular Movements: Extraocular movements intact.      Conjunctiva/sclera: Conjunctivae normal.   Neck:      Thyroid: No thyromegaly.   Cardiovascular:      Rate and Rhythm: Normal rate and regular rhythm.      Pulses: Normal pulses.      Heart sounds: Normal heart sounds.   Pulmonary:      Effort: Pulmonary effort is normal.      Breath sounds: Normal breath sounds.   Abdominal:      General: Bowel sounds are normal.      Palpations: Abdomen is soft.   Musculoskeletal:         General: No swelling or signs of injury.      Cervical back: Neck supple.   Lymphadenopathy:      Cervical: No cervical adenopathy.   Skin:     General: Skin is warm and dry.      Capillary Refill: Capillary refill takes less than 2 seconds.   Neurological:      General: No focal deficit present.      Mental Status: She is alert and oriented to person, place, and time. Mental status is at baseline.   Psychiatric:         Behavior: Behavior normal.         Thought Content: Thought content normal.         Assessment:  1. Chronic cystitis    2. Sciatica, unspecified laterality    3. Compression fracture of L2 vertebra with routine healing, subsequent encounter    4. Closed " fracture of one rib, unspecified laterality, sequela    5. Pancytopenia    6. Cervical myelopathy    7. UIP (usual interstitial pneumonitis)    8. Borderline serous cystadenoma of right ovary    9. Immunosuppression due to chronic steroid use    10. Aortic atherosclerosis    11. Hypothyroidism, unspecified type    12. Essential hypertension    13. Other hyperlipidemia    14. MDS/AML    15. Chronic midline low back pain with right-sided sciatica        Plan:  1. Chronic cystitis  -     Ambulatory referral/consult to Urology; Future; Expected date: 02/12/2025    2. Sciatica, unspecified laterality  -     traMADoL (ULTRAM) 50 mg tablet; Take 1 tablet (50 mg total) by mouth every 6 (six) hours as needed for Pain. Take 1-2 tabs by mouth every 8 hours as needed for pain  Dispense: 90 each; Refill: 0    3. Compression fracture of L2 vertebra with routine healing, subsequent encounter  -     traMADoL (ULTRAM) 50 mg tablet; Take 1 tablet (50 mg total) by mouth every 6 (six) hours as needed for Pain. Take 1-2 tabs by mouth every 8 hours as needed for pain  Dispense: 90 each; Refill: 0    4. Closed fracture of one rib, unspecified laterality, sequela  -     traMADoL (ULTRAM) 50 mg tablet; Take 1 tablet (50 mg total) by mouth every 6 (six) hours as needed for Pain. Take 1-2 tabs by mouth every 8 hours as needed for pain  Dispense: 90 each; Refill: 0    5. Pancytopenia  Stable   F/u with h/o     6. Cervical myelopathy  Stable   Cont home PT exercises    7. UIP (usual interstitial pneumonitis)  Stable   F/u wthi pulm     8. Borderline serous cystadenoma of right ovary  Stable   F/u with gyn onc    9. Immunosuppression due to chronic steroid use  Stable   Cont regimen and f/u with specialists     10. Aortic atherosclerosis  Stable   Cont statin     11. Hypothyroidism, unspecified type  Stable   Cont regimen     12. Essential hypertension  Stable   Cont regimen     13. Other hyperlipidemia  Stable cont statin     14.  MDS/AML  Stable   F/u with h/o     15. Chronic midline low back pain with right-sided sciatica  Stable   Cont home pt and apap / tramadol for btp and palliative care      Today:   [facility] message - ?? Mail order [facility] can deliver controlled substance   Urology - tamera - apprep opinoin on pot citrate   Schedule endo labs in week afer Christiano Paz per her pref   24 hour urine container   Pdn dosing currently at 1 mg    Health Maintenance   Topic Date Due    Colorectal Cancer Screening  12/09/2025    DEXA Scan  01/19/2026    Lipid Panel  06/22/2028    TETANUS VACCINE  07/21/2031    Shingles Vaccine  Completed    Influenza Vaccine  Completed    COVID-19 Vaccine  Completed    RSV Vaccine (Age 60+ and Pregnant patients)  Completed    Pneumococcal Vaccines (Age 50+)  Completed     66 min spent in care of patient including chart review, history, orders, physical, orders and coordination of care  Visit today included increased complexity associated with the care of the episodic problem sciatica addressed and managing the longitudinal care of the patient due to the serious and/or complex managed problem(s) mds, htn.

## 2025-02-12 ENCOUNTER — OFFICE VISIT (OUTPATIENT)
Dept: PALLIATIVE MEDICINE | Facility: CLINIC | Age: 80
End: 2025-02-12
Payer: MEDICARE

## 2025-02-12 DIAGNOSIS — J84.10 PULMONARY FIBROSIS: ICD-10-CM

## 2025-02-12 DIAGNOSIS — D46.9 MDS (MYELODYSPLASTIC SYNDROME): ICD-10-CM

## 2025-02-12 DIAGNOSIS — S32.020S COMPRESSION FRACTURE OF L2 VERTEBRA, SEQUELA: ICD-10-CM

## 2025-02-12 DIAGNOSIS — Z51.5 PALLIATIVE CARE BY SPECIALIST: Primary | ICD-10-CM

## 2025-02-12 NOTE — PROGRESS NOTES
The patient location is: [place], [place]    Visit type: audiovisual    Face to Face time with patient: 15 minutes  30 minutes of total time spent on the encounter, which includes face to face time and non-face to face time preparing to see the patient (eg, review of tests), Obtaining and/or reviewing separately obtained history, Documenting clinical information in the electronic or other health record, Independently interpreting results (not separately reported) and communicating results to the patient/family/caregiver, or Care coordination (not separately reported).         Each patient to whom he or she provides medical services by telemedicine is:  (1) informed of the relationship between the physician and patient and the respective role of any other health care provider with respect to management of the patient; and (2) notified that he or she may decline to receive medical services by telemedicine and may withdraw from such care at any time.    Notes:     Office Visit  [facility]      Consult Requested By: No ref. provider found  Reason for Consult: goals of care      ASSESSMENT/PLAN:     Plan/Recommendations:  Diagnoses and all orders for this visit:    Palliative care by specialist    Pulmonary fibrosis    MDS (myelodysplastic syndrome)    Compression fracture of L2 vertebra, sequela        # Palliative care by specialist  # Myelodysplastic syndrome  # Pulmonary fibrosis  Patient with h/o MDS currently under management of Oncology, well supported by her children, her goal is to focus on quality of life, she is unsure if she wants to continue treatment and will discuss with Dr Centeno. MDS stable at this time, now with less frequently follow up, every 6 months now.   - Follow up heme/oncology    # Vertebral compression fractures  Increased pain. Encouraged use of extra dose of tramadol per day to allow movement. Hesitant to take more pain medications.  - Counseled on improving pain control to  improve function and prevent frailty.   - Continue tramadol 50mg PO TID prn for pain  - Continue miralax PO daily for OIC ppx  - Follow up PCP    # Advance care planning  Living Will and HCPOA on file     Follow up: 3 months or earlier if needed    Patient's encounter and above plan of care discussed with patient's daughter, Luz.     SUBJECTIVE:     History of Present Illness:  Patient is a 79 y.o. year old female presenting with myelodysplastic syndrome, pulmonary fibrosis, hypertension, and multiple infections including MDRO UTIs and pulmonary coccidioidomycosis. Recently admitted to [facility] from 05/05/24-05/09/24. Presents via virtual visit for follow up.     02/12/2025:  Gained weight. More mobility than prior. Using wheelchair, walker, cane depending on how she is feeling. Having pain which is the worst symptom. Having a harder time to get up from the chair. Planning to resume exercises to strengthen legs. In bed a lot due to pain.     Tramadol helps but not much. Takes one tramadol tablet per day at lunch. Takes two tylenol at night.     Appetite is improving with Periactin. Now on hold since appetite is improved.     Denies constipation, diarrhea.     Dr Centeno regarding MDS stated that labs looked good. Labs less frequently, less frequent follow ups. 6 month follow ups.     Having pain with urination due to calcifications on bladder wall. Plan to have scraping done by preferred urologist. Discussed pain treatment otherwise.     05/2024:    Saw Dr. Centeno since our last visit. Dr. Centeno wants to pause chemotherapy while she regains strength. Her blood counts are improving. They have a follow up in July to touch base again.     She is feeling weak overall but has gained some strength in the past week. She was vomiting last night. She is still in pain. She is still taking tramadol twice per day. She takes it when she eats. She is also taking extra strength tylenol in the morning and at night. She has not had a  "bowel movement in the last two days. She had diarrhea before.    PT is going well. She is getting exhausted from PT. Getting discharged from PT OT next week. Plans to have acupuncture done soon.     Vomiting occasionally from chest congestion that makes her gag. She is going to start taking mucinex.     Previous visit:     Hospital discharge summary:  "She was admitted for further eval and treatment of pain after a fall with new broken rib (9th rib on right) and new thoracic and lumbar vertebral fractures. Neurosurgery consulted and recommends IR for kyphoplasty, TLSO brace, PT/OT and pain control. Follow-up in 4-6 weeks. She is s/p IR kyphoplasty of T11, T12, and L1 on 5/8. She worked with PT/OT on 5/9 and low intensity therapy recommended. All questions answered with patient and daughter at bedside, and they are in agreement with the discharge plan. "     She presents via virtual visit with her daughter Luz and evaluated by inpatient palliative care team during aforementioned hospital stay.     The patient has an appointment with Dr. Centeno tomorrow morning at 8AM but is not currently in the shape to go to [place] for that visit. Plan to reschedule.     Rehabilitating well since hospital discharge. Was hard to stand up from sitting. Now walking with assistance. She is getting stronger but slowly. She gets tired very easily. Luz is trying to take things slow.     She has an appointment today virtually with her primary care Dr. Anderson as well.     Admits to pain post kyphoplasty. The pain is the same. If she does not move she is able to rest. If she moves, she has severe pain. She is using tramadol for pain control but it is not helping much. It helps a little bit. She is taking tramadol three times per day. She is having daily bowel movements.    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    Breast cancer     mastectomy 2014    " Coccidiomycosis, progressive     Elevated CA-125 02/25/2018    Hyperlipidemia     Hypertension     Liver disease     OAB (overactive bladder)     Osteopenia     on Dexa 11/2017    Osteoporosis     pt reports hx of this, declined fosamax in past - treated with calcium and vit D    Pelvic mass 02/25/2018    Pulmonary fibrosis     Pulmonary nodules     SOB (shortness of breath) on exertion     Thyroid disease     hypo    Urinary tract infection due to extended-spectrum beta lactamase (ESBL) producing Escherichia coli 03/16/2022    UTI (urinary tract infection)      Past Surgical History:   Procedure Laterality Date    BONE MARROW BIOPSY N/A 4/10/2024    Procedure: Biopsy-bone marrow;  Surgeon: Mohit Centeno MD;  Location: [facility] (4TH FLR);  Service: Oncology;  Laterality: N/A;  4/4-precall complete-MS    CHOLECYSTECTOMY      COLONOSCOPY N/A 12/09/2022    Procedure: COLONOSCOPY;  Surgeon: Enrique Dominguez MD;  Location: [facility] (4TH FLR);  Service: Endoscopy;  Laterality: N/A;  inst via portal  pt requests after 12/9/22-MS  11/30-pt notified of earlier arrival time-KPvt    CYSTOSCOPY WITH BIOPSY OF BLADDER Bilateral 07/27/2022    Procedure: CYSTOSCOPY, WITH BLADDER BIOPSY; retrograde pyelogram,;  Surgeon: Anaya Oropeza MD;  Location: [facility];  Service: Urology;  Laterality: Bilateral;    ENDOSCOPIC ULTRASOUND OF UPPER GASTROINTESTINAL TRACT N/A 04/08/2021    Procedure: ULTRASOUND, UPPER GI TRACT, ENDOSCOPIC;  Surgeon: Aisha Barajas MD;  Location: [facility] (2ND FLR);  Service: Endoscopy;  Laterality: N/A;  UEUS/ERCP evaluation abn MRCP - Dr Angelika Zamudio-19 test 4/5/21 at [facility] Pre-admit - pg    ERCP N/A 04/08/2021    Procedure: ERCP (ENDOSCOPIC RETROGRADE CHOLANGIOPANCREATOGRAPHY);  Surgeon: Aisha Barajas MD;  Location: [facility] (2ND FLR);  Service: Endoscopy;  Laterality: N/A;  UEUS/ERCP evaluation abn MRCP - Dr Angelika Zamudio-19 test 4/5/21 at [facility] Pre-admit - pg     ESOPHAGOGASTRODUODENOSCOPY N/A 04/08/2021    Procedure: EGD (ESOPHAGOGASTRODUODENOSCOPY);  Surgeon: Aisha Barajas MD;  Location: [facility] (2ND FLR);  Service: Endoscopy;  Laterality: N/A;  UEUS/ERCP evaluation abn MRCP - Dr Carney  Covid-19 test 4/5/21 at [facility] Pre-admit - pg    ESOPHAGOGASTRODUODENOSCOPY N/A 06/02/2023    Procedure: EGD (ESOPHAGOGASTRODUODENOSCOPY);  Surgeon: Emeka Carney MD;  Location: [facility] (4TH FLR);  Service: Endoscopy;  Laterality: N/A;  She has  history of seromucinous borderline tumor of the ovary. We generally follow  and CEA tumor markers. Her CEA recently became slightly elevated. CT imaging negative and colonoscopy negative.     Talita AMAYA Mcnairde    instructions portal-LW  pre    FIXATION KYPHOPLASTY  5/8/2024    Procedure: Kyphoplasty- 3 levels regular kypho T11-L1 with anesthesia;  Surgeon: Chandra Villavicencio MD;  Location: [facility];  Service: Interventional Radiology;;    HYSTERECTOMY      LAPAROSCOPIC APPENDECTOMY N/A 4/28/2024    Procedure: APPENDECTOMY, LAPAROSCOPIC;  Surgeon: BALJEET Haji MD;  Location: [facility];  Service: General;  Laterality: N/A;    MASTECTOMY Right     2014     Family History   Problem Relation Name Age of Onset    Cancer Mother Jessica         colon cancer    Breast cancer Mother Jessica     Hypertension Father Yonas     Heart disease Father Yonas     Stroke Father Yonas     No Known Problems Sister      Cancer Brother Yonas         lung, ++ tobacco    Hypertension Brother Gary     No Known Problems Daughter      Hypertension Son Lavell     Colon cancer Neg Hx      Ovarian cancer Neg Hx       Review of patient's allergies indicates:   Allergen Reactions    Ciprofloxacin Other (See Comments)     Right foot pain and edema, tendonitis    Amlodipine Edema and Swelling     BLE  BLE    Lisinopril Other (See Comments)     cough    Nitrofuran analogues      Social Drivers of Health     Tobacco Use: Medium Risk (2/5/2025)    Patient History      Smoking Tobacco Use: Former     Smokeless Tobacco Use: Never     Passive Exposure: Past   Alcohol Use: Not At Risk (2/3/2025)    AUDIT-C     Frequency of Alcohol Consumption: Monthly or less     Average Number of Drinks: 1 or 2     Frequency of Binge Drinking: Never   Financial Resource Strain: Low Risk  (2/3/2025)    Overall Financial Resource Strain (CARDIA)     Difficulty of Paying Living Expenses: Not hard at all   Food Insecurity: No Food Insecurity (2/3/2025)    Hunger Vital Sign     Worried About Running Out of Food in the Last Year: Never true     Ran Out of Food in the Last Year: Never true   Transportation Needs: No Transportation Needs (4/29/2024)    PRAPARE - Transportation     Lack of Transportation (Medical): No     Lack of Transportation (Non-Medical): No   Physical Activity: Inactive (2/3/2025)    Exercise Vital Sign     Days of Exercise per Week: 0 days     Minutes of Exercise per Session: 10 min   Stress: Stress Concern Present (2/3/2025)    [employer] - Occupational Stress Questionnaire     Feeling of Stress : To some extent   Housing Stability: Low Risk  (2/3/2025)    Housing Stability Vital Sign     Unable to Pay for Housing in the Last Year: No     Number of Times Moved in the Last Year: 1     Homeless in the Last Year: No   Depression: Low Risk  (2/5/2025)    Depression     Last PHQ-4: Flowsheet Data: 0   Utilities: Not At Risk (2/3/2025)    [facility] Utilities     Threatened with loss of utilities: No   Health Literacy: Adequate Health Literacy (2/3/2025)     Health Literacy     Frequency of need for help with medical instructions: Never   Social Isolation: Not on file          Medications:    Current Outpatient Medications:     acetaminophen (TYLENOL) 325 MG tablet, Take 2 tablets (650 mg total) by mouth every 6 (six) hours as needed for Pain., Disp: , Rfl:     acyclovir (ZOVIRAX) 400 MG tablet, Take 1 tablet (400 mg total) by mouth 2 (two) times daily., Disp: 60  tablet, Rfl: 11    albuterol-ipratropium (DUO-NEB) 2.5 mg-0.5 mg/3 mL nebulizer solution, Take 3 mLs by nebulization every 6 (six) hours as needed for Wheezing or Shortness of Breath. Rescue, Disp: 75 mL, Rfl: 11    ascorbic acid/zinc (ZINC WITH VITAMIN C ORAL), Take 1 tablet by mouth Daily., Disp: , Rfl:     atorvastatin (LIPITOR) 20 MG tablet, Take 1 tablet (20 mg total) by mouth once daily., Disp: 90 tablet, Rfl: 0    azelastine (ASTELIN) 137 mcg (0.1 %) nasal spray, 1 spray (137 mcg total) by Nasal route 2 (two) times daily., Disp: 30 mL, Rfl: 6    bisacodyL (DULCOLAX) 10 mg Supp, Place 1 suppository (10 mg total) rectally daily as needed (constipation)., Disp: , Rfl:     calcium-vitamin D3 (CALCIUM 500 + D) 500 mg(1,250mg) -200 unit per tablet, Take 1 tablet by mouth 2 (two) times daily with meals., Disp: , Rfl:     carvediloL (COREG) 25 MG tablet, TAKE 1 TABLET BY MOUTH TWICE A DAY WITH FOOD, Disp: 180 tablet, Rfl: 1    cyproheptadine (PERIACTIN) 4 mg tablet, Take 1 tablet (4 mg total) by mouth 3 (three) times daily as needed (decreased appetite)., Disp: 30 tablet, Rfl: 3    docusate sodium (COLACE) 100 MG capsule, Take 1 capsule (100 mg total) by mouth 2 (two) times daily. (Patient not taking: Reported on 2/5/2025), Disp: 60 capsule, Rfl: 0    estradioL (ESTRACE) 0.01 % (0.1 mg/gram) vaginal cream, Place 1 fingertip of cream first at urethral opening and then external vagina. Please do not use plastic applicator, Disp: 42.5 g, Rfl: 3    ferrous gluconate (FERGON) 324 MG tablet, Take 1 tablet (324 mg total) by mouth every other day., Disp: 90 tablet, Rfl: 3    levothyroxine (SYNTHROID) 50 MCG tablet, Take 1 tablet (50 mcg total) by mouth before breakfast., Disp: 90 tablet, Rfl: 1    mirtazapine (REMERON) 7.5 MG Tab, Take 1 tablet (7.5 mg total) by mouth every evening., Disp: 90 tablet, Rfl: 3    multivitamin (THERAGRAN) per tablet, Take 1 tablet by mouth once daily., Disp: , Rfl:     naloxone (NARCAN) 4  mg/actuation Spry, 4mg by nasal route as needed for opioid overdose; may repeat every 2-3 minutes in alternating nostrils until medical help arrives. Call 911, Disp: 1 each, Rfl: 11    ondansetron (ZOFRAN-ODT) 4 MG TbDL, Take 1 tablet (4 mg total) by mouth every 6 (six) hours as needed (nausea)., Disp: 30 tablet, Rfl: 0    pantoprazole (PROTONIX) 20 MG tablet, Take 1 tablet (20 mg total) by mouth once daily., Disp: 90 tablet, Rfl: 1    polyethylene glycol (GLYCOLAX) 17 gram PwPk, Take 17 g by mouth once daily. (Patient not taking: Reported on 2/5/2025), Disp: 30 packet, Rfl: 0    potassium chloride (K-TAB) 20 mEq, Take 20 mEq by mouth., Disp: , Rfl:     predniSONE (DELTASONE) 1 MG tablet, Take 2 tablets (2 mg total) by mouth once daily., Disp: 180 tablet, Rfl: 3    sodium chloride 3% 3 % nebulizer solution, Take 4 mLs by nebulization as needed for Other., Disp: 120 mL, Rfl: 3    [START ON 2/17/2025] traMADoL (ULTRAM) 50 mg tablet, Take 1 tablet (50 mg total) by mouth every 6 (six) hours as needed for Pain. Take 1-2 tabs by mouth every 8 hours as needed for pain, Disp: 90 each, Rfl: 0    vibegron (GEMTESA) 75 mg Tab, Take 1 tablet (75 mg total) by mouth once daily., Disp: 30 tablet, Rfl: 11    OBJECTIVE:       ROS:  Review of Systems   Constitutional:  Positive for appetite change and unexpected weight change.   HENT:  Negative for mouth sores.    Eyes:  Negative for visual disturbance.   Respiratory:  Positive for cough. Negative for shortness of breath.    Cardiovascular:  Negative for chest pain.   Gastrointestinal:  Negative for abdominal pain and diarrhea.   Genitourinary:  Positive for frequency.   Musculoskeletal:  Positive for back pain and gait problem.   Skin:  Negative for rash.   Neurological:  Negative for headaches.   Hematological:  Negative for adenopathy.   Psychiatric/Behavioral:  Negative for sleep disturbance. The patient is not nervous/anxious.        Review of Symptoms      Symptom Assessment  (ESAS 0-10 Scale)  Pain:  0  Dyspnea:  0  Anxiety:  0  Nausea:  0  Depression:  0  Anorexia:  0  Fatigue:  0  Insomnia:  0  Restlessness:  0  Agitation:  0       Anxiety:  Is not nervous/anxious    Living Arrangements:  Lives with family    Psychosocial/Cultural:   See Palliative Psychosocial Note: Yes  **Primary  to Follow**  Palliative Care  Consult: No     Time-Based Charting:  Yes  Chart Review: 15 minutes  Face to Face: 15 minutes    Total Time Spent: 30 minutes      Advance Care Planning   Advance Directives:   Living Will: Yes        Copy on chart: Yes      Decision Making:  Patient answered questions  Goals of Care: What is most important right now is to focus on spending time at home, avoiding the hospital, remaining as independent as possible. Accordingly, we have decided that the best plan to meet the patient's goals includes continuing with treatment.              Physical Exam:  Vitals:    Physical Exam  Constitutional:       General: She is not in acute distress.     Appearance: Normal appearance. She is not ill-appearing or diaphoretic.   Musculoskeletal:      Cervical back: Normal range of motion.   Skin:     Coloration: Skin is not jaundiced or pale.   Neurological:      Mental Status: She is alert and oriented to person, place, and time.   Psychiatric:         Mood and Affect: Mood normal.         Behavior: Behavior normal.         Thought Content: Thought content normal.         Judgment: Judgment normal.         Labs:  CBC:   WBC   Date Value Ref Range Status   12/05/2024 11.21 3.90 - 12.70 K/uL Final     Hemoglobin   Date Value Ref Range Status   12/05/2024 11.4 (L) 12.0 - 16.0 g/dL Final     Hematocrit   Date Value Ref Range Status   12/05/2024 36.4 (L) 37.0 - 48.5 % Final     MCV   Date Value Ref Range Status   12/05/2024 97 82 - 98 fL Final     Platelets   Date Value Ref Range Status   12/05/2024 226 150 - 450 K/uL Final       LFT:   Lab Results   Component Value  Date    AST 16 12/05/2024    ALKPHOS 207 (H) 12/05/2024    BILITOT 0.6 12/05/2024       Albumin:   Albumin   Date Value Ref Range Status   12/05/2024 3.4 (L) 3.5 - 5.2 g/dL Final     Protein:   Total Protein   Date Value Ref Range Status   12/05/2024 7.3 6.0 - 8.4 g/dL Final       Radiology:None      30 minutes of total time spent on the encounter, which includes face to face time and non-face to face time preparing to see the patient (eg, review of tests), Obtaining and/or reviewing separately obtained history, Documenting clinical information in the electronic or other health record, Independently interpreting results (not separately reported) and communicating results to the patient/family/caregiver, or Care coordination (not separately reported).    0 minutes spent in discussing ACP    Signature: Fifi Pacheco DO

## 2025-02-14 ENCOUNTER — TELEPHONE (OUTPATIENT)
Dept: INTERNAL MEDICINE | Facility: CLINIC | Age: 80
End: 2025-02-14
Payer: MEDICARE

## 2025-02-14 DIAGNOSIS — D46.9 MDS (MYELODYSPLASTIC SYNDROME): ICD-10-CM

## 2025-02-14 DIAGNOSIS — R30.0 DYSURIA: ICD-10-CM

## 2025-02-14 NOTE — TELEPHONE ENCOUNTER
Becki from home health called to see if Dr. Anderson changed the patient carvediloL to 12.5mg at her recent visit. She said the patient daughter told her the doctor changed it and if so she would like to update her medication list.

## 2025-02-14 NOTE — TELEPHONE ENCOUNTER
----- Message from Summer sent at 2/14/2025  1:58 PM CST -----  Regarding: carvediloL (COREG) 25 MG tablet  Type:  Patient Returning Call        Name of who is calling:Becki/NICOL haley [facility]         What is request in detail:Becki is requesting a call back in regards to medication, pt is currently taking meds listed above and Becki wants to verify dose of medication please assist. Kassidy states you can leave her a text or voicemail         Can clinic reply by [facility]:no        What number to call back if not in [facility]:[phone]

## 2025-02-14 NOTE — TELEPHONE ENCOUNTER
PCP note does mention coreg being cut in half with improved BP control, will leave for PCP to review for med list changes    JL

## 2025-02-15 DIAGNOSIS — M54.30 SCIATICA, UNSPECIFIED LATERALITY: ICD-10-CM

## 2025-02-15 DIAGNOSIS — S22.39XS CLOSED FRACTURE OF ONE RIB, UNSPECIFIED LATERALITY, SEQUELA: ICD-10-CM

## 2025-02-15 DIAGNOSIS — S32.020D COMPRESSION FRACTURE OF L2 VERTEBRA WITH ROUTINE HEALING, SUBSEQUENT ENCOUNTER: ICD-10-CM

## 2025-02-15 PROBLEM — G95.9 CERVICAL MYELOPATHY: Status: ACTIVE | Noted: 2025-02-15

## 2025-02-15 PROBLEM — J84.112 UIP (USUAL INTERSTITIAL PNEUMONITIS): Status: ACTIVE | Noted: 2025-02-15

## 2025-02-15 PROBLEM — E43 SEVERE MALNUTRITION: Status: RESOLVED | Noted: 2024-02-08 | Resolved: 2025-02-15

## 2025-02-15 PROBLEM — D61.818 PANCYTOPENIA: Status: ACTIVE | Noted: 2025-02-15

## 2025-02-15 PROBLEM — E21.3 HYPERPARATHYROIDISM: Status: RESOLVED | Noted: 2023-06-14 | Resolved: 2025-02-15

## 2025-02-15 NOTE — TELEPHONE ENCOUNTER
Can we reach out to [facility] and inquire if controlled substances (specifically tramadol) can be delivered through this service and please let me know if so

## 2025-02-17 RX ORDER — TRAMADOL HYDROCHLORIDE 50 MG/1
50 TABLET, FILM COATED ORAL EVERY 6 HOURS PRN
Qty: 90 EACH | Refills: 0 | Status: SHIPPED | OUTPATIENT
Start: 2025-02-17 | End: 2025-04-07 | Stop reason: SDUPTHER

## 2025-02-17 NOTE — TELEPHONE ENCOUNTER
Care Due:                  Date            Visit Type   Department     Provider  --------------------------------------------------------------------------------                                EP -                              PRIMARY      [facility] INTERNAL  Last Visit: 02-      CARE ([facility])   MEDICINE       Savana Anderson  Next Visit: None Scheduled  None         None Found                                                            Last  Test          Frequency    Reason                     Performed    Due Date  --------------------------------------------------------------------------------    Lipid Panel.  12 months..  atorvastatin.............  06- 06-    TSH.........  12 months..  levothyroxine............  06- 06-    Health [facility] Embedded Care Due Messages. Reference number: [id number].   2/17/2025 2:59:52 PM CST

## 2025-02-17 NOTE — TELEPHONE ENCOUNTER
Refill Encounter    PCP Visits: Recent Visits  Date Type Provider Dept   02/05/25 Office Visit Savana Anderson MD [facility] Internal Medicine   07/17/24 Office Visit Savana Anderson MD [facility] Medicine   05/16/24 Office Visit Savana Anderson MD [facility] Medicine   03/22/24 Office Visit Savana Anderson MD [facility]   Showing recent visits within past 360 days and meeting all other requirements  Future Appointments  Date Type Provider Dept   05/08/25 Appointment Savana Anderson MD [facility] Medicine   Showing future appointments within next 720 days and meeting all other requirements      Last 3 Blood Pressure:   BP Readings from Last 3 Encounters:   02/05/25 120/72   01/09/25 118/70   11/06/24 102/63     Preferred Pharmacy:   [facility] [[id number]]     Requested RX:  Requested Prescriptions     Pending Prescriptions Disp Refills    traMADoL (ULTRAM) 50 mg tablet 90 each 0     Sig: Take 1 tablet (50 mg total) by mouth every 6 (six) hours as needed for Pain. Take 1-2 tabs by mouth every 8 hours as needed for pain      RX Route: Normal

## 2025-02-17 NOTE — TELEPHONE ENCOUNTER
Staff contacted [facility] and asked about controlled substances, the staff at pharmacy informed me that they do mail out controlled substances such as Tramadol.

## 2025-02-17 NOTE — TELEPHONE ENCOUNTER
Great - I sent in a refill through mail order - please let pt or daughter who is her primary caregiver know

## 2025-02-24 DIAGNOSIS — Z00.00 ENCOUNTER FOR MEDICARE ANNUAL WELLNESS EXAM: ICD-10-CM

## 2025-03-05 DIAGNOSIS — E78.49 OTHER HYPERLIPIDEMIA: ICD-10-CM

## 2025-03-06 RX ORDER — ATORVASTATIN CALCIUM 20 MG/1
20 TABLET, FILM COATED ORAL DAILY
Qty: 90 TABLET | Refills: 3 | Status: SHIPPED | OUTPATIENT
Start: 2025-03-06

## 2025-03-06 NOTE — TELEPHONE ENCOUNTER
Refill Routing Note   Medication(s) are not appropriate for processing by [facility] for the following reason(s):      Required labs outdated    [facility] action(s):  Defer Care Due:  None identified            Appointments  past 12m or future 3m with PCP    Date Provider   Last Visit   2/5/2025 Savana Anderson MD   Next Visit   5/8/2025 Savana Anderson MD   ED visits in past 90 days: 0        Note composed:8:25 AM 03/06/2025

## 2025-03-06 NOTE — TELEPHONE ENCOUNTER
No care due was identified.  Health [facility] Embedded Care Due Messages. Reference number: [id number].   3/05/2025 7:53:18 PM CST

## 2025-03-13 ENCOUNTER — PATIENT MESSAGE (OUTPATIENT)
Dept: INTERNAL MEDICINE | Facility: CLINIC | Age: 80
End: 2025-03-13
Payer: MEDICARE

## 2025-03-13 ENCOUNTER — LAB VISIT (OUTPATIENT)
Dept: LAB | Facility: HOSPITAL | Age: 80
End: 2025-03-13
Attending: STUDENT IN AN ORGANIZED HEALTH CARE EDUCATION/TRAINING PROGRAM
Payer: MEDICARE

## 2025-03-13 DIAGNOSIS — E03.9 ACQUIRED HYPOTHYROIDISM: ICD-10-CM

## 2025-03-13 DIAGNOSIS — M80.00XA AGE-RELATED OSTEOPOROSIS WITH CURRENT PATHOLOGICAL FRACTURE, INITIAL ENCOUNTER: ICD-10-CM

## 2025-03-13 LAB
25(OH)D3+25(OH)D2 SERPL-MCNC: 29 NG/ML (ref 30–96)
CALCIUM 24H UR-MRATE: 5 MG/HR (ref 4–12)
CALCIUM UR-MCNC: 11.2 MG/DL (ref 0–15)
CALCIUM URINE (MG/SPEC): 123 MG/SPEC
CREAT 24H UR-MRATE: 22.5 MG/HR (ref 40–75)
CREAT UR-MCNC: 49 MG/DL (ref 15–325)
CREATININE, URINE (MG/SPEC): 539 MG/SPEC
TSH SERPL DL<=0.005 MIU/L-ACNC: 2.84 UIU/ML (ref 0.4–4)
URINE COLLECTION DURATION: 24 HR
URINE COLLECTION DURATION: 24 HR
URINE VOLUME: 1100 ML
URINE VOLUME: 1100 ML

## 2025-03-13 PROCEDURE — 36415 COLL VENOUS BLD VENIPUNCTURE: CPT | Mod: PN | Performed by: STUDENT IN AN ORGANIZED HEALTH CARE EDUCATION/TRAINING PROGRAM

## 2025-03-13 PROCEDURE — 82306 VITAMIN D 25 HYDROXY: CPT | Performed by: STUDENT IN AN ORGANIZED HEALTH CARE EDUCATION/TRAINING PROGRAM

## 2025-03-13 PROCEDURE — 84443 ASSAY THYROID STIM HORMONE: CPT | Performed by: STUDENT IN AN ORGANIZED HEALTH CARE EDUCATION/TRAINING PROGRAM

## 2025-03-13 PROCEDURE — 82340 ASSAY OF CALCIUM IN URINE: CPT | Performed by: STUDENT IN AN ORGANIZED HEALTH CARE EDUCATION/TRAINING PROGRAM

## 2025-03-13 PROCEDURE — 82570 ASSAY OF URINE CREATININE: CPT | Performed by: STUDENT IN AN ORGANIZED HEALTH CARE EDUCATION/TRAINING PROGRAM

## 2025-03-14 ENCOUNTER — RESULTS FOLLOW-UP (OUTPATIENT)
Dept: ENDOCRINOLOGY | Facility: CLINIC | Age: 80
End: 2025-03-14

## 2025-03-21 ENCOUNTER — PATIENT MESSAGE (OUTPATIENT)
Dept: ADMINISTRATIVE | Facility: OTHER | Age: 80
End: 2025-03-21
Payer: MEDICARE

## 2025-03-24 ENCOUNTER — LAB VISIT (OUTPATIENT)
Dept: LAB | Facility: HOSPITAL | Age: 80
End: 2025-03-24
Payer: MEDICARE

## 2025-03-24 ENCOUNTER — EXTERNAL HOME HEALTH (OUTPATIENT)
Dept: HOME HEALTH SERVICES | Facility: HOSPITAL | Age: 80
End: 2025-03-24
Payer: MEDICARE

## 2025-03-24 DIAGNOSIS — D46.9 MDS (MYELODYSPLASTIC SYNDROME): ICD-10-CM

## 2025-03-24 DIAGNOSIS — D46.9 MDS (MYELODYSPLASTIC SYNDROME): Chronic | ICD-10-CM

## 2025-03-24 LAB
ABSOLUTE NEUTROPHIL MANUAL (OHS): 14.3 K/UL
ALBUMIN SERPL BCP-MCNC: 3.5 G/DL (ref 3.5–5.2)
ALP SERPL-CCNC: 157 UNIT/L (ref 40–150)
ALT SERPL W/O P-5'-P-CCNC: 10 UNIT/L (ref 10–44)
ANION GAP (OHS): 7 MMOL/L (ref 8–16)
AST SERPL-CCNC: 16 UNIT/L (ref 11–45)
BILIRUB SERPL-MCNC: 0.5 MG/DL (ref 0.1–1)
BUN SERPL-MCNC: 21 MG/DL (ref 8–23)
CALCIUM SERPL-MCNC: 8.9 MG/DL (ref 8.7–10.5)
CHLORIDE SERPL-SCNC: 107 MMOL/L (ref 95–110)
CO2 SERPL-SCNC: 25 MMOL/L (ref 23–29)
CREAT SERPL-MCNC: 0.7 MG/DL (ref 0.5–1.4)
ERYTHROCYTE [DISTWIDTH] IN BLOOD BY AUTOMATED COUNT: 17.5 % (ref 11.5–14.5)
GFR SERPLBLD CREATININE-BSD FMLA CKD-EPI: >60 ML/MIN/1.73/M2
GLUCOSE SERPL-MCNC: 96 MG/DL (ref 70–110)
HCT VFR BLD AUTO: 35 % (ref 37–48.5)
HGB BLD-MCNC: 10.9 GM/DL (ref 12–16)
LDH SERPL-CCNC: 271 U/L (ref 110–260)
LYMPHOCYTES NFR BLD MANUAL: 9 % (ref 18–48)
MAGNESIUM SERPL-MCNC: 2.1 MG/DL (ref 1.6–2.6)
MCH RBC QN AUTO: 29.9 PG (ref 27–50)
MCHC RBC AUTO-ENTMCNC: 31.1 G/DL (ref 32–36)
MCV RBC AUTO: 96 FL (ref 82–98)
METAMYELOCYTES NFR BLD MANUAL: 6 %
MONOCYTES NFR BLD MANUAL: 8 % (ref 4–15)
MYELOCYTES NFR BLD MANUAL: 3 %
NEUTROPHILS NFR BLD MANUAL: 74 % (ref 38–73)
NUCLEATED RBC (/100WBC) (OHS): 0 /100 WBC
PHOSPHATE SERPL-MCNC: 3.7 MG/DL (ref 2.7–4.5)
PLATELET # BLD AUTO: 288 K/UL (ref 150–450)
PLATELET BLD QL SMEAR: ABNORMAL
PMV BLD AUTO: 8.7 FL (ref 9.2–12.9)
POTASSIUM SERPL-SCNC: 4.3 MMOL/L (ref 3.5–5.1)
PROT SERPL-MCNC: 7.4 GM/DL (ref 6–8.4)
RBC # BLD AUTO: 3.64 M/UL (ref 4–5.4)
SODIUM SERPL-SCNC: 139 MMOL/L (ref 136–145)
URATE SERPL-MCNC: 5.9 MG/DL (ref 2.4–5.7)
WBC # BLD AUTO: 19.34 K/UL (ref 3.9–12.7)

## 2025-03-24 PROCEDURE — 85027 COMPLETE CBC AUTOMATED: CPT | Mod: PN

## 2025-03-24 PROCEDURE — 36415 COLL VENOUS BLD VENIPUNCTURE: CPT | Mod: PN

## 2025-03-24 PROCEDURE — 84550 ASSAY OF BLOOD/URIC ACID: CPT | Mod: PN

## 2025-03-24 PROCEDURE — 84460 ALANINE AMINO (ALT) (SGPT): CPT | Mod: PN

## 2025-03-24 PROCEDURE — 83615 LACTATE (LD) (LDH) ENZYME: CPT | Mod: PN

## 2025-03-24 PROCEDURE — 83735 ASSAY OF MAGNESIUM: CPT | Mod: PN

## 2025-03-24 PROCEDURE — 84100 ASSAY OF PHOSPHORUS: CPT | Mod: PN

## 2025-03-25 ENCOUNTER — OFFICE VISIT (OUTPATIENT)
Dept: HEMATOLOGY/ONCOLOGY | Facility: CLINIC | Age: 80
End: 2025-03-25
Payer: MEDICARE

## 2025-03-25 ENCOUNTER — DOCUMENT SCAN (OUTPATIENT)
Dept: HOME HEALTH SERVICES | Facility: HOSPITAL | Age: 80
End: 2025-03-25
Payer: MEDICARE

## 2025-03-25 DIAGNOSIS — D46.9 MDS (MYELODYSPLASTIC SYNDROME): Primary | ICD-10-CM

## 2025-03-25 DIAGNOSIS — D63.0 ANEMIA IN NEOPLASTIC DISEASE: ICD-10-CM

## 2025-03-25 NOTE — PROGRESS NOTES
HEMATOLOGIC MALIGNANCIES PROGRESS NOTE    IDENTIFYING STATEMENT   Niurka Castellon) is a 80 y.o. female with a  of 1945 from [place], [place] with the diagnosis of MDS.      TELEMEDICINE DOCUMENTATION    The patient location is: [place]  The chief complaint leading to consultation is: MDS    Visit type: audiovisual    Face to Face time with patient: 13 minutes  30 minutes of total time spent on the encounter, which includes face to face time and non-face to face time preparing to see the patient (eg, review of tests), Obtaining and/or reviewing separately obtained history, Documenting clinical information in the electronic or other health record, Independently interpreting results (not separately reported) and communicating results to the patient/family/caregiver, or Care coordination (not separately reported).         Each patient to whom he or she provides medical services by telemedicine is:  (1) informed of the relationship between the physician and patient and the respective role of any other health care provider with respect to management of the patient; and (2) notified that he or she may decline to receive medical services by telemedicine and may withdraw from such care at any time.    Notes:       ONCOLOGY HISTORY:    1. Myelodysplastic syndrome with increased blasts-2              A. 2023: Bone marrow biopsy - 65-75% cellular marrow consistent with myelodysplastic syndrome with excess blasts-2; cytogenetics 46,XX,del(3)(q12)[2]/47,sl,+8[18]; NGS not sent; IPSS-R score of 7 = very high risk   B. 2023: Cycle 1 azacitidine-venetoclax (5 days aza, 14 days angelo) for MDS/AML   C. 10/18/2023: Bone marrow biopsy - 50-60% cellular marrow with no increased blasts and trilineage hematopoiesis with granulocytic hypoplasia and moderate dysgranulopoiesis, mildly left-shifted marked erythroid hyperplasia with severe dyserythropoiesis, and marked megakaryocytic hyperplasia with predominantly variably  sized including small del3q-like monolobated forms; 3-4+ histiocytic iron stores; focal MF-1; cytogenetics 46,XX[20]; NGS shows ASXL1 (26%), SRSF2 (33%) and STAG2 (3%).     2. Neuropathy  3. Interstitial lung disease/pulmonary fibrosis  4. Hypertension  5. Aortic atherosclerosis  6. Pulmonary coccidioidomycosis  7. Hypothyroidism  8. Hyperparathyroidism    INTERVAL HISTORY:      Ms. Pedraza is seen in this virtual visit in follow-up of MDS/AML.    - much more weak and unstable; difficult to walk - it's - been this way since last virtual visit  - she is having urinary frequency and dysuria; concerned about kidney stones  - balance is not there; weaker; sleeping more (can sleep entire day)    Past Medical History, Past Social History and Past Family History have been reviewed and are unchanged except as noted in the interval history.    MEDICATIONS:     Current Outpatient Medications on File Prior to Visit   Medication Sig Dispense Refill    acetaminophen (TYLENOL) 325 MG tablet Take 2 tablets (650 mg total) by mouth every 6 (six) hours as needed for Pain.      acyclovir (ZOVIRAX) 400 MG tablet Take 1 tablet (400 mg total) by mouth 2 (two) times daily. 60 tablet 11    albuterol-ipratropium (DUO-NEB) 2.5 mg-0.5 mg/3 mL nebulizer solution Take 3 mLs by nebulization every 6 (six) hours as needed for Wheezing or Shortness of Breath. Rescue 75 mL 11    ascorbic acid/zinc (ZINC WITH VITAMIN C ORAL) Take 1 tablet by mouth Daily.      atorvastatin (LIPITOR) 20 MG tablet Take 1 tablet (20 mg total) by mouth once daily. 90 tablet 3    azelastine (ASTELIN) 137 mcg (0.1 %) nasal spray 1 spray (137 mcg total) by Nasal route 2 (two) times daily. 30 mL 6    bisacodyL (DULCOLAX) 10 mg Supp Place 1 suppository (10 mg total) rectally daily as needed (constipation).      calcium-vitamin D3 (CALCIUM 500 + D) 500 mg(1,250mg) -200 unit per tablet Take 1 tablet by mouth 2 (two) times daily with meals.      carvediloL (COREG) 12.5 MG tablet  Take 1 tablet (12.5 mg total) by mouth 2 (two) times daily. 180 tablet 3    cyproheptadine (PERIACTIN) 4 mg tablet Take 1 tablet (4 mg total) by mouth 3 (three) times daily as needed (decreased appetite). 30 tablet 3    docusate sodium (COLACE) 100 MG capsule Take 1 capsule (100 mg total) by mouth 2 (two) times daily. 60 capsule 0    estradioL (ESTRACE) 0.01 % (0.1 mg/gram) vaginal cream Place 1 fingertip of cream first at urethral opening and then external vagina. Please do not use plastic applicator 42.5 g 3    ferrous gluconate (FERGON) 324 MG tablet Take 1 tablet (324 mg total) by mouth every other day. 90 tablet 3    levothyroxine (SYNTHROID) 50 MCG tablet Take 1 tablet (50 mcg total) by mouth before breakfast. 90 tablet 1    mirtazapine (REMERON) 7.5 MG Tab Take 1 tablet (7.5 mg total) by mouth every evening. 90 tablet 3    multivitamin (THERAGRAN) per tablet Take 1 tablet by mouth once daily.      naloxone (NARCAN) 4 mg/actuation Spry 4mg by nasal route as needed for opioid overdose; may repeat every 2-3 minutes in alternating nostrils until medical help arrives. Call 911 1 each 11    ondansetron (ZOFRAN-ODT) 4 MG TbDL Take 1 tablet (4 mg total) by mouth every 6 (six) hours as needed (nausea). 30 tablet 0    pantoprazole (PROTONIX) 20 MG tablet Take 1 tablet (20 mg total) by mouth once daily. 90 tablet 1    polyethylene glycol (GLYCOLAX) 17 gram PwPk Take 17 g by mouth once daily. 30 packet 0    potassium chloride (K-TAB) 20 mEq Take 20 mEq by mouth.      predniSONE (DELTASONE) 1 MG tablet Take 2 tablets (2 mg total) by mouth once daily. 180 tablet 3    sodium chloride 3% 3 % nebulizer solution Take 4 mLs by nebulization as needed for Other. 120 mL 3    traMADoL (ULTRAM) 50 mg tablet Take 1 tablet (50 mg total) by mouth every 6 (six) hours as needed for Pain. Take 1-2 tabs by mouth every 8 hours as needed for pain 90 each 0    [DISCONTINUED] budesonide-formoterol 80-4.5 mcg (SYMBICORT) 80-4.5 mcg/actuation  HFAA Inhale 2 puffs into the lungs 2 (two) times daily as needed. Controller 1 Inhaler 6     No current facility-administered medications on file prior to visit.       ALLERGIES:   Review of patient's allergies indicates:   Allergen Reactions    Ciprofloxacin Other (See Comments)     Right foot pain and edema, tendonitis    Amlodipine Edema and Swelling     BLE  BLE    Lisinopril Other (See Comments)     cough    Nitrofuran analogues         ROS:       Review of Systems   Constitutional:  Positive for appetite change. Negative for unexpected weight change.   HENT:   Negative for mouth sores.    Respiratory:  Negative for cough and shortness of breath.    Cardiovascular:  Negative for chest pain.   Gastrointestinal:  Negative for abdominal pain and diarrhea.   Genitourinary:  Positive for frequency.    Musculoskeletal:  Positive for back pain.   Skin:  Negative for rash.   Neurological:  Negative for headaches.   Hematological:  Negative for adenopathy.   Psychiatric/Behavioral:  The patient is not nervous/anxious.        PHYSICAL EXAM:  There were no vitals filed for this visit.    Physical Exam  Head and neck visualized. No abnormalities seen.     LAB:   Results for orders placed or performed in visit on 03/24/25   Comprehensive Metabolic Panel    Collection Time: 03/24/25  3:14 PM   Result Value Ref Range    Sodium 139 136 - 145 mmol/L    Potassium 4.3 3.5 - 5.1 mmol/L    Chloride 107 95 - 110 mmol/L    CO2 25 23 - 29 mmol/L    Glucose 96 70 - 110 mg/dL    BUN 21 8 - 23 mg/dL    Creatinine 0.7 0.5 - 1.4 mg/dL    Calcium 8.9 8.7 - 10.5 mg/dL    Protein Total 7.4 6.0 - 8.4 gm/dL    Albumin 3.5 3.5 - 5.2 g/dL    Bilirubin Total 0.5 0.1 - 1.0 mg/dL     (H) 40 - 150 unit/L    AST 16 11 - 45 unit/L    ALT 10 10 - 44 unit/L    Anion Gap 7 (L) 8 - 16 mmol/L    eGFR >60 >60 mL/min/1.73/m2   Magnesium    Collection Time: 03/24/25  3:14 PM   Result Value Ref Range    Magnesium  2.1 1.6 - 2.6 mg/dL   Phosphorus     Collection Time: 03/24/25  3:14 PM   Result Value Ref Range    Phosphorus Level 3.7 2.7 - 4.5 mg/dL   Lactate Dehydrogenase    Collection Time: 03/24/25  3:14 PM   Result Value Ref Range    Lactate Dehydrogenase 271 (H) 110 - 260 U/L   Uric Acid    Collection Time: 03/24/25  3:14 PM   Result Value Ref Range    Uric Acid 5.9 (H) 2.4 - 5.7 mg/dL   CBC with Differential    Collection Time: 03/24/25  3:14 PM   Result Value Ref Range    WBC 19.34 (H) 3.90 - 12.70 K/uL    RBC 3.64 (L) 4.00 - 5.40 M/uL    HGB 10.9 (L) 12.0 - 16.0 gm/dL    HCT 35.0 (L) 37.0 - 48.5 %    MCV 96 82 - 98 fL    MCH 29.9 27.0 - 50.0 pg    MCHC 31.1 (L) 32.0 - 36.0 g/dL    RDW 17.5 (H) 11.5 - 14.5 %    Platelet Count 288 150 - 450 K/uL    MPV 8.7 (L) 9.2 - 12.9 fL    Nucleated RBC 0 <=0 /100 WBC   Manual Differential    Collection Time: 03/24/25  3:14 PM   Result Value Ref Range    Gran # (ANC) 14.3 K/uL    Segmented Neutrophil % 74.0 (H) 38.0 - 73.0 %    Lymphocyte % 9.0 (L) 18.0 - 48.0 %    Monocyte % 8.0 4.0 - 15.0 %    Metamyelocyte % 6.0 %    Myelocyte % 3.0 %    Platelet Estimate Appears Normal       *Note: Due to a large number of results and/or encounters for the requested time period, some results have not been displayed. A complete set of results can be found in Results Review.       PROBLEMS ASSESSED THIS VISIT:    1. MDS (myelodysplastic syndrome)    2. Anemia in neoplastic disease          PLAN:       Myelodysplastic syndrome  Ms. 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 We reviewed that this is an aggressive hematologic malignancy with high probability of evolution to acute myeloid leukemia (AML). According to the ICC pathologic designation, this is referred to as MDS/AML.     Bone marrow biopsy after 1 cycle of aza-angelo showed resolution of increased blasts with ongoing morphologic changes consistent with MDS. We reviewed that this represents a favorable response to therapy. We therefore continued aza-angelo therapy, and she completed 6 cycles. Therapy was  interrupted due to infectious complications with diverticulitis, appendicitis, and hospitalization.     Bone marrow biopsy on 4/10/2024 demonstrated 40-50% cellular marrow with dysplastic changes but no increase in blasts. ASXL1, JAK2, SRSF2 mutations were detected. No increase in blasts.     She presently has only mild anemia but no significant leukopenia or thrombocytopenia. Based on her worsened performance status, complicated hospitalizations, and reduced performance status, we discussed that we will forgo additional therapy at this time and monitor her disease. We can discuss management if she develops worsening cytopenias or progression towards AML.     Immunodeficiency  Antimicrobial prophylaxis as follows:  - HSV/VZV: acyclovir 400 mg PO BID    Anemia   Due to MDS/AML. Mild. Continue to monitor.     Follow-up  3 months     Mohit Centeno MD  Hematology and Stem Cell Transplant    Visit today included increased complexity associated with the care of the episodic problem MDS addressed and managing the longitudinal care of the patient due to the serious and/or complex managed problem(s) MDS.

## 2025-03-27 ENCOUNTER — TELEPHONE (OUTPATIENT)
Dept: UROLOGY | Facility: CLINIC | Age: 80
End: 2025-03-27
Payer: MEDICARE

## 2025-03-27 NOTE — TELEPHONE ENCOUNTER
Staff called pt to confirm appt on April 2nd. Pt answered, and hung up three times, unable to leave voicemail.

## 2025-04-01 NOTE — TELEPHONE ENCOUNTER
----- Message from Davi Arias sent at 10/26/2023 10:01 AM CDT -----  Name of Who is Calling: [facility] IN REGARDS TO NEETA PINTO [[phone]]           What is the request in detail: CLIENT stated that he would like to speak with the office directly in regards to his questions/concerns, CLIENT  DID NOT GIVE INFO ON WHAT IT WAS IN REGARDS TO .Please contact to further discuss and advise.           Can the clinic reply by [facility]: NO           What Number to Call Back if not [facility]:  [phone]      
Spoke to Name of Who is Calling: [facility] and asked if the office received forms that was faxed to office.  States that they will refax the form.  
No

## 2025-04-02 ENCOUNTER — OFFICE VISIT (OUTPATIENT)
Dept: UROLOGY | Facility: CLINIC | Age: 80
End: 2025-04-02
Attending: INTERNAL MEDICINE
Payer: MEDICARE

## 2025-04-02 VITALS
SYSTOLIC BLOOD PRESSURE: 109 MMHG | DIASTOLIC BLOOD PRESSURE: 74 MMHG | HEIGHT: 62 IN | BODY MASS INDEX: 24.67 KG/M2 | HEART RATE: 75 BPM | OXYGEN SATURATION: 99 % | WEIGHT: 134.06 LBS

## 2025-04-02 DIAGNOSIS — R30.0 DYSURIA: ICD-10-CM

## 2025-04-02 DIAGNOSIS — N39.0 RECURRENT UTI: Primary | ICD-10-CM

## 2025-04-02 DIAGNOSIS — N32.9 LESION OF BLADDER: ICD-10-CM

## 2025-04-02 DIAGNOSIS — N32.81 OAB (OVERACTIVE BLADDER): ICD-10-CM

## 2025-04-02 PROCEDURE — 87077 CULTURE AEROBIC IDENTIFY: CPT | Performed by: UROLOGY

## 2025-04-02 PROCEDURE — G2211 COMPLEX E/M VISIT ADD ON: HCPCS | Mod: S$GLB,,, | Performed by: UROLOGY

## 2025-04-02 PROCEDURE — 99214 OFFICE O/P EST MOD 30 MIN: CPT | Mod: S$GLB,,, | Performed by: UROLOGY

## 2025-04-02 RX ORDER — VIBEGRON 75 MG/1
75 TABLET, FILM COATED ORAL DAILY
Qty: 30 TABLET | Refills: 11 | Status: SHIPPED | OUTPATIENT
Start: 2025-04-02

## 2025-04-02 RX ORDER — METHENAMINE, SODIUM PHOSPHATE, MONOBASIC, ANHYDROUS, PHENYL SALICYLATE, METHYLENE BLUE AND HYOSCYAMINE SULFATE 118; 40.8; 36; 10; .12 MG/1; MG/1; MG/1; MG/1; MG/1
1 CAPSULE ORAL 3 TIMES DAILY PRN
Qty: 40 CAPSULE | Refills: 11 | Status: SHIPPED | OUTPATIENT
Start: 2025-04-02

## 2025-04-02 NOTE — PROGRESS NOTES
"  Subjective:       Niurka Pedraza is a 80 y.o. female who is an established patient with [facility] urologist, Dr Dhaliwal,  was seen for evaluation of UTI.      Long history of rUTIs. She was seen by another urologist for recurrent UTIs/dysuria. Multiple +UCx with different bacteria (E coli, Enterococcus). She has had negative cystoscopy and GONZÁLEZ (1/17). UTIs return soon after treatment. She was started on prophy Keflex in 7/2017, stopped, then nitrofurantoin ppx started in 2019, changed back to Keflex 2021 due to concern for pulm fibrosis.     Denies dysuria. Increased frequency to q1h. Present for 2 months. Denies constipation, occasional diarrhea. Frequent RADHA. Now with UUI. Also with BOBBI - increased coughing with recent lung issue. Nocturia x 4-5. Denies gross hematuria.     PVR (bladder scan)prior visits - 32cc, 0cc, 21cc, 22cc.    CT uro - no  abnormalities. Lung nodules - followed by pulmonary. Ovarian cyst - referred to gyn (now s/p DARCI-BSO for ovarian cancer).     On Ditropan in past for OAB symptoms. +probiotics.     Progressed to multiresistant recurrent UTIs requiring IV abx - treated by ID (Dr Rishi Dempsey). Has been treated with IV abx. Not using Estrace.   PVR (bladder scan) today - 22c    Cytology 7/22 - no malignant cells    Cysto 5/19 - normal, heavy sediment in bladder    Cysto 7/22 - very abnormal appearing bladder mucosa with widespread "scaly" yellow/white areas L>R. Easily detached from bladder wall without bleeding. No tumors.     GONZÁLEZ 4/19 - wnl, PVR 31cc  CT uro 9/20 - negative, pulm nodules (seeing PCP)  GONZÁLEZ 7/22 - no hydro/stones. PVR 51cc    OR 7/27/22 - TUR of large red-brown plaque along entire L lateral wall with white/silver appearance of bladder under plaque. Other plaques noted throoughout bladder, removed. Bladder biopsies - Polypoid keratinizing squamous mucosa with hemorrhagic stroma and reactive epithelial changes. No malignancy. Plaques were keratinaceous debris and blood. " "    Cysto 1/23 - Similar appearing yellow "plaques" on the bladder wall, easily scraped free of bladder wall with minimal manipulation of scope. Non-bleeding. Less area than previous but still large area in L lateral, small in R lateral      Still with many multiresistant E coli UTIs. Seeing ID. Seeing heme/onc for MDS/AML cancer. Reports she was recommended for repeat cysto. Was on Ditropan 15mg - +frequency q2h, nocturia. Changed to Gemtesa. Using Estrace cream.     Cysto done 1/2025 by Dr Dhaliwal ([facility] urology) with similar findings of severe squamous metaplasia with overlying calcifications. ReTURBT was held due to lack of significant improvement previously. She reports continued bladder pain with recent negative urine cultures. Pain worse at end of stream.     Gemtesa now cost prohibitory.         UCx:  Multiple UTIs in last year (8+) - mostly multiresistant ESBL E coli.   7/22, 10/22, 5/23, 7/23, 7/23 - >100k E coli ESBL (multiresistant)  7/21 - >100k Enterococcus  2/21 - 50-99k Proteus  9/20 - >100k Proteus  7/20 - >100k E coli, 10-49k Proteus  7/20 - >100k Proteus   5/20 - 10-49k Klebsiella  9/19 - >100k Klebsiella  8/19 - >100k Klebsiella  6/19 - >100k E coli  5/19 - neg  5/19 - >100k E coli  3/19 - >100k E coli  2/19 - >100k E coli  1/19 - >100k E coli  11/18 - >100k E coli  8/18 - contaminant  7/18 - 50-99k Enterobacter (treated with Bactrim)  11/17 - neg  10/17 - >100k E coli  10/17- >100k Enterococcus  7/17 - >100k E coli  5/17- >100k E coli  5/17 - neg  4/17- 50-99k Enterococcus  3/17 - >100k E coli  12/16 - neg     PVR (bladder scan) last visit - 33cc, 50cc, 0cc      The following portions of the patient's history were reviewed and updated as appropriate: allergies, current medications, past family history, past medical history, past social history, past surgical history and problem list.     Review of Systems  Constitutional: no fever or chills  ENT: no nasal congestion or sore " "throat  Respiratory: no cough or shortness of breath  Cardiovascular: no chest pain or palpitations  Gastrointestinal: no nausea or vomiting, tolerating diet  Genitourinary: as per HPI  Hematologic/Lymphatic: no easy bruising or lymphadenopathy  Musculoskeletal: no arthralgias or myalgias  Skin: no rashes or lesions  Neurological: no seizures or tremors  Behavioral/Psych: no auditory or visual hallucinations        Objective:    Vitals: /74 (BP Location: Left arm, Patient Position: Sitting)   Pulse 75   Ht 5' 2" (1.575 m)   Wt 60.8 kg (134 lb 0.6 oz)   LMP 02/08/2000   SpO2 99%   BMI 24.52 kg/m²     Physical Exam   General: well developed, well nourished in no acute distress  Head: normocephalic, atraumatic  Neck: supple, trachea midline, no obvious enlargement of thyroid  HEENT: EOMI, mucus membranes moist, sclera anicteric, no hearing impairment  Lungs: symmetric expansion, non-labored breathing  Neuro: alert and oriented x 3, no gross deficits  Psych: normal judgment and insight, normal mood/affect and non-anxious  Genitourinary:  deferred      Lab Review   Urine analysis today in clinic shows - pending    Lab Results   Component Value Date    WBC 19.34 (H) 03/24/2025    HGB 10.9 (L) 03/24/2025    HGB 11.4 (L) 12/05/2024    HCT 35.0 (L) 03/24/2025    HCT 36.4 (L) 12/05/2024    MCV 96 03/24/2025    MCV 97 12/05/2024     03/24/2025     12/05/2024     Lab Results   Component Value Date    CREATININE 0.7 03/24/2025    BUN 21 03/24/2025       Imaging  Images and reports were personally reviewed by me and discussed with patient  GONZÁLEZ, CT reviewed       Assessment/Plan:      1. Recurrent UTI    - Discussed UTI prevention strategies.   - Adequate hydration.   - Double voiding. Consider timed voiding.    - Avoid constipation.   - GONZÁLEZ - negative 1/17   - Cystoscopy - negative in 1/17 (by another urologist)   - Cranberry/probiotics.   - Estrace cream 2x weekly - +ovarian ca, approved by gyn onc.    " "- Call with UTI symptoms so UA/UCx can be sent.      - GONZÁLEZ 4/19 - wnl   - Cysto 5/19 - wnl   - Continue Estrace (approved by gyn onc). Instructed on use.    - Ellura trial, D-mannose supplement, probiotics     - CT urogram 9/20 - negative   - Recurrent multiresistant UTIs.    - Repeat GONZÁLEZ 7/22 - wnl   - Office cystoscopy with widespread abnormal bladder mucosa. S/p bladder biopsy/TUR of abnormal areas. OR 7/27/22.   - No malignancy   - Suspect related to ulcerative cystitis     - Cystoscopy 1/23 - continued "plaques" on bladder wall, again noted 1/2025 (done by Dr Dhaliwal)   - Considered repeat cysto/TUR of abnormal areas. Discussed. No significant benefit noted previously so this was held.    - UCx by I&O cath today       2. Microhematuria    - Discussed etiology and workup of hematuria   - UCx - results above   - Cytology - previously negative   - CT urogram 12/17 - no  abnormalities. GONZÁLEZ was negative from 1/17.   - Office cystoscopy - negative 1/17, 5/19, 7/22 with abnormalities per above        3. Nocturia/frequency/urge incontinence   - Ditropan XL 15mg - discussed SE, caution use in elderly.   - Reduce PM fluids   - Urgency worsening. Added Gemtesa. Now not covered well - will attempt tier-exception.    4. Bladder pain   - Main concern today   - UCx today   - Trial UroMP PRN      Follow up 2-3mths    Visit today included increased complexity associated with the care of the episodic problem Susan/OAB addressed and managing the longitudinal care of the patient due to the serious and/or complex managed problem(s) Susan/OAB.  "

## 2025-04-03 LAB — BACTERIA UR CULT: ABNORMAL

## 2025-04-04 ENCOUNTER — RESULTS FOLLOW-UP (OUTPATIENT)
Dept: UROLOGY | Facility: HOSPITAL | Age: 80
End: 2025-04-04

## 2025-04-04 ENCOUNTER — TELEPHONE (OUTPATIENT)
Dept: UROLOGY | Facility: CLINIC | Age: 80
End: 2025-04-04
Payer: MEDICARE

## 2025-04-04 RX ORDER — CEPHALEXIN 500 MG/1
500 CAPSULE ORAL EVERY 8 HOURS
Qty: 30 CAPSULE | Refills: 0 | Status: SHIPPED | OUTPATIENT
Start: 2025-04-04 | End: 2025-04-07 | Stop reason: SDUPTHER

## 2025-04-04 NOTE — TELEPHONE ENCOUNTER
Left voice message informing patient urine cx showed UTI and abx was sent to the pharmacy.  DOROTEO HORN  ----- Message from Anaya Oropeza MD sent at 4/4/2025  3:23 PM CDT -----  Please inform patient of urinary tract infection on recent urine culture. Appropriate antibiotics (Keflex) were sent in to the pharmacy.    ----- Message -----  From: Lab, Background User  Sent: 4/3/2025  11:55 PM CDT  To: Anaya Oropeza MD

## 2025-04-07 DIAGNOSIS — S22.39XS CLOSED FRACTURE OF ONE RIB, UNSPECIFIED LATERALITY, SEQUELA: ICD-10-CM

## 2025-04-07 DIAGNOSIS — M54.30 SCIATICA, UNSPECIFIED LATERALITY: ICD-10-CM

## 2025-04-07 DIAGNOSIS — S32.020D COMPRESSION FRACTURE OF L2 VERTEBRA WITH ROUTINE HEALING, SUBSEQUENT ENCOUNTER: ICD-10-CM

## 2025-04-07 RX ORDER — TRAMADOL HYDROCHLORIDE 50 MG/1
50 TABLET ORAL EVERY 6 HOURS PRN
Qty: 90 EACH | Refills: 0 | Status: SHIPPED | OUTPATIENT
Start: 2025-04-07

## 2025-04-07 RX ORDER — CEPHALEXIN 500 MG/1
500 CAPSULE ORAL EVERY 8 HOURS
Qty: 30 CAPSULE | Refills: 0 | Status: SHIPPED | OUTPATIENT
Start: 2025-04-07 | End: 2025-04-17

## 2025-04-07 NOTE — TELEPHONE ENCOUNTER
Refill Encounter    PCP Visits: Recent Visits  Date Type Provider Dept   02/05/25 Office Visit Savana Anderson MD [facility]   07/17/24 Office Visit Savana Anderson MD [facility]   05/16/24 Office Visit Savana Anderson MD [facility]   Showing recent visits within past 360 days and meeting all other requirements  Future Appointments  Date Type Provider Dept   05/08/25 Appointment Savana Anderson MD [facility]   Showing future appointments within next 720 days and meeting all other requirements      Last 3 Blood Pressure:   BP Readings from Last 3 Encounters:   04/02/25 109/74   02/05/25 120/72   01/09/25 118/70     Preferred Pharmacy:   [facility]  [address] [address]  [place] [address]  Phone: [phone] Fax: [phone]    Requested RX:  Requested Prescriptions     Pending Prescriptions Disp Refills    traMADoL (ULTRAM) 50 mg tablet 90 each 0     Sig: Take 1 tablet (50 mg total) by mouth every 6 (six) hours as needed for Pain. Take 1-2 tabs by mouth every 8 hours as needed for pain      RX Route: Normal

## 2025-04-07 NOTE — TELEPHONE ENCOUNTER
No care due was identified.  [facility] Embedded Care Due Messages. Reference number: [id number].   4/07/2025 11:03:45 AM CDT

## 2025-04-10 ENCOUNTER — PATIENT MESSAGE (OUTPATIENT)
Dept: INTERNAL MEDICINE | Facility: CLINIC | Age: 80
End: 2025-04-10
Payer: MEDICARE

## 2025-04-10 NOTE — TELEPHONE ENCOUNTER
Please offer them an appt with me or Trina belle available to review her concerns if they are requesting an appointment.   She can take tyelenol arthritis over the counter and voltaren topical gel if needed to help with symptoms

## 2025-04-11 ENCOUNTER — TELEPHONE (OUTPATIENT)
Dept: INTERNAL MEDICINE | Facility: CLINIC | Age: 80
End: 2025-04-11
Payer: MEDICARE

## 2025-04-11 NOTE — TELEPHONE ENCOUNTER
Spoke to Ms. Pedraza daughter and informed her per Dr. Anderson that   Please offer them an appt with me or Trina - first available to review her concerns if they are requesting an appointment.   She can take tyelenol arthritis over the counter and voltaren topical gel if needed to help with symptoms      Daughter states understanding and states that she will see how the tylenol and voltaren help and will then schedule an appt.

## 2025-04-11 NOTE — TELEPHONE ENCOUNTER
----- Message from Carrie sent at 4/10/2025  2:30 PM CDT -----  Type: Patient callWho called: [facility] ([facility])Does the patient know what this is regarding? Requesting a call back in regards to patient is requesting an order for physical therapy evaluation ; patient states she has been having a lot of weakness ; been complaining for about a week; please advise Would the patient rather a call back or response via [facility]? Call[facility]oleen call back number: [phone] (Office) Additional information:

## 2025-04-17 ENCOUNTER — TELEPHONE (OUTPATIENT)
Dept: INTERNAL MEDICINE | Facility: CLINIC | Age: 80
End: 2025-04-17
Payer: MEDICARE

## 2025-04-17 ENCOUNTER — PATIENT MESSAGE (OUTPATIENT)
Dept: UROLOGY | Facility: CLINIC | Age: 80
End: 2025-04-17
Payer: MEDICARE

## 2025-04-17 ENCOUNTER — PATIENT MESSAGE (OUTPATIENT)
Dept: INTERNAL MEDICINE | Facility: CLINIC | Age: 80
End: 2025-04-17
Payer: MEDICARE

## 2025-04-17 DIAGNOSIS — R26.89 BALANCE PROBLEM: Primary | ICD-10-CM

## 2025-04-17 RX ORDER — METHENAMINE, SODIUM PHOSPHATE, MONOBASIC, ANHYDROUS, PHENYL SALICYLATE, METHYLENE BLUE AND HYOSCYAMINE SULFATE 118; 40.8; 36; 10; .12 MG/1; MG/1; MG/1; MG/1; MG/1
1 CAPSULE ORAL 3 TIMES DAILY PRN
Qty: 40 CAPSULE | Refills: 11 | Status: SHIPPED | OUTPATIENT
Start: 2025-04-17

## 2025-04-17 NOTE — TELEPHONE ENCOUNTER
Please call [facility] and give verbal order for home PT/OT to evaluate and treat - for balance and debility     Per message from 4/11 - the Best call back number for [place] is: [phone] (Office)

## 2025-04-17 NOTE — TELEPHONE ENCOUNTER
----- Message from Alban sent at 4/17/2025 10:41 AM CDT -----  Regarding: High Priority  Type : Patient Call  Who Called : (Home Health Nurse Becki)   Does the patient know what this is regarding?: Requesting a call back in regards to speaking with the provider about physical therapy for the pt. The pt and her family is requesting the PT. Please reach out to the home health nurse at the phone number listed below . I have also listed the fax number under additional info.Please Advise   Would the patient rather a call back or a response via [facility]? Call   Best Call Back Number: [phone]  Additional Information:[phone]

## 2025-04-23 DIAGNOSIS — E03.9 HYPOTHYROIDISM, UNSPECIFIED TYPE: ICD-10-CM

## 2025-04-23 DIAGNOSIS — R05.9 COUGH, UNSPECIFIED TYPE: ICD-10-CM

## 2025-04-23 RX ORDER — VIBEGRON 75 MG/1
75 TABLET, FILM COATED ORAL DAILY
Qty: 30 TABLET | Refills: 11 | Status: SHIPPED | OUTPATIENT
Start: 2025-04-23

## 2025-04-23 RX ORDER — PANTOPRAZOLE SODIUM 20 MG/1
20 TABLET, DELAYED RELEASE ORAL DAILY
Qty: 90 TABLET | Refills: 1 | Status: SHIPPED | OUTPATIENT
Start: 2025-04-23

## 2025-04-23 NOTE — TELEPHONE ENCOUNTER
No care due was identified.  [facility] Embedded Care Due Messages. Reference number: [id number].   4/23/2025 3:15:46 PM CDT

## 2025-04-23 NOTE — TELEPHONE ENCOUNTER
Care Due:                  Date            Visit Type   Department     Provider  --------------------------------------------------------------------------------                                EP -                              PRIMARY      [facility] INTERNAL  Last Visit: 02-      CARE ([facility])   MEDICINE       Savana Anderson                              ESTABLISHED                              PATIENT -    [facility] INTERNAL  Next Visit: 05-      VIRTUAL      MEDICINE       Savana Anderson                                                            Last  Test          Frequency    Reason                     Performed    Due Date  --------------------------------------------------------------------------------    Lipid Panel.  12 months..  atorvastatin.............  06- 06-    [facility] Embedded Care Due Messages. Reference number: [id number].   4/23/2025 3:13:45 PM CDT

## 2025-04-24 ENCOUNTER — LAB VISIT (OUTPATIENT)
Dept: LAB | Facility: HOSPITAL | Age: 80
End: 2025-04-24
Attending: INTERNAL MEDICINE
Payer: MEDICARE

## 2025-04-24 ENCOUNTER — TELEPHONE (OUTPATIENT)
Dept: HEMATOLOGY/ONCOLOGY | Facility: CLINIC | Age: 80
End: 2025-04-24
Payer: MEDICARE

## 2025-04-24 DIAGNOSIS — D46.9 MDS (MYELODYSPLASTIC SYNDROME): ICD-10-CM

## 2025-04-24 LAB
ABSOLUTE EOSINOPHIL (OHS): 0.02 K/UL
ABSOLUTE MONOCYTE (OHS): 0.79 K/UL (ref 0.3–1)
ABSOLUTE NEUTROPHIL COUNT (OHS): 11.11 K/UL (ref 1.8–7.7)
ALBUMIN SERPL BCP-MCNC: 3.6 G/DL (ref 3.5–5.2)
ALP SERPL-CCNC: 178 UNIT/L (ref 40–150)
ALT SERPL W/O P-5'-P-CCNC: 22 UNIT/L (ref 10–44)
ANION GAP (OHS): 6 MMOL/L (ref 8–16)
AST SERPL-CCNC: 21 UNIT/L (ref 11–45)
BASOPHILS # BLD AUTO: 0.11 K/UL
BASOPHILS NFR BLD AUTO: 0.8 %
BILIRUB SERPL-MCNC: 0.6 MG/DL (ref 0.1–1)
BUN SERPL-MCNC: 20 MG/DL (ref 8–23)
CALCIUM SERPL-MCNC: 9.1 MG/DL (ref 8.7–10.5)
CHLORIDE SERPL-SCNC: 103 MMOL/L (ref 95–110)
CO2 SERPL-SCNC: 28 MMOL/L (ref 23–29)
CREAT SERPL-MCNC: 0.8 MG/DL (ref 0.5–1.4)
ERYTHROCYTE [DISTWIDTH] IN BLOOD BY AUTOMATED COUNT: 16.9 % (ref 11.5–14.5)
GFR SERPLBLD CREATININE-BSD FMLA CKD-EPI: >60 ML/MIN/1.73/M2
GLUCOSE SERPL-MCNC: 116 MG/DL (ref 70–110)
HCT VFR BLD AUTO: 36.7 % (ref 37–48.5)
HGB BLD-MCNC: 11.6 GM/DL (ref 12–16)
IMM GRANULOCYTES # BLD AUTO: 0.67 K/UL (ref 0–0.04)
IMM GRANULOCYTES NFR BLD AUTO: 4.7 % (ref 0–0.5)
LDH SERPL-CCNC: 226 U/L (ref 110–260)
LYMPHOCYTES # BLD AUTO: 1.69 K/UL (ref 1–4.8)
MAGNESIUM SERPL-MCNC: 2.1 MG/DL (ref 1.6–2.6)
MCH RBC QN AUTO: 30.8 PG (ref 27–31)
MCHC RBC AUTO-ENTMCNC: 31.6 G/DL (ref 32–36)
MCV RBC AUTO: 97 FL (ref 82–98)
NUCLEATED RBC (/100WBC) (OHS): 0 /100 WBC
PHOSPHATE SERPL-MCNC: 4.6 MG/DL (ref 2.7–4.5)
PLATELET # BLD AUTO: 215 K/UL (ref 150–450)
PMV BLD AUTO: 8.8 FL (ref 9.2–12.9)
POTASSIUM SERPL-SCNC: 4.5 MMOL/L (ref 3.5–5.1)
PROT SERPL-MCNC: 7.4 GM/DL (ref 6–8.4)
RBC # BLD AUTO: 3.77 M/UL (ref 4–5.4)
RELATIVE EOSINOPHIL (OHS): 0.1 %
RELATIVE LYMPHOCYTE (OHS): 11.7 % (ref 18–48)
RELATIVE MONOCYTE (OHS): 5.5 % (ref 4–15)
RELATIVE NEUTROPHIL (OHS): 77.2 % (ref 38–73)
SODIUM SERPL-SCNC: 137 MMOL/L (ref 136–145)
URATE SERPL-MCNC: 6.1 MG/DL (ref 2.4–5.7)
WBC # BLD AUTO: 14.39 K/UL (ref 3.9–12.7)

## 2025-04-24 PROCEDURE — 80053 COMPREHEN METABOLIC PANEL: CPT | Mod: PN

## 2025-04-24 PROCEDURE — 83735 ASSAY OF MAGNESIUM: CPT | Mod: PN

## 2025-04-24 PROCEDURE — 84100 ASSAY OF PHOSPHORUS: CPT | Mod: PN

## 2025-04-24 PROCEDURE — 36415 COLL VENOUS BLD VENIPUNCTURE: CPT | Mod: PN

## 2025-04-24 PROCEDURE — 85025 COMPLETE CBC W/AUTO DIFF WBC: CPT | Mod: PN

## 2025-04-24 PROCEDURE — 83615 LACTATE (LD) (LDH) ENZYME: CPT | Mod: PN

## 2025-04-24 PROCEDURE — 84550 ASSAY OF BLOOD/URIC ACID: CPT | Mod: PN

## 2025-04-24 RX ORDER — LEVOTHYROXINE SODIUM 50 UG/1
50 TABLET ORAL
Qty: 90 TABLET | Refills: 3 | Status: SHIPPED | OUTPATIENT
Start: 2025-04-24

## 2025-04-24 NOTE — TELEPHONE ENCOUNTER
Called pt via telephone from call backs about needing to r/s her lab to today instead of tomorrow. Pt labs have been moved today at the [place] location for 4:35 pm.

## 2025-04-24 NOTE — TELEPHONE ENCOUNTER
Provider Staff:  Action required for this patient    Requires labs      Please see care gap opportunities below in Care Due Message.    Thanks!  [facility]     Appointments      Date Provider   Last Visit   2/5/2025 Savana Anderson MD   Next Visit   5/8/2025 Savana Anderson MD     Refill Decision Note   Niurka Pedraza  is requesting a refill authorization.  Brief Assessment and Rationale for Refill:  Approve     Medication Therapy Plan:         Comments:     Note composed:4:09 PM 04/24/2025

## 2025-04-24 NOTE — TELEPHONE ENCOUNTER
----- Message from Aby sent at 4/24/2025  2:28 PM CDT -----  Regarding: Urgent Consult/Advisory/Appt  Contact: [phone]  Consult/Advisory/Appt  Name Of Caller: pt   Contact Preference:  [phone] Nature of call: Would like to change labs for today if possible. I was unable to reach anyone in the office to r/s Please call to advise thank you

## 2025-04-25 ENCOUNTER — TELEPHONE (OUTPATIENT)
Dept: INTERNAL MEDICINE | Facility: CLINIC | Age: 80
End: 2025-04-25
Payer: MEDICARE

## 2025-04-25 ENCOUNTER — RESULTS FOLLOW-UP (OUTPATIENT)
Dept: INTERNAL MEDICINE | Facility: CLINIC | Age: 80
End: 2025-04-25

## 2025-04-25 NOTE — TELEPHONE ENCOUNTER
----- Message from Laila sent at 4/24/2025  4:49 PM CDT -----  Name of Who is Calling: (Becki) Nurse from Home Health services What is the request in detail: pt stated they showing symptom of possible UTI and a sample was sent to the lab. Please contact to further discuss and advise.  Can the clinic reply by [facility]:call What Number to Call Back if not [facility]: [phone] (Becki )

## 2025-04-28 DIAGNOSIS — R63.4 WEIGHT LOSS: ICD-10-CM

## 2025-04-28 DIAGNOSIS — D50.9 IRON DEFICIENCY ANEMIA, UNSPECIFIED IRON DEFICIENCY ANEMIA TYPE: ICD-10-CM

## 2025-04-28 RX ORDER — FERROUS GLUCONATE 324(38)MG
324 TABLET ORAL EVERY OTHER DAY
Qty: 90 TABLET | Refills: 3 | Status: SHIPPED | OUTPATIENT
Start: 2025-04-28

## 2025-04-28 RX ORDER — MIRTAZAPINE 7.5 MG/1
7.5 TABLET, FILM COATED ORAL NIGHTLY
Qty: 90 TABLET | Refills: 3 | Status: SHIPPED | OUTPATIENT
Start: 2025-04-28 | End: 2026-04-28

## 2025-04-28 RX ORDER — CYPROHEPTADINE HYDROCHLORIDE 4 MG/1
4 TABLET ORAL 3 TIMES DAILY PRN
Qty: 90 TABLET | Refills: 3 | Status: SHIPPED | OUTPATIENT
Start: 2025-04-28

## 2025-04-28 NOTE — TELEPHONE ENCOUNTER
Refill Encounter    PCP Visits: Recent Visits  Date Type Provider Dept   02/05/25 Office Visit Savana Anderson MD [facility] Internal Medicine   07/17/24 Office Visit Savana Anderson MD [facility] Internal Medicine   05/16/24 Office Visit Savana Anderson MD [facility] Internal Medicine   Showing recent visits within past 360 days and meeting all other requirements  Future Appointments  Date Type Provider Dept   05/08/25 Appointment Savana Anderson MD [facility] Internal Medicine   Showing future appointments within next 720 days and meeting all other requirements      Last 3 Blood Pressure:   BP Readings from Last 3 Encounters:   04/02/25 109/74   02/05/25 120/72   01/09/25 118/70     Preferred Pharmacy:   [facility]  [address] [address]  [place] [address]  Phone: [phone] Fax: [phone]        Requested RX:  Requested Prescriptions     Pending Prescriptions Disp Refills    mirtazapine (REMERON) 7.5 MG Tab 90 tablet 3     Sig: Take 1 tablet (7.5 mg total) by mouth every evening.    ferrous gluconate (FERGON) 324 MG tablet 90 tablet 3     Sig: Take 1 tablet (324 mg total) by mouth every other day.      RX Route: Normal

## 2025-04-28 NOTE — TELEPHONE ENCOUNTER
No care due was identified.  [facility] Embedded Care Due Messages. Reference number: [id number].   4/28/2025 12:27:56 PM CDT

## 2025-04-28 NOTE — TELEPHONE ENCOUNTER
No care due was identified.  [facility] Embedded Care Due Messages. Reference number: [id number].   4/28/2025 12:29:47 PM CDT

## 2025-04-28 NOTE — TELEPHONE ENCOUNTER
Refill Routing Note   Medication(s) are not appropriate for processing by [facility] for the following reason(s):        Outside of protocol    [facility] action(s):  Route               Appointments  past 12m or future 3m with PCP    Date Provider   Last Visit   2/5/2025 Savana Anderson MD   Next Visit   4/28/2025 Savana Anderson MD   ED visits in past 90 days: 0        Note composed:1:21 PM 04/28/2025

## 2025-05-08 ENCOUNTER — OFFICE VISIT (OUTPATIENT)
Dept: INTERNAL MEDICINE | Facility: CLINIC | Age: 80
End: 2025-05-08
Attending: INTERNAL MEDICINE
Payer: MEDICARE

## 2025-05-08 VITALS — BODY MASS INDEX: 24.52 KG/M2 | HEIGHT: 62 IN

## 2025-05-08 DIAGNOSIS — R63.4 WEIGHT LOSS: ICD-10-CM

## 2025-05-08 DIAGNOSIS — D50.9 IRON DEFICIENCY ANEMIA, UNSPECIFIED IRON DEFICIENCY ANEMIA TYPE: ICD-10-CM

## 2025-05-08 DIAGNOSIS — Z13.220 ENCOUNTER FOR LIPID SCREENING FOR CARDIOVASCULAR DISEASE: ICD-10-CM

## 2025-05-08 DIAGNOSIS — S22.39XS CLOSED FRACTURE OF ONE RIB, UNSPECIFIED LATERALITY, SEQUELA: ICD-10-CM

## 2025-05-08 DIAGNOSIS — H91.8X3 OTHER SPECIFIED HEARING LOSS OF BOTH EARS: Primary | ICD-10-CM

## 2025-05-08 DIAGNOSIS — M54.30 SCIATICA, UNSPECIFIED LATERALITY: ICD-10-CM

## 2025-05-08 DIAGNOSIS — Z13.6 ENCOUNTER FOR LIPID SCREENING FOR CARDIOVASCULAR DISEASE: ICD-10-CM

## 2025-05-08 DIAGNOSIS — S32.020D COMPRESSION FRACTURE OF L2 VERTEBRA WITH ROUTINE HEALING, SUBSEQUENT ENCOUNTER: ICD-10-CM

## 2025-05-08 DIAGNOSIS — G47.00 INSOMNIA, UNSPECIFIED TYPE: ICD-10-CM

## 2025-05-08 PROCEDURE — 98005 SYNCH AUDIO-VIDEO EST LOW 20: CPT | Mod: 95,,, | Performed by: INTERNAL MEDICINE

## 2025-05-08 RX ORDER — MIRTAZAPINE 15 MG/1
15 TABLET, FILM COATED ORAL NIGHTLY
Qty: 90 TABLET | Refills: 3 | Status: SHIPPED | OUTPATIENT
Start: 2025-05-08 | End: 2026-05-08

## 2025-05-08 RX ORDER — CYPROHEPTADINE HYDROCHLORIDE 4 MG/1
4 TABLET ORAL 3 TIMES DAILY PRN
Qty: 90 TABLET | Refills: 3 | Status: SHIPPED | OUTPATIENT
Start: 2025-05-08

## 2025-05-08 RX ORDER — FERROUS GLUCONATE 324(38)MG
324 TABLET ORAL EVERY OTHER DAY
Qty: 90 TABLET | Refills: 3 | Status: SHIPPED | OUTPATIENT
Start: 2025-05-08

## 2025-05-08 RX ORDER — TRAMADOL HYDROCHLORIDE 50 MG/1
50 TABLET, FILM COATED ORAL EVERY 6 HOURS PRN
Qty: 90 EACH | Refills: 0 | Status: SHIPPED | OUTPATIENT
Start: 2025-05-08 | End: 2025-05-08

## 2025-05-08 RX ORDER — TRAMADOL HYDROCHLORIDE 50 MG/1
TABLET, FILM COATED ORAL
Qty: 90 EACH | Refills: 0 | Status: SHIPPED | OUTPATIENT
Start: 2025-05-08

## 2025-05-08 NOTE — PROGRESS NOTES
The patient location is: [place]  The chief complaint leading to consultation is: sciatica, insomnia    Visit type: audiovisual    Face to Face time with patient: 28 min  29 minutes of total time spent on the encounter, which includes face to face time and non-face to face time preparing to see the patient (eg, review of tests), Obtaining and/or reviewing separately obtained history, Documenting clinical information in the electronic or other health record, Independently interpreting results (not separately reported) and communicating results to the patient/family/caregiver, or Care coordination (not separately reported).     Each patient to whom he or she provides medical services by telemedicine is:  (1) informed of the relationship between the physician and patient and the respective role of any other health care provider with respect to management of the patient; and (2) notified that he or she may decline to receive medical services by telemedicine and may withdraw from such care at any time.    Notes:   Subjective:   Patient ID: Niurka Pedraza is a 80 y.o. female  Chief complaint:   Chief Complaint   Patient presents with    Hypertension     F/u     History of Present Illness    CHIEF COMPLAINT:  Patient presents today for follow up of multiple chronic conditions  Accompanied by her daughter today     URINARY SYMPTOMS:  She continues to experience chronic urinary symptoms including pain, frequency, burning sensation after urination, and persistent feeling of needing to urinate despite empty bladder. She continues Gemtesa for urinary frequency. Urine culture was negative.  Followed by urology    ENT:  She reports complete hearing loss in right ear. She is overdue for yearly audiometry. She also reports earwax buildup and notes previous ear irrigation was beneficial.    CHRONIC PAIN:  She reports back pain that is well-controlled with tramadol for breakthrough pain when symptoms become severe.   reviewed and  "consistent     SLEEP:  She reports difficulty falling asleep at night. She continues mirtazapine 7.5 mg for sleep management.    ROS:  General: -fever, -chills, -fatigue, -weight gain, -weight loss  Eyes: -vision changes, -redness, -discharge  ENT: -ear pain, -nasal congestion, -sore throat, +hearing loss, +difficulty hearing  Cardiovascular: -chest pain, -palpitations, -lower extremity edema  Respiratory: -cough, -shortness of breath  Gastrointestinal: -abdominal pain, -nausea, -vomiting, -diarrhea, -constipation, -blood in stool  Genitourinary: -dysuria, -hematuria, +frequency, +painful urination  Musculoskeletal: -joint pain, -muscle pain, +limb pain, +pain with movement, +back pain  Skin: -rash, -lesion  Neurological: -headache, -dizziness, -numbness, -tingling  Psychiatric: -anxiety, -depression, +sleep difficulty, +difficulty falling asleep         Objective:  Vitals:    05/08/25 1306   Height: 5' 2" (1.575 m)     Body mass index is 24.52 kg/m².            Assessment & Plan    N21.0 Calculus in bladder  R39.15 Urgency of urination  R30.0 Dysuria  H91.91 Unspecified hearing loss, right ear  H61.20 Impacted cerumen, unspecified ear  G89.29 Other chronic pain  G47.00 Insomnia, unspecified  M81.0 Age-related osteoporosis without current pathological fracture    IMPRESSION:  - Reviewed negative urine culture results, indicating resolution of previous infection concern.  - Ruled out kidney stones based on most recent CT.  - Assessed bladder wall irritation as likely cause of ongoing urinary symptoms.  - Evaluated current treatment plan for bladder symptoms, including Estrace, Demano supplement, probiotics, and Gemtesa.  - Current stone management approach (hydration, allowing passage) appropriate; invasive removal not indicated due to potential risks including bladder wall damage.  - BP control good, with current reading of 116/75.  - Recent thyroid labs WNL.    BLADDER CALCULI:  - Identified calcified structures at " the bottom of the bladder consistent with stones on CT of the abdomen, similar to prior study.  - Will manage conservatively due to small size and ability to pass naturally.  - Recommend increasing fluid intake to promote passage of small bladder stones and reduce risk of new stone formation.    URINARY URGENCY AND DYSURIA:  - Patient continues to experience burning sensation after urination and frequent urination.  - Cystoscopy revealed bladder wall irritation, which was discussed as a potential cause of symptoms rather than stones.  - Continued Gemtesa for urinary frequency management and other medications for symptom relief.    RIGHT EAR HEARING LOSS:  - Patient reports loss of hearing in the right ear.  - Noted patient is due for yearly audiometry follow-up.  - Referred to ENT and audiologist for evaluation of hearing loss.    EAR WAX IMPACTION:  - Patient reports earwax buildup and suspects it may be contributing to hearing issues.  - Previous professional ear cleaning was helpful.  - Recommend using Debrox for 1 week to soften earwax before ENT appointment, which will include evaluation for potential earwax removal.    CHRONIC BACK PAIN:  - Patient experiences back pain that is effectively managed with tramadol for breakthrough pain.  - Sent tramadol refill to [place] pharmacy.    INSOMNIA:  - Patient has difficulty sleeping at night.  - Assessed that current low dose of mirtazapine (7.5 mg) may not be sufficient for sleep issues.  - Increased dosage to 15 mg daily to address sleep difficulties.    OSTEOPOROSIS MANAGEMENT:  - Patient is taking vitamin D as recommended by endocrinologist.  - Discussed potential osteoporosis treatments including Evenity and Prolia.  - Recommend scheduling endocrinology appointment to discuss osteoporosis treatment options after dental procedures are completed.    FOLLOW-UP AND ADDITIONAL TESTS:  - Ordered lipid panel to be added to upcoming lab draw on May 23rd (fasting not  required).  - Follow up in 3 months.  - Contact office if any issues arise before next appointment.           Health Maintenance   Topic Date Due    Colorectal Cancer Screening  12/09/2025    DEXA Scan  01/19/2026    Lipid Panel  06/22/2028    TETANUS VACCINE  07/21/2031    Shingles Vaccine  Completed    Influenza Vaccine  Completed    COVID-19 Vaccine  Completed    RSV Vaccine (Age 60+ and Pregnant patients)  Completed    Pneumococcal Vaccines (Age 50+)  Completed       This note was generated with the assistance of ambient listening technology. Verbal consent was obtained by the patient and accompanying visitor(s) for the recording of patient appointment to facilitate this note. I attest to having reviewed and edited the generated note for accuracy, though some syntax or spelling errors may persist. Please contact the author of this note for any clarification.                Subjective:   Patient ID: Niurka Pedraza is a 80 y.o. female  Chief complaint:   Chief Complaint   Patient presents with    Hypertension     F/u       HPI  Here for htn follow up   Accomp by meena today   116/75 today   Hearing loss right          Stil:   Livign with daughter   Has home med equipment:   Bed  Shower chair  rollator  Wheelchair    Receiving palliative care and home health currently    Recurrent uti, lesion of bladder, dysuria:   - on gemtessa and d mannose   - off of oxybutynin   - they are taking otc potassium citrate 99mg/day and she will check otc dosing and will f/u with urology to confirm if ok to continue  - Seen by urology and s/p cystoscopy 1/30/25  Impression:  Severe squamous metaplasia with overlying calcifications.  Plan:  We discussed repeat TURBT.  Previous procedure did not seem to improve her bladder pain.  She will consider options for now.    Osteoporosis - compression fx with hx of penia:   - To f/u with dentist and compelte labs by endo   Ptrb:  - discussed role of fosamax, pot se   Reviewed yue and vit d  "recs   Limited exercise given pain, fatigue, deconditioning   In past: Hx of porosis that improved prev to penia on dexa  - had prev declined tx with fosamax several years ago and improved with ca/vit d and exercise     Back pain: taking 1 tab daily tramadol - if btp will take extra dose     ILD:   Saw pulm 1/2025  - improved and no inhalers currently  Prev:  - was on breo and plan was to stop med after comletes last inh and only use albuterol   - stopped breo at time of pulm appt and doing well off of it     HTN:  Had episode of low bp systolic < 90 and rested and then realized bp was in good range and when bp was at goal would hold and would give it   Then noticed fluctations and then cut coreg in 1/2     MDS:  Chemo pause 2/2 to intol   Bm bx planned  Saw h/o     Saw nsx 7/10/2024    UTI:   Treated with augmentin after cx results returned     Weight loss: improved   Taking periactin     Neuropathy  Stable     Hypothyroidism:  Stable   C/w medication     Hld:   Stable   C/w medication    Hm:   utd    Review of Systems   Constitutional:  Negative for activity change and unexpected weight change.   HENT:  Negative for hearing loss, rhinorrhea and trouble swallowing.    Eyes:  Negative for discharge and visual disturbance.   Respiratory:  Negative for chest tightness and wheezing.    Cardiovascular:  Negative for chest pain and palpitations.   Gastrointestinal:  Negative for blood in stool, constipation, diarrhea and vomiting.   Endocrine: Negative for polydipsia and polyuria.   Genitourinary:  Positive for dysuria. Negative for difficulty urinating, hematuria and menstrual problem.   Musculoskeletal:  Positive for arthralgias. Negative for joint swelling and neck pain.   Neurological:  Positive for weakness. Negative for headaches.   Psychiatric/Behavioral:  Negative for confusion and dysphoric mood.        Objective:  Vitals:    05/08/25 1306   Height: 5' 2" (1.575 m)     Body mass index is 24.52 kg/m².    Physical " Exam  Constitutional:       General: She is not in acute distress.     Appearance: She is well-developed. She is not diaphoretic.   Eyes:      General:         Right eye: No discharge.         Left eye: No discharge.      Conjunctiva/sclera: Conjunctivae normal.   Pulmonary:      Effort: Pulmonary effort is normal. No respiratory distress.   Skin:     Findings: No erythema or rash.   Neurological:      Mental Status: She is alert and oriented to person, place, and time.   Psychiatric:         Behavior: Behavior normal.         Thought Content: Thought content normal.         Judgment: Judgment normal.       Assessment:  1. Other specified hearing loss of both ears    2. Sciatica, unspecified laterality    3. Compression fracture of L2 vertebra with routine healing, subsequent encounter    4. Closed fracture of one rib, unspecified laterality, sequela    5. Weight loss    6. Iron deficiency anemia, unspecified iron deficiency anemia type    7. Insomnia, unspecified type    8. Encounter for lipid screening for cardiovascular disease      Plan:  Other specified hearing loss of both ears  -     Ambulatory referral/consult to ENT; Future; Expected date: 05/15/2025  -     Ambulatory referral/consult to Audiology; Future; Expected date: 05/15/2025    Sciatica, unspecified laterality  -     Discontinue: traMADoL (ULTRAM) 50 mg tablet; Take 1 tablet (50 mg total) by mouth every 6 (six) hours as needed for Pain. Take 1-2 tabs by mouth every 8 hours as needed for pain  Dispense: 90 each; Refill: 0  -     traMADoL (ULTRAM) 50 mg tablet; Take 1 to 2 tablets by mouth every 8 hours as needed for pain  Dispense: 90 each; Refill: 0    Compression fracture of L2 vertebra with routine healing, subsequent encounter  -     Discontinue: traMADoL (ULTRAM) 50 mg tablet; Take 1 tablet (50 mg total) by mouth every 6 (six) hours as needed for Pain. Take 1-2 tabs by mouth every 8 hours as needed for pain  Dispense: 90 each; Refill: 0  -      traMADoL (ULTRAM) 50 mg tablet; Take 1 to 2 tablets by mouth every 8 hours as needed for pain  Dispense: 90 each; Refill: 0    Closed fracture of one rib, unspecified laterality, sequela  -     Discontinue: traMADoL (ULTRAM) 50 mg tablet; Take 1 tablet (50 mg total) by mouth every 6 (six) hours as needed for Pain. Take 1-2 tabs by mouth every 8 hours as needed for pain  Dispense: 90 each; Refill: 0  -     traMADoL (ULTRAM) 50 mg tablet; Take 1 to 2 tablets by mouth every 8 hours as needed for pain  Dispense: 90 each; Refill: 0    Weight loss  -     cyproheptadine (PERIACTIN) 4 mg tablet; Take 1 tablet (4 mg total) by mouth 3 (three) times daily as needed (decreased appetite).  Dispense: 90 tablet; Refill: 3    Iron deficiency anemia, unspecified iron deficiency anemia type  -     ferrous gluconate (FERGON) 324 MG tablet; Take 1 tablet (324 mg total) by mouth every other day.  Dispense: 90 tablet; Refill: 3    Insomnia, unspecified type  -     mirtazapine (REMERON) 15 MG tablet; Take 1 tablet (15 mg total) by mouth every evening.  Dispense: 90 tablet; Refill: 3    Encounter for lipid screening for cardiovascular disease  -     Lipid Panel; Future; Expected date: 05/23/2025      Health Maintenance   Topic Date Due    Colorectal Cancer Screening  12/09/2025    DEXA Scan  01/19/2026    Lipid Panel  06/22/2028    TETANUS VACCINE  07/21/2031    Shingles Vaccine  Completed    Influenza Vaccine  Completed    COVID-19 Vaccine  Completed    RSV Vaccine (Age 60+ and Pregnant patients)  Completed    Pneumococcal Vaccines (Age 50+)  Completed     Visit today included increased complexity associated with the care of the episodic problem sciatica, insomnia addressed and managing the longitudinal care of the patient due to the serious and/or complex managed problem(s) chronic pain, [place].

## 2025-05-14 ENCOUNTER — TELEPHONE (OUTPATIENT)
Dept: INTERNAL MEDICINE | Facility: CLINIC | Age: 80
End: 2025-05-14
Payer: MEDICARE

## 2025-05-14 ENCOUNTER — OFFICE VISIT (OUTPATIENT)
Dept: PALLIATIVE MEDICINE | Facility: CLINIC | Age: 80
End: 2025-05-14
Payer: MEDICARE

## 2025-05-14 DIAGNOSIS — M54.41 CHRONIC MIDLINE LOW BACK PAIN WITH RIGHT-SIDED SCIATICA: ICD-10-CM

## 2025-05-14 DIAGNOSIS — S32.020S COMPRESSION FRACTURE OF L2 VERTEBRA, SEQUELA: ICD-10-CM

## 2025-05-14 DIAGNOSIS — D46.9 MDS (MYELODYSPLASTIC SYNDROME): ICD-10-CM

## 2025-05-14 DIAGNOSIS — J84.10 PULMONARY FIBROSIS: ICD-10-CM

## 2025-05-14 DIAGNOSIS — R26.89 BALANCE PROBLEM: ICD-10-CM

## 2025-05-14 DIAGNOSIS — G89.29 CHRONIC MIDLINE LOW BACK PAIN WITH RIGHT-SIDED SCIATICA: ICD-10-CM

## 2025-05-14 DIAGNOSIS — Z51.5 PALLIATIVE CARE BY SPECIALIST: Primary | ICD-10-CM

## 2025-05-14 DIAGNOSIS — G62.9 NEUROPATHY: ICD-10-CM

## 2025-05-14 RX ORDER — LIDOCAINE 50 MG/G
1 PATCH TOPICAL DAILY
Qty: 30 PATCH | Refills: 2 | Status: SHIPPED | OUTPATIENT
Start: 2025-05-14

## 2025-05-14 NOTE — TELEPHONE ENCOUNTER
Spoke with patients Daughter. She confirmed that the prescriptions for the Wrists and Knees are correct. Can be signed by Dr Anderson upon her Return to clinic. Forms on your desk.  
For information on Fall & Injury Prevention, visit: [email or web address] OR  [email or web address] OR  https://www.cdc.gov/steadi/patient.html

## 2025-05-14 NOTE — PROGRESS NOTES
The patient location is: [place], [place]    Visit type: audiovisual    Face to Face time with patient: 15 minutes  30 minutes of total time spent on the encounter, which includes face to face time and non-face to face time preparing to see the patient (eg, review of tests), Obtaining and/or reviewing separately obtained history, Documenting clinical information in the electronic or other health record, Independently interpreting results (not separately reported) and communicating results to the patient/family/caregiver, or Care coordination (not separately reported).         Each patient to whom he or she provides medical services by telemedicine is:  (1) informed of the relationship between the physician and patient and the respective role of any other health care provider with respect to management of the patient; and (2) notified that he or she may decline to receive medical services by telemedicine and may withdraw from such care at any time.    Notes:     Office Visit  [facility]      Consult Requested By: No ref. provider found  Reason for Consult: goals of care      ASSESSMENT/PLAN:     Plan/Recommendations:  Diagnoses and all orders for this visit:    Palliative care by specialist    Neuropathy  -     Ambulatory Referral/Consult to Physical Therapy    Chronic midline low back pain with right-sided sciatica  -     Ambulatory Referral/Consult to Physical Therapy  -     LIDOcaine (LIDODERM) 5 %; Place 1 patch onto the skin once daily. Remove & Discard patch within 12 hours or as directed by MD    Balance problem  -     Ambulatory Referral/Consult to Physical Therapy    Pulmonary fibrosis    MDS (myelodysplastic syndrome)    Compression fracture of L2 vertebra, sequela          # Palliative care by specialist  # Myelodysplastic syndrome  # Pulmonary fibrosis  Patient with h/o MDS currently under management of Oncology, well supported by her children, her goal is to focus on quality of life, she is  unsure if she wants to continue treatment and will discuss with Dr Centeno. MDS stable at this time, now with less frequently follow up, every 6 months now.   - Follow up heme/oncology    # Vertebral compression fractures  Complains of back pain, knee pain, minimal improvement with tramadol  - Start lidoderm patch once daily  - Encouraged diclofenac gel application to knee  - Refer to outpatient PT  - Counseled on improving pain control to improve function and prevent frailty.   - Continue tramadol 50mg PO TID prn for pain  - Continue miralax PO daily for OIC ppx  - Follow up PCP    # Advance care planning  Living Will and HCPOA on file     Follow up: 3 months or earlier if needed    Patient's encounter and above plan of care discussed with patient's daughter, Luz.     SUBJECTIVE:     History of Present Illness:  Patient is a 80 y.o. year old female presenting with myelodysplastic syndrome, pulmonary fibrosis, hypertension, and multiple infections including MDRO UTIs and pulmonary coccidioidomycosis. Recently admitted to [facility] from 05/05/24-05/09/24. Presents via virtual visit for follow up.     5/14/25:  Had a cystoscopy. Determined not worth taking the stones out. Gave her pain medication for painful urination. Continues to have that pain with urination. Patient feels it is not helping the pain.     Having leg pain and instability. Had pain in right knee limiting flexibility. Having low back pain. Had physical therapy the past two weeks. Was taking tramadol right before doing PT. Released from PT at home.     Appetite is improved. Taking cyproheptadine once per day at lunch time. Feels it is helping.     Admits to mild constipation.     Back is still hurting despite using tramadol. Takes several attempts to stand up.     02/12/2025:  Gained weight. More mobility than prior. Using wheelchair, walker, cane depending on how she is feeling. Having pain which is the worst symptom. Having a harder time to get up from the  "chair. Planning to resume exercises to strengthen legs. In bed a lot due to pain.     Tramadol helps but not much. Takes one tramadol tablet per day at lunch. Takes two tylenol at night.     Appetite is improving with Periactin. Now on hold since appetite is improved.     Denies constipation, diarrhea.     Dr Centeno regarding MDS stated that labs looked good. Labs less frequently, less frequent follow ups. 6 month follow ups.     Having pain with urination due to calcifications on bladder wall. Plan to have scraping done by preferred urologist. Discussed pain treatment otherwise.     05/2024:    Saw Dr. Centeno since our last visit. Dr. Centeno wants to pause chemotherapy while she regains strength. Her blood counts are improving. They have a follow up in July to touch base again.     She is feeling weak overall but has gained some strength in the past week. She was vomiting last night. She is still in pain. She is still taking tramadol twice per day. She takes it when she eats. She is also taking extra strength tylenol in the morning and at night. She has not had a bowel movement in the last two days. She had diarrhea before.    PT is going well. She is getting exhausted from PT. Getting discharged from PT OT next week. Plans to have acupuncture done soon.     Vomiting occasionally from chest congestion that makes her gag. She is going to start taking mucinex.     Previous visit:     Hospital discharge summary:  "She was admitted for further eval and treatment of pain after a fall with new broken rib (9th rib on right) and new thoracic and lumbar vertebral fractures. Neurosurgery consulted and recommends IR for kyphoplasty, TLSO brace, PT/OT and pain control. Follow-up in 4-6 weeks. She is s/p IR kyphoplasty of T11, T12, and L1 on 5/8. She worked with PT/OT on 5/9 and low intensity therapy recommended. All questions answered with patient and daughter at bedside, and they are in agreement with the discharge plan. " "     She presents via virtual visit with her daughter Luz and evaluated by inpatient palliative care team during aforementioned hospital stay.     The patient has an appointment with Dr. Centeno tomorrow morning at 8AM but is not currently in the shape to go to [place] for that visit. Plan to reschedule.     Rehabilitating well since hospital discharge. Was hard to stand up from sitting. Now walking with assistance. She is getting stronger but slowly. She gets tired very easily. Luz is trying to take things slow.     She has an appointment today virtually with her primary care Dr. Anderson as well.     Admits to pain post kyphoplasty. The pain is the same. If she does not move she is able to rest. If she moves, she has severe pain. She is using tramadol for pain control but it is not helping much. It helps a little bit. She is taking tramadol three times per day. She is having daily bowel movements.    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    Breast cancer     mastectomy 2014    Coccidiomycosis, progressive     Elevated CA-125 02/25/2018    Hyperlipidemia     Hypertension     Liver disease     OAB (overactive bladder)     Osteopenia     on Dexa 11/2017    Osteoporosis     pt reports hx of this, declined fosamax in past - treated with calcium and vit D    Pelvic mass 02/25/2018    Pulmonary fibrosis     Pulmonary nodules     SOB (shortness of breath) on exertion     Thyroid disease     hypo    Urinary tract infection due to extended-spectrum beta lactamase (ESBL) producing Escherichia coli 03/16/2022    UTI (urinary tract infection)      Past Surgical History:   Procedure Laterality Date    BONE MARROW BIOPSY N/A 4/10/2024    Procedure: Biopsy-bone marrow;  Surgeon: Mohit Centeno MD;  Location: [facility] ([address] [place] [address]);  Service: Oncology;  Laterality: N/A;  4/4-precall complete-MS    CHOLECYSTECTOMY      COLONOSCOPY N/A 12/09/2022     Procedure: COLONOSCOPY;  Surgeon: Enrique Dominguez MD;  Location: [facility] (4TH FLR);  Service: Endoscopy;  Laterality: N/A;  inst via portal  pt requests after 12/9/22-MS  11/30-pt notified of earlier arrival time-KPvt    CYSTOSCOPY WITH BIOPSY OF BLADDER Bilateral 07/27/2022    Procedure: CYSTOSCOPY, WITH BLADDER BIOPSY; retrograde pyelogram,;  Surgeon: Anaya Oropeza MD;  Location: [facility];  Service: Urology;  Laterality: Bilateral;    ENDOSCOPIC ULTRASOUND OF UPPER GASTROINTESTINAL TRACT N/A 04/08/2021    Procedure: ULTRASOUND, UPPER GI TRACT, ENDOSCOPIC;  Surgeon: Aisha Barajas MD;  Location: [facility] (2ND FLR);  Service: Endoscopy;  Laterality: N/A;  UEUS/ERCP evaluation abn MRCP - Dr Angelika Steinerid-19 test 4/5/21 at [facility] Pre-admit - pg    ERCP N/A 04/08/2021    Procedure: ERCP (ENDOSCOPIC RETROGRADE CHOLANGIOPANCREATOGRAPHY);  Surgeon: Aisha Barajas MD;  Location: [facility] (2ND FLR);  Service: Endoscopy;  Laterality: N/A;  UEUS/ERCP evaluation abn MRCP - Dr Angelika Steinerid-19 test 4/5/21 at [facility] Pre-admit - pg    ESOPHAGOGASTRODUODENOSCOPY N/A 04/08/2021    Procedure: EGD (ESOPHAGOGASTRODUODENOSCOPY);  Surgeon: Aisha Barajas MD;  Location: [facility] (2ND FLR);  Service: Endoscopy;  Laterality: N/A;  UEUS/ERCP evaluation abn MRCP - Dr Carney  Covid-19 test 4/5/21 at [facility] Pre-admit - pg    ESOPHAGOGASTRODUODENOSCOPY N/A 06/02/2023    Procedure: EGD (ESOPHAGOGASTRODUODENOSCOPY);  Surgeon: Emeka Carney MD;  Location: [facility] (4TH FLR);  Service: Endoscopy;  Laterality: N/A;  She has  history of seromucinous borderline tumor of the ovary. We generally follow  and CEA tumor markers. Her CEA recently became slightly elevated. CT imaging negative and colonoscopy negative.     Talita Mcnairde    instructions portal-LW  pre    FIXATION KYPHOPLASTY  5/8/2024    Procedure: Kyphoplasty- 3 levels regular kypho T11-L1 with anesthesia;  Surgeon: Chandra Villavicencio MD;   Location: [facility] CATH;  Service: Interventional Radiology;;    HYSTERECTOMY      LAPAROSCOPIC APPENDECTOMY N/A 4/28/2024    Procedure: APPENDECTOMY, LAPAROSCOPIC;  Surgeon: BALJEET Haji MD;  Location: [facility] OR;  Service: General;  Laterality: N/A;    MASTECTOMY Right     2014     Family History   Problem Relation Name Age of Onset    Cancer Mother Jessica         colon cancer    Breast cancer Mother Jessica     Hypertension Father Yonas     Heart disease Father Yonas     Stroke Father Yonas     No Known Problems Sister      Cancer Brother Yonas         lung, ++ tobacco    Hypertension Brother Gary     No Known Problems Daughter      Hypertension Son Lavell     Colon cancer Neg Hx      Ovarian cancer Neg Hx       Review of patient's allergies indicates:   Allergen Reactions    Ciprofloxacin Other (See Comments)     Right foot pain and edema, tendonitis    Amlodipine Edema and Swelling     BLE  BLE    Lisinopril Other (See Comments)     cough    Nitrofuran analogues      Social Drivers of Health     Tobacco Use: Medium Risk (5/8/2025)    Patient History     Smoking Tobacco Use: Former     Smokeless Tobacco Use: Never     Passive Exposure: Past   Alcohol Use: Not At Risk (2/3/2025)    AUDIT-C     Frequency of Alcohol Consumption: Monthly or less     Average Number of Drinks: 1 or 2     Frequency of Binge Drinking: Never   Financial Resource Strain: Low Risk  (2/3/2025)    Overall Financial Resource Strain (CARDIA)     Difficulty of Paying Living Expenses: Not hard at all   Food Insecurity: No Food Insecurity (2/3/2025)    Hunger Vital Sign     Worried About Running Out of Food in the Last Year: Never true     Ran Out of Food in the Last Year: Never true   Transportation Needs: No Transportation Needs (4/29/2024)    PRAPARE - Transportation     Lack of Transportation (Medical): No     Lack of Transportation (Non-Medical): No   Physical Activity: Inactive (2/3/2025)    Exercise Vital Sign     Days of Exercise per Week: 0  days     Minutes of Exercise per Session: 10 min   Stress: Stress Concern Present (2/3/2025)    [employer] - Occupational Stress Questionnaire     Feeling of Stress : To some extent   Housing Stability: Unknown (2/3/2025)    Housing Stability Vital Sign     Unable to Pay for Housing in the Last Year: No     Number of Times Moved in the Last Year: Not on file     Homeless in the Last Year: Not on file   Depression: Low Risk  (5/8/2025)    Depression     Last PHQ-4: Flowsheet Data: 0   Utilities: Not At Risk (2/3/2025)    [facility] Utilities     Threatened with loss of utilities: No   Health Literacy: Adequate Health Literacy (2/3/2025)     Health Literacy     Frequency of need for help with medical instructions: Never   Social Isolation: Not on file          Medications:    Current Outpatient Medications:     acetaminophen (TYLENOL) 325 MG tablet, Take 2 tablets (650 mg total) by mouth every 6 (six) hours as needed for Pain., Disp: , Rfl:     acyclovir (ZOVIRAX) 400 MG tablet, Take 1 tablet (400 mg total) by mouth 2 (two) times daily., Disp: 60 tablet, Rfl: 11    albuterol-ipratropium (DUO-NEB) 2.5 mg-0.5 mg/3 mL nebulizer solution, Take 3 mLs by nebulization every 6 (six) hours as needed for Wheezing or Shortness of Breath. Rescue, Disp: 75 mL, Rfl: 11    ascorbic acid/zinc (ZINC WITH VITAMIN C ORAL), Take 1 tablet by mouth Daily., Disp: , Rfl:     atorvastatin (LIPITOR) 20 MG tablet, Take 1 tablet (20 mg total) by mouth once daily., Disp: 90 tablet, Rfl: 3    azelastine (ASTELIN) 137 mcg (0.1 %) nasal spray, 1 spray (137 mcg total) by Nasal route 2 (two) times daily., Disp: 30 mL, Rfl: 6    bisacodyL (DULCOLAX) 10 mg Supp, Place 1 suppository (10 mg total) rectally daily as needed (constipation)., Disp: , Rfl:     calcium-vitamin D3 (CALCIUM 500 + D) 500 mg(1,250mg) -200 unit per tablet, Take 1 tablet by mouth 2 (two) times daily with meals., Disp: , Rfl:     carvediloL (COREG) 12.5 MG  tablet, Take 1 tablet (12.5 mg total) by mouth 2 (two) times daily., Disp: 180 tablet, Rfl: 3    cyproheptadine (PERIACTIN) 4 mg tablet, Take 1 tablet (4 mg total) by mouth 3 (three) times daily as needed (decreased appetite)., Disp: 90 tablet, Rfl: 3    docusate sodium (COLACE) 100 MG capsule, Take 1 capsule (100 mg total) by mouth 2 (two) times daily., Disp: 60 capsule, Rfl: 0    estradioL (ESTRACE) 0.01 % (0.1 mg/gram) vaginal cream, Place 1 fingertip of cream first at urethral opening and then external vagina. Please do not use plastic applicator, Disp: 42.5 g, Rfl: 3    ferrous gluconate (FERGON) 324 MG tablet, Take 1 tablet (324 mg total) by mouth every other day., Disp: 90 tablet, Rfl: 3    levothyroxine (SYNTHROID) 50 MCG tablet, Take 1 tablet (50 mcg total) by mouth before breakfast., Disp: 90 tablet, Rfl: 3    LIDOcaine (LIDODERM) 5 %, Place 1 patch onto the skin once daily. Remove & Discard patch within 12 hours or as directed by MD, Disp: 30 patch, Rfl: 2    methen-m.blue-s.phos-phsal-hyo (URO-MP) 118-10-40.8-36 mg Cap, Take 1 capsule by mouth 3 (three) times daily as needed (painful urination)., Disp: 40 capsule, Rfl: 11    mirtazapine (REMERON) 15 MG tablet, Take 1 tablet (15 mg total) by mouth every evening., Disp: 90 tablet, Rfl: 3    multivitamin (THERAGRAN) per tablet, Take 1 tablet by mouth once daily., Disp: , Rfl:     naloxone (NARCAN) 4 mg/actuation Spry, 4mg by nasal route as needed for opioid overdose; may repeat every 2-3 minutes in alternating nostrils until medical help arrives. Call 911, Disp: 1 each, Rfl: 11    ondansetron (ZOFRAN-ODT) 4 MG TbDL, Take 1 tablet (4 mg total) by mouth every 6 (six) hours as needed (nausea)., Disp: 30 tablet, Rfl: 0    pantoprazole (PROTONIX) 20 MG tablet, Take 1 tablet (20 mg total) by mouth once daily., Disp: 90 tablet, Rfl: 1    polyethylene glycol (GLYCOLAX) 17 gram PwPk, Take 17 g by mouth once daily., Disp: 30 packet, Rfl: 0    potassium chloride  (K-TAB) 20 mEq, Take 20 mEq by mouth., Disp: , Rfl:     predniSONE (DELTASONE) 1 MG tablet, Take 2 tablets (2 mg total) by mouth once daily., Disp: 180 tablet, Rfl: 3    sodium chloride 3% 3 % nebulizer solution, Take 4 mLs by nebulization as needed for Other., Disp: 120 mL, Rfl: 3    traMADoL (ULTRAM) 50 mg tablet, Take 1 to 2 tablets by mouth every 8 hours as needed for pain, Disp: 90 each, Rfl: 0    vibegron (GEMTESA) 75 mg Tab, Take 1 tablet (75 mg total) by mouth once daily., Disp: 30 tablet, Rfl: 11    OBJECTIVE:       ROS:  Review of Systems   Constitutional:  Positive for appetite change and unexpected weight change.   HENT:  Negative for mouth sores.    Eyes:  Negative for visual disturbance.   Respiratory:  Positive for cough. Negative for shortness of breath.    Cardiovascular:  Negative for chest pain.   Gastrointestinal:  Negative for abdominal pain and diarrhea.   Genitourinary:  Positive for frequency.   Musculoskeletal:  Positive for back pain and gait problem.   Skin:  Negative for rash.   Neurological:  Negative for headaches.   Hematological:  Negative for adenopathy.   Psychiatric/Behavioral:  Negative for sleep disturbance. The patient is not nervous/anxious.        Review of Symptoms      Symptom Assessment (ESAS 0-10 Scale)  Pain:  0  Dyspnea:  0  Anxiety:  0  Nausea:  0  Depression:  0  Anorexia:  0  Fatigue:  0  Insomnia:  0  Restlessness:  0  Agitation:  0       Anxiety:  Is not nervous/anxious    Living Arrangements:  Lives with family    Psychosocial/Cultural:   See Palliative Psychosocial Note: Yes  **Primary  to Follow**  Palliative Care  Consult: No     Time-Based Charting:  Yes  Chart Review: 15 minutes  Face to Face: 15 minutes    Total Time Spent: 30 minutes      Advance Care Planning   Advance Directives:   Living Will: Yes        Copy on chart: Yes      Decision Making:  Patient answered questions  Goals of Care: What is most important right now is to  focus on spending time at home, avoiding the hospital, remaining as independent as possible. Accordingly, we have decided that the best plan to meet the patient's goals includes continuing with treatment.              Physical Exam:  Vitals:    Physical Exam  Constitutional:       General: She is not in acute distress.     Appearance: Normal appearance. She is not ill-appearing or diaphoretic.   Musculoskeletal:      Cervical back: Normal range of motion.   Skin:     Coloration: Skin is not jaundiced or pale.   Neurological:      Mental Status: She is alert and oriented to person, place, and time.   Psychiatric:         Mood and Affect: Mood normal.         Behavior: Behavior normal.         Thought Content: Thought content normal.         Judgment: Judgment normal.         Labs:  CBC:   WBC   Date Value Ref Range Status   04/24/2025 14.39 (H) 3.90 - 12.70 K/uL Final     Hemoglobin   Date Value Ref Range Status   12/05/2024 11.4 (L) 12.0 - 16.0 g/dL Final     HGB   Date Value Ref Range Status   04/24/2025 11.6 (L) 12.0 - 16.0 gm/dL Final     Hematocrit   Date Value Ref Range Status   12/05/2024 36.4 (L) 37.0 - 48.5 % Final     HCT   Date Value Ref Range Status   04/24/2025 36.7 (L) 37.0 - 48.5 % Final     MCV   Date Value Ref Range Status   04/24/2025 97 82 - 98 fL Final   12/05/2024 97 82 - 98 fL Final     Platelet Count   Date Value Ref Range Status   04/24/2025 215 150 - 450 K/uL Final     Platelets   Date Value Ref Range Status   12/05/2024 226 150 - 450 K/uL Final       LFT:   Lab Results   Component Value Date    AST 21 04/24/2025    ALKPHOS 178 (H) 04/24/2025    BILITOT 0.6 04/24/2025       Albumin:   Albumin   Date Value Ref Range Status   04/24/2025 3.6 3.5 - 5.2 g/dL Final   12/05/2024 3.4 (L) 3.5 - 5.2 g/dL Final     Protein:   Protein Total   Date Value Ref Range Status   04/24/2025 7.4 6.0 - 8.4 gm/dL Final     Total Protein   Date Value Ref Range Status   12/05/2024 7.3 6.0 - 8.4 g/dL Final        Radiology:None      30 minutes of total time spent on the encounter, which includes face to face time and non-face to face time preparing to see the patient (eg, review of tests), Obtaining and/or reviewing separately obtained history, Documenting clinical information in the electronic or other health record, Independently interpreting results (not separately reported) and communicating results to the patient/family/caregiver, or Care coordination (not separately reported).    0 minutes spent in discussing ACP    Signature: Fifi Pacheco DO

## 2025-05-15 ENCOUNTER — TELEPHONE (OUTPATIENT)
Dept: REHABILITATION | Facility: HOSPITAL | Age: 80
End: 2025-05-15
Payer: MEDICARE

## 2025-05-15 NOTE — TELEPHONE ENCOUNTER
Called pt to get her scheduled for New PT/funct eval; no ans left detailed voicemail for pt to return my call to schedule PT eval.

## 2025-05-21 ENCOUNTER — PATIENT MESSAGE (OUTPATIENT)
Dept: AUDIOLOGY | Facility: CLINIC | Age: 80
End: 2025-05-21
Payer: MEDICARE

## 2025-05-26 ENCOUNTER — LAB VISIT (OUTPATIENT)
Dept: LAB | Facility: HOSPITAL | Age: 80
End: 2025-05-26
Attending: INTERNAL MEDICINE
Payer: MEDICARE

## 2025-05-26 DIAGNOSIS — Z13.6 ENCOUNTER FOR LIPID SCREENING FOR CARDIOVASCULAR DISEASE: ICD-10-CM

## 2025-05-26 DIAGNOSIS — Z13.220 ENCOUNTER FOR LIPID SCREENING FOR CARDIOVASCULAR DISEASE: ICD-10-CM

## 2025-05-26 DIAGNOSIS — D46.9 MDS (MYELODYSPLASTIC SYNDROME): ICD-10-CM

## 2025-05-26 LAB
ABSOLUTE EOSINOPHIL (OHS): 0.03 K/UL
ABSOLUTE MONOCYTE (OHS): 0.98 K/UL (ref 0.3–1)
ABSOLUTE NEUTROPHIL COUNT (OHS): 14.17 K/UL (ref 1.8–7.7)
ALBUMIN SERPL BCP-MCNC: 3.9 G/DL (ref 3.5–5.2)
ALP SERPL-CCNC: 151 UNIT/L (ref 40–150)
ALT SERPL W/O P-5'-P-CCNC: 23 UNIT/L (ref 10–44)
ANION GAP (OHS): 10 MMOL/L (ref 8–16)
AST SERPL-CCNC: 24 UNIT/L (ref 11–45)
BASOPHILS # BLD AUTO: 0.05 K/UL
BASOPHILS NFR BLD AUTO: 0.3 %
BILIRUB SERPL-MCNC: 0.6 MG/DL (ref 0.1–1)
BUN SERPL-MCNC: 25 MG/DL (ref 8–23)
CALCIUM SERPL-MCNC: 9 MG/DL (ref 8.7–10.5)
CHLORIDE SERPL-SCNC: 105 MMOL/L (ref 95–110)
CHOLEST SERPL-MCNC: 131 MG/DL (ref 120–199)
CHOLEST/HDLC SERPL: 2.8 {RATIO} (ref 2–5)
CO2 SERPL-SCNC: 23 MMOL/L (ref 23–29)
CREAT SERPL-MCNC: 0.9 MG/DL (ref 0.5–1.4)
ERYTHROCYTE [DISTWIDTH] IN BLOOD BY AUTOMATED COUNT: 16.5 % (ref 11.5–14.5)
GFR SERPLBLD CREATININE-BSD FMLA CKD-EPI: >60 ML/MIN/1.73/M2
GLUCOSE SERPL-MCNC: 109 MG/DL (ref 70–110)
HCT VFR BLD AUTO: 39.4 % (ref 37–48.5)
HDLC SERPL-MCNC: 47 MG/DL (ref 40–75)
HDLC SERPL: 35.9 % (ref 20–50)
HGB BLD-MCNC: 12.5 GM/DL (ref 12–16)
IMM GRANULOCYTES # BLD AUTO: 0.83 K/UL (ref 0–0.04)
IMM GRANULOCYTES NFR BLD AUTO: 4.7 % (ref 0–0.5)
LDH SERPL-CCNC: 258 U/L (ref 110–260)
LDLC SERPL CALC-MCNC: 40.8 MG/DL (ref 63–159)
LYMPHOCYTES # BLD AUTO: 1.62 K/UL (ref 1–4.8)
MAGNESIUM SERPL-MCNC: 2 MG/DL (ref 1.6–2.6)
MCH RBC QN AUTO: 31.3 PG (ref 27–31)
MCHC RBC AUTO-ENTMCNC: 31.7 G/DL (ref 32–36)
MCV RBC AUTO: 99 FL (ref 82–98)
NONHDLC SERPL-MCNC: 84 MG/DL
NUCLEATED RBC (/100WBC) (OHS): 0 /100 WBC
PHOSPHATE SERPL-MCNC: 4.3 MG/DL (ref 2.7–4.5)
PLATELET # BLD AUTO: 222 K/UL (ref 150–450)
PMV BLD AUTO: 10.1 FL (ref 9.2–12.9)
POTASSIUM SERPL-SCNC: 4.4 MMOL/L (ref 3.5–5.1)
PROT SERPL-MCNC: 7.6 GM/DL (ref 6–8.4)
RBC # BLD AUTO: 4 M/UL (ref 4–5.4)
RELATIVE EOSINOPHIL (OHS): 0.2 %
RELATIVE LYMPHOCYTE (OHS): 9.2 % (ref 18–48)
RELATIVE MONOCYTE (OHS): 5.5 % (ref 4–15)
RELATIVE NEUTROPHIL (OHS): 80.1 % (ref 38–73)
SODIUM SERPL-SCNC: 138 MMOL/L (ref 136–145)
TRIGL SERPL-MCNC: 216 MG/DL (ref 30–150)
URATE SERPL-MCNC: 7.1 MG/DL (ref 2.4–5.7)
WBC # BLD AUTO: 17.68 K/UL (ref 3.9–12.7)

## 2025-05-26 PROCEDURE — 85025 COMPLETE CBC W/AUTO DIFF WBC: CPT | Mod: PN

## 2025-05-26 PROCEDURE — 36415 COLL VENOUS BLD VENIPUNCTURE: CPT | Mod: PN

## 2025-05-26 PROCEDURE — 84550 ASSAY OF BLOOD/URIC ACID: CPT | Mod: PN

## 2025-05-26 PROCEDURE — 83735 ASSAY OF MAGNESIUM: CPT | Mod: PN

## 2025-05-26 PROCEDURE — 84100 ASSAY OF PHOSPHORUS: CPT | Mod: PN

## 2025-05-26 PROCEDURE — 83615 LACTATE (LD) (LDH) ENZYME: CPT | Mod: PN

## 2025-05-26 PROCEDURE — 82565 ASSAY OF CREATININE: CPT | Mod: PN

## 2025-05-26 PROCEDURE — 80061 LIPID PANEL: CPT

## 2025-05-29 ENCOUNTER — TELEPHONE (OUTPATIENT)
Dept: REHABILITATION | Facility: HOSPITAL | Age: 80
End: 2025-05-29
Payer: MEDICARE

## 2025-05-29 NOTE — TELEPHONE ENCOUNTER
Called pt to get her scheduled for New PT/funct eval; no ans left detailed voicemail for pt to return my call to schedule.

## 2025-05-30 ENCOUNTER — RESULTS FOLLOW-UP (OUTPATIENT)
Dept: INTERNAL MEDICINE | Facility: CLINIC | Age: 80
End: 2025-05-30

## 2025-06-03 ENCOUNTER — TELEPHONE (OUTPATIENT)
Dept: REHABILITATION | Facility: HOSPITAL | Age: 80
End: 2025-06-03
Payer: MEDICARE

## 2025-06-04 ENCOUNTER — PATIENT MESSAGE (OUTPATIENT)
Dept: REHABILITATION | Facility: HOSPITAL | Age: 80
End: 2025-06-04
Payer: MEDICARE

## 2025-06-04 ENCOUNTER — EXTERNAL HOME HEALTH (OUTPATIENT)
Dept: HOME HEALTH SERVICES | Facility: HOSPITAL | Age: 80
End: 2025-06-04
Payer: MEDICARE

## 2025-06-04 ENCOUNTER — TELEPHONE (OUTPATIENT)
Dept: REHABILITATION | Facility: HOSPITAL | Age: 80
End: 2025-06-04
Payer: MEDICARE

## 2025-06-16 DIAGNOSIS — I10 HYPERTENSION, UNSPECIFIED TYPE: ICD-10-CM

## 2025-06-16 RX ORDER — CARVEDILOL 12.5 MG/1
12.5 TABLET ORAL 2 TIMES DAILY
Qty: 180 TABLET | Refills: 3 | Status: SHIPPED | OUTPATIENT
Start: 2025-06-16

## 2025-06-16 NOTE — TELEPHONE ENCOUNTER
No care due was identified.  [facility] Embedded Care Due Messages. Reference number: [id number].   6/16/2025 2:01:26 PM CDT

## 2025-06-16 NOTE — TELEPHONE ENCOUNTER
Refill Decision Note   Niurka Keila  is requesting a refill authorization.  Brief Assessment and Rationale for Refill:  Approve     Medication Therapy Plan:         Comments:     Note composed:2:53 PM 06/16/2025

## 2025-06-23 ENCOUNTER — APPOINTMENT (OUTPATIENT)
Dept: LAB | Facility: HOSPITAL | Age: 80
End: 2025-06-23
Attending: INTERNAL MEDICINE
Payer: MEDICARE

## 2025-06-23 PROCEDURE — 85025 COMPLETE CBC W/AUTO DIFF WBC: CPT | Mod: PN

## 2025-06-23 PROCEDURE — 83735 ASSAY OF MAGNESIUM: CPT | Mod: PN

## 2025-06-23 PROCEDURE — 36415 COLL VENOUS BLD VENIPUNCTURE: CPT | Mod: PN

## 2025-06-23 PROCEDURE — 83615 LACTATE (LD) (LDH) ENZYME: CPT | Mod: PN

## 2025-06-23 PROCEDURE — 84100 ASSAY OF PHOSPHORUS: CPT | Mod: PN

## 2025-06-23 PROCEDURE — 80053 COMPREHEN METABOLIC PANEL: CPT | Mod: PN

## 2025-06-23 PROCEDURE — 84550 ASSAY OF BLOOD/URIC ACID: CPT | Mod: PN

## 2025-06-30 ENCOUNTER — OFFICE VISIT (OUTPATIENT)
Dept: HEMATOLOGY/ONCOLOGY | Facility: CLINIC | Age: 80
End: 2025-06-30
Payer: MEDICARE

## 2025-06-30 VITALS — DIASTOLIC BLOOD PRESSURE: 89 MMHG | SYSTOLIC BLOOD PRESSURE: 140 MMHG

## 2025-06-30 DIAGNOSIS — D63.0 ANEMIA IN NEOPLASTIC DISEASE: ICD-10-CM

## 2025-06-30 DIAGNOSIS — D46.9 MDS (MYELODYSPLASTIC SYNDROME): Primary | ICD-10-CM

## 2025-06-30 NOTE — PROGRESS NOTES
HEMATOLOGIC MALIGNANCIES PROGRESS NOTE    IDENTIFYING STATEMENT   Niurka Castellon) is a 80 y.o. female with a  of 1945 from [place], [place] with the diagnosis of MDS.      TELEMEDICINE DOCUMENTATION    The patient location is: [place]  The chief complaint leading to consultation is: MDS    Visit type: audiovisual    Face to Face time with patient: 11 minutes  25 minutes of total time spent on the encounter, which includes face to face time and non-face to face time preparing to see the patient (eg, review of tests), Obtaining and/or reviewing separately obtained history, Documenting clinical information in the electronic or other health record, Independently interpreting results (not separately reported) and communicating results to the patient/family/caregiver, or Care coordination (not separately reported).         Each patient to whom he or she provides medical services by telemedicine is:  (1) informed of the relationship between the physician and patient and the respective role of any other health care provider with respect to management of the patient; and (2) notified that he or she may decline to receive medical services by telemedicine and may withdraw from such care at any time.    Notes:       ONCOLOGY HISTORY:    1. Myelodysplastic syndrome with increased blasts-2              A. 2023: Bone marrow biopsy - 65-75% cellular marrow consistent with myelodysplastic syndrome with excess blasts-2; cytogenetics 46,XX,del(3)(q12)[2]/47,sl,+8[18]; NGS not sent; IPSS-R score of 7 = very high risk   B. 2023: Cycle 1 azacitidine-venetoclax (5 days aza, 14 days angelo) for MDS/AML   C. 10/18/2023: Bone marrow biopsy - 50-60% cellular marrow with no increased blasts and trilineage hematopoiesis with granulocytic hypoplasia and moderate dysgranulopoiesis, mildly left-shifted marked erythroid hyperplasia with severe dyserythropoiesis, and marked megakaryocytic hyperplasia with predominantly variably  sized including small del3q-like monolobated forms; 3-4+ histiocytic iron stores; focal MF-1; cytogenetics 46,XX[20]; NGS shows ASXL1 (26%), SRSF2 (33%) and STAG2 (3%).     2. Neuropathy  3. Interstitial lung disease/pulmonary fibrosis  4. Hypertension  5. Aortic atherosclerosis  6. Pulmonary coccidioidomycosis  7. Hypothyroidism  8. Hyperparathyroidism    INTERVAL HISTORY:      Ms. Pderaza is seen in this virtual visit in follow-up of MDS/AML. She reports the following:    -doing well  - still has lots of pain walking - chronic  - no change in urinary symptoms    Other interval events:  - 5/14/2025: [facility] care visit    Past Medical History, Past Social History and Past Family History have been reviewed and are unchanged except as noted in the interval history.    MEDICATIONS:     Current Outpatient Medications on File Prior to Visit   Medication Sig Dispense Refill    acetaminophen (TYLENOL) 325 MG tablet Take 2 tablets (650 mg total) by mouth every 6 (six) hours as needed for Pain.      acyclovir (ZOVIRAX) 400 MG tablet Take 1 tablet (400 mg total) by mouth 2 (two) times daily. 60 tablet 11    albuterol-ipratropium (DUO-NEB) 2.5 mg-0.5 mg/3 mL nebulizer solution Take 3 mLs by nebulization every 6 (six) hours as needed for Wheezing or Shortness of Breath. Rescue 75 mL 11    ascorbic acid/zinc (ZINC WITH VITAMIN C ORAL) Take 1 tablet by mouth Daily.      atorvastatin (LIPITOR) 20 MG tablet Take 1 tablet (20 mg total) by mouth once daily. 90 tablet 3    bisacodyL (DULCOLAX) 10 mg Supp Place 1 suppository (10 mg total) rectally daily as needed (constipation).      calcium-vitamin D3 (CALCIUM 500 + D) 500 mg(1,250mg) -200 unit per tablet Take 1 tablet by mouth 2 (two) times daily with meals.      carvediloL (COREG) 12.5 MG tablet Take 1 tablet (12.5 mg total) by mouth 2 (two) times daily. 180 tablet 3    cyproheptadine (PERIACTIN) 4 mg tablet Take 1 tablet (4 mg total) by mouth 3 (three) times daily as needed (decreased  appetite). 90 tablet 3    docusate sodium (COLACE) 100 MG capsule Take 1 capsule (100 mg total) by mouth 2 (two) times daily. 60 capsule 0    estradioL (ESTRACE) 0.01 % (0.1 mg/gram) vaginal cream Place 1 fingertip of cream first at urethral opening and then external vagina. Please do not use plastic applicator 42.5 g 3    ferrous gluconate (FERGON) 324 MG tablet Take 1 tablet (324 mg total) by mouth every other day. 90 tablet 3    levothyroxine (SYNTHROID) 50 MCG tablet Take 1 tablet (50 mcg total) by mouth before breakfast. 90 tablet 3    LIDOcaine (LIDODERM) 5 % Place 1 patch onto the skin once daily. Remove & Discard patch within 12 hours or as directed by MD 30 patch 2    methen-m.blue-s.phos-phsal-hyo (URO-MP) 118-10-40.8-36 mg Cap Take 1 capsule by mouth 3 (three) times daily as needed (painful urination). 40 capsule 11    mirtazapine (REMERON) 15 MG tablet Take 1 tablet (15 mg total) by mouth every evening. 90 tablet 3    multivitamin (THERAGRAN) per tablet Take 1 tablet by mouth once daily.      naloxone (NARCAN) 4 mg/actuation Spry 4mg by nasal route as needed for opioid overdose; may repeat every 2-3 minutes in alternating nostrils until medical help arrives. Call 911 1 each 11    ondansetron (ZOFRAN-ODT) 4 MG TbDL Take 1 tablet (4 mg total) by mouth every 6 (six) hours as needed (nausea). 30 tablet 0    pantoprazole (PROTONIX) 20 MG tablet Take 1 tablet (20 mg total) by mouth once daily. 90 tablet 1    polyethylene glycol (GLYCOLAX) 17 gram PwPk Take 17 g by mouth once daily. 30 packet 0    potassium chloride (K-TAB) 20 mEq Take 20 mEq by mouth.      predniSONE (DELTASONE) 1 MG tablet Take 2 tablets (2 mg total) by mouth once daily. 180 tablet 3    sodium chloride 3% 3 % nebulizer solution Take 4 mLs by nebulization as needed for Other. 120 mL 3    traMADoL (ULTRAM) 50 mg tablet Take 1 to 2 tablets by mouth every 8 hours as needed for pain 90 each 0    vibegron (GEMTESA) 75 mg Tab Take 1 tablet (75 mg  total) by mouth once daily. 30 tablet 11    azelastine (ASTELIN) 137 mcg (0.1 %) nasal spray 1 spray (137 mcg total) by Nasal route 2 (two) times daily. 30 mL 6    [DISCONTINUED] budesonide-formoterol 80-4.5 mcg (SYMBICORT) 80-4.5 mcg/actuation HFAA Inhale 2 puffs into the lungs 2 (two) times daily as needed. Controller 1 Inhaler 6     No current facility-administered medications on file prior to visit.       ALLERGIES:   Review of patient's allergies indicates:   Allergen Reactions    Ciprofloxacin Other (See Comments)     Right foot pain and edema, tendonitis    Amlodipine Edema and Swelling     BLE  BLE    Lisinopril Other (See Comments)     cough    Nitrofuran analogues         ROS:       Review of Systems   Constitutional:  Positive for appetite change. Negative for unexpected weight change.   HENT:   Negative for mouth sores.    Respiratory:  Negative for cough and shortness of breath.    Cardiovascular:  Negative for chest pain.   Gastrointestinal:  Negative for abdominal pain and diarrhea.   Genitourinary:  Positive for frequency.    Musculoskeletal:  Positive for back pain.   Skin:  Negative for rash.   Neurological:  Negative for headaches.   Hematological:  Negative for adenopathy.   Psychiatric/Behavioral:  The patient is not nervous/anxious.        PHYSICAL EXAM:  Vitals:    06/30/25 1336   BP: (!) 140/89   PainSc:   4   PainLoc: Generalized       Physical Exam  Head and neck visualized. No abnormalities seen.     LAB:   Results for orders placed or performed in visit on 05/26/25   Comprehensive Metabolic Panel    Collection Time: 05/26/25  3:21 PM   Result Value Ref Range    Sodium 138 136 - 145 mmol/L    Potassium 4.4 3.5 - 5.1 mmol/L    Chloride 105 95 - 110 mmol/L    CO2 23 23 - 29 mmol/L    Glucose 109 70 - 110 mg/dL    BUN 25 (H) 8 - 23 mg/dL    Creatinine 0.9 0.5 - 1.4 mg/dL    Calcium 9.0 8.7 - 10.5 mg/dL    Protein Total 7.6 6.0 - 8.4 gm/dL    Albumin 3.9 3.5 - 5.2 g/dL    Bilirubin Total 0.6  0.1 - 1.0 mg/dL     (H) 40 - 150 unit/L    AST 24 11 - 45 unit/L    ALT 23 10 - 44 unit/L    Anion Gap 10 8 - 16 mmol/L    eGFR >60 >60 mL/min/1.73/m2   Magnesium    Collection Time: 05/26/25  3:21 PM   Result Value Ref Range    Magnesium  2.0 1.6 - 2.6 mg/dL   PHOSPHORUS    Collection Time: 05/26/25  3:21 PM   Result Value Ref Range    Phosphorus Level 4.3 2.7 - 4.5 mg/dL   Lactate Dehydrogenase    Collection Time: 05/26/25  3:21 PM   Result Value Ref Range    Lactate Dehydrogenase 258 110 - 260 U/L   Uric Acid    Collection Time: 05/26/25  3:21 PM   Result Value Ref Range    Uric Acid 7.1 (H) 2.4 - 5.7 mg/dL   Lipid Panel    Collection Time: 05/26/25  3:21 PM   Result Value Ref Range    Cholesterol Total 131 120 - 199 mg/dL    Triglyceride 216 (H) 30 - 150 mg/dL    HDL Cholesterol 47 40 - 75 mg/dL    LDL Cholesterol 40.8 (L) 63.0 - 159.0 mg/dL    HDL/Cholesterol Ratio 35.9 20.0 - 50.0 %    Cholesterol/HDL Ratio 2.8 2.0 - 5.0    Non HDL Cholesterol 84 mg/dL   CBC with Differential    Collection Time: 05/26/25  3:21 PM   Result Value Ref Range    WBC 17.68 (H) 3.90 - 12.70 K/uL    RBC 4.00 4.00 - 5.40 M/uL    HGB 12.5 12.0 - 16.0 gm/dL    HCT 39.4 37.0 - 48.5 %    MCV 99 (H) 82 - 98 fL    MCH 31.3 (H) 27.0 - 31.0 pg    MCHC 31.7 (L) 32.0 - 36.0 g/dL    RDW 16.5 (H) 11.5 - 14.5 %    Platelet Count 222 150 - 450 K/uL    MPV 10.1 9.2 - 12.9 fL    Nucleated RBC 0 <=0 /100 WBC    Neut % 80.1 (H) 38 - 73 %    Lymph % 9.2 (L) 18 - 48 %    Mono % 5.5 4 - 15 %    Eos % 0.2 <=8 %    Basophil % 0.3 <=1.9 %    Imm Grans % 4.7 (H) 0.0 - 0.5 %    Neut # 14.17 (H) 1.8 - 7.7 K/uL    Lymph # 1.62 1 - 4.8 K/uL    Mono # 0.98 0.3 - 1 K/uL    Eos # 0.03 <=0.5 K/uL    Baso # 0.05 <=0.2 K/uL    Imm Grans # 0.83 (H) 0.00 - 0.04 K/uL     *Note: Due to a large number of results and/or encounters for the requested time period, some results have not been displayed. A complete set of results can be found in Results Review.        PROBLEMS ASSESSED THIS VISIT:    No diagnosis found.    PLAN:       Myelodysplastic syndrome  Ms. 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 We reviewed that this is an aggressive hematologic malignancy with high probability of evolution to acute myeloid leukemia (AML). According to the [facility] pathologic designation, this is referred to as MDS/AML.     Bone marrow biopsy after 1 cycle of aza-angelo showed resolution of increased blasts with ongoing morphologic changes consistent with MDS. We reviewed that this represents a favorable response to therapy. We therefore continued aza-angelo therapy, and she completed 6 cycles. Therapy was interrupted due to infectious complications with diverticulitis, appendicitis, and hospitalization.     Bone marrow biopsy on 4/10/2024 demonstrated 40-50% cellular marrow with dysplastic changes but no increase in blasts. ASXL1, JAK2, SRSF2 mutations were detected. No increase in blasts.     She presently has only mild anemia but no significant leukopenia or thrombocytopenia. Based on her worsened performance status, complicated hospitalizations, and reduced performance status, we discussed that we will forgo additional therapy at this time and monitor her disease. We can discuss management if she develops worsening cytopenias or progression towards AML.     Immunodeficiency  Antimicrobial prophylaxis as follows:  - HSV/VZV: acyclovir 400 mg PO BID    Anemia   Due to MDS/AML. Mild. Continue to monitor.     Follow-up  3 months     Mohit Centeno MD  Hematology and Stem Cell Transplant    Visit today included increased complexity associated with the care of the episodic problem MDS addressed and managing the longitudinal care of the patient due to the serious and/or complex managed problem(s) MDS.

## 2025-07-06 DIAGNOSIS — S32.020D COMPRESSION FRACTURE OF L2 VERTEBRA WITH ROUTINE HEALING, SUBSEQUENT ENCOUNTER: ICD-10-CM

## 2025-07-06 DIAGNOSIS — M54.30 SCIATICA, UNSPECIFIED LATERALITY: ICD-10-CM

## 2025-07-06 DIAGNOSIS — S22.39XS CLOSED FRACTURE OF ONE RIB, UNSPECIFIED LATERALITY, SEQUELA: ICD-10-CM

## 2025-07-06 NOTE — TELEPHONE ENCOUNTER
No care due was identified.  [facility] Embedded Care Due Messages. Reference number: [id number].   7/06/2025 3:21:15 PM CDT

## 2025-07-07 RX ORDER — TRAMADOL HYDROCHLORIDE 50 MG/1
TABLET, FILM COATED ORAL
Qty: 90 EACH | Refills: 0 | Status: SHIPPED | OUTPATIENT
Start: 2025-07-07

## 2025-07-07 NOTE — TELEPHONE ENCOUNTER
Refill Routing Note   Medication(s) are not appropriate for processing by [facility] for the following reason(s):        Outside of protocol    [facility] action(s):  Route             Appointments  past 12m or future 3m with PCP    Date Provider   Last Visit   5/8/2025 Savana Anderson MD   Next Visit   8/11/2025 Savana Anderson MD   ED visits in past 90 days: 0        Note composed:6:45 AM 07/07/2025

## 2025-07-08 ENCOUNTER — TELEPHONE (OUTPATIENT)
Dept: REHABILITATION | Facility: HOSPITAL | Age: 80
End: 2025-07-08
Payer: MEDICARE

## 2025-07-08 ENCOUNTER — OFFICE VISIT (OUTPATIENT)
Dept: ENDOCRINOLOGY | Facility: CLINIC | Age: 80
End: 2025-07-08
Payer: MEDICARE

## 2025-07-08 DIAGNOSIS — Z78.0 OSTEOPENIA AFTER MENOPAUSE: ICD-10-CM

## 2025-07-08 DIAGNOSIS — M81.0 OSTEOPOROSIS, UNSPECIFIED OSTEOPOROSIS TYPE, UNSPECIFIED PATHOLOGICAL FRACTURE PRESENCE: ICD-10-CM

## 2025-07-08 DIAGNOSIS — M85.80 OSTEOPENIA AFTER MENOPAUSE: ICD-10-CM

## 2025-07-08 DIAGNOSIS — R26.89 BALANCE PROBLEM: ICD-10-CM

## 2025-07-08 DIAGNOSIS — M80.00XA AGE-RELATED OSTEOPOROSIS WITH CURRENT PATHOLOGICAL FRACTURE, INITIAL ENCOUNTER: Primary | ICD-10-CM

## 2025-07-08 DIAGNOSIS — D46.9 MDS (MYELODYSPLASTIC SYNDROME): Chronic | ICD-10-CM

## 2025-07-08 PROCEDURE — G2211 COMPLEX E/M VISIT ADD ON: HCPCS | Mod: 95,,, | Performed by: STUDENT IN AN ORGANIZED HEALTH CARE EDUCATION/TRAINING PROGRAM

## 2025-07-08 PROCEDURE — 98006 SYNCH AUDIO-VIDEO EST MOD 30: CPT | Mod: 95,,, | Performed by: STUDENT IN AN ORGANIZED HEALTH CARE EDUCATION/TRAINING PROGRAM

## 2025-07-08 NOTE — PROGRESS NOTES
ENDOCRINOLOGY FOLLOW UP VISIT: 07/08/2025    The patient location is: Home  The chief complaint leading to consultation is: Osteoporosis    Visit type: audiovisual    Face to Face time with patient: 20 minutes  40 minutes of total time spent on the encounter, which includes face to face time and non-face to face time preparing to see the patient (eg, review of tests), Obtaining and/or reviewing separately obtained history, Documenting clinical information in the electronic or other health record, Independently interpreting results (not separately reported) and communicating results to the patient/family/caregiver, or Care coordination (not separately reported).     Each patient to whom he or she provides medical services by telemedicine is:  (1) informed of the relationship between the physician and patient and the respective role of any other health care provider with respect to management of the patient; and (2) notified that he or she may decline to receive medical services by telemedicine and may withdraw from such care at any time.    Subjective:      Patient ID: Niurka Pedraza is a 80 y.o. female.    Chief Complaint:  Osteoporosis    History of Present Illness      Niurka Pedraza presents today for follow up of osteoporosis. She has osteopenia of lumbar spine and both hips on previous bone density scan from over two years ago. She has documented compression fractures at multiple levels from T11 through L2, with a history of kyphoplasty. She reports no new fractures since last visit. She has never been on osteoporosis treatment. She denies recent radiation treatment to bones or active bone cancer. She experiences significant balance problems with shakiness, though has not fallen. She uses a cane within the house, a walker, and a wheelchair for larger spaces. She acknowledges her balance issues may be related to underlying pain. She has myelodysplastic syndrome and interstitial lung disease. She denies history of  heart attack or stroke in the past year. She is currently on chronic prednisone 2 mg daily. She has full dentures on top and bottom jaw following complete dental extraction of remaining 3-4 teeth, completed approximately one month ago on June 3rd. She takes Daniela brand calcium 250 mg capsules twice daily, vitamin D supplements, and levothyroxine 50 mcg daily. Recent March labs indicate well-controlled thyroid function.          Last visit was on 10/31/24. Since that time all dental work has been completed.       Regarding Osteopenia/Osteoporosis:     - Most recent DEXA: 01/19/2023     FINDINGS:  The bone mineral density measured from L1 through L4 is 1.026g/cm2.  This corresponds to a T score of -1.4 and a Z score of 0.0.  No significant interval change.  The bone mineral density within the left femoral neck measures 0.759 g/cm2.  This corresponds to a T score of -2.0 and a Z score of -0.2.  The bone mineral density within the right femoral neck measures 0.778 g/cm2.  This corresponds to a T score of -1.9 and a Z score of -0.1.  Decrease in bone mineral density by 6.3%.     FRAX RESULTS:  10-year Probability of Fracture:  Major Osteoporotic Fracture 8.9%.  Hip Fracture 2.5%.     Impression:  Osteopenia lumbar spine and both hips.  Decrease in bone mineral density at the femoral necks by 6.3%.        Spine Xray: 08/08/2024     FINDINGS:  The bones are diffusely demineralized.  There is been previous kyphoplasty at T11, T12 and L1 with stable compression deformities at these levels.  There is a stable moderate compression deformity of L2.  Also, there is stable mild superior endplate depression of L4 and L5 without obvious interval loss of height.  There is severe disc space narrowing at the L5-S1 level.  There is multilevel facet joint arthropathy.  There is no new fracture.  There is a broad levocurvature of the upper lumbar spine.         Latest Reference Range & Units 11/05/24 15:42 12/05/24 14:42 03/24/25 15:14  04/24/25 16:38 05/26/25 15:21 06/23/25 16:43   Calcium 8.7 - 10.5 mg/dL 9.1 9.1 8.9 9.1 9.0 8.6 (L)   Phosphorus Level 2.7 - 4.5 mg/dL 3.7 3.8 3.7 4.6 (H) 4.3 3.3   ALP 40 - 150 unit/L 209 (H) 207 (H) 157 (H) 178 (H) 151 (H) 141   Albumin 3.5 - 5.2 g/dL 3.6 3.4 (L) 3.5 3.6 3.9 3.5   (L): Data is abnormally low  (H): Data is abnormally high         - Fracture history  - Age 79: Compression fractures of the spine T11, T12, L1, L2 (suspected from falls)     - Past osteoporosis medication: None prior   - Current therapy: None     - Calcium intake:  250 mg of calcium twice a day.      - Vit D3 intake:  Yes on Vit D3     - Pertinent factors:  No   Yes  [x]    []            Tobacco use  [x]    []            Alcohol use  []    [x]            Current diarrhea (no known celiac disease)  []    [x]            Thyroid disease (yes on LT4)  []    [x]            Anemia   [x]    []            Kidney stones  [x]    []            Hyperparathyroidism  []    [x]            High risk medications include:  Prednisone 2 mg daily, for multiple years for ILD   []    [x]            Recent falls (multiple in the past)  []    [x]            Height loss greater than 2 inches  [x]    []            Tolerating weight-bearing exercise     - Patient uses ambulatory devices: Walker and cane  - Sense of balance: Poor balance     - Completed teeth extraction and now has dentures for all her teeth.       Had menopause around age 50-55 years.   No estrogen treatments pursued.      ROS:   As above    Objective:     LMP 02/08/2000   BP Readings from Last 3 Encounters:   06/30/25 (!) 140/89   04/02/25 109/74   02/05/25 120/72     Wt Readings from Last 1 Encounters:   04/02/25 1335 60.8 kg (134 lb 0.6 oz)     There is no height or weight on file to calculate BMI.    Physical Exam    General: No acute distress. Nontoxic appearing.  Psychiatric: Normal mood. Normal affect. No evidence of SI.           Lab Review:   Lab Results   Component Value Date    HGBA1C  5.0 04/27/2024     Lab Results   Component Value Date    CHOL 131 05/26/2025    HDL 47 05/26/2025    LDLCALC 40.8 (L) 05/26/2025    TRIG 216 (H) 05/26/2025    CHOLHDL 35.9 05/26/2025     Lab Results   Component Value Date     06/23/2025    K 3.7 06/23/2025     06/23/2025    CO2 24 06/23/2025     (H) 06/23/2025    BUN 14 06/23/2025    CREATININE 0.7 06/23/2025    CALCIUM 8.6 (L) 06/23/2025    PROT 7.2 06/23/2025    ALBUMIN 3.5 06/23/2025    BILITOT 1.0 06/23/2025    ALKPHOS 141 06/23/2025    AST 15 06/23/2025    ALT 10 06/23/2025    ANIONGAP 11 06/23/2025    ESTGFRAFRICA >60 06/30/2022    EGFRNONAA >60 06/30/2022    TSH 2.840 03/13/2025     Vit D, 25-Hydroxy   Date Value Ref Range Status   03/13/2025 29 (L) 30 - 96 ng/mL Final     Comment:     Vitamin D deficiency.........<10 ng/mL                              Vitamin D insufficiency......10-29 ng/mL       Vitamin D sufficiency........> or equal to 30 ng/mL  Vitamin D toxicity............>100 ng/mL         Assessment and Plan       Known history of osteoporosis with compression fractures and prior kyphoplasty.  History of myelodysplastic syndrome precludes use of PTH analogs.  No history of stroke or heart attack in past 12 months, making Evenity a potential option.  Normal renal function allows consideration of Reclast.  Prolia also a viable option.  Oral bisphosphonates likely to be avoided due to less potent nature.  Recent normalization of alkaline phosphatase levels noted.  On chronic low-dose prednisone for interstitial lung disease.  Dental work completed with full dentures in place.  Will review updated bone density results to determine most appropriate treatment option.    OSTEOPOROSIS, UNSPECIFIED OSTEOPOROSIS TYPE, UNSPECIFIED PATHOLOGICAL FRACTURE PRESENCE:  - Explained difference between osteoporosis (bone quality issue) and arthritis (joint pain).  - Discussed two classes of osteoporosis medications: anti-resorptives and anabolics,  including their mechanisms of action (pills, IV infusion, subcutaneous injection) and bone-building properties.  - Ordered DEXA scan as last scan >2 years ago.  - Follow up after DEXA scan results are available for treatment recommendations.  - Recommend staying active for overall health, including bone and heart health.  - Continued levothyroxine 50 mcg daily.    BALANCE PROBLEM:  - Patient to continue to be careful and avoid situations that could lead to falls.  - Patient to always have something to hold onto when moving around.  - Recommend staying active for overall health, while being mindful of balance issues and arthritis pain.  - Contact the office with any questions or concerns that arise before next contact.    PLAN SUMMARY:  - Continue levothyroxine 50 mcg daily  - Ordered DEXA scan  - Continue calcium supplementation (Daniela brand, 250 mg dicalcium malate, 2 capsules daily)  - Continue vitamin D supplementation  - Follow up after DEXA scan results for treatment recommendations          RTC in 1 year, DXA soon.      **Visit today included increased complexity associated with the care of the problems addressed and managing the longitudinal care of the patient due to the serious and/or complex managed problems      Michael Waldron,      This note was generated with the assistance of ambient listening technology. Verbal consent was obtained by the patient and accompanying visitor(s) for the recording of patient appointment to facilitate this note. I attest to having reviewed and edited the generated note for accuracy, though some syntax or spelling errors may persist. Please contact the author of this note for any clarification.

## 2025-07-08 NOTE — TELEPHONE ENCOUNTER
Called pt to get her rescheduled for New PT/funct eval; no ans left detailed voicemail for pt to return my call to reschedule her PT/funct eval.

## 2025-07-08 NOTE — PATIENT INSTRUCTIONS
Osteoporosis Medication Options: Reclast, Prolia, and Evenity  You have been diagnosed with osteoporosis, a condition that weakens the bones and increases the risk of fractures. We will begin by updating your DXA scan to reassess your bone density, and then we will likely start treatment to help strengthen your bones and reduce your fracture risk.    All of the medications listed below are administered at our infusion centers and are commonly used to treat osteoporosis. Each medication works in a different way and has a specific treatment timeline and follow-up plan.        1. Reclast (Zoledronic Acid)  How it works:  Reclast is a bisphosphonate that reduces bone breakdown. It helps maintain or improve bone density and reduce the risk of fractures.  How it is given:  Administered as an intravenous (IV) infusion once a year. The infusion typically takes about 15 to 30 minutes.  Treatment timeline:  Generally given once yearly for 3 years. After that, we may pause treatment and continue to monitor your bone health with follow-up scans.  Follow-up considerations:  Reclast remains in the bone for several years, so its effects persist even after stopping. Long-term follow-up is still necessary to ensure ongoing bone protection.  Additional notes:  May cause temporary flu-like symptoms after the first dose. Kidney function should be checked before each dose. There is a rare risk of jaw problems or thigh bone fractures with long-term use.        2. Prolia (Denosumab)  How it works:  Prolia is a RANK ligand inhibitor that prevents bone breakdown by targeting a key signal that triggers bone resorption.  How it is given:  Given as a subcutaneous (under the skin) injection once every six months.  Treatment timeline:  Requires ongoing treatment every six months without interruption. If Prolia is stopped abruptly, rapid bone loss and increased fracture risk can occur. A transition to another bone medication, typically a  "bisphosphonate, is required if Prolia is discontinued.  Follow-up considerations:  Calcium and vitamin D levels must be monitored. If stopping Prolia, a bisphosphonate follow-up ("exit") therapy is essential to maintain the bone protection gained.  Additional notes:  May slightly increase the risk of certain infections or low calcium levels. Rare cases of jaw or thigh bone issues have also been reported.          3. Evenity (Romosozumab)  How it works:  Evenity is a bone-building medication that works in two ways: it increases new bone formation and also reduces bone breakdown. It blocks a protein called sclerostin, which naturally slows down bone formation.  How it is given:  Administered as two subcutaneous injections once a month for a total of 12 months.  Treatment timeline:  Used for 12 months only, then followed by another medication (usually a bisphosphonate) to help preserve the bone that was built.  Follow-up considerations:  A transition medication is necessary after completing the 12-month Evenity course to prevent bone loss.  Additional notes:  Not recommended for patients with a recent history of heart attack or stroke, as there may be a slight increase in cardiovascular risk.        Next Steps  We will begin with a repeat DXA scan to evaluate your current bone density.  Based on the results, we will determine the best medication option, with Reclast or Prolia likely being good first choices.  Evenity may be considered if you have had recent fractures or very low bone density, especially if bone-building is a priority.  Regardless of the medication chosen, long-term follow-up is essential, including labs, bone density scans, and planning for medication transitions when needed.    If you have any questions, please let us know. Well work together to select the safest and most effective treatment plan for your bone health.      Osteoporosis Treatment     Regardless if you are on a prescription medication " for osteoporosis or osteopenia, one should still do all of the following. All of these things combined help to reduce your fracture risk. The goal of all these treatments is to reduce your risk of fracture.      Take Calcium and Vitamin D- It is important to get a minimum amount of 1200 mg of calcium daily. That can be in the diet or in the form of a supplement. Also a minimum of 800 IU of Vitamin D should be taken a day. Taking a prescription medication does not take the place of taking Calcium plus vitamin D. Some trials show a reduced fracture risk with taking calcium and vitamin D.   Weight bearing exercise- this is extremely important to reduce fracture risk. Trials have shown that weight bearing exercise can reduce the risk of a hip fracture. It may also help with your bone density. At minimum one should do 30 minutes of weight bearing exercise 3 times a week. Yoga and Ben Chi can often also help with balance.   Fall Precautions- the 1st goal in preventing a fracture is not falling. Always wear good shoes and avoid heals. Make sure you check your house to make sure there is nothing that could be a fall risk (rony, odin). Make sure if you get up at night that there is some lighting. Be mindful with pets as they can be common reasons for a fall. We often times feel safe in our own home, but that is a common area that one can have a fracture. If you usually use a walker or a cane, make sure you use it in the home as well.      Bone Density- once a prescription medication is started, we check your bone density every 2 years. It usually does not need to be checked more than that as your bone changes very slowly. Always keep in mind the goal of therapy is to reduce your fracture risk and not normalize your bone density. Sometimes you may see a slight improvement in your bone density with treatment or no change at all. That is ok. One of the main reasons to do a bone density is to make sure there is not a decrease  in your bone density     Drug Holidays- this does not apply to Prolia. We only do drug holidays for Fosamax, Reclast, Actonel and Boniva. Stopping or delaying Prolia can cause a rebound loss of bone density and in rare cases a compression fracture.      Risks of Medications for Osteoporosis  Most patients tolerate these medications without any problems. The following do not include all the side effects of these medications  Rarely patients can develop pains with these medications. They usually will go away. However, if they are severe you should let us know immediately  Osteonecrosis of the Jaw (ONJ)- this is a rare complication of many bone medications. It is diagnosed by a dentist or oral surgeon. If you develop pain in your jaw let us know and we have you see your dentist for an evaluation. Most cases are in patients with cancer who get much larger doses of these medications. The overall risk is 1 in 10,000 to 1 in 100,000 patient years. It is always important to get regular dental cleanings. If you are planning an invasive dental procedure we may ask that you complete that prior to starting treatment.   Atypical Femur Fractures- This is also a rare side effect of these medications. The risk increases the longer you are on these medications. This is one reason we sometimes do drug holidays. This can usually present with thigh or groin pain. The overall risk is 3.2 to 50 cases per 100,000 patient years

## 2025-07-10 ENCOUNTER — DOCUMENT SCAN (OUTPATIENT)
Dept: HOME HEALTH SERVICES | Facility: HOSPITAL | Age: 80
End: 2025-07-10
Payer: MEDICARE

## 2025-07-11 ENCOUNTER — HOSPITAL ENCOUNTER (OUTPATIENT)
Dept: RADIOLOGY | Facility: HOSPITAL | Age: 80
Discharge: HOME | End: 2025-07-11
Attending: STUDENT IN AN ORGANIZED HEALTH CARE EDUCATION/TRAINING PROGRAM
Payer: MEDICARE

## 2025-07-11 DIAGNOSIS — M80.00XA AGE-RELATED OSTEOPOROSIS WITH CURRENT PATHOLOGICAL FRACTURE, INITIAL ENCOUNTER: ICD-10-CM

## 2025-07-11 DIAGNOSIS — M81.0 OSTEOPOROSIS, UNSPECIFIED OSTEOPOROSIS TYPE, UNSPECIFIED PATHOLOGICAL FRACTURE PRESENCE: ICD-10-CM

## 2025-07-11 PROCEDURE — 77091 TBS TECHL CALCULATION ONLY: CPT | Mod: FY,PO

## 2025-07-11 PROCEDURE — 77080 DXA BONE DENSITY AXIAL: CPT | Mod: 26,,, | Performed by: RADIOLOGY

## 2025-07-11 PROCEDURE — 77092 TBS I&R FX RSK QHP: CPT | Mod: ,,, | Performed by: RADIOLOGY

## 2025-07-11 NOTE — PROGRESS NOTES
Adrian arrived for Ertapenem 4/5.  cc bag @ 25 ml/hr infusing concurrently.   Tolerated infusion without difficulty and left unit in NAD. Return appointment provided.                (0) No visual loss

## 2025-07-15 ENCOUNTER — TELEPHONE (OUTPATIENT)
Dept: OTOLARYNGOLOGY | Facility: CLINIC | Age: 80
End: 2025-07-15
Payer: MEDICARE

## 2025-07-15 NOTE — TELEPHONE ENCOUNTER
Lvm regarding upcoming appt, pt is needing to be seen for hearing loss concerns, scheduled pt an audio prior

## 2025-07-16 ENCOUNTER — TELEPHONE (OUTPATIENT)
Dept: INTERNAL MEDICINE | Facility: CLINIC | Age: 80
End: 2025-07-16
Payer: MEDICARE

## 2025-07-18 ENCOUNTER — TELEPHONE (OUTPATIENT)
Dept: INTERNAL MEDICINE | Facility: CLINIC | Age: 80
End: 2025-07-18
Payer: MEDICARE

## 2025-07-18 NOTE — TELEPHONE ENCOUNTER
Attempted to contact Levi back from [facility] but I would need his last name to dial him back.

## 2025-07-18 NOTE — TELEPHONE ENCOUNTER
Copied from CRM #[id number]. Topic: General Inquiry - Return Call  >> Jul 18, 2025 11:37 AM Alfredito wrote:  Type:  Patient Returning Call    Who Called: Levi    Who Left Message for Patient: MITCHELL Rosales    Does the patient know what this is regarding?:returning    Would the patient rather a call back or a response via [facility]? call    Best Call Back Number:[phone]   fax: [phone]    Additional Information:

## 2025-07-19 ENCOUNTER — E-VISIT (OUTPATIENT)
Facility: CLINIC | Age: 80
End: 2025-07-19
Payer: MEDICARE

## 2025-07-19 ENCOUNTER — PATIENT MESSAGE (OUTPATIENT)
Dept: INTERNAL MEDICINE | Facility: CLINIC | Age: 80
End: 2025-07-19
Payer: MEDICARE

## 2025-07-19 ENCOUNTER — NURSE TRIAGE (OUTPATIENT)
Dept: ADMINISTRATIVE | Facility: CLINIC | Age: 80
End: 2025-07-19
Payer: MEDICARE

## 2025-07-19 ENCOUNTER — LAB VISIT (OUTPATIENT)
Dept: LAB | Facility: HOSPITAL | Age: 80
End: 2025-07-19
Attending: INTERNAL MEDICINE
Payer: MEDICARE

## 2025-07-19 DIAGNOSIS — N39.0 URINARY TRACT INFECTION WITHOUT HEMATURIA, SITE UNSPECIFIED: Primary | ICD-10-CM

## 2025-07-19 DIAGNOSIS — R30.0 DYSURIA: ICD-10-CM

## 2025-07-19 LAB
AMORPH CRY URNS QL MICRO: ABNORMAL
BACTERIA #/AREA URNS HPF: ABNORMAL /HPF
BILIRUB UR QL STRIP.AUTO: ABNORMAL
CLARITY UR: ABNORMAL
COLOR UR AUTO: ABNORMAL
GLUCOSE UR QL STRIP: NEGATIVE
HGB UR QL STRIP: ABNORMAL
HYALINE CASTS #/AREA URNS LPF: 0 /LPF (ref 0–1)
KETONES UR QL STRIP: ABNORMAL
LEUKOCYTE ESTERASE UR QL STRIP: ABNORMAL
MICROSCOPIC COMMENT: ABNORMAL
NITRITE UR QL STRIP: POSITIVE
PH UR STRIP: 7 [PH]
PROT UR QL STRIP: ABNORMAL
RBC #/AREA URNS HPF: 4 /HPF (ref 0–4)
SP GR UR STRIP: 1.02
SQUAMOUS #/AREA URNS HPF: 7 /HPF
WBC #/AREA URNS HPF: 25 /HPF (ref 0–5)

## 2025-07-19 PROCEDURE — 87086 URINE CULTURE/COLONY COUNT: CPT

## 2025-07-19 PROCEDURE — 81003 URINALYSIS AUTO W/O SCOPE: CPT | Mod: PO

## 2025-07-19 RX ORDER — SULFAMETHOXAZOLE AND TRIMETHOPRIM 800; 160 MG/1; MG/1
1 TABLET ORAL 2 TIMES DAILY
Qty: 14 TABLET | Refills: 0 | Status: SHIPPED | OUTPATIENT
Start: 2025-07-19

## 2025-07-19 NOTE — TELEPHONE ENCOUNTER
Pt daughter called triage and reports pt with severe pain(8/10) with urination. Reports pt with hx of chronic UTI. Has a standing order for urine lab test. So they collected urine sample and dropped off at lab today. Denies fever. Denies any confusion. Reports pain not improved after 2 hours of pain medicine.   According to care advice, see Physician within 4 hours (or PCP triage). [facility] provider, Edwardo Zabala II, MD secure chat sent. Pt has not taken her blood pressure medicine this morning, 146/95 blood pressure, pulse 78. Evisit scheduled per Dr. Zabala request.  Reason for Disposition   [1] SEVERE pain with urination (e.g., excruciating) AND [2] not improved after 2 hours of pain medicine    Additional Information   Negative: Shock suspected (e.g., cold/pale/clammy skin, too weak to stand, low BP, rapid pulse)   Negative: Sounds like a life-threatening emergency to the triager   Negative: [1] Unable to urinate (or only a few drops) > 4 hours AND [2] bladder feels very full (e.g., palpable bladder or strong urge to urinate)   Negative: Vomiting   Negative: Patient sounds very sick or weak to the triager    Protocols used: Urination Pain - Female-A-

## 2025-07-19 NOTE — PROGRESS NOTES
Patient ID: Niurka Pedraza is a 80 y.o. female.    {Message Patient Link  Message Patient:[id number]}    E-Visit Time Tracking: {Document Time:21062511}  Day 1 Time (in minutes): 10  Total Time (in minutes): 10    { Consider updating the patient's record with a blood pressure reading.  The last blood pressure was elevated or no blood pressure value has been documented in the past year. Click this link to document.       :[id number]}  Chief Complaint: Urinary Tract Infection (Entered automatically based on patient selection in [employer].)  { Quick Link CC:[id number]}    The patient initiated a request through [employer] on 7/19/2025 for evaluation and management with a chief complaint of Urinary Tract Infection (Entered automatically based on patient selection in [employer].)     I evaluated the questionnaire submission on 07/19/2025.    [facility] Peq Evisit Uti Questionnaire    7/19/2025  4:03 PM CDT - Filed by Luz Pedraza (Proxy)   Do you agree to participate in an E-Visit? Yes   If you have any of the following symptoms, please go to the nearest emergency room or call 911: I acknowledge   Choose the state of your primary residence [place]   What is the main issue you would like addressed today? UTI   Do you have any of the following symptoms? ( Discomfort or pain passing urine, Passing urine more frequently, Cloudy urine, Discharge in urine, Other, None) Discomfort or pain passing urine;  Passing urine more frequently;  Cloudy urine   When did your concern begin? 7/18/2025   What medications or treatments have you used to help your symptoms? (Antibiotics, Cranberry products, Drinking more fluids, Painkillers, Other, None) Cranberry products;  Drinking more fluids;  Other   What other medications or treatments have you used for your symptoms? Dmanose - azo   What does your urine look like? (Clear, Cloudy, Dark yellow, Light yellow, Pink or red (bloody), Foamy, Not sure) Cloudy   Have you had any of the following symptoms during the  past 24 hours?   Urgency (a sudden and uncontrollable urge to urinate) (None, Mild, Moderate, Severe) Severe   Frequency (going to the toilet very often) (None, Mild, Moderate, Severe) Severe   Burning pain when urinating (None, Mild, Moderate, Severe) Severe   Incomplete bladder emptying (still feel like you need to urinate again after urination) (None, Mild, Moderate, Severe) Severe   Pain in the lower belly when youre not urinating. (None, Mild, Moderate, Severe) Moderate   Please rate how much discomfort these symptoms have caused in the last 24 hours. (None, Mild, Moderate, Severe) Severe   Have you had any of the following symptoms during the past 24 hours?   Blood seen in the urine (None, Mild, Moderate, Severe) None   Pain in the lower back on one or both sides (flank pain) (None, Mild, Moderate, Severe) None   Abnormal Vaginal Discharge (abnormal amount, color and/or odor) (None, Mild, Moderate, Severe) Severe   Discharge from the opening you urinate from (urethra) when not urinating. (None, Mild, Moderate, Severe) Mild   Tell us how the symptoms have interfered with your every day activities/work in the past 24 hours. (None, Mild, Moderate, Severe) Severe   Do you have a fever? (Less than 100.4°F, 100.4°F or greater, No, Not sure) No   Are you a diabetic? No   If you are female, please tell us if you have any of the following right now (as youre completing the questionnaire): (Menstrual period (menses), PMS (Premenstrual syndrome),Signs of menopause such as hot flashes, Pregnancy, None) None   Do you have a history of kidney stones? No   Provide any information you feel is important to your history not asked above Ive had UTI in the past and it gets better with antibiotics   Please attach any relevant images or files (if you have performed a home test for UTI, please submit a photo of results)    Are you able to take your vitals? Yes   Systolic Blood Pressure 150   Diastolic Blood Pressure 86   Weight:  137.6   Height: 62   Pluse: 78   Temp 97.9   Resp: 77   SpO2 94         Encounter Diagnosis   Name Primary?    Urinary tract infection without hematuria, site unspecified Yes        No orders of the defined types were placed in this encounter.     Medications Ordered This Encounter   Medications    sulfamethoxazole-trimethoprim 800-160mg (BACTRIM DS) 800-160 mg Tab     Sig: Take 1 tablet by mouth 2 (two) times daily.     Dispense:  14 tablet     Refill:  0        No follow-ups on file.    {Documentation Link  Add Vitals   [facility]   Labs   Imaging   Return Letter   PDMP  Chart Review   Meds  Encounter  Results Review  Review full history  Snapshot Message Patient  Follow up:[id number]}    {Use the following Level of Service Charges based on Total Time  92327        5-10 min  29861        11-20 min  56738        21+ min.   (This text will automatically delete):[id number]}

## 2025-07-19 NOTE — PROGRESS NOTES
Patient ID: Niurka Pedraza is a 80 y.o. female.        E-Visit Time Tracking:   Day 1 Time (in minutes): 10  Total Time (in minutes): 10      Chief Complaint: Urinary Tract Infection (Entered automatically based on patient selection in [employer].)      The patient initiated a request through [employer] on 7/19/2025 for evaluation and management with a chief complaint of Urinary Tract Infection (Entered automatically based on patient selection in [employer].)     I evaluated the questionnaire submission on 07/19/2025.    [facility] P[facility] Evisit Uti Questionnaire    7/19/2025  4:03 PM CDT - Filed by Luz Pedraza (Proxy)   Do you agree to participate in an E-Visit? Yes   If you have any of the following symptoms, please go to the nearest emergency room or call 911: I acknowledge   Choose the state of your primary residence [place]   What is the main issue you would like addressed today? UTI   Do you have any of the following symptoms? ( Discomfort or pain passing urine, Passing urine more frequently, Cloudy urine, Discharge in urine, Other, None) Discomfort or pain passing urine;  Passing urine more frequently;  Cloudy urine   When did your concern begin? 7/18/2025   What medications or treatments have you used to help your symptoms? (Antibiotics, Cranberry products, Drinking more fluids, Painkillers, Other, None) Cranberry products;  Drinking more fluids;  Other   What other medications or treatments have you used for your symptoms? Dmanose - azo   What does your urine look like? (Clear, Cloudy, Dark yellow, Light yellow, Pink or red (bloody), Foamy, Not sure) Cloudy   Have you had any of the following symptoms during the past 24 hours?   Urgency (a sudden and uncontrollable urge to urinate) (None, Mild, Moderate, Severe) Severe   Frequency (going to the toilet very often) (None, Mild, Moderate, Severe) Severe   Burning pain when urinating (None, Mild, Moderate, Severe) Severe   Incomplete bladder emptying (still feel like you need to  urinate again after urination) (None, Mild, Moderate, Severe) Severe   Pain in the lower belly when youre not urinating. (None, Mild, Moderate, Severe) Moderate   Please rate how much discomfort these symptoms have caused in the last 24 hours. (None, Mild, Moderate, Severe) Severe   Have you had any of the following symptoms during the past 24 hours?   Blood seen in the urine (None, Mild, Moderate, Severe) None   Pain in the lower back on one or both sides (flank pain) (None, Mild, Moderate, Severe) None   Abnormal Vaginal Discharge (abnormal amount, color and/or odor) (None, Mild, Moderate, Severe) Severe   Discharge from the opening you urinate from (urethra) when not urinating. (None, Mild, Moderate, Severe) Mild   Tell us how the symptoms have interfered with your every day activities/work in the past 24 hours. (None, Mild, Moderate, Severe) Severe   Do you have a fever? (Less than 100.4°F, 100.4°F or greater, No, Not sure) No   Are you a diabetic? No   If you are female, please tell us if you have any of the following right now (as youre completing the questionnaire): (Menstrual period (menses), PMS (Premenstrual syndrome),Signs of menopause such as hot flashes, Pregnancy, None) None   Do you have a history of kidney stones? No   Provide any information you feel is important to your history not asked above Ive had UTI in the past and it gets better with antibiotics   Please attach any relevant images or files (if you have performed a home test for UTI, please submit a photo of results)    Are you able to take your vitals? Yes   Systolic Blood Pressure 150   Diastolic Blood Pressure 86   Weight: 137.6   Height: 62   Pluse: 78   Temp 97.9   Resp: 77   SpO2 94       MDM: Patient is daughter contacted nurse advice line due to patient having burning with urination, concern for another urinary tract infection.  She did drop off urinalysis at lab earlier today which I was able to review and does suggest UTI,  including positive nitrites..  Prior urine cultures showed fungal, several show no growth, but most recent E coli culture showed sensitivity to Macrobid and Bactrim.  The patient reports allergy to Macrobid.  Will call in a prescription for Bactrim to her pharmacy.  Advised to call primary care or Urology Monday for follow up.  Advised evaluation in urgent care or emergency room for fever or worsening symptoms in the interim.    Encounter Diagnosis   Name Primary?    Urinary tract infection without hematuria, site unspecified Yes        No orders of the defined types were placed in this encounter.     Medications Ordered This Encounter   Medications    sulfamethoxazole-trimethoprim 800-160mg (BACTRIM DS) 800-160 mg Tab     Sig: Take 1 tablet by mouth 2 (two) times daily.     Dispense:  14 tablet     Refill:  0        No follow-ups on file.

## 2025-07-20 LAB — BACTERIA UR CULT: NORMAL

## 2025-07-21 ENCOUNTER — TELEPHONE (OUTPATIENT)
Dept: INTERNAL MEDICINE | Facility: CLINIC | Age: 80
End: 2025-07-21
Payer: MEDICARE

## 2025-07-21 NOTE — TELEPHONE ENCOUNTER
Called to discuss UTI concerns and to schedule VV Per ED Dr Orders,. No answer Left VM for patient to call the office to schedule.

## 2025-07-21 NOTE — TELEPHONE ENCOUNTER
Copied from CRM #[id number]. Topic: Medications - Medication Status Check   >> Jul 21, 2025  9:37 AM Trino wrote:  Type: Call Back    Who called:[facility] in [place]    What is the request in detail: Needs last office visit notes for the signed prescriptions.     Best call back number:[phone]/fax # [phone]    Additional Information:

## 2025-07-23 ENCOUNTER — CLINICAL SUPPORT (OUTPATIENT)
Dept: AUDIOLOGY | Facility: CLINIC | Age: 80
End: 2025-07-23
Payer: MEDICARE

## 2025-07-23 ENCOUNTER — OFFICE VISIT (OUTPATIENT)
Dept: OTOLARYNGOLOGY | Facility: CLINIC | Age: 80
End: 2025-07-23
Payer: MEDICARE

## 2025-07-23 ENCOUNTER — LAB VISIT (OUTPATIENT)
Dept: LAB | Facility: HOSPITAL | Age: 80
End: 2025-07-23
Attending: INTERNAL MEDICINE
Payer: MEDICARE

## 2025-07-23 VITALS — WEIGHT: 134.06 LBS | BODY MASS INDEX: 24.52 KG/M2

## 2025-07-23 DIAGNOSIS — H91.91 PROFOUND HEARING LOSS OF RIGHT EAR: ICD-10-CM

## 2025-07-23 DIAGNOSIS — H61.22 IMPACTED CERUMEN OF LEFT EAR: Primary | ICD-10-CM

## 2025-07-23 DIAGNOSIS — H90.3 ASYMMETRICAL SENSORINEURAL HEARING LOSS: Primary | ICD-10-CM

## 2025-07-23 DIAGNOSIS — H90.5 HIGH FREQUENCY SENSORINEURAL HEARING LOSS OF LEFT EAR: ICD-10-CM

## 2025-07-23 DIAGNOSIS — H93.291 IMPAIRED AUDITORY DISCRIMINATION, RIGHT: ICD-10-CM

## 2025-07-23 PROCEDURE — 92557 COMPREHENSIVE HEARING TEST: CPT | Mod: PBBFAC,PO | Performed by: AUDIOLOGIST

## 2025-07-23 PROCEDURE — 99999 PR PBB SHADOW E&M-EST. PATIENT-LVL I: CPT | Mod: PBBFAC,,, | Performed by: AUDIOLOGIST

## 2025-07-23 PROCEDURE — 92567 TYMPANOMETRY: CPT | Mod: PBBFAC,PO | Performed by: AUDIOLOGIST

## 2025-07-23 PROCEDURE — 99999 PR PBB SHADOW E&M-EST. PATIENT-LVL II: CPT | Mod: PBBFAC,,, | Performed by: NURSE PRACTITIONER

## 2025-07-23 PROCEDURE — 99212 OFFICE O/P EST SF 10 MIN: CPT | Mod: PBBFAC,PO | Performed by: NURSE PRACTITIONER

## 2025-07-23 PROCEDURE — 99211 OFF/OP EST MAY X REQ PHY/QHP: CPT | Mod: PBBFAC,27,PO | Performed by: AUDIOLOGIST

## 2025-07-23 PROCEDURE — G0268 REMOVAL OF IMPACTED WAX MD: HCPCS | Mod: PBBFAC,PO,LT | Performed by: NURSE PRACTITIONER

## 2025-07-23 NOTE — PROGRESS NOTES
ED Subjective     Patient ID: Niurka Pedraza is a 80 y.o. female.    Chief Complaint: Hearing Loss    HPI  Patient has seen 3 different ENT providers within our department in the past 7.5 years for asymmetrical SNHL. Patient was last seen in ENT by Dr. العراقي 2.5 years ago for asymmetrical SNHL. Long-standing h/o asymmetric SNHL. Patient states she was diagnosed with right-sided SNHL in 2014 while living in [place], [place]. She reports that she was thoroughly evaluated and had a negative MRI scan. She does not have this previous audiogram or MRI imaging results. Patient returns today at the request of Dr. Anderson for hearing loss.   Duration of difficulty hearing: X many years  Laterality: right ear has no hearing  Family h/o HL: none  Noise exposure: none  Tinnitus: occasional mild  Otologic hx: no surgery or trauma      Review of Systems   Constitutional: Negative.    HENT: Negative.     Eyes: Negative.    Respiratory: Negative.     Cardiovascular: Negative.    Gastrointestinal: Negative.    Musculoskeletal: Negative.    Integumentary:  Negative.   Neurological: Negative.    Hematological: Negative.    Psychiatric/Behavioral: Negative.            Objective     Physical Exam  Vitals and nursing note reviewed.   Constitutional:       General: She is not in acute distress.     Appearance: She is well-developed. She is not ill-appearing.   HENT:      Head: Normocephalic and atraumatic.      Right Ear: Hearing, tympanic membrane, ear canal and external ear normal. No middle ear effusion. Tympanic membrane is not erythematous.      Left Ear: Hearing, tympanic membrane, ear canal and external ear normal.  No middle ear effusion. Tympanic membrane is not erythematous.      Nose: Nose normal.   Eyes:      General: Lids are normal. No scleral icterus.        Right eye: No discharge.         Left eye: No discharge.   Neck:      Trachea: Trachea normal. No tracheal deviation.   Cardiovascular:      Rate and Rhythm: Normal  rate.   Pulmonary:      Effort: Pulmonary effort is normal. No respiratory distress.      Breath sounds: No stridor. No wheezing.   Musculoskeletal:         General: Normal range of motion.      Cervical back: Normal range of motion and neck supple.   Skin:     General: Skin is warm and dry.   Neurological:      Mental Status: She is alert and oriented to person, place, and time.      Coordination: Coordination normal.      Gait: Gait normal.   Psychiatric:         Attention and Perception: Attention normal.         Mood and Affect: Mood normal.         Speech: Speech normal.         Behavior: Behavior normal. Behavior is cooperative.     SEPARATE PROCEDURE IN OFFICE:   Procedure: Removal of impacted cerumen, LEFT   Pre Procedure Diagnosis: Cerumen Impaction   Post Procedure Diagnosis: Cerumen Impaction   Verbal informed consent in regards to risk of trauma to ear canal, ear drum or hearing, discomfort during procedure and/or inability to remove cerumen impaction in one session or unforeseen events or complications.   No anesthesia.     Procedure in detail:   Ear canal visualized bilateral with appropriate size ear speculum utilizing Operating Head Binocular Otomicroscope   Utilizing the following:  Ring curet was used. The impacted cerumen of the ear canals was removed atraumatically. The TM and EAC were then inspected and found to be clear of wax. See description of TMs/EACs in PE above.   Complications: No   Condition: Improved/Good         Assessment and Plan     1. Impacted cerumen of left ear    2. Profound hearing loss of right ear    3. 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 Impaired auditory discrimination, right        Recommend daily use of binaural amplification (has hearing aids but not sure where they are).  Recommend annual audiogram to monitor hearing loss.   Recommend hearing protection in loud noise.   Return to clinic as needed for further ENT symptoms or concerns.             No follow-ups on file.

## 2025-07-23 NOTE — PROGRESS NOTES
The patient, accompanied by her daughter, was seen for a hearing evaluation.    Report from the patient was as follows:    Difficulty Hearing/Understanding - history of asymmetric hearing loss;  last hearing evaluation in 2022; noticing hearing in left ear has decreased; has right and left hearing aid, but has not used the aids in many years and does not know where hearing aids are  Tinnitus - negative  History of Loud Noise Exposure - negative  Family History of Hearing Loss - negative    Otoscopic screening revealed a clear view of TM AU    A hearing evaluation was performed today. Test results indicated a profound sensorineural hearing loss in the right ear and a mild-to-moderate sensorineural hearing loss in the left ear. Impedance testing showed a Type A tympanogram in each ear, consistent with normal middle ear function.    The recommendations were as follows:    (1)  Medical evaluation due to asymmetry  (2)  Hearing aid consult pending medical clearance  (3)  Ear protection in loud noise   (4)  Hearing evaluation in one year or sooner if hearing decrease is noted     Today's test results and recommendations were discussed with the patient.

## 2025-07-24 ENCOUNTER — DOCUMENT SCAN (OUTPATIENT)
Dept: HOME HEALTH SERVICES | Facility: HOSPITAL | Age: 80
End: 2025-07-24
Payer: MEDICARE

## 2025-07-25 ENCOUNTER — CLINICAL SUPPORT (OUTPATIENT)
Dept: AUDIOLOGY | Facility: CLINIC | Age: 80
End: 2025-07-25
Payer: MEDICARE

## 2025-07-25 DIAGNOSIS — Z71.89 HEARING AID CONSULTATION: Primary | ICD-10-CM

## 2025-07-25 NOTE — PROGRESS NOTES
The patient, accompanied by her daughter, was seen for a hearing aid consult. She reported that she was previously fitted with a Phonak Audeo B50-R in her right ear. The hearing in her right ear, including speech discrimination, has decreased. The patient has been evaluated medically for this decrease. She reported she has not worn any hearing aid for several years and that she is interested in pursuing a hearing aid fitting with new hearing aids. She reported she does not know where her Audeo B50-R hearing aid is. The patient ordered a Phonak Audeo I-70 hearing aid for the left ear and a CROS I-R for the right ear. The 30 day trial period and payment procedure were discussed, including the $250 non-refundable hearing aid deposit, which the patient paid today. The patient scheduled a hearing aid fitting.

## 2025-08-01 ENCOUNTER — DOCUMENTATION ONLY (OUTPATIENT)
Dept: AUDIOLOGY | Facility: CLINIC | Age: 80
End: 2025-08-01
Payer: MEDICARE

## 2025-08-01 NOTE — PROGRESS NOTES
I received in the patient's hearing aid order from [facility] and sent a copy of the invoice to Farzana EVANS Dylan and Justin.    Hearing Aid Information   and Model: Phonak CROS I-R & Audeo I70-R  Color: Silver Gray  Right SN: [id number]  Left SN: [id number]  Battery: Li-Ion Rechargeable 13  Repair Warranty Expiration Date: 08/27/28  L&D Warranty Expiration Date: 08/27/28   SN: [id number]

## 2025-08-05 ENCOUNTER — TELEPHONE (OUTPATIENT)
Dept: ENDOCRINOLOGY | Facility: CLINIC | Age: 80
End: 2025-08-05
Payer: MEDICARE

## 2025-08-05 DIAGNOSIS — M81.0 AGE-RELATED OSTEOPOROSIS WITHOUT CURRENT PATHOLOGICAL FRACTURE: Primary | ICD-10-CM

## 2025-08-05 RX ORDER — ZOLEDRONIC ACID 5 MG/100ML
5 INJECTION, SOLUTION INTRAVENOUS
OUTPATIENT
Start: 2025-08-11

## 2025-08-05 RX ORDER — SODIUM CHLORIDE 0.9 % (FLUSH) 0.9 %
10 SYRINGE (ML) INJECTION
OUTPATIENT
Start: 2025-08-11

## 2025-08-05 RX ORDER — HEPARIN 100 UNIT/ML
500 SYRINGE INTRAVENOUS
OUTPATIENT
Start: 2025-08-11

## 2025-08-05 NOTE — TELEPHONE ENCOUNTER
Reviewed prior DXA results and have recommend Reclast as therapy option which patient and family have agreed to proceed with.  We will send orders to the [facility] for this.  Known history of multiple compression fractures and very high risk for fracture so oral agents not felt to be potent enough for therapy.    Recent DXA:    FINDINGS:  DEXA:     The L2-L4 vertebral bone mineral density is equal to 0.982 g/cm squared with a T score of -0.9.  The left femoral neck bone mineral density is equal to 0.566 g/cm squared with a T score of -2.5.     Trabecular Bone Score:  The lumbar spine trabecular bone score (TBS L1-L4) is 1.253.  This is consistent with degraded microarchitecture.  The lumbar spine T-score adjusted for TBS is -0.9.  The femoral neck T-score adjusted for TBS is -2.5.  The bone resilience index is severely low compared to the reference population.     There is a 18% risk of a major osteoporotic fracture and a 6.8% risk of hip fracture in the next 10 years (TBS adjusted FRAX score).     Impression:  Osteoporosis

## 2025-08-11 ENCOUNTER — TELEPHONE (OUTPATIENT)
Dept: INFUSION THERAPY | Facility: HOSPITAL | Age: 80
End: 2025-08-11
Payer: MEDICARE

## 2025-08-11 ENCOUNTER — OFFICE VISIT (OUTPATIENT)
Dept: INTERNAL MEDICINE | Facility: CLINIC | Age: 80
End: 2025-08-11
Attending: INTERNAL MEDICINE
Payer: MEDICARE

## 2025-08-11 VITALS
DIASTOLIC BLOOD PRESSURE: 76 MMHG | HEIGHT: 62 IN | BODY MASS INDEX: 24.48 KG/M2 | WEIGHT: 133 LBS | HEART RATE: 82 BPM | SYSTOLIC BLOOD PRESSURE: 118 MMHG

## 2025-08-11 DIAGNOSIS — G89.29 CHRONIC MIDLINE LOW BACK PAIN WITH RIGHT-SIDED SCIATICA: ICD-10-CM

## 2025-08-11 DIAGNOSIS — S32.020D COMPRESSION FRACTURE OF L2 VERTEBRA WITH ROUTINE HEALING, SUBSEQUENT ENCOUNTER: ICD-10-CM

## 2025-08-11 DIAGNOSIS — M80.00XD AGE-RELATED OSTEOPOROSIS WITH CURRENT PATHOLOGICAL FRACTURE WITH ROUTINE HEALING, SUBSEQUENT ENCOUNTER: ICD-10-CM

## 2025-08-11 DIAGNOSIS — M54.41 CHRONIC MIDLINE LOW BACK PAIN WITH RIGHT-SIDED SCIATICA: ICD-10-CM

## 2025-08-11 DIAGNOSIS — D46.9 MDS (MYELODYSPLASTIC SYNDROME): Chronic | ICD-10-CM

## 2025-08-11 DIAGNOSIS — R30.0 DYSURIA: Primary | ICD-10-CM

## 2025-08-11 DIAGNOSIS — J84.9 INTERSTITIAL LUNG DISEASE: ICD-10-CM

## 2025-08-11 DIAGNOSIS — D84.9 IMMUNOCOMPROMISED: ICD-10-CM

## 2025-08-11 PROCEDURE — 98006 SYNCH AUDIO-VIDEO EST MOD 30: CPT | Mod: 95,,, | Performed by: INTERNAL MEDICINE

## 2025-08-11 PROCEDURE — G2211 COMPLEX E/M VISIT ADD ON: HCPCS | Mod: 95,,, | Performed by: INTERNAL MEDICINE

## 2025-08-12 ENCOUNTER — PATIENT MESSAGE (OUTPATIENT)
Dept: UROLOGY | Facility: CLINIC | Age: 80
End: 2025-08-12
Payer: MEDICARE

## 2025-08-12 ENCOUNTER — LAB VISIT (OUTPATIENT)
Dept: LAB | Facility: HOSPITAL | Age: 80
End: 2025-08-12
Attending: OBSTETRICS & GYNECOLOGY
Payer: MEDICARE

## 2025-08-12 ENCOUNTER — TELEPHONE (OUTPATIENT)
Dept: INTERNAL MEDICINE | Facility: CLINIC | Age: 80
End: 2025-08-12
Payer: MEDICARE

## 2025-08-12 ENCOUNTER — PATIENT MESSAGE (OUTPATIENT)
Dept: INTERNAL MEDICINE | Facility: CLINIC | Age: 80
End: 2025-08-12
Payer: MEDICARE

## 2025-08-12 DIAGNOSIS — R30.0 DYSURIA: Primary | ICD-10-CM

## 2025-08-12 DIAGNOSIS — R30.0 DYSURIA: ICD-10-CM

## 2025-08-12 LAB
BACTERIA #/AREA URNS HPF: ABNORMAL /HPF
BILIRUB UR QL STRIP.AUTO: ABNORMAL
CAOX CRY URNS QL MICRO: ABNORMAL
CLARITY UR: ABNORMAL
COLOR UR AUTO: ABNORMAL
GLUCOSE UR QL STRIP: NEGATIVE
HGB UR QL STRIP: ABNORMAL
KETONES UR QL STRIP: ABNORMAL
LEUKOCYTE ESTERASE UR QL STRIP: ABNORMAL
MICROSCOPIC COMMENT: ABNORMAL
NITRITE UR QL STRIP: NEGATIVE
PH UR STRIP: 7 [PH]
PROT UR QL STRIP: ABNORMAL
SP GR UR STRIP: 1.02
SQUAMOUS #/AREA URNS HPF: 9 /HPF
WBC #/AREA URNS HPF: >100 /HPF (ref 0–5)

## 2025-08-12 PROCEDURE — 81000 URINALYSIS NONAUTO W/SCOPE: CPT | Mod: PN

## 2025-08-12 PROCEDURE — 87086 URINE CULTURE/COLONY COUNT: CPT

## 2025-08-13 DIAGNOSIS — N39.0 RECURRENT UTI: ICD-10-CM

## 2025-08-13 LAB — BACTERIA UR CULT: NORMAL

## 2025-08-13 RX ORDER — ESTRADIOL 0.1 MG/G
CREAM VAGINAL
Qty: 42.5 G | Refills: 3 | Status: SHIPPED | OUTPATIENT
Start: 2025-08-13

## 2025-08-13 RX ORDER — ESTRADIOL 0.1 MG/G
CREAM VAGINAL
Qty: 42.5 G | Refills: 3 | Status: CANCELLED | OUTPATIENT
Start: 2025-08-13

## 2025-08-15 ENCOUNTER — CLINICAL SUPPORT (OUTPATIENT)
Dept: AUDIOLOGY | Facility: CLINIC | Age: 80
End: 2025-08-15
Payer: MEDICARE

## 2025-08-15 DIAGNOSIS — Z46.1 ENCOUNTER FOR HEARING AID FITTING: Primary | ICD-10-CM

## 2025-08-19 ENCOUNTER — TELEPHONE (OUTPATIENT)
Dept: INTERNAL MEDICINE | Facility: CLINIC | Age: 80
End: 2025-08-19
Payer: MEDICARE

## 2025-08-19 DIAGNOSIS — R30.0 DYSURIA: Primary | ICD-10-CM

## 2025-08-20 ENCOUNTER — INFUSION (OUTPATIENT)
Dept: INFUSION THERAPY | Facility: HOSPITAL | Age: 80
End: 2025-08-20
Attending: STUDENT IN AN ORGANIZED HEALTH CARE EDUCATION/TRAINING PROGRAM
Payer: MEDICARE

## 2025-08-20 VITALS
SYSTOLIC BLOOD PRESSURE: 110 MMHG | HEART RATE: 81 BPM | RESPIRATION RATE: 18 BRPM | HEIGHT: 62 IN | WEIGHT: 132.25 LBS | TEMPERATURE: 98 F | BODY MASS INDEX: 24.34 KG/M2 | OXYGEN SATURATION: 98 % | DIASTOLIC BLOOD PRESSURE: 71 MMHG

## 2025-08-20 DIAGNOSIS — M81.0 AGE-RELATED OSTEOPOROSIS WITHOUT CURRENT PATHOLOGICAL FRACTURE: Primary | ICD-10-CM

## 2025-08-20 PROCEDURE — 96374 THER/PROPH/DIAG INJ IV PUSH: CPT | Mod: PN

## 2025-08-20 PROCEDURE — 63600175 PHARM REV CODE 636 W HCPCS: Mod: PN | Performed by: STUDENT IN AN ORGANIZED HEALTH CARE EDUCATION/TRAINING PROGRAM

## 2025-08-20 PROCEDURE — 25000003 PHARM REV CODE 250: Mod: PN | Performed by: STUDENT IN AN ORGANIZED HEALTH CARE EDUCATION/TRAINING PROGRAM

## 2025-08-20 RX ORDER — SODIUM CHLORIDE 0.9 % (FLUSH) 0.9 %
10 SYRINGE (ML) INJECTION
OUTPATIENT
Start: 2025-08-20

## 2025-08-20 RX ORDER — ZOLEDRONIC ACID 5 MG/100ML
5 INJECTION, SOLUTION INTRAVENOUS
Status: COMPLETED | OUTPATIENT
Start: 2025-08-20 | End: 2025-08-20

## 2025-08-20 RX ORDER — SODIUM CHLORIDE 0.9 % (FLUSH) 0.9 %
10 SYRINGE (ML) INJECTION
Status: DISCONTINUED | OUTPATIENT
Start: 2025-08-20 | End: 2025-08-20 | Stop reason: HOSPADM

## 2025-08-20 RX ORDER — HEPARIN 100 UNIT/ML
500 SYRINGE INTRAVENOUS
OUTPATIENT
Start: 2025-08-20

## 2025-08-20 RX ORDER — ZOLEDRONIC ACID 5 MG/100ML
5 INJECTION, SOLUTION INTRAVENOUS
OUTPATIENT
Start: 2025-08-20 | End: 2025-08-20

## 2025-08-20 RX ADMIN — SODIUM CHLORIDE: 9 INJECTION, SOLUTION INTRAVENOUS at 03:08

## 2025-08-20 RX ADMIN — ZOLEDRONIC ACID 5 MG: 0.05 INJECTION, SOLUTION INTRAVENOUS at 03:08

## 2025-08-22 ENCOUNTER — CLINICAL SUPPORT (OUTPATIENT)
Dept: AUDIOLOGY | Facility: CLINIC | Age: 80
End: 2025-08-22
Payer: MEDICARE

## 2025-08-22 DIAGNOSIS — Z97.4 WEARS HEARING AID: Primary | ICD-10-CM

## 2025-08-29 ENCOUNTER — PATIENT MESSAGE (OUTPATIENT)
Dept: INTERNAL MEDICINE | Facility: CLINIC | Age: 80
End: 2025-08-29
Payer: MEDICARE

## 2025-09-02 ENCOUNTER — DOCUMENT SCAN (OUTPATIENT)
Dept: HOME HEALTH SERVICES | Facility: HOSPITAL | Age: 80
End: 2025-09-02
Payer: MEDICARE

## (undated) DEVICE — LOOP CUT BIPOLAR 24/26FR YEL

## (undated) DEVICE — SOL IRR NACL .9% 3000ML

## (undated) DEVICE — GLOVE BIOGEL SENSOR SZ 6.5

## (undated) DEVICE — POSITIONER HEAD ADULT

## (undated) DEVICE — JELLY KY LUBRICATING 5G PACKET

## (undated) DEVICE — KIT WING PAD POSITIONING

## (undated) DEVICE — SOL NS 1000CC

## (undated) DEVICE — SUT MCRYL PLUS 4-0 PS2 27IN

## (undated) DEVICE — DEVICE ANC SW STAT FOLEY 6-24

## (undated) DEVICE — SYR 10CC LUER LOCK

## (undated) DEVICE — TIP SUCTION YANKAUER

## (undated) DEVICE — SEE L#120831

## (undated) DEVICE — DRAPE COLUMN DAVINCI XI

## (undated) DEVICE — SYR CATHETER TIP 60ML

## (undated) DEVICE — PORT ACCESS 8MM W/120MM LOW

## (undated) DEVICE — PAD ABD 8X10 STERILE

## (undated) DEVICE — GLOVE BIOGEL SKINSENSE PI 6.5

## (undated) DEVICE — SEE MEDLINE ITEM 146313

## (undated) DEVICE — SUT PROLENE 0 CT1 30IN BLUE

## (undated) DEVICE — SEALER LIGASURE IMPACT 18CM

## (undated) DEVICE — OBTURATOR BLADELESS 8MM XI

## (undated) DEVICE — SUT VICRYL PLUS 0 CT1 36IN

## (undated) DEVICE — IRRIGATOR ENDOSCOPY DISP.

## (undated) DEVICE — SET CYSTO IRRIGATION UNIV SPIK

## (undated) DEVICE — CATH URTRL OPEN END STR TIP 5F

## (undated) DEVICE — MANIPULATOR VCARE PLUS 34MM

## (undated) DEVICE — COVER TIP CURVED SCISSORS XI

## (undated) DEVICE — INSERT CUSHIONPRONE VIEW LARGE

## (undated) DEVICE — Device

## (undated) DEVICE — SOL PVP-I SCRUB 7.5% 4OZ

## (undated) DEVICE — SET TRI-LUMEN FILTERED TUBE

## (undated) DEVICE — SEE MEDLINE ITEM 156923

## (undated) DEVICE — NDL INSUF ULTRA VERESS 120MM

## (undated) DEVICE — ELECTRODE REM PLYHSV RETURN 9

## (undated) DEVICE — ADHESIVE DERMABOND ADVANCED

## (undated) DEVICE — SOL CLEARIFY VISUALIZATION LAP

## (undated) DEVICE — SOL WATER STRL IRR 1000ML

## (undated) DEVICE — SUT V-LOC 180 ABD 2/0 GS-21

## (undated) DEVICE — DRAPE ARM DAVINCI XI

## (undated) DEVICE — SOL ELECTROLUBE ANTI-STIC

## (undated) DEVICE — SEAL UNIVERSAL 5MM-8MM XI